# Patient Record
Sex: MALE | Race: ASIAN | NOT HISPANIC OR LATINO | URBAN - METROPOLITAN AREA
[De-identification: names, ages, dates, MRNs, and addresses within clinical notes are randomized per-mention and may not be internally consistent; named-entity substitution may affect disease eponyms.]

---

## 2021-04-01 ENCOUNTER — INPATIENT (INPATIENT)
Facility: HOSPITAL | Age: 61
LOS: 10 days | Discharge: ANOTHER HOSPITAL | End: 2021-04-12
Attending: INTERNAL MEDICINE | Admitting: INTERNAL MEDICINE
Payer: COMMERCIAL

## 2021-04-01 VITALS
SYSTOLIC BLOOD PRESSURE: 108 MMHG | RESPIRATION RATE: 28 BRPM | TEMPERATURE: 97 F | HEART RATE: 120 BPM | OXYGEN SATURATION: 60 % | DIASTOLIC BLOOD PRESSURE: 86 MMHG

## 2021-04-01 DIAGNOSIS — J96.00 ACUTE RESPIRATORY FAILURE, UNSPECIFIED WHETHER WITH HYPOXIA OR HYPERCAPNIA: ICD-10-CM

## 2021-04-01 DIAGNOSIS — R09.89 OTHER SPECIFIED SYMPTOMS AND SIGNS INVOLVING THE CIRCULATORY AND RESPIRATORY SYSTEMS: ICD-10-CM

## 2021-04-01 LAB
ALBUMIN SERPL ELPH-MCNC: 3.2 G/DL — LOW (ref 3.3–5)
ALBUMIN SERPL ELPH-MCNC: 3.2 G/DL — LOW (ref 3.3–5)
ALP SERPL-CCNC: 66 U/L — SIGNIFICANT CHANGE UP (ref 40–120)
ALP SERPL-CCNC: 66 U/L — SIGNIFICANT CHANGE UP (ref 40–120)
ALT FLD-CCNC: 74 U/L — HIGH (ref 4–41)
ALT FLD-CCNC: 74 U/L — HIGH (ref 4–41)
ANION GAP SERPL CALC-SCNC: 14 MMOL/L — SIGNIFICANT CHANGE UP (ref 7–14)
ANION GAP SERPL CALC-SCNC: 16 MMOL/L — HIGH (ref 7–14)
APTT BLD: 28.6 SEC — SIGNIFICANT CHANGE UP (ref 27–36.3)
APTT BLD: 56.8 SEC — HIGH (ref 27–36.3)
AST SERPL-CCNC: 157 U/L — HIGH (ref 4–40)
AST SERPL-CCNC: 173 U/L — HIGH (ref 4–40)
B-OH-BUTYR SERPL-SCNC: 1.3 MMOL/L — HIGH (ref 0–0.4)
BASOPHILS # BLD AUTO: 0 K/UL — SIGNIFICANT CHANGE UP (ref 0–0.2)
BASOPHILS NFR BLD AUTO: 0 % — SIGNIFICANT CHANGE UP (ref 0–2)
BILIRUB SERPL-MCNC: 0.8 MG/DL — SIGNIFICANT CHANGE UP (ref 0.2–1.2)
BILIRUB SERPL-MCNC: 0.9 MG/DL — SIGNIFICANT CHANGE UP (ref 0.2–1.2)
BLOOD GAS ARTERIAL COMPREHENSIVE RESULT: SIGNIFICANT CHANGE UP
BLOOD GAS ARTERIAL COMPREHENSIVE RESULT: SIGNIFICANT CHANGE UP
BLOOD GAS ARTERIAL COMPREHENSIVE WITH CREATININE RESULT: SIGNIFICANT CHANGE UP
BUN SERPL-MCNC: 45 MG/DL — HIGH (ref 7–23)
BUN SERPL-MCNC: 46 MG/DL — HIGH (ref 7–23)
CALCIUM SERPL-MCNC: 7.9 MG/DL — LOW (ref 8.4–10.5)
CALCIUM SERPL-MCNC: 8 MG/DL — LOW (ref 8.4–10.5)
CHLORIDE SERPL-SCNC: 103 MMOL/L — SIGNIFICANT CHANGE UP (ref 98–107)
CHLORIDE SERPL-SCNC: 103 MMOL/L — SIGNIFICANT CHANGE UP (ref 98–107)
CO2 SERPL-SCNC: 19 MMOL/L — LOW (ref 22–31)
CO2 SERPL-SCNC: 21 MMOL/L — LOW (ref 22–31)
CREAT SERPL-MCNC: 2.32 MG/DL — HIGH (ref 0.5–1.3)
CREAT SERPL-MCNC: 2.47 MG/DL — HIGH (ref 0.5–1.3)
EOSINOPHIL # BLD AUTO: 0 K/UL — SIGNIFICANT CHANGE UP (ref 0–0.5)
EOSINOPHIL NFR BLD AUTO: 0 % — SIGNIFICANT CHANGE UP (ref 0–6)
GLUCOSE BLDC GLUCOMTR-MCNC: 127 MG/DL — HIGH (ref 70–99)
GLUCOSE BLDC GLUCOMTR-MCNC: 127 MG/DL — HIGH (ref 70–99)
GLUCOSE BLDC GLUCOMTR-MCNC: 135 MG/DL — HIGH (ref 70–99)
GLUCOSE BLDC GLUCOMTR-MCNC: 140 MG/DL — HIGH (ref 70–99)
GLUCOSE BLDC GLUCOMTR-MCNC: 193 MG/DL — HIGH (ref 70–99)
GLUCOSE BLDC GLUCOMTR-MCNC: 235 MG/DL — HIGH (ref 70–99)
GLUCOSE BLDC GLUCOMTR-MCNC: 242 MG/DL — HIGH (ref 70–99)
GLUCOSE BLDC GLUCOMTR-MCNC: 243 MG/DL — HIGH (ref 70–99)
GLUCOSE BLDC GLUCOMTR-MCNC: 261 MG/DL — HIGH (ref 70–99)
GLUCOSE BLDC GLUCOMTR-MCNC: 289 MG/DL — HIGH (ref 70–99)
GLUCOSE BLDC GLUCOMTR-MCNC: 294 MG/DL — HIGH (ref 70–99)
GLUCOSE BLDC GLUCOMTR-MCNC: 360 MG/DL — HIGH (ref 70–99)
GLUCOSE SERPL-MCNC: 284 MG/DL — HIGH (ref 70–99)
GLUCOSE SERPL-MCNC: 300 MG/DL — HIGH (ref 70–99)
LYMPHOCYTES # BLD AUTO: 1.92 K/UL — SIGNIFICANT CHANGE UP (ref 1–3.3)
LYMPHOCYTES # BLD AUTO: 6.9 % — LOW (ref 13–44)
MONOCYTES # BLD AUTO: 1.2 K/UL — HIGH (ref 0–0.9)
MONOCYTES NFR BLD AUTO: 4.3 % — SIGNIFICANT CHANGE UP (ref 2–14)
NEUTROPHILS # BLD AUTO: 22.77 K/UL — HIGH (ref 1.8–7.4)
NEUTROPHILS NFR BLD AUTO: 81.9 % — HIGH (ref 43–77)
POTASSIUM SERPL-MCNC: 4.4 MMOL/L — SIGNIFICANT CHANGE UP (ref 3.5–5.3)
POTASSIUM SERPL-MCNC: 4.5 MMOL/L — SIGNIFICANT CHANGE UP (ref 3.5–5.3)
POTASSIUM SERPL-SCNC: 4.4 MMOL/L — SIGNIFICANT CHANGE UP (ref 3.5–5.3)
POTASSIUM SERPL-SCNC: 4.5 MMOL/L — SIGNIFICANT CHANGE UP (ref 3.5–5.3)
PROT SERPL-MCNC: 6 G/DL — SIGNIFICANT CHANGE UP (ref 6–8.3)
PROT SERPL-MCNC: 6 G/DL — SIGNIFICANT CHANGE UP (ref 6–8.3)
SARS-COV-2 RNA SPEC QL NAA+PROBE: DETECTED
SODIUM SERPL-SCNC: 138 MMOL/L — SIGNIFICANT CHANGE UP (ref 135–145)
SODIUM SERPL-SCNC: 138 MMOL/L — SIGNIFICANT CHANGE UP (ref 135–145)

## 2021-04-01 PROCEDURE — 71045 X-RAY EXAM CHEST 1 VIEW: CPT | Mod: 26,77,76

## 2021-04-01 PROCEDURE — 36620 INSERTION CATHETER ARTERY: CPT

## 2021-04-01 PROCEDURE — 31500 INSERT EMERGENCY AIRWAY: CPT

## 2021-04-01 PROCEDURE — 99291 CRITICAL CARE FIRST HOUR: CPT | Mod: 25

## 2021-04-01 PROCEDURE — 71275 CT ANGIOGRAPHY CHEST: CPT | Mod: 26

## 2021-04-01 PROCEDURE — 71045 X-RAY EXAM CHEST 1 VIEW: CPT | Mod: 26

## 2021-04-01 PROCEDURE — 36556 INSERT NON-TUNNEL CV CATH: CPT

## 2021-04-01 RX ORDER — NOREPINEPHRINE BITARTRATE/D5W 8 MG/250ML
0.05 PLASTIC BAG, INJECTION (ML) INTRAVENOUS
Qty: 8 | Refills: 0 | Status: DISCONTINUED | OUTPATIENT
Start: 2021-04-01 | End: 2021-04-01

## 2021-04-01 RX ORDER — HEPARIN SODIUM 5000 [USP'U]/ML
INJECTION INTRAVENOUS; SUBCUTANEOUS
Qty: 25000 | Refills: 0 | Status: DISCONTINUED | OUTPATIENT
Start: 2021-04-01 | End: 2021-04-09

## 2021-04-01 RX ORDER — INSULIN HUMAN 100 [IU]/ML
9 INJECTION, SOLUTION SUBCUTANEOUS
Qty: 100 | Refills: 0 | Status: DISCONTINUED | OUTPATIENT
Start: 2021-04-01 | End: 2021-04-02

## 2021-04-01 RX ORDER — PROPOFOL 10 MG/ML
10 INJECTION, EMULSION INTRAVENOUS
Qty: 1000 | Refills: 0 | Status: DISCONTINUED | OUTPATIENT
Start: 2021-04-01 | End: 2021-04-01

## 2021-04-01 RX ORDER — NOREPINEPHRINE BITARTRATE/D5W 8 MG/250ML
0.05 PLASTIC BAG, INJECTION (ML) INTRAVENOUS
Qty: 16 | Refills: 0 | Status: DISCONTINUED | OUTPATIENT
Start: 2021-04-01 | End: 2021-04-06

## 2021-04-01 RX ORDER — DEXAMETHASONE 0.5 MG/5ML
6 ELIXIR ORAL DAILY
Refills: 0 | Status: COMPLETED | OUTPATIENT
Start: 2021-04-01 | End: 2021-04-10

## 2021-04-01 RX ORDER — SODIUM CHLORIDE 9 MG/ML
1000 INJECTION INTRAMUSCULAR; INTRAVENOUS; SUBCUTANEOUS ONCE
Refills: 0 | Status: COMPLETED | OUTPATIENT
Start: 2021-04-01 | End: 2021-04-01

## 2021-04-01 RX ORDER — MIDAZOLAM HYDROCHLORIDE 1 MG/ML
0.1 INJECTION, SOLUTION INTRAMUSCULAR; INTRAVENOUS
Qty: 100 | Refills: 0 | Status: DISCONTINUED | OUTPATIENT
Start: 2021-04-01 | End: 2021-04-03

## 2021-04-01 RX ORDER — MIDAZOLAM HYDROCHLORIDE 1 MG/ML
0.02 INJECTION, SOLUTION INTRAMUSCULAR; INTRAVENOUS
Qty: 100 | Refills: 0 | Status: DISCONTINUED | OUTPATIENT
Start: 2021-04-01 | End: 2021-04-03

## 2021-04-01 RX ORDER — HEPARIN SODIUM 5000 [USP'U]/ML
6000 INJECTION INTRAVENOUS; SUBCUTANEOUS EVERY 6 HOURS
Refills: 0 | Status: DISCONTINUED | OUTPATIENT
Start: 2021-04-01 | End: 2021-04-01

## 2021-04-01 RX ORDER — VANCOMYCIN HCL 1 G
1000 VIAL (EA) INTRAVENOUS ONCE
Refills: 0 | Status: COMPLETED | OUTPATIENT
Start: 2021-04-01 | End: 2021-04-01

## 2021-04-01 RX ORDER — PROPOFOL 10 MG/ML
10 INJECTION, EMULSION INTRAVENOUS
Qty: 1000 | Refills: 0 | Status: DISCONTINUED | OUTPATIENT
Start: 2021-04-01 | End: 2021-04-06

## 2021-04-01 RX ORDER — CHLORHEXIDINE GLUCONATE 213 G/1000ML
1 SOLUTION TOPICAL
Refills: 0 | Status: DISCONTINUED | OUTPATIENT
Start: 2021-04-01 | End: 2021-04-12

## 2021-04-01 RX ORDER — DEXAMETHASONE 0.5 MG/5ML
6 ELIXIR ORAL ONCE
Refills: 0 | Status: COMPLETED | OUTPATIENT
Start: 2021-04-01 | End: 2021-04-01

## 2021-04-01 RX ORDER — INSULIN HUMAN 100 [IU]/ML
0.1 INJECTION, SOLUTION SUBCUTANEOUS
Qty: 100 | Refills: 0 | Status: DISCONTINUED | OUTPATIENT
Start: 2021-04-01 | End: 2021-04-01

## 2021-04-01 RX ORDER — DEXTROSE 50 % IN WATER 50 %
25 SYRINGE (ML) INTRAVENOUS ONCE
Refills: 0 | Status: DISCONTINUED | OUTPATIENT
Start: 2021-04-01 | End: 2021-04-12

## 2021-04-01 RX ORDER — CEFEPIME 1 G/1
1000 INJECTION, POWDER, FOR SOLUTION INTRAMUSCULAR; INTRAVENOUS ONCE
Refills: 0 | Status: COMPLETED | OUTPATIENT
Start: 2021-04-01 | End: 2021-04-02

## 2021-04-01 RX ORDER — CHLORHEXIDINE GLUCONATE 213 G/1000ML
15 SOLUTION TOPICAL EVERY 12 HOURS
Refills: 0 | Status: DISCONTINUED | OUTPATIENT
Start: 2021-04-01 | End: 2021-04-07

## 2021-04-01 RX ORDER — DEXTROSE 50 % IN WATER 50 %
15 SYRINGE (ML) INTRAVENOUS ONCE
Refills: 0 | Status: DISCONTINUED | OUTPATIENT
Start: 2021-04-01 | End: 2021-04-02

## 2021-04-01 RX ORDER — PROPOFOL 10 MG/ML
20 INJECTION, EMULSION INTRAVENOUS
Qty: 1000 | Refills: 0 | Status: DISCONTINUED | OUTPATIENT
Start: 2021-04-01 | End: 2021-04-02

## 2021-04-01 RX ORDER — DEXTROSE 10 % IN WATER 10 %
1000 INTRAVENOUS SOLUTION INTRAVENOUS
Refills: 0 | Status: DISCONTINUED | OUTPATIENT
Start: 2021-04-01 | End: 2021-04-01

## 2021-04-01 RX ORDER — FENTANYL CITRATE 50 UG/ML
0.5 INJECTION INTRAVENOUS
Qty: 2500 | Refills: 0 | Status: DISCONTINUED | OUTPATIENT
Start: 2021-04-01 | End: 2021-04-02

## 2021-04-01 RX ORDER — GLUCAGON INJECTION, SOLUTION 0.5 MG/.1ML
1 INJECTION, SOLUTION SUBCUTANEOUS ONCE
Refills: 0 | Status: DISCONTINUED | OUTPATIENT
Start: 2021-04-01 | End: 2021-04-12

## 2021-04-01 RX ORDER — HUMAN INSULIN 100 [IU]/ML
5 INJECTION, SUSPENSION SUBCUTANEOUS EVERY 6 HOURS
Refills: 0 | Status: DISCONTINUED | OUTPATIENT
Start: 2021-04-01 | End: 2021-04-02

## 2021-04-01 RX ORDER — INSULIN LISPRO 100/ML
VIAL (ML) SUBCUTANEOUS EVERY 6 HOURS
Refills: 0 | Status: DISCONTINUED | OUTPATIENT
Start: 2021-04-01 | End: 2021-04-02

## 2021-04-01 RX ORDER — SODIUM CHLORIDE 9 MG/ML
1000 INJECTION, SOLUTION INTRAVENOUS
Refills: 0 | Status: DISCONTINUED | OUTPATIENT
Start: 2021-04-01 | End: 2021-04-02

## 2021-04-01 RX ORDER — CISATRACURIUM BESYLATE 2 MG/ML
3 INJECTION INTRAVENOUS
Qty: 200 | Refills: 0 | Status: DISCONTINUED | OUTPATIENT
Start: 2021-04-01 | End: 2021-04-02

## 2021-04-01 RX ORDER — HEPARIN SODIUM 5000 [USP'U]/ML
3000 INJECTION INTRAVENOUS; SUBCUTANEOUS EVERY 6 HOURS
Refills: 0 | Status: DISCONTINUED | OUTPATIENT
Start: 2021-04-01 | End: 2021-04-01

## 2021-04-01 RX ORDER — DEXTROSE 50 % IN WATER 50 %
12.5 SYRINGE (ML) INTRAVENOUS ONCE
Refills: 0 | Status: DISCONTINUED | OUTPATIENT
Start: 2021-04-01 | End: 2021-04-12

## 2021-04-01 RX ADMIN — SODIUM CHLORIDE 1000 MILLILITER(S): 9 INJECTION INTRAMUSCULAR; INTRAVENOUS; SUBCUTANEOUS at 08:49

## 2021-04-01 RX ADMIN — Medication 6 MILLIGRAM(S): at 22:01

## 2021-04-01 RX ADMIN — CISATRACURIUM BESYLATE 13 MICROGRAM(S)/KG/MIN: 2 INJECTION INTRAVENOUS at 17:15

## 2021-04-01 RX ADMIN — MIDAZOLAM HYDROCHLORIDE 1.8 MG/KG/HR: 1 INJECTION, SOLUTION INTRAMUSCULAR; INTRAVENOUS at 11:29

## 2021-04-01 RX ADMIN — Medication 3.38 MICROGRAM(S)/KG/MIN: at 17:23

## 2021-04-01 RX ADMIN — SODIUM CHLORIDE 1000 MILLILITER(S): 9 INJECTION INTRAMUSCULAR; INTRAVENOUS; SUBCUTANEOUS at 09:18

## 2021-04-01 RX ADMIN — FENTANYL CITRATE 4.5 MICROGRAM(S)/KG/HR: 50 INJECTION INTRAVENOUS at 22:01

## 2021-04-01 RX ADMIN — Medication 6 MILLIGRAM(S): at 08:44

## 2021-04-01 RX ADMIN — PROPOFOL 5.4 MICROGRAM(S)/KG/MIN: 10 INJECTION, EMULSION INTRAVENOUS at 10:46

## 2021-04-01 RX ADMIN — HUMAN INSULIN 5 UNIT(S): 100 INJECTION, SUSPENSION SUBCUTANEOUS at 20:59

## 2021-04-01 RX ADMIN — INSULIN HUMAN 9 UNIT(S)/HR: 100 INJECTION, SOLUTION SUBCUTANEOUS at 09:40

## 2021-04-01 RX ADMIN — MIDAZOLAM HYDROCHLORIDE 1.8 MG/KG/HR: 1 INJECTION, SOLUTION INTRAMUSCULAR; INTRAVENOUS at 21:02

## 2021-04-01 RX ADMIN — Medication 250 MILLIGRAM(S): at 17:39

## 2021-04-01 RX ADMIN — INSULIN HUMAN 9 UNIT(S)/HR: 100 INJECTION, SOLUTION SUBCUTANEOUS at 20:59

## 2021-04-01 RX ADMIN — PROPOFOL 8.64 MICROGRAM(S)/KG/MIN: 10 INJECTION, EMULSION INTRAVENOUS at 18:45

## 2021-04-01 RX ADMIN — CHLORHEXIDINE GLUCONATE 15 MILLILITER(S): 213 SOLUTION TOPICAL at 17:40

## 2021-04-01 RX ADMIN — FENTANYL CITRATE 4.5 MICROGRAM(S)/KG/HR: 50 INJECTION INTRAVENOUS at 10:47

## 2021-04-01 RX ADMIN — HEPARIN SODIUM 1300 UNIT(S)/HR: 5000 INJECTION INTRAVENOUS; SUBCUTANEOUS at 17:00

## 2021-04-01 RX ADMIN — Medication 8.44 MICROGRAM(S)/KG/MIN: at 11:40

## 2021-04-01 NOTE — H&P ADULT - NSHPOUTPATIENTPROVIDERS_GEN_ALL_CORE
Alba Pizarro, Nephew: [phone] - primary contact  Demetrius, Brother in law (in [place]): [phone]   Lucero, Wife (in [place]): [phone] (speaks [place])

## 2021-04-01 NOTE — ED PROVIDER NOTE - OBJECTIVE STATEMENT
61yo M unknown medical history BIBEMS for respiratory distress, recently COVID+ as per EMS. Pt unable to comply with history 2/2 resp distress, nods yes to difficulty breathing, no to pain. AAOx3. En route, pt had rr-28, O2-60%.

## 2021-04-01 NOTE — ED ADULT NURSE NOTE - OBJECTIVE STATEMENT
Pt. brought in by EMS to Room 9 from home Covid + c/o SOB and tachypnea. As per Ems pt. satting 60% on nonrebreather and tachycardic. Pt. respirations even and labored, abdomen distended and nontender, extremities cool to touch, placed on CM, ST at this time. Respiratory at bedside, pt. placed on Bipap, satting 92%. 20G IV obtained in LAC, blood obtained, flushing without resistance, dressing dry and intact. 18G IV obtained in RAC, blood obtained, flushing without resistance, dressing dry and intact. Safety precautions obtained.

## 2021-04-01 NOTE — H&P ADULT - ASSESSMENT
60M unknown PMHx presented with hypoxic respiratory failure 2/2 COVID, complicated by severe HAGMA, intubated for hypoxic respiratory failure.     # NEURO  - AOx3 at baseline  - Sedated with versed and fentanyl  - May paralyze pending severity of ARDS    # CARDIAC  Troponin elevation in setting of increased demand and SUKI  - No ischemic changes no EKG  - R/o PE  - Trend trop q6 until peaked    # PULM  Hypoxic respiratory failure 2/2 COVID +/- PE  - CTA chest r/o PE  - Lung protective ventilation; 6mL/kg IBW = ______  - Volume AC, titrate FiO2 to maintain PaO2 > 55  - Dexamethasone 6 mg qdaily x 10 days (4/1 - )    # RENAL  ?SUKI vs CKD  - olson for strict I&Os  - trend Cr  - Avoid nephrotoxic agents, NSAIDs, RCA  - Renally dose meds    HAGMA lactic acidosis 2/2 sepsis +/- DKA  - f/u BHB  - fluid resuscitation    # GI   - Trickle feeds    # HEME/ONC  Elevated d-dimer/other inflammatory markers w/ hypoxia and tachycardia  - will start lovenox covid ppx dosing vs therapeutic AC pending CTA    # ID  Sepsis  - IVF 30cc/kg  - empiric vanc/cefepimep    Tx COVID19 as above    # ENDO  DKA?  - f/u bhb  - on insulin gtt  - f/u a1c

## 2021-04-01 NOTE — PROCEDURE NOTE - NSPOSTCAREGUIDE_GEN_A_CORE
Verbal/written post procedure instructions were given to patient/caregiver/Instructed patient/caregiver to follow-up with primary care physician/Instructed patient/caregiver regarding signs and symptoms of infection/Care for catheter as per unit/ICU protocols

## 2021-04-01 NOTE — ED ADULT TRIAGE NOTE - INTERNATIONAL TRAVEL
Outgoing call to patient, to advise of echo appointment scheduled for May 13, 2020 at 0845 AM at [facility].     Please transfer to [phone] when patient returns call.   
Patient advised.   
No

## 2021-04-01 NOTE — ED ADULT NURSE NOTE - CHIEF COMPLAINT QUOTE
Pt is Covid ++ BIBA due to respiratory distress, ), pt resp rate 28 and tachycardic in triage 122 bpm; pt on non-rebreather;  O2 sat reported 60% in ambulance, unable to obtain pulse ox reading in Amb [facility]; pt brought directly to  9; pt unable to speak due to SOB.

## 2021-04-01 NOTE — ED PROVIDER NOTE - CLINICAL SUMMARY MEDICAL DECISION MAKING FREE TEXT BOX
59yo M unknown medical history BIBEMS for respiratory distress and +COVID. O2 sat initially 45% on NRB, improved to 88%, will provide respiratory support with NIPPV, will obtain labs, ekg, cxr, possible icu evaluation, will reassess

## 2021-04-01 NOTE — CONSULT NOTE ADULT - ATTENDING COMMENTS
60 year old man with hypoxic respiratory failure requiring intubation HAGMA combination of DKA and elevated lactate with associated renal injury in the setting of viral pneumonia from COVID -19 infection admit to ICU for further managment

## 2021-04-01 NOTE — ED ADULT TRIAGE NOTE - CHIEF COMPLAINT QUOTE
Pt is Covid ++ BIBA due to respiratory distress, ), pt resp rate 28 and tachycardic in triage 122 bpm; pt on non-rebreather;  O2 sat reported 60% in ambulance, unable to obtain pulse ox reading in Amb [facility]; pt brought directly to  9; pt unable to speak due to SOB.

## 2021-04-01 NOTE — ED PROVIDER NOTE - PROGRESS NOTE DETAILS
Eliel Leo, PGY-1: Pt brought in by EMS, tachypneic to 40, cold, sat 60-90 with poor wave form. EKG performed, however limited 2/2 tremor, unable to interpret. BIPAP called immediately, just started, will monitor for impovement. Pt blood sugar 576, Glidescope at bedside. Eliel Leo, PGY-1: VBG demonstrates severe acidosis, Hyperglycemia, fluids started, and insulin given. MICU consulted. Sats 93%, will attempt to avoid intubation as patient may be attempting to compensate w high RR. CTA ordered. Khanh Cardenas PGY3  micu evaluated, still tachypneic, rec intubation. pt intubated without complication. will admit to micu Khanh Cardenas PGY3  micu evaluated, still tachypneic, rec intubation. pt intubated without complication. will admit to micu, placement confirmed on CXR Eliel Leo, PGY-1: several days of URI and fever, COVID+ last Wednesday, has since been in quarantine. Today was found to be severely dyspneic by neighbor. Not previously on medications as per brother, recently moved to [place], medical history is unknown.     Brother: [phone]

## 2021-04-01 NOTE — CONSULT NOTE ADULT - ASSESSMENT
60M unknown PMH brought to [facility] for acute hypoxic respiratory failure in setting of recent COVID infection, also with concern for DKA. Intubated in ED.     #Acute hypoxic respiratory failure in setting of COVID-19  -initially tachypneic to 40s on AVAPS. now intubated  -post-intubation ABG and CXR   -sedated on propofol   -current settings: 24/400/8/100  -trend ABGs    #HAGMA, r/o DKA   -AG of 29, bicarb of 123, elevated FSG to 500s, pH 7.05. No beta hydroxy butyrate ordered, unclear if true DKA.   -component of lactic acidosis as lactate is 15. high lactate, likely due to severe hypoxia   -agree with insulin gtt given elevated FSGs   -trend BMP and FSG       TO BE DISCUSSED WITH ATTENDING  60M unknown PMH brought to [facility] for acute hypoxic respiratory failure in setting of recent COVID infection, also with concern for DKA. Intubated in ED.     #Acute hypoxic respiratory failure in setting of COVID-19  -initially tachypneic to 40s on AVAPS. now intubated  -post-intubation ABG and CXR   -sedated on propofol   -current settings: 24/400/8/100  -trend ABGs    #HAGMA, r/o DKA   -AG of 29, bicarb of 123, elevated FSG to 500s, pH 7.05. No beta hydroxy butyrate ordered, unclear if true DKA.   -component of lactic acidosis as lactate is 15. high lactate, likely due to severe hypoxia   -agree with insulin gtt given elevated FSGs   -trend BMP and FSG       BEING ADMITTED TO [facility]

## 2021-04-01 NOTE — H&P ADULT - NSHPLABSRESULTS_GEN_ALL_CORE
14.6   27.80 )-----------( 230      ( 01 Apr 2021 08:52 )             47.1     04-01    135  |  93<L>  |  39<H>  ----------------------------<  625<HH>  4.5   |  13<L>  |  1.97<H>    Ca    9.0      01 Apr 2021 08:52    TPro  7.2  /  Alb  3.6  /  TBili  0.8  /  DBili  x   /  AST  37  /  ALT  27  /  AlkPhos  72  04-01    PT/INR - ( 01 Apr 2021 08:52 )   PT: 17.9 sec;   INR: 1.61 ratio         PTT - ( 01 Apr 2021 08:52 )  PTT:24.2 sec    pH, Venous: 7.05 (04-01-21 @ 08:52)  pCO2, Venous: 51 mmHg (04-01-21 @ 08:52)  HCO3, Venous: 10 mmol/L (04-01-21 @ 08:52)  pO2, Venous: <24 mmHg (04-01-21 @ 08:52)    Blood Gas Venous - Lactate: 14.9 mmol/L (04-01-21 @ 08:52)      CARDIAC MARKERS ( 01 Apr 2021 08:52 )  x     / x     / 504 U/L / x     / x          CAPILLARY BLOOD GLUCOSE      POCT Blood Glucose.: 360 mg/dL (01 Apr 2021 11:14)  POCT Blood Glucose.: 381 mg/dL (01 Apr 2021 10:15)  POCT Blood Glucose.: 326 mg/dL (01 Apr 2021 09:16)  POCT Blood Glucose.: 576 mg/dL (01 Apr 2021 08:45)      CXR < from: Xray Chest 1 View- PORTABLE-Urgent (Xray Chest 1 View- PORTABLE-Urgent .) (04.01.21 @ 10:51) >      < end of copied text >    IMPRESSION:  Appropriately positioned ET tube with tip approximately 3.3 cm above the asha.    Diffuse bilateral patchy lung opacities, concerning for multifocal pneumonia including an atypical/viral infection such as Covid 19, not significantly changed.

## 2021-04-01 NOTE — H&P ADULT - HISTORY OF PRESENT ILLNESS
60M unknown medical history BIBEMS for respiratory distress, recently COVID+ as per EMS. Pt unable to comply with history 2/2 resp distress, nods yes to difficulty breathing, no to pain. Baseline AAOx3. En route, pt had RR 28, SaO2 60%. In ED initially hypoxic to 60% placed on NRB and satting low 90s and tachypneic to 40s on NRB. Placed on AVAPS, still tachypneic to 40s. Labs showing HAGMA, elevated FSG to 500s. Also with lactate of 15. Trops of 200 and pro-BNP elevated to 21k. MICU consulted for respiratory failure in setting of COVID, also concern for DKA. On encounter, pt is noncooperative with questioning. Nods and tries to speak, unable to form full sentences and visibly tachypneic on AVAPS. In ED given 2L NS, decadron. Started on insulin gtt. Subsequently intubated.     Initial vitals 108/86, , RR 28, T97, SaO2 60% on 15L NRB. Received 2L NS bolus. Started on insulin gtt.     Pt admitted to surg b for further management.  60M unknown medical history BIBEMS for respiratory distress, recently COVID+ as per EMS. Pt unable to comply with history 2/2 resp distress, nods yes to difficulty breathing, no to pain. Baseline AAOx3. En route, pt had RR 28, SaO2 60%. In ED initially hypoxic to 60% placed on NRB and satting low 90s and tachypneic to 40s on NRB. Placed on AVAPS, still tachypneic to 40s. Labs showing HAGMA, elevated FSG to 500s. Also with lactate of 15. Trops of 200 and pro-BNP elevated to 21k. MICU consulted for respiratory failure in setting of COVID, also concern for DKA. On encounter, pt is noncooperative with questioning. Nods and tries to speak, unable to form full sentences and visibly tachypneic on AVAPS. In ED given 2L NS, decadron. Started on insulin gtt. Subsequently intubated.     Initial vitals 108/86, , RR 28, T97, SaO2 60% on 15L NRB. Received 2L NS bolus. Started on insulin gtt.     Pt admitted to surg b for further management.     Addendum 4/1/21 1700:  Limited history obtained from Alba Pizarro nephgurwinder, who lives in the [place] [phone]). Patient has a PMH of low BP and takes no medications. Patient tested positive for COVID on 3/19. He was initially doing fine with some fatigue, but suddenly decompensated after yesterday. Unable to obtain social history. Wife, Lucero, is currently in [place] - she went due to medical issues but was unable to return due to COVID travel restrictions.

## 2021-04-01 NOTE — H&P ADULT - ATTENDING COMMENTS
arrived intubated sedated on levophed    Covid PNA  acute resp failure hypoxic  PE with signs of right heart strain  lactic acidosis    # CVS- cont levophed for MAP <65, rayna placed  trend  lactate  official TTE - LVH on pocus, right heart dilation  check rigoberto again  # resp wean FiO2 as tolerated ,abg pending, prone for PF <150  # neuro cont versed fent , nimbex   # ID cont dexamethasone, empiric abx   # heme - IV heparin with bolus  # endo glucose resolved not in apparent DKA, follow glucose  # check pH if still <7.2 consider hco pushes  # adm to ICU

## 2021-04-01 NOTE — CONSULT NOTE ADULT - SUBJECTIVE AND OBJECTIVE BOX
HPI:  60M unknown medical history BIBEMS for respiratory distress, recently COVID+ as per EMS. Pt unable to comply with history 2/2 resp distress, nods yes to difficulty breathing, no to pain. AAOx3. En route, pt had rr-28, O2-60%. In ED initially hypoxic to 60% placed on NRB and satting low 90s and tachypneic to 40s  on NRB. Placed on AVAPS, still tachypneic to 40s. Labs showing HAGMA, elevated FSG to 500s. Also with lactate of 15. Trops of 200 and pro-BNP elevated to 21k. MICU consulted for respiratory failure in setting of COVID, also concern for DKA. On encounter pt is noncooperative with questioning. Nods and tries to speak, unable to form full sentences and visibly tachypneic on AVAPS. In ED given 2L NS, decadron. Started on insulin gtt. Subsequently intubated.       REVIEW OF SYSTEMS:  unable to assess     PMH/PSH:  PAST MEDICAL & SURGICAL HISTORY:      FAMILY HISTORY:  FAMILY HISTORY:      SOCIAL HISTORY:  Smoking: [  ] Never Smoked  [  ] Former Smoker (# packs x # years), quit   [  ] Current Smoker (# packs x # years)  Substance Use: [  ] None; [   ] Yes:  EtOH Use: [   ] None; [   ] Rare; [   ] Social; [   ] Frequent:   Marital Status: [  ] Single  [  ]   [  ]   [  ]   Dependents:  Occupation:  Barriers to treatment:   Advance Directives:     HOME MEDICATIONS:  Home Medications:      ALLERGIES:  Allergies    No Known Allergies    Intolerances            OBJECTIVE:  ICU Vital Signs Last 24 Hrs  T(C): 36.1 (01 Apr 2021 08:15), Max: 36.1 (01 Apr 2021 08:15)  T(F): 97 (01 Apr 2021 08:15), Max: 97 (01 Apr 2021 08:15)  HR: 123 (01 Apr 2021 10:19) (118 - 127)  BP: 148/86 (01 Apr 2021 09:41) (108/86 - 148/86)  BP(mean): 107 (01 Apr 2021 09:41) (104 - 107)  ABP: --  ABP(mean): --  RR: 45 (01 Apr 2021 09:41) (28 - 45)  SpO2: 88% (01 Apr 2021 10:19) (60% - 92%)    Mode: AC/ CMV (Assist Control/ Continuous Mandatory Ventilation), RR (machine): 24, TV (machine): 400, FiO2: 100, PEEP: 8, ITime: 1, MAP: 9, PIP: 47    CAPILLARY BLOOD GLUCOSE      POCT Blood Glucose.: 381 mg/dL (01 Apr 2021 10:15)      PHYSICAL EXAM  GENERAL: tachypneic male in acute distress   NEURO: follows commands; spontaneously moving all 4 extremities; strength and sensation grossly intact  HEENT: Pupil equal and reactive to light; extra-ocular movements intact; clear conjunctiva; normal mucus membrane and oropharynx; neck supple  RESP: rhonchi, tachypneic, on AVAPS   CVS: S1/S2 present, tachycardic   ABD: Soft, non-tender, non-distended, no masses appreciated; bowel sound normal at all 4 quadrants  EXT: Grossly normal ROM; 2+ pedal pulses bilaterally, no BLE edema  SKIN: Warm, dry, and appropirate color for skin tone; No new rashes      HOSPITAL MEDICATIONS:  MEDICATIONS  (STANDING):  chlorhexidine 0.12% Liquid 15 milliLiter(s) Oral Mucosa every 12 hours  insulin regular Infusion 9 Unit(s)/Hr (9 mL/Hr) IV Continuous <Continuous>  propofol Infusion 10 MICROgram(s)/kG/Min (5.4 mL/Hr) IV Continuous <Continuous>    MEDICATIONS  (PRN):        LABS:                        14.6   27.80 )-----------( 230      ( 01 Apr 2021 08:52 )             47.1     Hgb Trend: 14.6<--  04-01    135  |  93<L>  |  39<H>  ----------------------------<  625<HH>  4.5   |  13<L>  |  1.97<H>    Ca    9.0      01 Apr 2021 08:52    TPro  7.2  /  Alb  3.6  /  TBili  0.8  /  DBili  x   /  AST  37  /  ALT  27  /  AlkPhos  72  04-01    Creatinine Trend: 1.97<--  PT/INR - ( 01 Apr 2021 08:52 )   PT: 17.9 sec;   INR: 1.61 ratio         PTT - ( 01 Apr 2021 08:52 )  PTT:24.2 sec      Venous Blood Gas:  04-01 @ 08:52  7.05/51/<24/10/14.7  VBG Lactate: 14.9      MICROBIOLOGY:       RADIOLOGY & ADDITIONAL TESTS:   < from: Xray Chest 1 View- PORTABLE-Urgent (04.01.21 @ 09:12) >  INTERPRETATION:    Heart size and the mediastinum cannot be accurately evaluated on this projection.  There are diffuse bilateral patchy lung opacities.  No pleural effusion or pneumothorax seen.  There is osteoarthritic degenerative change of the spine.      IMPRESSION:  Diffuse bilateral patchy lung opacities, concerning for multifocal pneumonia including an atypical/viral infection such as Covid 19.      < end of copied text >

## 2021-04-02 LAB
A1C WITH ESTIMATED AVERAGE GLUCOSE RESULT: 10.3 % — HIGH (ref 4–5.6)
ALBUMIN SERPL ELPH-MCNC: 2.9 G/DL — LOW (ref 3.3–5)
ALP SERPL-CCNC: 62 U/L — SIGNIFICANT CHANGE UP (ref 40–120)
ALT FLD-CCNC: 74 U/L — HIGH (ref 4–41)
ANION GAP SERPL CALC-SCNC: 12 MMOL/L — SIGNIFICANT CHANGE UP (ref 7–14)
ANION GAP SERPL CALC-SCNC: 16 MMOL/L — HIGH (ref 7–14)
ANION GAP SERPL CALC-SCNC: 16 MMOL/L — HIGH (ref 7–14)
APTT BLD: 69.5 SEC — HIGH (ref 27–36.3)
APTT BLD: 69.9 SEC — HIGH (ref 27–36.3)
AST SERPL-CCNC: 153 U/L — HIGH (ref 4–40)
B-OH-BUTYR SERPL-SCNC: 1.9 MMOL/L — HIGH (ref 0–0.4)
B-OH-BUTYR SERPL-SCNC: <0 MMOL/L — SIGNIFICANT CHANGE UP (ref 0–0.4)
BASOPHILS # BLD AUTO: 0.04 K/UL — SIGNIFICANT CHANGE UP (ref 0–0.2)
BASOPHILS NFR BLD AUTO: 0.1 % — SIGNIFICANT CHANGE UP (ref 0–2)
BILIRUB SERPL-MCNC: 0.8 MG/DL — SIGNIFICANT CHANGE UP (ref 0.2–1.2)
BLOOD GAS ARTERIAL COMPREHENSIVE RESULT: SIGNIFICANT CHANGE UP
BUN SERPL-MCNC: 55 MG/DL — HIGH (ref 7–23)
BUN SERPL-MCNC: 56 MG/DL — HIGH (ref 7–23)
BUN SERPL-MCNC: 59 MG/DL — HIGH (ref 7–23)
CALCIUM SERPL-MCNC: 7.8 MG/DL — LOW (ref 8.4–10.5)
CALCIUM SERPL-MCNC: 8 MG/DL — LOW (ref 8.4–10.5)
CALCIUM SERPL-MCNC: 8.3 MG/DL — LOW (ref 8.4–10.5)
CHLORIDE SERPL-SCNC: 101 MMOL/L — SIGNIFICANT CHANGE UP (ref 98–107)
CHLORIDE SERPL-SCNC: 101 MMOL/L — SIGNIFICANT CHANGE UP (ref 98–107)
CHLORIDE SERPL-SCNC: 106 MMOL/L — SIGNIFICANT CHANGE UP (ref 98–107)
CO2 SERPL-SCNC: 18 MMOL/L — LOW (ref 22–31)
CO2 SERPL-SCNC: 18 MMOL/L — LOW (ref 22–31)
CO2 SERPL-SCNC: 22 MMOL/L — SIGNIFICANT CHANGE UP (ref 22–31)
CREAT SERPL-MCNC: 2.5 MG/DL — HIGH (ref 0.5–1.3)
CREAT SERPL-MCNC: 2.57 MG/DL — HIGH (ref 0.5–1.3)
CREAT SERPL-MCNC: 2.65 MG/DL — HIGH (ref 0.5–1.3)
D DIMER BLD IA.RAPID-MCNC: HIGH NG/ML DDU
EOSINOPHIL # BLD AUTO: 0 K/UL — SIGNIFICANT CHANGE UP (ref 0–0.5)
EOSINOPHIL NFR BLD AUTO: 0 % — SIGNIFICANT CHANGE UP (ref 0–6)
ESTIMATED AVERAGE GLUCOSE: 249 MG/DL — HIGH (ref 68–114)
GLUCOSE BLDC GLUCOMTR-MCNC: 116 MG/DL — HIGH (ref 70–99)
GLUCOSE BLDC GLUCOMTR-MCNC: 121 MG/DL — HIGH (ref 70–99)
GLUCOSE BLDC GLUCOMTR-MCNC: 146 MG/DL — HIGH (ref 70–99)
GLUCOSE BLDC GLUCOMTR-MCNC: 184 MG/DL — HIGH (ref 70–99)
GLUCOSE BLDC GLUCOMTR-MCNC: 190 MG/DL — HIGH (ref 70–99)
GLUCOSE BLDC GLUCOMTR-MCNC: 258 MG/DL — HIGH (ref 70–99)
GLUCOSE BLDC GLUCOMTR-MCNC: 278 MG/DL — HIGH (ref 70–99)
GLUCOSE BLDC GLUCOMTR-MCNC: 317 MG/DL — HIGH (ref 70–99)
GLUCOSE BLDC GLUCOMTR-MCNC: 339 MG/DL — HIGH (ref 70–99)
GLUCOSE BLDC GLUCOMTR-MCNC: 362 MG/DL — HIGH (ref 70–99)
GLUCOSE BLDC GLUCOMTR-MCNC: 372 MG/DL — HIGH (ref 70–99)
GLUCOSE BLDC GLUCOMTR-MCNC: 383 MG/DL — HIGH (ref 70–99)
GLUCOSE BLDC GLUCOMTR-MCNC: 397 MG/DL — HIGH (ref 70–99)
GLUCOSE BLDC GLUCOMTR-MCNC: 421 MG/DL — HIGH (ref 70–99)
GLUCOSE SERPL-MCNC: 141 MG/DL — HIGH (ref 70–99)
GLUCOSE SERPL-MCNC: 255 MG/DL — HIGH (ref 70–99)
GLUCOSE SERPL-MCNC: 454 MG/DL — CRITICAL HIGH (ref 70–99)
HCT VFR BLD CALC: 38.6 % — LOW (ref 39–50)
HGB BLD-MCNC: 13 G/DL — SIGNIFICANT CHANGE UP (ref 13–17)
IANC: 30.02 K/UL — HIGH (ref 1.5–8.5)
IMM GRANULOCYTES NFR BLD AUTO: 6.3 % — HIGH (ref 0–1.5)
LYMPHOCYTES # BLD AUTO: 1.42 K/UL — SIGNIFICANT CHANGE UP (ref 1–3.3)
LYMPHOCYTES # BLD AUTO: 4.1 % — LOW (ref 13–44)
MAGNESIUM SERPL-MCNC: 2.6 MG/DL — SIGNIFICANT CHANGE UP (ref 1.6–2.6)
MCHC RBC-ENTMCNC: 28.4 PG — SIGNIFICANT CHANGE UP (ref 27–34)
MCHC RBC-ENTMCNC: 33.7 GM/DL — SIGNIFICANT CHANGE UP (ref 32–36)
MCV RBC AUTO: 84.3 FL — SIGNIFICANT CHANGE UP (ref 80–100)
MONOCYTES # BLD AUTO: 1.22 K/UL — HIGH (ref 0–0.9)
MONOCYTES NFR BLD AUTO: 3.5 % — SIGNIFICANT CHANGE UP (ref 2–14)
NEUTROPHILS # BLD AUTO: 30.02 K/UL — HIGH (ref 1.8–7.4)
NEUTROPHILS NFR BLD AUTO: 86 % — HIGH (ref 43–77)
NRBC # BLD: 0 /100 WBCS — SIGNIFICANT CHANGE UP
NRBC # FLD: 0.26 K/UL — HIGH
PHOSPHATE SERPL-MCNC: 4 MG/DL — SIGNIFICANT CHANGE UP (ref 2.5–4.5)
PHOSPHATE SERPL-MCNC: 4.3 MG/DL — SIGNIFICANT CHANGE UP (ref 2.5–4.5)
PLATELET # BLD AUTO: 139 K/UL — LOW (ref 150–400)
POTASSIUM SERPL-MCNC: 4.5 MMOL/L — SIGNIFICANT CHANGE UP (ref 3.5–5.3)
POTASSIUM SERPL-MCNC: 5.3 MMOL/L — SIGNIFICANT CHANGE UP (ref 3.5–5.3)
POTASSIUM SERPL-MCNC: 5.5 MMOL/L — HIGH (ref 3.5–5.3)
POTASSIUM SERPL-SCNC: 4.5 MMOL/L — SIGNIFICANT CHANGE UP (ref 3.5–5.3)
POTASSIUM SERPL-SCNC: 5.3 MMOL/L — SIGNIFICANT CHANGE UP (ref 3.5–5.3)
POTASSIUM SERPL-SCNC: 5.5 MMOL/L — HIGH (ref 3.5–5.3)
PROT SERPL-MCNC: 5.9 G/DL — LOW (ref 6–8.3)
RBC # BLD: 4.58 M/UL — SIGNIFICANT CHANGE UP (ref 4.2–5.8)
RBC # FLD: 15.4 % — HIGH (ref 10.3–14.5)
SODIUM SERPL-SCNC: 135 MMOL/L — SIGNIFICANT CHANGE UP (ref 135–145)
SODIUM SERPL-SCNC: 135 MMOL/L — SIGNIFICANT CHANGE UP (ref 135–145)
SODIUM SERPL-SCNC: 140 MMOL/L — SIGNIFICANT CHANGE UP (ref 135–145)
TROPONIN T, HIGH SENSITIVITY RESULT: 391 NG/L — CRITICAL HIGH
TROPONIN T, HIGH SENSITIVITY RESULT: 555 NG/L — CRITICAL HIGH
VANCOMYCIN FLD-MCNC: 17.4 UG/ML — SIGNIFICANT CHANGE UP
WBC # BLD: 34.9 K/UL — HIGH (ref 3.8–10.5)
WBC # FLD AUTO: 34.9 K/UL — HIGH (ref 3.8–10.5)

## 2021-04-02 PROCEDURE — 99291 CRITICAL CARE FIRST HOUR: CPT

## 2021-04-02 RX ORDER — SODIUM ZIRCONIUM CYCLOSILICATE 10 G/10G
10 POWDER, FOR SUSPENSION ORAL ONCE
Refills: 0 | Status: COMPLETED | OUTPATIENT
Start: 2021-04-02 | End: 2021-04-02

## 2021-04-02 RX ORDER — INSULIN HUMAN 100 [IU]/ML
5 INJECTION, SOLUTION SUBCUTANEOUS
Qty: 100 | Refills: 0 | Status: DISCONTINUED | OUTPATIENT
Start: 2021-04-02 | End: 2021-04-03

## 2021-04-02 RX ORDER — HUMAN INSULIN 100 [IU]/ML
12 INJECTION, SUSPENSION SUBCUTANEOUS EVERY 6 HOURS
Refills: 0 | Status: DISCONTINUED | OUTPATIENT
Start: 2021-04-02 | End: 2021-04-02

## 2021-04-02 RX ORDER — CEFEPIME 1 G/1
1000 INJECTION, POWDER, FOR SOLUTION INTRAMUSCULAR; INTRAVENOUS EVERY 12 HOURS
Refills: 0 | Status: DISCONTINUED | OUTPATIENT
Start: 2021-04-02 | End: 2021-04-06

## 2021-04-02 RX ORDER — HUMAN INSULIN 100 [IU]/ML
7 INJECTION, SUSPENSION SUBCUTANEOUS EVERY 6 HOURS
Refills: 0 | Status: DISCONTINUED | OUTPATIENT
Start: 2021-04-02 | End: 2021-04-02

## 2021-04-02 RX ORDER — INSULIN LISPRO 100/ML
VIAL (ML) SUBCUTANEOUS EVERY 6 HOURS
Refills: 0 | Status: DISCONTINUED | OUTPATIENT
Start: 2021-04-02 | End: 2021-04-02

## 2021-04-02 RX ORDER — FENTANYL CITRATE 50 UG/ML
0.5 INJECTION INTRAVENOUS
Qty: 2500 | Refills: 0 | Status: DISCONTINUED | OUTPATIENT
Start: 2021-04-02 | End: 2021-04-06

## 2021-04-02 RX ADMIN — SODIUM ZIRCONIUM CYCLOSILICATE 10 GRAM(S): 10 POWDER, FOR SUSPENSION ORAL at 06:56

## 2021-04-02 RX ADMIN — HEPARIN SODIUM 1300 UNIT(S)/HR: 5000 INJECTION INTRAVENOUS; SUBCUTANEOUS at 10:14

## 2021-04-02 RX ADMIN — PROPOFOL 4.32 MICROGRAM(S)/KG/MIN: 10 INJECTION, EMULSION INTRAVENOUS at 04:00

## 2021-04-02 RX ADMIN — Medication 3.38 MICROGRAM(S)/KG/MIN: at 20:17

## 2021-04-02 RX ADMIN — MIDAZOLAM HYDROCHLORIDE 1.8 MG/KG/HR: 1 INJECTION, SOLUTION INTRAMUSCULAR; INTRAVENOUS at 17:18

## 2021-04-02 RX ADMIN — CEFEPIME 100 MILLIGRAM(S): 1 INJECTION, POWDER, FOR SOLUTION INTRAMUSCULAR; INTRAVENOUS at 22:19

## 2021-04-02 RX ADMIN — FENTANYL CITRATE 0.72 MICROGRAM(S)/KG/HR: 50 INJECTION INTRAVENOUS at 20:14

## 2021-04-02 RX ADMIN — HUMAN INSULIN 5 UNIT(S): 100 INJECTION, SUSPENSION SUBCUTANEOUS at 06:56

## 2021-04-02 RX ADMIN — Medication 3.38 MICROGRAM(S)/KG/MIN: at 09:30

## 2021-04-02 RX ADMIN — Medication 1: at 01:01

## 2021-04-02 RX ADMIN — CEFEPIME 100 MILLIGRAM(S): 1 INJECTION, POWDER, FOR SOLUTION INTRAMUSCULAR; INTRAVENOUS at 10:49

## 2021-04-02 RX ADMIN — Medication 3: at 06:54

## 2021-04-02 RX ADMIN — CHLORHEXIDINE GLUCONATE 15 MILLILITER(S): 213 SOLUTION TOPICAL at 06:53

## 2021-04-02 RX ADMIN — Medication 6 MILLIGRAM(S): at 06:54

## 2021-04-02 RX ADMIN — FENTANYL CITRATE 0.72 MICROGRAM(S)/KG/HR: 50 INJECTION INTRAVENOUS at 08:29

## 2021-04-02 RX ADMIN — HUMAN INSULIN 7 UNIT(S): 100 INJECTION, SUSPENSION SUBCUTANEOUS at 12:43

## 2021-04-02 RX ADMIN — PROPOFOL 4.32 MICROGRAM(S)/KG/MIN: 10 INJECTION, EMULSION INTRAVENOUS at 14:04

## 2021-04-02 RX ADMIN — HEPARIN SODIUM 1300 UNIT(S)/HR: 5000 INJECTION INTRAVENOUS; SUBCUTANEOUS at 20:14

## 2021-04-02 RX ADMIN — Medication 10: at 12:43

## 2021-04-02 RX ADMIN — CEFEPIME 100 MILLIGRAM(S): 1 INJECTION, POWDER, FOR SOLUTION INTRAMUSCULAR; INTRAVENOUS at 01:00

## 2021-04-02 RX ADMIN — INSULIN HUMAN 5 UNIT(S)/HR: 100 INJECTION, SOLUTION SUBCUTANEOUS at 20:16

## 2021-04-02 RX ADMIN — MIDAZOLAM HYDROCHLORIDE 1.8 MG/KG/HR: 1 INJECTION, SOLUTION INTRAMUSCULAR; INTRAVENOUS at 20:16

## 2021-04-02 RX ADMIN — CHLORHEXIDINE GLUCONATE 15 MILLILITER(S): 213 SOLUTION TOPICAL at 17:19

## 2021-04-02 RX ADMIN — PROPOFOL 4.32 MICROGRAM(S)/KG/MIN: 10 INJECTION, EMULSION INTRAVENOUS at 20:14

## 2021-04-02 RX ADMIN — CHLORHEXIDINE GLUCONATE 1 APPLICATION(S): 213 SOLUTION TOPICAL at 06:54

## 2021-04-02 NOTE — DIETITIAN INITIAL EVALUATION ADULT. - RD TO REMAIN AVAILABLE
2. Additional free water per MD discretion. 3. Consider multivitamin daily for micronutrient coverage. 4. Bowel regimen as needed.

## 2021-04-02 NOTE — PROGRESS NOTE ADULT - ASSESSMENT
60M unknown PMHx presented with hypoxic respiratory failure 2/2 COVID, complicated by severe HAGMA, intubated for hypoxic respiratory failure.     # NEURO  - AOx3 at baseline  - Sedated with versed and fentanyl  - May paralyze pending severity of ARDS    # CARDIAC  Troponin elevation in setting of increased demand and SUKI  - No ischemic changes no EKG  - R/o PE  - Trend trop q6 until peaked    # PULM  Hypoxic respiratory failure 2/2 COVID +/- PE  - CTA chest r/o PE  - Lung protective ventilation; 6mL/kg IBW estimated 5'7 = 400mL  - Volume AC, titrate FiO2 to maintain PaO2 > 55  - Dexamethasone 6 mg qdaily x 10 days (4/1 - )    # RENAL  ?SUKI vs CKD  - olson for strict I&Os  - trend Cr  - Avoid nephrotoxic agents, NSAIDs, RCA  - Renally dose meds    HAGMA lactic acidosis 2/2 sepsis +/- DKA  - f/u BHB  - fluid resuscitation    # GI   - Trickle feeds    # HEME/ONC  Elevated d-dimer/other inflammatory markers w/ hypoxia and tachycardia  - will start lovenox covid ppx dosing vs therapeutic AC pending CTA    # ID  Sepsis  - IVF 30cc/kg  - empiric vanc/cefepimep    Tx COVID19 as above    # ENDO  DKA?  - f/u bhb  - on insulin gtt  - f/u a1c 60M unknown PMHx presented with hypoxic respiratory failure 2/2 COVID, complicated by severe HAGMA, intubated for hypoxic respiratory failure.     # NEURO  - AOx3 at baseline  - Sedated with prop, versed, and fentanyl    # CARDIAC  Troponin elevation in setting of increased demand, SUKI, and PE  - No ischemic changes no EKG  - Trend trop q6 until peaked    # PULM  Hypoxic respiratory failure 2/2 COVID with L segmental upper lobe PE  - Lung protective ventilation; 6mL/kg IBW estimated 5'7 = 400mL  - Volume AC, titrate FiO2 to maintain PaO2 > 55  - Dexamethasone 6 mg qdaily x 10 days (4/1 - )    L segmental upper lobe PE  - Heparin gtt started 4/1; will need minimum of 3 months AC    # RENAL  ?SUKI vs CKD  - olson for strict I&Os  - trend Cr  - Avoid nephrotoxic agents, NSAIDs, RCA  - Renally dose meds    HAGMA lactic acidosis 2/2 sepsis +/- DKA  - likely due to severe hypoxemia on admission with lactate of 15, now improved s/p intubation and IVF    # GI   - c/w glucerna    # HEME/ONC  PE  - On heparin gtt 4/1    # ID  Sepsis  - IVF 30cc/kg  - empiric vanc/cefepimep    Tx COVID19 as above    # ENDO  Hyperglycemia w/ mildly elevated BHB  - s/p insulin gtt, now on NPH 7u q6  - maintain FS between 100-180  - ISS, FSq6 60M unknown PMHx presented with hypoxic respiratory failure 2/2 COVID, complicated by severe HAGMA, intubated for hypoxic respiratory failure.     # NEURO  - AOx3 at baseline  - Sedated with prop, versed, and fentanyl    # CARDIAC  Troponin elevation in setting of increased demand, SUKI, and PE  - No ischemic changes no EKG  - Trend trop q6 until peaked    Hypotension 2/2 vasoplegia and RH strain from PE; also with echogenic material in RV suspect clots  - c/w levo maintain MAP > 65  - f/u echo    # PULM  Hypoxic respiratory failure 2/2 COVID with L segmental upper lobe PE  - Lung protective ventilation; 6mL/kg IBW estimated 5'7 = 400mL  - Volume AC, titrate FiO2 to maintain PaO2 > 55  - Dexamethasone 6 mg qdaily x 10 days (4/1 - )    L segmental upper lobe PE  - Heparin gtt started 4/1; will need minimum of 3 months AC    # RENAL  ?SUKI vs CKD  - olson for strict I&Os  - trend Cr  - Avoid nephrotoxic agents, NSAIDs, RCA  - Renally dose meds    Hyperkalemia  - received [facility]  - Trend K TID to reassess need for further lokelma    HAGMA lactic acidosis 2/2 sepsis +/- DKA  - likely due to severe hypoxemia on admission with lactate of 15, now improved s/p intubation and IVF    # GI   - c/w nepro given renal dysfunction    # HEME/ONC  PE and L common femoral vein DVT   - On heparin gtt 4/1      # ID  Sepsis  - empiric vanc/cefepime started 4/1  - dose vanc by level, cefepime renally adjusted    Tx COVID19 as above    # ENDO  Hyperglycemia w/ mildly elevated BHB  - s/p insulin gtt, now on NPH 7u q6  - maintain FS between 100-180  - ISS, FSq6

## 2021-04-02 NOTE — DIETITIAN INITIAL EVALUATION ADULT. - PERTINENT LABORATORY DATA
04-02 Na 135 mmol/L Glu 255 mg/dL<H> K+ 5.5 mmol/L<H> Cr 2.65 mg/dL<H> BUN 55 mg/dL<H> Phos 4.0 mg/dL Alb 2.9 g/dL<L> Hgb 13.0 g/dL Hct 38.6 %<L> Glucose, Serum: 255 mg/dL *H*  Glucose, Serum: 284 mg/dL *H*  Glucose, Serum: 300 mg/dL *H*  24Hr FS:383 mg/dL  339 mg/dL  258 mg/dL  184 mg/dL  127 mg/dL  127 mg/dL

## 2021-04-02 NOTE — DIETITIAN INITIAL EVALUATION ADULT. - ENTERAL
1. Recommend Nepro @ 20mL/hr x24 hours + No Carb Prosource 3x daily [provides 480mL total volume, 864Kcal, 84 g protein, 349mL free water daily]. With propofol (399Kcal) patient will receive 1263Kcal (17.5Kcal/kg ABW) and 84g protein (~1.2g/kg ABW)

## 2021-04-02 NOTE — DIETITIAN INITIAL EVALUATION ADULT. - OTHER INFO
Pt with unknown medical history BIBEMS for respiratory distress, recently COVID positive. Intubated 4/1. Sedated on Propofol, Versed, Fentanyl. Pressor support with Levo. Propofol 15.1mL/hr  =  provides 399 additional Kcal if sedation continues at this rate x24 hours. Patient started on trickle feeds of Glucerna 1.2 after intubation. At time of visit, Glucerna 1.2 running at 40mL/hr. No tube feeding intolerances reported. Noted elevated Scr (^2.65) and hyperkalemia (K+ 5.5). Phosphorous normal. Would consider changing to Nepro formula 2/2 low K+ content (MD ordered). S/p insulin gtt. Ordered for SSI and NPH 7 units q6hrs. Noted hyperglycemia - possibly related to Dexamethasone. No BM in-house x 1 day. No vomiting or abdominal distention noted.

## 2021-04-02 NOTE — DIETITIAN INITIAL EVALUATION ADULT. - PERTINENT MEDS FT
cefepime   IVPB  dexAMETHasone  Injectable  fentaNYL   Infusion.....  heparin  Infusion.  insulin lispro (ADMELOG) corrective regimen sliding scale  insulin NPH human recombinant  midazolam Infusion  norepinephrine Infusion  propofol Infusion

## 2021-04-02 NOTE — PROGRESS NOTE ADULT - SUBJECTIVE AND OBJECTIVE BOX
Scar Khan MD, PGY-2  Available on Microsoft Teams | Pager: [phone] | Omnisio: [id number]  ---------------------------------------------------------------------------------------------  Patient is a 60y old  Male who presents with a chief complaint of COVID19 w/ severe metabolic acidosis s/p intubation (01 Apr 2021 11:30)      HPI:  60M unknown medical history BIBEMS for respiratory distress, recently COVID+ as per EMS. Pt unable to comply with history 2/2 resp distress, nods yes to difficulty breathing, no to pain. Baseline AAOx3. En route, pt had RR 28, SaO2 60%. In ED initially hypoxic to 60% placed on NRB and satting low 90s and tachypneic to 40s on NRB. Placed on AVAPS, still tachypneic to 40s. Labs showing HAGMA, elevated FSG to 500s. Also with lactate of 15. Trops of 200 and pro-BNP elevated to 21k. MICU consulted for respiratory failure in setting of COVID, also concern for DKA. On encounter, pt is noncooperative with questioning. Nods and tries to speak, unable to form full sentences and visibly tachypneic on AVAPS. In ED given 2L NS, decadron. Started on insulin gtt. Subsequently intubated.     Initial vitals 108/86, , RR 28, T97, SaO2 60% on 15L NRB. Received 2L NS bolus. Started on insulin gtt.     Pt admitted to surg b for further management.     Addendum 4/1/21 1700:  Limited history obtained from nitesh Anders, who lives in the [place] [phone]). Patient has a PMH of low BP and takes no medications. Patient tested positive for COVID on 3/19. He was initially doing fine with some fatigue, but suddenly decompensated after yesterday. Unable to obtain social history. Wife, Lucero, is currently in [place] - she went due to medical issues but was unable to return due to COVID travel restrictions.    (01 Apr 2021 11:30)      SUBJECTIVE / OVERNIGHT EVENTS:  ADDITIONAL REVIEW OF SYSTEMS:    MEDICATIONS  (STANDING):  cefepime   IVPB 1000 milliGRAM(s) IV Intermittent every 12 hours  chlorhexidine 0.12% Liquid 15 milliLiter(s) Oral Mucosa every 12 hours  chlorhexidine 4% Liquid 1 Application(s) Topical <User Schedule>  cisatracurium Infusion 3 MICROgram(s)/kG/Min (13 mL/Hr) IV Continuous <Continuous>  dexAMETHasone  Injectable 6 milliGRAM(s) IV Push daily  dextrose 50% Injectable 25 Gram(s) IV Push once  dextrose 50% Injectable 12.5 Gram(s) IV Push once  dextrose 50% Injectable 25 Gram(s) IV Push once  fentaNYL   Infusion..... 0.5 MICROgram(s)/kG/Hr (0.72 mL/Hr) IV Continuous <Continuous>  glucagon  Injectable 1 milliGRAM(s) IntraMuscular once  heparin  Infusion.  Unit(s)/Hr (13 mL/Hr) IV Continuous <Continuous>  insulin lispro (ADMELOG) corrective regimen sliding scale   SubCutaneous every 6 hours  insulin NPH human recombinant 7 Unit(s) SubCutaneous every 6 hours  midazolam Infusion 0.1 mG/kG/Hr (9 mL/Hr) IV Continuous <Continuous>  midazolam Infusion 0.02 mG/kG/Hr (1.8 mL/Hr) IV Continuous <Continuous>  norepinephrine Infusion 0.05 MICROgram(s)/kG/Min (3.38 mL/Hr) IV Continuous <Continuous>  propofol Infusion 10 MICROgram(s)/kG/Min (4.32 mL/Hr) IV Continuous <Continuous>    MEDICATIONS  (PRN):    Allergies    No Known Allergies    Intolerances        ICU Vital Signs Last 24 Hrs  T(F): 99.9 (02 Apr 2021 08:00), Max: 99.9 (02 Apr 2021 08:00)  HR: 95 (02 Apr 2021 08:00) (82 - 127)  BP: 108/77 (01 Apr 2021 16:00) (80/44 - 148/86)  BP(mean): 89 (01 Apr 2021 16:00) (58 - 107)  ABP: 127/74 (02 Apr 2021 08:00) (95/62 - 194/100)  ABP(mean): 93 (02 Apr 2021 08:00) (71 - 156)  RR: 33 (02 Apr 2021 08:00) (21 - 45)  SpO2: 100% (02 Apr 2021 08:00) (88% - 100%)    Mode: AC/ CMV (Assist Control/ Continuous Mandatory Ventilation)  RR (machine): 28  TV (machine): 400  FiO2: 60  PEEP: 5  ITime: 0.64  MAP: 12  PIP: 23    Physical Exam:     I&O's Summary    01 Apr 2021 07:01  -  02 Apr 2021 07:00  --------------------------------------------------------  IN: 1381.5 mL / OUT: 1050 mL / NET: 331.5 mL    02 Apr 2021 07:01  -  02 Apr 2021 08:36  --------------------------------------------------------  IN: 90.7 mL / OUT: 70 mL / NET: 20.7 mL        LABS:                        13.0   34.90 )-----------( 139      ( 02 Apr 2021 04:01 )             38.6     04-02    135  |  101  |  55<H>  ----------------------------<  255<H>  5.5<H>   |  18<L>  |  2.65<H>    Ca    7.8<L>      02 Apr 2021 04:01  Phos  4.0     04-02  Mg     2.6     04-02    TPro  5.9<L>  /  Alb  2.9<L>  /  TBili  0.8  /  DBili  x   /  AST  153<H>  /  ALT  74<H>  /  AlkPhos  62  04-02    PT/INR - ( 01 Apr 2021 08:52 )   PT: 17.9 sec;   INR: 1.61 ratio         PTT - ( 02 Apr 2021 04:01 )  PTT:69.9 sec  ABG - ( 02 Apr 2021 04:01 )  pH, Arterial: 7.32  pH, Blood: x     /  pCO2: 37    /  pO2: 98    / HCO3: 19    / Base Excess: -6.3  /  SaO2: 96.7              pH, Venous: 7.05 (04-01-21 @ 08:52)  pCO2, Venous: 51 mmHg (04-01-21 @ 08:52)  HCO3, Venous: 10 mmol/L (04-01-21 @ 08:52)  pO2, Venous: <24 mmHg (04-01-21 @ 08:52)    Blood Gas Venous - Lactate: 14.9 mmol/L (04-01-21 @ 08:52)      CARDIAC MARKERS ( 01 Apr 2021 08:52 )  x     / x     / 504 U/L / x     / x          CAPILLARY BLOOD GLUCOSE  POCT Blood Glucose.: 258 mg/dL (02 Apr 2021 06:04)  POCT Blood Glucose.: 184 mg/dL (02 Apr 2021 00:50)  POCT Blood Glucose.: 127 mg/dL (01 Apr 2021 23:12)  POCT Blood Glucose.: 127 mg/dL (01 Apr 2021 22:08)  POCT Blood Glucose.: 140 mg/dL (01 Apr 2021 21:11)  POCT Blood Glucose.: 135 mg/dL (01 Apr 2021 20:22)  POCT Blood Glucose.: 235 mg/dL (01 Apr 2021 19:04)  POCT Blood Glucose.: 261 mg/dL (01 Apr 2021 18:05)  POCT Blood Glucose.: 193 mg/dL (01 Apr 2021 17:01)  POCT Blood Glucose.: 243 mg/dL (01 Apr 2021 16:01)  POCT Blood Glucose.: 294 mg/dL (01 Apr 2021 14:43)  POCT Blood Glucose.: 242 mg/dL (01 Apr 2021 13:28)  POCT Blood Glucose.: 289 mg/dL (01 Apr 2021 12:12)  POCT Blood Glucose.: 360 mg/dL (01 Apr 2021 11:14)  POCT Blood Glucose.: 381 mg/dL (01 Apr 2021 10:15)  POCT Blood Glucose.: 326 mg/dL (01 Apr 2021 09:16)  POCT Blood Glucose.: 576 mg/dL (01 Apr 2021 08:45)      RADIOLOGY & ADDITIONAL TESTS:  Results Reviewed:   Imaging Personally Reviewed:  Electrocardiogram Personally Reviewed: Scar Khan MD, PGY-2  Available on Microsoft Teams | Pager: [phone] | Aer[employer]ot: [phone]  ---------------------------------------------------------------------------------------------  Patient is a 60y old  Male who presents with a chief complaint of COVID19 w/ severe metabolic acidosis s/p intubation (01 Apr 2021 11:30)      HPI:  60M unknown medical history BIBEMS for respiratory distress, recently COVID+ as per EMS. Pt unable to comply with history 2/2 resp distress, nods yes to difficulty breathing, no to pain. Baseline AAOx3. En route, pt had RR 28, SaO2 60%. In ED initially hypoxic to 60% placed on NRB and satting low 90s and tachypneic to 40s on NRB. Placed on AVAPS, still tachypneic to 40s. Labs showing HAGMA, elevated FSG to 500s. Also with lactate of 15. Trops of 200 and pro-BNP elevated to 21k. MICU consulted for respiratory failure in setting of COVID, also concern for DKA. On encounter, pt is noncooperative with questioning. Nods and tries to speak, unable to form full sentences and visibly tachypneic on AVAPS. In ED given 2L NS, decadron. Started on insulin gtt. Subsequently intubated.     Initial vitals 108/86, , RR 28, T97, SaO2 60% on 15L NRB. Received 2L NS bolus. Started on insulin gtt.     Pt admitted to surg b for further management.     Addendum 4/1/21 1700:  Limited history obtained from nitesh Anders, who lives in the [place] [phone]). Patient has a PMH of low BP and takes no medications. Patient tested positive for COVID on 3/19. He was initially doing fine with some fatigue, but suddenly decompensated after yesterday. Unable to obtain social history. Wife, Lucero, is currently in [place] - she went due to medical issues but was unable to return due to COVID travel restrictions.    (01 Apr 2021 11:30)    SUBJECTIVE / OVERNIGHT EVENTS:  Lokelma due to K 5.5, FiO2 reduced 65% overnight. Insulin transitioned to NPH.     MEDICATIONS  (STANDING):  cefepime   IVPB 1000 milliGRAM(s) IV Intermittent every 12 hours  chlorhexidine 0.12% Liquid 15 milliLiter(s) Oral Mucosa every 12 hours  chlorhexidine 4% Liquid 1 Application(s) Topical <User Schedule>  cisatracurium Infusion 3 MICROgram(s)/kG/Min (13 mL/Hr) IV Continuous <Continuous>  dexAMETHasone  Injectable 6 milliGRAM(s) IV Push daily  dextrose 50% Injectable 25 Gram(s) IV Push once  dextrose 50% Injectable 12.5 Gram(s) IV Push once  dextrose 50% Injectable 25 Gram(s) IV Push once  fentaNYL   Infusion..... 0.5 MICROgram(s)/kG/Hr (0.72 mL/Hr) IV Continuous <Continuous>  glucagon  Injectable 1 milliGRAM(s) IntraMuscular once  heparin  Infusion.  Unit(s)/Hr (13 mL/Hr) IV Continuous <Continuous>  insulin lispro (ADMELOG) corrective regimen sliding scale   SubCutaneous every 6 hours  insulin NPH human recombinant 7 Unit(s) SubCutaneous every 6 hours  midazolam Infusion 0.1 mG/kG/Hr (9 mL/Hr) IV Continuous <Continuous>  midazolam Infusion 0.02 mG/kG/Hr (1.8 mL/Hr) IV Continuous <Continuous>  norepinephrine Infusion 0.05 MICROgram(s)/kG/Min (3.38 mL/Hr) IV Continuous <Continuous>  propofol Infusion 10 MICROgram(s)/kG/Min (4.32 mL/Hr) IV Continuous <Continuous>    MEDICATIONS  (PRN):    Allergies    No Known Allergies    Intolerances    ICU Vital Signs Last 24 Hrs  T(F): 99.9 (02 Apr 2021 08:00), Max: 99.9 (02 Apr 2021 08:00)  HR: 95 (02 Apr 2021 08:00) (82 - 127)  BP: 108/77 (01 Apr 2021 16:00) (80/44 - 148/86)  BP(mean): 89 (01 Apr 2021 16:00) (58 - 107)  ABP: 127/74 (02 Apr 2021 08:00) (95/62 - 194/100)  ABP(mean): 93 (02 Apr 2021 08:00) (71 - 156)  RR: 33 (02 Apr 2021 08:00) (21 - 45)  SpO2: 100% (02 Apr 2021 08:00) (88% - 100%)    Mode: AC/ CMV (Assist Control/ Continuous Mandatory Ventilation)  RR (machine): 28  TV (machine): 400  FiO2: 60  PEEP: 5  ITime: 0.64  MAP: 12  PIP: 23    Physical Exam:   GENERAL: intubated and sedated  HEAD: NCAT  CHEST/LUNG: CTABL  HEART: RRR, s1, s2  ABDOMEN: Soft, Nontender, Nondistended; Bowel sounds present  EXTREMITIES:  2+ Peripheral Pulses, No clubbing, cyanosis, or edema  NEUROLOGY: non-focal  SKIN: No rashes or lesions    I&O's Summary    01 Apr 2021 07:01  -  02 Apr 2021 07:00  --------------------------------------------------------  IN: 1381.5 mL / OUT: 1050 mL / NET: 331.5 mL    02 Apr 2021 07:01  -  02 Apr 2021 08:36  --------------------------------------------------------  IN: 90.7 mL / OUT: 70 mL / NET: 20.7 mL    LABS:                        13.0   34.90 )-----------( 139      ( 02 Apr 2021 04:01 )             38.6     04-02    135  |  101  |  55<H>  ----------------------------<  255<H>  5.5<H>   |  18<L>  |  2.65<H>    Ca    7.8<L>      02 Apr 2021 04:01  Phos  4.0     04-02  Mg     2.6     04-02    TPro  5.9<L>  /  Alb  2.9<L>  /  TBili  0.8  /  DBili  x   /  AST  153<H>  /  ALT  74<H>  /  AlkPhos  62  04-02    PT/INR - ( 01 Apr 2021 08:52 )   PT: 17.9 sec;   INR: 1.61 ratio         PTT - ( 02 Apr 2021 04:01 )  PTT:69.9 sec  ABG - ( 02 Apr 2021 04:01 )  pH, Arterial: 7.32  pH, Blood: x     /  pCO2: 37    /  pO2: 98    / HCO3: 19    / Base Excess: -6.3  /  SaO2: 96.7      pH, Venous: 7.05 (04-01-21 @ 08:52)  pCO2, Venous: 51 mmHg (04-01-21 @ 08:52)  HCO3, Venous: 10 mmol/L (04-01-21 @ 08:52)  pO2, Venous: <24 mmHg (04-01-21 @ 08:52)    Blood Gas Venous - Lactate: 14.9 mmol/L (04-01-21 @ 08:52)    CARDIAC MARKERS ( 01 Apr 2021 08:52 )  x     / x     / 504 U/L / x     / x          CAPILLARY BLOOD GLUCOSE  POCT Blood Glucose.: 258 mg/dL (02 Apr 2021 06:04)  POCT Blood Glucose.: 184 mg/dL (02 Apr 2021 00:50)  POCT Blood Glucose.: 127 mg/dL (01 Apr 2021 23:12)  POCT Blood Glucose.: 127 mg/dL (01 Apr 2021 22:08)  POCT Blood Glucose.: 140 mg/dL (01 Apr 2021 21:11)  POCT Blood Glucose.: 135 mg/dL (01 Apr 2021 20:22)  POCT Blood Glucose.: 235 mg/dL (01 Apr 2021 19:04)  POCT Blood Glucose.: 261 mg/dL (01 Apr 2021 18:05)  POCT Blood Glucose.: 193 mg/dL (01 Apr 2021 17:01)  POCT Blood Glucose.: 243 mg/dL (01 Apr 2021 16:01)  POCT Blood Glucose.: 294 mg/dL (01 Apr 2021 14:43)  POCT Blood Glucose.: 242 mg/dL (01 Apr 2021 13:28)  POCT Blood Glucose.: 289 mg/dL (01 Apr 2021 12:12)  POCT Blood Glucose.: 360 mg/dL (01 Apr 2021 11:14)  POCT Blood Glucose.: 381 mg/dL (01 Apr 2021 10:15)  POCT Blood Glucose.: 326 mg/dL (01 Apr 2021 09:16)  POCT Blood Glucose.: 576 mg/dL (01 Apr 2021 08:45)      RADIOLOGY & ADDITIONAL TESTS:  Results Reviewed:   Imaging Personally Reviewed:  Electrocardiogram Personally Reviewed:

## 2021-04-03 LAB
ALBUMIN SERPL ELPH-MCNC: 2.8 G/DL — LOW (ref 3.3–5)
ALP SERPL-CCNC: 57 U/L — SIGNIFICANT CHANGE UP (ref 40–120)
ALT FLD-CCNC: 57 U/L — HIGH (ref 4–41)
ANION GAP SERPL CALC-SCNC: 11 MMOL/L — SIGNIFICANT CHANGE UP (ref 7–14)
APTT BLD: 76.5 SEC — HIGH (ref 27–36.3)
AST SERPL-CCNC: 62 U/L — HIGH (ref 4–40)
BASOPHILS # BLD AUTO: 0.16 K/UL — SIGNIFICANT CHANGE UP (ref 0–0.2)
BASOPHILS NFR BLD AUTO: 0.4 % — SIGNIFICANT CHANGE UP (ref 0–2)
BILIRUB SERPL-MCNC: 0.6 MG/DL — SIGNIFICANT CHANGE UP (ref 0.2–1.2)
BLOOD GAS ARTERIAL - LYTES,HGB,ICA,LACT RESULT: SIGNIFICANT CHANGE UP
BUN SERPL-MCNC: 60 MG/DL — HIGH (ref 7–23)
CALCIUM SERPL-MCNC: 8.2 MG/DL — LOW (ref 8.4–10.5)
CHLORIDE SERPL-SCNC: 105 MMOL/L — SIGNIFICANT CHANGE UP (ref 98–107)
CO2 SERPL-SCNC: 23 MMOL/L — SIGNIFICANT CHANGE UP (ref 22–31)
CREAT SERPL-MCNC: 2.36 MG/DL — HIGH (ref 0.5–1.3)
EOSINOPHIL # BLD AUTO: 0 K/UL — SIGNIFICANT CHANGE UP (ref 0–0.5)
EOSINOPHIL NFR BLD AUTO: 0 % — SIGNIFICANT CHANGE UP (ref 0–6)
GLUCOSE BLDC GLUCOMTR-MCNC: 114 MG/DL — HIGH (ref 70–99)
GLUCOSE BLDC GLUCOMTR-MCNC: 128 MG/DL — HIGH (ref 70–99)
GLUCOSE BLDC GLUCOMTR-MCNC: 135 MG/DL — HIGH (ref 70–99)
GLUCOSE BLDC GLUCOMTR-MCNC: 139 MG/DL — HIGH (ref 70–99)
GLUCOSE BLDC GLUCOMTR-MCNC: 140 MG/DL — HIGH (ref 70–99)
GLUCOSE BLDC GLUCOMTR-MCNC: 141 MG/DL — HIGH (ref 70–99)
GLUCOSE BLDC GLUCOMTR-MCNC: 143 MG/DL — HIGH (ref 70–99)
GLUCOSE BLDC GLUCOMTR-MCNC: 148 MG/DL — HIGH (ref 70–99)
GLUCOSE BLDC GLUCOMTR-MCNC: 169 MG/DL — HIGH (ref 70–99)
GLUCOSE BLDC GLUCOMTR-MCNC: 171 MG/DL — HIGH (ref 70–99)
GLUCOSE BLDC GLUCOMTR-MCNC: 173 MG/DL — HIGH (ref 70–99)
GLUCOSE BLDC GLUCOMTR-MCNC: 182 MG/DL — HIGH (ref 70–99)
GLUCOSE BLDC GLUCOMTR-MCNC: 186 MG/DL — HIGH (ref 70–99)
GLUCOSE BLDC GLUCOMTR-MCNC: 187 MG/DL — HIGH (ref 70–99)
GLUCOSE BLDC GLUCOMTR-MCNC: 193 MG/DL — HIGH (ref 70–99)
GLUCOSE BLDC GLUCOMTR-MCNC: 198 MG/DL — HIGH (ref 70–99)
GLUCOSE BLDC GLUCOMTR-MCNC: 209 MG/DL — HIGH (ref 70–99)
GLUCOSE BLDC GLUCOMTR-MCNC: 234 MG/DL — HIGH (ref 70–99)
GLUCOSE BLDC GLUCOMTR-MCNC: 272 MG/DL — HIGH (ref 70–99)
GLUCOSE BLDC GLUCOMTR-MCNC: 297 MG/DL — HIGH (ref 70–99)
GLUCOSE SERPL-MCNC: 203 MG/DL — HIGH (ref 70–99)
GRAM STN FLD: SIGNIFICANT CHANGE UP
HCT VFR BLD CALC: 37.7 % — LOW (ref 39–50)
HGB BLD-MCNC: 12.5 G/DL — LOW (ref 13–17)
IANC: 33.68 K/UL — HIGH (ref 1.5–8.5)
IMM GRANULOCYTES NFR BLD AUTO: 5.1 % — HIGH (ref 0–1.5)
LYMPHOCYTES # BLD AUTO: 1.09 K/UL — SIGNIFICANT CHANGE UP (ref 1–3.3)
LYMPHOCYTES # BLD AUTO: 2.9 % — LOW (ref 13–44)
MAGNESIUM SERPL-MCNC: 3 MG/DL — HIGH (ref 1.6–2.6)
MCHC RBC-ENTMCNC: 28 PG — SIGNIFICANT CHANGE UP (ref 27–34)
MCHC RBC-ENTMCNC: 33.2 GM/DL — SIGNIFICANT CHANGE UP (ref 32–36)
MCV RBC AUTO: 84.3 FL — SIGNIFICANT CHANGE UP (ref 80–100)
MONOCYTES # BLD AUTO: 1.33 K/UL — HIGH (ref 0–0.9)
MONOCYTES NFR BLD AUTO: 3.5 % — SIGNIFICANT CHANGE UP (ref 2–14)
NEUTROPHILS # BLD AUTO: 33.68 K/UL — HIGH (ref 1.8–7.4)
NEUTROPHILS NFR BLD AUTO: 88.1 % — HIGH (ref 43–77)
NRBC # BLD: 0 /100 WBCS — SIGNIFICANT CHANGE UP
NRBC # FLD: 0.16 K/UL — HIGH
PHOSPHATE SERPL-MCNC: 4.2 MG/DL — SIGNIFICANT CHANGE UP (ref 2.5–4.5)
PLATELET # BLD AUTO: 163 K/UL — SIGNIFICANT CHANGE UP (ref 150–400)
POTASSIUM SERPL-MCNC: 4.7 MMOL/L — SIGNIFICANT CHANGE UP (ref 3.5–5.3)
POTASSIUM SERPL-SCNC: 4.7 MMOL/L — SIGNIFICANT CHANGE UP (ref 3.5–5.3)
PROT SERPL-MCNC: 5.8 G/DL — LOW (ref 6–8.3)
RBC # BLD: 4.47 M/UL — SIGNIFICANT CHANGE UP (ref 4.2–5.8)
RBC # FLD: 15.5 % — HIGH (ref 10.3–14.5)
SODIUM SERPL-SCNC: 139 MMOL/L — SIGNIFICANT CHANGE UP (ref 135–145)
SPECIMEN SOURCE: SIGNIFICANT CHANGE UP
VANCOMYCIN FLD-MCNC: 7 UG/ML — SIGNIFICANT CHANGE UP
WBC # BLD: 38.21 K/UL — HIGH (ref 3.8–10.5)
WBC # FLD AUTO: 38.21 K/UL — HIGH (ref 3.8–10.5)

## 2021-04-03 PROCEDURE — 99291 CRITICAL CARE FIRST HOUR: CPT

## 2021-04-03 RX ORDER — POLYETHYLENE GLYCOL 3350 17 G/17G
17 POWDER, FOR SOLUTION ORAL EVERY 12 HOURS
Refills: 0 | Status: DISCONTINUED | OUTPATIENT
Start: 2021-04-03 | End: 2021-04-12

## 2021-04-03 RX ORDER — SENNA PLUS 8.6 MG/1
2 TABLET ORAL AT BEDTIME
Refills: 0 | Status: DISCONTINUED | OUTPATIENT
Start: 2021-04-03 | End: 2021-04-12

## 2021-04-03 RX ORDER — HUMAN INSULIN 100 [IU]/ML
32 INJECTION, SUSPENSION SUBCUTANEOUS EVERY 6 HOURS
Refills: 0 | Status: DISCONTINUED | OUTPATIENT
Start: 2021-04-03 | End: 2021-04-04

## 2021-04-03 RX ORDER — VANCOMYCIN HCL 1 G
1000 VIAL (EA) INTRAVENOUS ONCE
Refills: 0 | Status: COMPLETED | OUTPATIENT
Start: 2021-04-03 | End: 2021-04-03

## 2021-04-03 RX ORDER — INSULIN LISPRO 100/ML
VIAL (ML) SUBCUTANEOUS EVERY 6 HOURS
Refills: 0 | Status: DISCONTINUED | OUTPATIENT
Start: 2021-04-03 | End: 2021-04-06

## 2021-04-03 RX ORDER — HUMAN INSULIN 100 [IU]/ML
16 INJECTION, SUSPENSION SUBCUTANEOUS EVERY 6 HOURS
Refills: 0 | Status: DISCONTINUED | OUTPATIENT
Start: 2021-04-03 | End: 2021-04-03

## 2021-04-03 RX ADMIN — HUMAN INSULIN 32 UNIT(S): 100 INJECTION, SUSPENSION SUBCUTANEOUS at 17:59

## 2021-04-03 RX ADMIN — HUMAN INSULIN 16 UNIT(S): 100 INJECTION, SUSPENSION SUBCUTANEOUS at 13:02

## 2021-04-03 RX ADMIN — FENTANYL CITRATE 0.72 MICROGRAM(S)/KG/HR: 50 INJECTION INTRAVENOUS at 03:42

## 2021-04-03 RX ADMIN — Medication 4: at 17:46

## 2021-04-03 RX ADMIN — PROPOFOL 4.32 MICROGRAM(S)/KG/MIN: 10 INJECTION, EMULSION INTRAVENOUS at 13:39

## 2021-04-03 RX ADMIN — CHLORHEXIDINE GLUCONATE 15 MILLILITER(S): 213 SOLUTION TOPICAL at 05:47

## 2021-04-03 RX ADMIN — CHLORHEXIDINE GLUCONATE 15 MILLILITER(S): 213 SOLUTION TOPICAL at 17:46

## 2021-04-03 RX ADMIN — HUMAN INSULIN 16 UNIT(S): 100 INJECTION, SUSPENSION SUBCUTANEOUS at 07:52

## 2021-04-03 RX ADMIN — CEFEPIME 100 MILLIGRAM(S): 1 INJECTION, POWDER, FOR SOLUTION INTRAMUSCULAR; INTRAVENOUS at 22:33

## 2021-04-03 RX ADMIN — Medication 6 MILLIGRAM(S): at 05:46

## 2021-04-03 RX ADMIN — Medication 6: at 23:39

## 2021-04-03 RX ADMIN — SENNA PLUS 2 TABLET(S): 8.6 TABLET ORAL at 21:33

## 2021-04-03 RX ADMIN — CHLORHEXIDINE GLUCONATE 1 APPLICATION(S): 213 SOLUTION TOPICAL at 07:52

## 2021-04-03 RX ADMIN — HUMAN INSULIN 32 UNIT(S): 100 INJECTION, SUSPENSION SUBCUTANEOUS at 23:39

## 2021-04-03 RX ADMIN — Medication 250 MILLIGRAM(S): at 09:39

## 2021-04-03 RX ADMIN — CEFEPIME 100 MILLIGRAM(S): 1 INJECTION, POWDER, FOR SOLUTION INTRAMUSCULAR; INTRAVENOUS at 11:02

## 2021-04-03 RX ADMIN — POLYETHYLENE GLYCOL 3350 17 GRAM(S): 17 POWDER, FOR SOLUTION ORAL at 17:46

## 2021-04-03 RX ADMIN — FENTANYL CITRATE 0.72 MICROGRAM(S)/KG/HR: 50 INJECTION INTRAVENOUS at 13:39

## 2021-04-03 NOTE — PROGRESS NOTE ADULT - ATTENDING COMMENTS
60M with unknown medical history admitted for acute hypoxic respiratory failure from COVID-19, HAGMA, lactic acidosis, SUKI (vs CKD), hyperglycemia, troponemia, elevated BNP and PE/DVT. Nephew (Alba Pizarro, lives in the [place], [phone]) reports PMH of low BP and takes no medications. Wife, Lucero, is currently in [place]. Patient tested positive for COVID on 3/19. Found to have PE/DVT on admission.    Neuro: Analgesia with fentanyl, sedation with midazolam and propofol. Will d/c Versed this morning, then wean Propofol.  CV: Shock, likely cardiogenic (acute PE) and distributive (sepsis), continue leveophed to keep MAP >65. Can send ScvO2 for further detemination. TAPSE borderline low at 1.6 this morning.  Pulm: Acute hypoxic respiratory failure, continue mechanical ventilation with lung-protective settings.  GI: Continue TF with Nepro.  Renal: SUKI vs CKD, non oliguric, no HD imperative today. Monitor UOP. Limit neprhotoxins and hypotension. Mann necessary for strict IO.  ID: Culture negative sepsis, continue vancomycin and cefepime. WCC increasing but afebrile and otherwise doing better clinically. Monitor closely. If he worsens or stops improving, would send for stat CT A/P. POCUS revealed no significant hydronephrosis today, so doubt infected kidney stone.  Endo: Check TSH. Newly diagnosied DM, I transitioned from insulin drip to NPH 16u q6h this morning.  Heme: PE/DVT, continue heparin infusion.   Prophylaxis: Heparin drip, chlorhexidine, would start GI ppx with full-dose A/C.  Lines: LI[facility] TLC (4/1), R ax arterial (4/1), Mann (4/1).  Code: Full. 60M with unknown medical history admitted for acute hypoxic respiratory failure from COVID-19, HAGMA, lactic acidosis, SUKI (vs CKD), hyperglycemia, troponemia, elevated BNP and PE/DVT. Nephew (Alba Pizarro, lives in the [place], [phone]) reports PMH of low BP and takes no medications. Wife, Lucero, is currently in [place]. Patient tested positive for COVID on 3/19. Found to have PE/DVT on admission.    Neuro: Analgesia with fentanyl, sedation with midazolam and propofol. Will d/c Versed this morning, then wean Propofol.  CV: Shock, likely cardiogenic (acute PE) and distributive (sepsis), continue leveophed to keep MAP >65. Can send ScvO2 for further detemination. TAPSE borderline low at 1.6 this morning.  Pulm: Acute hypoxic respiratory failure, continue mechanical ventilation with lung-protective settings.  GI: Continue TF with Nepro.  Renal: SUKI vs CKD, non oliguric, no HD imperative today. Monitor UOP. Limit neprhotoxins and hypotension. Mann necessary for strict IO.  ID: Culture negative sepsis, continue vancomycin and cefepime. WCC increasing but afebrile and otherwise doing better clinically. Monitor closely. If he worsens or stops improving, would send for stat CT A/P. POCUS revealed no significant hydronephrosis today, so doubt infected kidney stone. COVID-19, continue dexamethasone 6 mg QD x 10 days.  Endo: Check TSH. Newly diagnosied DM, I transitioned from insulin drip to NPH 16u q6h this morning.  Heme: PE/DVT, continue heparin infusion.   Prophylaxis: Heparin drip, chlorhexidine, would start GI ppx with full-dose A/C.  Lines: [facility] TLC (4/1), R ax arterial (4/1), Mann (4/1).  Code: Full.

## 2021-04-03 NOTE — PROGRESS NOTE ADULT - SUBJECTIVE AND OBJECTIVE BOX
Scar Khan MD, PGY-2  Available on Microsoft Teams | Pager: [phone] | [facility]: [phone]  ---------------------------------------------------------------------------------------------  Patient is a 60y old  Male who presents with a chief complaint of COVID19 w/ severe metabolic acidosis s/p intubation (02 Apr 2021 13:18)      HPI:  60M unknown medical history BIBEMS for respiratory distress, recently COVID+ as per EMS. Pt unable to comply with history 2/2 resp distress, nods yes to difficulty breathing, no to pain. Baseline AAOx3. En route, pt had RR 28, SaO2 60%. In ED initially hypoxic to 60% placed on NRB and satting low 90s and tachypneic to 40s on NRB. Placed on AVAPS, still tachypneic to 40s. Labs showing HAGMA, elevated FSG to 500s. Also with lactate of 15. Trops of 200 and pro-BNP elevated to 21k. MICU consulted for respiratory failure in setting of COVID, also concern for DKA. On encounter, pt is noncooperative with questioning. Nods and tries to speak, unable to form full sentences and visibly tachypneic on AVAPS. In ED given 2L NS, decadron. Started on insulin gtt. Subsequently intubated.     Initial vitals 108/86, , RR 28, T97, SaO2 60% on 15L NRB. Received 2L NS bolus. Started on insulin gtt.     Pt admitted to surg b for further management.     Addendum 4/1/21 1700:  Limited history obtained from nitesh Anders, who lives in the [place] [phone]). Patient has a PMH of low BP and takes no medications. Patient tested positive for COVID on 3/19. He was initially doing fine with some fatigue, but suddenly decompensated after yesterday. Unable to obtain social history. Wife, Lucero, is currently in [place] - she went due to medical issues but was unable to return due to COVID travel restrictions.    (01 Apr 2021 11:30)      SUBJECTIVE / OVERNIGHT EVENTS: Insulin gtt overnight, now controlled. Labs pending. No other events. Intubated and sedated.      MEDICATIONS  (STANDING):  cefepime   IVPB 1000 milliGRAM(s) IV Intermittent every 12 hours  chlorhexidine 0.12% Liquid 15 milliLiter(s) Oral Mucosa every 12 hours  chlorhexidine 4% Liquid 1 Application(s) Topical <User Schedule>  dexAMETHasone  Injectable 6 milliGRAM(s) IV Push daily  dextrose 50% Injectable 25 Gram(s) IV Push once  dextrose 50% Injectable 12.5 Gram(s) IV Push once  dextrose 50% Injectable 25 Gram(s) IV Push once  fentaNYL   Infusion..... 0.5 MICROgram(s)/kG/Hr (0.72 mL/Hr) IV Continuous <Continuous>  glucagon  Injectable 1 milliGRAM(s) IntraMuscular once  heparin  Infusion.  Unit(s)/Hr (13 mL/Hr) IV Continuous <Continuous>  insulin NPH human recombinant 16 Unit(s) SubCutaneous every 6 hours  insulin regular Infusion 5 Unit(s)/Hr (5 mL/Hr) IV Continuous <Continuous>  midazolam Infusion 0.1 mG/kG/Hr (9 mL/Hr) IV Continuous <Continuous>  midazolam Infusion 0.02 mG/kG/Hr (1.8 mL/Hr) IV Continuous <Continuous>  norepinephrine Infusion 0.05 MICROgram(s)/kG/Min (3.38 mL/Hr) IV Continuous <Continuous>  propofol Infusion 10 MICROgram(s)/kG/Min (4.32 mL/Hr) IV Continuous <Continuous>    MEDICATIONS  (PRN):  Allergies  No Known Allergies  Intolerances      ICU Vital Signs Last 24 Hrs  T(F): 99.3 (03 Apr 2021 07:00), Max: 100.2 (02 Apr 2021 09:00)  HR: 82 (03 Apr 2021 07:00) (81 - 95)  ABP: 107/63 (03 Apr 2021 07:00) (92/58 - 127/74)  ABP(mean): 78 (03 Apr 2021 07:00) (69 - 93)  RR: 29 (03 Apr 2021 07:00) (28 - 33)  SpO2: 100% (03 Apr 2021 07:00) (100% - 100%)    Mode: AC/ CMV (Assist Control/ Continuous Mandatory Ventilation)  RR (machine): 28  TV (machine): 400  FiO2: 40  PEEP: 5  ITime: 0.6  MAP: 11  PIP: 25    Physical Exam:   GENERAL: intubated and sedated  HEAD: NCAT  CHEST/LUNG: CTABL  HEART: RRR, s1, s2  ABDOMEN: Soft, Nontender, Nondistended; Bowel sounds present  EXTREMITIES:  2+ Peripheral Pulses, No clubbing, cyanosis, or edema  NEUROLOGY: non-focal  SKIN: No rashes or lesions    I&O's Summary    02 Apr 2021 07:01  -  03 Apr 2021 07:00  --------------------------------------------------------  IN: 2068 mL / OUT: 1320 mL / NET: 748 mL    LABS:                        12.5   38.21 )-----------( 163      ( 03 Apr 2021 06:50 )             37.7     04-02    140  |  106  |  56<H>  ----------------------------<  141<H>  4.5   |  22  |  2.50<H>    Ca    8.3<L>      02 Apr 2021 22:39  Phos  4.3     04-02  Mg     2.6     04-02    TPro  5.9<L>  /  Alb  2.9<L>  /  TBili  0.8  /  DBili  x   /  AST  153<H>  /  ALT  74<H>  /  AlkPhos  62  04-02    PT/INR - ( 01 Apr 2021 08:52 )   PT: 17.9 sec;   INR: 1.61 ratio         PTT - ( 03 Apr 2021 06:50 )  PTT:76.5 sec  ABG - ( 03 Apr 2021 06:50 )  pH, Arterial: 7.36  pH, Blood: x     /  pCO2: 42    /  pO2: 64    / HCO3: 23    / Base Excess: -1.1  /  SaO2: 90.2      pH, Venous: 7.05 (04-01-21 @ 08:52)  pCO2, Venous: 51 mmHg (04-01-21 @ 08:52)  HCO3, Venous: 10 mmol/L (04-01-21 @ 08:52)  pO2, Venous: <24 mmHg (04-01-21 @ 08:52)    CARDIAC MARKERS ( 01 Apr 2021 08:52 )  x     / x     / 504 U/L / x     / x          CAPILLARY BLOOD GLUCOSE  POCT Blood Glucose.: 143 mg/dL (03 Apr 2021 06:55)  POCT Blood Glucose.: 171 mg/dL (03 Apr 2021 05:57)  POCT Blood Glucose.: 209 mg/dL (03 Apr 2021 05:03)  POCT Blood Glucose.: 193 mg/dL (03 Apr 2021 04:02)  POCT Blood Glucose.: 187 mg/dL (03 Apr 2021 03:09)  POCT Blood Glucose.: 173 mg/dL (03 Apr 2021 02:10)  POCT Blood Glucose.: 141 mg/dL (03 Apr 2021 00:47)  POCT Blood Glucose.: 116 mg/dL (02 Apr 2021 23:55)  POCT Blood Glucose.: 121 mg/dL (02 Apr 2021 22:59)  POCT Blood Glucose.: 146 mg/dL (02 Apr 2021 22:01)  POCT Blood Glucose.: 190 mg/dL (02 Apr 2021 21:02)  POCT Blood Glucose.: 278 mg/dL (02 Apr 2021 19:58)  POCT Blood Glucose.: 317 mg/dL (02 Apr 2021 18:58)  POCT Blood Glucose.: 362 mg/dL (02 Apr 2021 18:30)  POCT Blood Glucose.: 372 mg/dL (02 Apr 2021 18:07)  POCT Blood Glucose.: 397 mg/dL (02 Apr 2021 17:39)  POCT Blood Glucose.: 421 mg/dL (02 Apr 2021 17:03)  POCT Blood Glucose.: 383 mg/dL (02 Apr 2021 12:41)  POCT Blood Glucose.: 339 mg/dL (02 Apr 2021 11:35)    RADIOLOGY & ADDITIONAL TESTS:  Results Reviewed:   Imaging Personally Reviewed:  Electrocardiogram Personally Reviewed: Scar Khan MD, PGY-2  Available on [employer] Teams | Pager: [phone] | [facility]: [phone]  ---------------------------------------------------------------------------------------------  Patient is a 60y old  Male who presents with a chief complaint of COVID19 w/ severe metabolic acidosis s/p intubation (02 Apr 2021 13:18)      HPI:  60M unknown medical history BIBEMS for respiratory distress, recently COVID+ as per EMS. Pt unable to comply with history 2/2 resp distress, nods yes to difficulty breathing, no to pain. Baseline AAOx3. En route, pt had RR 28, SaO2 60%. In ED initially hypoxic to 60% placed on NRB and satting low 90s and tachypneic to 40s on NRB. Placed on AVAPS, still tachypneic to 40s. Labs showing HAGMA, elevated FSG to 500s. Also with lactate of 15. Trops of 200 and pro-BNP elevated to 21k. MICU consulted for respiratory failure in setting of COVID, also concern for DKA. On encounter, pt is noncooperative with questioning. Nods and tries to speak, unable to form full sentences and visibly tachypneic on AVAPS. In ED given 2L NS, decadron. Started on insulin gtt. Subsequently intubated.     Initial vitals 108/86, , RR 28, T97, SaO2 60% on 15L NRB. Received 2L NS bolus. Started on insulin gtt.     Pt admitted to surg b for further management.     Addendum 4/1/21 1700:  Limited history obtained from nitesh Anders, who lives in the [place] [phone]). Patient has a PMH of low BP and takes no medications. Patient tested positive for COVID on 3/19. He was initially doing fine with some fatigue, but suddenly decompensated after yesterday. Unable to obtain social history. Wife, Lucero, is currently in [place] - she went due to medical issues but was unable to return due to COVID travel restrictions.    (01 Apr 2021 11:30)    SUBJECTIVE / OVERNIGHT EVENTS: Insulin gtt overnight, now controlled. Labs pending. No other events. Intubated and sedated.      MEDICATIONS  (STANDING):  cefepime   IVPB 1000 milliGRAM(s) IV Intermittent every 12 hours  chlorhexidine 0.12% Liquid 15 milliLiter(s) Oral Mucosa every 12 hours  chlorhexidine 4% Liquid 1 Application(s) Topical <User Schedule>  dexAMETHasone  Injectable 6 milliGRAM(s) IV Push daily  dextrose 50% Injectable 25 Gram(s) IV Push once  dextrose 50% Injectable 12.5 Gram(s) IV Push once  dextrose 50% Injectable 25 Gram(s) IV Push once  fentaNYL   Infusion..... 0.5 MICROgram(s)/kG/Hr (0.72 mL/Hr) IV Continuous <Continuous>  glucagon  Injectable 1 milliGRAM(s) IntraMuscular once  heparin  Infusion.  Unit(s)/Hr (13 mL/Hr) IV Continuous <Continuous>  insulin NPH human recombinant 16 Unit(s) SubCutaneous every 6 hours  insulin regular Infusion 5 Unit(s)/Hr (5 mL/Hr) IV Continuous <Continuous>  midazolam Infusion 0.1 mG/kG/Hr (9 mL/Hr) IV Continuous <Continuous>  midazolam Infusion 0.02 mG/kG/Hr (1.8 mL/Hr) IV Continuous <Continuous>  norepinephrine Infusion 0.05 MICROgram(s)/kG/Min (3.38 mL/Hr) IV Continuous <Continuous>  propofol Infusion 10 MICROgram(s)/kG/Min (4.32 mL/Hr) IV Continuous <Continuous>    MEDICATIONS  (PRN):  Allergies  No Known Allergies  Intolerances      ICU Vital Signs Last 24 Hrs  T(F): 99.3 (03 Apr 2021 07:00), Max: 100.2 (02 Apr 2021 09:00)  HR: 82 (03 Apr 2021 07:00) (81 - 95)  ABP: 107/63 (03 Apr 2021 07:00) (92/58 - 127/74)  ABP(mean): 78 (03 Apr 2021 07:00) (69 - 93)  RR: 29 (03 Apr 2021 07:00) (28 - 33)  SpO2: 100% (03 Apr 2021 07:00) (100% - 100%)    Mode: AC/ CMV (Assist Control/ Continuous Mandatory Ventilation)  RR (machine): 28  TV (machine): 400  FiO2: 40  PEEP: 5  ITime: 0.6  MAP: 11  PIP: 25    Physical Exam:   GENERAL: intubated and sedated  HEAD: NCAT  CHEST/LUNG: CTABL  HEART: RRR, s1, s2  ABDOMEN: Soft, Nontender, Nondistended; Bowel sounds present  EXTREMITIES:  2+ Peripheral Pulses, No clubbing, cyanosis, or edema  NEUROLOGY: non-focal  SKIN: No rashes or lesions    I&O's Summary    02 Apr 2021 07:01  -  03 Apr 2021 07:00  --------------------------------------------------------  IN: 2068 mL / OUT: 1320 mL / NET: 748 mL    LABS:                        12.5   38.21 )-----------( 163      ( 03 Apr 2021 06:50 )             37.7     04-02    140  |  106  |  56<H>  ----------------------------<  141<H>  4.5   |  22  |  2.50<H>    Ca    8.3<L>      02 Apr 2021 22:39  Phos  4.3     04-02  Mg     2.6     04-02    TPro  5.9<L>  /  Alb  2.9<L>  /  TBili  0.8  /  DBili  x   /  AST  153<H>  /  ALT  74<H>  /  AlkPhos  62  04-02    PT/INR - ( 01 Apr 2021 08:52 )   PT: 17.9 sec;   INR: 1.61 ratio         PTT - ( 03 Apr 2021 06:50 )  PTT:76.5 sec  ABG - ( 03 Apr 2021 06:50 )  pH, Arterial: 7.36  pH, Blood: x     /  pCO2: 42    /  pO2: 64    / HCO3: 23    / Base Excess: -1.1  /  SaO2: 90.2      pH, Venous: 7.05 (04-01-21 @ 08:52)  pCO2, Venous: 51 mmHg (04-01-21 @ 08:52)  HCO3, Venous: 10 mmol/L (04-01-21 @ 08:52)  pO2, Venous: <24 mmHg (04-01-21 @ 08:52)    CARDIAC MARKERS ( 01 Apr 2021 08:52 )  x     / x     / 504 U/L / x     / x        CAPILLARY BLOOD GLUCOSE  POCT Blood Glucose.: 143 mg/dL (03 Apr 2021 06:55)  POCT Blood Glucose.: 171 mg/dL (03 Apr 2021 05:57)  POCT Blood Glucose.: 209 mg/dL (03 Apr 2021 05:03)  POCT Blood Glucose.: 193 mg/dL (03 Apr 2021 04:02)  POCT Blood Glucose.: 187 mg/dL (03 Apr 2021 03:09)  POCT Blood Glucose.: 173 mg/dL (03 Apr 2021 02:10)  POCT Blood Glucose.: 141 mg/dL (03 Apr 2021 00:47)  POCT Blood Glucose.: 116 mg/dL (02 Apr 2021 23:55)  POCT Blood Glucose.: 121 mg/dL (02 Apr 2021 22:59)  POCT Blood Glucose.: 146 mg/dL (02 Apr 2021 22:01)  POCT Blood Glucose.: 190 mg/dL (02 Apr 2021 21:02)  POCT Blood Glucose.: 278 mg/dL (02 Apr 2021 19:58)  POCT Blood Glucose.: 317 mg/dL (02 Apr 2021 18:58)  POCT Blood Glucose.: 362 mg/dL (02 Apr 2021 18:30)  POCT Blood Glucose.: 372 mg/dL (02 Apr 2021 18:07)  POCT Blood Glucose.: 397 mg/dL (02 Apr 2021 17:39)  POCT Blood Glucose.: 421 mg/dL (02 Apr 2021 17:03)  POCT Blood Glucose.: 383 mg/dL (02 Apr 2021 12:41)  POCT Blood Glucose.: 339 mg/dL (02 Apr 2021 11:35)    RADIOLOGY & ADDITIONAL TESTS:  Results Reviewed:   Imaging Personally Reviewed:  Electrocardiogram Personally Reviewed:

## 2021-04-03 NOTE — ED PROCEDURE NOTE - NS ED PROCEDURE ASSISTED BY
The procedure was performed independently
Supervision was available
The procedure was performed independently
Supervision was available

## 2021-04-03 NOTE — ED PROCEDURE NOTE - CARDIAC WINDOWS
Parasternal Long/Parasternal Short/Apical 4-Chamber/Sub-Xyphoid View
Parasternal Long/Parasternal Short/Apical 4-Chamber

## 2021-04-03 NOTE — ED PROCEDURE NOTE - PROCEDURE NAME, MLM
Point of Care Ultrasound Cardiac
Point of Care Ultrasound Lung
Tracheal Intubation
Point of Care Ultrasound Cardiac

## 2021-04-03 NOTE — CHART NOTE - NSCHARTNOTEFT_GEN_A_CORE
I spoke with pt's nephew Alba and his brother in law Demetrius. Pt does not take any chronic medications, only PRNs for fever/pain. He may have taken an oral diabetes medication in the past, but not recently. Does not know of any other chronic issues except issue diabetes. Updated both members of family on pt's progress. All questions answered.

## 2021-04-03 NOTE — PROGRESS NOTE ADULT - ASSESSMENT
60M unknown PMHx presented with hypoxic respiratory failure 2/2 COVID, complicated by severe HAGMA, intubated for hypoxic respiratory failure.     # NEURO  - AOx3 at baseline  - Sedated with prop, versed, and fentanyl    # CARDIAC  Troponin elevation in setting of increased demand, SUKI, and PE  - No ischemic changes no EKG  - Trend trop q6 until peaked    Hypotension 2/2 vasoplegia and RH strain from PE; also with echogenic material in RV suspect clots  - c/w levo maintain MAP > 65  - f/u echo    # PULM  Hypoxic respiratory failure 2/2 COVID with L segmental upper lobe PE  - Lung protective ventilation; 6mL/kg IBW estimated 5'7 = 400mL  - Volume AC, titrate FiO2 to maintain PaO2 > 55  - Dexamethasone 6 mg qdaily x 10 days (4/1 - )    L segmental upper lobe PE  - Heparin gtt started 4/1; will need minimum of 3 months AC    # RENAL  ?SUKI vs CKD  - olson for strict I&Os  - trend Cr  - Avoid nephrotoxic agents, NSAIDs, RCA  - Renally dose meds    Hyperkalemia  - received [facility]  - Trend K TID to reassess need for further lokelma    HAGMA lactic acidosis 2/2 sepsis +/- DKA  - likely due to severe hypoxemia on admission with lactate of 15, now improved s/p intubation and IVF    # GI   - c/w nepro given renal dysfunction    # HEME/ONC  PE and L common femoral vein DVT (DVT seen on POCUS 4/2)  - On heparin gtt 4/1    # ID  Sepsis  - empiric vanc/cefepime started 4/1  - dose vanc by level, cefepime renally adjusted    Tx COVID19 as above    # ENDO  Hyperglycemia w/ mildly elevated BHB  - s/p insulin gtt, NPH 16u q6  - maintain FS between 100-180  - ISS, FSq6 60M unknown PMHx presented with hypoxic respiratory failure 2/2 COVID, complicated by severe HAGMA, intubated for hypoxic respiratory failure.     # NEURO  - AOx3 at baseline  - Sedated with prop, versed, and fentanyl    # CARDIAC  Troponin elevation in setting of increased demand, SUKI, and PE  - No ischemic changes no EKG  - Downtrending trops now, no longer trending    Hypotension 2/2 vasoplegia and RH strain from PE; also with echogenic material in RV suspect clots  - c/w levo maintain MAP > 65  - f/u echo    # PULM  Hypoxic respiratory failure 2/2 COVID with L segmental upper lobe PE  - Lung protective ventilation; 6mL/kg IBW estimated 5'7 = 400mL  - Volume AC, titrate FiO2 to maintain PaO2 > 55  - Dexamethasone 6 mg qdaily x 10 days (4/1 - )    L segmental upper lobe PE  - Heparin gtt started 4/1; will need minimum of 3 months AC    # RENAL  ?SUKI vs CKD  - olson for strict I&Os  - trend Cr  - Avoid nephrotoxic agents, NSAIDs, RCA  - Renally dose meds    Hyperkalemia  - received lokelma x 1, now resolved    HAGMA lactic acidosis 2/2 sepsis +/- DKA  - likely due to severe hypoxemia on admission with lactate of 15, now improved s/p intubation and IVF  - now resolved    # GI   - c/w nepro given renal dysfunction    # HEME/ONC  PE and L common femoral vein DVT (DVT seen on POCUS 4/2)  - On heparin gtt 4/1    # ID  Sepsis  - empiric vanc/cefepime started 4/1  - dose vanc by level, cefepime renally adjusted  - give vanc 1g when random level < 15    Tx COVID19 as above    # ENDO  Hyperglycemia w/ mildly elevated BHB  - s/p insulin gtt, NPH 16u q6  - maintain FS between 100-180  - ISS, FSq6 60M unknown PMHx presented with hypoxic respiratory failure 2/2 COVID, complicated by severe HAGMA, intubated for hypoxic respiratory failure.     # NEURO  - AOx3 at baseline  - Sedated with prop, versed, and fentanyl  - D/c versed, then wean prop as tolerated    # CARDIAC  Troponin elevation in setting of increased demand, SUKI, and PE  - No ischemic changes no EKG  - Downtrending trops now, no longer trending    Hypotension 2/2 vasoplegia and RH strain from PE; also with echogenic material in RV suspect clots  - c/w levo maintain MAP > 65  - f/u echo    # PULM  Hypoxic respiratory failure 2/2 COVID with L segmental upper lobe PE  - Lung protective ventilation; 6mL/kg IBW estimated 5'7 = 400mL  - Volume AC, titrate FiO2 to maintain PaO2 > 55  - Dexamethasone 6 mg qdaily x 10 days (4/1 - )    L segmental upper lobe PE  - Heparin gtt started 4/1; will need minimum of 3 months AC    # RENAL  ?SUKI vs CKD  - olson for strict I&Os  - trend Cr  - Avoid nephrotoxic agents, NSAIDs, RCA  - Renally dose meds    Hyperkalemia  - received lokelma x 1, now resolved    HAGMA lactic acidosis 2/2 sepsis +/- DKA  - likely due to severe hypoxemia on admission with lactate of 15, now improved s/p intubation and IVF  - now resolved    # GI   - c/w nepro given renal dysfunction  - bowel regimen    # HEME/ONC  PE and L common femoral vein DVT (DVT seen on POCUS 4/2)  - On heparin gtt 4/1    # ID  Sepsis  - empiric vanc/cefepime started 4/1  - dose vanc by level, cefepime renally adjusted  - give vanc 1g when random level < 15    Tx COVID19 as above    # ENDO  Hyperglycemia w/ mildly elevated BHB  - s/p insulin gtt, NPH 16u q6  - maintain FS between 100-180  - ISS, FSq6

## 2021-04-04 LAB
ALBUMIN SERPL ELPH-MCNC: 2.5 G/DL — LOW (ref 3.3–5)
ALP SERPL-CCNC: 65 U/L — SIGNIFICANT CHANGE UP (ref 40–120)
ALT FLD-CCNC: 52 U/L — HIGH (ref 4–41)
ANION GAP SERPL CALC-SCNC: 12 MMOL/L — SIGNIFICANT CHANGE UP (ref 7–14)
APTT BLD: 84 SEC — HIGH (ref 27–36.3)
AST SERPL-CCNC: 65 U/L — HIGH (ref 4–40)
BASOPHILS # BLD AUTO: 0.12 K/UL — SIGNIFICANT CHANGE UP (ref 0–0.2)
BASOPHILS NFR BLD AUTO: 0.4 % — SIGNIFICANT CHANGE UP (ref 0–2)
BILIRUB SERPL-MCNC: 0.7 MG/DL — SIGNIFICANT CHANGE UP (ref 0.2–1.2)
BLOOD GAS ARTERIAL COMPREHENSIVE RESULT: SIGNIFICANT CHANGE UP
BUN SERPL-MCNC: 63 MG/DL — HIGH (ref 7–23)
CALCIUM SERPL-MCNC: 8.3 MG/DL — LOW (ref 8.4–10.5)
CHLORIDE SERPL-SCNC: 102 MMOL/L — SIGNIFICANT CHANGE UP (ref 98–107)
CO2 SERPL-SCNC: 23 MMOL/L — SIGNIFICANT CHANGE UP (ref 22–31)
CREAT SERPL-MCNC: 1.99 MG/DL — HIGH (ref 0.5–1.3)
EOSINOPHIL # BLD AUTO: 0 K/UL — SIGNIFICANT CHANGE UP (ref 0–0.5)
EOSINOPHIL NFR BLD AUTO: 0 % — SIGNIFICANT CHANGE UP (ref 0–6)
GLUCOSE BLDC GLUCOMTR-MCNC: 221 MG/DL — HIGH (ref 70–99)
GLUCOSE BLDC GLUCOMTR-MCNC: 229 MG/DL — HIGH (ref 70–99)
GLUCOSE BLDC GLUCOMTR-MCNC: 251 MG/DL — HIGH (ref 70–99)
GLUCOSE BLDC GLUCOMTR-MCNC: 264 MG/DL — HIGH (ref 70–99)
GLUCOSE BLDC GLUCOMTR-MCNC: 276 MG/DL — HIGH (ref 70–99)
GLUCOSE BLDC GLUCOMTR-MCNC: 319 MG/DL — HIGH (ref 70–99)
GLUCOSE SERPL-MCNC: 348 MG/DL — HIGH (ref 70–99)
HCT VFR BLD CALC: 36 % — LOW (ref 39–50)
HGB BLD-MCNC: 11.9 G/DL — LOW (ref 13–17)
IANC: 26.77 K/UL — HIGH (ref 1.5–8.5)
IMM GRANULOCYTES NFR BLD AUTO: 4.9 % — HIGH (ref 0–1.5)
LYMPHOCYTES # BLD AUTO: 0.68 K/UL — LOW (ref 1–3.3)
LYMPHOCYTES # BLD AUTO: 2.2 % — LOW (ref 13–44)
MCHC RBC-ENTMCNC: 27.8 PG — SIGNIFICANT CHANGE UP (ref 27–34)
MCHC RBC-ENTMCNC: 33.1 GM/DL — SIGNIFICANT CHANGE UP (ref 32–36)
MCV RBC AUTO: 84.1 FL — SIGNIFICANT CHANGE UP (ref 80–100)
MONOCYTES # BLD AUTO: 1.46 K/UL — HIGH (ref 0–0.9)
MONOCYTES NFR BLD AUTO: 4.8 % — SIGNIFICANT CHANGE UP (ref 2–14)
NEUTROPHILS # BLD AUTO: 26.77 K/UL — HIGH (ref 1.8–7.4)
NEUTROPHILS NFR BLD AUTO: 87.7 % — HIGH (ref 43–77)
NRBC # BLD: 0 /100 WBCS — SIGNIFICANT CHANGE UP
NRBC # FLD: 0.08 K/UL — HIGH
PLATELET # BLD AUTO: 132 K/UL — LOW (ref 150–400)
POTASSIUM SERPL-MCNC: 5 MMOL/L — SIGNIFICANT CHANGE UP (ref 3.5–5.3)
POTASSIUM SERPL-SCNC: 5 MMOL/L — SIGNIFICANT CHANGE UP (ref 3.5–5.3)
PROCALCITONIN SERPL-MCNC: 0.38 NG/ML — HIGH (ref 0.02–0.1)
PROT SERPL-MCNC: 5.7 G/DL — LOW (ref 6–8.3)
RBC # BLD: 4.28 M/UL — SIGNIFICANT CHANGE UP (ref 4.2–5.8)
RBC # FLD: 15.5 % — HIGH (ref 10.3–14.5)
SODIUM SERPL-SCNC: 137 MMOL/L — SIGNIFICANT CHANGE UP (ref 135–145)
VANCOMYCIN FLD-MCNC: 12.4 UG/ML — SIGNIFICANT CHANGE UP
WBC # BLD: 30.52 K/UL — HIGH (ref 3.8–10.5)
WBC # FLD AUTO: 30.52 K/UL — HIGH (ref 3.8–10.5)

## 2021-04-04 PROCEDURE — 99291 CRITICAL CARE FIRST HOUR: CPT

## 2021-04-04 RX ORDER — HUMAN INSULIN 100 [IU]/ML
40 INJECTION, SUSPENSION SUBCUTANEOUS EVERY 6 HOURS
Refills: 0 | Status: DISCONTINUED | OUTPATIENT
Start: 2021-04-04 | End: 2021-04-06

## 2021-04-04 RX ORDER — PANTOPRAZOLE SODIUM 20 MG/1
40 TABLET, DELAYED RELEASE ORAL DAILY
Refills: 0 | Status: DISCONTINUED | OUTPATIENT
Start: 2021-04-04 | End: 2021-04-05

## 2021-04-04 RX ORDER — VANCOMYCIN HCL 1 G
1000 VIAL (EA) INTRAVENOUS ONCE
Refills: 0 | Status: COMPLETED | OUTPATIENT
Start: 2021-04-04 | End: 2021-04-04

## 2021-04-04 RX ORDER — LACTULOSE 10 G/15ML
10 SOLUTION ORAL DAILY
Refills: 0 | Status: DISCONTINUED | OUTPATIENT
Start: 2021-04-04 | End: 2021-04-06

## 2021-04-04 RX ORDER — HUMAN INSULIN 100 [IU]/ML
36 INJECTION, SUSPENSION SUBCUTANEOUS EVERY 6 HOURS
Refills: 0 | Status: DISCONTINUED | OUTPATIENT
Start: 2021-04-04 | End: 2021-04-04

## 2021-04-04 RX ADMIN — Medication 6 MILLIGRAM(S): at 06:00

## 2021-04-04 RX ADMIN — CHLORHEXIDINE GLUCONATE 1 APPLICATION(S): 213 SOLUTION TOPICAL at 07:34

## 2021-04-04 RX ADMIN — HUMAN INSULIN 36 UNIT(S): 100 INJECTION, SUSPENSION SUBCUTANEOUS at 17:37

## 2021-04-04 RX ADMIN — POLYETHYLENE GLYCOL 3350 17 GRAM(S): 17 POWDER, FOR SOLUTION ORAL at 06:00

## 2021-04-04 RX ADMIN — PANTOPRAZOLE SODIUM 40 MILLIGRAM(S): 20 TABLET, DELAYED RELEASE ORAL at 13:44

## 2021-04-04 RX ADMIN — HUMAN INSULIN 32 UNIT(S): 100 INJECTION, SUSPENSION SUBCUTANEOUS at 06:01

## 2021-04-04 RX ADMIN — Medication 6: at 23:34

## 2021-04-04 RX ADMIN — LACTULOSE 10 GRAM(S): 10 SOLUTION ORAL at 17:39

## 2021-04-04 RX ADMIN — CEFEPIME 100 MILLIGRAM(S): 1 INJECTION, POWDER, FOR SOLUTION INTRAMUSCULAR; INTRAVENOUS at 11:14

## 2021-04-04 RX ADMIN — Medication 4: at 06:01

## 2021-04-04 RX ADMIN — HUMAN INSULIN 40 UNIT(S): 100 INJECTION, SUSPENSION SUBCUTANEOUS at 23:34

## 2021-04-04 RX ADMIN — HUMAN INSULIN 36 UNIT(S): 100 INJECTION, SUSPENSION SUBCUTANEOUS at 11:14

## 2021-04-04 RX ADMIN — CEFEPIME 100 MILLIGRAM(S): 1 INJECTION, POWDER, FOR SOLUTION INTRAMUSCULAR; INTRAVENOUS at 22:35

## 2021-04-04 RX ADMIN — Medication 6: at 17:38

## 2021-04-04 RX ADMIN — Medication 4: at 11:15

## 2021-04-04 RX ADMIN — Medication 250 MILLIGRAM(S): at 11:49

## 2021-04-04 RX ADMIN — POLYETHYLENE GLYCOL 3350 17 GRAM(S): 17 POWDER, FOR SOLUTION ORAL at 17:38

## 2021-04-04 RX ADMIN — SENNA PLUS 2 TABLET(S): 8.6 TABLET ORAL at 21:13

## 2021-04-04 RX ADMIN — CHLORHEXIDINE GLUCONATE 15 MILLILITER(S): 213 SOLUTION TOPICAL at 17:37

## 2021-04-04 RX ADMIN — CHLORHEXIDINE GLUCONATE 15 MILLILITER(S): 213 SOLUTION TOPICAL at 06:03

## 2021-04-04 NOTE — PROGRESS NOTE ADULT - ATTENDING COMMENTS
60M with DM, not on meds, admitted for acute hypoxic respiratory failure from COVID-19, HAGMA, lactic acidosis, SUKI (vs CKD), hyperglycemia, elevated troponin & pBNP. Nephew (Alba Pizarro nephgurwinder in [place], [phone]) reports PMH of DM, low BP and takes no medications. Wife, Lucero, is currently in [place]. Patient tested positive for COVID on 3/19. Found to have PE/DVT on admission.    Neuro: Propofol weaned off yesterday evening; will stop the fentanyl infusion this morning for awakening trial.  CV: Shock, cardiogenic (acute PE) & distributive (sepsis), continue leveophed to keep MAP >65. Able to wean down to .06 mcg/kg/min this AM. Suspect type 2 MI as cause of elevated troponin. Awaiting formal TTE. Would also start ASA 81 mg when able.  Pulm: Acute hypoxic respiratory failure, continue mechanical ventilation with lung-protective settings. Will place on CPAP trial once off sedation.  GI: Mild LFT elevation, likely from meds/sepsis/COVID. Continue TF with Nepro. No BM despite senna & PEG; give methylnaltrexone or lactulose.  Renal: SUKI vs CKD, non oliguric, Cr improving with conservative care. Monitor UOP, limit neprhotoxins and hypotension. Mann necessary.  ID: Sepsis, LRC with GPCs and blood cx NGTD. Continue vancomycin & cefepime. WCC trending down. COVID-19, continue decadron.  Endo: DM, not on home meds, elevated A1C. I increased NPH to 36u q6h today and will continue SSI.  Heme: PE/DVT, continue heparin infusion. If Cr continues to improve, would transition to treatment dose LMWH if GFR > 30.  Skin: Blue discoloration of toes. 2+ DP pulses, warm feet. Concerned about recent vasopressors but fingers normal. ? COVID toes. Monitor for now.  Prophylaxis: Heparin drip, chlorhexidine, will start GI ppx with full-dose A/C.  Lines: [facility] TLC (4/1), R ax arterial (4/1), Mann (4/1).  Code: Full.

## 2021-04-04 NOTE — PROGRESS NOTE ADULT - SUBJECTIVE AND OBJECTIVE BOX
Whitney Mario MD  Internal Medicine, PGY2  [phone]    MICU Progress Note    Interval Events:    Meds administered:  chlorhexidine 4% Liquid: 1 Application(s) Topical (04-04 @ 07:34)  chlorhexidine 0.12% Liquid: 15 milliLiter(s) Oral Mucosa (04-04 @ 06:03)  insulin lispro (ADMELOG) corrective regimen sliding scale: 4 Unit(s) SubCutaneous (04-04 @ 06:01)  insulin NPH human recombinant: 32 Unit(s) SubCutaneous (04-04 @ 06:01)  polyethylene glycol 3350: 17 Gram(s) Oral (04-04 @ 06:00)  dexAMETHasone  Injectable: 6 milliGRAM(s) IV Push (04-04 @ 06:00)  insulin lispro (ADMELOG) corrective regimen sliding scale: 6 Unit(s) SubCutaneous (04-03 @ 23:39)  insulin NPH human recombinant: 32 Unit(s) SubCutaneous (04-03 @ 23:39)  cefepime   IVPB: 100 mL/Hr IV Intermittent (04-03 @ 22:33)  senna: 2 Tablet(s) Oral (04-03 @ 21:33)        REVIEW OF SYSTEMS:  [ ] Unable to assess ROS because ______  CONSTITUTIONAL: No fever, chills, night sweats, or fatigue  EYES: No eye pain, visual disturbances, or discharge  ENMT:  No difficulty hearing, tinnitus, vertigo; No sinus or throat pain  NECK: No pain or stiffness  BREASTS: No pain, masses, or nipple discharge  RESPIRATORY: No cough, wheezing, or hemoptysis; No shortness of breath  CARDIOVASCULAR: No chest pain, palpitations, dizziness, or leg swelling  GASTROINTESTINAL: No abdominal or epigastric pain. No nausea, vomiting, or hematemesis; No diarrhea or constipation. No melena or hematochezia.  GENITOURINARY: No dysuria, frequency, hematuria, or incontinence  NEUROLOGICAL: No headaches, memory loss, loss of strength, numbness, or tremors  SKIN: No itching, burning, rashes, or lesions   LYMPH NODES: No enlarged glands  ENDOCRINE: No heat or cold intolerance; No hair loss  MUSCULOSKELETAL: No joint pain or swelling; No muscle, back, or extremity pain  PSYCHIATRIC: No depression, anxiety, mood swings, or difficulty sleeping  HEME/LYMPH: No easy bruising, or bleeding gums  ALLERGY AND IMMUNOLOGIC: No hives or eczema    OBJECTIVE:  Vents:  Mode: AC/ CMV (Assist Control/ Continuous Mandatory Ventilation), RR (machine): 28, TV (machine): 400, FiO2: 35, PEEP: 5, ITime: 0.64, MAP: 10, PIP: 25    04-03 @ 07:01  -  04-04 @ 07:00  --------------------------------------------------------  IN: 1825.5 mL / OUT: 1550 mL / NET: 275.5 mL      CAPILLARY BLOOD GLUCOSE      POCT Blood Glucose.: 221 mg/dL (04 Apr 2021 05:55)      Drips:    Lines:  See A/P    VITALS:  T(F): 99.7 (04-04-21 @ 07:00), Max: 99.9 (04-04-21 @ 00:26)  HR: 73 (04-04-21 @ 07:00) (73 - 84)  BP: 118/75 (04-04-21 @ 07:30) (111/72 - 118/75)  BP(mean): 90 (04-04-21 @ 07:30) (82 - 90)  ABP: 108/69 (04-04-21 @ 07:30) (99/58 - 113/69)  ABP(mean): 84 (04-04-21 @ 07:30) (5 - 86)  RR: 28 (04-04-21 @ 07:00) (27 - 29)  SpO2: 100% (04-04-21 @ 07:00) (100% - 100%)    PHYSICAL EXAM:  GENERAL: NAD, lying in bed comfortably  HEAD:  Atraumatic, Normocephalic  EYES: EOMI, PERRLA, conjunctiva and sclera clear  ENT: Moist mucous membranes  NECK: Supple, No JVD  CHEST/LUNG: Clear to auscultation bilaterally; No rales, rhonchi, wheezing, or rubs. Unlabored respirations  HEART: Regular rate and rhythm; No murmurs, rubs, or gallops  ABDOMEN: Bowel sounds present; Soft, Nontender, Nondistended. No hepatomegaly  EXTREMITIES:  2+ Peripheral Pulses, brisk capillary refill. No clubbing, cyanosis, or edema  NERVOUS SYSTEM:  Alert & Oriented X3, speech clear. No deficits   MSK: FROM all 4 extremities, full and equal strength  SKIN: No rashes or lesions    HOSPITAL MEDICATIONS:  Standing Meds:  cefepime   IVPB 1000 milliGRAM(s) IV Intermittent every 12 hours  chlorhexidine 0.12% Liquid 15 milliLiter(s) Oral Mucosa every 12 hours  chlorhexidine 4% Liquid 1 Application(s) Topical <User Schedule>  dexAMETHasone  Injectable 6 milliGRAM(s) IV Push daily  dextrose 50% Injectable 25 Gram(s) IV Push once  dextrose 50% Injectable 12.5 Gram(s) IV Push once  dextrose 50% Injectable 25 Gram(s) IV Push once  fentaNYL   Infusion..... 0.5 MICROgram(s)/kG/Hr IV Continuous <Continuous>  glucagon  Injectable 1 milliGRAM(s) IntraMuscular once  heparin  Infusion.  Unit(s)/Hr IV Continuous <Continuous>  insulin lispro (ADMELOG) corrective regimen sliding scale   SubCutaneous every 6 hours  insulin NPH human recombinant 32 Unit(s) SubCutaneous every 6 hours  norepinephrine Infusion 0.05 MICROgram(s)/kG/Min IV Continuous <Continuous>  polyethylene glycol 3350 17 Gram(s) Oral every 12 hours  propofol Infusion 10 MICROgram(s)/kG/Min IV Continuous <Continuous>  senna 2 Tablet(s) Oral at bedtime      PRN Meds:      LABS:  CBC 04-04-21 @ 02:51                        11.9   30.52 )-----------( 132                   36.0       Hgb trend: 11.9 <-- , 12.5 <-- , 13.0 <-- , 14.6 <--   WBC trend: 30.52 <-- , 38.21 <-- , 34.90 <-- , 27.80 <--     CMP 04-04-21 @ 02:51    137  |  102  |  63<H>  ----------------------------<  348<H>  5.0   |  23  |  1.99<H>    Ca    8.3<L>      04-04-21 @ 02:51  Phos  4.2     04-03  Mg     3.0     04-03    TPro  5.7<L>  /  Alb  2.5<L>  /  TBili  0.7  /  DBili  x   /  AST  65<H>  /  ALT  52<H>  /  AlkPhos  65  04-04      Serum Cr trend: 1.99 <-- , 2.36 <-- , 2.50 <-- , 2.57 <-- , 2.65 <-- , 2.47 <-- , 2.32 <-- , 1.97 <--   PTT - ( 04 Apr 2021 02:51 )  PTT:84.0 sec    Arterial Blood Gas:  04-04 @ 02:51  7.40/37/91/23/96.4/-1.5  ABG lactate: --  Arterial Blood Gas:  04-03 @ 06:50  7.36/42/64/23/90.2/-1.1  ABG lactate: --  Arterial Blood Gas:  04-02 @ 14:28  7.35/34/76/20/94.2/-5.8  ABG lactate: --        MICROBIOLOGY:     Culture - Sputum (collected 03 Apr 2021 18:14)  Source: .Sputum Sputum  Gram Stain (03 Apr 2021 19:26):    Few Squamous epithelial cells per low power field    Few polymorphonuclear leukocytes per low power field    Few Yeast per oil power field    Few Gram Positive Cocci in Pairs and Chains per oil power field    Culture - Blood (collected 01 Apr 2021 12:32)  Source: .Blood Blood-Peripheral  Preliminary Report (02 Apr 2021 13:01):    No growth to date.    Culture - Blood (collected 01 Apr 2021 12:32)  Source: .Blood Blood-Peripheral  Preliminary Report (02 Apr 2021 13:01):    No growth to date.        RADIOLOGY:  [ ] Reviewed and interpreted by me    EKG:   Whitney Mario MD  Internal Medicine, PGY2  [phone]    MICU Progress Note    Interval Events: No acute events.     Meds administered:  chlorhexidine 4% Liquid: 1 Application(s) Topical (04-04 @ 07:34)  chlorhexidine 0.12% Liquid: 15 milliLiter(s) Oral Mucosa (04-04 @ 06:03)  insulin lispro (ADMELOG) corrective regimen sliding scale: 4 Unit(s) SubCutaneous (04-04 @ 06:01)  insulin NPH human recombinant: 32 Unit(s) SubCutaneous (04-04 @ 06:01)  polyethylene glycol 3350: 17 Gram(s) Oral (04-04 @ 06:00)  dexAMETHasone  Injectable: 6 milliGRAM(s) IV Push (04-04 @ 06:00)  insulin lispro (ADMELOG) corrective regimen sliding scale: 6 Unit(s) SubCutaneous (04-03 @ 23:39)  insulin NPH human recombinant: 32 Unit(s) SubCutaneous (04-03 @ 23:39)  cefepime   IVPB: 100 mL/Hr IV Intermittent (04-03 @ 22:33)  senna: 2 Tablet(s) Oral (04-03 @ 21:33)        REVIEW OF SYSTEMS:  [ ] Unable to assess ROS because ______  CONSTITUTIONAL: No fever, chills, night sweats, or fatigue  EYES: No eye pain, visual disturbances, or discharge  ENMT:  No difficulty hearing, tinnitus, vertigo; No sinus or throat pain  NECK: No pain or stiffness  BREASTS: No pain, masses, or nipple discharge  RESPIRATORY: No cough, wheezing, or hemoptysis; No shortness of breath  CARDIOVASCULAR: No chest pain, palpitations, dizziness, or leg swelling  GASTROINTESTINAL: No abdominal or epigastric pain. No nausea, vomiting, or hematemesis; No diarrhea or constipation. No melena or hematochezia.  GENITOURINARY: No dysuria, frequency, hematuria, or incontinence  NEUROLOGICAL: No headaches, memory loss, loss of strength, numbness, or tremors  SKIN: No itching, burning, rashes, or lesions   LYMPH NODES: No enlarged glands  ENDOCRINE: No heat or cold intolerance; No hair loss  MUSCULOSKELETAL: No joint pain or swelling; No muscle, back, or extremity pain  PSYCHIATRIC: No depression, anxiety, mood swings, or difficulty sleeping  HEME/LYMPH: No easy bruising, or bleeding gums  ALLERGY AND IMMUNOLOGIC: No hives or eczema    OBJECTIVE:  Vents:  Mode: AC/ CMV (Assist Control/ Continuous Mandatory Ventilation), RR (machine): 28, TV (machine): 400, FiO2: 35, PEEP: 5, ITime: 0.64, MAP: 10, PIP: 25    04-03 @ 07:01  -  04-04 @ 07:00  --------------------------------------------------------  IN: 1825.5 mL / OUT: 1550 mL / NET: 275.5 mL      CAPILLARY BLOOD GLUCOSE      POCT Blood Glucose.: 221 mg/dL (04 Apr 2021 05:55)      Drips:    Lines:  See A/P    VITALS:  T(F): 99.7 (04-04-21 @ 07:00), Max: 99.9 (04-04-21 @ 00:26)  HR: 73 (04-04-21 @ 07:00) (73 - 84)  BP: 118/75 (04-04-21 @ 07:30) (111/72 - 118/75)  BP(mean): 90 (04-04-21 @ 07:30) (82 - 90)  ABP: 108/69 (04-04-21 @ 07:30) (99/58 - 113/69)  ABP(mean): 84 (04-04-21 @ 07:30) (5 - 86)  RR: 28 (04-04-21 @ 07:00) (27 - 29)  SpO2: 100% (04-04-21 @ 07:00) (100% - 100%)    PHYSICAL EXAM:  GENERAL: intubated and sedated  HEAD: NCAT  CHEST/LUNG: CTABL  HEART: RRR, s1, s2  ABDOMEN: Soft, Nontender, Nondistended; Bowel sounds present  EXTREMITIES:  2+ Peripheral Pulses, No clubbing, cyanosis, or edema  NEUROLOGY: non-focal  SKIN: No rashes or lesions    HOSPITAL MEDICATIONS:  Standing Meds:  cefepime   IVPB 1000 milliGRAM(s) IV Intermittent every 12 hours  chlorhexidine 0.12% Liquid 15 milliLiter(s) Oral Mucosa every 12 hours  chlorhexidine 4% Liquid 1 Application(s) Topical <User Schedule>  dexAMETHasone  Injectable 6 milliGRAM(s) IV Push daily  dextrose 50% Injectable 25 Gram(s) IV Push once  dextrose 50% Injectable 12.5 Gram(s) IV Push once  dextrose 50% Injectable 25 Gram(s) IV Push once  fentaNYL   Infusion..... 0.5 MICROgram(s)/kG/Hr IV Continuous <Continuous>  glucagon  Injectable 1 milliGRAM(s) IntraMuscular once  heparin  Infusion.  Unit(s)/Hr IV Continuous <Continuous>  insulin lispro (ADMELOG) corrective regimen sliding scale   SubCutaneous every 6 hours  insulin NPH human recombinant 32 Unit(s) SubCutaneous every 6 hours  norepinephrine Infusion 0.05 MICROgram(s)/kG/Min IV Continuous <Continuous>  polyethylene glycol 3350 17 Gram(s) Oral every 12 hours  propofol Infusion 10 MICROgram(s)/kG/Min IV Continuous <Continuous>  senna 2 Tablet(s) Oral at bedtime      PRN Meds:      LABS:  CBC 04-04-21 @ 02:51                        11.9   30.52 )-----------( 132                   36.0       Hgb trend: 11.9 <-- , 12.5 <-- , 13.0 <-- , 14.6 <--   WBC trend: 30.52 <-- , 38.21 <-- , 34.90 <-- , 27.80 <--     CMP 04-04-21 @ 02:51    137  |  102  |  63<H>  ----------------------------<  348<H>  5.0   |  23  |  1.99<H>    Ca    8.3<L>      04-04-21 @ 02:51  Phos  4.2     04-03  Mg     3.0     04-03    TPro  5.7<L>  /  Alb  2.5<L>  /  TBili  0.7  /  DBili  x   /  AST  65<H>  /  ALT  52<H>  /  AlkPhos  65  04-04      Serum Cr trend: 1.99 <-- , 2.36 <-- , 2.50 <-- , 2.57 <-- , 2.65 <-- , 2.47 <-- , 2.32 <-- , 1.97 <--   PTT - ( 04 Apr 2021 02:51 )  PTT:84.0 sec    Arterial Blood Gas:  04-04 @ 02:51  7.40/37/91/23/96.4/-1.5  ABG lactate: --  Arterial Blood Gas:  04-03 @ 06:50  7.36/42/64/23/90.2/-1.1  ABG lactate: --  Arterial Blood Gas:  04-02 @ 14:28  7.35/34/76/20/94.2/-5.8  ABG lactate: --        MICROBIOLOGY:     Culture - Sputum (collected 03 Apr 2021 18:14)  Source: .Sputum Sputum  Gram Stain (03 Apr 2021 19:26):    Few Squamous epithelial cells per low power field    Few polymorphonuclear leukocytes per low power field    Few Yeast per oil power field    Few Gram Positive Cocci in Pairs and Chains per oil power field    Culture - Blood (collected 01 Apr 2021 12:32)  Source: .Blood Blood-Peripheral  Preliminary Report (02 Apr 2021 13:01):    No growth to date.    Culture - Blood (collected 01 Apr 2021 12:32)  Source: .Blood Blood-Peripheral  Preliminary Report (02 Apr 2021 13:01):    No growth to date.        RADIOLOGY:  [ ] Reviewed and interpreted by me    EKG:

## 2021-04-04 NOTE — PROGRESS NOTE ADULT - ASSESSMENT
60M unknown PMHx presented with hypoxic respiratory failure 2/2 COVID, complicated by severe HAGMA, intubated for hypoxic respiratory failure.     # NEURO  - AOx3 at baseline  - Sedated with prop, versed, and fentanyl  - D/c versed, then wean prop as tolerated    # CARDIAC  Troponin elevation in setting of increased demand, SUKI, and PE  - No ischemic changes no EKG  - Downtrending trops now, no longer trending    Hypotension 2/2 vasoplegia and RH strain from PE; also with echogenic material in RV suspect clots  - c/w levo maintain MAP > 65  - f/u echo    # PULM  Hypoxic respiratory failure 2/2 COVID with L segmental upper lobe PE  - Lung protective ventilation; 6mL/kg IBW estimated 5'7 = 400mL  - Volume AC, titrate FiO2 to maintain PaO2 > 55  - Dexamethasone 6 mg qdaily x 10 days (4/1 - )    L segmental upper lobe PE  - Heparin gtt started 4/1; will need minimum of 3 months AC    # RENAL  ?SUKI vs CKD  - olson for strict I&Os  - trend Cr  - Avoid nephrotoxic agents, NSAIDs, RCA  - Renally dose meds    Hyperkalemia  - received lokelma x 1, now resolved    HAGMA lactic acidosis 2/2 sepsis +/- DKA  - likely due to severe hypoxemia on admission with lactate of 15, now improved s/p intubation and IVF  - now resolved    # GI   - c/w nepro given renal dysfunction  - bowel regimen    # HEME/ONC  PE and L common femoral vein DVT (DVT seen on POCUS 4/2)  - On heparin gtt 4/1    # ID  Sepsis  - empiric vanc/cefepime started 4/1  - dose vanc by level, cefepime renally adjusted    Tx COVID19 as above    # ENDO  Hyperglycemia w/ mildly elevated BHB  - s/p insulin gtt, NPH 32u q6 - will increase to 36  - maintain FS between 100-180  - ISS, FSq6 60M unknown PMHx presented with hypoxic respiratory failure 2/2 COVID, complicated by severe HAGMA, intubated for hypoxic respiratory failure.     # NEURO  - AOx3 at baseline  - Sedated with fentanyl, wean as tolerated    # CARDIAC  Troponin elevation in setting of increased demand, SUKI, and PE  - No ischemic changes no EKG  - Downtrending trops now, no longer trending    Hypotension 2/2 vasoplegia and RH strain from PE; also with echogenic material in RV suspect clots  - c/w levo maintain MAP > 65  - f/u echo    # PULM  Hypoxic respiratory failure 2/2 COVID with L segmental upper lobe PE  - Lung protective ventilation; 6mL/kg IBW estimated 5'7 = 400mL  - Volume AC, titrate FiO2 to maintain PaO2 > 55  - Dexamethasone 6 mg qdaily x 10 days (4/1 - )    L segmental upper lobe PE  - Heparin gtt started 4/1; will need minimum of 3 months AC    # RENAL  ?SUKI vs CKD  - olson for strict I&Os  - trend Cr  - Avoid nephrotoxic agents, NSAIDs, RCA  - Renally dose meds    Hyperkalemia  - received lokelma x 1, now resolved    HAGMA lactic acidosis 2/2 sepsis +/- DKA  - likely due to severe hypoxemia on admission with lactate of 15, now improved s/p intubation and IVF  - now resolved    # GI   - c/w nepro given renal dysfunction  - bowel regimen    # HEME/ONC  PE and L common femoral vein DVT (DVT seen on POCUS 4/2)  - On heparin gtt 4/1  - Once Cr downtrends, can switch to LMWH or DOAC    # ID  Sepsis  - empiric vanc/cefepime started 4/1  - dose vanc by level, cefepime renally adjusted    Tx COVID19 as above    # ENDO  Hyperglycemia w/ mildly elevated BHB  - s/p insulin gtt, NPH 32u q6 - will increase to 36  - maintain FS between 100-180  - ISS, FSq6 60M unknown PMHx presented with hypoxic respiratory failure 2/2 COVID, requiring intubation, complicated by PE and HAGMA.    # NEURO  - AOx3 at baseline  - Sedated with fentanyl, wean as tolerated    # CARDIAC  Troponin elevation in setting of increased demand, SUKI, and PE  - No ischemic changes no EKG  - Downtrending trops now, no longer trending    Hypotension 2/2 vasoplegia and RH strain from PE; also with echogenic material in RV suspect clots  - c/w levo maintain MAP > 65  - f/u echo    # PULM  Hypoxic respiratory failure 2/2 COVID with L segmental upper lobe PE  - Lung protective ventilation; 6mL/kg IBW estimated 5'7 = 400mL  - Volume AC, titrate FiO2 to maintain PaO2 > 55  - Dexamethasone 6 mg qdaily x 10 days (4/1 - )    L segmental upper lobe PE  - Heparin gtt started 4/1; will need minimum of 3 months AC    # RENAL  ?SUKI vs CKD  - olson for strict I&Os  - trend Cr  - Avoid nephrotoxic agents, NSAIDs, RCA  - Renally dose meds    Hyperkalemia  - received lokelma x 1, now resolved    HAGMA lactic acidosis 2/2 sepsis +/- DKA  - likely due to severe hypoxemia on admission with lactate of 15, now improved s/p intubation and IVF  - now resolved    # GI   - c/w nepro given renal dysfunction  - bowel regimen    # HEME/ONC  PE and L common femoral vein DVT (DVT seen on POCUS 4/2)  - On heparin gtt 4/1  - Once Cr downtrends, can switch to LMWH or DOAC    # ID  Sepsis  - empiric vanc/cefepime started 4/1  - dose vanc by level, cefepime renally adjusted    Tx COVID19 as above    # ENDO  Hyperglycemia w/ mildly elevated BHB  - s/p insulin gtt, NPH 32u q6 - will increase to 36  - maintain FS between 100-180  - ISS, FSq6 60M unknown PMHx presented with hypoxic respiratory failure 2/2 COVID, requiring intubation, complicated by PE and HAGMA.    # NEURO  - AOx3 at baseline  - Sedated with fentanyl, wean as tolerated    # CARDIAC  Troponin elevation in setting of increased demand, SUKI, and PE  - No ischemic changes no EKG  - Downtrending trops now, no longer trending    Hypotension 2/2 vasoplegia and RH strain from PE; also with echogenic material in RV suspect clots  - c/w levo maintain MAP > 65  - f/u echo    # PULM  Hypoxic respiratory failure 2/2 COVID with L segmental upper lobe PE  - Lung protective ventilation; 6mL/kg IBW estimated 5'7 = 400mL  - Volume AC, titrate FiO2 to maintain PaO2 > 55  - Dexamethasone 6 mg qdaily x 10 days (4/1 - )    L segmental upper lobe PE  - Heparin gtt started 4/1; will need minimum of 3 months AC    # RENAL  ?SUKI vs CKD  - olson for strict I&Os  - trend Cr  - Avoid nephrotoxic agents, NSAIDs, RCA  - Renally dose meds    Hyperkalemia  - received lokelma x 1, now resolved    HAGMA lactic acidosis 2/2 sepsis +/- DKA  - likely due to severe hypoxemia on admission with lactate of 15, now improved s/p intubation and IVF  - now resolved    # GI   - c/w nepro given renal dysfunction  - bowel regimen    # HEME/ONC  PE and L common femoral vein DVT (DVT seen on POCUS 4/2)  - On heparin gtt 4/1  - Once Cr downtrends, can switch to LMWH or DOAC    # ID  Sepsis  - empiric vanc/cefepime started 4/1  - dose vanc by level, cefepime renally adjusted    Tx COVID19 as above    # ENDO  Hyperglycemia w/ mildly elevated BHB  - s/p insulin gtt, NPH 32u q6 - will increase to 36  - maintain FS between 100-180  - ISS, FSq6  - A1c 10.3 - new DM. Will need endo consult eventually

## 2021-04-05 LAB
ALBUMIN SERPL ELPH-MCNC: 2.5 G/DL — LOW (ref 3.3–5)
ALP SERPL-CCNC: 80 U/L — SIGNIFICANT CHANGE UP (ref 40–120)
ALT FLD-CCNC: 76 U/L — HIGH (ref 4–41)
ANION GAP SERPL CALC-SCNC: 12 MMOL/L — SIGNIFICANT CHANGE UP (ref 7–14)
APTT BLD: 68.6 SEC — HIGH (ref 27–36.3)
AST SERPL-CCNC: 105 U/L — HIGH (ref 4–40)
BASOPHILS # BLD AUTO: 0 K/UL — SIGNIFICANT CHANGE UP (ref 0–0.2)
BASOPHILS NFR BLD AUTO: 0 % — SIGNIFICANT CHANGE UP (ref 0–2)
BILIRUB SERPL-MCNC: 0.7 MG/DL — SIGNIFICANT CHANGE UP (ref 0.2–1.2)
BLOOD GAS ARTERIAL COMPREHENSIVE RESULT: SIGNIFICANT CHANGE UP
BUN SERPL-MCNC: 63 MG/DL — HIGH (ref 7–23)
CALCIUM SERPL-MCNC: 8.8 MG/DL — SIGNIFICANT CHANGE UP (ref 8.4–10.5)
CHLORIDE SERPL-SCNC: 102 MMOL/L — SIGNIFICANT CHANGE UP (ref 98–107)
CO2 SERPL-SCNC: 23 MMOL/L — SIGNIFICANT CHANGE UP (ref 22–31)
CREAT SERPL-MCNC: 1.67 MG/DL — HIGH (ref 0.5–1.3)
CULTURE RESULTS: SIGNIFICANT CHANGE UP
EOSINOPHIL # BLD AUTO: 0 K/UL — SIGNIFICANT CHANGE UP (ref 0–0.5)
EOSINOPHIL NFR BLD AUTO: 0 % — SIGNIFICANT CHANGE UP (ref 0–6)
GLUCOSE BLDC GLUCOMTR-MCNC: 160 MG/DL — HIGH (ref 70–99)
GLUCOSE BLDC GLUCOMTR-MCNC: 171 MG/DL — HIGH (ref 70–99)
GLUCOSE BLDC GLUCOMTR-MCNC: 175 MG/DL — HIGH (ref 70–99)
GLUCOSE BLDC GLUCOMTR-MCNC: 201 MG/DL — HIGH (ref 70–99)
GLUCOSE BLDC GLUCOMTR-MCNC: 270 MG/DL — HIGH (ref 70–99)
GLUCOSE SERPL-MCNC: 271 MG/DL — HIGH (ref 70–99)
HCT VFR BLD CALC: 36.6 % — LOW (ref 39–50)
HGB BLD-MCNC: 12 G/DL — LOW (ref 13–17)
IANC: 20.67 K/UL — HIGH (ref 1.5–8.5)
LYMPHOCYTES # BLD AUTO: 0.22 K/UL — LOW (ref 1–3.3)
LYMPHOCYTES # BLD AUTO: 0.9 % — LOW (ref 13–44)
MAGNESIUM SERPL-MCNC: 2.7 MG/DL — HIGH (ref 1.6–2.6)
MCHC RBC-ENTMCNC: 27 PG — SIGNIFICANT CHANGE UP (ref 27–34)
MCHC RBC-ENTMCNC: 32.8 GM/DL — SIGNIFICANT CHANGE UP (ref 32–36)
MCV RBC AUTO: 82.4 FL — SIGNIFICANT CHANGE UP (ref 80–100)
MONOCYTES # BLD AUTO: 0.84 K/UL — SIGNIFICANT CHANGE UP (ref 0–0.9)
MONOCYTES NFR BLD AUTO: 3.5 % — SIGNIFICANT CHANGE UP (ref 2–14)
MRSA PCR RESULT.: SIGNIFICANT CHANGE UP
NEUTROPHILS # BLD AUTO: 21.59 K/UL — HIGH (ref 1.8–7.4)
NEUTROPHILS NFR BLD AUTO: 90.3 % — HIGH (ref 43–77)
PHOSPHATE SERPL-MCNC: 4.4 MG/DL — SIGNIFICANT CHANGE UP (ref 2.5–4.5)
PLATELET # BLD AUTO: 105 K/UL — LOW (ref 150–400)
POTASSIUM SERPL-MCNC: 4.5 MMOL/L — SIGNIFICANT CHANGE UP (ref 3.5–5.3)
POTASSIUM SERPL-SCNC: 4.5 MMOL/L — SIGNIFICANT CHANGE UP (ref 3.5–5.3)
PROT SERPL-MCNC: 5.8 G/DL — LOW (ref 6–8.3)
RBC # BLD: 4.44 M/UL — SIGNIFICANT CHANGE UP (ref 4.2–5.8)
RBC # FLD: 15.8 % — HIGH (ref 10.3–14.5)
S AUREUS DNA NOSE QL NAA+PROBE: SIGNIFICANT CHANGE UP
SODIUM SERPL-SCNC: 137 MMOL/L — SIGNIFICANT CHANGE UP (ref 135–145)
SPECIMEN SOURCE: SIGNIFICANT CHANGE UP
WBC # BLD: 23.91 K/UL — HIGH (ref 3.8–10.5)
WBC # FLD AUTO: 23.91 K/UL — HIGH (ref 3.8–10.5)

## 2021-04-05 PROCEDURE — 99291 CRITICAL CARE FIRST HOUR: CPT

## 2021-04-05 RX ORDER — DEXMEDETOMIDINE HYDROCHLORIDE IN 0.9% SODIUM CHLORIDE 4 UG/ML
0.2 INJECTION INTRAVENOUS
Qty: 400 | Refills: 0 | Status: DISCONTINUED | OUTPATIENT
Start: 2021-04-05 | End: 2021-04-12

## 2021-04-05 RX ORDER — MIDAZOLAM HYDROCHLORIDE 1 MG/ML
4 INJECTION, SOLUTION INTRAMUSCULAR; INTRAVENOUS ONCE
Refills: 0 | Status: DISCONTINUED | OUTPATIENT
Start: 2021-04-05 | End: 2021-04-05

## 2021-04-05 RX ORDER — FENTANYL CITRATE 50 UG/ML
100 INJECTION INTRAVENOUS ONCE
Refills: 0 | Status: DISCONTINUED | OUTPATIENT
Start: 2021-04-05 | End: 2021-04-05

## 2021-04-05 RX ORDER — PANTOPRAZOLE SODIUM 20 MG/1
40 TABLET, DELAYED RELEASE ORAL ONCE
Refills: 0 | Status: COMPLETED | OUTPATIENT
Start: 2021-04-05 | End: 2021-04-05

## 2021-04-05 RX ORDER — PANTOPRAZOLE SODIUM 20 MG/1
40 TABLET, DELAYED RELEASE ORAL DAILY
Refills: 0 | Status: DISCONTINUED | OUTPATIENT
Start: 2021-04-06 | End: 2021-04-12

## 2021-04-05 RX ORDER — FENTANYL CITRATE 50 UG/ML
25 INJECTION INTRAVENOUS ONCE
Refills: 0 | Status: DISCONTINUED | OUTPATIENT
Start: 2021-04-05 | End: 2021-04-05

## 2021-04-05 RX ORDER — FENTANYL CITRATE 50 UG/ML
50 INJECTION INTRAVENOUS ONCE
Refills: 0 | Status: DISCONTINUED | OUTPATIENT
Start: 2021-04-05 | End: 2021-04-05

## 2021-04-05 RX ADMIN — HUMAN INSULIN 40 UNIT(S): 100 INJECTION, SUSPENSION SUBCUTANEOUS at 23:34

## 2021-04-05 RX ADMIN — Medication 2: at 23:35

## 2021-04-05 RX ADMIN — POLYETHYLENE GLYCOL 3350 17 GRAM(S): 17 POWDER, FOR SOLUTION ORAL at 17:42

## 2021-04-05 RX ADMIN — FENTANYL CITRATE 25 MICROGRAM(S): 50 INJECTION INTRAVENOUS at 04:18

## 2021-04-05 RX ADMIN — FENTANYL CITRATE 100 MICROGRAM(S): 50 INJECTION INTRAVENOUS at 22:58

## 2021-04-05 RX ADMIN — HUMAN INSULIN 40 UNIT(S): 100 INJECTION, SUSPENSION SUBCUTANEOUS at 18:06

## 2021-04-05 RX ADMIN — LACTULOSE 10 GRAM(S): 10 SOLUTION ORAL at 11:13

## 2021-04-05 RX ADMIN — HEPARIN SODIUM 1300 UNIT(S)/HR: 5000 INJECTION INTRAVENOUS; SUBCUTANEOUS at 13:53

## 2021-04-05 RX ADMIN — MIDAZOLAM HYDROCHLORIDE 4 MILLIGRAM(S): 1 INJECTION, SOLUTION INTRAMUSCULAR; INTRAVENOUS at 22:53

## 2021-04-05 RX ADMIN — Medication 6: at 18:06

## 2021-04-05 RX ADMIN — SENNA PLUS 2 TABLET(S): 8.6 TABLET ORAL at 22:35

## 2021-04-05 RX ADMIN — DEXMEDETOMIDINE HYDROCHLORIDE IN 0.9% SODIUM CHLORIDE 3.6 MICROGRAM(S)/KG/HR: 4 INJECTION INTRAVENOUS at 17:38

## 2021-04-05 RX ADMIN — Medication 6 MILLIGRAM(S): at 05:34

## 2021-04-05 RX ADMIN — CEFEPIME 100 MILLIGRAM(S): 1 INJECTION, POWDER, FOR SOLUTION INTRAMUSCULAR; INTRAVENOUS at 10:22

## 2021-04-05 RX ADMIN — CEFEPIME 100 MILLIGRAM(S): 1 INJECTION, POWDER, FOR SOLUTION INTRAMUSCULAR; INTRAVENOUS at 22:35

## 2021-04-05 RX ADMIN — HUMAN INSULIN 40 UNIT(S): 100 INJECTION, SUSPENSION SUBCUTANEOUS at 11:12

## 2021-04-05 RX ADMIN — Medication 2: at 11:11

## 2021-04-05 RX ADMIN — HUMAN INSULIN 40 UNIT(S): 100 INJECTION, SUSPENSION SUBCUTANEOUS at 05:38

## 2021-04-05 RX ADMIN — FENTANYL CITRATE 0.72 MICROGRAM(S)/KG/HR: 50 INJECTION INTRAVENOUS at 22:52

## 2021-04-05 RX ADMIN — FENTANYL CITRATE 25 MICROGRAM(S): 50 INJECTION INTRAVENOUS at 05:36

## 2021-04-05 RX ADMIN — Medication 4: at 05:34

## 2021-04-05 RX ADMIN — CHLORHEXIDINE GLUCONATE 15 MILLILITER(S): 213 SOLUTION TOPICAL at 05:35

## 2021-04-05 RX ADMIN — POLYETHYLENE GLYCOL 3350 17 GRAM(S): 17 POWDER, FOR SOLUTION ORAL at 05:35

## 2021-04-05 RX ADMIN — PANTOPRAZOLE SODIUM 40 MILLIGRAM(S): 20 TABLET, DELAYED RELEASE ORAL at 10:22

## 2021-04-05 RX ADMIN — CHLORHEXIDINE GLUCONATE 1 APPLICATION(S): 213 SOLUTION TOPICAL at 08:20

## 2021-04-05 RX ADMIN — CHLORHEXIDINE GLUCONATE 15 MILLILITER(S): 213 SOLUTION TOPICAL at 17:42

## 2021-04-05 NOTE — PROGRESS NOTE ADULT - SUBJECTIVE AND OBJECTIVE BOX
CHIEF COMPLAINT:    Interval Events:    HPI:  60M unknown medical history BIBEMS for respiratory distress, recently COVID+ as per EMS. Pt unable to comply with history 2/2 resp distress, nods yes to difficulty breathing, no to pain. Baseline AAOx3. En route, pt had RR 28, SaO2 60%. In ED initially hypoxic to 60% placed on NRB and satting low 90s and tachypneic to 40s on NRB. Placed on AVAPS, still tachypneic to 40s. Labs showing HAGMA, elevated FSG to 500s. Also with lactate of 15. Trops of 200 and pro-BNP elevated to 21k. MICU consulted for respiratory failure in setting of COVID, also concern for DKA. On encounter, pt is noncooperative with questioning. Nods and tries to speak, unable to form full sentences and visibly tachypneic on AVAPS. In ED given 2L NS, decadron. Started on insulin gtt. Subsequently intubated.     Initial vitals 108/86, , RR 28, T97, SaO2 60% on 15L NRB. Received 2L NS bolus. Started on insulin gtt.     Pt admitted to surg b for further management.     Addendum 4/1/21 1700:  Limited history obtained from Alba Pizarro nephgurwinder, who lives in the [place] [phone]). Patient has a PMH of low BP and takes no medications. Patient tested positive for COVID on 3/19. He was initially doing fine with some fatigue, but suddenly decompensated after yesterday. Unable to obtain social history. Wife, Lucero, is currently in [place] - she went due to medical issues but was unable to return due to COVID travel restrictions.    (01 Apr 2021 11:30)      REVIEW OF SYSTEMS:      [ ] Unable to assess ROS because  ETT Sedation ________    OBJECTIVE:  ICU Vital Signs Last 24 Hrs  T(C): 35.9 (05 Apr 2021 11:00), Max: 37.5 (04 Apr 2021 20:00)  T(F): 96.6 (05 Apr 2021 11:00), Max: 99.5 (04 Apr 2021 20:00)  HR: 75 (05 Apr 2021 11:18) (61 - 98)  BP: 119/76 (05 Apr 2021 08:00) (119/76 - 119/76)  BP(mean): 92 (05 Apr 2021 08:00) (92 - 92)  ABP: 98/55 (05 Apr 2021 11:00) (91/54 - 132/69)  ABP(mean): 69 (05 Apr 2021 11:00) (66 - 90)  RR: 22 (05 Apr 2021 11:00) (16 - 28)  SpO2: 97% (05 Apr 2021 11:18) (97% - 100%)    Mode: CPAP with PS, FiO2: 35, PEEP: 5, PS: 5, MAP: 8, PIP: 15    04-04 @ 07:01  -  04-05 @ 07:00  --------------------------------------------------------  IN: 2004.4 mL / OUT: 2250 mL / NET: -245.6 mL    04-05 @ 07:01 - 04-05 @ 11:47  --------------------------------------------------------  IN: 319.8 mL / OUT: 375 mL / NET: -55.2 mL      CAPILLARY BLOOD GLUCOSE      POCT Blood Glucose.: 160 mg/dL (05 Apr 2021 11:28)      Physical Exam:   GENERAL: intubated, sedated  HEENT:  NC/AT  EYES: PERRLA, conjunctiva and sclera clear  NECK: Supple, No JVD  CHEST/LUNG: diminished bilaterally; No wheezes, rales, or rhonchi  HEART: Regular rate and rhythm; No murmurs, rubs, or gallops  ABDOMEN: Soft, Nontender, Nondistended; Bowel sounds present  EXTREMITIES:  2+ Peripheral Pulses, No clubbing, cyanosis, or edema  PSYCH: unable as pt is sedated  NEUROLOGY: sedated  SKIN: No rashes or lesions      LINES:    HOSPITAL MEDICATIONS:  Standing Meds:  cefepime   IVPB 1000 milliGRAM(s) IV Intermittent every 12 hours  chlorhexidine 0.12% Liquid 15 milliLiter(s) Oral Mucosa every 12 hours  chlorhexidine 4% Liquid 1 Application(s) Topical <User Schedule>  dexAMETHasone  Injectable 6 milliGRAM(s) IV Push daily  dexMEDEtomidine Infusion 0.2 MICROgram(s)/kG/Hr IV Continuous <Continuous>  dextrose 50% Injectable 25 Gram(s) IV Push once  dextrose 50% Injectable 12.5 Gram(s) IV Push once  dextrose 50% Injectable 25 Gram(s) IV Push once  fentaNYL   Infusion..... 0.5 MICROgram(s)/kG/Hr IV Continuous <Continuous>  glucagon  Injectable 1 milliGRAM(s) IntraMuscular once  heparin  Infusion.  Unit(s)/Hr IV Continuous <Continuous>  insulin lispro (ADMELOG) corrective regimen sliding scale   SubCutaneous every 6 hours  insulin NPH human recombinant 40 Unit(s) SubCutaneous every 6 hours  lactulose Syrup 10 Gram(s) Oral daily  norepinephrine Infusion 0.05 MICROgram(s)/kG/Min IV Continuous <Continuous>  polyethylene glycol 3350 17 Gram(s) Oral every 12 hours  propofol Infusion 10 MICROgram(s)/kG/Min IV Continuous <Continuous>  senna 2 Tablet(s) Oral at bedtime      PRN Meds:      LABS:                        12.0   23.91 )-----------( 105      ( 05 Apr 2021 02:08 )             36.6     Hgb Trend: 12.0<--, 11.9<--, 12.5<--, 13.0<--, 14.6<--  04-05    137  |  102  |  63<H>  ----------------------------<  271<H>  4.5   |  23  |  1.67<H>    Ca    8.8      05 Apr 2021 02:08  Phos  4.4     04-05  Mg     2.7     04-05    TPro  5.8<L>  /  Alb  2.5<L>  /  TBili  0.7  /  DBili  x   /  AST  105<H>  /  ALT  76<H>  /  AlkPhos  80  04-05    Creatinine Trend: 1.67<--, 1.99<--, 2.36<--, 2.50<--, 2.57<--, 2.65<--  PTT - ( 05 Apr 2021 02:08 )  PTT:68.6 sec    Arterial Blood Gas:  04-05 @ 02:08  7.45/35/108/25/97.9/0.1  ABG lactate: --  Arterial Blood Gas:  04-04 @ 02:51  7.40/37/91/23/96.4/-1.5  ABG lactate: --        MICROBIOLOGY:     Culture - Sputum (collected 03 Apr 2021 18:14)  Source: .Sputum Sputum  Gram Stain (03 Apr 2021 19:26):    Few Squamous epithelial cells per low power field    Few polymorphonuclear leukocytes per low power field    Few Yeast per oil power field    Few Gram Positive Cocci in Pairs and Chains per oil power field  Preliminary Report (04 Apr 2021 17:12):    Normal Respiratory Lizzette present    Culture - Blood (collected 03 Apr 2021 08:27)  Source: .Blood Blood-Peripheral  Preliminary Report (04 Apr 2021 09:01):    No growth to date.      RADIOLOGY:  [ ] Reviewed and interpreted by me

## 2021-04-05 NOTE — PROGRESS NOTE ADULT - ATTENDING COMMENTS
Patient seen and examined at bedside, chart,labs and radiography reviwed  1-hypoxic resp failure, continue vent support  2-ulises, avoid nephrotoxic meds, nephro consult  3-leukocytosis, continue abxs  4-pulm embolism, heparin drip    31 mins of critical crae time

## 2021-04-05 NOTE — PROGRESS NOTE ADULT - ASSESSMENT
60M unknown PMHx presented with hypoxic respiratory failure 2/2 COVID, requiring intubation, complicated by PE and HAGMA.    LOS day 4   EET day 4     # NEURO  - AOx3 at baseline  - Sedated with Precedex, wean as tolerated    # CARDIAC  Troponin elevation in setting of increased demand, SUKI, and PE  - No ischemic changes no EKG  - Downtrending trops now, no longer trending    Hypotension 2/2 vasoplegia and RH strain from PE; also with echogenic material in RV suspect clots  - c/w levo maintain MAP > 65  - f/u echo    # PULM  Hypoxic respiratory failure 2/2 COVID with L segmental upper lobe PE  - Lung protective ventilation; 6mL/kg IBW estimated 5'7 = 400mL  - Volume AC, titrate FiO2 to maintain PaO2 > 55  - Dexamethasone 6 mg qdaily x 10 days (4/1 - )    L segmental upper lobe PE  - Heparin gtt started 4/1; will need minimum of 3 months AC    # RENAL  ?SUKI vs CKD  - olson for strict I&Os  - trend Cr  - Avoid nephrotoxic agents, NSAIDs, RCA  - Renally dose meds    Hyperkalemia  - received lokelma x 1, now resolved    HAGMA lactic acidosis 2/2 sepsis +/- DKA  - likely due to severe hypoxemia on admission with lactate of 15, now improved s/p intubation and IVF  - now resolved    # GI   - c/w nepro given renal dysfunction  - bowel regimen    # HEME/ONC  PE and L common femoral vein DVT (DVT seen on POCUS 4/2)  - On heparin gtt 4/1  - Once Cr downtrends, can switch to LMWH or DOAC    # ID  Sepsis  - empiric vanc/cefepime started 4/1  - dose vanc by level, cefepime renally adjusted  -Cultures pending    Tx COVID19 as above    # ENDO  Hyperglycemia   - s/p insulin gtt,   - maintain FS between 100-180  - ISS, FSq6  - A1c 10.3 - new DM. Will need endo consult eventually

## 2021-04-06 LAB
ALBUMIN SERPL ELPH-MCNC: 2.6 G/DL — LOW (ref 3.3–5)
ALP SERPL-CCNC: 75 U/L — SIGNIFICANT CHANGE UP (ref 40–120)
ALT FLD-CCNC: 83 U/L — HIGH (ref 4–41)
ANION GAP SERPL CALC-SCNC: 10 MMOL/L — SIGNIFICANT CHANGE UP (ref 7–14)
APPEARANCE UR: ABNORMAL
APTT BLD: 65.8 SEC — HIGH (ref 27–36.3)
AST SERPL-CCNC: 66 U/L — HIGH (ref 4–40)
BACTERIA # UR AUTO: ABNORMAL
BASOPHILS # BLD AUTO: 0 K/UL — SIGNIFICANT CHANGE UP (ref 0–0.2)
BASOPHILS NFR BLD AUTO: 0 % — SIGNIFICANT CHANGE UP (ref 0–2)
BILIRUB SERPL-MCNC: 0.9 MG/DL — SIGNIFICANT CHANGE UP (ref 0.2–1.2)
BILIRUB UR-MCNC: NEGATIVE — SIGNIFICANT CHANGE UP
BLOOD GAS ARTERIAL COMPREHENSIVE RESULT: SIGNIFICANT CHANGE UP
BUN SERPL-MCNC: 70 MG/DL — HIGH (ref 7–23)
CALCIUM SERPL-MCNC: 9 MG/DL — SIGNIFICANT CHANGE UP (ref 8.4–10.5)
CHLORIDE SERPL-SCNC: 103 MMOL/L — SIGNIFICANT CHANGE UP (ref 98–107)
CO2 SERPL-SCNC: 25 MMOL/L — SIGNIFICANT CHANGE UP (ref 22–31)
COLOR SPEC: SIGNIFICANT CHANGE UP
CREAT SERPL-MCNC: 1.56 MG/DL — HIGH (ref 0.5–1.3)
CULTURE RESULTS: SIGNIFICANT CHANGE UP
CULTURE RESULTS: SIGNIFICANT CHANGE UP
DIFF PNL FLD: ABNORMAL
EOSINOPHIL # BLD AUTO: 0 K/UL — SIGNIFICANT CHANGE UP (ref 0–0.5)
EOSINOPHIL NFR BLD AUTO: 0 % — SIGNIFICANT CHANGE UP (ref 0–6)
EPI CELLS # UR: SIGNIFICANT CHANGE UP
GLUCOSE BLDC GLUCOMTR-MCNC: 138 MG/DL — HIGH (ref 70–99)
GLUCOSE BLDC GLUCOMTR-MCNC: 169 MG/DL — HIGH (ref 70–99)
GLUCOSE BLDC GLUCOMTR-MCNC: 288 MG/DL — HIGH (ref 70–99)
GLUCOSE BLDC GLUCOMTR-MCNC: 326 MG/DL — HIGH (ref 70–99)
GLUCOSE SERPL-MCNC: 165 MG/DL — HIGH (ref 70–99)
GLUCOSE UR QL: ABNORMAL
GRAM STN FLD: SIGNIFICANT CHANGE UP
GRAN CASTS # UR COMP ASSIST: SIGNIFICANT CHANGE UP /LPF
HCT VFR BLD CALC: 38.8 % — LOW (ref 39–50)
HGB BLD-MCNC: 12.8 G/DL — LOW (ref 13–17)
HYALINE CASTS # UR AUTO: SIGNIFICANT CHANGE UP /LPF (ref 0–7)
IANC: 31.04 K/UL — HIGH (ref 1.5–8.5)
KETONES UR-MCNC: NEGATIVE — SIGNIFICANT CHANGE UP
LEUKOCYTE ESTERASE UR-ACNC: NEGATIVE — SIGNIFICANT CHANGE UP
LYMPHOCYTES # BLD AUTO: 0.94 K/UL — LOW (ref 1–3.3)
LYMPHOCYTES # BLD AUTO: 2.6 % — LOW (ref 13–44)
MAGNESIUM SERPL-MCNC: 2.7 MG/DL — HIGH (ref 1.6–2.6)
MCHC RBC-ENTMCNC: 27.2 PG — SIGNIFICANT CHANGE UP (ref 27–34)
MCHC RBC-ENTMCNC: 33 GM/DL — SIGNIFICANT CHANGE UP (ref 32–36)
MCV RBC AUTO: 82.6 FL — SIGNIFICANT CHANGE UP (ref 80–100)
MONOCYTES # BLD AUTO: 0 K/UL — SIGNIFICANT CHANGE UP (ref 0–0.9)
MONOCYTES NFR BLD AUTO: 0 % — LOW (ref 2–14)
NEUTROPHILS # BLD AUTO: 34.88 K/UL — HIGH (ref 1.8–7.4)
NEUTROPHILS NFR BLD AUTO: 96.5 % — HIGH (ref 43–77)
NITRITE UR-MCNC: NEGATIVE — SIGNIFICANT CHANGE UP
PH UR: 6 — SIGNIFICANT CHANGE UP (ref 5–8)
PHOSPHATE SERPL-MCNC: 3.9 MG/DL — SIGNIFICANT CHANGE UP (ref 2.5–4.5)
PLATELET # BLD AUTO: 118 K/UL — LOW (ref 150–400)
POTASSIUM SERPL-MCNC: 4.8 MMOL/L — SIGNIFICANT CHANGE UP (ref 3.5–5.3)
POTASSIUM SERPL-SCNC: 4.8 MMOL/L — SIGNIFICANT CHANGE UP (ref 3.5–5.3)
PROCALCITONIN SERPL-MCNC: 0.24 NG/ML — HIGH (ref 0.02–0.1)
PROT SERPL-MCNC: 6.3 G/DL — SIGNIFICANT CHANGE UP (ref 6–8.3)
PROT UR-MCNC: ABNORMAL
RBC # BLD: 4.7 M/UL — SIGNIFICANT CHANGE UP (ref 4.2–5.8)
RBC # FLD: 15.8 % — HIGH (ref 10.3–14.5)
RBC CASTS # UR COMP ASSIST: SIGNIFICANT CHANGE UP /HPF (ref 0–4)
SODIUM SERPL-SCNC: 138 MMOL/L — SIGNIFICANT CHANGE UP (ref 135–145)
SP GR SPEC: 1.02 — SIGNIFICANT CHANGE UP (ref 1.01–1.02)
SPECIMEN SOURCE: SIGNIFICANT CHANGE UP
UROBILINOGEN FLD QL: SIGNIFICANT CHANGE UP
WBC # BLD: 36.14 K/UL — HIGH (ref 3.8–10.5)
WBC # FLD AUTO: 36.14 K/UL — HIGH (ref 3.8–10.5)
WBC UR QL: 3 /HPF — SIGNIFICANT CHANGE UP (ref 0–5)

## 2021-04-06 PROCEDURE — 99291 CRITICAL CARE FIRST HOUR: CPT

## 2021-04-06 PROCEDURE — 71045 X-RAY EXAM CHEST 1 VIEW: CPT | Mod: 26,77

## 2021-04-06 PROCEDURE — 71045 X-RAY EXAM CHEST 1 VIEW: CPT | Mod: 26

## 2021-04-06 RX ORDER — CEFTRIAXONE 500 MG/1
1000 INJECTION, POWDER, FOR SOLUTION INTRAMUSCULAR; INTRAVENOUS EVERY 24 HOURS
Refills: 0 | Status: DISCONTINUED | OUTPATIENT
Start: 2021-04-06 | End: 2021-04-10

## 2021-04-06 RX ORDER — HUMAN INSULIN 100 [IU]/ML
45 INJECTION, SUSPENSION SUBCUTANEOUS EVERY 6 HOURS
Refills: 0 | Status: DISCONTINUED | OUTPATIENT
Start: 2021-04-06 | End: 2021-04-09

## 2021-04-06 RX ORDER — INSULIN LISPRO 100/ML
VIAL (ML) SUBCUTANEOUS EVERY 6 HOURS
Refills: 0 | Status: DISCONTINUED | OUTPATIENT
Start: 2021-04-06 | End: 2021-04-12

## 2021-04-06 RX ORDER — LINEZOLID 600 MG/300ML
600 INJECTION, SOLUTION INTRAVENOUS EVERY 12 HOURS
Refills: 0 | Status: DISCONTINUED | OUTPATIENT
Start: 2021-04-06 | End: 2021-04-07

## 2021-04-06 RX ORDER — LINEZOLID 600 MG/300ML
600 INJECTION, SOLUTION INTRAVENOUS ONCE
Refills: 0 | Status: COMPLETED | OUTPATIENT
Start: 2021-04-06 | End: 2021-04-06

## 2021-04-06 RX ORDER — LINEZOLID 600 MG/300ML
INJECTION, SOLUTION INTRAVENOUS
Refills: 0 | Status: DISCONTINUED | OUTPATIENT
Start: 2021-04-06 | End: 2021-04-07

## 2021-04-06 RX ADMIN — Medication 8: at 11:24

## 2021-04-06 RX ADMIN — HEPARIN SODIUM 1300 UNIT(S)/HR: 5000 INJECTION INTRAVENOUS; SUBCUTANEOUS at 10:36

## 2021-04-06 RX ADMIN — POLYETHYLENE GLYCOL 3350 17 GRAM(S): 17 POWDER, FOR SOLUTION ORAL at 05:03

## 2021-04-06 RX ADMIN — Medication 6 MILLIGRAM(S): at 05:03

## 2021-04-06 RX ADMIN — PANTOPRAZOLE SODIUM 40 MILLIGRAM(S): 20 TABLET, DELAYED RELEASE ORAL at 11:17

## 2021-04-06 RX ADMIN — Medication 2: at 05:06

## 2021-04-06 RX ADMIN — HUMAN INSULIN 40 UNIT(S): 100 INJECTION, SUSPENSION SUBCUTANEOUS at 05:07

## 2021-04-06 RX ADMIN — POLYETHYLENE GLYCOL 3350 17 GRAM(S): 17 POWDER, FOR SOLUTION ORAL at 17:15

## 2021-04-06 RX ADMIN — HUMAN INSULIN 45 UNIT(S): 100 INJECTION, SUSPENSION SUBCUTANEOUS at 23:40

## 2021-04-06 RX ADMIN — HUMAN INSULIN 40 UNIT(S): 100 INJECTION, SUSPENSION SUBCUTANEOUS at 11:24

## 2021-04-06 RX ADMIN — CHLORHEXIDINE GLUCONATE 15 MILLILITER(S): 213 SOLUTION TOPICAL at 05:03

## 2021-04-06 RX ADMIN — LINEZOLID 300 MILLIGRAM(S): 600 INJECTION, SOLUTION INTRAVENOUS at 23:38

## 2021-04-06 RX ADMIN — DEXMEDETOMIDINE HYDROCHLORIDE IN 0.9% SODIUM CHLORIDE 3.6 MICROGRAM(S)/KG/HR: 4 INJECTION INTRAVENOUS at 15:40

## 2021-04-06 RX ADMIN — CHLORHEXIDINE GLUCONATE 15 MILLILITER(S): 213 SOLUTION TOPICAL at 17:15

## 2021-04-06 RX ADMIN — Medication 6: at 17:15

## 2021-04-06 RX ADMIN — HUMAN INSULIN 45 UNIT(S): 100 INJECTION, SUSPENSION SUBCUTANEOUS at 17:15

## 2021-04-06 RX ADMIN — SENNA PLUS 2 TABLET(S): 8.6 TABLET ORAL at 22:07

## 2021-04-06 RX ADMIN — LINEZOLID 300 MILLIGRAM(S): 600 INJECTION, SOLUTION INTRAVENOUS at 10:36

## 2021-04-06 RX ADMIN — CEFTRIAXONE 100 MILLIGRAM(S): 500 INJECTION, POWDER, FOR SOLUTION INTRAMUSCULAR; INTRAVENOUS at 09:39

## 2021-04-06 RX ADMIN — CHLORHEXIDINE GLUCONATE 1 APPLICATION(S): 213 SOLUTION TOPICAL at 07:15

## 2021-04-06 NOTE — PROGRESS NOTE ADULT - ATTENDING COMMENTS
Patient seen and examined at bedside. I have reviewed the above document and agree with the providers above documentation    1-resp failure  2-leukocytosis now 36k  3-pe/dvt    -continue abxs  -heparin drip  -blood,urine,sputum and procaclitonin

## 2021-04-06 NOTE — PROGRESS NOTE ADULT - SUBJECTIVE AND OBJECTIVE BOX
MICU Daily Progress Note  =====================================================  Interval/Overnight Events:  - Tachypnea on CPAP, returned to Volume AC overnight  - Started on fentanyl drip for asynchrony     HPI:  60M unknown medical history BIBEMS for respiratory distress, recently COVID+ as per EMS. Pt unable to comply with history 2/2 resp distress, nods yes to difficulty breathing, no to pain. Baseline AAOx3. In ED initially hypoxic to 60% placed on NRB and satting low 90s and tachypneic to 40s on NRB. Placed on AVAPS, still tachypneic to 40s. Labs showing HAGMA, elevated FSG to 500s. Also with lactate of 15. Trops of 200 and pro-BNP elevated to 21k. MICU consulted for respiratory failure in setting of COVID, also concern for DKA. On encounter, pt is noncooperative with questioning. In ED given 2L NS, decadron. Started on insulin gtt. Subsequently intubated and admitted to [facility].    Addendum 4/1/21 1700:  Limited history obtained from nitesh Anders, who lives in the [place] [phone]). Patient has a PMH of low BP and takes no medications. Patient tested positive for COVID on 3/19. He was initially doing fine with some fatigue, but suddenly decompensated after yesterday. Unable to obtain social history. Wife, Lucero, is currently in [place] - she went due to medical issues but was unable to return due to COVID travel restrictions.       Allergies: No Known Allergies      MEDICATIONS:   --------------------------------------------------------------------------------------  Neurologic Medications  dexMEDEtomidine Infusion 0.2 MICROgram(s)/kG/Hr IV Continuous <Continuous>  fentaNYL   Infusion..... 0.5 MICROgram(s)/kG/Hr IV Continuous <Continuous>    Respiratory Medications    Cardiovascular Medications  norepinephrine Infusion 0.05 MICROgram(s)/kG/Min IV Continuous <Continuous>    Gastrointestinal Medications  lactulose Syrup 10 Gram(s) Oral daily  pantoprazole  Injectable 40 milliGRAM(s) IV Push daily  polyethylene glycol 3350 17 Gram(s) Oral every 12 hours  senna 2 Tablet(s) Oral at bedtime    Genitourinary Medications    Hematologic/Oncologic Medications  heparin  Infusion.  Unit(s)/Hr IV Continuous <Continuous>    Antimicrobial/Immunologic Medications  cefTRIAXone   IVPB 1000 milliGRAM(s) IV Intermittent every 24 hours  linezolid  IVPB      linezolid  IVPB 600 milliGRAM(s) IV Intermittent once  linezolid  IVPB 600 milliGRAM(s) IV Intermittent every 12 hours    Endocrine/Metabolic Medications  dexAMETHasone  Injectable 6 milliGRAM(s) IV Push daily  dextrose 50% Injectable 25 Gram(s) IV Push once  dextrose 50% Injectable 12.5 Gram(s) IV Push once  dextrose 50% Injectable 25 Gram(s) IV Push once  glucagon  Injectable 1 milliGRAM(s) IntraMuscular once  insulin lispro (ADMELOG) corrective regimen sliding scale   SubCutaneous every 6 hours  insulin NPH human recombinant 40 Unit(s) SubCutaneous every 6 hours    Topical/Other Medications  chlorhexidine 0.12% Liquid 15 milliLiter(s) Oral Mucosa every 12 hours  chlorhexidine 4% Liquid 1 Application(s) Topical <User Schedule>    --------------------------------------------------------------------------------------    VITAL SIGNS, INS/OUTS (last 24 hours):  --------------------------------------------------------------------------------------  ICU Vital Signs Last 24 Hrs  T(C): 36.1 (06 Apr 2021 09:00), Max: 38 (06 Apr 2021 00:00)  T(F): 97 (06 Apr 2021 09:00), Max: 100.4 (06 Apr 2021 00:00)  HR: 64 (06 Apr 2021 09:00) (61 - 118)  BP: 113/68 (06 Apr 2021 08:00) (113/68 - 113/68)  BP(mean): 85 (06 Apr 2021 08:00) (85 - 85)  ABP: 112/62 (06 Apr 2021 09:00) (81/47 - 175/90)  ABP(mean): 80 (06 Apr 2021 09:00) (60 - 110)  RR: 19 (06 Apr 2021 09:00) (18 - 36)  SpO2: 100% (06 Apr 2021 09:00) (91% - 100%)  --------------------------------------------------------------------------------------    EXAM  NEUROLOGY  Exam: Intubated and sedated, not following commands    HEENT  Exam: Normocephalic, atraumatic    RESPIRATORY  Exam: Lungs clear to auscultation, Normal expansion/effort.  Mechanical Ventilation: Mode: AC/ CMV (Assist Control/ Continuous Mandatory Ventilation), RR (machine): 20, TV (machine): 350, FiO2: 50, PEEP: 5, ITime: 0.7, MAP: 8, PIP: 14    CARDIOVASCULAR  Exam: S1, S2.  Regular rate and rhythm.    GI/NUTRITION  Exam: Abdomen soft, Non-tender, Non-distended.     VASCULAR  Exam: Extremities warm, pink, well-perfused.     MUSCULOSKELETAL  Exam: All extremities moving spontaneously without limitations.     SKIN  Exam: Good skin turgor, no skin breakdown.     METABOLIC/FLUIDS/ELECTROLYTES    HEMATOLOGIC  [x] VTE Prophylaxis: heparin  Infusion.  Unit(s)/Hr IV Continuous <Continuous>    Transfusions:	[] PRBC	[] Platelets		[] FFP	[] Cryoprecipitate    INFECTIOUS DISEASE  Antimicrobials/Immunologic Medications:  cefTRIAXone   IVPB 1000 milliGRAM(s) IV Intermittent every 24 hours  linezolid  IVPB      linezolid  IVPB 600 milliGRAM(s) IV Intermittent once  linezolid  IVPB 600 milliGRAM(s) IV Intermittent every 12 hours    Day # 1    Tubes/Lines/Drains   [x] Peripheral IV  [x] Central Venous Line     	[] R	[x] L	[x] IJ	[] Fem	[] SC	Date Placed:   [x] Arterial Line		[] R	[] L	[] Fem	[] Rad	[] Ax	Date Placed:   [] PICC		[] Midline		[] Mediport  [x] Urinary Catheter		Date Placed:   [x] Necessity of urinary, arterial, and venous catheters discussed    LABS  --------------------------------------------------------------------------------------  CBC (04-06 @ 03:13)                          12.8<L>                   36.14<H>  )--------------(  118<L>     96.5<H>% Neuts, 2.6<L>% Lymphs, ANC: 34.88<H>                          38.8<L>  CBC (04-05 @ 02:08)                          12.0<L>                   23.91<H>  )--------------(  105<L>     90.3<H>% Neuts, 0.9<L>% Lymphs, ANC: 21.59<H>                          36.6<L>    BMP (04-06 @ 03:13)       138     |  103     |  70<H> 			Ca++ --      Ca 9.0          ---------------------------------( 165<H>		Mg 2.7<H>       4.8     |  25      |  1.56<H>			Ph 3.9     BMP (04-05 @ 02:08)       137     |  102     |  63<H> 			Ca++ --      Ca 8.8          ---------------------------------( 271<H>		Mg 2.7<H>       4.5     |  23      |  1.67<H>			Ph 4.4       LFTs (04-06 @ 03:13)      TPro 6.3 / Alb 2.6<L> / TBili 0.9 / DBili -- / AST 66<H> / ALT 83<H> / AlkPhos 75  LFTs (04-05 @ 02:08)      TPro 5.8<L> / Alb 2.5<L> / TBili 0.7 / DBili -- / <H> / ALT 76<H> / AlkPhos 80    Coags (04-06 @ 03:13)  aPTT 65.8<H> / INR -- / PT --  Coags (04-05 @ 02:08)  aPTT 68.6<H> / INR -- / PT --      ABG (04-06 @ 00:09)     7.44 / 39 / 82<L> / 27<H> / 2.4<H> / 95.3%     Lactate:    ABG (04-05 @ 21:09)     7.47<H> / 34<L> / 75<L> / 26 / 1.1 / 94.8<L>%     Lactate:      -> .Blood Blood-Peripheral Culture (04-03 @ 08:27)     NG    NG    No growth to date.    -> .Sputum Sputum Culture (04-03 @ 05:00)       Few Squamous epithelial cells per low power field  Few polymorphonuclear leukocytes per low power field  Few Yeast per oil power field  Few Gram Positive Cocci in Pairs and Chains per oil power field    NG    Normal Respiratory Lizzette present    -> .Blood Blood-Peripheral Culture (04-01 @ 12:32)     NG    NG    No growth to date.  --------------------------------------------------------------------------------------    OTHER LABORATORY:     IMAGING STUDIES:   CXR:

## 2021-04-06 NOTE — CHART NOTE - NSCHARTNOTEFT_GEN_A_CORE
Nutrition Follow-Up Note   Reason for follow-up: Critical care length of stay follow- up. Medical record reviewed. Spoke to RN.     Clinical Course history: Patient with unknown medical history BIBEMS for respiratory distress, recently COVID positive. Intubated . Off gtt sedation per RN.     Diet Order: Diet, NPO with Tube Feed:   Tube Feeding Modality: Orogastric  Nepro with Carb Steady (NEPRORTH)  Total Volume for 24 Hours (mL): 960  Continuous  Starting Tube Feed Rate {mL per Hour}: 20  Increase Tube Feed Rate by (mL): 20     Every 4 hours  Until Goal Tube Feed Rate (mL per Hour): 40  Tube Feed Duration (in Hours): 24  Tube Feed Start Time: 10:30  No Carb Prosource (1pkg = 15gms Protein)     Qty per Day:  3 (21 @ 10:11)    CURRENT EN ORDER PROVIDES:  Total Volume: 960mL/day  Energy: 1728Kcal/day  Protein: 123g protein/day  Free Water: 698mL/day    Nutrition Related Information: - Tube feeding intake: -: 190mL, -3: 565mL, 4/3-: 480mL, -: 890mL, -: 950mL.   *Of note, propofol weaned off on  so goal rate of feeds increased to 40mL/hr.   - Feeds running at 40mL/hr during visit.   - No tube feeding intolerances reported by RN.   - Glucose levels within target today (B, 175). Ordered for SSI and NPH 40 units q6hrs. Ordered for Dexamethasone.  - Potassium and phosphorus levels within normal limits at this time. Sodium normal. Would continue with Nepro formula as patient with elevated K+ and phosphorus previously.    GI: - RN report patient with large BM today.   - Bowel regimen: Senna, Lactulose, Miralax.   - No vomiting reported.    Anthropometric Measurements:   Height: 165.1cm   Weight (kg): 82.6 (, bed-scale weight *likely inaccurate), 72 ()  IBW: 61.6kg +/-10%  nutrition focused physical exam: mild temple wasting, mild quadriceps depletion.    Medications: MEDICATIONS  (STANDING):  cefTRIAXone   IVPB 1000 milliGRAM(s) IV Intermittent every 24 hours  dexAMETHasone  Injectable 6 milliGRAM(s) IV Push daily  dexMEDEtomidine Infusion 0.2 MICROgram(s)/kG/Hr (3.6 mL/Hr) IV Continuous <Continuous>  heparin  Infusion.  Unit(s)/Hr (13 mL/Hr) IV Continuous <Continuous>  insulin lispro (ADMELOG) corrective regimen sliding scale   SubCutaneous every 6 hours  insulin NPH human recombinant 40 Unit(s) SubCutaneous every 6 hours  linezolid  IVPB      linezolid  IVPB 600 milliGRAM(s) IV Intermittent every 12 hours  norepinephrine Infusion 0.05 MICROgram(s)/kG/Min (3.38 mL/Hr) IV Continuous <Continuous>  pantoprazole  Injectable 40 milliGRAM(s) IV Push daily  polyethylene glycol 3350 17 Gram(s) Oral every 12 hours  senna 2 Tablet(s) Oral at bedtime    Labs:  @ 03:13: Sodium 138, Potassium 4.8, Calcium 9.0, Magnesium 2.7<H>, Phosphorus 3.9, BUN 70<H>, Creatinine 1.56<H>, Glucose 165<H>, Alk Phos 75, ALT/SGPT 83<H>, AST/SGOT 66<H>, Albumin 2.6<L>, Prealbumin --, Total Bilirubin 0.9, Hemoglobin 12.8<L>, Hematocrit 38.8<L>, Ferritin --, C-Reactive Protein --, Creatine Kinase <<27>    POCT Blood Glucose.: 326 mg/dL (21 @ 11:21)  POCT Blood Glucose.: 169 mg/dL (21 @ 05:05)  POCT Blood Glucose.: 175 mg/dL (21 @ 23:33)  POCT Blood Glucose.: 270 mg/dL (21 @ 17:48)    Skin: No pressure ulcers/DTI noted in flowsheets.   Edema: 2+ generalized    Estimated Nutrition Needs: calculated using admit weight 72kg  Estimated Energy Needs (20-22Kcal/kg): 1440-1584Kcal/day  Estimated Protein Needs (1.5-2gkg): 86-108g protein/day  *Increased energy needs given critical illness*    Previous Nutrition Diagnosis: Altered Nutrition Related Lab Values.   Diagnosis is ongoing     Nutrition Interventions/Recommendations:   1. Recommend Nepro via OGT @ 35mL/hr x24 hours + No Carb Prosource 2x daily [provides 840mL total volume, 1512Kcal, 98gprotein and 611mL free water daily] ( 21Kcal/kg admit weight, ~1.4g/kg admit weight).  2. Additional free water per physician discretion.  3. Consider multivitamin daily for micronutrient coverage.  4. Monitor glucose levels and electrolytes.   5. Bowel regimen as needed.   6. Obtain daily weight.     Monitoring and Evaluation:   Monitor nutrition provision, tolerance to diet, weights, labs, skin integrity and GI function.   RD remains available, please consult as needed. Will follow up per protocol.

## 2021-04-06 NOTE — PROGRESS NOTE ADULT - ASSESSMENT
60M unknown PMHx presented with hypoxic respiratory failure 2/2 COVID, requiring intubation, complicated by PE and HAGMA    # NEURO  - AOx3 at baseline  - Sedated with Precedex, wean as tolerated  - Fentanyl PRN, attempt to avoid narcotic infusion    # CARDIAC  - Troponin elevation in setting of increased demand, SUKI, and PE, no ischemic changes on EKG  - Hypotension 2/2 vasoplegia and RH strain from PE; also with echogenic material in RV suspect clots  - Norepinephrine for MAP >64    # PULM  Hypoxic respiratory failure 2/2 COVID with L segmental upper lobe PE  - Volume AC 20/350/5/50%     - Ppeak 32     - Pplat 22     -   - CPAP 10/5 as tolerated, will attempt to wean    # GI   - c/w nepro given renal dysfunction, increase to goal now that propofol is off  - Continue protonix  - GI  ppx w/ lactulose, miralax and senna    # RENAL  ?SUKI vs CKD, Cr plateauing   - Monitor intake & output  - Net even/24hrs, UOP 1.9L  - Avoid nephrotoxic agents, NSAIDs, RCA  - Renally dose medications    # ID  COVID19   - Dexamethasone 4/1-    - Concern for superimposed bacterial infection vs. reactive given rising leukocytosis, procal 0.24 from 0.38      - Empiric vanc/cefepime started 4/1      - Change antibiotics to Ceftriaxone & Linezolid 4/6-  - Repeat blood cultures, sputum cultures  - Blood and sputum cultures from 4/5 pending  - Send fungitell     # HEME/ONC  PE and L common femoral vein DVT (DVT seen on POCUS 4/2)  - On heparin infusion, therapeutic  - Once Cr downtrends, can switch to LMWH or DOAC    # ENDO  Hyperglycemia   - A1c 10.3 - new DM, will eventually need follow up  - NPH 40units q 6hrs  - Continue moderate correctional scale  - Will adjust based on blood sugars    Disposition: MICU

## 2021-04-07 LAB
ALBUMIN SERPL ELPH-MCNC: 2.4 G/DL — LOW (ref 3.3–5)
ALP SERPL-CCNC: 83 U/L — SIGNIFICANT CHANGE UP (ref 40–120)
ALT FLD-CCNC: 73 U/L — HIGH (ref 4–41)
ANION GAP SERPL CALC-SCNC: 9 MMOL/L — SIGNIFICANT CHANGE UP (ref 7–14)
APTT BLD: 71.9 SEC — HIGH (ref 27–36.3)
AST SERPL-CCNC: 49 U/L — HIGH (ref 4–40)
BILIRUB SERPL-MCNC: 0.6 MG/DL — SIGNIFICANT CHANGE UP (ref 0.2–1.2)
BLOOD GAS ARTERIAL COMPREHENSIVE RESULT: SIGNIFICANT CHANGE UP
BUN SERPL-MCNC: 70 MG/DL — HIGH (ref 7–23)
CALCIUM SERPL-MCNC: 9.1 MG/DL — SIGNIFICANT CHANGE UP (ref 8.4–10.5)
CHLORIDE SERPL-SCNC: 101 MMOL/L — SIGNIFICANT CHANGE UP (ref 98–107)
CO2 SERPL-SCNC: 27 MMOL/L — SIGNIFICANT CHANGE UP (ref 22–31)
CREAT SERPL-MCNC: 1.46 MG/DL — HIGH (ref 0.5–1.3)
GLUCOSE BLDC GLUCOMTR-MCNC: 147 MG/DL — HIGH (ref 70–99)
GLUCOSE BLDC GLUCOMTR-MCNC: 148 MG/DL — HIGH (ref 70–99)
GLUCOSE BLDC GLUCOMTR-MCNC: 149 MG/DL — HIGH (ref 70–99)
GLUCOSE BLDC GLUCOMTR-MCNC: 184 MG/DL — HIGH (ref 70–99)
GLUCOSE SERPL-MCNC: 112 MG/DL — HIGH (ref 70–99)
HCT VFR BLD CALC: 36.8 % — LOW (ref 39–50)
HGB BLD-MCNC: 11.7 G/DL — LOW (ref 13–17)
MAGNESIUM SERPL-MCNC: 2.5 MG/DL — SIGNIFICANT CHANGE UP (ref 1.6–2.6)
MCHC RBC-ENTMCNC: 27 PG — SIGNIFICANT CHANGE UP (ref 27–34)
MCHC RBC-ENTMCNC: 31.8 GM/DL — LOW (ref 32–36)
MCV RBC AUTO: 85 FL — SIGNIFICANT CHANGE UP (ref 80–100)
NRBC # BLD: 0 /100 WBCS — SIGNIFICANT CHANGE UP
NRBC # FLD: 0.06 K/UL — HIGH
PHOSPHATE SERPL-MCNC: 4.2 MG/DL — SIGNIFICANT CHANGE UP (ref 2.5–4.5)
PLATELET # BLD AUTO: 130 K/UL — LOW (ref 150–400)
POTASSIUM SERPL-MCNC: 4.1 MMOL/L — SIGNIFICANT CHANGE UP (ref 3.5–5.3)
POTASSIUM SERPL-SCNC: 4.1 MMOL/L — SIGNIFICANT CHANGE UP (ref 3.5–5.3)
PROT SERPL-MCNC: 6.3 G/DL — SIGNIFICANT CHANGE UP (ref 6–8.3)
RBC # BLD: 4.33 M/UL — SIGNIFICANT CHANGE UP (ref 4.2–5.8)
RBC # FLD: 15.2 % — HIGH (ref 10.3–14.5)
SODIUM SERPL-SCNC: 137 MMOL/L — SIGNIFICANT CHANGE UP (ref 135–145)
WBC # BLD: 34.09 K/UL — HIGH (ref 3.8–10.5)
WBC # FLD AUTO: 34.09 K/UL — HIGH (ref 3.8–10.5)

## 2021-04-07 PROCEDURE — 99291 CRITICAL CARE FIRST HOUR: CPT

## 2021-04-07 PROCEDURE — 93306 TTE W/DOPPLER COMPLETE: CPT | Mod: 26

## 2021-04-07 RX ORDER — CASPOFUNGIN ACETATE 7 MG/ML
INJECTION, POWDER, LYOPHILIZED, FOR SOLUTION INTRAVENOUS
Refills: 0 | Status: DISCONTINUED | OUTPATIENT
Start: 2021-04-07 | End: 2021-04-07

## 2021-04-07 RX ORDER — CHLORHEXIDINE GLUCONATE 213 G/1000ML
15 SOLUTION TOPICAL EVERY 12 HOURS
Refills: 0 | Status: DISCONTINUED | OUTPATIENT
Start: 2021-04-07 | End: 2021-04-09

## 2021-04-07 RX ORDER — CASPOFUNGIN ACETATE 7 MG/ML
50 INJECTION, POWDER, LYOPHILIZED, FOR SOLUTION INTRAVENOUS EVERY 24 HOURS
Refills: 0 | Status: DISCONTINUED | OUTPATIENT
Start: 2021-04-07 | End: 2021-04-07

## 2021-04-07 RX ORDER — VANCOMYCIN HCL 1 G
1000 VIAL (EA) INTRAVENOUS EVERY 12 HOURS
Refills: 0 | Status: DISCONTINUED | OUTPATIENT
Start: 2021-04-07 | End: 2021-04-09

## 2021-04-07 RX ADMIN — Medication 250 MILLIGRAM(S): at 17:17

## 2021-04-07 RX ADMIN — LINEZOLID 300 MILLIGRAM(S): 600 INJECTION, SOLUTION INTRAVENOUS at 05:23

## 2021-04-07 RX ADMIN — HUMAN INSULIN 45 UNIT(S): 100 INJECTION, SUSPENSION SUBCUTANEOUS at 17:06

## 2021-04-07 RX ADMIN — HUMAN INSULIN 45 UNIT(S): 100 INJECTION, SUSPENSION SUBCUTANEOUS at 05:36

## 2021-04-07 RX ADMIN — Medication 6 MILLIGRAM(S): at 05:22

## 2021-04-07 RX ADMIN — DEXMEDETOMIDINE HYDROCHLORIDE IN 0.9% SODIUM CHLORIDE 3.6 MICROGRAM(S)/KG/HR: 4 INJECTION INTRAVENOUS at 21:39

## 2021-04-07 RX ADMIN — POLYETHYLENE GLYCOL 3350 17 GRAM(S): 17 POWDER, FOR SOLUTION ORAL at 16:16

## 2021-04-07 RX ADMIN — Medication 2: at 23:02

## 2021-04-07 RX ADMIN — POLYETHYLENE GLYCOL 3350 17 GRAM(S): 17 POWDER, FOR SOLUTION ORAL at 05:22

## 2021-04-07 RX ADMIN — HEPARIN SODIUM 1300 UNIT(S)/HR: 5000 INJECTION INTRAVENOUS; SUBCUTANEOUS at 04:42

## 2021-04-07 RX ADMIN — HUMAN INSULIN 45 UNIT(S): 100 INJECTION, SUSPENSION SUBCUTANEOUS at 11:29

## 2021-04-07 RX ADMIN — CHLORHEXIDINE GLUCONATE 15 MILLILITER(S): 213 SOLUTION TOPICAL at 05:22

## 2021-04-07 RX ADMIN — FENTANYL CITRATE 100 MICROGRAM(S): 50 INJECTION INTRAVENOUS at 19:39

## 2021-04-07 RX ADMIN — CHLORHEXIDINE GLUCONATE 1 APPLICATION(S): 213 SOLUTION TOPICAL at 06:32

## 2021-04-07 RX ADMIN — HUMAN INSULIN 45 UNIT(S): 100 INJECTION, SUSPENSION SUBCUTANEOUS at 23:00

## 2021-04-07 RX ADMIN — CEFTRIAXONE 100 MILLIGRAM(S): 500 INJECTION, POWDER, FOR SOLUTION INTRAMUSCULAR; INTRAVENOUS at 09:00

## 2021-04-07 RX ADMIN — SENNA PLUS 2 TABLET(S): 8.6 TABLET ORAL at 21:04

## 2021-04-07 RX ADMIN — CHLORHEXIDINE GLUCONATE 15 MILLILITER(S): 213 SOLUTION TOPICAL at 11:49

## 2021-04-07 RX ADMIN — PANTOPRAZOLE SODIUM 40 MILLIGRAM(S): 20 TABLET, DELAYED RELEASE ORAL at 11:48

## 2021-04-07 RX ADMIN — DEXMEDETOMIDINE HYDROCHLORIDE IN 0.9% SODIUM CHLORIDE 3.6 MICROGRAM(S)/KG/HR: 4 INJECTION INTRAVENOUS at 05:37

## 2021-04-07 NOTE — PROGRESS NOTE ADULT - ASSESSMENT
60M unknown PMHx presented with hypoxic respiratory failure 2/2 COVID, requiring intubation, complicated by PE and HAGMA    # NEURO  - AOx3 at baseline  - Sedated with Precedex, wean as tolerated  - Fentanyl PRN, attempt to avoid narcotic infusion  - Will order CT of head today    # CARDIAC  - Troponin elevation in setting of increased demand, SUKI, and PE, no ischemic changes on EKG  - Hypotension 2/2 vasoplegia and RH strain from PE; also with echogenic material in RV suspect clots  - Norepinephrine for MAP >64    # PULM  Hypoxic respiratory failure 2/2 COVID with L segmental upper lobe PE  - CPAP 10/5, 40%      # GI   - c/w nepro given renal dysfunction, increase to goal now that propofol is off  - Continue protonix  - GI  ppx w/ lactulose, miralax and senna    # RENAL  ?SUKI vs CKD, Cr plateauing   - Monitor intake & output  - Net even/24hrs, UOP 1.9L  - Avoid nephrotoxic agents, NSAIDs, RCA  - Renally dose medications    # ID  COVID19   - Dexamethasone 4/1-    - Concern for superimposed bacterial infection vs. reactive given rising leukocytosis, procal 0.24 from 0.38      - Empiric vanc/cefepime started 4/1      - Change antibiotics to Ceftriaxone & Linezolid 4/6-      - Will start Caspofungin today 4/7-  - Repeat blood cultures, sputum cultures  - Blood and sputum cultures from 4/5 pending  - Send fungitell     # HEME/ONC  PE and L common femoral vein DVT (DVT seen on POCUS 4/2)  - On heparin infusion, therapeutic  - Once Cr downtrends, can switch to LMWH or DOAC    # ENDO  Hyperglycemia   - A1c 10.3 - new DM, will eventually need follow up  - NPH 40units q 6hrs  - Continue moderate correctional scale  - Will adjust based on blood sugars    Disposition: MICU   60M unknown PMHx presented with hypoxic respiratory failure 2/2 COVID, requiring intubation, complicated by PE and HAGMA    # NEURO  - AOx3 at baseline  - Sedated with Precedex, wean as tolerated  - Fentanyl PRN, attempt to avoid narcotic infusion  - Will order CT of head today    # CARDIAC  - Troponin elevation in setting of increased demand, SUKI, and PE, no ischemic changes on EKG  - Hypotension 2/2 vasoplegia and RH strain from PE; also with echogenic material in RV suspect clots  - Norepinephrine for MAP >64    # PULM  Hypoxic respiratory failure 2/2 COVID with L segmental upper lobe PE  - CPAP 10/5, 40%      # GI   - c/w nepro given renal dysfunction, increase to goal now that propofol is off  - Continue protonix  - GI  ppx w/ lactulose, miralax and senna    # RENAL  ?SUKI vs CKD, Cr plateauing   - Monitor intake & output  - Net even/24hrs, UOP 1.9L  - Avoid nephrotoxic agents, NSAIDs, RCA  - Renally dose medications    # ID  COVID19   - Dexamethasone 4/1-    - Concern for superimposed bacterial infection vs. reactive. Sputum culture Moderate yeast and moderate gram neg rods.       - Empiric vanc/cefepime started 4/1      - Change antibiotics to Ceftriaxone & Linezolid 4/6-      - Will start Caspofungin today 4/7-  - Repeat blood cultures.  - Blood and sputum cultures from 4/5 pending  - Send fungitell     # HEME/ONC  PE and L common femoral vein DVT (DVT seen on POCUS 4/2)  - On heparin infusion, therapeutic  - Once Cr downtrends, can switch to LMWH or DOAC    # ENDO  Hyperglycemia   - A1c 10.3 - new DM, will eventually need follow up  - NPH increased from 40units to 45 units q 6hrs  - Continue moderate correctional scale  - Will adjust based on blood sugars    Disposition: MICU

## 2021-04-07 NOTE — PROGRESS NOTE ADULT - ATTENDING COMMENTS
Patient seen and examined at bedside. I have reviewed the above document and agree with the providers above documentation    1-resp failure  2-leukocytosis  -noted yeast insputum  -suggest beginning antifungal medications  3-encepathl;opathy  -likely covid incued encepathlopathy  4-pe/dvt on heparin drp

## 2021-04-07 NOTE — PHARMACOTHERAPY INTERVENTION NOTE - COMMENTS
60y Male with hypoxic respiratory failure 2/2 COVID-19 infection requiring intubation. Out of concern for superimposed infection, the patient is being empirically managed with ceftriaxone and vancomycin. The team is considering adding caspofungin empirically given moderate yeast seen on sputum Gram stain. Respiratory status appears to be improving as indicated by decreasing ventilatory requirements. Yeast present on Gram stain likely represents colonization. Additionally, MRSA nasal swab was negative.     Recommendations:  1. Suggest monitoring the patient off of caspofungin  2. Would consider discontinuation of vancomycin in the setting of negative MRSA nasal swab    Charlene Borden, PharmD  PGY-2 Infectious Diseases Pharmacy Resident  Spectra [id number]

## 2021-04-07 NOTE — PROGRESS NOTE ADULT - SUBJECTIVE AND OBJECTIVE BOX
INTERVAL HPI/OVERNIGHT EVENTS: Pt currently on CPAP. NPH increased to 45, last BS in 100s    SUBJECTIVE: Pt sedated/intubated    OBJECTIVE:    VITAL SIGNS:  ICU Vital Signs Last 24 Hrs  T(C): 36.6 (2021 08:00), Max: 37.7 (2021 17:00)  T(F): 97.9 (2021 08:00), Max: 99.9 (2021 17:00)  HR: 70 (2021 09:00) (70 - 105)  BP: 117/70 (2021 11:00) (117/70 - 117/70)  BP(mean): 84 (2021 11:00) (84 - 84)  ABP: 95/58 (2021 09:00) (95/53 - 136/66)  ABP(mean): 70 (2021 09:00) (67 - 91)  RR: 20 (2021 09:00) (15 - 29)  SpO2: 99% (2021 09:00) (96% - 100%)    Mode: CPAP with PS, FiO2: 40, PEEP: 5, PS: 10, MAP: 9, PC: , PIP: 17    04-06 @ 07:01  -  04-07 @ 07:00  --------------------------------------------------------  IN: 1915.4 mL / OUT: 2050 mL / NET: -134.6 mL      CAPILLARY BLOOD GLUCOSE      POCT Blood Glucose.: 148 mg/dL (2021 05:27)      PHYSICAL EXAM:    General: NAD  HEENT: NC/AT; PERRL, clear conjunctiva  Neck: supple  Respiratory: CTA b/l  Cardiovascular: +S1/S2; RRR  Abdomen: soft, NT/ND; +BS x4  Extremities: WWP, 2+ peripheral pulses b/l; no LE edema  Skin: normal color and turgor; no rash  Neurological:    MEDICATIONS:  MEDICATIONS  (STANDING):  caspofungin IVPB      cefTRIAXone   IVPB 1000 milliGRAM(s) IV Intermittent every 24 hours  chlorhexidine 0.12% Liquid 15 milliLiter(s) Oral Mucosa every 12 hours  chlorhexidine 4% Liquid 1 Application(s) Topical <User Schedule>  dexAMETHasone  Injectable 6 milliGRAM(s) IV Push daily  dexMEDEtomidine Infusion 0.2 MICROgram(s)/kG/Hr (3.6 mL/Hr) IV Continuous <Continuous>  dextrose 50% Injectable 25 Gram(s) IV Push once  dextrose 50% Injectable 12.5 Gram(s) IV Push once  dextrose 50% Injectable 25 Gram(s) IV Push once  glucagon  Injectable 1 milliGRAM(s) IntraMuscular once  heparin  Infusion.  Unit(s)/Hr (13 mL/Hr) IV Continuous <Continuous>  insulin lispro (ADMELOG) corrective regimen sliding scale   SubCutaneous every 6 hours  insulin NPH human recombinant 45 Unit(s) SubCutaneous every 6 hours  linezolid  IVPB      linezolid  IVPB 600 milliGRAM(s) IV Intermittent every 12 hours  pantoprazole  Injectable 40 milliGRAM(s) IV Push daily  polyethylene glycol 3350 17 Gram(s) Oral every 12 hours  senna 2 Tablet(s) Oral at bedtime    MEDICATIONS  (PRN):      ALLERGIES:  Allergies    No Known Allergies    Intolerances        LABS:                        11.7   34.09 )-----------( 130      ( 2021 04:56 )             36.8     04-07    137  |  101  |  70<H>  ----------------------------<  112<H>  4.1   |  27  |  1.46<H>    Ca    9.1      2021 04:56  Phos  4.2     04-07  Mg     2.5     04-07    TPro  6.3  /  Alb  2.4<L>  /  TBili  0.6  /  DBili  x   /  AST  49<H>  /  ALT  73<H>  /  AlkPhos  83  04-07    PTT - ( 2021 03:15 )  PTT:71.9 sec  Urinalysis Basic - ( 2021 11:21 )    Color: Light Yellow / Appearance: Slightly Turbid / S.018 / pH: x  Gluc: x / Ketone: Negative  / Bili: Negative / Urobili: <2 mg/dL   Blood: x / Protein: 30 mg/dL / Nitrite: Negative   Leuk Esterase: Negative / RBC: MANY /HPF / WBC 3 /HPF   Sq Epi: x / Non Sq Epi: Few / Bacteria: Few        RADIOLOGY & ADDITIONAL TESTS: Reviewed.

## 2021-04-08 LAB
ALBUMIN SERPL ELPH-MCNC: 2.3 G/DL — LOW (ref 3.3–5)
ALP SERPL-CCNC: 67 U/L — SIGNIFICANT CHANGE UP (ref 40–120)
ALT FLD-CCNC: 71 U/L — HIGH (ref 4–41)
ANION GAP SERPL CALC-SCNC: 8 MMOL/L — SIGNIFICANT CHANGE UP (ref 7–14)
APTT BLD: 73.6 SEC — HIGH (ref 27–36.3)
AST SERPL-CCNC: 52 U/L — HIGH (ref 4–40)
BASOPHILS # BLD AUTO: 0.07 K/UL — SIGNIFICANT CHANGE UP (ref 0–0.2)
BASOPHILS # BLD AUTO: 0.1 K/UL — SIGNIFICANT CHANGE UP (ref 0–0.2)
BASOPHILS NFR BLD AUTO: 0.3 % — SIGNIFICANT CHANGE UP (ref 0–2)
BASOPHILS NFR BLD AUTO: 0.4 % — SIGNIFICANT CHANGE UP (ref 0–2)
BILIRUB SERPL-MCNC: 0.5 MG/DL — SIGNIFICANT CHANGE UP (ref 0.2–1.2)
BLOOD GAS ARTERIAL COMPREHENSIVE RESULT: SIGNIFICANT CHANGE UP
BUN SERPL-MCNC: 63 MG/DL — HIGH (ref 7–23)
CALCIUM SERPL-MCNC: 8.8 MG/DL — SIGNIFICANT CHANGE UP (ref 8.4–10.5)
CHLORIDE SERPL-SCNC: 100 MMOL/L — SIGNIFICANT CHANGE UP (ref 98–107)
CO2 SERPL-SCNC: 28 MMOL/L — SIGNIFICANT CHANGE UP (ref 22–31)
CREAT SERPL-MCNC: 1.28 MG/DL — SIGNIFICANT CHANGE UP (ref 0.5–1.3)
CULTURE RESULTS: SIGNIFICANT CHANGE UP
CULTURE RESULTS: SIGNIFICANT CHANGE UP
EOSINOPHIL # BLD AUTO: 0 K/UL — SIGNIFICANT CHANGE UP (ref 0–0.5)
EOSINOPHIL # BLD AUTO: 0.02 K/UL — SIGNIFICANT CHANGE UP (ref 0–0.5)
EOSINOPHIL NFR BLD AUTO: 0 % — SIGNIFICANT CHANGE UP (ref 0–6)
EOSINOPHIL NFR BLD AUTO: 0.1 % — SIGNIFICANT CHANGE UP (ref 0–6)
FUNGITELL: <31 PG/ML — SIGNIFICANT CHANGE UP
GLUCOSE BLDC GLUCOMTR-MCNC: 126 MG/DL — HIGH (ref 70–99)
GLUCOSE BLDC GLUCOMTR-MCNC: 142 MG/DL — HIGH (ref 70–99)
GLUCOSE BLDC GLUCOMTR-MCNC: 172 MG/DL — HIGH (ref 70–99)
GLUCOSE BLDC GLUCOMTR-MCNC: 52 MG/DL — CRITICAL LOW (ref 70–99)
GLUCOSE SERPL-MCNC: 108 MG/DL — HIGH (ref 70–99)
HCT VFR BLD CALC: 33.2 % — LOW (ref 39–50)
HCT VFR BLD CALC: 33.9 % — LOW (ref 39–50)
HGB BLD-MCNC: 10.8 G/DL — LOW (ref 13–17)
HGB BLD-MCNC: 11.1 G/DL — LOW (ref 13–17)
IANC: 20.87 K/UL — HIGH (ref 1.5–8.5)
IANC: 21.66 K/UL — HIGH (ref 1.5–8.5)
IMM GRANULOCYTES NFR BLD AUTO: 4 % — HIGH (ref 0–1.5)
IMM GRANULOCYTES NFR BLD AUTO: 4.5 % — HIGH (ref 0–1.5)
INR BLD: 1.19 RATIO — HIGH (ref 0.88–1.16)
INR BLD: 1.21 RATIO — HIGH (ref 0.88–1.16)
LYMPHOCYTES # BLD AUTO: 0 % — LOW (ref 13–44)
LYMPHOCYTES # BLD AUTO: 0 K/UL — LOW (ref 1–3.3)
LYMPHOCYTES # BLD AUTO: 0.45 K/UL — LOW (ref 1–3.3)
LYMPHOCYTES # BLD AUTO: 1.8 % — LOW (ref 13–44)
MAGNESIUM SERPL-MCNC: 2.3 MG/DL — SIGNIFICANT CHANGE UP (ref 1.6–2.6)
MCHC RBC-ENTMCNC: 27 PG — SIGNIFICANT CHANGE UP (ref 27–34)
MCHC RBC-ENTMCNC: 27.3 PG — SIGNIFICANT CHANGE UP (ref 27–34)
MCHC RBC-ENTMCNC: 32.5 GM/DL — SIGNIFICANT CHANGE UP (ref 32–36)
MCHC RBC-ENTMCNC: 32.7 GM/DL — SIGNIFICANT CHANGE UP (ref 32–36)
MCV RBC AUTO: 83 FL — SIGNIFICANT CHANGE UP (ref 80–100)
MCV RBC AUTO: 83.5 FL — SIGNIFICANT CHANGE UP (ref 80–100)
MONOCYTES # BLD AUTO: 1.43 K/UL — HIGH (ref 0–0.9)
MONOCYTES # BLD AUTO: 1.71 K/UL — HIGH (ref 0–0.9)
MONOCYTES NFR BLD AUTO: 6.1 % — SIGNIFICANT CHANGE UP (ref 2–14)
MONOCYTES NFR BLD AUTO: 6.8 % — SIGNIFICANT CHANGE UP (ref 2–14)
NEUTROPHILS # BLD AUTO: 20.87 K/UL — HIGH (ref 1.8–7.4)
NEUTROPHILS # BLD AUTO: 21.66 K/UL — HIGH (ref 1.8–7.4)
NEUTROPHILS NFR BLD AUTO: 86.4 % — HIGH (ref 43–77)
NEUTROPHILS NFR BLD AUTO: 89.6 % — HIGH (ref 43–77)
NRBC # BLD: 0 /100 WBCS — SIGNIFICANT CHANGE UP
NRBC # BLD: 0 /100 WBCS — SIGNIFICANT CHANGE UP
NRBC # FLD: 0.02 K/UL — HIGH
NRBC # FLD: 0.04 K/UL — HIGH
PHOSPHATE SERPL-MCNC: 4.4 MG/DL — SIGNIFICANT CHANGE UP (ref 2.5–4.5)
PLATELET # BLD AUTO: 144 K/UL — LOW (ref 150–400)
PLATELET # BLD AUTO: 181 K/UL — SIGNIFICANT CHANGE UP (ref 150–400)
POTASSIUM SERPL-MCNC: 4.8 MMOL/L — SIGNIFICANT CHANGE UP (ref 3.5–5.3)
POTASSIUM SERPL-SCNC: 4.8 MMOL/L — SIGNIFICANT CHANGE UP (ref 3.5–5.3)
PROT SERPL-MCNC: 6 G/DL — SIGNIFICANT CHANGE UP (ref 6–8.3)
PROTHROM AB SERPL-ACNC: 13.5 SEC — SIGNIFICANT CHANGE UP (ref 10.6–13.6)
PROTHROM AB SERPL-ACNC: 13.7 SEC — HIGH (ref 10.6–13.6)
RBC # BLD: 4 M/UL — LOW (ref 4.2–5.8)
RBC # BLD: 4.06 M/UL — LOW (ref 4.2–5.8)
RBC # FLD: 14.8 % — HIGH (ref 10.3–14.5)
RBC # FLD: 15.1 % — HIGH (ref 10.3–14.5)
SODIUM SERPL-SCNC: 136 MMOL/L — SIGNIFICANT CHANGE UP (ref 135–145)
SPECIMEN SOURCE: SIGNIFICANT CHANGE UP
SPECIMEN SOURCE: SIGNIFICANT CHANGE UP
WBC # BLD: 23.29 K/UL — HIGH (ref 3.8–10.5)
WBC # BLD: 25.06 K/UL — HIGH (ref 3.8–10.5)
WBC # FLD AUTO: 23.29 K/UL — HIGH (ref 3.8–10.5)
WBC # FLD AUTO: 25.06 K/UL — HIGH (ref 3.8–10.5)

## 2021-04-08 PROCEDURE — 70450 CT HEAD/BRAIN W/O DYE: CPT | Mod: 26

## 2021-04-08 PROCEDURE — 71045 X-RAY EXAM CHEST 1 VIEW: CPT | Mod: 26

## 2021-04-08 PROCEDURE — 99221 1ST HOSP IP/OBS SF/LOW 40: CPT

## 2021-04-08 PROCEDURE — 71045 X-RAY EXAM CHEST 1 VIEW: CPT | Mod: 26,77

## 2021-04-08 PROCEDURE — 99291 CRITICAL CARE FIRST HOUR: CPT

## 2021-04-08 RX ORDER — FENTANYL CITRATE 50 UG/ML
100 INJECTION INTRAVENOUS ONCE
Refills: 0 | Status: DISCONTINUED | OUTPATIENT
Start: 2021-04-08 | End: 2021-04-08

## 2021-04-08 RX ORDER — DEXTROSE 50 % IN WATER 50 %
50 SYRINGE (ML) INTRAVENOUS ONCE
Refills: 0 | Status: COMPLETED | OUTPATIENT
Start: 2021-04-08 | End: 2021-04-08

## 2021-04-08 RX ADMIN — HUMAN INSULIN 45 UNIT(S): 100 INJECTION, SUSPENSION SUBCUTANEOUS at 06:10

## 2021-04-08 RX ADMIN — PANTOPRAZOLE SODIUM 40 MILLIGRAM(S): 20 TABLET, DELAYED RELEASE ORAL at 11:18

## 2021-04-08 RX ADMIN — Medication 4 MILLIGRAM(S): at 21:30

## 2021-04-08 RX ADMIN — CHLORHEXIDINE GLUCONATE 1 APPLICATION(S): 213 SOLUTION TOPICAL at 06:10

## 2021-04-08 RX ADMIN — FENTANYL CITRATE 100 MICROGRAM(S): 50 INJECTION INTRAVENOUS at 05:09

## 2021-04-08 RX ADMIN — Medication 50 MILLILITER(S): at 18:26

## 2021-04-08 RX ADMIN — FENTANYL CITRATE 100 MICROGRAM(S): 50 INJECTION INTRAVENOUS at 04:22

## 2021-04-08 RX ADMIN — CEFTRIAXONE 100 MILLIGRAM(S): 500 INJECTION, POWDER, FOR SOLUTION INTRAMUSCULAR; INTRAVENOUS at 09:27

## 2021-04-08 RX ADMIN — HUMAN INSULIN 45 UNIT(S): 100 INJECTION, SUSPENSION SUBCUTANEOUS at 11:08

## 2021-04-08 RX ADMIN — DEXMEDETOMIDINE HYDROCHLORIDE IN 0.9% SODIUM CHLORIDE 3.6 MICROGRAM(S)/KG/HR: 4 INJECTION INTRAVENOUS at 06:19

## 2021-04-08 RX ADMIN — Medication 250 MILLIGRAM(S): at 06:18

## 2021-04-08 RX ADMIN — CHLORHEXIDINE GLUCONATE 15 MILLILITER(S): 213 SOLUTION TOPICAL at 18:26

## 2021-04-08 RX ADMIN — CHLORHEXIDINE GLUCONATE 15 MILLILITER(S): 213 SOLUTION TOPICAL at 06:13

## 2021-04-08 RX ADMIN — Medication 6 MILLIGRAM(S): at 06:10

## 2021-04-08 RX ADMIN — DEXMEDETOMIDINE HYDROCHLORIDE IN 0.9% SODIUM CHLORIDE 3.6 MICROGRAM(S)/KG/HR: 4 INJECTION INTRAVENOUS at 19:54

## 2021-04-08 RX ADMIN — Medication 250 MILLIGRAM(S): at 18:57

## 2021-04-08 NOTE — CONSULT NOTE ADULT - ASSESSMENT
59 YO male with Covid, on heparin infusion for DVT/PE found to have a cerebellar infarct and scattered hemorrhagic infarcts vs hemorrhagic PRES

## 2021-04-08 NOTE — PROGRESS NOTE ADULT - ATTENDING COMMENTS
Patient seen and examined at bedside. I have reviewed the above document and agree with the providers above documentation    1-resp failure  2-leukocytosis, consult id  3-pe/dvt-anticoagulation  34-ams head ct consult neuro

## 2021-04-08 NOTE — CONSULT NOTE ADULT - PROBLEM SELECTOR RECOMMENDATION 2
Hold anticoagulation  Neurologic checks Q1H off sedation  maintain serum Na > 145, may require hypertonic saline infusion  Follow up CTA  Repeat noncontrast head CT in AM  No acute surgical intervention

## 2021-04-08 NOTE — PROGRESS NOTE ADULT - ASSESSMENT
60M unknown PMHx presented with hypoxic respiratory failure 2/2 COVID, requiring intubation, complicated by PE and HAGMA    # NEURO  - AOx3 at baseline, now does not withdraw to pain or follow commands  - Sedated with Precedex, wean as tolerated  - Urgent CT head to r/o acute process  - EEG  - Will consult neurology    # CARDIAC  - Troponin elevation in setting of increased demand, SUKI, and PE, no ischemic changes on EKG  - Hypotension 2/2 vasoplegia and RH strain from PE; also with echogenic material in RV suspect clots  - Norepinephrine for MAP >64    # PULM  Hypoxic respiratory failure 2/2 COVID with L segmental upper lobe PE  - CPAP 10/5, 40%  -     # GI   - c/w nepro at goal 35ml/hr  - Continue protonix  - GI  ppx w/ lactulose, miralax and senna    # RENAL  ?SUKI vs CKD, Cr improving  - Monitor intake & output  - Net negative 400ml/24hrs  - Avoid nephrotoxic agents, NSAIDs, RCA  - Renally dose medications    # ID  COVID19   - Dexamethasone 4/1-    - Concern for superimposed bacterial infection vs. reactive. Sputum culture Moderate yeast and moderate gram neg rods.       - Empiric vanc/cefepime started 4/1      - Change antibiotics to Ceftriaxone & Linezolid 4/6-      - Single dose Caspofungin 4/7  - Repeat blood cultures.  - Blood and sputum cultures from 4/5 NGTD  - Fungitell negative    # HEME/ONC  PE and L common femoral vein DVT (DVT seen on POCUS 4/2)  - On heparin infusion, therapeutic  - Once Cr downtrends, can switch to LMWH or DOAC    # ENDO  Hyperglycemia   - A1c 10.3 - new DM, will eventually need follow up  - NPH 45 units q 6hrs  - Continue moderate correctional scale  - Will adjust based on blood sugars    Disposition: MICU

## 2021-04-08 NOTE — CONSULT NOTE ADULT - REASON FOR ADMISSION
COVID19 w/ severe metabolic acidosis s/p intubation
COVID19 w/ severe metabolic acidosis s/p intubation
SOB, COVID+

## 2021-04-08 NOTE — CHART NOTE - NSCHARTNOTEFT_GEN_A_CORE
Called by primary team regarding emergent CTH findings.     Briefly, patient is a 60M w/ PMH of hypotension here for COVID related respiratory failure requiring intubation complicated by PE causing right heart strain, L. common femoral DVT, PNA, echogenic material found in the RV on TTE.     Neurology consulted for AMS on 4/8 after patient received large dose of Versed.     CTH (to my eye) demonstrates an inferior cerebellar hypodensity (?L. PICA territory); L. temporooccipital hypodensity with hemorrhage; R. occipital hypodensity with hemorrhage; L. jorge parietal hypodensity with hemorrhage; R. high parietal hypodensity with petechial hemorrhage; L. high frontal hypodensity with hemorrhage.     Impression: AMS likely secondary to multifocal localization -- etiology uncertain. DDx includes multifocal arterial ischemic infarcts w/ hemorrhagic conversion of unknown mechanism,  but possibly Embolic Stroke of Undetermined Source +/- COVID related hypercoagulability. Other possibilities include hemorrhagic posterior reversible encephelopathy syndrome (PRES) related to COVID, hemorrhagic venous infarcts, and infective endocarditis.     Plan:   [] CTA head/neck w/ IV contrast STAT  [] CTV w/ IV contrast STAT  [] Stop heparin STAT and consider reversing heparin to prevent worsening intracerebral hemorrhage if the benefits outweigh the risk of hemodynamic decompensation due to PE.   [] Avoid all pharmacological DVT ppx and antiplatelets   [] NSG consult STAT for suboccipital craniectomy watch  [] q2h neurochecks   [] SBP < 140   [] Hold off RAJESH for now since cardiac thrombus and endocarditis are lower on the differential Called by primary team regarding emergent CTH findings.     Briefly, patient is a 60M w/ PMH of hypotension here for COVID related respiratory failure requiring intubation complicated by PE causing right heart strain, L. common femoral DVT, PNA, echogenic material found in the RV on TTE.     Neurology consulted for AMS on 4/8 after patient received large dose of Versed.     CTH (to my eye) demonstrates an inferior cerebellar hypodensity (?L. PICA territory); L. temporooccipital hypodensity with hemorrhage; R. occipital hypodensity with hemorrhage; L. jorge parietal hypodensity with hemorrhage; R. high parietal hypodensity with petechial hemorrhage; L. high frontal hypodensity with hemorrhage.     Impression: AMS likely secondary to multifocal localization -- etiology uncertain. DDx includes multifocal arterial ischemic infarcts w/ hemorrhagic conversion of unknown mechanism,  but possibly Embolic Stroke of Undetermined Source +/- COVID related hypercoagulability. Other possibilities include COVID related hemorrhagic posterior reversible encephelopathy syndrome (PRES), hemorrhagic venous infarcts, and infective endocarditis.     Plan:   [] CTA head/neck w/ IV contrast STAT  [] CTV w/ IV contrast STAT  [] Stop heparin STAT and consider reversing heparin to prevent worsening intracerebral hemorrhage if the benefits outweigh the risk of hemodynamic decompensation due to PE.   [] Avoid all pharmacological DVT ppx and antiplatelets   [] NSG consult STAT for suboccipital craniectomy watch  [] q2h neurochecks   [] SBP < 140   [] Hold off RAJESH for now since cardiac thrombus and endocarditis are lower on the differential  [] MR brain w/w/o contrast (once able)   [] VEEG (soon after CTA's are done) Called by primary team regarding emergent CTH findings.     Briefly, patient is a 60M w/ PMH of hypotension here for COVID related respiratory failure requiring intubation complicated by PE causing right heart strain, L. common femoral DVT, PNA, echogenic material found in the RV on TTE.     Neurology consulted for AMS on 4/8 after patient received large dose of Versed.     CTH (to my eye) demonstrates an inferior cerebellar hypodensity (?L. PICA territory); L. temporooccipital hypodensity with hemorrhage; R. occipital hypodensity with hemorrhage; L. jorge parietal hypodensity with hemorrhage; R. high parietal hypodensity with petechial hemorrhage; L. high frontal hypodensity with hemorrhage.     Impression: AMS likely secondary to multifocal localization -- etiology uncertain. DDx includes multifocal arterial ischemic infarcts w/ hemorrhagic conversion of unknown mechanism,  but possibly Embolic Stroke of Undetermined Source +/- COVID related hypercoagulability. Other possibilities include COVID related hemorrhagic posterior reversible encephelopathy syndrome (PRES), hemorrhagic venous infarcts, and infective endocarditis.     Plan:   [] CTA head/neck w/ IV contrast STAT  [] CTV w/ IV contrast STAT  [] Stop heparin STAT and consider reversing heparin to prevent worsening intracerebral hemorrhage if the benefits outweigh the risk of hemodynamic decompensation due to PE.   [] Avoid all pharmacological DVT ppx and antiplatelets   [] NSG consult STAT for suboccipital craniectomy watch  [] q2h neurochecks   [] SBP < 140   [] Hold off RAJESH for now since cardiac thrombus and endocarditis are lower on the differential  [] MR brain w/w/o contrast (once able)   [] VEEG (soon after CTA's are done)    ADDENDUM 0528 4/9    CTA h/n demonstrates thrombus in the L. PICA, Called by primary team regarding emergent CTH findings.     Briefly, patient is a 60M w/ PMH of hypotension here for COVID related respiratory failure requiring intubation complicated by PE causing right heart strain, L. common femoral DVT, PNA, echogenic material found in the RV on TTE.     Neurology consulted for AMS on 4/8 after patient received large dose of Versed.     CTH (to my eye) demonstrates an inferior cerebellar hypodensity (?L. PICA territory); L. temporooccipital hypodensity with hemorrhage; R. occipital hypodensity with hemorrhage; L. jorge parietal hypodensity with hemorrhage; R. high parietal hypodensity with petechial hemorrhage; L. high frontal hypodensity with hemorrhage.     Impression: AMS likely secondary to multifocal localization -- etiology uncertain. DDx includes multifocal arterial ischemic infarcts w/ hemorrhagic conversion of unknown mechanism,  but possibly Embolic Stroke of Undetermined Source +/- COVID related hypercoagulability. Other possibilities include COVID related hemorrhagic posterior reversible encephelopathy syndrome (PRES), hemorrhagic venous infarcts, and infective endocarditis.     Plan:   [] CTA head/neck w/ IV contrast STAT  [] CTV w/ IV contrast STAT  [] Stop heparin STAT and consider reversing heparin to prevent worsening intracerebral hemorrhage if the benefits outweigh the risk of hemodynamic decompensation due to PE.   [] Avoid all pharmacological DVT ppx and antiplatelets   [] NSG consult STAT for suboccipital craniectomy watch  [] q2h neurochecks   [] SBP < 140   [] Hold off RAJESH for now since cardiac thrombus and endocarditis are lower on the differential  [] MR brain w/w/o contrast (once able)   [] VEEG (soon after CTA's are done)        ADDENDUM 0528 4/9  CTA h/n w/ venous phase demonstrates thrombus in the L. PICA artery. Otherwise, no stenoocclusive disease. Dural sinus are patent.  This finding supports the presumed ischemic stroke mechanism of ESUS +/- hyperocoagualability of COVID for at least the cerebellar lesion. The etiology of the remaining lesions is uncertain at this time.   MRI may clarify this. Please order MRI brain w/w/o contrast; MRV w/ IV contrast (to confirm no venous sinus thrombosis given suboptimal angle of the CTV). Called by primary team regarding emergent CTH findings.     Briefly, patient is a 60M w/ PMH of hypotension here for COVID related respiratory failure requiring intubation complicated by PE causing right heart strain, L. common femoral DVT, PNA, echogenic material found in the RV on TTE.     Neurology consulted for AMS on 4/8 after patient received large dose of Versed.     CTH (to my eye) demonstrates an inferior cerebellar hypodensity (?L. PICA territory); L. temporooccipital hypodensity with hemorrhage; R. occipital hypodensity with hemorrhage; L. high parietal hypodensity with hemorrhage; R. high parietal hypodensity with petechial hemorrhage; L. high frontal hypodensity with hemorrhage.     Impression: AMS likely secondary to multifocal localization -- etiology uncertain. DDx includes multifocal arterial ischemic infarcts w/ hemorrhagic conversion of unknown mechanism,  but possibly Embolic Stroke of Undetermined Source +/- COVID related hypercoagulability. Other possibilities include COVID related hemorrhagic posterior reversible encephelopathy syndrome (PRES), hemorrhagic venous infarcts, and infective endocarditis.     Plan:   [] CTA head/neck w/ IV contrast STAT  [] CTV w/ IV contrast STAT  [] Stop heparin STAT and consider reversing heparin to prevent worsening intracerebral hemorrhage if the benefits outweigh the risk of hemodynamic decompensation due to PE.   [] Avoid all pharmacological DVT ppx and antiplatelets   [] NSG consult STAT for suboccipital craniectomy watch  [] q2h neurochecks   [] SBP < 140   [] Hold off RAJESH for now since cardiac thrombus and endocarditis are lower on the differential  [] MR brain w/w/o contrast (once able)   [] VEEG (soon after CTA's are done)        ADDENDUM 0528 4/9  CTA h/n w/ venous phase demonstrates thrombus in the L. PICA. Otherwise, no stenoocclusive disease. Dural sinus are patent.  This finding supports the presumed ischemic stroke mechanism of ESUS +/- hyperocoagualability of COVID for at least the cerebellar lesion. The etiology of the remaining lesions is uncertain at this time.   MRI may clarify this. Please order MRI brain w/w/o contrast; MRV w/ IV contrast (to confirm no venous sinus thrombosis given suboptimal angle of the CTV).    Stroke attending. Agree with above

## 2021-04-08 NOTE — CHART NOTE - NSCHARTNOTEFT_GEN_A_CORE
CTH revealing possible CVA/hemorrage. Neurology called who advised to obtain CTA head&neck and consult neurosurgery. Unable to reach nephew, however updated and obtained consent for imaging from brother in law Demetrius. CTH revealing possible CVA w/ hemorrage. Neurology called who advised to obtain CTA head&neck and consult neurosurgery. Heparin gtt has been discontinued. Unable to reach nephew, however updated and obtained consent for imaging from brother in law Demetrius([phone]).

## 2021-04-08 NOTE — PROGRESS NOTE ADULT - SUBJECTIVE AND OBJECTIVE BOX
MICU Daily Progress Note  =====================================================  Interval/Overnight Events:    - No acute events overnight    HPI: 60M unknown medical history BIBEMS for respiratory distress, recently COVID+ as per EMS. Pt unable to comply with history 2/2 resp distress, nods yes to difficulty breathing, no to pain. Baseline AAOx3. In ED initially hypoxic to 60% placed on NRB and satting low 90s and tachypneic to 40s on NRB. Placed on AVAPS, still tachypneic to 40s. Labs showing HAGMA, elevated FSG to 500s. Also with lactate of 15. Trops of 200 and pro-BNP elevated to 21k. MICU consulted for respiratory failure in setting of COVID, also concern for DKA. On encounter, pt is noncooperative with questioning. In ED given 2L NS, decadron. Started on insulin gtt. Subsequently intubated and admitted to [facility].    Addendum 21 1700:  Limited history obtained from nitesh Anders, who lives in the [place] [phone]). Patient has a PMH of low BP and takes no medications. Patient tested positive for COVID on 3/19. He was initially doing fine with some fatigue, but suddenly decompensated after yesterday. Unable to obtain social history. Wife, Lucero, is currently in [place] - she went due to medical issues but was unable to return due to COVID travel restrictions.   Allergies: No Known Allergies      MEDICATIONS:   --------------------------------------------------------------------------------------  Neurologic Medications  dexMEDEtomidine Infusion 0.2 MICROgram(s)/kG/Hr IV Continuous <Continuous>    Respiratory Medications    Cardiovascular Medications    Gastrointestinal Medications  pantoprazole  Injectable 40 milliGRAM(s) IV Push daily  polyethylene glycol 3350 17 Gram(s) Oral every 12 hours  senna 2 Tablet(s) Oral at bedtime    Genitourinary Medications    Hematologic/Oncologic Medications  heparin  Infusion.  Unit(s)/Hr IV Continuous <Continuous>    Antimicrobial/Immunologic Medications  cefTRIAXone   IVPB 1000 milliGRAM(s) IV Intermittent every 24 hours  vancomycin  IVPB 1000 milliGRAM(s) IV Intermittent every 12 hours    Endocrine/Metabolic Medications  dexAMETHasone  Injectable 6 milliGRAM(s) IV Push daily  dextrose 50% Injectable 25 Gram(s) IV Push once  dextrose 50% Injectable 12.5 Gram(s) IV Push once  dextrose 50% Injectable 25 Gram(s) IV Push once  glucagon  Injectable 1 milliGRAM(s) IntraMuscular once  insulin lispro (ADMELOG) corrective regimen sliding scale   SubCutaneous every 6 hours  insulin NPH human recombinant 45 Unit(s) SubCutaneous every 6 hours    Topical/Other Medications  chlorhexidine 0.12% Liquid 15 milliLiter(s) Oral Mucosa every 12 hours  chlorhexidine 4% Liquid 1 Application(s) Topical <User Schedule>    --------------------------------------------------------------------------------------  VITAL SIGNS, INS/OUTS (last 24 hours):  --------------------------------------------------------------------------------------  Vital Signs Last 24 Hrs  T(C): 36.7 (2021 11:00), Max: 37.2 (2021 04:00)  T(F): 98.1 (2021 11:00), Max: 99 (2021 04:00)  HR: 68 (2021 11:00) (60 - 130)  BP: 92/52 (2021 21:00) (92/52 - 92/52)  BP(mean): --  RR: 20 (2021 11:00) (19 - 35)  SpO2: 100% (2021 11:00) (95% - 100%)  --------------------------------------------------------------------------------------    EXAM  NEUROLOGY  Exam: Normal, intubated, does not follow commands or withdraw to pain    HEENT  Exam: Normocephalic, atraumatic    RESPIRATORY  Exam: Lungs clear to auscultation, Normal expansion/effort.  Mechanical Ventilation: Mode: CPAP with PS, FiO2: 40, PEEP: 5, PS: 10, MAP: 9, PIP: 16    CARDIOVASCULAR  Exam: S1, S2.  Regular rate and rhythm.     GI/NUTRITION  Exam: Abdomen soft, Non-tender, Non-distended.     VASCULAR  Exam: Extremities warm, pink, well-perfused.     SKIN  Exam: Good skin turgor, no skin breakdown.     METABOLIC/FLUIDS/ELECTROLYTES    HEMATOLOGIC  [x] VTE Prophylaxis: heparin  Infusion.  Unit(s)/Hr IV Continuous <Continuous>    Transfusions:	[] PRBC	[] Platelets		[] FFP	[] Cryoprecipitate    INFECTIOUS DISEASE  Antimicrobials/Immunologic Medications:  cefTRIAXone   IVPB 1000 milliGRAM(s) IV Intermittent every 24 hours  vancomycin  IVPB 1000 milliGRAM(s) IV Intermittent every 12 hours    Day # 7    Tubes/Lines/Drains    [x] Peripheral IV  [x] Central Venous Line     	[] R	[] L	[] IJ	[] Fem	[] SC	Date Placed:   [x] Arterial Line		[] R	[] L	[] Fem	[] Rad	[] Ax	Date Placed:   [] PICC		[] Midline		[] Mediport  [] Urinary Catheter		Date Placed:   [x] Necessity of urinary, arterial, and venous catheters discussed    LABS  --------------------------------------------------------------------------------------  CBC (:52)                          10.8<L>                   25.06<H>  )--------------(  144<L>     86.4<H>% Neuts, 1.8<L>% Lymphs, ANC: 21.66<H>                          33.2<L>  CBC (:56)                          11.7<L>                   34.09<H>  )--------------(  130<L>     --    % Neuts, --    % Lymphs, ANC: --                              36.8<L>    BMP (:52)       136     |  100     |  63<H> 			Ca++ --      Ca 8.8          ---------------------------------( 108<H>		Mg 2.3          4.8     |  28      |  1.28  			Ph 4.4     BMP ( 04:56)       137     |  101     |  70<H> 			Ca++ --      Ca 9.1          ---------------------------------( 112<H>		Mg 2.5          4.1     |  27      |  1.46<H>			Ph 4.2       LFTs (:52)      TPro 6.0 / Alb 2.3<L> / TBili 0.5 / DBili -- / AST 52<H> / ALT 71<H> / AlkPhos 67  LFTs ( 04:56)      TPro 6.3 / Alb 2.4<L> / TBili 0.6 / DBili -- / AST 49<H> / ALT 73<H> / AlkPhos 83    Coags (:52)  aPTT 73.6<H> / INR 1.21<H> / PT 13.7<H>  Coags ( 03:15)  aPTT 71.9<H> / INR -- / PT --      ABG (:52)     7.47<H> / 40 / 96 / 29<H> / 4.6<H> / 97.2%     Lactate:    ABG ( @ 03:15)     7.44 / 40 / 98 / 28<H> / 3.5<H> / 97.5%     Lactate:        Urinalysis ( @ 11:21):     Color: Light Yellow / Appearance: Slightly Turbid<!> / S.018 / pH: 6.0 / Gluc: 500 mg/dL<!> / Ketones: Negative / Bili: Negative / Urobili: <2 mg/dL / Protein :30 mg/dL<!> / Nitrites: Negative / Leuk.Est: Negative / RBC: MANY / WBC: 3 / Sq Epi:  / Non Sq Epi: Few / Bacteria Few<!>       -> .Sputum Sputum Culture ( @ 14:49)       No polymorphonuclear leukocytes per low power field  Moderate Squamous epithelial cells per low power field  Moderate Yeast like cells per oil power field  Moderate Gram Variable Rods per oil power field  Rare Gram Positive Cocci in Clusters per oil power field    NG    Normal Respiratory Lizzette present    -> .Blood Blood Culture ( @ 12:23)     NG    NG    No growth to date.    -> .Blood Blood-Peripheral Culture ( @ 05:00)       Few Squamous epithelial cells per low power field  Few polymorphonuclear leukocytes per low power field  Few Yeast per oil power field  Few Gram Positive Cocci in Pairs and Chains per oil power field    NG    No Growth Final    -> .Blood Blood-Peripheral Culture ( @ 09:59)     NG    NG    No Growth Final    --------------------------------------------------------------------------------------    OTHER LABORATORY:     IMAGING STUDIES:   CXR:

## 2021-04-08 NOTE — CONSULT NOTE ADULT - ASSESSMENT
INCOMPLETE  Assessment:  60y unknown-handedness M with no known PMH admitted on 04/01/21 w/ severe hypoxic respiratory failure 2* COVID-19 c/b metabolic acidosis and overdose on Versed for whom neurology was consulted for persistent AMS  On exam, he is intubated, awakens spontaneously intermittently, does not follow commands or attend, brainstem reflexes intact, blinks to threat normal on the right and diminished on the left, withdraws to noxious stimulus in both legs but neither arm, has no spontaneous movement and downgoing toes.     IMPRESSION: Multi-factorial impaired level of arousal due to global cerebral dysfunction & impaired reticular activation system from combination of COVID-19, associated hypoxia, persistent pharmacologic effects of overdose of versed, precipitous drop in BP and multiple metabolic derangements including severe acidosis.     Plan  []STAT CTH w/o contrast & CTA H&N   []Minimize sedation as tolerated, sedation holidays at minimum as tolerated  []Continue to treat and correct metabolic derangements.   []Regulate autonomic stability as tolerated to keep MAP of  to help maintain CPP of  for cerebral autoregulation.   []Consider rEEG if significant fluctuations and discrete episodes of complete unresponsiveness to r/o intermittent seizures.     Bruce Rios, DO  PGY-3 Neurology Service 60y unknown-handedness M w h/o hypotension admitted on 04/01/21 w/ severe hypoxic respiratory failure 2* COVID-19 c/b metabolic acidosis and overdose on Versed for whom neurology was consulted for persistent AMS  On exam, he is intubated, on sedation with Precedex, awakens spontaneously intermittently, does not follow commands or attend, brainstem reflexes intact, blinks to threat normal on the right and diminished on the left, withdraws to noxious stimulus in both legs but neither arm, has no spontaneous movement and downgoing toes.     IMPRESSION: Multi-factorial impaired level of arousal due to global cerebral dysfunction & impaired reticular activation system from combination of COVID-19, associated hypoxia, persistent pharmacologic effects of overdose of multiple sedatives , precipitous drop in BP and multiple metabolic derangements including severe acidosis.     Plan  []STAT CTH w/o contrast & CTA H&N   []Minimize sedation as tolerated, sedation holidays at minimum as tolerated  []Continue to treat and correct metabolic derangements.   []Regulate autonomic stability as tolerated to keep MAP of  to help maintain CPP of  for cerebral autoregulation.   []Consider rEEG if significant fluctuations and discrete episodes of complete unresponsiveness to r/o intermittent seizures.     Bruce Rios, DO  PGY-3 Neurology Service

## 2021-04-08 NOTE — CONSULT NOTE ADULT - TIME BILLING
60-year-old ? handed gentleman first evaluated at [facility] on 4/9/2021 with decreased level of consciousness.  History and exam as above.  ROS otherwise negative.  CT head (4/8/2021) to my eye showed multiple areas of hypodensity consistent with subacute infarction, some with minor hemorrhagic transformation: Large left cerebellar with moderate effacement of the fourth ventricle; bilateral frontoparietal corona radiata/centrum semiovale; bilateral occipital or perhaps occipitoparietal; right thalamic.  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  Impression.  On 4/1/2021 he was admitted with Covid, respiratory failure, subsequent pulmonary embolism and pneumonia.  He was being treated with IV heparin.  On 4/8/2021 he was found to have depressed level of consciousness, some of which might have been attributable to large doses of Versed.  Unfortunately, aside from possible Covid-related encephalopathy, his neurological status may be explained by multiple recent infarcts in different vascular distributions, including the left cerebellum with mass-effect.  Mechanism of the infarcts is uncertain, but consistent with embolic stroke of undetermined source and/or perhaps hypercoagulability of COVID-19.  Prognosis is uncertain at this early time point, but probably guarded.  The left cerebellar infarct should be monitored for potential further mass-effect and brainstem compression or hydrocephalus.  I am assuming that he will still benefit from anticoagulation for recent pulmonary embolism.  Suggest.  If not already done, check serum D-dimer and antiphospholipid antibodies; repeat CT head in 24 hours; if patient is stable and can hold off on anticoagulation for now, repeat CT head in 2-3 days from now; if repeat CT is stable, with no significant increase in hemorrhage, then from neurovascular standpoint can restart anticoagulation in the low therapeutic range, for example IV heparin with PTT 50-60; if IVC filter would eliminate the need for full dose anticoagulation, then would proceed with filter instead; permissive hypertension with systolic -170; when stable, PT/OT.

## 2021-04-09 LAB
ALBUMIN SERPL ELPH-MCNC: 2 G/DL — LOW (ref 3.3–5)
ALBUMIN SERPL ELPH-MCNC: 2.2 G/DL — LOW (ref 3.3–5)
ALBUMIN SERPL ELPH-MCNC: 2.3 G/DL — LOW (ref 3.3–5)
ALP SERPL-CCNC: 103 U/L — SIGNIFICANT CHANGE UP (ref 40–120)
ALP SERPL-CCNC: 104 U/L — SIGNIFICANT CHANGE UP (ref 40–120)
ALP SERPL-CCNC: 89 U/L — SIGNIFICANT CHANGE UP (ref 40–120)
ALT FLD-CCNC: 108 U/L — HIGH (ref 4–41)
ALT FLD-CCNC: 123 U/L — HIGH (ref 4–41)
ALT FLD-CCNC: 136 U/L — HIGH (ref 4–41)
ANION GAP SERPL CALC-SCNC: 11 MMOL/L — SIGNIFICANT CHANGE UP (ref 7–14)
ANION GAP SERPL CALC-SCNC: 8 MMOL/L — SIGNIFICANT CHANGE UP (ref 7–14)
ANION GAP SERPL CALC-SCNC: 9 MMOL/L — SIGNIFICANT CHANGE UP (ref 7–14)
APTT BLD: 32.5 SEC — SIGNIFICANT CHANGE UP (ref 27–36.3)
AST SERPL-CCNC: 65 U/L — HIGH (ref 4–40)
AST SERPL-CCNC: 81 U/L — HIGH (ref 4–40)
AST SERPL-CCNC: 89 U/L — HIGH (ref 4–40)
BASOPHILS # BLD AUTO: 0 K/UL — SIGNIFICANT CHANGE UP (ref 0–0.2)
BASOPHILS NFR BLD AUTO: 0 % — SIGNIFICANT CHANGE UP (ref 0–2)
BILIRUB SERPL-MCNC: 0.4 MG/DL — SIGNIFICANT CHANGE UP (ref 0.2–1.2)
BILIRUB SERPL-MCNC: 0.5 MG/DL — SIGNIFICANT CHANGE UP (ref 0.2–1.2)
BILIRUB SERPL-MCNC: 0.6 MG/DL — SIGNIFICANT CHANGE UP (ref 0.2–1.2)
BLOOD GAS ARTERIAL COMPREHENSIVE RESULT: SIGNIFICANT CHANGE UP
BUN SERPL-MCNC: 49 MG/DL — HIGH (ref 7–23)
BUN SERPL-MCNC: 50 MG/DL — HIGH (ref 7–23)
BUN SERPL-MCNC: 57 MG/DL — HIGH (ref 7–23)
CALCIUM SERPL-MCNC: 8.2 MG/DL — LOW (ref 8.4–10.5)
CALCIUM SERPL-MCNC: 8.5 MG/DL — SIGNIFICANT CHANGE UP (ref 8.4–10.5)
CALCIUM SERPL-MCNC: 8.7 MG/DL — SIGNIFICANT CHANGE UP (ref 8.4–10.5)
CHLORIDE SERPL-SCNC: 104 MMOL/L — SIGNIFICANT CHANGE UP (ref 98–107)
CHLORIDE SERPL-SCNC: 98 MMOL/L — SIGNIFICANT CHANGE UP (ref 98–107)
CHLORIDE SERPL-SCNC: 98 MMOL/L — SIGNIFICANT CHANGE UP (ref 98–107)
CO2 SERPL-SCNC: 23 MMOL/L — SIGNIFICANT CHANGE UP (ref 22–31)
CO2 SERPL-SCNC: 24 MMOL/L — SIGNIFICANT CHANGE UP (ref 22–31)
CO2 SERPL-SCNC: 25 MMOL/L — SIGNIFICANT CHANGE UP (ref 22–31)
CREAT SERPL-MCNC: 1.13 MG/DL — SIGNIFICANT CHANGE UP (ref 0.5–1.3)
CREAT SERPL-MCNC: 1.17 MG/DL — SIGNIFICANT CHANGE UP (ref 0.5–1.3)
CREAT SERPL-MCNC: 1.2 MG/DL — SIGNIFICANT CHANGE UP (ref 0.5–1.3)
EOSINOPHIL # BLD AUTO: 0 K/UL — SIGNIFICANT CHANGE UP (ref 0–0.5)
EOSINOPHIL NFR BLD AUTO: 0 % — SIGNIFICANT CHANGE UP (ref 0–6)
GLUCOSE BLDC GLUCOMTR-MCNC: 124 MG/DL — HIGH (ref 70–99)
GLUCOSE BLDC GLUCOMTR-MCNC: 150 MG/DL — HIGH (ref 70–99)
GLUCOSE BLDC GLUCOMTR-MCNC: 158 MG/DL — HIGH (ref 70–99)
GLUCOSE BLDC GLUCOMTR-MCNC: 169 MG/DL — HIGH (ref 70–99)
GLUCOSE BLDC GLUCOMTR-MCNC: 199 MG/DL — HIGH (ref 70–99)
GLUCOSE BLDC GLUCOMTR-MCNC: 231 MG/DL — HIGH (ref 70–99)
GLUCOSE BLDC GLUCOMTR-MCNC: 52 MG/DL — CRITICAL LOW (ref 70–99)
GLUCOSE BLDC GLUCOMTR-MCNC: 53 MG/DL — CRITICAL LOW (ref 70–99)
GLUCOSE SERPL-MCNC: 165 MG/DL — HIGH (ref 70–99)
GLUCOSE SERPL-MCNC: 184 MG/DL — HIGH (ref 70–99)
GLUCOSE SERPL-MCNC: 49 MG/DL — CRITICAL LOW (ref 70–99)
HCT VFR BLD CALC: 34.8 % — LOW (ref 39–50)
HGB BLD-MCNC: 11.2 G/DL — LOW (ref 13–17)
IANC: 22.26 K/UL — HIGH (ref 1.5–8.5)
INR BLD: 1.1 RATIO — SIGNIFICANT CHANGE UP (ref 0.88–1.16)
LYMPHOCYTES # BLD AUTO: 0.44 K/UL — LOW (ref 1–3.3)
LYMPHOCYTES # BLD AUTO: 1.7 % — LOW (ref 13–44)
MAGNESIUM SERPL-MCNC: 2.2 MG/DL — SIGNIFICANT CHANGE UP (ref 1.6–2.6)
MCHC RBC-ENTMCNC: 26.7 PG — LOW (ref 27–34)
MCHC RBC-ENTMCNC: 32.2 GM/DL — SIGNIFICANT CHANGE UP (ref 32–36)
MCV RBC AUTO: 82.9 FL — SIGNIFICANT CHANGE UP (ref 80–100)
MONOCYTES # BLD AUTO: 1.12 K/UL — HIGH (ref 0–0.9)
MONOCYTES NFR BLD AUTO: 4.3 % — SIGNIFICANT CHANGE UP (ref 2–14)
NEUTROPHILS # BLD AUTO: 23.88 K/UL — HIGH (ref 1.8–7.4)
NEUTROPHILS NFR BLD AUTO: 91.4 % — HIGH (ref 43–77)
PHOSPHATE SERPL-MCNC: 4.3 MG/DL — SIGNIFICANT CHANGE UP (ref 2.5–4.5)
PLATELET # BLD AUTO: 219 K/UL — SIGNIFICANT CHANGE UP (ref 150–400)
POTASSIUM SERPL-MCNC: 4.4 MMOL/L — SIGNIFICANT CHANGE UP (ref 3.5–5.3)
POTASSIUM SERPL-MCNC: 4.8 MMOL/L — SIGNIFICANT CHANGE UP (ref 3.5–5.3)
POTASSIUM SERPL-MCNC: 5 MMOL/L — SIGNIFICANT CHANGE UP (ref 3.5–5.3)
POTASSIUM SERPL-SCNC: 4.4 MMOL/L — SIGNIFICANT CHANGE UP (ref 3.5–5.3)
POTASSIUM SERPL-SCNC: 4.8 MMOL/L — SIGNIFICANT CHANGE UP (ref 3.5–5.3)
POTASSIUM SERPL-SCNC: 5 MMOL/L — SIGNIFICANT CHANGE UP (ref 3.5–5.3)
PROT SERPL-MCNC: 5.8 G/DL — LOW (ref 6–8.3)
PROT SERPL-MCNC: 6 G/DL — SIGNIFICANT CHANGE UP (ref 6–8.3)
PROT SERPL-MCNC: 6.4 G/DL — SIGNIFICANT CHANGE UP (ref 6–8.3)
PROTHROM AB SERPL-ACNC: 12.6 SEC — SIGNIFICANT CHANGE UP (ref 10.6–13.6)
RBC # BLD: 4.2 M/UL — SIGNIFICANT CHANGE UP (ref 4.2–5.8)
RBC # FLD: 14.7 % — HIGH (ref 10.3–14.5)
SODIUM SERPL-SCNC: 131 MMOL/L — LOW (ref 135–145)
SODIUM SERPL-SCNC: 134 MMOL/L — LOW (ref 135–145)
SODIUM SERPL-SCNC: 135 MMOL/L — SIGNIFICANT CHANGE UP (ref 135–145)
VANCOMYCIN TROUGH SERPL-MCNC: 16.8 UG/ML — SIGNIFICANT CHANGE UP (ref 10–20)
WBC # BLD: 26.13 K/UL — HIGH (ref 3.8–10.5)
WBC # FLD AUTO: 26.13 K/UL — HIGH (ref 3.8–10.5)

## 2021-04-09 PROCEDURE — 99291 CRITICAL CARE FIRST HOUR: CPT

## 2021-04-09 PROCEDURE — 70498 CT ANGIOGRAPHY NECK: CPT | Mod: 26

## 2021-04-09 PROCEDURE — 70496 CT ANGIOGRAPHY HEAD: CPT | Mod: 26

## 2021-04-09 PROCEDURE — 99223 1ST HOSP IP/OBS HIGH 75: CPT

## 2021-04-09 PROCEDURE — 70450 CT HEAD/BRAIN W/O DYE: CPT | Mod: 26,59

## 2021-04-09 RX ORDER — LEVETIRACETAM 250 MG/1
500 TABLET, FILM COATED ORAL EVERY 12 HOURS
Refills: 0 | Status: DISCONTINUED | OUTPATIENT
Start: 2021-04-09 | End: 2021-04-12

## 2021-04-09 RX ORDER — SODIUM CHLORIDE 9 MG/ML
1000 INJECTION, SOLUTION INTRAVENOUS
Refills: 0 | Status: DISCONTINUED | OUTPATIENT
Start: 2021-04-09 | End: 2021-04-09

## 2021-04-09 RX ORDER — NOREPINEPHRINE BITARTRATE/D5W 8 MG/250ML
0.05 PLASTIC BAG, INJECTION (ML) INTRAVENOUS
Qty: 8 | Refills: 0 | Status: DISCONTINUED | OUTPATIENT
Start: 2021-04-09 | End: 2021-04-12

## 2021-04-09 RX ORDER — SODIUM CHLORIDE 9 MG/ML
1000 INJECTION INTRAMUSCULAR; INTRAVENOUS; SUBCUTANEOUS
Refills: 0 | Status: DISCONTINUED | OUTPATIENT
Start: 2021-04-09 | End: 2021-04-09

## 2021-04-09 RX ORDER — DEXTROSE 50 % IN WATER 50 %
50 SYRINGE (ML) INTRAVENOUS ONCE
Refills: 0 | Status: COMPLETED | OUTPATIENT
Start: 2021-04-09 | End: 2021-04-09

## 2021-04-09 RX ORDER — CHLORHEXIDINE GLUCONATE 213 G/1000ML
15 SOLUTION TOPICAL EVERY 12 HOURS
Refills: 0 | Status: DISCONTINUED | OUTPATIENT
Start: 2021-04-09 | End: 2021-04-12

## 2021-04-09 RX ORDER — SODIUM CHLORIDE 5 G/100ML
500 INJECTION, SOLUTION INTRAVENOUS
Refills: 0 | Status: DISCONTINUED | OUTPATIENT
Start: 2021-04-09 | End: 2021-04-09

## 2021-04-09 RX ORDER — SODIUM CHLORIDE 5 G/100ML
500 INJECTION, SOLUTION INTRAVENOUS
Refills: 0 | Status: DISCONTINUED | OUTPATIENT
Start: 2021-04-09 | End: 2021-04-10

## 2021-04-09 RX ADMIN — SODIUM CHLORIDE 60 MILLILITER(S): 5 INJECTION, SOLUTION INTRAVENOUS at 12:36

## 2021-04-09 RX ADMIN — LEVETIRACETAM 400 MILLIGRAM(S): 250 TABLET, FILM COATED ORAL at 17:43

## 2021-04-09 RX ADMIN — Medication 6 MILLIGRAM(S): at 05:59

## 2021-04-09 RX ADMIN — SODIUM CHLORIDE 30 MILLILITER(S): 5 INJECTION, SOLUTION INTRAVENOUS at 07:44

## 2021-04-09 RX ADMIN — CHLORHEXIDINE GLUCONATE 15 MILLILITER(S): 213 SOLUTION TOPICAL at 05:59

## 2021-04-09 RX ADMIN — SODIUM CHLORIDE 75 MILLILITER(S): 9 INJECTION, SOLUTION INTRAVENOUS at 06:22

## 2021-04-09 RX ADMIN — DEXMEDETOMIDINE HYDROCHLORIDE IN 0.9% SODIUM CHLORIDE 3.6 MICROGRAM(S)/KG/HR: 4 INJECTION INTRAVENOUS at 20:40

## 2021-04-09 RX ADMIN — PANTOPRAZOLE SODIUM 40 MILLIGRAM(S): 20 TABLET, DELAYED RELEASE ORAL at 11:46

## 2021-04-09 RX ADMIN — POLYETHYLENE GLYCOL 3350 17 GRAM(S): 17 POWDER, FOR SOLUTION ORAL at 06:05

## 2021-04-09 RX ADMIN — Medication 2 MILLIGRAM(S): at 06:49

## 2021-04-09 RX ADMIN — CHLORHEXIDINE GLUCONATE 1 APPLICATION(S): 213 SOLUTION TOPICAL at 06:05

## 2021-04-09 RX ADMIN — Medication 250 MILLIGRAM(S): at 06:05

## 2021-04-09 RX ADMIN — POLYETHYLENE GLYCOL 3350 17 GRAM(S): 17 POWDER, FOR SOLUTION ORAL at 17:43

## 2021-04-09 RX ADMIN — Medication 50 MILLILITER(S): at 05:45

## 2021-04-09 RX ADMIN — SODIUM CHLORIDE 30 MILLILITER(S): 5 INJECTION, SOLUTION INTRAVENOUS at 03:49

## 2021-04-09 RX ADMIN — CEFTRIAXONE 100 MILLIGRAM(S): 500 INJECTION, POWDER, FOR SOLUTION INTRAMUSCULAR; INTRAVENOUS at 09:08

## 2021-04-09 RX ADMIN — SENNA PLUS 2 TABLET(S): 8.6 TABLET ORAL at 21:06

## 2021-04-09 RX ADMIN — CHLORHEXIDINE GLUCONATE 15 MILLILITER(S): 213 SOLUTION TOPICAL at 17:43

## 2021-04-09 RX ADMIN — Medication 2: at 11:46

## 2021-04-09 RX ADMIN — DEXMEDETOMIDINE HYDROCHLORIDE IN 0.9% SODIUM CHLORIDE 3.6 MICROGRAM(S)/KG/HR: 4 INJECTION INTRAVENOUS at 01:22

## 2021-04-09 RX ADMIN — Medication 2: at 23:30

## 2021-04-09 RX ADMIN — Medication 6.87 MICROGRAM(S)/KG/MIN: at 01:22

## 2021-04-09 NOTE — PROGRESS NOTE ADULT - SUBJECTIVE AND OBJECTIVE BOX
ICU Daily Progress Note  =====================================================  Interval/Overnight Events:     ***      HPI: 60M unknown medical history BIBEMS for respiratory distress, recently COVID+ as per EMS. Pt unable to comply with history 2/2 resp distress, nods yes to difficulty breathing, no to pain. Baseline AAOx3. In ED initially hypoxic to 60% placed on NRB and satting low 90s and tachypneic to 40s on NRB. Placed on AVAPS, still tachypneic to 40s. Labs showing HAGMA, elevated FSG to 500s. Also with lactate of 15. Trops of 200 and pro-BNP elevated to 21k. MICU consulted for respiratory failure in setting of COVID, also concern for DKA. On encounter, pt is noncooperative with questioning. In ED given 2L NS, decadron. Started on insulin gtt. Subsequently intubated and admitted to [facility].    Addendum 4/1/21 1700:  Limited history obtained from nitesh Anders, who lives in the [place] (+[phone]). Patient has a PMH of low BP and takes no medications. Patient tested positive for COVID on 3/19. He was initially doing fine with some fatigue, but suddenly decompensated after yesterday. Unable to obtain social history. Wife, Lucero, is currently in [place] - she went due to medical issues but was unable to return due to COVID travel restrictions.   Allergies: No Known Allergies    Allergies: No Known Allergies      MEDICATIONS:   --------------------------------------------------------------------------------------  Neurologic Medications  dexMEDEtomidine Infusion 0.2 MICROgram(s)/kG/Hr IV Continuous <Continuous>    Respiratory Medications    Cardiovascular Medications  norepinephrine Infusion 0.05 MICROgram(s)/kG/Min IV Continuous <Continuous>    Gastrointestinal Medications  dextrose 5%. 1000 milliLiter(s) IV Continuous <Continuous>  pantoprazole  Injectable 40 milliGRAM(s) IV Push daily  polyethylene glycol 3350 17 Gram(s) Oral every 12 hours  senna 2 Tablet(s) Oral at bedtime  sodium chloride 1.5%. 500 milliLiter(s) IV Continuous <Continuous>    Genitourinary Medications    Hematologic/Oncologic Medications    Antimicrobial/Immunologic Medications  cefTRIAXone   IVPB 1000 milliGRAM(s) IV Intermittent every 24 hours  vancomycin  IVPB 1000 milliGRAM(s) IV Intermittent every 12 hours    Endocrine/Metabolic Medications  dexAMETHasone  Injectable 6 milliGRAM(s) IV Push daily  dextrose 50% Injectable 25 Gram(s) IV Push once  dextrose 50% Injectable 12.5 Gram(s) IV Push once  dextrose 50% Injectable 25 Gram(s) IV Push once  glucagon  Injectable 1 milliGRAM(s) IntraMuscular once  insulin lispro (ADMELOG) corrective regimen sliding scale   SubCutaneous every 6 hours    Topical/Other Medications  chlorhexidine 0.12% Liquid 15 milliLiter(s) Oral Mucosa every 12 hours  chlorhexidine 4% Liquid 1 Application(s) Topical <User Schedule>    --------------------------------------------------------------------------------------  VITAL SIGNS, INS/OUTS (last 24 hours):  T(C): 36.7 (04-09-21 @ 07:00), Max: 37.3 (04-08-21 @ 13:00)  HR: 69 (04-09-21 @ 07:00) (59 - 78)  BP: --  BP(mean): --  ABP: 92/54 (04-09-21 @ 07:00) (91/55 - 121/66)  ABP(mean): 67 (04-09-21 @ 07:00) (67 - 90)  RR: 29 (04-09-21 @ 07:00) (19 - 32)  SpO2: 98% (04-09-21 @ 07:00) (97% - 100%)  Wt(kg): --  CVP(mm Hg): --  CI: --  CAPILLARY BLOOD GLUCOSE      POCT Blood Glucose.: 231 mg/dL (09 Apr 2021 06:03)  POCT Blood Glucose.: 52 mg/dL (09 Apr 2021 05:44)  POCT Blood Glucose.: 124 mg/dL (09 Apr 2021 01:12)  POCT Blood Glucose.: 53 mg/dL (09 Apr 2021 00:41)  POCT Blood Glucose.: 172 mg/dL (08 Apr 2021 19:20)  POCT Blood Glucose.: 52 mg/dL (08 Apr 2021 18:20)  POCT Blood Glucose.: 142 mg/dL (08 Apr 2021 11:03)   N/A      04-08 @ 07:01  -  04-09 @ 07:00  --------------------------------------------------------  IN:    Dexmedetomidine: 253.4 mL    dextrose 5%: 75 mL    Heparin Infusion: 208 mL    IV PiggyBack: 300 mL    Nepro: 630 mL    Norepinephrine: 29.9 mL    sodium chloride 1.5%: 90 mL  Total IN: 1586.3 mL    OUT:    Indwelling Catheter - Urethral (mL): 2020 mL    Rectal Tube (mL): 100 mL  Total OUT: 2120 mL    Total NET: -533.7 mL  --------------------------------------------------------------------------------------    EXAM  NEUROLOGY  Exam: Normal, intubated, does not follow commands or withdraw to pain    HEENT  Exam: Normocephalic, atraumatic    RESPIRATORY  Exam: Lungs clear to auscultation, Normal expansion/effort.  Mechanical Ventilation: Mode: CPAP with PS, FiO2: 40, PEEP: 5, PS: 10, MAP: 9, PIP: 16    CARDIOVASCULAR  Exam: S1, S2.  Regular rate and rhythm.     GI/NUTRITION  Exam: Abdomen soft, Non-tender, Non-distended.     VASCULAR  Exam: Extremities warm, pink, well-perfused.     SKIN  Exam: Good skin turgor, no skin breakdown.     METABOLIC/FLUIDS/ELECTROLYTES  dextrose 5%. 1000 milliLiter(s) IV Continuous <Continuous>  sodium chloride 1.5%. 500 milliLiter(s) IV Continuous <Continuous>      HEMATOLOGIC  [x] VTE Prophylaxis:   Transfusions:	[] PRBC	[] Platelets		[] FFP	[] Cryoprecipitate    INFECTIOUS DISEASE  Antimicrobials/Immunologic Medications:  cefTRIAXone   IVPB 1000 milliGRAM(s) IV Intermittent every 24 hours  vancomycin  IVPB 1000 milliGRAM(s) IV Intermittent every 12 hours    Day #      of     ***    Tubes/Lines/Drains  ***  [x] Peripheral IV  [] Central Venous Line     	[] R	[] L	[] IJ	[] Fem	[] SC	Date Placed:   [] Arterial Line		[] R	[] L	[] Fem	[] Rad	[] Ax	Date Placed:   [] PICC		[] Midline		[] Mediport  [] Urinary Catheter		Date Placed:   [x] Necessity of urinary, arterial, and venous catheters discussed    LABS  --------------------------------------------------------------------------------------  ((Insert SICU Labs here))***  --------------------------------------------------------------------------------------    OTHER LABORATORY:     IMAGING STUDIES:   < from: CT Angio Head w/ IV Cont (04.09.21 @ 00:07) >    EXAM:  CT ANGIO BRAIN (W)AW IC      EXAM:  CT ANGIO NECK (W)AW IC        PROCEDURE DATE:  Apr 9 2021         INTERPRETATION:  Study: CTA head and Neck    CLINICAL HISTORY:  61 y/o male unknown handedness M with h/o hypotension not on any known meds BIBEMS 04/01/21 for COID-19 respiratory distress, intubated and sedated being evaluated by neurology for persistent impaired arousal. Per chart review, baseline reported AOx3 per primary team, but history has been limited as only known contacts are brother-in-law      TECHNIQUE:  CTA examination of the head and neck performed by obtaining helical axial images on a multi-detector CT unit during dynamic intravenous administration of iodinated contrast in the arterial phase. The image data was acquired utilizing submillimeter partitions and subsequently reconstructed into 3-dimensional maximum intensity projections and/or 3-D surface renditions in multiple planes using a separate work station. Both the axial source and reconstructed images werereviewed. 90 mls of Omnipaque 350 was administered intravenously without complication and 10 mls were discarded.    COMPARISON: None available.    FINDINGS:  Motion degraded exam.    CTA neck:  The left common carotid artery arises from the brachiocephalic artery and not the arch itself, normal variation in arch anatomy. The arch and origins of the great vessels are patent.    Both common carotid arteries take a normal course and caliber throughout the cervical region. There is higher bifurcationinto the internal and external carotid arteries on the left. The visualized portions of the external carotid arteries and the cervical internal carotid arteries are patent and normal in caliber. No evidence of focal occlusion, hemodynamically significant stenosis or dissection of the anterior carotid circulation within the neck.    Origins of both vertebral arteries are identified and patent. The vertebral arteries are codominant. The vertebral arteries take a normal course and caliber throughoutthe cervical region. No focal occlusion, hemodynamically significant stenosis or dissection is seen in the posterior vertebral circulation within the neck.    The patient is intubated at time of examination with ET tube in appropriate position above the asha. A orogastric tube is also present.    Nonspecific patchy groundglass opacities are seen in both lungs with more consolidative opacity in the superior right lower lobe likely representing patient's known atypical pneumonia with COVID 19. There are no acute osseous abnormalities.      CTA brain:  The cervical, petrous, lacerum, cavernous, clinoid and supraclinoid segments of both internal carotid arteries are widely patent and of normal course and caliber throughout the skull base. The middle and anterior cerebral arteries demonstrate symmetric arborization and patency without focal occlusion, hemodynamically significant stenosis or aneurysm. Anterior communicating artery is unremarkable.    The intradural vertebral arteries remain codominant. The right posterior inferior cerebellar artery is patent. The left posterior inferior cerebellar artery is patent proximally, however, demonstrates gradual decrease flow related enhancement in the midsection and absence of enhancement distally. The basilar artery and its tributaries, including the posterior cerebral arteries are widely patent and normal in caliber. Both posterior communicating arteries are identified and unremarkable. No focal occlusion, hemodynamically significant stenosis or aneurysm is seen in the remaining posterior vertebral basilar circulation.    The major dural venous sinuses and peripheral venous tributaries enhance appropriately for phase of contrast administration. There is no abnormal intracranial enhancement. There is no evidence of a vascular malformation.      IMPRESSION:  CTA neck:  No major vessel occlusion, hemodynamically significant stenosis by NASCET criteria or dissection.    Nonspecific patchy groundglass opacities are seen in both lungs with more consolidative opacity in the superior right lower lobe likely representing patient's known atypical pneumonia with COVID 19.    CTA HEAD:  Thrombosed left posterior inferior cerebellar artery. The remaining intracranial vasculature is patent without hemodynamically significant proximal stenosis, focal occlusion or aneurysm.    < end of copied text >       ICU Daily Progress Note  =====================================================  Interval/Overnight Events:      -       HPI: 60M unknown medical history BIBEMS for respiratory distress, recently COVID+ as per EMS. Pt unable to comply with history 2/2 resp distress, nods yes to difficulty breathing, no to pain. Baseline AAOx3. In ED initially hypoxic to 60% placed on NRB and satting low 90s and tachypneic to 40s on NRB. Placed on AVAPS, still tachypneic to 40s. Labs showing HAGMA, elevated FSG to 500s. Also with lactate of 15. Trops of 200 and pro-BNP elevated to 21k. MICU consulted for respiratory failure in setting of COVID, also concern for DKA. On encounter, pt is noncooperative with questioning. In ED given 2L NS, decadron. Started on insulin gtt. Subsequently intubated and admitted to [facility].    Addendum 4/1/21 1700:  Limited history obtained from nitesh Anders, who lives in the [place] [phone]). Patient has a PMH of low BP and takes no medications. Patient tested positive for COVID on 3/19. He was initially doing fine with some fatigue, but suddenly decompensated after yesterday. Unable to obtain social history. Wife, Lucero, is currently in [place] - she went due to medical issues but was unable to return due to COVID travel restrictions.   Allergies: No Known Allergies    Allergies: No Known Allergies      MEDICATIONS:   --------------------------------------------------------------------------------------  Neurologic Medications  dexMEDEtomidine Infusion 0.2 MICROgram(s)/kG/Hr IV Continuous <Continuous>    Respiratory Medications    Cardiovascular Medications  norepinephrine Infusion 0.05 MICROgram(s)/kG/Min IV Continuous <Continuous>    Gastrointestinal Medications  dextrose 5%. 1000 milliLiter(s) IV Continuous <Continuous>  pantoprazole  Injectable 40 milliGRAM(s) IV Push daily  polyethylene glycol 3350 17 Gram(s) Oral every 12 hours  senna 2 Tablet(s) Oral at bedtime  sodium chloride 1.5%. 500 milliLiter(s) IV Continuous <Continuous>    Genitourinary Medications    Hematologic/Oncologic Medications    Antimicrobial/Immunologic Medications  cefTRIAXone   IVPB 1000 milliGRAM(s) IV Intermittent every 24 hours  vancomycin  IVPB 1000 milliGRAM(s) IV Intermittent every 12 hours    Endocrine/Metabolic Medications  dexAMETHasone  Injectable 6 milliGRAM(s) IV Push daily  dextrose 50% Injectable 25 Gram(s) IV Push once  dextrose 50% Injectable 12.5 Gram(s) IV Push once  dextrose 50% Injectable 25 Gram(s) IV Push once  glucagon  Injectable 1 milliGRAM(s) IntraMuscular once  insulin lispro (ADMELOG) corrective regimen sliding scale   SubCutaneous every 6 hours    Topical/Other Medications  chlorhexidine 0.12% Liquid 15 milliLiter(s) Oral Mucosa every 12 hours  chlorhexidine 4% Liquid 1 Application(s) Topical <User Schedule>    --------------------------------------------------------------------------------------  VITAL SIGNS, INS/OUTS (last 24 hours):  T(C): 36.7 (04-09-21 @ 07:00), Max: 37.3 (04-08-21 @ 13:00)  HR: 69 (04-09-21 @ 07:00) (59 - 78)  BP: --  BP(mean): --  ABP: 92/54 (04-09-21 @ 07:00) (91/55 - 121/66)  ABP(mean): 67 (04-09-21 @ 07:00) (67 - 90)  RR: 29 (04-09-21 @ 07:00) (19 - 32)  SpO2: 98% (04-09-21 @ 07:00) (97% - 100%)  Wt(kg): --  CVP(mm Hg): --  CI: --  CAPILLARY BLOOD GLUCOSE      POCT Blood Glucose.: 231 mg/dL (09 Apr 2021 06:03)  POCT Blood Glucose.: 52 mg/dL (09 Apr 2021 05:44)  POCT Blood Glucose.: 124 mg/dL (09 Apr 2021 01:12)  POCT Blood Glucose.: 53 mg/dL (09 Apr 2021 00:41)  POCT Blood Glucose.: 172 mg/dL (08 Apr 2021 19:20)  POCT Blood Glucose.: 52 mg/dL (08 Apr 2021 18:20)  POCT Blood Glucose.: 142 mg/dL (08 Apr 2021 11:03)   N/A      04-08 @ 07:01  -  04-09 @ 07:00  --------------------------------------------------------  IN:    Dexmedetomidine: 253.4 mL    dextrose 5%: 75 mL    Heparin Infusion: 208 mL    IV PiggyBack: 300 mL    Nepro: 630 mL    Norepinephrine: 29.9 mL    sodium chloride 1.5%: 90 mL  Total IN: 1586.3 mL    OUT:    Indwelling Catheter - Urethral (mL): 2020 mL    Rectal Tube (mL): 100 mL  Total OUT: 2120 mL    Total NET: -533.7 mL  --------------------------------------------------------------------------------------    EXAM  NEUROLOGY  Exam: Normal, intubated, does not follow commands or withdraw to pain    HEENT  Exam: Normocephalic, atraumatic    RESPIRATORY  Exam: Lungs clear to auscultation, Normal expansion/effort.  Mechanical Ventilation: Mode: CPAP with PS, FiO2: 40, PEEP: 5, PS: 10, MAP: 9, PIP: 16    CARDIOVASCULAR  Exam: S1, S2.  Regular rate and rhythm.     GI/NUTRITION  Exam: Abdomen soft, Non-tender, Non-distended.     VASCULAR  Exam: Extremities warm, pink, well-perfused.     SKIN  Exam: Good skin turgor, no skin breakdown.     METABOLIC/FLUIDS/ELECTROLYTES  dextrose 5%. 1000 milliLiter(s) IV Continuous <Continuous>  sodium chloride 1.5%. 500 milliLiter(s) IV Continuous <Continuous>      HEMATOLOGIC  [x] VTE Prophylaxis:   Transfusions:	[] PRBC	[] Platelets		[] FFP	[] Cryoprecipitate    INFECTIOUS DISEASE  Antimicrobials/Immunologic Medications:  cefTRIAXone   IVPB 1000 milliGRAM(s) IV Intermittent every 24 hours  vancomycin  IVPB 1000 milliGRAM(s) IV Intermittent every 12 hours    Day #      of     ***    Tubes/Lines/Drains  ***  [x] Peripheral IV  [] Central Venous Line     	[] R	[] L	[] IJ	[] Fem	[] SC	Date Placed:   [] Arterial Line		[] R	[] L	[] Fem	[] Rad	[] Ax	Date Placed:   [] PICC		[] Midline		[] Mediport  [] Urinary Catheter		Date Placed:   [x] Necessity of urinary, arterial, and venous catheters discussed    LABS  --------------------------------------------------------------------------------------  ((Insert SICU Labs here))***  --------------------------------------------------------------------------------------    OTHER LABORATORY:     IMAGING STUDIES:   < from: CT Angio Head w/ IV Cont (04.09.21 @ 00:07) >    EXAM:  CT ANGIO BRAIN (W)AW IC      EXAM:  CT ANGIO NECK (W)AW IC        PROCEDURE DATE:  Apr 9 2021         INTERPRETATION:  Study: CTA head and Neck    CLINICAL HISTORY:  59 y/o male unknown handedness M with h/o hypotension not on any known meds BIBEMS 04/01/21 for COID-19 respiratory distress, intubated and sedated being evaluated by neurology for persistent impaired arousal. Per chart review, baseline reported AOx3 per primary team, but history has been limited as only known contacts are brother-in-law      TECHNIQUE:  CTA examination of the head and neck performed by obtaining helical axial images on a multi-detector CT unit during dynamic intravenous administration of iodinated contrast in the arterial phase. The image data was acquired utilizing submillimeter partitions and subsequently reconstructed into 3-dimensional maximum intensity projections and/or 3-D surface renditions in multiple planes using a separate work station. Both the axial source and reconstructed images werereviewed. 90 mls of Omnipaque 350 was administered intravenously without complication and 10 mls were discarded.    COMPARISON: None available.    FINDINGS:  Motion degraded exam.    CTA neck:  The left common carotid artery arises from the brachiocephalic artery and not the arch itself, normal variation in arch anatomy. The arch and origins of the great vessels are patent.    Both common carotid arteries take a normal course and caliber throughout the cervical region. There is higher bifurcationinto the internal and external carotid arteries on the left. The visualized portions of the external carotid arteries and the cervical internal carotid arteries are patent and normal in caliber. No evidence of focal occlusion, hemodynamically significant stenosis or dissection of the anterior carotid circulation within the neck.    Origins of both vertebral arteries are identified and patent. The vertebral arteries are codominant. The vertebral arteries take a normal course and caliber throughoutthe cervical region. No focal occlusion, hemodynamically significant stenosis or dissection is seen in the posterior vertebral circulation within the neck.    The patient is intubated at time of examination with ET tube in appropriate position above the asha. A orogastric tube is also present.    Nonspecific patchy groundglass opacities are seen in both lungs with more consolidative opacity in the superior right lower lobe likely representing patient's known atypical pneumonia with COVID 19. There are no acute osseous abnormalities.      CTA brain:  The cervical, petrous, lacerum, cavernous, clinoid and supraclinoid segments of both internal carotid arteries are widely patent and of normal course and caliber throughout the skull base. The middle and anterior cerebral arteries demonstrate symmetric arborization and patency without focal occlusion, hemodynamically significant stenosis or aneurysm. Anterior communicating artery is unremarkable.    The intradural vertebral arteries remain codominant. The right posterior inferior cerebellar artery is patent. The left posterior inferior cerebellar artery is patent proximally, however, demonstrates gradual decrease flow related enhancement in the midsection and absence of enhancement distally. The basilar artery and its tributaries, including the posterior cerebral arteries are widely patent and normal in caliber. Both posterior communicating arteries are identified and unremarkable. No focal occlusion, hemodynamically significant stenosis or aneurysm is seen in the remaining posterior vertebral basilar circulation.    The major dural venous sinuses and peripheral venous tributaries enhance appropriately for phase of contrast administration. There is no abnormal intracranial enhancement. There is no evidence of a vascular malformation.      IMPRESSION:  CTA neck:  No major vessel occlusion, hemodynamically significant stenosis by NASCET criteria or dissection.    Nonspecific patchy groundglass opacities are seen in both lungs with more consolidative opacity in the superior right lower lobe likely representing patient's known atypical pneumonia with COVID 19.    CTA HEAD:  Thrombosed left posterior inferior cerebellar artery. The remaining intracranial vasculature is patent without hemodynamically significant proximal stenosis, focal occlusion or aneurysm.    < end of copied text >       ICU Daily Progress Note  =====================================================  Interval/Overnight Events:      - CTH obtained for AMS; showed the following: Scattered bilateral cerebral and left cerebellar hemisphere acute infarcts with mild areas of hemorrhagic transformation, as discussed. Additional area of evolving acute ischemia in the right thalamus. Thromboembolic phenomenon is considered given different vascular distributions. No obstructive hydrocephalus, shift of midline structures, or herniation.  - Anticoagulation discontinued  - Neurology/ neurosurgery consulted   - Hypertonic saline started   - NPH discontinued for hypoglycemia overnight    HPI: 60M unknown medical history BIBEMS for respiratory distress, recently COVID+ as per EMS. Pt unable to comply with history 2/2 resp distress, nods yes to difficulty breathing, no to pain. Baseline AAOx3. In ED initially hypoxic to 60% placed on NRB and satting low 90s and tachypneic to 40s on NRB. Placed on AVAPS, still tachypneic to 40s. Labs showing HAGMA, elevated FSG to 500s. Also with lactate of 15. Trops of 200 and pro-BNP elevated to 21k. MICU consulted for respiratory failure in setting of COVID, also concern for DKA. On encounter, pt is noncooperative with questioning. In ED given 2L NS, decadron. Started on insulin gtt. Subsequently intubated and admitted to [facility].    Addendum 4/1/21 1700:  Limited history obtained from nitesh Anders, who lives in the [place] [phone]). Patient has a PMH of low BP and takes no medications. Patient tested positive for COVID on 3/19. He was initially doing fine with some fatigue, but suddenly decompensated after yesterday. Unable to obtain social history. Wife, Lucero, is currently in [place] - she went due to medical issues but was unable to return due to COVID travel restrictions.   Allergies: No Known Allergies    Allergies: No Known Allergies      MEDICATIONS:   --------------------------------------------------------------------------------------  Neurologic Medications  dexMEDEtomidine Infusion 0.2 MICROgram(s)/kG/Hr IV Continuous <Continuous>    Respiratory Medications    Cardiovascular Medications  norepinephrine Infusion 0.05 MICROgram(s)/kG/Min IV Continuous <Continuous>    Gastrointestinal Medications  dextrose 5%. 1000 milliLiter(s) IV Continuous <Continuous>  pantoprazole  Injectable 40 milliGRAM(s) IV Push daily  polyethylene glycol 3350 17 Gram(s) Oral every 12 hours  senna 2 Tablet(s) Oral at bedtime  sodium chloride 1.5%. 500 milliLiter(s) IV Continuous <Continuous>    Genitourinary Medications    Hematologic/Oncologic Medications    Antimicrobial/Immunologic Medications  cefTRIAXone   IVPB 1000 milliGRAM(s) IV Intermittent every 24 hours  vancomycin  IVPB 1000 milliGRAM(s) IV Intermittent every 12 hours    Endocrine/Metabolic Medications  dexAMETHasone  Injectable 6 milliGRAM(s) IV Push daily  dextrose 50% Injectable 25 Gram(s) IV Push once  dextrose 50% Injectable 12.5 Gram(s) IV Push once  dextrose 50% Injectable 25 Gram(s) IV Push once  glucagon  Injectable 1 milliGRAM(s) IntraMuscular once  insulin lispro (ADMELOG) corrective regimen sliding scale   SubCutaneous every 6 hours    Topical/Other Medications  chlorhexidine 0.12% Liquid 15 milliLiter(s) Oral Mucosa every 12 hours  chlorhexidine 4% Liquid 1 Application(s) Topical <User Schedule>    --------------------------------------------------------------------------------------  VITAL SIGNS, INS/OUTS (last 24 hours):  T(C): 36.7 (04-09-21 @ 07:00), Max: 37.3 (04-08-21 @ 13:00)  HR: 69 (04-09-21 @ 07:00) (59 - 78)  BP: --  BP(mean): --  ABP: 92/54 (04-09-21 @ 07:00) (91/55 - 121/66)  ABP(mean): 67 (04-09-21 @ 07:00) (67 - 90)  RR: 29 (04-09-21 @ 07:00) (19 - 32)  SpO2: 98% (04-09-21 @ 07:00) (97% - 100%)  Wt(kg): --  CVP(mm Hg): --  CI: --  CAPILLARY BLOOD GLUCOSE      POCT Blood Glucose.: 231 mg/dL (09 Apr 2021 06:03)  POCT Blood Glucose.: 52 mg/dL (09 Apr 2021 05:44)  POCT Blood Glucose.: 124 mg/dL (09 Apr 2021 01:12)  POCT Blood Glucose.: 53 mg/dL (09 Apr 2021 00:41)  POCT Blood Glucose.: 172 mg/dL (08 Apr 2021 19:20)  POCT Blood Glucose.: 52 mg/dL (08 Apr 2021 18:20)  POCT Blood Glucose.: 142 mg/dL (08 Apr 2021 11:03)   N/A      04-08 @ 07:01  -  04-09 @ 07:00  --------------------------------------------------------  IN:    Dexmedetomidine: 253.4 mL    dextrose 5%: 75 mL    Heparin Infusion: 208 mL    IV PiggyBack: 300 mL    Nepro: 630 mL    Norepinephrine: 29.9 mL    sodium chloride 1.5%: 90 mL  Total IN: 1586.3 mL    OUT:    Indwelling Catheter - Urethral (mL): 2020 mL    Rectal Tube (mL): 100 mL  Total OUT: 2120 mL    Total NET: -533.7 mL  --------------------------------------------------------------------------------------    EXAM  NEUROLOGY  Exam: Intubated, opens eyes spontaneously; does not follow commands nor withdraw to noxious stimuli    HEENT  Exam: Normocephalic, atraumatic    RESPIRATORY  Exam: Lungs clear to auscultation, Normal expansion/effort.  Mechanical Ventilation: Mode: CPAP with PS, FiO2: 40, PEEP: 5, PS: 10, MAP: 9, PIP: 16    CARDIOVASCULAR  Exam: S1, S2.  Regular rate and rhythm.     GI/NUTRITION  Exam: Abdomen soft, Non-tender, Non-distended.     VASCULAR  Exam: Extremities warm, pink, well-perfused.     SKIN  Exam: Good skin turgor, no skin breakdown.     METABOLIC/FLUIDS/ELECTROLYTES  dextrose 5%. 1000 milliLiter(s) IV Continuous <Continuous>  sodium chloride 1.5%. 500 milliLiter(s) IV Continuous <Continuous>      HEMATOLOGIC  [x] VTE Prophylaxis:   Transfusions:	[] PRBC	[] Platelets		[] FFP	[] Cryoprecipitate    INFECTIOUS DISEASE  Antimicrobials/Immunologic Medications:  cefTRIAXone   IVPB 1000 milliGRAM(s) IV Intermittent every 24 hours  vancomycin  IVPB 1000 milliGRAM(s) IV Intermittent every 12 hours    Day #  3    of    Vancomcyin  Day #  4    of   Ceftriaxone      Tubes/Lines/Drains    [x] Peripheral IV  [x] Central Venous Line     	[] R	[x] L	[x] IJ	[] Fem	[] SC	Date Placed: 4/1  [x] Arterial Line		[x] R	[] L	[] Fem	[x] Rad	[] Ax	Date Placed: 4/1  [] PICC		[] Midline		[] Mediport  [] Urinary Catheter		Date Placed:   [x] Necessity of urinary, arterial, and venous catheters discussed    LABS  --------------------------------------------------------------------------------------                        11.2   26.13 )-----------( 219      ( 09 Apr 2021 01:18 )             34.8   04-09    134<L>  |  98  |  57<H>  ----------------------------<  49<LL>  4.4   |  25  |  1.17    Ca    8.7      09 Apr 2021 01:18  Phos  4.3     04-09  Mg     2.2     04-09    TPro  6.4  /  Alb  2.2<L>  /  TBili  0.6  /  DBili  x   /  AST  81<H>  /  ALT  108<H>  /  AlkPhos  89  04-09  ABG - ( 09 Apr 2021 01:18 )  pH, Arterial: 7.45  pH, Blood: x     /  pCO2: 40    /  pO2: 92    / HCO3: 28    / Base Excess: 3.5   /  SaO2: 97.1    --------------------------------------------------------------------------------------    OTHER LABORATORY:     IMAGING STUDIES:   < from: CT Angio Head w/ IV Cont (04.09.21 @ 00:07) >    EXAM:  CT ANGIO BRAIN (W)AW IC      EXAM:  CT ANGIO NECK (W)AW IC        PROCEDURE DATE:  Apr 9 2021         INTERPRETATION:  Study: CTA head and Neck    CLINICAL HISTORY:  61 y/o male unknown handedness M with h/o hypotension not on any known meds BIBEMS 04/01/21 for COID-19 respiratory distress, intubated and sedated being evaluated by neurology for persistent impaired arousal. Per chart review, baseline reported AOx3 per primary team, but history has been limited as only known contacts are brother-in-law      TECHNIQUE:  CTA examination of the head and neck performed by obtaining helical axial images on a multi-detector CT unit during dynamic intravenous administration of iodinated contrast in the arterial phase. The image data was acquired utilizing submillimeter partitions and subsequently reconstructed into 3-dimensional maximum intensity projections and/or 3-D surface renditions in multiple planes using a separate work station. Both the axial source and reconstructed images werereviewed. 90 mls of Omnipaque 350 was administered intravenously without complication and 10 mls were discarded.    COMPARISON: None available.    FINDINGS:  Motion degraded exam.    CTA neck:  The left common carotid artery arises from the brachiocephalic artery and not the arch itself, normal variation in arch anatomy. The arch and origins of the great vessels are patent.    Both common carotid arteries take a normal course and caliber throughout the cervical region. There is higher bifurcationinto the internal and external carotid arteries on the left. The visualized portions of the external carotid arteries and the cervical internal carotid arteries are patent and normal in caliber. No evidence of focal occlusion, hemodynamically significant stenosis or dissection of the anterior carotid circulation within the neck.    Origins of both vertebral arteries are identified and patent. The vertebral arteries are codominant. The vertebral arteries take a normal course and caliber throughoutthe cervical region. No focal occlusion, hemodynamically significant stenosis or dissection is seen in the posterior vertebral circulation within the neck.    The patient is intubated at time of examination with ET tube in appropriate position above the asha. A orogastric tube is also present.    Nonspecific patchy groundglass opacities are seen in both lungs with more consolidative opacity in the superior right lower lobe likely representing patient's known atypical pneumonia with COVID 19. There are no acute osseous abnormalities.      CTA brain:  The cervical, petrous, lacerum, cavernous, clinoid and supraclinoid segments of both internal carotid arteries are widely patent and of normal course and caliber throughout the skull base. The middle and anterior cerebral arteries demonstrate symmetric arborization and patency without focal occlusion, hemodynamically significant stenosis or aneurysm. Anterior communicating artery is unremarkable.    The intradural vertebral arteries remain codominant. The right posterior inferior cerebellar artery is patent. The left posterior inferior cerebellar artery is patent proximally, however, demonstrates gradual decrease flow related enhancement in the midsection and absence of enhancement distally. The basilar artery and its tributaries, including the posterior cerebral arteries are widely patent and normal in caliber. Both posterior communicating arteries are identified and unremarkable. No focal occlusion, hemodynamically significant stenosis or aneurysm is seen in the remaining posterior vertebral basilar circulation.    The major dural venous sinuses and peripheral venous tributaries enhance appropriately for phase of contrast administration. There is no abnormal intracranial enhancement. There is no evidence of a vascular malformation.      IMPRESSION:  CTA neck:  No major vessel occlusion, hemodynamically significant stenosis by NASCET criteria or dissection.    Nonspecific patchy groundglass opacities are seen in both lungs with more consolidative opacity in the superior right lower lobe likely representing patient's known atypical pneumonia with COVID 19.    CTA HEAD:  Thrombosed left posterior inferior cerebellar artery. The remaining intracranial vasculature is patent without hemodynamically significant proximal stenosis, focal occlusion or aneurysm.    < end of copied text >  ==========================================================================================================  < from: CT Head No Cont (04.09.21 @ 11:07) >    EXAM:  CT BRAIN        PROCEDURE DATE:  Apr 9 2021         INTERPRETATION:  CLINICAL INDICATION:  CVA. Follow-up.    TECHNIQUE : Axial CT scanning of the brain was obtained from the skull base to the vertex without the administration of intravenous contrast. Coronal and sagittal reformatted images were subsequently obtained.    COMPARISON: CT head 4/8/2021    FINDINGS:  Redemonstration of multiple patchy hypoattenuating areas within the bilateral cerebral hemispheres, specifically in the bilateral frontoparietal and bilateral occipital lobes and left temporal lobe, and left cerebellar hemisphere, grossly unchanged from prior. Unchanged hypoattenuation within the right thalamus.    Unchanged hemorrhagic transformation within the left parietal, left temporal and bilateral occipital lobes.    Unchanged left greater than right mass effect with effacement of the left frontoparietal sulci and mild effacement of the fourth ventricle.    The basal cisterns are patent. The ventricles are stablein size. There is no midline shift.    The paranasal sinuses are clear. The tympanomastoid air cells are clear. The orbits are unremarkable.    Calvarium is intact.    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    No obstructive hydrocephalus or midline shift.      < end of copied text >

## 2021-04-09 NOTE — PROGRESS NOTE ADULT - ASSESSMENT
60M unknown PMHx presented with hypoxic respiratory failure 2/2 COVID, requiring intubation, complicated by PE and HAGMA    # NEURO  - AOx3 at baseline, now does not withdraw to pain or follow commands  - Sedated with Precedex, wean as tolerated  - Urgent CT head to r/o acute process  - EEG  - Will consult neurology    # CARDIAC  - Troponin elevation in setting of increased demand, SUKI, and PE, no ischemic changes on EKG  - Hypotension 2/2 vasoplegia and RH strain from PE; also with echogenic material in RV suspect clots  - Norepinephrine for MAP >64    # PULM  Hypoxic respiratory failure 2/2 COVID with L segmental upper lobe PE  - CPAP 10/5, 40%  -     # GI   - c/w nepro at goal 35ml/hr  - Continue protonix  - GI  ppx w/ lactulose, miralax and senna    # RENAL  ?SUKI vs CKD, Cr improving  - Monitor intake & output  - Net negative 400ml/24hrs  - Avoid nephrotoxic agents, NSAIDs, RCA  - Renally dose medications    # ID  COVID19   - Dexamethasone 4/1-    - Concern for superimposed bacterial infection vs. reactive. Sputum culture Moderate yeast and moderate gram neg rods.       - Empiric vanc/cefepime started 4/1      - Change antibiotics to Ceftriaxone & Linezolid 4/6-      - Single dose Caspofungin 4/7  - Repeat blood cultures.  - Blood and sputum cultures from 4/5 NGTD  - Fungitell negative    # HEME/ONC  PE and L common femoral vein DVT (DVT seen on POCUS 4/2)  - On heparin infusion, therapeutic  - Once Cr downtrends, can switch to LMWH or DOAC    # ENDO  Hyperglycemia   - A1c 10.3 - new DM, will eventually need follow up  - NPH 45 units q 6hrs  - Continue moderate correctional scale  - Will adjust based on blood sugars    Disposition: MICU 60M unknown PMHx presented with hypoxic respiratory failure 2/2 COVID, requiring intubation, complicated by PE and HAGMA    # NEURO  - AOx3 at baseline, now does not withdraw to pain or follow commands  - Sedated with Precedex, wean as tolerated  - CTH 4/8: Left cerebellar infarct,  L. temporooccipital hypodensity with hemorrhage; R. occipital hypodensity with hemorrhage; L. jorge parietal hypodensity with hemorrhage; R. high parietal hypodensity with petechial hemorrhage; L. high frontal hypodensity with hemorrhage.  - CTH 4/9: Scattered bilateral cerebral and left cerebellar hemispheric infarctswith mild areas of hemorrhagic transformation grossly unchanged from prior. No obstructive hydrocephalus or midline shift.  - f/u EEG  - Start Keppra for seizure prophylaxis   - Start hypertonic (3%) saline @ 30cc/h for hyponatremia 134 (goal >145)  - Appreciate neurology and neurosurgery recommendations    # CARDIAC  - Troponin elevation in setting of increased demand, SUKI, and PE, no ischemic changes on EKG  - Hypotension 2/2 vasoplegia and RH strain from PE; also with echogenic material in RV suspect clots  - Norepinephrine for MAP >64    # PULM  Hypoxic respiratory failure 2/2 COVID with L segmental upper lobe PE  - CPAP 10/5, 40% as tolerated  -     # GI   - c/w nepro at goal 35ml/hr  - Continue protonix  - GI  ppx w/ lactulose, miralax and senna    # RENAL  ?SUKI vs CKD, Cr improving  - Monitor intake & output  - Net negative 400ml/24hrs  - Avoid nephrotoxic agents, NSAIDs, RCA  - Renally dose medications    # ID  COVID19   - Dexamethasone 4/1- 4/10   - Concern for superimposed bacterial infection vs. reactive. Sputum culture Moderate yeast and moderate gram neg rods.       - Empiric vanc/cefepime started 4/1      - Change antibiotics to Ceftriaxone & Linezolid 4/6-      - Single dose Caspofungin 4/7  - Blood cultures 4/6 NGTD  - Sputum culture 4/6 GVR/ GPC in clusters  - MRSA swab negative  - Discontinue Vancomycin   - Fungitell negative    # HEME/ONC  PE and L common femoral vein DVT (DVT seen on POCUS 4/2)  - HOLDING anticoagulation given CVA with hemorrhagic conversion    # ENDO  Hyperglycemia   - A1c 10.3 - new DM, will eventually need follow up  - NPH discontinued for episodes of hypoglycemia overnight  - Continue moderate correctional scale    Disposition: MICU

## 2021-04-09 NOTE — CHART NOTE - NSCHARTNOTESELECT_GEN_ALL_CORE
Family update/Event Note
Neurology
Neurology/Event Note
CVA/Hemorrhage/Event Note
Follow-Up/Nutrition Services

## 2021-04-09 NOTE — CHART NOTE - NSCHARTNOTEFT_GEN_A_CORE
Neurology team spoke to critical care provider regarding patient's PE and need for AC from there perspective.    From a neurology perspective, would prefer to hold AC for 2-3 days at least.    Recommendations:  [] Please obtain repeat CTH on 04/10 to monitor cerebellum for edema/4th ventricle compression.  [] Please obtain repeat CTH on 04/11 to monitor hemorrhagic transformation. If this CTH is stable, then could consider restarting AC.    Case discussed with stroke attending Dr. Libman. Neurology team spoke to critical care provider regarding patient's PE and need for AC from there perspective.    From a neurology perspective, would prefer to hold AC for 2-3 days at least.    Recommendations:  [] Please obtain repeat CTH on 04/10 to monitor cerebellum for edema/4th ventricle compression.  [] Please obtain repeat CTH on 04/11 to monitor hemorrhagic transformation. If this CTH is stable, then could consider restarting AC.  [] Avoid hyponatremia. Neurosx recommending to maintain serum Na > 145. Current Na 131. c/w hypertonic saline.    Case discussed with stroke attending Dr. Libman. Neurology team spoke to critical care provider regarding patient's PE and need for AC from there perspective.    From a neurology perspective, would prefer to hold AC for 2-3 days if this can be done with a reasonable degree of safety from the standpoint of his PE.     Recommendations:  [] Please obtain repeat CTH on 04/10 to monitor cerebellum for edema/4th ventricle compression.  [] Please obtain repeat CTH on 04/11 to monitor hemorrhagic transformation. If this CTH is stable, then could consider restarting AC.  [] Avoid hyponatremia. Neurosx recommending to maintain serum Na > 145. Current Na 131. c/w hypertonic saline.    Case discussed with stroke attending Dr. Libman.    Stroke attending. Agree with above

## 2021-04-09 NOTE — PROGRESS NOTE ADULT - ATTENDING COMMENTS
Patient seen and examined at the bedside. Patient noted to have signiifcant posteror cerebral stroke with hemorrhgaic transformation, no shift or obvious cerebral edema.    -Neurosurgery consulted  -stopped heparin  -begin 35 at 30cc/hr serum opsom q 6 hr  -head at 30 degrees  -patinet is hypoensive on levophed, shooting for map of 65  -control fevers  -tight glycemic control  -begin keppra  -serial ct head, repeat today  -ptt is now <40

## 2021-04-10 LAB
ALBUMIN SERPL ELPH-MCNC: 1.8 G/DL — LOW (ref 3.3–5)
ALBUMIN SERPL ELPH-MCNC: 2.1 G/DL — LOW (ref 3.3–5)
ALBUMIN SERPL ELPH-MCNC: 2.2 G/DL — LOW (ref 3.3–5)
ALBUMIN SERPL ELPH-MCNC: 2.3 G/DL — LOW (ref 3.3–5)
ALP SERPL-CCNC: 104 U/L — SIGNIFICANT CHANGE UP (ref 40–120)
ALP SERPL-CCNC: 108 U/L — SIGNIFICANT CHANGE UP (ref 40–120)
ALP SERPL-CCNC: 112 U/L — SIGNIFICANT CHANGE UP (ref 40–120)
ALP SERPL-CCNC: 125 U/L — HIGH (ref 40–120)
ALT FLD-CCNC: 130 U/L — HIGH (ref 4–41)
ALT FLD-CCNC: 130 U/L — HIGH (ref 4–41)
ALT FLD-CCNC: 153 U/L — HIGH (ref 4–41)
ALT FLD-CCNC: 169 U/L — HIGH (ref 4–41)
ANION GAP SERPL CALC-SCNC: 11 MMOL/L — SIGNIFICANT CHANGE UP (ref 7–14)
ANION GAP SERPL CALC-SCNC: 9 MMOL/L — SIGNIFICANT CHANGE UP (ref 7–14)
APTT BLD: 30 SEC — SIGNIFICANT CHANGE UP (ref 27–36.3)
AST SERPL-CCNC: 100 U/L — HIGH (ref 4–40)
AST SERPL-CCNC: 61 U/L — HIGH (ref 4–40)
AST SERPL-CCNC: 63 U/L — HIGH (ref 4–40)
AST SERPL-CCNC: 69 U/L — HIGH (ref 4–40)
BASOPHILS # BLD AUTO: 0.06 K/UL — SIGNIFICANT CHANGE UP (ref 0–0.2)
BASOPHILS NFR BLD AUTO: 0.3 % — SIGNIFICANT CHANGE UP (ref 0–2)
BILIRUB SERPL-MCNC: 0.4 MG/DL — SIGNIFICANT CHANGE UP (ref 0.2–1.2)
BLOOD GAS ARTERIAL - LYTES,HGB,ICA,LACT RESULT: SIGNIFICANT CHANGE UP
BLOOD GAS ARTERIAL COMPREHENSIVE RESULT: SIGNIFICANT CHANGE UP
BUN SERPL-MCNC: 51 MG/DL — HIGH (ref 7–23)
BUN SERPL-MCNC: 51 MG/DL — HIGH (ref 7–23)
BUN SERPL-MCNC: 52 MG/DL — HIGH (ref 7–23)
BUN SERPL-MCNC: 53 MG/DL — HIGH (ref 7–23)
CALCIUM SERPL-MCNC: 8.2 MG/DL — LOW (ref 8.4–10.5)
CALCIUM SERPL-MCNC: 8.3 MG/DL — LOW (ref 8.4–10.5)
CALCIUM SERPL-MCNC: 8.5 MG/DL — SIGNIFICANT CHANGE UP (ref 8.4–10.5)
CALCIUM SERPL-MCNC: 8.6 MG/DL — SIGNIFICANT CHANGE UP (ref 8.4–10.5)
CHLORIDE SERPL-SCNC: 104 MMOL/L — SIGNIFICANT CHANGE UP (ref 98–107)
CHLORIDE SERPL-SCNC: 106 MMOL/L — SIGNIFICANT CHANGE UP (ref 98–107)
CHLORIDE SERPL-SCNC: 108 MMOL/L — HIGH (ref 98–107)
CHLORIDE SERPL-SCNC: 109 MMOL/L — HIGH (ref 98–107)
CO2 SERPL-SCNC: 22 MMOL/L — SIGNIFICANT CHANGE UP (ref 22–31)
CO2 SERPL-SCNC: 23 MMOL/L — SIGNIFICANT CHANGE UP (ref 22–31)
CO2 SERPL-SCNC: 23 MMOL/L — SIGNIFICANT CHANGE UP (ref 22–31)
CO2 SERPL-SCNC: 24 MMOL/L — SIGNIFICANT CHANGE UP (ref 22–31)
CREAT SERPL-MCNC: 1.14 MG/DL — SIGNIFICANT CHANGE UP (ref 0.5–1.3)
CREAT SERPL-MCNC: 1.14 MG/DL — SIGNIFICANT CHANGE UP (ref 0.5–1.3)
CREAT SERPL-MCNC: 1.15 MG/DL — SIGNIFICANT CHANGE UP (ref 0.5–1.3)
CREAT SERPL-MCNC: 1.22 MG/DL — SIGNIFICANT CHANGE UP (ref 0.5–1.3)
EOSINOPHIL # BLD AUTO: 0.04 K/UL — SIGNIFICANT CHANGE UP (ref 0–0.5)
EOSINOPHIL NFR BLD AUTO: 0.2 % — SIGNIFICANT CHANGE UP (ref 0–6)
GLUCOSE BLDC GLUCOMTR-MCNC: 172 MG/DL — HIGH (ref 70–99)
GLUCOSE BLDC GLUCOMTR-MCNC: 238 MG/DL — HIGH (ref 70–99)
GLUCOSE BLDC GLUCOMTR-MCNC: 252 MG/DL — HIGH (ref 70–99)
GLUCOSE SERPL-MCNC: 189 MG/DL — HIGH (ref 70–99)
GLUCOSE SERPL-MCNC: 203 MG/DL — HIGH (ref 70–99)
GLUCOSE SERPL-MCNC: 222 MG/DL — HIGH (ref 70–99)
GLUCOSE SERPL-MCNC: 276 MG/DL — HIGH (ref 70–99)
HCT VFR BLD CALC: 33.2 % — LOW (ref 39–50)
HGB BLD-MCNC: 10.6 G/DL — LOW (ref 13–17)
IANC: 14.9 K/UL — HIGH (ref 1.5–8.5)
IMM GRANULOCYTES NFR BLD AUTO: 4.3 % — HIGH (ref 0–1.5)
INR BLD: 1.11 RATIO — SIGNIFICANT CHANGE UP (ref 0.88–1.16)
LYMPHOCYTES # BLD AUTO: 0.49 K/UL — LOW (ref 1–3.3)
LYMPHOCYTES # BLD AUTO: 2.7 % — LOW (ref 13–44)
MAGNESIUM SERPL-MCNC: 2.1 MG/DL — SIGNIFICANT CHANGE UP (ref 1.6–2.6)
MCHC RBC-ENTMCNC: 26.4 PG — LOW (ref 27–34)
MCHC RBC-ENTMCNC: 31.9 GM/DL — LOW (ref 32–36)
MCV RBC AUTO: 82.8 FL — SIGNIFICANT CHANGE UP (ref 80–100)
MONOCYTES # BLD AUTO: 1.75 K/UL — HIGH (ref 0–0.9)
MONOCYTES NFR BLD AUTO: 9.7 % — SIGNIFICANT CHANGE UP (ref 2–14)
NEUTROPHILS # BLD AUTO: 14.9 K/UL — HIGH (ref 1.8–7.4)
NEUTROPHILS NFR BLD AUTO: 82.8 % — HIGH (ref 43–77)
NRBC # BLD: 0 /100 WBCS — SIGNIFICANT CHANGE UP
NRBC # FLD: 0 K/UL — SIGNIFICANT CHANGE UP
PHOSPHATE SERPL-MCNC: 4.1 MG/DL — SIGNIFICANT CHANGE UP (ref 2.5–4.5)
PLATELET # BLD AUTO: 267 K/UL — SIGNIFICANT CHANGE UP (ref 150–400)
POTASSIUM SERPL-MCNC: 4.7 MMOL/L — SIGNIFICANT CHANGE UP (ref 3.5–5.3)
POTASSIUM SERPL-MCNC: 4.9 MMOL/L — SIGNIFICANT CHANGE UP (ref 3.5–5.3)
POTASSIUM SERPL-MCNC: 4.9 MMOL/L — SIGNIFICANT CHANGE UP (ref 3.5–5.3)
POTASSIUM SERPL-MCNC: 5.3 MMOL/L — SIGNIFICANT CHANGE UP (ref 3.5–5.3)
POTASSIUM SERPL-SCNC: 4.7 MMOL/L — SIGNIFICANT CHANGE UP (ref 3.5–5.3)
POTASSIUM SERPL-SCNC: 4.9 MMOL/L — SIGNIFICANT CHANGE UP (ref 3.5–5.3)
POTASSIUM SERPL-SCNC: 4.9 MMOL/L — SIGNIFICANT CHANGE UP (ref 3.5–5.3)
POTASSIUM SERPL-SCNC: 5.3 MMOL/L — SIGNIFICANT CHANGE UP (ref 3.5–5.3)
PROT SERPL-MCNC: 5.8 G/DL — LOW (ref 6–8.3)
PROT SERPL-MCNC: 6 G/DL — SIGNIFICANT CHANGE UP (ref 6–8.3)
PROT SERPL-MCNC: 6.1 G/DL — SIGNIFICANT CHANGE UP (ref 6–8.3)
PROT SERPL-MCNC: 6.1 G/DL — SIGNIFICANT CHANGE UP (ref 6–8.3)
PROTHROM AB SERPL-ACNC: 12.6 SEC — SIGNIFICANT CHANGE UP (ref 10.6–13.6)
RBC # BLD: 4.01 M/UL — LOW (ref 4.2–5.8)
RBC # FLD: 14.9 % — HIGH (ref 10.3–14.5)
SODIUM SERPL-SCNC: 137 MMOL/L — SIGNIFICANT CHANGE UP (ref 135–145)
SODIUM SERPL-SCNC: 139 MMOL/L — SIGNIFICANT CHANGE UP (ref 135–145)
SODIUM SERPL-SCNC: 140 MMOL/L — SIGNIFICANT CHANGE UP (ref 135–145)
SODIUM SERPL-SCNC: 141 MMOL/L — SIGNIFICANT CHANGE UP (ref 135–145)
WBC # BLD: 18.01 K/UL — HIGH (ref 3.8–10.5)
WBC # FLD AUTO: 18.01 K/UL — HIGH (ref 3.8–10.5)

## 2021-04-10 PROCEDURE — 95720 EEG PHY/QHP EA INCR W/VEEG: CPT

## 2021-04-10 PROCEDURE — 70450 CT HEAD/BRAIN W/O DYE: CPT | Mod: 26

## 2021-04-10 PROCEDURE — 99291 CRITICAL CARE FIRST HOUR: CPT

## 2021-04-10 RX ORDER — SODIUM CHLORIDE 5 G/100ML
500 INJECTION, SOLUTION INTRAVENOUS
Refills: 0 | Status: DISCONTINUED | OUTPATIENT
Start: 2021-04-10 | End: 2021-04-11

## 2021-04-10 RX ADMIN — CHLORHEXIDINE GLUCONATE 1 APPLICATION(S): 213 SOLUTION TOPICAL at 05:05

## 2021-04-10 RX ADMIN — SENNA PLUS 2 TABLET(S): 8.6 TABLET ORAL at 22:31

## 2021-04-10 RX ADMIN — LEVETIRACETAM 400 MILLIGRAM(S): 250 TABLET, FILM COATED ORAL at 17:53

## 2021-04-10 RX ADMIN — LEVETIRACETAM 400 MILLIGRAM(S): 250 TABLET, FILM COATED ORAL at 05:06

## 2021-04-10 RX ADMIN — CHLORHEXIDINE GLUCONATE 15 MILLILITER(S): 213 SOLUTION TOPICAL at 17:53

## 2021-04-10 RX ADMIN — CHLORHEXIDINE GLUCONATE 15 MILLILITER(S): 213 SOLUTION TOPICAL at 05:06

## 2021-04-10 RX ADMIN — POLYETHYLENE GLYCOL 3350 17 GRAM(S): 17 POWDER, FOR SOLUTION ORAL at 17:53

## 2021-04-10 RX ADMIN — CEFTRIAXONE 100 MILLIGRAM(S): 500 INJECTION, POWDER, FOR SOLUTION INTRAMUSCULAR; INTRAVENOUS at 08:56

## 2021-04-10 RX ADMIN — Medication 2: at 05:44

## 2021-04-10 RX ADMIN — POLYETHYLENE GLYCOL 3350 17 GRAM(S): 17 POWDER, FOR SOLUTION ORAL at 05:06

## 2021-04-10 RX ADMIN — Medication 6: at 11:23

## 2021-04-10 RX ADMIN — Medication 4: at 17:53

## 2021-04-10 RX ADMIN — PANTOPRAZOLE SODIUM 40 MILLIGRAM(S): 20 TABLET, DELAYED RELEASE ORAL at 11:19

## 2021-04-10 RX ADMIN — DEXMEDETOMIDINE HYDROCHLORIDE IN 0.9% SODIUM CHLORIDE 3.6 MICROGRAM(S)/KG/HR: 4 INJECTION INTRAVENOUS at 04:37

## 2021-04-10 RX ADMIN — Medication 6 MILLIGRAM(S): at 05:05

## 2021-04-10 NOTE — EEG REPORT - NS EEG TEXT BOX
[facility]   COMPREHENSIVE EPILEPSY CENTER   REPORT OF LONG-TERM VIDEO EEG     [facility]: [address], [place] [address], Ph#: [phone]  [facility]: [address] [address], [place] [address], Ph#: [phone]  [facility]: [address] [place] [address], Ph#: [phone]    Patient Name: ADRIANA BOOKER  Age and : 60y ([birth year])  MRN #: [id number]  Location: [facility] (PACU) 12  Referring Physician: Scot Wilhelm    Start Time/Date: 12:48 on 21  End Time/Date: 11:02 on 04-10-21  Duration: 21hr 09m    _____________________________________________________________  STUDY INFORMATION    EEG Recording Technique:  The patient underwent continuous Video-EEG monitoring, using Telemetry System hardware on the XLTek Digital System. EEG and video data were stored on a computer hard drive with important events saved in digital archive files. The material was reviewed by a physician (electroencephalographer / epileptologist) on a daily basis. Mauri and seizure detection algorithms were utilized and reviewed. An EEG Technician attended to the patient, and was available throughout daytime work hours.  The epilepsy center neurologist was available in person or on call 24-hours per day.    EEG Placement and Labeling of Electrodes:  The EEG was performed utilizing 20 channel referential EEG connections (coronal over temporal over parasagittal montage) using all standard 10-20 electrode placements with EKG, with additional electrodes placed in the inferior temporal region using the modified 10-10 montage electrode placements for elective admissions, or if deemed necessary. Recording was at a sampling rate of 256 samples per second per channel. Time synchronized digital video recording was done simultaneously with EEG recording. A low light infrared camera was used for low light recording.     _____________________________________________________________  HISTORY    Patient is a 60y old  Male who presents with a chief complaint of COVID19 w/ severe metabolic acidosis s/p intubation (10 Apr 2021 10:41)      PERTINENT MEDICATION:  levETIRAcetam  IVPB 500 milliGRAM(s) IV Intermittent every 12 hours    _____________________________________________________________  STUDY INTERPRETATION    Findings: The background was continuous, spontaneously variable and reactive. During wakefulness, no posterior dominant rhythm seen.    Background Slowing:  Background predominantly consisted of theta, delta and faster activities.    Focal Slowing:   None were present.    Sleep Background:  Drowsiness was characterized by fragmentation, attenuation, and slowing of the background activity.    Sleep was characterized by the presence of symmetric, rudimentary sleep spindles and K-complexes.    Other Non-Epileptiform Findings:  None were present.    Interictal Epileptiform Activity:   None were present.    Events:  Clinical events: None recorded.  Seizures: None recorded.    Activation Procedures:   Hyperventilation was not performed.    Photic stimulation was not performed.     Artifacts:  Intermittent myogenic and movement artifacts were noted.    ECG:  The heart rate on single channel ECG was predominantly between 60-70 BPM.    _____________________________________________________________  EEG SUMMARY/CLASSIFICATION    Abnormal EEG in the awake, drowsy and asleep states.  - Moderate to severe  generalized slowing.    _____________________________________________________________  EEG IMPRESSION/CLINICAL CORRELATE    Abnormal EEG study.  1. Moderate to severe nonspecific diffuse or multifocal cerebral dysfunction.   2. No epileptiform pattern or seizure seen.    _____________________________________________________________    Jossy Swain MD  Attending Physician, [facility]

## 2021-04-10 NOTE — PROGRESS NOTE ADULT - ATTENDING COMMENTS
-Neurosurgery consulted  -stopped heparin  -reduce 3% to 20cc/hr, serial bmps do not serum sodium >12meq in 18hrs  -head at 30 degrees  -control fevers  -tight glycemic control  - keppra  -serial ct head, repeat today

## 2021-04-10 NOTE — PROGRESS NOTE ADULT - SUBJECTIVE AND OBJECTIVE BOX
HPI: 59 y/o unknown handedness M with h/o hypotension not on any known meds BIBEMS 04/01/21 for COVID-19 respiratory distress, intubated and sedated being evaluated by neurology for persistent impaired arousal.  Per chart review, baseline reported AOx3 per primary team, but history has been limited as only known contacts are brother-in-law Demetrius [phone], Alba Pizarro, nephew from [place] (+44 ) and his Wife, Lucero, in [place] +91  (speaks [place]).  PT's primary language unknown, but could be [place] and apparently was answering questions in English on admission (nodding yes to feeling SOB).   Apparently, PT had COVID-19 + test 03/19, initially only experienced fatigue and then had rapid decompensation 03/31/21.  On 04/01/21 was found dyspneic by neighbor and was brought into ED by EMS.  Per chart review, on arrival PT was able to nod yes when asked if he was SOB.  En route had RR 28, SaO2 60%.  In ED was hypoxic to 60% placed on NRB with improvement to low 90s but still tachypneic to 40s.  PT noted be have high-anion gap metabolic acidosis with finger stick in 500s with concern for DKA.  Additionally noted troponins in 200, lactate 15, elevated pro-BNP to 21,000.  .  PT was intubated, given IVF, started on insulin gtt and versed gtt, propofol gtt, fentanyl.  Of note, apparently PT's weight was incorrectly measured and registered recorded as 90 kg which was used to dose versed when in fact his weight is 71.6 Kg as of 04/08/21.  In addition, PT has had several precipitous drops in BP including on admission rom 194/100 to 95/62 within an hour but overall has had low BPs.  Further complications on current admission include demand-mismatch cadiac ischemia, SUKI, L. segmental upper lobe PE, hypotension 2* vasoplegia w/ RH strain, echogenic material found in RV on echocardiogram, possible superimposed bacterial vs. fungal PNA on CTX & Linezolid since 04/06, L. common femoral vein DVT, newly Dx uncontrolled DM2 w/ A1C 10.3.    Med review notable that on 04/02, PT was on midazolam Infusion 0.1 mG/kG/Hr (9 mL/Hr) vs midazolam Infusion 0.02 mG/kG/Hr (1.8 mL/Hr) IV continuous as well as fentnanyl infusion 0.5 mcg/kg/hr (0.72mL/hr) and propofol 10 mcg/kg/min (4.32 mL/hr).  On 04/06 he was started on fentanyl gtt at 0.5mcg/kg/hr as well as precedex at 0.2 mcg/kg/.hr.  PT currently still on precedex at same dose.      PMH  Hypotension    Home meds:   None reported    PSH: Unknown  Social Hx: Unknown  FH: Unknown   ALL: Unknown     MEDICATIONS  (STANDING):  cefTRIAXone   IVPB 1000 milliGRAM(s) IV Intermittent every 24 hours  chlorhexidine 0.12% Liquid 15 milliLiter(s) Oral Mucosa every 12 hours  chlorhexidine 4% Liquid 1 Application(s) Topical <User Schedule>  dexAMETHasone  Injectable 6 milliGRAM(s) IV Push daily  dexMEDEtomidine Infusion 0.2 MICROgram(s)/kG/Hr (3.6 mL/Hr) IV Continuous <Continuous>  dextrose 50% Injectable 25 Gram(s) IV Push once  dextrose 50% Injectable 12.5 Gram(s) IV Push once  dextrose 50% Injectable 25 Gram(s) IV Push once  glucagon  Injectable 1 milliGRAM(s) IntraMuscular once  heparin  Infusion.  Unit(s)/Hr (13 mL/Hr) IV Continuous <Continuous>  insulin lispro (ADMELOG) corrective regimen sliding scale   SubCutaneous every 6 hours  insulin NPH human recombinant 45 Unit(s) SubCutaneous every 6 hours  pantoprazole  Injectable 40 milliGRAM(s) IV Push daily  polyethylene glycol 3350 17 Gram(s) Oral every 12 hours  senna 2 Tablet(s) Oral at bedtime  vancomycin  IVPB 1000 milliGRAM(s) IV Intermittent every 12 hours      Weight (kg): 73.3 (04-07)    Neuro:  MS: Eyes open intermittently spontaneously, but mostly requires noxious stimuli to open eyes.  Not following commands in English (primary/best language not known at time of exam).  Withdraws to noxious stimuli in legs only.      Language: intubated - comatose  CNs: Pupils b/l equal 2mm with sluggish reaction, cephalo-ocular reflex intact, face symmetric, cough and gag present, rest of cranial nerves exam is limited by mental status.  Motor: no response to painful stimulation except in legs, 2/5 in B/L legs to noxious stimuli but otherwise no spontaneous movement or withdrawal to noxious stimuli.   Sensory: no response to pain apart from legs  Reflexes: 1+ in biceps and patellar B/L, downgoing toes B/L      Labs:                        11.1   23.29 )-----------( 181      ( 08 Apr 2021 17:45 )             33.9     04-08    136  |  100  |  63<H>  ----------------------------<  108<H>  4.8   |  28  |  1.28    Ca    8.8      08 Apr 2021 01:52  Phos  4.4     04-08  Mg     2.3     04-08    TPro  6.0  /  Alb  2.3<L>  /  TBili  0.5  /  DBili  x   /  AST  52<H>  /  ALT  71<H>  /  AlkPhos  67  04-08    PT/INR - ( 08 Apr 2021 17:45 )   PT: 13.5 sec;   INR: 1.19 ratio  PTT - ( 08 Apr 2021 01:52 )  PTT:73.6 sec  COVID-19 PCR: Detected (01 Apr 2021 09:06)      Culture - Sputum (collected 06 Apr 2021 14:49)  Source: .Sputum Sputum  Gram Stain (06 Apr 2021 23:23):    No polymorphonuclear leukocytes per low power field    Moderate Squamous epithelial cells per low power field    Moderate Yeast like cells per oil power field    Moderate Gram Variable Rods per oil power field    Rare Gram Positive Cocci in Clusters per oil power field  Preliminary Report (07 Apr 2021 21:20):    Normal Respiratory Lizzette present    Culture - Blood (collected 06 Apr 2021 12:23)  Source: .Blood Blood  Preliminary Report (07 Apr 2021 13:01):    No growth to date.    Culture - Blood (collected 06 Apr 2021 12:23)  Source: .Blood Blood  Preliminary Report (07 Apr 2021 13:01):    No growth to date.      ABG - ( 08 Apr 2021 01:52 )  pH, Arterial: 7.47  pH, Blood: x     /  pCO2: 40    /  pO2: 96    / HCO3: 29    / Base Excess: 4.6   /  SaO2: 97.2        < from: CT Head No Cont (04.09.21 @ 11:07) >  EXAM:  CT BRAIN        PROCEDURE DATE:  Apr 9 2021         INTERPRETATION:  CLINICAL INDICATION:  CVA. Follow-up.    TECHNIQUE : Axial CT scanning of the brain was obtained from the skull base to the vertex without the administration of intravenous contrast. Coronal and sagittal reformatted images were subsequently obtained.    COMPARISON: CT head 4/8/2021    FINDINGS:  Redemonstration of multiple patchy hypoattenuating areas within the bilateral cerebral hemispheres, specifically in the bilateral frontoparietal and bilateral occipital lobes and left temporal lobe, and left cerebellar hemisphere, grossly unchanged from prior. Unchanged hypoattenuation within the right thalamus.    Unchanged hemorrhagic transformation within the left parietal, left temporal and bilateral occipital lobes.    Unchanged left greater than right mass effect with effacement of the left frontoparietal sulci and mild effacement of the fourth ventricle.    The basal cisterns are patent. The ventricles are stablein size. There is no midline shift.    The paranasal sinuses are clear. The tympanomastoid air cells are clear. The orbits are unremarkable.    Calvarium is intact.    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    No obstructive hydrocephalus or midline shift.                MANJEET TABARES M.D., RADIOLOGY RESIDENT  This document has been electronically signed.  REJI MONTANO M.D., ATTENDING RADIOLOGIST  This document has been el    < end of copied text >      Assessment and Recommendation:   · Assessment	  60y unknown-handedness M w h/o hypotension admitted on 04/01/21 w/ severe hypoxic respiratory failure 2* COVID-19 c/b metabolic acidosis and overdose on Versed for whom neurology was consulted for persistent AMS  On exam, he is intubated, on sedation with Precedex, awakens spontaneously intermittently, does not follow commands or attend, brainstem reflexes intact, blinks to threat normal on the right and diminished on the left, withdraws to noxious stimulus in both legs but neither arm, has no spontaneous movement and downgoing toes.     IMPRESSION: Multi-factorial impaired level of arousal due to global cerebral dysfunction & impaired reticular activation system from combination of COVID-19, associated hypoxia, persistent pharmacologic effects of overdose of multiple sedatives , precipitous drop in BP and multiple metabolic derangements including severe acidosis.     Plan  can start anticoagulation  []Minimize sedation as tolerated, sedation holidays at minimum as tolerated  []Continue to treat and correct metabolic derangements.   []Regulate autonomic stability as tolerated to keep MAP of  to help maintain CPP of  for cerebral autoregulation.   []Consider rEEG if significant fluctuations and discrete episodes of complete unresponsiveness to r/o intermittent seizures.      HPI: 59 y/o unknown handedness M with h/o hypotension not on any known meds BIBEMS 21 for COVID-19 respiratory distress, intubated and sedated being evaluated by neurology for persistent impaired arousal.  Per chart review, baseline reported AOx3 per primary team, but history has been limited as only known contacts are brother-in-law Demetrius [phone], Alba Booker, nephew from [place] (+44 ) and his Wife, Lucero, in [place] +91  (speaks [place]).  PT's primary language unknown, but could be [place] and apparently was answering questions in English on admission (nodding yes to feeling SOB).   Apparently, PT had COVID-19 + test , initially only experienced fatigue and then had rapid decompensation 21.  On 21 was found dyspneic by neighbor and was brought into ED by EMS.  Per chart review, on arrival PT was able to nod yes when asked if he was SOB.  En route had RR 28, SaO2 60%.  In ED was hypoxic to 60% placed on NRB with improvement to low 90s but still tachypneic to 40s.  PT noted be have high-anion gap metabolic acidosis with finger stick in 500s with concern for DKA.  Additionally noted troponins in 200, lactate 15, elevated pro-BNP to 21,000.  .  PT was intubated, given IVF, started on insulin gtt and versed gtt, propofol gtt, fentanyl.  Of note, apparently PT's weight was incorrectly measured and registered recorded as 90 kg which was used to dose versed when in fact his weight is 71.6 Kg as of 21.  In addition, PT has had several precipitous drops in BP including on admission rom 194/100 to 95/62 within an hour but overall has had low BPs.  Further complications on current admission include demand-mismatch cadiac ischemia, SUKI, L. segmental upper lobe PE, hypotension 2* vasoplegia w/ RH strain, echogenic material found in RV on echocardiogram, possible superimposed bacterial vs. fungal PNA on CTX & Linezolid since , L. common femoral vein DVT, newly Dx uncontrolled DM2 w/ A1C 10.3.    Med review notable that on , PT was on midazolam Infusion 0.1 mG/kG/Hr (9 mL/Hr) vs midazolam Infusion 0.02 mG/kG/Hr (1.8 mL/Hr) IV continuous as well as fentnanyl infusion 0.5 mcg/kg/hr (0.72mL/hr) and propofol 10 mcg/kg/min (4.32 mL/hr).  On  he was started on fentanyl gtt at 0.5mcg/kg/hr as well as precedex at 0.2 mcg/kg/.hr.  PT currently still on precedex at same dose.      PMH  Hypotension    Home meds:   None reported    PSH: Unknown  Social Hx: Unknown  FH: Unknown   ALL: Unknown     MEDICATIONS  (STANDING):  cefTRIAXone   IVPB 1000 milliGRAM(s) IV Intermittent every 24 hours  chlorhexidine 0.12% Liquid 15 milliLiter(s) Oral Mucosa every 12 hours  chlorhexidine 4% Liquid 1 Application(s) Topical <User Schedule>  dexAMETHasone  Injectable 6 milliGRAM(s) IV Push daily  dexMEDEtomidine Infusion 0.2 MICROgram(s)/kG/Hr (3.6 mL/Hr) IV Continuous <Continuous>  dextrose 50% Injectable 25 Gram(s) IV Push once  dextrose 50% Injectable 12.5 Gram(s) IV Push once  dextrose 50% Injectable 25 Gram(s) IV Push once  glucagon  Injectable 1 milliGRAM(s) IntraMuscular once  heparin  Infusion.  Unit(s)/Hr (13 mL/Hr) IV Continuous <Continuous>  insulin lispro (ADMELOG) corrective regimen sliding scale   SubCutaneous every 6 hours  insulin NPH human recombinant 45 Unit(s) SubCutaneous every 6 hours  pantoprazole  Injectable 40 milliGRAM(s) IV Push daily  polyethylene glycol 3350 17 Gram(s) Oral every 12 hours  senna 2 Tablet(s) Oral at bedtime  vancomycin  IVPB 1000 milliGRAM(s) IV Intermittent every 12 hours      Weight (kg): 73.3 ()    Neuro:  MS: Eyes open intermittently spontaneously, but mostly requires noxious stimuli to open eyes.  Not following commands in English (primary/best language not known at time of exam).  Withdraws to noxious stimuli in legs only.      Language: intubated - comatose  CNs: Pupils b/l equal 2mm with sluggish reaction, cephalo-ocular reflex intact, face symmetric, cough and gag present, rest of cranial nerves exam is limited by mental status.  Motor: no response to painful stimulation except in legs, 2/5 in B/L legs to noxious stimuli but otherwise no spontaneous movement or withdrawal to noxious stimuli.   Sensory: no response to pain apart from legs  Reflexes: 1+ in biceps and patellar B/L, downgoing toes B/L      Labs:                        11.1   23.29 )-----------( 181      ( 2021 17:45 )             33.9     04-08    136  |  100  |  63<H>  ----------------------------<  108<H>  4.8   |  28  |  1.28    Ca    8.8      2021 01:52  Phos  4.4     04-08  Mg     2.3     04-08    TPro  6.0  /  Alb  2.3<L>  /  TBili  0.5  /  DBili  x   /  AST  52<H>  /  ALT  71<H>  /  AlkPhos  67  04-08    PT/INR - ( 2021 17:45 )   PT: 13.5 sec;   INR: 1.19 ratio  PTT - ( 2021 01:52 )  PTT:73.6 sec  COVID-19 PCR: Detected (2021 09:06)      Culture - Sputum (collected 2021 14:49)  Source: .Sputum Sputum  Gram Stain (2021 23:23):    No polymorphonuclear leukocytes per low power field    Moderate Squamous epithelial cells per low power field    Moderate Yeast like cells per oil power field    Moderate Gram Variable Rods per oil power field    Rare Gram Positive Cocci in Clusters per oil power field  Preliminary Report (2021 21:20):    Normal Respiratory Lizzette present    Culture - Blood (collected 2021 12:23)  Source: .Blood Blood  Preliminary Report (2021 13:01):    No growth to date.    Culture - Blood (collected 2021 12:23)  Source: .Blood Blood  Preliminary Report (2021 13:01):    No growth to date.      ABG - ( 2021 01:52 )  pH, Arterial: 7.47  pH, Blood: x     /  pCO2: 40    /  pO2: 96    / HCO3: 29    / Base Excess: 4.6   /  SaO2: 97.2        < from: CT Head No Cont (04.09.21 @ 11:07) >  EXAM:  CT BRAIN        PROCEDURE DATE:  2021         INTERPRETATION:  CLINICAL INDICATION:  CVA. Follow-up.    TECHNIQUE : Axial CT scanning of the brain was obtained from the skull base to the vertex without the administration of intravenous contrast. Coronal and sagittal reformatted images were subsequently obtained.    COMPARISON: CT head 2021    FINDINGS:  Redemonstration of multiple patchy hypoattenuating areas within the bilateral cerebral hemispheres, specifically in the bilateral frontoparietal and bilateral occipital lobes and left temporal lobe, and left cerebellar hemisphere, grossly unchanged from prior. Unchanged hypoattenuation within the right thalamus.    Unchanged hemorrhagic transformation within the left parietal, left temporal and bilateral occipital lobes.    Unchanged left greater than right mass effect with effacement of the left frontoparietal sulci and mild effacement of the fourth ventricle.    The basal cisterns are patent. The ventricles are stablein size. There is no midline shift.    The paranasal sinuses are clear. The tympanomastoid air cells are clear. The orbits are unremarkable.    Calvarium is intact.    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    No obstructive hydrocephalus or midline shift.                MANJEET TABARES M.D., RADIOLOGY RESIDENT  This document has been electronically signed.  REJI MONTANO M.D., ATTENDING RADIOLOGIST  This document has been el    < end of copied text >  EEG Report:  · EEG Report	  [facility]   REPORT OF LONG-TERM VIDEO EEG     [facility]: [address] [place] [address], [address] [address], Ph#: [phone]  [facility]: [address] [place] [address], Ph#: [phone]  [facility]: [address] [place] [address], Ph#: [phone]    Patient Name: ADRIANA BOOKER  Age and : 60y ([birth year])  MRN #: [id number]  Location: [facility] (PACU) 12  Referring Physician: Scot Wilhelm    Start Time/Date: 12:48 on 21  End Time/Date: 11:02 on 04-10-21  Duration: 21hr 09m    _____________________________________________________________  STUDY INFORMATION    EEG Recording Technique:  The patient underwent continuous Video-EEG monitoring, using Telemetry System hardware on the XLTek Digital System. EEG and video data were stored on a computer hard drive with important events saved in digital archive files. The material was reviewed by a physician (electroencephalographer / epileptologist) on a daily basis. Mauri and seizure detection algorithms were utilized and reviewed. An EEG Technician attended to the patient, and was available throughout daytime work hours.  The epilepsy center neurologist was available in person or on call 24-hours per day.    EEG Placement and Labeling of Electrodes:  The EEG was performed utilizing 20 channel referential EEG connections (coronal over temporal over parasagittal montage) using all standard 10-20 electrode placements with EKG, with additional electrodes placed in the inferior temporal region using the modified 10-10 montage electrode placements for elective admissions, or if deemed necessary. Recording was at a sampling rate of 256 samples per second per channel. Time synchronized digital video recording was done simultaneously with EEG recording. A low light infrared camera was used for low light recording.     _____________________________________________________________  HISTORY    Patient is a 60y old  Male who presents with a chief complaint of COVID19 w/ severe metabolic acidosis s/p intubation (10 Apr 2021 10:41)      PERTINENT MEDICATION:  levETIRAcetam  IVPB 500 milliGRAM(s) IV Intermittent every 12 hours    _____________________________________________________________  STUDY INTERPRETATION    Findings: The background was continuous, spontaneously variable and reactive. During wakefulness, no posterior dominant rhythm seen.    Background Slowing:  Background predominantly consisted of theta, delta and faster activities.    Focal Slowing:   None were present.    Sleep Background:  Drowsiness was characterized by fragmentation, attenuation, and slowing of the background activity.    Sleep was characterized by the presence of symmetric, rudimentary sleep spindles and K-complexes.    Other Non-Epileptiform Findings:  None were present.    Interictal Epileptiform Activity:   None were present.    Events:  Clinical events: None recorded.  Seizures: None recorded.    Activation Procedures:   Hyperventilation was not performed.    Photic stimulation was not performed.     Artifacts:  Intermittent myogenic and movement artifacts were noted.    ECG:  The heart rate on single channel ECG was predominantly between 60-70 BPM.    _____________________________________________________________  EEG SUMMARY/CLASSIFICATION    Abnormal EEG in the awake, drowsy and asleep states.  - Moderate to severe  generalized slowing.    _____________________________________________________________  EEG IMPRESSION/CLINICAL CORRELATE    Abnormal EEG study.  1. Moderate to severe nonspecific diffuse or multifocal cerebral dysfunction.   2. No epileptiform pattern or seizure seen.    _____________________________________________________________    Jossy Swain MD  Attending Physician, [facility]          Electronic Signatures:  Jossy Swain)  (Signed 10-Apr-2021 11:23)  	Authored: EEG REPORT      Last Updated: 10-Apr-2021 11:23 by Jossy Swain)    Assessment and Recommendation:   · Assessment	  60y unknown-handedness M w h/o hypotension admitted on 21 w/ severe hypoxic respiratory failure 2* COVID-19 c/b metabolic acidosis and overdose on Versed for whom neurology was consulted for persistent AMS  On exam, he is intubated, on sedation with Precedex, awakens spontaneously intermittently, does not follow commands or attend, brainstem reflexes intact, blinks to threat normal on the right and diminished on the left, withdraws to noxious stimulus in both legs but neither arm, has no spontaneous movement and downgoing toes.     IMPRESSION: Multi-factorial impaired level of arousal due to global cerebral dysfunction & impaired reticular activation system from combination of COVID-19, associated hypoxia, persistent pharmacologic effects of overdose of multiple sedatives , precipitous drop in BP and multiple metabolic derangements including severe acidosis.     Plan  EG shows no seizure activity  []Continue to treat and correct metabolic derangements.   []Regulate autonomic stability as tolerated to keep MAP of  to help maintain CPP of  for cerebral autoregulation.   [repeat CT

## 2021-04-10 NOTE — PROGRESS NOTE ADULT - SUBJECTIVE AND OBJECTIVE BOX
Subjective: Intubated and sedated w/ precedex drip    Vital Signs:  Vital Signs Last 24 Hrs  T(C): 37.5 (04-10-21 @ 09:00), Max: 37.9 (04-09-21 @ 16:00)  T(F): 99.5 (04-10-21 @ 09:00), Max: 100.2 (04-09-21 @ 16:00)  HR: 63 (04-10-21 @ 10:00) (59 - 73)  BP: 123/62  RR: 30 (04-10-21 @ 10:00) (23 - 30)  SpO2: 99% (04-10-21 @ 10:00) (96% - 100%) on (O2)      General: Opens eyes spontaneously, but not tracking appropriately  Eyes: Scleras clear, PERRLA  Neck: Neck supple, trachea midline, No JVD  Respiratory: coarse breath sounds B/L  CV: S1S2; no audible murmurs  Abdominal: +BS's x4, soft, ND/NT  Extremities: No edema, + peripheral pulses  Skin: No Rashes, Hematoma, Ecchymosis                          10.6   18.01 )-----------( 267      ( 10 Apr 2021 00:55 )             33.2       140  |  108<H>  |  51<H>  ----------------------------<  189<H>  4.7   |  23  |  1.14    Ca    8.3<L>     Phos  4.1     Mg     2.1       TPro  6.0  /  Alb  1.8<L>  /  TBili  0.4  /  DBili  x   /  AST  61<H>  /  ALT  130<H>  /  AlkPhos  108    PT: 12.6 sec;   INR: 1.11 ratio      PTT:30.0 sec      MEDICATIONS  (STANDING):  cefTRIAXone   IVPB 1000 milliGRAM(s) IV Intermittent every 24 hours  chlorhexidine 0.12% Liquid 15 milliLiter(s) Oral Mucosa every 12 hours  chlorhexidine 4% Liquid 1 Application(s) Topical <User Schedule>  dexMEDEtomidine Infusion 0.2 MICROgram(s)/kG/Hr (3.6 mL/Hr) IV Continuous <Continuous>  dextrose 50% Injectable 25 Gram(s) IV Push once  dextrose 50% Injectable 12.5 Gram(s) IV Push once  dextrose 50% Injectable 25 Gram(s) IV Push once  glucagon  Injectable 1 milliGRAM(s) IntraMuscular once  insulin lispro (ADMELOG) corrective regimen sliding scale   SubCutaneous every 6 hours  levETIRAcetam  IVPB 500 milliGRAM(s) IV Intermittent every 12 hours  norepinephrine Infusion 0.05 MICROgram(s)/kG/Min (6.87 mL/Hr) IV Continuous <Continuous>  pantoprazole  Injectable 40 milliGRAM(s) IV Push daily  polyethylene glycol 3350 17 Gram(s) Oral every 12 hours  senna 2 Tablet(s) Oral at bedtime  sodium chloride 3%. 500 milliLiter(s) (20 mL/Hr) IV Continuous <Continuous>      Assessment  61 y/o male w/ PAST MEDICAL & SURGICAL HISTORY:    Admitted 4/1/21 w/ COVID+ PNA requiring Intubation that same day    PLAN  +Left CVA w/ mild hemorrhagic transformation w/ evolving ischemia in right thalamus on CT Head 4/8, repeat CT 4/9 unchanged.  Neuro following; Spoke w/ Dr. Schoenberg  Repeat Head CT today to monitor cerebellum edema/4th ventricle compression, and plan is to repeat again tomorrow, 4/11  EEG in place since yesterday; Plan is to remove for Head CT today  Only sedative currently is precedex    3% saline continues at 20ml/hr  Na as low as 131 yesterday at noon; 140 this morning at 6am  Serial BMP's for Na every 6hrs - next at 12pm    Vent settings: FIO2 40%, PEEP 5, , RR 12  ABG at 1am: 7.45/36/100/26/98%  *CPAP'd 8hrs yesterday    Wean levophed drip as tolerated    Cr 1.14  Urine output last 24hrs: 2,265ml  Fluid Balance last 24hrs: +124ml    WBC 18 (down from 26 yesterday); Tmax 100.2oF yesterday at 4pm  Blood and sputum cultures negative, but game stain on sputum 4/6/21 +gram variable rods  Continues on ceftriaxone (Day 10)    +Pulmonary Embolism on CTA chest 4/1/21  Holding anticoagulation at this time for +CVA with hemorrhagic transformation    Finger-sticks:  172 at 6am, 199 at midnight, 150 at 6pm  Lispro sliding-scale    Nepro OGT feeds  Senna, miralax, protonix    Patient seen and discussed on rounds this morning with Dr. Nicole

## 2021-04-11 LAB
ALBUMIN SERPL ELPH-MCNC: 1.9 G/DL — LOW (ref 3.3–5)
ALBUMIN SERPL ELPH-MCNC: 2.1 G/DL — LOW (ref 3.3–5)
ALP SERPL-CCNC: 112 U/L — SIGNIFICANT CHANGE UP (ref 40–120)
ALP SERPL-CCNC: 120 U/L — SIGNIFICANT CHANGE UP (ref 40–120)
ALT FLD-CCNC: 164 U/L — HIGH (ref 4–41)
ALT FLD-CCNC: 185 U/L — HIGH (ref 4–41)
ANION GAP SERPL CALC-SCNC: 10 MMOL/L — SIGNIFICANT CHANGE UP (ref 7–14)
ANION GAP SERPL CALC-SCNC: 7 MMOL/L — SIGNIFICANT CHANGE UP (ref 7–14)
ANION GAP SERPL CALC-SCNC: 8 MMOL/L — SIGNIFICANT CHANGE UP (ref 7–14)
APTT BLD: 30.8 SEC — SIGNIFICANT CHANGE UP (ref 27–36.3)
AST SERPL-CCNC: 100 U/L — HIGH (ref 4–40)
AST SERPL-CCNC: 76 U/L — HIGH (ref 4–40)
BASOPHILS # BLD AUTO: 0.04 K/UL — SIGNIFICANT CHANGE UP (ref 0–0.2)
BASOPHILS NFR BLD AUTO: 0.3 % — SIGNIFICANT CHANGE UP (ref 0–2)
BILIRUB SERPL-MCNC: 0.4 MG/DL — SIGNIFICANT CHANGE UP (ref 0.2–1.2)
BILIRUB SERPL-MCNC: 0.4 MG/DL — SIGNIFICANT CHANGE UP (ref 0.2–1.2)
BLOOD GAS ARTERIAL - LYTES,HGB,ICA,LACT RESULT: SIGNIFICANT CHANGE UP
BUN SERPL-MCNC: 49 MG/DL — HIGH (ref 7–23)
BUN SERPL-MCNC: 49 MG/DL — HIGH (ref 7–23)
BUN SERPL-MCNC: 52 MG/DL — HIGH (ref 7–23)
CALCIUM SERPL-MCNC: 8.3 MG/DL — LOW (ref 8.4–10.5)
CALCIUM SERPL-MCNC: 8.3 MG/DL — LOW (ref 8.4–10.5)
CALCIUM SERPL-MCNC: 8.6 MG/DL — SIGNIFICANT CHANGE UP (ref 8.4–10.5)
CHLORIDE SERPL-SCNC: 110 MMOL/L — HIGH (ref 98–107)
CHLORIDE SERPL-SCNC: 113 MMOL/L — HIGH (ref 98–107)
CHLORIDE SERPL-SCNC: 114 MMOL/L — HIGH (ref 98–107)
CO2 SERPL-SCNC: 21 MMOL/L — LOW (ref 22–31)
CO2 SERPL-SCNC: 22 MMOL/L — SIGNIFICANT CHANGE UP (ref 22–31)
CO2 SERPL-SCNC: 24 MMOL/L — SIGNIFICANT CHANGE UP (ref 22–31)
CREAT SERPL-MCNC: 1.18 MG/DL — SIGNIFICANT CHANGE UP (ref 0.5–1.3)
CREAT SERPL-MCNC: 1.23 MG/DL — SIGNIFICANT CHANGE UP (ref 0.5–1.3)
CREAT SERPL-MCNC: 1.23 MG/DL — SIGNIFICANT CHANGE UP (ref 0.5–1.3)
CULTURE RESULTS: SIGNIFICANT CHANGE UP
CULTURE RESULTS: SIGNIFICANT CHANGE UP
EOSINOPHIL # BLD AUTO: 0.13 K/UL — SIGNIFICANT CHANGE UP (ref 0–0.5)
EOSINOPHIL NFR BLD AUTO: 0.8 % — SIGNIFICANT CHANGE UP (ref 0–6)
GLUCOSE BLDC GLUCOMTR-MCNC: 187 MG/DL — HIGH (ref 70–99)
GLUCOSE BLDC GLUCOMTR-MCNC: 190 MG/DL — HIGH (ref 70–99)
GLUCOSE BLDC GLUCOMTR-MCNC: 191 MG/DL — HIGH (ref 70–99)
GLUCOSE BLDC GLUCOMTR-MCNC: 211 MG/DL — HIGH (ref 70–99)
GLUCOSE SERPL-MCNC: 199 MG/DL — HIGH (ref 70–99)
GLUCOSE SERPL-MCNC: 215 MG/DL — HIGH (ref 70–99)
GLUCOSE SERPL-MCNC: 231 MG/DL — HIGH (ref 70–99)
HCT VFR BLD CALC: 33 % — LOW (ref 39–50)
HGB BLD-MCNC: 10.2 G/DL — LOW (ref 13–17)
IANC: 12.51 K/UL — HIGH (ref 1.5–8.5)
IMM GRANULOCYTES NFR BLD AUTO: 2.9 % — HIGH (ref 0–1.5)
INR BLD: 1.22 RATIO — HIGH (ref 0.88–1.16)
LYMPHOCYTES # BLD AUTO: 0.5 K/UL — LOW (ref 1–3.3)
LYMPHOCYTES # BLD AUTO: 3.2 % — LOW (ref 13–44)
MAGNESIUM SERPL-MCNC: 2.1 MG/DL — SIGNIFICANT CHANGE UP (ref 1.6–2.6)
MCHC RBC-ENTMCNC: 26.2 PG — LOW (ref 27–34)
MCHC RBC-ENTMCNC: 30.9 GM/DL — LOW (ref 32–36)
MCV RBC AUTO: 84.8 FL — SIGNIFICANT CHANGE UP (ref 80–100)
MONOCYTES # BLD AUTO: 1.78 K/UL — HIGH (ref 0–0.9)
MONOCYTES NFR BLD AUTO: 11.6 % — SIGNIFICANT CHANGE UP (ref 2–14)
NEUTROPHILS # BLD AUTO: 12.51 K/UL — HIGH (ref 1.8–7.4)
NEUTROPHILS NFR BLD AUTO: 81.2 % — HIGH (ref 43–77)
NRBC # BLD: 0 /100 WBCS — SIGNIFICANT CHANGE UP
NRBC # FLD: 0 K/UL — SIGNIFICANT CHANGE UP
PHOSPHATE SERPL-MCNC: 3.7 MG/DL — SIGNIFICANT CHANGE UP (ref 2.5–4.5)
PLATELET # BLD AUTO: 330 K/UL — SIGNIFICANT CHANGE UP (ref 150–400)
POTASSIUM SERPL-MCNC: 4.5 MMOL/L — SIGNIFICANT CHANGE UP (ref 3.5–5.3)
POTASSIUM SERPL-MCNC: 4.6 MMOL/L — SIGNIFICANT CHANGE UP (ref 3.5–5.3)
POTASSIUM SERPL-MCNC: 4.7 MMOL/L — SIGNIFICANT CHANGE UP (ref 3.5–5.3)
POTASSIUM SERPL-SCNC: 4.5 MMOL/L — SIGNIFICANT CHANGE UP (ref 3.5–5.3)
POTASSIUM SERPL-SCNC: 4.6 MMOL/L — SIGNIFICANT CHANGE UP (ref 3.5–5.3)
POTASSIUM SERPL-SCNC: 4.7 MMOL/L — SIGNIFICANT CHANGE UP (ref 3.5–5.3)
PROT SERPL-MCNC: 6.3 G/DL — SIGNIFICANT CHANGE UP (ref 6–8.3)
PROT SERPL-MCNC: 6.5 G/DL — SIGNIFICANT CHANGE UP (ref 6–8.3)
PROTHROM AB SERPL-ACNC: 13.8 SEC — HIGH (ref 10.6–13.6)
RBC # BLD: 3.89 M/UL — LOW (ref 4.2–5.8)
RBC # FLD: 15.2 % — HIGH (ref 10.3–14.5)
SODIUM SERPL-SCNC: 141 MMOL/L — SIGNIFICANT CHANGE UP (ref 135–145)
SODIUM SERPL-SCNC: 143 MMOL/L — SIGNIFICANT CHANGE UP (ref 135–145)
SODIUM SERPL-SCNC: 145 MMOL/L — SIGNIFICANT CHANGE UP (ref 135–145)
SPECIMEN SOURCE: SIGNIFICANT CHANGE UP
SPECIMEN SOURCE: SIGNIFICANT CHANGE UP
WBC # BLD: 15.41 K/UL — HIGH (ref 3.8–10.5)
WBC # FLD AUTO: 15.41 K/UL — HIGH (ref 3.8–10.5)

## 2021-04-11 PROCEDURE — 71045 X-RAY EXAM CHEST 1 VIEW: CPT | Mod: 26

## 2021-04-11 PROCEDURE — 99233 SBSQ HOSP IP/OBS HIGH 50: CPT

## 2021-04-11 PROCEDURE — 70450 CT HEAD/BRAIN W/O DYE: CPT | Mod: 26

## 2021-04-11 PROCEDURE — 99291 CRITICAL CARE FIRST HOUR: CPT

## 2021-04-11 RX ORDER — HEPARIN SODIUM 5000 [USP'U]/ML
500 INJECTION INTRAVENOUS; SUBCUTANEOUS
Qty: 25000 | Refills: 0 | Status: DISCONTINUED | OUTPATIENT
Start: 2021-04-11 | End: 2021-04-12

## 2021-04-11 RX ADMIN — LEVETIRACETAM 400 MILLIGRAM(S): 250 TABLET, FILM COATED ORAL at 17:20

## 2021-04-11 RX ADMIN — CHLORHEXIDINE GLUCONATE 15 MILLILITER(S): 213 SOLUTION TOPICAL at 17:20

## 2021-04-11 RX ADMIN — CHLORHEXIDINE GLUCONATE 15 MILLILITER(S): 213 SOLUTION TOPICAL at 05:33

## 2021-04-11 RX ADMIN — Medication 2: at 12:32

## 2021-04-11 RX ADMIN — SENNA PLUS 2 TABLET(S): 8.6 TABLET ORAL at 21:44

## 2021-04-11 RX ADMIN — LEVETIRACETAM 400 MILLIGRAM(S): 250 TABLET, FILM COATED ORAL at 05:33

## 2021-04-11 RX ADMIN — HEPARIN SODIUM 5 UNIT(S)/HR: 5000 INJECTION INTRAVENOUS; SUBCUTANEOUS at 21:44

## 2021-04-11 RX ADMIN — PANTOPRAZOLE SODIUM 40 MILLIGRAM(S): 20 TABLET, DELAYED RELEASE ORAL at 12:20

## 2021-04-11 RX ADMIN — CHLORHEXIDINE GLUCONATE 1 APPLICATION(S): 213 SOLUTION TOPICAL at 06:10

## 2021-04-11 RX ADMIN — Medication 4: at 06:19

## 2021-04-11 RX ADMIN — Medication 2: at 17:14

## 2021-04-11 RX ADMIN — Medication 2: at 00:59

## 2021-04-11 RX ADMIN — POLYETHYLENE GLYCOL 3350 17 GRAM(S): 17 POWDER, FOR SOLUTION ORAL at 05:33

## 2021-04-11 NOTE — PROGRESS NOTE ADULT - ATTENDING COMMENTS
ARF with hypoxia- does well on PSV. cont during day. a/c at night    severe sepsis- 2/2 covid    leukocytosis- better, afeb, off abx    DM- needs better control, add NPH    CVA- hemmorhagic cerebeller. stable on CT x3. # 4 today per neuro, prophylactic hypertonic saline. d/c after CT no change today

## 2021-04-11 NOTE — PROGRESS NOTE ADULT - SUBJECTIVE AND OBJECTIVE BOX
· Subjective and Objective:   HPI: 61 y/o unknown handedness M with h/o hypotension not on any known meds BIBEMS 21 for COVID-19 respiratory distress, intubated and sedated being evaluated by neurology for persistent impaired arousal.  Per chart review, baseline reported AOx3 per primary team, but history has been limited as only known contacts are brother-in-law Demetrius [phone], Alba Booker, nephew from [place] [phone] ) and his Wife, Lucero, in [place] +[phone]  (speaks [place]).  PT's primary language unknown, but could be [place] and apparently was answering questions in English on admission (nodding yes to feeling SOB).   Apparently, PT had COVID-19 + test , initially only experienced fatigue and then had rapid decompensation 21.  On 21 was found dyspneic by neighbor and was brought into ED by EMS.  Per chart review, on arrival PT was able to nod yes when asked if he was SOB.  En route had RR 28, SaO2 60%.  In ED was hypoxic to 60% placed on NRB with improvement to low 90s but still tachypneic to 40s.  PT noted be have high-anion gap metabolic acidosis with finger stick in 500s with concern for DKA.  Additionally noted troponins in 200, lactate 15, elevated pro-BNP to 21,000.  .  PT was intubated, given IVF, started on insulin gtt and versed gtt, propofol gtt, fentanyl.  Of note, apparently PT's weight was incorrectly measured and registered recorded as 90 kg which was used to dose versed when in fact his weight is 71.6 Kg as of 21.  In addition, PT has had several precipitous drops in BP including on admission rom 194/100 to 95/62 within an hour but overall has had low BPs.  Further complications on current admission include demand-mismatch cadiac ischemia, SUKI, L. segmental upper lobe PE, hypotension 2* vasoplegia w/ RH strain, echogenic material found in RV on echocardiogram, possible superimposed bacterial vs. fungal PNA on CTX & Linezolid since , L. common femoral vein DVT, newly Dx uncontrolled DM2 w/ A1C 10.3.    Med review notable that on , PT was on midazolam Infusion 0.1 mG/kG/Hr (9 mL/Hr) vs midazolam Infusion 0.02 mG/kG/Hr (1.8 mL/Hr) IV continuous as well as fentnanyl infusion 0.5 mcg/kg/hr (0.72mL/hr) and propofol 10 mcg/kg/min (4.32 mL/hr).  On  he was started on fentanyl gtt at 0.5mcg/kg/hr as well as precedex at 0.2 mcg/kg/.hr.  PT currently still on precedex at same dose.      PMH  Hypotension    Home meds:   None reported    PSH: Unknown  Social Hx: Unknown  FH: Unknown   ALL: Unknown     MEDICATIONS  (STANDING):  cefTRIAXone   IVPB 1000 milliGRAM(s) IV Intermittent every 24 hours  chlorhexidine 0.12% Liquid 15 milliLiter(s) Oral Mucosa every 12 hours  chlorhexidine 4% Liquid 1 Application(s) Topical <User Schedule>  dexAMETHasone  Injectable 6 milliGRAM(s) IV Push daily  dexMEDEtomidine Infusion 0.2 MICROgram(s)/kG/Hr (3.6 mL/Hr) IV Continuous <Continuous>  dextrose 50% Injectable 25 Gram(s) IV Push once  dextrose 50% Injectable 12.5 Gram(s) IV Push once  dextrose 50% Injectable 25 Gram(s) IV Push once  glucagon  Injectable 1 milliGRAM(s) IntraMuscular once  heparin  Infusion.  Unit(s)/Hr (13 mL/Hr) IV Continuous <Continuous>  insulin lispro (ADMELOG) corrective regimen sliding scale   SubCutaneous every 6 hours  insulin NPH human recombinant 45 Unit(s) SubCutaneous every 6 hours  pantoprazole  Injectable 40 milliGRAM(s) IV Push daily  polyethylene glycol 3350 17 Gram(s) Oral every 12 hours  senna 2 Tablet(s) Oral at bedtime  vancomycin  IVPB 1000 milliGRAM(s) IV Intermittent every 12 hours      Weight (kg): 73.3 ()    Neuro:  MS: Eyes open intermittently spontaneously, but mostly requires noxious stimuli to open eyes.  Not following commands in English (primary/best language not known at time of exam).  Withdraws to noxious stimuli in legs only.      Language: intubated - comatose  CNs: Pupils b/l equal 2mm with sluggish reaction, cephalo-ocular reflex intact, face symmetric, cough and gag present, rest of cranial nerves exam is limited by mental status.  Motor: no response to painful stimulation except in legs, 2/5 in B/L legs to noxious stimuli but otherwise no spontaneous movement or withdrawal to noxious stimuli.   Sensory: no response to pain apart from legs  Reflexes: 1+ in biceps and patellar B/L, downgoing toes B/L      Labs:                        11.1   23.29 )-----------( 181      ( 2021 17:45 )             33.9     04-08    136  |  100  |  63<H>  ----------------------------<  108<H>  4.8   |  28  |  1.28    Ca    8.8      2021 01:52  Phos  4.4     04-08  Mg     2.3     04-08    TPro  6.0  /  Alb  2.3<L>  /  TBili  0.5  /  DBili  x   /  AST  52<H>  /  ALT  71<H>  /  AlkPhos  67  04-08    PT/INR - ( 2021 17:45 )   PT: 13.5 sec;   INR: 1.19 ratio  PTT - ( 2021 01:52 )  PTT:73.6 sec  COVID-19 PCR: Detected (2021 09:06)      Culture - Sputum (collected 2021 14:49)  Source: .Sputum Sputum  Gram Stain (2021 23:23):    No polymorphonuclear leukocytes per low power field    Moderate Squamous epithelial cells per low power field    Moderate Yeast like cells per oil power field    Moderate Gram Variable Rods per oil power field    Rare Gram Positive Cocci in Clusters per oil power field  Preliminary Report (2021 21:20):    Normal Respiratory Lizzette present    Culture - Blood (collected 2021 12:23)  Source: .Blood Blood  Preliminary Report (2021 13:01):    No growth to date.    Culture - Blood (collected 2021 12:23)  Source: .Blood Blood  Preliminary Report (2021 13:01):    No growth to date.      ABG - ( 2021 01:52 )  pH, Arterial: 7.47  pH, Blood: x     /  pCO2: 40    /  pO2: 96    / HCO3: 29    / Base Excess: 4.6   /  SaO2: 97.2        < from: CT Head No Cont (21 @ 11:07) >  EXAM:  CT BRAIN        PROCEDURE DATE:  2021         INTERPRETATION:  CLINICAL INDICATION:  CVA. Follow-up.    TECHNIQUE : Axial CT scanning of the brain was obtained from the skull base to the vertex without the administration of intravenous contrast. Coronal and sagittal reformatted images were subsequently obtained.    COMPARISON: CT head 2021    FINDINGS:  Redemonstration of multiple patchy hypoattenuating areas within the bilateral cerebral hemispheres, specifically in the bilateral frontoparietal and bilateral occipital lobes and left temporal lobe, and left cerebellar hemisphere, grossly unchanged from prior. Unchanged hypoattenuation within the right thalamus.    Unchanged hemorrhagic transformation within the left parietal, left temporal and bilateral occipital lobes.    Unchanged left greater than right mass effect with effacement of the left frontoparietal sulci and mild effacement of the fourth ventricle.    The basal cisterns are patent. The ventricles are stablein size. There is no midline shift.    The paranasal sinuses are clear. The tympanomastoid air cells are clear. The orbits are unremarkable.    Calvarium is intact.    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    No obstructive hydrocephalus or midline shift.                MANJEET TABARES M.D., RADIOLOGY RESIDENT  This document has been electronically signed.  REJI MONTANO M.D., ATTENDING RADIOLOGIST  This document has been el    < end of copied text >   EEG Report:  · EEG Report	  [facility]   REPORT OF LONG-TERM VIDEO EEG     [facility]: [address] , [place] [address], Ph#: [phone]  [facility]: [address] [place] [address], Ph#: [phone]  [facility]: [address] [place] [address], Ph#: [phone]    Patient Name: ADRIANA BOOKER  Age and : 60y ([birth year])  MRN #: [id number]  Location: [facility] (PACU) 12  Referring Physician: Scot Wilhelm    Start Time/Date: 12:48 on 21  End Time/Date: 11:02 on 04-10-21  Duration: 21hr 09m    _____________________________________________________________  STUDY INFORMATION    EEG Recording Technique:  The patient underwent continuous Video-EEG monitoring, using Telemetry System hardware on the XLTek Digital System. EEG and video data were stored on a computer hard drive with important events saved in digital archive files. The material was reviewed by a physician (electroencephalographer / epileptologist) on a daily basis. Mauri and seizure detection algorithms were utilized and reviewed. An EEG Technician attended to the patient, and was available throughout daytime work hours.  The epilepsy center neurologist was available in person or on call 24-hours per day.    EEG Placement and Labeling of Electrodes:  The EEG was performed utilizing 20 channel referential EEG connections (coronal over temporal over parasagittal montage) using all standard 10-20 electrode placements with EKG, with additional electrodes placed in the inferior temporal region using the modified 10-10 montage electrode placements for elective admissions, or if deemed necessary. Recording was at a sampling rate of 256 samples per second per channel. Time synchronized digital video recording was done simultaneously with EEG recording. A low light infrared camera was used for low light recording.     _____________________________________________________________  HISTORY    Patient is a 60y old  Male who presents with a chief complaint of COVID19 w/ severe metabolic acidosis s/p intubation (10 Apr 2021 10:41)      PERTINENT MEDICATION:  levETIRAcetam  IVPB 500 milliGRAM(s) IV Intermittent every 12 hours    _____________________________________________________________  STUDY INTERPRETATION    Findings: The background was continuous, spontaneously variable and reactive. During wakefulness, no posterior dominant rhythm seen.    Background Slowing:  Background predominantly consisted of theta, delta and faster activities.    Focal Slowing:   None were present.    Sleep Background:  Drowsiness was characterized by fragmentation, attenuation, and slowing of the background activity.    Sleep was characterized by the presence of symmetric, rudimentary sleep spindles and K-complexes.    Other Non-Epileptiform Findings:  None were present.    Interictal Epileptiform Activity:   None were present.    Events:  Clinical events: None recorded.  Seizures: None recorded.    Activation Procedures:   Hyperventilation was not performed.    Photic stimulation was not performed.     Artifacts:  Intermittent myogenic and movement artifacts were noted.    ECG:  The heart rate on single channel ECG was predominantly between 60-70 BPM.    _____________________________________________________________  EEG SUMMARY/CLASSIFICATION    Abnormal EEG in the awake, drowsy and asleep states.  - Moderate to severe  generalized slowing.    _____________________________________________________________  EEG IMPRESSION/CLINICAL CORRELATE    Abnormal EEG study.  1. Moderate to severe nonspecific diffuse or multifocal cerebral dysfunction.   2. No epileptiform pattern or seizure seen.    _____________________________________________________________    Jossy Swain MD  Attending Physician, [facility]          Electronic Signatures:  Jossy Swain)  (Signed 10-Apr-2021 11:23)  	Authored: EEG REPORT      Last Updated: 10-Apr-2021 11:23 by Jossy Swain)  < from: CT Head No Cont (04.10.21 @ 12:27) >  EXAM:  CT BRAIN        PROCEDURE DATE:  Apr 10 2021         INTERPRETATION:  Clinical indication: Follow-up infarct.    Multiple axial sections were performed from base of skull to vertex without contrast enhancement. Coronal and sagittal reconstructions were performed as well.    Parenchymal volume loss and chronic microvascular ischemic changes are again seen and unchanged when compared the prior head CT performed on 2021    Abnormal areas of low-attenuation involving the high left frontal, high left frontal parietal, high right parietal, right occipital and left cerebellar region. These findings appear unchanged compared prior exam. There is evidence of some hemorrhagic transmission is seen involving the high left frontal parietal, right occipital region. These findings are compatible with patient's known infarct.    No significant shift or herniation seen    Evaluation of the osseous structures with the appropriate window appears normal    The visualized paranasal sinuses mastoid eminence appear clear.    IMPRESSION: No significant change when allowing for differences in technique.        < end of copied text >  < from: CT Head No Cont (04.10.21 @ 12:27) >  EXAM:  CT BRAIN        PROCEDURE DATE:  Apr 10 2021         INTERPRETATION:  Clinical indication: Follow-up infarct.    Multiple axial sections were performed from base of skull to vertex without contrast enhancement. Coronal and sagittal reconstructions were performed as well.    Parenchymal volume loss and chronic microvascular ischemic changes are again seen and unchanged when compared the prior head CT performed on 2021    Abnormal areas of low-attenuation involving the high left frontal, high left frontal parietal, high right parietal, right occipital and left cerebellar region. These findings appear unchanged compared prior exam. There is evidence of some hemorrhagic transmission is seen involving the high left frontal parietal, right occipital region. These findings are compatible with patient's known infarct.    No significant shift or herniation seen    Evaluation of the osseous structures with the appropriate window appears normal    The visualized paranasal sinuses mastoid eminence appear clear.    IMPRESSION: No significant change when allowing for differences in technique.        < end of copied text >    Assessment and Recommendation:   · Assessment	  60y unknown-handedness M w h/o hypotension admitted on 21 w/ severe hypoxic respiratory failure 2* COVID-19 c/b metabolic acidosis and overdose on Versed for whom neurology was consulted for persistent AMS  On exam, he is intubated, on sedation with Precedex, awakens spontaneously intermittently, does not follow commands or attend, brainstem reflexes intact, blinks to threat normal on the right and diminished on the left, withdraws to noxious stimulus in both legs but neither arm, has no spontaneous movement and downgoing toes.     IMPRESSION: Multi-factorial impaired level of arousal due to global cerebral dysfunction & impaired reticular activation system from combination of COVID-19, associated hypoxia, persistent pharmacologic effects of overdose of multiple sedatives , precipitous drop in BP and multiple metabolic derangements including severe acidosis.     Plan  EEG shows no seizure activity CT head no change   []Continue to treat and correct metabolic derangements.   []Regulate autonomic stability as tolerated to keep MAP of  to help maintain CPP of  for cerebral autoregulation.   can start anticoagulation no bolus      Electronic Signatures:  Schoenberg, Laura Gray (MD)  (Signed 2021 10:23)  	Authored: Progress Note, Reason for Admission, Subjective and Objective, Assessment and Plan

## 2021-04-11 NOTE — PROGRESS NOTE ADULT - SUBJECTIVE AND OBJECTIVE BOX
Subjective: Intubated and sedated w/ precedex drips.    Vital Signs:  Vital Signs Last 24 Hrs  T(C): 36 (04-11-21 @ 08:00), Max: 36.8 (04-11-21 @ 03:00)  T(F): 96.8 (04-11-21 @ 08:00), Max: 98.2 (04-11-21 @ 03:00)  HR: 63 (04-11-21 @ 08:21) (60 - 72)  BP: 103/49  RR: 27 (04-11-21 @ 08:00) (18 - 30)  SpO2: 95% (04-11-21 @ 08:21) (95% - 100%) on (O2)      General: Intubated and sedated w/ precedex; Opens eyes spontaneously, not tracking; moving extremities spontaneously - not appropriate to command  Eyes: Scleras clear, PERRLA  Neck: Neck supple, trachea midline, No JVD  Respiratory: coarse breath sounds B/L  CV: S1S2; no audible murmurs  Abdominal: +BS's x4, soft, ND/NT  Extremities: No edema, + peripheral pulses  Skin: No Rashes, Hematoma, Ecchymosis                          10.2   15.41 )-----------( 330      ( 11 Apr 2021 03:10 )             33.0       143  |  113<H>  |  49<H>  ----------------------------<  199<H>  4.6   |  22  |  1.18    Ca    8.3<L>      Phos  3.7     Mg     2.1        TPro  6.5  /  Alb  1.9<L>  /  TBili  0.4  /  DBili  x   /  AST  100<H>  /  ALT  185<H>  /  AlkPhos  120    PT: 13.8 sec;   INR: 1.22 ratio      PTT:30.8 sec      MEDICATIONS  (STANDING):  chlorhexidine 0.12% Liquid 15 milliLiter(s) Oral Mucosa every 12 hours  chlorhexidine 4% Liquid 1 Application(s) Topical <User Schedule>  dexMEDEtomidine Infusion 0.2 MICROgram(s)/kG/Hr (3.6 mL/Hr) IV Continuous <Continuous>  dextrose 50% Injectable 25 Gram(s) IV Push once  dextrose 50% Injectable 12.5 Gram(s) IV Push once  dextrose 50% Injectable 25 Gram(s) IV Push once  glucagon  Injectable 1 milliGRAM(s) IntraMuscular once  insulin lispro (ADMELOG) corrective regimen sliding scale   SubCutaneous every 6 hours  levETIRAcetam  IVPB 500 milliGRAM(s) IV Intermittent every 12 hours  norepinephrine Infusion 0.05 MICROgram(s)/kG/Min (6.87 mL/Hr) IV Continuous <Continuous>  pantoprazole  Injectable 40 milliGRAM(s) IV Push daily  polyethylene glycol 3350 17 Gram(s) Oral every 12 hours  senna 2 Tablet(s) Oral at bedtime  sodium chloride 3%. 500 milliLiter(s) (35 mL/Hr) IV Continuous <Continuous>      Assessment  61 y/o male w/ PAST MEDICAL & SURGICAL HISTORY:    Admitted 4/1/21 w/ COVID+ ARDS, and intubated 4/1/21    PLAN  Tolerated CPAP 40%, 10/5 from 1:30pm yesterday, through 10am this morning  ABG at 3am this morning on CPAP: 7.44/38/107/26/98%    Patient biting on ETT w/ precedex drip down to 0.7mcg/kg/hr; Turned back-up to 1.2mcg/kg/hr, and much more comfortable.  Put on Pressure-control 40%, PEEP 5  No longer biting on ETT at this point    CT Head 4/8 +left acute infarcts w/ hemorrhagic transformation  Repeat Head CT 4/9 unchanged  Repeat Head CT 4/10 unchanged  Plan is for repeat CT Head again today  Neuro following    Wean levophed as tolerated    +Secretions suctioned through ETT  Sputum culture sent  WBC 15 (down from 18)  Afebrile  Sputum culture 4/6 negative  Off antibiotics; Finished course of ceftriaxone yesterday    Cr 1.23  Urine output last 24hrs: 2,810ml  Fluid balance last 24hrs: -975ml     this morning (131 on 4/9/21)  3% Na drip at 35ml/hr    LFT's mildly elevated    Finger-sticks:   211 at 6am, 1887 at midnight, 238 at 6pm  Lispro sliding-scale    Nepro OGT feeds  Protonix, miralax, senna    SCD's for DVT prophylaxis    Patient seen and discussed on bedside rounds this morning with Dr. Larry

## 2021-04-12 ENCOUNTER — INPATIENT (INPATIENT)
Facility: HOSPITAL | Age: 61
LOS: 52 days | Discharge: NURSING, REHAB OR LONG-TERM CARE | DRG: 163 | End: 2021-06-04
Attending: STUDENT IN AN ORGANIZED HEALTH CARE EDUCATION/TRAINING PROGRAM | Admitting: INTERNAL MEDICINE
Payer: COMMERCIAL

## 2021-04-12 VITALS
RESPIRATION RATE: 23 BRPM | HEART RATE: 67 BPM | DIASTOLIC BLOOD PRESSURE: 67 MMHG | SYSTOLIC BLOOD PRESSURE: 122 MMHG | OXYGEN SATURATION: 98 %

## 2021-04-12 VITALS — HEART RATE: 70 BPM | OXYGEN SATURATION: 100 %

## 2021-04-12 DIAGNOSIS — R09.89 OTHER SPECIFIED SYMPTOMS AND SIGNS INVOLVING THE CIRCULATORY AND RESPIRATORY SYSTEMS: ICD-10-CM

## 2021-04-12 DIAGNOSIS — J18.9 PNEUMONIA, UNSPECIFIED ORGANISM: ICD-10-CM

## 2021-04-12 PROBLEM — Z78.9 OTHER SPECIFIED HEALTH STATUS: Chronic | Status: ACTIVE | Noted: 2021-04-01

## 2021-04-12 LAB
ALBUMIN SERPL ELPH-MCNC: 1.9 G/DL — LOW (ref 3.3–5)
ALBUMIN SERPL ELPH-MCNC: 2.1 G/DL — LOW (ref 3.3–5)
ALP SERPL-CCNC: 117 U/L — SIGNIFICANT CHANGE UP (ref 40–120)
ALP SERPL-CCNC: 133 U/L — HIGH (ref 40–120)
ALT FLD-CCNC: 179 U/L — HIGH (ref 10–45)
ALT FLD-CCNC: 261 U/L — HIGH (ref 4–41)
ANION GAP SERPL CALC-SCNC: 10 MMOL/L — SIGNIFICANT CHANGE UP (ref 5–17)
ANION GAP SERPL CALC-SCNC: 10 MMOL/L — SIGNIFICANT CHANGE UP (ref 7–14)
APTT BLD: 37 SEC — HIGH (ref 27–36.3)
APTT BLD: 43 SEC — HIGH (ref 27–36.3)
APTT BLD: 43.9 SEC — HIGH (ref 27.5–35.5)
AST SERPL-CCNC: 112 U/L — HIGH (ref 4–40)
AST SERPL-CCNC: 58 U/L — HIGH (ref 10–40)
BASOPHILS # BLD AUTO: 0.03 K/UL — SIGNIFICANT CHANGE UP (ref 0–0.2)
BASOPHILS # BLD AUTO: 0.05 K/UL — SIGNIFICANT CHANGE UP (ref 0–0.2)
BASOPHILS NFR BLD AUTO: 0.2 % — SIGNIFICANT CHANGE UP (ref 0–2)
BASOPHILS NFR BLD AUTO: 0.2 % — SIGNIFICANT CHANGE UP (ref 0–2)
BILIRUB SERPL-MCNC: 0.9 MG/DL — SIGNIFICANT CHANGE UP (ref 0.2–1.2)
BILIRUB SERPL-MCNC: 1.1 MG/DL — SIGNIFICANT CHANGE UP (ref 0.2–1.2)
BLOOD GAS ARTERIAL - LYTES,HGB,ICA,LACT RESULT: SIGNIFICANT CHANGE UP
BLOOD GAS ARTERIAL COMPREHENSIVE WITH CREATININE RESULT: SIGNIFICANT CHANGE UP
BUN SERPL-MCNC: 50 MG/DL — HIGH (ref 7–23)
BUN SERPL-MCNC: 55 MG/DL — HIGH (ref 7–23)
CALCIUM SERPL-MCNC: 8.3 MG/DL — LOW (ref 8.4–10.5)
CALCIUM SERPL-MCNC: 8.6 MG/DL — SIGNIFICANT CHANGE UP (ref 8.4–10.5)
CHLORIDE SERPL-SCNC: 112 MMOL/L — HIGH (ref 98–107)
CHLORIDE SERPL-SCNC: 113 MMOL/L — HIGH (ref 96–108)
CK SERPL-CCNC: 26 U/L — LOW (ref 30–200)
CO2 SERPL-SCNC: 20 MMOL/L — LOW (ref 22–31)
CO2 SERPL-SCNC: 20 MMOL/L — LOW (ref 22–31)
CREAT SERPL-MCNC: 1.24 MG/DL — SIGNIFICANT CHANGE UP (ref 0.5–1.3)
CREAT SERPL-MCNC: 1.38 MG/DL — HIGH (ref 0.5–1.3)
CRP SERPL-MCNC: 295 MG/L — HIGH (ref 0–4)
D DIMER BLD IA.RAPID-MCNC: 1272 NG/ML DDU — HIGH
EOSINOPHIL # BLD AUTO: 0.06 K/UL — SIGNIFICANT CHANGE UP (ref 0–0.5)
EOSINOPHIL # BLD AUTO: 0.28 K/UL — SIGNIFICANT CHANGE UP (ref 0–0.5)
EOSINOPHIL NFR BLD AUTO: 0.3 % — SIGNIFICANT CHANGE UP (ref 0–6)
EOSINOPHIL NFR BLD AUTO: 1.2 % — SIGNIFICANT CHANGE UP (ref 0–6)
GAS PNL BLDA: SIGNIFICANT CHANGE UP
GLUCOSE BLDC GLUCOMTR-MCNC: 162 MG/DL — HIGH (ref 70–99)
GLUCOSE BLDC GLUCOMTR-MCNC: 198 MG/DL — HIGH (ref 70–99)
GLUCOSE BLDC GLUCOMTR-MCNC: 225 MG/DL — HIGH (ref 70–99)
GLUCOSE BLDC GLUCOMTR-MCNC: 242 MG/DL — HIGH (ref 70–99)
GLUCOSE BLDC GLUCOMTR-MCNC: 247 MG/DL — HIGH (ref 70–99)
GLUCOSE BLDC GLUCOMTR-MCNC: 275 MG/DL — HIGH (ref 70–99)
GLUCOSE SERPL-MCNC: 241 MG/DL — HIGH (ref 70–99)
GLUCOSE SERPL-MCNC: 302 MG/DL — HIGH (ref 70–99)
GRAM STN FLD: SIGNIFICANT CHANGE UP
HCT VFR BLD CALC: 30.9 % — LOW (ref 39–50)
HCT VFR BLD CALC: 32.3 % — LOW (ref 39–50)
HGB BLD-MCNC: 10.3 G/DL — LOW (ref 13–17)
HGB BLD-MCNC: 9.7 G/DL — LOW (ref 13–17)
IANC: 15 K/UL — HIGH (ref 1.5–8.5)
IMM GRANULOCYTES NFR BLD AUTO: 1.3 % — SIGNIFICANT CHANGE UP (ref 0–1.5)
IMM GRANULOCYTES NFR BLD AUTO: 1.7 % — HIGH (ref 0–1.5)
INR BLD: 1.39 RATIO — HIGH (ref 0.88–1.16)
INR BLD: 1.43 RATIO — HIGH (ref 0.88–1.16)
LDH SERPL L TO P-CCNC: 438 U/L — HIGH (ref 50–242)
LYMPHOCYTES # BLD AUTO: 0.69 K/UL — LOW (ref 1–3.3)
LYMPHOCYTES # BLD AUTO: 0.71 K/UL — LOW (ref 1–3.3)
LYMPHOCYTES # BLD AUTO: 3.1 % — LOW (ref 13–44)
LYMPHOCYTES # BLD AUTO: 4.1 % — LOW (ref 13–44)
MAGNESIUM SERPL-MCNC: 2 MG/DL — SIGNIFICANT CHANGE UP (ref 1.6–2.6)
MAGNESIUM SERPL-MCNC: 2.2 MG/DL — SIGNIFICANT CHANGE UP (ref 1.6–2.6)
MCHC RBC-ENTMCNC: 26.5 PG — LOW (ref 27–34)
MCHC RBC-ENTMCNC: 26.5 PG — LOW (ref 27–34)
MCHC RBC-ENTMCNC: 31.4 GM/DL — LOW (ref 32–36)
MCHC RBC-ENTMCNC: 31.9 GM/DL — LOW (ref 32–36)
MCV RBC AUTO: 83 FL — SIGNIFICANT CHANGE UP (ref 80–100)
MCV RBC AUTO: 84.4 FL — SIGNIFICANT CHANGE UP (ref 80–100)
MONOCYTES # BLD AUTO: 1.3 K/UL — HIGH (ref 0–0.9)
MONOCYTES # BLD AUTO: 1.44 K/UL — HIGH (ref 0–0.9)
MONOCYTES NFR BLD AUTO: 6.4 % — SIGNIFICANT CHANGE UP (ref 2–14)
MONOCYTES NFR BLD AUTO: 7.5 % — SIGNIFICANT CHANGE UP (ref 2–14)
NEUTROPHILS # BLD AUTO: 15 K/UL — HIGH (ref 1.8–7.4)
NEUTROPHILS # BLD AUTO: 19.82 K/UL — HIGH (ref 1.8–7.4)
NEUTROPHILS NFR BLD AUTO: 86.2 % — HIGH (ref 43–77)
NEUTROPHILS NFR BLD AUTO: 87.8 % — HIGH (ref 43–77)
NRBC # BLD: 0 /100 WBCS — SIGNIFICANT CHANGE UP
NRBC # BLD: 0 /100 WBCS — SIGNIFICANT CHANGE UP (ref 0–0)
NRBC # FLD: 0 K/UL — SIGNIFICANT CHANGE UP
PHOSPHATE SERPL-MCNC: 3.7 MG/DL — SIGNIFICANT CHANGE UP (ref 2.5–4.5)
PHOSPHATE SERPL-MCNC: 5.2 MG/DL — HIGH (ref 2.5–4.5)
PLATELET # BLD AUTO: 367 K/UL — SIGNIFICANT CHANGE UP (ref 150–400)
PLATELET # BLD AUTO: 376 K/UL — SIGNIFICANT CHANGE UP (ref 150–400)
POTASSIUM SERPL-MCNC: 4.3 MMOL/L — SIGNIFICANT CHANGE UP (ref 3.5–5.3)
POTASSIUM SERPL-MCNC: 4.5 MMOL/L — SIGNIFICANT CHANGE UP (ref 3.5–5.3)
POTASSIUM SERPL-SCNC: 4.3 MMOL/L — SIGNIFICANT CHANGE UP (ref 3.5–5.3)
POTASSIUM SERPL-SCNC: 4.5 MMOL/L — SIGNIFICANT CHANGE UP (ref 3.5–5.3)
PROCALCITONIN SERPL-MCNC: 0.63 NG/ML — HIGH (ref 0.02–0.1)
PROT SERPL-MCNC: 6.4 G/DL — SIGNIFICANT CHANGE UP (ref 6–8.3)
PROT SERPL-MCNC: 6.6 G/DL — SIGNIFICANT CHANGE UP (ref 6–8.3)
PROTHROM AB SERPL-ACNC: 16.2 SEC — HIGH (ref 10.6–13.6)
PROTHROM AB SERPL-ACNC: 16.4 SEC — HIGH (ref 10.6–13.6)
RBC # BLD: 3.66 M/UL — LOW (ref 4.2–5.8)
RBC # BLD: 3.89 M/UL — LOW (ref 4.2–5.8)
RBC # FLD: 15 % — HIGH (ref 10.3–14.5)
RBC # FLD: 15.2 % — HIGH (ref 10.3–14.5)
SODIUM SERPL-SCNC: 142 MMOL/L — SIGNIFICANT CHANGE UP (ref 135–145)
SODIUM SERPL-SCNC: 143 MMOL/L — SIGNIFICANT CHANGE UP (ref 135–145)
SPECIMEN SOURCE: SIGNIFICANT CHANGE UP
WBC # BLD: 17.39 K/UL — HIGH (ref 3.8–10.5)
WBC # BLD: 22.58 K/UL — HIGH (ref 3.8–10.5)
WBC # FLD AUTO: 17.39 K/UL — HIGH (ref 3.8–10.5)
WBC # FLD AUTO: 22.58 K/UL — HIGH (ref 3.8–10.5)

## 2021-04-12 PROCEDURE — 71045 X-RAY EXAM CHEST 1 VIEW: CPT | Mod: 26

## 2021-04-12 PROCEDURE — 99291 CRITICAL CARE FIRST HOUR: CPT

## 2021-04-12 PROCEDURE — 70450 CT HEAD/BRAIN W/O DYE: CPT | Mod: 26

## 2021-04-12 PROCEDURE — 71045 X-RAY EXAM CHEST 1 VIEW: CPT | Mod: 26,77

## 2021-04-12 RX ORDER — INSULIN LISPRO 100/ML
VIAL (ML) SUBCUTANEOUS EVERY 6 HOURS
Refills: 0 | Status: DISCONTINUED | OUTPATIENT
Start: 2021-04-12 | End: 2021-04-13

## 2021-04-12 RX ORDER — CHLORHEXIDINE GLUCONATE 213 G/1000ML
15 SOLUTION TOPICAL EVERY 12 HOURS
Refills: 0 | Status: DISCONTINUED | OUTPATIENT
Start: 2021-04-12 | End: 2021-04-13

## 2021-04-12 RX ORDER — FENTANYL CITRATE 50 UG/ML
0.5 INJECTION INTRAVENOUS
Qty: 5000 | Refills: 0 | Status: DISCONTINUED | OUTPATIENT
Start: 2021-04-12 | End: 2021-04-12

## 2021-04-12 RX ORDER — NOREPINEPHRINE BITARTRATE/D5W 8 MG/250ML
0.05 PLASTIC BAG, INJECTION (ML) INTRAVENOUS
Qty: 8 | Refills: 0 | Status: DISCONTINUED | OUTPATIENT
Start: 2021-04-12 | End: 2021-04-14

## 2021-04-12 RX ORDER — FENTANYL CITRATE 50 UG/ML
0.5 INJECTION INTRAVENOUS
Qty: 2500 | Refills: 0 | Status: DISCONTINUED | OUTPATIENT
Start: 2021-04-12 | End: 2021-04-12

## 2021-04-12 RX ORDER — DEXMEDETOMIDINE HYDROCHLORIDE IN 0.9% SODIUM CHLORIDE 4 UG/ML
0.2 INJECTION INTRAVENOUS
Qty: 200 | Refills: 0 | Status: DISCONTINUED | OUTPATIENT
Start: 2021-04-12 | End: 2021-04-13

## 2021-04-12 RX ORDER — CHLORHEXIDINE GLUCONATE 213 G/1000ML
1 SOLUTION TOPICAL
Refills: 0 | Status: DISCONTINUED | OUTPATIENT
Start: 2021-04-12 | End: 2021-04-14

## 2021-04-12 RX ORDER — HEPARIN SODIUM 5000 [USP'U]/ML
700 INJECTION INTRAVENOUS; SUBCUTANEOUS
Qty: 25000 | Refills: 0 | Status: DISCONTINUED | OUTPATIENT
Start: 2021-04-12 | End: 2021-04-15

## 2021-04-12 RX ADMIN — LEVETIRACETAM 400 MILLIGRAM(S): 250 TABLET, FILM COATED ORAL at 05:36

## 2021-04-12 RX ADMIN — PANTOPRAZOLE SODIUM 40 MILLIGRAM(S): 20 TABLET, DELAYED RELEASE ORAL at 11:52

## 2021-04-12 RX ADMIN — DEXMEDETOMIDINE HYDROCHLORIDE IN 0.9% SODIUM CHLORIDE 3.6 MICROGRAM(S)/KG/HR: 4 INJECTION INTRAVENOUS at 15:11

## 2021-04-12 RX ADMIN — DEXMEDETOMIDINE HYDROCHLORIDE IN 0.9% SODIUM CHLORIDE 3.6 MICROGRAM(S)/KG/HR: 4 INJECTION INTRAVENOUS at 10:25

## 2021-04-12 RX ADMIN — Medication 4: at 17:19

## 2021-04-12 RX ADMIN — Medication 6.21 MICROGRAM(S)/KG/MIN: at 23:15

## 2021-04-12 RX ADMIN — POLYETHYLENE GLYCOL 3350 17 GRAM(S): 17 POWDER, FOR SOLUTION ORAL at 05:36

## 2021-04-12 RX ADMIN — LEVETIRACETAM 400 MILLIGRAM(S): 250 TABLET, FILM COATED ORAL at 17:20

## 2021-04-12 RX ADMIN — CHLORHEXIDINE GLUCONATE 15 MILLILITER(S): 213 SOLUTION TOPICAL at 08:04

## 2021-04-12 RX ADMIN — CHLORHEXIDINE GLUCONATE 15 MILLILITER(S): 213 SOLUTION TOPICAL at 17:05

## 2021-04-12 RX ADMIN — HEPARIN SODIUM 8 UNIT(S)/HR: 5000 INJECTION INTRAVENOUS; SUBCUTANEOUS at 23:15

## 2021-04-12 RX ADMIN — POLYETHYLENE GLYCOL 3350 17 GRAM(S): 17 POWDER, FOR SOLUTION ORAL at 17:20

## 2021-04-12 RX ADMIN — FENTANYL CITRATE 0.73 MICROGRAM(S)/KG/HR: 50 INJECTION INTRAVENOUS at 19:30

## 2021-04-12 RX ADMIN — CHLORHEXIDINE GLUCONATE 1 APPLICATION(S): 213 SOLUTION TOPICAL at 23:16

## 2021-04-12 RX ADMIN — Medication 4: at 01:15

## 2021-04-12 RX ADMIN — CHLORHEXIDINE GLUCONATE 1 APPLICATION(S): 213 SOLUTION TOPICAL at 08:04

## 2021-04-12 RX ADMIN — Medication 8: at 23:42

## 2021-04-12 RX ADMIN — Medication 2: at 07:00

## 2021-04-12 RX ADMIN — DEXMEDETOMIDINE HYDROCHLORIDE IN 0.9% SODIUM CHLORIDE 3.31 MICROGRAM(S)/KG/HR: 4 INJECTION INTRAVENOUS at 23:16

## 2021-04-12 NOTE — H&P ADULT - NSHPLABSRESULTS_GEN_ALL_CORE
9.7    22.58 )-----------( 376      ( 12 Apr 2021 21:18 )             30.9   04-12    143  |  113<H>  |  55<H>  ----------------------------<  302<H>  4.3   |  20<L>  |  1.38<H>    Ca    8.3<L>      12 Apr 2021 21:18  Phos  5.2     04-12  Mg     2.2     04-12    TPro  6.4  /  Alb  1.9<L>  /  TBili  0.9  /  DBili  x   /  AST  58<H>  /  ALT  179<H>  /  AlkPhos  117  04-12

## 2021-04-12 NOTE — PROGRESS NOTE ADULT - PROVIDER SPECIALTY LIST ADULT
Critical Care
Critical Care
MICU
Critical Care
MICU
Neurology
Critical Care
MICU
Critical Care
Neurology
CCU

## 2021-04-12 NOTE — H&P ADULT - ASSESSMENT
60M unknown PMHx presented with hypoxic respiratory failure 2/2 COVID, complicated by severe HAGMA, intubated for hypoxic respiratory failure.     # NEURO  - AOx3 at baseline  - Sedated with precedex and fentanyl initially   - weaned off fentanyl     # CARDIAC  hypotension secondary to sedation   - c/w levophed gtt to goal MAP >65     Troponin elevation in setting of increased demand and SUKI  - No ischemic changes no EKG  - R/o PE  - Trend trop q6 until peaked    # PULM  Hypoxic respiratory failure 2/2 COVID +/- PE  - tolerating CPAP 12/5 40%   - CXR to confirm ETT placement   - f/u ABG    # RENAL  ?SUKI vs CKD  - wesley for strict I&Os  - trend Cr  - Avoid nephrotoxic agents, NSAIDs, RCA  - Renally dose meds    HAGMA lactic acidosis 2/2 sepsis +/- DKA  - f/u BHB  - fluid resuscitation    # GI   - Trickle feeds    # HEME/ONC  Elevated d-dimer/other inflammatory markers w/ hypoxia and tachycardia  - will start lovenox covid ppx dosing vs therapeutic AC pending CTA    # ID  Sepsis  - IVF 30cc/kg  - empiric vanc/cefepimep    Tx COVID19 as above    # ENDO  DKA?  - f/u bhb  - on insulin gtt  - f/u a1c 60M unknown PMHx presented with hypoxic respiratory failure 2/2 COVID, complicated by severe HAGMA, intubated for hypoxic respiratory failure.     # NEURO  - AOx3 at baseline  - with AMS 4/8 found to have hemorrhagic infarcts  - CTH shows   - vEEG: moderate to severe non specific diffuse or multifocal cereberal dysfunction , neg for seizure  - c/w keppra for prophylaxis     Cerebellar/ hemorrhagic infarcts vs hemorrhagic PRES  - repeat CTH x3 no change (from [facility])  - f/u repeat CTH (4/12)   - on assessment, withdraws from pain   - c/w neuro check Q2hour  - sedated with precedex will wean as tolerated   - neurosx consulted no surgical intervention at this time     # PULMONARY   Hypoxic respiratory failure 2/2 COVID +/- PE  - tolerating CPAP 12/5 40% on arrival to 5ICU   - might need to put back on full vent support overnight   - CXR to confirm ETT placement   - f/u ABG    # CARDIAC  hypotension secondary to sedation   - c/w levophed gtt to goal MAP >65     # GI   - c/w nepro at 35 cc/hr tolerating well   - c/w bowel regimen with senna and miralax    #   SUKI now resolving   - creat 1.3<--2.5   - olson for strict I&Os  - Avoid nephrotoxic agents  - Renally dose meds    # HEME/ONC  PE and L common femoral vein DVT (DVT seen on POCUS 4/2)  - c/w hep gtt patient specific with goal PTT 50-70 sec     # ENDOCRINE  - AIC 10.3   - c/w moderate ISS     # ID  leukocytosis   - uptrending WBC 22.58<-- 17.39  - low grade fever 100   - continue to trend    - s/p Dex 10 day course (4/1-4/10)   - blood cx NGTD  - will resend blood culture if becomes febrile     COVID19  - Dexamethasone 4/1- 4/10  - Concern for superimposed bacterial infection vs. reactive. Sputum culture Moderate yeast and moderate gram neg rods.   - s/p Empiric vanc/cefepime  - s/p Caspofungin 4/7 x 1 dose   - Blood cultures 4/6 NGTD  - Sputum culture 4/6 GVR/ GPC in clusters  - MRSA swab negative  - Vancomycin d/c'd  - Fungitell negative     60M unknown PMHx presented with hypoxic respiratory failure 2/2 COVID, complicated by severe HAGMA, intubated for hypoxic respiratory failure.     # NEURO  - AOx3 at baseline  - with AMS 4/8 found to have hemorrhagic infarcts  - CTH shows CTH shows scattered b/l cerebellar hemisphere infarcts with mild areas of hemmorhagic transformation   - vEEG: moderate to severe non specific diffuse or multifocal cereberal dysfunction , neg for seizure  - c/w keppra for prophylaxis     Cerebellar/ hemorrhagic infarcts vs hemorrhagic PRES  - repeat CTH x3 no change (from [facility])  - f/u repeat CTH (4/12)   - on assessment, withdraws from pain   - c/w neuro check Q2hour  - sedated with precedex will wean as tolerated   - neurosx consulted no surgical intervention at this time     # PULMONARY   Hypoxic respiratory failure 2/2 COVID +/- PE  - tolerating CPAP 12/5 40% on arrival to 5ICU   - might need to put back on full vent support overnight   - CXR to confirm ETT placement   - f/u ABG    # CARDIAC  hypotension secondary to sedation   - c/w levophed gtt to goal MAP >65     # GI   - c/w nepro at 35 cc/hr tolerating well   - c/w bowel regimen with senna and miralax    #   SUKI now resolving   - creat 1.3<--2.5   - olson for strict I&Os  - Avoid nephrotoxic agents  - Renally dose meds    # HEME/ONC  PE and L common femoral vein DVT (DVT seen on POCUS 4/2)  - c/w hep gtt patient specific with goal PTT 50-70 sec     # ENDOCRINE  - AIC 10.3   - c/w moderate ISS     # ID  leukocytosis   - uptrending WBC 22.58<-- 17.39  - low grade fever 100   - continue to trend    - s/p Dex 10 day course (4/1-4/10)   - blood cx NGTD  - will resend blood culture if becomes febrile     COVID19  - Dexamethasone 4/1- 4/10  - Concern for superimposed bacterial infection vs. reactive. Sputum culture Moderate yeast and moderate gram neg rods.   - s/p Empiric vanc/cefepime  - s/p Caspofungin 4/7 x 1 dose   - Blood cultures 4/6 NGTD  - Sputum culture 4/6 GVR/ GPC in clusters  - MRSA swab negative  - Vancomycin d/c'd  - Fungitell negative    Lines:  Left radial rayna 4/13  Right IJ TLC 4/13

## 2021-04-12 NOTE — PROGRESS NOTE ADULT - REASON FOR ADMISSION
COVID19 w/ severe metabolic acidosis s/p intubation

## 2021-04-12 NOTE — H&P ADULT - NSHPPHYSICALEXAM_GEN_ALL_CORE
PHYSICAL EXAM:  GENERAL: No acute distress, well-developed  HEAD:  Atraumatic, Normocephalic  EYES: EOMI, PERRLA, conjunctiva and sclera clear  NECK: Supple, no lymphadenopathy, no JVD  CHEST/LUNG:   HEART: Regular rate and rhythm. Normal S1/S2. No murmurs, rubs, or gallops  ABDOMEN: Soft, non-tender, non-distended; normal bowel sounds, no organomegaly  EXTREMITIES:  2+ peripheral pulses b/l, No clubbing, cyanosis, or edema  NEUROLOGY: A&O x 3, no focal deficits  SKIN: No rashes or lesions PHYSICAL EXAM:  GENERAL: No acute distress, well-developed  HEAD:  Atraumatic, Normocephalic  EYES: EOMI, PERRLA, conjunctiva and sclera clear  NECK: Supple, no lymphadenopathy, no JVD  CHEST/LUNG:   HEART: Regular rate and rhythm. Normal S1/S2. No murmurs, rubs, or gallops  ABDOMEN: Soft, non-tender, non-distended; normal bowel sounds, no organomegaly  EXTREMITIES:  2+ peripheral pulses b/l, No clubbing, cyanosis, or edema  NEUROLOGY: sedated, illicit pain from painful stimuli    SKIN: No rashes or lesions PHYSICAL EXAM:  GENERAL: No acute distress, well-developed  HEAD:  Atraumatic, Normocephalic  EYES: EOMI, PERRLA, conjunctiva and sclera clear  NECK: Supple, no lymphadenopathy, no JVD  CHEST/LUNG: b/l rales   HEART: Regular rate and rhythm. Normal S1/S2. No murmurs, rubs, or gallops  ABDOMEN: Soft, non-tender, non-distended; normal bowel sounds, no organomegaly  EXTREMITIES:  2+ peripheral pulses b/l, No clubbing, cyanosis, or edema  NEUROLOGY: sedated, illicit pain from painful stimuli    SKIN: No rashes or lesions

## 2021-04-12 NOTE — PROGRESS NOTE ADULT - ATTENDING COMMENTS
ARF- problems with high pressures intermittently. Vt dec and improved. does not seem r/t biting down    hypotension- likely r/t sedation. low dose levo    cva- repeat CTx3 no change    leukocytosis- improving after 45 days ctx and 3 vanco. cx sput looks like NRF    SUKI- resolving    inc LFTs- started 4/10. no obvious toxins. follow

## 2021-04-12 NOTE — PROGRESS NOTE ADULT - SUBJECTIVE AND OBJECTIVE BOX
HPI:  60M unknown medical history BIBEMS for respiratory distress, recently COVID+ as per EMS. Pt unable to comply with history 2/2 resp distress, nods yes to difficulty breathing, no to pain. Baseline AAOx3. En route, pt had RR 28, SaO2 60%. In ED initially hypoxic to 60% placed on NRB and satting low 90s and tachypneic to 40s on NRB. Placed on AVAPS, still tachypneic to 40s. Labs showing HAGMA, elevated FSG to 500s. Also with lactate of 15. Trops of 200 and pro-BNP elevated to 21k. MICU consulted for respiratory failure in setting of COVID, also concern for DKA. On encounter, pt is noncooperative with questioning. Nods and tries to speak, unable to form full sentences and visibly tachypneic on AVAPS. In ED given 2L NS, decadron. Started on insulin gtt. Subsequently intubated.     Initial vitals 108/86, , RR 28, T97, SaO2 60% on 15L NRB. Received 2L NS bolus. Started on insulin gtt.     Pt admitted to surg b for further management.     Addendum 4/1/21 1700:  Limited history obtained from nitesh Anders, who lives in the [place] [phone]). Patient has a PMH of low BP and takes no medications. Patient tested positive for COVID on 3/19. He was initially doing fine with some fatigue, but suddenly decompensated after yesterday. Unable to obtain social history. Wife, Lucero, is currently in [place] - she went due to medical issues but was unable to return due to COVID travel restrictions.    (01 Apr 2021 11:30)    Subjective: Intubated and sedated w/ precedex drips.    ICU Vital Signs Last 24 Hrs  T(C): 37.7 (12 Apr 2021 16:00), Max: 38.1 (12 Apr 2021 00:00)  T(F): 99.9 (12 Apr 2021 16:00), Max: 100.6 (12 Apr 2021 00:00)  HR: 68 (12 Apr 2021 16:00) (60 - 75)  BP: --  BP(mean): --  ABP: 107/60 (12 Apr 2021 16:00) (96/57 - 155/73)  ABP(mean): 77 (12 Apr 2021 16:00) (69 - 99)  RR: 24 (12 Apr 2021 16:00) (22 - 30)  SpO2: 100% (12 Apr 2021 16:00) (93% - 100%)    General: Intubated and sedated w/ precedex; Opens eyes spontaneously, not tracking; moving extremities spontaneously - not appropriate to command  Eyes: Scleras clear, PERRLA  Neck: Neck supple, trachea midline, No JVD  Respiratory: coarse breath sounds B/L  CV: S1S2; no audible murmurs  Abdominal: +BS's x4, soft, ND/NT  Extremities: No edema, + peripheral pulses  Skin: No Rashes, Hematoma, Ecchymosis                          10.3   17.39 )-----------( 367      ( 12 Apr 2021 01:59 )             32.3     04-12    142  |  112<H>  |  50<H>  ----------------------------<  241<H>  4.5   |  20<L>  |  1.24    Ca    8.6      12 Apr 2021 01:59  Phos  3.7     04-12  Mg     2.0     04-12    TPro  6.6  /  Alb  2.1<L>  /  TBili  1.1  /  DBili  x   /  AST  112<H>  /  ALT  261<H>  /  AlkPhos  133<H>  04-12    PT/INR - ( 12 Apr 2021 01:59 )   PT: 16.2 sec;   INR: 1.43 ratio         PTT - ( 12 Apr 2021 11:45 )  PTT:43.0 sec    MEDICATIONS  (STANDING):  chlorhexidine 0.12% Liquid 15 milliLiter(s) Oral Mucosa every 12 hours  chlorhexidine 4% Liquid 1 Application(s) Topical <User Schedule>  dexMEDEtomidine Infusion 0.2 MICROgram(s)/kG/Hr (3.6 mL/Hr) IV Continuous <Continuous>  dextrose 50% Injectable 25 Gram(s) IV Push once  dextrose 50% Injectable 12.5 Gram(s) IV Push once  dextrose 50% Injectable 25 Gram(s) IV Push once  glucagon  Injectable 1 milliGRAM(s) IntraMuscular once  insulin lispro (ADMELOG) corrective regimen sliding scale   SubCutaneous every 6 hours  levETIRAcetam  IVPB 500 milliGRAM(s) IV Intermittent every 12 hours  norepinephrine Infusion 0.05 MICROgram(s)/kG/Min (6.87 mL/Hr) IV Continuous <Continuous>  pantoprazole  Injectable 40 milliGRAM(s) IV Push daily  polyethylene glycol 3350 17 Gram(s) Oral every 12 hours  senna 2 Tablet(s) Oral at bedtime  sodium chloride 3%. 500 milliLiter(s) (35 mL/Hr) IV Continuous <Continuous>      Assessment  59 y/o male Admitted 4/1/21 w/ COVID+ ARDS, and intubated 4/1/21    PLAN  #Pulm:  Mode: CPAP with PS  FiO2: 40  PEEP: 5  PS: 20  MAP: 11  ABG - ( 12 Apr 2021 15:16 )  pH, Arterial: 7.46  pH, Blood: x     /  pCO2: 31    /  pO2: 85    / HCO3: 23    / Base Excess: -2.0  /  SaO2: 95.9      Patient biting on ETT w/ precedex drip down to 0.7mcg/kg/hr; Turned back-up to 1.2mcg/kg/hr, and much more comfortable.    #Neuro:  CT Head 4/8 +left acute infarcts w/ hemorrhagic transformation  Repeat Head CT 4/9 unchanged  Repeat Head CT 4/10 unchanged  Plan is for repeat CT Head again today  Neuro following    #Cardio:  Elevated trop  Hypotension  Wean levophed as tolerated    #ID  +Secretions suctioned through ETT  Sputum culture sent  WBC 17.39  Afebrile  Sputum culture 4/6 negative  Off antibiotics; Finished course of ceftriaxone on 4/10    #Renal:  Cr 1.24  I&O's Summary    11 Apr 2021 07:01  -  12 Apr 2021 07:00  --------------------------------------------------------  IN: 1289.9 mL / OUT: 2400 mL / NET: -1110.1 mL    12 Apr 2021 07:01  -  12 Apr 2021 16:10  --------------------------------------------------------  IN: 424.2 mL / OUT: 695 mL / NET: -270.8 mL     today (131 on 4/9/21)    LFT's mildly elevated    #Endo:  Finger-sticks:   Lispro sliding-scale  POCT Blood Glucose.: 162 mg/dL (12 Apr 2021 11:57)  NPH inc to 45    GI:  Nepro OGT feeds  Protonix, miralax, senna    #Heme:  SCD's for DVT prophylaxis  Heparin

## 2021-04-12 NOTE — H&P ADULT - NSHPOUTPATIENTPROVIDERS_GEN_ALL_CORE
Alba Pizarro, Nephew: [phone] - primary contact  Demetrius, Brother in law (in [place]): [phone]   Lucero, Wife (in [place]): [phone] (speaks [place])

## 2021-04-12 NOTE — H&P ADULT - HISTORY OF PRESENT ILLNESS
60M unknown medical history BIBEMS for respiratory distress, recently COVID+ as per EMS. Pt unable to comply with history 2/2 resp distress, nods yes to difficulty breathing, no to pain. Baseline AAOx3. En route, pt had RR 28, SaO2 60%. In ED initially hypoxic to 60% placed on NRB and satting low 90s and tachypneic to 40s on NRB. Placed on AVAPS, still tachypneic to 40s. Labs showing HAGMA, elevated FSG to 500s. Also with lactate of 15. Trops of 200 and pro-BNP elevated to 21k. MICU consulted for respiratory failure in setting of COVID, also concern for DKA. On encounter, pt is noncooperative with questioning. Nods and tries to speak, unable to form full sentences and visibly tachypneic on AVAPS. In ED given 2L NS, decadron. Started on insulin gtt. Subsequently intubated.     Initial vitals 108/86, , RR 28, T97, SaO2 60% on 15L NRB. Received 2L NS bolus. Started on insulin gtt.     Pt admitted to surg b for further management.     Addendum 4/1/21 1700:  Limited history obtained from Alba Pizarro nephgurwinder, who lives in the [place] [phone]). Patient has a PMH of low BP and takes no medications. Patient tested positive for COVID on 3/19. He was initially doing fine with some fatigue, but suddenly decompensated after yesterday. Unable to obtain social history. Wife, Lucero, is currently in [place] - she went due to medical issues but was unable to return due to COVID travel restrictions.    60M unknown medical history BIBEMS for respiratory distress, recently COVID+ as per EMS. Pt unable to comply with history 2/2 resp distress, nods yes to difficulty breathing, no to pain. Baseline AAOx3. En route, pt had RR 28, SaO2 60%. In ED initially hypoxic to 60% placed on NRB and satting low 90s and tachypneic to 40s on NRB. Placed on AVAPS, still tachypneic to 40s. Labs showing HAGMA, elevated FSG to 500s. Also with lactate of 15. Trops of 200 and pro-BNP elevated to 21k. MICU consulted for respiratory failure in setting of COVID, also concern for DKA. On encounter, pt is noncooperative with questioning. Nods and tries to speak, unable to form full sentences and visibly tachypneic on AVAPS. In ED given 2L NS, decadron. Started on insulin gtt. Subsequently intubated.     Initial vitals 108/86, , RR 28, T97, SaO2 60% on 15L NRB. Received 2L NS bolus. Started on insulin gtt.     Pt admitted to surg b for further management.     Addendum 4/1/21 1700:  Limited history obtained from Alba Pizarro nephgurwinder, who lives in the [place] [phone]). Patient has a PMH of low BP and takes no medications. Patient tested positive for COVID on 3/19. He was initially doing fine with some fatigue, but suddenly decompensated after yesterday. Unable to obtain social history. Wife, Lucero, is currently in [place] - she went due to medical issues but was unable to return due to COVID travel restrictions.

## 2021-04-12 NOTE — PROGRESS NOTE ADULT - CRITICAL CARE SERVICES PROVIDED
Critical care services provided

## 2021-04-12 NOTE — PROGRESS NOTE ADULT - NSICDXPILOT_GEN_ALL_CORE
[place]

## 2021-04-12 NOTE — H&P ADULT - ATTENDING COMMENTS
Patient is a 59 y/o M with no known PMH, p/w acute hypoxic respiratory failure due to Covid pna, initially in severe ARDS, respiratory status improved, complicated by PE on AC, but now with acute cerebellar ischemic cva with some hemorrhagic transformation, initally heparin held, restarted yesterday, transferred today from [facility] to [facility] due to level loading, on keppra, no seizures on EEG there, s/p hypertonic saline.     - continue vent management, on PS, tolerating well, serial ABG, vent bundle, continue fentanyl/precedex for sedation  - PPI for DVT ppx  - restarted on 4/10 on heparin gtt without bolus due to PE, will repeat HCT to ensure stability of hemorrhage, continue keppra BID, f/u with neurology, q2h neurochecks for now, if remain stable, can trainsition to q4h  - SUKI, polyuric, monitor UOP  - insulin sliding scale, FS q6h  - continue feeds  -bowel regimen  - continue pressors as needed  - guarded prognosis

## 2021-04-13 LAB
ALBUMIN SERPL ELPH-MCNC: 2 G/DL — LOW (ref 3.3–5)
ALP SERPL-CCNC: 116 U/L — SIGNIFICANT CHANGE UP (ref 40–120)
ALT FLD-CCNC: 154 U/L — HIGH (ref 10–45)
ANION GAP SERPL CALC-SCNC: 11 MMOL/L — SIGNIFICANT CHANGE UP (ref 5–17)
APPEARANCE UR: ABNORMAL
APTT BLD: 50.6 SEC — HIGH (ref 27.5–35.5)
APTT BLD: 52.3 SEC — HIGH (ref 27.5–35.5)
AST SERPL-CCNC: 40 U/L — SIGNIFICANT CHANGE UP (ref 10–40)
BACTERIA # UR AUTO: NEGATIVE — SIGNIFICANT CHANGE UP
BASE EXCESS BLDV CALC-SCNC: -3 MMOL/L — LOW (ref -2–2)
BILIRUB SERPL-MCNC: 0.8 MG/DL — SIGNIFICANT CHANGE UP (ref 0.2–1.2)
BILIRUB UR-MCNC: NEGATIVE — SIGNIFICANT CHANGE UP
BLD GP AB SCN SERPL QL: NEGATIVE — SIGNIFICANT CHANGE UP
BUN SERPL-MCNC: 50 MG/DL — HIGH (ref 7–23)
CALCIUM SERPL-MCNC: 8.6 MG/DL — SIGNIFICANT CHANGE UP (ref 8.4–10.5)
CHLORIDE SERPL-SCNC: 112 MMOL/L — HIGH (ref 96–108)
CO2 BLDV-SCNC: 22 MMOL/L — SIGNIFICANT CHANGE UP (ref 22–30)
CO2 SERPL-SCNC: 22 MMOL/L — SIGNIFICANT CHANGE UP (ref 22–31)
COLOR SPEC: YELLOW — SIGNIFICANT CHANGE UP
CREAT SERPL-MCNC: 1.19 MG/DL — SIGNIFICANT CHANGE UP (ref 0.5–1.3)
CRP SERPL-MCNC: 311 MG/L — HIGH (ref 0–4)
D DIMER BLD IA.RAPID-MCNC: 1170 NG/ML DDU — HIGH
DIFF PNL FLD: ABNORMAL
EPI CELLS # UR: 2 /HPF — SIGNIFICANT CHANGE UP
FERRITIN SERPL-MCNC: 1130 NG/ML — HIGH (ref 30–400)
FERRITIN SERPL-MCNC: 1401 NG/ML — HIGH (ref 30–400)
FIBRINOGEN PPP-MCNC: 1200 MG/DL — HIGH (ref 290–520)
GAS PNL BLDA: SIGNIFICANT CHANGE UP
GAS PNL BLDA: SIGNIFICANT CHANGE UP
GLUCOSE BLDC GLUCOMTR-MCNC: 152 MG/DL — HIGH (ref 70–99)
GLUCOSE BLDC GLUCOMTR-MCNC: 174 MG/DL — HIGH (ref 70–99)
GLUCOSE BLDC GLUCOMTR-MCNC: 203 MG/DL — HIGH (ref 70–99)
GLUCOSE BLDC GLUCOMTR-MCNC: 211 MG/DL — HIGH (ref 70–99)
GLUCOSE BLDC GLUCOMTR-MCNC: 90 MG/DL — SIGNIFICANT CHANGE UP (ref 70–99)
GLUCOSE SERPL-MCNC: 178 MG/DL — HIGH (ref 70–99)
GLUCOSE UR QL: ABNORMAL
GRAM STN FLD: SIGNIFICANT CHANGE UP
HCO3 BLDV-SCNC: 21 MMOL/L — SIGNIFICANT CHANGE UP (ref 21–29)
HCT VFR BLD CALC: 32.4 % — LOW (ref 39–50)
HCV AB S/CO SERPL IA: 0.33 S/CO — SIGNIFICANT CHANGE UP (ref 0–0.99)
HCV AB SERPL-IMP: SIGNIFICANT CHANGE UP
HGB BLD-MCNC: 10.2 G/DL — LOW (ref 13–17)
HOROWITZ INDEX BLDV+IHG-RTO: 40 — SIGNIFICANT CHANGE UP
HYALINE CASTS # UR AUTO: 1 /LPF — SIGNIFICANT CHANGE UP (ref 0–7)
KETONES UR-MCNC: NEGATIVE — SIGNIFICANT CHANGE UP
LDH SERPL L TO P-CCNC: 386 U/L — HIGH (ref 50–242)
LEUKOCYTE ESTERASE UR-ACNC: NEGATIVE — SIGNIFICANT CHANGE UP
MAGNESIUM SERPL-MCNC: 2.1 MG/DL — SIGNIFICANT CHANGE UP (ref 1.6–2.6)
MCHC RBC-ENTMCNC: 26.5 PG — LOW (ref 27–34)
MCHC RBC-ENTMCNC: 31.5 GM/DL — LOW (ref 32–36)
MCV RBC AUTO: 84.2 FL — SIGNIFICANT CHANGE UP (ref 80–100)
NITRITE UR-MCNC: NEGATIVE — SIGNIFICANT CHANGE UP
NRBC # BLD: 0 /100 WBCS — SIGNIFICANT CHANGE UP (ref 0–0)
NT-PROBNP SERPL-SCNC: 648 PG/ML — HIGH (ref 0–300)
PCO2 BLDV: 37 MMHG — LOW (ref 42–55)
PH BLDV: 7.38 — SIGNIFICANT CHANGE UP (ref 7.35–7.45)
PH UR: 5.5 — SIGNIFICANT CHANGE UP (ref 5–8)
PHOSPHATE SERPL-MCNC: 4.1 MG/DL — SIGNIFICANT CHANGE UP (ref 2.5–4.5)
PLATELET # BLD AUTO: 416 K/UL — HIGH (ref 150–400)
PO2 BLDV: 40 MMHG — SIGNIFICANT CHANGE UP (ref 25–45)
POTASSIUM SERPL-MCNC: 3.6 MMOL/L — SIGNIFICANT CHANGE UP (ref 3.5–5.3)
POTASSIUM SERPL-SCNC: 3.6 MMOL/L — SIGNIFICANT CHANGE UP (ref 3.5–5.3)
PROT SERPL-MCNC: 6.5 G/DL — SIGNIFICANT CHANGE UP (ref 6–8.3)
PROT UR-MCNC: ABNORMAL
RBC # BLD: 3.85 M/UL — LOW (ref 4.2–5.8)
RBC # FLD: 15.2 % — HIGH (ref 10.3–14.5)
RBC CASTS # UR COMP ASSIST: 2 /HPF — SIGNIFICANT CHANGE UP (ref 0–4)
RH IG SCN BLD-IMP: POSITIVE — SIGNIFICANT CHANGE UP
SAO2 % BLDV: 65 % — LOW (ref 67–88)
SODIUM SERPL-SCNC: 145 MMOL/L — SIGNIFICANT CHANGE UP (ref 135–145)
SP GR SPEC: 1.02 — SIGNIFICANT CHANGE UP (ref 1.01–1.02)
SPECIMEN SOURCE: SIGNIFICANT CHANGE UP
TRIGL SERPL-MCNC: 126 MG/DL — SIGNIFICANT CHANGE UP
TROPONIN T, HIGH SENSITIVITY RESULT: 36 NG/L — SIGNIFICANT CHANGE UP (ref 0–51)
UROBILINOGEN FLD QL: NEGATIVE — SIGNIFICANT CHANGE UP
WBC # BLD: 25.62 K/UL — HIGH (ref 3.8–10.5)
WBC # FLD AUTO: 25.62 K/UL — HIGH (ref 3.8–10.5)
WBC UR QL: 1 /HPF — SIGNIFICANT CHANGE UP (ref 0–5)

## 2021-04-13 PROCEDURE — 99291 CRITICAL CARE FIRST HOUR: CPT | Mod: 25

## 2021-04-13 PROCEDURE — 71045 X-RAY EXAM CHEST 1 VIEW: CPT | Mod: 26

## 2021-04-13 PROCEDURE — 36556 INSERT NON-TUNNEL CV CATH: CPT

## 2021-04-13 PROCEDURE — 36620 INSERTION CATHETER ARTERY: CPT

## 2021-04-13 RX ORDER — LEVETIRACETAM 250 MG/1
500 TABLET, FILM COATED ORAL
Refills: 0 | Status: DISCONTINUED | OUTPATIENT
Start: 2021-04-13 | End: 2021-04-13

## 2021-04-13 RX ORDER — LEVETIRACETAM 250 MG/1
500 TABLET, FILM COATED ORAL EVERY 12 HOURS
Refills: 0 | Status: COMPLETED | OUTPATIENT
Start: 2021-04-13 | End: 2021-04-30

## 2021-04-13 RX ORDER — HUMAN INSULIN 100 [IU]/ML
9 INJECTION, SUSPENSION SUBCUTANEOUS EVERY 6 HOURS
Refills: 0 | Status: DISCONTINUED | OUTPATIENT
Start: 2021-04-13 | End: 2021-04-13

## 2021-04-13 RX ORDER — MIDAZOLAM HYDROCHLORIDE 1 MG/ML
2 INJECTION, SOLUTION INTRAMUSCULAR; INTRAVENOUS ONCE
Refills: 0 | Status: DISCONTINUED | OUTPATIENT
Start: 2021-04-13 | End: 2021-04-13

## 2021-04-13 RX ORDER — CHLORHEXIDINE GLUCONATE 213 G/1000ML
15 SOLUTION TOPICAL EVERY 12 HOURS
Refills: 0 | Status: DISCONTINUED | OUTPATIENT
Start: 2021-04-13 | End: 2021-04-13

## 2021-04-13 RX ORDER — FENTANYL CITRATE 50 UG/ML
100 INJECTION INTRAVENOUS ONCE
Refills: 0 | Status: DISCONTINUED | OUTPATIENT
Start: 2021-04-13 | End: 2021-04-13

## 2021-04-13 RX ORDER — CHLORHEXIDINE GLUCONATE 213 G/1000ML
1 SOLUTION TOPICAL
Refills: 0 | Status: DISCONTINUED | OUTPATIENT
Start: 2021-04-13 | End: 2021-04-14

## 2021-04-13 RX ORDER — SODIUM CHLORIDE 9 MG/ML
1000 INJECTION, SOLUTION INTRAVENOUS
Refills: 0 | Status: DISCONTINUED | OUTPATIENT
Start: 2021-04-13 | End: 2021-04-14

## 2021-04-13 RX ORDER — INSULIN LISPRO 100/ML
VIAL (ML) SUBCUTANEOUS EVERY 6 HOURS
Refills: 0 | Status: DISCONTINUED | OUTPATIENT
Start: 2021-04-13 | End: 2021-04-15

## 2021-04-13 RX ORDER — POTASSIUM CHLORIDE 20 MEQ
20 PACKET (EA) ORAL ONCE
Refills: 0 | Status: COMPLETED | OUTPATIENT
Start: 2021-04-13 | End: 2021-04-13

## 2021-04-13 RX ORDER — SODIUM CHLORIDE 9 MG/ML
1000 INJECTION, SOLUTION INTRAVENOUS
Refills: 0 | Status: DISCONTINUED | OUTPATIENT
Start: 2021-04-13 | End: 2021-04-13

## 2021-04-13 RX ORDER — FUROSEMIDE 40 MG
20 TABLET ORAL ONCE
Refills: 0 | Status: COMPLETED | OUTPATIENT
Start: 2021-04-13 | End: 2021-04-13

## 2021-04-13 RX ORDER — SODIUM CHLORIDE 9 MG/ML
10 INJECTION INTRAMUSCULAR; INTRAVENOUS; SUBCUTANEOUS
Refills: 0 | Status: DISCONTINUED | OUTPATIENT
Start: 2021-04-13 | End: 2021-04-15

## 2021-04-13 RX ORDER — HUMAN INSULIN 100 [IU]/ML
4 INJECTION, SUSPENSION SUBCUTANEOUS ONCE
Refills: 0 | Status: COMPLETED | OUTPATIENT
Start: 2021-04-13 | End: 2021-04-13

## 2021-04-13 RX ORDER — SENNA PLUS 8.6 MG/1
2 TABLET ORAL AT BEDTIME
Refills: 0 | Status: DISCONTINUED | OUTPATIENT
Start: 2021-04-13 | End: 2021-04-14

## 2021-04-13 RX ORDER — PANTOPRAZOLE SODIUM 20 MG/1
40 TABLET, DELAYED RELEASE ORAL DAILY
Refills: 0 | Status: DISCONTINUED | OUTPATIENT
Start: 2021-04-13 | End: 2021-04-17

## 2021-04-13 RX ORDER — POLYETHYLENE GLYCOL 3350 17 G/17G
17 POWDER, FOR SOLUTION ORAL AT BEDTIME
Refills: 0 | Status: DISCONTINUED | OUTPATIENT
Start: 2021-04-13 | End: 2021-04-14

## 2021-04-13 RX ORDER — DEXTROSE 50 % IN WATER 50 %
25 SYRINGE (ML) INTRAVENOUS ONCE
Refills: 0 | Status: COMPLETED | OUTPATIENT
Start: 2021-04-13 | End: 2021-04-13

## 2021-04-13 RX ADMIN — PANTOPRAZOLE SODIUM 40 MILLIGRAM(S): 20 TABLET, DELAYED RELEASE ORAL at 11:08

## 2021-04-13 RX ADMIN — Medication 1 DROP(S): at 11:08

## 2021-04-13 RX ADMIN — Medication 1: at 23:39

## 2021-04-13 RX ADMIN — HEPARIN SODIUM 8 UNIT(S)/HR: 5000 INJECTION INTRAVENOUS; SUBCUTANEOUS at 06:08

## 2021-04-13 RX ADMIN — Medication 6.21 MICROGRAM(S)/KG/MIN: at 18:24

## 2021-04-13 RX ADMIN — Medication 100 MILLIEQUIVALENT(S): at 15:58

## 2021-04-13 RX ADMIN — Medication 1 DROP(S): at 05:37

## 2021-04-13 RX ADMIN — CHLORHEXIDINE GLUCONATE 15 MILLILITER(S): 213 SOLUTION TOPICAL at 05:37

## 2021-04-13 RX ADMIN — Medication 25 MILLILITER(S): at 18:25

## 2021-04-13 RX ADMIN — LEVETIRACETAM 400 MILLIGRAM(S): 250 TABLET, FILM COATED ORAL at 06:50

## 2021-04-13 RX ADMIN — Medication 1 DROP(S): at 18:25

## 2021-04-13 RX ADMIN — LEVETIRACETAM 400 MILLIGRAM(S): 250 TABLET, FILM COATED ORAL at 18:25

## 2021-04-13 RX ADMIN — Medication 20 MILLIGRAM(S): at 11:08

## 2021-04-13 RX ADMIN — FENTANYL CITRATE 100 MICROGRAM(S): 50 INJECTION INTRAVENOUS at 01:50

## 2021-04-13 RX ADMIN — FENTANYL CITRATE 100 MICROGRAM(S): 50 INJECTION INTRAVENOUS at 01:15

## 2021-04-13 RX ADMIN — CHLORHEXIDINE GLUCONATE 1 APPLICATION(S): 213 SOLUTION TOPICAL at 23:29

## 2021-04-13 RX ADMIN — HUMAN INSULIN 4 UNIT(S): 100 INJECTION, SUSPENSION SUBCUTANEOUS at 11:35

## 2021-04-13 RX ADMIN — Medication 8: at 05:35

## 2021-04-13 RX ADMIN — Medication 8: at 11:23

## 2021-04-13 NOTE — PROGRESS NOTE ADULT - ATTENDING COMMENTS
Mr. Pizarro is a 59 y/o male who was admitted for COVID-19 pneumonia, acute hypoxic resp failure, PE with RV strain, acute cerebellar and cerebral infarcts with hemorrhagic conversion and encephalopathy. He was transferred from [facility] on 4/12. He tolerated PSV 5/5 today and is successfully extubated now. He is awake, alert, follows commands and moves all extremities. Will reconsult neurology for further w/u and treatment, continue iv heparin for now. Continue Keppra for seizure ppx. Bedside POCUS with scattered b-lines, bibasilar consolidations, normal LV sys fn, dilated RV, IVC 2 cm with resp variation, no R to L shunt was found on saline injection. He was on low dose levo in am - wean off if tolerates. Will order PT and swallow eval. Family updated by HERMANN Gonzalez, I updated family friend Dr. Pizarro (Radiologist at [facility]). Mr. Pizarro is a 61 y/o male who was admitted for COVID-19 pneumonia, acute hypoxic resp failure, PE with RV strain, acute cerebellar and cerebral infarcts with hemorrhagic conversion and encephalopathy. He was transferred from [facility] on 4/12. He tolerated PSV 5/5 today and is successfully extubated now. He is awake, alert, follows commands and moves all extremities. Will reconsult neurology for further w/u and treatment, continue iv heparin for now. Continue Keppra for seizure ppx. Bedside POCUS with scattered b-lines, bibasilar consolidations, normal LV sys fn, dilated RV, IVC 2 cm with resp variation, no R to L shunt was found on saline injection. He was on low dose levo in am - wean off if tolerates. Will order PT and swallow eval. He has nonspecific leucocytosis without an obvious evidence of infection, central line was changed overnight, will send cultures, monitor off abx for now. Family updated by HERMANN Gonzalez, I updated family friend Dr. Pizarro (Radiologist at [facility]).

## 2021-04-13 NOTE — PROGRESS NOTE ADULT - ASSESSMENT
60M unknown PMHx presented with hypoxic respiratory failure 2/2 COVID, complicated by severe HAGMA, hypoxemia, multiple areas of crebral infarcts with hemorhagic transformation, suki, spt with klebs completed empiric course of abx for Concern for superimposed bacterial infection vs. reactive,  intubated at [facility] for hypoxic respiratory failure and transferred to 5ICU (4/12)     # NEURO  - AOx3 at baseline currently lightly sedated but follows command no focal deficits seen  - with AMS 4/8 found to have hemorrhagic infarcts  Acute infarcts involving left cerebellar region  the left posterior frontal, bilateral occipital parietal regions with hemharagic transformation  - repeat CTH x3 no change (from [facility])  - f/u repeat CTH (4/12)   - vEEG: moderate to severe non specific diffuse or multifocal cereberal dysfunction , neg for seizure  - c/w keppra for prophylaxis   - c/w neuro check q4h  - Continue with low dose precedex will wean as tolerated   - neurosx ---> no surgical intervention at this time     # PULMONARY   Hypoxic respiratory failure 2/2 COVID +/- PE  - Now extubated on 4/13 to venturi mask at 40% sats 99%  -f/u post abg results    # CARDIAC  hypotension secondary to sedation   - c/w levophed gtt to goal MAP >65     # GI   - NPO for intubation  Dysphagia screening post extubation today  - c/w bowel regimen with senna and miralax    #   SUKI now resolving   - continue to trend creatinine  - olson for strict I&Os likely in am if renal function continues to improve  - Avoid nephrotoxic agents  - Renally dose meds    # HEME/ONC  PE and L common femoral vein DVT (DVT seen on POCUS 4/2)  - c/w hep gtt patient specific with goal PTT 50-70 sec   -trend PTT   -H+H currently stable    # ENDOCRINE  - AIC 10.3   - c/w moderate ISS     # ID  leukocytosis   - uptrending WBC 22.58<-- 17.39  - low grade fever 100   - continue to trend    - s/p Dex 10 day course (4/1-4/10)   - blood cx NGTD  - f/u blood culture sent 4/12    COVID19  - Dexamethasone 4/1- 4/10  - Not given remdes in the setting of SUKI  -Currenly off Abx  Concern for superimposed bacterial infection vs. reactive. Sputum culture Moderate yeast and moderate gram neg rods.   - s/p Empiric vanc/cefepime  - s/p Caspofungin 4/7 x 1 dose   - Blood cultures 4/6 NGTD  - Sputum culture 4/12 klebs  - MRSA swab negative  -  Fungitell negative

## 2021-04-13 NOTE — AIRWAY REMOVAL NOTE  ADULT & PEDS - ARTIFICAL AIRWAY REMOVAL COMMENTS
Written order for extubation verified. The patient was identified by full name and birth date compared to the identification band. Present during the procedure was NICK Torres.

## 2021-04-13 NOTE — PROCEDURE NOTE - NSINDICATIONS_GEN_A_CORE
arterial puncture to obtain ABG's/blood sampling/critical patient/monitoring purposes
critical illness/emergency venous access/hemodynamic monitoring/venous access

## 2021-04-13 NOTE — PROCEDURE NOTE - NSPROCDETAILS_GEN_ALL_CORE
location identified, draped/prepped, sterile technique used, needle inserted/introduced/positive blood return obtained via catheter/connected to a pressurized flush line/sutured in place/Seldinger technique/all materials/supplies accounted for at end of procedure
guidewire recovered/lumen(s) aspirated and flushed/sterile dressing applied/sterile technique, catheter placed/ultrasound guidance with use of sterile gel and probe cove

## 2021-04-13 NOTE — PROGRESS NOTE ADULT - SUBJECTIVE AND OBJECTIVE BOX
CHIEF COMPLAINT:  Patient is a 60y old  Male who presents with a chief complaint of COVID19 w/ severe metabolic acidosis s/p intubation (2021 20:54)    HPI:  60M unknown medical history BIBEMS for respiratory distress, recently COVID+ as per EMS. Pt unable to comply with history 2/2 resp distress, nods yes to difficulty breathing, no to pain. Baseline AAOx3. En route, pt had RR 28, SaO2 60%. In ED initially hypoxic to 60% placed on NRB and satting low 90s and tachypneic to 40s on NRB. Placed on AVAPS, still tachypneic to 40s. Labs showing HAGMA, elevated FSG to 500s. Also with lactate of 15. Trops of 200 and pro-BNP elevated to 21k. MICU consulted for respiratory failure in setting of COVID, also concern for DKA. On encounter, pt is noncooperative with questioning. Nods and tries to speak, unable to form full sentences and visibly tachypneic on AVAPS. In ED given 2L NS, decadron. Started on insulin gtt. Subsequently intubated.     Initial vitals 108/86, , RR 28, T97, SaO2 60% on 15L NRB. Received 2L NS bolus. Started on insulin gtt.     Pt admitted to surg b for further management.     Addendum 21 1700:  Limited history obtained from nitesh Anders, who lives in the  (+[phone]). Patient has a PMH of low BP and takes no medications. Patient tested positive for COVID on 3/19. He was initially doing fine with some fatigue, but suddenly decompensated after yesterday. Unable to obtain social history. Wife, Lucero, is currently in [place] - she went due to medical issues but was unable to return due to COVID travel restrictions.        (2021 20:54)      Interval Events: admitted overnight from [facility] see H+P for events      REVIEW OF SYSTEMS: unable 2/2 sedation          OBJECTIVE:  ICU Vital Signs Last 24 Hrs  T(C): 37 (2021 04:00), Max: 37.9 (2021 22:15)  T(F): 98.6 (2021 04:00), Max: 100.2 (2021 22:15)  HR: 56 (2021 06:45) (55 - 70)  BP: 88/52 (2021 20:45) (88/52 - 122/67)  BP(mean): 65 (2021 20:45) (65 - 89)  ABP: 102/48 (2021 06:45) (89/50 - 170/140)  ABP(mean): 66 (2021 06:45) (62 - 147)  RR: 15 (2021 06:45) (15 - 28)  SpO2: 100% (2021 06:45) (84% - 100%)    Mode: AC/ CMV (Assist Control/ Continuous Mandatory Ventilation), RR (machine): 22, TV (machine): 350, FiO2: 40, PEEP: 5, ITime: 1, MAP: 9, PIP: 19    04-12 @ 07:01  -  04-13 @ 07:00  --------------------------------------------------------  IN: 779.7 mL / OUT: 700 mL / NET: 79.7 mL      CAPILLARY BLOOD GLUCOSE      POCT Blood Glucose.: 211 mg/dL (2021 05:12)          PHYSICAL EXAM:  GENERAL: NAD,  HEENT:  Atraumatic, Normocephalic  EYES: PERRLA, conjunctiva and sclera clear  NECK: Supple, No JVD  CHEST/LUNG: diminished bilaterally; No wheezes, rales, or rhonchi  HEART: Regular rate and rhythm; No murmurs, rubs, or gallops  ABDOMEN: Soft, Nontender, Nondistended; Bowel sounds present  EXTREMITIES:  2+ Peripheral Pulses, No clubbing, cyanosis, or edema  PSYCH: unable as pt is sedated  NEUROLOGY: sedated, withdraws to pain stimuli to upper extremities  SKIN: No rashes or lesions        HOSPITAL MEDICATIONS:  MEDICATIONS  (STANDING):  artificial  tears Solution 1 Drop(s) Both EYES every 6 hours  chlorhexidine 0.12% Liquid 15 milliLiter(s) Oral Mucosa every 12 hours  chlorhexidine 2% Cloths 1 Application(s) Topical <User Schedule>  chlorhexidine 2% Cloths 1 Application(s) Topical <User Schedule>  dexMEDEtomidine Infusion 0.2 MICROgram(s)/kG/Hr (3.31 mL/Hr) IV Continuous <Continuous>  heparin  Infusion 700 Unit(s)/Hr (8 mL/Hr) IV Continuous <Continuous>  insulin lispro (ADMELOG) corrective regimen sliding scale   SubCutaneous every 6 hours  levETIRAcetam  IVPB 500 milliGRAM(s) IV Intermittent every 12 hours  norepinephrine Infusion 0.05 MICROgram(s)/kG/Min (6.21 mL/Hr) IV Continuous <Continuous>  pantoprazole  Injectable 40 milliGRAM(s) IV Push daily  polyethylene glycol 3350 17 Gram(s) Oral at bedtime  senna 2 Tablet(s) Oral at bedtime    MEDICATIONS  (PRN):  sodium chloride 0.9% lock flush 10 milliLiter(s) IV Push every 1 hour PRN Pre/post blood products, medications, blood draw, and to maintain line patency      LABS:                        9.7    22.58 )-----------( 376      ( 2021 21:18 )             30.9     Hgb Trend: 9.7<--, 10.3<--, 10.2<--, 10.6<--, 11.2<--  04-12    143  |  113<H>  |  55<H>  ----------------------------<  302<H>  4.3   |  20<L>  |  1.38<H>    Ca    8.3<L>      2021 21:18  Phos  5.2     04-12  Mg     2.2     -12    TPro  6.4  /  Alb  1.9<L>  /  TBili  0.9  /  DBili  x   /  AST  58<H>  /  ALT  179<H>  /  AlkPhos  117  04-12    LIVER FUNCTIONS - ( 2021 21:18 )  Alb: 1.9 g/dL / Pro: 6.4 g/dL / ALK PHOS: 117 U/L / ALT: 179 U/L / AST: 58 U/L / GGT: x           Creatinine Trend: 1.38<--, 1.24<--, 1.23<--, 1.18<--, 1.23<--, 1.15<--  PT/INR - ( 2021 21:18 )   PT: 16.4 sec;   INR: 1.39 ratio         PTT - ( 2021 05:39 )  PTT:52.3 sec  Urinalysis Basic - ( 2021 03:23 )    Color: Yellow / Appearance: Slightly Turbid / S.024 / pH: x  Gluc: x / Ketone: Negative  / Bili: Negative / Urobili: Negative   Blood: x / Protein: 30 mg/dL / Nitrite: Negative   Leuk Esterase: Negative / RBC: 2 /hpf / WBC 1 /HPF   Sq Epi: x / Non Sq Epi: 2 /hpf / Bacteria: Negative      Arterial Blood Gas:   @ 21:17  7.44/33/94/22/97/-1.2  ABG lactate: --  Arterial Blood Gas:   @ 15:16  7.46/31/85/23/95.9/-2.0  ABG lactate: --  Arterial Blood Gas:   @ 01:59  7.62/20/76/25/96.7/-0.2  ABG lactate: --    Venous Blood Gas:   @ 02:17  7.38/37/40/21/65  VBG Lactate: --      MICROBIOLOGY:     RADIOLOGY:  [ ] Reviewed and interpreted by me    EKG:

## 2021-04-14 LAB
-  AMIKACIN: SIGNIFICANT CHANGE UP
-  AMOXICILLIN/CLAVULANIC ACID: SIGNIFICANT CHANGE UP
-  AMPICILLIN/SULBACTAM: SIGNIFICANT CHANGE UP
-  AMPICILLIN: SIGNIFICANT CHANGE UP
-  AZTREONAM: SIGNIFICANT CHANGE UP
-  CEFAZOLIN: SIGNIFICANT CHANGE UP
-  CEFEPIME: SIGNIFICANT CHANGE UP
-  CEFOXITIN: SIGNIFICANT CHANGE UP
-  CEFTRIAXONE: SIGNIFICANT CHANGE UP
-  CIPROFLOXACIN: SIGNIFICANT CHANGE UP
-  ERTAPENEM: SIGNIFICANT CHANGE UP
-  GENTAMICIN: SIGNIFICANT CHANGE UP
-  IMIPENEM: SIGNIFICANT CHANGE UP
-  LEVOFLOXACIN: SIGNIFICANT CHANGE UP
-  MEROPENEM: SIGNIFICANT CHANGE UP
-  PIPERACILLIN/TAZOBACTAM: SIGNIFICANT CHANGE UP
-  TOBRAMYCIN: SIGNIFICANT CHANGE UP
-  TRIMETHOPRIM/SULFAMETHOXAZOLE: SIGNIFICANT CHANGE UP
ALBUMIN SERPL ELPH-MCNC: 2.1 G/DL — LOW (ref 3.3–5)
ALP SERPL-CCNC: 110 U/L — SIGNIFICANT CHANGE UP (ref 40–120)
ALT FLD-CCNC: 128 U/L — HIGH (ref 10–45)
ANION GAP SERPL CALC-SCNC: 12 MMOL/L — SIGNIFICANT CHANGE UP (ref 5–17)
APTT BLD: 44.4 SEC — HIGH (ref 27.5–35.5)
APTT BLD: 47.6 SEC — HIGH (ref 27.5–35.5)
APTT BLD: 49.3 SEC — HIGH (ref 27.5–35.5)
APTT BLD: 57.8 SEC — HIGH (ref 27.5–35.5)
AST SERPL-CCNC: 33 U/L — SIGNIFICANT CHANGE UP (ref 10–40)
BASOPHILS # BLD AUTO: 0.04 K/UL — SIGNIFICANT CHANGE UP (ref 0–0.2)
BASOPHILS NFR BLD AUTO: 0.2 % — SIGNIFICANT CHANGE UP (ref 0–2)
BILIRUB SERPL-MCNC: 0.8 MG/DL — SIGNIFICANT CHANGE UP (ref 0.2–1.2)
BUN SERPL-MCNC: 47 MG/DL — HIGH (ref 7–23)
CALCIUM SERPL-MCNC: 8.3 MG/DL — LOW (ref 8.4–10.5)
CHLORIDE SERPL-SCNC: 113 MMOL/L — HIGH (ref 96–108)
CK SERPL-CCNC: 44 U/L — SIGNIFICANT CHANGE UP (ref 30–200)
CO2 SERPL-SCNC: 20 MMOL/L — LOW (ref 22–31)
COVID-19 NUCLEOCAPSID GAM AB INTERP: POSITIVE
COVID-19 NUCLEOCAPSID TOTAL GAM ANTIBODY RESULT: 24.2 INDEX — HIGH
COVID-19 SPIKE DOMAIN AB INTERP: POSITIVE
COVID-19 SPIKE DOMAIN ANTIBODY RESULT: >250 U/ML — HIGH
CREAT SERPL-MCNC: 1.33 MG/DL — HIGH (ref 0.5–1.3)
CULTURE RESULTS: SIGNIFICANT CHANGE UP
EOSINOPHIL # BLD AUTO: 0.09 K/UL — SIGNIFICANT CHANGE UP (ref 0–0.5)
EOSINOPHIL NFR BLD AUTO: 0.4 % — SIGNIFICANT CHANGE UP (ref 0–6)
FERRITIN SERPL-MCNC: 1228 NG/ML — HIGH (ref 30–400)
GAS PNL BLDA: SIGNIFICANT CHANGE UP
GLUCOSE BLDC GLUCOMTR-MCNC: 148 MG/DL — HIGH (ref 70–99)
GLUCOSE BLDC GLUCOMTR-MCNC: 188 MG/DL — HIGH (ref 70–99)
GLUCOSE BLDC GLUCOMTR-MCNC: 217 MG/DL — HIGH (ref 70–99)
GLUCOSE SERPL-MCNC: 188 MG/DL — HIGH (ref 70–99)
HCT VFR BLD CALC: 32 % — LOW (ref 39–50)
HGB BLD-MCNC: 9.9 G/DL — LOW (ref 13–17)
IGA FLD-MCNC: 322 MG/DL — SIGNIFICANT CHANGE UP (ref 84–499)
IGG FLD-MCNC: 1817 MG/DL — HIGH (ref 610–1660)
IGM SERPL-MCNC: 109 MG/DL — SIGNIFICANT CHANGE UP (ref 35–242)
IMM GRANULOCYTES NFR BLD AUTO: 0.8 % — SIGNIFICANT CHANGE UP (ref 0–1.5)
INR BLD: 1.21 RATIO — HIGH (ref 0.88–1.16)
KAPPA LC SER QL IFE: 14.42 MG/DL — HIGH (ref 0.33–1.94)
KAPPA/LAMBDA FREE LIGHT CHAIN RATIO, SERUM: 1.1 RATIO — SIGNIFICANT CHANGE UP (ref 0.26–1.65)
LAMBDA LC SER QL IFE: 13.13 MG/DL — HIGH (ref 0.57–2.63)
LDH SERPL L TO P-CCNC: 382 U/L — HIGH (ref 50–242)
LYMPHOCYTES # BLD AUTO: 0.67 K/UL — LOW (ref 1–3.3)
LYMPHOCYTES # BLD AUTO: 3.3 % — LOW (ref 13–44)
MAGNESIUM SERPL-MCNC: 2.1 MG/DL — SIGNIFICANT CHANGE UP (ref 1.6–2.6)
MCHC RBC-ENTMCNC: 26.3 PG — LOW (ref 27–34)
MCHC RBC-ENTMCNC: 30.9 GM/DL — LOW (ref 32–36)
MCV RBC AUTO: 84.9 FL — SIGNIFICANT CHANGE UP (ref 80–100)
METHOD TYPE: SIGNIFICANT CHANGE UP
MONOCYTES # BLD AUTO: 0.89 K/UL — SIGNIFICANT CHANGE UP (ref 0–0.9)
MONOCYTES NFR BLD AUTO: 4.4 % — SIGNIFICANT CHANGE UP (ref 2–14)
NEUTROPHILS # BLD AUTO: 18.36 K/UL — HIGH (ref 1.8–7.4)
NEUTROPHILS NFR BLD AUTO: 90.9 % — HIGH (ref 43–77)
NRBC # BLD: 0 /100 WBCS — SIGNIFICANT CHANGE UP (ref 0–0)
ORGANISM # SPEC MICROSCOPIC CNT: SIGNIFICANT CHANGE UP
ORGANISM # SPEC MICROSCOPIC CNT: SIGNIFICANT CHANGE UP
PHOSPHATE SERPL-MCNC: 3.8 MG/DL — SIGNIFICANT CHANGE UP (ref 2.5–4.5)
PLATELET # BLD AUTO: 379 K/UL — SIGNIFICANT CHANGE UP (ref 150–400)
POTASSIUM SERPL-MCNC: 3.7 MMOL/L — SIGNIFICANT CHANGE UP (ref 3.5–5.3)
POTASSIUM SERPL-SCNC: 3.7 MMOL/L — SIGNIFICANT CHANGE UP (ref 3.5–5.3)
PROCALCITONIN SERPL-MCNC: 0.52 NG/ML — HIGH (ref 0.02–0.1)
PROT SERPL-MCNC: 6.5 G/DL — SIGNIFICANT CHANGE UP (ref 6–8.3)
PROTHROM AB SERPL-ACNC: 14.4 SEC — HIGH (ref 10.6–13.6)
RBC # BLD: 3.77 M/UL — LOW (ref 4.2–5.8)
RBC # FLD: 15 % — HIGH (ref 10.3–14.5)
SARS-COV-2 IGG+IGM SERPL QL IA: 24.2 INDEX — HIGH
SARS-COV-2 IGG+IGM SERPL QL IA: >250 U/ML — HIGH
SARS-COV-2 IGG+IGM SERPL QL IA: POSITIVE
SARS-COV-2 IGG+IGM SERPL QL IA: POSITIVE
SODIUM SERPL-SCNC: 145 MMOL/L — SIGNIFICANT CHANGE UP (ref 135–145)
SPECIMEN SOURCE: SIGNIFICANT CHANGE UP
WBC # BLD: 20.21 K/UL — HIGH (ref 3.8–10.5)
WBC # FLD AUTO: 20.21 K/UL — HIGH (ref 3.8–10.5)

## 2021-04-14 PROCEDURE — 71045 X-RAY EXAM CHEST 1 VIEW: CPT | Mod: 26

## 2021-04-14 PROCEDURE — 99233 SBSQ HOSP IP/OBS HIGH 50: CPT | Mod: GC

## 2021-04-14 RX ORDER — MEROPENEM 1 G/30ML
1000 INJECTION INTRAVENOUS EVERY 8 HOURS
Refills: 0 | Status: DISCONTINUED | OUTPATIENT
Start: 2021-04-14 | End: 2021-04-19

## 2021-04-14 RX ORDER — CHOLECALCIFEROL (VITAMIN D3) 125 MCG
400 CAPSULE ORAL DAILY
Refills: 0 | Status: DISCONTINUED | OUTPATIENT
Start: 2021-04-14 | End: 2021-05-02

## 2021-04-14 RX ORDER — MEROPENEM 1 G/30ML
INJECTION INTRAVENOUS
Refills: 0 | Status: DISCONTINUED | OUTPATIENT
Start: 2021-04-14 | End: 2021-04-19

## 2021-04-14 RX ORDER — SODIUM CHLORIDE 9 MG/ML
1000 INJECTION, SOLUTION INTRAVENOUS ONCE
Refills: 0 | Status: COMPLETED | OUTPATIENT
Start: 2021-04-14 | End: 2021-04-14

## 2021-04-14 RX ORDER — SENNA PLUS 8.6 MG/1
10 TABLET ORAL AT BEDTIME
Refills: 0 | Status: DISCONTINUED | OUTPATIENT
Start: 2021-04-14 | End: 2021-04-16

## 2021-04-14 RX ORDER — ACETAMINOPHEN 500 MG
1000 TABLET ORAL ONCE
Refills: 0 | Status: COMPLETED | OUTPATIENT
Start: 2021-04-14 | End: 2021-04-14

## 2021-04-14 RX ORDER — MULTIVIT-MIN/FERROUS GLUCONATE 9 MG/15 ML
30 LIQUID (ML) ORAL DAILY
Refills: 0 | Status: DISCONTINUED | OUTPATIENT
Start: 2021-04-14 | End: 2021-05-02

## 2021-04-14 RX ORDER — MEROPENEM 1 G/30ML
1000 INJECTION INTRAVENOUS ONCE
Refills: 0 | Status: COMPLETED | OUTPATIENT
Start: 2021-04-14 | End: 2021-04-14

## 2021-04-14 RX ORDER — MULTIVIT-MIN/FERROUS GLUCONATE 9 MG/15 ML
15 LIQUID (ML) ORAL DAILY
Refills: 0 | Status: DISCONTINUED | OUTPATIENT
Start: 2021-04-14 | End: 2021-04-14

## 2021-04-14 RX ORDER — POLYETHYLENE GLYCOL 3350 17 G/17G
17 POWDER, FOR SOLUTION ORAL AT BEDTIME
Refills: 0 | Status: DISCONTINUED | OUTPATIENT
Start: 2021-04-14 | End: 2021-04-16

## 2021-04-14 RX ADMIN — HEPARIN SODIUM 8.5 UNIT(S)/HR: 5000 INJECTION INTRAVENOUS; SUBCUTANEOUS at 01:33

## 2021-04-14 RX ADMIN — Medication 400 UNIT(S): at 16:49

## 2021-04-14 RX ADMIN — LEVETIRACETAM 400 MILLIGRAM(S): 250 TABLET, FILM COATED ORAL at 17:10

## 2021-04-14 RX ADMIN — Medication 1000 MILLIGRAM(S): at 02:00

## 2021-04-14 RX ADMIN — SODIUM CHLORIDE 1000 MILLILITER(S): 9 INJECTION, SOLUTION INTRAVENOUS at 16:49

## 2021-04-14 RX ADMIN — Medication 2: at 17:09

## 2021-04-14 RX ADMIN — PANTOPRAZOLE SODIUM 40 MILLIGRAM(S): 20 TABLET, DELAYED RELEASE ORAL at 11:54

## 2021-04-14 RX ADMIN — Medication 1000 MILLIGRAM(S): at 20:00

## 2021-04-14 RX ADMIN — Medication 400 MILLIGRAM(S): at 01:32

## 2021-04-14 RX ADMIN — SENNA PLUS 10 MILLILITER(S): 8.6 TABLET ORAL at 21:49

## 2021-04-14 RX ADMIN — HEPARIN SODIUM 9.5 UNIT(S)/HR: 5000 INJECTION INTRAVENOUS; SUBCUTANEOUS at 20:41

## 2021-04-14 RX ADMIN — MEROPENEM 100 MILLIGRAM(S): 1 INJECTION INTRAVENOUS at 21:46

## 2021-04-14 RX ADMIN — Medication 30 MILLILITER(S): at 16:48

## 2021-04-14 RX ADMIN — Medication 400 MILLIGRAM(S): at 18:59

## 2021-04-14 RX ADMIN — LEVETIRACETAM 400 MILLIGRAM(S): 250 TABLET, FILM COATED ORAL at 05:24

## 2021-04-14 RX ADMIN — Medication 1: at 11:51

## 2021-04-14 RX ADMIN — MEROPENEM 100 MILLIGRAM(S): 1 INJECTION INTRAVENOUS at 16:48

## 2021-04-14 NOTE — DIETITIAN INITIAL EVALUATION ADULT. - REASON INDICATOR FOR ASSESSMENT
Nutrition Assessment warranted for length of stay on 5ICU  Source: EMR, Team; Pt noted to not be speaking at this time (weaned off precedex yesterday)

## 2021-04-14 NOTE — DIETITIAN INITIAL EVALUATION ADULT. - ADD RECOMMEND
1. If Pt passes Swallow eval, recommend initiating Consistent CHO diet with Glucerna Shake x2 daily - with textures/consistencies deferred to team. If pt fails swallow eval, see EN recommendations above. 2. Consider addition of Multivitamin & Vitamin D supplementation pending no existing contraindications; Please draw Vitamin D levels. 3. RD to provide DM diet education as appropriate. 4. Monitor PO intake/tolerance, weights, labs, hydration status, bowels, and skin integrity.

## 2021-04-14 NOTE — DIETITIAN INITIAL EVALUATION ADULT. - ETIOLOGY
inability to provide adequate protein-calorie needs in setting of complicated hospital course 2/2 COVID-19

## 2021-04-14 NOTE — PHYSICAL THERAPY INITIAL EVALUATION ADULT - MANUAL MUSCLE TESTING RESULTS, REHAB EVAL
Unable to assess as pt's with poor command follow Unable to assess as pt's with poor command follow; Active L knee extension observed in sitting x2, however unable to repeat due to poor command follow

## 2021-04-14 NOTE — DIETITIAN NUTRITION RISK NOTIFICATION - ADDITIONAL COMMENTS/DIETITIAN RECOMMENDATIONS
1. If Pt passes Swallow eval, recommend initiating Consistent CHO diet with Glucerna Shake x2 daily - with textures/consistencies deferred to team.    - If pt fails swallow eval, recommend initiating Glucerna 1.2 @ 20ml/hr and increasing as tolerated to goal rate of 70ml/hr x 24hrs.  2. Consider addition of Multivitamin & Vitamin D supplementation pending no existing contraindications; Please draw Vitamin D levels

## 2021-04-14 NOTE — PROGRESS NOTE ADULT - SUBJECTIVE AND OBJECTIVE BOX
ADRIANA BOOKER  MRN-[id number]  Patient is a 60y old  Male who presents with a chief complaint of COVID19 w/ severe metabolic acidosis s/p intubation (2021 07:01)    HPI:  60M unknown medical history BIBEMS for respiratory distress, recently COVID+ as per EMS. Pt unable to comply with history 2/2 resp distress, nods yes to difficulty breathing, no to pain. Baseline AAOx3. En route, pt had RR 28, SaO2 60%. In ED initially hypoxic to 60% placed on NRB and satting low 90s and tachypneic to 40s on NRB. Placed on AVAPS, still tachypneic to 40s. Labs showing HAGMA, elevated FSG to 500s. Also with lactate of 15. Trops of 200 and pro-BNP elevated to 21k. MICU consulted for respiratory failure in setting of COVID, also concern for DKA. On encounter, pt is noncooperative with questioning. Nods and tries to speak, unable to form full sentences and visibly tachypneic on AVAPS. In ED given 2L NS, decadron. Started on insulin gtt. Subsequently intubated.     Initial vitals 108/86, , RR 28, T97, SaO2 60% on 15L NRB. Received 2L NS bolus. Started on insulin gtt.     Pt admitted to surg b for further management.     Addendum 21 1700:  Limited history obtained from nitesh Anders, who lives in the  (+[phone]). Patient has a PMH of low BP and takes no medications. Patient tested positive for COVID on 3/19. He was initially doing fine with some fatigue, but suddenly decompensated after yesterday. Unable to obtain social history. Wife, Lucero, is currently in [place] - she went due to medical issues but was unable to return due to COVID travel restrictions.        (2021 20:54)      24 HOUR EVENTS:    REVIEW OF SYSTEMS:    CONSTITUTIONAL: No weakness, fevers or chills  EYES/ENT: No visual changes;  No vertigo or throat pain   NECK: No pain or stiffness  RESPIRATORY: No cough, wheezing, hemoptysis; No shortness of breath  CARDIOVASCULAR: No chest pain or palpitations  GASTROINTESTINAL: No abdominal or epigastric pain. No nausea, vomiting, or hematemesis; No diarrhea or constipation. No melena or hematochezia.  GENITOURINARY: No dysuria, frequency or hematuria  NEUROLOGICAL: No numbness or weakness  SKIN: No itching, rashes      ICU Vital Signs Last 24 Hrs  T(C): 37.3 (2021 04:00), Max: 38 (2021 23:00)  T(F): 99.1 (:00), Max: 100.4 (2021 23:00)  HR: 116 (:00) (55 - 128)  BP: --  BP(mean): --  ABP: 117/43 (:) (97/46 - 153/78)  ABP(mean): 63 (:) (63 - 98)  RR: 25 (:) (15 - 34)  SpO2: 99% (:) (72% - 100%)    Mode: 40% V/M  CVP(mm Hg): --  CO: --  CI: --  PA: --  PA(mean): --  PA(direct): --  PCWP: --  LA: --  RA: --  SVR: --  SVRI: --  PVR: --  PVRI: --  I&O's Summary    2021 07:01  -  2021 07:00  --------------------------------------------------------  IN: 779.7 mL / OUT: 700 mL / NET: 79.7 mL    2021 07:  -  2021 06:19  --------------------------------------------------------  IN: 844.8 mL / OUT: 2045 mL / NET: -1200.2 mL        CAPILLARY BLOOD GLUCOSE    CAPILLARY BLOOD GLUCOSE      POCT Blood Glucose.: 148 mg/dL (2021 05:22)      PHYSICAL EXAM:  GENERAL: No acute distress, well-developed  HEAD:  Atraumatic, Normocephalic  EYES: EOMI, PERRLA, conjunctiva and sclera clear  NECK: Supple, no lymphadenopathy, no JVD  CHEST/LUNG: CTAB; No wheezes, rales, or rhonchi  HEART: Regular rate and rhythm. Normal S1/S2. No murmurs, rubs, or gallops  ABDOMEN: Soft, non-tender, non-distended; normal bowel sounds, no organomegaly  EXTREMITIES:  2+ peripheral pulses b/l, No clubbing, cyanosis, or edema  NEUROLOGY: A&O x 3, no focal deficits  SKIN: No rashes or lesions    ============================I/O===========================   I&O's Detail    2021 07:  -  2021 07:00  --------------------------------------------------------  IN:    Dexmedetomidine: 272.8 mL    Heparin: 86 mL    IV PiggyBack: 140 mL    Nepro with Carb Steady: 245 mL    Norepinephrine: 35.9 mL  Total IN: 779.7 mL    OUT:    Indwelling Catheter - Urethral (mL): 700 mL    Rectal Tube (mL): 0 mL  Total OUT: 700 mL    Total NET: 79.7 mL      2021 07:  -  2021 06:19  --------------------------------------------------------  IN:    Dexmedetomidine: 100.8 mL    Heparin: 178 mL    IV PiggyBack: 445 mL    Nepro with Carb Steady: 70 mL    Norepinephrine: 51 mL  Total IN: 844.8 mL    OUT:    dextrose 5% + lactated ringers: 0 mL    Indwelling Catheter - Urethral (mL): 2045 mL  Total OUT: 2045 mL    Total NET: -1200.2 mL        ============================ LABS =========================                        9.9    20.21 )-----------( 379      ( 2021 00:38 )             32.0         145  |  113<H>  |  47<H>  ----------------------------<  188<H>  3.7   |  20<L>  |  1.33<H>    Ca    8.3<L>      2021 00:39  Phos  3.8       Mg     2.1         TPro  6.5  /  Alb  2.1<L>  /  TBili  0.8  /  DBili  x   /  AST  33  /  ALT  128<H>  /  AlkPhos  110      Troponin T, High Sensitivity Result: 36 ng/L (21 @ 13:49)      Creatine Kinase, Serum: 44 U/L (21 @ 00:39)  Creatine Kinase, Serum: 26 U/L (21 @ 21:18)          LIVER FUNCTIONS - ( 2021 00:39 )  Alb: 2.1 g/dL / Pro: 6.5 g/dL / ALK PHOS: 110 U/L / ALT: 128 U/L / AST: 33 U/L / GGT: x           PT/INR - ( 2021 00:38 )   PT: 14.4 sec;   INR: 1.21 ratio         PTT - ( 2021 00:38 )  PTT:44.4 sec  ABG - ( 2021 00:37 )  pH, Arterial: 7.46  pH, Blood: x     /  pCO2: 32    /  pO2: 78    / HCO3: 22    / Base Excess: -.3   /  SaO2: 96                Blood Gas Arterial, Lactate: 1.4 mmol/L (21 @ 00:37)  Blood Gas Arterial, Lactate: 1.0 mmol/L (21 @ 13:46)  Blood Gas Arterial, Lactate: 0.9 mmol/L (21 @ 08:20)  Blood Gas Arterial, Lactate: 1.0 mmol/L (21 @ 21:17)  Blood Gas Arterial, Lactate: 1.5 mmol/L (21 @ 15:16)  Blood Gas Arterial, Lactate: 1.0 mmol/L (21 @ 01:59)    Urinalysis Basic - ( 2021 03:23 )    Color: Yellow / Appearance: Slightly Turbid / S.024 / pH: x  Gluc: x / Ketone: Negative  / Bili: Negative / Urobili: Negative   Blood: x / Protein: 30 mg/dL / Nitrite: Negative   Leuk Esterase: Negative / RBC: 2 /hpf / WBC 1 /HPF   Sq Epi: x / Non Sq Epi: 2 /hpf / Bacteria: Negative      ======================Micro/Rad/Cardio=================  Telemetry: Reviewed   EKG: Reviewed  CXR: Reviewed  Culture: Reviewed   Echo: Transthoracic Echocardiogram:   Patient name: ADRIANA BOOKER  YOB: 1960   Age: 60 (M)   MR#: [id number]  Study Date: 2021  [facility] CovidSonographer: Fawn Rios RDCS  Study quality: Technically Difficult  Referring Physician: Scot Wilhelm MD  Blood Pressure: 110/62 mmHg  Height: 183 cm  Weight: 73 kg  BSA: 2 m2  ------------------------------------------------------------------------  PROCEDURE: Transthoracic echocardiogram with 2-D, M-Mode  and complete spectral and color flow Doppler.  INDICATION: Chronic ischemic heart disease, unspecified  (I25.9)  ------------------------------------------------------------------------  DIMENSIONS:  Dimensions:     Normal Values:  LA:     2.4 cm    2.0 - 4.0 cm  Ao:     4.4 cm    2.0 - 3.8 cm  SEPTUM: 0.7 cm    0.6 - 1.2 cm  PWT:    0.6 cm    0.6 - 1.1 cm  LVIDd:  4.0 cm    3.0 - 5.6 cm  LVIDs:  2.6 cm    1.8 - 4.0 cm  Derived Variables:  LVMI: 37 g/m2  RWT: 0.30  ------------------------------------------------------------------------  OBSERVATIONS:  Mitral Valve: Normal mitral valve. Minimal mitral  regurgitation.  Aortic Root: Mild aortic root dilatation  (Ao: 4.4 cm at  the sinuses of Valsalva).  Aortic Valve: Normal trileaflet aortic valve.  Left Atrium: Normal left atrium.  Left Ventricle: Limited views from the parasternal window  suggest grossly preserved overall left ventricular systolic  function.  Unable to exclude segmental wall motion  abnormalities. Normal left ventricular internal dimensions  and wall thicknesses.  Right Heart: Normal right atrium. Right ventricular  enlargement with decreased right ventricular systolic  function. Normal tricuspid valve. Minimal tricuspid  regurgitation. Normal pulmonic valve. Minimal pulmonic  regurgitation.  Pericardium/PleuraNormal pericardium with trace pericardial  effusion.  ------------------------------------------------------------------------  CONCLUSIONS:  Technically difficult study.  1. Normal mitral valve. Minimal mitral regurgitation.  2. Mild aortic root dilatation  (Ao: 4.4 cm at the sinuses  of Valsalva).  3. Normal left ventricular internal dimensions and wall  thicknesses.  4. Limited views from the parasternal window suggest  grossly preserved overall left ventricular systolic  function.  Unable to exclude segmental wall motion  abnormalities.  5. Right ventricular enlargement with decreased right  ventricular systolic function.  ------------------------------------------------------------------------  Confirmed on  2021 - 12:07:23 by Bruce Robins M.D.,  [facility], [facility]  ------------------------------------------------------------------------ (21 @ 09:05)    Cath:   ======================================================  PAST MEDICAL & SURGICAL HISTORY:  No pertinent past medical history    No significant past surgical history      ====================ASSESSMENT AND PLAN==============      ====================CARDIOVASCULAR==================    Mechanical Circulatory Support:  [ ] IABP   [ ] Impella 2.5   [ ] Impella CP  Settings:  Augmented Diastolic Pressure:  Impella Flow:     ==Hemodynamics==     (Date) CVP:      PCWP:        PA S/D:            Cardiac Output:             Cardiac Index:            SVR:    Preload (fluids, diuretics):  Afterload (anti-hypertensives, pressors):  Inotropes:    ==Pump==    TTE Date:  LVEF:                              Regional Wall Motion Abnormailities?:  [ ]Yes   [ ] No   If Yes, Details  Diastolic function:  RV function:   Any change from prior?: [ ] Yes   [ ] No   If Yes, Details:   Volume status:   [ ] Hypovolemia    [ ] Euvolemic    [ ] Hypervolemic    ==Coronaries==    Last ischemic workup:   Antiplatelet regimen:   Anticoagulant:   Statin:   Beta blocker:    ==Rhythm==    Current rhythm:  AM EKG Interpretation:   Anti-arrhythmic therapies:   TVP with settings:         ====================== NEUROLOGY=====================  Sedation [ ]Yes   [ ] No   If Yes, Medication/Dose:   CAM ICU [ ] Positive  [ ] Negative    levETIRAcetam  IVPB 500 milliGRAM(s) IV Intermittent every 12 hours    ==================== RESPIRATORY======================  [ ] Invasive Mechanical Ventilation   [ ] BIPAP    [ ] HFNC    [ ] NC   Non-invasive Mechanical Ventilation/ HFNC Settings:   Mode: 40% V/M    ABG - ( 2021 00:37 )  pH, Arterial: 7.46  pH, Blood: x     /  pCO2: 32    /  pO2: 78    / HCO3: 22    / Base Excess: -.3   /  SaO2: 96                  Blood Gas Arterial - Lytes,H+H,iCa,Lact: Performed In Lab (21 @ 00:37)  Blood Gas Arterial - Lytes,H+H,iCa,Lact: Performed In Lab (21 @ 13:46)  Blood Gas Arterial - Lytes,H+H,iCa,Lact: Performed In Lab (21 @ 08:20)        ===================== RENAL =========================    21 @ 07:21 @ 07:00  --------------------------------------------------------  IN: 779.7 mL / OUT: 700 mL / NET: 79.7 mL    21 @ 07:01  21 @ 06:19  --------------------------------------------------------  IN: 844.8 mL / OUT: 2045 mL / NET: -1200.2 mL      Renal Replacement Therapy:  [ ] CRRT      [ ] IHD   Last Session:    Fluid removal:     [ ] Diuretic therapy, Regimen:       ==================== GASTROINTESTINAL===================    Diet:  Last BM:   Indication for Stress Ulcer Prophylaxis, [ ] Yes    [ ] No   If Yes, Medication:     pantoprazole  Injectable 40 milliGRAM(s) IV Push daily  polyethylene glycol 3350 17 Gram(s) Oral at bedtime  senna 2 Tablet(s) Oral at bedtime  sodium chloride 0.9% lock flush 10 milliLiter(s) IV Push every 1 hour PRN Pre/post blood products, medications, blood draw, and to maintain line patency    ========================INFECTIOUS DISEASE================  T(C): 37.3 (21 @ 04:00), Max: 38 (21 @ 23:00)  WBC Count: 20.21 K/uL (21 @ 00:38)  WBC Count: 25.62 K/uL (21 @ 13:49)  WBC Count: 22.58 K/uL (21 @ 21:18)  WBC Count: 17.39 K/uL (21 @ 01:59)      Culture - Sputum (collected 21 @ 05:25)  Source: .Sputum Sputum  Gram Stain (21 @ 10:16):    No polymorphonuclear leukocytes per low power field    No Squamous epithelial cells per low power field    Moderate Gram Negative Rods per oil power field    Culture - Sputum (collected 21 @ 08:30)  Source: .Sputum Sputum  Gram Stain (21 @ 13:49):    Few polymorphonuclear leukocytes per low power field    Rare Squamous epithelial cells per low power field    Moderate Gram Positive Cocci in Pairs and Chains per oil power field    Moderate Gram Negative Diplococci per oil power field  Preliminary Report (21 @ 11:53):    Numerous Klebsiella pneumoniae        Current Antibiotics/Antifungals/ Antivirals with start date:       ===================HEMATOLOGIC/ONC ===================  Hemoglobin: 9.9 g/dL (21 @ 00:38)  Hemoglobin: 10.2 g/dL (21 @ 13:49)  Hemoglobin: 9.7 g/dL (21 @ 21:18)  Hemoglobin: 10.3 g/dL (21 @ 01:59)    Platelet Count - Automated: 379 K/uL (21 @ 00:38)  Platelet Count - Automated: 416 K/uL (21 @ 13:49)  Platelet Count - Automated: 376 K/uL (21 @ 21:18)  Platelet Count - Automated: 367 K/uL (21 @ 01:59)    Chemical VTE Prophylaxis:  [ ] Lovenox    [ ] SQH   [ ]NA  Systemic Anticogaulation:  [ ] Yes    [ ] No,  If Yes, Medication and Indication:     heparin  Infusion 700 Unit(s)/Hr (8.5 mL/Hr) IV Continuous <Continuous>    =======================    ENDOCRINE  =====================  Insulin drip  [ ] Yes    [ ] No  Basal Insulin [ ] Yes    [ ] No, Details:   Bolus insulin [ ] Yes    [ ] No  Sliding Scale  [ ] Yes    [ ] No    POCT Blood Glucose.: 148 mg/dL (21 @ 05:22)  POCT Blood Glucose.: 174 mg/dL (21 @ 23:08)  POCT Blood Glucose.: 152 mg/dL (21 @ 19:42)  POCT Blood Glucose.: 90 mg/dL (21 @ 18:03)  POCT Blood Glucose.: 203 mg/dL (21 @ 11:07)        insulin lispro (ADMELOG) corrective regimen sliding scale   SubCutaneous every 6 hours    ======================= LINES/TUBES  =====================  [place]: (Date placed)  Central Line: (Date placed)  HD Catheter: (Date placed)  Arterial Line: (Date placed)  Endotracheal Tube: (Date placed)  [place]: (Date placed)  ======================= DISPOSITION  =====================  [ ] Critical   [ ] Guarded    [ ] Stable    [ ] Maintain in CICU  [ ] Downgrade to [place]  [ ] Discharge Home   ADRIANA BOOKER  MRN-[id number]  Patient is a 60y old  Male who presents with a chief complaint of COVID19 w/ severe metabolic acidosis s/p intubation (2021 07:01)    HPI: 60M unknown medical history BIBEMS for respiratory distress, recently COVID+ as per EMS. Pt unable to comply with history 2/2 resp distress, nods yes to difficulty breathing, no to pain. Baseline AAOx3. En route, pt had RR 28, SaO2 60%. In ED initially hypoxic to 60% placed on NRB and satting low 90s and tachypneic to 40s on NRB. Placed on AVAPS, still tachypneic to 40s. Labs showing HAGMA, elevated FSG to 500s. Also with lactate of 15. Trops of 200 and pro-BNP elevated to 21k. MICU consulted for respiratory failure in setting of COVID, also concern for DKA. On encounter, pt is noncooperative with questioning. Nods and tries to speak, unable to form full sentences and visibly tachypneic on AVAPS. In ED given 2L NS, decadron. Started on insulin gtt. Subsequently intubated.     REVIEW OF SYSTEMS:  - Unable to obtain, poor mental status     ICU Vital Signs Last 24 Hrs  T(C): 37.3 (2021 04:00), Max: 38 (2021 23:00)  T(F): 99.1 (2021 04:00), Max: 100.4 (2021 23:00)  HR: 116 (2021 04:00) (55 - 128)  ABP: 117/43 (2021 04:00) (97/46 - 153/78)  ABP(mean): 63 (2021 04:00) (63 - 98)  RR: 25 (2021 04:00) (15 - 34)  SpO2: 99% (2021 04:00) (72% - 100%)    Mode: 40% V/M      I&O's Summary    2021 07:01  -  2021 07:00  --------------------------------------------------------  IN: 779.7 mL / OUT: 700 mL / NET: 79.7 mL    2021 07:  -  2021 06:19  --------------------------------------------------------  IN: 844.8 mL / OUT: 2045 mL / NET: -1200.2 mL      CAPILLARY BLOOD GLUCOSE  POCT Blood Glucose.: 148 mg/dL (2021 05:22)    PHYSICAL EXAM:      ============================I/O===========================   I&O's Detail    2021 07:  -  2021 07:00  --------------------------------------------------------  IN:    Dexmedetomidine: 272.8 mL    Heparin: 86 mL    IV PiggyBack: 140 mL    Nepro with Carb Steady: 245 mL    Norepinephrine: 35.9 mL  Total IN: 779.7 mL    OUT:    Indwelling Catheter - Urethral (mL): 700 mL    Rectal Tube (mL): 0 mL  Total OUT: 700 mL    Total NET: 79.7 mL    2021 07:  -  2021 06:19  --------------------------------------------------------  IN:    Dexmedetomidine: 100.8 mL    Heparin: 178 mL    IV PiggyBack: 445 mL    Nepro with Carb Steady: 70 mL    Norepinephrine: 51 mL  Total IN: 844.8 mL    OUT:    dextrose 5% + lactated ringers: 0 mL    Indwelling Catheter - Urethral (mL): 2045 mL  Total OUT: 2045 mL    Total NET: -1200.2 mL  ============================ LABS =========================                    9.9    20.21 )-----------( 379      ( 2021 00:38 )             32.0         145  |  113<H>  |  47<H>  ----------------------------<  188<H>  3.7   |  20<L>  |  1.33<H>    Ca    8.3<L>      2021 00:39  Phos  3.8       Mg     2.1         TPro  6.5  /  Alb  2.1<L>  /  TBili  0.8  /  DBili  x   /  AST  33  /  ALT  128<H>  /  AlkPhos  110      Troponin T, High Sensitivity Result: 36 ng/L (21 @ 13:49)    Creatine Kinase, Serum: 44 U/L (21 @ 00:39)  Creatine Kinase, Serum: 26 U/L (21 @ 21:18)    LIVER FUNCTIONS - ( 2021 00:39 )  Alb: 2.1 g/dL / Pro: 6.5 g/dL / ALK PHOS: 110 U/L / ALT: 128 U/L / AST: 33 U/L / GGT: x           PT/INR - ( 2021 00:38 )   PT: 14.4 sec;   INR: 1.21 ratio    PTT - ( 2021 00:38 )  PTT:44.4 sec  ABG - ( 2021 00:37 )  pH, Arterial: 7.46  pH, Blood: x     /  pCO2: 32    /  pO2: 78    / HCO3: 22    / Base Excess: -.3   /  SaO2: 96        Blood Gas Arterial, Lactate: 1.4 mmol/L (21 @ 00:37)  Blood Gas Arterial, Lactate: 1.0 mmol/L (21 @ 13:46)  Blood Gas Arterial, Lactate: 0.9 mmol/L (04-13-21 @ 08:20)  Blood Gas Arterial, Lactate: 1.0 mmol/L (21 @ 21:17)  Blood Gas Arterial, Lactate: 1.5 mmol/L (21 @ 15:16)  Blood Gas Arterial, Lactate: 1.0 mmol/L (21 @ 01:59)    Urinalysis Basic - ( 2021 03:23 )    Color: Yellow / Appearance: Slightly Turbid / S.024 / pH: x  Gluc: x / Ketone: Negative  / Bili: Negative / Urobili: Negative   Blood: x / Protein: 30 mg/dL / Nitrite: Negative   Leuk Esterase: Negative / RBC: 2 /hpf / WBC 1 /HPF   Sq Epi: x / Non Sq Epi: 2 /hpf / Bacteria: Negative  ======================Micro/Rad/Cardio=================  Telemetry: Reviewed   EKG: Reviewed  CXR: Reviewed  Culture: Reviewed   Echo: Transthoracic Echocardiogram:   Patient name: ADRIANA BOOKER  YOB: 1960   Age: 60 (M)   MR#: [id number]  Study Date: 2021  [facility]Sonographer: Fawn Rios RDCS  Study quality: Technically Difficult  Referring Physician: Scot Wilhelm MD  Blood Pressure: 110/62 mmHg  Height: 183 cm  Weight: 73 kg  BSA: 2 m2  ------------------------------------------------------------------------  PROCEDURE: Transthoracic echocardiogram with 2-D, M-Mode  and complete spectral and color flow Doppler.  INDICATION: Chronic ischemic heart disease, unspecified  (I25.9)  ------------------------------------------------------------------------  DIMENSIONS:  Dimensions:     Normal Values:  LA:     2.4 cm    2.0 - 4.0 cm  Ao:     4.4 cm    2.0 - 3.8 cm  SEPTUM: 0.7 cm    0.6 - 1.2 cm  PWT:    0.6 cm    0.6 - 1.1 cm  LVIDd:  4.0 cm    3.0 - 5.6 cm  LVIDs:  2.6 cm    1.8 - 4.0 cm  Derived Variables:  LVMI: 37 g/m2  RWT: 0.30  ------------------------------------------------------------------------  OBSERVATIONS:  Mitral Valve: Normal mitral valve. Minimal mitral  regurgitation.  Aortic Root: Mild aortic root dilatation  (Ao: 4.4 cm at  the sinuses of Valsalva).  Aortic Valve: Normal trileaflet aortic valve.  Left Atrium: Normal left atrium.  Left Ventricle: Limited views from the parasternal window  suggest grossly preserved overall left ventricular systolic  function.  Unable to exclude segmental wall motion  abnormalities. Normal left ventricular internal dimensions  and wall thicknesses.  Right Heart: Normal right atrium. Right ventricular  enlargement with decreased right ventricular systolic  function. Normal tricuspid valve. Minimal tricuspid  regurgitation. Normal pulmonic valve. Minimal pulmonic  regurgitation.  Pericardium/PleuraNormal pericardium with trace pericardial  effusion.  ------------------------------------------------------------------------  CONCLUSIONS:  Technically difficult study.  1. Normal mitral valve. Minimal mitral regurgitation.  2. Mild aortic root dilatation  (Ao: 4.4 cm at the sinuses  of Valsalva).  3. Normal left ventricular internal dimensions and wall  thicknesses.  4. Limited views from the parasternal window suggest  grossly preserved overall left ventricular systolic  function.  Unable to exclude segmental wall motion  abnormalities.  5. Right ventricular enlargement with decreased right  ventricular systolic function.  ======================================================  PAST MEDICAL & SURGICAL HISTORY:  No pertinent past medical history  No significant past surgical history ADRIANA BOOEKR  MRN-[id number]  Patient is a 60y old  Male who presents with a chief complaint of COVID19 w/ severe metabolic acidosis s/p intubation (2021 07:01)    HPI: 60M unknown medical history BIBEMS for respiratory distress, recently COVID+ as per EMS. Pt unable to comply with history 2/2 resp distress, nods yes to difficulty breathing, no to pain. Baseline AAOx3. En route, pt had RR 28, SaO2 60%. In ED initially hypoxic to 60% placed on NRB and satting low 90s and tachypneic to 40s on NRB. Placed on AVAPS, still tachypneic to 40s. Labs showing HAGMA, elevated FSG to 500s. Also with lactate of 15. Trops of 200 and pro-BNP elevated to 21k. MICU consulted for respiratory failure in setting of COVID, also concern for DKA. On encounter, pt is noncooperative with questioning. Nods and tries to speak, unable to form full sentences and visibly tachypneic on AVAPS. In ED given 2L NS, decadron. Started on insulin gtt. Subsequently intubated.     REVIEW OF SYSTEMS:  - Unable to obtain, poor mental status     ICU Vital Signs Last 24 Hrs  T(C): 37.3 (2021 04:00), Max: 38 (2021 23:00)  T(F): 99.1 (2021 04:00), Max: 100.4 (2021 23:00)  HR: 116 (2021 04:00) (55 - 128)  ABP: 117/43 (2021 04:00) (97/46 - 153/78)  ABP(mean): 63 (2021 04:00) (63 - 98)  RR: 25 (2021 04:00) (15 - 34)  SpO2: 99% (2021 04:00) (72% - 100%)    Mode: 40% V/M      I&O's Summary    2021 07:01  -  2021 07:00  --------------------------------------------------------  IN: 779.7 mL / OUT: 700 mL / NET: 79.7 mL    2021 07:01  -  2021 06:19  --------------------------------------------------------  IN: 844.8 mL / OUT: 2045 mL / NET: -1200.2 mL      CAPILLARY BLOOD GLUCOSE  POCT Blood Glucose.: 148 mg/dL (2021 05:22)    PHYSICAL EXAM:  General: WN/WD NAD  Neurology: Tracking, not following commands   Respiratory: CTA B/L, no wheezing/rhonchi   CV: RRR, S1S2, no murmurs, rubs/gallops  Abdominal: Soft, NTND, normoactive BS  Extremities: No edema, +distal pulses   ============================I/O===========================   I&O's Detail    2021 07:01  -  2021 07:00  --------------------------------------------------------  IN:    Dexmedetomidine: 272.8 mL    Heparin: 86 mL    IV PiggyBack: 140 mL    Nepro with Carb Steady: 245 mL    Norepinephrine: 35.9 mL  Total IN: 779.7 mL    OUT:    Indwelling Catheter - Urethral (mL): 700 mL    Rectal Tube (mL): 0 mL  Total OUT: 700 mL    Total NET: 79.7 mL    2021 07:01  -  2021 06:19  --------------------------------------------------------  IN:    Dexmedetomidine: 100.8 mL    Heparin: 178 mL    IV PiggyBack: 445 mL    Nepro with Carb Steady: 70 mL    Norepinephrine: 51 mL  Total IN: 844.8 mL    OUT:    dextrose 5% + lactated ringers: 0 mL    Indwelling Catheter - Urethral (mL): 2045 mL  Total OUT: 2045 mL    Total NET: -1200.2 mL  ============================ LABS =========================                    9.9    20.21 )-----------( 379      ( 2021 00:38 )             32.0         145  |  113<H>  |  47<H>  ----------------------------<  188<H>  3.7   |  20<L>  |  1.33<H>    Ca    8.3<L>      2021 00:39  Phos  3.8       Mg     2.1         TPro  6.5  /  Alb  2.1<L>  /  TBili  0.8  /  DBili  x   /  AST  33  /  ALT  128<H>  /  AlkPhos  110      Troponin T, High Sensitivity Result: 36 ng/L (21 @ 13:49)    Creatine Kinase, Serum: 44 U/L (21 @ 00:39)  Creatine Kinase, Serum: 26 U/L (21 @ 21:18)    LIVER FUNCTIONS - ( 2021 00:39 )  Alb: 2.1 g/dL / Pro: 6.5 g/dL / ALK PHOS: 110 U/L / ALT: 128 U/L / AST: 33 U/L / GGT: x           PT/INR - ( 2021 00:38 )   PT: 14.4 sec;   INR: 1.21 ratio    PTT - ( 2021 00:38 )  PTT:44.4 sec  ABG - ( 2021 00:37 )  pH, Arterial: 7.46  pH, Blood: x     /  pCO2: 32    /  pO2: 78    / HCO3: 22    / Base Excess: -.3   /  SaO2: 96        Blood Gas Arterial, Lactate: 1.4 mmol/L (21 @ 00:37)  Blood Gas Arterial, Lactate: 1.0 mmol/L (21 @ 13:46)  Blood Gas Arterial, Lactate: 0.9 mmol/L (21 @ 08:20)  Blood Gas Arterial, Lactate: 1.0 mmol/L (21 @ 21:17)  Blood Gas Arterial, Lactate: 1.5 mmol/L (21 @ 15:16)  Blood Gas Arterial, Lactate: 1.0 mmol/L (21 @ 01:59)    Urinalysis Basic - ( 2021 03:23 )    Color: Yellow / Appearance: Slightly Turbid / S.024 / pH: x  Gluc: x / Ketone: Negative  / Bili: Negative / Urobili: Negative   Blood: x / Protein: 30 mg/dL / Nitrite: Negative   Leuk Esterase: Negative / RBC: 2 /hpf / WBC 1 /HPF   Sq Epi: x / Non Sq Epi: 2 /hpf / Bacteria: Negative  ======================Micro/Rad/Cardio=================  Telemetry: Reviewed   EKG: Reviewed  CXR: Reviewed  Culture: Reviewed   Echo: Transthoracic Echocardiogram:   Patient name: ADRIANA BOOKER  YOB: 1960   Age: 60 (M)   MR#: [id number]  Study Date: 2021  [facility]: Fawn Rios RDCS  Study quality: Technically Difficult  Referring Physician: Scot Wilhelm MD  Blood Pressure: 110/62 mmHg  Height: 183 cm  Weight: 73 kg  BSA: 2 m2  ------------------------------------------------------------------------  PROCEDURE: Transthoracic echocardiogram with 2-D, M-Mode  and complete spectral and color flow Doppler.  INDICATION: Chronic ischemic heart disease, unspecified  (I25.9)  ------------------------------------------------------------------------  DIMENSIONS:  Dimensions:     Normal Values:  LA:     2.4 cm    2.0 - 4.0 cm  Ao:     4.4 cm    2.0 - 3.8 cm  SEPTUM: 0.7 cm    0.6 - 1.2 cm  PWT:    0.6 cm    0.6 - 1.1 cm  LVIDd:  4.0 cm    3.0 - 5.6 cm  LVIDs:  2.6 cm    1.8 - 4.0 cm  Derived Variables:  LVMI: 37 g/m2  RWT: 0.30  ------------------------------------------------------------------------  OBSERVATIONS:  Mitral Valve: Normal mitral valve. Minimal mitral  regurgitation.  Aortic Root: Mild aortic root dilatation  (Ao: 4.4 cm at  the sinuses of Valsalva).  Aortic Valve: Normal trileaflet aortic valve.  Left Atrium: Normal left atrium.  Left Ventricle: Limited views from the parasternal window  suggest grossly preserved overall left ventricular systolic  function.  Unable to exclude segmental wall motion  abnormalities. Normal left ventricular internal dimensions  and wall thicknesses.  Right Heart: Normal right atrium. Right ventricular  enlargement with decreased right ventricular systolic  function. Normal tricuspid valve. Minimal tricuspid  regurgitation. Normal pulmonic valve. Minimal pulmonic  regurgitation.  Pericardium/PleuraNormal pericardium with trace pericardial  effusion.  ------------------------------------------------------------------------  CONCLUSIONS:  Technically difficult study.  1. Normal mitral valve. Minimal mitral regurgitation.  2. Mild aortic root dilatation  (Ao: 4.4 cm at the sinuses  of Valsalva).  3. Normal left ventricular internal dimensions and wall  thicknesses.  4. Limited views from the parasternal window suggest  grossly preserved overall left ventricular systolic  function.  Unable to exclude segmental wall motion  abnormalities.  5. Right ventricular enlargement with decreased right  ventricular systolic function.  ======================================================  PAST MEDICAL & SURGICAL HISTORY:  No pertinent past medical history  No significant past surgical history

## 2021-04-14 NOTE — PROGRESS NOTE ADULT - ASSESSMENT
A/P: 60M unknown PMHx presented with hypoxic respiratory failure 2/2 COVID, complicated by severe HAGMA, hypoxemia, multiple areas of crebral infarcts with hemorhagic transformation, suki, spt with klebs completed empiric course of abx for Concern for superimposed bacterial infection vs. reactive,  intubated at [facility] for hypoxic respiratory failure and transferred to 5ICU (4/12)     NEURO  #Acute CVA w/ hemorraghic conversion   - HCT: acute infarcts L cerebellar, L posterior frontal, b/l occipital parietal, hemorrhagic transformation  - Repeat HCT (4/12): no changes - as described above - no shift or herniation noted   - vEEG: mod-severe nonspecific diffuse/multifocal cerebral dysfunction, no seizures  - Neuro surg: no surgical intervention, restarted on AC no bolus  - c/w keppra for prophylaxis, neuro check q4h    PULM  #Acute hypoxic Respiratory Failure s/p intubation  - S/P Extubation (4/13), stable on RA w/ Sp02 >94%  - Pulm toileting with chest PT, supplemental 02 prn     #PE  - CTA Chest (4/1): +RITO PE with RV strain   - c/w systemic patient specific Heparin     CV  - No active issues, remains off pressors     GI  #Dysphagia   - Maintain NPO, insert NGT - start enteral feds   - Speech/swallow eval   - bowel regimen     RENAL  #SUKI  - SCr 1.19->1.33, strict I/Os, avoid nephrotoxins   - Target net even today     HEME  #Acute PE  - CTA Chest (4/1): +RITO PE with RV strain   - POCUS (4/2): L common femoral DVT  - c/w systemic patient specific Heparin     ENDO  #Hx T2DM, A1c 10.3  - BG stable while NPO, c/w ISS mod  - Monitor FS once started on enteral feds     ID  #Leukocytosis   - Remains with low grade fevers, no lactate, procal 0.59   - s/p Empiric vanc/cefepime, Caspofungin x 1 dose  - Monitor off Abx, f/u infectious work up     #COVID-19  - Did not complete Remdesvir 2/2 SUKI   - S/P completed Decadron course   - Inflammatory markers q72h    ETHICS  - FULL CODE  ======================= DISPOSITION  =====================  [X ] Critical   [ ] Guarded    [ ] Stable    [X ] Maintain in COVID ICU  [ ] Downgrade to floor   [ ] Discharge Home    Kaylin Ortiz [facility]  ICU [phone] A/P: 60M unknown PMHx presented with hypoxic respiratory failure 2/2 COVID, complicated by severe HAGMA, hypoxemia, multiple areas of crebral infarcts with hemorhagic transformation, suki, spt with klebs completed empiric course of abx for Concern for superimposed bacterial infection vs. reactive,  intubated at [facility] for hypoxic respiratory failure and transferred to 5ICU (4/12)     NEURO  #Acute CVA w/ hemorraghic conversion   - HCT: +infarcts L cerebellar, L posterior/frontal, b/l occipital/parietal, hemorrhagic transformation  - Repeat HCT (4/12): no changes - as described above - no shift or herniation noted   - vEEG: mod-severe nonspecific diffuse/multifocal cerebral dysfunction, no seizures  - Neuro surg: no surgical intervention, restarted on AC no bolus  - c/w keppra for prophylaxis, neuro check q4h    PULM  #Acute hypoxic Respiratory Failure s/p intubation  - S/P Extubation (4/13), stable on RA w/ Sp02 >94%  - Pulm toileting with chest PT, supplemental 02 prn     #PE  - CTA Chest (4/1): +RITO PE with RV strain   - c/w systemic patient specific Heparin     CV  - No active issues, remains off pressors     GI  #Dysphagia   - Maintain NPO, insert NGT - start enteral feds   - Speech/swallow eval   - bowel regimen     RENAL  #SUKI  - SCr 1.19->1.33, strict I/Os, avoid nephrotoxins   - Target net even today     HEME  #Acute PE  - CTA Chest (4/1): +RITO PE with RV strain   - POCUS (4/2): L common femoral DVT  - c/w systemic patient specific Heparin     ENDO  #Hx T2DM, A1c 10.3  - BG stable while NPO, c/w ISS mod  - Monitor FS once started on enteral feds     ID  #Leukocytosis   - Remains with low grade fevers, no lactate, procal 0.59   - s/p Empiric vanc/cefepime, Caspofungin x 1 dose  - Monitor off Abx, f/u infectious work up     #COVID-19  - Did not complete Remdesvir 2/2 SUKI   - S/P completed Decadron course   - Inflammatory markers q72h    ETHICS  - FULL CODE  ======================= DISPOSITION  =====================  [X ] Critical   [ ] Guarded    [ ] Stable    [X ] Maintain in COVID ICU  [ ] Downgrade to floor   [ ] Discharge Home    Kaylin Ortiz [facility]  ICU [phone] A/P: 60M unknown PMHx presented with hypoxic respiratory failure 2/2 COVID, complicated by severe HAGMA, hypoxemia, multiple areas of crebral infarcts with hemorhagic transformation, suki, spt with klebs completed empiric course of abx for Concern for superimposed bacterial infection vs. reactive,  intubated at [facility] for hypoxic respiratory failure and transferred to 5ICU (4/12)     NEURO  #Acute CVA w/ hemorraghic conversion   - HCT: +infarcts L cerebellar, L posterior/frontal, b/l occipital/parietal, hemorrhagic transformation  - Repeat HCT (4/12): no changes - as described above - no shift or herniation noted   - vEEG: mod-severe nonspecific diffuse/multifocal cerebral dysfunction, no seizures  - Neuro surg: no surgical intervention, restarted on AC no bolus  - c/w keppra for prophylaxis, neuro check q4h  - Neurology consult     PULM  #Acute hypoxic Respiratory Failure s/p intubation  - S/P Extubation (4/13), stable on RA w/ Sp02 >94%  - Pulm toileting with chest PT, supplemental 02 prn     #PE  - CTA Chest (4/1): +RITO PE with RV strain   - c/w systemic patient specific Heparin     CV  - No active issues, remains off pressors     GI  #Dysphagia   - Maintain NPO, insert NGT - start enteral feds   - Speech/swallow eval   - bowel regimen     RENAL  #SUKI  - SCr 1.19->1.33, strict I/Os, avoid nephrotoxins   - Target net even today     HEME  #Acute PE  - CTA Chest (4/1): +RITO PE with RV strain   - POCUS (4/2): L common femoral DVT  - c/w systemic patient specific Heparin     ENDO  #Hx T2DM, A1c 10.3  - BG stable while NPO, c/w ISS mod  - Monitor FS once started on enteral feds     ID  #Leukocytosis   - Remains with low grade fevers, no lactate, procal 0.59   - s/p Empiric vanc/cefepime, Caspofungin x 1 dose  - Monitor off Abx, f/u infectious work up     #COVID-19  - Did not complete Remdesvir 2/2 SUKI   - S/P completed Decadron course   - Inflammatory markers q72h    ETHICS  - FULL CODE  ======================= DISPOSITION  =====================  [ ] Critical   [ ] Guarded    [X ] Stable    [ ] Maintain in COVID ICU  [X ] Downgrade to floor   [ ] Discharge Home    Kaylin Ortiz [facility]  ICU [phone]

## 2021-04-14 NOTE — DIETITIAN INITIAL EVALUATION ADULT. - PERTINENT MEDS FT
MEDICATIONS  (STANDING):  chlorhexidine 2% Cloths 1 Application(s) Topical <User Schedule>  heparin  Infusion 700 Unit(s)/Hr (8.5 mL/Hr) IV Continuous <Continuous>  insulin lispro (ADMELOG) corrective regimen sliding scale   SubCutaneous every 6 hours  levETIRAcetam  IVPB 500 milliGRAM(s) IV Intermittent every 12 hours  pantoprazole  Injectable 40 milliGRAM(s) IV Push daily  polyethylene glycol 3350 17 Gram(s) Oral at bedtime  senna 2 Tablet(s) Oral at bedtime    MEDICATIONS  (PRN):  sodium chloride 0.9% lock flush 10 milliLiter(s) IV Push every 1 hour PRN Pre/post blood products, medications, blood draw, and to maintain line patency

## 2021-04-14 NOTE — PHYSICAL THERAPY INITIAL EVALUATION ADULT - PERTINENT HX OF CURRENT PROBLEM, REHAB EVAL
60 y M unknown PMH presented with hypoxic respiratory failure 2/2 COVID, complicated by severe HAGMA, hypoxemia, multiple areas of crebral infarcts with hemorhagic transformation, ulises, completed empiric course of abx for Concern for bacterial infection vs. reactive, intubated at [facility] for hypoxic respiratory failure and transferred to 5ICU (4/12). CT chest: found to have PE in RITO.

## 2021-04-14 NOTE — CHART NOTE - NSCHARTNOTEFT_GEN_A_CORE
5T COVID ICU Transfer Note    Transfer from: 5T COVID ICU    Transfer to: ( X ) Medicine    (  ) Telemetry     (   ) RCU      (    ) Palliative       (   ) Stroke Unit     (  ) MICU     Accepting Physician: Prashanth Frost   Signout given to: NANCY Frost, Medicine ACP    ADRIANA BOOKER  MRN-[id number]  Patient is a 60y old  Male who presents with a chief complaint of COVID19 w/ severe metabolic acidosis s/p intubation (2021 07:01)    HPI: 60M unknown medical history BIBEMS for respiratory distress, recently COVID+ as per EMS. Pt unable to comply with history 2/2 resp distress, nods yes to difficulty breathing, no to pain. Baseline AAOx3. En route, pt had RR 28, SaO2 60%. In ED initially hypoxic to 60% placed on NRB and satting low 90s and tachypneic to 40s on NRB. Placed on AVAPS, still tachypneic to 40s. Labs showing HAGMA, elevated FSG to 500s. Also with lactate of 15. Trops of 200 and pro-BNP elevated to 21k. MICU consulted for respiratory failure in setting of COVID, also concern for DKA. On encounter, pt is noncooperative with questioning. Nods and tries to speak, unable to form full sentences and visibly tachypneic on AVAPS. In ED given 2L NS, decadron. Started on insulin gtt. Subsequently intubated.     REVIEW OF SYSTEMS:  - Unable to obtain, poor mental status     ICU Vital Signs Last 24 Hrs  T(C): 37.3 (2021 04:00), Max: 38 (2021 23:00)  T(F): 99.1 (2021 04:00), Max: 100.4 (2021 23:00)  HR: 116 (2021 04:00) (55 - 128)  ABP: 117/43 (2021 04:00) (97/46 - 153/78)  ABP(mean): 63 (2021 04:00) (63 - 98)  RR: 25 (2021 04:00) (15 - 34)  SpO2: 99% (2021 04:00) (72% - 100%)    Mode: 40% V/M      I&O's Summary    2021 07:01  -  2021 07:00  --------------------------------------------------------  IN: 779.7 mL / OUT: 700 mL / NET: 79.7 mL    2021 07:  -  2021 06:19  --------------------------------------------------------  IN: 844.8 mL / OUT: 2045 mL / NET: -1200.2 mL      CAPILLARY BLOOD GLUCOSE  POCT Blood Glucose.: 148 mg/dL (2021 05:22)    PHYSICAL EXAM:  General: WN/WD NAD  Neurology: Tracking, not following commands   Respiratory: CTA B/L, no wheezing/rhonchi   CV: RRR, S1S2, no murmurs, rubs/gallops  Abdominal: Soft, NTND, normoactive BS  Extremities: No edema, +distal pulses   ============================I/O===========================   I&O's Detail    2021 07:  -  2021 07:00  --------------------------------------------------------  IN:    Dexmedetomidine: 272.8 mL    Heparin: 86 mL    IV PiggyBack: 140 mL    Nepro with Carb Steady: 245 mL    Norepinephrine: 35.9 mL  Total IN: 779.7 mL    OUT:    Indwelling Catheter - Urethral (mL): 700 mL    Rectal Tube (mL): 0 mL  Total OUT: 700 mL    Total NET: 79.7 mL    2021 07:  -  2021 06:19  --------------------------------------------------------  IN:    Dexmedetomidine: 100.8 mL    Heparin: 178 mL    IV PiggyBack: 445 mL    Nepro with Carb Steady: 70 mL    Norepinephrine: 51 mL  Total IN: 844.8 mL    OUT:    dextrose 5% + lactated ringers: 0 mL    Indwelling Catheter - Urethral (mL): 2045 mL  Total OUT: 2045 mL    Total NET: -1200.2 mL  ============================ LABS =========================                    9.9    20.21 )-----------( 379      ( 2021 00:38 )             32.0         145  |  113<H>  |  47<H>  ----------------------------<  188<H>  3.7   |  20<L>  |  1.33<H>    Ca    8.3<L>      2021 00:39  Phos  3.8       Mg     2.1         TPro  6.5  /  Alb  2.1<L>  /  TBili  0.8  /  DBili  x   /  AST  33  /  ALT  128<H>  /  AlkPhos  110      Troponin T, High Sensitivity Result: 36 ng/L (21 @ 13:49)    Creatine Kinase, Serum: 44 U/L (21 @ 00:39)  Creatine Kinase, Serum: 26 U/L (21 @ 21:18)    LIVER FUNCTIONS - ( 2021 00:39 )  Alb: 2.1 g/dL / Pro: 6.5 g/dL / ALK PHOS: 110 U/L / ALT: 128 U/L / AST: 33 U/L / GGT: x           PT/INR - ( 2021 00:38 )   PT: 14.4 sec;   INR: 1.21 ratio    PTT - ( 2021 00:38 )  PTT:44.4 sec  ABG - ( 2021 00:37 )  pH, Arterial: 7.46  pH, Blood: x     /  pCO2: 32    /  pO2: 78    / HCO3: 22    / Base Excess: -.3   /  SaO2: 96        Blood Gas Arterial, Lactate: 1.4 mmol/L (21 @ 00:37)  Blood Gas Arterial, Lactate: 1.0 mmol/L (21 @ 13:46)  Blood Gas Arterial, Lactate: 0.9 mmol/L (21 @ 08:20)  Blood Gas Arterial, Lactate: 1.0 mmol/L (21 @ 21:17)  Blood Gas Arterial, Lactate: 1.5 mmol/L (21 @ 15:16)  Blood Gas Arterial, Lactate: 1.0 mmol/L (21 @ 01:59)    Urinalysis Basic - ( 2021 03:23 )    Color: Yellow / Appearance: Slightly Turbid / S.024 / pH: x  Gluc: x / Ketone: Negative  / Bili: Negative / Urobili: Negative   Blood: x / Protein: 30 mg/dL / Nitrite: Negative   Leuk Esterase: Negative / RBC: 2 /hpf / WBC 1 /HPF   Sq Epi: x / Non Sq Epi: 2 /hpf / Bacteria: Negative  ======================Micro/Rad/Cardio=================  Telemetry: Reviewed   EKG: Reviewed  CXR: Reviewed  Culture: Reviewed   Echo: Transthoracic Echocardiogram:   Patient name: ADRIANA BOOKER  YOB: 1960   Age: 60 (M)   MR#: [id number]  Study Date: 2021  [facility]: Fawn Rios RDCS  Study quality: Technically Difficult  Referring Physician: Scot Wilhelm MD  Blood Pressure: 110/62 mmHg  Height: 183 cm  Weight: 73 kg  BSA: 2 m2  ------------------------------------------------------------------------  PROCEDURE: Transthoracic echocardiogram with 2-D, M-Mode  and complete spectral and color flow Doppler.  INDICATION: Chronic ischemic heart disease, unspecified  (I25.9)  ------------------------------------------------------------------------  DIMENSIONS:  Dimensions:     Normal Values:  LA:     2.4 cm    2.0 - 4.0 cm  Ao:     4.4 cm    2.0 - 3.8 cm  SEPTUM: 0.7 cm    0.6 - 1.2 cm  PWT:    0.6 cm    0.6 - 1.1 cm  LVIDd:  4.0 cm    3.0 - 5.6 cm  LVIDs:  2.6 cm    1.8 - 4.0 cm  Derived Variables:  LVMI: 37 g/m2  RWT: 0.30  ------------------------------------------------------------------------  OBSERVATIONS:  Mitral Valve: Normal mitral valve. Minimal mitral  regurgitation.  Aortic Root: Mild aortic root dilatation  (Ao: 4.4 cm at  the sinuses of Valsalva).  Aortic Valve: Normal trileaflet aortic valve.  Left Atrium: Normal left atrium.  Left Ventricle: Limited views from the parasternal window  suggest grossly preserved overall left ventricular systolic  function.  Unable to exclude segmental wall motion  abnormalities. Normal left ventricular internal dimensions  and wall thicknesses.  Right Heart: Normal right atrium. Right ventricular  enlargement with decreased right ventricular systolic  function. Normal tricuspid valve. Minimal tricuspid  regurgitation. Normal pulmonic valve. Minimal pulmonic  regurgitation.  Pericardium/PleuraNormal pericardium with trace pericardial  effusion.  ------------------------------------------------------------------------  CONCLUSIONS:  Technically difficult study.  1. Normal mitral valve. Minimal mitral regurgitation.  2. Mild aortic root dilatation  (Ao: 4.4 cm at the sinuses  of Valsalva).  3. Normal left ventricular internal dimensions and wall  thicknesses.  4. Limited views from the parasternal window suggest  grossly preserved overall left ventricular systolic  function.  Unable to exclude segmental wall motion  abnormalities.  5. Right ventricular enlargement with decreased right  ventricular systolic function.  ======================================================  PAST MEDICAL & SURGICAL HISTORY:  No pertinent past medical history  No significant past surgical history    Assessment and Plan:   · Assessment	  A/P: 60M unknown PMHx presented with hypoxic respiratory failure 2/2 COVID, complicated by severe HAGMA, hypoxemia, multiple areas of crebral infarcts with hemorhagic transformation, suki, spt with klebs completed empiric course of abx for Concern for superimposed bacterial infection vs. reactive,  intubated at [facility] for hypoxic respiratory failure and transferred to 5ICU ()     NEURO  #Acute CVA w/ hemorraghic conversion   - HCT: +infarcts L cerebellar, L posterior/frontal, b/l occipital/parietal, hemorrhagic transformation  - Repeat HCT (): no changes - as described above - no shift or herniation noted   - vEEG: mod-severe nonspecific diffuse/multifocal cerebral dysfunction, no seizures  - Neuro surg: no surgical intervention, restarted on AC no bolus  - c/w keppra for prophylaxis, neuro check q4h  - Neurology consult     PULM  #Acute hypoxic Respiratory Failure s/p intubation  - S/P Extubation (), stable on RA w/ Sp02 >94%  - Pulm toileting with chest PT, supplemental 02 prn     #PE  - CTA Chest (): +RITO PE with RV strain   - c/w systemic patient specific Heparin     CV  - No active issues, remains off pressors     GI  #Dysphagia   - Maintain NPO, insert NGT - start enteral feds   - Speech/swallow eval once able   - bowel regimen     RENAL  #SUKI  - SCr 1.19->1.33, strict I/Os, avoid nephrotoxins   - Target net even today     HEME  #Acute PE  - CTA Chest (): +RITO PE with RV strain   - POCUS (): L common femoral DVT  - c/w systemic patient specific Heparin     ENDO  #Hx T2DM, A1c 10.3  - BG stable while NPO, c/w ISS mod  - Monitor FS once started on enteral feds     ID  #Leukocytosis   - Remains with low grade fevers, no lactate, procal 0.59   - s/p Empiric vanc/cefepime, Caspofungin x 1 dose  - Monitor off Abx, f/u infectious work up     #COVID-19  - Did not complete Remdesvir  SUKI   - S/P completed Decadron course   - Inflammatory markers q72h    ETHICS  - FULL CODE, Palliative consult     Follow up:  - Speech/swallow eval once able   - Neuro consult   - Palliative consult   - TOV  ======================= DISPOSITION  =====================  [ ] Critical   [ ] Guarded    [X ] Stable    [ ] Maintain in COVID ICU  [X ] Downgrade to floor   [ ] Discharge Home    Kayiln Ortiz [facility]  ICU [phone]

## 2021-04-14 NOTE — DIETITIAN INITIAL EVALUATION ADULT. - OTHER INFO
Diet Hx in-house: NPO with EN via OGT 4/12-4/14, was ordered for Nepro @ 35ml/hr x 24hrs (840ml formula, 1512kcals, 68g protein, 611ml free H2O). Pt currently ordered NPO as of 5:30 this AM  - Per flowsheets, EN initiated 4/13, received 315ml (567kcals & ~26g protein) and then feeds were held  - Per chart (from current admit & prior admit at [facility]) Pt has met <50% of protein & energy needs in the last 7 days  - Per chart Pt pending dysphagia screening     GI: Pt previously noted with liquid stools and had rectal tube placed - since discontinued and noted with 2 soft BM's this AM - bowel regimen ordered (miralax, senna)    Weight Hx: Pt weighed 158Ibs/72kg (4/1)   Dosing weight upon current admit 4/12: 145.9Ibs - indicates a 7.6% (12Ib) wt loss in < 2 weeks  - Some wt loss likely 2/2 fluid shifts as pt noted to receive diuretic therapies, however, also likely 2/2 inability to feed at goal rate 2/2 complicated hospital course.    Pt with unknown PMH, A1c 10.3% (4/2) - indicates DM. ISS ordered at this time to optimize BG.

## 2021-04-14 NOTE — DIETITIAN INITIAL EVALUATION ADULT. - ORAL INTAKE PTA/DIET HISTORY
Unable to obtain subjective information from pt at this time; pt extubated and weaned from sedation yesterday - noted to not be speaking at this time. Per chart, NKFA, no micronutrient supplementation PTA. Per RD note from [facility] on 4/6 Pt was receiving Nepro @ 40ml/hr x 24hrs + 3 No Carb Prosource (1908kcals &123g protein) and then was changed to Nepro @ goal rate of 35ml/hr x 24hrs + 2 No Carb Prosource (1632kcals & 98g protein) on 4/6 up until discharge on 4/12

## 2021-04-14 NOTE — PHYSICAL THERAPY INITIAL EVALUATION ADULT - PLANNED THERAPY INTERVENTIONS, PT EVAL
Stair training... GOAL: In 4 weeks pt will negotiate 5 steps MinAx1 with least restrictive device./balance training/bed mobility training/gait training/strengthening/transfer training

## 2021-04-14 NOTE — PHYSICAL THERAPY INITIAL EVALUATION ADULT - IMPAIRMENTS FOUND, PT EVAL
aerobic capacity/endurance/arousal, attention, and cognition/cognitive impairment/decreased midline orientation/gait, locomotion, and balance/muscle strength/tone

## 2021-04-14 NOTE — DIETITIAN INITIAL EVALUATION ADULT. - PERTINENT LABORATORY DATA
04-14 Na145 mmol/L Glu 188 mg/dL<H> K+ 3.7 mmol/L Cr  1.33 mg/dL<H> BUN 47 mg/dL<H> Phos 3.8 mg/dL Alb 2.1 g/dL<L> PAB n/a       Triglycerides, Serum: 126 mg/dL (04-13-21 @ 13:49)    CAPILLARY BLOOD GLUCOSE  POCT Blood Glucose.: 148 mg/dL (14 Apr 2021 05:22)  POCT Blood Glucose.: 174 mg/dL (13 Apr 2021 23:08)  POCT Blood Glucose.: 152 mg/dL (13 Apr 2021 19:42)  POCT Blood Glucose.: 90 mg/dL (13 Apr 2021 18:03)  POCT Blood Glucose.: 203 mg/dL (13 Apr 2021 11:07)

## 2021-04-14 NOTE — DIETITIAN INITIAL EVALUATION ADULT. - CHIEF COMPLAINT
60M, unknown PMH. P/w acute hypoxic respiratory failure 2/2 COVID-19, c/b severe HAGMA; intubated 4/1. Course c/b PE, but now with acute cerebellar ischemic CVA with some hemorrhagic transformation. Transferred from [facility] to [facility] 2/2 level loading. Extubated 4/13 and weaned to RA as of this AM; weaned off pressors and sedatives

## 2021-04-14 NOTE — CHART NOTE - NSCHARTNOTEFT_GEN_A_CORE
MAR Accept Note    Transfer from: MICU    Transfer to: ( x ) Medicine    (  ) Telemetry     (   ) RCU        (    ) Palliative         (   ) Stroke Unit          (   ) __________________    Accepting Physician: Dr Pizarro  Signout given to:     MICU COURSE:  Refer to ICU transfer note for full details. Briefly, 60M w/ PMH of unspecified low BPs, recent COVID+ BIBEMS for respiratory distress. Admitted to MICU for AHRF 2/2 COVID requiring 1 day of intubation, and DKA requiring insulin gtt. Extubated 4/13 and on RA. Course c/b CVA with hemorrhagic conversion, ongoing encephalopathy, dysphagia s/p NGT, acute PE on hep gtt, SUKI.      ASSESSMENT & PLAN:   NEURO  #Acute CVA w/ hemorraghic conversion   - HCT: +infarcts L cerebellar, L posterior/frontal, b/l occipital/parietal, hemorrhagic transformation  - Repeat HCT (4/12): no changes - as described above - no shift or herniation noted   - vEEG: mod-severe nonspecific diffuse/multifocal cerebral dysfunction, no seizures  - Neuro surg: no surgical intervention, restarted on AC no bolus  - c/w keppra for prophylaxis, neuro check q4h  - Neurology consult     PULM  #Acute hypoxic Respiratory Failure s/p intubation  - S/P Extubation (4/13), stable on RA w/ Sp02 >94%  - Pulm toileting with chest PT, supplemental 02 prn     #PE  - CTA Chest (4/1): +RITO PE with RV strain   - c/w systemic patient specific Heparin     CV  - No active issues, remains off pressors     GI  #Dysphagia   - Maintain NPO, insert NGT - start enteral feds   - Speech/swallow eval   - bowel regimen     RENAL  #SUKI  - SCr 1.19->1.33, strict I/Os, avoid nephrotoxins   - Target net even today     HEME  #Acute PE  - CTA Chest (4/1): +RITO PE with RV strain   - POCUS (4/2): L common femoral DVT  - c/w systemic patient specific Heparin     ENDO  #Hx T2DM, A1c 10.3  - BG stable while NPO, c/w ISS mod  - Monitor FS once started on enteral feds     ID  #Leukocytosis   - Remains with low grade fevers, no lactate, procal 0.59   - s/p Empiric vanc/cefepime, Caspofungin x 1 dose  - Monitor off Abx, f/u infectious work up     #COVID-19  - Did not complete Remdesvir 2/2 SUKI   - S/P completed Decadron course   - Inflammatory markers q72h    ETHICS  - FULL CODE      FOR FOLLOW UP:  - trend CBC, BMP  - f/u neuro  - f/u PT  - f/u speech therapy  - GOC clarification

## 2021-04-14 NOTE — PHYSICAL THERAPY INITIAL EVALUATION ADULT - GENERAL OBSERVATIONS, REHAB EVAL
Pt tolerated PT initial eval fairly well. Rec'd in bed; +ICU monitoring, a-line, Mann catheter, R IJ, IVx2.

## 2021-04-14 NOTE — SWALLOW BEDSIDE ASSESSMENT ADULT - COMMENTS
Continued: extubated 4/13. Found to have acute CVA w/ hemorraghic conversion. +infarcts L cerebellar, L posterior/frontal, b/l occipital/parietal, hemorrhagic transformation. Repeat HCT (4/12): no changes. CTA Chest (4/1): +RITO PE with RV strain.  Pt on Heparin.

## 2021-04-14 NOTE — DIETITIAN INITIAL EVALUATION ADULT. - ENTERAL
Pending need for EN, recommend initiating Glucerna 1.2 @ 20ml/hr and increasing as tolerated to goal rate of 70ml/hr x 24hrs. At goal to provide 1680ml formula, 2016kcals, 101g protein, 1352ml free H2O (meets 30.5kcals/kg & 1.5g protein/kg based on dosing wt 66.2kg).

## 2021-04-14 NOTE — PHYSICAL THERAPY INITIAL EVALUATION ADULT - ADDITIONAL COMMENTS
Pt poor historian and non verbal.  As per CM Pt poor historian and non verbal.  As per CHRISTOPH Davis (spoke with family) Pt lives in apartment with wife, however wife is currently in [place]. PTA pt was independent with ambulation and ADL's and was employed.

## 2021-04-15 LAB
ALBUMIN SERPL ELPH-MCNC: 2 G/DL — LOW (ref 3.3–5)
ALBUMIN SERPL ELPH-MCNC: 2.1 G/DL — LOW (ref 3.3–5)
ALP SERPL-CCNC: 110 U/L — SIGNIFICANT CHANGE UP (ref 40–120)
ALP SERPL-CCNC: 115 U/L — SIGNIFICANT CHANGE UP (ref 40–120)
ALT FLD-CCNC: 73 U/L — HIGH (ref 10–45)
ALT FLD-CCNC: 92 U/L — HIGH (ref 10–45)
ANION GAP SERPL CALC-SCNC: 10 MMOL/L — SIGNIFICANT CHANGE UP (ref 5–17)
ANION GAP SERPL CALC-SCNC: 10 MMOL/L — SIGNIFICANT CHANGE UP (ref 5–17)
APTT BLD: 50.9 SEC — HIGH (ref 27.5–35.5)
AST SERPL-CCNC: 22 U/L — SIGNIFICANT CHANGE UP (ref 10–40)
AST SERPL-CCNC: 27 U/L — SIGNIFICANT CHANGE UP (ref 10–40)
BASOPHILS # BLD AUTO: 0.02 K/UL — SIGNIFICANT CHANGE UP (ref 0–0.2)
BASOPHILS NFR BLD AUTO: 0.1 % — SIGNIFICANT CHANGE UP (ref 0–2)
BILIRUB SERPL-MCNC: 0.6 MG/DL — SIGNIFICANT CHANGE UP (ref 0.2–1.2)
BILIRUB SERPL-MCNC: 0.9 MG/DL — SIGNIFICANT CHANGE UP (ref 0.2–1.2)
BUN SERPL-MCNC: 38 MG/DL — HIGH (ref 7–23)
BUN SERPL-MCNC: 43 MG/DL — HIGH (ref 7–23)
CALCIUM SERPL-MCNC: 8 MG/DL — LOW (ref 8.4–10.5)
CALCIUM SERPL-MCNC: 8.1 MG/DL — LOW (ref 8.4–10.5)
CHLORIDE SERPL-SCNC: 115 MMOL/L — HIGH (ref 96–108)
CHLORIDE SERPL-SCNC: 115 MMOL/L — HIGH (ref 96–108)
CO2 SERPL-SCNC: 24 MMOL/L — SIGNIFICANT CHANGE UP (ref 22–31)
CO2 SERPL-SCNC: 24 MMOL/L — SIGNIFICANT CHANGE UP (ref 22–31)
CREAT SERPL-MCNC: 1.3 MG/DL — SIGNIFICANT CHANGE UP (ref 0.5–1.3)
CREAT SERPL-MCNC: 1.42 MG/DL — HIGH (ref 0.5–1.3)
EOSINOPHIL # BLD AUTO: 0.19 K/UL — SIGNIFICANT CHANGE UP (ref 0–0.5)
EOSINOPHIL NFR BLD AUTO: 1.2 % — SIGNIFICANT CHANGE UP (ref 0–6)
GLUCOSE BLDC GLUCOMTR-MCNC: 240 MG/DL — HIGH (ref 70–99)
GLUCOSE BLDC GLUCOMTR-MCNC: 248 MG/DL — HIGH (ref 70–99)
GLUCOSE BLDC GLUCOMTR-MCNC: 256 MG/DL — HIGH (ref 70–99)
GLUCOSE BLDC GLUCOMTR-MCNC: 279 MG/DL — HIGH (ref 70–99)
GLUCOSE BLDC GLUCOMTR-MCNC: 334 MG/DL — HIGH (ref 70–99)
GLUCOSE SERPL-MCNC: 247 MG/DL — HIGH (ref 70–99)
GLUCOSE SERPL-MCNC: 250 MG/DL — HIGH (ref 70–99)
HCT VFR BLD CALC: 30.2 % — LOW (ref 39–50)
HGB BLD-MCNC: 9.2 G/DL — LOW (ref 13–17)
IMM GRANULOCYTES NFR BLD AUTO: 0.9 % — SIGNIFICANT CHANGE UP (ref 0–1.5)
INR BLD: 1.21 RATIO — HIGH (ref 0.88–1.16)
LYMPHOCYTES # BLD AUTO: 1.06 K/UL — SIGNIFICANT CHANGE UP (ref 1–3.3)
LYMPHOCYTES # BLD AUTO: 6.6 % — LOW (ref 13–44)
MAGNESIUM SERPL-MCNC: 2.3 MG/DL — SIGNIFICANT CHANGE UP (ref 1.6–2.6)
MAGNESIUM SERPL-MCNC: 2.3 MG/DL — SIGNIFICANT CHANGE UP (ref 1.6–2.6)
MCHC RBC-ENTMCNC: 26.1 PG — LOW (ref 27–34)
MCHC RBC-ENTMCNC: 30.5 GM/DL — LOW (ref 32–36)
MCV RBC AUTO: 85.8 FL — SIGNIFICANT CHANGE UP (ref 80–100)
MONOCYTES # BLD AUTO: 0.97 K/UL — HIGH (ref 0–0.9)
MONOCYTES NFR BLD AUTO: 6 % — SIGNIFICANT CHANGE UP (ref 2–14)
NEUTROPHILS # BLD AUTO: 13.71 K/UL — HIGH (ref 1.8–7.4)
NEUTROPHILS NFR BLD AUTO: 85.2 % — HIGH (ref 43–77)
NRBC # BLD: 0 /100 WBCS — SIGNIFICANT CHANGE UP (ref 0–0)
PHOSPHATE SERPL-MCNC: 2.9 MG/DL — SIGNIFICANT CHANGE UP (ref 2.5–4.5)
PHOSPHATE SERPL-MCNC: 2.9 MG/DL — SIGNIFICANT CHANGE UP (ref 2.5–4.5)
PLATELET # BLD AUTO: 366 K/UL — SIGNIFICANT CHANGE UP (ref 150–400)
POTASSIUM SERPL-MCNC: 3.6 MMOL/L — SIGNIFICANT CHANGE UP (ref 3.5–5.3)
POTASSIUM SERPL-MCNC: 3.7 MMOL/L — SIGNIFICANT CHANGE UP (ref 3.5–5.3)
POTASSIUM SERPL-SCNC: 3.6 MMOL/L — SIGNIFICANT CHANGE UP (ref 3.5–5.3)
POTASSIUM SERPL-SCNC: 3.7 MMOL/L — SIGNIFICANT CHANGE UP (ref 3.5–5.3)
PROT SERPL-MCNC: 6.3 G/DL — SIGNIFICANT CHANGE UP (ref 6–8.3)
PROT SERPL-MCNC: 6.6 G/DL — SIGNIFICANT CHANGE UP (ref 6–8.3)
PROTHROM AB SERPL-ACNC: 14.4 SEC — HIGH (ref 10.6–13.6)
RBC # BLD: 3.52 M/UL — LOW (ref 4.2–5.8)
RBC # FLD: 15.1 % — HIGH (ref 10.3–14.5)
SODIUM SERPL-SCNC: 149 MMOL/L — HIGH (ref 135–145)
SODIUM SERPL-SCNC: 149 MMOL/L — HIGH (ref 135–145)
WBC # BLD: 16.09 K/UL — HIGH (ref 3.8–10.5)
WBC # FLD AUTO: 16.09 K/UL — HIGH (ref 3.8–10.5)

## 2021-04-15 PROCEDURE — 99233 SBSQ HOSP IP/OBS HIGH 50: CPT | Mod: GC

## 2021-04-15 PROCEDURE — 93306 TTE W/DOPPLER COMPLETE: CPT | Mod: 26

## 2021-04-15 RX ORDER — HEPARIN SODIUM 5000 [USP'U]/ML
950 INJECTION INTRAVENOUS; SUBCUTANEOUS
Qty: 25000 | Refills: 0 | Status: DISCONTINUED | OUTPATIENT
Start: 2021-04-15 | End: 2021-04-16

## 2021-04-15 RX ORDER — POTASSIUM CHLORIDE 20 MEQ
40 PACKET (EA) ORAL ONCE
Refills: 0 | Status: DISCONTINUED | OUTPATIENT
Start: 2021-04-15 | End: 2021-04-15

## 2021-04-15 RX ORDER — HUMAN INSULIN 100 [IU]/ML
2 INJECTION, SUSPENSION SUBCUTANEOUS EVERY 6 HOURS
Refills: 0 | Status: DISCONTINUED | OUTPATIENT
Start: 2021-04-15 | End: 2021-04-16

## 2021-04-15 RX ORDER — ACETAMINOPHEN 500 MG
1000 TABLET ORAL ONCE
Refills: 0 | Status: COMPLETED | OUTPATIENT
Start: 2021-04-15 | End: 2021-04-15

## 2021-04-15 RX ORDER — POTASSIUM PHOSPHATE, MONOBASIC POTASSIUM PHOSPHATE, DIBASIC 236; 224 MG/ML; MG/ML
15 INJECTION, SOLUTION INTRAVENOUS ONCE
Refills: 0 | Status: COMPLETED | OUTPATIENT
Start: 2021-04-15 | End: 2021-04-15

## 2021-04-15 RX ORDER — INSULIN LISPRO 100/ML
VIAL (ML) SUBCUTANEOUS EVERY 6 HOURS
Refills: 0 | Status: DISCONTINUED | OUTPATIENT
Start: 2021-04-15 | End: 2021-05-06

## 2021-04-15 RX ADMIN — HUMAN INSULIN 2 UNIT(S): 100 INJECTION, SUSPENSION SUBCUTANEOUS at 17:05

## 2021-04-15 RX ADMIN — Medication 6: at 06:16

## 2021-04-15 RX ADMIN — MEROPENEM 100 MILLIGRAM(S): 1 INJECTION INTRAVENOUS at 21:49

## 2021-04-15 RX ADMIN — SENNA PLUS 10 MILLILITER(S): 8.6 TABLET ORAL at 21:50

## 2021-04-15 RX ADMIN — Medication 400 MILLIGRAM(S): at 20:42

## 2021-04-15 RX ADMIN — Medication 2: at 00:24

## 2021-04-15 RX ADMIN — Medication 30 MILLILITER(S): at 12:41

## 2021-04-15 RX ADMIN — LEVETIRACETAM 400 MILLIGRAM(S): 250 TABLET, FILM COATED ORAL at 17:05

## 2021-04-15 RX ADMIN — Medication 1000 MILLIGRAM(S): at 21:49

## 2021-04-15 RX ADMIN — Medication 4: at 17:06

## 2021-04-15 RX ADMIN — PANTOPRAZOLE SODIUM 40 MILLIGRAM(S): 20 TABLET, DELAYED RELEASE ORAL at 12:41

## 2021-04-15 RX ADMIN — HUMAN INSULIN 2 UNIT(S): 100 INJECTION, SUSPENSION SUBCUTANEOUS at 12:42

## 2021-04-15 RX ADMIN — Medication 6: at 12:42

## 2021-04-15 RX ADMIN — MEROPENEM 100 MILLIGRAM(S): 1 INJECTION INTRAVENOUS at 05:35

## 2021-04-15 RX ADMIN — Medication 400 UNIT(S): at 12:41

## 2021-04-15 RX ADMIN — LEVETIRACETAM 400 MILLIGRAM(S): 250 TABLET, FILM COATED ORAL at 05:32

## 2021-04-15 RX ADMIN — MEROPENEM 100 MILLIGRAM(S): 1 INJECTION INTRAVENOUS at 12:43

## 2021-04-15 RX ADMIN — POTASSIUM PHOSPHATE, MONOBASIC POTASSIUM PHOSPHATE, DIBASIC 62.5 MILLIMOLE(S): 236; 224 INJECTION, SOLUTION INTRAVENOUS at 06:17

## 2021-04-15 NOTE — PROGRESS NOTE ADULT - ASSESSMENT
A/P: 60M unknown PMHx presented with hypoxic respiratory failure 2/2 COVID, complicated by severe HAGMA, hypoxemia, multiple areas of crebral infarcts with hemorhagic transformation, suki, spt with klebs completed empiric course of abx for Concern for superimposed bacterial infection vs. reactive,  intubated at [facility] for hypoxic respiratory failure and transferred to 5ICU (4/12)     NEURO  #Acute CVA w/ hemorraghic conversion   - HCT: +infarcts L cerebellar, L posterior/frontal, b/l occipital/parietal, hemorrhagic transformation  - Repeat HCT (4/12): no changes - as described above - no shift or herniation noted   - vEEG: mod-severe nonspecific diffuse/multifocal cerebral dysfunction, no seizures  - Neuro surg: no surgical intervention, restarted on AC no bolus  - c/w keppra for prophylaxis, neuro check q4h  - Neurology consult     PULM  #Acute hypoxic Respiratory Failure s/p intubation  - S/P Extubation (4/13), stable on RA w/ Sp02 >94%  - Pulm toileting with chest PT, supplemental 02 prn     #PE  - CTA Chest (4/1): +RITO PE with RV strain   - c/w systemic patient specific Heparin     CV  - No active issues, remains off pressors     GI  #Dysphagia   - Maintain NPO, insert NGT - start enteral feds   - Speech/swallow eval   - bowel regimen     RENAL  #SUKI  - SCr 1.19->1.33, strict I/Os, avoid nephrotoxins   - Target net even today     HEME  #Acute PE  - CTA Chest (4/1): +RITO PE with RV strain   - POCUS (4/2): L common femoral DVT  - c/w systemic patient specific Heparin     ENDO  #Hx T2DM, A1c 10.3  - BG stable while NPO, c/w ISS mod  - Monitor FS once started on enteral feds     ID  #Leukocytosis   - Remains with low grade fevers, no lactate, procal 0.59   - s/p Empiric vanc/cefepime, Caspofungin x 1 dose  - Monitor off Abx, f/u infectious work up     #COVID-19  - Did not complete Remdesvir 2/2 SUKI   - S/P completed Decadron course   - Inflammatory markers q72h    ETHICS  - FULL CODE A/P: 60M unknown PMHx presented with hypoxic respiratory failure 2/2 COVID, complicated by severe HAGMA, hypoxemia, multiple areas of crebral infarcts with hemorhagic transformation, suki, spt with klebs completed empiric course of abx for Concern for superimposed bacterial infection vs. reactive,  intubated at [facility] for hypoxic respiratory failure and transferred to 5ICU (4/12)     NEURO  #Acute CVA w/ hemorraghic conversion   - HCT: +infarcts L cerebellar, L posterior/frontal, b/l occipital/parietal, hemorrhagic transformation  - Repeat HCT (4/12): no changes - as described above - no shift or herniation noted   - vEEG: mod-severe nonspecific diffuse/multifocal cerebral dysfunction, no seizures  - Neuro surg: no surgical intervention, restarted on AC no bolus  - c/w keppra for prophylaxis, neuro check q4h  - Neurology consult     PULM  #Acute hypoxic Respiratory Failure s/p intubation  - S/P Extubation (4/13), stable on RA w/ Sp02 >94%  - Pulm toileting with chest PT, supplemental 02 prn     #PE  - CTA Chest (4/1): +RITO PE with RV strain   - c/w Heparin gtt     CV  - No active issues, remains off pressors     GI  #Dysphagia   - NGT with enteral feeds per nutrition recs   - Speech/swallow eval when able to participate  - bowel regimen     RENAL  #SUKI  - SCr 1.33->1.42, strict I/Os, avoid nephrotoxins   - Target net even today     HEME  #Acute PE  - CTA Chest (4/1): +RITO PE with RV strain   - POCUS (4/2): L common femoral DVT  - c/w Heparin gtt    ENDO  #Hx T2DM, A1c 10.3  - C/w mod ISS q6h  - Add NPH 2units q6h    ID  #Leukocytosis   - Remains with low grade fevers, no lactate, procal 0.52  - s/p Empiric vanc/cefepime, Caspofungin x 1 dose  - Monitor off Abx    #COVID-19  - Did not complete Remdesvir 2/2 SUKI   - S/P completed Decadron course   - Inflammatory markers q72h    ETHICS  - FULL CODE

## 2021-04-15 NOTE — OCCUPATIONAL THERAPY INITIAL EVALUATION ADULT - IMPAIRMENTS CONTRIBUTING IMPAIRED BED MOBILITY, REHAB EVAL
impaired balance/cognition/impaired coordination/impaired motor control/abnormal muscle tone/impaired postural control/decreased ROM/decreased strength

## 2021-04-15 NOTE — PROGRESS NOTE ADULT - NUTRITIONAL ASSESSMENT
This patient has been assessed with a concern for Malnutrition and has been determined to have a diagnosis/diagnoses of Severe protein-calorie malnutrition.    This patient is being managed with:   Diet NPO with Tube Feed-  Tube Feeding Modality: Nasogastric  Glucerna 1.2 Kaden (GLUCERNARTH)  Total Volume for 24 Hours (mL): 1680  Continuous  Starting Tube Feed Rate {mL per Hour}: 20  Increase Tube Feed Rate by (mL): 10     Every 6 hours  Until Goal Tube Feed Rate (mL per Hour): 70  Tube Feed Duration (in Hours): 24  Tube Feed Start Time: 15:00  Entered: Apr 14 2021  3:00PM

## 2021-04-15 NOTE — OCCUPATIONAL THERAPY INITIAL EVALUATION ADULT - ASR WT BEARING STATUS EVAL
CONT: En route, pt had RR 28, SaO2 60%. In ED initially hypoxic to 60% placed on NRB and satting low 90s and tachypneic to 40s on NRB. Placed on AVAPS, still tachypneic to 40s. Trops of 200 and pro-BNP elevated to 21k. MICU consulted for respiratory failure in setting of COVID, also concern for DKA.  In ED given 2L NS, decadron. Started on insulin gtt. Subsequently intubated. CT Head (4/12): Abnormal low-attenuation involving left cerebellar region is again seen which is compatible with acute infarct. Acute infarcts involving the left posterior frontal, bilateral occipital parietal regions are again identified. These findings are compatible with acute subacute infarct as well. The infarct especially involving the high left parietal region does demonstrate some hemorrhagic transformation./no weight-bearing restrictions

## 2021-04-15 NOTE — PROGRESS NOTE ADULT - SUBJECTIVE AND OBJECTIVE BOX
CHIEF COMPLAINT:  Patient is a 60y old  Male who presents with a chief complaint of COVID19 w/ severe metabolic acidosis s/p intubation (14 Apr 2021 08:12)    HPI:  60M unknown medical history BIBEMS for respiratory distress, recently COVID+ as per EMS. Pt unable to comply with history 2/2 resp distress, nods yes to difficulty breathing, no to pain. Baseline AAOx3. En route, pt had RR 28, SaO2 60%. In ED initially hypoxic to 60% placed on NRB and satting low 90s and tachypneic to 40s on NRB. Placed on AVAPS, still tachypneic to 40s. Labs showing HAGMA, elevated FSG to 500s. Also with lactate of 15. Trops of 200 and pro-BNP elevated to 21k. MICU consulted for respiratory failure in setting of COVID, also concern for DKA. On encounter, pt is noncooperative with questioning. Nods and tries to speak, unable to form full sentences and visibly tachypneic on AVAPS. In ED given 2L NS, decadron. Started on insulin gtt. Subsequently intubated.     Initial vitals 108/86, , RR 28, T97, SaO2 60% on 15L NRB. Received 2L NS bolus. Started on insulin gtt.     Pt admitted to surg b for further management.     Addendum 4/1/21 1700:  Limited history obtained from nitesh Anders, who lives in the  (+[phone]). Patient has a PMH of low BP and takes no medications. Patient tested positive for COVID on 3/19. He was initially doing fine with some fatigue, but suddenly decompensated after yesterday. Unable to obtain social history. Wife, Lucero, is currently in [place] - she went due to medical issues but was unable to return due to COVID travel restrictions.        (12 Apr 2021 20:54)      Interval Events:      REVIEW OF SYSTEMS:  -    OBJECTIVE:  ICU Vital Signs Last 24 Hrs  T(C): 37.8 (15 Apr 2021 06:00), Max: 37.9 (14 Apr 2021 18:00)  T(F): 100.1 (15 Apr 2021 06:00), Max: 100.2 (14 Apr 2021 18:00)  HR: 119 (15 Apr 2021 07:00) (114 - 130)  BP: 140/69 (15 Apr 2021 07:00) (117/56 - 154/73)  BP(mean): 97 (15 Apr 2021 07:00) (79 - 105)  ABP: 149/61 (14 Apr 2021 13:00) (133/48 - 153/58)  ABP(mean): 87 (14 Apr 2021 13:00) (71 - 87)  RR: 27 (15 Apr 2021 07:00) (21 - 35)  SpO2: 97% (15 Apr 2021 07:00) (92% - 98%)        04-14-21 @ 07:01  -  04-15-21 @ 07:00  --------------------------------------------------------  IN: 1391.5 mL / OUT: 1215 mL / NET: 176.5 mL      CAPILLARY BLOOD GLUCOSE  POCT Blood Glucose.: 256 mg/dL (15 Apr 2021 05:34)    PHYSICAL EXAM:          HOSPITAL MEDICATIONS:  MEDICATIONS  (STANDING):  cholecalciferol 400 Unit(s) Oral daily  heparin  Infusion 700 Unit(s)/Hr (9.5 mL/Hr) IV Continuous <Continuous>  insulin lispro (ADMELOG) corrective regimen sliding scale   SubCutaneous every 6 hours  levETIRAcetam  IVPB 500 milliGRAM(s) IV Intermittent every 12 hours  meropenem  IVPB      meropenem  IVPB 1000 milliGRAM(s) IV Intermittent every 8 hours  multivitamin/minerals Oral Solution (WELLESSE) 30 milliLiter(s) Oral daily  pantoprazole  Injectable 40 milliGRAM(s) IV Push daily  polyethylene glycol 3350 17 Gram(s) Oral at bedtime  senna Syrup 10 milliLiter(s) Oral at bedtime    MEDICATIONS  (PRN):  sodium chloride 0.9% lock flush 10 milliLiter(s) IV Push every 1 hour PRN Pre/post blood products, medications, blood draw, and to maintain line patency      LABS:                        9.2    16.09 )-----------( 366      ( 15 Apr 2021 02:42 )             30.2     Hgb Trend: 9.2<--, 9.9<--, 10.2<--, 9.7<--, 10.3<--  04-15    149<H>  |  115<H>  |  43<H>  ----------------------------<  247<H>  3.6   |  24  |  1.42<H>    Ca    8.0<L>      15 Apr 2021 02:42  Phos  2.9     04-15  Mg     2.3     04-15    TPro  6.3  /  Alb  2.0<L>  /  TBili  0.9  /  DBili  x   /  AST  27  /  ALT  92<H>  /  AlkPhos  115  04-15    LIVER FUNCTIONS - ( 15 Apr 2021 02:42 )  Alb: 2.0 g/dL / Pro: 6.3 g/dL / ALK PHOS: 115 U/L / ALT: 92 U/L / AST: 27 U/L / GGT: x           Creatinine Trend: 1.42<--, 1.33<--, 1.19<--, 1.38<--, 1.24<--, 1.23<--  PT/INR - ( 15 Apr 2021 02:42 )   PT: 14.4 sec;   INR: 1.21 ratio    PTT - ( 15 Apr 2021 02:42 )  PTT:50.9 sec    Arterial Blood Gas:  04-14 @ 00:37  7.46/32/78/22/96/-.3  ABG lactate: --  Arterial Blood Gas:  04-13 @ 13:46  7.50/31/99/24/98/1.7  ABG lactate: --  Arterial Blood Gas:  04-13 @ 08:20  7.44/32/99/22/97/-1.5  ABG lactate: --        MICROBIOLOGY: Reviewed      RADIOLOGY: Reviewed and interpreted by me    EKG:   CHIEF COMPLAINT:  Patient is a 60y old  Male who presents with a chief complaint of COVID19 w/ severe metabolic acidosis s/p intubation (14 Apr 2021 08:12)    HPI:  60M unknown medical history BIBEMS for respiratory distress, recently COVID+ as per EMS. Pt unable to comply with history 2/2 resp distress, nods yes to difficulty breathing, no to pain. Baseline AAOx3. En route, pt had RR 28, SaO2 60%. In ED initially hypoxic to 60% placed on NRB and satting low 90s and tachypneic to 40s on NRB. Placed on AVAPS, still tachypneic to 40s. Labs showing HAGMA, elevated FSG to 500s. Also with lactate of 15. Trops of 200 and pro-BNP elevated to 21k. MICU consulted for respiratory failure in setting of COVID, also concern for DKA. On encounter, pt is noncooperative with questioning. Nods and tries to speak, unable to form full sentences and visibly tachypneic on AVAPS. In ED given 2L NS, decadron. Started on insulin gtt. Subsequently intubated.     Initial vitals 108/86, , RR 28, T97, SaO2 60% on 15L NRB. Received 2L NS bolus. Started on insulin gtt.     Pt admitted to surg b for further management.     Addendum 4/1/21 1700:  Limited history obtained from nitesh Anders, who lives in the  (+[phone]). Patient has a PMH of low BP and takes no medications. Patient tested positive for COVID on 3/19. He was initially doing fine with some fatigue, but suddenly decompensated after yesterday. Unable to obtain social history. Wife, Lucero, is currently in [place] - she went due to medical issues but was unable to return due to COVID travel restrictions.        (12 Apr 2021 20:54)      Interval Events:  -started free water 300cc q8h for sodium 148    REVIEW OF SYSTEMS:  -unable to obtain confusion    OBJECTIVE:  ICU Vital Signs Last 24 Hrs  T(C): 37.8 (15 Apr 2021 06:00), Max: 37.9 (14 Apr 2021 18:00)  T(F): 100.1 (15 Apr 2021 06:00), Max: 100.2 (14 Apr 2021 18:00)  HR: 119 (15 Apr 2021 07:00) (114 - 130)  BP: 140/69 (15 Apr 2021 07:00) (117/56 - 154/73)  BP(mean): 97 (15 Apr 2021 07:00) (79 - 105)  ABP: 149/61 (14 Apr 2021 13:00) (133/48 - 153/58)  ABP(mean): 87 (14 Apr 2021 13:00) (71 - 87)  RR: 27 (15 Apr 2021 07:00) (21 - 35)  SpO2: 97% (15 Apr 2021 07:00) (92% - 98%)      04-14-21 @ 07:01  -  04-15-21 @ 07:00  --------------------------------------------------------  IN: 1391.5 mL / OUT: 1215 mL / NET: 176.5 mL      CAPILLARY BLOOD GLUCOSE  POCT Blood Glucose.: 256 mg/dL (15 Apr 2021 05:34)    PHYSICAL EXAM:  GENERAL: NAD  HEENT:  Atraumatic, Normocephalic  EYES: PERRL, conjunctiva and sclera clear  NECK: Supple, No JVD  CHEST/LUNG: diminished bilaterally; No wheezes, rales, or rhonchi  HEART: Regular rate and rhythm; No murmurs, rubs, or gallops  ABDOMEN: Soft, Nontender, Nondistended; Bowel sounds present  EXTREMITIES:  2+ Peripheral Pulses, No clubbing, cyanosis, or edema  PSYCH: flat affect  NEUROLOGY: withdrawn, follows basic commands, weak  SKIN: No rashes or lesions        HOSPITAL MEDICATIONS:  MEDICATIONS  (STANDING):  cholecalciferol 400 Unit(s) Oral daily  heparin  Infusion 700 Unit(s)/Hr (9.5 mL/Hr) IV Continuous <Continuous>  insulin lispro (ADMELOG) corrective regimen sliding scale   SubCutaneous every 6 hours  levETIRAcetam  IVPB 500 milliGRAM(s) IV Intermittent every 12 hours  meropenem  IVPB      meropenem  IVPB 1000 milliGRAM(s) IV Intermittent every 8 hours  multivitamin/minerals Oral Solution (WELLESSE) 30 milliLiter(s) Oral daily  pantoprazole  Injectable 40 milliGRAM(s) IV Push daily  polyethylene glycol 3350 17 Gram(s) Oral at bedtime  senna Syrup 10 milliLiter(s) Oral at bedtime    MEDICATIONS  (PRN):  sodium chloride 0.9% lock flush 10 milliLiter(s) IV Push every 1 hour PRN Pre/post blood products, medications, blood draw, and to maintain line patency      LABS:                        9.2    16.09 )-----------( 366      ( 15 Apr 2021 02:42 )             30.2     Hgb Trend: 9.2<--, 9.9<--, 10.2<--, 9.7<--, 10.3<--  04-15    149<H>  |  115<H>  |  43<H>  ----------------------------<  247<H>  3.6   |  24  |  1.42<H>    Ca    8.0<L>      15 Apr 2021 02:42  Phos  2.9     04-15  Mg     2.3     04-15    TPro  6.3  /  Alb  2.0<L>  /  TBili  0.9  /  DBili  x   /  AST  27  /  ALT  92<H>  /  AlkPhos  115  04-15    LIVER FUNCTIONS - ( 15 Apr 2021 02:42 )  Alb: 2.0 g/dL / Pro: 6.3 g/dL / ALK PHOS: 115 U/L / ALT: 92 U/L / AST: 27 U/L / GGT: x           Creatinine Trend: 1.42<--, 1.33<--, 1.19<--, 1.38<--, 1.24<--, 1.23<--  PT/INR - ( 15 Apr 2021 02:42 )   PT: 14.4 sec;   INR: 1.21 ratio    PTT - ( 15 Apr 2021 02:42 )  PTT:50.9 sec    Arterial Blood Gas:  04-14 @ 00:37  7.46/32/78/22/96/-.3  ABG lactate: --  Arterial Blood Gas:  04-13 @ 13:46  7.50/31/99/24/98/1.7  ABG lactate: --  Arterial Blood Gas:  04-13 @ 08:20  7.44/32/99/22/97/-1.5  ABG lactate: --        MICROBIOLOGY: Reviewed      RADIOLOGY: Reviewed and interpreted by me    EKG: sinus

## 2021-04-15 NOTE — CONSULT NOTE ADULT - SUBJECTIVE AND OBJECTIVE BOX
HPI:  60M unknown medical history BIBEMS for respiratory distress, recently COVID+ as per EMS. Pt unable to comply with history 2/2 resp distress, nods yes to difficulty breathing, no to pain. Baseline AAOx3. En route, pt had RR 28, SaO2 60%. In ED initially hypoxic to 60% placed on NRB and satting low 90s and tachypneic to 40s on NRB. Placed on AVAPS, still tachypneic to 40s. Labs showing HAGMA, elevated FSG to 500s. Also with lactate of 15. Trops of 200 and pro-BNP elevated to 21k. MICU consulted for respiratory failure in setting of COVID, also concern for DKA. On encounter, pt is noncooperative with questioning. Nods and tries to speak, unable to form full sentences and visibly tachypneic on AVAPS. In ED given 2L NS, decadron. Started on insulin gtt. Subsequently intubated.     Initial vitals 108/86, , RR 28, T97, SaO2 60% on 15L NRB. Received 2L NS bolus. Started on insulin gtt.     Pt admitted to surg b for further management.     Addendum 4/1/21 1700:  Limited history obtained from Alba Pizarro, nephew, who lives in the [place] [phone]). Patient has a PMH of low BP and takes no medications. Patient tested positive for COVID on 3/19. He was initially doing fine with some fatigue, but suddenly decompensated after yesterday. Unable to obtain social history. Wife, Lucero, is currently in [place] - she went due to medical issues but was unable to return due to COVID travel restrictions.     REVIEW OF SYSTEMS  As per HPI    PAST MEDICAL & SURGICAL HISTORY:  No pertinent past medical history    No significant past surgical history      FAMILY HISTORY:  No pertinent family history in first degree relatives      SOCIAL HISTORY:   T/E/D:   Occupation:   Lives with:     MEDICATIONS (HOME):  Home Medications:    MEDICATIONS  (STANDING):  cholecalciferol 400 Unit(s) Oral daily  heparin  Infusion. 950 Unit(s)/Hr (9.5 mL/Hr) IV Continuous <Continuous>  insulin lispro (ADMELOG) corrective regimen sliding scale   SubCutaneous every 6 hours  insulin NPH human recombinant 2 Unit(s) SubCutaneous every 6 hours  levETIRAcetam  IVPB 500 milliGRAM(s) IV Intermittent every 12 hours  meropenem  IVPB      meropenem  IVPB 1000 milliGRAM(s) IV Intermittent every 8 hours  multivitamin/minerals Oral Solution (WELLESSE) 30 milliLiter(s) Oral daily  pantoprazole  Injectable 40 milliGRAM(s) IV Push daily  polyethylene glycol 3350 17 Gram(s) Oral at bedtime  senna Syrup 10 milliLiter(s) Oral at bedtime    MEDICATIONS  (PRN):    ALLERGIES/INTOLERANCES:  Allergies  No Known Allergies    Intolerances    VITALS & EXAMINATION:  Vital Signs Last 24 Hrs  T(C): 37.6 (15 Apr 2021 08:00), Max: 37.9 (14 Apr 2021 18:00)  T(F): 99.7 (15 Apr 2021 08:00), Max: 100.2 (14 Apr 2021 18:00)  HR: 120 (15 Apr 2021 15:48) (114 - 127)  BP: 151/77 (15 Apr 2021 15:48) (117/56 - 154/73)  BP(mean): 105 (15 Apr 2021 15:48) (79 - 105)  RR: 35 (15 Apr 2021 15:48) (22 - 35)  SpO2: 97% (15 Apr 2021 15:48) (93% - 97%)    General:  Constitutional: Male, appears stated age, in no apparent distress including pain  Head: Normocephalic & atraumatic.    Neurological (>12):  Limited exam  MS: Awake, alert to people walking in, does not follow commands.     Language: nonverbal    CNs: PERRLA (R = 4mm, L = 4mm). No BTT. Unable to test for EOM. No facial asymmetry b/l, full eye closure strength b/l. Gag reflex deferred.     Motor: No spontaneous movements of the extremities, minimally turns head     Sensation: Grimaces to noxious stimuli.     Cortical: Extinction on DSS (neglect): none    Reflexes: Corneal OCR reflexes intact.               Patellar(L4)    Plantar Resp  R	1		    Mute  L	1		    Mute     Coordination: Unable to assess    Gait: Deferred    LABORATORY:  CBC                       9.2    16.09 )-----------( 366      ( 15 Apr 2021 02:42 )             30.2     Chem 04-15    149<H>  |  115<H>  |  43<H>  ----------------------------<  247<H>  3.6   |  24  |  1.42<H>    Ca    8.0<L>      15 Apr 2021 02:42  Phos  2.9     04-15  Mg     2.3     04-15    TPro  6.3  /  Alb  2.0<L>  /  TBili  0.9  /  DBili  x   /  AST  27  /  ALT  92<H>  /  AlkPhos  115  04-15    LFTs LIVER FUNCTIONS - ( 15 Apr 2021 02:42 )  Alb: 2.0 g/dL / Pro: 6.3 g/dL / ALK PHOS: 115 U/L / ALT: 92 U/L / AST: 27 U/L / GGT: x           Coagulopathy PT/INR - ( 15 Apr 2021 02:42 )   PT: 14.4 sec;   INR: 1.21 ratio         PTT - ( 15 Apr 2021 02:42 )  PTT:50.9 sec  Lipid Panel 04-13 Chol -- LDL -- HDL -- Trig 126  A1c   Cardiac enzymes CARDIAC MARKERS ( 14 Apr 2021 00:39 )  x     / x     / 44 U/L / x     / x          U/A   CSF  Immunological  Other    STUDIES & IMAGING:  Studies (EKG, EEG, EMG, etc):     Radiology (XR, CT, MR, U/S, TTE/RAJESH):  CT head 4/12: No significant change when allowing for differences in technique.   HPI: 60M unknown med Hx BIBEMS to [facility] ED on 4/1/21 for respiratory distress, recently COVID+ on 3/19/21, transferred to [facility] from [facility] for possible neurosurg intervention; neurology consulted as pt was recently extubated and is AMS. Baseline AAOx3, was unable to participate in initial history due to resp distress, was nodding yes to indicate difficulty breathing and no to pain. Pt found to have high anion gap acidosis, elevated FSG to 500s, elevated beta-hydroxybutyrate, lactate of 15, trops of 200, pro-BNP of 21k. Was intubated and admitted to MICU for ARDS 2/2 COVID c/b probable DKA. Subsequent complications while admitted at [facility] include SUKI, segmental upper lobe PE, hypotension 2/2 vasoplegia w/ RH strain, echogenic material found in RV on echocardiogram, possible superimposed bacterial vs. fungal PNA on ceftriaxone and linezolid since 4/6/21, and L. common femoral vein DVT. Pt was also accidentally overdosed on Versed because of incorrectly charted weight on 4/8/21, pt was AMS and neuro was consulted. CTH demonstrated scattered cerebral and left cerebellar hemisphere acute infarcts with mild areas of hemorrhagic transformation, with additional area of evolving acute ischemia in the right thalamus, without evidence of obstructive hydrocephalus, midline shift, or herniation, presentation concerning for multiple thromboembolic infarcts w/ hemorrhagic transformation vs. hemorrhagic PRES. AC was halted for several days but ultimately restarted after 4 subsequent CTHs demonstrated no evolution of hemorrhagic changes.   60M unknown medical history BIBEMS for respiratory distress, recently COVID+ as per EMS. Pt unable to comply with history 2/2 resp distress, nods yes to difficulty breathing, no to pain. Baseline AAOx3. En route, pt had RR 28, SaO2 60%. In ED initially hypoxic to 60% placed on NRB and satting low 90s and tachypneic to 40s on NRB. Placed on AVAPS, still tachypneic to 40s. Labs showing HAGMA, elevated FSG to 500s. Also with lactate of 15. Trops of 200 and pro-BNP elevated to 21k. MICU consulted for respiratory failure in setting of COVID, also concern for DKA. On encounter, pt is noncooperative with questioning. Nods and tries to speak, unable to form full sentences and visibly tachypneic on AVAPS. In ED given 2L NS, decadron. Started on insulin gtt. Subsequently intubated.     Initial vitals 108/86, , RR 28, T97, SaO2 60% on 15L NRB. Received 2L NS bolus. Started on insulin gtt.     Pt admitted to surg b for further management.     Addendum 4/1/21 1700:  Limited history obtained from Alba Pizarro, nephew, who lives in the [place] [phone]). Patient has a PMH of low BP and takes no medications. Patient tested positive for COVID on 3/19. He was initially doing fine with some fatigue, but suddenly decompensated after yesterday. Unable to obtain social history. Wife, Lucero, is currently in [place] - she went due to medical issues but was unable to return due to COVID travel restrictions.     REVIEW OF SYSTEMS  As per HPI    PAST MEDICAL & SURGICAL HISTORY:  No pertinent past medical history    No significant past surgical history      FAMILY HISTORY:  No pertinent family history in first degree relatives      SOCIAL HISTORY:   T/E/D:   Occupation:   Lives with:     MEDICATIONS (HOME):  Home Medications:    MEDICATIONS  (STANDING):  cholecalciferol 400 Unit(s) Oral daily  heparin  Infusion. 950 Unit(s)/Hr (9.5 mL/Hr) IV Continuous <Continuous>  insulin lispro (ADMELOG) corrective regimen sliding scale   SubCutaneous every 6 hours  insulin NPH human recombinant 2 Unit(s) SubCutaneous every 6 hours  levETIRAcetam  IVPB 500 milliGRAM(s) IV Intermittent every 12 hours  meropenem  IVPB      meropenem  IVPB 1000 milliGRAM(s) IV Intermittent every 8 hours  multivitamin/minerals Oral Solution (WELLESSE) 30 milliLiter(s) Oral daily  pantoprazole  Injectable 40 milliGRAM(s) IV Push daily  polyethylene glycol 3350 17 Gram(s) Oral at bedtime  senna Syrup 10 milliLiter(s) Oral at bedtime    MEDICATIONS  (PRN):    ALLERGIES/INTOLERANCES:  Allergies  No Known Allergies    Intolerances    VITALS & EXAMINATION:  Vital Signs Last 24 Hrs  T(C): 37.6 (15 Apr 2021 08:00), Max: 37.9 (14 Apr 2021 18:00)  T(F): 99.7 (15 Apr 2021 08:00), Max: 100.2 (14 Apr 2021 18:00)  HR: 120 (15 Apr 2021 15:48) (114 - 127)  BP: 151/77 (15 Apr 2021 15:48) (117/56 - 154/73)  BP(mean): 105 (15 Apr 2021 15:48) (79 - 105)  RR: 35 (15 Apr 2021 15:48) (22 - 35)  SpO2: 97% (15 Apr 2021 15:48) (93% - 97%)    General:  Constitutional: Male, appears stated age, in no apparent distress including pain  Head: Normocephalic & atraumatic.    Neurological (>12):  Limited exam  MS: Awake, alert to people walking in, does not follow commands.     Language: nonverbal    CNs: PERRLA (R = 4mm, L = 4mm). No BTT. Unable to test for EOM. No facial asymmetry b/l, full eye closure strength b/l. Gag reflex deferred.     Motor: No spontaneous movements of the extremities, minimally turns head     Sensation: Grimaces to noxious stimuli.     Cortical: Extinction on DSS (neglect): none    Reflexes: Corneal OCR reflexes intact.               Patellar(L4)    Plantar Resp  R	1		    Mute  L	1		    Mute     Coordination: Unable to assess    Gait: Deferred    LABORATORY:  CBC                       9.2    16.09 )-----------( 366      ( 15 Apr 2021 02:42 )             30.2     Chem 04-15    149<H>  |  115<H>  |  43<H>  ----------------------------<  247<H>  3.6   |  24  |  1.42<H>    Ca    8.0<L>      15 Apr 2021 02:42  Phos  2.9     04-15  Mg     2.3     04-15    TPro  6.3  /  Alb  2.0<L>  /  TBili  0.9  /  DBili  x   /  AST  27  /  ALT  92<H>  /  AlkPhos  115  04-15    LFTs LIVER FUNCTIONS - ( 15 Apr 2021 02:42 )  Alb: 2.0 g/dL / Pro: 6.3 g/dL / ALK PHOS: 115 U/L / ALT: 92 U/L / AST: 27 U/L / GGT: x           Coagulopathy PT/INR - ( 15 Apr 2021 02:42 )   PT: 14.4 sec;   INR: 1.21 ratio         PTT - ( 15 Apr 2021 02:42 )  PTT:50.9 sec  Lipid Panel 04-13 Chol -- LDL -- HDL -- Trig 126  A1c   Cardiac enzymes CARDIAC MARKERS ( 14 Apr 2021 00:39 )  x     / x     / 44 U/L / x     / x          U/A   CSF  Immunological  Other    STUDIES & IMAGING:  Studies (EKG, EEG, EMG, etc):     Radiology (XR, CT, MR, U/S, TTE/RAJESH):  CT head 4/12: No significant change when allowing for differences in technique.   HPI: 60M unknown med Hx BIBEMS to [facility] ED on 4/1/21 for respiratory distress, recently COVID+ on 3/19/21, transferred to [facility] from [facility] for possible neurosurg intervention; neurology consulted as pt was recently extubated and is still AMS. Baseline AAOx3 and fully functional. Pt at [facility] found to have high anion gap acidosis, elevated FSG to 500s, elevated beta-hydroxybutyrate, lactate of 15, trops of 200, pro-BNP of 21k. Was intubated and admitted to MICU for ARDS 2/2 COVID c/b probable DKA. Subsequent complications while admitted at [facility] include SUKI, segmental upper lobe PE, hypotension 2/2 vasoplegia w/ RH strain, echogenic material found in RV on echocardiogram, possible superimposed bacterial vs. fungal PNA on ceftriaxone and linezolid since 4/6/21, and L. common femoral vein DVT. Pt was also accidentally overdosed on Versed because of incorrectly charted weight on 4/8/21, pt was AMS and neuro was consulted all while at [facility]. CTH demonstrated scattered cerebral and left cerebellar hemisphere acute infarcts with mild areas of hemorrhagic transformation, with additional area of evolving acute ischemia in the right thalamus, without evidence of obstructive hydrocephalus, midline shift, or herniation, presentation concerning for multiple thromboembolic infarcts w/ hemorrhagic transformation vs. hemorrhagic PRES. AC was halted for several days but ultimately restarted after 3 subsequent CTHs demonstrated no evolution of hemorrhagic changes.       REVIEW OF SYSTEMS  As per HPI    PAST MEDICAL & SURGICAL HISTORY:  No pertinent past medical history    No significant past surgical history      FAMILY HISTORY:  No pertinent family history in first degree relatives      SOCIAL HISTORY:   T/E/D:   Occupation:   Lives with:     MEDICATIONS (HOME):  Home Medications:    MEDICATIONS  (STANDING):  cholecalciferol 400 Unit(s) Oral daily  heparin  Infusion. 950 Unit(s)/Hr (9.5 mL/Hr) IV Continuous <Continuous>  insulin lispro (ADMELOG) corrective regimen sliding scale   SubCutaneous every 6 hours  insulin NPH human recombinant 2 Unit(s) SubCutaneous every 6 hours  levETIRAcetam  IVPB 500 milliGRAM(s) IV Intermittent every 12 hours  meropenem  IVPB      meropenem  IVPB 1000 milliGRAM(s) IV Intermittent every 8 hours  multivitamin/minerals Oral Solution (WELLESSE) 30 milliLiter(s) Oral daily  pantoprazole  Injectable 40 milliGRAM(s) IV Push daily  polyethylene glycol 3350 17 Gram(s) Oral at bedtime  senna Syrup 10 milliLiter(s) Oral at bedtime    MEDICATIONS  (PRN):    ALLERGIES/INTOLERANCES:  Allergies  No Known Allergies    Intolerances    VITALS & EXAMINATION:  Vital Signs Last 24 Hrs  T(C): 37.6 (15 Apr 2021 08:00), Max: 37.9 (14 Apr 2021 18:00)  T(F): 99.7 (15 Apr 2021 08:00), Max: 100.2 (14 Apr 2021 18:00)  HR: 120 (15 Apr 2021 15:48) (114 - 127)  BP: 151/77 (15 Apr 2021 15:48) (117/56 - 154/73)  BP(mean): 105 (15 Apr 2021 15:48) (79 - 105)  RR: 35 (15 Apr 2021 15:48) (22 - 35)  SpO2: 97% (15 Apr 2021 15:48) (93% - 97%)    General:  Constitutional: Male, appears stated age, in no apparent distress including pain  Head: Normocephalic & atraumatic.    Neurological (>12):  Limited exam  MS: Awake, alert to people walking in, does not follow commands.     Language: nonverbal    CNs: PERRLA (R = 4mm, L = 4mm). No BTT. Unable to test for EOM. No facial asymmetry b/l, full eye closure strength b/l. Gag reflex deferred.     Motor: No spontaneous movements of the extremities, minimally turns head     Sensation: Grimaces to noxious stimuli.     Cortical: Extinction on DSS (neglect): none    Reflexes: Corneal OCR reflexes intact.               Patellar(L4)    Plantar Resp  R	1		    Mute  L	1		    Mute     Coordination: Unable to assess    Gait: Deferred    LABORATORY:  CBC                       9.2    16.09 )-----------( 366      ( 15 Apr 2021 02:42 )             30.2     Chem 04-15    149<H>  |  115<H>  |  43<H>  ----------------------------<  247<H>  3.6   |  24  |  1.42<H>    Ca    8.0<L>      15 Apr 2021 02:42  Phos  2.9     04-15  Mg     2.3     04-15    TPro  6.3  /  Alb  2.0<L>  /  TBili  0.9  /  DBili  x   /  AST  27  /  ALT  92<H>  /  AlkPhos  115  04-15    LFTs LIVER FUNCTIONS - ( 15 Apr 2021 02:42 )  Alb: 2.0 g/dL / Pro: 6.3 g/dL / ALK PHOS: 115 U/L / ALT: 92 U/L / AST: 27 U/L / GGT: x           Coagulopathy PT/INR - ( 15 Apr 2021 02:42 )   PT: 14.4 sec;   INR: 1.21 ratio         PTT - ( 15 Apr 2021 02:42 )  PTT:50.9 sec  Lipid Panel 04-13 Chol -- LDL -- HDL -- Trig 126  A1c   Cardiac enzymes CARDIAC MARKERS ( 14 Apr 2021 00:39 )  x     / x     / 44 U/L / x     / x          U/A   CSF  Immunological  Other    STUDIES & IMAGING:  Studies (EKG, EEG, EMG, etc):     Radiology (XR, CT, MR, U/S, TTE/RAJESH):  CT head 4/12: No significant change when allowing for differences in technique.

## 2021-04-15 NOTE — OCCUPATIONAL THERAPY INITIAL EVALUATION ADULT - PERTINENT HX OF CURRENT PROBLEM, REHAB EVAL
60M BIBEMS for respiratory distress, recently COVID+ as per EMS. Pt unable to comply with history 2/2 resp distress, nods yes to difficulty breathing, no to pain. Baseline AAOx3.

## 2021-04-15 NOTE — CONSULT NOTE ADULT - ASSESSMENT
Assessment:     Neurology consulted here at [facility] for AMS s/p extubation. On exam, patient is nonverbal and does not follow commands. He is minimally alert. Tested brainstem reflexes are intact. Last CTH done performed 4/12 showed stable b/l infarcts in different vascular distributions, with suggested mechanism uncertain, but consistent with embolic stroke of undetermined source and/or perhaps hypercoagulability of COVID-19.  Multi-factorial impaired level of arousal due to global cerebral dysfunction likely from combination of COVID-19, associated hypoxia, persistent pharmacologic effects of overdose of multiple sedatives at [facility], precipitous drop in BP (while at [facility]) and multiple metabolic derangements.    Plan:  []Continue heparin gtt w/ transition to DOAC when able  []Care per MICU team  []q4hr neur0 checks  []Further recommendations to be discussed with neurology service    -Management & disposition to be discussed with neuro attending, Dr. Melvin Assessment:  60M unknown med Hx BIBEMS to [facility] ED on 4/1/21 for respiratory distress, recently COVID+ on 3/19/21, transferred to [facility] from [facility] for possible neurosurg intervention; neurology consulted as pt was recently extubated and is still AMS. Baseline AAOx3 and fully functional. Pt at [facility] found to have high anion gap acidosis, elevated FSG to 500s, elevated beta-hydroxybutyrate, lactate of 15, trops of 200, pro-BNP of 21k. Was intubated and admitted to MICU for ARDS 2/2 COVID c/b probable DKA. Subsequent complications while admitted at [facility] include SUKI, segmental upper lobe PE, hypotension 2/2 vasoplegia w/ RH strain, echogenic material found in RV on echocardiogram, possible superimposed bacterial vs. fungal PNA on ceftriaxone and linezolid since 4/6/21, and L. common femoral vein DVT. Pt was also accidentally overdosed on Versed because of incorrectly charted weight on 4/8/21, pt was AMS and neuro was consulted all while at [facility]. CTH demonstrated scattered cerebral and left cerebellar hemisphere acute infarcts with mild areas of hemorrhagic transformation, with additional area of evolving acute ischemia in the right thalamus, without evidence of obstructive hydrocephalus, midline shift, or herniation, presentation concerning for multiple thromboembolic infarcts w/ hemorrhagic transformation vs. hemorrhagic PRES. AC was halted for several days but ultimately restarted after 3 subsequent CTHs demonstrated no evolution of hemorrhagic changes.     Neurology consulted here at [facility] for still AMS s/p extubation. On exam, patient is nonverbal and does not follow commands. He is minimally alert. Tested brainstem reflexes are intact. Last CTH done performed 4/12 showed stable b/l infarcts in different vascular distributions, with suggested mechanism uncertain, but consistent with embolic stroke of undetermined source and/or perhaps hypercoagulability of COVID-19.  Multi-factorial impaired level of arousal due to global cerebral dysfunction likely from combination of COVID-19, associated hypoxia, persistent pharmacologic effects of overdose of multiple sedatives at [facility], precipitous drop in BP (while at [facility]) and multiple metabolic derangements.    Plan:  []Continue heparin gtt w/ transition to DOAC when able  []Care per MICU team  []q4hr neur0 checks  []Further recommendations to be discussed with neurology service    -Management & disposition to be discussed with neuro attending, Dr. Melvin

## 2021-04-16 LAB
-  AMIKACIN: SIGNIFICANT CHANGE UP
-  AMOXICILLIN/CLAVULANIC ACID: SIGNIFICANT CHANGE UP
-  AMPICILLIN/SULBACTAM: SIGNIFICANT CHANGE UP
-  AMPICILLIN: SIGNIFICANT CHANGE UP
-  AZTREONAM: SIGNIFICANT CHANGE UP
-  CEFAZOLIN: SIGNIFICANT CHANGE UP
-  CEFEPIME: SIGNIFICANT CHANGE UP
-  CEFOXITIN: SIGNIFICANT CHANGE UP
-  CEFTRIAXONE: SIGNIFICANT CHANGE UP
-  CIPROFLOXACIN: SIGNIFICANT CHANGE UP
-  ERTAPENEM: SIGNIFICANT CHANGE UP
-  GENTAMICIN: SIGNIFICANT CHANGE UP
-  IMIPENEM: SIGNIFICANT CHANGE UP
-  LEVOFLOXACIN: SIGNIFICANT CHANGE UP
-  MEROPENEM: SIGNIFICANT CHANGE UP
-  PIPERACILLIN/TAZOBACTAM: SIGNIFICANT CHANGE UP
-  TOBRAMYCIN: SIGNIFICANT CHANGE UP
-  TRIMETHOPRIM/SULFAMETHOXAZOLE: SIGNIFICANT CHANGE UP
A-TUMOR NECROSIS FACT SERPL-MCNC: 174.3 PG/ML — HIGH
ALBUMIN SERPL ELPH-MCNC: 2 G/DL — LOW (ref 3.3–5)
ALBUMIN SERPL ELPH-MCNC: 2.1 G/DL — LOW (ref 3.3–5)
ALP SERPL-CCNC: 115 U/L — SIGNIFICANT CHANGE UP (ref 40–120)
ALP SERPL-CCNC: 118 U/L — SIGNIFICANT CHANGE UP (ref 40–120)
ALT FLD-CCNC: 62 U/L — HIGH (ref 10–45)
ALT FLD-CCNC: 66 U/L — HIGH (ref 10–45)
ANION GAP SERPL CALC-SCNC: 7 MMOL/L — SIGNIFICANT CHANGE UP (ref 5–17)
ANION GAP SERPL CALC-SCNC: 8 MMOL/L — SIGNIFICANT CHANGE UP (ref 5–17)
APTT BLD: 48 SEC — HIGH (ref 27.5–35.5)
APTT BLD: 52.8 SEC — HIGH (ref 27.5–35.5)
APTT BLD: 54.3 SEC — HIGH (ref 27.5–35.5)
AST SERPL-CCNC: 21 U/L — SIGNIFICANT CHANGE UP (ref 10–40)
AST SERPL-CCNC: 28 U/L — SIGNIFICANT CHANGE UP (ref 10–40)
BASOPHILS # BLD AUTO: 0.02 K/UL — SIGNIFICANT CHANGE UP (ref 0–0.2)
BASOPHILS NFR BLD AUTO: 0.1 % — SIGNIFICANT CHANGE UP (ref 0–2)
BILIRUB SERPL-MCNC: 0.4 MG/DL — SIGNIFICANT CHANGE UP (ref 0.2–1.2)
BILIRUB SERPL-MCNC: 0.5 MG/DL — SIGNIFICANT CHANGE UP (ref 0.2–1.2)
BUN SERPL-MCNC: 38 MG/DL — HIGH (ref 7–23)
BUN SERPL-MCNC: 39 MG/DL — HIGH (ref 7–23)
CALCIUM SERPL-MCNC: 7.8 MG/DL — LOW (ref 8.4–10.5)
CALCIUM SERPL-MCNC: 7.9 MG/DL — LOW (ref 8.4–10.5)
CHLORIDE SERPL-SCNC: 115 MMOL/L — HIGH (ref 96–108)
CHLORIDE SERPL-SCNC: 116 MMOL/L — HIGH (ref 96–108)
CO2 SERPL-SCNC: 24 MMOL/L — SIGNIFICANT CHANGE UP (ref 22–31)
CO2 SERPL-SCNC: 25 MMOL/L — SIGNIFICANT CHANGE UP (ref 22–31)
CREAT SERPL-MCNC: 1.24 MG/DL — SIGNIFICANT CHANGE UP (ref 0.5–1.3)
CREAT SERPL-MCNC: 1.26 MG/DL — SIGNIFICANT CHANGE UP (ref 0.5–1.3)
CULTURE RESULTS: SIGNIFICANT CHANGE UP
EOSINOPHIL # BLD AUTO: 0.22 K/UL — SIGNIFICANT CHANGE UP (ref 0–0.5)
EOSINOPHIL NFR BLD AUTO: 1.6 % — SIGNIFICANT CHANGE UP (ref 0–6)
GLUCOSE BLDC GLUCOMTR-MCNC: 167 MG/DL — HIGH (ref 70–99)
GLUCOSE BLDC GLUCOMTR-MCNC: 204 MG/DL — HIGH (ref 70–99)
GLUCOSE BLDC GLUCOMTR-MCNC: 234 MG/DL — HIGH (ref 70–99)
GLUCOSE BLDC GLUCOMTR-MCNC: 307 MG/DL — HIGH (ref 70–99)
GLUCOSE SERPL-MCNC: 334 MG/DL — HIGH (ref 70–99)
GLUCOSE SERPL-MCNC: 334 MG/DL — HIGH (ref 70–99)
HCT VFR BLD CALC: 29.3 % — LOW (ref 39–50)
HCT VFR BLD CALC: 31.6 % — LOW (ref 39–50)
HGB BLD-MCNC: 8.8 G/DL — LOW (ref 13–17)
HGB BLD-MCNC: 9.5 G/DL — LOW (ref 13–17)
IL10 SERPL-MCNC: 13.5 PG/ML — HIGH
IL12 SERPL-MCNC: 4.8 PG/ML — HIGH
IL13 SERPL-MCNC: 51.8 PG/ML — HIGH
IL17A SERPL-MCNC: 2.1 PG/ML — HIGH
IL2 SERPL-MCNC: 1924 PG/ML — HIGH (ref 175.3–858.2)
IL2 SERPL-MCNC: <2.1 PG/ML — SIGNIFICANT CHANGE UP
IL4 SERPL-MCNC: 2.9 PG/ML — HIGH
IL6 SERPL-MCNC: 36.9 PG/ML — HIGH
IL8 SERPL-MCNC: 10.3 PG/ML — HIGH
IMM GRANULOCYTES NFR BLD AUTO: 1 % — SIGNIFICANT CHANGE UP (ref 0–1.5)
INR BLD: 1.16 RATIO — SIGNIFICANT CHANGE UP (ref 0.88–1.16)
INTERFERON GAMMA, BLOOD: 7.6 PG/ML — HIGH
INTERLEUKIN 1 BETA: 20.5 PG/ML — HIGH
INTERLEUKIN 5: <2.1 PG/ML — SIGNIFICANT CHANGE UP
LYMPHOCYTES # BLD AUTO: 1.13 K/UL — SIGNIFICANT CHANGE UP (ref 1–3.3)
LYMPHOCYTES # BLD AUTO: 8 % — LOW (ref 13–44)
MAGNESIUM SERPL-MCNC: 2.1 MG/DL — SIGNIFICANT CHANGE UP (ref 1.6–2.6)
MAGNESIUM SERPL-MCNC: 2.2 MG/DL — SIGNIFICANT CHANGE UP (ref 1.6–2.6)
MCHC RBC-ENTMCNC: 25.9 PG — LOW (ref 27–34)
MCHC RBC-ENTMCNC: 26.2 PG — LOW (ref 27–34)
MCHC RBC-ENTMCNC: 30 GM/DL — LOW (ref 32–36)
MCHC RBC-ENTMCNC: 30.1 GM/DL — LOW (ref 32–36)
MCV RBC AUTO: 86.2 FL — SIGNIFICANT CHANGE UP (ref 80–100)
MCV RBC AUTO: 87.3 FL — SIGNIFICANT CHANGE UP (ref 80–100)
METHOD TYPE: SIGNIFICANT CHANGE UP
MONOCYTES # BLD AUTO: 0.99 K/UL — HIGH (ref 0–0.9)
MONOCYTES NFR BLD AUTO: 7 % — SIGNIFICANT CHANGE UP (ref 2–14)
NEUTROPHILS # BLD AUTO: 11.57 K/UL — HIGH (ref 1.8–7.4)
NEUTROPHILS NFR BLD AUTO: 82.3 % — HIGH (ref 43–77)
NRBC # BLD: 0 /100 WBCS — SIGNIFICANT CHANGE UP (ref 0–0)
NRBC # BLD: 0 /100 WBCS — SIGNIFICANT CHANGE UP (ref 0–0)
ORGANISM # SPEC MICROSCOPIC CNT: SIGNIFICANT CHANGE UP
ORGANISM # SPEC MICROSCOPIC CNT: SIGNIFICANT CHANGE UP
PHOSPHATE SERPL-MCNC: 2.3 MG/DL — LOW (ref 2.5–4.5)
PHOSPHATE SERPL-MCNC: 2.7 MG/DL — SIGNIFICANT CHANGE UP (ref 2.5–4.5)
PLATELET # BLD AUTO: 350 K/UL — SIGNIFICANT CHANGE UP (ref 150–400)
PLATELET # BLD AUTO: 357 K/UL — SIGNIFICANT CHANGE UP (ref 150–400)
POTASSIUM SERPL-MCNC: 3.8 MMOL/L — SIGNIFICANT CHANGE UP (ref 3.5–5.3)
POTASSIUM SERPL-MCNC: 4 MMOL/L — SIGNIFICANT CHANGE UP (ref 3.5–5.3)
POTASSIUM SERPL-SCNC: 3.8 MMOL/L — SIGNIFICANT CHANGE UP (ref 3.5–5.3)
POTASSIUM SERPL-SCNC: 4 MMOL/L — SIGNIFICANT CHANGE UP (ref 3.5–5.3)
PROT SERPL-MCNC: 6.2 G/DL — SIGNIFICANT CHANGE UP (ref 6–8.3)
PROT SERPL-MCNC: 6.4 G/DL — SIGNIFICANT CHANGE UP (ref 6–8.3)
PROTHROM AB SERPL-ACNC: 13.8 SEC — HIGH (ref 10.6–13.6)
RBC # BLD: 3.4 M/UL — LOW (ref 4.2–5.8)
RBC # BLD: 3.62 M/UL — LOW (ref 4.2–5.8)
RBC # FLD: 14.9 % — HIGH (ref 10.3–14.5)
RBC # FLD: 15 % — HIGH (ref 10.3–14.5)
SODIUM SERPL-SCNC: 147 MMOL/L — HIGH (ref 135–145)
SODIUM SERPL-SCNC: 148 MMOL/L — HIGH (ref 135–145)
SPECIMEN SOURCE: SIGNIFICANT CHANGE UP
WBC # BLD: 14.07 K/UL — HIGH (ref 3.8–10.5)
WBC # BLD: 17.14 K/UL — HIGH (ref 3.8–10.5)
WBC # FLD AUTO: 14.07 K/UL — HIGH (ref 3.8–10.5)
WBC # FLD AUTO: 17.14 K/UL — HIGH (ref 3.8–10.5)

## 2021-04-16 PROCEDURE — 99233 SBSQ HOSP IP/OBS HIGH 50: CPT | Mod: GC

## 2021-04-16 PROCEDURE — 99223 1ST HOSP IP/OBS HIGH 75: CPT

## 2021-04-16 RX ORDER — METOPROLOL TARTRATE 50 MG
25 TABLET ORAL
Refills: 0 | Status: DISCONTINUED | OUTPATIENT
Start: 2021-04-16 | End: 2021-04-17

## 2021-04-16 RX ORDER — HUMAN INSULIN 100 [IU]/ML
10 INJECTION, SUSPENSION SUBCUTANEOUS EVERY 6 HOURS
Refills: 0 | Status: DISCONTINUED | OUTPATIENT
Start: 2021-04-16 | End: 2021-04-17

## 2021-04-16 RX ORDER — HEPARIN SODIUM 5000 [USP'U]/ML
1000 INJECTION INTRAVENOUS; SUBCUTANEOUS
Qty: 25000 | Refills: 0 | Status: DISCONTINUED | OUTPATIENT
Start: 2021-04-16 | End: 2021-04-17

## 2021-04-16 RX ORDER — POTASSIUM PHOSPHATE, MONOBASIC POTASSIUM PHOSPHATE, DIBASIC 236; 224 MG/ML; MG/ML
15 INJECTION, SOLUTION INTRAVENOUS ONCE
Refills: 0 | Status: COMPLETED | OUTPATIENT
Start: 2021-04-16 | End: 2021-04-16

## 2021-04-16 RX ADMIN — HUMAN INSULIN 10 UNIT(S): 100 INJECTION, SUSPENSION SUBCUTANEOUS at 23:14

## 2021-04-16 RX ADMIN — Medication 30 MILLILITER(S): at 12:07

## 2021-04-16 RX ADMIN — HUMAN INSULIN 2 UNIT(S): 100 INJECTION, SUSPENSION SUBCUTANEOUS at 05:37

## 2021-04-16 RX ADMIN — HUMAN INSULIN 10 UNIT(S): 100 INJECTION, SUSPENSION SUBCUTANEOUS at 17:07

## 2021-04-16 RX ADMIN — HUMAN INSULIN 2 UNIT(S): 100 INJECTION, SUSPENSION SUBCUTANEOUS at 00:39

## 2021-04-16 RX ADMIN — Medication 4: at 05:37

## 2021-04-16 RX ADMIN — Medication 4: at 17:06

## 2021-04-16 RX ADMIN — POTASSIUM PHOSPHATE, MONOBASIC POTASSIUM PHOSPHATE, DIBASIC 62.5 MILLIMOLE(S): 236; 224 INJECTION, SOLUTION INTRAVENOUS at 16:19

## 2021-04-16 RX ADMIN — Medication 400 UNIT(S): at 12:08

## 2021-04-16 RX ADMIN — LEVETIRACETAM 400 MILLIGRAM(S): 250 TABLET, FILM COATED ORAL at 05:35

## 2021-04-16 RX ADMIN — HUMAN INSULIN 10 UNIT(S): 100 INJECTION, SUSPENSION SUBCUTANEOUS at 12:08

## 2021-04-16 RX ADMIN — Medication 2: at 23:16

## 2021-04-16 RX ADMIN — HEPARIN SODIUM 1000 UNIT(S)/HR: 5000 INJECTION INTRAVENOUS; SUBCUTANEOUS at 08:19

## 2021-04-16 RX ADMIN — Medication 25 MILLIGRAM(S): at 17:04

## 2021-04-16 RX ADMIN — LEVETIRACETAM 400 MILLIGRAM(S): 250 TABLET, FILM COATED ORAL at 17:03

## 2021-04-16 RX ADMIN — MEROPENEM 100 MILLIGRAM(S): 1 INJECTION INTRAVENOUS at 05:33

## 2021-04-16 RX ADMIN — Medication 8: at 00:38

## 2021-04-16 RX ADMIN — HEPARIN SODIUM 1200 UNIT(S)/HR: 5000 INJECTION INTRAVENOUS; SUBCUTANEOUS at 20:00

## 2021-04-16 RX ADMIN — MEROPENEM 100 MILLIGRAM(S): 1 INJECTION INTRAVENOUS at 21:24

## 2021-04-16 RX ADMIN — MEROPENEM 100 MILLIGRAM(S): 1 INJECTION INTRAVENOUS at 14:09

## 2021-04-16 RX ADMIN — PANTOPRAZOLE SODIUM 40 MILLIGRAM(S): 20 TABLET, DELAYED RELEASE ORAL at 12:08

## 2021-04-16 RX ADMIN — Medication 8: at 12:08

## 2021-04-16 RX ADMIN — HEPARIN SODIUM 1100 UNIT(S)/HR: 5000 INJECTION INTRAVENOUS; SUBCUTANEOUS at 12:45

## 2021-04-16 NOTE — PROGRESS NOTE ADULT - ASSESSMENT
A/P: 60M unknown PMHx presented with hypoxic respiratory failure 2/2 COVID, complicated by severe HAGMA, hypoxemia, multiple areas of crebral infarcts with hemorhagic transformation, suki, spt with klebs completed empiric course of abx for Concern for superimposed bacterial infection vs. reactive,  intubated at [facility] for hypoxic respiratory failure and transferred to 5ICU (4/12)     NEURO  #Acute CVA w/ hemorraghic conversion   - HCT: +infarcts L cerebellar, L posterior/frontal, b/l occipital/parietal, hemorrhagic transformation  - vEEG: mod-severe nonspecific diffuse/multifocal cerebral dysfunction, no seizures  - Neuro surg: no surgical intervention  - c/w keppra for prophylaxis, neuro check q4h  - Neurology recs appreciated    PULM  #Acute hypoxic Respiratory Failure s/p intubation  - S/P Extubation (4/13), stable on RA w/ Sp02 >94%  - Pulm toileting with chest PT, supplemental 02 prn     #PE  - CTA Chest (4/1): +RITO PE with RV strain   - c/w Heparin gtt     CV  - Remains off pressors   - Start Metoprolol 25mg BID with hold parameters    GI  #Dysphagia   - NGT with enteral feeds per nutrition recs   - Speech/swallow eval when able to participate; consider PEG  - bowel regimen     RENAL  #SUKI  - SCr 1.3->1.26, strict I/Os, avoid nephrotoxins   - Target net even today     HEME  #Acute PE  - CTA Chest (4/1): +RITO PE with RV strain   - POCUS (4/2): L common femoral DVT  - c/w Heparin gtt    ENDO  #Hx T2DM, A1c 10.3  - C/w mod ISS and NPH q6h    ID  #Leukocytosis, Klebsiella ESBL   - Remains with low grade fevers, dec WBC 14  - Meropenem x 8days (4/14-22)    #COVID-19  - Did not complete Remdesvir 2/2 SUKI   - S/P completed Decadron course   - Inflammatory markers q72h    ETHICS  - FULL CODE

## 2021-04-16 NOTE — CHART NOTE - NSCHARTNOTEFT_GEN_A_CORE
60M unknown medical history BIBEMS for respiratory distress, recently COVID+ as per EMS. Pt unable to comply with history 2/2 resp distress, nods yes to difficulty breathing, no to pain. Baseline AAOx3. En route, pt had RR 28, SaO2 60%. In ED initially hypoxic to 60% placed on NRB and satting low 90s and tachypneic to 40s on NRB. Placed on AVAPS, still tachypneic to 40s. Labs showing HAGMA, elevated FSG to 500s. Also with lactate of 15. Trops of 200 and pro-BNP elevated to 21k. MICU consulted for respiratory failure in setting of COVID, also concern for DKA. On encounter, pt is noncooperative with questioning. Nods and tries to speak, unable to form full sentences and visibly tachypneic on AVAPS. In ED given 2L NS, decadron. Started on insulin gtt. Subsequently intubated  Extubated 4/13. Found to have acute CVA w/ hemorraghic conversion. +infarcts L cerebellar, L posterior/frontal, b/l occipital/parietal, hemorrhagic transformation. Repeat HCT (4/12): no changes. CTA Chest (4/1): +RITO PE with RV strain.  Pt on Heparin.  Neurology following-> Multi-factorial impaired level of arousal due to global cerebral dysfunction likely from combination of COVID-19, associated hypoxia, persistent pharmacologic effects of overdose of multiple sedatives at [facility], precipitous drop in BP (while at [facility]) and multiple metabolic derangements.    Pt seen for initial bedside swallow evaluation on 4/14. Mentation did not support PO feeding at that time and NPO, with non-oral nutrition/hydration/medications was recommended.    Pt seen today for re-assessment of swallow and speech-language evaluation. Christopher  #[phone], Rodri, utilized for exams.     Pt found in bed, sleeping upon encounter. Pt opens eyes to verbal stimulation. Spontaneous movement of the upper Right extremity. Pt does not establish eye contact, nor does he turn his head to localize sound. +Flat affect. Difficulty keeping eyes open for extent of evaluation. Vocal quality noted to be dysphonic with coughing. +Non-nutritive swallows evident. Oral care provided. +Xerostomia and dried crusted secretions along lips and anterior portion of tongue. Pt attempts to close lips around swab. RR ranging from 31-35 at baseline. No PO trials administered given elevated RR and current mentation.   Formal speech-language evaluation completed. Please see report for details.     Impressions: Pt remains inappropriate for PO feeding. Improved responses noted today to verbal/tactile stimulation.     Recommend: Continue NPO, with non-oral nutrition/hydration/medications. This service will continue to follow for advancement to PO feeding as clinically indicated and language/cognitive therapy.     Kaylin Bardales #[phone]   Speech pathology

## 2021-04-16 NOTE — PROGRESS NOTE ADULT - SUBJECTIVE AND OBJECTIVE BOX
CHIEF COMPLAINT:  Patient is a 60y old  Male who presents with a chief complaint of COVID19 w/ severe metabolic acidosis s/p intubation (15 Apr 2021 16:44)    HPI:  60M unknown medical history BIBEMS for respiratory distress, recently COVID+ as per EMS. Pt unable to comply with history 2/2 resp distress, nods yes to difficulty breathing, no to pain. Baseline AAOx3. En route, pt had RR 28, SaO2 60%. In ED initially hypoxic to 60% placed on NRB and satting low 90s and tachypneic to 40s on NRB. Placed on AVAPS, still tachypneic to 40s. Labs showing HAGMA, elevated FSG to 500s. Also with lactate of 15. Trops of 200 and pro-BNP elevated to 21k. MICU consulted for respiratory failure in setting of COVID, also concern for DKA. On encounter, pt is noncooperative with questioning. Nods and tries to speak, unable to form full sentences and visibly tachypneic on AVAPS. In ED given 2L NS, decadron. Started on insulin gtt. Subsequently intubated and admitted to [facility] for further management.     Addendum 4/1/21 1700:  Limited history obtained from nitesh Anders, who lives in the [place] [phone]). Patient has a PMH of low BP and takes no medications. Patient tested positive for COVID on 3/19. He was initially doing fine with some fatigue, but suddenly decompensated after yesterday. Unable to obtain social history. Wife, Lucero, is currently in [place] - she went due to medical issues but was unable to return due to COVID travel restrictions.    (12 Apr 2021 20:54)      Interval Events:  -Tmax 100->Tylenol 1g IV    REVIEW OF SYSTEMS:  -unable to obtain confusion    OBJECTIVE:  ICU Vital Signs Last 24 Hrs  T(C): 37.7 (16 Apr 2021 05:00), Max: 38.2 (15 Apr 2021 20:00)  T(F): 99.8 (16 Apr 2021 05:00), Max: 100.8 (15 Apr 2021 20:00)  HR: 109 (16 Apr 2021 06:00) (105 - 123)  BP: 153/76 (16 Apr 2021 06:00) (126/70 - 153/77)  BP(mean): 105 (16 Apr 2021 06:00) (92 - 108)  ABP: --  ABP(mean): --  RR: 32 (16 Apr 2021 06:00) (24 - 35)  SpO2: 98% (16 Apr 2021 06:00) (80% - 100%)      04-15-21 @ 07:01  -  04-16-21 @ 07:00  --------------------------------------------------------  IN: 3088 mL / OUT: 2225 mL / NET: 863 mL    04-16-21 @ 07:01  -  04-16-21 @ 10:46  --------------------------------------------------------  IN: 80 mL / OUT: 0 mL / NET: 80 mL      CAPILLARY BLOOD GLUCOSE  POCT Blood Glucose.: 234 mg/dL (16 Apr 2021 05:29)    PHYSICAL EXAM:  GENERAL: NAD  HEENT:  Atraumatic, Normocephalic  EYES: PERRL, conjunctiva and sclera clear  NECK: Supple, No JVD  CHEST/LUNG: diminished bilaterally; No wheezes, rales, or rhonchi  HEART: Regular rate and rhythm; No murmurs, rubs, or gallops  ABDOMEN: Soft, Nontender, Nondistended; Bowel sounds present  EXTREMITIES:  2+ Peripheral Pulses, No clubbing, cyanosis, or edema  PSYCH: flat affect  NEUROLOGY: withdrawn, follows basic commands, weak  SKIN: No rashes or lesions    HOSPITAL MEDICATIONS:  MEDICATIONS  (STANDING):  cholecalciferol 400 Unit(s) Oral daily  heparin  Infusion. 1000 Unit(s)/Hr (10 mL/Hr) IV Continuous <Continuous>  insulin lispro (ADMELOG) corrective regimen sliding scale   SubCutaneous every 6 hours  insulin NPH human recombinant 10 Unit(s) SubCutaneous every 6 hours  levETIRAcetam  IVPB 500 milliGRAM(s) IV Intermittent every 12 hours  meropenem  IVPB      meropenem  IVPB 1000 milliGRAM(s) IV Intermittent every 8 hours  metoprolol tartrate 25 milliGRAM(s) Enteral Tube two times a day  multivitamin/minerals Oral Solution (WELLESSE) 30 milliLiter(s) Oral daily  pantoprazole  Injectable 40 milliGRAM(s) IV Push daily  polyethylene glycol 3350 17 Gram(s) Oral at bedtime  senna Syrup 10 milliLiter(s) Oral at bedtime    MEDICATIONS  (PRN):      LABS:                        8.8    14.07 )-----------( 350      ( 16 Apr 2021 00:14 )             29.3     Hgb Trend: 8.8<--, 9.2<--, 9.9<--, 10.2<--, 9.7<--  04-16    147<H>  |  115<H>  |  39<H>  ----------------------------<  334<H>  3.8   |  24  |  1.26    Ca    7.8<L>      16 Apr 2021 00:14  Phos  2.7     04-16  Mg     2.2     04-16    TPro  6.2  /  Alb  2.1<L>  /  TBili  0.5  /  DBili  x   /  AST  21  /  ALT  66<H>  /  AlkPhos  115  04-16    LIVER FUNCTIONS - ( 16 Apr 2021 00:14 )  Alb: 2.1 g/dL / Pro: 6.2 g/dL / ALK PHOS: 115 U/L / ALT: 66 U/L / AST: 21 U/L / GGT: x           Creatinine Trend: 1.26<--, 1.30<--, 1.42<--, 1.33<--, 1.19<--, 1.38<--  PT/INR - ( 16 Apr 2021 00:14 )   PT: 13.8 sec;   INR: 1.16 ratio    PTT - ( 16 Apr 2021 00:14 )  PTT:52.8 sec    MICROBIOLOGY: Reviewed    RADIOLOGY: Reviewed and interpreted by me    EKG: sinus

## 2021-04-16 NOTE — SPEECH LANGUAGE PATHOLOGY EVALUATION - SLP PERTINENT HISTORY OF CURRENT PROBLEM
60M unknown medical history BIBEMS for respiratory distress, recently COVID+ as per EMS. Pt unable to comply with history 2/2 resp distress, nods yes to difficulty breathing, no to pain. Baseline AAOx3. En route, pt had RR 28, SaO2 60%. In ED initially hypoxic to 60% placed on NRB and satting low 90s and tachypneic to 40s on NRB. Placed on AVAPS, still tachypneic to 40s. Labs showing HAGMA, elevated FSG to 500s. Also with lactate of 15. Trops of 200 and pro-BNP elevated to 21k. MICU consulted for respiratory failure in setting of COVID, also concern for DKA. On encounter, pt is noncooperative with questioning. Nods and tries to speak, unable to form full sentences and visibly tachypneic on AVAPS. In ED given 2L NS, decadron. Started on insulin gtt. Subsequently intubated

## 2021-04-16 NOTE — SPEECH LANGUAGE PATHOLOGY EVALUATION - 1-STEP
given command "Blink if you understand me" and "squeeze my hand" followed at the beginning of the session.

## 2021-04-16 NOTE — CHART NOTE - NSCHARTNOTEFT_GEN_A_CORE
Nutrition Chart Note.  Pt seen for: Initial Malnutrition Follow up on 5ICU    Source: EMR, team; pt nonveral    Chart reviewed, events noted. Per chart: 60M, unknown PMH. P/w acute hypoxic respiratory failure 2/2 COVID-19, c/b severe HAGMA; intubated 4/1. Course c/b PE, but now with acute cerebellar ischemic CVA with some hemorrhagic transformation. Transferred from [facility] to [facility] 2/2 level loading. Extubated 4/13.  - Failed Bedside swallow - receiving TF's via NGT    Diet : Diet, NPO with Tube Feed:   Tube Feeding Modality: Nasogastric  Glucerna 1.2 Kaden (GLUCERNARTH)  Total Volume for 24 Hours (mL): 1680  Continuous  Starting Tube Feed Rate {mL per Hour}: 20  Increase Tube Feed Rate by (mL): 10     Every 6 hours  Until Goal Tube Feed Rate (mL per Hour): 70  Tube Feed Duration (in Hours): 24  Tube Feed Start Time: 15:00 (04-14-21 @ 15:01)      CURRENT EN ORDER PROVIDES: 2016kcals, 101g protein, 1352ml free H2O  - EN provisions per Flowsheets:    (4/16) 550ml, 33% of EN goal (so far) - currently infusing at goal rate    (4/15) 1080ml, 64% of EN goal - titrating up to goal    (4/14) 120ml, 7% of EN goal - Pt was NPO and then EN initiated @ 20ml/hr and slowly titrated up to goal      Nutrition-Related Events:  - 10u NPH q6hrs + Insulin Sliding Scale   - Hypernatremic 4/15 - Free Water added: 300ml q8hrs  - Multivitamin/mineral & Vitamin D3 ordered  - Bedside Swallow 4/14 - recommendations for "NPO, with non-oral nutrition/hydration/medications."  - Pt with unknown PMH, A1c 10.3% (4/2) - indicates DM.    GI: Last BM 4/16, stool consistency noted to be liquid - bowel regimen ordered (miralax, senna)      Current Weight: No additional weights indicated at this time  Weight (kg): 66.2 (04-12)      Drug Dosing Weight  Weight (kg): 66.2 (12 Apr 2021 21:00)  BMI (kg/m2): 22.9 (12 Apr 2021 21:00)    Pertinent Medications: MEDICATIONS  (STANDING):  cholecalciferol 400 Unit(s) Oral daily  heparin  Infusion. 1000 Unit(s)/Hr (10 mL/Hr) IV Continuous <Continuous>  insulin lispro (ADMELOG) corrective regimen sliding scale   SubCutaneous every 6 hours  insulin NPH human recombinant 10 Unit(s) SubCutaneous every 6 hours  levETIRAcetam  IVPB 500 milliGRAM(s) IV Intermittent every 12 hours  meropenem  IVPB      meropenem  IVPB 1000 milliGRAM(s) IV Intermittent every 8 hours  multivitamin/minerals Oral Solution (WELLESSE) 30 milliLiter(s) Oral daily  pantoprazole  Injectable 40 milliGRAM(s) IV Push daily  polyethylene glycol 3350 17 Gram(s) Oral at bedtime  senna Syrup 10 milliLiter(s) Oral at bedtime    MEDICATIONS  (PRN):    Pertinent Labs:  04-16 @ 00:14: Sodium 147<H>, Potassium 3.8, Calcium 7.8<L>, Magnesium 2.2, Phosphorus 2.7, BUN 39<H>, Creatinine 1.26, Glucose 334<H>, Alk Phos 115, ALT/SGPT 66<H>, AST/SGOT 21, Albumin 2.1<L>, Prealbumin --, Total Bilirubin 0.5, Hemoglobin 8.8<L>, Hematocrit 29.3<L>, Ferritin --, C-Reactive Protein --, Creatine Kinase <<27>  04-15 @ 17:54: Sodium 149<H>, Potassium 3.7, Calcium 8.1<L>, Magnesium 2.3, Phosphorus 2.9, BUN 38<H>, Creatinine 1.30, Glucose 250<H>, Alk Phos 110, ALT/SGPT 73<H>, AST/SGOT 22, Albumin 2.1<L>, Prealbumin --, Total Bilirubin 0.6, Hemoglobin --, Hematocrit --, Ferritin --, C-Reactive Protein --, Creatine Kinase <<27>    Triglycerides, Serum: 126 mg/dL (04-13-21 @ 13:49)    CAPILLARY BLOOD GLUCOSE  POCT Blood Glucose.: 234 mg/dL (16 Apr 2021 05:29)  POCT Blood Glucose.: 334 mg/dL (15 Apr 2021 23:45)  POCT Blood Glucose.: 248 mg/dL (15 Apr 2021 17:03)  POCT Blood Glucose.: 279 mg/dL (15 Apr 2021 12:27)        Skin per nursing documentation: no pressure injuries noted  Edema: 1+ generalized; 2+ B/L ankles    Estimated Needs:   [x ] no change since previous assessment  Based on Dosing wt 145.9 lb/66.1 kg  Energy (28-32 kcals/kg): 8595-3286  Protein (1.2-1.6 g pro/kg): 79..76        Previous Nutrition Diagnosis: Severe Malnutrition in setting of Acute Illness.  Nutrition Diagnosis is [x ] ongoing - addressed with EN       New Nutrition Diagnosis: N/a      Interventions:   1. Continue Glucerna 1.2 @ goal rate of 70ml/hr x 24hrs. Provides 1680ml formula, 2016kcals, 101g protein, 1352ml free H2O (meets 30.5kcals/kg & 1.5g protein/kg based on dosing wt 66.2kg).  2. Continue Multivitamin and Vitamin D3 supplementation as ordered  3. Consider modifying bowel regimen in setting of loose stools; if persists, consider addition of Banatrol BID  4. Monitor BG and adjust insulin regimen as appropriate - defer to team  5. RD to remain available to adjust EN formulary, volume/rate PRN.      Monitoring and Evaluation:   Continue to monitor Nutritional intake, Tolerance to diet prescription, weights, labs, skin integrity  RD remains available upon request and will follow up per protocol    Kay Napoles, MS, RD, CDN, [facility]  pager #[phone]

## 2021-04-16 NOTE — SPEECH LANGUAGE PATHOLOGY EVALUATION - SLP DIAGNOSIS
Pt presents with improved response/awareness to verbal/tactile stimulation. Eyes open and facial grimace when hearing native language. Attempting to focus but unable to maintain attention to task/speaker. Moving RUE spontaneously. Followed 1-step simple commands on x2 occasions. No verbal responses. Unable to establish eye contact.

## 2021-04-17 DIAGNOSIS — R13.10 DYSPHAGIA, UNSPECIFIED: ICD-10-CM

## 2021-04-17 DIAGNOSIS — Z71.89 OTHER SPECIFIED COUNSELING: ICD-10-CM

## 2021-04-17 DIAGNOSIS — R41.82 ALTERED MENTAL STATUS, UNSPECIFIED: ICD-10-CM

## 2021-04-17 DIAGNOSIS — E11.9 TYPE 2 DIABETES MELLITUS WITHOUT COMPLICATIONS: ICD-10-CM

## 2021-04-17 DIAGNOSIS — I73.9 PERIPHERAL VASCULAR DISEASE, UNSPECIFIED: ICD-10-CM

## 2021-04-17 DIAGNOSIS — I10 ESSENTIAL (PRIMARY) HYPERTENSION: ICD-10-CM

## 2021-04-17 DIAGNOSIS — Z29.9 ENCOUNTER FOR PROPHYLACTIC MEASURES, UNSPECIFIED: ICD-10-CM

## 2021-04-17 DIAGNOSIS — J95.851 VENTILATOR ASSOCIATED PNEUMONIA: ICD-10-CM

## 2021-04-17 DIAGNOSIS — I63.9 CEREBRAL INFARCTION, UNSPECIFIED: ICD-10-CM

## 2021-04-17 DIAGNOSIS — I26.99 OTHER PULMONARY EMBOLISM WITHOUT ACUTE COR PULMONALE: ICD-10-CM

## 2021-04-17 LAB
ALBUMIN SERPL ELPH-MCNC: 1.9 G/DL — LOW (ref 3.3–5)
ALP SERPL-CCNC: 120 U/L — SIGNIFICANT CHANGE UP (ref 40–120)
ALT FLD-CCNC: 64 U/L — HIGH (ref 10–45)
ANION GAP SERPL CALC-SCNC: 8 MMOL/L — SIGNIFICANT CHANGE UP (ref 5–17)
APTT BLD: 63.1 SEC — HIGH (ref 27.5–35.5)
APTT BLD: 74 SEC — HIGH (ref 27.5–35.5)
AST SERPL-CCNC: 45 U/L — HIGH (ref 10–40)
BASOPHILS # BLD AUTO: 0.04 K/UL — SIGNIFICANT CHANGE UP (ref 0–0.2)
BASOPHILS NFR BLD AUTO: 0.2 % — SIGNIFICANT CHANGE UP (ref 0–2)
BILIRUB SERPL-MCNC: 0.4 MG/DL — SIGNIFICANT CHANGE UP (ref 0.2–1.2)
BUN SERPL-MCNC: 34 MG/DL — HIGH (ref 7–23)
CALCIUM SERPL-MCNC: 7.8 MG/DL — LOW (ref 8.4–10.5)
CHLORIDE SERPL-SCNC: 114 MMOL/L — HIGH (ref 96–108)
CO2 SERPL-SCNC: 22 MMOL/L — SIGNIFICANT CHANGE UP (ref 22–31)
CREAT SERPL-MCNC: 1.08 MG/DL — SIGNIFICANT CHANGE UP (ref 0.5–1.3)
EOSINOPHIL # BLD AUTO: 0.56 K/UL — HIGH (ref 0–0.5)
EOSINOPHIL NFR BLD AUTO: 3.2 % — SIGNIFICANT CHANGE UP (ref 0–6)
GLUCOSE BLDC GLUCOMTR-MCNC: 155 MG/DL — HIGH (ref 70–99)
GLUCOSE BLDC GLUCOMTR-MCNC: 182 MG/DL — HIGH (ref 70–99)
GLUCOSE BLDC GLUCOMTR-MCNC: 209 MG/DL — HIGH (ref 70–99)
GLUCOSE BLDC GLUCOMTR-MCNC: 56 MG/DL — LOW (ref 70–99)
GLUCOSE BLDC GLUCOMTR-MCNC: 60 MG/DL — LOW (ref 70–99)
GLUCOSE BLDC GLUCOMTR-MCNC: 76 MG/DL — SIGNIFICANT CHANGE UP (ref 70–99)
GLUCOSE SERPL-MCNC: 200 MG/DL — HIGH (ref 70–99)
HCT VFR BLD CALC: 30.6 % — LOW (ref 39–50)
HGB BLD-MCNC: 9.1 G/DL — LOW (ref 13–17)
IMM GRANULOCYTES NFR BLD AUTO: 1.2 % — SIGNIFICANT CHANGE UP (ref 0–1.5)
INR BLD: 1.14 RATIO — SIGNIFICANT CHANGE UP (ref 0.88–1.16)
LYMPHOCYTES # BLD AUTO: 1.3 K/UL — SIGNIFICANT CHANGE UP (ref 1–3.3)
LYMPHOCYTES # BLD AUTO: 7.5 % — LOW (ref 13–44)
MAGNESIUM SERPL-MCNC: 2.1 MG/DL — SIGNIFICANT CHANGE UP (ref 1.6–2.6)
MCHC RBC-ENTMCNC: 26.1 PG — LOW (ref 27–34)
MCHC RBC-ENTMCNC: 29.7 GM/DL — LOW (ref 32–36)
MCV RBC AUTO: 87.9 FL — SIGNIFICANT CHANGE UP (ref 80–100)
MONOCYTES # BLD AUTO: 1.03 K/UL — HIGH (ref 0–0.9)
MONOCYTES NFR BLD AUTO: 6 % — SIGNIFICANT CHANGE UP (ref 2–14)
NEUTROPHILS # BLD AUTO: 14.12 K/UL — HIGH (ref 1.8–7.4)
NEUTROPHILS NFR BLD AUTO: 81.9 % — HIGH (ref 43–77)
NRBC # BLD: 0 /100 WBCS — SIGNIFICANT CHANGE UP (ref 0–0)
PHOSPHATE SERPL-MCNC: 2.8 MG/DL — SIGNIFICANT CHANGE UP (ref 2.5–4.5)
PLATELET # BLD AUTO: 336 K/UL — SIGNIFICANT CHANGE UP (ref 150–400)
POTASSIUM SERPL-MCNC: 4.1 MMOL/L — SIGNIFICANT CHANGE UP (ref 3.5–5.3)
POTASSIUM SERPL-SCNC: 4.1 MMOL/L — SIGNIFICANT CHANGE UP (ref 3.5–5.3)
PROT SERPL-MCNC: 6.4 G/DL — SIGNIFICANT CHANGE UP (ref 6–8.3)
PROTHROM AB SERPL-ACNC: 13.6 SEC — SIGNIFICANT CHANGE UP (ref 10.6–13.6)
RBC # BLD: 3.48 M/UL — LOW (ref 4.2–5.8)
RBC # FLD: 15 % — HIGH (ref 10.3–14.5)
SODIUM SERPL-SCNC: 144 MMOL/L — SIGNIFICANT CHANGE UP (ref 135–145)
WBC # BLD: 17.26 K/UL — HIGH (ref 3.8–10.5)
WBC # FLD AUTO: 17.26 K/UL — HIGH (ref 3.8–10.5)

## 2021-04-17 PROCEDURE — 99223 1ST HOSP IP/OBS HIGH 75: CPT | Mod: GC

## 2021-04-17 PROCEDURE — 99233 SBSQ HOSP IP/OBS HIGH 50: CPT | Mod: GC

## 2021-04-17 RX ORDER — DEXTROSE 50 % IN WATER 50 %
25 SYRINGE (ML) INTRAVENOUS ONCE
Refills: 0 | Status: DISCONTINUED | OUTPATIENT
Start: 2021-04-17 | End: 2021-05-12

## 2021-04-17 RX ORDER — APIXABAN 2.5 MG/1
10 TABLET, FILM COATED ORAL EVERY 12 HOURS
Refills: 0 | Status: DISCONTINUED | OUTPATIENT
Start: 2021-04-17 | End: 2021-04-17

## 2021-04-17 RX ORDER — HUMAN INSULIN 100 [IU]/ML
12 INJECTION, SUSPENSION SUBCUTANEOUS EVERY 6 HOURS
Refills: 0 | Status: DISCONTINUED | OUTPATIENT
Start: 2021-04-17 | End: 2021-04-17

## 2021-04-17 RX ORDER — APIXABAN 2.5 MG/1
5 TABLET, FILM COATED ORAL EVERY 12 HOURS
Refills: 0 | Status: DISCONTINUED | OUTPATIENT
Start: 2021-04-17 | End: 2021-04-17

## 2021-04-17 RX ORDER — GLUCAGON INJECTION, SOLUTION 0.5 MG/.1ML
1 INJECTION, SOLUTION SUBCUTANEOUS ONCE
Refills: 0 | Status: DISCONTINUED | OUTPATIENT
Start: 2021-04-17 | End: 2021-05-12

## 2021-04-17 RX ORDER — HUMAN INSULIN 100 [IU]/ML
4 INJECTION, SUSPENSION SUBCUTANEOUS EVERY 6 HOURS
Refills: 0 | Status: DISCONTINUED | OUTPATIENT
Start: 2021-04-17 | End: 2021-04-17

## 2021-04-17 RX ORDER — HEPARIN SODIUM 5000 [USP'U]/ML
2500 INJECTION INTRAVENOUS; SUBCUTANEOUS EVERY 6 HOURS
Refills: 0 | Status: DISCONTINUED | OUTPATIENT
Start: 2021-04-17 | End: 2021-04-29

## 2021-04-17 RX ORDER — HEPARIN SODIUM 5000 [USP'U]/ML
5500 INJECTION INTRAVENOUS; SUBCUTANEOUS EVERY 6 HOURS
Refills: 0 | Status: DISCONTINUED | OUTPATIENT
Start: 2021-04-17 | End: 2021-04-29

## 2021-04-17 RX ORDER — DEXTROSE 50 % IN WATER 50 %
15 SYRINGE (ML) INTRAVENOUS ONCE
Refills: 0 | Status: DISCONTINUED | OUTPATIENT
Start: 2021-04-17 | End: 2021-05-12

## 2021-04-17 RX ORDER — SODIUM CHLORIDE 9 MG/ML
4 INJECTION INTRAMUSCULAR; INTRAVENOUS; SUBCUTANEOUS EVERY 6 HOURS
Refills: 0 | Status: COMPLETED | OUTPATIENT
Start: 2021-04-17 | End: 2021-04-18

## 2021-04-17 RX ORDER — IPRATROPIUM/ALBUTEROL SULFATE 18-103MCG
3 AEROSOL WITH ADAPTER (GRAM) INHALATION EVERY 6 HOURS
Refills: 0 | Status: COMPLETED | OUTPATIENT
Start: 2021-04-17 | End: 2021-04-18

## 2021-04-17 RX ORDER — HEPARIN SODIUM 5000 [USP'U]/ML
INJECTION INTRAVENOUS; SUBCUTANEOUS
Qty: 25000 | Refills: 0 | Status: DISCONTINUED | OUTPATIENT
Start: 2021-04-17 | End: 2021-04-29

## 2021-04-17 RX ORDER — SODIUM CHLORIDE 9 MG/ML
1000 INJECTION, SOLUTION INTRAVENOUS
Refills: 0 | Status: DISCONTINUED | OUTPATIENT
Start: 2021-04-17 | End: 2021-04-18

## 2021-04-17 RX ORDER — DEXTROSE 50 % IN WATER 50 %
12.5 SYRINGE (ML) INTRAVENOUS ONCE
Refills: 0 | Status: DISCONTINUED | OUTPATIENT
Start: 2021-04-17 | End: 2021-05-12

## 2021-04-17 RX ORDER — SODIUM,POTASSIUM PHOSPHATES 278-250MG
1 POWDER IN PACKET (EA) ORAL ONCE
Refills: 0 | Status: COMPLETED | OUTPATIENT
Start: 2021-04-17 | End: 2021-04-17

## 2021-04-17 RX ADMIN — Medication 4: at 13:33

## 2021-04-17 RX ADMIN — MEROPENEM 100 MILLIGRAM(S): 1 INJECTION INTRAVENOUS at 13:45

## 2021-04-17 RX ADMIN — Medication 2: at 04:50

## 2021-04-17 RX ADMIN — HUMAN INSULIN 10 UNIT(S): 100 INJECTION, SUSPENSION SUBCUTANEOUS at 04:51

## 2021-04-17 RX ADMIN — Medication 400 UNIT(S): at 13:17

## 2021-04-17 RX ADMIN — HEPARIN SODIUM 1200 UNIT(S)/HR: 5000 INJECTION INTRAVENOUS; SUBCUTANEOUS at 02:01

## 2021-04-17 RX ADMIN — LEVETIRACETAM 400 MILLIGRAM(S): 250 TABLET, FILM COATED ORAL at 18:10

## 2021-04-17 RX ADMIN — Medication 1 PACKET(S): at 08:29

## 2021-04-17 RX ADMIN — Medication 25 MILLIGRAM(S): at 04:49

## 2021-04-17 RX ADMIN — HUMAN INSULIN 12 UNIT(S): 100 INJECTION, SUSPENSION SUBCUTANEOUS at 13:34

## 2021-04-17 RX ADMIN — HEPARIN SODIUM 1200 UNIT(S)/HR: 5000 INJECTION INTRAVENOUS; SUBCUTANEOUS at 09:39

## 2021-04-17 RX ADMIN — Medication 2: at 18:10

## 2021-04-17 RX ADMIN — MEROPENEM 100 MILLIGRAM(S): 1 INJECTION INTRAVENOUS at 04:50

## 2021-04-17 RX ADMIN — MEROPENEM 100 MILLIGRAM(S): 1 INJECTION INTRAVENOUS at 22:23

## 2021-04-17 RX ADMIN — PANTOPRAZOLE SODIUM 40 MILLIGRAM(S): 20 TABLET, DELAYED RELEASE ORAL at 13:17

## 2021-04-17 RX ADMIN — HEPARIN SODIUM 1200 UNIT(S)/HR: 5000 INJECTION INTRAVENOUS; SUBCUTANEOUS at 19:17

## 2021-04-17 RX ADMIN — LEVETIRACETAM 400 MILLIGRAM(S): 250 TABLET, FILM COATED ORAL at 04:50

## 2021-04-17 NOTE — PROGRESS NOTE ADULT - PROBLEM SELECTOR PLAN 8
-Initiated goals of care discussion today with patient's brother-in-law (listed in chart as primary point of contact); patient is full code at this time; goal is as much recovery as possible -Dry gangrene noted over distal toes on bilateral lower extremities--most concerning for microvascular disease subsequent to vasopressor administration   -Dorsalis pedis pulses intact bilaterally; will order susan to confirm no evidence of macrovascular disease and no need for procedural intervention

## 2021-04-17 NOTE — PROGRESS NOTE ADULT - PROBLEM SELECTOR PLAN 9
-Full-dose anticoagulation for dvt prophylaxis as above   -NPO with tube feeds  -Full code at this time -Initiated goals of care discussion today with patient's brother-in-law (listed in chart as primary point of contact); patient is full code at this time; goal is as much recovery as possible

## 2021-04-17 NOTE — PROGRESS NOTE ADULT - ATTENDING COMMENTS
This is a 60 yr male with PMH of DMII (may have been on oral hypoglycemic in the past) who was initially admitted to [facility] ICU for hypoxic respiratory failure 2/2 COVID, PE with R heart strain. Also required levo for cardiogenic and septic shock. Labs on admission also significant for hyperglycemia, severe lactic acidosis, SUKI.  CTH 4/8 with scattered b/l cerebral/L cerebellar hemisphere acute infarcts w/ mild areas of hemorrhagic transformation and additional areas of evolving acute ischemia in the R thalamus. CTA head w/ thrombosed left posterior inferior cerebellar artery, CTA neck without significant stenosis. Was evaluated by neurology and neurosurgery, no surgical intervention. Given ppx hypertonic saline. EEG negative for seizures but on ppx keppra. Transferred to [facility] 4/12 and extubated 4/13. Patient has completed 10 day course of dexamethasone for COVID, renal function improved. Found to have + ESBL kleb in the sputum and on antibiotics for ventilator associated PNA.   Currently, still AOx0 (baseline AOx3), opens eyes and withdraws to tactile stimuli. Moving all extremities but not able to follow commands.   Encephalopathy – likely from ischemic infarcts w/ hemorrhagic component, COVID 19. Will need GOC discussion with the family. Unable to participate w/ S&S, will need PEG if within GOC. On ppx keppra  Acute PE - CTHs have been stable while on heparin gtt (restarted 4/10), transition to DOAC if NGT replaced  ESBL kleb PNA – continue meropenem   Necrotic toes – noted to have some necrosis on b/l LE, extremities warm with R foot slightly cooler. Was on levo while in MICU so likely microvascular disease. PVRs, may need vascular/podiatry eval if within GOC  Rest as above. This is a 60 yr male with PMH of DMII (may have been on oral hypoglycemic in the past) who was initially admitted to [facility] ICU for hypoxic respiratory failure 2/2 COVID, PE with R heart strain. Also required levo for cardiogenic and septic shock. Labs on admission also significant for hyperglycemia, severe lactic acidosis, SUKI.  CTH 4/8 with scattered b/l cerebral/L cerebellar hemisphere acute infarcts w/ mild areas of hemorrhagic transformation and additional areas of evolving acute ischemia in the R thalamus. CTA head w/ thrombosed left posterior inferior cerebellar artery, CTA neck without significant stenosis. Was evaluated by neurology and neurosurgery, no surgical intervention. Given ppx hypertonic saline. EEG negative for seizures but on ppx keppra. Transferred to [facility] 4/12 and extubated 4/13. Patient has completed 10 day course of dexamethasone for COVID, renal function improved. Found to have + ESBL kleb in the sputum and on antibiotics for ventilator associated PNA.   Currently, still AOx0 (baseline AOx3), opens eyes and withdraws to tactile stimuli. Moving all extremities but not able to follow commands.   Encephalopathy – likely from ischemic infarcts w/ hemorrhagic component, COVID 19. Will need GOC discussion with the family. Unable to participate w/ S&S, will need PEG if within GOC. On ppx keppra  Acute PE - CTHs have been stable while on heparin gtt (restarted 4/10), transition to DOAC if NGT replaced  ESBL kleb PNA – continue meropenem   Necrotic toes – noted to have some necrosis on b/l LE, extremities warm with R foot slightly cooler. Was on levo while in MICU so likely microvascular disease. ABIs, may need vascular/podiatry eval if within GOC  Rest as above.

## 2021-04-17 NOTE — PROGRESS NOTE ADULT - PROBLEM SELECTOR PLAN 10
-Full-dose anticoagulation for dvt prophylaxis as above   -NPO with tube feeds  -Full code at this time

## 2021-04-17 NOTE — PROGRESS NOTE ADULT - PROBLEM SELECTOR PLAN 3
-Low-grade temperature elevations noted over past few days in micu  -For that reason, blood and sputum cultures drawn on 4/13; sputum cultures growing esbl klebsiella   -For that reason, managing patient at this time with meropenem 1 g q8h for treatment of ventilator-associated pneumonia (dqay 3/7 of antibiotic therapy)   -Maintaining oxygen saturations greater than 95% on room air at this time

## 2021-04-17 NOTE — PROGRESS NOTE ADULT - ATTENDING COMMENTS
Patient seen and examined. Patient is 60M who presented with COVID-19 pneumonia. His course was complicated by ARDS, PE/DVT, and multiple ischemic infarcts. He is now extubated and slowly improving but remains with dysphagia.    1. Acute Hypoxemic Respiratory Failure - in the setting of COVID-19 and RITO PE. Now extubated and doing well on RA. Maintain o2 sat > 90%. continue therapeutic AC. Incentive spirometer if able.   2. Oropharyngeal dysphagia - continue NGT feeds. Patient remains too encephalopathic to eat but does apparently respond moderately to commands/speech in [place]. May need PEG tube therefore will continue IV AC until that decision is made. Aspiration precautions.  3. Venous Thromboembolism - initially with RITO PE with RV strain as well as L femoral DVT. Continue AC. Consider repeat echo.   - Consider checking anticardiolipin antibodies given extent of clotting  4. Neurologic - patient with bilateral MCA, bilateral PCA, and left ICA infarcts. EEG negative 4/10. Continue AC. Neurology followup. Continue Keppra for seizure prophylaxis  5. Infectious Disease - COVID-19 pneumonia completed treatment. New ESBL Klebsiella pneumonia started on Karin 4/14 with plan for a 7 day course. Monitor for signs and symptoms of infection. culture if spikes.    Patient is medically stable for transfer to the medical floors for further management and care.

## 2021-04-17 NOTE — PROGRESS NOTE ADULT - SUBJECTIVE AND OBJECTIVE BOX
Mateo Spears y3   [phone]    INTERVAL HPI/OVERNIGHT EVENTS: The patient's care was transitioned to an internal medicine team. In brief, he is a 60-year-old man with minimal past medical history other than a concern for diabetes mellitus who presented to [facility] on 4/1 with hypoxic respiratory failure in the setting of a recent diagnosis of covid-19. He was intubated in the emergency department and admitted immediately to the medical intensive care unit. At the time of presentation, the patient was noted to have a high anion gap metabolic acidosis, presumed to be secondary to lactic acidosis in the setting of profound hypoxia. On admission, the patient also was noted to have a pulmonary embolus and was managed with an intravenous heparin infusion. In the setting of persistent fevers and leukocytosis, the patient was managed for about five days on vancomycin and cefepime. On 4/8, the patient was noted to have minimal recovery of his mental status despite weaning of sedative medications, so he underwent a non-contrast ct scan of the head which demonstrated multiple areas of hypodensity, in the bilateral corona radiata/centrum semiovale, in the bilateral occipital lobes, in the right thalamus, and in the left cerebellum, concerning for multi-focal acute-to-subacute infarctions. Notably, associated with the infarction of the patient's left cerebellum was concern for mass effect and for compression of the fourth ventricle with concern for the development of obstructive hydrocephalus. A cta of the head and neck was ordered and demonstrated an obstruction in the left posterior inferior cerebellar artery. Out of concern for worsening hemorrhagic conversion, the patient's therapeutic anticoagulation was discontinued. He was evaluated by the neurosurgical team, which said that there was no indication for acute intervention. The patient underwent an EEG, which did not demonstrate any seizures or epilleptiform activity. On 4/11, the patient's therapeutic anticoagulation was restarted, and on 4/12, he was transferred to [facility] out of need for load management and for further care. On 4/13, the patient was successfully extubated. In the setting of persistent, low-grade temperature elevations and tachycardia, the patient had blood and sputum cultures drawn, and on 4/14, his sputum cultures grew extended spectrum beta-lactamase resistant klebsiella, so the patient was administered intravenous meropenem. He was evaluated by physical therapy, occupational therapy, and speech/swallow teams, which were able to perform very limited evaluations in the setting of the patient's persistent alteration in mental status and limited ability to communicate. At this time, he has been maintained npo with tube feeds.     SUBJECTIVE: Patient seen and examined at bedside.     Could not obtain accurate review of systems because the patient is not verbal.     OBJECTIVE:    VITAL SIGNS:  T(C): 37.3 (17 Apr 2021 11:00), Max: 37.6 (16 Apr 2021 16:00)  T(F): 99.1 (17 Apr 2021 11:00), Max: 99.7 (16 Apr 2021 16:00)  HR: 96 (17 Apr 2021 14:00) (78 - 108)  BP: 140/72 (17 Apr 2021 14:00) (135/65 - 158/76)  BP(mean): 100 (17 Apr 2021 14:00) (89 - 111)  ABP: --  ABP(mean): --  RR: 31 (17 Apr 2021 14:00) (20 - 33)  SpO2: 99% (17 Apr 2021 14:00) (95% - 100%)        04-16 @ 07:01  -  04-17 @ 07:00  --------------------------------------------------------  IN: 2737 mL / OUT: 700 mL / NET: 2037 mL    04-17 @ 07:01 - 04-17 @ 14:34  --------------------------------------------------------  IN: 694 mL / OUT: 0 mL / NET: 694 mL      CAPILLARY BLOOD GLUCOSE      POCT Blood Glucose.: 209 mg/dL (17 Apr 2021 13:32)      PHYSICAL EXAM:    General: NAD; awake and alert   HEENT: PERRL grossly, clear conjunctiva  Neck: No jvd, no lymphadenopathy  Respiratory: CTA b/l, no rales, no wheezes; equal respiratory excursion   Cardiovascular: +S1/S2; RRR  Abdomen: soft, NT/ND; +BS x4  Extremities: WWP, 2+ peripheral pulses b/l; no LE edema  Skin: normal color and turgor; no rash  Neurological: No gross motor or sensory deficits; coordination intact     MEDICATIONS:  MEDICATIONS  (STANDING):  cholecalciferol 400 Unit(s) Oral daily  heparin  Infusion. 1000 Unit(s)/Hr (10 mL/Hr) IV Continuous <Continuous>  insulin lispro (ADMELOG) corrective regimen sliding scale   SubCutaneous every 6 hours  insulin NPH human recombinant 12 Unit(s) SubCutaneous every 6 hours  levETIRAcetam  IVPB 500 milliGRAM(s) IV Intermittent every 12 hours  meropenem  IVPB 1000 milliGRAM(s) IV Intermittent every 8 hours  meropenem  IVPB      metoprolol tartrate 25 milliGRAM(s) Enteral Tube two times a day  multivitamin/minerals Oral Solution (WELLESSE) 30 milliLiter(s) Oral daily  pantoprazole  Injectable 40 milliGRAM(s) IV Push daily    MEDICATIONS  (PRN):      ALLERGIES:  Allergies    No Known Allergies    Intolerances        LABS:                        9.1    17.26 )-----------( 336      ( 17 Apr 2021 01:33 )             30.6     04-17    144  |  114<H>  |  34<H>  ----------------------------<  200<H>  4.1   |  22  |  1.08    Ca    7.8<L>      17 Apr 2021 01:33  Phos  2.8     04-17  Mg     2.1     04-17    TPro  6.4  /  Alb  1.9<L>  /  TBili  0.4  /  DBili  x   /  AST  45<H>  /  ALT  64<H>  /  AlkPhos  120  04-17    PT/INR - ( 17 Apr 2021 01:33 )   PT: 13.6 sec;   INR: 1.14 ratio         PTT - ( 17 Apr 2021 08:18 )  PTT:74.0 sec      RADIOLOGY & ADDITIONAL TESTS: Reviewed. Mateo Spears y3   [phone]    INTERVAL HPI/OVERNIGHT EVENTS: The patient's care was transitioned to an internal medicine team. In brief, he is a 60-year-old man with minimal past medical history other than a concern for diabetes mellitus who presented to [facility] on 4/1 with hypoxic respiratory failure in the setting of a recent diagnosis of covid-19. He was intubated in the emergency department and admitted immediately to the medical intensive care unit. At the time of presentation, the patient was noted to have a high anion gap metabolic acidosis, presumed to be secondary to lactic acidosis in the setting of profound hypoxia. On admission, the patient also was noted to have a pulmonary embolus and was managed with an intravenous heparin infusion. In the setting of persistent fevers and leukocytosis, the patient was managed for about five days on vancomycin and cefepime. On 4/8, the patient was noted to have minimal recovery of his mental status despite weaning of sedative medications, so he underwent a non-contrast ct scan of the head which demonstrated multiple areas of hypodensity, in the bilateral corona radiata/centrum semiovale, in the bilateral occipital lobes, in the right thalamus, and in the left cerebellum, concerning for multi-focal acute-to-subacute infarctions. Notably, associated with the infarction of the patient's left cerebellum was concern for mass effect and for compression of the fourth ventricle with concern for the development of obstructive hydrocephalus. A cta of the head and neck was ordered and demonstrated an obstruction in the left posterior inferior cerebellar artery. Out of concern for worsening hemorrhagic conversion, the patient's therapeutic anticoagulation was discontinued. He was evaluated by the neurosurgical team, which said that there was no indication for acute intervention. The patient underwent an EEG, which did not demonstrate any seizures or epilleptiform activity. On 4/11, the patient's therapeutic anticoagulation was restarted, and on 4/12, he was transferred to [facility] out of need for load management and for further care. On 4/13, the patient was successfully extubated. In the setting of persistent, low-grade temperature elevations and tachycardia, the patient had blood and sputum cultures drawn, and on 4/14, his sputum cultures grew extended spectrum beta-lactamase resistant klebsiella, so the patient was administered intravenous meropenem. He was evaluated by physical therapy, occupational therapy, and speech/swallow teams, which were able to perform very limited evaluations in the setting of the patient's persistent alteration in mental status and limited ability to communicate. At this time, he has been maintained npo with tube feeds.     SUBJECTIVE: Patient seen and examined at bedside.     Could not obtain accurate review of systems because the patient is not verbal.     OBJECTIVE:    VITAL SIGNS:  T(C): 37.3 (17 Apr 2021 11:00), Max: 37.6 (16 Apr 2021 16:00)  T(F): 99.1 (17 Apr 2021 11:00), Max: 99.7 (16 Apr 2021 16:00)  HR: 96 (17 Apr 2021 14:00) (78 - 108)  BP: 140/72 (17 Apr 2021 14:00) (135/65 - 158/76)  BP(mean): 100 (17 Apr 2021 14:00) (89 - 111)  ABP: --  ABP(mean): --  RR: 31 (17 Apr 2021 14:00) (20 - 33)  SpO2: 99% (17 Apr 2021 14:00) (95% - 100%)        04-16 @ 07:01  -  04-17 @ 07:00  --------------------------------------------------------  IN: 2737 mL / OUT: 700 mL / NET: 2037 mL    04-17 @ 07:01  -  04-17 @ 14:34  --------------------------------------------------------  IN: 694 mL / OUT: 0 mL / NET: 694 mL      CAPILLARY BLOOD GLUCOSE      POCT Blood Glucose.: 209 mg/dL (17 Apr 2021 13:32)      PHYSICAL EXAM:    General: NAD; awake but does not speak to me or follow commands   HEENT: PERRL grossly, clear conjunctiva  Neck: No jvd, no lymphadenopathy  Respiratory: Tachypneic; using accessory muscles of respiration; coarse breath sounds noted at right lung base; no wheezes; equal respiratory excursion bilaterally   Cardiovascular: +S1/S2; RRR  Abdomen: soft, NT/ND; +BS x4  Extremities: WWP, 2+ dorsalis pedis pulses b/l; no LE edema; eschar noted over distal toes on bilateral feet   Skin: normal color and turgor; no rash  Neurological: Not verbal; does not respond to commands; moving right arm spontaneously     MEDICATIONS:  MEDICATIONS  (STANDING):  cholecalciferol 400 Unit(s) Oral daily  heparin  Infusion. 1000 Unit(s)/Hr (10 mL/Hr) IV Continuous <Continuous>  insulin lispro (ADMELOG) corrective regimen sliding scale   SubCutaneous every 6 hours  insulin NPH human recombinant 12 Unit(s) SubCutaneous every 6 hours  levETIRAcetam  IVPB 500 milliGRAM(s) IV Intermittent every 12 hours  meropenem  IVPB 1000 milliGRAM(s) IV Intermittent every 8 hours  meropenem  IVPB      metoprolol tartrate 25 milliGRAM(s) Enteral Tube two times a day  multivitamin/minerals Oral Solution (WELLESSE) 30 milliLiter(s) Oral daily  pantoprazole  Injectable 40 milliGRAM(s) IV Push daily    MEDICATIONS  (PRN):      ALLERGIES:  Allergies    No Known Allergies    Intolerances        LABS:                        9.1    17.26 )-----------( 336      ( 17 Apr 2021 01:33 )             30.6     04-17    144  |  114<H>  |  34<H>  ----------------------------<  200<H>  4.1   |  22  |  1.08    Ca    7.8<L>      17 Apr 2021 01:33  Phos  2.8     04-17  Mg     2.1     04-17    TPro  6.4  /  Alb  1.9<L>  /  TBili  0.4  /  DBili  x   /  AST  45<H>  /  ALT  64<H>  /  AlkPhos  120  04-17    PT/INR - ( 17 Apr 2021 01:33 )   PT: 13.6 sec;   INR: 1.14 ratio         PTT - ( 17 Apr 2021 08:18 )  PTT:74.0 sec      RADIOLOGY & ADDITIONAL TESTS: Reviewed.

## 2021-04-17 NOTE — PROGRESS NOTE ADULT - NUTRITIONAL ASSESSMENT
This patient has been assessed with a concern for Malnutrition and has been determined to have a diagnosis/diagnoses of Severe protein-calorie malnutrition.    This patient is being managed with:   Diet NPO with Tube Feed-  Tube Feeding Modality: Nasogastric  Glucerna 1.2 Kaden (GLUCERNARTH)  Total Volume for 24 Hours (mL): 1680  Continuous  Starting Tube Feed Rate {mL per Hour}: 20  Increase Tube Feed Rate by (mL): 10     Every 6 hours  Until Goal Tube Feed Rate (mL per Hour): 70  Tube Feed Duration (in Hours): 24  Tube Feed Start Time: 15:00  Jaquan TF     Qty per Day:  2  Entered: Apr 16 2021  7:50PM

## 2021-04-17 NOTE — PROGRESS NOTE ADULT - PROBLEM SELECTOR PLAN 6
-Elevated blood pressures, to 140s and 150s systolic, noted over past few days in micu subsequent to vasopressor weaning   -Administered metoprolol tartrate 25 mg twice daily since 4/16   -Will transition anti-hypertensive management to oral amlodipine 5 mg daily -Elevated blood pressures, to 140s and 150s systolic, noted over past few days in micu subsequent to vasopressor weaning   -Administered metoprolol tartrate 25 mg twice daily since 4/16; will discontinue today  -Will transition anti-hypertensive management to oral amlodipine 5 mg daily after monitoring for 24 hours if blood pressures continue to rise

## 2021-04-17 NOTE — PROGRESS NOTE ADULT - PROBLEM SELECTOR PLAN 7
-Dysphagia noted in setting of patient's acute alteration in mental status  -Nasogastric tube placed for administration of tube feeds  -Patient evaluated by speech and swallow team, and npo status recommended  -Will maintain npo status with tube feeds at this time; in setting of acute cerebral infarcts, it is possible that patient's neurologic function and swallowing mechanism improve; will re-evaluate daily -Dysphagia noted in setting of patient's acute alteration in mental status  -Nasogastric tube placed for administration of tube feeds; but patient self removed today   -Patient evaluated by speech and swallow team, and npo status recommended  -Will maintain npo status with tube feeds at this time once tube feeds replaced (unsuccessful x3 today); in setting of acute cerebral infarcts, it is possible that patient's neurologic function and swallowing mechanism improve; will re-evaluate daily

## 2021-04-17 NOTE — PROGRESS NOTE ADULT - PROBLEM SELECTOR PLAN 5
-History of type II diabetes mellitus noted; a1c noted to be greater than 10   -In setting of tube feed administration, administering 12 units of nph q6h with q6h insulin sliding scale

## 2021-04-17 NOTE — PROGRESS NOTE ADULT - PROBLEM SELECTOR PLAN 1
-Alteration in mental status noted subsequent to weaning of sedative medications  -Etiology likely multi-factorial in setting of acute ischemic infarctions of the brain in conjunction with sedative medication administration, acute kidney injury with uremia (now resolving), resolving hypoxic respiratory failure, covid-19 infection, superimposed bacterial pneumonia, urinary retention, concern for nutritional deficiencies  -Managing acute pneumonia with antibiotics; managing pulmonary embolus with full-dose anticoagulation; limiting sedative medication administration   -At this time, will favor frequent reorientation; will re-evaluate swallowing function as tolerated

## 2021-04-17 NOTE — PROGRESS NOTE ADULT - SUBJECTIVE AND OBJECTIVE BOX
CHIEF COMPLAINT:  Patient is a 60y old  Male who presents with a chief complaint of COVID19 w/ severe metabolic acidosis s/p intubation (16 Apr 2021 10:45)    HPI:  60M unknown medical history BIBEMS for respiratory distress, recently COVID+ as per EMS. Pt unable to comply with history 2/2 resp distress, nods yes to difficulty breathing, no to pain. Baseline AAOx3. En route, pt had RR 28, SaO2 60%. In ED initially hypoxic to 60% placed on NRB and satting low 90s and tachypneic to 40s on NRB. Placed on AVAPS, still tachypneic to 40s. Labs showing HAGMA, elevated FSG to 500s. Also with lactate of 15. Trops of 200 and pro-BNP elevated to 21k. MICU consulted for respiratory failure in setting of COVID, also concern for DKA. On encounter, pt is noncooperative with questioning. Nods and tries to speak, unable to form full sentences and visibly tachypneic on AVAPS. In ED given 2L NS, decadron. Started on insulin gtt. Subsequently intubated.     Initial vitals 108/86, , RR 28, T97, SaO2 60% on 15L NRB. Received 2L NS bolus. Started on insulin gtt.     Pt admitted to [facility] surg b for further management.     Addendum 4/1/21 1700:  Limited history obtained from nitesh Anders, who lives in the  [phone]). Patient has a PMH of low BP and takes no medications. Patient tested positive for COVID on 3/19. He was initially doing fine with some fatigue, but suddenly decompensated after yesterday. Unable to obtain social history. Wife, Lucero, is currently in [place] - she went due to medical issues but was unable to return due to COVID travel restrictions.    (12 Apr 2021 20:54)      Interval Events:  -no overnight events    REVIEW OF SYSTEMS:  -unable to obtain confused    OBJECTIVE:  ICU Vital Signs Last 24 Hrs  T(C): 37.3 (17 Apr 2021 05:00), Max: 37.6 (16 Apr 2021 16:00)  T(F): 99.2 (17 Apr 2021 05:00), Max: 99.7 (16 Apr 2021 16:00)  HR: 78 (17 Apr 2021 06:00) (78 - 117)  BP: 147/75 (17 Apr 2021 06:00) (135/65 - 160/74)  BP(mean): 103 (17 Apr 2021 06:00) (89 - 110)  ABP: --  ABP(mean): --  RR: 25 (17 Apr 2021 06:00) (25 - 35)  SpO2: 100% (17 Apr 2021 06:00) (96% - 100%)        04-15-21 @ 07:01  -  04-16-21 @ 07:00  --------------------------------------------------------  IN: 3088 mL / OUT: 2225 mL / NET: 863 mL    04-16-21 @ 07:01  -  04-17-21 @ 06:48  --------------------------------------------------------  IN: 2737 mL / OUT: 700 mL / NET: 2037 mL      CAPILLARY BLOOD GLUCOSE  POCT Blood Glucose.: 182 mg/dL (17 Apr 2021 04:48)    PHYSICAL EXAM:  GENERAL: NAD  HEENT:  Atraumatic, Normocephalic  EYES: PERRL, conjunctiva and sclera clear  NECK: Supple, No JVD  CHEST/LUNG: diminished bilaterally; No wheezes, rales, or rhonchi  HEART: Regular rate and rhythm; No murmurs, rubs, or gallops  ABDOMEN: Soft, Nontender, Nondistended; Bowel sounds present  EXTREMITIES:  2+ Peripheral Pulses, No clubbing, cyanosis, or edema  PSYCH: flat affect  NEUROLOGY: withdrawn, follows basic commands, weak  SKIN: No rashes or lesions    HOSPITAL MEDICATIONS:  MEDICATIONS  (STANDING):  cholecalciferol 400 Unit(s) Oral daily  heparin  Infusion. 1000 Unit(s)/Hr (10 mL/Hr) IV Continuous <Continuous>  insulin lispro (ADMELOG) corrective regimen sliding scale   SubCutaneous every 6 hours  insulin NPH human recombinant 10 Unit(s) SubCutaneous every 6 hours  levETIRAcetam  IVPB 500 milliGRAM(s) IV Intermittent every 12 hours  meropenem  IVPB      meropenem  IVPB 1000 milliGRAM(s) IV Intermittent every 8 hours  metoprolol tartrate 25 milliGRAM(s) Enteral Tube two times a day  multivitamin/minerals Oral Solution (WELLESSE) 30 milliLiter(s) Oral daily  pantoprazole  Injectable 40 milliGRAM(s) IV Push daily    MEDICATIONS  (PRN):      LABS:                        9.1    17.26 )-----------( 336      ( 17 Apr 2021 01:33 )             30.6     Hgb Trend: 9.1<--, 9.5<--, 8.8<--, 9.2<--, 9.9<--  04-17    144  |  114<H>  |  34<H>  ----------------------------<  200<H>  4.1   |  22  |  1.08    Ca    7.8<L>      17 Apr 2021 01:33  Phos  2.8     04-17  Mg     2.1     04-17    TPro  6.4  /  Alb  1.9<L>  /  TBili  0.4  /  DBili  x   /  AST  45<H>  /  ALT  64<H>  /  AlkPhos  120  04-17    LIVER FUNCTIONS - ( 17 Apr 2021 01:33 )  Alb: 1.9 g/dL / Pro: 6.4 g/dL / ALK PHOS: 120 U/L / ALT: 64 U/L / AST: 45 U/L / GGT: x           Creatinine Trend: 1.08<--, 1.24<--, 1.26<--, 1.30<--, 1.42<--, 1.33<--  PT/INR - ( 17 Apr 2021 01:33 )   PT: 13.6 sec;   INR: 1.14 ratio    PTT - ( 17 Apr 2021 01:33 )  PTT:63.1 sec    MICROBIOLOGY: Reviewed    RADIOLOGY: Reviewed and interpreted by me    EKG: sinus

## 2021-04-17 NOTE — PROGRESS NOTE ADULT - PROBLEM SELECTOR PLAN 4
-Pulmonary embolus noted on ct scan performed at time of admission  -Etiology of venous thromboembolism uncertain at this time; no known history of thrombophilia; provoked in setting of acute covid illness   -Managing with heparin for full-dose anticoagulation   -Will transition therapy to apixaban administered via enteral tube -Pulmonary embolus noted on ct scan performed at time of admission  -Etiology of venous thromboembolism uncertain at this time; no known history of thrombophilia; provoked in setting of acute covid illness   -Managing with heparin for full-dose anticoagulation   -Will transition therapy to apixaban once enteral tube access regained

## 2021-04-17 NOTE — PROGRESS NOTE ADULT - ASSESSMENT
A/P: 60M unknown PMHx presented with hypoxic respiratory failure 2/2 COVID, complicated by severe HAGMA, hypoxemia, multiple areas of crebral infarcts with hemorhagic transformation, suki, spt with klebs completed empiric course of abx for Concern for superimposed bacterial infection vs. reactive,  intubated at [facility] for hypoxic respiratory failure and transferred to 5ICU (4/12)     NEURO  #Acute CVA w/ hemorraghic conversion   - HCT: +infarcts L cerebellar, L posterior/frontal, b/l occipital/parietal, hemorrhagic transformation  - vEEG: mod-severe nonspecific diffuse/multifocal cerebral dysfunction, no seizures  - Neuro surg: no surgical intervention  - c/w keppra for prophylaxis, neuro check q4h  - Neurology recs appreciated    PULM  #Acute hypoxic Respiratory Failure s/p intubation  - S/P Extubation (4/13), stable on RA w/ Sp02 >94%  - Pulm toileting with chest PT, supplemental 02 prn     #PE  - CTA Chest (4/1): +RIOT PE with RV strain   - c/w Heparin gtt     CV  - Remains off pressors   - Start Metoprolol 25mg BID with hold parameters    GI  #Dysphagia   - NGT with enteral feeds per nutrition recs   - Speech/swallow eval when able to participate; consider PEG  - bowel regimen     RENAL  #SUKI  - SCr 1.3->1.26, strict I/Os, avoid nephrotoxins   - Target net even today     HEME  #Acute PE  - CTA Chest (4/1): +RITO PE with RV strain   - POCUS (4/2): L common femoral DVT  - c/w Heparin gtt    ENDO  #Hx T2DM, A1c 10.3  - C/w mod ISS and NPH q6h    ID  #Leukocytosis, Klebsiella ESBL   - Remains with low grade fevers, dec WBC 14  - Meropenem x 8days (4/14-22)    #COVID-19  - Did not complete Remdesvir 2/2 SUKI   - S/P completed Decadron course   - Inflammatory markers q72h    ETHICS  - FULL CODE

## 2021-04-17 NOTE — PROGRESS NOTE ADULT - ASSESSMENT
The patient is a 60-year-old man managed previously only for diabetes mellitus who presented on 4/1 with acute respiratory failure with hypoxia secondary to covid-19-associated pneumonia and whose course has been complicated by 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

## 2021-04-17 NOTE — PROGRESS NOTE ADULT - PROBLEM SELECTOR PLAN 2
-Acute ischemic strokes, with concern for hemorrhagic conversion, noted on CT scan on 4/8  -Etiology of acute ischemic stroke uncertain at this time; tte with bubble performed at bedside in the micu without any evidence of right-to-left shunt; no arrhythmia noted on telemetry throughout stay in the micu   -CTA head and neck without any evidence of carotid artery stenosis or dissection   -Full-dose anticoagulation for pulmonary embolus held briefly out of concern for hemorrhagic conversion of acute strokes but since restarted; status post management with hypertonic saline; minimal concern at this time for elevated intracranial pressure   -Will administer oral aspirin for secondary prophylaxis   -Currently receiving leviteracetam for seizure prophylaxis although no documented seizures or seizure-like activity   -Will discuss case with neurology team -Acute ischemic strokes, with concern for hemorrhagic conversion, noted on CT scan on 4/8  -Etiology of acute ischemic stroke uncertain at this time; tte with bubble performed at bedside in the micu without any evidence of right-to-left shunt; no arrhythmia noted on telemetry throughout stay in the micu   -CTA head and neck without any evidence of carotid artery stenosis or dissection   -Full-dose anticoagulation for pulmonary embolus held briefly out of concern for hemorrhagic conversion of acute strokes but since restarted; status post management with hypertonic saline; minimal concern at this time for elevated intracranial pressure   -Will administer oral aspirin for secondary prophylaxis following discussion with neurology  -Currently receiving leviteracetam for seizure prophylaxis although no documented seizures or seizure-like activity   -Will discuss case with neurology team

## 2021-04-18 ENCOUNTER — TRANSCRIPTION ENCOUNTER (OUTPATIENT)
Age: 61
End: 2021-04-18

## 2021-04-18 LAB
ALBUMIN SERPL ELPH-MCNC: 1.9 G/DL — LOW (ref 3.3–5)
ALP SERPL-CCNC: 103 U/L — SIGNIFICANT CHANGE UP (ref 40–120)
ALT FLD-CCNC: 61 U/L — HIGH (ref 10–45)
ANION GAP SERPL CALC-SCNC: 8 MMOL/L — SIGNIFICANT CHANGE UP (ref 5–17)
APTT BLD: 79.6 SEC — HIGH (ref 27.5–35.5)
APTT BLD: 92.3 SEC — HIGH (ref 27.5–35.5)
AST SERPL-CCNC: 36 U/L — SIGNIFICANT CHANGE UP (ref 10–40)
BILIRUB SERPL-MCNC: 0.6 MG/DL — SIGNIFICANT CHANGE UP (ref 0.2–1.2)
BUN SERPL-MCNC: 29 MG/DL — HIGH (ref 7–23)
CALCIUM SERPL-MCNC: 8 MG/DL — LOW (ref 8.4–10.5)
CHLORIDE SERPL-SCNC: 111 MMOL/L — HIGH (ref 96–108)
CO2 SERPL-SCNC: 22 MMOL/L — SIGNIFICANT CHANGE UP (ref 22–31)
CREAT SERPL-MCNC: 1.1 MG/DL — SIGNIFICANT CHANGE UP (ref 0.5–1.3)
CULTURE RESULTS: SIGNIFICANT CHANGE UP
CULTURE RESULTS: SIGNIFICANT CHANGE UP
GLUCOSE BLDC GLUCOMTR-MCNC: 111 MG/DL — HIGH (ref 70–99)
GLUCOSE BLDC GLUCOMTR-MCNC: 114 MG/DL — HIGH (ref 70–99)
GLUCOSE BLDC GLUCOMTR-MCNC: 118 MG/DL — HIGH (ref 70–99)
GLUCOSE BLDC GLUCOMTR-MCNC: 128 MG/DL — HIGH (ref 70–99)
GLUCOSE SERPL-MCNC: 112 MG/DL — HIGH (ref 70–99)
HCT VFR BLD CALC: 32.4 % — LOW (ref 39–50)
HGB BLD-MCNC: 10 G/DL — LOW (ref 13–17)
MAGNESIUM SERPL-MCNC: 2.1 MG/DL — SIGNIFICANT CHANGE UP (ref 1.6–2.6)
MCHC RBC-ENTMCNC: 27.1 PG — SIGNIFICANT CHANGE UP (ref 27–34)
MCHC RBC-ENTMCNC: 30.9 GM/DL — LOW (ref 32–36)
MCV RBC AUTO: 87.8 FL — SIGNIFICANT CHANGE UP (ref 80–100)
NRBC # BLD: 0 /100 WBCS — SIGNIFICANT CHANGE UP (ref 0–0)
PHOSPHATE SERPL-MCNC: 3.2 MG/DL — SIGNIFICANT CHANGE UP (ref 2.5–4.5)
PLATELET # BLD AUTO: 318 K/UL — SIGNIFICANT CHANGE UP (ref 150–400)
POTASSIUM SERPL-MCNC: 4.3 MMOL/L — SIGNIFICANT CHANGE UP (ref 3.5–5.3)
POTASSIUM SERPL-SCNC: 4.3 MMOL/L — SIGNIFICANT CHANGE UP (ref 3.5–5.3)
PROT SERPL-MCNC: 6.4 G/DL — SIGNIFICANT CHANGE UP (ref 6–8.3)
RBC # BLD: 3.69 M/UL — LOW (ref 4.2–5.8)
RBC # FLD: 15 % — HIGH (ref 10.3–14.5)
SODIUM SERPL-SCNC: 141 MMOL/L — SIGNIFICANT CHANGE UP (ref 135–145)
SPECIMEN SOURCE: SIGNIFICANT CHANGE UP
SPECIMEN SOURCE: SIGNIFICANT CHANGE UP
WBC # BLD: 18.68 K/UL — HIGH (ref 3.8–10.5)
WBC # FLD AUTO: 18.68 K/UL — HIGH (ref 3.8–10.5)

## 2021-04-18 PROCEDURE — 99233 SBSQ HOSP IP/OBS HIGH 50: CPT | Mod: GC

## 2021-04-18 RX ORDER — SODIUM CHLORIDE 9 MG/ML
1000 INJECTION, SOLUTION INTRAVENOUS
Refills: 0 | Status: DISCONTINUED | OUTPATIENT
Start: 2021-04-18 | End: 2021-05-12

## 2021-04-18 RX ORDER — DEXTROSE 50 % IN WATER 50 %
25 SYRINGE (ML) INTRAVENOUS ONCE
Refills: 0 | Status: DISCONTINUED | OUTPATIENT
Start: 2021-04-18 | End: 2021-04-18

## 2021-04-18 RX ORDER — HUMAN INSULIN 100 [IU]/ML
4 INJECTION, SUSPENSION SUBCUTANEOUS EVERY 6 HOURS
Refills: 0 | Status: DISCONTINUED | OUTPATIENT
Start: 2021-04-18 | End: 2021-04-18

## 2021-04-18 RX ORDER — DEXTROSE 50 % IN WATER 50 %
12.5 SYRINGE (ML) INTRAVENOUS ONCE
Refills: 0 | Status: COMPLETED | OUTPATIENT
Start: 2021-04-18 | End: 2021-04-18

## 2021-04-18 RX ADMIN — Medication 12.5 GRAM(S): at 00:05

## 2021-04-18 RX ADMIN — MEROPENEM 100 MILLIGRAM(S): 1 INJECTION INTRAVENOUS at 13:25

## 2021-04-18 RX ADMIN — SODIUM CHLORIDE 4 MILLILITER(S): 9 INJECTION INTRAMUSCULAR; INTRAVENOUS; SUBCUTANEOUS at 12:07

## 2021-04-18 RX ADMIN — LEVETIRACETAM 400 MILLIGRAM(S): 250 TABLET, FILM COATED ORAL at 05:08

## 2021-04-18 RX ADMIN — LEVETIRACETAM 400 MILLIGRAM(S): 250 TABLET, FILM COATED ORAL at 17:33

## 2021-04-18 RX ADMIN — SODIUM CHLORIDE 40 MILLILITER(S): 9 INJECTION, SOLUTION INTRAVENOUS at 00:04

## 2021-04-18 RX ADMIN — HEPARIN SODIUM 1200 UNIT(S)/HR: 5000 INJECTION INTRAVENOUS; SUBCUTANEOUS at 08:58

## 2021-04-18 RX ADMIN — MEROPENEM 100 MILLIGRAM(S): 1 INJECTION INTRAVENOUS at 21:26

## 2021-04-18 RX ADMIN — Medication 3 MILLILITER(S): at 12:08

## 2021-04-18 RX ADMIN — Medication 3 MILLILITER(S): at 07:08

## 2021-04-18 RX ADMIN — HEPARIN SODIUM 1200 UNIT(S)/HR: 5000 INJECTION INTRAVENOUS; SUBCUTANEOUS at 02:50

## 2021-04-18 RX ADMIN — MEROPENEM 100 MILLIGRAM(S): 1 INJECTION INTRAVENOUS at 05:08

## 2021-04-18 NOTE — PROGRESS NOTE ADULT - PROBLEM SELECTOR PLAN 9
-Initiated goals of care discussion today with patient's brother-in-law (listed in chart as primary point of contact); patient is full code at this time; goal is as much recovery as possible -Initiated goals of care discussion with patient's brother-in-law (listed in chart as primary point of contact); patient is full code at this time; goal is as much recovery as possible  -Discussed further today the possibility of placing a peg tube; the patient's brother-in-law states that the patient's preference would be to receive a peg tube   -Will consult gastroenterology in am for peg tube placement -Initiated goals of care discussion with patient's brother-in-law (listed in chart as primary point of contact); patient is full code at this time; goal is as much recovery as possible  -The patient's wife is in [place], but the patient's brother-in-law has been visiting and placing the patient's wife on facetime; she is aware of all that is happening and has happened   -Discussed further today the possibility of placing a peg tube; the patient's brother-in-law states that the patient's preference would be to receive a peg tube   -Will consult gastroenterology in am for peg tube placement

## 2021-04-18 NOTE — DISCHARGE NOTE PROVIDER - DETAILS OF MALNUTRITION DIAGNOSIS/DIAGNOSES
This patient has been assessed with a concern for Malnutrition and was treated during this hospitalization for the following Nutrition diagnosis/diagnoses:     -  04/14/2021: Severe protein-calorie malnutrition

## 2021-04-18 NOTE — DISCHARGE NOTE PROVIDER - NSDCFUADDINST_GEN_ALL_CORE_FT
Podiatry Discharge Instructions:  Follow up: Please follow up with Dr. Aguilar within 1 week of discharge from the hospital, please call [phone] for appointment and discuss that you recently were seen in the hospital.  Wound Care: Please apply betadine to bilateral digits  Antibiotics: Please continue as instructed. Podiatry Discharge Instructions:  Follow up: Please follow up with Dr. Aguilar within 1 week of discharge from the hospital, please call [phone] for appointment and discuss that you recently were seen in the hospital.  Wound Care: Please apply betadine to bilateral digits  Antibiotics: Please continue as instructed

## 2021-04-18 NOTE — DISCHARGE NOTE PROVIDER - NSDCFUADDAPPT_GEN_ALL_CORE_FT
Bactrim DS 2 tabs TID + Augmentin 875mg po q 12, for tentative 2 more weeks (then will further determine if role for further course based on imaging)--> will need additional imaging of lungs in 2 weeks.      Bactrim DS 2 tabs TID + Augmentin 875mg po q 12, for tentative 2 more weeks (then will further determine if role for further course based on imaging)--> will need additional imaging of lungs in 2 weeks. Follow with Dr. Sammy Chambers.     Needs outpt endocrine established. Can call [facility] to schedule an appointment. [phone]. or can follow up w/previous Endocrinologist.     Will need VERY CLOSE follow up care with ID, Endocrine, PCP and Pulmonary      Bactrim DS 2 tabs TID + Augmentin 875mg po q12 until 6/8/21. Follow with Dr. Sammy Chambers (Infectious Disease) upon discharge.     Needs outpatient Endocrinologist established. Can call [facility] to schedule an appointment. [phone]. or can follow up w/ previous Endocrinologist.     Will need close follow up care with ID, Endocrine, PCP, Podiatry, and Pulmonary      Bactrim DS 2 tabs TID + Augmentin 875mg po q12 until 6/8/21. Follow with Dr. Sammy Chambers (Infectious Disease) upon discharge.     Needs outpatient Endocrinologist established. Can call [facility] to schedule an appointment. [phone]. or can follow up w/ previous Endocrinologist.     Will need close follow up care with ID, Endocrine, PCP, Podiatry, Neurology and Pulmonary

## 2021-04-18 NOTE — DISCHARGE NOTE PROVIDER - NSDCMRMEDTOKEN_GEN_ALL_CORE_FT
acetaminophen 325 mg oral tablet: 2 tab(s) orally every 6 hours  amoxicillin-clavulanate 875 mg-125 mg oral tablet: 1 tab(s) orally every 12 hours   cadexomer iodine 0.9% topical gel: 1 application topically once a day  cholecalciferol oral tablet: 400 unit(s) orally once a day  doxazosin 2 mg oral tablet: 1 tab(s) orally once a day (at bedtime)  heparin: 5000 unit(s) subcutaneous every 12 hours  insulin isophane (NPH) 100 units/mL human recombinant subcutaneous suspension: 15 unit(s) subcutaneous   insulin lispro 100 units/mL injectable solution: Moderate sliding scale insulin: injectable   2 Unit(s) if Glucose 151 - 200  4 Unit(s) if Glucose 201 - 250  6 Unit(s) if Glucose 251 - 300  8 Unit(s) if Glucose 301 - 350  10 Unit(s) if Glucose 351 - 400  12 Unit(s) if Glucose Greater Than 400.     Please notify provider for BG &lt;70 or &gt;400  ipratropium-albuterol 0.5 mg-2.5 mg/3 mL inhalation solution: 3 milliliter(s) inhaled every 6 hours, As needed, Shortness of Breath and/or Wheezing  levETIRAcetam 100 mg/mL oral solution: 5 milliliter(s) orally 2 times a day  polyethylene glycol 3350 oral powder for reconstitution: 17 gram(s) orally once a day  senna oral tablet: 2 tab(s) orally once a day (at bedtime)  sulfamethoxazole-trimethoprim 800 mg-160 mg oral tablet: 2 tab(s) orally 2 times a day    acetaminophen 325 mg oral tablet: 2 tab(s) orally every 6 hours  amoxicillin-clavulanate 875 mg-125 mg oral tablet: 1 tab(s) orally every 12 hours   cadexomer iodine 0.9% topical gel: 1 application topically once a day  cholecalciferol oral tablet: 400 unit(s) orally once a day  doxazosin 2 mg oral tablet: 1 tab(s) orally once a day (at bedtime)  heparin: 5000 unit(s) subcutaneous every 12 hours  insulin isophane (NPH) 100 units/mL human recombinant subcutaneous suspension: 12 unit(s) subcutaneous  insulin lispro 100 units/mL injectable solution: Moderate sliding scale insulin: injectable   2 Unit(s) if Glucose 151 - 200  4 Unit(s) if Glucose 201 - 250  6 Unit(s) if Glucose 251 - 300  8 Unit(s) if Glucose 301 - 350  10 Unit(s) if Glucose 351 - 400  12 Unit(s) if Glucose Greater Than 400.     Please notify provider for BG &lt;70 or &gt;400  ipratropium-albuterol 0.5 mg-2.5 mg/3 mL inhalation solution: 3 milliliter(s) inhaled every 6 hours, As needed, Shortness of Breath and/or Wheezing  levETIRAcetam 100 mg/mL oral solution: 5 milliliter(s) orally 2 times a day  polyethylene glycol 3350 oral powder for reconstitution: 17 gram(s) orally once a day  senna oral tablet: 2 tab(s) orally once a day (at bedtime)  sulfamethoxazole-trimethoprim 800 mg-160 mg oral tablet: 2 tab(s) orally 2 times a day

## 2021-04-18 NOTE — DISCHARGE NOTE PROVIDER - NSDCCPCAREPLAN_GEN_ALL_CORE_FT
PRINCIPAL DISCHARGE DIAGNOSIS  Diagnosis: Altered mental status  Assessment and Plan of Treatment: When you first came to the hospital, you had a lot of trouble breathing because of your covid infection. For that reason, you were connected to a breathing tube. After your breathing tube was disconnected and you were breathing on your own, your doctors became very concerned that you were not interacting with them as well as they would have hoped. They wondered if this may have been caused by a stroke or a bleed, so they did a ct scan of your head, which showed evidence of some new strokes. We wonder if this may be the reason why it has been more difficult for you to wake up. We also know that all of the time that you spent with the breathing tube, and all of the medications you received, may have made you a bit confused. Because you were confused and had trouble swallowing on your own, your doctors placed a feeding tube...      SECONDARY DISCHARGE DIAGNOSES  Diagnosis: Pulmonary embolism  Assessment and Plan of Treatment: When you first came to the hospital, in addition to your covid, your doctors noticed a clot in your lungs. For that reason, they have been giving you blood thinners...     PRINCIPAL DISCHARGE DIAGNOSIS  Diagnosis: Altered mental status  Assessment and Plan of Treatment: When you first came to the hospital, you had a lot of trouble breathing because of your covid infection. For that reason, you were connected to a breathing tube. After your breathing tube was disconnected and you were breathing on your own, your doctors became very concerned that you were not interacting with them as well as they would have hoped. They wondered if this may have been caused by a stroke or a bleed, so they did a ct scan of your head, which showed evidence of some new strokes. We wonder if this may be the reason why it has been more difficult for you to wake up. We also know that all of the time that you spent with the breathing tube, and all of the medications you received, may have made you a bit confused. Because you were confused and had trouble swallowing on your own, your doctors placed a feeding tube.      SECONDARY DISCHARGE DIAGNOSES  Diagnosis: Pulmonary embolism  Assessment and Plan of Treatment: When you first came to the hospital, in addition to your covid, your doctors noticed a clot in your lungs. For that reason, you are to continue on Eliquis at home.     PRINCIPAL DISCHARGE DIAGNOSIS  Diagnosis: Altered mental status  Assessment and Plan of Treatment: When you first came to the hospital, you had a lot of trouble breathing because of your covid infection. For that reason, you were connected to a breathing tube. After your breathing tube was disconnected and you were breathing on your own, your doctors became very concerned that you were not interacting with them as well as they would have hoped. They wondered if this may have been caused by a stroke or a bleed, so they did a ct scan of your head, which showed evidence of some new strokes. We wonder if this may be the reason why it has been more difficult for you to wake up. We also know that all of the time that you spent with the breathing tube, and all of the medications you received, may have made you a bit confused. Because you were confused and had trouble swallowing on your own, your doctors placed a feeding tube.      SECONDARY DISCHARGE DIAGNOSES  Diagnosis: Empyema lung  Assessment and Plan of Treatment: You were found to have a large collection of infected fluid in your lung. A VATs procedure was done by thoracic surgery. You recovered well and you were treated with a long course of meropenem. Please continue to take your antibiotics.    Diagnosis: Pulmonary embolism  Assessment and Plan of Treatment: When you first came to the hospital, in addition to your covid, your doctors noticed a clot in your lungs. You were put on a blood thinner BUT because you had two episodes of minor bleeds in the thigh and in the right chest wall, we decided that the risk of bleed outweighed the benefit. You will go home off of anticoagulation but if you start feeling shortness of breath or rapid heart rate please go to your nearest hospital     PRINCIPAL DISCHARGE DIAGNOSIS  Diagnosis: Altered mental status  Assessment and Plan of Treatment: You presented to the hospital with difficulty breathing in the setting of a COVID pneumonia. You required intubation and a stay in the ICU. After your breathing tube was removed, you had a change in your mental status. Imaging of your brain showed evidence of some new strokes.      SECONDARY DISCHARGE DIAGNOSES  Diagnosis: Pulmonary embolism  Assessment and Plan of Treatment: When you first came to the hospital, in addition to your covid, your doctors noticed a clot in your lungs. You were put on a blood thinner BUT because you had two episodes of minor bleeds in the thigh and in the right chest wall, we decided that the risk of bleed outweighed the benefit. You will go home off of anticoagulation but if you start feeling shortness of breath or rapid heart rate please go to your nearest hospital    Diagnosis: Empyema lung  Assessment and Plan of Treatment: You were found to have a large collection of infected fluid in your lung. A VATs procedure was done by thoracic surgery. You recovered well and you were treated with a long course of meropenem. Please continue to take your antibiotics.     PRINCIPAL DISCHARGE DIAGNOSIS  Diagnosis: Altered mental status  Assessment and Plan of Treatment: You presented to the hospital with difficulty breathing in the setting of a COVID pneumonia. You required intubation and a stay in the ICU. After your breathing tube was removed, you had a change in your mental status. Imaging of your brain showed evidence of some new strokes.      SECONDARY DISCHARGE DIAGNOSES  Diagnosis: Pulmonary embolism  Assessment and Plan of Treatment: When you first came to the hospital, in addition to your covid, your doctors noticed a clot in your lungs. You were put on a blood thinner BUT because you had two episodes of minor bleeds in the thigh and in the right chest wall, we decided that the risk of bleed outweighed the benefit. You will go home off of anticoagulation but if you start feeling shortness of breath or rapid heart rate please go to your nearest hospital    Diagnosis: Empyema lung  Assessment and Plan of Treatment: You were found to have a large collection of infected fluid in your lung. A VATs procedure was done by thoracic surgery. You recovered well and you were treated with a long course of meropenem. Please continue to take your oral antibiotics until 6/8 and follow up with ID as an outpatient.

## 2021-04-18 NOTE — DISCHARGE NOTE PROVIDER - INSTRUCTIONS
PEG tube feeds only, continuous feeds at 70ml/hour   PATIENT DID NOT PASS MBS SWALLOW STUDY ON 5/26. PEG tube feeds only, continuous feeds at 70ml/hour:PATIENT DID NOT PASS MBS SWALLOW STUDY ON 5/26.   ASPIRATION RISK: KEEP HEAD OF BED ELEVATED TO 30 DEGREES OR GREATER at all times    This patient is being managed with:   Diet NPO with Tube Feed-  Tube Feeding Modality: Gastrostomy  Glucerna 1.2 Kaden (GLUCERNARTH)  Total Volume for 24 Hours (mL): 1680  Until Goal Tube Feed Rate (mL per Hour): 70  Tube Feed Duration (in Hours): 24    INSULIN REQUIRED: NPH 12-12-12-3 and  continue  moderate correction scale sq q6h; continue this regimen on discharge to rehab, finger sticks every 6 hours    PEG tube feeds only, continuous feeds at 70ml/hour:PATIENT DID NOT PASS MBS SWALLOW STUDY ON 5/26.   ASPIRATION RISK: KEEP HEAD OF BED ELEVATED TO 30 DEGREES OR GREATER at all times    This patient is being managed with:   Diet NPO with Tube Feed-  Tube Feeding Modality: Gastrostomy  Glucerna 1.2 Kaden (GLUCERNARTH)  Total Volume for 24 Hours (mL): 1680  Until Goal Tube Feed Rate (mL per Hour): 70  Tube Feed Duration (in Hours): 24    INSULIN REQUIRED: NPH 15U BID and continue  moderate correction scale sq q6h; continue this regimen on discharge to rehab, finger sticks every 6 hours

## 2021-04-18 NOTE — PROGRESS NOTE ADULT - PROBLEM SELECTOR PLAN 1
-Alteration in mental status noted subsequent to weaning of sedative medications  -Etiology likely multi-factorial in setting of acute ischemic infarctions of the brain in conjunction with sedative medication administration, acute kidney injury with uremia (now resolving), resolving hypoxic respiratory failure, covid-19 infection, superimposed bacterial pneumonia, urinary retention, concern for nutritional deficiencies  -Managing acute pneumonia with antibiotics; managing pulmonary embolus with full-dose anticoagulation; limiting sedative medication administration   -At this time, will favor frequent reorientation; will re-evaluate swallowing function as tolerated -Alteration in mental status noted subsequent to weaning of sedative medications  -Etiology likely multi-factorial in setting of acute ischemic infarctions of the brain in conjunction with sedative medication administration, acute kidney injury with uremia (now resolving), resolving hypoxic respiratory failure, covid-19 infection, superimposed bacterial pneumonia, urinary retention, concern for nutritional deficiencies  -Managing acute pneumonia with antibiotics; managing pulmonary embolus with full-dose anticoagulation; limiting sedative medication administration   -At this time, will favor frequent reorientation; will re-evaluate swallowing function as possible

## 2021-04-18 NOTE — PROGRESS NOTE ADULT - NUTRITIONAL ASSESSMENT
This patient has been assessed with a concern for Malnutrition and has been determined to have a diagnosis/diagnoses of Severe protein-calorie malnutrition.    This patient is being managed with:   Diet NPO-  Entered: Apr 17 2021  6:50PM

## 2021-04-18 NOTE — PROGRESS NOTE ADULT - PROBLEM SELECTOR PLAN 6
-Elevated blood pressures, to 140s and 150s systolic, noted over past few days in micu subsequent to vasopressor weaning   -Administered metoprolol tartrate 25 mg twice daily since 4/16; will discontinue today  -Will transition anti-hypertensive management to oral amlodipine 5 mg daily after monitoring for 24 hours if blood pressures continue to rise -Elevated blood pressures, to 140s and 150s systolic, noted over past few days in [facility]u subsequent to vasopressor weaning   -Administered metoprolol tartrate 25 mg twice daily since 4/16; discontinued  -Will transition anti-hypertensive management to oral amlodipine 5 mg daily after monitoring for 24 hours if blood pressures continue to rise

## 2021-04-18 NOTE — PROGRESS NOTE ADULT - SUBJECTIVE AND OBJECTIVE BOX
Mateo Spears y3   [phone]    INTERVAL HPI/OVERNIGHT EVENTS:    SUBJECTIVE: Patient seen and examined at bedside.     CONSTITUTIONAL: No weakness, fevers, or chills  EYES/ENT: No visual changes;  No vertigo or throat pain   NECK: No pain, no stiffness  RESPIRATORY: No cough, no shortness of breath  CARDIOVASCULAR: No chest pain, no palpitations  GASTROINTESTINAL: No abdominal or epigastric pain. No nausea, vomiting, or hematemesis; No diarrhea or constipation. No melena or hematochezia.  GENITOURINARY: No dysuria, frequency, or hematuria  NEUROLOGICAL: No numbness, no weakness  SKIN: No itching, no rashes    OBJECTIVE:    VITAL SIGNS:  ICU Vital Signs Last 24 Hrs  T(C): 37 (18 Apr 2021 04:09), Max: 37.3 (17 Apr 2021 07:00)  T(F): 98.6 (18 Apr 2021 04:09), Max: 99.2 (18 Apr 2021 00:40)  HR: 91 (18 Apr 2021 04:09) (90 - 105)  BP: 120/78 (18 Apr 2021 04:09) (120/78 - 152/86)  BP(mean): 100 (17 Apr 2021 14:00) (98 - 111)  ABP: --  ABP(mean): --  RR: 18 (18 Apr 2021 04:09) (18 - 31)  SpO2: 95% (18 Apr 2021 04:09) (94% - 100%)        04-16 @ 07:01  -  04-17 @ 07:00  --------------------------------------------------------  IN: 2737 mL / OUT: 700 mL / NET: 2037 mL    04-17 @ 07:01  -  04-18 @ 06:56  --------------------------------------------------------  IN: 1120 mL / OUT: 375 mL / NET: 745 mL      CAPILLARY BLOOD GLUCOSE      POCT Blood Glucose.: 111 mg/dL (18 Apr 2021 05:59)      PHYSICAL EXAM:    General: NAD; awake and alert   HEENT: PERRL grossly, clear conjunctiva  Neck: No jvd, no lymphadenopathy  Respiratory: CTA b/l, no rales, no wheezes; equal respiratory excursion   Cardiovascular: +S1/S2; RRR  Abdomen: soft, NT/ND; +BS x4  Extremities: WWP, 2+ peripheral pulses b/l; no LE edema  Skin: normal color and turgor; no rash  Neurological: No gross motor or sensory deficits; coordination intact     MEDICATIONS:  MEDICATIONS  (STANDING):  albuterol/ipratropium for Nebulization 3 milliLiter(s) Nebulizer every 6 hours  cholecalciferol 400 Unit(s) Oral daily  dextrose 40% Gel 15 Gram(s) Oral once  dextrose 5%. 1000 milliLiter(s) (50 mL/Hr) IV Continuous <Continuous>  dextrose 5%. 1000 milliLiter(s) (100 mL/Hr) IV Continuous <Continuous>  dextrose 50% Injectable 25 Gram(s) IV Push once  dextrose 50% Injectable 12.5 Gram(s) IV Push once  dextrose 50% Injectable 25 Gram(s) IV Push once  glucagon  Injectable 1 milliGRAM(s) IntraMuscular once  heparin  Infusion.  Unit(s)/Hr (12 mL/Hr) IV Continuous <Continuous>  insulin lispro (ADMELOG) corrective regimen sliding scale   SubCutaneous every 6 hours  insulin NPH human recombinant 4 Unit(s) SubCutaneous every 6 hours  levETIRAcetam  IVPB 500 milliGRAM(s) IV Intermittent every 12 hours  meropenem  IVPB      meropenem  IVPB 1000 milliGRAM(s) IV Intermittent every 8 hours  multivitamin/minerals Oral Solution (WELLESSE) 30 milliLiter(s) Oral daily  sodium chloride 3%  Inhalation 4 milliLiter(s) Inhalation every 6 hours    MEDICATIONS  (PRN):  heparin   Injectable 5500 Unit(s) IV Push every 6 hours PRN For aPTT less than 40  heparin   Injectable 2500 Unit(s) IV Push every 6 hours PRN For aPTT between 40 - 57      ALLERGIES:  Allergies    No Known Allergies    Intolerances        LABS:                        10.0   18.68 )-----------( 318      ( 18 Apr 2021 01:41 )             32.4     04-17    144  |  114<H>  |  34<H>  ----------------------------<  200<H>  4.1   |  22  |  1.08    Ca    7.8<L>      17 Apr 2021 01:33  Phos  2.8     04-17  Mg     2.1     04-17    TPro  6.4  /  Alb  1.9<L>  /  TBili  0.4  /  DBili  x   /  AST  45<H>  /  ALT  64<H>  /  AlkPhos  120  04-17    PT/INR - ( 17 Apr 2021 01:33 )   PT: 13.6 sec;   INR: 1.14 ratio         PTT - ( 18 Apr 2021 01:41 )  PTT:92.3 sec      RADIOLOGY & ADDITIONAL TESTS: Reviewed. Mateo Spears y3   [phone]    INTERVAL HPI/OVERNIGHT EVENTS: The patient developed hypoglycemia after his nasogastric tube was removed (and therefore feeds were discontinued). However, his fingerstick glucose measurement charis following administration of intravenous dextrose.     SUBJECTIVE: Patient seen and examined at bedside.     Cannot assess review of systems as patient is not verbal.     OBJECTIVE:    VITAL SIGNS:  T(C): 37 (18 Apr 2021 04:09), Max: 37.3 (17 Apr 2021 07:00)  T(F): 98.6 (18 Apr 2021 04:09), Max: 99.2 (18 Apr 2021 00:40)  HR: 91 (18 Apr 2021 04:09) (90 - 105)  BP: 120/78 (18 Apr 2021 04:09) (120/78 - 152/86)  BP(mean): 100 (17 Apr 2021 14:00) (98 - 111)  ABP: --  ABP(mean): --  RR: 18 (18 Apr 2021 04:09) (18 - 31)  SpO2: 95% (18 Apr 2021 04:09) (94% - 100%)        04-16 @ 07:01  -  04-17 @ 07:00  --------------------------------------------------------  IN: 2737 mL / OUT: 700 mL / NET: 2037 mL    04-17 @ 07:01  -  04-18 @ 06:56  --------------------------------------------------------  IN: 1120 mL / OUT: 375 mL / NET: 745 mL      CAPILLARY BLOOD GLUCOSE      POCT Blood Glucose.: 111 mg/dL (18 Apr 2021 05:59)      PHYSICAL EXAM:    General: Lethargic, breathing comfortably   HEENT: PERRL grossly, clear conjunctiva; nasogastric tube removed   Neck: No jvd, no lymphadenopathy  Respiratory: CTA b/l, no rales, no wheezes; equal respiratory excursion   Cardiovascular: +S1/S2; RRR  Abdomen: soft, NT/ND; +BS x4  Extremities: WWP, 2+ peripheral pulses b/l; no LE edema  Skin: normal color and turgor; no rash  Neurological: Not alert; not verbal; not tracking gaze; moving all extremities     MEDICATIONS:  MEDICATIONS  (STANDING):  albuterol/ipratropium for Nebulization 3 milliLiter(s) Nebulizer every 6 hours  cholecalciferol 400 Unit(s) Oral daily  dextrose 40% Gel 15 Gram(s) Oral once  dextrose 5%. 1000 milliLiter(s) (50 mL/Hr) IV Continuous <Continuous>  dextrose 5%. 1000 milliLiter(s) (100 mL/Hr) IV Continuous <Continuous>  dextrose 50% Injectable 25 Gram(s) IV Push once  dextrose 50% Injectable 12.5 Gram(s) IV Push once  dextrose 50% Injectable 25 Gram(s) IV Push once  glucagon  Injectable 1 milliGRAM(s) IntraMuscular once  heparin  Infusion.  Unit(s)/Hr (12 mL/Hr) IV Continuous <Continuous>  insulin lispro (ADMELOG) corrective regimen sliding scale   SubCutaneous every 6 hours  insulin NPH human recombinant 4 Unit(s) SubCutaneous every 6 hours  levETIRAcetam  IVPB 500 milliGRAM(s) IV Intermittent every 12 hours  meropenem  IVPB      meropenem  IVPB 1000 milliGRAM(s) IV Intermittent every 8 hours  multivitamin/minerals Oral Solution (WELLESSE) 30 milliLiter(s) Oral daily  sodium chloride 3%  Inhalation 4 milliLiter(s) Inhalation every 6 hours    MEDICATIONS  (PRN):  heparin   Injectable 5500 Unit(s) IV Push every 6 hours PRN For aPTT less than 40  heparin   Injectable 2500 Unit(s) IV Push every 6 hours PRN For aPTT between 40 - 57      ALLERGIES:  Allergies    No Known Allergies    Intolerances        LABS:                        10.0   18.68 )-----------( 318      ( 18 Apr 2021 01:41 )             32.4     04-17    144  |  114<H>  |  34<H>  ----------------------------<  200<H>  4.1   |  22  |  1.08    Ca    7.8<L>      17 Apr 2021 01:33  Phos  2.8     04-17  Mg     2.1     04-17    TPro  6.4  /  Alb  1.9<L>  /  TBili  0.4  /  DBili  x   /  AST  45<H>  /  ALT  64<H>  /  AlkPhos  120  04-17    PT/INR - ( 17 Apr 2021 01:33 )   PT: 13.6 sec;   INR: 1.14 ratio         PTT - ( 18 Apr 2021 01:41 )  PTT:92.3 sec      RADIOLOGY & ADDITIONAL TESTS: Reviewed.

## 2021-04-18 NOTE — DISCHARGE NOTE PROVIDER - CARE PROVIDER_API CALL
Reyna Stover)  EndocrinologyMetabDiabetes; Internal Medicine  [address], [address]  [place] [address]  Phone: [phone]  Fax: [phone]  Established Patient  Follow Up Time: 1 week    Sammy Chambers)  Infectious Disease; Internal Medicine  [address] [place] [address]  Phone: [phone]  Fax: [phone]  Established Patient  Follow Up Time: 2 weeks

## 2021-04-18 NOTE — DISCHARGE NOTE PROVIDER - HOSPITAL COURSE
The patient  is a 60-year-old man with minimal past medical history other than a concern for diabetes mellitus who presented to [facility] on 4/1 with hypoxic respiratory failure in the setting of a recent diagnosis of covid-19. He was intubated in the emergency department and admitted immediately to the medical intensive care unit. At the time of presentation, the patient was noted to have a high anion gap metabolic acidosis, presumed to be secondary to lactic acidosis in the setting of profound hypoxia. On admission, the patient also was noted to have a pulmonary embolus and was managed with an intravenous heparin infusion. In the setting of persistent fevers and leukocytosis, the patient was managed for about five days on vancomycin and cefepime. On 4/8, the patient was noted to have minimal recovery of his mental status despite weaning of sedative medications, so he underwent a non-contrast ct scan of the head which demonstrated multiple areas of hypodensity, in the bilateral corona radiata/centrum semiovale, in the bilateral occipital lobes, in the right thalamus, and in the left cerebellum, concerning for multi-focal acute-to-subacute infarctions. Notably, associated with the infarction of the patient's left cerebellum was concern for mass effect and for compression of the fourth ventricle with concern for the development of obstructive hydrocephalus. A cta of the head and neck was ordered and demonstrated an obstruction in the left posterior inferior cerebellar artery. Out of concern for worsening hemorrhagic conversion, the patient's therapeutic anticoagulation was discontinued. He was evaluated by the neurosurgical team, which said that there was no indication for acute intervention. The patient underwent an EEG, which did not demonstrate any seizures or epilleptiform activity. On 4/11, the patient's therapeutic anticoagulation was restarted, and on 4/12, he was transferred to [facility] out of need for load management and for further care. On 4/13, the patient was successfully extubated. In the setting of persistent, low-grade temperature elevations and tachycardia, the patient had blood and sputum cultures drawn, and on 4/14, his sputum cultures grew extended spectrum beta-lactamase resistant klebsiella, so the patient was administered intravenous meropenem. He was evaluated by physical therapy, occupational therapy, and speech/swallow teams, which were able to perform very limited evaluations in the setting of the patient's persistent alteration in mental status and limited ability to communicate. The patient was transferred to the internal medicine floor on 4/17. He has been maintained npo with tube feeds; however, he has pulled out his ng tube. Multiple attempts at replacing the patient’s ng tube were unsuccessful. Per goals of care discussion with the patient’s brother-in-law, who is in constant communication with the patient’s next-of-kin, his wife, the patient’s goals of care are a full code, and he would desire to have a peg tube placed. The patient  is a 60-year-old man with minimal past medical history other than a concern for diabetes mellitus who presented to [facility] on 4/1 with hypoxic respiratory failure in the setting of a recent diagnosis of covid-19. He was intubated in the emergency department and admitted immediately to the medical intensive care unit. At the time of presentation, the patient was noted to have a high anion gap metabolic acidosis, presumed to be secondary to lactic acidosis in the setting of profound hypoxia. On admission, the patient also was noted to have a pulmonary embolus and was managed with an intravenous heparin infusion. In the setting of persistent fevers and leukocytosis, the patient was managed for about five days on vancomycin and cefepime. On 4/8, the patient was noted to have minimal recovery of his mental status despite weaning of sedative medications, so he underwent a non-contrast ct scan of the head which demonstrated multiple areas of hypodensity, in the bilateral corona radiata/centrum semiovale, in the bilateral occipital lobes, in the right thalamus, and in the left cerebellum, concerning for multi-focal acute-to-subacute infarctions. Notably, associated with the infarction of the patient's left cerebellum was concern for mass effect and for compression of the fourth ventricle with concern for the development of obstructive hydrocephalus. A cta of the head and neck was ordered and demonstrated an obstruction in the left posterior inferior cerebellar artery. Out of concern for worsening hemorrhagic conversion, the patient's therapeutic anticoagulation was discontinued. He was evaluated by the neurosurgical team, which said that there was no indication for acute intervention. The patient underwent an EEG, which did not demonstrate any seizures or epilleptiform activity. On 4/11, the patient's therapeutic anticoagulation was restarted, and on 4/12, he was transferred to [facility] out of need for load management and for further care. On 4/13, the patient was successfully extubated. In the setting of persistent, low-grade temperature elevations and tachycardia, the patient had blood and sputum cultures drawn, and on 4/14, his sputum cultures grew extended spectrum beta-lactamase resistant klebsiella, so the patient was administered intravenous meropenem. He was evaluated by physical therapy, occupational therapy, and speech/swallow teams, which were able to perform very limited evaluations in the setting of the patient's persistent alteration in mental status and limited ability to communicate. The patient was transferred to the internal medicine floor on 4/17. He has been maintained npo with tube feeds; however, he has pulled out his ng tube. Multiple attempts at replacing the patient’s ng tube were unsuccessful. Per goals of care discussion with the patient’s brother-in-law, who is in constant communication with the patient’s next-of-kin, his wife, the patient’s goals of care are a full code, and he would desire to have a peg tube placed. Peg tube was placed and tube feedings were started. He was transitioned from heparin gtt to lovenox and will be discharged on Eliquis. The patient  is a 60-year-old man with minimal past medical history other than a concern for diabetes mellitus who presented to [facility] on 4/1 with hypoxic respiratory failure in the setting of a recent diagnosis of covid-19. He was intubated in the emergency department and admitted immediately to the medical intensive care unit. At the time of presentation, the patient was noted to have a high anion gap metabolic acidosis, presumed to be secondary to lactic acidosis in the setting of profound hypoxia. On admission, the patient also was noted to have a pulmonary embolus and was managed with an intravenous heparin infusion. In the setting of persistent fevers and leukocytosis, the patient was managed for about five days on vancomycin and cefepime. On 4/8, the patient was noted to have minimal recovery of his mental status despite weaning of sedative medications, so he underwent a non-contrast ct scan of the head which demonstrated multiple areas of hypodensity, in the bilateral corona radiata/centrum semiovale, in the bilateral occipital lobes, in the right thalamus, and in the left cerebellum, concerning for multi-focal acute-to-subacute infarctions. Notably, associated with the infarction of the patient's left cerebellum was concern for mass effect and for compression of the fourth ventricle with concern for the development of obstructive hydrocephalus. A cta of the head and neck was ordered and demonstrated an obstruction in the left posterior inferior cerebellar artery. Out of concern for worsening hemorrhagic conversion, the patient's therapeutic anticoagulation was discontinued. He was evaluated by the neurosurgical team, which said that there was no indication for acute intervention. The patient underwent an EEG, which did not demonstrate any seizures or epilleptiform activity. On 4/11, the patient's therapeutic anticoagulation was restarted, and on 4/12, he was transferred to [facility] out of need for load management and for further care. On 4/13, the patient was successfully extubated. In the setting of persistent, low-grade temperature elevations and tachycardia, the patient had blood and sputum cultures drawn, and on 4/14, his sputum cultures grew extended spectrum beta-lactamase resistant klebsiella, so the patient was administered intravenous meropenem. He was evaluated by physical therapy, occupational therapy, and speech/swallow teams, which were able to perform very limited evaluations in the setting of the patient's persistent alteration in mental status and limited ability to communicate. The patient was transferred to the internal medicine floor on 4/17. He has been maintained npo with tube feeds; however, he has pulled out his ng tube. Multiple attempts at replacing the patient’s ng tube were unsuccessful. Per goals of care discussion with the patient’s brother-in-law, who is in constant communication with the patient’s next-of-kin, his wife, the patient’s goals of care are a full code, and he would desire to have a peg tube placed. Peg tube was placed and tube feedings were started with goal rate of 70. He was transitioned from heparin gtt to lovenox.      When brought to the floors, patient had uptrending WBC. Patient was panscanned (CT) and was found to have an empyema with an active hematoma in left obturator. UA positive. Patient started on meropenem on 5/7 until day of discharge. Lovenox was stopped due to bleed. Patient taken for VATs procedure for the empyema, now s/p removal of staples and all three drains. Patient then put on heparin drip but found to have right chest wall hematoma and rebleeding left obturator hematoma. Risks of restarting AC during this time outweighed benefit and full AC was stopped for the rest of his admission. Pt underwent another MBS on 5/26 and did not pass, still having signs of aspiration.     PT and PMR saw patient and he is cleared for discharge to acxute rehab with multiple follow ups. The patient  is a 60-year-old man with minimal past medical history other than a concern for diabetes mellitus who presented to [facility] on 4/1 with hypoxic respiratory failure in the setting of a recent diagnosis of covid-19. He was intubated in the emergency department and admitted immediately to the medical intensive care unit. At the time of presentation, the patient was noted to have a high anion gap metabolic acidosis, presumed to be secondary to lactic acidosis in the setting of profound hypoxia. On admission, the patient also was noted to have a pulmonary embolus and was managed with an intravenous heparin infusion. In the setting of persistent fevers and leukocytosis, the patient was managed for about five days on vancomycin and cefepime. On 4/8, the patient was noted to have minimal recovery of his mental status despite weaning of sedative medications, so he underwent a non-contrast ct scan of the head which demonstrated multiple areas of hypodensity, in the bilateral corona radiata/centrum semiovale, in the bilateral occipital lobes, in the right thalamus, and in the left cerebellum, concerning for multi-focal acute-to-subacute infarctions. Notably, associated with the infarction of the patient's left cerebellum was concern for mass effect and for compression of the fourth ventricle with concern for the development of obstructive hydrocephalus. A cta of the head and neck was ordered and demonstrated an obstruction in the left posterior inferior cerebellar artery. Out of concern for worsening hemorrhagic conversion, the patient's therapeutic anticoagulation was discontinued. He was evaluated by the neurosurgical team, which said that there was no indication for acute intervention. The patient underwent an EEG, which did not demonstrate any seizures or epilleptiform activity. On 4/11, the patient's therapeutic anticoagulation was restarted, and on 4/12, he was transferred to [facility] out of need for load management and for further care. On 4/13, the patient was successfully extubated. In the setting of persistent, low-grade temperature elevations and tachycardia, the patient had blood and sputum cultures drawn, and on 4/14, his sputum cultures grew extended spectrum beta-lactamase resistant klebsiella, so the patient was administered intravenous meropenem. He was evaluated by physical therapy, occupational therapy, and speech/swallow teams, which were able to perform very limited evaluations in the setting of the patient's persistent alteration in mental status and limited ability to communicate. The patient was transferred to the internal medicine floor on 4/17. Per goals of care discussion with the patient’s brother-in-law, who is in constant communication with the patient’s next-of-kin, his wife, the patient’s goals of care are a full code, and he would desire to have a peg tube placed. Peg tube was placed and tube feedings were started with goal rate of 70. He was transitioned from heparin gtt to lovenox.      When brought to the floors, patient had uptrending WBC. Patient was panscanned (CT) and was found to have an empyema with an active hematoma in left obturator. UA positive. Patient started on meropenem on 5/7 until day of discharge, at which  time he was transitioned to augmentin and bactrim to be continued until 6/8. Lovenox was stopped due to bleed. Patient taken for VATs procedure for the empyema, s/p removal of staples and all three drains. Patient then put on heparin drip but found to have right chest wall hematoma and rebleeding left obturator hematoma. Risks of restarting AC during this time outweighed benefit and full AC was stopped, pt was continued on DVT ppx throughout hospitalization.     PT and PMR saw patient and he is cleared for discharge to acute rehab with multiple follow ups. The patient  is a 60-year-old man with minimal past medical history other than a concern for diabetes mellitus who presented to [facility] on 4/1 with hypoxic respiratory failure in the setting of a recent diagnosis of covid-19. He was intubated in the emergency department and admitted immediately to the medical intensive care unit. At the time of presentation, the patient was noted to have a high anion gap metabolic acidosis, presumed to be secondary to lactic acidosis in the setting of profound hypoxia. On admission, the patient also was noted to have a pulmonary embolus and was managed with an intravenous heparin infusion. In the setting of persistent fevers and leukocytosis, the patient was managed for about five days on vancomycin and cefepime. On 4/8, the patient was noted to have minimal recovery of his mental status despite weaning of sedative medications, so he underwent a non-contrast ct scan of the head which demonstrated multiple areas of hypodensity, in the bilateral corona radiata/centrum semiovale, in the bilateral occipital lobes, in the right thalamus, and in the left cerebellum, concerning for multi-focal acute-to-subacute infarctions. Notably, associated with the infarction of the patient's left cerebellum was concern for mass effect and for compression of the fourth ventricle with concern for the development of obstructive hydrocephalus. A cta of the head and neck was ordered and demonstrated an obstruction in the left posterior inferior cerebellar artery. Out of concern for worsening hemorrhagic conversion, the patient's therapeutic anticoagulation was discontinued. He was evaluated by the neurosurgical team, which said that there was no indication for acute intervention. The patient underwent an EEG, which did not demonstrate any seizures or epilleptiform activity. On 4/11, the patient's therapeutic anticoagulation was restarted, and on 4/12, he was transferred to [facility] out of need for load management and for further care. On 4/13, the patient was successfully extubated. In the setting of persistent, low-grade temperature elevations and tachycardia, the patient had blood and sputum cultures drawn, and on 4/14, his sputum cultures grew extended spectrum beta-lactamase resistant klebsiella, so the patient was administered intravenous meropenem. He was evaluated by physical therapy, occupational therapy, and speech/swallow teams, which were able to perform very limited evaluations in the setting of the patient's persistent alteration in mental status and limited ability to communicate. The patient was transferred to the internal medicine floor on 4/17. Per goals of care discussion with the patient’s brother-in-law, who is in constant communication with the patient’s next-of-kin, his wife, the patient’s goals of care are a full code, and he would desire to have a peg tube placed. Peg tube was placed and tube feedings were started with goal rate of 70. He was transitioned from heparin gtt to lovenox.      When brought to the floors, patient had uptrending WBC. Patient was panscanned (CT) and was found to have an empyema with an active hematoma in left obturator. UA positive. Patient started on meropenem on 5/7 until day of discharge, at which  time he was transitioned to augmentin and bactrim to be continued until 6/8. Lovenox was stopped due to bleed. Patient taken for VATs procedure for the empyema, s/p removal of staples and all three drains. Patient then put on heparin drip but found to have right chest wall hematoma and rebleeding left obturator hematoma. Risks of restarting AC during this time outweighed benefit and full AC was stopped, pt was continued on DVT ppx throughout hospitalization.     PT and PMR saw patient and he is cleared for discharge to acute rehab with multiple follow ups.       61 yo M with h/o DM admitted on 4/1 for acute hypoxic respiratory failure secondary to SARS-CoV-2 infection, requiring endotracheal intubation, septic shock, ischemic stroke with hemorrhagic transformation and ESBL Klebsiella pneumonia. Initially admitted to [facility] ICU and transferred to [facility] for neurosurgery evaluation and further management. Pulmonary service consulted eapascual this month for empyema, patient underwent VATS decortication on 5/12 with Dr. Albarado. grew ESBL Klebsiella pneumonia on fluid cultures, being treated with Meropenem. Three chest tubes removed over the course of the past week.   CT Chest 5/16 Interval new small cavitary lesions in the right lower lobe and right chest wall hematoma.    Also of note, RITO segmental PEs with e/o RH strain was noted on CTPA performed on 4/1. LE Dopplers negative on 4/15 and 5/8  Treated with heparin drip, however being held in light of acute bleed in right obturator and right chest wall hematoma found on CT 5/16 and is being maintained on Hep SQ bid, Hct stable around 30.    COVID-19 infection  Provoked VTE (setting of sepsis/high inflammatory state with COVID infection)  Acute hypoxic respiratory failure  Empyema s/p VATs decortication  Acute ischemic CVA with areas of hemorrhagic transformation  Right obturator hematoma with presence of active bleeding, Large right chest wall hematoma- no evidence of active bleeding    T60-year-old male with h/o untreated T2DM and recently diagnosed COVID (3/19) BIBEMS to [facility] on 4/1 for hypoxic respiratory failure. Admitted to MICU and intubated. Course complicated by the following:   -Septic shock  -ESBL Klebsiella PNA   -Ischemic stroke with hemorrhagic transformation. Found to have multi-focal acute-to-subacute infarctions. 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 Subsequent scans showed hemorrhagic conversion. Evaluated by Neurosurgery, who recommended outpatient follow up.     -Elevated  ischemic stroke with hemorrhagic transformation, and ESBL Klebsiella PNA. admission, the patient also was noted to have a pulmonary embolus and was managed with an intravenous heparin infusion. In the setting of persistent fevers and leukocytosis, the patient was managed for about five days on vancomycin and cefepime.    Out of concern for worsening hemorrhagic conversion, the patient's therapeutic anticoagulation was discontinued. He was evaluated by the neurosurgical team, which said that there was no indication for acute intervention. The patient underwent an EEG, which did not demonstrate any seizures or epilleptiform activity. On 4/11, the patient's therapeutic anticoagulation was restarted, and on 4/12, he was transferred to [facility] out of need for load management and for further care. On 4/13, the patient was successfully extubated. In the setting of persistent, low-grade temperature elevations and tachycardia, the patient had blood and sputum cultures drawn, and on 4/14, his sputum cultures grew extended spectrum beta-lactamase resistant klebsiella, so the patient was administered intravenous meropenem. He was evaluated by physical therapy, occupational therapy, and speech/swallow teams, which were able to perform very limited evaluations in the setting of the patient's persistent alteration in mental status and limited ability to communicate. The patient was transferred to the internal medicine floor on 4/17. Per goals of care discussion with the patient’s brother-in-law, who is in constant communication with the patient’s next-of-kin, his wife, the patient’s goals of care are a full code, and he would desire to have a peg tube placed. Peg tube was placed and tube feedings were started with goal rate of 70. He was transitioned from heparin gtt to lovenox.      When brought to the floors, patient had uptrending WBC. Patient was panscanned (CT) and was found to have an empyema with an active hematoma in left obturator. UA positive. Patient started on meropenem on 5/7 until day of discharge, at which  time he was transitioned to augmentin and bactrim to be continued until 6/8. Lovenox was stopped due to bleed. Patient taken for VATs procedure for the empyema, s/p removal of staples and all three drains. Patient then put on heparin drip but found to have right chest wall hematoma and rebleeding left obturator hematoma. Risks of restarting AC during this time outweighed benefit and full AC was stopped, pt was continued on DVT ppx throughout hospitalization.     PT and PMR saw patient and he is cleared for discharge to acute rehab with multiple follow ups.       59 yo M with h/o DM admitted on 4/1 for acute hypoxic respiratory failure secondary to SARS-CoV-2 infection, requiring endotracheal intubation, septic shock, ischemic stroke with hemorrhagic transformation and ESBL Klebsiella pneumonia. Initially admitted to [facility] ICU and transferred to [facility] for neurosurgery evaluation and further management. Pulmonary service consulted ealier this month for empyema, patient underwent VATS decortication on 5/12 with Dr. Albarado. grew ESBL Klebsiella pneumonia on fluid cultures, being treated with Meropenem. Three chest tubes removed over the course of the past week.   CT Chest 5/16 Interval new small cavitary lesions in the right lower lobe and right chest wall hematoma.    Also of note, RITO segmental PEs with e/o RH strain was noted on CTPA performed on 4/1. LE Dopplers negative on 4/15 and 5/8  Treated with heparin drip, however being held in light of acute bleed in right obturator and right chest wall hematoma found on CT 5/16 and is being maintained on Hep SQ bid, Hct stable around 30.    COVID-19 infection  Provoked VTE (setting of sepsis/high inflammatory state with COVID infection)  Acute hypoxic respiratory failure  Empyema s/p VATs decortication  Acute ischemic CVA with areas of hemorrhagic transformation  Right obturator hematoma with presence of active bleeding, Large right chest wall hematoma- no evidence of active bleeding    T60-year-old male with h/o untreated T2DM and recently diagnosed COVID (3/19) BIBEMS to [facility] on 4/1 for hypoxic respiratory failure, found to have COVID PNA. Admitted to MICU and intubated. Course complicated by the following:   -Septic shock on admission, treated with vancomycin and cefepime.   -ESBL Klebsiella PNA c/b   -Ischemic stroke with hemorrhagic transformation. Found to have multi-focal acute-to-subacute infarctions. 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 Subsequent scans showed hemorrhagic conversion. Evaluated by Neurosurgery, who recommended outpatient follow up.   -PE: initially anticoagulated with heparin gtt. However, discontinued and treated with prophylactic dose SQH course further complicated by a right chest wall and right obturator hematomas    Out of concern for worsening hemorrhagic conversion, the patient's therapeutic anticoagulation was discontinued. He was evaluated by the neurosurgical team, which said that there was no indication for acute intervention. The patient underwent an EEG, which did not demonstrate any seizures or epilleptiform activity. On 4/11, the patient's therapeutic anticoagulation was restarted, and on 4/12, he was transferred to [facility] out of need for load management and for further care. On 4/13, the patient was successfully extubated. In the setting of persistent, low-grade temperature elevations and tachycardia, the patient had blood and sputum cultures drawn, and on 4/14, his sputum cultures grew extended spectrum beta-lactamase resistant klebsiella, so the patient was administered intravenous meropenem. He was evaluated by physical therapy, occupational therapy, and speech/swallow teams, which were able to perform very limited evaluations in the setting of the patient's persistent alteration in mental status and limited ability to communicate. The patient was transferred to the internal medicine floor on 4/17. Per goals of care discussion with the patient’s brother-in-law, who is in constant communication with the patient’s next-of-kin, his wife, the patient’s goals of care are a full code, and he would desire to have a peg tube placed. Peg tube was placed and tube feedings were started with goal rate of 70. He was transitioned from heparin gtt to lovenox.      When brought to the floors, patient had uptrending WBC. Patient was panscanned (CT) and was found to have an empyema with an active hematoma in left obturator. UA positive. Patient started on meropenem on 5/7 until day of discharge, at which  time he was transitioned to augmentin and bactrim to be continued until 6/8. Lovenox was stopped due to bleed. Patient taken for VATs procedure for the empyema, s/p removal of staples and all three drains. Patient then put on heparin drip but found to have right chest wall hematoma and rebleeding left obturator hematoma. Risks of restarting AC during this time outweighed benefit and full AC was stopped, pt was continued on DVT ppx throughout hospitalization.     PT and PMR saw patient and he is cleared for discharge to acute rehab with multiple follow ups.       61 yo M with h/o DM admitted on 4/1 for acute hypoxic respiratory failure secondary to SARS-CoV-2 infection, requiring endotracheal intubation, septic shock, ischemic stroke with hemorrhagic transformation and ESBL Klebsiella pneumonia. Initially admitted to [facility] ICU and transferred to [facility] for neurosurgery evaluation and further management. Pulmonary service consulted ealier this month for empyema, patient underwent VATS decortication on 5/12 with Dr. Albarado. grew ESBL Klebsiella pneumonia on fluid cultures, being treated with Meropenem. Three chest tubes removed over the course of the past week.   CT Chest 5/16 Interval new small cavitary lesions in the right lower lobe and right chest wall hematoma.    Also of note, RITO segmental PEs with e/o RH strain was noted on CTPA performed on 4/1. LE Dopplers negative on 4/15 and 5/8  Treated with heparin drip, however being held in light of acute bleed in right obturator and right chest wall hematoma found on CT 5/16 and is being maintained on Hep SQ bid, Hct stable around 30.    COVID-19 infection  Provoked VTE (setting of sepsis/high inflammatory state with COVID infection)  Acute hypoxic respiratory failure  Empyema s/p VATs decortication  Acute ischemic CVA with areas of hemorrhagic transformation  Right obturator hematoma with presence of active bleeding, Large right chest wall hematoma- no evidence of active bleeding    T60yo M with T2DM and recently diagnosed COVID (3/19) BIBEMS to [facility] on 4/1 for hypoxic respiratory failure secondary to COVID PNA, requiring endotracheal intubation (4/1-4/13). Course complicated by the following:   -Septic shock on admission, treated with vancomycin and cefepime.   -ESBL Klebsiella PNA c/b empyema s/p VATs decortication. Treated with prolonged course of Meropenem while hospitalized and transitioned to Augmentin/Bactrim upon discharge (until 6/8). Will follow up with ID as outpatient.   -Acute ischemic CVA with areas of hemorrhagic transformation. Found to have multi-focal acute-to-subacute infarctions. 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 Subsequent scans showed hemorrhagic conversion. Transferred to [facility] for Neurosurgery evaluation, no surgical intervention at this time. Will require outpatient follow up.   -RITO segmental PEs with e/o RH strain: Provoked in the setting of sepsis/high inflammatory state with COVID infection. LE Dopplers negative x 2. Initially anticoagulated with heparin gtt. Course further complicated by a right chest wall and right obturator hematomas. Due to concern for worsening hemorrhagic conversion of stroke and hematomas, the patient's therapeutic anticoagulation was discontinued. He was evaluated by the neurosurgical team, which said that there was no indication for acute intervention. Patient will not be anticoagulated upon discharge.   -AMS: Likely in the setting of acute strokes, improved throughout hospitalization. Due to prolonged intubation with subsequent AMS and dysphagia, pt had PEG tube placed. Pt failed MBS following improvement of mental status.   -Dry gangrene of BLE digits: required bedside debridement and wound care. Will follow up with Podiatry as an outpatient.      PT and PMR saw patient and he is cleared for discharge to acute rehab with multiple follow ups, including ID, Endocrine, PCP, Podiatry, Neurology and Pulmonary.

## 2021-04-18 NOTE — PROGRESS NOTE ADULT - PROBLEM SELECTOR PLAN 8
-Dry gangrene noted over distal toes on bilateral lower extremities--most concerning for microvascular disease subsequent to vasopressor administration   -Dorsalis pedis pulses intact bilaterally; will order susan to confirm no evidence of macrovascular disease and no need for procedural intervention

## 2021-04-18 NOTE — PROGRESS NOTE ADULT - PROBLEM SELECTOR PLAN 4
-Pulmonary embolus noted on ct scan performed at time of admission  -Etiology of venous thromboembolism uncertain at this time; no known history of thrombophilia; provoked in setting of acute covid illness   -Managing with heparin for full-dose anticoagulation   -Will transition therapy to apixaban once enteral tube access regained

## 2021-04-18 NOTE — DISCHARGE NOTE PROVIDER - PROVIDER TOKENS
PROVIDER:[TOKEN:[[id number]],FOLLOWUP:[1 week],ESTABLISHEDPATIENT:[T]],PROVIDER:[TOKEN:[[id number]],FOLLOWUP:[2 weeks],ESTABLISHEDPATIENT:[T]]

## 2021-04-18 NOTE — PROGRESS NOTE ADULT - ATTENDING COMMENTS
60 yr male with PMH of DMII (may have been on oral hypoglycemic in the past) who was initially admitted to [facility] ICU for hypoxic respiratory failure 2/2 COVID, PE with R heart strain. Also required levo for cardiogenic and septic shock. Labs on admission also significant for hyperglycemia, severe lactic acidosis, SUKI.  CTH 4/8 with scattered b/l cerebral/L cerebellar hemisphere acute infarcts w/ mild areas of hemorrhagic transformation and additional areas of evolving acute ischemia in the R thalamus. CTA head w/ thrombosed left posterior inferior cerebellar artery, CTA neck without significant stenosis. Was evaluated by neurology and neurosurgery, no surgical intervention. Given ppx hypertonic saline. EEG negative for seizures but on ppx keppra. Transferred to [facility] 4/12 and extubated 4/13. Patient has completed 10 day course of dexamethasone for COVID, renal function improved. Found to have + ESBL kleb in the sputum and on antibiotics for ventilator associated PNA.     acute hypoxic resp failure: on RA now sating into mid 90s. s/p COVID PNA now improved  acute CVA: likely thrombotic complication of recent COVID infection. significant cognitive, speech, swallow, motor deficits. will likely benefit from acute rehab. cont with AC on hep gtt for now  PE/DVT: again related to recent COVID infection. cont with hep gtt for now. eventual transition to oral meds  Dysphagia: Will likely need PEG. pulling out NGT. if noncompliant will hold off further attempt. Will need to d/w surrogate decision maker.  COVID PNA: improved resp status now on RA> completed treatment in ICU. supportive.  Bacterial PNA: focal infiltrate now on broad abx. can cont for 7 days and stop.     Overall prognosis guarded.   PT and PMR eval

## 2021-04-18 NOTE — DISCHARGE NOTE PROVIDER - CARE PROVIDERS DIRECT ADDRESSES
[email or web address]

## 2021-04-18 NOTE — DISCHARGE NOTE PROVIDER - NSDCHHNEEDSERVICE_GEN_ALL_CORE
Catheter insertion/Medication teaching and assessment/Observation and assessment/Rehabilitation services/Teaching and training/Wound care and assessment

## 2021-04-18 NOTE — PROGRESS NOTE ADULT - PROBLEM SELECTOR PLAN 7
-Dysphagia noted in setting of patient's acute alteration in mental status  -Nasogastric tube placed for administration of tube feeds; but patient self removed today   -Patient evaluated by speech and swallow team, and npo status recommended  -Will maintain npo status with tube feeds at this time once tube feeds replaced (unsuccessful x3 today); in setting of acute cerebral infarcts, it is possible that patient's neurologic function and swallowing mechanism improve; will re-evaluate daily -Dysphagia noted in setting of patient's acute alteration in mental status  -Nasogastric tube placed for administration of tube feeds; but patient self removed 4/17; multiple attempts to replace unsuccessful   -Patient evaluated by speech and swallow team, and npo status recommended  -Will maintain npo status with tube feeds at this time once tube feeds replaced (unsuccessful x3 today); in setting of acute cerebral infarcts, it is possible that patient's neurologic function and swallowing mechanism improve; will re-evaluate daily

## 2021-04-18 NOTE — PROGRESS NOTE ADULT - PROBLEM SELECTOR PLAN 2
-Acute ischemic strokes, with concern for hemorrhagic conversion, noted on CT scan on 4/8  -Etiology of acute ischemic stroke uncertain at this time; tte with bubble performed at bedside in the micu without any evidence of right-to-left shunt; no arrhythmia noted on telemetry throughout stay in the micu   -CTA head and neck without any evidence of carotid artery stenosis or dissection   -Full-dose anticoagulation for pulmonary embolus held briefly out of concern for hemorrhagic conversion of acute strokes but since restarted; status post management with hypertonic saline; minimal concern at this time for elevated intracranial pressure   -Will administer oral aspirin for secondary prophylaxis following discussion with neurology  -Currently receiving leviteracetam for seizure prophylaxis although no documented seizures or seizure-like activity   -Will discuss case with neurology team

## 2021-04-18 NOTE — CHART NOTE - NSCHARTNOTEFT_GEN_A_CORE
Consult for tube feeding    Noted pt had pulled out NGT 4/17, pending replacement. When EN resumed, recommend  Glucerna 1.2 @ goal rate of 70ml/hr x 24hrs. Provides 1680ml formula, 2016kcals, 101g protein, 1352ml free H2O (meets 30.5kcals/kg & 1.5g protein/kg based on dosing wt 66.2kg). Banatrol 2 times/day if indicated for loose BM. Will f/u on EN tolerance.

## 2021-04-18 NOTE — DISCHARGE NOTE PROVIDER - NSDCCPTREATMENT_GEN_ALL_CORE_FT
PRINCIPAL PROCEDURE  Procedure: CTA chest w/w/o contrast  Findings and Treatment: 4/1/21  Branching left upper lobe segmental pulmonary emboli with evidence of heart strain.  Extensive bilateral opacities consistent with reported history of Covid.      SECONDARY PROCEDURE  Procedure: CTA head w/w/o contrast  Findings and Treatment: 4/9/21  CTA neck:  No major vessel occlusion, hemodynamically significant stenosis by NASCET criteria or dissection.  Nonspecific patchy groundglass opacities are seen in both lungs with more consolidative opacity in the superior right lower lobe likely representing patient's known atypical pneumonia with COVID 19.  CTA HEAD:  Thrombosed left posterior inferior cerebellar artery. The remaining intracranial vasculature is patent without hemodynamically significant proximal stenosis, focal occlusion or aneurysm.    Procedure: MRI brain  Findings and Treatment: 4/22/21  MRI BRAIN: Multiple evolving subacute infarcts with associated hemorrhagesincluding in the bilateral centrum semiovale, bilateral parieto-occipital region, left posterior temporal lobe, right thalamus, and anterior temporal poles bilaterally. Evolving subacute infarct with hemorrhagic transformation in the left cerebellar hemisphere. Note, many of these infarcts have associated abnormal contrast enhancement, likely reflecting expected evolution of ischemia, however, follow-up imaging to document resolution of this finding is recommended.  MRA HEAD: No evidence of proximal vessel stenosis, occlusion, aneurysm or vascular malformation. CT angiography may be obtained for further assessment.    Procedure: CTA chest w/w/o contrast  Findings and Treatment: 5/8/21  Right empyema. Bilateral multifocal pneumonia.  Splenic infarcts.  Mucosal hyperenhancement of the urinary bladder, possibly cystitis.  Asymmetric enlargement of the right obturator internus muscle with hyperdense foci, suspicious for intramuscular hematoma with active bleed; alternatively, intramuscular metastases can have this appearance.

## 2021-04-18 NOTE — DISCHARGE NOTE PROVIDER - NSFOLLOWUPCLINICS_GEN_ALL_ED_FT
[facility] Pulmonolgy and Sleep Medicine  Pulmonology  [address] [place] [address], [place] [address]  Phone: [phone]  Fax:   Follow Up Time: 1 month     [facility] Pulmonolgy and Sleep Medicine  Pulmonology  [address], [address]  [place] [address]  Phone: [phone]  Fax:   Follow Up Time: 1 month    [facility]  Neurology  [address] [place] [id number]  Phone: [phone]  Fax:   Follow Up Time: 1 month

## 2021-04-19 LAB
ANION GAP SERPL CALC-SCNC: 15 MMOL/L — SIGNIFICANT CHANGE UP (ref 5–17)
APPEARANCE UR: CLEAR — SIGNIFICANT CHANGE UP
APTT BLD: 77.9 SEC — HIGH (ref 27.5–35.5)
BILIRUB UR-MCNC: NEGATIVE — SIGNIFICANT CHANGE UP
BUN SERPL-MCNC: 30 MG/DL — HIGH (ref 7–23)
CALCIUM SERPL-MCNC: 8.3 MG/DL — LOW (ref 8.4–10.5)
CHLORIDE SERPL-SCNC: 106 MMOL/L — SIGNIFICANT CHANGE UP (ref 96–108)
CO2 SERPL-SCNC: 18 MMOL/L — LOW (ref 22–31)
COLOR SPEC: COLORLESS — SIGNIFICANT CHANGE UP
CREAT SERPL-MCNC: 1.16 MG/DL — SIGNIFICANT CHANGE UP (ref 0.5–1.3)
DIFF PNL FLD: NEGATIVE — SIGNIFICANT CHANGE UP
GLUCOSE BLDC GLUCOMTR-MCNC: 125 MG/DL — HIGH (ref 70–99)
GLUCOSE BLDC GLUCOMTR-MCNC: 134 MG/DL — HIGH (ref 70–99)
GLUCOSE BLDC GLUCOMTR-MCNC: 148 MG/DL — HIGH (ref 70–99)
GLUCOSE BLDC GLUCOMTR-MCNC: 153 MG/DL — HIGH (ref 70–99)
GLUCOSE BLDC GLUCOMTR-MCNC: 165 MG/DL — HIGH (ref 70–99)
GLUCOSE SERPL-MCNC: 144 MG/DL — HIGH (ref 70–99)
GLUCOSE UR QL: NEGATIVE — SIGNIFICANT CHANGE UP
HCT VFR BLD CALC: 31.3 % — LOW (ref 39–50)
HGB BLD-MCNC: 9.5 G/DL — LOW (ref 13–17)
KETONES UR-MCNC: NEGATIVE — SIGNIFICANT CHANGE UP
LEUKOCYTE ESTERASE UR-ACNC: NEGATIVE — SIGNIFICANT CHANGE UP
MAGNESIUM SERPL-MCNC: 2.2 MG/DL — SIGNIFICANT CHANGE UP (ref 1.6–2.6)
MCHC RBC-ENTMCNC: 26.2 PG — LOW (ref 27–34)
MCHC RBC-ENTMCNC: 30.4 GM/DL — LOW (ref 32–36)
MCV RBC AUTO: 86.2 FL — SIGNIFICANT CHANGE UP (ref 80–100)
NITRITE UR-MCNC: NEGATIVE — SIGNIFICANT CHANGE UP
NRBC # BLD: 0 /100 WBCS — SIGNIFICANT CHANGE UP (ref 0–0)
PH UR: 6.5 — SIGNIFICANT CHANGE UP (ref 5–8)
PHOSPHATE SERPL-MCNC: 3.6 MG/DL — SIGNIFICANT CHANGE UP (ref 2.5–4.5)
PLATELET # BLD AUTO: 339 K/UL — SIGNIFICANT CHANGE UP (ref 150–400)
POTASSIUM SERPL-MCNC: 4.4 MMOL/L — SIGNIFICANT CHANGE UP (ref 3.5–5.3)
POTASSIUM SERPL-SCNC: 4.4 MMOL/L — SIGNIFICANT CHANGE UP (ref 3.5–5.3)
PROT UR-MCNC: NEGATIVE — SIGNIFICANT CHANGE UP
RBC # BLD: 3.63 M/UL — LOW (ref 4.2–5.8)
RBC # FLD: 15.6 % — HIGH (ref 10.3–14.5)
SODIUM SERPL-SCNC: 139 MMOL/L — SIGNIFICANT CHANGE UP (ref 135–145)
SP GR SPEC: 1.01 — SIGNIFICANT CHANGE UP (ref 1.01–1.02)
UROBILINOGEN FLD QL: NEGATIVE — SIGNIFICANT CHANGE UP
WBC # BLD: 16.03 K/UL — HIGH (ref 3.8–10.5)
WBC # FLD AUTO: 16.03 K/UL — HIGH (ref 3.8–10.5)

## 2021-04-19 PROCEDURE — 71045 X-RAY EXAM CHEST 1 VIEW: CPT | Mod: 26

## 2021-04-19 PROCEDURE — 99233 SBSQ HOSP IP/OBS HIGH 50: CPT | Mod: GC

## 2021-04-19 PROCEDURE — 99222 1ST HOSP IP/OBS MODERATE 55: CPT | Mod: GC

## 2021-04-19 PROCEDURE — 99223 1ST HOSP IP/OBS HIGH 75: CPT | Mod: GC

## 2021-04-19 RX ORDER — FUROSEMIDE 40 MG
20 TABLET ORAL ONCE
Refills: 0 | Status: COMPLETED | OUTPATIENT
Start: 2021-04-19 | End: 2021-04-19

## 2021-04-19 RX ORDER — ACETAMINOPHEN 500 MG
1000 TABLET ORAL ONCE
Refills: 0 | Status: COMPLETED | OUTPATIENT
Start: 2021-04-19 | End: 2021-04-19

## 2021-04-19 RX ORDER — VANCOMYCIN HCL 1 G
1250 VIAL (EA) INTRAVENOUS EVERY 12 HOURS
Refills: 0 | Status: DISCONTINUED | OUTPATIENT
Start: 2021-04-19 | End: 2021-04-19

## 2021-04-19 RX ORDER — ERTAPENEM SODIUM 1 G/1
1000 INJECTION, POWDER, LYOPHILIZED, FOR SOLUTION INTRAMUSCULAR; INTRAVENOUS EVERY 24 HOURS
Refills: 0 | Status: DISCONTINUED | OUTPATIENT
Start: 2021-04-19 | End: 2021-04-23

## 2021-04-19 RX ORDER — IPRATROPIUM/ALBUTEROL SULFATE 18-103MCG
3 AEROSOL WITH ADAPTER (GRAM) INHALATION ONCE
Refills: 0 | Status: COMPLETED | OUTPATIENT
Start: 2021-04-19 | End: 2021-04-19

## 2021-04-19 RX ORDER — VANCOMYCIN HCL 1 G
VIAL (EA) INTRAVENOUS
Refills: 0 | Status: DISCONTINUED | OUTPATIENT
Start: 2021-04-19 | End: 2021-04-19

## 2021-04-19 RX ORDER — SODIUM CHLORIDE 9 MG/ML
1000 INJECTION, SOLUTION INTRAVENOUS ONCE
Refills: 0 | Status: COMPLETED | OUTPATIENT
Start: 2021-04-19 | End: 2021-04-19

## 2021-04-19 RX ORDER — IPRATROPIUM/ALBUTEROL SULFATE 18-103MCG
3 AEROSOL WITH ADAPTER (GRAM) INHALATION EVERY 8 HOURS
Refills: 0 | Status: DISCONTINUED | OUTPATIENT
Start: 2021-04-19 | End: 2021-04-30

## 2021-04-19 RX ORDER — VANCOMYCIN HCL 1 G
1250 VIAL (EA) INTRAVENOUS ONCE
Refills: 0 | Status: COMPLETED | OUTPATIENT
Start: 2021-04-19 | End: 2021-04-19

## 2021-04-19 RX ADMIN — MEROPENEM 100 MILLIGRAM(S): 1 INJECTION INTRAVENOUS at 05:08

## 2021-04-19 RX ADMIN — Medication 2: at 11:49

## 2021-04-19 RX ADMIN — LEVETIRACETAM 400 MILLIGRAM(S): 250 TABLET, FILM COATED ORAL at 17:41

## 2021-04-19 RX ADMIN — Medication 3 MILLILITER(S): at 14:17

## 2021-04-19 RX ADMIN — SODIUM CHLORIDE 1000 MILLILITER(S): 9 INJECTION, SOLUTION INTRAVENOUS at 05:43

## 2021-04-19 RX ADMIN — ERTAPENEM SODIUM 120 MILLIGRAM(S): 1 INJECTION, POWDER, LYOPHILIZED, FOR SOLUTION INTRAMUSCULAR; INTRAVENOUS at 17:41

## 2021-04-19 RX ADMIN — Medication 3 MILLILITER(S): at 02:06

## 2021-04-19 RX ADMIN — Medication 166.67 MILLIGRAM(S): at 06:53

## 2021-04-19 RX ADMIN — Medication 2: at 06:53

## 2021-04-19 RX ADMIN — MEROPENEM 100 MILLIGRAM(S): 1 INJECTION INTRAVENOUS at 14:05

## 2021-04-19 RX ADMIN — LEVETIRACETAM 400 MILLIGRAM(S): 250 TABLET, FILM COATED ORAL at 05:08

## 2021-04-19 RX ADMIN — Medication 20 MILLIGRAM(S): at 13:43

## 2021-04-19 RX ADMIN — Medication 400 MILLIGRAM(S): at 05:08

## 2021-04-19 RX ADMIN — HEPARIN SODIUM 1200 UNIT(S)/HR: 5000 INJECTION INTRAVENOUS; SUBCUTANEOUS at 08:53

## 2021-04-19 NOTE — PROGRESS NOTE ADULT - ATTENDING COMMENTS
60 yr male with PMH of DMI who was initially admitted to [facility] ICU for hypoxic respiratory failure 2/2 COVID c/b PE with R heart strain, cardiogenic and septic shock, ischemic and hemorrhagic CVA and ESBL klebsiella ventilator associate PNA, transferred to [facility] for neurosurgery eval.  Overnight was febrile, pan cultured. On exam, barely arousable except to deep sternal rub.  acute hypoxic resp failure: cont supplemental O2 as needed and supportive management.  acute CVA: likely thrombotic complication of recent COVID infection. significant cognitive, speech, swallow, motor deficits. will likely benefit from acute rehab. cont with AC on hep gtt for now  PE/DVT: again related to recent COVID infection. cont with hep gtt for now. eventual transition to oral meds  Dysphagia: pulled out NGT overnight. SLP yanet appreciated. GI consult for PEG placement.  COVID PNA: s/p treatment  Ventilator associated ESBL klebsiella pneumonia - cont vanc and meropenem; f/u sputum and blood cx, urinary antigens. ID consult  Dry gangrenous appearance of several toes. Podiatry consult.

## 2021-04-19 NOTE — PROGRESS NOTE ADULT - PROBLEM SELECTOR PLAN 9
-Initiated goals of care discussion with patient's brother-in-law (listed in chart as primary point of contact); patient is full code at this time; goal is as much recovery as possible  -The patient's wife is in [place], but the patient's brother-in-law has been visiting and placing the patient's wife on facetime; she is aware of all that is happening and has happened   -Discussed further today the possibility of placing a peg tube; the patient's brother-in-law states that the patient's preference would be to receive a peg tube   -Will consult gastroenterology in am for peg tube placement -Initiated goals of care discussion with patient's brother-in-law (listed in chart as primary point of contact); patient is full code at this time; goal is as much recovery as possible  -The patient's wife is in [place], but the patient's brother-in-law has been visiting and placing the patient's wife on facetime; she is aware of all that is happening and has happened   -Discussed further today the possibility of placing a peg tube; the patient's brother-in-law states that the patient's preference would be to receive a peg tube   - GI consult for peg tube placement

## 2021-04-19 NOTE — PROGRESS NOTE ADULT - SUBJECTIVE AND OBJECTIVE BOX
Irina Rodriguez MD  PGY 1 Department of Internal Medicine  Pager: [phone], [phone] ANH      Patient is a 60y old  Male who presents with a chief complaint of COVID19 w/ severe metabolic acidosis s/p intubation (18 Apr 2021 16:32)      SUBJECTIVE / OVERNIGHT EVENTS: Pt seen and examined. No acute overnight events.   Denies fevers, chills, CP/palpitations, SOB/cough, abdominal pain, N/V, constipation, or diarrhea.        MEDICATIONS  (STANDING):  cholecalciferol 400 Unit(s) Oral daily  dextrose 40% Gel 15 Gram(s) Oral once  dextrose 5%. 1000 milliLiter(s) (50 mL/Hr) IV Continuous <Continuous>  dextrose 5%. 1000 milliLiter(s) (100 mL/Hr) IV Continuous <Continuous>  dextrose 50% Injectable 25 Gram(s) IV Push once  dextrose 50% Injectable 12.5 Gram(s) IV Push once  dextrose 50% Injectable 25 Gram(s) IV Push once  glucagon  Injectable 1 milliGRAM(s) IntraMuscular once  heparin  Infusion.  Unit(s)/Hr (12 mL/Hr) IV Continuous <Continuous>  insulin lispro (ADMELOG) corrective regimen sliding scale   SubCutaneous every 6 hours  levETIRAcetam  IVPB 500 milliGRAM(s) IV Intermittent every 12 hours  meropenem  IVPB 1000 milliGRAM(s) IV Intermittent every 8 hours  meropenem  IVPB      multivitamin/minerals Oral Solution (WELLESSE) 30 milliLiter(s) Oral daily  vancomycin  IVPB 1250 milliGRAM(s) IV Intermittent every 12 hours  vancomycin  IVPB        MEDICATIONS  (PRN):  heparin   Injectable 5500 Unit(s) IV Push every 6 hours PRN For aPTT less than 40  heparin   Injectable 2500 Unit(s) IV Push every 6 hours PRN For aPTT between 40 - 57      I&O's Summary    17 Apr 2021 07:01  -  18 Apr 2021 07:00  --------------------------------------------------------  IN: 1168 mL / OUT: 375 mL / NET: 793 mL    18 Apr 2021 07:01  -  19 Apr 2021 06:59  --------------------------------------------------------  IN: 0 mL / OUT: 0 mL / NET: 0 mL        Vital Signs Last 24 Hrs  T(C): 38.3 (19 Apr 2021 03:37), Max: 38.3 (19 Apr 2021 03:37)  T(F): 101 (19 Apr 2021 03:37), Max: 101 (19 Apr 2021 03:37)  HR: 118 (19 Apr 2021 03:37) (95 - 118)  BP: 117/76 (19 Apr 2021 03:37) (117/76 - 126/82)  BP(mean): --  RR: 22 (19 Apr 2021 04:19) (18 - 22)  SpO2: 95% (19 Apr 2021 04:19) (90% - 98%)    CAPILLARY BLOOD GLUCOSE      POCT Blood Glucose.: 153 mg/dL (19 Apr 2021 05:59)  POCT Blood Glucose.: 134 mg/dL (19 Apr 2021 00:17)  POCT Blood Glucose.: 128 mg/dL (18 Apr 2021 17:28)  POCT Blood Glucose.: 118 mg/dL (18 Apr 2021 11:46)      PHYSICAL EXAM:  GENERAL: NAD,   HEAD:  Atraumatic, Normocephalic  EYES: EOMI, PERRL, conjunctiva and sclera clear  NECK: No JVD  CHEST/LUNG: Clear to auscultation bilaterally; No wheeze  HEART: Regular rate and rhythm; No murmurs, rubs, or gallops  ABDOMEN: Soft, Nontender, Nondistended; Bowel sounds present  EXTREMITIES:  2+ Peripheral Pulses, No clubbing, cyanosis, or edema  PSYCH: AAOx3  NEUROLOGY: non-focal  SKIN: No rashes or lesions       LABS:                        10.0   18.68 )-----------( 318      ( 18 Apr 2021 01:41 )             32.4     Auto Eosinophil # x     / Auto Eosinophil % x     / Auto Neutrophil # x     / Auto Neutrophil % x     / BANDS % x        04-18    141  |  111<H>  |  29<H>  ----------------------------<  112<H>  4.3   |  22  |  1.10    Ca    8.0<L>      18 Apr 2021 06:49  Mg     2.1     04-18  Phos  3.2     04-18  TPro  6.4  /  Alb  1.9<L>  /  TBili  0.6  /  DBili  x   /  AST  36  /  ALT  61<H>  /  AlkPhos  103  04-18    PTT - ( 18 Apr 2021 08:35 )  PTT:79.6 sec              RADIOLOGY & ADDITIONAL TESTS:    Imaging Personally Reviewed:    Consultant(s) Notes Reviewed:      Care Discussed with Consultants/Other Providers:   Irina Rodriguez MD  PGY 1 Department of Internal Medicine  Pager: [phone], [phone] ANH      Patient is a 60y old  Male who presents with a chief complaint of COVID19 w/ severe metabolic acidosis s/p intubation (18 Apr 2021 16:32)      SUBJECTIVE / OVERNIGHT EVENTS: Pt seen and examined. Overnight, the pt was febrile, in which panculture was ordered and SOB, started on 4L NC. The pt is unable to ascertain ROS, however appears alert and awake.      MEDICATIONS  (STANDING):  cholecalciferol 400 Unit(s) Oral daily  dextrose 40% Gel 15 Gram(s) Oral once  dextrose 5%. 1000 milliLiter(s) (50 mL/Hr) IV Continuous <Continuous>  dextrose 5%. 1000 milliLiter(s) (100 mL/Hr) IV Continuous <Continuous>  dextrose 50% Injectable 25 Gram(s) IV Push once  dextrose 50% Injectable 12.5 Gram(s) IV Push once  dextrose 50% Injectable 25 Gram(s) IV Push once  glucagon  Injectable 1 milliGRAM(s) IntraMuscular once  heparin  Infusion.  Unit(s)/Hr (12 mL/Hr) IV Continuous <Continuous>  insulin lispro (ADMELOG) corrective regimen sliding scale   SubCutaneous every 6 hours  levETIRAcetam  IVPB 500 milliGRAM(s) IV Intermittent every 12 hours  meropenem  IVPB 1000 milliGRAM(s) IV Intermittent every 8 hours  meropenem  IVPB      multivitamin/minerals Oral Solution (WELLESSE) 30 milliLiter(s) Oral daily  vancomycin  IVPB 1250 milliGRAM(s) IV Intermittent every 12 hours  vancomycin  IVPB        MEDICATIONS  (PRN):  heparin   Injectable 5500 Unit(s) IV Push every 6 hours PRN For aPTT less than 40  heparin   Injectable 2500 Unit(s) IV Push every 6 hours PRN For aPTT between 40 - 57      I&O's Summary    17 Apr 2021 07:01  -  18 Apr 2021 07:00  --------------------------------------------------------  IN: 1168 mL / OUT: 375 mL / NET: 793 mL    18 Apr 2021 07:01  -  19 Apr 2021 06:59  --------------------------------------------------------  IN: 0 mL / OUT: 0 mL / NET: 0 mL        Vital Signs Last 24 Hrs  T(C): 38.3 (19 Apr 2021 03:37), Max: 38.3 (19 Apr 2021 03:37)  T(F): 101 (19 Apr 2021 03:37), Max: 101 (19 Apr 2021 03:37)  HR: 118 (19 Apr 2021 03:37) (95 - 118)  BP: 117/76 (19 Apr 2021 03:37) (117/76 - 126/82)  BP(mean): --  RR: 22 (19 Apr 2021 04:19) (18 - 22)  SpO2: 95% (19 Apr 2021 04:19) (90% - 98%)    CAPILLARY BLOOD GLUCOSE      POCT Blood Glucose.: 153 mg/dL (19 Apr 2021 05:59)  POCT Blood Glucose.: 134 mg/dL (19 Apr 2021 00:17)  POCT Blood Glucose.: 128 mg/dL (18 Apr 2021 17:28)  POCT Blood Glucose.: 118 mg/dL (18 Apr 2021 11:46)      PHYSICAL EXAM:  GENERAL: NAD,   HEAD:  Atraumatic, Normocephalic  EYES: EOMI, PERRL, conjunctiva and sclera clear  NECK: No JVD  CHEST/LUNG: Clear to auscultation bilaterally; Rhonchi in b/l upper lobes  HEART: Regular rate and rhythm; No murmurs, rubs, or gallops  ABDOMEN: Soft, Nontender, Nondistended; Bowel sounds present  EXTREMITIES:  2+ Peripheral Pulses, No clubbing, cyanosis, or edema  NEUROLOGY: AAOx4, non-focal deficit  SKIN: No rashes or lesions       LABS:                        10.0   18.68 )-----------( 318      ( 18 Apr 2021 01:41 )             32.4     Auto Eosinophil # x     / Auto Eosinophil % x     / Auto Neutrophil # x     / Auto Neutrophil % x     / BANDS % x        04-18    141  |  111<H>  |  29<H>  ----------------------------<  112<H>  4.3   |  22  |  1.10    Ca    8.0<L>      18 Apr 2021 06:49  Mg     2.1     04-18  Phos  3.2     04-18  TPro  6.4  /  Alb  1.9<L>  /  TBili  0.6  /  DBili  x   /  AST  36  /  ALT  61<H>  /  AlkPhos  103  04-18    PTT - ( 18 Apr 2021 08:35 )  PTT:79.6 sec

## 2021-04-19 NOTE — PROGRESS NOTE ADULT - PROBLEM SELECTOR PLAN 1
-Alteration in mental status noted subsequent to weaning of sedative medications  -Etiology likely multi-factorial in setting of acute ischemic infarctions of the brain in conjunction with sedative medication administration, acute kidney injury with uremia (now resolving), resolving hypoxic respiratory failure, covid-19 infection, superimposed bacterial pneumonia, urinary retention, concern for nutritional deficiencies  -Managing acute pneumonia with antibiotics; managing pulmonary embolus with full-dose anticoagulation; limiting sedative medication administration   -At this time, will favor frequent reorientation; will re-evaluate swallowing function as possible - Etiology likely multi-factorial in setting of acute ischemic infarctions of the brain in conjunction vs acute kidney injury with uremia (now resolving) vs resolving hypoxic respiratory failure, covid-19 infection, superimposed bacterial pneumonia, urinary retention, concern for nutritional deficiencies  - Managing acute pneumonia with antibiotics; managing pulmonary embolus with full-dose anticoagulation; limiting sedative medication administration   - At this time, will favor frequent reorientation; will re-evaluate swallowing function as possible

## 2021-04-19 NOTE — PROGRESS NOTE ADULT - PROBLEM SELECTOR PLAN 7
-Dysphagia noted in setting of patient's acute alteration in mental status  -Nasogastric tube placed for administration of tube feeds; but patient self removed 4/17; multiple attempts to replace unsuccessful   -Patient evaluated by speech and swallow team, and npo status recommended  -Will maintain npo status with tube feeds at this time once tube feeds replaced (unsuccessful x3 today); in setting of acute cerebral infarcts, it is possible that patient's neurologic function and swallowing mechanism improve; will re-evaluate daily -Dysphagia noted in setting of patient's acute alteration in mental status  -Nasogastric tube placed for administration of tube feeds; but patient self removed 4/17; multiple attempts to replace unsuccessful   -Patient evaluated by speech and swallow team, and npo status recommended  -Will maintain npo status with tube feeds at this time once tube feeds replaced (unsuccessful x3 today); in setting of acute cerebral infarcts, it is possible that patient's neurologic function and swallowing mechanism improve  Will re-evaluate daily

## 2021-04-19 NOTE — CONSULT NOTE ADULT - SUBJECTIVE AND OBJECTIVE BOX
INFECTIOUS DISEASE SERVICE INITIAL CONSULTATION NOTE    HPI:  60M unknown medical history BIBEMS for respiratory distress, recently COVID+ as per EMS. Pt unable to comply with history 2/2 resp distress, nods yes to difficulty breathing, no to pain. Baseline AAOx3. En route, pt had RR 28, SaO2 60%. In ED initially hypoxic to 60% placed on NRB and satting low 90s and tachypneic to 40s on NRB. Placed on AVAPS, still tachypneic to 40s. Labs showing HAGMA, elevated FSG to 500s. Also with lactate of 15. Trops of 200 and pro-BNP elevated to 21k. MICU consulted for respiratory failure in setting of COVID, also concern for DKA. On encounter, pt is noncooperative with questioning. Nods and tries to speak, unable to form full sentences and visibly tachypneic on AVAPS. In ED given 2L NS, decadron. Started on insulin gtt. Subsequently intubated.     Initial vitals 108/86, , RR 28, T97, SaO2 60% on 15L NRB. Received 2L NS bolus. Started on insulin gtt.     Pt admitted to surg b for further management.     Addendum 4/1/21 1700:  Limited history obtained from Alba Pizarro nephew, who lives in the [place] [phone]). Patient has a PMH of low BP and takes no medications. Patient tested positive for COVID on 3/19. He was initially doing fine with some fatigue, but suddenly decompensated after yesterday. Unable to obtain social history. Wife, Lucero, is currently in [place] - she went due to medical issues but was unable to return due to COVID travel restrictions.    (12 Apr 2021 20:54)  Patient was admitted to [facility] at [facility]. Developed CVAs with hemorrhagic transformation. Transferred to [facility] 5ICU. Eventually extubated and transferred to [facility]. ID consulted for fever.   The patient is unable to provide any additional history, but denies pain.     PAST MEDICAL & SURGICAL HISTORY:  No pertinent past medical history    No significant past surgical history    REVIEW OF SYSTEMS: The patient is unable to provide due to their mental status.    ACTIVE ANTIMICROBIAL/ANTIBIOTIC MEDICATIONS:  meropenem  IVPB 1000 milliGRAM(s) IV Intermittent every 8 hours  meropenem  IVPB      vancomycin  IVPB 1250 milliGRAM(s) IV Intermittent every 12 hours  vancomycin  IVPB          OTHER MEDICATIONS:  albuterol/ipratropium for Nebulization 3 milliLiter(s) Nebulizer every 8 hours  cholecalciferol 400 Unit(s) Oral daily  dextrose 40% Gel 15 Gram(s) Oral once  dextrose 5%. 1000 milliLiter(s) IV Continuous <Continuous>  dextrose 5%. 1000 milliLiter(s) IV Continuous <Continuous>  dextrose 50% Injectable 25 Gram(s) IV Push once  dextrose 50% Injectable 12.5 Gram(s) IV Push once  dextrose 50% Injectable 25 Gram(s) IV Push once  glucagon  Injectable 1 milliGRAM(s) IntraMuscular once  heparin   Injectable 5500 Unit(s) IV Push every 6 hours PRN  heparin   Injectable 2500 Unit(s) IV Push every 6 hours PRN  heparin  Infusion.  Unit(s)/Hr IV Continuous <Continuous>  insulin lispro (ADMELOG) corrective regimen sliding scale   SubCutaneous every 6 hours  levETIRAcetam  IVPB 500 milliGRAM(s) IV Intermittent every 12 hours  multivitamin/minerals Oral Solution (WELLESSE) 30 milliLiter(s) Oral daily      ALLERGIES:  Allergies    No Known Allergies    Intolerances    SOCIAL HISTORY: From the [place]?    FAMILY HISTORY:  No pertinent family history in first degree relatives        VITAL SIGNS:  ICU Vital Signs Last 24 Hrs  T(C): 37.4 (19 Apr 2021 12:14), Max: 38.3 (19 Apr 2021 03:37)  T(F): 99.4 (19 Apr 2021 12:14), Max: 101 (19 Apr 2021 03:37)  HR: 89 (19 Apr 2021 14:21) (89 - 118)  BP: 120/76 (19 Apr 2021 12:14) (117/76 - 137/83)  BP(mean): --  ABP: --  ABP(mean): --  RR: 20 (19 Apr 2021 12:14) (18 - 22)  SpO2: 98% (19 Apr 2021 14:21) (90% - 99%)      PHYSICAL EXAM:  Constitutional: Sleeping comfortably, coughing  Head: NC/AT  Eyes: anicteric sclera  ENMT: no rhinorrhea; no oropharyngeal lesions, erythema, or exudates	  Neck: supple; no JVD or LAD  Respiratory: Coughing   Cardiovascular: +S1/S2, RRR; no appreciable murmurs  Gastrointestinal: soft, NT/ND; +BS  Extremities: WWP; no clubbing, cyanosis, or edema, few black toes bilaterally   Vascular: no phlebitis   Dermatologic: skin warm and dry; no visible rashes or lesions  Neurologic: awakens to tactile stimuli, does not follow commands     LABS:                        9.5    16.03 )-----------( 339      ( 19 Apr 2021 07:08 )             31.3     04-19    139  |  106  |  30<H>  ----------------------------<  144<H>  4.4   |  18<L>  |  1.16    Ca    8.3<L>      19 Apr 2021 07:07  Phos  3.6     04-19  Mg     2.2     04-19    TPro  6.4  /  Alb  1.9<L>  /  TBili  0.6  /  DBili  x   /  AST  36  /  ALT  61<H>  /  AlkPhos  103  04-18    PTT - ( 19 Apr 2021 07:09 )  PTT:77.9 sec      MICROBIOLOGY:    Culture - Blood (collected 04-13-21 @ 07:02)  Source: .Blood Blood  Final Report (04-18-21 @ 08:00):    No Growth Final    Culture - Blood (collected 04-13-21 @ 07:02)  Source: .Blood Blood  Final Report (04-18-21 @ 08:00):    No Growth Final    Culture - Sputum (collected 04-13-21 @ 05:25)  Source: .Sputum Sputum  Gram Stain (04-13-21 @ 10:16):    No polymorphonuclear leukocytes per low power field    No Squamous epithelial cells per low power field    Moderate Gram Negative Rods per oil power field  Final Report (04-16-21 @ 08:53):    Numerous Klebsiella pneumoniae ESBL    Normal Respiratory Lizzette absent  Organism: Klebsiella pneumoniae ESBL (04-16-21 @ 08:53)  Organism: Klebsiella pneumoniae ESBL (04-16-21 @ 08:53)      -  Amikacin: S <=16      -  Amoxicillin/Clavulanic Acid: I 16/8      -  Ampicillin: R >16 These ampicillin results predict results for amoxicillin      -  Ampicillin/Sulbactam: R >16/8 Enterobacter, Citrobacter, and Serratia may develop resistance during prolonged therapy (3-4 days)      -  Aztreonam: R >16      -  Cefazolin: R >16 Enterobacter, Citrobacter, and Serratia may develop resistance during prolonged therapy (3-4 days)      -  Cefepime: R 16      -  Cefoxitin: I 16      -  Ceftriaxone: R >32 Enterobacter, Citrobacter, and Serratia may develop resistance during prolonged therapy      -  Ciprofloxacin: S <=0.25      -  Ertapenem: S <=0.5      -  Gentamicin: S <=2      -  Imipenem: S <=1      -  Levofloxacin: S <=0.5      -  Meropenem: S <=1      -  Piperacillin/Tazobactam: R >64      -  Tobramycin: S <=2      -  Trimethoprim/Sulfamethoxazole: S <=0.5/9.5      Method Type: JUAN        RADIOLOGY & ADDITIONAL STUDIES: INFECTIOUS DISEASE SERVICE INITIAL CONSULTATION NOTE    HPI:  60M unknown medical history BIBEMS for respiratory distress, recently COVID+ as per EMS. Pt unable to comply with history 2/2 resp distress, nods yes to difficulty breathing, no to pain. Baseline AAOx3. En route, pt had RR 28, SaO2 60%. In ED initially hypoxic to 60% placed on NRB and satting low 90s and tachypneic to 40s on NRB. Placed on AVAPS, still tachypneic to 40s. Labs showing HAGMA, elevated FSG to 500s. Also with lactate of 15. Trops of 200 and pro-BNP elevated to 21k. MICU consulted for respiratory failure in setting of COVID, also concern for DKA. On encounter, pt is noncooperative with questioning. Nods and tries to speak, unable to form full sentences and visibly tachypneic on AVAPS. In ED given 2L NS, decadron. Started on insulin gtt. Subsequently intubated.     Initial vitals 108/86, , RR 28, T97, SaO2 60% on 15L NRB. Received 2L NS bolus. Started on insulin gtt.     Pt admitted to surg b for further management.     Addendum 4/1/21 1700:  Limited history obtained from Alba Pizarro nephew, who lives in the [place] [phone]). Patient has a PMH of low BP and takes no medications. Patient tested positive for COVID on 3/19. He was initially doing fine with some fatigue, but suddenly decompensated after yesterday. Unable to obtain social history. Wife, Lucero, is currently in [place] - she went due to medical issues but was unable to return due to COVID travel restrictions.    (12 Apr 2021 20:54)  Patient was admitted to [facility]. Developed CVAs with hemorrhagic transformation. Transferred to [facility] 5ICU. Eventually extubated and transferred to [facility]. ID consulted for fever.   The patient is unable to provide any additional history, but denies pain.     PAST MEDICAL & SURGICAL HISTORY:  No pertinent past medical history    No significant past surgical history    REVIEW OF SYSTEMS: The patient is unable to provide due to their mental status.    ACTIVE ANTIMICROBIAL/ANTIBIOTIC MEDICATIONS:  meropenem  IVPB 1000 milliGRAM(s) IV Intermittent every 8 hours  meropenem  IVPB      vancomycin  IVPB 1250 milliGRAM(s) IV Intermittent every 12 hours  vancomycin  IVPB          OTHER MEDICATIONS:  albuterol/ipratropium for Nebulization 3 milliLiter(s) Nebulizer every 8 hours  cholecalciferol 400 Unit(s) Oral daily  dextrose 40% Gel 15 Gram(s) Oral once  dextrose 5%. 1000 milliLiter(s) IV Continuous <Continuous>  dextrose 5%. 1000 milliLiter(s) IV Continuous <Continuous>  dextrose 50% Injectable 25 Gram(s) IV Push once  dextrose 50% Injectable 12.5 Gram(s) IV Push once  dextrose 50% Injectable 25 Gram(s) IV Push once  glucagon  Injectable 1 milliGRAM(s) IntraMuscular once  heparin   Injectable 5500 Unit(s) IV Push every 6 hours PRN  heparin   Injectable 2500 Unit(s) IV Push every 6 hours PRN  heparin  Infusion.  Unit(s)/Hr IV Continuous <Continuous>  insulin lispro (ADMELOG) corrective regimen sliding scale   SubCutaneous every 6 hours  levETIRAcetam  IVPB 500 milliGRAM(s) IV Intermittent every 12 hours  multivitamin/minerals Oral Solution (WELLESSE) 30 milliLiter(s) Oral daily      ALLERGIES:  Allergies    No Known Allergies    Intolerances    SOCIAL HISTORY: From the [place]?    FAMILY HISTORY:  No pertinent family history in first degree relatives    VITAL SIGNS:  ICU Vital Signs Last 24 Hrs  T(C): 37.4 (19 Apr 2021 12:14), Max: 38.3 (19 Apr 2021 03:37)  T(F): 99.4 (19 Apr 2021 12:14), Max: 101 (19 Apr 2021 03:37)  HR: 89 (19 Apr 2021 14:21) (89 - 118)  BP: 120/76 (19 Apr 2021 12:14) (117/76 - 137/83)  BP(mean): --  ABP: --  ABP(mean): --  RR: 20 (19 Apr 2021 12:14) (18 - 22)  SpO2: 98% (19 Apr 2021 14:21) (90% - 99%)      PHYSICAL EXAM:  Constitutional: Sleeping comfortably, coughing  Head: NC/AT  Eyes: anicteric sclera  ENMT: no rhinorrhea; no oropharyngeal lesions, erythema, or exudates	  Neck: supple; no JVD or LAD  Respiratory: Coughing   Cardiovascular: +S1/S2, RRR; no appreciable murmurs  Gastrointestinal: soft, NT/ND; +BS  Extremities: WWP; no clubbing, cyanosis, or edema, few black toes bilaterally   Vascular: no phlebitis   Dermatologic: skin warm and dry; no visible rashes or lesions  Neurologic: awakens to tactile stimuli, does not follow commands     LABS:                        9.5    16.03 )-----------( 339      ( 19 Apr 2021 07:08 )             31.3     04-19    139  |  106  |  30<H>  ----------------------------<  144<H>  4.4   |  18<L>  |  1.16    Ca    8.3<L>      19 Apr 2021 07:07  Phos  3.6     04-19  Mg     2.2     04-19    TPro  6.4  /  Alb  1.9<L>  /  TBili  0.6  /  DBili  x   /  AST  36  /  ALT  61<H>  /  AlkPhos  103  04-18    PTT - ( 19 Apr 2021 07:09 )  PTT:77.9 sec      MICROBIOLOGY:    Culture - Blood (collected 04-13-21 @ 07:02)  Source: .Blood Blood  Final Report (04-18-21 @ 08:00):    No Growth Final    Culture - Blood (collected 04-13-21 @ 07:02)  Source: .Blood Blood  Final Report (04-18-21 @ 08:00):    No Growth Final    Culture - Sputum (collected 04-13-21 @ 05:25)  Source: .Sputum Sputum  Gram Stain (04-13-21 @ 10:16):    No polymorphonuclear leukocytes per low power field    No Squamous epithelial cells per low power field    Moderate Gram Negative Rods per oil power field  Final Report (04-16-21 @ 08:53):    Numerous Klebsiella pneumoniae ESBL    Normal Respiratory Lizzette absent  Organism: Klebsiella pneumoniae ESBL (04-16-21 @ 08:53)  Organism: Klebsiella pneumoniae ESBL (04-16-21 @ 08:53)      -  Amikacin: S <=16      -  Amoxicillin/Clavulanic Acid: I 16/8      -  Ampicillin: R >16 These ampicillin results predict results for amoxicillin      -  Ampicillin/Sulbactam: R >16/8 Enterobacter, Citrobacter, and Serratia may develop resistance during prolonged therapy (3-4 days)      -  Aztreonam: R >16      -  Cefazolin: R >16 Enterobacter, Citrobacter, and Serratia may develop resistance during prolonged therapy (3-4 days)      -  Cefepime: R 16      -  Cefoxitin: I 16      -  Ceftriaxone: R >32 Enterobacter, Citrobacter, and Serratia may develop resistance during prolonged therapy      -  Ciprofloxacin: S <=0.25      -  Ertapenem: S <=0.5      -  Gentamicin: S <=2      -  Imipenem: S <=1      -  Levofloxacin: S <=0.5      -  Meropenem: S <=1      -  Piperacillin/Tazobactam: R >64      -  Tobramycin: S <=2      -  Trimethoprim/Sulfamethoxazole: S <=0.5/9.5      Method Type: JUAN    RADIOLOGY & ADDITIONAL STUDIES:    < from: Xray Chest 1 View- PORTABLE-Urgent (Xray Chest 1 View- PORTABLE-Urgent .) (04.19.21 @ 03:53) >  Impression:    The heart is normal in size. Right pleural effusion. Right lower lobe pneumonia. The left lung appears to be clear. NG tube was removed. The right central line was removed as well. No pneumothorax. No change in appearance the chest when compared to previous study done April 14, 2021 at 2:23 PM.    < end of copied text >  < from: CT Head No Cont (04.12.21 @ 21:56) >  This exam is compared with prior noncontrast head CT performed on April 11, 2021.    Abnormal low-attenuation involving left cerebellar region is again seen which is compatible with acute infarct. Acute infarcts involving the left posterior frontal, bilateral occipital parietal regions are again identified. These findings are compatible with acute subacute infarct as well. The infarct especially involving the high left parietal region does demonstrate some hemorrhagic transformation. No significant shift or herniation seen.    Dilation of the osseous structures with the appropriate window appears normal    The visualized paranasal sinuses mastoid and mastoids appear clear.    IMPRESSION: No significant change when allowing for differences in technique.    < end of copied text >

## 2021-04-19 NOTE — PROGRESS NOTE ADULT - PROBLEM SELECTOR PLAN 3
-Low-grade temperature elevations noted over past few days in micu  -For that reason, blood and sputum cultures drawn on 4/13; sputum cultures growing esbl klebsiella   -For that reason, managing patient at this time with meropenem 1 g q8h for treatment of ventilator-associated pneumonia (day 4/7 of antibiotic therapy)   -Maintaining oxygen saturations greater than 95% on room air at this time -Low-grade temperature elevations noted over past few days in micu  -For that reason, blood and sputum cultures drawn on 4/13; sputum cultures growing esbl klebsiella   -For that reason, managing patient at this time with meropenem 1 g q8h for treatment of ventilator-associated pneumonia, will continue Abx for 14 day course  -Maintaining oxygen saturations greater than 95% on room air at this time  - Spiked fever O/N on 4/18 to 4/19, cultures sent (previous 4/13 blood cxs x2 negative), CXR similar to previously with noted, right pleural effusion and right lower lobe pneumonia, and left side clear lungs, fever can also be 2/2 to noninfectious source given hx of P.E. and stroke  - Started on vancomycin -Low-grade temperature elevations noted over past few days in micu  -For that reason, blood and sputum cultures drawn on 4/13; sputum cultures growing esbl klebsiella   - Previously on meropenem 1 g q8h (4/15-4/19) for treatment of ventilator-associated pneumonia, switched to ertapenem for 14 day course, given low concern for pseudomonas, given sputum not growing this  -Maintaining oxygen saturations greater than 95% on room air at this time  - Spiked fever O/N on 4/18 to 4/19, cultures sent (previous 4/13 blood cxs x2 negative), CXR similar to previously with noted, right pleural effusion and right lower lobe pneumonia, and left side clear lungs, likely 2/2 to aspiration from chemical pneumonitis

## 2021-04-19 NOTE — CONSULT NOTE ADULT - ASSESSMENT
# dysphagia - likely mutlifactorial, including CVA, covid/prolonged intubation; will benefit from non-oral nutrition. Currently patient is poor candidate for PEG placement, given 1) c/f ongoing infection (fever, leukocytosis, hypoxia); 2) recurrent episodes of pulling NGT (and therefore risk of pulling PEG tube w/ subsequent risk of peritonitits); 3) possibility for rapid improvement pending management of CVA, infection. As such, will defer PEG placement at this time    # fever  # COVID  # CVA    Recommendations:  - management of fever/hypoxia per primary team  - management of CVA per neurology  - c/w SLP evaluations as mental status improves  - if patient improves from infectious standpoint, and still not tolerating PO, will discuss w/ neurology and family role of PEG. Please call GI back at that point.   - rest of care per primary team    GI will sign off. Please call back when patient optimized for PEG if still within [facility]    Ivan Harris, PGY4  Gastroenterology Fellow  Available on [employer] Teams  [phone] ([employer] Short Range Pager)  [phone] (Long Range Pager)    After 5pm, please contact the on-call GI fellow. [phone] # dysphagia - likely mutlifactorial, including CVA, covid/prolonged intubation; will benefit from non-oral nutrition. Currently patient is poor candidate for PEG placement, given 1) c/f ongoing infection (fever, leukocytosis, hypoxia); 2) recurrent episodes of pulling NGT (and therefore risk of pulling PEG tube w/ subsequent risk of peritonitits); 3) possibility for rapid improvement pending management of CVA, infection. As such, will defer PEG placement at this time    # fever  # COVID  # CVA    Recommendations:  - management of fever/hypoxia per primary team  - management of CVA per neurology  - c/w SLP evaluations as mental status improves  - Will need to discuss with wife regarding risks of patient pulling out his PEG as he did with the NGT, which can result in potentially fatal peritonitis/leak  - if patient improves from infectious standpoint, and still not tolerating PO, will discuss w/ neurology and family role of PEG. Please call GI back at that point.   - rest of care per primary team    GI will sign off. Please call back when patient optimized for PEG if still within [place] [facility]    Ivan Harris, PGY4  Gastroenterology Fellow  Available on [employer] Teams  [phone] ([employer] Short Range Pager)  [phone] (Long Range Pager)    After 5pm, please contact the on-call GI fellow. [phone]

## 2021-04-19 NOTE — CONSULT NOTE ADULT - SUBJECTIVE AND OBJECTIVE BOX
Chief Complaint:  Patient is a 60y old  Male who presents with a chief complaint of COVID19 w/ severe metabolic acidosis s/p intubation (19 Apr 2021 15:14)      HPI:  History obtained from chart review:  60M unknown medical history BIBEMS 4/1/21 for respiratory distress, recently COVID+ as per EMS. Pt unable to comply with history 2/2 resp distress, nods yes to difficulty breathing, no to pain. Baseline AAOx3. En route, pt had RR 28, SaO2 60%. In ED initially hypoxic to 60% placed on NRB and satting low 90s and tachypneic to 40s on NRB. Placed on AVAPS, still tachypneic to 40s. Labs showing HAGMA, elevated FSG to 500s. Also with lactate of 15. Trops of 200 and pro-BNP elevated to 21k. MICU consulted for respiratory failure in setting of COVID, also concern for DKA. In ED given 2L NS, decadron. Started on insulin gtt. Subsequently intubated.     Pt admitted to surg b for further management.     Patient was admitted to [facility] at [facility]. Developed CVAs with hemorrhagic transformation. Transferred to [facility] 5ICU. Eventually extubated and transferred to [facility].     Remains not tolerating PO. NGT's placed, though patient continues to pull them out. Seen by SLP, however not tolerating exam.     Continues to be intermittently febrile (most recent 4/18/21 101) w/ new hypoxia.     GI consulted for PEG placement.      Allergies:  No Known Allergies      Home Medications:    Hospital Medications:  albuterol/ipratropium for Nebulization 3 milliLiter(s) Nebulizer every 8 hours  cholecalciferol 400 Unit(s) Oral daily  dextrose 40% Gel 15 Gram(s) Oral once  dextrose 5%. 1000 milliLiter(s) IV Continuous <Continuous>  dextrose 5%. 1000 milliLiter(s) IV Continuous <Continuous>  dextrose 50% Injectable 25 Gram(s) IV Push once  dextrose 50% Injectable 12.5 Gram(s) IV Push once  dextrose 50% Injectable 25 Gram(s) IV Push once  ertapenem  IVPB 1000 milliGRAM(s) IV Intermittent every 24 hours  glucagon  Injectable 1 milliGRAM(s) IntraMuscular once  heparin   Injectable 5500 Unit(s) IV Push every 6 hours PRN  heparin   Injectable 2500 Unit(s) IV Push every 6 hours PRN  heparin  Infusion.  Unit(s)/Hr IV Continuous <Continuous>  insulin lispro (ADMELOG) corrective regimen sliding scale   SubCutaneous every 6 hours  levETIRAcetam  IVPB 500 milliGRAM(s) IV Intermittent every 12 hours  multivitamin/minerals Oral Solution (WELLESSE) 30 milliLiter(s) Oral daily      PMHX/PSHX:  No pertinent past medical history    No significant past surgical history        Family history:  No pertinent family history in first degree relatives        Denies family history of colon cancer/polyps, stomach cancer/polyps, pancreatic cancer/masses, liver cancer/disease, ovarian cancer and endometrial cancer.    Social History:   Tob: Denies  EtOH: Denies  Illicit Drugs: Denies    ROS:     General:  No wt loss, fevers, chills, night sweats, fatigue  Eyes:  Good vision, no reported pain  ENT:  No sore throat, pain, runny nose, dysphagia  CV:  No pain, palpitations, hypo/hypertension  Pulm:  No dyspnea, cough, tachypnea, wheezing  GI:  see HPI  :  No pain, bleeding, incontinence, nocturia  Muscle:  No pain, weakness  Neuro:  No weakness, tingling, memory problems  Psych:  No fatigue, insomnia, mood problems, depression  Endocrine:  No polyuria, polydipsia, cold/heat intolerance  Heme:  No petechiae, ecchymosis, easy bruisability  Skin:  No rash, tattoos, scars, edema    PHYSICAL EXAM:     GENERAL:  lying in bed, NAD, nonverbal  HEENT:  NCAT, no scleral icterus   CHEST:  no respiratory distress  HEART:  Regular rate and rhythm  ABDOMEN:  Soft, non-tender, non-distended, normoactive bowel sounds,  no masses, no hepato-splenomegaly, no signs of chronic liver disease  EXTREMITIES: No edema  SKIN:  No rash/erythema/ecchymoses/petechiae/wounds/abscess/warm/dry  NEURO:  nonverbal, making eye contact, not moving left side    Vital Signs:  Vital Signs Last 24 Hrs  T(C): 37.4 (19 Apr 2021 12:14), Max: 38.3 (19 Apr 2021 03:37)  T(F): 99.4 (19 Apr 2021 12:14), Max: 101 (19 Apr 2021 03:37)  HR: 89 (19 Apr 2021 14:21) (89 - 118)  BP: 120/76 (19 Apr 2021 12:14) (117/76 - 137/83)  BP(mean): --  RR: 20 (19 Apr 2021 12:14) (18 - 22)  SpO2: 98% (19 Apr 2021 14:21) (90% - 99%)  Daily     Daily     LABS:                        9.5    16.03 )-----------( 339      ( 19 Apr 2021 07:08 )             31.3     Mean Cell Volume: 86.2 fl (04-19-21 @ 07:08)    04-19    139  |  106  |  30<H>  ----------------------------<  144<H>  4.4   |  18<L>  |  1.16    Ca    8.3<L>      19 Apr 2021 07:07  Phos  3.6     04-19  Mg     2.2     04-19    TPro  6.4  /  Alb  1.9<L>  /  TBili  0.6  /  DBili  x   /  AST  36  /  ALT  61<H>  /  AlkPhos  103  04-18    LIVER FUNCTIONS - ( 18 Apr 2021 06:49 )  Alb: 1.9 g/dL / Pro: 6.4 g/dL / ALK PHOS: 103 U/L / ALT: 61 U/L / AST: 36 U/L / GGT: x           PTT - ( 19 Apr 2021 07:09 )  PTT:77.9 sec                            9.5    16.03 )-----------( 339      ( 19 Apr 2021 07:08 )             31.3                         10.0   18.68 )-----------( 318      ( 18 Apr 2021 01:41 )             32.4                         9.1    17.26 )-----------( 336      ( 17 Apr 2021 01:33 )             30.6                         9.5    17.14 )-----------( 357      ( 16 Apr 2021 18:47 )             31.6       Imaging:           Chief Complaint:  Patient is a 60y old  Male who presents with a chief complaint of COVID19 w/ severe metabolic acidosis s/p intubation (19 Apr 2021 15:14)      HPI:  History obtained from chart review:  60M unknown medical history BIBEMS 4/1/21 for respiratory distress, recently COVID+ as per EMS. Pt unable to comply with history 2/2 resp distress, nods yes to difficulty breathing, no to pain. Baseline AAOx3. En route, pt had RR 28, SaO2 60%. In ED initially hypoxic to 60% placed on NRB and satting low 90s and tachypneic to 40s on NRB. Placed on AVAPS, still tachypneic to 40s. Labs showing HAGMA, elevated FSG to 500s. Also with lactate of 15. Trops of 200 and pro-BNP elevated to 21k. MICU consulted for respiratory failure in setting of COVID, also concern for DKA. In ED given 2L NS, decadron. Started on insulin gtt. Subsequently intubated.     Pt admitted to surg b for further management.     Patient was admitted to [facility] at [facility]. Developed CVAs with hemorrhagic transformation. Transferred to [facility] 5ICU. Eventually extubated and transferred to [facility].     Remains not tolerating PO. NGT's placed, though patient continues to pull them out. Seen by SLP, however not tolerating exam.     Continues to be intermittently febrile (most recent 4/18/21 101) w/ new hypoxia.     GI consulted for PEG placement.      Allergies:  No Known Allergies      Home Medications:    Hospital Medications:  albuterol/ipratropium for Nebulization 3 milliLiter(s) Nebulizer every 8 hours  cholecalciferol 400 Unit(s) Oral daily  dextrose 40% Gel 15 Gram(s) Oral once  dextrose 5%. 1000 milliLiter(s) IV Continuous <Continuous>  dextrose 5%. 1000 milliLiter(s) IV Continuous <Continuous>  dextrose 50% Injectable 25 Gram(s) IV Push once  dextrose 50% Injectable 12.5 Gram(s) IV Push once  dextrose 50% Injectable 25 Gram(s) IV Push once  ertapenem  IVPB 1000 milliGRAM(s) IV Intermittent every 24 hours  glucagon  Injectable 1 milliGRAM(s) IntraMuscular once  heparin   Injectable 5500 Unit(s) IV Push every 6 hours PRN  heparin   Injectable 2500 Unit(s) IV Push every 6 hours PRN  heparin  Infusion.  Unit(s)/Hr IV Continuous <Continuous>  insulin lispro (ADMELOG) corrective regimen sliding scale   SubCutaneous every 6 hours  levETIRAcetam  IVPB 500 milliGRAM(s) IV Intermittent every 12 hours  multivitamin/minerals Oral Solution (WELLESSE) 30 milliLiter(s) Oral daily      PMHX/PSHX:  No pertinent past medical history    No significant past surgical history        Family history:  No pertinent family history in first degree relatives        Denies family history of colon cancer/polyps, stomach cancer/polyps, pancreatic cancer/masses, liver cancer/disease, ovarian cancer and endometrial cancer.    Social History:   Unable to obtain, patinet is non-verbal    ROS:   Unable to obtain, paitent is non-verbal    PHYSICAL EXAM:     GENERAL:  lying in bed, NAD, nonverbal  HEENT:  NCAT, no scleral icterus   CHEST:  no respiratory distress  HEART:  Regular rate and rhythm  ABDOMEN:  Soft, non-tender, non-distended, normoactive bowel sounds,  no masses, no hepato-splenomegaly, no signs of chronic liver disease  EXTREMITIES: No edema  SKIN:  No rash/erythema/ecchymoses/petechiae/wounds/abscess/warm/dry  NEURO:  nonverbal, making eye contact, not moving left side    Vital Signs:  Vital Signs Last 24 Hrs  T(C): 37.4 (19 Apr 2021 12:14), Max: 38.3 (19 Apr 2021 03:37)  T(F): 99.4 (19 Apr 2021 12:14), Max: 101 (19 Apr 2021 03:37)  HR: 89 (19 Apr 2021 14:21) (89 - 118)  BP: 120/76 (19 Apr 2021 12:14) (117/76 - 137/83)  BP(mean): --  RR: 20 (19 Apr 2021 12:14) (18 - 22)  SpO2: 98% (19 Apr 2021 14:21) (90% - 99%)  Daily     Daily     LABS:                        9.5    16.03 )-----------( 339      ( 19 Apr 2021 07:08 )             31.3     Mean Cell Volume: 86.2 fl (04-19-21 @ 07:08)    04-19    139  |  106  |  30<H>  ----------------------------<  144<H>  4.4   |  18<L>  |  1.16    Ca    8.3<L>      19 Apr 2021 07:07  Phos  3.6     04-19  Mg     2.2     04-19    TPro  6.4  /  Alb  1.9<L>  /  TBili  0.6  /  DBili  x   /  AST  36  /  ALT  61<H>  /  AlkPhos  103  04-18    LIVER FUNCTIONS - ( 18 Apr 2021 06:49 )  Alb: 1.9 g/dL / Pro: 6.4 g/dL / ALK PHOS: 103 U/L / ALT: 61 U/L / AST: 36 U/L / GGT: x           PTT - ( 19 Apr 2021 07:09 )  PTT:77.9 sec                            9.5    16.03 )-----------( 339      ( 19 Apr 2021 07:08 )             31.3                         10.0   18.68 )-----------( 318      ( 18 Apr 2021 01:41 )             32.4                         9.1    17.26 )-----------( 336      ( 17 Apr 2021 01:33 )             30.6                         9.5    17.14 )-----------( 357      ( 16 Apr 2021 18:47 )             31.6       Imaging:    < from: CT Angio Chest w/ IV Cont (04.01.21 @ 14:04) >  IMPRESSION:  Branching left upper lobe segmental pulmonary emboli with evidence of heart strain.    Extensive bilateral opacities consistent with reported history of Covid.    These findings were discussed with JOEL Chambers on  4/1/2021 at 3:27 PM by Dr. Castillo with read back confirmation.      < end of copied text >         Chief Complaint:  Patient is a 60y old  Male who presents with a chief complaint of COVID19 w/ severe metabolic acidosis s/p intubation (19 Apr 2021 15:14)      HPI:  History obtained from chart review:  60M unknown medical history BIBEMS 4/1/21 for respiratory distress, recently COVID+ as per EMS. Pt unable to comply with history 2/2 resp distress, nods yes to difficulty breathing, no to pain. Baseline AAOx3. En route, pt had RR 28, SaO2 60%. In ED initially hypoxic to 60% placed on NRB and satting low 90s and tachypneic to 40s on NRB. Placed on AVAPS, still tachypneic to 40s. Labs showing HAGMA, elevated FSG to 500s. Also with lactate of 15. Trops of 200 and pro-BNP elevated to 21k. MICU consulted for respiratory failure in setting of COVID, also concern for DKA. In ED given 2L NS, decadron. Started on insulin gtt. Subsequently intubated.     Pt admitted to surg b for further management.     Patient was admitted to [facility] at [facility]. Developed CVAs with hemorrhagic transformation. Transferred to [facility] 5ICU. Eventually extubated and transferred to [facility].     Remains not tolerating PO. NGT's placed, though patient continues to pull them out. Seen by SLP, however not tolerating exam.     Continues to be intermittently febrile (most recent 4/18/21 101) w/ new hypoxia.     GI consulted for PEG placement.      Allergies:  No Known Allergies      Home Medications:    Hospital Medications:  albuterol/ipratropium for Nebulization 3 milliLiter(s) Nebulizer every 8 hours  cholecalciferol 400 Unit(s) Oral daily  dextrose 40% Gel 15 Gram(s) Oral once  dextrose 5%. 1000 milliLiter(s) IV Continuous <Continuous>  dextrose 5%. 1000 milliLiter(s) IV Continuous <Continuous>  dextrose 50% Injectable 25 Gram(s) IV Push once  dextrose 50% Injectable 12.5 Gram(s) IV Push once  dextrose 50% Injectable 25 Gram(s) IV Push once  ertapenem  IVPB 1000 milliGRAM(s) IV Intermittent every 24 hours  glucagon  Injectable 1 milliGRAM(s) IntraMuscular once  heparin   Injectable 5500 Unit(s) IV Push every 6 hours PRN  heparin   Injectable 2500 Unit(s) IV Push every 6 hours PRN  heparin  Infusion.  Unit(s)/Hr IV Continuous <Continuous>  insulin lispro (ADMELOG) corrective regimen sliding scale   SubCutaneous every 6 hours  levETIRAcetam  IVPB 500 milliGRAM(s) IV Intermittent every 12 hours  multivitamin/minerals Oral Solution (WELLESSE) 30 milliLiter(s) Oral daily      PMHX/PSHX:  No pertinent past medical history    No significant past surgical history        Family history:  No pertinent family history in first degree relatives        Denies family history of colon cancer/polyps, stomach cancer/polyps, pancreatic cancer/masses, liver cancer/disease, ovarian cancer and endometrial cancer.    Social History:   Unable to obtain, patinet is non-verbal    ROS:   Unable to obtain, paitent is non-verbal    PHYSICAL EXAM:     GENERAL:  lying in bed, NAD, nonverbal  HEENT:  NCAT, no scleral icterus   CHEST:  no respiratory distress  HEART:  Regular rate and rhythm  ABDOMEN:  Soft, non-tender, non-distended, normoactive bowel sounds,  no masses, no hepato-splenomegaly, no signs of chronic liver disease; no abdominal scars  EXTREMITIES: No edema  SKIN:  No rash/erythema/ecchymoses/petechiae/wounds/abscess/warm/dry  NEURO:  nonverbal, making eye contact, not moving left side    Vital Signs:  Vital Signs Last 24 Hrs  T(C): 37.4 (19 Apr 2021 12:14), Max: 38.3 (19 Apr 2021 03:37)  T(F): 99.4 (19 Apr 2021 12:14), Max: 101 (19 Apr 2021 03:37)  HR: 89 (19 Apr 2021 14:21) (89 - 118)  BP: 120/76 (19 Apr 2021 12:14) (117/76 - 137/83)  BP(mean): --  RR: 20 (19 Apr 2021 12:14) (18 - 22)  SpO2: 98% (19 Apr 2021 14:21) (90% - 99%)  Daily     Daily     LABS:                        9.5    16.03 )-----------( 339      ( 19 Apr 2021 07:08 )             31.3     Mean Cell Volume: 86.2 fl (04-19-21 @ 07:08)    04-19    139  |  106  |  30<H>  ----------------------------<  144<H>  4.4   |  18<L>  |  1.16    Ca    8.3<L>      19 Apr 2021 07:07  Phos  3.6     04-19  Mg     2.2     04-19    TPro  6.4  /  Alb  1.9<L>  /  TBili  0.6  /  DBili  x   /  AST  36  /  ALT  61<H>  /  AlkPhos  103  04-18    LIVER FUNCTIONS - ( 18 Apr 2021 06:49 )  Alb: 1.9 g/dL / Pro: 6.4 g/dL / ALK PHOS: 103 U/L / ALT: 61 U/L / AST: 36 U/L / GGT: x           PTT - ( 19 Apr 2021 07:09 )  PTT:77.9 sec                            9.5    16.03 )-----------( 339      ( 19 Apr 2021 07:08 )             31.3                         10.0   18.68 )-----------( 318      ( 18 Apr 2021 01:41 )             32.4                         9.1    17.26 )-----------( 336      ( 17 Apr 2021 01:33 )             30.6                         9.5    17.14 )-----------( 357      ( 16 Apr 2021 18:47 )             31.6       Imaging:    < from: CT Angio Chest w/ IV Cont (04.01.21 @ 14:04) >  IMPRESSION:  Branching left upper lobe segmental pulmonary emboli with evidence of heart strain.    Extensive bilateral opacities consistent with reported history of Covid.    These findings were discussed with JOEL Chambers on  4/1/2021 at 3:27 PM by Dr. Castillo with read back confirmation.      < end of copied text >

## 2021-04-19 NOTE — PROGRESS NOTE ADULT - PROBLEM SELECTOR PLAN 6
-Elevated blood pressures, to 140s and 150s systolic, noted over past few days in micu subsequent to vasopressor weaning   -Administered metoprolol tartrate 25 mg twice daily since 4/16; discontinued  -Will transition anti-hypertensive management to oral amlodipine 5 mg daily after monitoring for 24 hours if blood pressures continue to rise -Elevated blood pressures, to 140s and 150s systolic, noted over past few days in micu subsequent to vasopressor weaning   - Was on metoprolol tartrate 25 mg twice daily since 4/16; discontinued  - If is hypertensive persistently, can consider antihypertensives

## 2021-04-19 NOTE — CONSULT NOTE ADULT - ASSESSMENT
Mr Pizarro is a 60 year old man with prolonged hospitalization for COVID-19, DKA, and CVA with hemorrhagic conversion who has developed new fevers.   Suspect that the patient is aspirating, though no clear aspiration pneumonia on imaging at this time. Downtrending leukocytosis.   Sputum culture on 4/13 with ESBL klebsiella.     Fevers - suspect 2/2 aspiration  - Stop meropenem   - Start ertapenem 1g daily   - Stop vancomycin  - Send MSSA/MRSA nasal swab  - Follow up cultures  - Monitor for additional fevers  - Trend WBCs    Valerie Carmona, PGY-5  Infectious Disease Fellow   Pager: [phone]  After 5pm/weekends: [phone]

## 2021-04-19 NOTE — PROGRESS NOTE ADULT - PROBLEM SELECTOR PLAN 8
-Dry gangrene noted over distal toes on bilateral lower extremities--most concerning for microvascular disease subsequent to vasopressor administration   -Dorsalis pedis pulses intact bilaterally; will order susan to confirm no evidence of macrovascular disease and no need for procedural intervention -Dry gangrene noted over distal toes on bilateral lower extremities--most concerning for microvascular disease subsequent to vasopressor administration   -Dorsalis pedis pulses intact bilaterally  - ERASMO ordered to confirm no evidence of macrovascular disease and no need for procedural intervention

## 2021-04-19 NOTE — PROGRESS NOTE ADULT - PROBLEM SELECTOR PLAN 2
-Acute ischemic strokes, with concern for hemorrhagic conversion, noted on CT scan on 4/8  -Etiology of acute ischemic stroke uncertain at this time; tte with bubble performed at bedside in the micu without any evidence of right-to-left shunt; no arrhythmia noted on telemetry throughout stay in the micu   -CTA head and neck without any evidence of carotid artery stenosis or dissection   -Full-dose anticoagulation for pulmonary embolus held briefly out of concern for hemorrhagic conversion of acute strokes but since restarted; status post management with hypertonic saline; minimal concern at this time for elevated intracranial pressure   -Will administer oral aspirin for secondary prophylaxis following discussion with neurology  -Currently receiving leviteracetam for seizure prophylaxis although no documented seizures or seizure-like activity   -Will discuss case with neurology team -Acute ischemic strokes, with concern for hemorrhagic conversion, noted on CT scan on 4/8  -Etiology of acute ischemic stroke uncertain at this time; TTE with bubble performed at bedside in the micu without any evidence of right-to-left shunt; no arrhythmia noted on telemetry throughout stay in the micu   -CTA head and neck without any evidence of carotid artery stenosis or dissection   -Full-dose anticoagulation for pulmonary embolus held briefly out of concern for hemorrhagic conversion of acute strokes but since restarted; status post management with hypertonic saline; minimal concern at this time for elevated intracranial pressure   -Will administer oral aspirin for secondary prophylaxis following discussion with neurology  -Currently receiving leviteracetam for seizure prophylaxis although no documented seizures or seizure-like activity   -Will discuss case with neurology team

## 2021-04-20 LAB
ANION GAP SERPL CALC-SCNC: 13 MMOL/L — SIGNIFICANT CHANGE UP (ref 5–17)
APTT BLD: 68.6 SEC — HIGH (ref 27.5–35.5)
BUN SERPL-MCNC: 27 MG/DL — HIGH (ref 7–23)
CALCIUM SERPL-MCNC: 8.4 MG/DL — SIGNIFICANT CHANGE UP (ref 8.4–10.5)
CHLORIDE SERPL-SCNC: 103 MMOL/L — SIGNIFICANT CHANGE UP (ref 96–108)
CO2 SERPL-SCNC: 21 MMOL/L — LOW (ref 22–31)
CREAT SERPL-MCNC: 1 MG/DL — SIGNIFICANT CHANGE UP (ref 0.5–1.3)
CULTURE RESULTS: NO GROWTH — SIGNIFICANT CHANGE UP
GLUCOSE BLDC GLUCOMTR-MCNC: 117 MG/DL — HIGH (ref 70–99)
GLUCOSE BLDC GLUCOMTR-MCNC: 119 MG/DL — HIGH (ref 70–99)
GLUCOSE BLDC GLUCOMTR-MCNC: 142 MG/DL — HIGH (ref 70–99)
GLUCOSE BLDC GLUCOMTR-MCNC: 169 MG/DL — HIGH (ref 70–99)
GLUCOSE SERPL-MCNC: 112 MG/DL — HIGH (ref 70–99)
HCT VFR BLD CALC: 31.8 % — LOW (ref 39–50)
HGB BLD-MCNC: 9.9 G/DL — LOW (ref 13–17)
MAGNESIUM SERPL-MCNC: 2.2 MG/DL — SIGNIFICANT CHANGE UP (ref 1.6–2.6)
MCHC RBC-ENTMCNC: 26.8 PG — LOW (ref 27–34)
MCHC RBC-ENTMCNC: 31.1 GM/DL — LOW (ref 32–36)
MCV RBC AUTO: 86.2 FL — SIGNIFICANT CHANGE UP (ref 80–100)
NRBC # BLD: 0 /100 WBCS — SIGNIFICANT CHANGE UP (ref 0–0)
PHOSPHATE SERPL-MCNC: 3.8 MG/DL — SIGNIFICANT CHANGE UP (ref 2.5–4.5)
PLATELET # BLD AUTO: 269 K/UL — SIGNIFICANT CHANGE UP (ref 150–400)
POTASSIUM SERPL-MCNC: 4.6 MMOL/L — SIGNIFICANT CHANGE UP (ref 3.5–5.3)
POTASSIUM SERPL-SCNC: 4.6 MMOL/L — SIGNIFICANT CHANGE UP (ref 3.5–5.3)
RBC # BLD: 3.69 M/UL — LOW (ref 4.2–5.8)
RBC # FLD: 15.8 % — HIGH (ref 10.3–14.5)
SODIUM SERPL-SCNC: 137 MMOL/L — SIGNIFICANT CHANGE UP (ref 135–145)
SPECIMEN SOURCE: SIGNIFICANT CHANGE UP
WBC # BLD: 12.13 K/UL — HIGH (ref 3.8–10.5)
WBC # FLD AUTO: 12.13 K/UL — HIGH (ref 3.8–10.5)

## 2021-04-20 PROCEDURE — 99233 SBSQ HOSP IP/OBS HIGH 50: CPT | Mod: GC

## 2021-04-20 PROCEDURE — 93923 UPR/LXTR ART STDY 3+ LVLS: CPT | Mod: 26

## 2021-04-20 PROCEDURE — 99232 SBSQ HOSP IP/OBS MODERATE 35: CPT

## 2021-04-20 RX ORDER — SODIUM CHLORIDE 9 MG/ML
1000 INJECTION, SOLUTION INTRAVENOUS
Refills: 0 | Status: DISCONTINUED | OUTPATIENT
Start: 2021-04-20 | End: 2021-04-21

## 2021-04-20 RX ADMIN — LEVETIRACETAM 400 MILLIGRAM(S): 250 TABLET, FILM COATED ORAL at 18:01

## 2021-04-20 RX ADMIN — LEVETIRACETAM 400 MILLIGRAM(S): 250 TABLET, FILM COATED ORAL at 05:36

## 2021-04-20 RX ADMIN — Medication 3 MILLILITER(S): at 05:58

## 2021-04-20 RX ADMIN — SODIUM CHLORIDE 75 MILLILITER(S): 9 INJECTION, SOLUTION INTRAVENOUS at 14:12

## 2021-04-20 RX ADMIN — Medication 3 MILLILITER(S): at 15:49

## 2021-04-20 RX ADMIN — ERTAPENEM SODIUM 120 MILLIGRAM(S): 1 INJECTION, POWDER, LYOPHILIZED, FOR SOLUTION INTRAMUSCULAR; INTRAVENOUS at 18:01

## 2021-04-20 RX ADMIN — HEPARIN SODIUM 1200 UNIT(S)/HR: 5000 INJECTION INTRAVENOUS; SUBCUTANEOUS at 20:16

## 2021-04-20 NOTE — PROGRESS NOTE ADULT - PROBLEM SELECTOR PLAN 1
- Etiology likely multi-factorial in setting of acute ischemic infarctions of the brain in conjunction vs acute kidney injury with uremia (now resolving) vs resolving hypoxic respiratory failure, covid-19 infection, superimposed bacterial pneumonia, urinary retention, concern for nutritional deficiencies  - Managing acute pneumonia with antibiotics; managing pulmonary embolus with full-dose anticoagulation; limiting sedative medication administration   - At this time, will favor frequent reorientation; will re-evaluate swallowing function as possible - Etiology likely multi-factorial in setting of acute ischemic infarctions of the brain in conjunction vs acute kidney injury with uremia (now resolving) vs resolving hypoxic respiratory failure, covid-19 infection, superimposed bacterial pneumonia, urinary retention, concern for nutritional deficiencies  - Managing acute pneumonia with antibiotics; managing pulmonary embolus with full-dose anticoagulation; limiting sedative medication administration   - At this time, will favor frequent reorientation; will re-evaluate swallowing function as possible  - Pending EEG, MRI Brain, and MRA Head

## 2021-04-20 NOTE — CHART NOTE - NSCHARTNOTEFT_GEN_A_CORE
60M unknown medical history BIBEMS for respiratory distress, recently COVID+ as per EMS. Pt unable to comply with history 2/2 resp distress, nods yes to difficulty breathing, no to pain. Baseline AAOx3. En route, pt had RR 28, SaO2 60%. In ED initially hypoxic to 60% placed on NRB and satting low 90s and tachypneic to 40s on NRB. Placed on AVAPS, still tachypneic to 40s. Labs showing HAGMA, elevated FSG to 500s. Also with lactate of 15. Trops of 200 and pro-BNP elevated to 21k. MICU consulted for respiratory failure in setting of COVID, also concern for DKA. On encounter, pt is noncooperative with questioning. Nods and tries to speak, unable to form full sentences and visibly tachypneic on AVAPS. In ED given 2L NS, decadron. Started on insulin gtt. Subsequently intubated  Extubated 4/13. Found to have acute CVA w/ hemorraghic conversion. +infarcts L cerebellar, L posterior/frontal, b/l occipital/parietal, hemorrhagic transformation. Repeat HCT (4/12): no changes. CTA Chest (4/1): +RITO PE with RV strain.  Pt on Heparin.  Neurology following-> Multi-factorial impaired level of arousal due to global cerebral dysfunction likely from combination of COVID-19, associated hypoxia, persistent pharmacologic effects of overdose of multiple sedatives at [facility], precipitous drop in BP (while at [facility]) and multiple metabolic derangements. ID following-> +fevers, suspect aspiration.     Pt is being followed by this service for restorative swallow and language therapy.     Pt seen today for reassessment of swallow. Pt found in bed, sleeping upon encounter. Pt opens eyes briefly to verbal stimulation. +Baseline, non-productive cough. gurgling noted.  Pt placed in upright position and produced spontaneous cough. Christopher  #[id number]. Pt does not establish eye contact or localize to clinician's voice. Pt lethargic and is unable to maintain attention to SLP despite multi-modal cues. No PO trials offered at this time. Pt left  in no acute distress. D/W MD Rodriguez     Impressions: Mentation does support PO feeding at this time.   Recommendations: Continue NPO, with non-oral nutrition/hydration/medications. Maintain good oral hygiene and aspiration precautions. This service will continue to follow. Recommend acute rehab upon discharge.     Kaylin Bardales #[phone]   Speech Pathology 60M unknown medical history BIBEMS for respiratory distress, recently COVID+ as per EMS. Pt unable to comply with history 2/2 resp distress, nods yes to difficulty breathing, no to pain. Baseline AAOx3. En route, pt had RR 28, SaO2 60%. In ED initially hypoxic to 60% placed on NRB and satting low 90s and tachypneic to 40s on NRB. Placed on AVAPS, still tachypneic to 40s. Labs showing HAGMA, elevated FSG to 500s. Also with lactate of 15. Trops of 200 and pro-BNP elevated to 21k. MICU consulted for respiratory failure in setting of COVID, also concern for DKA. On encounter, pt is noncooperative with questioning. Nods and tries to speak, unable to form full sentences and visibly tachypneic on AVAPS. In ED given 2L NS, decadron. Started on insulin gtt. Subsequently intubated  Extubated 4/13. Found to have acute CVA w/ hemorraghic conversion. +infarcts L cerebellar, L posterior/frontal, b/l occipital/parietal, hemorrhagic transformation. Repeat HCT (4/12): no changes. CTA Chest (4/1): +RITO PE with RV strain.  Pt on Heparin.  Neurology following-> Multi-factorial impaired level of arousal due to global cerebral dysfunction likely from combination of COVID-19, associated hypoxia, persistent pharmacologic effects of overdose of multiple sedatives at [facility], precipitous drop in BP (while at [facility]) and multiple metabolic derangements. ID following-> +fevers, suspect aspiration.     Pt is being followed by this service for restorative swallow and language therapy.     Pt seen today for reassessment of swallow. Pt found in bed, sleeping upon encounter. Pt opens eyes briefly to verbal stimulation. +Baseline, non-productive cough. gurgling noted.  Pt on room air. Pt placed in upright position and produced spontaneous cough. Christopher  #[id number]. Pt does not establish eye contact or localize to clinician's voice. Pt lethargic and is unable to maintain attention to SLP despite multi-modal cues. +Flat affect with no demonstration of awareness for surroundings. No PO trials offered at this time. Pt left  in no acute distress. D/W MD Rodriguez     Impressions: Mentation does support PO feeding at this time.   Recommendations: Continue NPO, with non-oral nutrition/hydration/medications. Maintain good oral hygiene and aspiration precautions. This service will continue to follow. Recommend acute rehab upon discharge.     Kaylin Bardales #[phone]   Speech Pathology

## 2021-04-20 NOTE — PROGRESS NOTE ADULT - SUBJECTIVE AND OBJECTIVE BOX
ADRIANA BOOKER 60y [id number]    Patient is a 60y old  Male who presents with a chief complaint of COVID19 w/ severe metabolic acidosis s/p intubation (2021 07:03)      Follow Up/CC:  ID following for fever    Interval History/ROS: no fever, pt lethargic     Allergies    No Known Allergies    Intolerances        ANTIMICROBIALS:  ertapenem  IVPB 1000 every 24 hours      MEDICATIONS  (STANDING):  albuterol/ipratropium for Nebulization 3 milliLiter(s) Nebulizer every 8 hours  cholecalciferol 400 Unit(s) Oral daily  dextrose 40% Gel 15 Gram(s) Oral once  dextrose 5% + lactated ringers. 1000 milliLiter(s) (75 mL/Hr) IV Continuous <Continuous>  dextrose 5%. 1000 milliLiter(s) (50 mL/Hr) IV Continuous <Continuous>  dextrose 5%. 1000 milliLiter(s) (100 mL/Hr) IV Continuous <Continuous>  dextrose 50% Injectable 12.5 Gram(s) IV Push once  dextrose 50% Injectable 25 Gram(s) IV Push once  dextrose 50% Injectable 25 Gram(s) IV Push once  ertapenem  IVPB 1000 milliGRAM(s) IV Intermittent every 24 hours  glucagon  Injectable 1 milliGRAM(s) IntraMuscular once  heparin  Infusion.  Unit(s)/Hr (12 mL/Hr) IV Continuous <Continuous>  insulin lispro (ADMELOG) corrective regimen sliding scale   SubCutaneous every 6 hours  levETIRAcetam  IVPB 500 milliGRAM(s) IV Intermittent every 12 hours  multivitamin/minerals Oral Solution (WELLESSE) 30 milliLiter(s) Oral daily    MEDICATIONS  (PRN):  heparin   Injectable 5500 Unit(s) IV Push every 6 hours PRN For aPTT less than 40  heparin   Injectable 2500 Unit(s) IV Push every 6 hours PRN For aPTT between 40 - 57        Vital Signs Last 24 Hrs  T(C): 37.1 (2021 04:49), Max: 37.5 (2021 20:24)  T(F): 98.8 (2021 04:49), Max: 99.5 (2021 20:24)  HR: 0 (2021 06:43) (0 - 108)  BP: 113/74 (2021 04:49) (113/74 - 120/82)  BP(mean): --  RR: 18 (2021 04:49) (18 - 20)  SpO2: 94% (2021 06:43) (94% - 100%)    CBC Full  -  ( 2021 07:01 )  WBC Count : 12.13 K/uL  RBC Count : 3.69 M/uL  Hemoglobin : 9.9 g/dL  Hematocrit : 31.8 %  Platelet Count - Automated : 269 K/uL  Mean Cell Volume : 86.2 fl  Mean Cell Hemoglobin : 26.8 pg  Mean Cell Hemoglobin Concentration : 31.1 gm/dL  Auto Neutrophil # : x  Auto Lymphocyte # : x  Auto Monocyte # : x  Auto Eosinophil # : x  Auto Basophil # : x  Auto Neutrophil % : x  Auto Lymphocyte % : x  Auto Monocyte % : x  Auto Eosinophil % : x  Auto Basophil % : x        137  |  103  |  27<H>  ----------------------------<  112<H>  4.6   |  21<L>  |  1.00    Ca    8.4      2021 07:03  Phos  3.8       Mg     2.2             Urinalysis Basic - ( 2021 16:38 )    Color: Colorless / Appearance: Clear / S.011 / pH: x  Gluc: x / Ketone: Negative  / Bili: Negative / Urobili: Negative   Blood: x / Protein: Negative / Nitrite: Negative   Leuk Esterase: Negative / RBC: x / WBC x   Sq Epi: x / Non Sq Epi: x / Bacteria: x        MICROBIOLOGY:  .Blood Blood-Peripheral  21   No growth to date.  --  --      .Blood Blood  21   No Growth Final  --  --      .Sputum Sputum  21   Numerous Klebsiella pneumoniae ESBL  Normal Respiratory Lizzette absent  --  Klebsiella pneumoniae ESBL      .Sputum Sputum  21   Numerous Klebsiella pneumoniae ESBL  Normal Respiratory Lizzette absent  --  Klebsiella pneumoniae ESBL      .Blood Blood  21   No Growth Final  --  --      .Sputum Sputum  21   Normal Respiratory Lizzette present  --    No polymorphonuclear leukocytes per low power field  Moderate Squamous epithelial cells per low power field  Moderate Yeast like cells per oil power field  Moderate Gram Variable Rods per oil power field  Rare Gram Positive Cocci in Clusters per oil power field      .Blood Blood-Peripheral  21   No Growth Final  --    Few Squamous epithelial cells per low power field  Few polymorphonuclear leukocytes per low power field  Few Yeast per oil power field  Few Gram Positive Cocci in Pairs and Chains per oil power field      .Blood Blood-Peripheral  21   No Growth Final  --  --        RADIOLOGY    < from: Xray Chest 1 View- PORTABLE-Urgent (Xray Chest 1 View- PORTABLE-Urgent .) (21 @ 03:53) >  The heart is normal in size. Right pleural effusion. Right lower lobe pneumonia. The left lung appears to be clear. NG tube was removed. The right central line was removed as well. No pneumothorax. No change in appearance the chest when compared to previous study done 2021 at 2:23 PM.    < end of copied text >

## 2021-04-20 NOTE — PROGRESS NOTE ADULT - PROBLEM SELECTOR PLAN 8
-Dry gangrene noted over distal toes on bilateral lower extremities--most concerning for microvascular disease subsequent to vasopressor administration   -Dorsalis pedis pulses intact bilaterally  - ERASMO ordered to confirm no evidence of macrovascular disease and no need for procedural intervention -Dry gangrene noted over distal toes on bilateral lower extremities--most concerning for microvascular disease subsequent to vasopressor administration   -Dorsalis pedis pulses intact bilaterally  - ERASMO demonstrating right ERASMO: 1.09, left ERASMO: 1.24, right TBI: 0.78, and left TBI: 0.73.   Significant lower extremity arterial disease not identified. The only abnormality on this examination is the decreased amplitude to the pulse volume recordings at the levels of the toes bilaterally.  - Podiatry consulted

## 2021-04-20 NOTE — CONSULT NOTE ADULT - SUBJECTIVE AND OBJECTIVE BOX
Patient is a 60y old  Male who presents with a chief complaint of COVID19 w/ severe metabolic acidosis s/p intubation (20 Apr 2021 12:08)      HPI:  60M unknown medical history BIBEMS for respiratory distress, recently COVID+ as per EMS. Pt unable to comply with history 2/2 resp distress, nods yes to difficulty breathing, no to pain. Baseline AAOx3. En route, pt had RR 28, SaO2 60%. In ED initially hypoxic to 60% placed on NRB and satting low 90s and tachypneic to 40s on NRB. Placed on AVAPS, still tachypneic to 40s. Labs showing HAGMA, elevated FSG to 500s. Also with lactate of 15. Trops of 200 and pro-BNP elevated to 21k. MICU consulted for respiratory failure in setting of COVID, also concern for DKA. On encounter, pt is noncooperative with questioning. Nods and tries to speak, unable to form full sentences and visibly tachypneic on AVAPS. In ED given 2L NS, decadron. Started on insulin gtt. Subsequently intubated.     Initial vitals 108/86, , RR 28, T97, SaO2 60% on 15L NRB. Received 2L NS bolus. Started on insulin gtt.     Pt admitted to surg b for further management.     Addendum 4/1/21 1700:  Limited history obtained from nitesh Anders, who lives in the  (+[address] ). Patient has a PMH of low BP and takes no medications. Patient tested positive for COVID on 3/19. He was initially doing fine with some fatigue, but suddenly decompensated after yesterday. Unable to obtain social history. Wife, Lucero, is currently in [place] - she went due to medical issues but was unable to return due to COVID travel restrictions.        (12 Apr 2021 20:54)      PAST MEDICAL & SURGICAL HISTORY:  No pertinent past medical history    No significant past surgical history        MEDICATIONS  (STANDING):  albuterol/ipratropium for Nebulization 3 milliLiter(s) Nebulizer every 8 hours  cholecalciferol 400 Unit(s) Oral daily  dextrose 40% Gel 15 Gram(s) Oral once  dextrose 5% + lactated ringers. 1000 milliLiter(s) (75 mL/Hr) IV Continuous <Continuous>  dextrose 5%. 1000 milliLiter(s) (50 mL/Hr) IV Continuous <Continuous>  dextrose 5%. 1000 milliLiter(s) (100 mL/Hr) IV Continuous <Continuous>  dextrose 50% Injectable 25 Gram(s) IV Push once  dextrose 50% Injectable 12.5 Gram(s) IV Push once  dextrose 50% Injectable 25 Gram(s) IV Push once  ertapenem  IVPB 1000 milliGRAM(s) IV Intermittent every 24 hours  glucagon  Injectable 1 milliGRAM(s) IntraMuscular once  heparin  Infusion.  Unit(s)/Hr (12 mL/Hr) IV Continuous <Continuous>  insulin lispro (ADMELOG) corrective regimen sliding scale   SubCutaneous every 6 hours  levETIRAcetam  IVPB 500 milliGRAM(s) IV Intermittent every 12 hours  multivitamin/minerals Oral Solution (WELLESSE) 30 milliLiter(s) Oral daily    MEDICATIONS  (PRN):  heparin   Injectable 5500 Unit(s) IV Push every 6 hours PRN For aPTT less than 40  heparin   Injectable 2500 Unit(s) IV Push every 6 hours PRN For aPTT between 40 - 57      Allergies    No Known Allergies    Intolerances        VITALS:    Vital Signs Last 24 Hrs  T(C): 36.4 (20 Apr 2021 13:25), Max: 37.5 (19 Apr 2021 20:24)  T(F): 97.6 (20 Apr 2021 13:25), Max: 99.5 (19 Apr 2021 20:24)  HR: 100 (20 Apr 2021 15:51) (0 - 108)  BP: 109/72 (20 Apr 2021 13:25) (109/72 - 120/82)  BP(mean): --  RR: 18 (20 Apr 2021 13:25) (18 - 19)  SpO2: 98% (20 Apr 2021 15:51) (94% - 100%)    LABS:                          9.9    12.13 )-----------( 269      ( 20 Apr 2021 07:01 )             31.8       04-20    137  |  103  |  27<H>  ----------------------------<  112<H>  4.6   |  21<L>  |  1.00    Ca    8.4      20 Apr 2021 07:03  Phos  3.8     04-20  Mg     2.2     04-20        CAPILLARY BLOOD GLUCOSE      POCT Blood Glucose.: 142 mg/dL (20 Apr 2021 17:34)  POCT Blood Glucose.: 117 mg/dL (20 Apr 2021 12:56)  POCT Blood Glucose.: 119 mg/dL (20 Apr 2021 06:28)  POCT Blood Glucose.: 125 mg/dL (19 Apr 2021 23:53)      PTT - ( 20 Apr 2021 07:00 )  PTT:68.6 sec    LOWER EXTREMITY PHYSICAL EXAM:    Vascular: DP/PT 2/4, B/L, CFT <3 seconds B/L, Temperature gradient warm to cool, B/L however diminished warmth noted on the R foot past TMT  Neuro: Epicritic sensation intact to the level of digits, B/L.  Skin: bilateral digital gangrenous changes noted to distal tips of digits, notably on L foot 2nd digit full thickness, well adhered and warmth noted; R foot 2nd digit sloughed skin with no probe to bone nor signs of infection      RADIOLOGY & ADDITIONAL STUDIES:  < from: [facility] Physiol Extremity Lower 3+ Level, BI (04.20.21 @ 12:44) >    EXAM:  PHYSIOL EXTREM LOW 3+ LEV BI                            PROCEDURE DATE:  04/20/2021            INTERPRETATION:  History: Gangrenous right and left toes.    Risk factors: Smoking, diabetes, hypertension, coronary artery disease.    The right ankle-brachial index equals 1.09; the left ankle-brachial index equals 1.24.    The blood pressure measurements at the right ankle are 126 mmHg in the posterior tibial artery and 129 mmHg in the dorsal pedis artery.    The blood pressure measurement at the right great toe is 92 mmHg and the right toe-brachial index equals 0.78.    The blood pressure measurements at the left ankle are 146 mmHg in the posterior tibial artery and 126 mmHg in the dorsal pedis artery.    The blood pressure measurement at the left great toe is 86 mmHg and the left toe-brachial index equals 0.73.    Plethysmography shows normal arterial waveforms at every level bilaterally from the upper thighs through the metatarsals.    The only abnormality on this examination is the decreased amplitude to the pulse volume recordings at the levels of the toes bilaterally.    Impression: Significant lower extremity arterial disease is NOT identified.    The only abnormality on this examination is the decreased amplitude to the pulse volume recordings at the levels of the toes bilaterally.                    DRE AG M.D., ATTENDING RADIOLOGIST  This document has been electronically signed. Apr 20 2021 12:59PM    < end of copied text >

## 2021-04-20 NOTE — PROGRESS NOTE ADULT - PROBLEM SELECTOR PLAN 7
-Dysphagia noted in setting of patient's acute alteration in mental status  -Nasogastric tube placed for administration of tube feeds; but patient self removed 4/17; multiple attempts to replace unsuccessful   -Patient evaluated by speech and swallow team, and npo status recommended  -Will maintain npo status with tube feeds at this time once tube feeds replaced (unsuccessful x3 today); in setting of acute cerebral infarcts, it is possible that patient's neurologic function and swallowing mechanism improve  Will re-evaluate daily

## 2021-04-20 NOTE — PROGRESS NOTE ADULT - SUBJECTIVE AND OBJECTIVE BOX
Irina Rodriguez MD  PGY 1 Department of Internal Medicine  Pager: [phone], [phone] [facility]      Patient is a 60y old  Male who presents with a chief complaint of COVID19 w/ severe metabolic acidosis s/p intubation (2021 15:56)      SUBJECTIVE / OVERNIGHT EVENTS: Pt seen and examined. No acute overnight events.   Denies fevers, chills, CP/palpitations, SOB/cough, abdominal pain, N/V, constipation, or diarrhea.        MEDICATIONS  (STANDING):  albuterol/ipratropium for Nebulization 3 milliLiter(s) Nebulizer every 8 hours  cholecalciferol 400 Unit(s) Oral daily  dextrose 40% Gel 15 Gram(s) Oral once  dextrose 5%. 1000 milliLiter(s) (50 mL/Hr) IV Continuous <Continuous>  dextrose 5%. 1000 milliLiter(s) (100 mL/Hr) IV Continuous <Continuous>  dextrose 50% Injectable 12.5 Gram(s) IV Push once  dextrose 50% Injectable 25 Gram(s) IV Push once  dextrose 50% Injectable 25 Gram(s) IV Push once  ertapenem  IVPB 1000 milliGRAM(s) IV Intermittent every 24 hours  glucagon  Injectable 1 milliGRAM(s) IntraMuscular once  heparin  Infusion.  Unit(s)/Hr (12 mL/Hr) IV Continuous <Continuous>  insulin lispro (ADMELOG) corrective regimen sliding scale   SubCutaneous every 6 hours  levETIRAcetam  IVPB 500 milliGRAM(s) IV Intermittent every 12 hours  multivitamin/minerals Oral Solution (WELLESSE) 30 milliLiter(s) Oral daily    MEDICATIONS  (PRN):  heparin   Injectable 5500 Unit(s) IV Push every 6 hours PRN For aPTT less than 40  heparin   Injectable 2500 Unit(s) IV Push every 6 hours PRN For aPTT between 40 - 57      I&O's Summary    2021 07:01  -  2021 07:00  --------------------------------------------------------  IN: 250 mL / OUT: 1702 mL / NET: -1452 mL        Vital Signs Last 24 Hrs  T(C): 37.1 (2021 04:49), Max: 37.5 (2021 20:24)  T(F): 98.8 (2021 04:49), Max: 99.5 (2021 20:24)  HR: 0 (2021 06:43) (0 - 108)  BP: 113/74 (2021 04:49) (113/74 - 137/83)  BP(mean): --  RR: 18 (2021 04:49) (18 - 22)  SpO2: 94% (2021 06:43) (94% - 100%)    CAPILLARY BLOOD GLUCOSE      POCT Blood Glucose.: 119 mg/dL (2021 06:28)  POCT Blood Glucose.: 125 mg/dL (2021 23:53)  POCT Blood Glucose.: 148 mg/dL (2021 17:14)  POCT Blood Glucose.: 165 mg/dL (2021 11:42)      PHYSICAL EXAM:  GENERAL: NAD,   HEAD:  Atraumatic, Normocephalic  EYES: EOMI, PERRL, conjunctiva and sclera clear  NECK: No JVD  CHEST/LUNG: Clear to auscultation bilaterally; No wheeze  HEART: Regular rate and rhythm; No murmurs, rubs, or gallops  ABDOMEN: Soft, Nontender, Nondistended; Bowel sounds present  EXTREMITIES:  2+ Peripheral Pulses, No clubbing, cyanosis, or edema  PSYCH: AAOx3  NEUROLOGY: non-focal  SKIN: No rashes or lesions       LABS:                        9.5    16.03 )-----------( 339      ( 2021 07:08 )             31.3     Auto Eosinophil # x     / Auto Eosinophil % x     / Auto Neutrophil # x     / Auto Neutrophil % x     / BANDS % x        04-19    139  |  106  |  30<H>  ----------------------------<  144<H>  4.4   |  18<L>  |  1.16    Ca    8.3<L>      2021 07:07  Mg     2.2     04-19  Phos  3.6     04-19    PTT - ( 2021 07:09 )  PTT:77.9 sec      Urinalysis Basic - ( 2021 16:38 )    Color: Colorless / Appearance: Clear / S.011 / pH: x  Gluc: x / Ketone: Negative  / Bili: Negative / Urobili: Negative   Blood: x / Protein: Negative / Nitrite: Negative   Leuk Esterase: Negative / RBC: x / WBC x   Sq Epi: x / Non Sq Epi: x / Bacteria: x         Irina Rodriguez MD  PGY 1 Department of Internal Medicine  Pager: [phone], [phone] [facility]      Patient is a 60y old  Male who presents with a chief complaint of COVID19 w/ severe metabolic acidosis s/p intubation (2021 15:56)      SUBJECTIVE / OVERNIGHT EVENTS: Pt seen and examined. No acute overnight events. This morning the pt remains not in acute distress, ROS limited given altered mental status.        MEDICATIONS  (STANDING):  albuterol/ipratropium for Nebulization 3 milliLiter(s) Nebulizer every 8 hours  cholecalciferol 400 Unit(s) Oral daily  dextrose 40% Gel 15 Gram(s) Oral once  dextrose 5%. 1000 milliLiter(s) (50 mL/Hr) IV Continuous <Continuous>  dextrose 5%. 1000 milliLiter(s) (100 mL/Hr) IV Continuous <Continuous>  dextrose 50% Injectable 12.5 Gram(s) IV Push once  dextrose 50% Injectable 25 Gram(s) IV Push once  dextrose 50% Injectable 25 Gram(s) IV Push once  ertapenem  IVPB 1000 milliGRAM(s) IV Intermittent every 24 hours  glucagon  Injectable 1 milliGRAM(s) IntraMuscular once  heparin  Infusion.  Unit(s)/Hr (12 mL/Hr) IV Continuous <Continuous>  insulin lispro (ADMELOG) corrective regimen sliding scale   SubCutaneous every 6 hours  levETIRAcetam  IVPB 500 milliGRAM(s) IV Intermittent every 12 hours  multivitamin/minerals Oral Solution (WELLESSE) 30 milliLiter(s) Oral daily    MEDICATIONS  (PRN):  heparin   Injectable 5500 Unit(s) IV Push every 6 hours PRN For aPTT less than 40  heparin   Injectable 2500 Unit(s) IV Push every 6 hours PRN For aPTT between 40 - 57      I&O's Summary    2021 07:01  -  2021 07:00  --------------------------------------------------------  IN: 250 mL / OUT: 1702 mL / NET: -1452 mL        Vital Signs Last 24 Hrs  T(C): 37.1 (2021 04:49), Max: 37.5 (2021 20:24)  T(F): 98.8 (2021 04:49), Max: 99.5 (2021 20:24)  HR: 0 (2021 06:43) (0 - 108)  BP: 113/74 (2021 04:49) (113/74 - 137/83)  BP(mean): --  RR: 18 (2021 04:49) (18 - 22)  SpO2: 94% (2021 06:43) (94% - 100%)    CAPILLARY BLOOD GLUCOSE      POCT Blood Glucose.: 119 mg/dL (2021 06:28)  POCT Blood Glucose.: 125 mg/dL (2021 23:53)  POCT Blood Glucose.: 148 mg/dL (2021 17:14)  POCT Blood Glucose.: 165 mg/dL (2021 11:42)      PHYSICAL EXAM:  GENERAL: NAD,   HEAD:  Atraumatic, Normocephalic  EYES: EOMI, PERRL, conjunctiva and sclera clear  NECK: No JVD  CHEST/LUNG: Clear to auscultation bilaterally; No wheeze, crackles, or rhonchi present  HEART: Regular rate and rhythm; No murmurs, rubs, or gallops  ABDOMEN: Soft, Nontender, Nondistended; Bowel sounds present  EXTREMITIES:  2+ Peripheral Pulses, No clubbing, cyanosis, or edema  NEUROLOGY: Altered mental status, unable to assess neuro deficitis.  SKIN: No rashes or lesions       LABS:                        9.5    16.03 )-----------( 339      ( 2021 07:08 )             31.3     Auto Eosinophil # x     / Auto Eosinophil % x     / Auto Neutrophil # x     / Auto Neutrophil % x     / BANDS % x        04-19    139  |  106  |  30<H>  ----------------------------<  144<H>  4.4   |  18<L>  |  1.16    Ca    8.3<L>      2021 07:07  Mg     2.2     04-19  Phos  3.6     04-19    PTT - ( 2021 07:09 )  PTT:77.9 sec      Urinalysis Basic - ( 2021 16:38 )    Color: Colorless / Appearance: Clear / S.011 / pH: x  Gluc: x / Ketone: Negative  / Bili: Negative / Urobili: Negative   Blood: x / Protein: Negative / Nitrite: Negative   Leuk Esterase: Negative / RBC: x / WBC x   Sq Epi: x / Non Sq Epi: x / Bacteria: x

## 2021-04-20 NOTE — PROGRESS NOTE ADULT - ATTENDING COMMENTS
60 yr male with PMH of DM who was initially admitted to [facility] ICU for hypoxic respiratory failure 2/2 COVID c/b PE with R heart strain, cardiogenic and septic shock, ischemic and hemorrhagic CVA and ESBL klebsiella ventilator associate PNA, transferred to [facility] for neurosurgery eval.  No acute overnight events. Pt remains AAO X 0, not responding to commands.  acute hypoxic resp failure: requiring 4L NC. Appreciate ongoing pulm recs.  acute CVA: likely thrombotic complication of recent COVID infection. significant cognitive, speech, swallow, motor deficits. will likely benefit from acute rehab. F/u neurology recs.  PE/DVT: related to recent COVID infection. cont with hep gtt for now. eventual transition to oral meds  Dysphagia: pulled out NGT overnight. SLP yanet appreciated. GI consulted for PEG placement - however concern that pt might pull this out as well. Start maintenance fluids, monitor electrolytes.  COVID PNA: s/p treatment; supportive care  Ventilator associated ESBL klebsiella pneumonia -appreciate ID recs, cont ertapenem.   Dry gangrenous appearance of several toes. Podiatry consult.  Poor prognosis. Palliative care consult placed.

## 2021-04-20 NOTE — PROGRESS NOTE ADULT - ASSESSMENT
Mr Pizarro is a 60 year old man with prolonged hospitalization for COVID-19, DKA, and CVA with hemorrhagic conversion who has developed new fevers.   Suspect that the patient is aspirating, though no clear aspiration pneumonia on imaging at this time. Downtrending leukocytosis.   Sputum culture on 4/13 with ESBL klebsiella.     Fevers - suspect 2/2 aspiration PNA  - cont ertapenem 1g daily - total 5 days   - Send MSSA/MRSA nasal swab  - Follow up cultures  - Monitor for additional fevers  - Trend WBCs - better  - asp precautions   - define GOC     Chan Alaniz  Attending Physician   Division of Infectious Disease  Pager #[phone]  Available on Microsoft Teams also  After 5pm/weekend or no response, call #[phone]    D/w Dr. Ferraro

## 2021-04-20 NOTE — PROGRESS NOTE ADULT - PROBLEM SELECTOR PLAN 9
-Initiated goals of care discussion with patient's brother-in-law (listed in chart as primary point of contact); patient is full code at this time; goal is as much recovery as possible  -The patient's wife is in [place], but the patient's brother-in-law has been visiting and placing the patient's wife on facetime; she is aware of all that is happening and has happened   -Discussed further today the possibility of placing a peg tube; the patient's brother-in-law states that the patient's preference would be to receive a peg tube   - GI consult for peg tube placement

## 2021-04-20 NOTE — PROGRESS NOTE ADULT - PROBLEM SELECTOR PLAN 6
-Elevated blood pressures, to 140s and 150s systolic, noted over past few days in micu subsequent to vasopressor weaning   - Was on metoprolol tartrate 25 mg twice daily since 4/16; discontinued  - If is hypertensive persistently, can consider antihypertensives

## 2021-04-20 NOTE — PROGRESS NOTE ADULT - PROBLEM SELECTOR PLAN 3
-Low-grade temperature elevations noted over past few days in micu  -For that reason, blood and sputum cultures drawn on 4/13; sputum cultures growing esbl klebsiella   - Previously on meropenem 1 g q8h (4/15-4/19) for treatment of ventilator-associated pneumonia, switched to ertapenem for 14 day course, given low concern for pseudomonas, given sputum not growing this  -Maintaining oxygen saturations greater than 95% on room air at this time  - Spiked fever O/N on 4/18 to 4/19, cultures sent (previous 4/13 blood cxs x2 negative), CXR similar to previously with noted, right pleural effusion and right lower lobe pneumonia, and left side clear lungs, likely 2/2 to aspiration from chemical pneumonitis -Low-grade temperature elevations noted over past few days in micu  -For that reason, blood and sputum cultures drawn on 4/13; sputum cultures growing ESBL Klebsiella   - Previously on meropenem 1 g q8h (4/15-4/19) for treatment of ventilator-associated pneumonia, switched to ertapenem for 14 day course, given low concern for pseudomonas, given sputum not growing this  -Maintaining oxygen saturations greater than 95% on room air at this time  - Spiked fever O/N on 4/18 to 4/19, cultures sent (previous 4/13 blood cxs x2 negative), CXR similar to previously with noted, right pleural effusion and right lower lobe pneumonia, and left side clear lungs, likely 2/2 to aspiration from chemical pneumonitis -Low-grade temperature elevations noted over past few days in micu  -For that reason, blood and sputum cultures drawn on 4/13; sputum cultures growing ESBL Klebsiella   - Previously on meropenem 1 g q8h (4/15-4/19) for treatment of ventilator-associated pneumonia, switched to ertapenem for 5 more days, given low concern for pseudomonas, given sputum not growing this  -Maintaining oxygen saturations greater than 95% on room air at this time  - Spiked fever O/N on 4/18 to 4/19, cultures sent (previous 4/13 blood cxs x2 negative), CXR similar to previously with noted, right pleural effusion and right lower lobe pneumonia, and left side clear lungs, likely 2/2 to aspiration from chemical pneumonitis

## 2021-04-21 LAB
APTT BLD: 82.4 SEC — HIGH (ref 27.5–35.5)
GLUCOSE BLDC GLUCOMTR-MCNC: 133 MG/DL — HIGH (ref 70–99)
GLUCOSE BLDC GLUCOMTR-MCNC: 171 MG/DL — HIGH (ref 70–99)
GLUCOSE BLDC GLUCOMTR-MCNC: 173 MG/DL — HIGH (ref 70–99)
GLUCOSE BLDC GLUCOMTR-MCNC: 176 MG/DL — HIGH (ref 70–99)
HCT VFR BLD CALC: 33.6 % — LOW (ref 39–50)
HGB BLD-MCNC: 10.1 G/DL — LOW (ref 13–17)
MCHC RBC-ENTMCNC: 26.1 PG — LOW (ref 27–34)
MCHC RBC-ENTMCNC: 30.1 GM/DL — LOW (ref 32–36)
MCV RBC AUTO: 86.8 FL — SIGNIFICANT CHANGE UP (ref 80–100)
MRSA PCR RESULT.: SIGNIFICANT CHANGE UP
NRBC # BLD: 0 /100 WBCS — SIGNIFICANT CHANGE UP (ref 0–0)
PLATELET # BLD AUTO: 330 K/UL — SIGNIFICANT CHANGE UP (ref 150–400)
RBC # BLD: 3.87 M/UL — LOW (ref 4.2–5.8)
RBC # FLD: 15.9 % — HIGH (ref 10.3–14.5)
S AUREUS DNA NOSE QL NAA+PROBE: DETECTED
SARS-COV-2 RNA SPEC QL NAA+PROBE: DETECTED
WBC # BLD: 10.07 K/UL — SIGNIFICANT CHANGE UP (ref 3.8–10.5)
WBC # FLD AUTO: 10.07 K/UL — SIGNIFICANT CHANGE UP (ref 3.8–10.5)

## 2021-04-21 PROCEDURE — 99232 SBSQ HOSP IP/OBS MODERATE 35: CPT

## 2021-04-21 PROCEDURE — 99233 SBSQ HOSP IP/OBS HIGH 50: CPT | Mod: GC

## 2021-04-21 RX ORDER — SODIUM CHLORIDE 9 MG/ML
1000 INJECTION, SOLUTION INTRAVENOUS
Refills: 0 | Status: DISCONTINUED | OUTPATIENT
Start: 2021-04-21 | End: 2021-04-22

## 2021-04-21 RX ORDER — SODIUM CHLORIDE 9 MG/ML
1000 INJECTION, SOLUTION INTRAVENOUS
Refills: 0 | Status: DISCONTINUED | OUTPATIENT
Start: 2021-04-21 | End: 2021-04-21

## 2021-04-21 RX ADMIN — LEVETIRACETAM 400 MILLIGRAM(S): 250 TABLET, FILM COATED ORAL at 17:38

## 2021-04-21 RX ADMIN — Medication 2: at 00:11

## 2021-04-21 RX ADMIN — Medication 3 MILLILITER(S): at 22:46

## 2021-04-21 RX ADMIN — Medication 2: at 17:42

## 2021-04-21 RX ADMIN — Medication 3 MILLILITER(S): at 00:08

## 2021-04-21 RX ADMIN — ERTAPENEM SODIUM 120 MILLIGRAM(S): 1 INJECTION, POWDER, LYOPHILIZED, FOR SOLUTION INTRAMUSCULAR; INTRAVENOUS at 17:38

## 2021-04-21 RX ADMIN — Medication 3 MILLILITER(S): at 05:46

## 2021-04-21 RX ADMIN — Medication 3 MILLILITER(S): at 15:15

## 2021-04-21 RX ADMIN — LEVETIRACETAM 400 MILLIGRAM(S): 250 TABLET, FILM COATED ORAL at 05:48

## 2021-04-21 RX ADMIN — HEPARIN SODIUM 1200 UNIT(S)/HR: 5000 INJECTION INTRAVENOUS; SUBCUTANEOUS at 07:45

## 2021-04-21 RX ADMIN — Medication 2: at 05:46

## 2021-04-21 RX ADMIN — SODIUM CHLORIDE 75 MILLILITER(S): 9 INJECTION, SOLUTION INTRAVENOUS at 14:00

## 2021-04-21 RX ADMIN — SODIUM CHLORIDE 75 MILLILITER(S): 9 INJECTION, SOLUTION INTRAVENOUS at 00:17

## 2021-04-21 RX ADMIN — Medication 2: at 12:35

## 2021-04-21 RX ADMIN — SODIUM CHLORIDE 75 MILLILITER(S): 9 INJECTION, SOLUTION INTRAVENOUS at 19:00

## 2021-04-21 NOTE — PROGRESS NOTE ADULT - PROBLEM SELECTOR PLAN 10
-Full-dose anticoagulation for dvt prophylaxis as above   -NPO with tube feeds  -Full code at this time -Full-dose anticoagulation for dvt prophylaxis as above   - Will be reevaluated from speech and swallow  -Full code at this time

## 2021-04-21 NOTE — PROGRESS NOTE ADULT - ATTENDING COMMENTS
60 yr male with PMH of DM who was initially admitted to [facility] ICU for hypoxic respiratory failure 2/2 COVID c/b PE with R heart strain, cardiogenic and septic shock, ischemic and hemorrhagic CVA and ESBL klebsiella ventilator associate PNA, transferred to [facility] for neurosurgery eval.  No acute overnight events. Seen at bedside with family present. Awake, alert, responds name, followed some commands and was able to move all extremities, denies pain and open to NGT.  acute hypoxic resp failure: secondary to Ventilator associated ESBL klebsiella pneumonia, likely aspiration PNA. Cont ertapenem per ID recs. Still requiring 4L NC. Appreciate ongoing pulm recs.  acute CVA: likely thrombotic complication of recent COVID infection. significant cognitive, speech, swallow, motor deficits. Appreciate neurology recs - pending MRI and EEG. PT/Ot. Acute rehab on discharge.  PE/DVT: related to recent COVID infection. cont with hep gtt for now. eventual transition to oral meds  Dysphagia: will replace NGT today if tolerates. SLP eval. GI consult for PEG tube.  COVID: s/p treatment; supportive care  Dry gangrenous appearance of several toes. Appreciate Podiatry consult. No acute intervention  Poor prognosis. Spoke to family at bedside and via phone call who maintain continue aggressive management. Encouraged family to be present at bedside to facilitate healing.

## 2021-04-21 NOTE — PROGRESS NOTE ADULT - ASSESSMENT
Mr Pizarro is a 60 year old man with prolonged hospitalization for COVID-19, DKA, and CVA with hemorrhagic conversion who has developed new fevers.   Suspect that the patient is aspirating, though no clear aspiration pneumonia on imaging at this time. Downtrending leukocytosis.   Sputum culture on 4/13 with ESBL klebsiella.     Fevers - suspect 2/2 aspiration PNA  - cont ertapenem 1g daily - complete today - was on meropenem prior to ertapenem   - cultures negative  - Monitor for additional fevers  - Trend WBCs - better  - asp precautions   - define GOC     Chan Alaniz  Attending Physician   Division of Infectious Disease  Pager #[phone]  Available on Microsoft Teams also  After 5pm/weekend or no response, call #[phone]

## 2021-04-21 NOTE — PROGRESS NOTE ADULT - PROBLEM SELECTOR PLAN 9
-Initiated goals of care discussion with patient's brother-in-law (listed in chart as primary point of contact); patient is full code at this time; goal is as much recovery as possible  -The patient's wife is in [place], but the patient's brother-in-law has been visiting and placing the patient's wife on facetime; she is aware of all that is happening and has happened   -Discussed further today the possibility of placing a peg tube; the patient's brother-in-law states that the patient's preference would be to receive a peg tube   - GI consult for peg tube placement -Initiated goals of care discussion with patient's brother-in-law (listed in chart as primary point of contact); patient is full code at this time; goal is as much recovery as possible  -The patient's wife is in [place], but the patient's brother-in-law has been visiting and placing the patient's wife on facetime; she is aware of all that is happening and has happened   -Discussed further today the possibility of placing a peg tube; the patient's brother-in-law states that the patient's preference would be to receive a peg tube   - GI not offering peg tube at this time

## 2021-04-21 NOTE — PROGRESS NOTE ADULT - SUBJECTIVE AND OBJECTIVE BOX
Irina Rodriguez MD  PGY 1 Department of Internal Medicine  Pager: [phone], [phone] [facility]      Patient is a 60y old  Male who presents with a chief complaint of COVID19 w/ severe metabolic acidosis s/p intubation (2021 18:47)      SUBJECTIVE / OVERNIGHT EVENTS: Pt seen and examined. No acute overnight events.   Denies fevers, chills, CP/palpitations, SOB/cough, abdominal pain, N/V, constipation, or diarrhea.        MEDICATIONS  (STANDING):  albuterol/ipratropium for Nebulization 3 milliLiter(s) Nebulizer every 8 hours  cholecalciferol 400 Unit(s) Oral daily  dextrose 40% Gel 15 Gram(s) Oral once  dextrose 5% + lactated ringers. 1000 milliLiter(s) (75 mL/Hr) IV Continuous <Continuous>  dextrose 5%. 1000 milliLiter(s) (50 mL/Hr) IV Continuous <Continuous>  dextrose 5%. 1000 milliLiter(s) (100 mL/Hr) IV Continuous <Continuous>  dextrose 50% Injectable 25 Gram(s) IV Push once  dextrose 50% Injectable 12.5 Gram(s) IV Push once  dextrose 50% Injectable 25 Gram(s) IV Push once  ertapenem  IVPB 1000 milliGRAM(s) IV Intermittent every 24 hours  glucagon  Injectable 1 milliGRAM(s) IntraMuscular once  heparin  Infusion.  Unit(s)/Hr (12 mL/Hr) IV Continuous <Continuous>  insulin lispro (ADMELOG) corrective regimen sliding scale   SubCutaneous every 6 hours  levETIRAcetam  IVPB 500 milliGRAM(s) IV Intermittent every 12 hours  multivitamin/minerals Oral Solution (WELLESSE) 30 milliLiter(s) Oral daily    MEDICATIONS  (PRN):  heparin   Injectable 5500 Unit(s) IV Push every 6 hours PRN For aPTT less than 40  heparin   Injectable 2500 Unit(s) IV Push every 6 hours PRN For aPTT between 40 - 57      I&O's Summary    2021 07:01  -  2021 07:00  --------------------------------------------------------  IN: 1144 mL / OUT: 0 mL / NET: 1144 mL        Vital Signs Last 24 Hrs  T(C): 36.9 (2021 04:44), Max: 37.2 (2021 01:07)  T(F): 98.5 (2021 04:44), Max: 99 (2021 01:07)  HR: 96 (2021 05:31) (92 - 101)  BP: 110/71 (2021 04:44) (109/72 - 123/80)  BP(mean): --  RR: 18 (2021 04:44) (18 - 18)  SpO2: 97% (2021 05:31) (96% - 100%)    CAPILLARY BLOOD GLUCOSE      POCT Blood Glucose.: 173 mg/dL (2021 05:39)  POCT Blood Glucose.: 169 mg/dL (2021 23:50)  POCT Blood Glucose.: 142 mg/dL (2021 17:34)  POCT Blood Glucose.: 117 mg/dL (2021 12:56)      PHYSICAL EXAM:  GENERAL: NAD,   HEAD:  Atraumatic, Normocephalic  EYES: EOMI, PERRL, conjunctiva and sclera clear  NECK: No JVD  CHEST/LUNG: Clear to auscultation bilaterally; No wheeze  HEART: Regular rate and rhythm; No murmurs, rubs, or gallops  ABDOMEN: Soft, Nontender, Nondistended; Bowel sounds present  EXTREMITIES:  2+ Peripheral Pulses, No clubbing, cyanosis, or edema  PSYCH: AAOx3  NEUROLOGY: non-focal  SKIN: No rashes or lesions       LABS:                        9.9    12.13 )-----------( 269      ( 2021 07:01 )             31.8     Auto Eosinophil # x     / Auto Eosinophil % x     / Auto Neutrophil # x     / Auto Neutrophil % x     / BANDS % x                            9.5    16.03 )-----------( 339      ( 2021 07:08 )             31.3     Auto Eosinophil # x     / Auto Eosinophil % x     / Auto Neutrophil # x     / Auto Neutrophil % x     / BANDS % x        04-20    137  |  103  |  27<H>  ----------------------------<  112<H>  4.6   |  21<L>  |  1.00  -    139  |  106  |  30<H>  ----------------------------<  144<H>  4.4   |  18<L>  |  1.16    Ca    8.4      2021 07:03  Mg     2.2     0420  Phos  3.8     04-20    PTT - ( 2021 07:00 )  PTT:68.6 sec      Urinalysis Basic - ( 2021 16:38 )    Color: Colorless / Appearance: Clear / S.011 / pH: x  Gluc: x / Ketone: Negative  / Bili: Negative / Urobili: Negative   Blood: x / Protein: Negative / Nitrite: Negative   Leuk Esterase: Negative / RBC: x / WBC x   Sq Epi: x / Non Sq Epi: x / Bacteria: x           Irina Rodriguez MD  PGY 1 Department of Internal Medicine  Pager: [phone], [phone] J      Patient is a 60y old  Male who presents with a chief complaint of COVID19 w/ severe metabolic acidosis s/p intubation (2021 18:47)      SUBJECTIVE / OVERNIGHT EVENTS: Pt seen and examined. No acute overnight events. This morning the pt is resting comfortably at bedside, not in acute distress. ROS limited in setting of altered mental status.    MEDICATIONS  (STANDING):  albuterol/ipratropium for Nebulization 3 milliLiter(s) Nebulizer every 8 hours  cholecalciferol 400 Unit(s) Oral daily  dextrose 40% Gel 15 Gram(s) Oral once  dextrose 5% + lactated ringers. 1000 milliLiter(s) (75 mL/Hr) IV Continuous <Continuous>  dextrose 5%. 1000 milliLiter(s) (50 mL/Hr) IV Continuous <Continuous>  dextrose 5%. 1000 milliLiter(s) (100 mL/Hr) IV Continuous <Continuous>  dextrose 50% Injectable 25 Gram(s) IV Push once  dextrose 50% Injectable 12.5 Gram(s) IV Push once  dextrose 50% Injectable 25 Gram(s) IV Push once  ertapenem  IVPB 1000 milliGRAM(s) IV Intermittent every 24 hours  glucagon  Injectable 1 milliGRAM(s) IntraMuscular once  heparin  Infusion.  Unit(s)/Hr (12 mL/Hr) IV Continuous <Continuous>  insulin lispro (ADMELOG) corrective regimen sliding scale   SubCutaneous every 6 hours  levETIRAcetam  IVPB 500 milliGRAM(s) IV Intermittent every 12 hours  multivitamin/minerals Oral Solution (WELLESSE) 30 milliLiter(s) Oral daily    MEDICATIONS  (PRN):  heparin   Injectable 5500 Unit(s) IV Push every 6 hours PRN For aPTT less than 40  heparin   Injectable 2500 Unit(s) IV Push every 6 hours PRN For aPTT between 40 - 57      I&O's Summary    2021 07:01  -  2021 07:00  --------------------------------------------------------  IN: 1144 mL / OUT: 0 mL / NET: 1144 mL        Vital Signs Last 24 Hrs  T(C): 36.9 (2021 04:44), Max: 37.2 (2021 01:07)  T(F): 98.5 (2021 04:44), Max: 99 (2021 01:07)  HR: 96 (2021 05:31) (92 - 101)  BP: 110/71 (2021 04:44) (109/72 - 123/80)  BP(mean): --  RR: 18 (2021 04:44) (18 - 18)  SpO2: 97% (2021 05:31) (96% - 100%)    CAPILLARY BLOOD GLUCOSE      POCT Blood Glucose.: 173 mg/dL (2021 05:39)  POCT Blood Glucose.: 169 mg/dL (2021 23:50)  POCT Blood Glucose.: 142 mg/dL (2021 17:34)  POCT Blood Glucose.: 117 mg/dL (2021 12:56)      PHYSICAL EXAM:  GENERAL: NAD,   HEAD:  Atraumatic, Normocephalic  EYES: EOMI, PERRL, conjunctiva and sclera clear  NECK: No JVD  CHEST/LUNG: Clear to auscultation bilaterally; No wheeze, crackles, or rhonchi present  HEART: Regular rate and rhythm; No murmurs, rubs, or gallops  ABDOMEN: Soft, Nontender, Nondistended; Bowel sounds present  EXTREMITIES:  2+ Peripheral Pulses, No clubbing, cyanosis, or edema  NEUROLOGY: Altered mental status, unable to assess neuro deficitis.  SKIN: No rashes or lesions       LABS:                        9.9    12.13 )-----------( 269      ( 2021 07:01 )             31.8     Auto Eosinophil # x     / Auto Eosinophil % x     / Auto Neutrophil # x     / Auto Neutrophil % x     / BANDS % x                            9.5    16.03 )-----------( 339      ( 2021 07:08 )             31.3     Auto Eosinophil # x     / Auto Eosinophil % x     / Auto Neutrophil # x     / Auto Neutrophil % x     / BANDS % x        04-20    137  |  103  |  27<H>  ----------------------------<  112<H>  4.6   |  21<L>  |  1.00      139  |  106  |  30<H>  ----------------------------<  144<H>  4.4   |  18<L>  |  1.16    Ca    8.4      2021 07:03  Mg     2.2       Phos  3.8     04-20    PTT - ( 2021 07:00 )  PTT:68.6 sec      Urinalysis Basic - ( 2021 16:38 )    Color: Colorless / Appearance: Clear / S.011 / pH: x  Gluc: x / Ketone: Negative  / Bili: Negative / Urobili: Negative   Blood: x / Protein: Negative / Nitrite: Negative   Leuk Esterase: Negative / RBC: x / WBC x   Sq Epi: x / Non Sq Epi: x / Bacteria: x

## 2021-04-21 NOTE — PROGRESS NOTE ADULT - PROBLEM SELECTOR PLAN 8
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   Significant lower extremity arterial disease not identified. The only abnormality on this examination is the decreased amplitude to the pulse volume recordings at the levels of the toes bilaterally.  - Podiatry consulted -Dry gangrene noted over distal toes on bilateral lower extremities--most concerning for microvascular disease subsequent to vasopressor administration   -Dorsalis pedis pulses intact bilaterally  - ERASMO demonstrating right ERASMO: 1.09, left ERASMO: 1.24, right TBI: 0.78, and left TBI: 0.73.   Significant lower extremity arterial disease not identified. The only abnormality on this examination is the decreased amplitude to the pulse volume recordings at the levels of the toes bilaterally.  - Podiatry recs appreciated, no acute surgical intervention at this time, can apply betadine to wound

## 2021-04-21 NOTE — PROGRESS NOTE ADULT - SUBJECTIVE AND OBJECTIVE BOX
ADRIANA BOOKER 60y [id number]    Patient is a 60y old  Male who presents with a chief complaint of COVID19 w/ severe metabolic acidosis s/p intubation (2021 07:04)      Follow Up/CC:  ID following for    Interval History/ROS:    Allergies    No Known Allergies    Intolerances        ANTIMICROBIALS:  ertapenem  IVPB 1000 every 24 hours      MEDICATIONS  (STANDING):  albuterol/ipratropium for Nebulization 3 milliLiter(s) Nebulizer every 8 hours  cholecalciferol 400 Unit(s) Oral daily  dextrose 40% Gel 15 Gram(s) Oral once  dextrose 5% + lactated ringers. 1000 milliLiter(s) (75 mL/Hr) IV Continuous <Continuous>  dextrose 5%. 1000 milliLiter(s) (50 mL/Hr) IV Continuous <Continuous>  dextrose 5%. 1000 milliLiter(s) (100 mL/Hr) IV Continuous <Continuous>  dextrose 50% Injectable 25 Gram(s) IV Push once  dextrose 50% Injectable 12.5 Gram(s) IV Push once  dextrose 50% Injectable 25 Gram(s) IV Push once  ertapenem  IVPB 1000 milliGRAM(s) IV Intermittent every 24 hours  glucagon  Injectable 1 milliGRAM(s) IntraMuscular once  heparin  Infusion.  Unit(s)/Hr (12 mL/Hr) IV Continuous <Continuous>  insulin lispro (ADMELOG) corrective regimen sliding scale   SubCutaneous every 6 hours  levETIRAcetam  IVPB 500 milliGRAM(s) IV Intermittent every 12 hours  multivitamin/minerals Oral Solution (WELLESSE) 30 milliLiter(s) Oral daily    MEDICATIONS  (PRN):  heparin   Injectable 5500 Unit(s) IV Push every 6 hours PRN For aPTT less than 40  heparin   Injectable 2500 Unit(s) IV Push every 6 hours PRN For aPTT between 40 - 57        Vital Signs Last 24 Hrs  T(C): 36.6 (2021 13:48), Max: 37.2 (2021 01:07)  T(F): 97.8 (2021 13:48), Max: 99 (2021 01:07)  HR: 97 (2021 13:48) (92 - 99)  BP: 120/80 (2021 13:48) (104/69 - 123/80)  BP(mean): --  RR: 18 (2021 13:48) (18 - 18)  SpO2: 98% (2021 13:48) (96% - 100%)    CBC Full  -  ( 2021 07:11 )  WBC Count : 10.07 K/uL  RBC Count : 3.87 M/uL  Hemoglobin : 10.1 g/dL  Hematocrit : 33.6 %  Platelet Count - Automated : 330 K/uL  Mean Cell Volume : 86.8 fl  Mean Cell Hemoglobin : 26.1 pg  Mean Cell Hemoglobin Concentration : 30.1 gm/dL  Auto Neutrophil # : x  Auto Lymphocyte # : x  Auto Monocyte # : x  Auto Eosinophil # : x  Auto Basophil # : x  Auto Neutrophil % : x  Auto Lymphocyte % : x  Auto Monocyte % : x  Auto Eosinophil % : x  Auto Basophil % : x        137  |  103  |  27<H>  ----------------------------<  112<H>  4.6   |  21<L>  |  1.00    Ca    8.4      2021 07:03  Phos  3.8       Mg     2.2             Urinalysis Basic - ( 2021 16:38 )    Color: Colorless / Appearance: Clear / S.011 / pH: x  Gluc: x / Ketone: Negative  / Bili: Negative / Urobili: Negative   Blood: x / Protein: Negative / Nitrite: Negative   Leuk Esterase: Negative / RBC: x / WBC x   Sq Epi: x / Non Sq Epi: x / Bacteria: x        MICROBIOLOGY:  .Urine Clean Catch (Midstream)  21   No growth  --  --      .Blood Blood-Peripheral  21   No growth to date.  --  --      .Blood Blood  21   No Growth Final  --  --      .Sputum Sputum  21   Numerous Klebsiella pneumoniae ESBL  Normal Respiratory Lizzette absent  --  Klebsiella pneumoniae ESBL      .Sputum Sputum  21   Numerous Klebsiella pneumoniae ESBL  Normal Respiratory Lizzette absent  --  Klebsiella pneumoniae ESBL      .Blood Blood  21   No Growth Final  --  --      .Sputum Sputum  21   Normal Respiratory Lizzette present  --    No polymorphonuclear leukocytes per low power field  Moderate Squamous epithelial cells per low power field  Moderate Yeast like cells per oil power field  Moderate Gram Variable Rods per oil power field  Rare Gram Positive Cocci in Clusters per oil power field      .Blood Blood-Peripheral  21   No Growth Final  --    Few Squamous epithelial cells per low power field  Few polymorphonuclear leukocytes per low power field  Few Yeast per oil power field  Few Gram Positive Cocci in Pairs and Chains per oil power field      .Blood Blood-Peripheral  21   No Growth Final  --  --        RADIOLOGY    < from: Xray Chest 1 View- PORTABLE-Urgent (Xray Chest 1 View- PORTABLE-Urgent .) (21 @ 03:53) >  The heart is normal in size. Right pleural effusion. Right lower lobe pneumonia. The left lung appears to be clear. NG tube was removed. The right central line was removed as well. No pneumothorax. No change in appearance the chest when compared to previous study done 2021 at 2:23 PM.    < end of copied text >

## 2021-04-21 NOTE — PROGRESS NOTE ADULT - PROBLEM SELECTOR PLAN 3
-Low-grade temperature elevations noted over past few days in micu  -For that reason, blood and sputum cultures drawn on 4/13; sputum cultures growing ESBL Klebsiella   - Previously on meropenem 1 g q8h (4/15-4/19) for treatment of ventilator-associated pneumonia, switched to ertapenem for 5 more days, given low concern for pseudomonas, given sputum not growing this  -Maintaining oxygen saturations greater than 95% on room air at this time  - Spiked fever O/N on 4/18 to 4/19, cultures sent (previous 4/13 blood cxs x2 negative), CXR similar to previously with noted, right pleural effusion and right lower lobe pneumonia, and left side clear lungs, likely 2/2 to aspiration from chemical pneumonitis

## 2021-04-21 NOTE — PROGRESS NOTE ADULT - PROBLEM SELECTOR PLAN 1
- Etiology likely multi-factorial in setting of acute ischemic infarctions of the brain in conjunction vs acute kidney injury with uremia (now resolving) vs resolving hypoxic respiratory failure, covid-19 infection, superimposed bacterial pneumonia, urinary retention, concern for nutritional deficiencies  - Managing acute pneumonia with antibiotics; managing pulmonary embolus with full-dose anticoagulation; limiting sedative medication administration   - At this time, will favor frequent reorientation; will re-evaluate swallowing function as possible  - Pending EEG, MRI Brain, and MRA Head - Etiology likely multi-factorial in setting of acute ischemic infarctions of the brain in conjunction vs acute kidney injury with uremia (now resolving) vs resolving hypoxic respiratory failure, covid-19 infection, superimposed bacterial pneumonia, urinary retention, concern for nutritional deficiencies  - Managing acute pneumonia with antibiotics; managing pulmonary embolus with full-dose anticoagulation; limiting sedative medication administration   - At this time, will favor frequent reorientation; will re-evaluate swallowing function as possible  - Some improvement in mental status on 4/21  - Pending EEG, MRI Brain, and MRA Head

## 2021-04-22 LAB
ALBUMIN SERPL ELPH-MCNC: 2 G/DL — LOW (ref 3.3–5)
ALP SERPL-CCNC: 81 U/L — SIGNIFICANT CHANGE UP (ref 40–120)
ALT FLD-CCNC: 29 U/L — SIGNIFICANT CHANGE UP (ref 10–45)
ANION GAP SERPL CALC-SCNC: 11 MMOL/L — SIGNIFICANT CHANGE UP (ref 5–17)
APTT BLD: 75.8 SEC — HIGH (ref 27.5–35.5)
AST SERPL-CCNC: 23 U/L — SIGNIFICANT CHANGE UP (ref 10–40)
BILIRUB SERPL-MCNC: 0.6 MG/DL — SIGNIFICANT CHANGE UP (ref 0.2–1.2)
BUN SERPL-MCNC: 16 MG/DL — SIGNIFICANT CHANGE UP (ref 7–23)
CALCIUM SERPL-MCNC: 8.4 MG/DL — SIGNIFICANT CHANGE UP (ref 8.4–10.5)
CHLORIDE SERPL-SCNC: 101 MMOL/L — SIGNIFICANT CHANGE UP (ref 96–108)
CO2 SERPL-SCNC: 23 MMOL/L — SIGNIFICANT CHANGE UP (ref 22–31)
CREAT SERPL-MCNC: 0.76 MG/DL — SIGNIFICANT CHANGE UP (ref 0.5–1.3)
GLUCOSE BLDC GLUCOMTR-MCNC: 143 MG/DL — HIGH (ref 70–99)
GLUCOSE BLDC GLUCOMTR-MCNC: 149 MG/DL — HIGH (ref 70–99)
GLUCOSE BLDC GLUCOMTR-MCNC: 154 MG/DL — HIGH (ref 70–99)
GLUCOSE SERPL-MCNC: 158 MG/DL — HIGH (ref 70–99)
HCT VFR BLD CALC: 33.9 % — LOW (ref 39–50)
HGB BLD-MCNC: 10.4 G/DL — LOW (ref 13–17)
MAGNESIUM SERPL-MCNC: 1.9 MG/DL — SIGNIFICANT CHANGE UP (ref 1.6–2.6)
MCHC RBC-ENTMCNC: 26.1 PG — LOW (ref 27–34)
MCHC RBC-ENTMCNC: 30.7 GM/DL — LOW (ref 32–36)
MCV RBC AUTO: 85.2 FL — SIGNIFICANT CHANGE UP (ref 80–100)
NRBC # BLD: 0 /100 WBCS — SIGNIFICANT CHANGE UP (ref 0–0)
PHOSPHATE SERPL-MCNC: 2.7 MG/DL — SIGNIFICANT CHANGE UP (ref 2.5–4.5)
PLATELET # BLD AUTO: 318 K/UL — SIGNIFICANT CHANGE UP (ref 150–400)
POTASSIUM SERPL-MCNC: 4.3 MMOL/L — SIGNIFICANT CHANGE UP (ref 3.5–5.3)
POTASSIUM SERPL-SCNC: 4.3 MMOL/L — SIGNIFICANT CHANGE UP (ref 3.5–5.3)
PROT SERPL-MCNC: 6.8 G/DL — SIGNIFICANT CHANGE UP (ref 6–8.3)
RBC # BLD: 3.98 M/UL — LOW (ref 4.2–5.8)
RBC # FLD: 15.7 % — HIGH (ref 10.3–14.5)
SODIUM SERPL-SCNC: 135 MMOL/L — SIGNIFICANT CHANGE UP (ref 135–145)
WBC # BLD: 7.88 K/UL — SIGNIFICANT CHANGE UP (ref 3.8–10.5)
WBC # FLD AUTO: 7.88 K/UL — SIGNIFICANT CHANGE UP (ref 3.8–10.5)

## 2021-04-22 PROCEDURE — 70544 MR ANGIOGRAPHY HEAD W/O DYE: CPT | Mod: 26,59

## 2021-04-22 PROCEDURE — 99232 SBSQ HOSP IP/OBS MODERATE 35: CPT

## 2021-04-22 PROCEDURE — 99233 SBSQ HOSP IP/OBS HIGH 50: CPT | Mod: GC

## 2021-04-22 PROCEDURE — 99222 1ST HOSP IP/OBS MODERATE 55: CPT | Mod: GC

## 2021-04-22 PROCEDURE — 70553 MRI BRAIN STEM W/O & W/DYE: CPT | Mod: 26

## 2021-04-22 RX ORDER — SODIUM CHLORIDE 9 MG/ML
1000 INJECTION, SOLUTION INTRAVENOUS
Refills: 0 | Status: DISCONTINUED | OUTPATIENT
Start: 2021-04-22 | End: 2021-04-23

## 2021-04-22 RX ADMIN — ERTAPENEM SODIUM 120 MILLIGRAM(S): 1 INJECTION, POWDER, LYOPHILIZED, FOR SOLUTION INTRAMUSCULAR; INTRAVENOUS at 17:26

## 2021-04-22 RX ADMIN — HEPARIN SODIUM 1200 UNIT(S)/HR: 5000 INJECTION INTRAVENOUS; SUBCUTANEOUS at 12:24

## 2021-04-22 RX ADMIN — LEVETIRACETAM 400 MILLIGRAM(S): 250 TABLET, FILM COATED ORAL at 05:34

## 2021-04-22 RX ADMIN — SODIUM CHLORIDE 75 MILLILITER(S): 9 INJECTION, SOLUTION INTRAVENOUS at 18:42

## 2021-04-22 RX ADMIN — Medication 3 MILLILITER(S): at 21:38

## 2021-04-22 RX ADMIN — LEVETIRACETAM 400 MILLIGRAM(S): 250 TABLET, FILM COATED ORAL at 18:19

## 2021-04-22 RX ADMIN — Medication 3 MILLILITER(S): at 06:13

## 2021-04-22 RX ADMIN — Medication 2: at 12:25

## 2021-04-22 RX ADMIN — Medication 3 MILLILITER(S): at 15:08

## 2021-04-22 NOTE — PROGRESS NOTE ADULT - PROBLEM SELECTOR PLAN 9
-Initiated goals of care discussion with patient's brother-in-law (listed in chart as primary point of contact); patient is full code at this time; goal is as much recovery as possible  -The patient's wife is in [place], but the patient's brother-in-law has been visiting and placing the patient's wife on facetime; she is aware of all that is happening and has happened   -Discussed further today the possibility of placing a peg tube; the patient's brother-in-law states that the patient's preference would be to receive a peg tube   - GI not offering peg tube at this time

## 2021-04-22 NOTE — CONSULT NOTE ADULT - SUBJECTIVE AND OBJECTIVE BOX
60y Male was admitted on 04-12    Patient is a 60y old  Male who presents with a chief complaint of COVID19 w/ severe metabolic acidosis s/p intubation (21 Apr 2021 12:02)    HPI:  60-year-old man with minimal past medical history other than a concern for diabetes mellitus who presented to [facility] on 4/1 with hypoxic respiratory failure in the setting of a recent diagnosis of covid-19. He was intubated in the emergency department and admitted immediately to the medical intensive care unit. At the time of presentation, the patient was noted to have a high anion gap metabolic acidosis, presumed to be secondary to lactic acidosis in the setting of profound hypoxia. On admission, the patient also was noted to have a pulmonary embolus and was managed with an intravenous heparin infusion. In the setting of persistent fevers and leukocytosis, the patient was managed for about five days on vancomycin and cefepime. On 4/8, the patient was noted to have minimal recovery of his mental status despite weaning of sedative medications, so he underwent a non-contrast ct scan of the head which demonstrated multiple areas of hypodensity, in the bilateral corona radiata/centrum semiovale, in the bilateral occipital lobes, in the right thalamus, and in the left cerebellum, concerning for multi-focal acute-to-subacute infarctions. Notably, associated with the infarction of the patient's left cerebellum was concern for mass effect and for compression of the fourth ventricle with concern for the development of obstructive hydrocephalus. A cta of the head and neck was ordered and demonstrated an obstruction in the left posterior inferior cerebellar artery. Out of concern for worsening hemorrhagic conversion, the patient's therapeutic anticoagulation was discontinued. He was evaluated by the neurosurgical team, which said that there was no indication for acute intervention. The patient underwent an EEG, which did not demonstrate any seizures or epilleptiform activity. On 4/11, the patient's therapeutic anticoagulation was restarted, and on 4/12, he was transferred to [facility] out of need for load management and for further care. On 4/13, the patient was successfully extubated. In the setting of persistent, low-grade temperature elevations and tachycardia, the patient had blood and sputum cultures drawn, and on 4/14, his sputum cultures grew extended spectrum beta-lactamase resistant klebsiella, so the patient was administered intravenous meropenem. He was evaluated by physical therapy, occupational therapy, and speech/swallow teams, which were able to perform very limited evaluations in the setting of the patient's persistent alteration in mental status and limited ability to communicate. The patient was transferred to the internal medicine floor on 4/17. He has been maintained npo with tube feeds; however, he has pulled out his ng tube. Multiple attempts at replacing the patient’s ng tube were unsuccessful. PLan for PEG tube. 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, no intervention recommended     Hospital Course:      TODAY'S SUBJECTIVE HX:    REVIEW OF SYSTEMS  Constitutional - No fever, No weight loss, No fatigue  HEENT - No eye pain, No visual disturbances, No difficulty hearing, No tinnitus, No vertigo, No neck pain  Respiratory - No cough, No wheezing, No shortness of breath  Cardiovascular - No chest pain, No palpitations  Gastrointestinal - No abdominal pain, No nausea, No vomiting, No diarrhea, No constipation  Genitourinary - No dysuria, No frequency, No hematuria, No incontinence  Neurological - No headaches, No memory loss, No loss of strength, No numbness, No tremors  Skin - No itching, No rashes, No lesions   Endocrine - No temperature intolerance  Musculoskeletal - No joint pain, No joint swelling, No muscle pain  Psychiatric - No depression, No anxiety    VITALS  T(C): 36.6 (04-21-21 @ 21:43), Max: 37.2 (04-21-21 @ 01:07)  HR: 98 (04-21-21 @ 21:43) (96 - 99)  BP: 109/67 (04-21-21 @ 21:43) (104/69 - 120/80)  RR: 18 (04-21-21 @ 21:43) (18 - 18)  SpO2: 97% (04-21-21 @ 21:43) (96% - 100%)  Wt(kg): --    PAST MEDICAL & SURGICAL HISTORY  No pertinent past medical history    No significant past surgical history        SOCIAL HISTORY  Smoking - Denied  EtOH - Denied   Drugs - Denied    FUNCTIONAL HISTORY  Pt lives in apartment with wife, however wife is currently in [place]. PTA pt was independent with ambulation and ADL's and was employed.    CURRENT FUNCTIONAL STATUS 4/21  Bed mobility: max a    FAMILY HISTORY   No pertinent family history in first degree relatives        RECENT LABS/IMAGING  CBC Full  -  ( 21 Apr 2021 07:11 )  WBC Count : 10.07 K/uL  RBC Count : 3.87 M/uL  Hemoglobin : 10.1 g/dL  Hematocrit : 33.6 %  Platelet Count - Automated : 330 K/uL  Mean Cell Volume : 86.8 fl  Mean Cell Hemoglobin : 26.1 pg  Mean Cell Hemoglobin Concentration : 30.1 gm/dL  Auto Neutrophil # : x  Auto Lymphocyte # : x  Auto Monocyte # : x  Auto Eosinophil # : x  Auto Basophil # : x  Auto Neutrophil % : x  Auto Lymphocyte % : x  Auto Monocyte % : x  Auto Eosinophil % : x  Auto Basophil % : x    04-20    137  |  103  |  27<H>  ----------------------------<  112<H>  4.6   |  21<L>  |  1.00    Ca    8.4      20 Apr 2021 07:03  Phos  3.8     04-20  Mg     2.2     04-20          ALLERGIES  No Known Allergies      MEDICATIONS   albuterol/ipratropium for Nebulization 3 milliLiter(s) Nebulizer every 8 hours  cholecalciferol 400 Unit(s) Oral daily  dextrose 40% Gel 15 Gram(s) Oral once  dextrose 5% + lactated ringers. 1000 milliLiter(s) IV Continuous <Continuous>  dextrose 5%. 1000 milliLiter(s) IV Continuous <Continuous>  dextrose 5%. 1000 milliLiter(s) IV Continuous <Continuous>  dextrose 50% Injectable 25 Gram(s) IV Push once  dextrose 50% Injectable 12.5 Gram(s) IV Push once  dextrose 50% Injectable 25 Gram(s) IV Push once  ertapenem  IVPB 1000 milliGRAM(s) IV Intermittent every 24 hours  glucagon  Injectable 1 milliGRAM(s) IntraMuscular once  heparin   Injectable 5500 Unit(s) IV Push every 6 hours PRN  heparin   Injectable 2500 Unit(s) IV Push every 6 hours PRN  heparin  Infusion.  Unit(s)/Hr IV Continuous <Continuous>  insulin lispro (ADMELOG) corrective regimen sliding scale   SubCutaneous every 6 hours  levETIRAcetam  IVPB 500 milliGRAM(s) IV Intermittent every 12 hours  multivitamin/minerals Oral Solution (WELLESSE) 30 milliLiter(s) Oral daily    60 year old man with prolonged hospitalization for COVID-19, DKA, PE, and CVA with hemorrhagic conversion and fevers suspected 2/2 to aspiration PNA (klebsiella), dysphagia  #Resp (COVID, PE, ASp PNA) - abx, on AC (cleared by NSGY)  # CVA - ct head stable, no need for nsgy interveiontion, eeg negative  # DKA  # Nutrition       60y Male was admitted on 04-12    Patient is a 60y old  Male who presents with a chief complaint of COVID19 w/ severe metabolic acidosis s/p intubation (21 Apr 2021 12:02)    HPI:  60-year-old man with minimal past medical history other than a concern for diabetes mellitus who presented to [facility] on 4/1 with hypoxic respiratory failure in the setting of a recent diagnosis of covid-19. He was intubated in the emergency department and admitted immediately to the medical intensive care unit. At the time of presentation, the patient was noted to have a high anion gap metabolic acidosis, presumed to be secondary to lactic acidosis in the setting of profound hypoxia. On admission, the patient also was noted to have a pulmonary embolus and was managed with an intravenous heparin infusion. In the setting of persistent fevers and leukocytosis, the patient was managed for about five days on vancomycin and cefepime. On 4/8, the patient was noted to have minimal recovery of his mental status despite weaning of sedative medications, so he underwent a non-contrast ct scan of the head which demonstrated multiple areas of hypodensity, in the bilateral corona radiata/centrum semiovale, in the bilateral occipital lobes, in the right thalamus, and in the left cerebellum, concerning for multi-focal acute-to-subacute infarctions. Notably, associated with the infarction of the patient's left cerebellum was concern for mass effect and for compression of the fourth ventricle with concern for the development of obstructive hydrocephalus. A cta of the head and neck was ordered and demonstrated an obstruction in the left posterior inferior cerebellar artery. Out of concern for worsening hemorrhagic conversion, the patient's therapeutic anticoagulation was discontinued. He was evaluated by the neurosurgical team, which said that there was no indication for acute intervention. The patient underwent an EEG, which did not demonstrate any seizures or epilleptiform activity. On 4/11, the patient's therapeutic anticoagulation was restarted, and on 4/12, he was transferred to [facility] out of need for load management and for further care. On 4/13, the patient was successfully extubated. In the setting of persistent, low-grade temperature elevations and tachycardia, the patient had blood and sputum cultures drawn, and on 4/14, his sputum cultures grew extended spectrum beta-lactamase resistant klebsiella, so the patient was administered intravenous meropenem. He was evaluated by physical therapy, occupational therapy, and speech/swallow teams, which were able to perform very limited evaluations in the setting of the patient's persistent alteration in mental status and limited ability to communicate. The patient was transferred to the internal medicine floor on 4/17. He has been maintained npo with tube feeds; however, he has pulled out his ng tube. Multiple attempts at replacing the patient’s ng tube were unsuccessful. PLan for PEG tube. 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, no intervention recommended           TODAY'S SUBJECTIVE HX:  Patient does not provide much subjective Hx     VITALS  T(C): 36.6 (04-21-21 @ 21:43), Max: 37.2 (04-21-21 @ 01:07)  HR: 98 (04-21-21 @ 21:43) (96 - 99)  BP: 109/67 (04-21-21 @ 21:43) (104/69 - 120/80)  RR: 18 (04-21-21 @ 21:43) (18 - 18)  SpO2: 97% (04-21-21 @ 21:43) (96% - 100%)  Wt(kg): --    PAST MEDICAL & SURGICAL HISTORY  No pertinent past medical history    No significant past surgical history      FUNCTIONAL HISTORY  Pt lives in apartment with wife, however wife is currently in [place]. PTA pt was independent with ambulation and ADL's and was employed.    CURRENT FUNCTIONAL STATUS 4/21  Bed mobility: max a    FAMILY HISTORY   No pertinent family history in first degree relatives        RECENT LABS/IMAGING  CBC Full  -  ( 21 Apr 2021 07:11 )  WBC Count : 10.07 K/uL  RBC Count : 3.87 M/uL  Hemoglobin : 10.1 g/dL  Hematocrit : 33.6 %  Platelet Count - Automated : 330 K/uL  Mean Cell Volume : 86.8 fl  Mean Cell Hemoglobin : 26.1 pg  Mean Cell Hemoglobin Concentration : 30.1 gm/dL  Auto Neutrophil # : x  Auto Lymphocyte # : x  Auto Monocyte # : x  Auto Eosinophil # : x  Auto Basophil # : x  Auto Neutrophil % : x  Auto Lymphocyte % : x  Auto Monocyte % : x  Auto Eosinophil % : x  Auto Basophil % : x    04-20    137  |  103  |  27<H>  ----------------------------<  112<H>  4.6   |  21<L>  |  1.00    Ca    8.4      20 Apr 2021 07:03  Phos  3.8     04-20  Mg     2.2     04-20          ALLERGIES  No Known Allergies      MEDICATIONS   albuterol/ipratropium for Nebulization 3 milliLiter(s) Nebulizer every 8 hours  cholecalciferol 400 Unit(s) Oral daily  dextrose 40% Gel 15 Gram(s) Oral once  dextrose 5% + lactated ringers. 1000 milliLiter(s) IV Continuous <Continuous>  dextrose 5%. 1000 milliLiter(s) IV Continuous <Continuous>  dextrose 5%. 1000 milliLiter(s) IV Continuous <Continuous>  dextrose 50% Injectable 25 Gram(s) IV Push once  dextrose 50% Injectable 12.5 Gram(s) IV Push once  dextrose 50% Injectable 25 Gram(s) IV Push once  ertapenem  IVPB 1000 milliGRAM(s) IV Intermittent every 24 hours  glucagon  Injectable 1 milliGRAM(s) IntraMuscular once  heparin   Injectable 5500 Unit(s) IV Push every 6 hours PRN  heparin   Injectable 2500 Unit(s) IV Push every 6 hours PRN  heparin  Infusion.  Unit(s)/Hr IV Continuous <Continuous>  insulin lispro (ADMELOG) corrective regimen sliding scale   SubCutaneous every 6 hours  levETIRAcetam  IVPB 500 milliGRAM(s) IV Intermittent every 12 hours  multivitamin/minerals Oral Solution (WELLESSE) 30 milliLiter(s) Oral daily    ----------------------------------------------------------------------------------------  PHYSICAL EXAM  Constitutional - NAD, Comfortable  HEENT - NCAT, EOMI  Neck - Supple, No limited ROM  Chest - Breathing comfortably, No wheezing  Cardiovascular - S1S2   Abdomen - Soft   Extremities - No C/C/E, No calf tenderness   Neurologic Exam -                    Cognitive - Awake, Alert, AAO to self, place, date, year, situation     Communication - Fluent, No dysarthria     Cranial Nerves - CN 2-12 intact     Motor - No focal deficits                    LEFT    UE - ShAB 5/5, EF 5/5, EE 5/5, WE 5/5,  5/5                    RIGHT UE - ShAB 5/5, EF 5/5, EE 5/5, WE 5/5,  5/5                    LEFT    LE - HF 5/5, KE 5/5, DF 5/5, PF 5/5                    RIGHT LE - HF 5/5, KE 5/5, DF 5/5, PF 5/5        Sensory - Intact to LT     Reflexes - DTR Intact, No primitive reflexive     Coordination - FTN intact     OculoVestibular - No saccades, No nystagmus, VOR         Balance - WNL Static  Psychiatric - Mood stable, Affect WNL    ------------------------------------------------------------------------------------------------  ASSESSMENT/PLAN    60 year old man with prolonged hospitalization for COVID-19, DKA, PE, and CVA with hemorrhagic conversion and fevers suspected 2/2 to aspiration PNA (klebsiella), dysphagia  #Resp (COVID, PE, ASp PNA) - abx, on AC (cleared by NSGY)  # CVA - ct head stable, no need for nsgy interveiontion, eeg negative  # DKA  # Nutrition    Pain - Tylenol  DVT PPX - SCDs    Disposition -  PT - ROM, Bed Mob, Transfers, Amb with AD   OT - ADLs, ROM  SLP - Dysphagia eval and treat  Precautions - Falls, Cardiac  Weight bearing status -  Skin - Turn Q2hrs  Diet -              60y Male was admitted on 04-12    Patient is a 60y old  Male who presents with a chief complaint of COVID19 w/ severe metabolic acidosis s/p intubation (21 Apr 2021 12:02)    HPI:  60-year-old man with minimal past medical history other than a concern for diabetes mellitus who presented to [facility] on 4/1 with hypoxic respiratory failure in the setting of a recent diagnosis of covid-19. He was intubated in the emergency department and admitted immediately to the medical intensive care unit. At the time of presentation, the patient was noted to have a high anion gap metabolic acidosis, presumed to be secondary to lactic acidosis in the setting of profound hypoxia. On admission, the patient also was noted to have a pulmonary embolus and was managed with an intravenous heparin infusion. In the setting of persistent fevers and leukocytosis, the patient was managed for about five days on vancomycin and cefepime. On 4/8, the patient was noted to have minimal recovery of his mental status despite weaning of sedative medications, so he underwent a non-contrast ct scan of the head which demonstrated multiple areas of hypodensity, in the bilateral corona radiata/centrum semiovale, in the bilateral occipital lobes, in the right thalamus, and in the left cerebellum, concerning for multi-focal acute-to-subacute infarctions. Notably, associated with the infarction of the patient's left cerebellum was concern for mass effect and for compression of the fourth ventricle with concern for the development of obstructive hydrocephalus. A cta of the head and neck was ordered and demonstrated an obstruction in the left posterior inferior cerebellar artery. Out of concern for worsening hemorrhagic conversion, the patient's therapeutic anticoagulation was discontinued. He was evaluated by the neurosurgical team, which said that there was no indication for acute intervention. The patient underwent an EEG, which did not demonstrate any seizures or epilleptiform activity. On 4/11, the patient's therapeutic anticoagulation was restarted, and on 4/12, he was transferred to [facility] out of need for load management and for further care. On 4/13, the patient was successfully extubated. In the setting of persistent, low-grade temperature elevations and tachycardia, the patient had blood and sputum cultures drawn, and on 4/14, his sputum cultures grew extended spectrum beta-lactamase resistant klebsiella, so the patient was administered intravenous meropenem. He was evaluated by physical therapy, occupational therapy, and speech/swallow teams, which were able to perform very limited evaluations in the setting of the patient's persistent alteration in mental status and limited ability to communicate. The patient was transferred to the internal medicine floor on 4/17. He has been maintained npo with tube feeds; however, he has pulled out his ng tube. Multiple attempts at replacing the patient’s ng tube were unsuccessful. PLan for PEG tube. 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, no intervention recommended           TODAY'S SUBJECTIVE HX:  Patient does not provide much subjective Hx     VITALS  T(C): 36.6 (04-21-21 @ 21:43), Max: 37.2 (04-21-21 @ 01:07)  HR: 98 (04-21-21 @ 21:43) (96 - 99)  BP: 109/67 (04-21-21 @ 21:43) (104/69 - 120/80)  RR: 18 (04-21-21 @ 21:43) (18 - 18)  SpO2: 97% (04-21-21 @ 21:43) (96% - 100%)  Wt(kg): --    PAST MEDICAL & SURGICAL HISTORY  No pertinent past medical history    No significant past surgical history      FUNCTIONAL HISTORY  Pt lives in apartment with wife, however wife is currently in [place]. PTA pt was independent with ambulation and ADL's and was employed.    CURRENT FUNCTIONAL STATUS 4/21  Bed mobility: max a    FAMILY HISTORY   No pertinent family history in first degree relatives        RECENT LABS/IMAGING  CBC Full  -  ( 21 Apr 2021 07:11 )  WBC Count : 10.07 K/uL  RBC Count : 3.87 M/uL  Hemoglobin : 10.1 g/dL  Hematocrit : 33.6 %  Platelet Count - Automated : 330 K/uL  Mean Cell Volume : 86.8 fl  Mean Cell Hemoglobin : 26.1 pg  Mean Cell Hemoglobin Concentration : 30.1 gm/dL  Auto Neutrophil # : x  Auto Lymphocyte # : x  Auto Monocyte # : x  Auto Eosinophil # : x  Auto Basophil # : x  Auto Neutrophil % : x  Auto Lymphocyte % : x  Auto Monocyte % : x  Auto Eosinophil % : x  Auto Basophil % : x    04-20    137  |  103  |  27<H>  ----------------------------<  112<H>  4.6   |  21<L>  |  1.00    Ca    8.4      20 Apr 2021 07:03  Phos  3.8     04-20  Mg     2.2     04-20          ALLERGIES  No Known Allergies      MEDICATIONS   albuterol/ipratropium for Nebulization 3 milliLiter(s) Nebulizer every 8 hours  cholecalciferol 400 Unit(s) Oral daily  dextrose 40% Gel 15 Gram(s) Oral once  dextrose 5% + lactated ringers. 1000 milliLiter(s) IV Continuous <Continuous>  dextrose 5%. 1000 milliLiter(s) IV Continuous <Continuous>  dextrose 5%. 1000 milliLiter(s) IV Continuous <Continuous>  dextrose 50% Injectable 25 Gram(s) IV Push once  dextrose 50% Injectable 12.5 Gram(s) IV Push once  dextrose 50% Injectable 25 Gram(s) IV Push once  ertapenem  IVPB 1000 milliGRAM(s) IV Intermittent every 24 hours  glucagon  Injectable 1 milliGRAM(s) IntraMuscular once  heparin   Injectable 5500 Unit(s) IV Push every 6 hours PRN  heparin   Injectable 2500 Unit(s) IV Push every 6 hours PRN  heparin  Infusion.  Unit(s)/Hr IV Continuous <Continuous>  insulin lispro (ADMELOG) corrective regimen sliding scale   SubCutaneous every 6 hours  levETIRAcetam  IVPB 500 milliGRAM(s) IV Intermittent every 12 hours  multivitamin/minerals Oral Solution (WELLESSE) 30 milliLiter(s) Oral daily    ----------------------------------------------------------------------------------------  PHYSICAL EXAM  Constitutional - NAD,   HEENT - NCAT,   Chest - Breathing comfortably, No wheezing  Abdomen - Soft   Extremities - bilateral digital toe ischemia  Neurologic Exam -                    Cognitive - Awake, Alert, AAO to self, not to location, time, situation     Communication - follows some simple command, + dysarthria     Cranial Nerves - no facial asymmetry     Motor - moves right upper extrem more spontanously than left                    LEFT    UE - grossly 4/5                    RIGHT UE - grossly 3/5  does not move bilaterl LE  Tone increased in bilateral quadriceps, improves with ROM     Sensory - withdraws to pain on the left UE  Psychiatric - flat    ------------------------------------------------------------------------------------------------  ASSESSMENT/PLAN    60 year old man with prolonged hospitalization for COVID-19, DKA, PE, and CVA with hemorrhagic conversion and fevers suspected 2/2 to aspiration PNA (klebsiella), dysphagia  #Resp (COVID, PE, ASp PNA) - abx, on AC (cleared by NSGY)  # CVA - ct head stable, no need for nsgy intervention eeg negative, keppra for seizure ppx  # Nutrition - npo awaiting PEG    Pain - Tylenol  DVT PPX - heparin drip    Disposition -  PT - ROM, Bed Mob, Transfers, Amb with AD   OT - ADLs, ROM  SLP - Dysphagia eval and treat  Precautions - Falls  Skin - Turn Q2hrs    Rehab -   When patient is medically stable, tolerating oral/peg diet/medication, off of IV heparin Recommend ACUTE inpatient rehabilitation for the functional deficits consisting of 3 hours of therapy/day & 24 hour RN/daily PMR physician for comorbid medical management. Will continue to follow for ongoing rehab needs and recommendations. Patient will be able to tolerate 3 hours a day.

## 2021-04-22 NOTE — PROGRESS NOTE ADULT - SUBJECTIVE AND OBJECTIVE BOX
Irina Rodriguez MD  PGY 1 Department of Internal Medicine  Pager: [phone], [phone] MEGANJ      Patient is a 60y old  Male who presents with a chief complaint of COVID19 w/ severe metabolic acidosis s/p intubation (22 Apr 2021 00:01)      SUBJECTIVE / OVERNIGHT EVENTS: Pt seen and examined. No acute overnight events.   Denies fevers, chills, CP/palpitations, SOB/cough, abdominal pain, N/V, constipation, or diarrhea.        MEDICATIONS  (STANDING):  albuterol/ipratropium for Nebulization 3 milliLiter(s) Nebulizer every 8 hours  cholecalciferol 400 Unit(s) Oral daily  dextrose 40% Gel 15 Gram(s) Oral once  dextrose 5% + lactated ringers. 1000 milliLiter(s) (75 mL/Hr) IV Continuous <Continuous>  dextrose 5%. 1000 milliLiter(s) (50 mL/Hr) IV Continuous <Continuous>  dextrose 5%. 1000 milliLiter(s) (100 mL/Hr) IV Continuous <Continuous>  dextrose 50% Injectable 25 Gram(s) IV Push once  dextrose 50% Injectable 12.5 Gram(s) IV Push once  dextrose 50% Injectable 25 Gram(s) IV Push once  ertapenem  IVPB 1000 milliGRAM(s) IV Intermittent every 24 hours  glucagon  Injectable 1 milliGRAM(s) IntraMuscular once  heparin  Infusion.  Unit(s)/Hr (12 mL/Hr) IV Continuous <Continuous>  insulin lispro (ADMELOG) corrective regimen sliding scale   SubCutaneous every 6 hours  levETIRAcetam  IVPB 500 milliGRAM(s) IV Intermittent every 12 hours  multivitamin/minerals Oral Solution (WELLESSE) 30 milliLiter(s) Oral daily    MEDICATIONS  (PRN):  heparin   Injectable 5500 Unit(s) IV Push every 6 hours PRN For aPTT less than 40  heparin   Injectable 2500 Unit(s) IV Push every 6 hours PRN For aPTT between 40 - 57      I&O's Summary    21 Apr 2021 07:01  -  22 Apr 2021 07:00  --------------------------------------------------------  IN: 0 mL / OUT: 200 mL / NET: -200 mL        Vital Signs Last 24 Hrs  T(C): 37 (22 Apr 2021 04:22), Max: 37 (22 Apr 2021 04:22)  T(F): 98.6 (22 Apr 2021 04:22), Max: 98.6 (22 Apr 2021 04:22)  HR: 100 (22 Apr 2021 06:21) (94 - 100)  BP: 132/79 (22 Apr 2021 04:22) (104/69 - 132/79)  BP(mean): --  RR: 18 (22 Apr 2021 04:22) (18 - 18)  SpO2: 97% (22 Apr 2021 06:21) (95% - 100%)    CAPILLARY BLOOD GLUCOSE      POCT Blood Glucose.: 149 mg/dL (22 Apr 2021 05:44)  POCT Blood Glucose.: 133 mg/dL (21 Apr 2021 23:51)  POCT Blood Glucose.: 176 mg/dL (21 Apr 2021 17:39)  POCT Blood Glucose.: 171 mg/dL (21 Apr 2021 12:16)      PHYSICAL EXAM:  GENERAL: NAD,   HEAD:  Atraumatic, Normocephalic  EYES: EOMI, PERRL, conjunctiva and sclera clear  NECK: No JVD  CHEST/LUNG: Clear to auscultation bilaterally; No wheeze  HEART: Regular rate and rhythm; No murmurs, rubs, or gallops  ABDOMEN: Soft, Nontender, Nondistended; Bowel sounds present  EXTREMITIES:  2+ Peripheral Pulses, No clubbing, cyanosis, or edema  PSYCH: AAOx3  NEUROLOGY: non-focal  SKIN: No rashes or lesions       LABS:                        10.1   10.07 )-----------( 330      ( 21 Apr 2021 07:11 )             33.6     Auto Eosinophil # x     / Auto Eosinophil % x     / Auto Neutrophil # x     / Auto Neutrophil % x     / BANDS % x            PTT - ( 21 Apr 2021 07:11 )  PTT:82.4 sec

## 2021-04-22 NOTE — PROGRESS NOTE ADULT - ATTENDING COMMENTS
60 yr male with PMH of DM who was initially admitted to [facility] ICU for hypoxic respiratory failure 2/2 COVID c/b PE with R heart strain, cardiogenic and septic shock, ischemic and hemorrhagic CVA and ESBL klebsiella ventilator associate PNA, transferred to [facility] for neurosurgery eval.  No acute overnight events.   1) Acute hypoxic resp failure: secondary to Ventilator associated ESBL klebsiella pneumonia, possible aspiration PNA. Cont ertapenem per ID recs. Still requiring 4L NC. Appreciate ongoing pulm recs.  2) Acute CVA: likely thrombotic complication of recent COVID infection. significant cognitive, speech, swallow, motor deficits. Appreciate neurology recs - pending MRI and EEG. PT/OT. Acute rehab on discharge.  3) PE/DVT: related to recent COVID infection. cont with hep gtt for now. eventual transition to oral meds  4) Dysphagia: SLP to reeval pt tomorrow. If tolerates, can start diet. Otherwise will need GI consult for PEG tube. Pt does not tolerate NGT placement  5) COVID: s/p treatment; supportive care  6) Dry gangrenous appearance of several toes. Appreciate Podiatry consult. No acute intervention

## 2021-04-22 NOTE — PROGRESS NOTE ADULT - ASSESSMENT
Mr Pizarro is a 60 year old man with prolonged hospitalization for COVID-19, DKA, and CVA with hemorrhagic conversion who has developed new fevers.   Suspect that the patient is aspirating, though no clear aspiration pneumonia on imaging at this time. Downtrending leukocytosis.   Sputum culture on 4/13 with ESBL klebsiella.     Fevers - suspect 2/2 aspiration PNA  - cont ertapenem 1g daily - complete today - was on meropenem prior to ertapenem   - cultures negative  - Monitor for additional fevers  - Trend WBCs - better  - asp precautions   - define GOC     Chan Alaniz  Attending Physician   Division of Infectious Disease  Pager #[phone]  Available on Microsoft Teams also  After 5pm/weekend or no response, call #[phone]

## 2021-04-22 NOTE — CHART NOTE - NSCHARTNOTEFT_GEN_A_CORE
60M unknown medical history BIBEMS for respiratory distress, recently COVID+ as per EMS. Pt unable to comply with history 2/2 resp distress, nods yes to difficulty breathing, no to pain. Baseline AAOx3. En route, pt had RR 28, SaO2 60%. In ED initially hypoxic to 60% placed on NRB and satting low 90s and tachypneic to 40s on NRB. Placed on AVAPS, still tachypneic to 40s. Labs showing HAGMA, elevated FSG to 500s. Also with lactate of 15. Trops of 200 and pro-BNP elevated to 21k. MICU consulted for respiratory failure in setting of COVID, also concern for DKA. On encounter, pt is noncooperative with questioning. Nods and tries to speak, unable to form full sentences and visibly tachypneic on AVAPS. In ED given 2L NS, decadron. Started on insulin gtt. Subsequently intubated  Extubated 4/13. Found to have acute CVA w/ hemorraghic conversion. +infarcts L cerebellar, L posterior/frontal, b/l occipital/parietal, hemorrhagic transformation. Repeat HCT (4/12): no changes. CTA Chest (4/1): +RITO PE with RV strain.  Pt on Heparin.  Neurology following-> Multi-factorial impaired level of arousal due to global cerebral dysfunction likely from combination of COVID-19, associated hypoxia, persistent pharmacologic effects of overdose of multiple sedatives at [facility], precipitous drop in BP (while at [facility]) and multiple metabolic derangements. ID following-> +fevers, suspect aspiration.    Pt seen today for reassessment of swallow to determine safety of initiating an oral diet and speech-language therapy to improve functional communication.     Pt found in bed, sleeping upon encounter. Arouses easily to voice. Family (cousin) at bedside. Family requesting to translate. Language line with [place]  #[id number], [place] in background if needed. Pt with poor eye contact. +Hypophonia with improvement given increased breath support, suspect deconditioning component. Fair intelligibility due to hypophonia. Pt nods y/n intermittently to simple questions and intermittently follows simple 1-step commands ~25%. Pt fatigues easily and requires ret breaks. Instructions given to family to speak in short, simple sentences, allow increased time to respond. Family playing music in background to provide auditory stimulation. Pt nods "Yes" when asked if he enjoys.     Pt has baseline cough (wet, non-productive). Given PO trials of crushed ice chips x2 and 1/2-3/4 teaspoons of nectar thick liquid x2 and thin liquid x2 (family brought in tea and requesting to trial)-> significantly delayed pharyngeal swallows, +hyolaryngeal excursion upon palpation and increased cough post swallow noted. Immediate cough following 1/2 trials of thin liquid. No further PO trials administered. Pt requires rest breaks between intakes due to fatigue factor. Education to family provided regarding s/s of aspiration, rationale for recommendation to remain npo (including tea brought by family) and continued st services to progress to oral diet.  Although patient not ready to start oral diet, he is demonstrating improvement in cognitive status and ability to participate in therapy. Family verbalized understanding. Purposeful proactive rounding completed.     D/W NICK Galvan, family, patient, and MD Rodriguez.     Impressions: Pt presenting with improvement in cognitive status and ability to participate in therapy. Pt presents with evidence of an oropharyngeal dysphagia marked by delayed pharyngeal swallows and s/s indicative of laryngeal penetration/aspiration given thick and thin fluids. Although patient not demonstrating readiness to advance to an oral diet at this time, ability to accept PO trials is an improvement. Pt will need objective swallow study when clinicially ready to determine safety of oral diet.   Recommendations: Continue NPO, with non-oral nutrition/hydration/medications. Maintain good oral hygiene and aspiration precautions. Will continue to follow. Recommend rehab upon discharge.      Kaylin Bardales #[phone]   Speech pathology 60M unknown medical history BIBEMS for respiratory distress, recently COVID+ as per EMS. Pt unable to comply with history 2/2 resp distress, nods yes to difficulty breathing, no to pain. Baseline AAOx3. En route, pt had RR 28, SaO2 60%. In ED initially hypoxic to 60% placed on NRB and satting low 90s and tachypneic to 40s on NRB. Placed on AVAPS, still tachypneic to 40s. Labs showing HAGMA, elevated FSG to 500s. Also with lactate of 15. Trops of 200 and pro-BNP elevated to 21k. MICU consulted for respiratory failure in setting of COVID, also concern for DKA. On encounter, pt is noncooperative with questioning. Nods and tries to speak, unable to form full sentences and visibly tachypneic on AVAPS. In ED given 2L NS, decadron. Started on insulin gtt. Subsequently intubated  Extubated 4/13. Found to have acute CVA w/ hemorraghic conversion. +infarcts L cerebellar, L posterior/frontal, b/l occipital/parietal, hemorrhagic transformation. Repeat HCT (4/12): no changes. CTA Chest (4/1): +RITO PE with RV strain.  Pt on Heparin.  Neurology following-> Multi-factorial impaired level of arousal due to global cerebral dysfunction likely from combination of COVID-19, associated hypoxia, persistent pharmacologic effects of overdose of multiple sedatives at [facility], precipitous drop in BP (while at [facility]) and multiple metabolic derangements. ID following-> +fevers, suspect aspiration.    Pt seen today for reassessment of swallow to determine safety of initiating an oral diet and speech-language therapy to improve functional communication.     Pt found in bed, sleeping upon encounter. Arouses easily to voice. Family (cousin) at bedside. Family requesting to translate. Language line with [place]  #[id number], [place] in background if needed. Pt with poor eye contact. +Hypophonia with improvement given increased breath support, suspect deconditioning component. Fair intelligibility due to hypophonia. Pt nods y/n intermittently to simple questions and intermittently follows simple 1-step commands ~25%. Pt fatigues easily and requires ret breaks. Instructions given to family to speak in short, simple sentences, allow increased time to respond. Family playing music in background to provide auditory stimulation. Pt nods "Yes" when asked if he enjoys.     Pt has baseline cough (wet, non-productive). Given PO trials of crushed ice chips x2 and 1/2-3/4 teaspoons of nectar thick liquid x2 and thin liquid x2 (family brought in tea and requesting to trial)-> significantly delayed pharyngeal swallows, +hyolaryngeal excursion upon palpation and increased cough post swallow noted. Immediate cough following 1/2 trials of thin liquid. No further PO trials administered. Pt requires rest breaks between intakes due to fatigue factor. Education to family provided regarding s/s of aspiration, rationale for recommendation to remain npo (including tea brought by family) and continued st services to progress to oral diet.  Although patient not ready to start oral diet, he is demonstrating improvement in cognitive status and ability to participate in therapy. Family verbalized understanding. Purposeful proactive rounding completed.     D/W NICK Galvan, family, patient, and MD Rodriguez.     Impressions: Pt presenting with improvement in cognitive status and ability to participate in therapy. Pt presents with evidence of an oropharyngeal dysphagia marked by delayed pharyngeal swallows and s/s indicative of laryngeal penetration/aspiration given thick and thin fluids. Although patient not demonstrating readiness to advance to an oral diet at this time, ability to accept PO trials is an improvement. Pt will need objective swallow study when clinicially ready to determine safety of oral diet.   Recommendations: Continue NPO, with non-oral nutrition/hydration/medications. Maintain good oral hygiene and aspiration precautions. Will continue to follow. Recommend rehab upon discharge.  Monitor voice and if no improvement, consider ENT consult.     Kaylin Bardales #[phone]   Speech pathology

## 2021-04-22 NOTE — PROGRESS NOTE ADULT - PROBLEM SELECTOR PLAN 8
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   Significant lower extremity arterial disease not identified. The only abnormality on this examination is the decreased amplitude to the pulse volume recordings at the levels of the toes bilaterally.  - Podiatry recs appreciated, no acute surgical intervention at this time, can apply betadine to wound

## 2021-04-22 NOTE — PROGRESS NOTE ADULT - SUBJECTIVE AND OBJECTIVE BOX
ADRIANA BOOKER 60y [id number]    Patient is a 60y old  Male who presents with a chief complaint of COVID19 w/ severe metabolic acidosis s/p intubation (22 Apr 2021 07:06)      Follow Up/CC:  ID following for fever    Interval History/ROS: no fever    Allergies    No Known Allergies    Intolerances        ANTIMICROBIALS:  ertapenem  IVPB 1000 every 24 hours      MEDICATIONS  (STANDING):  albuterol/ipratropium for Nebulization 3 milliLiter(s) Nebulizer every 8 hours  cholecalciferol 400 Unit(s) Oral daily  dextrose 40% Gel 15 Gram(s) Oral once  dextrose 5% + lactated ringers. 1000 milliLiter(s) (75 mL/Hr) IV Continuous <Continuous>  dextrose 5%. 1000 milliLiter(s) (50 mL/Hr) IV Continuous <Continuous>  dextrose 5%. 1000 milliLiter(s) (100 mL/Hr) IV Continuous <Continuous>  dextrose 50% Injectable 25 Gram(s) IV Push once  dextrose 50% Injectable 12.5 Gram(s) IV Push once  dextrose 50% Injectable 25 Gram(s) IV Push once  ertapenem  IVPB 1000 milliGRAM(s) IV Intermittent every 24 hours  glucagon  Injectable 1 milliGRAM(s) IntraMuscular once  heparin  Infusion.  Unit(s)/Hr (12 mL/Hr) IV Continuous <Continuous>  insulin lispro (ADMELOG) corrective regimen sliding scale   SubCutaneous every 6 hours  levETIRAcetam  IVPB 500 milliGRAM(s) IV Intermittent every 12 hours  multivitamin/minerals Oral Solution (WELLESSE) 30 milliLiter(s) Oral daily    MEDICATIONS  (PRN):  heparin   Injectable 5500 Unit(s) IV Push every 6 hours PRN For aPTT less than 40  heparin   Injectable 2500 Unit(s) IV Push every 6 hours PRN For aPTT between 40 - 57        Vital Signs Last 24 Hrs  T(C): 36.8 (22 Apr 2021 16:28), Max: 37.1 (22 Apr 2021 08:06)  T(F): 98.2 (22 Apr 2021 16:28), Max: 98.7 (22 Apr 2021 08:06)  HR: 106 (22 Apr 2021 16:28) (89 - 108)  BP: 122/77 (22 Apr 2021 16:28) (109/67 - 134/84)  BP(mean): --  RR: 20 (22 Apr 2021 16:28) (18 - 20)  SpO2: 96% (22 Apr 2021 16:28) (95% - 98%)    CBC Full  -  ( 22 Apr 2021 07:18 )  WBC Count : 7.88 K/uL  RBC Count : 3.98 M/uL  Hemoglobin : 10.4 g/dL  Hematocrit : 33.9 %  Platelet Count - Automated : 318 K/uL  Mean Cell Volume : 85.2 fl  Mean Cell Hemoglobin : 26.1 pg  Mean Cell Hemoglobin Concentration : 30.7 gm/dL  Auto Neutrophil # : x  Auto Lymphocyte # : x  Auto Monocyte # : x  Auto Eosinophil # : x  Auto Basophil # : x  Auto Neutrophil % : x  Auto Lymphocyte % : x  Auto Monocyte % : x  Auto Eosinophil % : x  Auto Basophil % : x    04-22    135  |  101  |  16  ----------------------------<  158<H>  4.3   |  23  |  0.76    Ca    8.4      22 Apr 2021 07:16  Phos  2.7     04-22  Mg     1.9     04-22    TPro  6.8  /  Alb  2.0<L>  /  TBili  0.6  /  DBili  x   /  AST  23  /  ALT  29  /  AlkPhos  81  04-22    LIVER FUNCTIONS - ( 22 Apr 2021 07:16 )  Alb: 2.0 g/dL / Pro: 6.8 g/dL / ALK PHOS: 81 U/L / ALT: 29 U/L / AST: 23 U/L / GGT: x               MICROBIOLOGY:  .Urine Clean Catch (Midstream)  04-19-21   No growth  --  --      .Blood Blood-Peripheral  04-19-21   No growth to date.  --  --      .Blood Blood  04-13-21   No Growth Final  --  --      .Sputum Sputum  04-13-21   Numerous Klebsiella pneumoniae ESBL  Normal Respiratory Lizzette absent  --  Klebsiella pneumoniae ESBL      .Sputum Sputum  04-12-21   Numerous Klebsiella pneumoniae ESBL  Normal Respiratory Lizzette absent  --  Klebsiella pneumoniae ESBL      .Blood Blood  04-06-21   No Growth Final  --  --      .Sputum Sputum  04-06-21   Normal Respiratory Lizzette present  --    No polymorphonuclear leukocytes per low power field  Moderate Squamous epithelial cells per low power field  Moderate Yeast like cells per oil power field  Moderate Gram Variable Rods per oil power field  Rare Gram Positive Cocci in Clusters per oil power field      .Blood Blood-Peripheral  04-03-21   No Growth Final  --    Few Squamous epithelial cells per low power field  Few polymorphonuclear leukocytes per low power field  Few Yeast per oil power field  Few Gram Positive Cocci in Pairs and Chains per oil power field      .Blood Blood-Peripheral  04-01-21   No Growth Final  --  --      RADIOLOGY    < from: [facility] Physiol Extremity Lower 3+ Level, BI (04.20.21 @ 12:44) >  Impression: Significant lower extremity arterial disease is NOT identified.    The only abnormality on this examination is the decreased amplitude to the pulse volume recordings at the levels of the toes bilaterally.    < end of copied text >

## 2021-04-22 NOTE — PROGRESS NOTE ADULT - PROBLEM SELECTOR PLAN 1
- Etiology likely multi-factorial in setting of acute ischemic infarctions of the brain in conjunction vs acute kidney injury with uremia (now resolving) vs resolving hypoxic respiratory failure, covid-19 infection, superimposed bacterial pneumonia, urinary retention, concern for nutritional deficiencies  - Managing acute pneumonia with antibiotics; managing pulmonary embolus with full-dose anticoagulation; limiting sedative medication administration   - At this time, will favor frequent reorientation; will re-evaluate swallowing function as possible  - Some improvement in mental status on 4/21  - Pending EEG, MRI Brain, and MRA Head

## 2021-04-23 LAB
ALBUMIN SERPL ELPH-MCNC: 2.6 G/DL — LOW (ref 3.3–5)
ALP SERPL-CCNC: 82 U/L — SIGNIFICANT CHANGE UP (ref 40–120)
ALT FLD-CCNC: 26 U/L — SIGNIFICANT CHANGE UP (ref 10–45)
ANION GAP SERPL CALC-SCNC: 10 MMOL/L — SIGNIFICANT CHANGE UP (ref 5–17)
APTT BLD: 83.3 SEC — HIGH (ref 27.5–35.5)
AST SERPL-CCNC: 21 U/L — SIGNIFICANT CHANGE UP (ref 10–40)
BILIRUB SERPL-MCNC: 0.5 MG/DL — SIGNIFICANT CHANGE UP (ref 0.2–1.2)
BUN SERPL-MCNC: 16 MG/DL — SIGNIFICANT CHANGE UP (ref 7–23)
CALCIUM SERPL-MCNC: 9.1 MG/DL — SIGNIFICANT CHANGE UP (ref 8.4–10.5)
CHLORIDE SERPL-SCNC: 101 MMOL/L — SIGNIFICANT CHANGE UP (ref 96–108)
CO2 SERPL-SCNC: 26 MMOL/L — SIGNIFICANT CHANGE UP (ref 22–31)
CREAT SERPL-MCNC: 0.8 MG/DL — SIGNIFICANT CHANGE UP (ref 0.5–1.3)
GLUCOSE BLDC GLUCOMTR-MCNC: 130 MG/DL — HIGH (ref 70–99)
GLUCOSE BLDC GLUCOMTR-MCNC: 140 MG/DL — HIGH (ref 70–99)
GLUCOSE BLDC GLUCOMTR-MCNC: 174 MG/DL — HIGH (ref 70–99)
GLUCOSE BLDC GLUCOMTR-MCNC: 210 MG/DL — HIGH (ref 70–99)
GLUCOSE SERPL-MCNC: 136 MG/DL — HIGH (ref 70–99)
HCT VFR BLD CALC: 31.8 % — LOW (ref 39–50)
HGB BLD-MCNC: 9.8 G/DL — LOW (ref 13–17)
MAGNESIUM SERPL-MCNC: 2 MG/DL — SIGNIFICANT CHANGE UP (ref 1.6–2.6)
MCHC RBC-ENTMCNC: 26.1 PG — LOW (ref 27–34)
MCHC RBC-ENTMCNC: 30.8 GM/DL — LOW (ref 32–36)
MCV RBC AUTO: 84.6 FL — SIGNIFICANT CHANGE UP (ref 80–100)
NRBC # BLD: 0 /100 WBCS — SIGNIFICANT CHANGE UP (ref 0–0)
PHOSPHATE SERPL-MCNC: 3.4 MG/DL — SIGNIFICANT CHANGE UP (ref 2.5–4.5)
PLATELET # BLD AUTO: 364 K/UL — SIGNIFICANT CHANGE UP (ref 150–400)
POTASSIUM SERPL-MCNC: 4.3 MMOL/L — SIGNIFICANT CHANGE UP (ref 3.5–5.3)
POTASSIUM SERPL-SCNC: 4.3 MMOL/L — SIGNIFICANT CHANGE UP (ref 3.5–5.3)
PROT SERPL-MCNC: 7.7 G/DL — SIGNIFICANT CHANGE UP (ref 6–8.3)
RBC # BLD: 3.76 M/UL — LOW (ref 4.2–5.8)
RBC # FLD: 15.9 % — HIGH (ref 10.3–14.5)
SODIUM SERPL-SCNC: 137 MMOL/L — SIGNIFICANT CHANGE UP (ref 135–145)
WBC # BLD: 8.79 K/UL — SIGNIFICANT CHANGE UP (ref 3.8–10.5)
WBC # FLD AUTO: 8.79 K/UL — SIGNIFICANT CHANGE UP (ref 3.8–10.5)

## 2021-04-23 PROCEDURE — 99223 1ST HOSP IP/OBS HIGH 75: CPT

## 2021-04-23 PROCEDURE — 99233 SBSQ HOSP IP/OBS HIGH 50: CPT | Mod: GC

## 2021-04-23 PROCEDURE — 99232 SBSQ HOSP IP/OBS MODERATE 35: CPT

## 2021-04-23 RX ORDER — SODIUM CHLORIDE 9 MG/ML
1000 INJECTION, SOLUTION INTRAVENOUS
Refills: 0 | Status: COMPLETED | OUTPATIENT
Start: 2021-04-23 | End: 2021-04-23

## 2021-04-23 RX ADMIN — Medication 3 MILLILITER(S): at 06:11

## 2021-04-23 RX ADMIN — LEVETIRACETAM 400 MILLIGRAM(S): 250 TABLET, FILM COATED ORAL at 06:27

## 2021-04-23 RX ADMIN — LEVETIRACETAM 400 MILLIGRAM(S): 250 TABLET, FILM COATED ORAL at 18:59

## 2021-04-23 RX ADMIN — Medication 2: at 18:59

## 2021-04-23 RX ADMIN — Medication 4: at 12:29

## 2021-04-23 RX ADMIN — HEPARIN SODIUM 1200 UNIT(S)/HR: 5000 INJECTION INTRAVENOUS; SUBCUTANEOUS at 09:30

## 2021-04-23 NOTE — CONSULT NOTE ADULT - ASSESSMENT
60-year-old man managed previously only for diabetes mellitus who presented on 4/1 with acute respiratory failure with hypoxia secondary to covid-19-associated pneumonia and whose course has been complicated by 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 Palliative care was called for GOC.

## 2021-04-23 NOTE — CONSULT NOTE ADULT - PROBLEM SELECTOR RECOMMENDATION 3
Podiatry inputs noticed. For now waiting on tissue demarcation to define a need for a Sx intervention. Wound care.

## 2021-04-23 NOTE — PROGRESS NOTE ADULT - RS GEN PE MLT RESP DETAILS PC
clear to auscultation bilaterally/no wheezes
no wheezes/diminished breath sounds, R
respirations non-labored/diminished breath sounds, R
clear to auscultation bilaterally/no wheezes

## 2021-04-23 NOTE — PROGRESS NOTE ADULT - PROBLEM SELECTOR PLAN 10
-Full-dose anticoagulation for dvt prophylaxis as above   - Will be reevaluated from speech and swallow  -Full code at this time -Full-dose anticoagulation for DVT prophylaxis as above   -Full code at this time

## 2021-04-23 NOTE — PROGRESS NOTE ADULT - PROBLEM SELECTOR PLAN 3
-Low-grade temperature elevations noted over past few days in micu  -For that reason, blood and sputum cultures drawn on 4/13; sputum cultures growing ESBL Klebsiella   - Previously on meropenem 1 g q8h (4/15-4/19) for treatment of ventilator-associated pneumonia, switched to ertapenem for 5 more days, given low concern for pseudomonas, given sputum not growing this  -Maintaining oxygen saturations greater than 95% on room air at this time  - Spiked fever O/N on 4/18 to 4/19, cultures sent (previous 4/13 blood cxs x2 negative), CXR similar to previously with noted, right pleural effusion and right lower lobe pneumonia, and left side clear lungs, likely 2/2 to aspiration from chemical pneumonitis -Low-grade temperature elevations noted over past few days in micu  -For that reason, blood and sputum cultures drawn on 4/13; sputum cultures growing ESBL Klebsiella   - Previously on meropenem 1 g q8h (4/15-4/19) for treatment of ventilator-associated pneumonia, switched to ertapenem on 4/19 for 5 more days, completed course of Abx  -Maintaining oxygen saturations greater than 95% on room air at this time  - Spiked fever O/N on 4/18 to 4/19, cultures sent (previous 4/13 blood cxs x2 negative), CXR similar to previously with noted, right pleural effusion and right lower lobe pneumonia, and left side clear lungs, likely 2/2 to aspiration from chemical pneumonitis

## 2021-04-23 NOTE — PROGRESS NOTE ADULT - GASTROINTESTINAL
Requesting to verify her daughter's upcoming appt. Provided with this information as requested. Triager assisted by  Trev #[id number].  
detailed exam

## 2021-04-23 NOTE — PROGRESS NOTE ADULT - ASSESSMENT
Mr Pizarro is a 60 year old man with prolonged hospitalization for COVID-19, DKA, and CVA with hemorrhagic conversion who has developed new fevers.   Suspect that the patient is aspirating, though no clear aspiration pneumonia on imaging at this time. Downtrending leukocytosis.   Sputum culture on 4/13 with ESBL klebsiella.     Fevers - suspect 2/2 aspiration PNA  - s/p abx  - cultures negative  - Monitor for additional fevers  - Trend WBCs - better  - asp precautions   - define GOC     Chan Alaniz  Attending Physician   Division of Infectious Disease  Pager #[phone]  Available on [employer] Teams also  After 5pm/weekend or no response, call #[phone]    Will sign off, recall ID if needed #[phone].

## 2021-04-23 NOTE — PROGRESS NOTE ADULT - ATTENDING COMMENTS
60 yr male with PMH of DM who was initially admitted to [facility] ICU for hypoxic respiratory failure 2/2 COVID c/b PE with R heart strain, cardiogenic and septic shock, ischemic and hemorrhagic CVA and ESBL klebsiella ventilator associate PNA, transferred to [facility] for neurosurgery eval.  No acute overnight events.   1) Acute hypoxic resp failure: secondary to Ventilator associated ESBL klebsiella pneumonia, possible aspiration PNA. Completed course of ertapenem per ID recs. Cont supplemental O2.  2) Acute CVA: likely thrombotic complication of recent COVID infection. Significant cognitive, speech, swallow, motor deficits, however slowly improving. Appreciate neurology recs - s/p MRI and pending EEG. PT/OT. Acute rehab on discharge.  3) PE/DVT: related to recent COVID infection. Cont with hep gtt for now. Eventual transition to oral meds  4) Dysphagia: SLP to reeval pt today. If tolerates, can start diet. Otherwise will need GI consult for PEG tube. Pt does not tolerate NGT placement despite several repeated attempts. Cont maintenance hydration  5) COVID: s/p treatment; supportive care  6) Dry gangrenous appearance of several toes. Appreciate Podiatry consult. No acute intervention. Obtain wound care consult

## 2021-04-23 NOTE — PROGRESS NOTE ADULT - COMMENTS
pt lethargic, cannot get ROS

## 2021-04-23 NOTE — CONSULT NOTE ADULT - SUBJECTIVE AND OBJECTIVE BOX
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 Palliative care was called for GOC. gpatient awake,  non verbal, blinking spontaneously and to threat. does not follow commands consistently  no spontaneous movement, but withdraws all 4 equally to pain, facial grimacing is symmetric  CT head multiple infracts- unchanged    As per Neuor: "Impression- altered mental status- Multi-factorial - impaired level of arousal due to global cerebral dysfunction likely from combination of infarcts, COVID-19 encephalopathy, associated hypoxia, persistent pharmacologic effects of sedatives , precipitous drop in BP  and multiple metabolic derangements." And recommending EEG and MRI brain. As per ID with possible aspiration events. As per S&S: "Recommendations: Continue NPO, with non-oral nutrition/hydration/medications. Maintain good oral hygiene and aspiration precautions. Will continue to follow. Recommend rehab upon discharge.  Monitor voice and if no improvement, consider ENT consult." Rehab therapy is recommending acute rehab.     Palliative care was called for GOC and ACP.         PERTINENT PM/SXH:   No pertinent past medical history      No significant past surgical history      FAMILY HISTORY:  No pertinent family history in first degree relatives      ITEMS NOT CHECKED ARE NOT PRESENT    SOCIAL HISTORY:   Significant other/partner[ ]  Children[ ]  Baptist/Spirituality:  Substance hx:  [ ]   Tobacco hx:  [ ]   Alcohol hx: [ ]   Home Opioid hx:  [ ] I-Stop Reference No:  Living Situation: [ ]Home  [ ]Long term care  [ ]Rehab [ ]Other   LCSW placed call to  brother in law- Margarito Moira [phone] & also nephew-Rylanddwayne [facility]--[phone] introduced self, explain role of social work and provided  contact info.  Brother in law confirmed patient's identity and demographics.   Patient is a 60 year old, , Restorationism,  male.  Patient  resided in [facility] apartment located in a private home.  However, wife went to  [place] for health reasons and has not been able to return due to covid pandemic.    Patient was independent with ambulation and ADL's and was employed.  Both of  patient's two adult children, a son and a daughter are currently in [place].   Patient has been in the [place] the past two years and previously lived in  [place] he relocated to [place] last August 2020Patient noted with no advance directives & full code.  Surrogate decision maker   is patient's wife-Lucero, (in [place]): [phone] (speaks Gujurati).     ADVANCE DIRECTIVES:    DNR  MOLST  [ ]  Living Will  [ ]   DECISION MAKER(s):  [ ] Health Care Proxy(s)  [ ] Surrogate(s)  [ ] Guardian           Name(s): Phone Number(s):    BASELINE (I)ADL(s) (prior to admission):  [place]: [ ]Total  [ ] Moderate [ ]Dependent    Allergies    No Known Allergies    Intolerances    MEDICATIONS  (STANDING):  albuterol/ipratropium for Nebulization 3 milliLiter(s) Nebulizer every 8 hours  cholecalciferol 400 Unit(s) Oral daily  dextrose 40% Gel 15 Gram(s) Oral once  dextrose 5% + lactated ringers. 1000 milliLiter(s) (100 mL/Hr) IV Continuous <Continuous>  dextrose 5%. 1000 milliLiter(s) (50 mL/Hr) IV Continuous <Continuous>  dextrose 5%. 1000 milliLiter(s) (100 mL/Hr) IV Continuous <Continuous>  dextrose 50% Injectable 25 Gram(s) IV Push once  dextrose 50% Injectable 12.5 Gram(s) IV Push once  dextrose 50% Injectable 25 Gram(s) IV Push once  glucagon  Injectable 1 milliGRAM(s) IntraMuscular once  heparin  Infusion.  Unit(s)/Hr (12 mL/Hr) IV Continuous <Continuous>  insulin lispro (ADMELOG) corrective regimen sliding scale   SubCutaneous every 6 hours  levETIRAcetam  IVPB 500 milliGRAM(s) IV Intermittent every 12 hours  multivitamin/minerals Oral Solution (WELLESSE) 30 milliLiter(s) Oral daily    MEDICATIONS  (PRN):  heparin   Injectable 5500 Unit(s) IV Push every 6 hours PRN For aPTT less than 40  heparin   Injectable 2500 Unit(s) IV Push every 6 hours PRN For aPTT between 40 - 57    PRESENT SYMPTOMS: [ ]Unable to obtain due to poor mentation   Source if other than patient:  [ ]Family   [ ]Team     Pain: [ ]yes [ ]no  QOL impact -   Location -                    Aggravating factors -  Quality -  Radiation -  Timing-  Severity (0-10 scale):  Minimal acceptable level (0-10 scale):     CPOT:    https://www.[facility].org/getattachment/[email or web address]/Critical-Care-Pain-Observation-Tool-(CPOT)      PAIN AD Score:     http://geriatrictoolkit.[facility]/cog/painad.pdf (press ctrl +  left click to view)    Dyspnea:                           [ ]Mild [ ]Moderate [ ]Severe  Anxiety:                             [ ]Mild [ ]Moderate [ ]Severe  Fatigue:                             [ ]Mild [ ]Moderate [ ]Severe  Nausea:                             [ ]Mild [ ]Moderate [ ]Severe  Loss of appetite:              [ ]Mild [ ]Moderate [ ]Severe  Constipation:                    [ ]Mild [ ]Moderate [ ]Severe    Other Symptoms:  [ ]All other review of systems negative     Palliative Performance Status Version 2:         %    http://npcrc.org/files/news/palliative_performance_scale_ppsv2.pdf  PHYSICAL EXAM:  Vital Signs Last 24 Hrs  T(C): 36.7 (23 Apr 2021 05:09), Max: 36.8 (22 Apr 2021 11:49)  T(F): 98.1 (23 Apr 2021 05:09), Max: 98.3 (22 Apr 2021 19:54)  HR: 102 (23 Apr 2021 06:11) (85 - 113)  BP: 116/79 (23 Apr 2021 05:09) (116/79 - 133/84)  BP(mean): --  RR: 18 (23 Apr 2021 05:09) (18 - 20)  SpO2: 98% (23 Apr 2021 06:11) (96% - 100%) I&O's Summary    22 Apr 2021 07:01  -  23 Apr 2021 07:00  --------------------------------------------------------  IN: 275 mL / OUT: 600 mL / NET: -325 mL      GENERAL:  [ ]Alert  [ ]Oriented x   [ ]Lethargic  [ ]Cachexia  [ ]Unarousable  [ ]Verbal  [ ]Non-Verbal  Behavioral:   [ ] Anxiety  [ ] Delirium [ ] Agitation [ ] Other  HEENT:  [ ]Normal   [ ]Dry mouth   [ ]ET Tube/Trach  [ ]Oral lesions  PULMONARY:   [ ]Clear [ ]Tachypnea  [ ]Audible excessive secretions   [ ]Rhonchi        [ ]Right [ ]Left [ ]Bilateral  [ ]Crackles        [ ]Right [ ]Left [ ]Bilateral  [ ]Wheezing     [ ]Right [ ]Left [ ]Bilateral  [ ]Diminished breath sounds [ ]right [ ]left [ ]bilateral  CARDIOVASCULAR:    [ ]Regular [ ]Irregular [ ]Tachy  [ ]Adelso [ ]Murmur [ ]Other  GASTROINTESTINAL:  [ ]Soft  [ ]Distended   [ ]+BS  [ ]Non tender [ ]Tender  [ ]PEG [ ]OGT/ NGT  Last BM:     GENITOURINARY:  [ ]Normal [ ] Incontinent   [ ]Oliguria/Anuria   [ ]Mann  MUSCULOSKELETAL:   [ ]Normal   [ ]Weakness  [ ]Bed/Wheelchair bound [ ]Edema  NEUROLOGIC:   [ ]No focal deficits  [ ]Cognitive impairment  [ ]Dysphagia [ ]Dysarthria [ ]Paresis [ ]Other   SKIN:   [ ]Normal    [ ]Rash  [ ]Pressure ulcer(s)       Present on admission [ ]y [ ]n    CRITICAL CARE:  [ ] Shock Present  [ ]Septic [ ]Cardiogenic [ ]Neurologic [ ]Hypovolemic  [ ]  Vasopressors [ ]  Inotropes   [ ]Respiratory failure present [ ]Mechanical ventilation [ ]Non-invasive ventilatory support [ ]High flow    [ ]Acute  [ ]Chronic [ ]Hypoxic  [ ]Hypercarbic [ ]Other  [ ]Other organ failure     LABS:                        9.8    8.79  )-----------( 364      ( 23 Apr 2021 06:42 )             31.8   04-23    137  |  101  |  16  ----------------------------<  136<H>  4.3   |  26  |  0.80    Ca    9.1      23 Apr 2021 06:42  Phos  3.4     04-23  Mg     2.0     04-23    TPro  7.7  /  Alb  2.6<L>  /  TBili  0.5  /  DBili  x   /  AST  21  /  ALT  26  /  AlkPhos  82  04-23  PTT - ( 23 Apr 2021 06:42 )  PTT:83.3 sec      RADIOLOGY & ADDITIONAL STUDIES:    PROTEIN CALORIE MALNUTRITION PRESENT: [ ]mild [ ]moderate [ ]severe [ ]underweight [ ]morbid obesity  https://www.andeal.org/vault/2440/web/files/ONC/Table_Clinical%20Characteristics%20to%20Document%20Malnutrition-White%20JV%20et%20al%202012.pdf    Height (cm): 170 (04-12-21 @ 20:51)  Weight (kg): 66.2 (04-12-21 @ 21:00), 73.3 (04-07-21 @ 06:00)  BMI (kg/m2): 22.9 (04-12-21 @ 21:00), 25.4 (04-12-21 @ 20:51)    [ ]PPSV2 < or = to 30% [ ]significant weight loss  [ ]poor nutritional intake  [ ]anasarca     Albumin, Serum: 2.6 g/dL (04-23-21 @ 06:42)   [ ]Artificial Nutrition      REFERRALS:   [ ]Chaplaincy  [ ]Hospice  [ ]Child Life  [ ]Social Work  [ ]Case management [ ]Holistic Therapy     Goals of Care Document:  Romain  ID # [id number]  HPI:  60-year-old man managed previously only for diabetes mellitus who presented on 4/1 with acute respiratory failure with hypoxia secondary to covid-19-associated pneumonia and whose course has been complicated by 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 Palliative care was called for GOC.     As per Neuor: "Impression- altered mental status- Multi-factorial - impaired level of arousal due to global cerebral dysfunction likely from combination of infarcts, COVID-19 encephalopathy, associated hypoxia, persistent pharmacologic effects of sedatives , precipitous drop in BP  and multiple metabolic derangements." And recommending EEG and MRI brain. As per ID with possible aspiration events. As per S&S: "Recommendations: Continue NPO, with non-oral nutrition/hydration/medications. Maintain good oral hygiene and aspiration precautions. Will continue to follow. Recommend rehab upon discharge.  Monitor voice and if no improvement, consider ENT consult." Rehab therapy is recommending acute rehab.     Palliative care was called for GOC and ACP.     4/24 The patient was lethargic and only able to episodically nod to very simple questions. He was almost non verbal. He denied pain.     PERTINENT PM/SXH:   No pertinent past medical history      No significant past surgical history      FAMILY HISTORY:  No pertinent family history in first degree relatives      ITEMS NOT CHECKED ARE NOT PRESENT    SOCIAL HISTORY:   Significant other/partner[x ] Wife  Children[ ]  Methodist/Spirituality:  Substance hx:  [ ]   Tobacco hx:  [ ]   Alcohol hx: [ ]   Home Opioid hx:  [ ] I-Stop Reference No:  Living Situation: [x ]Home  [ ]Long term care  [ ]Rehab [ ]Other   LCSW placed call to  brother in law- Margarito Moira [phone] & also nephew-Alba [facility]Henry [facility] [phone] introduced self, explain role of social work and provided  contact info.  Brother in law confirmed patient's identity and demographics.   Patient is a 60 year old, , Sabianist, [place] male.  Patient  resided in [facility] apartment located in a private home.  However, wife went to  [place] for health reasons and has not been able to return due to covid pandemic.    Patient was independent with ambulation and ADL's and was employed.  Both of  patient's two adult children, a son and a daughter are currently in [place].   [place] has been [place] the past two years and previously lived in  [place] he relocated to [place] last August 2020Patient noted with no advance directives & full code.  Surrogate decision maker   is patient's wife-Lucero, (in [place]): [phone] (speaks Guaugustinarati).     ADVANCE DIRECTIVES:    DNR  MOLST  [ ]  Living Will  [ ]   DECISION MAKER(s):  [ ] Health Care Proxy(s)  [x ] Surrogate(s)  [ ] Guardian           Name(s): Phone Number(s): Wife as above.     BASELINE (I)ADL(s) (prior to admission):  [place]: [ ]Total  [ ] Moderate [ ]Dependent    Allergies    No Known Allergies    Intolerances    MEDICATIONS  (STANDING):  albuterol/ipratropium for Nebulization 3 milliLiter(s) Nebulizer every 8 hours  cholecalciferol 400 Unit(s) Oral daily  dextrose 40% Gel 15 Gram(s) Oral once  dextrose 5% + lactated ringers. 1000 milliLiter(s) (100 mL/Hr) IV Continuous <Continuous>  dextrose 5%. 1000 milliLiter(s) (50 mL/Hr) IV Continuous <Continuous>  dextrose 5%. 1000 milliLiter(s) (100 mL/Hr) IV Continuous <Continuous>  dextrose 50% Injectable 25 Gram(s) IV Push once  dextrose 50% Injectable 12.5 Gram(s) IV Push once  dextrose 50% Injectable 25 Gram(s) IV Push once  glucagon  Injectable 1 milliGRAM(s) IntraMuscular once  heparin  Infusion.  Unit(s)/Hr (12 mL/Hr) IV Continuous <Continuous>  insulin lispro (ADMELOG) corrective regimen sliding scale   SubCutaneous every 6 hours  levETIRAcetam  IVPB 500 milliGRAM(s) IV Intermittent every 12 hours  multivitamin/minerals Oral Solution (WELLESSE) 30 milliLiter(s) Oral daily    MEDICATIONS  (PRN):  heparin   Injectable 5500 Unit(s) IV Push every 6 hours PRN For aPTT less than 40  heparin   Injectable 2500 Unit(s) IV Push every 6 hours PRN For aPTT between 40 - 57    PRESENT SYMPTOMS: [ x]Unable to obtain due to poor mentation   Source if other than patient:  [ ]Family   [ ]Team     Pain: [ ]yes [ x]no  QOL impact -   Location -                    Aggravating factors -  Quality -  Radiation -  Timing-  Severity (0-10 scale):  Minimal acceptable level (0-10 scale):     CPOT:    https://www.[facility].org/getattachment/[email or web address]/Critical-Care-Pain-Observation-Tool-(CPOT)      PAIN AD Score:     http://geriatrictoolkit.[facility]/cog/painad.pdf (press ctrl +  left click to view)    Dyspnea:                           [ ]Mild [ ]Moderate [ ]Severe  Anxiety:                             [ ]Mild [ ]Moderate [ ]Severe  Fatigue:                             [ ]Mild [ ]Moderate [ ]Severe  Nausea:                             [ ]Mild [ ]Moderate [ ]Severe  Loss of appetite:              [ ]Mild [ ]Moderate [ ]Severe  Constipation:                    [ ]Mild [ ]Moderate [ ]Severe    Other Symptoms:  [ ]All other review of systems negative     Palliative Performance Status Version 2:    20     %    http://npcrc.org/files/news/palliative_performance_scale_ppsv2.pdf  PHYSICAL EXAM:  Vital Signs Last 24 Hrs  T(C): 36.7 (23 Apr 2021 05:09), Max: 36.8 (22 Apr 2021 11:49)  T(F): 98.1 (23 Apr 2021 05:09), Max: 98.3 (22 Apr 2021 19:54)  HR: 102 (23 Apr 2021 06:11) (85 - 113)  BP: 116/79 (23 Apr 2021 05:09) (116/79 - 133/84)  BP(mean): --  RR: 18 (23 Apr 2021 05:09) (18 - 20)  SpO2: 98% (23 Apr 2021 06:11) (96% - 100%) I&O's Summary    22 Apr 2021 07:01  -  23 Apr 2021 07:00  --------------------------------------------------------  IN: 275 mL / OUT: 600 mL / NET: -325 mL      GENERAL:  [ ]Alert  [ ]Oriented x   [x ]Lethargic  [ ]Cachexia  [ ]Unarousable  [ ]Verbal  [ ]Non-Verbal  Behavioral:   [ ] Anxiety  [ ] Delirium [ ] Agitation [ ] Other  HEENT:  [x ]Normal   [ ]Dry mouth   [ ]ET Tube/Trach  [ ]Oral lesions  PULMONARY:   [ ]Clear [ ]Tachypnea  [ ]Audible excessive secretions   [ ]Rhonchi        [ ]Right [ ]Left [ ]Bilateral  [ ]Crackles        [ ]Right [ ]Left [ ]Bilateral  [ ]Wheezing     [ ]Right [ ]Left [ ]Bilateral  [x ]Diminished breath sounds [ ]right [ ]left [x ]bilateral  CARDIOVASCULAR:    [x ]Regular [ ]Irregular [ ]Tachy  [ ]Adelso [ ]Murmur [ ]Other  GASTROINTESTINAL:  [x ]Soft  [ ]Distended   [x ]+BS  [ ]Non tender [ ]Tender  [ ]PEG [ ]OGT/ NGT  Last BM: 4/17    GENITOURINARY:  [ ]Normal [x ] Incontinent   [ ]Oliguria/Anuria   [ ]Mann  MUSCULOSKELETAL:   [ ]Normal   [x ]Weakness  [ ]Bed/Wheelchair bound [ ]Edema  NEUROLOGIC:   [ ]No focal deficits  [x ]Cognitive impairment  [ ]Dysphagia [ ]Dysarthria [ ]Paresis [x ]Other: Appears to be aphasic. Strength 3-4/5 over UE and 2/5 over RLE (unclear if this is 2/2 to overall weakness or 2/2 to CVAs themselves or a combination of both)   SKIN:   [ ]Normal    [ ]Rash  [ ]Pressure ulcer(s)       Present on admission [ ]y [ ]n [x] necrotic areas over toes with healing wounds.     CRITICAL CARE:  [ ] Shock Present  [ ]Septic [ ]Cardiogenic [ ]Neurologic [ ]Hypovolemic  [ ]  Vasopressors [ ]  Inotropes   [ ]Respiratory failure present [ ]Mechanical ventilation [ ]Non-invasive ventilatory support [ ]High flow    [ ]Acute  [ ]Chronic [ ]Hypoxic  [ ]Hypercarbic [ ]Other  [ ]Other organ failure     LABS:                        9.8    8.79  )-----------( 364      ( 23 Apr 2021 06:42 )             31.8   04-23    137  |  101  |  16  ----------------------------<  136<H>  4.3   |  26  |  0.80    Ca    9.1      23 Apr 2021 06:42  Phos  3.4     04-23  Mg     2.0     04-23    TPro  7.7  /  Alb  2.6<L>  /  TBili  0.5  /  DBili  x   /  AST  21  /  ALT  26  /  AlkPhos  82  04-23  PTT - ( 23 Apr 2021 06:42 )  PTT:83.3 sec      RADIOLOGY & ADDITIONAL STUDIES:  < from: MR Angio Head No Cont (04.22.21 @ 21:31) >    EXAM:  MR BRAIN WAW IC                          EXAM:  MR ANGIO BRAIN                            PROCEDURE DATE:  04/22/2021  IMPRESSION:    MRI BRAIN: Multiple evolving subacute infarcts with associated hemorrhagesincluding in the bilateral centrum semiovale, bilateral parieto-occipital region, left posterior temporal lobe, right thalamus, and anterior temporal poles bilaterally. Evolving subacute infarct with hemorrhagic transformation in the left cerebellar hemisphere. Note, many of these infarcts have associated abnormal contrast enhancement, likely reflecting expected evolution of ischemia, however, follow-up imaging to document resolution of this finding is recommended.    MRA HEAD: No evidence of proximal vessel stenosis, occlusion, aneurysm or vascular malformation. CT angiography may be obtained for further assessment.                KAYCE BURROWS MD; Attending Radiologist  This document has been electronically signed. Apr 23 2021 10:39AM    < end of copied text >      PROTEIN CALORIE MALNUTRITION PRESENT: [ ]mild [ ]moderate [ ]severe [ ]underweight [ ]morbid obesity  https://www.andeal.org/vault/2440/web/files/ONC/Table_Clinical%20Characteristics%20to%20Document%20Malnutrition-White%20JV%20et%20al%202012.pdf    Height (cm): 170 (04-12-21 @ 20:51)  Weight (kg): 66.2 (04-12-21 @ 21:00), 73.3 (04-07-21 @ 06:00)  BMI (kg/m2): 22.9 (04-12-21 @ 21:00), 25.4 (04-12-21 @ 20:51)    [ ]PPSV2 < or = to 30% [ ]significant weight loss  [ ]poor nutritional intake  [ ]anasarca     Albumin, Serum: 2.6 g/dL (04-23-21 @ 06:42)   [ ]Artificial Nutrition      REFERRALS:   [ ]Chaplaincy  [ ]Hospice  [ ]Child Life  [ ]Social Work  [ ]Case management [ ]Holistic Therapy     Goals of Care Document:

## 2021-04-23 NOTE — CONSULT NOTE ADULT - PROBLEM SELECTOR RECOMMENDATION 4
The patient's wife is his legal surrogate.   When I was able to get neuro inputs it was already after midnight in [place] so calling his wife was at that time was not ideal. I will also need to f/u with S&S and getting the results of a recommended FEES study in order to better define Rx options for nutrition GOC. As a result I will be probably reaching out to his wife on Monday or Tuesday so GOC/ACP can be better determined.

## 2021-04-23 NOTE — PROGRESS NOTE ADULT - GASTROINTESTINAL DETAILS
soft/nontender/no distention/no rebound tenderness/no guarding
soft/nontender/no distention/no guarding/no rigidity

## 2021-04-23 NOTE — PROGRESS NOTE ADULT - PROBLEM SELECTOR PLAN 1
- Etiology likely multi-factorial in setting of acute ischemic infarctions of the brain in conjunction vs acute kidney injury with uremia (now resolving) vs resolving hypoxic respiratory failure, covid-19 infection, superimposed bacterial pneumonia, urinary retention, concern for nutritional deficiencies  - Managing acute pneumonia with antibiotics; managing pulmonary embolus with full-dose anticoagulation; limiting sedative medication administration   - At this time, will favor frequent reorientation; will re-evaluate swallowing function as possible  - Some improvement in mental status on 4/21  - Pending EEG, MRI Brain, and MRA Head - Etiology likely multi-factorial in setting of acute ischemic infarctions of the brain in conjunction vs acute kidney injury with uremia (now resolving) vs resolving hypoxic respiratory failure, covid-19 infection, superimposed bacterial pneumonia, urinary retention, concern for nutritional deficiencies  - Managing acute pneumonia with antibiotics; managing pulmonary embolus with full-dose anticoagulation; limiting sedative medication administration   - At this time, will favor frequent reorientation; will re-evaluate swallowing function as possible  - Some improvement in mental status on 4/21  - MRI Brain, and MRA Head demonstrated multiple evolving subacute infarcts with associated hemorrhages including in the bilateral centrum semiovale, bilateral parieto-occipital region, left posterior temporal lobe, right thalamus, and anterior temporal poles bilaterally. Evolving subacute infarct with hemorrhagic transformation in the left cerebellar hemisphere. Note, many of these infarcts have associated abnormal contrast enhancement, likely reflecting expected evolution of ischemia, however, follow-up imaging to document resolution of this finding is recommended. No evidence of proximal vessel stenosis, occlusion, aneurysm or vascular malformation.   - Awaiting neuro recs  - Pending EEG

## 2021-04-23 NOTE — CHART NOTE - NSCHARTNOTEFT_GEN_A_CORE
Nutrition Follow Up Note  Patient seen for: inadequate diet order x >4 days    Hospital course as per chart: 60y Male with PMH of DM who presented on  with acute respiratory failure with hypoxia secondary to COVID-19 associated PNA. Hospital course complicated by "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"     Pt with dysphagia in setting of AMS, NG tube placed for tube feeds. Pt self-removed on , multiple attempts to replace unsuccessful. SLP recommended to continue NPO yesterday (). Plan for FEES Monday () per team. "Patient's brother-in-law states that the patient's preference would be to receive a peg tube... GI not offering peg tube at this time."     Chart reviewed, events noted.    Source: [] Patient       [x] EMR        [] RN        [] Family at bedside       [] Other:    -If unable to interview patient: [] Trach/Vent/BiPAP  [x] Disoriented/confused/inappropriate to interview    Diet Order: NPO (21)    - Is current order appropriate/adequate? [] Yes  [x]  No:       GI:  Last BM ___.   Bowel Regimen? [] Yes   [] No    Weights: Daily Weight in k.4 () - ~wt stability noted. Will continue to monitor and trend weights.    Nutritionally Pertinent MEDICATIONS  (STANDING):  cholecalciferol  dextrose 40% Gel  dextrose 5% + lactated ringers.  dextrose 5%.  dextrose 5%.  dextrose 50% Injectable  dextrose 50% Injectable  dextrose 50% Injectable  glucagon  Injectable  insulin lispro (ADMELOG) corrective regimen sliding scale  multivitamin/minerals Oral Solution (WELLESSE)    Pertinent Labs:  @ 06:42: Na 137, BUN 16, Cr 0.80, <H>, K+ 4.3, Phos 3.4, Mg 2.0, Alk Phos 82, ALT/SGPT 26, AST/SGOT 21, HbA1c --    A1C with Estimated Average Glucose Result: 10.3 % (21 @ 14:28)    Finger Sticks:  POCT Blood Glucose.: 210 mg/dL ( @ 12:16)  POCT Blood Glucose.: 140 mg/dL (04-23 @ 05:35)  POCT Blood Glucose.: 130 mg/dL ( @ 00:17)  POCT Blood Glucose.: 143 mg/dL ( @ 17:32)    Skin per nursing documentation: +wound, no pressure injuries per flowsheets   Edema per flowsheets: no edema per flowsheets     Estimated Needs:   [x] no change since previous assessment  Based on Dosing wt 145.9 lb/66.1 kg  Energy (28-32 kcals/kg): 8071-5892  Protein (1.2-1.6 g pro/kg): 79..76    Previous Nutrition Diagnosis: Severe Malnutrition   Nutrition Diagnosis is: [x] ongoing  [] resolved [] not applicable     New Nutrition Diagnosis: not applicable  Inadequate Oral Intake  Inadequate Protein-Energy Intake  Excessive Energy Intake  Underweight  Increased Nutrient Needs  Overweight/Obesity  Unintended Weight Loss   Food & Nutrition Related Knowledge Deficit  Altered Nutrition-Related Lab Values     Nutrition Care Plan:  [x] In Progress - pt NPO, to be addressed with nutrition provision as per team  [] Achieved  [] Not applicable    Recommendations:     Monitoring and Evaluation: Monitor nutrition provision and tolerance, weight, labs, skin, GI status    Nutrition care plan to remain consistent with GOC.     RD remains available upon request and will follow up per protocol  Concepcion Anne MS, RD CDN Pager #[phone] Nutrition Follow Up Note  Patient seen for: inadequate diet order x >4 days    Hospital course as per chart: 60y Male with PMH of DM who presented on  with acute respiratory failure with hypoxia secondary to COVID-19 associated PNA. Hospital course complicated by "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"     Pt with dysphagia in setting of AMS, NG tube placed for tube feeds. Pt self-removed on , multiple attempts to replace unsuccessful. SLP recommended to continue NPO yesterday and today. Plan for FEES Monday () per team. "Patient's brother-in-law states that the patient's preference would be to receive a peg tube... GI not offering peg tube at this time."     Chart reviewed, events noted.    Source: [] Patient       [x] EMR        [] RN        [] Family at bedside       [] Other:    -If unable to interview patient: [] Trach/Vent/BiPAP  [x] Disoriented/confused/inappropriate to interview - pt lethargic, no family at bedside    Diet Order: NPO (21)    - Is current order appropriate/adequate? [] Yes  [x]  No:     GI:  Last documented BM .   Bowel Regimen? [] Yes   [x] No    Weights: Daily Weight in k.4 () - ~wt stability noted. Will continue to monitor and trend weights.    Nutritionally Pertinent MEDICATIONS  (STANDING):  cholecalciferol  dextrose 40% Gel  dextrose 5% + lactated ringers.  dextrose 5%.  dextrose 5%.  dextrose 50% Injectable  dextrose 50% Injectable  dextrose 50% Injectable  glucagon  Injectable  insulin lispro (ADMELOG) corrective regimen sliding scale  multivitamin/minerals Oral Solution (WELLESSE)    Pertinent Labs:  @ 06:42: Na 137, BUN 16, Cr 0.80, <H>, K+ 4.3, Phos 3.4, Mg 2.0, Alk Phos 82, ALT/SGPT 26, AST/SGOT 21, HbA1c --    A1C with Estimated Average Glucose Result: 10.3 % (21 @ 14:28)    Finger Sticks:  POCT Blood Glucose.: 210 mg/dL ( @ 12:16)  POCT Blood Glucose.: 140 mg/dL ( @ 05:35)  POCT Blood Glucose.: 130 mg/dL ( @ 00:17)  POCT Blood Glucose.: 143 mg/dL ( @ 17:32)    Skin per nursing documentation: +wound, no pressure injuries per flowsheets   Edema per flowsheets: no edema per flowsheets     Estimated Needs:   [x] no change since previous assessment  Based on Dosing wt 145.9 lb/66.1 kg  Energy (28-32 kcals/kg): 0061-2521  Protein (1.2-1.6 g pro/kg): 79..76    Previous Nutrition Diagnosis: Severe Malnutrition   Nutrition Diagnosis is: [x] ongoing  [] resolved [] not applicable     New Nutrition Diagnosis: not applicable    Nutrition Care Plan:  [x] In Progress - pt NPO, to be addressed with nutrition provision as per team  [] Achieved  [] Not applicable    Recommendations:   1) Defer nutrition provision to team  - If able to be advanced to PO diet, recommend Consistent Carbohydrate diet with trial of Glucerna 1 daily (220 kcal, 10 g protein in each) to optimize intake. Defer texture/consistency to team/Speech Pathologist recommendations. Monitor/adjust PRN.  - If recommended to remain NPO, consider alternate means of nutrition if medically feasible and within GOC. RD remains available for further recommendations.   2) Pt at risk for refeeding syndrome:   - Continue multivitamin/minerals, suggest thiamine supplementation if no medical contraindications  - Monitor and replete electrolytes PRN  3) Bowel regimen as per team    Monitoring and Evaluation: Monitor nutrition provision and tolerance, weight, labs, skin, GI status    Nutrition care plan to remain consistent with GOC.     RD remains available upon request and will follow up per protocol  Concepcion Anne MS, RD CDN Pager #[phone]

## 2021-04-23 NOTE — PROGRESS NOTE ADULT - SUBJECTIVE AND OBJECTIVE BOX
Irina Rodriguez MD  PGY 1 Department of Internal Medicine  Pager: [phone], [phone]      Patient is a 60y old  Male who presents with a chief complaint of COVID19 w/ severe metabolic acidosis s/p intubation (22 Apr 2021 16:19)      SUBJECTIVE / OVERNIGHT EVENTS: Pt seen and examined. No acute overnight events.   Denies fevers, chills, CP/palpitations, SOB/cough, abdominal pain, N/V, constipation, or diarrhea.        MEDICATIONS  (STANDING):  albuterol/ipratropium for Nebulization 3 milliLiter(s) Nebulizer every 8 hours  cholecalciferol 400 Unit(s) Oral daily  dextrose 40% Gel 15 Gram(s) Oral once  dextrose 5% + lactated ringers. 1000 milliLiter(s) (75 mL/Hr) IV Continuous <Continuous>  dextrose 5%. 1000 milliLiter(s) (50 mL/Hr) IV Continuous <Continuous>  dextrose 5%. 1000 milliLiter(s) (100 mL/Hr) IV Continuous <Continuous>  dextrose 50% Injectable 25 Gram(s) IV Push once  dextrose 50% Injectable 12.5 Gram(s) IV Push once  dextrose 50% Injectable 25 Gram(s) IV Push once  ertapenem  IVPB 1000 milliGRAM(s) IV Intermittent every 24 hours  glucagon  Injectable 1 milliGRAM(s) IntraMuscular once  heparin  Infusion.  Unit(s)/Hr (12 mL/Hr) IV Continuous <Continuous>  insulin lispro (ADMELOG) corrective regimen sliding scale   SubCutaneous every 6 hours  levETIRAcetam  IVPB 500 milliGRAM(s) IV Intermittent every 12 hours  multivitamin/minerals Oral Solution (WELLESSE) 30 milliLiter(s) Oral daily    MEDICATIONS  (PRN):  heparin   Injectable 5500 Unit(s) IV Push every 6 hours PRN For aPTT less than 40  heparin   Injectable 2500 Unit(s) IV Push every 6 hours PRN For aPTT between 40 - 57      I&O's Summary    22 Apr 2021 07:01  -  23 Apr 2021 07:00  --------------------------------------------------------  IN: 275 mL / OUT: 600 mL / NET: -325 mL        Vital Signs Last 24 Hrs  T(C): 36.7 (23 Apr 2021 05:09), Max: 37.1 (22 Apr 2021 08:06)  T(F): 98.1 (23 Apr 2021 05:09), Max: 98.7 (22 Apr 2021 08:06)  HR: 102 (23 Apr 2021 06:11) (85 - 113)  BP: 116/79 (23 Apr 2021 05:09) (116/79 - 134/84)  BP(mean): --  RR: 18 (23 Apr 2021 05:09) (18 - 20)  SpO2: 98% (23 Apr 2021 06:11) (96% - 100%)    CAPILLARY BLOOD GLUCOSE      POCT Blood Glucose.: 140 mg/dL (23 Apr 2021 05:35)  POCT Blood Glucose.: 130 mg/dL (23 Apr 2021 00:17)  POCT Blood Glucose.: 143 mg/dL (22 Apr 2021 17:32)  POCT Blood Glucose.: 154 mg/dL (22 Apr 2021 11:59)      PHYSICAL EXAM:  GENERAL: NAD,   HEAD:  Atraumatic, Normocephalic  EYES: EOMI, PERRL, conjunctiva and sclera clear  NECK: No JVD  CHEST/LUNG: Clear to auscultation bilaterally; No wheeze  HEART: Regular rate and rhythm; No murmurs, rubs, or gallops  ABDOMEN: Soft, Nontender, Nondistended; Bowel sounds present  EXTREMITIES:  2+ Peripheral Pulses, No clubbing, cyanosis, or edema  PSYCH: AAOx3  NEUROLOGY: non-focal  SKIN: No rashes or lesions       LABS:                        10.4   7.88  )-----------( 318      ( 22 Apr 2021 07:18 )             33.9     Auto Eosinophil # x     / Auto Eosinophil % x     / Auto Neutrophil # x     / Auto Neutrophil % x     / BANDS % x                            10.1   10.07 )-----------( 330      ( 21 Apr 2021 07:11 )             33.6     Auto Eosinophil # x     / Auto Eosinophil % x     / Auto Neutrophil # x     / Auto Neutrophil % x     / BANDS % x        04-22    135  |  101  |  16  ----------------------------<  158<H>  4.3   |  23  |  0.76    Ca    8.4      22 Apr 2021 07:16  Mg     1.9     04-22  Phos  2.7     04-22  TPro  6.8  /  Alb  2.0<L>  /  TBili  0.6  /  DBili  x   /  AST  23  /  ALT  29  /  AlkPhos  81  04-22    PTT - ( 22 Apr 2021 07:19 )  PTT:75.8 sec             Irina Rodriguez MD  PGY 1 Department of Internal Medicine  Pager: [phone], [phone] [facility]      Patient is a 60y old  Male who presents with a chief complaint of COVID19 w/ severe metabolic acidosis s/p intubation (22 Apr 2021 16:19)      SUBJECTIVE / OVERNIGHT EVENTS: Pt seen and examined. No acute overnight events. This morning the pt is resting comfortably at bedside and not in acute distress. ROS limited in setting of mental status, despite using  phone.       MEDICATIONS  (STANDING):  albuterol/ipratropium for Nebulization 3 milliLiter(s) Nebulizer every 8 hours  cholecalciferol 400 Unit(s) Oral daily  dextrose 40% Gel 15 Gram(s) Oral once  dextrose 5% + lactated ringers. 1000 milliLiter(s) (75 mL/Hr) IV Continuous <Continuous>  dextrose 5%. 1000 milliLiter(s) (50 mL/Hr) IV Continuous <Continuous>  dextrose 5%. 1000 milliLiter(s) (100 mL/Hr) IV Continuous <Continuous>  dextrose 50% Injectable 25 Gram(s) IV Push once  dextrose 50% Injectable 12.5 Gram(s) IV Push once  dextrose 50% Injectable 25 Gram(s) IV Push once  ertapenem  IVPB 1000 milliGRAM(s) IV Intermittent every 24 hours  glucagon  Injectable 1 milliGRAM(s) IntraMuscular once  heparin  Infusion.  Unit(s)/Hr (12 mL/Hr) IV Continuous <Continuous>  insulin lispro (ADMELOG) corrective regimen sliding scale   SubCutaneous every 6 hours  levETIRAcetam  IVPB 500 milliGRAM(s) IV Intermittent every 12 hours  multivitamin/minerals Oral Solution (WELLESSE) 30 milliLiter(s) Oral daily    MEDICATIONS  (PRN):  heparin   Injectable 5500 Unit(s) IV Push every 6 hours PRN For aPTT less than 40  heparin   Injectable 2500 Unit(s) IV Push every 6 hours PRN For aPTT between 40 - 57      I&O's Summary    22 Apr 2021 07:01  -  23 Apr 2021 07:00  --------------------------------------------------------  IN: 275 mL / OUT: 600 mL / NET: -325 mL        Vital Signs Last 24 Hrs  T(C): 36.7 (23 Apr 2021 05:09), Max: 37.1 (22 Apr 2021 08:06)  T(F): 98.1 (23 Apr 2021 05:09), Max: 98.7 (22 Apr 2021 08:06)  HR: 102 (23 Apr 2021 06:11) (85 - 113)  BP: 116/79 (23 Apr 2021 05:09) (116/79 - 134/84)  BP(mean): --  RR: 18 (23 Apr 2021 05:09) (18 - 20)  SpO2: 98% (23 Apr 2021 06:11) (96% - 100%)    CAPILLARY BLOOD GLUCOSE      POCT Blood Glucose.: 140 mg/dL (23 Apr 2021 05:35)  POCT Blood Glucose.: 130 mg/dL (23 Apr 2021 00:17)  POCT Blood Glucose.: 143 mg/dL (22 Apr 2021 17:32)  POCT Blood Glucose.: 154 mg/dL (22 Apr 2021 11:59)      PHYSICAL EXAM:  GENERAL: NAD, waxing and waning mental status  HEAD:  Atraumatic, Normocephalic  EYES: EOMI, PERRL, conjunctiva and sclera clear  NECK: No JVD  CHEST/LUNG: Clear to auscultation bilaterally; No wheezes or crackles  HEART: Regular rate and rhythm; No murmurs, rubs, or gallops  ABDOMEN: Soft, Nontender, Nondistended; Bowel sounds present  EXTREMITIES:  2+ Peripheral Pulses, No clubbing, cyanosis, or edema  NEUROLOGY: non-focal  SKIN: No rashes or lesions       LABS:                        10.4   7.88  )-----------( 318      ( 22 Apr 2021 07:18 )             33.9     Auto Eosinophil # x     / Auto Eosinophil % x     / Auto Neutrophil # x     / Auto Neutrophil % x     / BANDS % x                            10.1   10.07 )-----------( 330      ( 21 Apr 2021 07:11 )             33.6     Auto Eosinophil # x     / Auto Eosinophil % x     / Auto Neutrophil # x     / Auto Neutrophil % x     / BANDS % x        04-22    135  |  101  |  16  ----------------------------<  158<H>  4.3   |  23  |  0.76    Ca    8.4      22 Apr 2021 07:16  Mg     1.9     04-22  Phos  2.7     04-22  TPro  6.8  /  Alb  2.0<L>  /  TBili  0.6  /  DBili  x   /  AST  23  /  ALT  29  /  AlkPhos  81  04-22    PTT - ( 22 Apr 2021 07:19 )  PTT:75.8 sec

## 2021-04-23 NOTE — CONSULT NOTE ADULT - NSPROBSELRECBLANK_5_GEN
Received request via: Pharmacy    Was the patient seen in the last year in this department? No     Does the patient have an active prescription (recently filled or refills available) for medication(s) requested? No   DISPLAY PLAN FREE TEXT

## 2021-04-23 NOTE — CHART NOTE - NSCHARTNOTEFT_GEN_A_CORE
60M unknown medical history BIBEMS for respiratory distress, recently COVID+ as per EMS. Pt unable to comply with history 2/2 resp distress, nods yes to difficulty breathing, no to pain. Baseline AAOx3. En route, pt had RR 28, SaO2 60%. In ED initially hypoxic to 60% placed on NRB and satting low 90s and tachypneic to 40s on NRB. Placed on AVAPS, still tachypneic to 40s. Labs showing HAGMA, elevated FSG to 500s. Also with lactate of 15. Trops of 200 and pro-BNP elevated to 21k. MICU consulted for respiratory failure in setting of COVID, also concern for DKA. On encounter, pt is noncooperative with questioning. Nods and tries to speak, unable to form full sentences and visibly tachypneic on AVAPS. In ED given 2L NS, decadron. Started on insulin gtt. Subsequently intubated  Extubated 4/13. Found to have acute CVA w/ hemorraghic conversion. +infarcts L cerebellar, L posterior/frontal, b/l occipital/parietal, hemorrhagic transformation. Repeat HCT (4/12): no changes. CTA Chest (4/1): +RITO PE with RV strain.  Pt on Heparin.  Neurology following-> Multi-factorial impaired level of arousal due to global cerebral dysfunction likely from combination of COVID-19, associated hypoxia, persistent pharmacologic effects of overdose of multiple sedatives at [facility], precipitous drop in BP (while at [facility]) and multiple metabolic derangements. ID following-> +fevers, suspect aspiration.    Pt seen today for reassessment of swallow on 4/22, recommendations were for NPO, with non-oral nutrition/hydration/medications.   Today, pt seen for re-evaluation of swallow, pt awake family at bedside requesting to translate. Pt able to follow some simple directions and communicate simple needs. Hypophonic, reduced vocal volume suspect related to deconditioning. At baseline pt with weak cough.   Impression: Pt presents with 1. An oral and suspected pharyngeal dysphagia marked by delayed oral transit time, suspected uncontrolled loss, and delayed coughing post PO intake of thin puree and honey thick liquids suggestive of laryngeal penetration/aspiration.     Recommendations:   Continue NPO, with non-oral nutrition/hydration/medications.   Given consistent improvement in overall status, recommend FEES exam to formally assess oropharyngeal swallow and determine candidacy for initiation of PO diet.   Maintain good oral hygiene and aspiration precautions. Will continue to follow. Recommend rehab upon discharge.    Above d/w RN: Reina

## 2021-04-23 NOTE — PROGRESS NOTE ADULT - PROBLEM SELECTOR PLAN 7
-Dysphagia noted in setting of patient's acute alteration in mental status  -Nasogastric tube placed for administration of tube feeds; but patient self removed 4/17; multiple attempts to replace unsuccessful   -Patient evaluated by speech and swallow team, and npo status recommended  -Will maintain npo status with tube feeds at this time once tube feeds replaced (unsuccessful x3 today); in setting of acute cerebral infarcts, it is possible that patient's neurologic function and swallowing mechanism improve  Will re-evaluate daily -Dysphagia noted in setting of patient's acute alteration in mental status  -Nasogastric tube placed for administration of tube feeds; but patient self removed 4/17; multiple attempts to replace unsuccessful   -Patient evaluated by speech and swallow team, and npo status recommended  -Will maintain npo status with tube feeds at this time once tube feeds replaced (unsuccessful x3 today); in setting of acute cerebral infarcts, it is possible that patient's neurologic function and swallowing mechanism improve  Pt tolerated small feedings with speech & swallow and family at bedside  - On Monday, will assess aspiration risk with fiberoptic endoscopic evaluation of swallowing study

## 2021-04-23 NOTE — PROGRESS NOTE ADULT - PROBLEM SELECTOR PLAN 2
-Acute ischemic strokes, with concern for hemorrhagic conversion, noted on CT scan on 4/8  -Etiology of acute ischemic stroke uncertain at this time; TTE with bubble performed at bedside in the micu without any evidence of right-to-left shunt; no arrhythmia noted on telemetry throughout stay in the micu   -CTA head and neck without any evidence of carotid artery stenosis or dissection   -Full-dose anticoagulation for pulmonary embolus held briefly out of concern for hemorrhagic conversion of acute strokes but since restarted; status post management with hypertonic saline; minimal concern at this time for elevated intracranial pressure   -Will administer oral aspirin for secondary prophylaxis following discussion with neurology  -Currently receiving leviteracetam for seizure prophylaxis although no documented seizures or seizure-like activity   -Will discuss case with neurology team -Acute ischemic strokes, with concern for hemorrhagic conversion, noted on CT scan on 4/8  -Etiology of acute ischemic stroke uncertain at this time; TTE with bubble performed at bedside in the micu without any evidence of right-to-left shunt; no arrhythmia noted on telemetry throughout stay in the micu   -CTA head and neck without any evidence of carotid artery stenosis or dissection   -Full-dose anticoagulation for pulmonary embolus held briefly out of concern for hemorrhagic conversion of acute strokes but since restarted; status post management with hypertonic saline; minimal concern at this time for elevated intracranial pressure   -Will administer oral aspirin for secondary prophylaxis following discussion with neurology  -Currently receiving levetiracetam for seizure prophylaxis although no documented seizures or seizure-like activity   - EEG pending  -Will discuss case with neurology team

## 2021-04-23 NOTE — PROGRESS NOTE ADULT - SUBJECTIVE AND OBJECTIVE BOX
Patient is a 60y old  Male who presents with a chief complaint of COVID19 w/ severe metabolic acidosis s/p intubation (23 Apr 2021 16:04)       INTERVAL HPI/OVERNIGHT EVENTS:  Patient seen and evaluated at bedside.  Pt is resting comfortable in NAD    Allergies    No Known Allergies    Intolerances        Vital Signs Last 24 Hrs  T(C): 36.7 (23 Apr 2021 14:23), Max: 36.7 (23 Apr 2021 05:09)  T(F): 98 (23 Apr 2021 14:23), Max: 98.1 (23 Apr 2021 05:09)  HR: 102 (23 Apr 2021 14:23) (85 - 105)  BP: 125/86 (23 Apr 2021 14:23) (116/79 - 125/86)  BP(mean): --  RR: 18 (23 Apr 2021 14:23) (18 - 18)  SpO2: 100% (23 Apr 2021 14:23) (97% - 100%)    LABS:                        9.8    8.79  )-----------( 364      ( 23 Apr 2021 06:42 )             31.8     04-23    137  |  101  |  16  ----------------------------<  136<H>  4.3   |  26  |  0.80    Ca    9.1      23 Apr 2021 06:42  Phos  3.4     04-23  Mg     2.0     04-23    TPro  7.7  /  Alb  2.6<L>  /  TBili  0.5  /  DBili  x   /  AST  21  /  ALT  26  /  AlkPhos  82  04-23    PTT - ( 23 Apr 2021 06:42 )  PTT:83.3 sec    CAPILLARY BLOOD GLUCOSE      POCT Blood Glucose.: 174 mg/dL (23 Apr 2021 18:41)  POCT Blood Glucose.: 210 mg/dL (23 Apr 2021 12:16)  POCT Blood Glucose.: 140 mg/dL (23 Apr 2021 05:35)  POCT Blood Glucose.: 130 mg/dL (23 Apr 2021 00:17)      Lower Extremity Physical Exam:  Vascular: DP/PT 2/4, B/L, CFT <3 seconds B/L, Temperature gradient warm to cool, B/L however diminished warmth noted on the R foot past TMT  Neuro: Epicritic sensation intact to the level of digits, B/L.  Skin: bilateral digital gangrenous changes noted to distal tips of digits, notably on L foot 2nd digit full thickness, well adhered and warmth noted; R foot 2nd digit sloughed skin with no probe to bone nor signs of infection

## 2021-04-23 NOTE — PROGRESS NOTE ADULT - SUBJECTIVE AND OBJECTIVE BOX
ADRIANA BOOKER 60y [id number]    Patient is a 60y old  Male who presents with a chief complaint of COVID19 w/ severe metabolic acidosis s/p intubation (23 Apr 2021 10:46)      Follow Up/CC:  ID following for fever    Interval History/ROS: no fever    Allergies    No Known Allergies    Intolerances        ANTIMICROBIALS:      MEDICATIONS  (STANDING):  albuterol/ipratropium for Nebulization 3 milliLiter(s) Nebulizer every 8 hours  cholecalciferol 400 Unit(s) Oral daily  dextrose 40% Gel 15 Gram(s) Oral once  dextrose 5% + lactated ringers. 1000 milliLiter(s) (100 mL/Hr) IV Continuous <Continuous>  dextrose 5%. 1000 milliLiter(s) (50 mL/Hr) IV Continuous <Continuous>  dextrose 5%. 1000 milliLiter(s) (100 mL/Hr) IV Continuous <Continuous>  dextrose 50% Injectable 25 Gram(s) IV Push once  dextrose 50% Injectable 12.5 Gram(s) IV Push once  dextrose 50% Injectable 25 Gram(s) IV Push once  glucagon  Injectable 1 milliGRAM(s) IntraMuscular once  heparin  Infusion.  Unit(s)/Hr (12 mL/Hr) IV Continuous <Continuous>  insulin lispro (ADMELOG) corrective regimen sliding scale   SubCutaneous every 6 hours  levETIRAcetam  IVPB 500 milliGRAM(s) IV Intermittent every 12 hours  multivitamin/minerals Oral Solution (WELLESSE) 30 milliLiter(s) Oral daily    MEDICATIONS  (PRN):  heparin   Injectable 5500 Unit(s) IV Push every 6 hours PRN For aPTT less than 40  heparin   Injectable 2500 Unit(s) IV Push every 6 hours PRN For aPTT between 40 - 57        Vital Signs Last 24 Hrs  T(C): 36.7 (23 Apr 2021 14:23), Max: 36.8 (22 Apr 2021 16:28)  T(F): 98 (23 Apr 2021 14:23), Max: 98.3 (22 Apr 2021 19:54)  HR: 102 (23 Apr 2021 14:23) (85 - 113)  BP: 125/86 (23 Apr 2021 14:23) (116/79 - 133/84)  BP(mean): --  RR: 18 (23 Apr 2021 14:23) (18 - 20)  SpO2: 100% (23 Apr 2021 14:23) (96% - 100%)    CBC Full  -  ( 23 Apr 2021 06:42 )  WBC Count : 8.79 K/uL  RBC Count : 3.76 M/uL  Hemoglobin : 9.8 g/dL  Hematocrit : 31.8 %  Platelet Count - Automated : 364 K/uL  Mean Cell Volume : 84.6 fl  Mean Cell Hemoglobin : 26.1 pg  Mean Cell Hemoglobin Concentration : 30.8 gm/dL  Auto Neutrophil # : x  Auto Lymphocyte # : x  Auto Monocyte # : x  Auto Eosinophil # : x  Auto Basophil # : x  Auto Neutrophil % : x  Auto Lymphocyte % : x  Auto Monocyte % : x  Auto Eosinophil % : x  Auto Basophil % : x    04-23    137  |  101  |  16  ----------------------------<  136<H>  4.3   |  26  |  0.80    Ca    9.1      23 Apr 2021 06:42  Phos  3.4     04-23  Mg     2.0     04-23    TPro  7.7  /  Alb  2.6<L>  /  TBili  0.5  /  DBili  x   /  AST  21  /  ALT  26  /  AlkPhos  82  04-23    LIVER FUNCTIONS - ( 23 Apr 2021 06:42 )  Alb: 2.6 g/dL / Pro: 7.7 g/dL / ALK PHOS: 82 U/L / ALT: 26 U/L / AST: 21 U/L / GGT: x               MICROBIOLOGY:  .Urine Clean Catch (Midstream)  04-19-21   No growth  --  --      .Blood Blood-Peripheral  04-19-21   No growth to date.  --  --      .Blood Blood  04-13-21   No Growth Final  --  --      .Sputum Sputum  04-13-21   Numerous Klebsiella pneumoniae ESBL  Normal Respiratory Lizzette absent  --  Klebsiella pneumoniae ESBL      .Sputum Sputum  04-12-21   Numerous Klebsiella pneumoniae ESBL  Normal Respiratory Lizzette absent  --  Klebsiella pneumoniae ESBL      .Blood Blood  04-06-21   No Growth Final  --  --      .Sputum Sputum  04-06-21   Normal Respiratory Lizzette present  --    No polymorphonuclear leukocytes per low power field  Moderate Squamous epithelial cells per low power field  Moderate Yeast like cells per oil power field  Moderate Gram Variable Rods per oil power field  Rare Gram Positive Cocci in Clusters per oil power field      .Blood Blood-Peripheral  04-03-21   No Growth Final  --    Few Squamous epithelial cells per low power field  Few polymorphonuclear leukocytes per low power field  Few Yeast per oil power field  Few Gram Positive Cocci in Pairs and Chains per oil power field      .Blood Blood-Peripheral  04-01-21   No Growth Final  --  --              v            RADIOLOGY

## 2021-04-23 NOTE — PROGRESS NOTE ADULT - ASSESSMENT
60M w/ bilateral digital gangrene  - Pt seen and evaluated  - bilateral digital gangrenous changes noted to distal tips of digits, notably on L foot 2nd digit full thickness, well adhered and warmth noted; R foot 2nd digit sloughed skin with no probe to bone nor signs of infection  - Foot currently without signs of infection, with demarcating gangrenous changes  - No acute podiatric surgical intervention at this time  - Plan to continue local wound care with betadine & with adequate blood flow & allow for further demarcation prior to any surgical intervention  - will monitor

## 2021-04-23 NOTE — PROGRESS NOTE ADULT - PROBLEM SELECTOR PLAN 8
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   Significant lower extremity arterial disease not identified. The only abnormality on this examination is the decreased amplitude to the pulse volume recordings at the levels of the toes bilaterally.  - Podiatry recs appreciated, no acute surgical intervention at this time, can apply betadine to wound -Dry gangrene noted over distal toes on bilateral lower extremities--most concerning for microvascular disease subsequent to vasopressor administration   -Dorsalis pedis pulses intact bilaterally  - ERASMO demonstrating right ERASMO: 1.09, left ERASMO: 1.24, right TBI: 0.78, and left TBI: 0.73.   Significant lower extremity arterial disease not identified. The only abnormality on this examination is the decreased amplitude to the pulse volume recordings at the levels of the toes bilaterally.  - Podiatry recs appreciated, no acute surgical intervention at this time, can apply betadine to wound  - Wound care consult placed

## 2021-04-24 DIAGNOSIS — I96 GANGRENE, NOT ELSEWHERE CLASSIFIED: ICD-10-CM

## 2021-04-24 DIAGNOSIS — R13.10 DYSPHAGIA, UNSPECIFIED: ICD-10-CM

## 2021-04-24 DIAGNOSIS — G93.49 OTHER ENCEPHALOPATHY: ICD-10-CM

## 2021-04-24 DIAGNOSIS — Z71.89 OTHER SPECIFIED COUNSELING: ICD-10-CM

## 2021-04-24 DIAGNOSIS — Z51.5 ENCOUNTER FOR PALLIATIVE CARE: ICD-10-CM

## 2021-04-24 LAB
ALBUMIN SERPL ELPH-MCNC: 2.2 G/DL — LOW (ref 3.3–5)
ALP SERPL-CCNC: 71 U/L — SIGNIFICANT CHANGE UP (ref 40–120)
ALT FLD-CCNC: 22 U/L — SIGNIFICANT CHANGE UP (ref 10–45)
ANION GAP SERPL CALC-SCNC: 6 MMOL/L — SIGNIFICANT CHANGE UP (ref 5–17)
APTT BLD: 92.1 SEC — HIGH (ref 27.5–35.5)
AST SERPL-CCNC: 19 U/L — SIGNIFICANT CHANGE UP (ref 10–40)
BILIRUB SERPL-MCNC: 0.5 MG/DL — SIGNIFICANT CHANGE UP (ref 0.2–1.2)
BUN SERPL-MCNC: 12 MG/DL — SIGNIFICANT CHANGE UP (ref 7–23)
CALCIUM SERPL-MCNC: 8.8 MG/DL — SIGNIFICANT CHANGE UP (ref 8.4–10.5)
CHLORIDE SERPL-SCNC: 103 MMOL/L — SIGNIFICANT CHANGE UP (ref 96–108)
CO2 SERPL-SCNC: 28 MMOL/L — SIGNIFICANT CHANGE UP (ref 22–31)
CREAT SERPL-MCNC: 0.75 MG/DL — SIGNIFICANT CHANGE UP (ref 0.5–1.3)
CULTURE RESULTS: SIGNIFICANT CHANGE UP
CULTURE RESULTS: SIGNIFICANT CHANGE UP
FOLATE SERPL-MCNC: 10.8 NG/ML — SIGNIFICANT CHANGE UP
GLUCOSE BLDC GLUCOMTR-MCNC: 148 MG/DL — HIGH (ref 70–99)
GLUCOSE BLDC GLUCOMTR-MCNC: 157 MG/DL — HIGH (ref 70–99)
GLUCOSE BLDC GLUCOMTR-MCNC: 172 MG/DL — HIGH (ref 70–99)
GLUCOSE BLDC GLUCOMTR-MCNC: 184 MG/DL — HIGH (ref 70–99)
GLUCOSE BLDC GLUCOMTR-MCNC: 189 MG/DL — HIGH (ref 70–99)
GLUCOSE BLDC GLUCOMTR-MCNC: 189 MG/DL — HIGH (ref 70–99)
GLUCOSE SERPL-MCNC: 184 MG/DL — HIGH (ref 70–99)
HCT VFR BLD CALC: 28.3 % — LOW (ref 39–50)
HGB BLD-MCNC: 8.6 G/DL — LOW (ref 13–17)
MAGNESIUM SERPL-MCNC: 1.8 MG/DL — SIGNIFICANT CHANGE UP (ref 1.6–2.6)
MCHC RBC-ENTMCNC: 26 PG — LOW (ref 27–34)
MCHC RBC-ENTMCNC: 30.4 GM/DL — LOW (ref 32–36)
MCV RBC AUTO: 85.5 FL — SIGNIFICANT CHANGE UP (ref 80–100)
NRBC # BLD: 0 /100 WBCS — SIGNIFICANT CHANGE UP (ref 0–0)
PHOSPHATE SERPL-MCNC: 3.3 MG/DL — SIGNIFICANT CHANGE UP (ref 2.5–4.5)
PLATELET # BLD AUTO: 354 K/UL — SIGNIFICANT CHANGE UP (ref 150–400)
POTASSIUM SERPL-MCNC: 4.1 MMOL/L — SIGNIFICANT CHANGE UP (ref 3.5–5.3)
POTASSIUM SERPL-SCNC: 4.1 MMOL/L — SIGNIFICANT CHANGE UP (ref 3.5–5.3)
PROT SERPL-MCNC: 6.8 G/DL — SIGNIFICANT CHANGE UP (ref 6–8.3)
RBC # BLD: 3.31 M/UL — LOW (ref 4.2–5.8)
RBC # FLD: 15.7 % — HIGH (ref 10.3–14.5)
SODIUM SERPL-SCNC: 137 MMOL/L — SIGNIFICANT CHANGE UP (ref 135–145)
SPECIMEN SOURCE: SIGNIFICANT CHANGE UP
SPECIMEN SOURCE: SIGNIFICANT CHANGE UP
TSH SERPL-MCNC: 2 UIU/ML — SIGNIFICANT CHANGE UP (ref 0.27–4.2)
VIT B12 SERPL-MCNC: 412 PG/ML — SIGNIFICANT CHANGE UP (ref 232–1245)
WBC # BLD: 7 K/UL — SIGNIFICANT CHANGE UP (ref 3.8–10.5)
WBC # FLD AUTO: 7 K/UL — SIGNIFICANT CHANGE UP (ref 3.8–10.5)

## 2021-04-24 PROCEDURE — 99233 SBSQ HOSP IP/OBS HIGH 50: CPT | Mod: GC

## 2021-04-24 RX ORDER — THIAMINE MONONITRATE (VIT B1) 100 MG
100 TABLET ORAL DAILY
Refills: 0 | Status: DISCONTINUED | OUTPATIENT
Start: 2021-04-24 | End: 2021-04-30

## 2021-04-24 RX ORDER — SODIUM CHLORIDE 9 MG/ML
1000 INJECTION, SOLUTION INTRAVENOUS
Refills: 0 | Status: DISCONTINUED | OUTPATIENT
Start: 2021-04-24 | End: 2021-04-25

## 2021-04-24 RX ADMIN — Medication 2: at 08:27

## 2021-04-24 RX ADMIN — Medication 3 MILLILITER(S): at 23:15

## 2021-04-24 RX ADMIN — LEVETIRACETAM 400 MILLIGRAM(S): 250 TABLET, FILM COATED ORAL at 05:44

## 2021-04-24 RX ADMIN — Medication 400 UNIT(S): at 12:05

## 2021-04-24 RX ADMIN — Medication 2: at 17:04

## 2021-04-24 RX ADMIN — Medication 3 MILLILITER(S): at 18:10

## 2021-04-24 RX ADMIN — Medication 100 MILLIGRAM(S): at 16:51

## 2021-04-24 RX ADMIN — LEVETIRACETAM 400 MILLIGRAM(S): 250 TABLET, FILM COATED ORAL at 17:01

## 2021-04-24 RX ADMIN — Medication 30 MILLILITER(S): at 16:52

## 2021-04-24 RX ADMIN — SODIUM CHLORIDE 100 MILLILITER(S): 9 INJECTION, SOLUTION INTRAVENOUS at 04:39

## 2021-04-24 RX ADMIN — Medication 2: at 23:36

## 2021-04-24 RX ADMIN — SODIUM CHLORIDE 75 MILLILITER(S): 9 INJECTION, SOLUTION INTRAVENOUS at 11:10

## 2021-04-24 RX ADMIN — HEPARIN SODIUM 1200 UNIT(S)/HR: 5000 INJECTION INTRAVENOUS; SUBCUTANEOUS at 08:31

## 2021-04-24 NOTE — PROGRESS NOTE ADULT - ATTENDING COMMENTS
Patient seen and examined     Overall nonverbal but stable. VSS. FSG ok.     Ultimately, patient pending FEES evaluation and decision regarding PEG. In term of mentation, pending EEG. Multiple CTH in past few days stable while on heparin gtt for stroke- BPs overall stable. Currently full code. Palliative following

## 2021-04-24 NOTE — PROGRESS NOTE ADULT - PROBLEM SELECTOR PLAN 1
- Etiology likely multi-factorial in setting of acute ischemic infarctions of the brain in conjunction vs acute kidney injury with uremia (now resolving) vs resolving hypoxic respiratory failure, covid-19 infection, superimposed bacterial pneumonia, urinary retention, concern for nutritional deficiencies  - Managing acute pneumonia with antibiotics; managing pulmonary embolus with full-dose anticoagulation; limiting sedative medication administration   - At this time, will favor frequent reorientation; will re-evaluate swallowing function as possible  - Some improvement in mental status on 4/21  - MRI Brain, and MRA Head demonstrated multiple evolving subacute infarcts with associated hemorrhages including in the bilateral centrum semiovale, bilateral parieto-occipital region, left posterior temporal lobe, right thalamus, and anterior temporal poles bilaterally. Evolving subacute infarct with hemorrhagic transformation in the left cerebellar hemisphere. Note, many of these infarcts have associated abnormal contrast enhancement, likely reflecting expected evolution of ischemia, however, follow-up imaging to document resolution of this finding is recommended. No evidence of proximal vessel stenosis, occlusion, aneurysm or vascular malformation.   - Awaiting neuro recs  - Pending EEG

## 2021-04-24 NOTE — PROGRESS NOTE ADULT - PROBLEM SELECTOR PLAN 3
-Low-grade temperature elevations noted over past few days in micu  -For that reason, blood and sputum cultures drawn on 4/13; sputum cultures growing ESBL Klebsiella   - Previously on meropenem 1 g q8h (4/15-4/19) for treatment of ventilator-associated pneumonia, switched to ertapenem on 4/19 for 5 more days, completed course of Abx  -Maintaining oxygen saturations greater than 95% on room air at this time  - Spiked fever O/N on 4/18 to 4/19, cultures sent (previous 4/13 blood cxs x2 negative), CXR similar to previously with noted, right pleural effusion and right lower lobe pneumonia, and left side clear lungs, likely 2/2 to aspiration from chemical pneumonitis

## 2021-04-24 NOTE — PROGRESS NOTE ADULT - PROBLEM SELECTOR PLAN 7
-Dysphagia noted in setting of patient's acute alteration in mental status  -Nasogastric tube placed for administration of tube feeds; but patient self removed 4/17; multiple attempts to replace unsuccessful   -Patient evaluated by speech and swallow team, and npo status recommended  -Will maintain npo status with tube feeds at this time once tube feeds replaced (unsuccessful x3 today); in setting of acute cerebral infarcts, it is possible that patient's neurologic function and swallowing mechanism improve  Pt tolerated small feedings with speech & swallow and family at bedside  - On Monday, will assess aspiration risk with fiberoptic endoscopic evaluation of swallowing study

## 2021-04-24 NOTE — PROGRESS NOTE ADULT - PROBLEM SELECTOR PLAN 9
-Initiated goals of care discussion with patient's brother-in-law (listed in chart as primary point of contact); patient is full code at this time; goal is as much recovery as possible  -The patient's wife is in [place], but the patient's brother-in-law has been visiting and placing the patient's wife on facetime; she is aware of all that is happening and has happened   -Discussed further today the possibility of placing a peg tube; the patient's brother-in-law states that the patient's preference would be to receive a peg tube   - GI not offering peg tube at this time

## 2021-04-24 NOTE — PROGRESS NOTE ADULT - PROBLEM SELECTOR PLAN 2
-Acute ischemic strokes, with concern for hemorrhagic conversion, noted on CT scan on 4/8  -Etiology of acute ischemic stroke uncertain at this time; TTE with bubble performed at bedside in the micu without any evidence of right-to-left shunt; no arrhythmia noted on telemetry throughout stay in the micu   -CTA head and neck without any evidence of carotid artery stenosis or dissection   -Full-dose anticoagulation for pulmonary embolus held briefly out of concern for hemorrhagic conversion of acute strokes but since restarted; status post management with hypertonic saline; minimal concern at this time for elevated intracranial pressure   -Will administer oral aspirin for secondary prophylaxis following discussion with neurology  -Currently receiving levetiracetam for seizure prophylaxis although no documented seizures or seizure-like activity   - EEG pending  -Will discuss case with neurology team

## 2021-04-24 NOTE — PROGRESS NOTE ADULT - SUBJECTIVE AND OBJECTIVE BOX
Patient is a 60y old  Male who presents with a chief complaint of COVID19 w/ severe metabolic acidosis s/p intubation (23 Apr 2021 16:04)      Raghavendra Martell PGY3  Internal Medicine  Pager: [facility]: [phone] NS: [phone]     SUBJECTIVE/OVERNIGHT EVENTS     No acute events overnight. Pt seen and examined at bedside.    MEDICATIONS  (STANDING):  albuterol/ipratropium for Nebulization 3 milliLiter(s) Nebulizer every 8 hours  cholecalciferol 400 Unit(s) Oral daily  dextrose 40% Gel 15 Gram(s) Oral once  dextrose 5%. 1000 milliLiter(s) (50 mL/Hr) IV Continuous <Continuous>  dextrose 5%. 1000 milliLiter(s) (100 mL/Hr) IV Continuous <Continuous>  dextrose 50% Injectable 25 Gram(s) IV Push once  dextrose 50% Injectable 12.5 Gram(s) IV Push once  dextrose 50% Injectable 25 Gram(s) IV Push once  glucagon  Injectable 1 milliGRAM(s) IntraMuscular once  heparin  Infusion.  Unit(s)/Hr (12 mL/Hr) IV Continuous <Continuous>  insulin lispro (ADMELOG) corrective regimen sliding scale   SubCutaneous every 6 hours  levETIRAcetam  IVPB 500 milliGRAM(s) IV Intermittent every 12 hours  multivitamin/minerals Oral Solution (WELLESSE) 30 milliLiter(s) Oral daily    MEDICATIONS  (PRN):  heparin   Injectable 5500 Unit(s) IV Push every 6 hours PRN For aPTT less than 40  heparin   Injectable 2500 Unit(s) IV Push every 6 hours PRN For aPTT between 40 - 57    CAPILLARY BLOOD GLUCOSE      POCT Blood Glucose.: 184 mg/dL (24 Apr 2021 06:04)  POCT Blood Glucose.: 148 mg/dL (24 Apr 2021 00:14)  POCT Blood Glucose.: 174 mg/dL (23 Apr 2021 18:41)  POCT Blood Glucose.: 210 mg/dL (23 Apr 2021 12:16)    I&O's Summary    23 Apr 2021 07:01  -  24 Apr 2021 07:00  --------------------------------------------------------  IN: 0 mL / OUT: 825 mL / NET: -825 mL          Vital Signs Last 24 Hrs  T(C): 36.5 (24 Apr 2021 04:42), Max: 36.7 (23 Apr 2021 14:23)  T(F): 97.7 (24 Apr 2021 04:42), Max: 98 (23 Apr 2021 14:23)  HR: 99 (24 Apr 2021 04:42) (99 - 102)  BP: 147/91 (24 Apr 2021 04:42) (118/72 - 147/91)  BP(mean): --  RR: 18 (24 Apr 2021 04:42) (18 - 19)  SpO2: 99% (24 Apr 2021 04:42) (99% - 100%)    PHYSICAL EXAM:  GENERAL: NAD, well-developed  HEAD:  Atraumatic, Normocephalic  EYES: EOMI, PERRLA, conjunctiva and sclera clear  NECK: Supple, No JVD  CHEST/LUNG: Clear to auscultation bilaterally; No wheeze  HEART: Regular rate and rhythm; No murmurs, rubs, or gallops  ABDOMEN: Soft, Nontender, Nondistended; Bowel sounds present  EXTREMITIES:  2+ Peripheral Pulses, No clubbing, cyanosis, or edema  PSYCH: AAOx3  NEUROLOGY: non-focal  SKIN: No rashes or lesions    LABS                        9.8    8.79  )-----------( 364      ( 23 Apr 2021 06:42 )             31.8     04-23    137  |  101  |  16  ----------------------------<  136<H>  4.3   |  26  |  0.80    Ca    9.1      23 Apr 2021 06:42  Phos  3.4     04-23  Mg     2.0     04-23    TPro  7.7  /  Alb  2.6<L>  /  TBili  0.5  /  DBili  x   /  AST  21  /  ALT  26  /  AlkPhos  82  04-23    PTT - ( 23 Apr 2021 06:42 )  PTT:83.3 sec   14 Apr 2021 00:39 Troponin x     / CK 44 U/L / CKMB x     / CKMB Units x       12 Apr 2021 21:18 Troponin x     / CK 26 U/L / CKMB x     / CKMB Units x       01 Apr 2021 08:52 Troponin x     /  U/L / CKMB x     / CKMB Units x                    RADIOLOGY & ADDITIONAL TESTS:         Patient is a 60y old  Male who presents with a chief complaint of COVID19 w/ severe metabolic acidosis s/p intubation (23 Apr 2021 16:04)      Raghavendra Martell PGY3  Internal Medicine  Pager: [facility]: [phone] NS: [phone]     SUBJECTIVE/OVERNIGHT EVENTS     No acute events overnight. Pt seen and examined at bedside. Mostly non-verbal with intermittent head nodding to questions.    MEDICATIONS  (STANDING):  albuterol/ipratropium for Nebulization 3 milliLiter(s) Nebulizer every 8 hours  cholecalciferol 400 Unit(s) Oral daily  dextrose 40% Gel 15 Gram(s) Oral once  dextrose 5%. 1000 milliLiter(s) (50 mL/Hr) IV Continuous <Continuous>  dextrose 5%. 1000 milliLiter(s) (100 mL/Hr) IV Continuous <Continuous>  dextrose 50% Injectable 25 Gram(s) IV Push once  dextrose 50% Injectable 12.5 Gram(s) IV Push once  dextrose 50% Injectable 25 Gram(s) IV Push once  glucagon  Injectable 1 milliGRAM(s) IntraMuscular once  heparin  Infusion.  Unit(s)/Hr (12 mL/Hr) IV Continuous <Continuous>  insulin lispro (ADMELOG) corrective regimen sliding scale   SubCutaneous every 6 hours  levETIRAcetam  IVPB 500 milliGRAM(s) IV Intermittent every 12 hours  multivitamin/minerals Oral Solution (WELLESSE) 30 milliLiter(s) Oral daily    MEDICATIONS  (PRN):  heparin   Injectable 5500 Unit(s) IV Push every 6 hours PRN For aPTT less than 40  heparin   Injectable 2500 Unit(s) IV Push every 6 hours PRN For aPTT between 40 - 57    CAPILLARY BLOOD GLUCOSE      POCT Blood Glucose.: 184 mg/dL (24 Apr 2021 06:04)  POCT Blood Glucose.: 148 mg/dL (24 Apr 2021 00:14)  POCT Blood Glucose.: 174 mg/dL (23 Apr 2021 18:41)  POCT Blood Glucose.: 210 mg/dL (23 Apr 2021 12:16)    I&O's Summary    23 Apr 2021 07:01  -  24 Apr 2021 07:00  --------------------------------------------------------  IN: 0 mL / OUT: 825 mL / NET: -825 mL          Vital Signs Last 24 Hrs  T(C): 36.5 (24 Apr 2021 04:42), Max: 36.7 (23 Apr 2021 14:23)  T(F): 97.7 (24 Apr 2021 04:42), Max: 98 (23 Apr 2021 14:23)  HR: 99 (24 Apr 2021 04:42) (99 - 102)  BP: 147/91 (24 Apr 2021 04:42) (118/72 - 147/91)  BP(mean): --  RR: 18 (24 Apr 2021 04:42) (18 - 19)  SpO2: 99% (24 Apr 2021 04:42) (99% - 100%)    PHYSICAL EXAM:  GENERAL: NAD, well-developed  HEAD:  Atraumatic, Normocephalic  EYES: EOMI, PERRLA, conjunctiva and sclera clear  NECK: Supple, No JVD  CHEST/LUNG: Clear to auscultation bilaterally; No wheeze  HEART: Regular rate and rhythm; No murmurs, rubs, or gallops  ABDOMEN: Soft, Nontender, Nondistended; Bowel sounds present  EXTREMITIES:  2+ Peripheral Pulses, No clubbing, cyanosis, or edema  PSYCH: alert, not oriented  NEUROLOGY: non-focal  SKIN: No rashes or lesions    LABS                        9.8    8.79  )-----------( 364      ( 23 Apr 2021 06:42 )             31.8     04-23    137  |  101  |  16  ----------------------------<  136<H>  4.3   |  26  |  0.80    Ca    9.1      23 Apr 2021 06:42  Phos  3.4     04-23  Mg     2.0     04-23    TPro  7.7  /  Alb  2.6<L>  /  TBili  0.5  /  DBili  x   /  AST  21  /  ALT  26  /  AlkPhos  82  04-23    PTT - ( 23 Apr 2021 06:42 )  PTT:83.3 sec   14 Apr 2021 00:39 Troponin x     / CK 44 U/L / CKMB x     / CKMB Units x       12 Apr 2021 21:18 Troponin x     / CK 26 U/L / CKMB x     / CKMB Units x       01 Apr 2021 08:52 Troponin x     /  U/L / CKMB x     / CKMB Units x                    RADIOLOGY & ADDITIONAL TESTS:

## 2021-04-24 NOTE — PROGRESS NOTE ADULT - PROBLEM SELECTOR PLAN 8
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   Significant lower extremity arterial disease not identified. The only abnormality on this examination is the decreased amplitude to the pulse volume recordings at the levels of the toes bilaterally.  - Podiatry recs appreciated, no acute surgical intervention at this time, can apply betadine to wound  - Wound care consult placed

## 2021-04-25 LAB
ALBUMIN SERPL ELPH-MCNC: 2.3 G/DL — LOW (ref 3.3–5)
ALP SERPL-CCNC: 72 U/L — SIGNIFICANT CHANGE UP (ref 40–120)
ALT FLD-CCNC: 22 U/L — SIGNIFICANT CHANGE UP (ref 10–45)
ANION GAP SERPL CALC-SCNC: 11 MMOL/L — SIGNIFICANT CHANGE UP (ref 5–17)
APTT BLD: 103 SEC — HIGH (ref 27.5–35.5)
APTT BLD: 106.7 SEC — HIGH (ref 27.5–35.5)
APTT BLD: 94.3 SEC — HIGH (ref 27.5–35.5)
AST SERPL-CCNC: 19 U/L — SIGNIFICANT CHANGE UP (ref 10–40)
BILIRUB SERPL-MCNC: 0.5 MG/DL — SIGNIFICANT CHANGE UP (ref 0.2–1.2)
BUN SERPL-MCNC: 11 MG/DL — SIGNIFICANT CHANGE UP (ref 7–23)
CALCIUM SERPL-MCNC: 8.9 MG/DL — SIGNIFICANT CHANGE UP (ref 8.4–10.5)
CHLORIDE SERPL-SCNC: 103 MMOL/L — SIGNIFICANT CHANGE UP (ref 96–108)
CO2 SERPL-SCNC: 24 MMOL/L — SIGNIFICANT CHANGE UP (ref 22–31)
CREAT SERPL-MCNC: 0.77 MG/DL — SIGNIFICANT CHANGE UP (ref 0.5–1.3)
GLUCOSE BLDC GLUCOMTR-MCNC: 139 MG/DL — HIGH (ref 70–99)
GLUCOSE BLDC GLUCOMTR-MCNC: 161 MG/DL — HIGH (ref 70–99)
GLUCOSE BLDC GLUCOMTR-MCNC: 183 MG/DL — HIGH (ref 70–99)
GLUCOSE BLDC GLUCOMTR-MCNC: 211 MG/DL — HIGH (ref 70–99)
GLUCOSE SERPL-MCNC: 181 MG/DL — HIGH (ref 70–99)
HCT VFR BLD CALC: 28.4 % — LOW (ref 39–50)
HGB BLD-MCNC: 8.7 G/DL — LOW (ref 13–17)
MAGNESIUM SERPL-MCNC: 1.6 MG/DL — SIGNIFICANT CHANGE UP (ref 1.6–2.6)
MCHC RBC-ENTMCNC: 25.9 PG — LOW (ref 27–34)
MCHC RBC-ENTMCNC: 30.6 GM/DL — LOW (ref 32–36)
MCV RBC AUTO: 84.5 FL — SIGNIFICANT CHANGE UP (ref 80–100)
NRBC # BLD: 0 /100 WBCS — SIGNIFICANT CHANGE UP (ref 0–0)
PHOSPHATE SERPL-MCNC: 3.6 MG/DL — SIGNIFICANT CHANGE UP (ref 2.5–4.5)
PLATELET # BLD AUTO: 382 K/UL — SIGNIFICANT CHANGE UP (ref 150–400)
POTASSIUM SERPL-MCNC: 3.9 MMOL/L — SIGNIFICANT CHANGE UP (ref 3.5–5.3)
POTASSIUM SERPL-SCNC: 3.9 MMOL/L — SIGNIFICANT CHANGE UP (ref 3.5–5.3)
PROT SERPL-MCNC: 6.8 G/DL — SIGNIFICANT CHANGE UP (ref 6–8.3)
RBC # BLD: 3.36 M/UL — LOW (ref 4.2–5.8)
RBC # FLD: 15.9 % — HIGH (ref 10.3–14.5)
SODIUM SERPL-SCNC: 138 MMOL/L — SIGNIFICANT CHANGE UP (ref 135–145)
WBC # BLD: 6.79 K/UL — SIGNIFICANT CHANGE UP (ref 3.8–10.5)
WBC # FLD AUTO: 6.79 K/UL — SIGNIFICANT CHANGE UP (ref 3.8–10.5)

## 2021-04-25 PROCEDURE — 99233 SBSQ HOSP IP/OBS HIGH 50: CPT | Mod: GC

## 2021-04-25 RX ORDER — SODIUM CHLORIDE 9 MG/ML
1000 INJECTION, SOLUTION INTRAVENOUS
Refills: 0 | Status: DISCONTINUED | OUTPATIENT
Start: 2021-04-25 | End: 2021-04-27

## 2021-04-25 RX ORDER — ATORVASTATIN CALCIUM 80 MG/1
40 TABLET, FILM COATED ORAL AT BEDTIME
Refills: 0 | Status: DISCONTINUED | OUTPATIENT
Start: 2021-04-25 | End: 2021-04-25

## 2021-04-25 RX ORDER — MAGNESIUM SULFATE 500 MG/ML
2 VIAL (ML) INJECTION ONCE
Refills: 0 | Status: COMPLETED | OUTPATIENT
Start: 2021-04-25 | End: 2021-04-25

## 2021-04-25 RX ADMIN — HEPARIN SODIUM 1100 UNIT(S)/HR: 5000 INJECTION INTRAVENOUS; SUBCUTANEOUS at 16:11

## 2021-04-25 RX ADMIN — SODIUM CHLORIDE 75 MILLILITER(S): 9 INJECTION, SOLUTION INTRAVENOUS at 13:25

## 2021-04-25 RX ADMIN — LEVETIRACETAM 400 MILLIGRAM(S): 250 TABLET, FILM COATED ORAL at 05:05

## 2021-04-25 RX ADMIN — HEPARIN SODIUM 1000 UNIT(S)/HR: 5000 INJECTION INTRAVENOUS; SUBCUTANEOUS at 23:22

## 2021-04-25 RX ADMIN — Medication 3 MILLILITER(S): at 17:07

## 2021-04-25 RX ADMIN — SODIUM CHLORIDE 75 MILLILITER(S): 9 INJECTION, SOLUTION INTRAVENOUS at 18:25

## 2021-04-25 RX ADMIN — HEPARIN SODIUM 1100 UNIT(S)/HR: 5000 INJECTION INTRAVENOUS; SUBCUTANEOUS at 08:40

## 2021-04-25 RX ADMIN — Medication 3 MILLILITER(S): at 22:47

## 2021-04-25 RX ADMIN — Medication 100 MILLIGRAM(S): at 12:01

## 2021-04-25 RX ADMIN — Medication 2: at 18:26

## 2021-04-25 RX ADMIN — Medication 2: at 23:47

## 2021-04-25 RX ADMIN — Medication 4: at 05:11

## 2021-04-25 RX ADMIN — Medication 50 GRAM(S): at 12:01

## 2021-04-25 RX ADMIN — LEVETIRACETAM 400 MILLIGRAM(S): 250 TABLET, FILM COATED ORAL at 17:06

## 2021-04-25 NOTE — PROGRESS NOTE ADULT - PROBLEM SELECTOR PLAN 10
-Full-dose anticoagulation for DVT prophylaxis as above   -Full code at this time -Full-dose anticoagulation for DVT prophylaxis as above   -Full code

## 2021-04-25 NOTE — PROGRESS NOTE ADULT - PROBLEM SELECTOR PLAN 7
-Dysphagia noted in setting of patient's acute alteration in mental status  -Nasogastric tube placed for administration of tube feeds; but patient self removed 4/17; multiple attempts to replace unsuccessful   -Patient evaluated by speech and swallow team, and npo status recommended  -Will maintain npo status with tube feeds at this time once tube feeds replaced (unsuccessful x3 today); in setting of acute cerebral infarcts, it is possible that patient's neurologic function and swallowing mechanism improve  Pt tolerated small feedings with speech & swallow and family at bedside  - On Monday, will assess aspiration risk with fiberoptic endoscopic evaluation of swallowing study -Dysphagia noted in setting of patient's acute alteration in mental status  -Nasogastric tube placed for administration of tube feeds; but patient self removed 4/17; multiple attempts to replace unsuccessful   -Patient evaluated by speech and swallow team, and npo status recommended  -Will maintain npo status with tube feeds at this time once tube feeds replaced (unsuccessful x3 today); in setting of acute cerebral infarcts, it is possible that patient's neurologic function and swallowing mechanism improve  Pt tolerated small feedings with speech & swallow and family at bedside  - On Monday, will assess aspiration risk with fiberoptic endoscopic evaluation of swallowing study  - Will give maintenance fluids (D5LR) at 1000 ml at 75 cc/hr x16 hrs

## 2021-04-25 NOTE — PROGRESS NOTE ADULT - ATTENDING COMMENTS
60 yr male with PMH of DM who was initially admitted to [facility] ICU for hypoxic respiratory failure 2/2 COVID c/b PE with R heart strain, cardiogenic and septic shock, ischemic and hemorrhagic CVA and ESBL klebsiella ventilator associate PNA, transferred to [facility] for neurosurgery eval.   Today able to open his eyes to voice, not following commands. Pending EEG  Pending FEES eval and possible PEG. IV hydration, failed multiple NGT placement attempts.   On heparin gtt for PE/DVT and acute CVA.  Rest as above.

## 2021-04-25 NOTE — PROGRESS NOTE ADULT - NUTRITIONAL ASSESSMENT
This patient has been assessed with a concern for Malnutrition and has been determined to have a diagnosis/diagnoses of Severe protein-calorie malnutrition.    This patient is being managed with:   Diet NPO-  Entered: Apr 17 2021  6:50PM     This patient has been assessed with a concern for Malnutrition and has been determined to have a diagnosis/diagnoses of Severe protein-calorie malnutrition.    This patient is being managed with:   Diet NPO-  Entered: Apr 17 2021  6:50PM

## 2021-04-25 NOTE — PROGRESS NOTE ADULT - PROBLEM SELECTOR PLAN 2
-Acute ischemic strokes, with concern for hemorrhagic conversion, noted on CT scan on 4/8  -Etiology of acute ischemic stroke uncertain at this time; TTE with bubble performed at bedside in the micu without any evidence of right-to-left shunt; no arrhythmia noted on telemetry throughout stay in the micu   -CTA head and neck without any evidence of carotid artery stenosis or dissection   -Full-dose anticoagulation for pulmonary embolus held briefly out of concern for hemorrhagic conversion of acute strokes but since restarted; status post management with hypertonic saline; minimal concern at this time for elevated intracranial pressure   -Will administer oral aspirin for secondary prophylaxis following discussion with neurology  -Currently receiving levetiracetam for seizure prophylaxis although no documented seizures or seizure-like activity   - EEG pending  -Will discuss case with neurology team -Acute ischemic strokes, with concern for hemorrhagic conversion, noted on CT scan on 4/8  -Etiology of acute ischemic stroke uncertain at this time; TTE with bubble performed at bedside in the micu without any evidence of right-to-left shunt; no arrhythmia noted on telemetry throughout stay in the micu   -CTA head and neck without any evidence of carotid artery stenosis or dissection   -Full-dose anticoagulation for pulmonary embolus held briefly out of concern for hemorrhagic conversion of acute strokes but since restarted; status post management with hypertonic saline; minimal concern at this time for elevated intracranial pressure   -Will administer oral aspirin for secondary prophylaxis following discussion with neurology  -Currently receiving levetiracetam for seizure prophylaxis although no documented seizures or seizure-like activity   - EEG pending  - Will discuss case with neurology team

## 2021-04-25 NOTE — PROGRESS NOTE ADULT - PROBLEM SELECTOR PLAN 8
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   Significant lower extremity arterial disease not identified. The only abnormality on this examination is the decreased amplitude to the pulse volume recordings at the levels of the toes bilaterally.  - Podiatry recs appreciated, no acute surgical intervention at this time, can apply betadine to wound  - Wound care consult placed -Dry gangrene noted over distal toes on bilateral lower extremities--most concerning for microvascular disease subsequent to vasopressor administration   -Dorsalis pedis pulses intact bilaterally  - ERASMO demonstrating right ERASMO: 1.09, left ERASMO: 1.24, right TBI: 0.78, and left TBI: 0.73.   Significant lower extremity arterial disease not identified. The only abnormality on this examination is the decreased amplitude to the pulse volume recordings at the levels of the toes bilaterally.  - Podiatry recs appreciated, no acute surgical intervention at this time, can apply betadine to wound  - Wound care recs appreciated

## 2021-04-25 NOTE — PROGRESS NOTE ADULT - SUBJECTIVE AND OBJECTIVE BOX
Irina Rodriguez MD  PGY 1 Department of Internal Medicine  Pager: [phone] NS, [id number] ANH      Patient is a 60y old  Male who presents with a chief complaint of COVID19 w/ severe metabolic acidosis s/p intubation (24 Apr 2021 07:48)      SUBJECTIVE / OVERNIGHT EVENTS: Pt seen and examined. No acute overnight events.   Denies fevers, chills, CP/palpitations, SOB/cough, abdominal pain, N/V, constipation, or diarrhea.        MEDICATIONS  (STANDING):  albuterol/ipratropium for Nebulization 3 milliLiter(s) Nebulizer every 8 hours  cholecalciferol 400 Unit(s) Oral daily  dextrose 40% Gel 15 Gram(s) Oral once  dextrose 5% + lactated ringers. 1000 milliLiter(s) (75 mL/Hr) IV Continuous <Continuous>  dextrose 5%. 1000 milliLiter(s) (50 mL/Hr) IV Continuous <Continuous>  dextrose 5%. 1000 milliLiter(s) (100 mL/Hr) IV Continuous <Continuous>  dextrose 50% Injectable 25 Gram(s) IV Push once  dextrose 50% Injectable 12.5 Gram(s) IV Push once  dextrose 50% Injectable 25 Gram(s) IV Push once  glucagon  Injectable 1 milliGRAM(s) IntraMuscular once  heparin  Infusion.  Unit(s)/Hr (12 mL/Hr) IV Continuous <Continuous>  insulin lispro (ADMELOG) corrective regimen sliding scale   SubCutaneous every 6 hours  levETIRAcetam  IVPB 500 milliGRAM(s) IV Intermittent every 12 hours  multivitamin/minerals Oral Solution (WELLESSE) 30 milliLiter(s) Oral daily  thiamine Injectable 100 milliGRAM(s) IV Push daily    MEDICATIONS  (PRN):  heparin   Injectable 5500 Unit(s) IV Push every 6 hours PRN For aPTT less than 40  heparin   Injectable 2500 Unit(s) IV Push every 6 hours PRN For aPTT between 40 - 57      I&O's Summary    24 Apr 2021 07:01  -  25 Apr 2021 07:00  --------------------------------------------------------  IN: 1368 mL / OUT: 0 mL / NET: 1368 mL        Vital Signs Last 24 Hrs  T(C): 36.9 (25 Apr 2021 06:00), Max: 37.2 (24 Apr 2021 21:09)  T(F): 98.5 (25 Apr 2021 06:00), Max: 98.9 (24 Apr 2021 21:09)  HR: 100 (25 Apr 2021 06:00) (92 - 115)  BP: 111/66 (25 Apr 2021 06:00) (107/73 - 132/83)  BP(mean): --  RR: 18 (25 Apr 2021 06:00) (17 - 19)  SpO2: 97% (25 Apr 2021 06:00) (97% - 100%)    CAPILLARY BLOOD GLUCOSE      POCT Blood Glucose.: 211 mg/dL (25 Apr 2021 05:09)  POCT Blood Glucose.: 189 mg/dL (24 Apr 2021 23:31)  POCT Blood Glucose.: 189 mg/dL (24 Apr 2021 17:01)  POCT Blood Glucose.: 157 mg/dL (24 Apr 2021 12:13)  POCT Blood Glucose.: 172 mg/dL (24 Apr 2021 08:26)      PHYSICAL EXAM:  GENERAL: NAD,   HEAD:  Atraumatic, Normocephalic  EYES: EOMI, PERRL, conjunctiva and sclera clear  NECK: No JVD  CHEST/LUNG: Clear to auscultation bilaterally; No wheeze  HEART: Regular rate and rhythm; No murmurs, rubs, or gallops  ABDOMEN: Soft, Nontender, Nondistended; Bowel sounds present  EXTREMITIES:  2+ Peripheral Pulses, No clubbing, cyanosis, or edema  PSYCH: AAOx3  NEUROLOGY: non-focal  SKIN: No rashes or lesions       LABS:                        8.6    7.00  )-----------( 354      ( 24 Apr 2021 07:11 )             28.3     Auto Eosinophil # x     / Auto Eosinophil % x     / Auto Neutrophil # x     / Auto Neutrophil % x     / BANDS % x        04-24    137  |  103  |  12  ----------------------------<  184<H>  4.1   |  28  |  0.75    Ca    8.8      24 Apr 2021 07:06  Mg     1.8     04-24  Phos  3.3     04-24  TPro  6.8  /  Alb  2.2<L>  /  TBili  0.5  /  DBili  x   /  AST  19  /  ALT  22  /  AlkPhos  71  04-24    PTT - ( 24 Apr 2021 07:11 )  PTT:92.1 sec           Irina Rodriguez MD  PGY 1 Department of Internal Medicine  Pager: [phone] NS, [id number] GRUPO      Patient is a 60y old  Male who presents with a chief complaint of COVID19 w/ severe metabolic acidosis s/p intubation (24 Apr 2021 07:48)      SUBJECTIVE / OVERNIGHT EVENTS: Pt seen and examined. No acute overnight events. This morning the pt was resting comfortably. Unable to effectively elicit ROS, however does not appear to be in any pain.      MEDICATIONS  (STANDING):  albuterol/ipratropium for Nebulization 3 milliLiter(s) Nebulizer every 8 hours  cholecalciferol 400 Unit(s) Oral daily  dextrose 40% Gel 15 Gram(s) Oral once  dextrose 5% + lactated ringers. 1000 milliLiter(s) (75 mL/Hr) IV Continuous <Continuous>  dextrose 5%. 1000 milliLiter(s) (50 mL/Hr) IV Continuous <Continuous>  dextrose 5%. 1000 milliLiter(s) (100 mL/Hr) IV Continuous <Continuous>  dextrose 50% Injectable 25 Gram(s) IV Push once  dextrose 50% Injectable 12.5 Gram(s) IV Push once  dextrose 50% Injectable 25 Gram(s) IV Push once  glucagon  Injectable 1 milliGRAM(s) IntraMuscular once  heparin  Infusion.  Unit(s)/Hr (12 mL/Hr) IV Continuous <Continuous>  insulin lispro (ADMELOG) corrective regimen sliding scale   SubCutaneous every 6 hours  levETIRAcetam  IVPB 500 milliGRAM(s) IV Intermittent every 12 hours  multivitamin/minerals Oral Solution (WELLESSE) 30 milliLiter(s) Oral daily  thiamine Injectable 100 milliGRAM(s) IV Push daily    MEDICATIONS  (PRN):  heparin   Injectable 5500 Unit(s) IV Push every 6 hours PRN For aPTT less than 40  heparin   Injectable 2500 Unit(s) IV Push every 6 hours PRN For aPTT between 40 - 57      I&O's Summary    24 Apr 2021 07:01  -  25 Apr 2021 07:00  --------------------------------------------------------  IN: 1368 mL / OUT: 0 mL / NET: 1368 mL        Vital Signs Last 24 Hrs  T(C): 36.9 (25 Apr 2021 06:00), Max: 37.2 (24 Apr 2021 21:09)  T(F): 98.5 (25 Apr 2021 06:00), Max: 98.9 (24 Apr 2021 21:09)  HR: 100 (25 Apr 2021 06:00) (92 - 115)  BP: 111/66 (25 Apr 2021 06:00) (107/73 - 132/83)  BP(mean): --  RR: 18 (25 Apr 2021 06:00) (17 - 19)  SpO2: 97% (25 Apr 2021 06:00) (97% - 100%)    CAPILLARY BLOOD GLUCOSE      POCT Blood Glucose.: 211 mg/dL (25 Apr 2021 05:09)  POCT Blood Glucose.: 189 mg/dL (24 Apr 2021 23:31)  POCT Blood Glucose.: 189 mg/dL (24 Apr 2021 17:01)  POCT Blood Glucose.: 157 mg/dL (24 Apr 2021 12:13)  POCT Blood Glucose.: 172 mg/dL (24 Apr 2021 08:26)      PHYSICAL EXAM:  GENERAL: NAD, AAOx1, improves with family at bedside  HEAD:  Atraumatic, Normocephalic  EYES: EOMI, PERRL, conjunctiva and sclera clear  NECK: No JVD  CHEST/LUNG: Clear to auscultation bilaterally; No wheeze  HEART: Regular rate and rhythm; No murmurs, rubs, or gallops  ABDOMEN: Soft, Nontender, Nondistended; Bowel sounds present  EXTREMITIES:  2+ Peripheral Pulses, No clubbing, cyanosis, or edema  NEUROLOGY: no focal deficits  SKIN: No rashes or lesions       LABS:                        8.6    7.00  )-----------( 354      ( 24 Apr 2021 07:11 )             28.3     Auto Eosinophil # x     / Auto Eosinophil % x     / Auto Neutrophil # x     / Auto Neutrophil % x     / BANDS % x        04-24    137  |  103  |  12  ----------------------------<  184<H>  4.1   |  28  |  0.75    Ca    8.8      24 Apr 2021 07:06  Mg     1.8     04-24  Phos  3.3     04-24  TPro  6.8  /  Alb  2.2<L>  /  TBili  0.5  /  DBili  x   /  AST  19  /  ALT  22  /  AlkPhos  71  04-24    PTT - ( 24 Apr 2021 07:11 )  PTT:92.1 sec

## 2021-04-25 NOTE — PROGRESS NOTE ADULT - PROBLEM SELECTOR PLAN 6
-Elevated blood pressures, to 140s and 150s systolic, noted over past few days in micu subsequent to vasopressor weaning   - Was on metoprolol tartrate 25 mg twice daily since 4/16; discontinued  - If is hypertensive persistently, can consider antihypertensives -Elevated blood pressures, to 140s and 150s systolic, noted over past few days in micu subsequent to vasopressor weaning   - Was on metoprolol tartrate 25 mg twice daily since 4/16; discontinued  - BP is stable  - If is hypertensive persistently, can consider antihypertensives

## 2021-04-25 NOTE — PROGRESS NOTE ADULT - PROBLEM SELECTOR PLAN 3
-Low-grade temperature elevations noted over past few days in micu  -For that reason, blood and sputum cultures drawn on 4/13; sputum cultures growing ESBL Klebsiella   - Previously on meropenem 1 g q8h (4/15-4/19) for treatment of ventilator-associated pneumonia, switched to ertapenem on 4/19 for 5 more days, completed course of Abx  -Maintaining oxygen saturations greater than 95% on room air at this time  - Spiked fever O/N on 4/18 to 4/19, cultures sent (previous 4/13 blood cxs x2 negative), CXR similar to previously with noted, right pleural effusion and right lower lobe pneumonia, and left side clear lungs, likely 2/2 to aspiration from chemical pneumonitis -Low-grade temperature elevations noted over past few days in micu  -For that reason, blood and sputum cultures drawn on 4/13; sputum cultures growing ESBL Klebsiella   - Previously on meropenem 1 g q8h (4/15-4/19) for treatment of ventilator-associated pneumonia, switched to ertapenem on 4/19 for 5 more days, completed course of Abx  -Maintaining oxygen saturations greater than 95% on room air at this time  - Spiked fever O/N on 4/18 to 4/19, cultures sent (previous 4/13 blood cxs x2 negative), CXR similar to previously with noted, right pleural effusion and right lower lobe pneumonia, and left side clear lungs, likely 2/2 to aspiration from chemical pneumonitis  - No current fevers has been stable off Abx

## 2021-04-25 NOTE — PROGRESS NOTE ADULT - PROBLEM SELECTOR PLAN 5
-History of type II diabetes mellitus noted; a1c noted to be greater than 10   -In setting of tube feed administration, administering 12 units of nph q6h with q6h insulin sliding scale -History of type II diabetes mellitus noted; a1c noted to be greater than 10   - On insulin sliding scale  - Will consider adding on basal/bolus if sugars increase  - Monitor fingersticks

## 2021-04-25 NOTE — PROGRESS NOTE ADULT - PROBLEM SELECTOR PLAN 1
- Etiology likely multi-factorial in setting of acute ischemic infarctions of the brain in conjunction vs acute kidney injury with uremia (now resolving) vs resolving hypoxic respiratory failure, covid-19 infection, superimposed bacterial pneumonia, urinary retention, concern for nutritional deficiencies  - Managing acute pneumonia with antibiotics; managing pulmonary embolus with full-dose anticoagulation; limiting sedative medication administration   - At this time, will favor frequent reorientation; will re-evaluate swallowing function as possible  - Some improvement in mental status on 4/21  - MRI Brain, and MRA Head demonstrated multiple evolving subacute infarcts with associated hemorrhages including in the bilateral centrum semiovale, bilateral parieto-occipital region, left posterior temporal lobe, right thalamus, and anterior temporal poles bilaterally. Evolving subacute infarct with hemorrhagic transformation in the left cerebellar hemisphere. Note, many of these infarcts have associated abnormal contrast enhancement, likely reflecting expected evolution of ischemia, however, follow-up imaging to document resolution of this finding is recommended. No evidence of proximal vessel stenosis, occlusion, aneurysm or vascular malformation.   - Awaiting neuro recs  - Pending EEG - Etiology likely multi-factorial in setting of acute ischemic infarctions of the brain in conjunction vs acute kidney injury with uremia (now resolving) vs resolving hypoxic respiratory failure, covid-19 infection, superimposed bacterial pneumonia, urinary retention, concern for nutritional deficiencies  - Managing acute pneumonia with antibiotics; managing pulmonary embolus with full-dose anticoagulation; limiting sedative medication administration   - At this time, will favor frequent reorientation; will re-evaluate swallowing function as possible  - Some improvement in mental status on 4/21  - MRI Brain, and MRA Head demonstrated multiple evolving subacute infarcts with associated hemorrhages including in the bilateral centrum semiovale, bilateral parieto-occipital region, left posterior temporal lobe, right thalamus, and anterior temporal poles bilaterally. Evolving subacute infarct with hemorrhagic transformation in the left cerebellar hemisphere. Note, many of these infarcts have associated abnormal contrast enhancement, likely reflecting expected evolution of ischemia, however, follow-up imaging to document resolution of this finding is recommended. No evidence of proximal vessel stenosis, occlusion, aneurysm or vascular malformation.   - Will follow up on neuro recs  - EEG pending - Etiology likely multi-factorial in setting of acute ischemic infarctions of the brain in conjunction vs acute kidney injury with uremia (now resolving) vs resolving hypoxic respiratory failure, covid-19 infection, superimposed bacterial pneumonia, urinary retention, concern for nutritional deficiencies  - Managing acute pneumonia with antibiotics; managing pulmonary embolus with full-dose anticoagulation; limiting sedative medication administration   - At this time, will favor frequent reorientation; will re-evaluate swallowing function as possible  - Some improvement in mental status on 4/21  - MRI Brain, and MRA Head demonstrated multiple evolving subacute infarcts with associated hemorrhages, likely expected evolution of ischemia  - Will follow up on neuro recs  - EEG pending

## 2021-04-26 LAB
ALBUMIN SERPL ELPH-MCNC: 2.3 G/DL — LOW (ref 3.3–5)
ALP SERPL-CCNC: 72 U/L — SIGNIFICANT CHANGE UP (ref 40–120)
ALT FLD-CCNC: 24 U/L — SIGNIFICANT CHANGE UP (ref 10–45)
ANION GAP SERPL CALC-SCNC: 11 MMOL/L — SIGNIFICANT CHANGE UP (ref 5–17)
APTT BLD: 63.2 SEC — HIGH (ref 27.5–35.5)
APTT BLD: 70 SEC — HIGH (ref 27.5–35.5)
AST SERPL-CCNC: 22 U/L — SIGNIFICANT CHANGE UP (ref 10–40)
BILIRUB SERPL-MCNC: 0.6 MG/DL — SIGNIFICANT CHANGE UP (ref 0.2–1.2)
BUN SERPL-MCNC: 10 MG/DL — SIGNIFICANT CHANGE UP (ref 7–23)
CALCIUM SERPL-MCNC: 8.7 MG/DL — SIGNIFICANT CHANGE UP (ref 8.4–10.5)
CHLORIDE SERPL-SCNC: 102 MMOL/L — SIGNIFICANT CHANGE UP (ref 96–108)
CHOLEST SERPL-MCNC: 114 MG/DL — SIGNIFICANT CHANGE UP
CO2 SERPL-SCNC: 26 MMOL/L — SIGNIFICANT CHANGE UP (ref 22–31)
CREAT SERPL-MCNC: 0.78 MG/DL — SIGNIFICANT CHANGE UP (ref 0.5–1.3)
GLUCOSE BLDC GLUCOMTR-MCNC: 131 MG/DL — HIGH (ref 70–99)
GLUCOSE BLDC GLUCOMTR-MCNC: 152 MG/DL — HIGH (ref 70–99)
GLUCOSE BLDC GLUCOMTR-MCNC: 158 MG/DL — HIGH (ref 70–99)
GLUCOSE BLDC GLUCOMTR-MCNC: 177 MG/DL — HIGH (ref 70–99)
GLUCOSE SERPL-MCNC: 171 MG/DL — HIGH (ref 70–99)
HCT VFR BLD CALC: 28.7 % — LOW (ref 39–50)
HDLC SERPL-MCNC: 35 MG/DL — LOW
HGB BLD-MCNC: 8.9 G/DL — LOW (ref 13–17)
LIPID PNL WITH DIRECT LDL SERPL: 53 MG/DL — SIGNIFICANT CHANGE UP
MAGNESIUM SERPL-MCNC: 1.9 MG/DL — SIGNIFICANT CHANGE UP (ref 1.6–2.6)
MCHC RBC-ENTMCNC: 26.3 PG — LOW (ref 27–34)
MCHC RBC-ENTMCNC: 31 GM/DL — LOW (ref 32–36)
MCV RBC AUTO: 84.7 FL — SIGNIFICANT CHANGE UP (ref 80–100)
NON HDL CHOLESTEROL: 79 MG/DL — SIGNIFICANT CHANGE UP
NRBC # BLD: 0 /100 WBCS — SIGNIFICANT CHANGE UP (ref 0–0)
PHOSPHATE SERPL-MCNC: 3.8 MG/DL — SIGNIFICANT CHANGE UP (ref 2.5–4.5)
PLATELET # BLD AUTO: 394 K/UL — SIGNIFICANT CHANGE UP (ref 150–400)
POTASSIUM SERPL-MCNC: 4.2 MMOL/L — SIGNIFICANT CHANGE UP (ref 3.5–5.3)
POTASSIUM SERPL-SCNC: 4.2 MMOL/L — SIGNIFICANT CHANGE UP (ref 3.5–5.3)
PROT SERPL-MCNC: 6.8 G/DL — SIGNIFICANT CHANGE UP (ref 6–8.3)
RBC # BLD: 3.39 M/UL — LOW (ref 4.2–5.8)
RBC # FLD: 15.9 % — HIGH (ref 10.3–14.5)
SODIUM SERPL-SCNC: 139 MMOL/L — SIGNIFICANT CHANGE UP (ref 135–145)
TRIGL SERPL-MCNC: 126 MG/DL — SIGNIFICANT CHANGE UP
WBC # BLD: 5.79 K/UL — SIGNIFICANT CHANGE UP (ref 3.8–10.5)
WBC # FLD AUTO: 5.79 K/UL — SIGNIFICANT CHANGE UP (ref 3.8–10.5)

## 2021-04-26 PROCEDURE — 99232 SBSQ HOSP IP/OBS MODERATE 35: CPT | Mod: GC

## 2021-04-26 PROCEDURE — 99221 1ST HOSP IP/OBS SF/LOW 40: CPT

## 2021-04-26 PROCEDURE — 99497 ADVNCD CARE PLAN 30 MIN: CPT

## 2021-04-26 RX ADMIN — Medication 3 MILLILITER(S): at 22:00

## 2021-04-26 RX ADMIN — Medication 2: at 13:01

## 2021-04-26 RX ADMIN — HEPARIN SODIUM 1000 UNIT(S)/HR: 5000 INJECTION INTRAVENOUS; SUBCUTANEOUS at 14:21

## 2021-04-26 RX ADMIN — HEPARIN SODIUM 1000 UNIT(S)/HR: 5000 INJECTION INTRAVENOUS; SUBCUTANEOUS at 06:23

## 2021-04-26 RX ADMIN — Medication 3 MILLILITER(S): at 05:12

## 2021-04-26 RX ADMIN — Medication 100 MILLIGRAM(S): at 14:18

## 2021-04-26 RX ADMIN — LEVETIRACETAM 400 MILLIGRAM(S): 250 TABLET, FILM COATED ORAL at 17:48

## 2021-04-26 RX ADMIN — Medication 2: at 05:18

## 2021-04-26 RX ADMIN — LEVETIRACETAM 400 MILLIGRAM(S): 250 TABLET, FILM COATED ORAL at 05:07

## 2021-04-26 RX ADMIN — Medication 2: at 17:49

## 2021-04-26 RX ADMIN — Medication 3 MILLILITER(S): at 13:10

## 2021-04-26 NOTE — PROGRESS NOTE ADULT - PROBLEM SELECTOR PLAN 6
-Elevated blood pressures, to 140s and 150s systolic, noted over past few days in micu subsequent to vasopressor weaning   - Was on metoprolol tartrate 25 mg twice daily since 4/16; discontinued  - BP is stable  - If is hypertensive persistently, can consider antihypertensives

## 2021-04-26 NOTE — PROGRESS NOTE ADULT - PROBLEM SELECTOR PLAN 8
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   Significant lower extremity arterial disease not identified. The only abnormality on this examination is the decreased amplitude to the pulse volume recordings at the levels of the toes bilaterally.  - Podiatry recs appreciated, no acute surgical intervention at this time, can apply betadine to wound  - Wound care recs appreciated

## 2021-04-26 NOTE — PROGRESS NOTE ADULT - PROBLEM SELECTOR PLAN 9
-Initiated goals of care discussion with patient's brother-in-law (listed in chart as primary point of contact); patient is full code at this time; goal is as much recovery as possible  -The patient's wife is in [place], but the patient's brother-in-law has been visiting and placing the patient's wife on facetime; she is aware of all that is happening and has happened   -Discussed further today the possibility of placing a peg tube; the patient's brother-in-law states that the patient's preference would be to receive a peg tube   - GI not offering peg tube at this time -Initiated goals of care discussion with patient's brother-in-law (listed in chart as primary point of contact); patient is full code at this time; goal is as much recovery as possible  -The patient's wife is in [place], but the patient's brother-in-law has been visiting and placing the patient's wife on facetime; she is aware of all that is happening and has happened   -Discussed further today the possibility of placing a peg tube; the patient's brother-in-law states that the patient's preference would be to receive a peg tube   - Depending on fiberoptic endoscopic evaluation swallowing study, may require peg tube

## 2021-04-26 NOTE — CONSULT NOTE ADULT - ASSESSMENT
59 yo male post covid c/b cva seen for FEES. Pt did not cooperate with laryngoscopy therefore exam was aborted

## 2021-04-26 NOTE — SWALLOW FEES ASSESSMENT ADULT - COMMENTS
Continued: extubated 4/13. Found to have acute CVA w/ hemorraghic conversion. +infarcts L cerebellar, L posterior/frontal, b/l occipital/parietal, hemorrhagic transformation. Repeat HCT (4/12): no changes. CTA Chest (4/1): +RITO PE with RV strain.  Pt on Heparin.  Neurology following-> Multi-factorial impaired level of arousal due to global cerebral dysfunction likely from combination of COVID-19, associated hypoxia, persistent pharmacologic effects of overdose of multiple sedatives at [facility], precipitous drop in BP (while at [facility]) and multiple metabolic derangements. ID following-> +fevers, suspect aspiration.

## 2021-04-26 NOTE — PROGRESS NOTE ADULT - PROBLEM SELECTOR PLAN 5
Vital signs:  Temp: 98.3  F (36.8  C) Temp src: Oral BP: 132/59 Pulse: 91 Resp: 16 SpO2: 93 % O2 Device: None (Room air)     Neuro: A&Ox4, calls appropriately. Neuros intact.   Cardiac: HR 91, denies chest pain.   Respiratory: LS clear, denies SOB.   Pain: Pt c/o headache, prn tylenol & cool cloth applied to forehead with adequate relief.   Nausea: Intermittent nausea, no emesis, prn antiemetic with relief. Aromatherapy utilized.   Diet: NPO, ice chips. TPN & lipids infusing per order.   GI/: Urostomy with adequate UOP. Colostomy pouch intact, +stool.   Incision: Midline incision with staples CDI, KYLE.   LDA: R PICC double lumen infusing. TPN infusing at 55 ml/hr. NS infusing at 45 ml/hr.   LAB:  at 0214.   Activity: Activity encouraged, up ambulating in halls w/SBA.   New changes this shift: None, no acute changes overnight.              Plan: Continue POC.    -History of type II diabetes mellitus noted; a1c noted to be greater than 10   - On insulin sliding scale  - Will consider adding on basal/bolus if sugars increase  - Monitor fingersticks

## 2021-04-26 NOTE — PROGRESS NOTE ADULT - PROBLEM SELECTOR PLAN 1
- Etiology likely multi-factorial in setting of acute ischemic infarctions of the brain in conjunction vs acute kidney injury with uremia (now resolving) vs resolving hypoxic respiratory failure, covid-19 infection, superimposed bacterial pneumonia, urinary retention, concern for nutritional deficiencies  - Managing acute pneumonia with antibiotics; managing pulmonary embolus with full-dose anticoagulation; limiting sedative medication administration   - At this time, will favor frequent reorientation; will re-evaluate swallowing function as possible  - Some improvement in mental status on 4/21  - MRI Brain, and MRA Head demonstrated multiple evolving subacute infarcts with associated hemorrhages, likely expected evolution of ischemia  - Will follow up on neuro recs  - EEG pending

## 2021-04-26 NOTE — GOALS OF CARE CONVERSATION - ADVANCED CARE PLANNING - CONVERSATION DETAILS
There was not a way for a language line  (ID [id number])  to be able to contact the patient's family in [place] [facility] so the patient's brother called them and put them on a three way phone call. The patient's brother in law also served as a .     The patient family agreed on the patient's brother being the patient's main decision maker. I d/w the patient's brother in law about his acute advanced illness and poor prognosis for functional independency. I indicated that moving forward there was a great chance for the patient needing 24/7 assistance. We also discussed about the patient's dysphagia and pending work up (FEES study). The patient's brother indicated that, as he has d/w the patient's wife, GOC are towards trying to further improve the patient's condition. We discussed about code status and he stated the patient was to be full code. He also stated that if failing the FEES study that the plan will be for PEG placement.    Once GOC are defined. I will sing off.         Amauri Martinez MD   Geriatrics and Palliative Care ([facility]) Consult Service    of Geriatric and Palliative Medicine  [facility]      Please page the following number for clinical matters between the hours of 9 am and 5 pm from Monday through Friday : [phone]    After 5pm and on weekends, please see the contact information below:    In the event of newly developing, evolving, or worsening symptoms, please contact the Palliative Medicine team via pager (if the patient is at [facility] #[phone] or if the patient is at [facility] #[phone]) The Geriatric and Palliative Medicine service has coverage 24 hours a day/ 7 days a week to provide medical recommendations regarding symptom management needs via telephone

## 2021-04-26 NOTE — PROGRESS NOTE ADULT - PROBLEM SELECTOR PLAN 3
-Low-grade temperature elevations noted over past few days in micu  -For that reason, blood and sputum cultures drawn on 4/13; sputum cultures growing ESBL Klebsiella   - Previously on meropenem 1 g q8h (4/15-4/19) for treatment of ventilator-associated pneumonia, switched to ertapenem on 4/19 for 5 more days, completed course of Abx  -Maintaining oxygen saturations greater than 95% on room air at this time  - Spiked fever O/N on 4/18 to 4/19, cultures sent (previous 4/13 blood cxs x2 negative), CXR similar to previously with noted, right pleural effusion and right lower lobe pneumonia, and left side clear lungs, likely 2/2 to aspiration from chemical pneumonitis  - No current fevers has been stable off Abx

## 2021-04-26 NOTE — PROGRESS NOTE ADULT - PROBLEM SELECTOR PLAN 7
-Dysphagia noted in setting of patient's acute alteration in mental status  -Nasogastric tube placed for administration of tube feeds; but patient self removed 4/17; multiple attempts to replace unsuccessful   -Patient evaluated by speech and swallow team, and npo status recommended  -Will maintain npo status with tube feeds at this time once tube feeds replaced (unsuccessful x3 today); in setting of acute cerebral infarcts, it is possible that patient's neurologic function and swallowing mechanism improve  Pt tolerated small feedings with speech & swallow and family at bedside  - On Monday, will assess aspiration risk with fiberoptic endoscopic evaluation of swallowing study  - Will give maintenance fluids (D5LR) at 1000 ml at 75 cc/hr x16 hrs -Dysphagia noted in setting of patient's acute alteration in mental status  -Nasogastric tube placed for administration of tube feeds; but patient self removed 4/17; multiple attempts to replace unsuccessful   -Patient evaluated by speech and swallow team, and npo status recommended  -Will maintain npo status with tube feeds at this time once tube feeds replaced (unsuccessful x3 today); in setting of acute cerebral infarcts, it is possible that patient's neurologic function and swallowing mechanism improve  Pt tolerated small feedings with speech & swallow and family at bedside  - On Monday, will assess aspiration risk with fiberoptic endoscopic evaluation of swallowing study, pending possible PEG tube

## 2021-04-26 NOTE — PROGRESS NOTE ADULT - ASSESSMENT
The patient is a 60-year-old man managed previously only for diabetes mellitus who presented on 4/1 with acute respiratory failure with hypoxia secondary to covid-19-associated pneumonia and whose course has been complicated by 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  The patient is a 60-year-old man managed previously only for diabetes mellitus who presented on 4/1 with acute respiratory failure with hypoxia secondary to covid-19-associated pneumonia and whose course has been complicated by 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 Pt completed antibiotic course, however is pending speech and swallow FEES evaluation, possible necessity of a PEG tube.

## 2021-04-26 NOTE — SWALLOW FEES ASSESSMENT ADULT - SLP GENERAL OBSERVATIONS
Pt awake in bed, O2 via NC, family present at bedside.  services offered, pt refused and requested to use family member as . Pt appropriately greeted clinician upon entry to room and appeared more awake, alert, and conversant compared to prior exams. + Dysphonia.

## 2021-04-26 NOTE — SWALLOW FEES ASSESSMENT ADULT - DIAGNOSTIC IMPRESSIONS
Pt admitted with COVID19 w/ severe metabolic acidosis s/p intubation. FEES was attempted however pt did not cooperate with laryngoscopy (despite multiple attempts and family assistance) therefore exam was aborted. Pt now scheduled for MBS, plan d/w pt and family at bedside, and MD/team. This service will continue to follow.

## 2021-04-26 NOTE — PROGRESS NOTE ADULT - SUBJECTIVE AND OBJECTIVE BOX
Irina Rodriguez MD  PGY 1 Department of Internal Medicine  Pager: [phone] NS, [phone] ANH      Patient is a 60y old  Male who presents with a chief complaint of COVID19 w/ severe metabolic acidosis s/p intubation (25 Apr 2021 07:03)      SUBJECTIVE / OVERNIGHT EVENTS: Pt seen and examined. No acute overnight events.   Denies fevers, chills, CP/palpitations, SOB/cough, abdominal pain, N/V, constipation, or diarrhea.        MEDICATIONS  (STANDING):  albuterol/ipratropium for Nebulization 3 milliLiter(s) Nebulizer every 8 hours  cholecalciferol 400 Unit(s) Oral daily  dextrose 40% Gel 15 Gram(s) Oral once  dextrose 5% + lactated ringers. 1000 milliLiter(s) (75 mL/Hr) IV Continuous <Continuous>  dextrose 5%. 1000 milliLiter(s) (50 mL/Hr) IV Continuous <Continuous>  dextrose 5%. 1000 milliLiter(s) (100 mL/Hr) IV Continuous <Continuous>  dextrose 50% Injectable 25 Gram(s) IV Push once  dextrose 50% Injectable 12.5 Gram(s) IV Push once  dextrose 50% Injectable 25 Gram(s) IV Push once  glucagon  Injectable 1 milliGRAM(s) IntraMuscular once  heparin  Infusion.  Unit(s)/Hr (12 mL/Hr) IV Continuous <Continuous>  insulin lispro (ADMELOG) corrective regimen sliding scale   SubCutaneous every 6 hours  levETIRAcetam  IVPB 500 milliGRAM(s) IV Intermittent every 12 hours  multivitamin/minerals Oral Solution (WELLESSE) 30 milliLiter(s) Oral daily  thiamine Injectable 100 milliGRAM(s) IV Push daily    MEDICATIONS  (PRN):  heparin   Injectable 5500 Unit(s) IV Push every 6 hours PRN For aPTT less than 40  heparin   Injectable 2500 Unit(s) IV Push every 6 hours PRN For aPTT between 40 - 57      I&O's Summary    25 Apr 2021 07:01  -  26 Apr 2021 07:00  --------------------------------------------------------  IN: 454 mL / OUT: 1000 mL / NET: -546 mL        Vital Signs Last 24 Hrs  T(C): 36.6 (26 Apr 2021 04:00), Max: 36.8 (25 Apr 2021 13:58)  T(F): 97.8 (26 Apr 2021 04:00), Max: 98.2 (25 Apr 2021 13:58)  HR: 82 (26 Apr 2021 06:10) (82 - 108)  BP: 118/78 (26 Apr 2021 04:00) (118/78 - 132/85)  BP(mean): --  RR: 18 (26 Apr 2021 04:00) (18 - 18)  SpO2: 98% (26 Apr 2021 06:10) (96% - 100%)    CAPILLARY BLOOD GLUCOSE      POCT Blood Glucose.: 177 mg/dL (26 Apr 2021 05:17)  POCT Blood Glucose.: 161 mg/dL (25 Apr 2021 23:28)  POCT Blood Glucose.: 183 mg/dL (25 Apr 2021 18:20)  POCT Blood Glucose.: 139 mg/dL (25 Apr 2021 13:08)      PHYSICAL EXAM:  GENERAL: NAD,   HEAD:  Atraumatic, Normocephalic  EYES: EOMI, PERRL, conjunctiva and sclera clear  NECK: No JVD  CHEST/LUNG: Clear to auscultation bilaterally; No wheeze  HEART: Regular rate and rhythm; No murmurs, rubs, or gallops  ABDOMEN: Soft, Nontender, Nondistended; Bowel sounds present  EXTREMITIES:  2+ Peripheral Pulses, No clubbing, cyanosis, or edema  PSYCH: AAOx3  NEUROLOGY: non-focal  SKIN: No rashes or lesions       LABS:                        8.9    5.79  )-----------( 394      ( 26 Apr 2021 05:47 )             28.7     Auto Eosinophil # x     / Auto Eosinophil % x     / Auto Neutrophil # x     / Auto Neutrophil % x     / BANDS % x                            8.7    6.79  )-----------( 382      ( 25 Apr 2021 07:13 )             28.4     Auto Eosinophil # x     / Auto Eosinophil % x     / Auto Neutrophil # x     / Auto Neutrophil % x     / BANDS % x                            8.6    7.00  )-----------( 354      ( 24 Apr 2021 07:11 )             28.3     Auto Eosinophil # x     / Auto Eosinophil % x     / Auto Neutrophil # x     / Auto Neutrophil % x     / BANDS % x        04-26    139  |  102  |  10  ----------------------------<  171<H>  4.2   |  26  |  0.78  04-25    138  |  103  |  11  ----------------------------<  181<H>  3.9   |  24  |  0.77  04-24    137  |  103  |  12  ----------------------------<  184<H>  4.1   |  28  |  0.75    Ca    8.7      26 Apr 2021 05:47  Mg     1.9     04-26  Phos  3.8     04-26  TPro  6.8  /  Alb  2.3<L>  /  TBili  0.6  /  DBili  x   /  AST  22  /  ALT  24  /  AlkPhos  72  04-26  TPro  6.8  /  Alb  2.3<L>  /  TBili  0.5  /  DBili  x   /  AST  19  /  ALT  22  /  AlkPhos  72  04-25  TPro  6.8  /  Alb  2.2<L>  /  TBili  0.5  /  DBili  x   /  AST  19  /  ALT  22  /  AlkPhos  71  04-24    PTT - ( 26 Apr 2021 05:47 )  PTT:70.0 sec             Irina Rodriguez MD  PGY 1 Department of Internal Medicine  Pager: [phone] NS, [phone] GRUPO      Patient is a 60y old  Male who presents with a chief complaint of COVID19 w/ severe metabolic acidosis s/p intubation (25 Apr 2021 07:03)      SUBJECTIVE / OVERNIGHT EVENTS: Pt seen and examined. No acute overnight events. This morning the pt is more alert and awake. He still has a limited ROS, however with use of  was able to state he is feeling better.      MEDICATIONS  (STANDING):  albuterol/ipratropium for Nebulization 3 milliLiter(s) Nebulizer every 8 hours  cholecalciferol 400 Unit(s) Oral daily  dextrose 40% Gel 15 Gram(s) Oral once  dextrose 5% + lactated ringers. 1000 milliLiter(s) (75 mL/Hr) IV Continuous <Continuous>  dextrose 5%. 1000 milliLiter(s) (50 mL/Hr) IV Continuous <Continuous>  dextrose 5%. 1000 milliLiter(s) (100 mL/Hr) IV Continuous <Continuous>  dextrose 50% Injectable 25 Gram(s) IV Push once  dextrose 50% Injectable 12.5 Gram(s) IV Push once  dextrose 50% Injectable 25 Gram(s) IV Push once  glucagon  Injectable 1 milliGRAM(s) IntraMuscular once  heparin  Infusion.  Unit(s)/Hr (12 mL/Hr) IV Continuous <Continuous>  insulin lispro (ADMELOG) corrective regimen sliding scale   SubCutaneous every 6 hours  levETIRAcetam  IVPB 500 milliGRAM(s) IV Intermittent every 12 hours  multivitamin/minerals Oral Solution (WELLESSE) 30 milliLiter(s) Oral daily  thiamine Injectable 100 milliGRAM(s) IV Push daily    MEDICATIONS  (PRN):  heparin   Injectable 5500 Unit(s) IV Push every 6 hours PRN For aPTT less than 40  heparin   Injectable 2500 Unit(s) IV Push every 6 hours PRN For aPTT between 40 - 57      I&O's Summary    25 Apr 2021 07:01  -  26 Apr 2021 07:00  --------------------------------------------------------  IN: 454 mL / OUT: 1000 mL / NET: -546 mL        Vital Signs Last 24 Hrs  T(C): 36.6 (26 Apr 2021 04:00), Max: 36.8 (25 Apr 2021 13:58)  T(F): 97.8 (26 Apr 2021 04:00), Max: 98.2 (25 Apr 2021 13:58)  HR: 82 (26 Apr 2021 06:10) (82 - 108)  BP: 118/78 (26 Apr 2021 04:00) (118/78 - 132/85)  BP(mean): --  RR: 18 (26 Apr 2021 04:00) (18 - 18)  SpO2: 98% (26 Apr 2021 06:10) (96% - 100%)    CAPILLARY BLOOD GLUCOSE      POCT Blood Glucose.: 177 mg/dL (26 Apr 2021 05:17)  POCT Blood Glucose.: 161 mg/dL (25 Apr 2021 23:28)  POCT Blood Glucose.: 183 mg/dL (25 Apr 2021 18:20)  POCT Blood Glucose.: 139 mg/dL (25 Apr 2021 13:08)      PHYSICAL EXAM:  GENERAL: NAD, AAOx0  HEAD:  Atraumatic, Normocephalic  EYES: EOMI, PERRL, conjunctiva and sclera clear  NECK: No JVD  CHEST/LUNG: Clear to auscultation bilaterally; No wheezes or crackles  HEART: Regular rate and rhythm; No murmurs, rubs, or gallops  ABDOMEN: Soft, Nontender, Nondistended; Bowel sounds present  EXTREMITIES:  2+ Peripheral Pulses, No clubbing, cyanosis, or edema  NEUROLOGY: non-focal  SKIN: No rashes or lesions       LABS:                        8.9    5.79  )-----------( 394      ( 26 Apr 2021 05:47 )             28.7     Auto Eosinophil # x     / Auto Eosinophil % x     / Auto Neutrophil # x     / Auto Neutrophil % x     / BANDS % x                            8.7    6.79  )-----------( 382      ( 25 Apr 2021 07:13 )             28.4     Auto Eosinophil # x     / Auto Eosinophil % x     / Auto Neutrophil # x     / Auto Neutrophil % x     / BANDS % x                            8.6    7.00  )-----------( 354      ( 24 Apr 2021 07:11 )             28.3     Auto Eosinophil # x     / Auto Eosinophil % x     / Auto Neutrophil # x     / Auto Neutrophil % x     / BANDS % x        04-26    139  |  102  |  10  ----------------------------<  171<H>  4.2   |  26  |  0.78  04-25    138  |  103  |  11  ----------------------------<  181<H>  3.9   |  24  |  0.77  04-24    137  |  103  |  12  ----------------------------<  184<H>  4.1   |  28  |  0.75    Ca    8.7      26 Apr 2021 05:47  Mg     1.9     04-26  Phos  3.8     04-26  TPro  6.8  /  Alb  2.3<L>  /  TBili  0.6  /  DBili  x   /  AST  22  /  ALT  24  /  AlkPhos  72  04-26  TPro  6.8  /  Alb  2.3<L>  /  TBili  0.5  /  DBili  x   /  AST  19  /  ALT  22  /  AlkPhos  72  04-25  TPro  6.8  /  Alb  2.2<L>  /  TBili  0.5  /  DBili  x   /  AST  19  /  ALT  22  /  AlkPhos  71  04-24    PTT - ( 26 Apr 2021 05:47 )  PTT:70.0 sec

## 2021-04-26 NOTE — CONSULT NOTE ADULT - SUBJECTIVE AND OBJECTIVE BOX
CC: dysphagia    HPI: 60M admitted with hypoxic respiratory failure 2/2 COVID, complicated by severe HAGMA, hypoxemia with Left upper Lobe Segmental PE,  multiple areas of cerebral infarcts with hemorhagic transformation, SUKI, sputum cx with klebsiella completed with an empiric course of Vanco/Cefepime and Capsofungin x1 for Concern for superimposed bacterial infection vs. reactive,  intubated at [facility] for hypoxic respiratory failure and transferred to 5ICU (4/12). Pt now stable on 5 juan david currently NPO pending objective swallow eval. ENT called to assist in FEES with Speech and Swallow.         PAST MEDICAL & SURGICAL HISTORY:  No pertinent past medical history    No significant past surgical history      Allergies    No Known Allergies    Intolerances      MEDICATIONS  (STANDING):  albuterol/ipratropium for Nebulization 3 milliLiter(s) Nebulizer every 8 hours  cholecalciferol 400 Unit(s) Oral daily  dextrose 40% Gel 15 Gram(s) Oral once  dextrose 5% + lactated ringers. 1000 milliLiter(s) (75 mL/Hr) IV Continuous <Continuous>  dextrose 5%. 1000 milliLiter(s) (50 mL/Hr) IV Continuous <Continuous>  dextrose 5%. 1000 milliLiter(s) (100 mL/Hr) IV Continuous <Continuous>  dextrose 50% Injectable 25 Gram(s) IV Push once  dextrose 50% Injectable 12.5 Gram(s) IV Push once  dextrose 50% Injectable 25 Gram(s) IV Push once  glucagon  Injectable 1 milliGRAM(s) IntraMuscular once  heparin  Infusion.  Unit(s)/Hr (12 mL/Hr) IV Continuous <Continuous>  insulin lispro (ADMELOG) corrective regimen sliding scale   SubCutaneous every 6 hours  levETIRAcetam  IVPB 500 milliGRAM(s) IV Intermittent every 12 hours  multivitamin/minerals Oral Solution (WELLESSE) 30 milliLiter(s) Oral daily  thiamine Injectable 100 milliGRAM(s) IV Push daily    MEDICATIONS  (PRN):  heparin   Injectable 5500 Unit(s) IV Push every 6 hours PRN For aPTT less than 40  heparin   Injectable 2500 Unit(s) IV Push every 6 hours PRN For aPTT between 40 - 57      Social History: No reported toxic haibts    Family history:  No pertinent family history in first degree relatives        ROS:   ENT: all negative except as noted in HPI   Skin: No itching, dryness, rash, changes to hair, or skin masses  CV: denies palpitations  Pulm: denies SOB, cough, hemoptysis  GI: denies change in appetite, indigestion, n/v  : denies pertinent urinary symptoms, urgency  Neuro: denies numbness/tingling, loss of sensation  Psych: denies anxiety  MS: denies muscle weakness, instability  Heme: denies easy bruising or bleeding  Endo: denies heat/cold intolerance, excessive sweating  Vascular: denies LE edema    Vital Signs Last 24 Hrs  T(C): 37 (26 Apr 2021 13:57), Max: 37 (26 Apr 2021 13:57)  T(F): 98.6 (26 Apr 2021 13:57), Max: 98.6 (26 Apr 2021 13:57)  HR: 114 (26 Apr 2021 13:57) (82 - 114)  BP: 124/78 (26 Apr 2021 13:57) (118/78 - 125/76)  BP(mean): --  RR: 18 (26 Apr 2021 13:57) (18 - 20)  SpO2: 99% (26 Apr 2021 13:57) (96% - 100%)                          8.9    5.79  )-----------( 394      ( 26 Apr 2021 05:47 )             28.7    04-26    139  |  102  |  10  ----------------------------<  171<H>  4.2   |  26  |  0.78    Ca    8.7      26 Apr 2021 05:47  Phos  3.8     04-26  Mg     1.9     04-26    TPro  6.8  /  Alb  2.3<L>  /  TBili  0.6  /  DBili  x   /  AST  22  /  ALT  24  /  AlkPhos  72  04-26   PTT - ( 26 Apr 2021 12:32 )  PTT:63.2 sec    PHYSICAL EXAM:  Gen: NAD  Skin: No rashes, bruises, or lesions  Head: Normocephalic, Atraumatic  Face: no edema, erythema, or fluctuance. Parotid glands soft without mass  Eyes: no scleral injection  Nose: Nares bilaterally patent, no discharge, nasal cannula in place  Mouth: No Stridor / Drooling / Trismus.  Mucosa moist, tongue/uvula midline, oropharynx clear  Neck: Flat, supple, no lymphadenopathy, trachea midline, no masses  Lymphatic: No lymphadenopathy  Resp: breathing easily, no stridor  CV: no peripheral edema/cyanosis  GI: nondistended   Peripheral vascular: no JVD or edema  Neuro: facial nerve intact, no facial droop        Fiberoptic Indirect laryngoscopy:   Unable to complete exam as pt not cooperating

## 2021-04-26 NOTE — PROGRESS NOTE ADULT - PROBLEM SELECTOR PLAN 2
-Acute ischemic strokes, with concern for hemorrhagic conversion, noted on CT scan on 4/8  -Etiology of acute ischemic stroke uncertain at this time; TTE with bubble performed at bedside in the micu without any evidence of right-to-left shunt; no arrhythmia noted on telemetry throughout stay in the micu   -CTA head and neck without any evidence of carotid artery stenosis or dissection   -Full-dose anticoagulation for pulmonary embolus held briefly out of concern for hemorrhagic conversion of acute strokes but since restarted; status post management with hypertonic saline; minimal concern at this time for elevated intracranial pressure   -Will administer oral aspirin for secondary prophylaxis following discussion with neurology  -Currently receiving levetiracetam for seizure prophylaxis although no documented seizures or seizure-like activity   - EEG pending  - Will discuss case with neurology team -Acute ischemic strokes, with concern for hemorrhagic conversion, noted on CT scan on 4/8  -Etiology of acute ischemic stroke uncertain at this time; TTE with bubble performed at bedside in the micu without any evidence of right-to-left shunt; no arrhythmia noted on telemetry throughout stay in the micu   -CTA head and neck without any evidence of carotid artery stenosis or dissection   -Full-dose anticoagulation for pulmonary embolus held briefly out of concern for hemorrhagic conversion of acute strokes but since restarted; status post management with hypertonic saline; minimal concern at this time for elevated intracranial pressure   -Will administer oral aspirin for secondary prophylaxis following discussion with neurology  -Currently receiving levetiracetam for seizure prophylaxis although no documented seizures or seizure-like activity   - EEG pending

## 2021-04-26 NOTE — SWALLOW FEES ASSESSMENT ADULT - SLP PERTINENT HISTORY OF CURRENT PROBLEM
H&P: 60M unknown medical history BIBEMS for respiratory distress, recently COVID+ as per EMS. Pt unable to comply with history 2/2 resp distress, nods yes to difficulty breathing, no to pain. Baseline AAOx3. En route, pt had RR 28, SaO2 60%. In ED initially hypoxic to 60% placed on NRB and satting low 90s and tachypneic to 40s on NRB. Placed on AVAPS, still tachypneic to 40s. Labs showing HAGMA, elevated FSG to 500s. Also with lactate of 15. Trops of 200 and pro-BNP elevated to 21k. MICU consulted for respiratory failure in setting of COVID, also concern for DKA. On encounter, pt is noncooperative with questioning. Nods and tries to speak, unable to form full sentences and visibly tachypneic on AVAPS. In ED given 2L NS, decadron. Started on insulin gtt. Subsequently intubated

## 2021-04-27 LAB
ALBUMIN SERPL ELPH-MCNC: 2.5 G/DL — LOW (ref 3.3–5)
ALP SERPL-CCNC: 79 U/L — SIGNIFICANT CHANGE UP (ref 40–120)
ALT FLD-CCNC: 25 U/L — SIGNIFICANT CHANGE UP (ref 10–45)
ANION GAP SERPL CALC-SCNC: 12 MMOL/L — SIGNIFICANT CHANGE UP (ref 5–17)
APTT BLD: 73.1 SEC — HIGH (ref 27.5–35.5)
AST SERPL-CCNC: 19 U/L — SIGNIFICANT CHANGE UP (ref 10–40)
BILIRUB SERPL-MCNC: 0.7 MG/DL — SIGNIFICANT CHANGE UP (ref 0.2–1.2)
BUN SERPL-MCNC: 12 MG/DL — SIGNIFICANT CHANGE UP (ref 7–23)
CALCIUM SERPL-MCNC: 8.7 MG/DL — SIGNIFICANT CHANGE UP (ref 8.4–10.5)
CHLORIDE SERPL-SCNC: 102 MMOL/L — SIGNIFICANT CHANGE UP (ref 96–108)
CO2 SERPL-SCNC: 27 MMOL/L — SIGNIFICANT CHANGE UP (ref 22–31)
CREAT SERPL-MCNC: 0.82 MG/DL — SIGNIFICANT CHANGE UP (ref 0.5–1.3)
GLUCOSE BLDC GLUCOMTR-MCNC: 122 MG/DL — HIGH (ref 70–99)
GLUCOSE BLDC GLUCOMTR-MCNC: 134 MG/DL — HIGH (ref 70–99)
GLUCOSE BLDC GLUCOMTR-MCNC: 155 MG/DL — HIGH (ref 70–99)
GLUCOSE BLDC GLUCOMTR-MCNC: 212 MG/DL — HIGH (ref 70–99)
GLUCOSE SERPL-MCNC: 120 MG/DL — HIGH (ref 70–99)
HCT VFR BLD CALC: 29.6 % — LOW (ref 39–50)
HGB BLD-MCNC: 9 G/DL — LOW (ref 13–17)
MAGNESIUM SERPL-MCNC: 1.9 MG/DL — SIGNIFICANT CHANGE UP (ref 1.6–2.6)
MCHC RBC-ENTMCNC: 25.9 PG — LOW (ref 27–34)
MCHC RBC-ENTMCNC: 30.4 GM/DL — LOW (ref 32–36)
MCV RBC AUTO: 85.1 FL — SIGNIFICANT CHANGE UP (ref 80–100)
NRBC # BLD: 0 /100 WBCS — SIGNIFICANT CHANGE UP (ref 0–0)
PHOSPHATE SERPL-MCNC: 4.1 MG/DL — SIGNIFICANT CHANGE UP (ref 2.5–4.5)
PLATELET # BLD AUTO: 385 K/UL — SIGNIFICANT CHANGE UP (ref 150–400)
POTASSIUM SERPL-MCNC: 4.5 MMOL/L — SIGNIFICANT CHANGE UP (ref 3.5–5.3)
POTASSIUM SERPL-SCNC: 4.5 MMOL/L — SIGNIFICANT CHANGE UP (ref 3.5–5.3)
PROT SERPL-MCNC: 6.9 G/DL — SIGNIFICANT CHANGE UP (ref 6–8.3)
RBC # BLD: 3.48 M/UL — LOW (ref 4.2–5.8)
RBC # FLD: 16.3 % — HIGH (ref 10.3–14.5)
SODIUM SERPL-SCNC: 141 MMOL/L — SIGNIFICANT CHANGE UP (ref 135–145)
WBC # BLD: 6.81 K/UL — SIGNIFICANT CHANGE UP (ref 3.8–10.5)
WBC # FLD AUTO: 6.81 K/UL — SIGNIFICANT CHANGE UP (ref 3.8–10.5)

## 2021-04-27 PROCEDURE — 99232 SBSQ HOSP IP/OBS MODERATE 35: CPT | Mod: GC

## 2021-04-27 PROCEDURE — 95816 EEG AWAKE AND DROWSY: CPT | Mod: 26

## 2021-04-27 PROCEDURE — 74230 X-RAY XM SWLNG FUNCJ C+: CPT | Mod: 26

## 2021-04-27 RX ORDER — SODIUM CHLORIDE 9 MG/ML
1000 INJECTION, SOLUTION INTRAVENOUS
Refills: 0 | Status: DISCONTINUED | OUTPATIENT
Start: 2021-04-27 | End: 2021-04-28

## 2021-04-27 RX ADMIN — HEPARIN SODIUM 1000 UNIT(S)/HR: 5000 INJECTION INTRAVENOUS; SUBCUTANEOUS at 06:35

## 2021-04-27 RX ADMIN — LEVETIRACETAM 400 MILLIGRAM(S): 250 TABLET, FILM COATED ORAL at 17:58

## 2021-04-27 RX ADMIN — LEVETIRACETAM 400 MILLIGRAM(S): 250 TABLET, FILM COATED ORAL at 05:14

## 2021-04-27 RX ADMIN — Medication 100 MILLIGRAM(S): at 12:19

## 2021-04-27 RX ADMIN — SODIUM CHLORIDE 75 MILLILITER(S): 9 INJECTION, SOLUTION INTRAVENOUS at 12:18

## 2021-04-27 RX ADMIN — Medication 3 MILLILITER(S): at 22:15

## 2021-04-27 RX ADMIN — Medication 4: at 17:58

## 2021-04-27 RX ADMIN — Medication 3 MILLILITER(S): at 05:37

## 2021-04-27 RX ADMIN — Medication 2: at 23:50

## 2021-04-27 NOTE — SWALLOW VFSS/MBS ASSESSMENT ADULT - COMMENTS
Continued: extubated 4/13. Found to have acute CVA w/ hemorraghic conversion. +infarcts L cerebellar, L posterior/frontal, b/l occipital/parietal, hemorrhagic transformation. Repeat HCT (4/12): no changes. CTA Chest (4/1): +RITO PE with RV strain.  Pt on Heparin.  Neurology following-> Multi-factorial impaired level of arousal due to global cerebral dysfunction likely from combination of COVID-19, associated hypoxia, persistent pharmacologic effects of overdose of multiple sedatives at [facility], precipitous drop in BP (while at [facility]) and multiple metabolic derangements. ID following-> +fevers, suspect aspiration.

## 2021-04-27 NOTE — PROGRESS NOTE ADULT - PROBLEM SELECTOR PLAN 5
-History of type II diabetes mellitus noted; a1c noted to be greater than 10   - On insulin sliding scale  - Will consider adding on basal/bolus if sugars increase  - Monitor fingersticks

## 2021-04-27 NOTE — PROGRESS NOTE ADULT - ASSESSMENT
The patient is a 60-year-old man managed previously only for diabetes mellitus who presented on 4/1 with acute respiratory failure with hypoxia secondary to covid-19-associated pneumonia and whose course has been complicated by 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 Pt completed antibiotic course, however is pending speech and swallow FEES evaluation, possible necessity of a PEG tube.

## 2021-04-27 NOTE — PROGRESS NOTE ADULT - PROBLEM SELECTOR PLAN 7
-Dysphagia noted in setting of patient's acute alteration in mental status  -Nasogastric tube placed for administration of tube feeds; but patient self removed 4/17; multiple attempts to replace unsuccessful   -Patient evaluated by speech and swallow team, and npo status recommended  -Will maintain npo status with tube feeds at this time once tube feeds replaced (unsuccessful x3 today); in setting of acute cerebral infarcts, it is possible that patient's neurologic function and swallowing mechanism improve  Pt tolerated small feedings with speech & swallow and family at bedside  - On Monday, will assess aspiration risk with fiberoptic endoscopic evaluation of swallowing study, pending possible PEG tube -Dysphagia noted in setting of patient's acute alteration in mental status  -Nasogastric tube placed for administration of tube feeds; but patient self removed 4/17; multiple attempts to replace unsuccessful   -Patient evaluated by speech and swallow team, and npo status recommended  -Will maintain npo status with tube feeds at this time once tube feeds replaced (unsuccessful x3 today); in setting of acute cerebral infarcts, it is possible that patient's neurologic function and swallowing mechanism improve  Pt tolerated small feedings with speech & swallow and family at bedside  - FEES study- pt not able to cooperate with it  - Cineesophagogram- demonstrating risk of aspiration, NPO recommended  - Will require PEG tube placement

## 2021-04-27 NOTE — SWALLOW VFSS/MBS ASSESSMENT ADULT - THE ABOVE FINDINGS WERE DISCUSSED WITH
T3 Dr. Rodriguez; RN Catrachita; Pt's family member (Chandana Pizarro)/Physician/Nursing/Patient/Family

## 2021-04-27 NOTE — CHART NOTE - NSCHARTNOTEFT_GEN_A_CORE
Nutrition Follow Up Note  Patient seen for: inadequate diet order x >4 days    Hospital course as per chart: 60y Male with PMH of DM who presented on  with acute respiratory failure with hypoxia secondary to COVID-19 associated PNA. Hospital course complicated by "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" Pt with dysphagia in setting of AMS, NG tube placed for tube feeds. Pt self-removed on , multiple attempts to replace unsuccessful. Did not tolerate FEES; pending MBS study. Per team, if fails, will likely need PEG. "The patient's brother-in-law states that the patient's preference would be to receive a peg tube." Chart reviewed, events noted.    Source: [] Patient       [x] EMR        [] RN        [] Family at bedside       [] Other:    -If unable to interview patient: [] Trach/Vent/BiPAP  [x] Disoriented/confused/inappropriate to interview - pt lethargic, no family at bedside    Diet, NPO (21 @ 18:50) [Active]  - Is current order appropriate/adequate? [] Yes  [x]  No:     GI:  Last documented BM .   Bowel Regimen? [] Yes   [x] No    Weights: Daily Weight in k.4 () - ~wt stability noted. Will continue to monitor and trend weights.    MEDICATIONS  (STANDING):  albuterol/ipratropium for Nebulization 3 milliLiter(s) Nebulizer every 8 hours  cholecalciferol 400 Unit(s) Oral daily  dextrose 40% Gel 15 Gram(s) Oral once  dextrose 5% + lactated ringers. 1000 milliLiter(s) (75 mL/Hr) IV Continuous <Continuous>  dextrose 5%. 1000 milliLiter(s) (50 mL/Hr) IV Continuous <Continuous>  dextrose 5%. 1000 milliLiter(s) (100 mL/Hr) IV Continuous <Continuous>  dextrose 50% Injectable 25 Gram(s) IV Push once  dextrose 50% Injectable 12.5 Gram(s) IV Push once  dextrose 50% Injectable 25 Gram(s) IV Push once  glucagon  Injectable 1 milliGRAM(s) IntraMuscular once  heparin  Infusion.  Unit(s)/Hr (12 mL/Hr) IV Continuous <Continuous>  insulin lispro (ADMELOG) corrective regimen sliding scale   SubCutaneous every 6 hours  levETIRAcetam  IVPB 500 milliGRAM(s) IV Intermittent every 12 hours  multivitamin/minerals Oral Solution (WELLESSE) 30 milliLiter(s) Oral daily  thiamine Injectable 100 milliGRAM(s) IV Push daily    MEDICATIONS  (PRN):  heparin   Injectable 5500 Unit(s) IV Push every 6 hours PRN For aPTT less than 40  heparin   Injectable 2500 Unit(s) IV Push every 6 hours PRN For aPTT between 40 - 57    Pertinent Labs (): glucose 120    A1C with Estimated Average Glucose Result: 10.3 % (21 @ 14:28)    CAPILLARY BLOOD GLUCOSE  POCT Blood Glucose.: 134 mg/dL (2021 12:01)  POCT Blood Glucose.: 122 mg/dL (2021 05:49)  POCT Blood Glucose.: 131 mg/dL (2021 23:55)  POCT Blood Glucose.: 152 mg/dL (2021 17:45)    Skin per nursing documentation: +wound, no pressure injuries per flowsheets   Edema per flowsheets: no edema per flowsheets     Estimated Needs:   [x] no change since previous assessment  Based on Dosing wt 145.9 lb/66.1 kg  Energy (28-32 kcals/kg): 6170-6746  Protein (1.2-1.6 g pro/kg): 79..76    Previous Nutrition Diagnosis: Severe Malnutrition   Nutrition Diagnosis is: [x] ongoing  [] resolved [] not applicable     New Nutrition Diagnosis: not applicable    Nutrition Care Plan:  [x] In Progress - pt NPO, to be addressed with nutrition provision as per team  [] Achieved  [] Not applicable    Recommendations:   1) Defer nutrition provision to team  - If able to be advanced to PO diet, recommend Consistent Carbohydrate diet with trial of Glucerna 1 daily (220 kcal, 10 g protein in each) to optimize intake. Defer texture/consistency to team/Speech Pathologist recommendations. Monitor/adjust PRN.  - If recommended to remain NPO, consider alternate means of nutrition if medically feasible and within GOC. RD remains available for further recommendations.   2) Pt at HIGH risk for refeeding syndrome:   - Continue multivitamin/minerals and thiamine supplementation if no medical contraindications  - Monitor and replete electrolytes PRN  3) Bowel regimen as per team    Monitoring and Evaluation: Monitor nutrition provision and tolerance, weight, labs, skin, GI status    Nutrition care plan to remain consistent with GOC.     RD remains available upon request and will follow up per protocol  Concepcion Anne MS, RD CDN Pager #[phone]

## 2021-04-27 NOTE — PROGRESS NOTE ADULT - PROBLEM SELECTOR PLAN 9
-Initiated goals of care discussion with patient's brother-in-law (listed in chart as primary point of contact); patient is full code at this time; goal is as much recovery as possible  -The patient's wife is in [place], but the patient's brother-in-law has been visiting and placing the patient's wife on facetime; she is aware of all that is happening and has happened   -Discussed further today the possibility of placing a peg tube; the patient's brother-in-law states that the patient's preference would be to receive a peg tube   - Depending on fiberoptic endoscopic evaluation swallowing study, may require peg tube

## 2021-04-27 NOTE — PROGRESS NOTE ADULT - PROBLEM SELECTOR PLAN 2
-Acute ischemic strokes, with concern for hemorrhagic conversion, noted on CT scan on 4/8  -Etiology of acute ischemic stroke uncertain at this time; TTE with bubble performed at bedside in the micu without any evidence of right-to-left shunt; no arrhythmia noted on telemetry throughout stay in the micu   -CTA head and neck without any evidence of carotid artery stenosis or dissection   -Full-dose anticoagulation for pulmonary embolus held briefly out of concern for hemorrhagic conversion of acute strokes but since restarted; status post management with hypertonic saline; minimal concern at this time for elevated intracranial pressure   -Will administer oral aspirin for secondary prophylaxis following discussion with neurology  -Currently receiving levetiracetam for seizure prophylaxis although no documented seizures or seizure-like activity   - EEG pending

## 2021-04-27 NOTE — PROGRESS NOTE ADULT - ATTENDING COMMENTS
60 yr male with PMH of DM who was initially admitted to [facility] ICU for hypoxic respiratory failure 2/2 COVID c/b PE with R heart strain, cardiogenic and septic shock, ischemic and hemorrhagic CVA and ESBL klebsiella ventilator associate PNA, transferred to [facility] for neurosurgery eval.   No acute events. Improving neurologically daily.  Did not tolerate FEES; pending MBS study; if fails, will likely need PEG  Cont  heparin gtt for PE/DVT and acute CVA.  Rest as above.

## 2021-04-27 NOTE — CHART NOTE - NSCHARTNOTEFT_GEN_A_CORE
MRI images reviewed by neurology service. At this time, agree with plan to continue Heparin gtt, goal PTT 40-60, with transition to DOAC. If acute change in current mental status, repeat STAT CTH is warranted.

## 2021-04-27 NOTE — PROGRESS NOTE ADULT - SUBJECTIVE AND OBJECTIVE BOX
Irina Rodriguez MD  PGY 1 Department of Internal Medicine  Pager: [phone] NS, [phone] ANH      Patient is a 60y old  Male who presents with a chief complaint of COVID19 w/ severe metabolic acidosis s/p intubation (26 Apr 2021 14:42)      SUBJECTIVE / OVERNIGHT EVENTS: Pt seen and examined. No acute overnight events.   Denies fevers, chills, CP/palpitations, SOB/cough, abdominal pain, N/V, constipation, or diarrhea.        MEDICATIONS  (STANDING):  albuterol/ipratropium for Nebulization 3 milliLiter(s) Nebulizer every 8 hours  cholecalciferol 400 Unit(s) Oral daily  dextrose 40% Gel 15 Gram(s) Oral once  dextrose 5% + lactated ringers. 1000 milliLiter(s) (75 mL/Hr) IV Continuous <Continuous>  dextrose 5%. 1000 milliLiter(s) (50 mL/Hr) IV Continuous <Continuous>  dextrose 5%. 1000 milliLiter(s) (100 mL/Hr) IV Continuous <Continuous>  dextrose 50% Injectable 25 Gram(s) IV Push once  dextrose 50% Injectable 12.5 Gram(s) IV Push once  dextrose 50% Injectable 25 Gram(s) IV Push once  glucagon  Injectable 1 milliGRAM(s) IntraMuscular once  heparin  Infusion.  Unit(s)/Hr (12 mL/Hr) IV Continuous <Continuous>  insulin lispro (ADMELOG) corrective regimen sliding scale   SubCutaneous every 6 hours  levETIRAcetam  IVPB 500 milliGRAM(s) IV Intermittent every 12 hours  multivitamin/minerals Oral Solution (WELLESSE) 30 milliLiter(s) Oral daily  thiamine Injectable 100 milliGRAM(s) IV Push daily    MEDICATIONS  (PRN):  heparin   Injectable 5500 Unit(s) IV Push every 6 hours PRN For aPTT less than 40  heparin   Injectable 2500 Unit(s) IV Push every 6 hours PRN For aPTT between 40 - 57      I&O's Summary    26 Apr 2021 07:01  -  27 Apr 2021 07:00  --------------------------------------------------------  IN: 300 mL / OUT: 1400 mL / NET: -1100 mL        Vital Signs Last 24 Hrs  T(C): 36.9 (27 Apr 2021 04:38), Max: 37 (26 Apr 2021 13:57)  T(F): 98.4 (27 Apr 2021 04:38), Max: 98.6 (26 Apr 2021 13:57)  HR: 67 (27 Apr 2021 05:37) (67 - 114)  BP: 126/76 (27 Apr 2021 04:38) (114/70 - 130/75)  BP(mean): --  RR: 18 (27 Apr 2021 04:38) (18 - 20)  SpO2: 98% (27 Apr 2021 05:37) (98% - 100%)    CAPILLARY BLOOD GLUCOSE      POCT Blood Glucose.: 122 mg/dL (27 Apr 2021 05:49)  POCT Blood Glucose.: 131 mg/dL (26 Apr 2021 23:55)  POCT Blood Glucose.: 152 mg/dL (26 Apr 2021 17:45)  POCT Blood Glucose.: 158 mg/dL (26 Apr 2021 12:03)      PHYSICAL EXAM:  GENERAL: NAD,   HEAD:  Atraumatic, Normocephalic  EYES: EOMI, PERRL, conjunctiva and sclera clear  NECK: No JVD  CHEST/LUNG: Clear to auscultation bilaterally; No wheeze  HEART: Regular rate and rhythm; No murmurs, rubs, or gallops  ABDOMEN: Soft, Nontender, Nondistended; Bowel sounds present  EXTREMITIES:  2+ Peripheral Pulses, No clubbing, cyanosis, or edema  PSYCH: AAOx3  NEUROLOGY: non-focal  SKIN: No rashes or lesions       LABS:                        9.0    6.81  )-----------( 385      ( 27 Apr 2021 06:05 )             29.6     Auto Eosinophil # x     / Auto Eosinophil % x     / Auto Neutrophil # x     / Auto Neutrophil % x     / BANDS % x                            8.9    5.79  )-----------( 394      ( 26 Apr 2021 05:47 )             28.7     Auto Eosinophil # x     / Auto Eosinophil % x     / Auto Neutrophil # x     / Auto Neutrophil % x     / BANDS % x        04-27    141  |  102  |  12  ----------------------------<  120<H>  4.5   |  27  |  0.82  04-26    139  |  102  |  10  ----------------------------<  171<H>  4.2   |  26  |  0.78    Ca    8.7      27 Apr 2021 06:05  Mg     1.9     04-27  Phos  4.1     04-27  TPro  6.9  /  Alb  2.5<L>  /  TBili  0.7  /  DBili  x   /  AST  19  /  ALT  25  /  AlkPhos  79  04-27  TPro  6.8  /  Alb  2.3<L>  /  TBili  0.6  /  DBili  x   /  AST  22  /  ALT  24  /  AlkPhos  72  04-26    PTT - ( 27 Apr 2021 06:05 )  PTT:73.1 sec           Irina Rodriguez MD  PGY 1 Department of Internal Medicine  Pager: [phone] NS, [phone] ANH      Patient is a 60y old  Male who presents with a chief complaint of COVID19 w/ severe metabolic acidosis s/p intubation (26 Apr 2021 14:42)      SUBJECTIVE / OVERNIGHT EVENTS: Pt seen and examined. No acute overnight events. Pt alert and awake, communicated with  however limited.     MEDICATIONS  (STANDING):  albuterol/ipratropium for Nebulization 3 milliLiter(s) Nebulizer every 8 hours  cholecalciferol 400 Unit(s) Oral daily  dextrose 40% Gel 15 Gram(s) Oral once  dextrose 5% + lactated ringers. 1000 milliLiter(s) (75 mL/Hr) IV Continuous <Continuous>  dextrose 5%. 1000 milliLiter(s) (50 mL/Hr) IV Continuous <Continuous>  dextrose 5%. 1000 milliLiter(s) (100 mL/Hr) IV Continuous <Continuous>  dextrose 50% Injectable 25 Gram(s) IV Push once  dextrose 50% Injectable 12.5 Gram(s) IV Push once  dextrose 50% Injectable 25 Gram(s) IV Push once  glucagon  Injectable 1 milliGRAM(s) IntraMuscular once  heparin  Infusion.  Unit(s)/Hr (12 mL/Hr) IV Continuous <Continuous>  insulin lispro (ADMELOG) corrective regimen sliding scale   SubCutaneous every 6 hours  levETIRAcetam  IVPB 500 milliGRAM(s) IV Intermittent every 12 hours  multivitamin/minerals Oral Solution (WELLESSE) 30 milliLiter(s) Oral daily  thiamine Injectable 100 milliGRAM(s) IV Push daily    MEDICATIONS  (PRN):  heparin   Injectable 5500 Unit(s) IV Push every 6 hours PRN For aPTT less than 40  heparin   Injectable 2500 Unit(s) IV Push every 6 hours PRN For aPTT between 40 - 57      I&O's Summary    26 Apr 2021 07:01  -  27 Apr 2021 07:00  --------------------------------------------------------  IN: 300 mL / OUT: 1400 mL / NET: -1100 mL        Vital Signs Last 24 Hrs  T(C): 36.9 (27 Apr 2021 04:38), Max: 37 (26 Apr 2021 13:57)  T(F): 98.4 (27 Apr 2021 04:38), Max: 98.6 (26 Apr 2021 13:57)  HR: 67 (27 Apr 2021 05:37) (67 - 114)  BP: 126/76 (27 Apr 2021 04:38) (114/70 - 130/75)  BP(mean): --  RR: 18 (27 Apr 2021 04:38) (18 - 20)  SpO2: 98% (27 Apr 2021 05:37) (98% - 100%)    CAPILLARY BLOOD GLUCOSE      POCT Blood Glucose.: 122 mg/dL (27 Apr 2021 05:49)  POCT Blood Glucose.: 131 mg/dL (26 Apr 2021 23:55)  POCT Blood Glucose.: 152 mg/dL (26 Apr 2021 17:45)  POCT Blood Glucose.: 158 mg/dL (26 Apr 2021 12:03)      PHYSICAL EXAM:  GENERAL: NAD, Alert and awake  HEAD:  Atraumatic, Normocephalic  EYES: EOMI, PERRL, conjunctiva and sclera clear  NECK: No JVD  CHEST/LUNG: Clear to auscultation bilaterally; No wheeze  HEART: Regular rate and rhythm; No murmurs, rubs, or gallops  ABDOMEN: Soft, Nontender, Nondistended; Bowel sounds present  EXTREMITIES:  2+ Peripheral Pulses, No clubbing, cyanosis, or edema  NEUROLOGY: non-focal  SKIN: No rashes or lesions       LABS:                        9.0    6.81  )-----------( 385      ( 27 Apr 2021 06:05 )             29.6     Auto Eosinophil # x     / Auto Eosinophil % x     / Auto Neutrophil # x     / Auto Neutrophil % x     / BANDS % x                            8.9    5.79  )-----------( 394      ( 26 Apr 2021 05:47 )             28.7     Auto Eosinophil # x     / Auto Eosinophil % x     / Auto Neutrophil # x     / Auto Neutrophil % x     / BANDS % x        04-27    141  |  102  |  12  ----------------------------<  120<H>  4.5   |  27  |  0.82  04-26    139  |  102  |  10  ----------------------------<  171<H>  4.2   |  26  |  0.78    Ca    8.7      27 Apr 2021 06:05  Mg     1.9     04-27  Phos  4.1     04-27  TPro  6.9  /  Alb  2.5<L>  /  TBili  0.7  /  DBili  x   /  AST  19  /  ALT  25  /  AlkPhos  79  04-27  TPro  6.8  /  Alb  2.3<L>  /  TBili  0.6  /  DBili  x   /  AST  22  /  ALT  24  /  AlkPhos  72  04-26    PTT - ( 27 Apr 2021 06:05 )  PTT:73.1 sec

## 2021-04-27 NOTE — EEG REPORT - NS EEG TEXT BOX
[facility]   COMPREHENSIVE EPILEPSY CENTER   REPORT OF ROUTINE VIDEO EEG     [facility]: [address] [place] [address] , [address], [place] [address], Ph#: [phone]  [facility]:  [address], [place] [address], Ph#: [phone]      Patient Name: ADRIANA BOOKER  Age and : 60y ([birth year])  MRN #: [id number]  Location: [place] [address]  Referring Physician: Pretty Ferraro    Study Date: 21    _____________________________________________________________  TECHNICAL INFORMATION    Placement and Labeling of Electrodes:  The EEG was performed utilizing 20 channels referential EEG connections (coronal over temporal over parasagittal montage) using all standard 10-20 electrode placements with EKG.  Recording was at a sampling rate of 256 samples per second per channel.  Time synchronized digital video recording was done simultaneously with EEG recording.  A low light infrared camera was used for low light recording.  Mauri and seizure detection algorithms were utilized.    _____________________________________________________________  HISTORY    Patient is a 60y old  Male who presents with a chief complaint of COVID19 w/ severe metabolic acidosis s/p intubation and multiple stroke.       PERTINENT MEDICATION:  MEDICATIONS  (STANDING):  albuterol/ipratropium for Nebulization 3 milliLiter(s) Nebulizer every 8 hours  cholecalciferol 400 Unit(s) Oral daily  dextrose 40% Gel 15 Gram(s) Oral once  dextrose 5% + lactated ringers. 1000 milliLiter(s) (75 mL/Hr) IV Continuous <Continuous>  dextrose 5%. 1000 milliLiter(s) (50 mL/Hr) IV Continuous <Continuous>  dextrose 5%. 1000 milliLiter(s) (100 mL/Hr) IV Continuous <Continuous>  dextrose 50% Injectable 25 Gram(s) IV Push once  dextrose 50% Injectable 12.5 Gram(s) IV Push once  dextrose 50% Injectable 25 Gram(s) IV Push once  glucagon  Injectable 1 milliGRAM(s) IntraMuscular once  heparin  Infusion.  Unit(s)/Hr (12 mL/Hr) IV Continuous <Continuous>  insulin lispro (ADMELOG) corrective regimen sliding scale   SubCutaneous every 6 hours  levETIRAcetam  IVPB 500 milliGRAM(s) IV Intermittent every 12 hours  multivitamin/minerals Oral Solution (WELLESSE) 30 milliLiter(s) Oral daily  thiamine Injectable 100 milliGRAM(s) IV Push daily    _____________________________________________________________  STUDY INTERPRETATION    Findings: The background was continuous, spontaneously variable and reactive. During wakefulness, the posterior dominant rhythm consisted of symmetric, poorly modulated 6-7 Hz activity, with amplitude to 30 uV, that attenuated to eye opening.      Background Slowing:  Diffuse theta and polymorphic delta slowing.    Focal Slowing:   None were present.    Sleep Background:  Drowsiness and stage II sleep transients were not recorded.    Other Non-Epileptiform Findings:  None were present.    Interictal Epileptiform Activity:   None were present.    Events:  Clinical events: None recorded.  Seizures: None recorded.    Activation Procedures:   Hyperventilation was not performed.    Photic stimulation was not performed.     Artifacts:  Intermittent myogenic and movement artifacts were noted.    ECG:  The heart rate on single channel ECG was predominantly between 60-70 BPM.    _____________________________________________________________  EEG SUMMARY/CLASSIFICATION    Abnormal EEG in the awake state.  - Moderate generalized slowing.    _____________________________________________________________  EEG IMPRESSION/CLINICAL CORRELATE    Abnormal EEG study.  1. Moderate nonspecific diffuse or multifocal cerebral dysfunction.   2. No epileptiform pattern or seizure seen.   _____________________________________________________________        Prelim read     Gerald Alvarez MD  Epilepsy Fellow, [facility]      Epilepsy Reading Room Ph# [phone]  Epilepsy Answering Service after 5 pm and before 8:30 am: Ph# [phone] [facility]   COMPREHENSIVE EPILEPSY CENTER   REPORT OF ROUTINE VIDEO EEG     [facility]: [address] [place] [address] , [address], [place] [address], Ph#: [phone]  [facility]:  [address], [place] [address], Ph#: [phone]      Patient Name: ADRIANA BOOKER  Age and : 60y ([birth year])  MRN #: [id number]  Location: [place] [address]  Referring Physician: Pretty Ferraro    Study Date: 21    _____________________________________________________________  TECHNICAL INFORMATION    Placement and Labeling of Electrodes:  The EEG was performed utilizing 20 channels referential EEG connections (coronal over temporal over parasagittal montage) using all standard 10-20 electrode placements with EKG.  Recording was at a sampling rate of 256 samples per second per channel.  Time synchronized digital video recording was done simultaneously with EEG recording.  A low light infrared camera was used for low light recording.  Mauri and seizure detection algorithms were utilized.    _____________________________________________________________  HISTORY    Patient is a 60y old  Male who presents with a chief complaint of COVID19 w/ severe metabolic acidosis s/p intubation and multiple stroke.       PERTINENT MEDICATION:  MEDICATIONS  (STANDING):  albuterol/ipratropium for Nebulization 3 milliLiter(s) Nebulizer every 8 hours  cholecalciferol 400 Unit(s) Oral daily  dextrose 40% Gel 15 Gram(s) Oral once  dextrose 5% + lactated ringers. 1000 milliLiter(s) (75 mL/Hr) IV Continuous <Continuous>  dextrose 5%. 1000 milliLiter(s) (50 mL/Hr) IV Continuous <Continuous>  dextrose 5%. 1000 milliLiter(s) (100 mL/Hr) IV Continuous <Continuous>  dextrose 50% Injectable 25 Gram(s) IV Push once  dextrose 50% Injectable 12.5 Gram(s) IV Push once  dextrose 50% Injectable 25 Gram(s) IV Push once  glucagon  Injectable 1 milliGRAM(s) IntraMuscular once  heparin  Infusion.  Unit(s)/Hr (12 mL/Hr) IV Continuous <Continuous>  insulin lispro (ADMELOG) corrective regimen sliding scale   SubCutaneous every 6 hours  levETIRAcetam  IVPB 500 milliGRAM(s) IV Intermittent every 12 hours  multivitamin/minerals Oral Solution (WELLESSE) 30 milliLiter(s) Oral daily  thiamine Injectable 100 milliGRAM(s) IV Push daily    _____________________________________________________________  STUDY INTERPRETATION    Findings: The background was continuous, spontaneously variable and reactive. During wakefulness, the posterior dominant rhythm consisted of symmetric, poorly modulated 6-7 Hz activity, with amplitude to 30 uV, that attenuated to eye opening.      Background Slowing:  Diffuse theta and polymorphic delta slowing.    Focal Slowing:   None were present.    Sleep Background:  Drowsiness and stage II sleep transients were not recorded.    Other Non-Epileptiform Findings:  None were present.    Interictal Epileptiform Activity:   None were present.    Events:  Clinical events: None recorded.  Seizures: None recorded.    Activation Procedures:   Hyperventilation was not performed.    Photic stimulation was not performed.     Artifacts:  Intermittent myogenic and movement artifacts were noted.    ECG:  The heart rate on single channel ECG was predominantly between 60-70 BPM.    _____________________________________________________________  EEG SUMMARY/CLASSIFICATION    Abnormal EEG in the awake state.  - Moderate generalized slowing.    _____________________________________________________________  EEG IMPRESSION/CLINICAL CORRELATE    Abnormal EEG study.  1. Moderate nonspecific diffuse or multifocal cerebral dysfunction.   2. No epileptiform pattern or seizure seen.   _____________________________________________________________      Gerald Alvarez MD  Epilepsy Fellow, [facility]      Epilepsy Reading Room Ph# [phone]  Epilepsy Answering Service after 5 pm and before 8:30 am: Ph# [phone]    Armen Bonilla MD  Neurology Attending Physician

## 2021-04-28 LAB
ALBUMIN SERPL ELPH-MCNC: 2.4 G/DL — LOW (ref 3.3–5)
ALP SERPL-CCNC: 74 U/L — SIGNIFICANT CHANGE UP (ref 40–120)
ALT FLD-CCNC: 23 U/L — SIGNIFICANT CHANGE UP (ref 10–45)
ANION GAP SERPL CALC-SCNC: 10 MMOL/L — SIGNIFICANT CHANGE UP (ref 5–17)
APTT BLD: 59.7 SEC — HIGH (ref 27.5–35.5)
AST SERPL-CCNC: 14 U/L — SIGNIFICANT CHANGE UP (ref 10–40)
BILIRUB SERPL-MCNC: 0.7 MG/DL — SIGNIFICANT CHANGE UP (ref 0.2–1.2)
BUN SERPL-MCNC: 12 MG/DL — SIGNIFICANT CHANGE UP (ref 7–23)
CALCIUM SERPL-MCNC: 8.6 MG/DL — SIGNIFICANT CHANGE UP (ref 8.4–10.5)
CHLORIDE SERPL-SCNC: 101 MMOL/L — SIGNIFICANT CHANGE UP (ref 96–108)
CO2 SERPL-SCNC: 26 MMOL/L — SIGNIFICANT CHANGE UP (ref 22–31)
CREAT SERPL-MCNC: 0.79 MG/DL — SIGNIFICANT CHANGE UP (ref 0.5–1.3)
GLUCOSE BLDC GLUCOMTR-MCNC: 133 MG/DL — HIGH (ref 70–99)
GLUCOSE BLDC GLUCOMTR-MCNC: 167 MG/DL — HIGH (ref 70–99)
GLUCOSE BLDC GLUCOMTR-MCNC: 221 MG/DL — HIGH (ref 70–99)
GLUCOSE SERPL-MCNC: 215 MG/DL — HIGH (ref 70–99)
HCT VFR BLD CALC: 28.2 % — LOW (ref 39–50)
HGB BLD-MCNC: 8.9 G/DL — LOW (ref 13–17)
MAGNESIUM SERPL-MCNC: 1.8 MG/DL — SIGNIFICANT CHANGE UP (ref 1.6–2.6)
MCHC RBC-ENTMCNC: 26.6 PG — LOW (ref 27–34)
MCHC RBC-ENTMCNC: 31.6 GM/DL — LOW (ref 32–36)
MCV RBC AUTO: 84.4 FL — SIGNIFICANT CHANGE UP (ref 80–100)
NRBC # BLD: 0 /100 WBCS — SIGNIFICANT CHANGE UP (ref 0–0)
PHOSPHATE SERPL-MCNC: 3.1 MG/DL — SIGNIFICANT CHANGE UP (ref 2.5–4.5)
PLATELET # BLD AUTO: 382 K/UL — SIGNIFICANT CHANGE UP (ref 150–400)
POTASSIUM SERPL-MCNC: 3.7 MMOL/L — SIGNIFICANT CHANGE UP (ref 3.5–5.3)
POTASSIUM SERPL-SCNC: 3.7 MMOL/L — SIGNIFICANT CHANGE UP (ref 3.5–5.3)
PROT SERPL-MCNC: 6.6 G/DL — SIGNIFICANT CHANGE UP (ref 6–8.3)
RBC # BLD: 3.34 M/UL — LOW (ref 4.2–5.8)
RBC # FLD: 16 % — HIGH (ref 10.3–14.5)
SODIUM SERPL-SCNC: 137 MMOL/L — SIGNIFICANT CHANGE UP (ref 135–145)
WBC # BLD: 6.23 K/UL — SIGNIFICANT CHANGE UP (ref 3.8–10.5)
WBC # FLD AUTO: 6.23 K/UL — SIGNIFICANT CHANGE UP (ref 3.8–10.5)

## 2021-04-28 PROCEDURE — 99232 SBSQ HOSP IP/OBS MODERATE 35: CPT

## 2021-04-28 PROCEDURE — 99358 PROLONG SERVICE W/O CONTACT: CPT

## 2021-04-28 PROCEDURE — 99233 SBSQ HOSP IP/OBS HIGH 50: CPT | Mod: GC

## 2021-04-28 RX ORDER — SODIUM CHLORIDE 9 MG/ML
1000 INJECTION, SOLUTION INTRAVENOUS
Refills: 0 | Status: DISCONTINUED | OUTPATIENT
Start: 2021-04-28 | End: 2021-04-30

## 2021-04-28 RX ADMIN — Medication 3 MILLILITER(S): at 13:45

## 2021-04-28 RX ADMIN — Medication 4: at 06:37

## 2021-04-28 RX ADMIN — LEVETIRACETAM 400 MILLIGRAM(S): 250 TABLET, FILM COATED ORAL at 05:25

## 2021-04-28 RX ADMIN — Medication 3 MILLILITER(S): at 22:13

## 2021-04-28 RX ADMIN — Medication 2: at 18:29

## 2021-04-28 RX ADMIN — Medication 100 MILLIGRAM(S): at 13:39

## 2021-04-28 RX ADMIN — HEPARIN SODIUM 1000 UNIT(S)/HR: 5000 INJECTION INTRAVENOUS; SUBCUTANEOUS at 13:38

## 2021-04-28 RX ADMIN — LEVETIRACETAM 400 MILLIGRAM(S): 250 TABLET, FILM COATED ORAL at 18:34

## 2021-04-28 RX ADMIN — SODIUM CHLORIDE 75 MILLILITER(S): 9 INJECTION, SOLUTION INTRAVENOUS at 15:20

## 2021-04-28 NOTE — PROGRESS NOTE ADULT - ASSESSMENT
60-year-old man managed previously only for diabetes mellitus who presented on 4/1 with acute respiratory failure with hypoxia secondary to covid-19-associated pneumonia and whose course has been complicated by 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 S/p failed s/s eval with MBS and FEES.       Impression:  #Dysphagia - in setting of multiple CVAs and intubation 2/2 covid  #COVID-19 resulting in hypoxic respiratory failure s/p intubation, now on RA  #CVA - on hep gtt      Recommendation:  - NPO at this time with no enteral access due to patient removal multiple times  - discussed with patient and patient's brother-in-law regarding risks and benefits of peg placement, they would like to move forward with procedure  - can plan for PEG tomorrow  - will need to hold heparin gtt x6 hours prior to procedure; please hold at 2AM  - rest of care per primary team      Arlene Grier PGY-4  Gastroenterology Fellow  Pager #[phone] (ANH) or [phone] (NS)  Available on [employer] Teams.  Please contact on-call GI fellow via answering service ([phone]) after 5pm and before 8am, and on weekends.

## 2021-04-28 NOTE — PROGRESS NOTE ADULT - PROBLEM SELECTOR PLAN 5
-History of type II diabetes mellitus noted; a1c noted to be greater than 10   - On insulin sliding scale  - Will consider adding on basal/bolus if sugars increase  - Monitor fingersticks -Elevated blood pressures, to 140s and 150s systolic, noted over past few days in micu subsequent to vasopressor weaning   - Was on metoprolol tartrate 25 mg twice daily since 4/16; discontinued  - BP is stable  - If is hypertensive persistently, can consider antihypertensives

## 2021-04-28 NOTE — PROGRESS NOTE ADULT - PROBLEM SELECTOR PLAN 1
- Etiology likely multi-factorial in setting of acute ischemic infarctions of the brain in conjunction vs acute kidney injury with uremia (now resolving) vs resolving hypoxic respiratory failure, covid-19 infection, superimposed bacterial pneumonia, urinary retention, concern for nutritional deficiencies  - Managing acute pneumonia with antibiotics; managing pulmonary embolus with full-dose anticoagulation; limiting sedative medication administration   - At this time, will favor frequent reorientation; will re-evaluate swallowing function as possible  - Some improvement in mental status on 4/21  - MRI Brain, and MRA Head demonstrated multiple evolving subacute infarcts with associated hemorrhages, likely expected evolution of ischemia  - Will follow up on neuro recs  - EEG pending - Etiology likely multi-factorial in setting of acute ischemic infarctions of the brain in conjunction vs acute kidney injury with uremia (now resolving) vs resolving hypoxic respiratory failure, covid-19 infection, superimposed bacterial pneumonia, urinary retention, concern for nutritional deficiencies  - Managing acute pneumonia with antibiotics; managing pulmonary embolus with full-dose anticoagulation; limiting sedative medication administration   - At this time, will favor frequent reorientation; will re-evaluate swallowing function as possible  - Some improvement in mental status on 4/21  - MRI Brain, and MRA Head demonstrated multiple evolving subacute infarcts with associated hemorrhages, likely expected evolution of ischemia  - Will follow up on neuro recs  - EEG pending official read, so far no seizure-like activity noted

## 2021-04-28 NOTE — PROGRESS NOTE ADULT - SUBJECTIVE AND OBJECTIVE BOX
Irina Rodriguez MD  PGY 1 Department of Internal Medicine  Pager: [phone] [place], [phone] ANH      Patient is a 60y old  Male who presents with a chief complaint of COVID19 w/ severe metabolic acidosis s/p intubation (27 Apr 2021 07:19)      SUBJECTIVE / OVERNIGHT EVENTS: Pt seen and examined. No acute overnight events.   Denies fevers, chills, CP/palpitations, SOB/cough, abdominal pain, N/V, constipation, or diarrhea.        MEDICATIONS  (STANDING):  albuterol/ipratropium for Nebulization 3 milliLiter(s) Nebulizer every 8 hours  cholecalciferol 400 Unit(s) Oral daily  dextrose 40% Gel 15 Gram(s) Oral once  dextrose 5% + lactated ringers. 1000 milliLiter(s) (75 mL/Hr) IV Continuous <Continuous>  dextrose 5%. 1000 milliLiter(s) (50 mL/Hr) IV Continuous <Continuous>  dextrose 5%. 1000 milliLiter(s) (100 mL/Hr) IV Continuous <Continuous>  dextrose 50% Injectable 25 Gram(s) IV Push once  dextrose 50% Injectable 12.5 Gram(s) IV Push once  dextrose 50% Injectable 25 Gram(s) IV Push once  glucagon  Injectable 1 milliGRAM(s) IntraMuscular once  heparin  Infusion.  Unit(s)/Hr (12 mL/Hr) IV Continuous <Continuous>  insulin lispro (ADMELOG) corrective regimen sliding scale   SubCutaneous every 6 hours  levETIRAcetam  IVPB 500 milliGRAM(s) IV Intermittent every 12 hours  multivitamin/minerals Oral Solution (WELLESSE) 30 milliLiter(s) Oral daily  thiamine Injectable 100 milliGRAM(s) IV Push daily    MEDICATIONS  (PRN):  heparin   Injectable 5500 Unit(s) IV Push every 6 hours PRN For aPTT less than 40  heparin   Injectable 2500 Unit(s) IV Push every 6 hours PRN For aPTT between 40 - 57      I&O's Summary    27 Apr 2021 07:01  -  28 Apr 2021 07:00  --------------------------------------------------------  IN: 550 mL / OUT: 400 mL / NET: 150 mL        Vital Signs Last 24 Hrs  T(C): 36.7 (28 Apr 2021 04:23), Max: 36.9 (27 Apr 2021 09:44)  T(F): 98 (28 Apr 2021 04:23), Max: 98.5 (27 Apr 2021 09:44)  HR: 111 (28 Apr 2021 04:23) (94 - 111)  BP: 115/74 (28 Apr 2021 04:23) (108/62 - 115/74)  BP(mean): --  RR: 18 (28 Apr 2021 04:23) (18 - 18)  SpO2: 95% (28 Apr 2021 04:23) (95% - 100%)    CAPILLARY BLOOD GLUCOSE      POCT Blood Glucose.: 221 mg/dL (28 Apr 2021 06:16)  POCT Blood Glucose.: 155 mg/dL (27 Apr 2021 23:33)  POCT Blood Glucose.: 212 mg/dL (27 Apr 2021 17:32)  POCT Blood Glucose.: 134 mg/dL (27 Apr 2021 12:01)      PHYSICAL EXAM:  GENERAL: NAD,   HEAD:  Atraumatic, Normocephalic  EYES: EOMI, PERRL, conjunctiva and sclera clear  NECK: No JVD  CHEST/LUNG: Clear to auscultation bilaterally; No wheeze  HEART: Regular rate and rhythm; No murmurs, rubs, or gallops  ABDOMEN: Soft, Nontender, Nondistended; Bowel sounds present  EXTREMITIES:  2+ Peripheral Pulses, No clubbing, cyanosis, or edema  PSYCH: AAOx3  NEUROLOGY: non-focal  SKIN: No rashes or lesions       LABS:                        9.0    6.81  )-----------( 385      ( 27 Apr 2021 06:05 )             29.6     Auto Eosinophil # x     / Auto Eosinophil % x     / Auto Neutrophil # x     / Auto Neutrophil % x     / BANDS % x        04-27    141  |  102  |  12  ----------------------------<  120<H>  4.5   |  27  |  0.82    Ca    8.7      27 Apr 2021 06:05  Mg     1.9     04-27  Phos  4.1     04-27  TPro  6.9  /  Alb  2.5<L>  /  TBili  0.7  /  DBili  x   /  AST  19  /  ALT  25  /  AlkPhos  79  04-27    PTT - ( 27 Apr 2021 06:05 )  PTT:73.1 sec             Irina Rodriguez MD  PGY 1 Department of Internal Medicine  Pager: [phone] NS, [phone] ANH      Patient is a 60y old  Male who presents with a chief complaint of COVID19 w/ severe metabolic acidosis s/p intubation (27 Apr 2021 07:19)      SUBJECTIVE / OVERNIGHT EVENTS: Pt seen and examined. No acute overnight events. This morning the pt remained alert and awake.      MEDICATIONS  (STANDING):  albuterol/ipratropium for Nebulization 3 milliLiter(s) Nebulizer every 8 hours  cholecalciferol 400 Unit(s) Oral daily  dextrose 40% Gel 15 Gram(s) Oral once  dextrose 5% + lactated ringers. 1000 milliLiter(s) (75 mL/Hr) IV Continuous <Continuous>  dextrose 5%. 1000 milliLiter(s) (50 mL/Hr) IV Continuous <Continuous>  dextrose 5%. 1000 milliLiter(s) (100 mL/Hr) IV Continuous <Continuous>  dextrose 50% Injectable 25 Gram(s) IV Push once  dextrose 50% Injectable 12.5 Gram(s) IV Push once  dextrose 50% Injectable 25 Gram(s) IV Push once  glucagon  Injectable 1 milliGRAM(s) IntraMuscular once  heparin  Infusion.  Unit(s)/Hr (12 mL/Hr) IV Continuous <Continuous>  insulin lispro (ADMELOG) corrective regimen sliding scale   SubCutaneous every 6 hours  levETIRAcetam  IVPB 500 milliGRAM(s) IV Intermittent every 12 hours  multivitamin/minerals Oral Solution (WELLESSE) 30 milliLiter(s) Oral daily  thiamine Injectable 100 milliGRAM(s) IV Push daily    MEDICATIONS  (PRN):  heparin   Injectable 5500 Unit(s) IV Push every 6 hours PRN For aPTT less than 40  heparin   Injectable 2500 Unit(s) IV Push every 6 hours PRN For aPTT between 40 - 57      I&O's Summary    27 Apr 2021 07:01  -  28 Apr 2021 07:00  --------------------------------------------------------  IN: 550 mL / OUT: 400 mL / NET: 150 mL        Vital Signs Last 24 Hrs  T(C): 36.7 (28 Apr 2021 04:23), Max: 36.9 (27 Apr 2021 09:44)  T(F): 98 (28 Apr 2021 04:23), Max: 98.5 (27 Apr 2021 09:44)  HR: 111 (28 Apr 2021 04:23) (94 - 111)  BP: 115/74 (28 Apr 2021 04:23) (108/62 - 115/74)  BP(mean): --  RR: 18 (28 Apr 2021 04:23) (18 - 18)  SpO2: 95% (28 Apr 2021 04:23) (95% - 100%)    CAPILLARY BLOOD GLUCOSE      POCT Blood Glucose.: 221 mg/dL (28 Apr 2021 06:16)  POCT Blood Glucose.: 155 mg/dL (27 Apr 2021 23:33)  POCT Blood Glucose.: 212 mg/dL (27 Apr 2021 17:32)  POCT Blood Glucose.: 134 mg/dL (27 Apr 2021 12:01)      PHYSICAL EXAM:  GENERAL: NAD, Alert and awake  HEAD:  Atraumatic, Normocephalic  EYES: EOMI, PERRL, conjunctiva and sclera clear  NECK: No JVD  CHEST/LUNG: Clear to auscultation bilaterally; No wheeze  HEART: Regular rate and rhythm; No murmurs, rubs, or gallops  ABDOMEN: Soft, Nontender, Nondistended; Bowel sounds present  EXTREMITIES:  2+ Peripheral Pulses, No clubbing, cyanosis, or edema  NEUROLOGY: non-focal  SKIN: No rashes or lesions          LABS:                        9.0    6.81  )-----------( 385      ( 27 Apr 2021 06:05 )             29.6     Auto Eosinophil # x     / Auto Eosinophil % x     / Auto Neutrophil # x     / Auto Neutrophil % x     / BANDS % x        04-27    141  |  102  |  12  ----------------------------<  120<H>  4.5   |  27  |  0.82    Ca    8.7      27 Apr 2021 06:05  Mg     1.9     04-27  Phos  4.1     04-27  TPro  6.9  /  Alb  2.5<L>  /  TBili  0.7  /  DBili  x   /  AST  19  /  ALT  25  /  AlkPhos  79  04-27    PTT - ( 27 Apr 2021 06:05 )  PTT:73.1 sec

## 2021-04-28 NOTE — GOALS OF CARE CONVERSATION - ADVANCED CARE PLANNING - CONVERSATION DETAILS
I received a called from the patient's surrogate (brother in law, Margarito Pizarro) who was on the phone with one of the patient's family member who is an MD, Dr. Mclain. The MD asked me several question about the patient's clinical status and results of multiple tests including MRI results, blood work, last ECHO, CTs, FESS and MBS studies and current treatments. After providing him with detailed information about the patient's clinical status, we discussed about neuro inputs in regards to prognosis. I stated that as d/w Neuro, chances for the patient recovering functional independency were limited; however, that there was a better possibility for regaining swallowing capacity. I indicated the patient was for possible PEG placement tomorrow. Dr. Mclain asked me about dispo planning and I indicated that current dispo is for acute rehab. After our discussion, Dr. Mclain stated he was going to reach out to the patient's family to discuss about his current state and level of support the patient may need in the future (likely 24/7 care) as well as the possible complications he may present moving forwards (infections, aspiration, decubitus ulcers). He also indicated he was going to try to re-address code status with them. He will call me back with an update about his conversations with the patient's family.     30' were spent in non face to face time during this encounter. Time in 16:30 time out 17:00

## 2021-04-28 NOTE — PROGRESS NOTE ADULT - ASSESSMENT
The patient is a 60-year-old man managed previously only for diabetes mellitus who presented on 4/1 with acute respiratory failure with hypoxia secondary to covid-19-associated pneumonia and whose course has been complicated by 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 Pt completed antibiotic course, however is pending speech and swallow FEES evaluation, possible necessity of a PEG tube. The patient is a 60-year-old man managed previously only for diabetes mellitus who presented on 4/1 with acute respiratory failure with hypoxia secondary to covid-19-associated pneumonia and whose course has been complicated by 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 Pt completed antibiotic course, however unsuccessful cineesophagogram with notable aspiration, pt pending PEG tube placement.

## 2021-04-28 NOTE — PROGRESS NOTE ADULT - PROBLEM SELECTOR PLAN 7
-Dysphagia noted in setting of patient's acute alteration in mental status  -Nasogastric tube placed for administration of tube feeds; but patient self removed 4/17; multiple attempts to replace unsuccessful   -Patient evaluated by speech and swallow team, and npo status recommended  -Will maintain npo status with tube feeds at this time once tube feeds replaced (unsuccessful x3 today); in setting of acute cerebral infarcts, it is possible that patient's neurologic function and swallowing mechanism improve  Pt tolerated small feedings with speech & swallow and family at bedside  - FEES study- pt not able to cooperate with it  - Cineesophagogram- demonstrating risk of aspiration, NPO recommended  - Will require PEG tube placement -Dry gangrene noted over distal toes on bilateral lower extremities--most concerning for microvascular disease subsequent to vasopressor administration   -Dorsalis pedis pulses intact bilaterally  - ERASMO demonstrating right ERASMO: 1.09, left ERASMO: 1.24, right TBI: 0.78, and left TBI: 0.73.   Significant lower extremity arterial disease not identified. The only abnormality on this examination is the decreased amplitude to the pulse volume recordings at the levels of the toes bilaterally.  - Podiatry recs appreciated, no acute surgical intervention at this time, can apply betadine to wound  - Wound care recs appreciated

## 2021-04-28 NOTE — PROGRESS NOTE ADULT - ATTENDING COMMENTS
60 yr male with PMH of DM who was initially admitted to [facility] ICU for hypoxic respiratory failure 2/2 COVID c/b PE with R heart strain, cardiogenic and septic shock, ischemic and hemorrhagic CVA and ESBL klebsiella ventilator associate PNA, transferred to [facility] for neurosurgery eval.   No acute events. Improving neurologically daily. S/p EEG with no epileptiform activity seen.  Did not tolerate FEES; failed MBS study; GI consult for PEG  Cont  heparin gtt for PE/DVT and acute CVA.  Rest as above.

## 2021-04-28 NOTE — PROGRESS NOTE ADULT - PROBLEM SELECTOR PLAN 2
-Acute ischemic strokes, with concern for hemorrhagic conversion, noted on CT scan on 4/8  -Etiology of acute ischemic stroke uncertain at this time; TTE with bubble performed at bedside in the micu without any evidence of right-to-left shunt; no arrhythmia noted on telemetry throughout stay in the micu   -CTA head and neck without any evidence of carotid artery stenosis or dissection   -Full-dose anticoagulation for pulmonary embolus held briefly out of concern for hemorrhagic conversion of acute strokes but since restarted; status post management with hypertonic saline; minimal concern at this time for elevated intracranial pressure   -Will administer oral aspirin for secondary prophylaxis following discussion with neurology  -Currently receiving levetiracetam for seizure prophylaxis although no documented seizures or seizure-like activity   - EEG pending -Acute ischemic strokes, with concern for hemorrhagic conversion, noted on CT scan on 4/8  -Etiology of acute ischemic stroke uncertain at this time; TTE with bubble performed at bedside in the micu without any evidence of right-to-left shunt; no arrhythmia noted on telemetry throughout stay in the micu   -CTA head and neck without any evidence of carotid artery stenosis or dissection   -Full-dose anticoagulation for pulmonary embolus held briefly out of concern for hemorrhagic conversion of acute strokes but since restarted; status post management with hypertonic saline; minimal concern at this time for elevated intracranial pressure   -Will administer oral aspirin for secondary prophylaxis following discussion with neurology  - Currently receiving levetiracetam for seizure prophylaxis although no documented seizures or seizure-like activity   - EEG pending official read, so far no seizure-like activity noted

## 2021-04-28 NOTE — PROGRESS NOTE ADULT - PROBLEM SELECTOR PLAN 8
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   Significant lower extremity arterial disease not identified. The only abnormality on this examination is the decreased amplitude to the pulse volume recordings at the levels of the toes bilaterally.  - Podiatry recs appreciated, no acute surgical intervention at this time, can apply betadine to wound  - Wound care recs appreciated -Initiated goals of care discussion with patient's brother-in-law (listed in chart as primary point of contact); patient is full code at this time; goal is as much recovery as possible  -The patient's wife is in [place], but the patient's brother-in-law has been visiting and placing the patient's wife on facetime; she is aware of all that is happening and has happened   -Discussed further today the possibility of placing a peg tube; the patient's brother-in-law states that the patient's preference would be to receive a peg tube   - Depending on fiberoptic endoscopic evaluation swallowing study, may require peg tube

## 2021-04-28 NOTE — CHART NOTE - NSCHARTNOTEFT_GEN_A_CORE
Routine EEG reviewed.  Moderate slowing but no evidence of epileptic activity.    Continue supportive and medical care.    Please call [phone] with any questions or concerns.

## 2021-04-28 NOTE — PROGRESS NOTE ADULT - ATTENDING COMMENTS
Patient known to service  Doing better since last week with improved fevers/leukocytosis  Failed repeat FEES and MBS  PEG is reasonable.  Benign abdominal exam  Tentative plan for PEG tomm

## 2021-04-28 NOTE — PROGRESS NOTE ADULT - PROBLEM SELECTOR PLAN 9
-Initiated goals of care discussion with patient's brother-in-law (listed in chart as primary point of contact); patient is full code at this time; goal is as much recovery as possible  -The patient's wife is in [place], but the patient's brother-in-law has been visiting and placing the patient's wife on facetime; she is aware of all that is happening and has happened   -Discussed further today the possibility of placing a peg tube; the patient's brother-in-law states that the patient's preference would be to receive a peg tube   - Depending on fiberoptic endoscopic evaluation swallowing study, may require peg tube - Holding full dose AC  - Full code - Heparin to be held at 2 AM on 4/29/21  - Full code

## 2021-04-28 NOTE — PROGRESS NOTE ADULT - PROBLEM SELECTOR PLAN 6
-Elevated blood pressures, to 140s and 150s systolic, noted over past few days in micu subsequent to vasopressor weaning   - Was on metoprolol tartrate 25 mg twice daily since 4/16; discontinued  - BP is stable  - If is hypertensive persistently, can consider antihypertensives -Dysphagia noted in setting of patient's acute alteration in mental status  -Nasogastric tube placed for administration of tube feeds; but patient self removed 4/17; multiple attempts to replace unsuccessful   -Patient evaluated by speech and swallow team, and npo status recommended  -Will maintain npo status with tube feeds at this time once tube feeds replaced (unsuccessful x3 today); in setting of acute cerebral infarcts, it is possible that patient's neurologic function and swallowing mechanism improve  Pt tolerated small feedings with speech & swallow and family at bedside  - FEES study- pt not able to cooperate with it  - Cineesophagogram- demonstrating risk of aspiration, NPO recommended  - PEG tube for 4/29, NPO, and hold heparin at 2 AM on 4/29/21

## 2021-04-28 NOTE — PROGRESS NOTE ADULT - SUBJECTIVE AND OBJECTIVE BOX
Chief Complaint:  Patient is a 60y old  Male who presents with a chief complaint of COVID19 w/ severe metabolic acidosis s/p intubation (28 Apr 2021 07:02)      Interval Events: patient underwent s/s eval with FEES and strict NPO is recommended  - leukocytosis, fevers and hypoxia resolved at this time  - after GOC discussion, decision made for PEG eval  - brother designated primary decision-maker      Hospital Medications:  albuterol/ipratropium for Nebulization 3 milliLiter(s) Nebulizer every 8 hours  cholecalciferol 400 Unit(s) Oral daily  dextrose 40% Gel 15 Gram(s) Oral once  dextrose 5% + lactated ringers. 1000 milliLiter(s) IV Continuous <Continuous>  dextrose 5%. 1000 milliLiter(s) IV Continuous <Continuous>  dextrose 5%. 1000 milliLiter(s) IV Continuous <Continuous>  dextrose 50% Injectable 25 Gram(s) IV Push once  dextrose 50% Injectable 12.5 Gram(s) IV Push once  dextrose 50% Injectable 25 Gram(s) IV Push once  glucagon  Injectable 1 milliGRAM(s) IntraMuscular once  heparin   Injectable 5500 Unit(s) IV Push every 6 hours PRN  heparin   Injectable 2500 Unit(s) IV Push every 6 hours PRN  heparin  Infusion.  Unit(s)/Hr IV Continuous <Continuous>  insulin lispro (ADMELOG) corrective regimen sliding scale   SubCutaneous every 6 hours  levETIRAcetam  IVPB 500 milliGRAM(s) IV Intermittent every 12 hours  multivitamin/minerals Oral Solution (WELLESSE) 30 milliLiter(s) Oral daily  thiamine Injectable 100 milliGRAM(s) IV Push daily      PMHX/PSHX:  No pertinent past medical history    No significant past surgical history            ROS:     General:  No weight loss, fevers, chills, night sweats, fatigue   Eyes:  No vision changes  ENT:  No sore throat, pain, runny nose  CV:  No chest pain, palpitations, dizziness   Resp:  No SOB, cough, wheezing  GI:  See HPI  :  No burning with urination, hematuria  Muscle:  No pain, weakness  Neuro:  No weakness/tingling, memory problems  Psych:  No fatigue, insomnia, mood problems, depression  Heme:  No easy bruisability  Skin:  No rash, edema      PHYSICAL EXAM:     GENERAL:  Well developed, no distress  HEENT:  NC/AT,  conjunctivae clear, sclera anicteric  CHEST:  Full & symmetric excursion, no increased effort w/ respirations  HEART:  Regular rhythm & rate  ABDOMEN:  Soft, non-tender, non-distended  EXTREMITIES:  no LE  edema  SKIN:  No rash/erythema/ecchymoses/petechiae/wounds/jaundice  NEURO:  Alert, oriented    Vital Signs:  Vital Signs Last 24 Hrs  T(C): 36.7 (28 Apr 2021 04:23), Max: 36.7 (27 Apr 2021 21:10)  T(F): 98 (28 Apr 2021 04:23), Max: 98.1 (27 Apr 2021 21:10)  HR: 111 (28 Apr 2021 04:23) (94 - 111)  BP: 115/74 (28 Apr 2021 04:23) (108/74 - 115/74)  BP(mean): --  RR: 18 (28 Apr 2021 04:23) (18 - 18)  SpO2: 95% (28 Apr 2021 04:23) (95% - 100%)  Daily     Daily     LABS:                        8.9    6.23  )-----------( 382      ( 28 Apr 2021 06:54 )             28.2     04-28    137  |  101  |  12  ----------------------------<  215<H>  3.7   |  26  |  0.79    Ca    8.6      28 Apr 2021 06:54  Phos  3.1     04-28  Mg     1.8     04-28    TPro  6.6  /  Alb  2.4<L>  /  TBili  0.7  /  DBili  x   /  AST  14  /  ALT  23  /  AlkPhos  74  04-28    LIVER FUNCTIONS - ( 28 Apr 2021 06:54 )  Alb: 2.4 g/dL / Pro: 6.6 g/dL / ALK PHOS: 74 U/L / ALT: 23 U/L / AST: 14 U/L / GGT: x           PTT - ( 28 Apr 2021 06:54 )  PTT:59.7 sec        Imaging:             Chief Complaint:  Patient is a 60y old  Male who presents with a chief complaint of COVID19 w/ severe metabolic acidosis s/p intubation (28 Apr 2021 07:02)      Interval Events: patient underwent s/s eval with FEES and strict NPO is recommended  - leukocytosis, fevers and hypoxia resolved at this time  - after GOC discussion, decision made for PEG eval  - brother-in-law designated primary decision-maker noted in last GOC discussion with Palliative  - patient lethargic and noncommunicative on exam      Hospital Medications:  albuterol/ipratropium for Nebulization 3 milliLiter(s) Nebulizer every 8 hours  cholecalciferol 400 Unit(s) Oral daily  dextrose 40% Gel 15 Gram(s) Oral once  dextrose 5% + lactated ringers. 1000 milliLiter(s) IV Continuous <Continuous>  dextrose 5%. 1000 milliLiter(s) IV Continuous <Continuous>  dextrose 5%. 1000 milliLiter(s) IV Continuous <Continuous>  dextrose 50% Injectable 25 Gram(s) IV Push once  dextrose 50% Injectable 12.5 Gram(s) IV Push once  dextrose 50% Injectable 25 Gram(s) IV Push once  glucagon  Injectable 1 milliGRAM(s) IntraMuscular once  heparin   Injectable 5500 Unit(s) IV Push every 6 hours PRN  heparin   Injectable 2500 Unit(s) IV Push every 6 hours PRN  heparin  Infusion.  Unit(s)/Hr IV Continuous <Continuous>  insulin lispro (ADMELOG) corrective regimen sliding scale   SubCutaneous every 6 hours  levETIRAcetam  IVPB 500 milliGRAM(s) IV Intermittent every 12 hours  multivitamin/minerals Oral Solution (WELLESSE) 30 milliLiter(s) Oral daily  thiamine Injectable 100 milliGRAM(s) IV Push daily      PMHX/PSHX:  No pertinent past medical history    No significant past surgical history            ROS: unable to obtain      PHYSICAL EXAM:     GENERAL:  Well developed, no distress  HEENT:  NC/AT,  conjunctivae clear, sclera anicteric  CHEST:  Full & symmetric excursion, no increased effort w/ respirations  HEART:  Regular rhythm & rate  ABDOMEN:  Soft, non-tender, non-distended  EXTREMITIES:  no LE  edema  SKIN:  No rash/erythema/ecchymoses/petechiae/wounds/jaundice  NEURO:  Alert, oriented    Vital Signs:  Vital Signs Last 24 Hrs  T(C): 36.7 (28 Apr 2021 04:23), Max: 36.7 (27 Apr 2021 21:10)  T(F): 98 (28 Apr 2021 04:23), Max: 98.1 (27 Apr 2021 21:10)  HR: 111 (28 Apr 2021 04:23) (94 - 111)  BP: 115/74 (28 Apr 2021 04:23) (108/74 - 115/74)  BP(mean): --  RR: 18 (28 Apr 2021 04:23) (18 - 18)  SpO2: 95% (28 Apr 2021 04:23) (95% - 100%)  Daily     Daily     LABS:                        8.9    6.23  )-----------( 382      ( 28 Apr 2021 06:54 )             28.2     04-28    137  |  101  |  12  ----------------------------<  215<H>  3.7   |  26  |  0.79    Ca    8.6      28 Apr 2021 06:54  Phos  3.1     04-28  Mg     1.8     04-28    TPro  6.6  /  Alb  2.4<L>  /  TBili  0.7  /  DBili  x   /  AST  14  /  ALT  23  /  AlkPhos  74  04-28    LIVER FUNCTIONS - ( 28 Apr 2021 06:54 )  Alb: 2.4 g/dL / Pro: 6.6 g/dL / ALK PHOS: 74 U/L / ALT: 23 U/L / AST: 14 U/L / GGT: x           PTT - ( 28 Apr 2021 06:54 )  PTT:59.7 sec        Imaging:             Chief Complaint:  Patient is a 60y old  Male who presents with a chief complaint of COVID19 w/ severe metabolic acidosis s/p intubation (28 Apr 2021 07:02)      Interval Events: patient underwent s/s eval with FEES and strict NPO is recommended  - leukocytosis, fevers and hypoxia resolved at this time  - after GOC discussion, decision made for PEG eval  - brother-in-law designated primary decision-maker noted in last GOC discussion with Palliative        Hospital Medications:  albuterol/ipratropium for Nebulization 3 milliLiter(s) Nebulizer every 8 hours  cholecalciferol 400 Unit(s) Oral daily  dextrose 40% Gel 15 Gram(s) Oral once  dextrose 5% + lactated ringers. 1000 milliLiter(s) IV Continuous <Continuous>  dextrose 5%. 1000 milliLiter(s) IV Continuous <Continuous>  dextrose 5%. 1000 milliLiter(s) IV Continuous <Continuous>  dextrose 50% Injectable 25 Gram(s) IV Push once  dextrose 50% Injectable 12.5 Gram(s) IV Push once  dextrose 50% Injectable 25 Gram(s) IV Push once  glucagon  Injectable 1 milliGRAM(s) IntraMuscular once  heparin   Injectable 5500 Unit(s) IV Push every 6 hours PRN  heparin   Injectable 2500 Unit(s) IV Push every 6 hours PRN  heparin  Infusion.  Unit(s)/Hr IV Continuous <Continuous>  insulin lispro (ADMELOG) corrective regimen sliding scale   SubCutaneous every 6 hours  levETIRAcetam  IVPB 500 milliGRAM(s) IV Intermittent every 12 hours  multivitamin/minerals Oral Solution (WELLESSE) 30 milliLiter(s) Oral daily  thiamine Injectable 100 milliGRAM(s) IV Push daily      PMHX/PSHX:  No pertinent past medical history    No significant past surgical history            ROS: limited - denies abd pain      PHYSICAL EXAM:     GENERAL:  chronically ill appearing, no distress  HEENT:  NC/AT,  conjunctivae clear, sclera anicteric  CHEST:  Full & symmetric excursion, no increased effort w/ respirations  HEART:  Regular rhythm & rate  ABDOMEN:  Soft, non-tender, non-distended  EXTREMITIES:  no LE  edema  SKIN:  No rash/erythema/ecchymoses/petechiae/wounds/jaundice  NEURO:  Alert, responsive, unable to verbalize answers however able to nod    Vital Signs:  Vital Signs Last 24 Hrs  T(C): 36.7 (28 Apr 2021 04:23), Max: 36.7 (27 Apr 2021 21:10)  T(F): 98 (28 Apr 2021 04:23), Max: 98.1 (27 Apr 2021 21:10)  HR: 111 (28 Apr 2021 04:23) (94 - 111)  BP: 115/74 (28 Apr 2021 04:23) (108/74 - 115/74)  BP(mean): --  RR: 18 (28 Apr 2021 04:23) (18 - 18)  SpO2: 95% (28 Apr 2021 04:23) (95% - 100%)  Daily     Daily     LABS:                        8.9    6.23  )-----------( 382      ( 28 Apr 2021 06:54 )             28.2     04-28    137  |  101  |  12  ----------------------------<  215<H>  3.7   |  26  |  0.79    Ca    8.6      28 Apr 2021 06:54  Phos  3.1     04-28  Mg     1.8     04-28    TPro  6.6  /  Alb  2.4<L>  /  TBili  0.7  /  DBili  x   /  AST  14  /  ALT  23  /  AlkPhos  74  04-28    LIVER FUNCTIONS - ( 28 Apr 2021 06:54 )  Alb: 2.4 g/dL / Pro: 6.6 g/dL / ALK PHOS: 74 U/L / ALT: 23 U/L / AST: 14 U/L / GGT: x           PTT - ( 28 Apr 2021 06:54 )  PTT:59.7 sec        Imaging:

## 2021-04-28 NOTE — PROGRESS NOTE ADULT - PROBLEM SELECTOR PLAN 3
-Low-grade temperature elevations noted over past few days in micu  -For that reason, blood and sputum cultures drawn on 4/13; sputum cultures growing ESBL Klebsiella   - Previously on meropenem 1 g q8h (4/15-4/19) for treatment of ventilator-associated pneumonia, switched to ertapenem on 4/19 for 5 more days, completed course of Abx  -Maintaining oxygen saturations greater than 95% on room air at this time  - Spiked fever O/N on 4/18 to 4/19, cultures sent (previous 4/13 blood cxs x2 negative), CXR similar to previously with noted, right pleural effusion and right lower lobe pneumonia, and left side clear lungs, likely 2/2 to aspiration from chemical pneumonitis  - No current fevers has been stable off Abx -Pulmonary embolus noted on ct scan performed at time of admission  -Etiology of venous thromboembolism uncertain at this time; no known history of thrombophilia; provoked in setting of acute covid illness   -Managing with heparin for full-dose anticoagulation   -Will transition therapy to apixaban once enteral tube access regained

## 2021-04-28 NOTE — PROGRESS NOTE ADULT - PROBLEM SELECTOR PLAN 4
-Pulmonary embolus noted on ct scan performed at time of admission  -Etiology of venous thromboembolism uncertain at this time; no known history of thrombophilia; provoked in setting of acute covid illness   -Managing with heparin for full-dose anticoagulation   -Will transition therapy to apixaban once enteral tube access regained -History of type II diabetes mellitus noted; a1c noted to be greater than 10   - On insulin sliding scale  - Will consider adding on basal/bolus if sugars increase  - Monitor fingersticks

## 2021-04-29 LAB
ALBUMIN SERPL ELPH-MCNC: 2.5 G/DL — LOW (ref 3.3–5)
ALP SERPL-CCNC: 74 U/L — SIGNIFICANT CHANGE UP (ref 40–120)
ALT FLD-CCNC: 22 U/L — SIGNIFICANT CHANGE UP (ref 10–45)
ANION GAP SERPL CALC-SCNC: 11 MMOL/L — SIGNIFICANT CHANGE UP (ref 5–17)
APTT BLD: 30.2 SEC — SIGNIFICANT CHANGE UP (ref 27.5–35.5)
AST SERPL-CCNC: 20 U/L — SIGNIFICANT CHANGE UP (ref 10–40)
BILIRUB SERPL-MCNC: 0.8 MG/DL — SIGNIFICANT CHANGE UP (ref 0.2–1.2)
BLD GP AB SCN SERPL QL: NEGATIVE — SIGNIFICANT CHANGE UP
BUN SERPL-MCNC: 11 MG/DL — SIGNIFICANT CHANGE UP (ref 7–23)
CALCIUM SERPL-MCNC: 8.8 MG/DL — SIGNIFICANT CHANGE UP (ref 8.4–10.5)
CHLORIDE SERPL-SCNC: 103 MMOL/L — SIGNIFICANT CHANGE UP (ref 96–108)
CO2 SERPL-SCNC: 25 MMOL/L — SIGNIFICANT CHANGE UP (ref 22–31)
CREAT SERPL-MCNC: 0.81 MG/DL — SIGNIFICANT CHANGE UP (ref 0.5–1.3)
GLUCOSE BLDC GLUCOMTR-MCNC: 114 MG/DL — HIGH (ref 70–99)
GLUCOSE BLDC GLUCOMTR-MCNC: 119 MG/DL — HIGH (ref 70–99)
GLUCOSE BLDC GLUCOMTR-MCNC: 138 MG/DL — HIGH (ref 70–99)
GLUCOSE BLDC GLUCOMTR-MCNC: 147 MG/DL — HIGH (ref 70–99)
GLUCOSE BLDC GLUCOMTR-MCNC: 156 MG/DL — HIGH (ref 70–99)
GLUCOSE BLDC GLUCOMTR-MCNC: 171 MG/DL — HIGH (ref 70–99)
GLUCOSE SERPL-MCNC: 140 MG/DL — HIGH (ref 70–99)
HCT VFR BLD CALC: 29.8 % — LOW (ref 39–50)
HGB BLD-MCNC: 9.2 G/DL — LOW (ref 13–17)
INR BLD: 1.2 RATIO — HIGH (ref 0.88–1.16)
MAGNESIUM SERPL-MCNC: 1.7 MG/DL — SIGNIFICANT CHANGE UP (ref 1.6–2.6)
MCHC RBC-ENTMCNC: 26.1 PG — LOW (ref 27–34)
MCHC RBC-ENTMCNC: 30.9 GM/DL — LOW (ref 32–36)
MCV RBC AUTO: 84.4 FL — SIGNIFICANT CHANGE UP (ref 80–100)
NRBC # BLD: 0 /100 WBCS — SIGNIFICANT CHANGE UP (ref 0–0)
PHOSPHATE SERPL-MCNC: 3.1 MG/DL — SIGNIFICANT CHANGE UP (ref 2.5–4.5)
PLATELET # BLD AUTO: 375 K/UL — SIGNIFICANT CHANGE UP (ref 150–400)
POTASSIUM SERPL-MCNC: 3.7 MMOL/L — SIGNIFICANT CHANGE UP (ref 3.5–5.3)
POTASSIUM SERPL-SCNC: 3.7 MMOL/L — SIGNIFICANT CHANGE UP (ref 3.5–5.3)
PROT SERPL-MCNC: 6.8 G/DL — SIGNIFICANT CHANGE UP (ref 6–8.3)
PROTHROM AB SERPL-ACNC: 14.3 SEC — HIGH (ref 10.6–13.6)
RBC # BLD: 3.53 M/UL — LOW (ref 4.2–5.8)
RBC # FLD: 16.3 % — HIGH (ref 10.3–14.5)
RH IG SCN BLD-IMP: POSITIVE — SIGNIFICANT CHANGE UP
SODIUM SERPL-SCNC: 139 MMOL/L — SIGNIFICANT CHANGE UP (ref 135–145)
WBC # BLD: 6.06 K/UL — SIGNIFICANT CHANGE UP (ref 3.8–10.5)
WBC # FLD AUTO: 6.06 K/UL — SIGNIFICANT CHANGE UP (ref 3.8–10.5)

## 2021-04-29 PROCEDURE — 43246 EGD PLACE GASTROSTOMY TUBE: CPT

## 2021-04-29 PROCEDURE — 99233 SBSQ HOSP IP/OBS HIGH 50: CPT | Mod: GC

## 2021-04-29 RX ORDER — MAGNESIUM SULFATE 500 MG/ML
2 VIAL (ML) INJECTION ONCE
Refills: 0 | Status: COMPLETED | OUTPATIENT
Start: 2021-04-29 | End: 2021-04-29

## 2021-04-29 RX ORDER — SODIUM CHLORIDE 9 MG/ML
500 INJECTION INTRAMUSCULAR; INTRAVENOUS; SUBCUTANEOUS
Refills: 0 | Status: COMPLETED | OUTPATIENT
Start: 2021-04-29 | End: 2021-04-29

## 2021-04-29 RX ADMIN — Medication 3 MILLILITER(S): at 13:45

## 2021-04-29 RX ADMIN — Medication 100 MILLIGRAM(S): at 12:50

## 2021-04-29 RX ADMIN — Medication 50 GRAM(S): at 18:23

## 2021-04-29 RX ADMIN — Medication 3 MILLILITER(S): at 22:07

## 2021-04-29 RX ADMIN — LEVETIRACETAM 400 MILLIGRAM(S): 250 TABLET, FILM COATED ORAL at 06:43

## 2021-04-29 RX ADMIN — Medication 2: at 13:38

## 2021-04-29 RX ADMIN — SODIUM CHLORIDE 75 MILLILITER(S): 9 INJECTION, SOLUTION INTRAVENOUS at 06:24

## 2021-04-29 RX ADMIN — LEVETIRACETAM 400 MILLIGRAM(S): 250 TABLET, FILM COATED ORAL at 18:27

## 2021-04-29 RX ADMIN — SODIUM CHLORIDE 30 MILLILITER(S): 9 INJECTION INTRAMUSCULAR; INTRAVENOUS; SUBCUTANEOUS at 18:39

## 2021-04-29 NOTE — PROGRESS NOTE ADULT - SUBJECTIVE AND OBJECTIVE BOX
ADRIANA BOOKER  Male  MRN-[id number]    Subjective: No acute events overnight. To go for PEG placement today, for which Heparin gtt is held since 2am 4/29.       VITAL SIGNS:  T(F): 97.4  HR: 104  BP: 126/81  RR: 18  SpO2: 96%    Exam:   MS: awake, alert. Nonverbal. Holds up 2 fingers when mimicked. Does not follow any other commands.    CN: BTT present b/l. EOMI. Face symmetric. T/u midline.   Motor: No drift RUE. LUE drops 10 seconds.   Sensory: Grimaces to noxious stimuli throughout   Reflexes: Mute Babinski bilaterally.   Coordination: Unable to assess  Gait: Deferred    LABS:                          9.2    6.06  )-----------( 375      ( 29 Apr 2021 07:07 )             29.8     04-29    139  |  103  |  11  ----------------------------<  140<H>  3.7   |  25  |  0.81    Ca    8.8      29 Apr 2021 07:07  Phos  3.1     04-29  Mg     1.7     04-29    TPro  6.8  /  Alb  2.5<L>  /  TBili  0.8  /  DBili  x   /  AST  20  /  ALT  22  /  AlkPhos  74  04-29    PT/INR - ( 29 Apr 2021 07:07 )   PT: 14.3 sec;   INR: 1.20 ratio         PTT - ( 29 Apr 2021 07:07 )  PTT:30.2 sec    RADIOLOGY & ADDITIONAL STUDIES:    EEG 4/27: 1. Moderate nonspecific diffuse or multifocal cerebral dysfunction.   2. No epileptiform pattern or seizure seen.     MRI BRAIN: Multiple evolving subacute infarcts with associated hemorrhages including in the bilateral centrum semiovale, bilateral parieto-occipital region, left posterior temporal lobe, right thalamus, and anterior temporal poles bilaterally. Evolving subacute infarct with hemorrhagic transformation in the left cerebellar hemisphere. Note, many of these infarcts have associated abnormal contrast enhancement, likely reflecting expected evolution of ischemia, however, follow-up imaging to document resolution of this finding is recommended.    MRA HEAD: No evidence of proximal vessel stenosis, occlusion, aneurysm or vascular malformation. CT angiography may be obtained for further assessment.

## 2021-04-29 NOTE — PROGRESS NOTE ADULT - PROBLEM SELECTOR PLAN 2
-Acute ischemic strokes, with concern for hemorrhagic conversion, noted on CT scan on 4/8  -Etiology of acute ischemic stroke uncertain at this time; TTE with bubble performed at bedside in the micu without any evidence of right-to-left shunt; no arrhythmia noted on telemetry throughout stay in the micu   -CTA head and neck without any evidence of carotid artery stenosis or dissection   -Full-dose anticoagulation for pulmonary embolus held briefly out of concern for hemorrhagic conversion of acute strokes but since restarted; status post management with hypertonic saline; minimal concern at this time for elevated intracranial pressure   -Will administer oral aspirin for secondary prophylaxis following discussion with neurology  - Currently receiving levetiracetam for seizure prophylaxis although no documented seizures or seizure-like activity   - EEG pending official read, so far no seizure-like activity noted -Acute ischemic strokes, with concern for hemorrhagic conversion, noted on CT scan on 4/8  -Etiology of acute ischemic stroke uncertain at this time; TTE with bubble performed at bedside in the micu without any evidence of right-to-left shunt; no arrhythmia noted on telemetry throughout stay in the micu   -CTA head and neck without any evidence of carotid artery stenosis or dissection   -Full-dose anticoagulation for pulmonary embolus held briefly out of concern for hemorrhagic conversion of acute strokes but since restarted; status post management with hypertonic saline; minimal concern at this time for elevated intracranial pressure   - Currently receiving levetiracetam for seizure prophylaxis although no documented seizures or seizure-like activity   - As per neuro, EEG not suggestive of seizure-like activity, pt is on keppra as per previous neuro recs  - Will administer aspirin when enteral tube access is obtained

## 2021-04-29 NOTE — PROGRESS NOTE ADULT - ASSESSMENT
The patient is a 60-year-old man managed previously only for diabetes mellitus who presented on 4/1 with acute respiratory failure with hypoxia secondary to covid-19-associated pneumonia and whose course has been complicated by 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 Pt completed antibiotic course, however unsuccessful cineesophagogram with notable aspiration, pt pending PEG tube placement. The patient is a 60-year-old man managed previously only for diabetes mellitus who presented on 4/1 with acute respiratory failure with hypoxia secondary to covid-19-associated pneumonia and whose course has been complicated by 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 Pt completed antibiotic course, however unsuccessful cineesophagogram with notable aspiration. PEG tube placement today.

## 2021-04-29 NOTE — PRE PROCEDURE NOTE - PRE PROCEDURE EVALUATION
Attending Physician:      Dr. Brennan                      Procedure:   EGD/PEG placement    Indication for Procedure:   Dysphagia  ________________________________________________________  PAST MEDICAL & SURGICAL HISTORY:    COVID with hospitalization/intubation  Metabolic Acidosis following intubation  Cerebral infarct    No significant past surgical history      ALLERGIES:  No Known Allergies    HOME MEDICATIONS:    AICD/PPM: [ ] yes   [X ] no    PERTINENT LAB DATA:                        9.2    6.06  )-----------( 375      ( 29 Apr 2021 07:07 )             29.8     04-29    139  |  103  |  11  ----------------------------<  140<H>  3.7   |  25  |  0.81    Ca    8.8      29 Apr 2021 07:07  Phos  3.1     04-29  Mg     1.7     04-29  TPro  6.8  /  Alb  2.5<L>  /  TBili  0.8  /  DBili  x   /  AST  20  /  ALT  22  /  AlkPhos  74  04-29  PT/INR - ( 29 Apr 2021 07:07 )   PT: 14.3 sec;   INR: 1.20 ratio    PTT - ( 29 Apr 2021 07:07 )  PTT:30.2 sec    PHYSICAL EXAMINATION:    T(C): 36.3  HR: 104  BP: 126/81  RR: 18  SpO2: 96%    Constitutional: NAD    Neck:  No JVD  Respiratory: CTAB/L  Cardiovascular: S1 and S2  Gastrointestinal: BS+, soft, NT/ND  Extremities: No peripheral edema  Neurological: A/O x 0-unable to assess as patient does not actively participate in conversation and does not follow commands.        COMMENTS:    The patient is a suitable candidate for the planned procedure unless box checked [ ]  No, explain:

## 2021-04-29 NOTE — PROGRESS NOTE ADULT - ATTENDING COMMENTS
60 yr male with PMH of DM who was initially admitted to [facility] ICU for hypoxic respiratory failure 2/2 COVID c/b PE with R heart strain, cardiogenic and septic shock, ischemic and hemorrhagic CVA and ESBL klebsiella ventilator associate PNA, transferred to [facility] for neurosurgery eval.   No acute events. Improving neurologically daily. S/p EEG with no epileptiform activity seen.  Did not tolerate FEES; failed MBS study; Planned for PEG today.  Resume heparin gtt for PE/DVT and acute CVA 24 hours after procedure.

## 2021-04-29 NOTE — PROGRESS NOTE ADULT - PROBLEM SELECTOR PLAN 1
- Etiology likely multi-factorial in setting of acute ischemic infarctions of the brain in conjunction vs acute kidney injury with uremia (now resolving) vs resolving hypoxic respiratory failure, covid-19 infection, superimposed bacterial pneumonia, urinary retention, concern for nutritional deficiencies  - Managing acute pneumonia with antibiotics; managing pulmonary embolus with full-dose anticoagulation; limiting sedative medication administration   - At this time, will favor frequent reorientation; will re-evaluate swallowing function as possible  - Some improvement in mental status on 4/21  - MRI Brain, and MRA Head demonstrated multiple evolving subacute infarcts with associated hemorrhages, likely expected evolution of ischemia  - Will follow up on neuro recs  - EEG pending official read, so far no seizure-like activity noted - Etiology likely multi-factorial in setting of acute ischemic infarctions of the brain in conjunction vs acute kidney injury with uremia (now resolving) vs resolving hypoxic respiratory failure, covid-19 infection, superimposed bacterial pneumonia, urinary retention, concern for nutritional deficiencies  - Managing acute pneumonia with antibiotics; managing pulmonary embolus with full-dose anticoagulation; limiting sedative medication administration   - At this time, will favor frequent reorientation; will re-evaluate swallowing function as possible  - Some improvement in mental status on 4/21  - MRI Brain, and MRA Head demonstrated multiple evolving subacute infarcts with associated hemorrhages, likely expected evolution of ischemia  - Neuro recs appreciated  - As per neuro, EEG not suggestive of seizure-like activity

## 2021-04-29 NOTE — PROGRESS NOTE ADULT - PROBLEM SELECTOR PLAN 9
- Heparin to be held at 2 AM on 4/29/21  - Full code - Heparin to be held at 2 AM on 4/29/21 until 48 hours after procedure, has SCDs  - Full code

## 2021-04-29 NOTE — PROGRESS NOTE ADULT - SUBJECTIVE AND OBJECTIVE BOX
Irina Rodriguez MD  PGY 1 Department of Internal Medicine  Pager: [phone] NS, [phone] ANH      Patient is a 60y old  Male who presents with a chief complaint of COVID19 w/ severe metabolic acidosis s/p intubation (28 Apr 2021 10:11)      SUBJECTIVE / OVERNIGHT EVENTS: Pt seen and examined. No acute overnight events.   Denies fevers, chills, CP/palpitations, SOB/cough, abdominal pain, N/V, constipation, or diarrhea.        MEDICATIONS  (STANDING):  albuterol/ipratropium for Nebulization 3 milliLiter(s) Nebulizer every 8 hours  cholecalciferol 400 Unit(s) Oral daily  dextrose 40% Gel 15 Gram(s) Oral once  dextrose 5% + lactated ringers. 1000 milliLiter(s) (75 mL/Hr) IV Continuous <Continuous>  dextrose 5%. 1000 milliLiter(s) (50 mL/Hr) IV Continuous <Continuous>  dextrose 5%. 1000 milliLiter(s) (100 mL/Hr) IV Continuous <Continuous>  dextrose 50% Injectable 25 Gram(s) IV Push once  dextrose 50% Injectable 12.5 Gram(s) IV Push once  dextrose 50% Injectable 25 Gram(s) IV Push once  glucagon  Injectable 1 milliGRAM(s) IntraMuscular once  insulin lispro (ADMELOG) corrective regimen sliding scale   SubCutaneous every 6 hours  levETIRAcetam  IVPB 500 milliGRAM(s) IV Intermittent every 12 hours  multivitamin/minerals Oral Solution (WELLESSE) 30 milliLiter(s) Oral daily  thiamine Injectable 100 milliGRAM(s) IV Push daily    MEDICATIONS  (PRN):      I&O's Summary    28 Apr 2021 07:01  -  29 Apr 2021 07:00  --------------------------------------------------------  IN: 430 mL / OUT: 1000 mL / NET: -570 mL        Vital Signs Last 24 Hrs  T(C): 36.6 (29 Apr 2021 05:38), Max: 37.3 (28 Apr 2021 21:10)  T(F): 97.8 (29 Apr 2021 05:38), Max: 99.1 (28 Apr 2021 21:10)  HR: 99 (29 Apr 2021 05:38) (92 - 103)  BP: 114/72 (29 Apr 2021 05:38) (114/72 - 122/82)  BP(mean): --  RR: 18 (29 Apr 2021 05:38) (18 - 18)  SpO2: 96% (29 Apr 2021 05:38) (96% - 99%)    CAPILLARY BLOOD GLUCOSE      POCT Blood Glucose.: 147 mg/dL (29 Apr 2021 05:59)  POCT Blood Glucose.: 138 mg/dL (29 Apr 2021 02:02)  POCT Blood Glucose.: 167 mg/dL (28 Apr 2021 17:38)  POCT Blood Glucose.: 133 mg/dL (28 Apr 2021 12:07)      PHYSICAL EXAM:  GENERAL: NAD,   HEAD:  Atraumatic, Normocephalic  EYES: EOMI, PERRL, conjunctiva and sclera clear  NECK: No JVD  CHEST/LUNG: Clear to auscultation bilaterally; No wheeze  HEART: Regular rate and rhythm; No murmurs, rubs, or gallops  ABDOMEN: Soft, Nontender, Nondistended; Bowel sounds present  EXTREMITIES:  2+ Peripheral Pulses, No clubbing, cyanosis, or edema  PSYCH: AAOx3  NEUROLOGY: non-focal  SKIN: No rashes or lesions       LABS:                        8.9    6.23  )-----------( 382      ( 28 Apr 2021 06:54 )             28.2     Auto Eosinophil # x     / Auto Eosinophil % x     / Auto Neutrophil # x     / Auto Neutrophil % x     / BANDS % x        04-28    137  |  101  |  12  ----------------------------<  215<H>  3.7   |  26  |  0.79    Ca    8.6      28 Apr 2021 06:54  Mg     1.8     04-28  Phos  3.1     04-28  TPro  6.6  /  Alb  2.4<L>  /  TBili  0.7  /  DBili  x   /  AST  14  /  ALT  23  /  AlkPhos  74  04-28    PTT - ( 28 Apr 2021 06:54 )  PTT:59.7 sec           Irina Rodriguez MD  PGY 1 Department of Internal Medicine  Pager: [phone] NS, [phone] ANH      Patient is a 60y old  Male who presents with a chief complaint of COVID19 w/ severe metabolic acidosis s/p intubation (28 Apr 2021 10:11)      SUBJECTIVE / OVERNIGHT EVENTS: Pt seen and examined. No acute overnight events. This morning the patient is awake and alert. ROS limited due to mental status.     MEDICATIONS  (STANDING):  albuterol/ipratropium for Nebulization 3 milliLiter(s) Nebulizer every 8 hours  cholecalciferol 400 Unit(s) Oral daily  dextrose 40% Gel 15 Gram(s) Oral once  dextrose 5% + lactated ringers. 1000 milliLiter(s) (75 mL/Hr) IV Continuous <Continuous>  dextrose 5%. 1000 milliLiter(s) (50 mL/Hr) IV Continuous <Continuous>  dextrose 5%. 1000 milliLiter(s) (100 mL/Hr) IV Continuous <Continuous>  dextrose 50% Injectable 25 Gram(s) IV Push once  dextrose 50% Injectable 12.5 Gram(s) IV Push once  dextrose 50% Injectable 25 Gram(s) IV Push once  glucagon  Injectable 1 milliGRAM(s) IntraMuscular once  insulin lispro (ADMELOG) corrective regimen sliding scale   SubCutaneous every 6 hours  levETIRAcetam  IVPB 500 milliGRAM(s) IV Intermittent every 12 hours  multivitamin/minerals Oral Solution (WELLESSE) 30 milliLiter(s) Oral daily  thiamine Injectable 100 milliGRAM(s) IV Push daily    MEDICATIONS  (PRN):      I&O's Summary    28 Apr 2021 07:01  -  29 Apr 2021 07:00  --------------------------------------------------------  IN: 430 mL / OUT: 1000 mL / NET: -570 mL        Vital Signs Last 24 Hrs  T(C): 36.6 (29 Apr 2021 05:38), Max: 37.3 (28 Apr 2021 21:10)  T(F): 97.8 (29 Apr 2021 05:38), Max: 99.1 (28 Apr 2021 21:10)  HR: 99 (29 Apr 2021 05:38) (92 - 103)  BP: 114/72 (29 Apr 2021 05:38) (114/72 - 122/82)  BP(mean): --  RR: 18 (29 Apr 2021 05:38) (18 - 18)  SpO2: 96% (29 Apr 2021 05:38) (96% - 99%)    CAPILLARY BLOOD GLUCOSE      POCT Blood Glucose.: 147 mg/dL (29 Apr 2021 05:59)  POCT Blood Glucose.: 138 mg/dL (29 Apr 2021 02:02)  POCT Blood Glucose.: 167 mg/dL (28 Apr 2021 17:38)  POCT Blood Glucose.: 133 mg/dL (28 Apr 2021 12:07)      PHYSICAL EXAM:  GENERAL: NAD, Alert and awake  HEAD:  Atraumatic, Normocephalic  EYES: EOMI, PERRL, conjunctiva and sclera clear  NECK: No JVD  CHEST/LUNG: Clear to auscultation bilaterally; No wheezes or crackles  HEART: Regular rate and rhythm; No murmurs, rubs, or gallops  ABDOMEN: Soft, Nontender, Nondistended; Bowel sounds present  EXTREMITIES:  2+ Peripheral Pulses, No clubbing, cyanosis, or edema  NEUROLOGY: non-focal  SKIN: No rashes or lesions       LABS:                        8.9    6.23  )-----------( 382      ( 28 Apr 2021 06:54 )             28.2     Auto Eosinophil # x     / Auto Eosinophil % x     / Auto Neutrophil # x     / Auto Neutrophil % x     / BANDS % x        04-28    137  |  101  |  12  ----------------------------<  215<H>  3.7   |  26  |  0.79    Ca    8.6      28 Apr 2021 06:54  Mg     1.8     04-28  Phos  3.1     04-28  TPro  6.6  /  Alb  2.4<L>  /  TBili  0.7  /  DBili  x   /  AST  14  /  ALT  23  /  AlkPhos  74  04-28    PTT - ( 28 Apr 2021 06:54 )  PTT:59.7 sec

## 2021-04-29 NOTE — PROGRESS NOTE ADULT - PROBLEM SELECTOR PLAN 3
-Pulmonary embolus noted on ct scan performed at time of admission  -Etiology of venous thromboembolism uncertain at this time; no known history of thrombophilia; provoked in setting of acute covid illness   -Managing with heparin for full-dose anticoagulation   -Will transition therapy to apixaban once enteral tube access regained -Pulmonary embolus noted on ct scan performed at time of admission  -Etiology of venous thromboembolism uncertain at this time; no known history of thrombophilia; provoked in setting of acute covid illness   -Managing with heparin for full-dose anticoagulation   -Will transition therapy to lovenox once enteral tube access regained, then on discharge will transition to Eliquis

## 2021-04-29 NOTE — PROGRESS NOTE ADULT - PROBLEM SELECTOR PLAN 4
-History of type II diabetes mellitus noted; a1c noted to be greater than 10   - On insulin sliding scale  - Will consider adding on basal/bolus if sugars increase  - Monitor fingersticks -History of type II diabetes mellitus noted; a1c noted to be greater than 10   - On insulin sliding scale  - If sugars are elevated, will consider NPH while on tube feeds  - Monitor fingersticks

## 2021-04-29 NOTE — PROGRESS NOTE ADULT - PROBLEM SELECTOR PLAN 6
-Dysphagia noted in setting of patient's acute alteration in mental status  -Nasogastric tube placed for administration of tube feeds; but patient self removed 4/17; multiple attempts to replace unsuccessful   -Patient evaluated by speech and swallow team, and npo status recommended  -Will maintain npo status with tube feeds at this time once tube feeds replaced (unsuccessful x3 today); in setting of acute cerebral infarcts, it is possible that patient's neurologic function and swallowing mechanism improve  Pt tolerated small feedings with speech & swallow and family at bedside  - FEES study- pt not able to cooperate with it  - Cineesophagogram- demonstrating risk of aspiration, NPO recommended  - PEG tube for 4/29, NPO, and hold heparin at 2 AM on 4/29/21 -Dysphagia noted in setting of patient's acute alteration in mental status  -Nasogastric tube placed for administration of tube feeds; but patient self removed 4/17; multiple attempts to replace unsuccessful   -Patient evaluated by speech and swallow team, and npo status recommended  -Will maintain npo status with tube feeds at this time once tube feeds replaced (unsuccessful x3 today); in setting of acute cerebral infarcts, it is possible that patient's neurologic function and swallowing mechanism improve  Pt tolerated small feedings with speech & swallow and family at bedside  - FEES study- pt not able to cooperate with it  - Cineesophagogram- demonstrating risk of aspiration, NPO recommended  - PEG tube for 4/29, NPO, and hold heparin at 2 AM on 4/29/21  - Monitor for refeeding syndrome, after 24 hours when tube feedings are started

## 2021-04-29 NOTE — PROGRESS NOTE ADULT - ASSESSMENT
Assessment: 60M unknown med Hx BIBEMS to [facility] ED on 4/1/21 for respiratory distress, recently COVID+ on 3/19/21, transferred to [facility] from [facility] for possible neurosurg intervention; neurology consulted as pt was recently extubated and is still AMS. Baseline AAOx3 and fully functional. Pt at [facility] found to have high anion gap acidosis, elevated FSG to 500s, elevated beta-hydroxybutyrate, lactate of 15, trops of 200, pro-BNP of 21k. Was intubated and admitted to MICU for ARDS 2/2 COVID c/b probable DKA. Subsequent complications while admitted at [facility] include SUKI, segmental upper lobe PE, hypotension 2/2 vasoplegia w/ RH strain, echogenic material found in RV on echocardiogram, possible superimposed bacterial vs. fungal PNA on ceftriaxone and linezolid since 4/6/21, and L. common femoral vein DVT. Pt was also accidentally overdosed on Versed because of incorrectly charted weight on 4/8/21, pt was AMS and neuro was consulted all while at [facility]. CTH demonstrated scattered cerebral and left cerebellar hemisphere acute infarcts with mild areas of hemorrhagic transformation, with additional area of evolving acute ischemia in the right thalamus, without evidence of obstructive hydrocephalus, midline shift, or herniation, presentation concerning for multiple thromboembolic infarcts w/ hemorrhagic transformation vs. hemorrhagic PRES. AC was halted for several days but ultimately restarted after 3 subsequent CTHs demonstrated no evolution of hemorrhagic changes.     Neurology consulted here at [facility] for still AMS s/p extubation. On exam, patient is significantly more alert and awake compared to initial evaluation.  Last CTH and most recent MRI showed b/l infarcts in different vascular distributions, with suggested mechanism uncertain, but consistent with embolic stroke of undetermined source and/or perhaps hypercoagulability of COVID-19. EEG showed no evidence of seizure activity. From a neurology standpoint, recommend to transition to DOAC when able. Patient to go for PEG today.        Plan:  []Continue heparin gtt w/ transition to DOAC when able  []Care per medicine team  []PT/OT  []STAT CTH if acute change in neuro status  []From a neurology standpoint, no further workup necessary  []Re-consult neurology PRN if acute change in neuro status      -Management & disposition to be discussed with neuro attending, Dr. Hale

## 2021-04-30 LAB
ALBUMIN SERPL ELPH-MCNC: 2.4 G/DL — LOW (ref 3.3–5)
ALBUMIN SERPL ELPH-MCNC: 2.8 G/DL — LOW (ref 3.3–5)
ALP SERPL-CCNC: 78 U/L — SIGNIFICANT CHANGE UP (ref 40–120)
ALP SERPL-CCNC: 83 U/L — SIGNIFICANT CHANGE UP (ref 40–120)
ALT FLD-CCNC: 19 U/L — SIGNIFICANT CHANGE UP (ref 10–45)
ALT FLD-CCNC: 19 U/L — SIGNIFICANT CHANGE UP (ref 10–45)
ANION GAP SERPL CALC-SCNC: 13 MMOL/L — SIGNIFICANT CHANGE UP (ref 5–17)
ANION GAP SERPL CALC-SCNC: 19 MMOL/L — HIGH (ref 5–17)
AST SERPL-CCNC: 17 U/L — SIGNIFICANT CHANGE UP (ref 10–40)
AST SERPL-CCNC: 19 U/L — SIGNIFICANT CHANGE UP (ref 10–40)
BILIRUB SERPL-MCNC: 0.8 MG/DL — SIGNIFICANT CHANGE UP (ref 0.2–1.2)
BILIRUB SERPL-MCNC: 1 MG/DL — SIGNIFICANT CHANGE UP (ref 0.2–1.2)
BUN SERPL-MCNC: 14 MG/DL — SIGNIFICANT CHANGE UP (ref 7–23)
BUN SERPL-MCNC: 19 MG/DL — SIGNIFICANT CHANGE UP (ref 7–23)
CALCIUM SERPL-MCNC: 8.7 MG/DL — SIGNIFICANT CHANGE UP (ref 8.4–10.5)
CALCIUM SERPL-MCNC: 9.2 MG/DL — SIGNIFICANT CHANGE UP (ref 8.4–10.5)
CHLORIDE SERPL-SCNC: 100 MMOL/L — SIGNIFICANT CHANGE UP (ref 96–108)
CHLORIDE SERPL-SCNC: 101 MMOL/L — SIGNIFICANT CHANGE UP (ref 96–108)
CO2 SERPL-SCNC: 20 MMOL/L — LOW (ref 22–31)
CO2 SERPL-SCNC: 23 MMOL/L — SIGNIFICANT CHANGE UP (ref 22–31)
CREAT SERPL-MCNC: 0.82 MG/DL — SIGNIFICANT CHANGE UP (ref 0.5–1.3)
CREAT SERPL-MCNC: 0.83 MG/DL — SIGNIFICANT CHANGE UP (ref 0.5–1.3)
GLUCOSE BLDC GLUCOMTR-MCNC: 103 MG/DL — HIGH (ref 70–99)
GLUCOSE BLDC GLUCOMTR-MCNC: 106 MG/DL — HIGH (ref 70–99)
GLUCOSE BLDC GLUCOMTR-MCNC: 136 MG/DL — HIGH (ref 70–99)
GLUCOSE SERPL-MCNC: 152 MG/DL — HIGH (ref 70–99)
GLUCOSE SERPL-MCNC: 99 MG/DL — SIGNIFICANT CHANGE UP (ref 70–99)
HCT VFR BLD CALC: 31 % — LOW (ref 39–50)
HGB BLD-MCNC: 9.5 G/DL — LOW (ref 13–17)
MAGNESIUM SERPL-MCNC: 2 MG/DL — SIGNIFICANT CHANGE UP (ref 1.6–2.6)
MAGNESIUM SERPL-MCNC: 2.2 MG/DL — SIGNIFICANT CHANGE UP (ref 1.6–2.6)
MCHC RBC-ENTMCNC: 26.4 PG — LOW (ref 27–34)
MCHC RBC-ENTMCNC: 30.6 GM/DL — LOW (ref 32–36)
MCV RBC AUTO: 86.1 FL — SIGNIFICANT CHANGE UP (ref 80–100)
NRBC # BLD: 0 /100 WBCS — SIGNIFICANT CHANGE UP (ref 0–0)
PHOSPHATE SERPL-MCNC: 3.4 MG/DL — SIGNIFICANT CHANGE UP (ref 2.5–4.5)
PHOSPHATE SERPL-MCNC: 4.3 MG/DL — SIGNIFICANT CHANGE UP (ref 2.5–4.5)
PLATELET # BLD AUTO: 348 K/UL — SIGNIFICANT CHANGE UP (ref 150–400)
POTASSIUM SERPL-MCNC: 4 MMOL/L — SIGNIFICANT CHANGE UP (ref 3.5–5.3)
POTASSIUM SERPL-MCNC: 4.4 MMOL/L — SIGNIFICANT CHANGE UP (ref 3.5–5.3)
POTASSIUM SERPL-SCNC: 4 MMOL/L — SIGNIFICANT CHANGE UP (ref 3.5–5.3)
POTASSIUM SERPL-SCNC: 4.4 MMOL/L — SIGNIFICANT CHANGE UP (ref 3.5–5.3)
PROT SERPL-MCNC: 7 G/DL — SIGNIFICANT CHANGE UP (ref 6–8.3)
PROT SERPL-MCNC: 7.9 G/DL — SIGNIFICANT CHANGE UP (ref 6–8.3)
RBC # BLD: 3.6 M/UL — LOW (ref 4.2–5.8)
RBC # FLD: 16.3 % — HIGH (ref 10.3–14.5)
SODIUM SERPL-SCNC: 137 MMOL/L — SIGNIFICANT CHANGE UP (ref 135–145)
SODIUM SERPL-SCNC: 139 MMOL/L — SIGNIFICANT CHANGE UP (ref 135–145)
WBC # BLD: 8.52 K/UL — SIGNIFICANT CHANGE UP (ref 3.8–10.5)
WBC # FLD AUTO: 8.52 K/UL — SIGNIFICANT CHANGE UP (ref 3.8–10.5)

## 2021-04-30 PROCEDURE — 99232 SBSQ HOSP IP/OBS MODERATE 35: CPT

## 2021-04-30 PROCEDURE — 99233 SBSQ HOSP IP/OBS HIGH 50: CPT | Mod: GC

## 2021-04-30 RX ORDER — IPRATROPIUM/ALBUTEROL SULFATE 18-103MCG
3 AEROSOL WITH ADAPTER (GRAM) INHALATION EVERY 12 HOURS
Refills: 0 | Status: DISCONTINUED | OUTPATIENT
Start: 2021-04-30 | End: 2021-05-12

## 2021-04-30 RX ORDER — THIAMINE MONONITRATE (VIT B1) 100 MG
100 TABLET ORAL DAILY
Refills: 0 | Status: DISCONTINUED | OUTPATIENT
Start: 2021-04-30 | End: 2021-05-02

## 2021-04-30 RX ORDER — ENOXAPARIN SODIUM 100 MG/ML
70 INJECTION SUBCUTANEOUS
Refills: 0 | Status: DISCONTINUED | OUTPATIENT
Start: 2021-04-30 | End: 2021-05-07

## 2021-04-30 RX ORDER — ENOXAPARIN SODIUM 100 MG/ML
60 INJECTION SUBCUTANEOUS
Refills: 0 | Status: DISCONTINUED | OUTPATIENT
Start: 2021-04-30 | End: 2021-04-30

## 2021-04-30 RX ORDER — SODIUM CHLORIDE 9 MG/ML
1000 INJECTION, SOLUTION INTRAVENOUS
Refills: 0 | Status: DISCONTINUED | OUTPATIENT
Start: 2021-04-30 | End: 2021-04-30

## 2021-04-30 RX ORDER — LEVETIRACETAM 250 MG/1
500 TABLET, FILM COATED ORAL EVERY 12 HOURS
Refills: 0 | Status: DISCONTINUED | OUTPATIENT
Start: 2021-05-01 | End: 2021-05-01

## 2021-04-30 RX ORDER — TAMSULOSIN HYDROCHLORIDE 0.4 MG/1
0.4 CAPSULE ORAL DAILY
Refills: 0 | Status: DISCONTINUED | OUTPATIENT
Start: 2021-04-30 | End: 2021-05-02

## 2021-04-30 RX ADMIN — LEVETIRACETAM 400 MILLIGRAM(S): 250 TABLET, FILM COATED ORAL at 17:33

## 2021-04-30 RX ADMIN — Medication 3 MILLILITER(S): at 05:15

## 2021-04-30 RX ADMIN — LEVETIRACETAM 400 MILLIGRAM(S): 250 TABLET, FILM COATED ORAL at 05:18

## 2021-04-30 RX ADMIN — Medication 3 MILLILITER(S): at 14:31

## 2021-04-30 RX ADMIN — TAMSULOSIN HYDROCHLORIDE 0.4 MILLIGRAM(S): 0.4 CAPSULE ORAL at 22:33

## 2021-04-30 RX ADMIN — ENOXAPARIN SODIUM 70 MILLIGRAM(S): 100 INJECTION SUBCUTANEOUS at 17:35

## 2021-04-30 RX ADMIN — Medication 100 MILLIGRAM(S): at 11:47

## 2021-04-30 NOTE — PROGRESS NOTE ADULT - ASSESSMENT
The patient is a 60-year-old man managed previously only for diabetes mellitus who presented on 4/1 with acute respiratory failure with hypoxia secondary to covid-19-associated pneumonia and whose course has been complicated by 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 Pt completed antibiotic course, however unsuccessful cineesophagogram with notable aspiration. PEG tube placement today. The patient is a 60-year-old man managed previously only for diabetes mellitus who presented on 4/1 with acute respiratory failure with hypoxia secondary to covid-19-associated pneumonia and whose course has been complicated by 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 Pt completed antibiotic course, however unsuccessful cineesophagogram with notable aspiration. PEG tube placement on 4/30 and tube feedings resume don 5/1.

## 2021-04-30 NOTE — PROGRESS NOTE ADULT - SUBJECTIVE AND OBJECTIVE BOX
Irina Rodriguez MD  PGY 1 Department of Internal Medicine  Pager: [phone] NS, [phone] ANH      Patient is a 60y old  Male who presents with a chief complaint of COVID19 w/ severe metabolic acidosis s/p intubation (29 Apr 2021 09:03)      SUBJECTIVE / OVERNIGHT EVENTS: Pt seen and examined. No acute overnight events.   Denies fevers, chills, CP/palpitations, SOB/cough, abdominal pain, N/V, constipation, or diarrhea.        MEDICATIONS  (STANDING):  albuterol/ipratropium for Nebulization 3 milliLiter(s) Nebulizer every 8 hours  cholecalciferol 400 Unit(s) Oral daily  dextrose 40% Gel 15 Gram(s) Oral once  dextrose 5% + lactated ringers. 1000 milliLiter(s) (75 mL/Hr) IV Continuous <Continuous>  dextrose 5%. 1000 milliLiter(s) (50 mL/Hr) IV Continuous <Continuous>  dextrose 5%. 1000 milliLiter(s) (100 mL/Hr) IV Continuous <Continuous>  dextrose 50% Injectable 25 Gram(s) IV Push once  dextrose 50% Injectable 12.5 Gram(s) IV Push once  dextrose 50% Injectable 25 Gram(s) IV Push once  glucagon  Injectable 1 milliGRAM(s) IntraMuscular once  insulin lispro (ADMELOG) corrective regimen sliding scale   SubCutaneous every 6 hours  levETIRAcetam  IVPB 500 milliGRAM(s) IV Intermittent every 12 hours  multivitamin/minerals Oral Solution (WELLESSE) 30 milliLiter(s) Oral daily  thiamine Injectable 100 milliGRAM(s) IV Push daily    MEDICATIONS  (PRN):      I&O's Summary    28 Apr 2021 07:01  -  29 Apr 2021 07:00  --------------------------------------------------------  IN: 430 mL / OUT: 1000 mL / NET: -570 mL    29 Apr 2021 07:01  -  30 Apr 2021 06:58  --------------------------------------------------------  IN: 100 mL / OUT: 450 mL / NET: -350 mL        Vital Signs Last 24 Hrs  T(C): 36.9 (30 Apr 2021 04:46), Max: 37.1 (29 Apr 2021 17:22)  T(F): 98.4 (30 Apr 2021 04:46), Max: 98.8 (29 Apr 2021 17:22)  HR: 108 (30 Apr 2021 04:46) (96 - 112)  BP: 125/74 (30 Apr 2021 04:46) (106/81 - 126/81)  BP(mean): --  RR: 18 (30 Apr 2021 04:46) (18 - 27)  SpO2: 100% (30 Apr 2021 04:46) (96% - 100%)    CAPILLARY BLOOD GLUCOSE      POCT Blood Glucose.: 106 mg/dL (30 Apr 2021 06:14)  POCT Blood Glucose.: 119 mg/dL (29 Apr 2021 23:45)  POCT Blood Glucose.: 114 mg/dL (29 Apr 2021 17:46)  POCT Blood Glucose.: 171 mg/dL (29 Apr 2021 13:33)  POCT Blood Glucose.: 156 mg/dL (29 Apr 2021 08:33)      PHYSICAL EXAM:  GENERAL: NAD,   HEAD:  Atraumatic, Normocephalic  EYES: EOMI, PERRL, conjunctiva and sclera clear  NECK: No JVD  CHEST/LUNG: Clear to auscultation bilaterally; No wheeze  HEART: Regular rate and rhythm; No murmurs, rubs, or gallops  ABDOMEN: Soft, Nontender, Nondistended; Bowel sounds present  EXTREMITIES:  2+ Peripheral Pulses, No clubbing, cyanosis, or edema  PSYCH: AAOx3  NEUROLOGY: non-focal  SKIN: No rashes or lesions       LABS:                        9.2    6.06  )-----------( 375      ( 29 Apr 2021 07:07 )             29.8     Auto Eosinophil # x     / Auto Eosinophil % x     / Auto Neutrophil # x     / Auto Neutrophil % x     / BANDS % x        04-29    139  |  103  |  11  ----------------------------<  140<H>  3.7   |  25  |  0.81    Ca    8.8      29 Apr 2021 07:07  Mg     1.7     04-29  Phos  3.1     04-29  TPro  6.8  /  Alb  2.5<L>  /  TBili  0.8  /  DBili  x   /  AST  20  /  ALT  22  /  AlkPhos  74  04-29    PT/INR - ( 29 Apr 2021 07:07 )   PT: 14.3 sec;   INR: 1.20 ratio         PTT - ( 29 Apr 2021 07:07 )  PTT:30.2 sec           Irina Rodriguez MD  PGY 1 Department of Internal Medicine  Pager: [phone] NS, [id number] GRUPO      Patient is a 60y old  Male who presents with a chief complaint of COVID19 w/ severe metabolic acidosis s/p intubation (29 Apr 2021 09:03)      SUBJECTIVE / OVERNIGHT EVENTS: Pt seen and examined. No acute overnight events. This morning the pt is alert and awake.       MEDICATIONS  (STANDING):  albuterol/ipratropium for Nebulization 3 milliLiter(s) Nebulizer every 8 hours  cholecalciferol 400 Unit(s) Oral daily  dextrose 40% Gel 15 Gram(s) Oral once  dextrose 5% + lactated ringers. 1000 milliLiter(s) (75 mL/Hr) IV Continuous <Continuous>  dextrose 5%. 1000 milliLiter(s) (50 mL/Hr) IV Continuous <Continuous>  dextrose 5%. 1000 milliLiter(s) (100 mL/Hr) IV Continuous <Continuous>  dextrose 50% Injectable 25 Gram(s) IV Push once  dextrose 50% Injectable 12.5 Gram(s) IV Push once  dextrose 50% Injectable 25 Gram(s) IV Push once  glucagon  Injectable 1 milliGRAM(s) IntraMuscular once  insulin lispro (ADMELOG) corrective regimen sliding scale   SubCutaneous every 6 hours  levETIRAcetam  IVPB 500 milliGRAM(s) IV Intermittent every 12 hours  multivitamin/minerals Oral Solution (WELLESSE) 30 milliLiter(s) Oral daily  thiamine Injectable 100 milliGRAM(s) IV Push daily    MEDICATIONS  (PRN):      I&O's Summary    28 Apr 2021 07:01  -  29 Apr 2021 07:00  --------------------------------------------------------  IN: 430 mL / OUT: 1000 mL / NET: -570 mL    29 Apr 2021 07:01  -  30 Apr 2021 06:58  --------------------------------------------------------  IN: 100 mL / OUT: 450 mL / NET: -350 mL        Vital Signs Last 24 Hrs  T(C): 36.9 (30 Apr 2021 04:46), Max: 37.1 (29 Apr 2021 17:22)  T(F): 98.4 (30 Apr 2021 04:46), Max: 98.8 (29 Apr 2021 17:22)  HR: 108 (30 Apr 2021 04:46) (96 - 112)  BP: 125/74 (30 Apr 2021 04:46) (106/81 - 126/81)  BP(mean): --  RR: 18 (30 Apr 2021 04:46) (18 - 27)  SpO2: 100% (30 Apr 2021 04:46) (96% - 100%)    CAPILLARY BLOOD GLUCOSE      POCT Blood Glucose.: 106 mg/dL (30 Apr 2021 06:14)  POCT Blood Glucose.: 119 mg/dL (29 Apr 2021 23:45)  POCT Blood Glucose.: 114 mg/dL (29 Apr 2021 17:46)  POCT Blood Glucose.: 171 mg/dL (29 Apr 2021 13:33)  POCT Blood Glucose.: 156 mg/dL (29 Apr 2021 08:33)      PHYSICAL EXAM:  GENERAL: NAD, AAOx0  HEAD:  Atraumatic, Normocephalic  EYES: EOMI, PERRL, conjunctiva and sclera clear  NECK: No JVD  CHEST/LUNG: Clear to auscultation bilaterally; No wheezes or crackles  HEART: Regular rate and rhythm; No murmurs, rubs, or gallops  ABDOMEN: Soft, Nontender, Nondistended; Bowel sounds present  EXTREMITIES:  2+ Peripheral Pulses, No clubbing, cyanosis, or edema  NEUROLOGY: non-focal  SKIN: No rashes or lesions       LABS:                        9.2    6.06  )-----------( 375      ( 29 Apr 2021 07:07 )             29.8     Auto Eosinophil # x     / Auto Eosinophil % x     / Auto Neutrophil # x     / Auto Neutrophil % x     / BANDS % x        04-29    139  |  103  |  11  ----------------------------<  140<H>  3.7   |  25  |  0.81    Ca    8.8      29 Apr 2021 07:07  Mg     1.7     04-29  Phos  3.1     04-29  TPro  6.8  /  Alb  2.5<L>  /  TBili  0.8  /  DBili  x   /  AST  20  /  ALT  22  /  AlkPhos  74  04-29    PT/INR - ( 29 Apr 2021 07:07 )   PT: 14.3 sec;   INR: 1.20 ratio         PTT - ( 29 Apr 2021 07:07 )  PTT:30.2 sec

## 2021-04-30 NOTE — PROGRESS NOTE ADULT - ASSESSMENT
· Assessment	  60M w/ bilateral digital gangrene  - Pt seen and evaluated  - bilateral digital gangrenous changes noted to distal tips of digits, notably on L foot 2nd digit full thickness, well adhered and warmth noted; R foot 2nd digit sloughed skin with no probe to bone nor signs of infection  - Foot currently without signs of infection, with demarcating gangrenous changes  - No acute podiatric surgical intervention at this time  - Plan to continue local wound care with betadine & with adequate blood flow & allow for further demarcation prior to any surgical intervention  - will monitor

## 2021-04-30 NOTE — CHART NOTE - NSCHARTNOTEFT_GEN_A_CORE
Nutrition Follow Up Note  Patient seen for: malnutrition follow-up, inadequate diet order x 4 days    Hospital course as per chart: 60y Male with PMH of DM who presented on  with acute respiratory failure with hypoxia secondary to COVID-19 associated PNA. Hospital course complicated by "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" Pt with dysphagia in setting of AMS, NG tube placed for tube feeds. Pt self-removed on , multiple attempts to replace unsuccessful. Did not tolerate FEES; cineesophagogram demonstrating risk of aspiration, NPO recommended. PEG tube placed yesterday (), may start enteral feeds via PEG today. Chart reviewed, events noted.     Source: [] Patient       [x] EMR        [] RN        [] Family at bedside       [] Other:    -If unable to interview patient: [] Trach/Vent/BiPAP  [x] Disoriented/confused/inappropriate to interview - pt lethargic, no family at bedside    Diet, NPO (21 @ 18:50) [Active]  - Is current order appropriate/adequate? [] Yes  [x]  No:     GI:  Last documented BM .   Bowel Regimen? [] Yes   [x] No    Weights: Daily Weight in k.1 (-) - ~wt stability noted. Will continue to monitor and trend weights.    MEDICATIONS  (STANDING):  albuterol/ipratropium for Nebulization 3 milliLiter(s) Nebulizer every 8 hours  cholecalciferol 400 Unit(s) Oral daily  dextrose 40% Gel 15 Gram(s) Oral once  dextrose 5% + lactated ringers. 1000 milliLiter(s) (75 mL/Hr) IV Continuous <Continuous>  dextrose 5%. 1000 milliLiter(s) (50 mL/Hr) IV Continuous <Continuous>  dextrose 5%. 1000 milliLiter(s) (100 mL/Hr) IV Continuous <Continuous>  dextrose 50% Injectable 25 Gram(s) IV Push once  dextrose 50% Injectable 12.5 Gram(s) IV Push once  dextrose 50% Injectable 25 Gram(s) IV Push once  glucagon  Injectable 1 milliGRAM(s) IntraMuscular once  insulin lispro (ADMELOG) corrective regimen sliding scale   SubCutaneous every 6 hours  levETIRAcetam  IVPB 500 milliGRAM(s) IV Intermittent every 12 hours  multivitamin/minerals Oral Solution (WELLESSE) 30 milliLiter(s) Oral daily  thiamine Injectable 100 milliGRAM(s) IV Push daily    A1C with Estimated Average Glucose Result: 10.3 % (21 @ 14:28)    CAPILLARY BLOOD GLUCOSE  POCT Blood Glucose.: 106 mg/dL (2021 06:14)  POCT Blood Glucose.: 119 mg/dL (2021 23:45)  POCT Blood Glucose.: 114 mg/dL (2021 17:46)  POCT Blood Glucose.: 171 mg/dL (2021 13:33)    Skin per nursing documentation: +wound, no pressure injuries per flowsheets   Edema per flowsheets: no edema per flowsheets     Estimated Needs:   [x] no change since previous assessment  Based on Dosing wt 145.9 lb/66.1 kg  Energy (28-32 kcals/kg): 9287-1646  Protein (1.2-1.6 g pro/kg): 79..76    Previous Nutrition Diagnosis: Severe Malnutrition   Nutrition Diagnosis is: [x] ongoing  [] resolved [] not applicable     New Nutrition Diagnosis: not applicable    Nutrition Care Plan:  [x] In Progress - pt NPO, to be addressed with nutrition provision as per team  [] Achieved  [] Not applicable    Recommendations:     Monitoring and Evaluation: Monitor enteral nutrition provision and tolerance, weight, labs, skin, GI status    Nutrition care plan to remain consistent with [facility].     RD remains available upon request and will follow up per protocol  Concepcion Anne MS, RD CDN Pager #[phone] Nutrition Follow Up Note  Patient seen for: malnutrition follow-up, inadequate diet order x 4 days    Hospital course as per chart: 60y Male with PMH of DM who presented on  with acute respiratory failure with hypoxia secondary to COVID-19 associated PNA. Hospital course complicated by "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" Pt with dysphagia in setting of AMS, NG tube placed for tube feeds. Pt self-removed on , multiple attempts to replace unsuccessful. Did not tolerate FEES; cineesophagogram demonstrating risk of aspiration, NPO recommended. PEG tube placed yesterday (), plan to start enteral feeds via PEG today per team. Chart reviewed, events noted.     Source: [] Patient       [x] EMR        [] RN        [] Family at bedside       [x] team 3    -If unable to interview patient: [] Trach/Vent/BiPAP  [x] Disoriented/confused/inappropriate to interview - pt lethargic, no family at bedside    Diet, NPO (21 @ 18:50) [Active]  - Is current order appropriate/adequate? [] Yes  [x]  No:     GI:  Last documented BM .   Bowel Regimen? [] Yes   [x] No    Weights: Daily Weight in k.1 (-) - ~wt stability noted. Will continue to monitor and trend weights.    MEDICATIONS  (STANDING):  albuterol/ipratropium for Nebulization 3 milliLiter(s) Nebulizer every 8 hours  cholecalciferol 400 Unit(s) Oral daily  dextrose 40% Gel 15 Gram(s) Oral once  dextrose 5% + lactated ringers. 1000 milliLiter(s) (75 mL/Hr) IV Continuous <Continuous>  dextrose 5%. 1000 milliLiter(s) (50 mL/Hr) IV Continuous <Continuous>  dextrose 5%. 1000 milliLiter(s) (100 mL/Hr) IV Continuous <Continuous>  dextrose 50% Injectable 25 Gram(s) IV Push once  dextrose 50% Injectable 12.5 Gram(s) IV Push once  dextrose 50% Injectable 25 Gram(s) IV Push once  glucagon  Injectable 1 milliGRAM(s) IntraMuscular once  insulin lispro (ADMELOG) corrective regimen sliding scale   SubCutaneous every 6 hours  levETIRAcetam  IVPB 500 milliGRAM(s) IV Intermittent every 12 hours  multivitamin/minerals Oral Solution (WELLESSE) 30 milliLiter(s) Oral daily  thiamine Injectable 100 milliGRAM(s) IV Push daily    A1C with Estimated Average Glucose Result: 10.3 % (21 @ 14:28)    CAPILLARY BLOOD GLUCOSE  POCT Blood Glucose.: 106 mg/dL (2021 06:14)  POCT Blood Glucose.: 119 mg/dL (2021 23:45)  POCT Blood Glucose.: 114 mg/dL (2021 17:46)  POCT Blood Glucose.: 171 mg/dL (2021 13:33)    Skin per nursing documentation: +wound, no pressure injuries per flowsheets   Edema per flowsheets: no edema per flowsheets     Estimated Needs:   [x] no change since previous assessment  Based on Dosing wt 145.9 lb/66.1 kg  Energy (28-32 kcals/kg): 3697-3582  Protein (1.2-1.6 g pro/kg): 79..76    Previous Nutrition Diagnosis: Severe Malnutrition   Nutrition Diagnosis is: [x] ongoing  [] resolved [] not applicable     New Nutrition Diagnosis: not applicable    Nutrition Care Plan:  [x] In Progress - pt NPO, to be addressed with nutrition provision as per team  [] Achieved  [] Not applicable    Recommendations:   1) As feasible, recommend starting tube feeds of Glucerna 1.2 at 10 ml/hr.   - Patient at HIGH RISK FOR REFEEDING SYNDROME. Recommend monitoring electrolytes, with repletion PRN before increasing rate.   - Pending tolerance and electrolytes WNL, recommend increasing Glucerna 1.2 formula by 10 ml/hr q24 hours to goal rate 70 ml/hr x 24 hours (at goal, regimen provides 1680 ml total volume, 2016 kcal, 100.8 g protein, 1352 ml water; provides 30 kcal/kg, 1.5 g/kg protein based on dosing wt 66.2 kg)  - Defer additional free water flushes to team  2) Continue multivitamin and thiamine in setting of refeeding risk, if no medical contraindications    Monitoring and Evaluation: Monitor enteral nutrition provision and tolerance, weight, labs, skin, GI status    Nutrition care plan to remain consistent with [facility].     RD remains available upon request and will follow up per protocol  Concepcion Llamas MS RD CDN Pager #[phone] Nutrition Follow Up Note  Patient seen for: malnutrition follow-up, inadequate diet order x 4 days    Hospital course as per chart: 60y Male with PMH of DM who presented on  with acute respiratory failure with hypoxia secondary to COVID-19 associated PNA. Hospital course complicated by "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" Pt with dysphagia in setting of AMS, NG tube placed for tube feeds. Pt self-removed on , multiple attempts to replace unsuccessful. Did not tolerate FEES; cineesophagogram demonstrating risk of aspiration, NPO recommended. PEG tube placed yesterday (), plan to start enteral feeds via PEG today per team. Chart reviewed, events noted.     Source: [] Patient       [x] EMR        [] RN        [] Family at bedside       [x] team 3    -If unable to interview patient: [] Trach/Vent/BiPAP  [x] Disoriented/confused/inappropriate to interview - pt lethargic, no family at bedside    Diet, NPO (21 @ 18:50) [Active]  - Is current order appropriate/adequate? [] Yes  [x]  No:     GI:  Last documented BM .   Bowel Regimen? [] Yes   [x] No    Weights: Daily Weight in k.1 (-) - ~wt stability noted. Will continue to monitor and trend weights.    MEDICATIONS  (STANDING):  albuterol/ipratropium for Nebulization 3 milliLiter(s) Nebulizer every 8 hours  cholecalciferol 400 Unit(s) Oral daily  dextrose 40% Gel 15 Gram(s) Oral once  dextrose 5% + lactated ringers. 1000 milliLiter(s) (75 mL/Hr) IV Continuous <Continuous>  dextrose 5%. 1000 milliLiter(s) (50 mL/Hr) IV Continuous <Continuous>  dextrose 5%. 1000 milliLiter(s) (100 mL/Hr) IV Continuous <Continuous>  dextrose 50% Injectable 25 Gram(s) IV Push once  dextrose 50% Injectable 12.5 Gram(s) IV Push once  dextrose 50% Injectable 25 Gram(s) IV Push once  glucagon  Injectable 1 milliGRAM(s) IntraMuscular once  insulin lispro (ADMELOG) corrective regimen sliding scale   SubCutaneous every 6 hours  levETIRAcetam  IVPB 500 milliGRAM(s) IV Intermittent every 12 hours  multivitamin/minerals Oral Solution (WELLESSE) 30 milliLiter(s) Oral daily  thiamine Injectable 100 milliGRAM(s) IV Push daily    A1C with Estimated Average Glucose Result: 10.3 % (21 @ 14:28)    CAPILLARY BLOOD GLUCOSE  POCT Blood Glucose.: 106 mg/dL (2021 06:14)  POCT Blood Glucose.: 119 mg/dL (2021 23:45)  POCT Blood Glucose.: 114 mg/dL (2021 17:46)  POCT Blood Glucose.: 171 mg/dL (2021 13:33)    Skin per nursing documentation: +wound, no pressure injuries per flowsheets   Edema per flowsheets: no edema per flowsheets     Estimated Needs:   [x] no change since previous assessment  Based on Dosing wt 145.9 lb/66.1 kg  Energy (28-32 kcals/kg): 7799-3561  Protein (1.2-1.6 g pro/kg): 79..76    Previous Nutrition Diagnosis: Severe Malnutrition   Nutrition Diagnosis is: [x] ongoing  [] resolved [] not applicable     New Nutrition Diagnosis: not applicable    Nutrition Care Plan:  [x] In Progress - pt NPO, to be addressed with nutrition provision as per team  [] Achieved  [] Not applicable    Recommendations:   1) As feasible, recommend starting tube feeds of Glucerna 1.2 at 10 ml/hr.   - Patient at HIGH RISK FOR REFEEDING SYNDROME. Recommend monitoring electrolytes, with repletion PRN before increasing rate.   - Pending tolerance and electrolytes WNL, recommend increasing Glucerna 1.2 formula by 10 ml/hr q24 hours to goal rate 70 ml/hr x 24 hours (at goal, regimen provides 1680 ml total volume, 2016 kcal, 100.8 g protein, 1352 ml water; provides 30 kcal/kg, 1.5 g/kg protein based on dosing wt 66.2 kg)  - Defer additional free water flushes to team  2) Continue multivitamin and thiamine in setting of refeeding risk, if no medical contraindications    Monitoring and Evaluation: Monitor enteral nutrition provision and tolerance, weight, labs, skin, GI status    Nutrition care plan to remain consistent with [facility].     Discussed with Team 3  RD remains available upon request and will follow up per protocol  Concepcion Llamas MS RD CDN Pager #[phone]

## 2021-04-30 NOTE — PROGRESS NOTE ADULT - PROBLEM SELECTOR PLAN 9
- Heparin to be held at 2 AM on 4/29/21 until 48 hours after procedure, has SCDs  - Full code - On lovenox  - Full code

## 2021-04-30 NOTE — PROGRESS NOTE ADULT - PROBLEM SELECTOR PLAN 8
-Initiated goals of care discussion with patient's brother-in-law (listed in chart as primary point of contact); patient is full code at this time; goal is as much recovery as possible  -The patient's wife is in [place], but the patient's brother-in-law has been visiting and placing the patient's wife on facetime; she is aware of all that is happening and has happened   -Discussed further today the possibility of placing a peg tube; the patient's brother-in-law states that the patient's preference would be to receive a peg tube   - Depending on fiberoptic endoscopic evaluation swallowing study, may require peg tube -Initiated goals of care discussion with patient's brother-in-law (listed in chart as primary point of contact); patient is full code at this time; goal is as much recovery as possible  -The patient's wife is in [place], but the patient's brother-in-law has been visiting and placing the patient's wife on facetime; she is aware of all that is happening and has happened   -Discussed further today the possibility of placing a peg tube; the patient's brother-in-law states that the patient's preference would be to receive a peg tube   - PEG tube on 4/30

## 2021-04-30 NOTE — PROGRESS NOTE ADULT - PROBLEM SELECTOR PLAN 3
-Pulmonary embolus noted on ct scan performed at time of admission  -Etiology of venous thromboembolism uncertain at this time; no known history of thrombophilia; provoked in setting of acute covid illness   -Managing with heparin for full-dose anticoagulation   -Will transition therapy to lovenox once enteral tube access regained, then on discharge will transition to Eliquis

## 2021-04-30 NOTE — PROGRESS NOTE ADULT - ASSESSMENT
60-year-old man managed previously only for diabetes mellitus who presented on 4/1 with acute respiratory failure with hypoxia secondary to covid-19-associated pneumonia and whose course has been complicated by 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 S/p failed s/s eval with MBS and FEES.       Impression:  #Dysphagia - in setting of multiple CVAs and intubation 2/2 covid  #COVID-19 resulting in hypoxic respiratory failure s/p intubation, now on RA  #CVA - on hep gtt      Recommendation:  - may start enteral feeds via PEG today   - Nutrition consult for recommendations on enteral feeds  - avoid gauze between the PEG bumper and skin  - maintain aspiration precautions, elevate HOB during feeds  - keep on abdominal binder to prevent PEG dislodgement given previously pulled out NGT  - may restart heparin today  - rest of care per primary team    GI to sign off. Please reconsult as needed.     Arlene Grier PGY-4  Gastroenterology Fellow  Pager #[phone] ([facility]) or [phone] ([facility])  Available on [employer] Teams.  Please contact on-call GI fellow via answering service ([phone]) after 5pm and before 8am, and on weekends.

## 2021-04-30 NOTE — PROGRESS NOTE ADULT - SUBJECTIVE AND OBJECTIVE BOX
Podiatry pager #: [phone]    Patient is a 60y old  Male who presents with a chief complaint of COVID19 w/ severe metabolic acidosis s/p intubation (30 Apr 2021 06:58) Podiatry seen today for evaluation of gangrenous changes to the foot       INTERVAL HPI/OVERNIGHT EVENTS:  Patient seen and evaluated at bedside.  Pt is resting comfortable in NAD. Denies N/V/F/C.  Pain rated at X/10    Allergies    No Known Allergies    Intolerances        Vital Signs Last 24 Hrs  T(C): 36.9 (30 Apr 2021 04:46), Max: 37.1 (29 Apr 2021 17:22)  T(F): 98.4 (30 Apr 2021 04:46), Max: 98.8 (29 Apr 2021 17:22)  HR: 108 (30 Apr 2021 04:46) (96 - 112)  BP: 125/74 (30 Apr 2021 04:46) (106/81 - 126/81)  BP(mean): --  RR: 18 (30 Apr 2021 04:46) (18 - 27)  SpO2: 100% (30 Apr 2021 04:46) (96% - 100%)    albuterol/ipratropium for Nebulization 3 milliLiter(s) Nebulizer every 8 hours  cholecalciferol 400 Unit(s) Oral daily  dextrose 40% Gel 15 Gram(s) Oral once  dextrose 5% + lactated ringers. 1000 milliLiter(s) IV Continuous <Continuous>  dextrose 5%. 1000 milliLiter(s) IV Continuous <Continuous>  dextrose 5%. 1000 milliLiter(s) IV Continuous <Continuous>  dextrose 50% Injectable 25 Gram(s) IV Push once  dextrose 50% Injectable 12.5 Gram(s) IV Push once  dextrose 50% Injectable 25 Gram(s) IV Push once  glucagon  Injectable 1 milliGRAM(s) IntraMuscular once  insulin lispro (ADMELOG) corrective regimen sliding scale   SubCutaneous every 6 hours  levETIRAcetam  IVPB 500 milliGRAM(s) IV Intermittent every 12 hours  multivitamin/minerals Oral Solution (WELLESSE) 30 milliLiter(s) Oral daily  thiamine Injectable 100 milliGRAM(s) IV Push daily      LABS:                        9.5    8.52  )-----------( 348      ( 30 Apr 2021 07:13 )             31.0     04-30    137  |  101  |  14  ----------------------------<  99  4.0   |  23  |  0.83    Ca    8.7      30 Apr 2021 07:13  Phos  4.3     04-30  Mg     2.2     04-30    TPro  7.0  /  Alb  2.4<L>  /  TBili  0.8  /  DBili  x   /  AST  17  /  ALT  19  /  AlkPhos  78  04-30    PT/INR - ( 29 Apr 2021 07:07 )   PT: 14.3 sec;   INR: 1.20 ratio         PTT - ( 29 Apr 2021 07:07 )  PTT:30.2 sec    CAPILLARY BLOOD GLUCOSE      POCT Blood Glucose.: 106 mg/dL (30 Apr 2021 06:14)  POCT Blood Glucose.: 119 mg/dL (29 Apr 2021 23:45)  POCT Blood Glucose.: 114 mg/dL (29 Apr 2021 17:46)  POCT Blood Glucose.: 171 mg/dL (29 Apr 2021 13:33)  POCT Blood Glucose.: 156 mg/dL (29 Apr 2021 08:33)      Lower Extremity Physical Exam:  Vascular: DP/PT 2/4, B/L, CFT <3 seconds B/L, Temperature gradient warm to cool, B/L however diminished warmth noted on the R foot past TMT  Neuro: Epicritic sensation intact to the level of digits, B/L.  Skin: bilateral digital gangrenous changes noted to distal tips of digits, notably on L foot 2nd digit full thickness, well adhered and warmth noted; R foot 2nd digit sloughed skin with no probe to bone nor signs of infection

## 2021-04-30 NOTE — PROGRESS NOTE ADULT - PROBLEM SELECTOR PLAN 2
-Acute ischemic strokes, with concern for hemorrhagic conversion, noted on CT scan on 4/8  -Etiology of acute ischemic stroke uncertain at this time; TTE with bubble performed at bedside in the [facility] without any evidence of right-to-left shunt; no arrhythmia noted on telemetry throughout stay in the [facility]   -CTA head and neck without any evidence of carotid artery stenosis or dissection   -Full-dose anticoagulation for pulmonary embolus held briefly out of concern for hemorrhagic conversion of acute strokes but since restarted; status post management with hypertonic saline; minimal concern at this time for elevated intracranial pressure   - Currently receiving levetiracetam for seizure prophylaxis although no documented seizures or seizure-like activity   - As per neuro, EEG not suggestive of seizure-like activity, pt is on keppra as per previous neuro recs  - Will administer aspirin when enteral tube access is obtained

## 2021-04-30 NOTE — PROGRESS NOTE ADULT - ATTENDING COMMENTS
s/p PEG yesterday  Looks healthy and in place  Okay to start feeds  Okay to start heparin drip today

## 2021-04-30 NOTE — PROGRESS NOTE ADULT - PROBLEM SELECTOR PLAN 1
- Etiology likely multi-factorial in setting of acute ischemic infarctions of the brain in conjunction vs acute kidney injury with uremia (now resolving) vs resolving hypoxic respiratory failure, covid-19 infection, superimposed bacterial pneumonia, urinary retention, concern for nutritional deficiencies  - Managing acute pneumonia with antibiotics; managing pulmonary embolus with full-dose anticoagulation; limiting sedative medication administration   - At this time, will favor frequent reorientation; will re-evaluate swallowing function as possible  - Some improvement in mental status on 4/21  - MRI Brain, and MRA Head demonstrated multiple evolving subacute infarcts with associated hemorrhages, likely expected evolution of ischemia  - Neuro recs appreciated  - As per neuro, EEG not suggestive of seizure-like activity

## 2021-04-30 NOTE — PROGRESS NOTE ADULT - SUBJECTIVE AND OBJECTIVE BOX
Chief Complaint:  Patient is a 60y old  Male who presents with a chief complaint of COVID19 w/ severe metabolic acidosis s/p intubation (30 Apr 2021 08:28)      Interval Events: no acute events overnight  - PEG tube placed yesterday - checked this AM. No blood noted on gauze under bumper. Bumper slightly loosened to rest  on abdominal wall - bumper at 4cm.      Hospital Medications:  albuterol/ipratropium for Nebulization 3 milliLiter(s) Nebulizer every 8 hours  cholecalciferol 400 Unit(s) Oral daily  dextrose 40% Gel 15 Gram(s) Oral once  dextrose 5% + lactated ringers. 1000 milliLiter(s) IV Continuous <Continuous>  dextrose 5%. 1000 milliLiter(s) IV Continuous <Continuous>  dextrose 5%. 1000 milliLiter(s) IV Continuous <Continuous>  dextrose 50% Injectable 25 Gram(s) IV Push once  dextrose 50% Injectable 12.5 Gram(s) IV Push once  dextrose 50% Injectable 25 Gram(s) IV Push once  glucagon  Injectable 1 milliGRAM(s) IntraMuscular once  insulin lispro (ADMELOG) corrective regimen sliding scale   SubCutaneous every 6 hours  levETIRAcetam  IVPB 500 milliGRAM(s) IV Intermittent every 12 hours  multivitamin/minerals Oral Solution (WELLESSE) 30 milliLiter(s) Oral daily  thiamine Injectable 100 milliGRAM(s) IV Push daily      PMHX/PSHX:  No pertinent past medical history    No significant past surgical history            ROS: unable to obtain      PHYSICAL EXAM:   GENERAL:  chronically ill appearing, no distress  HEENT:  NC/AT,  conjunctivae clear, sclera anicteric  CHEST:  Full & symmetric excursion, no increased effort w/ respirations  HEART:  Regular rhythm & rate  ABDOMEN:  Soft, non-tender, non-distended, PEG in place, no bleeding, easily rotates  EXTREMITIES:  no LE  edema  SKIN:  No rash/erythema/ecchymoses/petechiae/wounds/jaundice  NEURO:  lethargic this AM      Vital Signs:  Vital Signs Last 24 Hrs  T(C): 36.9 (30 Apr 2021 04:46), Max: 37.1 (29 Apr 2021 17:22)  T(F): 98.4 (30 Apr 2021 04:46), Max: 98.8 (29 Apr 2021 17:22)  HR: 108 (30 Apr 2021 04:46) (96 - 112)  BP: 125/74 (30 Apr 2021 04:46) (106/81 - 125/74)  BP(mean): --  RR: 18 (30 Apr 2021 04:46) (18 - 27)  SpO2: 100% (30 Apr 2021 04:46) (96% - 100%)  Daily     Daily     LABS:                        9.5    8.52  )-----------( 348      ( 30 Apr 2021 07:13 )             31.0     04-30    137  |  101  |  14  ----------------------------<  99  4.0   |  23  |  0.83    Ca    8.7      30 Apr 2021 07:13  Phos  4.3     04-30  Mg     2.2     04-30    TPro  7.0  /  Alb  2.4<L>  /  TBili  0.8  /  DBili  x   /  AST  17  /  ALT  19  /  AlkPhos  78  04-30    LIVER FUNCTIONS - ( 30 Apr 2021 07:13 )  Alb: 2.4 g/dL / Pro: 7.0 g/dL / ALK PHOS: 78 U/L / ALT: 19 U/L / AST: 17 U/L / GGT: x           PT/INR - ( 29 Apr 2021 07:07 )   PT: 14.3 sec;   INR: 1.20 ratio         PTT - ( 29 Apr 2021 07:07 )  PTT:30.2 sec        Imaging:

## 2021-04-30 NOTE — PROGRESS NOTE ADULT - ATTENDING COMMENTS
60 yr male with PMH of DM who was initially admitted to [facility] ICU for hypoxic respiratory failure 2/2 COVID c/b PE with R heart strain, cardiogenic and septic shock, ischemic and hemorrhagic CVA and ESBL klebsiella ventilator associate PNA, transferred to [facility] for neurosurgery eval and further management.   No acute events. S/p successful PEG placement yesterday by GI team. Site intact with abdominal binder to prevent dislodgement given pt's hx of pulling out NGT.  -Nutrition consult. GI cleared pt to start feeds  -Monitor for refeeding syndrome - trend BMP, electrolytes BID  -resume heparin gtt for PE  Dispo: when tube feeds at goal, to acute rehab. Likely next week.

## 2021-04-30 NOTE — PROGRESS NOTE ADULT - PROBLEM SELECTOR PLAN 6
-Dysphagia noted in setting of patient's acute alteration in mental status  -Nasogastric tube placed for administration of tube feeds; but patient self removed 4/17; multiple attempts to replace unsuccessful   -Patient evaluated by speech and swallow team, and npo status recommended  -Will maintain npo status with tube feeds at this time once tube feeds replaced (unsuccessful x3 today); in setting of acute cerebral infarcts, it is possible that patient's neurologic function and swallowing mechanism improve  Pt tolerated small feedings with speech & swallow and family at bedside  - FEES study- pt not able to cooperate with it  - Cineesophagogram- demonstrating risk of aspiration, NPO recommended  - PEG tube for 4/29, NPO, and hold heparin at 2 AM on 4/29/21  - Monitor for refeeding syndrome, after 24 hours when tube feedings are started -Dysphagia noted in setting of patient's acute alteration in mental status  -Nasogastric tube placed for administration of tube feeds; but patient self removed 4/17; multiple attempts to replace unsuccessful   -Patient evaluated by speech and swallow team, and npo status recommended  -Will maintain npo status with tube feeds at this time once tube feeds replaced (unsuccessful x3 today); in setting of acute cerebral infarcts, it is possible that patient's neurologic function and swallowing mechanism improve  Pt tolerated small feedings with speech & swallow and family at bedside  - FEES study- pt not able to cooperate with it  - Cineesophagogram- demonstrating risk of aspiration  - PEG tube placed on 4/30, tube feedings started  - Monitor for refeeding syndrome with BMP BID

## 2021-04-30 NOTE — PROGRESS NOTE ADULT - PROBLEM SELECTOR PLAN 4
-History of type II diabetes mellitus noted; a1c noted to be greater than 10   - On insulin sliding scale  - If sugars are elevated, will consider NPH while on tube feeds  - Monitor fingersticks

## 2021-05-01 LAB
ALBUMIN SERPL ELPH-MCNC: 2.7 G/DL — LOW (ref 3.3–5)
ALP SERPL-CCNC: 79 U/L — SIGNIFICANT CHANGE UP (ref 40–120)
ALT FLD-CCNC: 15 U/L — SIGNIFICANT CHANGE UP (ref 10–45)
ANION GAP SERPL CALC-SCNC: 16 MMOL/L — SIGNIFICANT CHANGE UP (ref 5–17)
ANION GAP SERPL CALC-SCNC: 18 MMOL/L — HIGH (ref 5–17)
AST SERPL-CCNC: 16 U/L — SIGNIFICANT CHANGE UP (ref 10–40)
BILIRUB SERPL-MCNC: 0.8 MG/DL — SIGNIFICANT CHANGE UP (ref 0.2–1.2)
BUN SERPL-MCNC: 19 MG/DL — SIGNIFICANT CHANGE UP (ref 7–23)
BUN SERPL-MCNC: 20 MG/DL — SIGNIFICANT CHANGE UP (ref 7–23)
CALCIUM SERPL-MCNC: 9.1 MG/DL — SIGNIFICANT CHANGE UP (ref 8.4–10.5)
CALCIUM SERPL-MCNC: 9.6 MG/DL — SIGNIFICANT CHANGE UP (ref 8.4–10.5)
CHLORIDE SERPL-SCNC: 100 MMOL/L — SIGNIFICANT CHANGE UP (ref 96–108)
CHLORIDE SERPL-SCNC: 99 MMOL/L — SIGNIFICANT CHANGE UP (ref 96–108)
CO2 SERPL-SCNC: 21 MMOL/L — LOW (ref 22–31)
CO2 SERPL-SCNC: 24 MMOL/L — SIGNIFICANT CHANGE UP (ref 22–31)
CREAT SERPL-MCNC: 0.77 MG/DL — SIGNIFICANT CHANGE UP (ref 0.5–1.3)
CREAT SERPL-MCNC: 0.82 MG/DL — SIGNIFICANT CHANGE UP (ref 0.5–1.3)
GLUCOSE BLDC GLUCOMTR-MCNC: 140 MG/DL — HIGH (ref 70–99)
GLUCOSE BLDC GLUCOMTR-MCNC: 143 MG/DL — HIGH (ref 70–99)
GLUCOSE BLDC GLUCOMTR-MCNC: 152 MG/DL — HIGH (ref 70–99)
GLUCOSE BLDC GLUCOMTR-MCNC: 180 MG/DL — HIGH (ref 70–99)
GLUCOSE BLDC GLUCOMTR-MCNC: 185 MG/DL — HIGH (ref 70–99)
GLUCOSE SERPL-MCNC: 170 MG/DL — HIGH (ref 70–99)
GLUCOSE SERPL-MCNC: 181 MG/DL — HIGH (ref 70–99)
HCT VFR BLD CALC: 31.7 % — LOW (ref 39–50)
HGB BLD-MCNC: 9.8 G/DL — LOW (ref 13–17)
MAGNESIUM SERPL-MCNC: 1.8 MG/DL — SIGNIFICANT CHANGE UP (ref 1.6–2.6)
MAGNESIUM SERPL-MCNC: 1.9 MG/DL — SIGNIFICANT CHANGE UP (ref 1.6–2.6)
MCHC RBC-ENTMCNC: 26.5 PG — LOW (ref 27–34)
MCHC RBC-ENTMCNC: 30.9 GM/DL — LOW (ref 32–36)
MCV RBC AUTO: 85.7 FL — SIGNIFICANT CHANGE UP (ref 80–100)
NRBC # BLD: 0 /100 WBCS — SIGNIFICANT CHANGE UP (ref 0–0)
PHOSPHATE SERPL-MCNC: 3.3 MG/DL — SIGNIFICANT CHANGE UP (ref 2.5–4.5)
PHOSPHATE SERPL-MCNC: 3.4 MG/DL — SIGNIFICANT CHANGE UP (ref 2.5–4.5)
PLATELET # BLD AUTO: 343 K/UL — SIGNIFICANT CHANGE UP (ref 150–400)
POTASSIUM SERPL-MCNC: 4.2 MMOL/L — SIGNIFICANT CHANGE UP (ref 3.5–5.3)
POTASSIUM SERPL-MCNC: 4.4 MMOL/L — SIGNIFICANT CHANGE UP (ref 3.5–5.3)
POTASSIUM SERPL-SCNC: 4.2 MMOL/L — SIGNIFICANT CHANGE UP (ref 3.5–5.3)
POTASSIUM SERPL-SCNC: 4.4 MMOL/L — SIGNIFICANT CHANGE UP (ref 3.5–5.3)
PROT SERPL-MCNC: 7.7 G/DL — SIGNIFICANT CHANGE UP (ref 6–8.3)
RBC # BLD: 3.7 M/UL — LOW (ref 4.2–5.8)
RBC # FLD: 16.9 % — HIGH (ref 10.3–14.5)
SODIUM SERPL-SCNC: 138 MMOL/L — SIGNIFICANT CHANGE UP (ref 135–145)
SODIUM SERPL-SCNC: 140 MMOL/L — SIGNIFICANT CHANGE UP (ref 135–145)
WBC # BLD: 9.87 K/UL — SIGNIFICANT CHANGE UP (ref 3.8–10.5)
WBC # FLD AUTO: 9.87 K/UL — SIGNIFICANT CHANGE UP (ref 3.8–10.5)

## 2021-05-01 PROCEDURE — 99233 SBSQ HOSP IP/OBS HIGH 50: CPT | Mod: GC

## 2021-05-01 RX ORDER — LEVETIRACETAM 250 MG/1
500 TABLET, FILM COATED ORAL
Refills: 0 | Status: DISCONTINUED | OUTPATIENT
Start: 2021-05-01 | End: 2021-05-12

## 2021-05-01 RX ORDER — LEVETIRACETAM 250 MG/1
500 TABLET, FILM COATED ORAL
Refills: 0 | Status: DISCONTINUED | OUTPATIENT
Start: 2021-05-01 | End: 2021-05-01

## 2021-05-01 RX ADMIN — LEVETIRACETAM 500 MILLIGRAM(S): 250 TABLET, FILM COATED ORAL at 05:02

## 2021-05-01 RX ADMIN — Medication 1 TABLET(S): at 13:35

## 2021-05-01 RX ADMIN — Medication 2: at 06:39

## 2021-05-01 RX ADMIN — Medication 2: at 13:36

## 2021-05-01 RX ADMIN — ENOXAPARIN SODIUM 70 MILLIGRAM(S): 100 INJECTION SUBCUTANEOUS at 18:51

## 2021-05-01 RX ADMIN — Medication 3 MILLILITER(S): at 06:14

## 2021-05-01 RX ADMIN — ENOXAPARIN SODIUM 70 MILLIGRAM(S): 100 INJECTION SUBCUTANEOUS at 05:03

## 2021-05-01 RX ADMIN — TAMSULOSIN HYDROCHLORIDE 0.4 MILLIGRAM(S): 0.4 CAPSULE ORAL at 13:36

## 2021-05-01 RX ADMIN — Medication 100 MILLIGRAM(S): at 13:35

## 2021-05-01 RX ADMIN — Medication 30 MILLILITER(S): at 13:36

## 2021-05-01 RX ADMIN — LEVETIRACETAM 500 MILLIGRAM(S): 250 TABLET, FILM COATED ORAL at 18:50

## 2021-05-01 RX ADMIN — Medication 400 UNIT(S): at 13:35

## 2021-05-01 NOTE — PROGRESS NOTE ADULT - PROBLEM SELECTOR PLAN 3
-Pulmonary embolus noted on ct scan performed at time of admission  -Etiology of venous thromboembolism uncertain at this time; no known history of thrombophilia; provoked in setting of acute covid illness   -Managing with heparin for full-dose anticoagulation   -Will transition therapy to lovenox once enteral tube access regained, then on discharge will transition to Eliquis -Pulmonary embolus noted on ct scan performed at time of admission  -Etiology of venous thromboembolism uncertain at this time; no known history of thrombophilia; provoked in setting of acute covid illness   - Transitioned from heparin to lovenox, will discharge on Eliquis

## 2021-05-01 NOTE — PROGRESS NOTE ADULT - NUTRITIONAL ASSESSMENT
This patient has been assessed with a concern for Malnutrition and has been determined to have a diagnosis/diagnoses of Severe protein-calorie malnutrition.    This patient is being managed with:   Diet NPO with Tube Feed-  Tube Feeding Modality: Gastrostomy  Glucerna 1.2 Kaden (GLUCERNARTH)  Total Volume for 24 Hours (mL): 1680  Continuous  Starting Tube Feed Rate {mL per Hour}: 10  Increase Tube Feed Rate by (mL): 10     Every 24 hours  Until Goal Tube Feed Rate (mL per Hour): 70  Tube Feed Duration (in Hours): 24  Tube Feed Start Time: 14:25  Entered: Apr 30 2021  2:46PM

## 2021-05-01 NOTE — CHART NOTE - NSCHARTNOTEFT_GEN_A_CORE
Nutrition Follow Up Note  Patient seen for: malnutrition follow-up, refeeding risk    59 yo male with PMH of DM who initially presented with acute respiratory failure with hypoxia secondary to COVID-19 associated PNA. Hospital course complicated by "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" Pt with dysphagia in setting of AMS, NG tube placed for tube feeds. Pt self-removed on , multiple attempts to replace unsuccessful. Did not tolerate FEES; cineesophagogram demonstrating risk of aspiration, NPO recommended. PEG tube placed on  with subsequent initiation of enteral feeding. Pt at risk for refeeding syndrome given course as described above. Chart reviewed, events noted.   Chart reviewed, events noted.    Source: [] Patient       [x] EMR        [x] PCA, MD        [] Family at bedside       [] Other:    -If unable to interview patient: [] Trach/Vent/BiPAP  [x] Disoriented/confused/inappropriate to interview- lethargic, no family at bedside  Spoke with PCA, pt appears to be tolerating EN thus far. Pt seen at bedside with Glucerna 1.2 infusing @ 10cc/hr. RD spoke with MD regarding EN regimen. EN initiated @ ~1500 on  therefore plan to increase to rate of 20cc/hr @ 1500 today. MD agreeable to plan; will continue to increase by 10cc/hr q24hrs. Will continue to check electrolytes (focusing on K+, Mg+, Phos) q12hrs & replete prn. Thus far electrolytes WDL with no need to replete. Will continue to monitor/trend    Diet Order:   Diet, NPO with Tube Feed:   Tube Feeding Modality: Gastrostomy  Glucerna 1.2 Kaden (GLUCERNAR)  Total Volume for 24 Hours (mL): 1680  Continuous  Starting Tube Feed Rate {mL per Hour}: 10  Increase Tube Feed Rate by (mL): 10     Every 24 hours  Until Goal Tube Feed Rate (mL per Hour): 70  Tube Feed Duration (in Hours): 24  Tube Feed Start Time: 14:25 (21)    EN currently provides: 240ml fluid, 288 kaden/d (4cal/kg), 14gm prot/d (0.2gm prot/kg)  Goal to provide: 1680ml fluid, 2016 kaden/d (30cal/kg), 101gm prot/d (1.5gm prot/kg)    - Is current order appropriate/adequate? [x] Yes  []  No: slow advancement to goal rate to promote tolerance & minimize possible side effects due to potential for refeeding syndrome    GI:  Last BM .   Bowel Regimen? [] Yes   [x] No    Daily Weight in k.1 (-) - weight stable    Nutritionally Pertinent MEDICATIONS  (STANDING):  cholecalciferol  dextrose 40% Gel  dextrose 5%.  dextrose 5%.  dextrose 50% Injectable  dextrose 50% Injectable  dextrose 50% Injectable  glucagon  Injectable  insulin lispro (ADMELOG) corrective regimen sliding scale  multivitamin  multivitamin/minerals Oral Solution (WELLESSE)  tamsulosin  thiamine    Pertinent Labs:  @ 07:38: Na 138, BUN 20, Cr 0.82, <H>, K+ 4.2, Phos 3.3, Mg 1.9, Alk Phos 79, ALT/SGPT 15, AST/SGOT 16, HbA1c --   @ 17:40: Na 139, BUN 19, Cr 0.82, <H>, K+ 4.4, Phos 3.4, Mg 2.0, Alk Phos 83, ALT/SGPT 19, AST/SGOT 19, HbA1c --    A1C with Estimated Average Glucose Result: 10.3 % (21 @ 14:28)    Finger Sticks:  POCT Blood Glucose.: 180 mg/dL ( @ 05:58)  POCT Blood Glucose.: 143 mg/dL ( @ 00:01)  POCT Blood Glucose.: 136 mg/dL ( @ 17:32)  POCT Blood Glucose.: 103 mg/dL ( @ 12:22)      Skin per nursing documentation: +wound, no pressure injuries per flowsheets   Edema per flowsheets: no edema per flowsheets     Estimated Needs:   [x] no change since previous assessment  Based on Dosing wt 145.9 lb/66.1 kg  Energy (28-32 kcals/kg): 2146-5692  Protein (1.2-1.6 g pro/kg): 79..76    Previous Nutrition Diagnosis: Severe Malnutrition   Nutrition Diagnosis is: [x] ongoing  [] resolved [] not applicable     New Nutrition Diagnosis: not applicable    Nutrition Care Plan:  [x] In Progress - achieving via slow advancement towards goal EN rate  [] Achieved  [] Not applicable    Recommendations:   1) Continue to advance feeds of Glucerna 1.2 by 10cc/hr q24hrs until goal rate of 70cc/hr x 24 hrs is achieved/tolerated. Goal to provide 1680cc fluid, 2016cal/d (30cal/kg), and 101gm prot/d (1.5gm prot/kg) based upon dosing wt 66.1Kg  2) Patient remains at risk for refeeding syndrome. Recommend monitoring electrolytes q12hrs (K, Mg, Phos), with repletion PRN prior to increasing EN rate.   3) Defer additional free water flushes to team  4) Continue multivitamin and thiamine in setting of refeeding risk, if no medical contraindications    Monitoring and Evaluation: Monitor enteral nutrition provision and tolerance, weight, labs, skin, GI status      RD remains available upon request and will follow up per protocol  Tavia Espinoza RD CDN Pager #[phone]

## 2021-05-01 NOTE — PROGRESS NOTE ADULT - PROBLEM SELECTOR PLAN 4
-History of type II diabetes mellitus noted; a1c noted to be greater than 10   - On insulin sliding scale  - If sugars are elevated, will consider NPH while on tube feeds  - Monitor fingersticks - Type II diabetes mellitus noted; a1c noted to be greater than 10   - On insulin sliding scale  - Monitor fingersticks while on tube feeds

## 2021-05-01 NOTE — PROGRESS NOTE ADULT - PROBLEM SELECTOR PLAN 1
- Etiology likely multi-factorial in setting of acute ischemic infarctions of the brain in conjunction vs acute kidney injury with uremia (now resolving) vs resolving hypoxic respiratory failure, covid-19 infection, superimposed bacterial pneumonia, urinary retention, concern for nutritional deficiencies  - Managing acute pneumonia with antibiotics; managing pulmonary embolus with full-dose anticoagulation; limiting sedative medication administration   - At this time, will favor frequent reorientation; will re-evaluate swallowing function as possible  - Some improvement in mental status on 4/21  - MRI Brain, and MRA Head demonstrated multiple evolving subacute infarcts with associated hemorrhages, likely expected evolution of ischemia  - Neuro recs appreciated  - As per neuro, EEG not suggestive of seizure-like activity - Etiology likely multi-factorial in setting of acute ischemic infarctions of the brain in conjunction vs acute kidney injury with uremia (now resolving) vs resolving hypoxic respiratory failure, covid-19 infection, superimposed bacterial pneumonia, urinary retention, concern for nutritional deficiencies)  - S/p management of: acute pneumonia with antibiotics; pulmonary embolus with full-dose anticoagulation  - Reorientation with family at bedside (pt speaks [place], does best with family present)  - MRI Brain, and MRA Head demonstrated multiple evolving subacute infarcts with associated hemorrhages, likely expected evolution of ischemia  - Neuro recs appreciated  - As per neuro, EEG not suggestive of seizure-like activity  - C/w Keppra 500 mg BID

## 2021-05-01 NOTE — PROGRESS NOTE ADULT - PROBLEM SELECTOR PLAN 5
-Elevated blood pressures, to 140s and 150s systolic, noted over past few days in micu subsequent to vasopressor weaning   - Was on metoprolol tartrate 25 mg twice daily since 4/16; discontinued  - BP is stable  - If is hypertensive persistently, can consider antihypertensives Elevated while in MICU  - Was on metoprolol tartrate 25 mg twice daily since 4/16; discontinued  - BP is stable  - If is hypertensive persistently, can consider antihypertensives

## 2021-05-01 NOTE — PROGRESS NOTE ADULT - ASSESSMENT
The patient is a 60-year-old man managed previously only for diabetes mellitus who presented on 4/1 with acute respiratory failure with hypoxia secondary to covid-19-associated pneumonia and whose course has been complicated by 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 Pt completed antibiotic course, however unsuccessful cineesophagogram with notable aspiration. PEG tube placement on 4/30 and tube feedings resume don 5/1. The patient is a 60-year-old man managed previously only for diabetes mellitus who presented on 4/1 with acute respiratory failure with hypoxia secondary to covid-19-associated pneumonia and whose course has been complicated by 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 Pt completed antibiotic course, however unsuccessful cineesophagogram with notable aspiration. PEG tube placement on 4/30 and tube feedings started on 5/1/21, slowly increasing by 10 ml by day, monitoring for refeeding syndrome.

## 2021-05-01 NOTE — PROGRESS NOTE ADULT - PROBLEM SELECTOR PLAN 2
-Acute ischemic strokes, with concern for hemorrhagic conversion, noted on CT scan on 4/8  -Etiology of acute ischemic stroke uncertain at this time; TTE with bubble performed at bedside in the micu without any evidence of right-to-left shunt; no arrhythmia noted on telemetry throughout stay in the micu   -CTA head and neck without any evidence of carotid artery stenosis or dissection   -Full-dose anticoagulation for pulmonary embolus held briefly out of concern for hemorrhagic conversion of acute strokes but since restarted; status post management with hypertonic saline; minimal concern at this time for elevated intracranial pressure   - Currently receiving levetiracetam for seizure prophylaxis although no documented seizures or seizure-like activity   - As per neuro, EEG not suggestive of seizure-like activity, pt is on keppra as per previous neuro recs  - Will administer aspirin when enteral tube access is obtained -Acute ischemic strokes, with concern for hemorrhagic conversion, noted on CT scan on 4/8  -Etiology of acute ischemic stroke uncertain at this time; TTE with bubble performed at bedside in the MICU without any evidence of right-to-left shunt; no arrhythmia noted on telemetry throughout stay in the MICU  - CTA head and neck without any evidence of carotid artery stenosis or dissection   - Transitioned from heparin to lovenox, will discharge on Eliquis

## 2021-05-01 NOTE — PROGRESS NOTE ADULT - PROBLEM SELECTOR PLAN 7
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   Significant lower extremity arterial disease not identified. The only abnormality on this examination is the decreased amplitude to the pulse volume recordings at the levels of the toes bilaterally.  - Podiatry recs appreciated, no acute surgical intervention at this time, can apply betadine to wound  - Wound care recs appreciated -Dry gangrene noted over distal toes on bilateral lower extremities--most concerning for microvascular disease subsequent to vasopressor administration   -Dorsalis pedis pulses intact bilaterally  - ERASMO demonstrating right ERASMO: 1.09, left ERASMO: 1.24, right TBI: 0.78, and left TBI: 0.73.   Significant lower extremity arterial disease not identified. Decreased amplitude to the pulse volume recordings at the levels of the toes bilaterally.  - Podiatry recs appreciated, no acute surgical intervention at this time, applying betadine to wound  - Wound care recs appreciated

## 2021-05-01 NOTE — PROGRESS NOTE ADULT - PROBLEM SELECTOR PLAN 8
-Initiated goals of care discussion with patient's brother-in-law (listed in chart as primary point of contact); patient is full code at this time; goal is as much recovery as possible  -The patient's wife is in [place], but the patient's brother-in-law has been visiting and placing the patient's wife on facetime; she is aware of all that is happening and has happened   -Discussed further today the possibility of placing a peg tube; the patient's brother-in-law states that the patient's preference would be to receive a peg tube   - PEG tube on 4/30 - Initiated goals of care discussion with patient's brother-in-law (listed in chart as primary point of contact); patient is full code at this time; goal is as much recovery as possible  -The patient's wife is in [place], but the patient's brother-in-law has been visiting and placing the patient's wife on facetime; she is aware of all that is happening and has happened

## 2021-05-01 NOTE — PROGRESS NOTE ADULT - SUBJECTIVE AND OBJECTIVE BOX
Irina Rodriguez MD  PGY 1 Department of Internal Medicine  Pager: [phone] [place], [phone] ANH      Patient is a 60y old  Male who presents with a chief complaint of COVID19 w/ severe metabolic acidosis s/p intubation (30 Apr 2021 10:00)      SUBJECTIVE / OVERNIGHT EVENTS: Pt seen and examined. No acute overnight events.   Denies fevers, chills, CP/palpitations, SOB/cough, abdominal pain, N/V, constipation, or diarrhea.        MEDICATIONS  (STANDING):  albuterol/ipratropium for Nebulization 3 milliLiter(s) Nebulizer every 12 hours  cholecalciferol 400 Unit(s) Oral daily  dextrose 40% Gel 15 Gram(s) Oral once  dextrose 5%. 1000 milliLiter(s) (50 mL/Hr) IV Continuous <Continuous>  dextrose 5%. 1000 milliLiter(s) (100 mL/Hr) IV Continuous <Continuous>  dextrose 50% Injectable 25 Gram(s) IV Push once  dextrose 50% Injectable 12.5 Gram(s) IV Push once  dextrose 50% Injectable 25 Gram(s) IV Push once  enoxaparin Injectable 70 milliGRAM(s) SubCutaneous two times a day  glucagon  Injectable 1 milliGRAM(s) IntraMuscular once  insulin lispro (ADMELOG) corrective regimen sliding scale   SubCutaneous every 6 hours  levETIRAcetam  Solution 500 milliGRAM(s) Oral two times a day  multivitamin 1 Tablet(s) Oral daily  multivitamin/minerals Oral Solution (WELLESSE) 30 milliLiter(s) Oral daily  tamsulosin 0.4 milliGRAM(s) Oral daily  thiamine 100 milliGRAM(s) Oral daily    MEDICATIONS  (PRN):      I&O's Summary    30 Apr 2021 07:01  -  01 May 2021 07:00  --------------------------------------------------------  IN: 210 mL / OUT: 700 mL / NET: -490 mL        Vital Signs Last 24 Hrs  T(C): 36.7 (01 May 2021 04:51), Max: 37.2 (30 Apr 2021 14:37)  T(F): 98.1 (01 May 2021 04:51), Max: 98.9 (30 Apr 2021 14:37)  HR: 112 (01 May 2021 06:20) (108 - 117)  BP: 120/76 (01 May 2021 04:51) (107/62 - 123/81)  BP(mean): --  RR: 18 (01 May 2021 04:51) (18 - 18)  SpO2: 96% (01 May 2021 06:20) (95% - 98%)    CAPILLARY BLOOD GLUCOSE      POCT Blood Glucose.: 180 mg/dL (01 May 2021 05:58)  POCT Blood Glucose.: 143 mg/dL (01 May 2021 00:01)  POCT Blood Glucose.: 136 mg/dL (30 Apr 2021 17:32)  POCT Blood Glucose.: 103 mg/dL (30 Apr 2021 12:22)      PHYSICAL EXAM:  GENERAL: NAD,   HEAD:  Atraumatic, Normocephalic  EYES: EOMI, PERRL, conjunctiva and sclera clear  NECK: No JVD  CHEST/LUNG: Clear to auscultation bilaterally; No wheeze  HEART: Regular rate and rhythm; No murmurs, rubs, or gallops  ABDOMEN: Soft, Nontender, Nondistended; Bowel sounds present  EXTREMITIES:  2+ Peripheral Pulses, No clubbing, cyanosis, or edema  PSYCH: AAOx3  NEUROLOGY: non-focal  SKIN: No rashes or lesions       LABS:                        9.5    8.52  )-----------( 348      ( 30 Apr 2021 07:13 )             31.0     Auto Eosinophil # x     / Auto Eosinophil % x     / Auto Neutrophil # x     / Auto Neutrophil % x     / BANDS % x                            9.2    6.06  )-----------( 375      ( 29 Apr 2021 07:07 )             29.8     Auto Eosinophil # x     / Auto Eosinophil % x     / Auto Neutrophil # x     / Auto Neutrophil % x     / BANDS % x        04-30    139  |  100  |  19  ----------------------------<  152<H>  4.4   |  20<L>  |  0.82  04-30    137  |  101  |  14  ----------------------------<  99  4.0   |  23  |  0.83  04-29    139  |  103  |  11  ----------------------------<  140<H>  3.7   |  25  |  0.81    Ca    9.2      30 Apr 2021 17:40  Mg     2.0     04-30  Phos  3.4     04-30  TPro  7.9  /  Alb  2.8<L>  /  TBili  1.0  /  DBili  x   /  AST  19  /  ALT  19  /  AlkPhos  83  04-30  TPro  7.0  /  Alb  2.4<L>  /  TBili  0.8  /  DBili  x   /  AST  17  /  ALT  19  /  AlkPhos  78  04-30  TPro  6.8  /  Alb  2.5<L>  /  TBili  0.8  /  DBili  x   /  AST  20  /  ALT  22  /  AlkPhos  74  04-29    PT/INR - ( 29 Apr 2021 07:07 )   PT: 14.3 sec;   INR: 1.20 ratio         PTT - ( 29 Apr 2021 07:07 )  PTT:30.2 sec           Irina Rodriguez MD  PGY 1 Department of Internal Medicine  Pager: [phone] [place], [phone] ANH      Patient is a 60y old  Male who presents with a chief complaint of COVID19 w/ severe metabolic acidosis s/p intubation (30 Apr 2021 10:00)      SUBJECTIVE / OVERNIGHT EVENTS: Pt seen and examined. No acute overnight events. This morning the pt is awake and alert, ROS is limited in setting of mental status.       MEDICATIONS  (STANDING):  albuterol/ipratropium for Nebulization 3 milliLiter(s) Nebulizer every 12 hours  cholecalciferol 400 Unit(s) Oral daily  dextrose 40% Gel 15 Gram(s) Oral once  dextrose 5%. 1000 milliLiter(s) (50 mL/Hr) IV Continuous <Continuous>  dextrose 5%. 1000 milliLiter(s) (100 mL/Hr) IV Continuous <Continuous>  dextrose 50% Injectable 25 Gram(s) IV Push once  dextrose 50% Injectable 12.5 Gram(s) IV Push once  dextrose 50% Injectable 25 Gram(s) IV Push once  enoxaparin Injectable 70 milliGRAM(s) SubCutaneous two times a day  glucagon  Injectable 1 milliGRAM(s) IntraMuscular once  insulin lispro (ADMELOG) corrective regimen sliding scale   SubCutaneous every 6 hours  levETIRAcetam  Solution 500 milliGRAM(s) Oral two times a day  multivitamin 1 Tablet(s) Oral daily  multivitamin/minerals Oral Solution (WELLESSE) 30 milliLiter(s) Oral daily  tamsulosin 0.4 milliGRAM(s) Oral daily  thiamine 100 milliGRAM(s) Oral daily    MEDICATIONS  (PRN):      I&O's Summary    30 Apr 2021 07:01  -  01 May 2021 07:00  --------------------------------------------------------  IN: 210 mL / OUT: 700 mL / NET: -490 mL        Vital Signs Last 24 Hrs  T(C): 36.7 (01 May 2021 04:51), Max: 37.2 (30 Apr 2021 14:37)  T(F): 98.1 (01 May 2021 04:51), Max: 98.9 (30 Apr 2021 14:37)  HR: 112 (01 May 2021 06:20) (108 - 117)  BP: 120/76 (01 May 2021 04:51) (107/62 - 123/81)  BP(mean): --  RR: 18 (01 May 2021 04:51) (18 - 18)  SpO2: 96% (01 May 2021 06:20) (95% - 98%)    CAPILLARY BLOOD GLUCOSE      POCT Blood Glucose.: 180 mg/dL (01 May 2021 05:58)  POCT Blood Glucose.: 143 mg/dL (01 May 2021 00:01)  POCT Blood Glucose.: 136 mg/dL (30 Apr 2021 17:32)  POCT Blood Glucose.: 103 mg/dL (30 Apr 2021 12:22)      PHYSICAL EXAM:  GENERAL: NAD, Alert and awake  HEAD:  Atraumatic, Normocephalic  EYES: EOMI, PERRL, conjunctiva and sclera clear  NECK: No JVD  CHEST/LUNG: Clear to auscultation bilaterally; No wheezes or crackles  HEART: Regular rate and rhythm; No murmurs, rubs, or gallops  ABDOMEN: Soft, Nontender, Nondistended; Bowel sounds present  EXTREMITIES:  2+ Peripheral Pulses, No clubbing, cyanosis, or edema  NEUROLOGY: non-focal  SKIN: No rashes or lesions       LABS:                        9.5    8.52  )-----------( 348      ( 30 Apr 2021 07:13 )             31.0     Auto Eosinophil # x     / Auto Eosinophil % x     / Auto Neutrophil # x     / Auto Neutrophil % x     / BANDS % x                            9.2    6.06  )-----------( 375      ( 29 Apr 2021 07:07 )             29.8     Auto Eosinophil # x     / Auto Eosinophil % x     / Auto Neutrophil # x     / Auto Neutrophil % x     / BANDS % x        04-30    139  |  100  |  19  ----------------------------<  152<H>  4.4   |  20<L>  |  0.82  04-30    137  |  101  |  14  ----------------------------<  99  4.0   |  23  |  0.83  04-29    139  |  103  |  11  ----------------------------<  140<H>  3.7   |  25  |  0.81    Ca    9.2      30 Apr 2021 17:40  Mg     2.0     04-30  Phos  3.4     04-30  TPro  7.9  /  Alb  2.8<L>  /  TBili  1.0  /  DBili  x   /  AST  19  /  ALT  19  /  AlkPhos  83  04-30  TPro  7.0  /  Alb  2.4<L>  /  TBili  0.8  /  DBili  x   /  AST  17  /  ALT  19  /  AlkPhos  78  04-30  TPro  6.8  /  Alb  2.5<L>  /  TBili  0.8  /  DBili  x   /  AST  20  /  ALT  22  /  AlkPhos  74  04-29    PT/INR - ( 29 Apr 2021 07:07 )   PT: 14.3 sec;   INR: 1.20 ratio         PTT - ( 29 Apr 2021 07:07 )  PTT:30.2 sec

## 2021-05-01 NOTE — PROGRESS NOTE ADULT - PROBLEM SELECTOR PLAN 6
-Dysphagia noted in setting of patient's acute alteration in mental status  -Nasogastric tube placed for administration of tube feeds; but patient self removed 4/17; multiple attempts to replace unsuccessful   -Patient evaluated by speech and swallow team, and npo status recommended  -Will maintain npo status with tube feeds at this time once tube feeds replaced (unsuccessful x3 today); in setting of acute cerebral infarcts, it is possible that patient's neurologic function and swallowing mechanism improve  Pt tolerated small feedings with speech & swallow and family at bedside  - FEES study- pt not able to cooperate with it  - Cineesophagogram- demonstrating risk of aspiration  - PEG tube placed on 4/30, tube feedings started  - Monitor for refeeding syndrome with BMP BID Dysphagia noted in setting of patient's acute alteration in mental status and hx of intubation  - Nasogastric tube placed for administration of tube feeds originally, but patient self removed 4/17; multiple attempts to replace unsuccessful   Evaluated by speech & swallow: unable to cooperate with FEES study, cineesophagram performed demonstrating aspiration  - PEG tube placed on 4/30, tube feedings started on 4/30, will slowly increase rate by 10 ml q24hrs  - Monitor for refeeding syndrome with BMP BID

## 2021-05-01 NOTE — PROGRESS NOTE ADULT - ATTENDING COMMENTS
60 yr male with PMH of DM who was initially admitted to [facility] ICU for hypoxic respiratory failure 2/2 COVID c/b PE with R heart strain, cardiogenic and septic shock, ischemic and hemorrhagic CVA and ESBL klebsiella ventilator associate PNA, transferred to [facility] for neurosurgery eval and further management.   No acute events. S/p successful PEG placement by GI team. Site intact with abdominal binder to prevent dislodgement given pt's hx of pulling out NGT.  -Nutrition consult. GI cleared pt to start feeds  -Monitor for refeeding syndrome - trend BMP, electrolytes BID  -resume heparin gtt for PE  Dispo: when tube feeds at goal, to acute rehab. Likely next week.

## 2021-05-02 LAB
ANION GAP SERPL CALC-SCNC: 15 MMOL/L — SIGNIFICANT CHANGE UP (ref 5–17)
BUN SERPL-MCNC: 21 MG/DL — SIGNIFICANT CHANGE UP (ref 7–23)
CALCIUM SERPL-MCNC: 9.4 MG/DL — SIGNIFICANT CHANGE UP (ref 8.4–10.5)
CHLORIDE SERPL-SCNC: 101 MMOL/L — SIGNIFICANT CHANGE UP (ref 96–108)
CO2 SERPL-SCNC: 25 MMOL/L — SIGNIFICANT CHANGE UP (ref 22–31)
CREAT SERPL-MCNC: 0.74 MG/DL — SIGNIFICANT CHANGE UP (ref 0.5–1.3)
GLUCOSE BLDC GLUCOMTR-MCNC: 184 MG/DL — HIGH (ref 70–99)
GLUCOSE BLDC GLUCOMTR-MCNC: 202 MG/DL — HIGH (ref 70–99)
GLUCOSE BLDC GLUCOMTR-MCNC: 221 MG/DL — HIGH (ref 70–99)
GLUCOSE BLDC GLUCOMTR-MCNC: 224 MG/DL — HIGH (ref 70–99)
GLUCOSE SERPL-MCNC: 211 MG/DL — HIGH (ref 70–99)
HCT VFR BLD CALC: 34.6 % — LOW (ref 39–50)
HGB BLD-MCNC: 10.5 G/DL — LOW (ref 13–17)
MAGNESIUM SERPL-MCNC: 1.8 MG/DL — SIGNIFICANT CHANGE UP (ref 1.6–2.6)
MCHC RBC-ENTMCNC: 26.1 PG — LOW (ref 27–34)
MCHC RBC-ENTMCNC: 30.3 GM/DL — LOW (ref 32–36)
MCV RBC AUTO: 86.1 FL — SIGNIFICANT CHANGE UP (ref 80–100)
NRBC # BLD: 0 /100 WBCS — SIGNIFICANT CHANGE UP (ref 0–0)
PHOSPHATE SERPL-MCNC: 3.7 MG/DL — SIGNIFICANT CHANGE UP (ref 2.5–4.5)
PLATELET # BLD AUTO: 368 K/UL — SIGNIFICANT CHANGE UP (ref 150–400)
POTASSIUM SERPL-MCNC: 3.8 MMOL/L — SIGNIFICANT CHANGE UP (ref 3.5–5.3)
POTASSIUM SERPL-SCNC: 3.8 MMOL/L — SIGNIFICANT CHANGE UP (ref 3.5–5.3)
RBC # BLD: 4.02 M/UL — LOW (ref 4.2–5.8)
RBC # FLD: 17.1 % — HIGH (ref 10.3–14.5)
SODIUM SERPL-SCNC: 141 MMOL/L — SIGNIFICANT CHANGE UP (ref 135–145)
WBC # BLD: 11 K/UL — HIGH (ref 3.8–10.5)
WBC # FLD AUTO: 11 K/UL — HIGH (ref 3.8–10.5)

## 2021-05-02 PROCEDURE — 93010 ELECTROCARDIOGRAM REPORT: CPT

## 2021-05-02 PROCEDURE — 99233 SBSQ HOSP IP/OBS HIGH 50: CPT | Mod: GC

## 2021-05-02 RX ORDER — THIAMINE MONONITRATE (VIT B1) 100 MG
100 TABLET ORAL DAILY
Refills: 0 | Status: DISCONTINUED | OUTPATIENT
Start: 2021-05-02 | End: 2021-05-12

## 2021-05-02 RX ORDER — SODIUM CHLORIDE 9 MG/ML
1000 INJECTION, SOLUTION INTRAVENOUS
Refills: 0 | Status: DISCONTINUED | OUTPATIENT
Start: 2021-05-02 | End: 2021-05-03

## 2021-05-02 RX ORDER — POTASSIUM CHLORIDE 20 MEQ
20 PACKET (EA) ORAL ONCE
Refills: 0 | Status: COMPLETED | OUTPATIENT
Start: 2021-05-02 | End: 2021-05-02

## 2021-05-02 RX ORDER — MAGNESIUM SULFATE 500 MG/ML
1 VIAL (ML) INJECTION ONCE
Refills: 0 | Status: COMPLETED | OUTPATIENT
Start: 2021-05-02 | End: 2021-05-02

## 2021-05-02 RX ORDER — MULTIVIT-MIN/FERROUS GLUCONATE 9 MG/15 ML
30 LIQUID (ML) ORAL DAILY
Refills: 0 | Status: DISCONTINUED | OUTPATIENT
Start: 2021-05-02 | End: 2021-05-12

## 2021-05-02 RX ORDER — TAMSULOSIN HYDROCHLORIDE 0.4 MG/1
0.4 CAPSULE ORAL DAILY
Refills: 0 | Status: DISCONTINUED | OUTPATIENT
Start: 2021-05-02 | End: 2021-05-02

## 2021-05-02 RX ORDER — CHOLECALCIFEROL (VITAMIN D3) 125 MCG
400 CAPSULE ORAL DAILY
Refills: 0 | Status: DISCONTINUED | OUTPATIENT
Start: 2021-05-02 | End: 2021-05-12

## 2021-05-02 RX ADMIN — ENOXAPARIN SODIUM 70 MILLIGRAM(S): 100 INJECTION SUBCUTANEOUS at 17:57

## 2021-05-02 RX ADMIN — Medication 4: at 17:57

## 2021-05-02 RX ADMIN — Medication 2: at 13:42

## 2021-05-02 RX ADMIN — LEVETIRACETAM 500 MILLIGRAM(S): 250 TABLET, FILM COATED ORAL at 17:58

## 2021-05-02 RX ADMIN — Medication 100 GRAM(S): at 17:56

## 2021-05-02 RX ADMIN — Medication 100 MILLIGRAM(S): at 13:43

## 2021-05-02 RX ADMIN — Medication 50 MILLIEQUIVALENT(S): at 17:55

## 2021-05-02 RX ADMIN — Medication 30 MILLILITER(S): at 13:43

## 2021-05-02 RX ADMIN — Medication 3 MILLILITER(S): at 05:27

## 2021-05-02 RX ADMIN — SODIUM CHLORIDE 100 MILLILITER(S): 9 INJECTION, SOLUTION INTRAVENOUS at 13:44

## 2021-05-02 RX ADMIN — Medication 400 UNIT(S): at 13:43

## 2021-05-02 RX ADMIN — Medication 2: at 00:48

## 2021-05-02 RX ADMIN — ENOXAPARIN SODIUM 70 MILLIGRAM(S): 100 INJECTION SUBCUTANEOUS at 06:27

## 2021-05-02 RX ADMIN — Medication 4: at 07:28

## 2021-05-02 RX ADMIN — LEVETIRACETAM 500 MILLIGRAM(S): 250 TABLET, FILM COATED ORAL at 06:27

## 2021-05-02 RX ADMIN — Medication 3 MILLILITER(S): at 17:27

## 2021-05-02 NOTE — PROGRESS NOTE ADULT - PROBLEM SELECTOR PLAN 4
- Type II diabetes mellitus noted; a1c noted to be greater than 10   - On insulin sliding scale  - Monitor fingersticks while on tube feeds

## 2021-05-02 NOTE — PROGRESS NOTE ADULT - NUTRITIONAL ASSESSMENT
Left 2nd message for patient to call RN hotline [phone].     Kristine Sutton RN   This patient has been assessed with a concern for Malnutrition and has been determined to have a diagnosis/diagnoses of Severe protein-calorie malnutrition.    This patient is being managed with:   Diet NPO with Tube Feed-  Tube Feeding Modality: Gastrostomy  Glucerna 1.2 Kaden (GLUCERNARTH)  Total Volume for 24 Hours (mL): 1680  Continuous  Starting Tube Feed Rate {mL per Hour}: 10  Increase Tube Feed Rate by (mL): 10     Every 24 hours  Until Goal Tube Feed Rate (mL per Hour): 70  Tube Feed Duration (in Hours): 24  Tube Feed Start Time: 14:25  Entered: Apr 30 2021  2:46PM

## 2021-05-02 NOTE — PROGRESS NOTE ADULT - ASSESSMENT
The patient is a 60-year-old man managed previously only for diabetes mellitus who presented on 4/1 with acute respiratory failure with hypoxia secondary to covid-19-associated pneumonia and whose course has been complicated by 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 Pt completed antibiotic course, however unsuccessful cineesophagogram with notable aspiration. PEG tube placement on 4/30 and tube feedings started on 5/1/21, slowly increasing by 10 ml by day, monitoring for refeeding syndrome.  60 yr male with PMH of DM who was initially admitted to [facility] ICU for hypoxic respiratory failure 2/2 COVID c/b PE with R heart strain, cardiogenic and septic shock, ischemic and hemorrhagic CVA and ESBL klebsiella ventilator associate PNA, transferred to [facility] for neurosurgery eval and further management. s/p PEG, now monitoring for refeeding syndrome

## 2021-05-02 NOTE — PROGRESS NOTE ADULT - PROBLEM SELECTOR PLAN 8
- Initiated goals of care discussion with patient's brother-in-law (listed in chart as primary point of contact); patient is full code at this time; goal is as much recovery as possible  -The patient's wife is in [place], but the patient's brother-in-law has been visiting and placing the patient's wife on facetime; she is aware of all that is happening and has happened

## 2021-05-02 NOTE — PROGRESS NOTE ADULT - PROBLEM SELECTOR PLAN 1
- Etiology likely multi-factorial in setting of acute ischemic infarctions of the brain in conjunction vs acute kidney injury with uremia (now resolving) vs resolving hypoxic respiratory failure, covid-19 infection, superimposed bacterial pneumonia, urinary retention, concern for nutritional deficiencies)  - S/p management of: acute pneumonia with antibiotics; pulmonary embolus with full-dose anticoagulation  - Reorientation with family at bedside (pt speaks [place], does best with family present)  - MRI Brain, and MRA Head demonstrated multiple evolving subacute infarcts with associated hemorrhages, likely expected evolution of ischemia  - Neuro recs appreciated  - As per neuro, EEG not suggestive of seizure-like activity  - C/w Keppra 500 mg BID

## 2021-05-02 NOTE — PROGRESS NOTE ADULT - PROBLEM SELECTOR PLAN 3
-Pulmonary embolus noted on ct scan performed at time of admission  -Etiology of venous thromboembolism uncertain at this time; no known history of thrombophilia; provoked in setting of acute covid illness   - Transitioned from heparin to lovenox, will discharge on Eliquis

## 2021-05-02 NOTE — CHART NOTE - NSCHARTNOTEFT_GEN_A_CORE
Nutrition Note  Patient seen for: Nutrition follow-up for refeeding risk     Chart reviewed, events noted. Pt s/p PEG placement with feeds started on 4/29.     Source: [] Patient       [x] EMR        [] RN        [] Family at bedside       [] Other:    -If unable to interview patient: [] Trach/Vent/BiPAP  [x] Disoriented/confused/inappropriate to interview, no family at bedside    Diet Order:   Diet, NPO with Tube Feed:   Tube Feeding Modality: Gastrostomy  Glucerna 1.2 Kaden (GLUCERNARTH)   Total Volume for 24 Hours (mL): 1680  Continuous  Starting Tube Feed Rate {mL per Hour}: 10  Increase Tube Feed Rate by (mL): 10     Every 24 hours  Until Goal Tube Feed Rate (mL per Hour): 70  Tube Feed Duration (in Hours): 24  Tube Feed Start Time: 14:25 (04-30-21)    - Is current order appropriate/adequate? [x] Yes  []  No: slow advancement towards goal rate due to refeeding risk.     - Nutrition-related concerns: Pt observed with tube feeds of Glucerna infusing @ 20ml/hr.  Electrolytes remain WNL, will continue to monitor and recommend repletion as needed. No acute GI distress noted. Plan to increase to 30ml/hr later today.    Previous Nutrition Diagnosis: Severe Malnutrition   Nutrition Diagnosis is: [x] ongoing , addressed with enteral nutrition provision     Nutrition Care Plan:  [x] In Progress    Recommendations:      1)Continue to advance feeds of Glucerna 1.2 by 10cc/hr [id number] until goal rate of 70cc/hr x 24 hrs is achieved/tolerated. Goal to provide 1680cc fluid, 2016cal/d (30cal/kg), and 101gm prot/d (1.5gm prot/kg) based upon dosing wt 66.1Kg  2) Patient remains at risk for refeeding syndrome. Recommend monitoring electrolytes q12hrs (K, Mg, Phos), with repletion PRN prior to increasing EN rate.   3) Defer additional free water flushes to team  4) Continue multivitamin and thiamine supplementation.     Monitoring and Evaluation:   Continue to monitor nutritional intake, tolerance to diet prescription, weights, labs, skin integrity  RD remains available upon request and will follow up per protocol  Beverley Salas RD, CDN, Pager # [phone]

## 2021-05-02 NOTE — PROGRESS NOTE ADULT - PROBLEM SELECTOR PLAN 5
Elevated while in MICU  - Was on metoprolol tartrate 25 mg twice daily since 4/16; discontinued  - BP is stable  - If is hypertensive persistently, can consider antihypertensives

## 2021-05-02 NOTE — PROGRESS NOTE ADULT - SUBJECTIVE AND OBJECTIVE BOX
Greyson Andrea (Tevin) PGY-2  Pager: NS) [phone]/ (PNF) [phone]    Patient is a 60y old  Male who presents with a chief complaint of COVID19 w/ severe metabolic acidosis s/p intubation (01 May 2021 07:00)      SUBJECTIVE / OVERNIGHT EVENTS:  No acute events over night. Denies any fevers/chills, headache, CP, SOB, abd pain, N/V/D, constipation, or leg swelling.       MEDICATIONS  (STANDING):  albuterol/ipratropium for Nebulization 3 milliLiter(s) Nebulizer every 12 hours  cholecalciferol 400 Unit(s) Oral daily  dextrose 40% Gel 15 Gram(s) Oral once  dextrose 5%. 1000 milliLiter(s) (50 mL/Hr) IV Continuous <Continuous>  dextrose 5%. 1000 milliLiter(s) (100 mL/Hr) IV Continuous <Continuous>  dextrose 50% Injectable 25 Gram(s) IV Push once  dextrose 50% Injectable 12.5 Gram(s) IV Push once  dextrose 50% Injectable 25 Gram(s) IV Push once  enoxaparin Injectable 70 milliGRAM(s) SubCutaneous two times a day  glucagon  Injectable 1 milliGRAM(s) IntraMuscular once  insulin lispro (ADMELOG) corrective regimen sliding scale   SubCutaneous every 6 hours  levETIRAcetam  Solution 500 milliGRAM(s) Oral two times a day  multivitamin 1 Tablet(s) Oral daily  multivitamin/minerals Oral Solution (WELLESSE) 30 milliLiter(s) Oral daily  tamsulosin 0.4 milliGRAM(s) Oral daily  thiamine 100 milliGRAM(s) Oral daily    MEDICATIONS  (PRN):          OBJECTIVE:  Vital Signs Last 24 Hrs  T(C): 36.9 (02 May 2021 04:48), Max: 36.9 (01 May 2021 20:05)  T(F): 98.4 (02 May 2021 04:48), Max: 98.5 (01 May 2021 20:05)  HR: 108 (02 May 2021 05:42) (108 - 124)  BP: 112/77 (02 May 2021 04:48) (112/77 - 119/76)  BP(mean): --  RR: 18 (02 May 2021 04:48) (18 - 18)  SpO2: 96% (02 May 2021 05:42) (95% - 97%)  PHYSICAL EXAM:  GENERAL: NAD, well-developed  HEAD:  Atraumatic, Normocephalic  EYES: conjunctiva and sclera clear  NECK: Supple, No JVD  CHEST/LUNG: Clear to auscultation bilaterally; No wheeze  HEART: Regular rate and rhythm; No murmurs, rubs, or gallops  ABDOMEN: Soft, Nontender, Nondistended; Bowel sounds present  EXTREMITIES:  No clubbing, cyanosis, or edema  PSYCH: AAOx3  NEUROLOGY: non-focal  SKIN: No rashes or lesions    CAPILLARY BLOOD GLUCOSE      POCT Blood Glucose.: 185 mg/dL (01 May 2021 23:51)  POCT Blood Glucose.: 140 mg/dL (01 May 2021 18:48)  POCT Blood Glucose.: 152 mg/dL (01 May 2021 12:47)    I&O's Summary    30 Apr 2021 07:01  -  01 May 2021 07:00  --------------------------------------------------------  IN: 220 mL / OUT: 700 mL / NET: -480 mL    01 May 2021 07:01  -  02 May 2021 06:54  --------------------------------------------------------  IN: 170 mL / OUT: 800 mL / NET: -630 mL              LABS:                        9.8    9.87  )-----------( 343      ( 01 May 2021 07:38 )             31.7     05-01    140  |  100  |  19  ----------------------------<  181<H>  4.4   |  24  |  0.77    Ca    9.6      01 May 2021 23:03  Phos  3.4     05-01  Mg     1.8     05-01    TPro  7.7  /  Alb  2.7<L>  /  TBili  0.8  /  DBili  x   /  AST  16  /  ALT  15  /  AlkPhos  79  05-01                  RADIOLOGY & ADDITIONAL TESTS:       Greyson Andrea ([place]evin) PGY-2  Pager: NS) [phone]/ (SNI) [phone]    Patient is a 60y old  Male who presents with a chief complaint of COVID19 w/ severe metabolic acidosis s/p intubation (01 May 2021 07:00)      SUBJECTIVE / OVERNIGHT EVENTS:  No acute events over night.   shakes no to "pain"      MEDICATIONS  (STANDING):  albuterol/ipratropium for Nebulization 3 milliLiter(s) Nebulizer every 12 hours  cholecalciferol 400 Unit(s) Oral daily  dextrose 40% Gel 15 Gram(s) Oral once  dextrose 5%. 1000 milliLiter(s) (50 mL/Hr) IV Continuous <Continuous>  dextrose 5%. 1000 milliLiter(s) (100 mL/Hr) IV Continuous <Continuous>  dextrose 50% Injectable 25 Gram(s) IV Push once  dextrose 50% Injectable 12.5 Gram(s) IV Push once  dextrose 50% Injectable 25 Gram(s) IV Push once  enoxaparin Injectable 70 milliGRAM(s) SubCutaneous two times a day  glucagon  Injectable 1 milliGRAM(s) IntraMuscular once  insulin lispro (ADMELOG) corrective regimen sliding scale   SubCutaneous every 6 hours  levETIRAcetam  Solution 500 milliGRAM(s) Oral two times a day  multivitamin 1 Tablet(s) Oral daily  multivitamin/minerals Oral Solution (WELLESSE) 30 milliLiter(s) Oral daily  tamsulosin 0.4 milliGRAM(s) Oral daily  thiamine 100 milliGRAM(s) Oral daily    MEDICATIONS  (PRN):          OBJECTIVE:  Vital Signs Last 24 Hrs  T(C): 36.9 (02 May 2021 04:48), Max: 36.9 (01 May 2021 20:05)  T(F): 98.4 (02 May 2021 04:48), Max: 98.5 (01 May 2021 20:05)  HR: 108 (02 May 2021 05:42) (108 - 124)  BP: 112/77 (02 May 2021 04:48) (112/77 - 119/76)  BP(mean): --  RR: 18 (02 May 2021 04:48) (18 - 18)  SpO2: 96% (02 May 2021 05:42) (95% - 97%)  PHYSICAL EXAM:  GENERAL: NAD  HEAD:  Atraumatic, Normocephalic  EYES: conjunctiva and sclera clear  NECK: Supple, No JVD  CHEST/LUNG: Clear to auscultation bilaterally; No wheeze  HEART: No murmurs, rubs, or gallops, tachycardic   ABDOMEN: Soft, Nontender, Nondistended; Bowel sounds present. PEG tube in place  EXTREMITIES:  No clubbing, cyanosis, or edema  PSYCH: AAOx0    CAPILLARY BLOOD GLUCOSE      POCT Blood Glucose.: 185 mg/dL (01 May 2021 23:51)  POCT Blood Glucose.: 140 mg/dL (01 May 2021 18:48)  POCT Blood Glucose.: 152 mg/dL (01 May 2021 12:47)    I&O's Summary    30 Apr 2021 07:01  -  01 May 2021 07:00  --------------------------------------------------------  IN: 220 mL / OUT: 700 mL / NET: -480 mL    01 May 2021 07:01  -  02 May 2021 06:54  --------------------------------------------------------  IN: 170 mL / OUT: 800 mL / NET: -630 mL              LABS:                        9.8    9.87  )-----------( 343      ( 01 May 2021 07:38 )             31.7     05-01    140  |  100  |  19  ----------------------------<  181<H>  4.4   |  24  |  0.77    Ca    9.6      01 May 2021 23:03  Phos  3.4     05-01  Mg     1.8     05-01    TPro  7.7  /  Alb  2.7<L>  /  TBili  0.8  /  DBili  x   /  AST  16  /  ALT  15  /  AlkPhos  79  05-01                  RADIOLOGY & ADDITIONAL TESTS:

## 2021-05-02 NOTE — PROGRESS NOTE ADULT - ATTENDING COMMENTS
60 yr male with PMH of DM who was initially admitted to [facility] ICU for hypoxic respiratory failure 2/2 COVID c/b PE with R heart strain, cardiogenic and septic shock, ischemic and hemorrhagic CVA and ESBL klebsiella ventilator associate PNA, transferred to [facility] for neurosurgery eval and further management.   No acute events. S/p successful PEG placement by GI team. Site intact with abdominal binder to prevent dislodgement given pt's hx of pulling out NGT.  -Nutrition consult. GI cleared pt to start feeds  -Monitor for refeeding syndrome - trend BMP, electrolytes   -resume lovenox for PE  -patient tachycardic today - hasn't been getting free water in tube feeds. Will trial IVF and check for improvement  Dispo: when tube feeds at goal, to acute rehab. Likely next week.

## 2021-05-02 NOTE — PROGRESS NOTE ADULT - PROBLEM SELECTOR PLAN 7
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   Significant lower extremity arterial disease not identified. Decreased amplitude to the pulse volume recordings at the levels of the toes bilaterally.  - Podiatry recs appreciated, no acute surgical intervention at this time, applying betadine to wound  - Wound care recs appreciated

## 2021-05-02 NOTE — PROGRESS NOTE ADULT - PROBLEM SELECTOR PLAN 6
Dysphagia noted in setting of patient's acute alteration in mental status and hx of intubation  - Nasogastric tube placed for administration of tube feeds originally, but patient self removed 4/17; multiple attempts to replace unsuccessful   Evaluated by speech & swallow: unable to cooperate with FEES study, cineesophagram performed demonstrating aspiration  - PEG tube placed on 4/30, tube feedings started on 4/30, will slowly increase rate by 10 ml q24hrs  - Monitor for refeeding syndrome with BMP BID Dysphagia noted in setting of patient's acute alteration in mental status and hx of intubation  - Nasogastric tube placed for administration of tube feeds originally, but patient self removed 4/17; multiple attempts to replace unsuccessful   Evaluated by speech & swallow: unable to cooperate with FEES study, cineesophagram performed demonstrating aspiration  - PEG tube placed on 4/30, tube feedings started on 4/30, will slowly increase rate by 10 ml q24hrs, currently at 20cc/hr  - Monitor for refeeding syndrome with BMP BID

## 2021-05-02 NOTE — PROGRESS NOTE ADULT - PROBLEM SELECTOR PLAN 9
- DVT: Lovenox  - Diet: NPO with tube feedings  - Full code - DVT: Lovenox full dose  - Diet: NPO with tube feedings  - Full code

## 2021-05-02 NOTE — PROGRESS NOTE ADULT - PROBLEM SELECTOR PLAN 2
-Acute ischemic strokes, with concern for hemorrhagic conversion, noted on CT scan on 4/8  -Etiology of acute ischemic stroke uncertain at this time; TTE with bubble performed at bedside in the MICU without any evidence of right-to-left shunt; no arrhythmia noted on telemetry throughout stay in the MICU  - CTA head and neck without any evidence of carotid artery stenosis or dissection   - Transitioned from heparin to lovenox, will discharge on Eliquis -Acute ischemic strokes, with concern for hemorrhagic conversion, noted on CT scan on 4/8  -Etiology of acute ischemic stroke uncertain at this time; TTE with bubble performed at bedside in the MICU without any evidence of right-to-left shunt; no arrhythmia noted on telemetry throughout stay in the MICU  - CTA head and neck without any evidence of carotid artery stenosis or dissection

## 2021-05-03 LAB
ALBUMIN SERPL ELPH-MCNC: 2.4 G/DL — LOW (ref 3.3–5)
ALP SERPL-CCNC: 69 U/L — SIGNIFICANT CHANGE UP (ref 40–120)
ALT FLD-CCNC: 13 U/L — SIGNIFICANT CHANGE UP (ref 10–45)
ANION GAP SERPL CALC-SCNC: 13 MMOL/L — SIGNIFICANT CHANGE UP (ref 5–17)
ANION GAP SERPL CALC-SCNC: 14 MMOL/L — SIGNIFICANT CHANGE UP (ref 5–17)
AST SERPL-CCNC: 20 U/L — SIGNIFICANT CHANGE UP (ref 10–40)
BILIRUB SERPL-MCNC: 0.6 MG/DL — SIGNIFICANT CHANGE UP (ref 0.2–1.2)
BUN SERPL-MCNC: 19 MG/DL — SIGNIFICANT CHANGE UP (ref 7–23)
BUN SERPL-MCNC: 20 MG/DL — SIGNIFICANT CHANGE UP (ref 7–23)
CALCIUM SERPL-MCNC: 9 MG/DL — SIGNIFICANT CHANGE UP (ref 8.4–10.5)
CALCIUM SERPL-MCNC: 9.1 MG/DL — SIGNIFICANT CHANGE UP (ref 8.4–10.5)
CHLORIDE SERPL-SCNC: 100 MMOL/L — SIGNIFICANT CHANGE UP (ref 96–108)
CHLORIDE SERPL-SCNC: 101 MMOL/L — SIGNIFICANT CHANGE UP (ref 96–108)
CO2 SERPL-SCNC: 23 MMOL/L — SIGNIFICANT CHANGE UP (ref 22–31)
CO2 SERPL-SCNC: 25 MMOL/L — SIGNIFICANT CHANGE UP (ref 22–31)
CREAT SERPL-MCNC: 0.73 MG/DL — SIGNIFICANT CHANGE UP (ref 0.5–1.3)
CREAT SERPL-MCNC: 0.8 MG/DL — SIGNIFICANT CHANGE UP (ref 0.5–1.3)
GLUCOSE BLDC GLUCOMTR-MCNC: 145 MG/DL — HIGH (ref 70–99)
GLUCOSE BLDC GLUCOMTR-MCNC: 246 MG/DL — HIGH (ref 70–99)
GLUCOSE BLDC GLUCOMTR-MCNC: 257 MG/DL — HIGH (ref 70–99)
GLUCOSE BLDC GLUCOMTR-MCNC: 271 MG/DL — HIGH (ref 70–99)
GLUCOSE SERPL-MCNC: 155 MG/DL — HIGH (ref 70–99)
GLUCOSE SERPL-MCNC: 229 MG/DL — HIGH (ref 70–99)
HCT VFR BLD CALC: 29 % — LOW (ref 39–50)
HGB BLD-MCNC: 8.8 G/DL — LOW (ref 13–17)
MAGNESIUM SERPL-MCNC: 1.9 MG/DL — SIGNIFICANT CHANGE UP (ref 1.6–2.6)
MAGNESIUM SERPL-MCNC: 2 MG/DL — SIGNIFICANT CHANGE UP (ref 1.6–2.6)
MCHC RBC-ENTMCNC: 26 PG — LOW (ref 27–34)
MCHC RBC-ENTMCNC: 30.3 GM/DL — LOW (ref 32–36)
MCV RBC AUTO: 85.8 FL — SIGNIFICANT CHANGE UP (ref 80–100)
NRBC # BLD: 0 /100 WBCS — SIGNIFICANT CHANGE UP (ref 0–0)
PHOSPHATE SERPL-MCNC: 3.4 MG/DL — SIGNIFICANT CHANGE UP (ref 2.5–4.5)
PHOSPHATE SERPL-MCNC: 4 MG/DL — SIGNIFICANT CHANGE UP (ref 2.5–4.5)
PLATELET # BLD AUTO: 330 K/UL — SIGNIFICANT CHANGE UP (ref 150–400)
POTASSIUM SERPL-MCNC: 4.5 MMOL/L — SIGNIFICANT CHANGE UP (ref 3.5–5.3)
POTASSIUM SERPL-MCNC: 4.7 MMOL/L — SIGNIFICANT CHANGE UP (ref 3.5–5.3)
POTASSIUM SERPL-SCNC: 4.5 MMOL/L — SIGNIFICANT CHANGE UP (ref 3.5–5.3)
POTASSIUM SERPL-SCNC: 4.7 MMOL/L — SIGNIFICANT CHANGE UP (ref 3.5–5.3)
PROT SERPL-MCNC: 7.4 G/DL — SIGNIFICANT CHANGE UP (ref 6–8.3)
RBC # BLD: 3.38 M/UL — LOW (ref 4.2–5.8)
RBC # FLD: 17.2 % — HIGH (ref 10.3–14.5)
SARS-COV-2 RNA SPEC QL NAA+PROBE: SIGNIFICANT CHANGE UP
SODIUM SERPL-SCNC: 137 MMOL/L — SIGNIFICANT CHANGE UP (ref 135–145)
SODIUM SERPL-SCNC: 139 MMOL/L — SIGNIFICANT CHANGE UP (ref 135–145)
WBC # BLD: 9.33 K/UL — SIGNIFICANT CHANGE UP (ref 3.8–10.5)
WBC # FLD AUTO: 9.33 K/UL — SIGNIFICANT CHANGE UP (ref 3.8–10.5)

## 2021-05-03 PROCEDURE — 99232 SBSQ HOSP IP/OBS MODERATE 35: CPT

## 2021-05-03 PROCEDURE — 99233 SBSQ HOSP IP/OBS HIGH 50: CPT | Mod: GC

## 2021-05-03 RX ORDER — POLYETHYLENE GLYCOL 3350 17 G/17G
17 POWDER, FOR SOLUTION ORAL DAILY
Refills: 0 | Status: DISCONTINUED | OUTPATIENT
Start: 2021-05-03 | End: 2021-05-12

## 2021-05-03 RX ORDER — SENNA PLUS 8.6 MG/1
2 TABLET ORAL AT BEDTIME
Refills: 0 | Status: DISCONTINUED | OUTPATIENT
Start: 2021-05-03 | End: 2021-05-12

## 2021-05-03 RX ORDER — SODIUM CHLORIDE 9 MG/ML
1000 INJECTION, SOLUTION INTRAVENOUS
Refills: 0 | Status: DISCONTINUED | OUTPATIENT
Start: 2021-05-03 | End: 2021-05-04

## 2021-05-03 RX ADMIN — Medication 100 MILLIGRAM(S): at 14:41

## 2021-05-03 RX ADMIN — Medication 3 MILLILITER(S): at 11:41

## 2021-05-03 RX ADMIN — Medication 30 MILLILITER(S): at 14:42

## 2021-05-03 RX ADMIN — POLYETHYLENE GLYCOL 3350 17 GRAM(S): 17 POWDER, FOR SOLUTION ORAL at 14:42

## 2021-05-03 RX ADMIN — Medication 4: at 06:14

## 2021-05-03 RX ADMIN — Medication 3 MILLILITER(S): at 05:19

## 2021-05-03 RX ADMIN — ENOXAPARIN SODIUM 70 MILLIGRAM(S): 100 INJECTION SUBCUTANEOUS at 19:09

## 2021-05-03 RX ADMIN — Medication 400 UNIT(S): at 14:41

## 2021-05-03 RX ADMIN — SENNA PLUS 2 TABLET(S): 8.6 TABLET ORAL at 22:16

## 2021-05-03 RX ADMIN — LEVETIRACETAM 500 MILLIGRAM(S): 250 TABLET, FILM COATED ORAL at 19:09

## 2021-05-03 RX ADMIN — Medication 4: at 00:44

## 2021-05-03 RX ADMIN — Medication 6: at 14:41

## 2021-05-03 RX ADMIN — LEVETIRACETAM 500 MILLIGRAM(S): 250 TABLET, FILM COATED ORAL at 06:14

## 2021-05-03 RX ADMIN — ENOXAPARIN SODIUM 70 MILLIGRAM(S): 100 INJECTION SUBCUTANEOUS at 06:14

## 2021-05-03 RX ADMIN — SODIUM CHLORIDE 100 MILLILITER(S): 9 INJECTION, SOLUTION INTRAVENOUS at 14:41

## 2021-05-03 NOTE — PROGRESS NOTE ADULT - SUBJECTIVE AND OBJECTIVE BOX
PROGRESS NOTE:   Authored by Dr. Emily Mckeon, ANH pager [phone]    Patient is a 60y old  Male who presents with a chief complaint of COVID19 w/ severe metabolic acidosis s/p intubation (02 May 2021 06:54)      SUBJECTIVE / OVERNIGHT EVENTS:    MEDICATIONS  (STANDING):  albuterol/ipratropium for Nebulization 3 milliLiter(s) Nebulizer every 12 hours  cholecalciferol 400 Unit(s) Oral daily  dextrose 40% Gel 15 Gram(s) Oral once  dextrose 5%. 1000 milliLiter(s) (50 mL/Hr) IV Continuous <Continuous>  dextrose 5%. 1000 milliLiter(s) (100 mL/Hr) IV Continuous <Continuous>  dextrose 50% Injectable 25 Gram(s) IV Push once  dextrose 50% Injectable 12.5 Gram(s) IV Push once  dextrose 50% Injectable 25 Gram(s) IV Push once  enoxaparin Injectable 70 milliGRAM(s) SubCutaneous two times a day  glucagon  Injectable 1 milliGRAM(s) IntraMuscular once  insulin lispro (ADMELOG) corrective regimen sliding scale   SubCutaneous every 6 hours  lactated ringers. 1000 milliLiter(s) (100 mL/Hr) IV Continuous <Continuous>  levETIRAcetam  Solution 500 milliGRAM(s) Oral two times a day  multivitamin/minerals Oral Solution (WELLESSE) 30 milliLiter(s) Oral daily  thiamine 100 milliGRAM(s) Oral daily    MEDICATIONS  (PRN):      CAPILLARY BLOOD GLUCOSE      POCT Blood Glucose.: 246 mg/dL (03 May 2021 06:12)  POCT Blood Glucose.: 224 mg/dL (02 May 2021 23:58)  POCT Blood Glucose.: 202 mg/dL (02 May 2021 17:46)  POCT Blood Glucose.: 184 mg/dL (02 May 2021 13:04)    I&O's Summary    02 May 2021 07:01  -  03 May 2021 07:00  --------------------------------------------------------  IN: 1340 mL / OUT: 1050 mL / NET: 290 mL        PHYSICAL EXAM:  Vital Signs Last 24 Hrs  T(C): 36.9 (03 May 2021 05:35), Max: 36.9 (03 May 2021 05:35)  T(F): 98.5 (03 May 2021 05:35), Max: 98.5 (03 May 2021 05:35)  HR: 108 (03 May 2021 05:35) (95 - 121)  BP: 127/80 (03 May 2021 05:35) (106/64 - 127/80)  BP(mean): --  RR: 18 (03 May 2021 05:35) (18 - 18)  SpO2: 100% (03 May 2021 05:35) (93% - 100%)    GENERAL: No acute distress, well-developed  HEAD:  Atraumatic, Normocephalic  EYES: EOMI, PERRLA, conjunctiva and sclera clear  NECK: Supple, no lymphadenopathy, no JVD  CHEST/LUNG: CTAB; No wheezes, rales, or rhonchi  HEART: Regular rate and rhythm; No murmurs, rubs, or gallops  ABDOMEN: Soft, non-tender, non-distended; normal bowel sounds, no organomegaly  EXTREMITIES:  2+ peripheral pulses b/l, No clubbing, cyanosis, or edema  NEUROLOGY: A&O x 3, no focal deficits  SKIN: No rashes or lesions    LABS:                        10.5   11.00 )-----------( 368      ( 02 May 2021 07:15 )             34.6     05-02    141  |  101  |  21  ----------------------------<  211<H>  3.8   |  25  |  0.74    Ca    9.4      02 May 2021 15:14  Phos  3.7     05-02  Mg     1.8     05-02                  RADIOLOGY & ADDITIONAL TESTS:  Results Reviewed:   Imaging Personally Reviewed:  Electrocardiogram Personally Reviewed:    COORDINATION OF CARE:  Care Discussed with Consultants/Other Providers [Y/N]:  Prior or Outpatient Records Reviewed [Y/N]:   PROGRESS NOTE:   Authored by Dr. Emily Mckeon, ANH pager [phone]    Patient is a 60y old  Male who presents with a chief complaint of COVID19 w/ severe metabolic acidosis s/p intubation (02 May 2021 06:54)      SUBJECTIVE / OVERNIGHT EVENTS: No events overnight. Patient examined at bedside. he appeared confused but able to follow commands. He was unable to answer questions as he was also very lethargic. Patient was able to move all extremities     MEDICATIONS  (STANDING):  albuterol/ipratropium for Nebulization 3 milliLiter(s) Nebulizer every 12 hours  cholecalciferol 400 Unit(s) Oral daily  dextrose 40% Gel 15 Gram(s) Oral once  dextrose 5%. 1000 milliLiter(s) (50 mL/Hr) IV Continuous <Continuous>  dextrose 5%. 1000 milliLiter(s) (100 mL/Hr) IV Continuous <Continuous>  dextrose 50% Injectable 25 Gram(s) IV Push once  dextrose 50% Injectable 12.5 Gram(s) IV Push once  dextrose 50% Injectable 25 Gram(s) IV Push once  enoxaparin Injectable 70 milliGRAM(s) SubCutaneous two times a day  glucagon  Injectable 1 milliGRAM(s) IntraMuscular once  insulin lispro (ADMELOG) corrective regimen sliding scale   SubCutaneous every 6 hours  lactated ringers. 1000 milliLiter(s) (100 mL/Hr) IV Continuous <Continuous>  levETIRAcetam  Solution 500 milliGRAM(s) Oral two times a day  multivitamin/minerals Oral Solution (WELLESSE) 30 milliLiter(s) Oral daily  thiamine 100 milliGRAM(s) Oral daily    MEDICATIONS  (PRN):      CAPILLARY BLOOD GLUCOSE      POCT Blood Glucose.: 246 mg/dL (03 May 2021 06:12)  POCT Blood Glucose.: 224 mg/dL (02 May 2021 23:58)  POCT Blood Glucose.: 202 mg/dL (02 May 2021 17:46)  POCT Blood Glucose.: 184 mg/dL (02 May 2021 13:04)    I&O's Summary    02 May 2021 07:01  -  03 May 2021 07:00  --------------------------------------------------------  IN: 1340 mL / OUT: 1050 mL / NET: 290 mL        PHYSICAL EXAM:  Vital Signs Last 24 Hrs  T(C): 36.9 (03 May 2021 05:35), Max: 36.9 (03 May 2021 05:35)  T(F): 98.5 (03 May 2021 05:35), Max: 98.5 (03 May 2021 05:35)  HR: 108 (03 May 2021 05:35) (95 - 121)  BP: 127/80 (03 May 2021 05:35) (106/64 - 127/80)  BP(mean): --  RR: 18 (03 May 2021 05:35) (18 - 18)  SpO2: 100% (03 May 2021 05:35) (93% - 100%)    GENERAL: lethargic but able to follow commands   HEAD:  Atraumatic, Normocephalic  EYES: EOMI, PERRLA, conjunctiva and sclera clear  NECK: Supple, no lymphadenopathy, no JVD  CHEST/LUNG: CTAB; No wheezes, rales, or rhonchi  HEART: Regular rate and rhythm; No murmurs, rubs, or gallops  ABDOMEN: Soft, non-tender, non-distended; normal bowel sounds, no organomegaly, PEG inplace, c/d/i  EXTREMITIES:  2+ peripheral pulses b/l, No clubbing, cyanosis, or edema  NEUROLOGY: A&O difficult to obtain, no focal deficits  SKIN: No rashes or lesions    LABS:                        10.5   11.00 )-----------( 368      ( 02 May 2021 07:15 )             34.6     05-02    141  |  101  |  21  ----------------------------<  211<H>  3.8   |  25  |  0.74    Ca    9.4      02 May 2021 15:14  Phos  3.7     05-02  Mg     1.8     05-02                  RADIOLOGY & ADDITIONAL TESTS:  Results Reviewed:   Imaging Personally Reviewed:  Electrocardiogram Personally Reviewed:    COORDINATION OF CARE:  Care Discussed with Consultants/Other Providers [Y/N]:  Prior or Outpatient Records Reviewed [Y/N]:

## 2021-05-03 NOTE — PROGRESS NOTE ADULT - ASSESSMENT
60 yr male with PMH of DM who was initially admitted to [facility] ICU for hypoxic respiratory failure 2/2 COVID c/b PE with R heart strain, cardiogenic and septic shock, ischemic and hemorrhagic CVA and ESBL klebsiella ventilator associate PNA, transferred to [facility] for neurosurgery eval and further management. s/p PEG, now monitoring for refeeding syndrome

## 2021-05-03 NOTE — CHART NOTE - NSCHARTNOTEFT_GEN_A_CORE
Nutrition Follow Up Note  Patient seen for: malnutrition, refeeding risk follow-up    Hospital course as per chart: 60y Male with PMH of DM who presented on  with acute respiratory failure with hypoxia secondary to COVID-19 associated PNA. Hospital course complicated by "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" Pt with dysphagia in setting of AMS, NG tube placed for tube feeds. Pt self-removed on , multiple attempts to replace unsuccessful. Did not tolerate FEES; cineesophagogram demonstrating risk of aspiration, NPO recommended. PEG tube placed , feeds started. Chart reviewed, events noted.     Source: [] Patient       [x] EMR        [x] RN        [] Family at bedside          -If unable to interview patient: [] Trach/Vent/BiPAP  [x] Disoriented/confused/inappropriate to interview - pt lethargic, no family at bedside    Diet, NPO with Tube Feed:   Tube Feeding Modality: Gastrostomy  Glucerna 1.2 Kaden (GLUCERNARTH)  Total Volume for 24 Hours (mL): 1680  Continuous  Starting Tube Feed Rate {mL per Hour}: 10  Increase Tube Feed Rate by (mL): 10     Every 24 hours  Until Goal Tube Feed Rate (mL per Hour): 70  Tube Feed Duration (in Hours): 24  Tube Feed Start Time: 14:25 (21 @ 14:46) [Active]    - Is current order appropriate/adequate? [x] Yes  []  No:   - At goal, current EN regimen provides 2016 kcal, 100.8 g protein, 1352 ml water; ~30 kcal/kg, 1.5 g/kg protein based on dosing wt 66.2 kg  - Visited pt, observed tube feeds (Glucerna 1.2) running at bedside at 30 ml/hr. RN reports pt tolerating with no acute issues, plans to increase to 40 ml/hr this afternoon.     GI:  Last documented BM .   Bowel Regimen? [x] Yes   [] No    Weights: Daily Weight in k.1 () - ~wt stability noted. Will continue to monitor and trend weights.    MEDICATIONS  (STANDING):  albuterol/ipratropium for Nebulization 3 milliLiter(s) Nebulizer every 12 hours  cholecalciferol 400 Unit(s) Oral daily  dextrose 40% Gel 15 Gram(s) Oral once  dextrose 5%. 1000 milliLiter(s) (50 mL/Hr) IV Continuous <Continuous>  dextrose 5%. 1000 milliLiter(s) (100 mL/Hr) IV Continuous <Continuous>  dextrose 50% Injectable 25 Gram(s) IV Push once  dextrose 50% Injectable 12.5 Gram(s) IV Push once  dextrose 50% Injectable 25 Gram(s) IV Push once  enoxaparin Injectable 70 milliGRAM(s) SubCutaneous two times a day  glucagon  Injectable 1 milliGRAM(s) IntraMuscular once  insulin lispro (ADMELOG) corrective regimen sliding scale   SubCutaneous every 6 hours  lactated ringers. 1000 milliLiter(s) (100 mL/Hr) IV Continuous <Continuous>  levETIRAcetam  Solution 500 milliGRAM(s) Oral two times a day  multivitamin/minerals Oral Solution (WELLESSE) 30 milliLiter(s) Oral daily  polyethylene glycol 3350 17 Gram(s) Oral daily  senna 2 Tablet(s) Oral at bedtime  thiamine 100 milliGRAM(s) Oral daily    Pertinent labs (5/3): glucose 229    A1C with Estimated Average Glucose Result: 10.3 % (21 @ 14:28)    CAPILLARY BLOOD GLUCOSE  POCT Blood Glucose.: 257 mg/dL (03 May 2021 14:11)  POCT Blood Glucose.: 271 mg/dL (03 May 2021 12:23)  POCT Blood Glucose.: 246 mg/dL (03 May 2021 06:12)  POCT Blood Glucose.: 224 mg/dL (02 May 2021 23:58)  POCT Blood Glucose.: 202 mg/dL (02 May 2021 17:46)    Skin per nursing documentation: +wound, no pressure injuries per flowsheets   Edema per flowsheets: no edema per flowsheets     Estimated Needs:   [x] no change since previous assessment  Based on Dosing wt 145.9 lb/66.1 kg  Energy (28-32 kcals/kg): 9657-2952  Protein (1.2-1.6 g pro/kg): 79..76    Previous Nutrition Diagnosis: Severe Malnutrition   Nutrition Diagnosis is: [x] ongoing  [] resolved [] not applicable     New Nutrition Diagnosis: not applicable    Nutrition Care Plan:  [x] In Progress - being addressed with enteral nutrition  [] Achieved  [] Not applicable    Recommendations:   1) Continue current enteral nutrition regimen: Glucerna 1.2 at 30 ml/hr.   - Patient at HIGH RISK FOR REFEEDING SYNDROME. Recommend monitoring electrolytes, with repletion PRN before increasing rate.   - Pending tolerance and electrolytes WNL, recommend increasing Glucerna 1.2 formula by 10 ml/hr q24 hours to goal rate 70 ml/hr x 24 hours.   - Defer additional free water flushes to team  2) Continue multivitamin and thiamine in setting of refeeding risk, if no medical contraindications  3) Consider Endocrinology consult in setting of hyperglycemia; glycemic control as per team    Monitoring and Evaluation: Monitor enteral nutrition provision and tolerance, weight, labs, skin, GI status    Nutrition care plan to remain consistent with GOC.     KARTIK remains available upon request and will follow up per protocol  Concepcion Llamas MS, RD CDN Pager #[phone]

## 2021-05-03 NOTE — PROGRESS NOTE ADULT - ATTENDING COMMENTS
aphasia  moves arms spontaneously  b/l leg weakness  follows commands intermittently  left foot dressing/toe gangrene per chart  +PEG  Is/Os  Monitor clinically.    patient with multiple co-morbid conditions; higher risk for future complications despite optimal medical therapy

## 2021-05-03 NOTE — PROGRESS NOTE ADULT - SUBJECTIVE AND OBJECTIVE BOX
Patient is a 60y old  Male who presents with a chief complaint of COVID19 w/ severe metabolic acidosis s/p intubation (03 May 2021 13:33)       INTERVAL HPI/OVERNIGHT EVENTS:  Patient seen and evaluated at bedside.  Pt is resting comfortable in NAD.     Allergies    No Known Allergies    Intolerances        Vital Signs Last 24 Hrs  T(C): 36.8 (03 May 2021 20:35), Max: 36.9 (03 May 2021 05:35)  T(F): 98.2 (03 May 2021 20:35), Max: 98.5 (03 May 2021 05:35)  HR: 109 (03 May 2021 20:35) (63 - 109)  BP: 112/63 (03 May 2021 20:35) (108/72 - 127/80)  BP(mean): --  RR: 18 (03 May 2021 20:35) (18 - 18)  SpO2: 98% (03 May 2021 20:35) (97% - 100%)    LABS:                        8.8    9.33  )-----------( 330      ( 03 May 2021 07:55 )             29.0     05-03    137  |  101  |  19  ----------------------------<  155<H>  4.7   |  23  |  0.73    Ca    9.1      03 May 2021 21:34  Phos  3.4     05-03  Mg     1.9     05-03    TPro  7.4  /  Alb  2.4<L>  /  TBili  0.6  /  DBili  x   /  AST  20  /  ALT  13  /  AlkPhos  69  05-03        CAPILLARY BLOOD GLUCOSE      POCT Blood Glucose.: 145 mg/dL (03 May 2021 19:08)  POCT Blood Glucose.: 257 mg/dL (03 May 2021 14:11)  POCT Blood Glucose.: 271 mg/dL (03 May 2021 12:23)  POCT Blood Glucose.: 246 mg/dL (03 May 2021 06:12)  POCT Blood Glucose.: 224 mg/dL (02 May 2021 23:58)      Lower Extremity Physical Exam:  Vascular: DP/PT 2/4, B/L, CFT <3 seconds B/L, Temperature gradient warm to cool, B/L however diminished warmth noted on the R foot past TMT  Ortho: toes painful to palp  Neuro: Epicritic sensation intact to the level of digits, B/L.  Skin: bilateral digital gangrenous changes noted to distal tips of digits, notably on L foot 2nd digit full thickness, well adhered and warmth noted; R foot 2nd digit sloughed skin with no probe to bone nor signs of infection

## 2021-05-03 NOTE — PROGRESS NOTE ADULT - PROBLEM SELECTOR PLAN 2
-Acute ischemic strokes, with concern for hemorrhagic conversion, noted on CT scan on 4/8  -Etiology of acute ischemic stroke uncertain at this time; TTE with bubble performed at bedside in the MICU without any evidence of right-to-left shunt; no arrhythmia noted on telemetry throughout stay in the MICU  - CTA head and neck without any evidence of carotid artery stenosis or dissection

## 2021-05-03 NOTE — PROGRESS NOTE ADULT - PROBLEM SELECTOR PLAN 6
Dysphagia noted in setting of patient's acute alteration in mental status and hx of intubation  - Nasogastric tube placed for administration of tube feeds originally, but patient self removed 4/17; multiple attempts to replace unsuccessful   Evaluated by speech & swallow: unable to cooperate with FEES study, cineesophagram performed demonstrating aspiration  - PEG tube placed on 4/30, tube feedings started on 4/30, will slowly increase rate by 10 ml q24hrs, currently at 20cc/hr  - Monitor for refeeding syndrome with BMP BID

## 2021-05-03 NOTE — PROGRESS NOTE ADULT - SUBJECTIVE AND OBJECTIVE BOX
60y Male was admitted on 04-12    Patient is a 60y old  Male who presents with a chief complaint of COVID19 w/ severe metabolic acidosis s/p intubation (21 Apr 2021 12:02)    HPI:  Patient seen and examined.  Family member at bedside providing interpretation.  Reports no pain, no CP, some dyspnea w exertion, + fatigue.  Tolerating therapies- max A transfers.    MEDICATIONS  (STANDING):  albuterol/ipratropium for Nebulization 3 milliLiter(s) Nebulizer every 12 hours  cholecalciferol 400 Unit(s) Oral daily  dextrose 40% Gel 15 Gram(s) Oral once  dextrose 5%. 1000 milliLiter(s) (50 mL/Hr) IV Continuous <Continuous>  dextrose 5%. 1000 milliLiter(s) (100 mL/Hr) IV Continuous <Continuous>  dextrose 50% Injectable 25 Gram(s) IV Push once  dextrose 50% Injectable 12.5 Gram(s) IV Push once  dextrose 50% Injectable 25 Gram(s) IV Push once  enoxaparin Injectable 70 milliGRAM(s) SubCutaneous two times a day  glucagon  Injectable 1 milliGRAM(s) IntraMuscular once  insulin lispro (ADMELOG) corrective regimen sliding scale   SubCutaneous every 6 hours  lactated ringers. 1000 milliLiter(s) (100 mL/Hr) IV Continuous <Continuous>  levETIRAcetam  Solution 500 milliGRAM(s) Oral two times a day  multivitamin/minerals Oral Solution (WELLESSE) 30 milliLiter(s) Oral daily  polyethylene glycol 3350 17 Gram(s) Oral daily  senna 2 Tablet(s) Oral at bedtime  thiamine 100 milliGRAM(s) Oral daily    MEDICATIONS  (PRN):    Vital Signs Last 24 Hrs  T(C): 36.4 (03 May 2021 12:28), Max: 36.9 (03 May 2021 05:35)  T(F): 97.5 (03 May 2021 12:28), Max: 98.5 (03 May 2021 05:35)  HR: 63 (03 May 2021 12:28) (63 - 121)  BP: 108/72 (03 May 2021 12:28) (106/64 - 127/80)  BP(mean): --  RR: 18 (03 May 2021 12:28) (18 - 18)  SpO2: 97% (03 May 2021 12:28) (93% - 100%)    PHYSICAL EXAM  Constitutional - NAD,   HEENT - NCAT,   Chest - Breathing comfortably, No wheezing  Abdomen - Soft   Extremities - bilateral digital toe ischemia  Neurologic Exam -                 Alert  Slow processing  Motor 3+/5 bl UEs, 3/5 prox bl LEs, 2/5 bl LEs  Follows verbal instruction w slow processing     Psychiatric - flat    ------------------------------------------------------------------------------------------------  ASSESSMENT/PLAN    60 year old man with prolonged hospitalization for COVID-19, DKA, PE, and CVA with hemorrhagic conversion and fevers suspected 2/2 to aspiration PNA (klebsiella), dysphagia    Disposition -  PT - ROM, Bed Mob, Transfers, Amb with AD   OT - ADLs, ROM  SLP - Dysphagia eval and treat  Prec- Falls, Pulm, cardiac  DVT px- Lovenox  Skin - Turn Q2hrs  Dispo- Acute Rehab- can tolerate 3h/d of therapies and requires daily physician visits

## 2021-05-04 LAB
ALBUMIN SERPL ELPH-MCNC: 2.5 G/DL — LOW (ref 3.3–5)
ALBUMIN SERPL ELPH-MCNC: 3.3 G/DL — SIGNIFICANT CHANGE UP (ref 3.3–5)
ALP SERPL-CCNC: 68 U/L — SIGNIFICANT CHANGE UP (ref 40–120)
ALP SERPL-CCNC: 91 U/L — SIGNIFICANT CHANGE UP (ref 40–120)
ALT FLD-CCNC: 12 U/L — SIGNIFICANT CHANGE UP (ref 10–45)
ALT FLD-CCNC: 15 U/L — SIGNIFICANT CHANGE UP (ref 10–45)
ANION GAP SERPL CALC-SCNC: 13 MMOL/L — SIGNIFICANT CHANGE UP (ref 5–17)
ANION GAP SERPL CALC-SCNC: 15 MMOL/L — SIGNIFICANT CHANGE UP (ref 5–17)
APTT BLD: 31.8 SEC — SIGNIFICANT CHANGE UP (ref 27.5–35.5)
AST SERPL-CCNC: 17 U/L — SIGNIFICANT CHANGE UP (ref 10–40)
AST SERPL-CCNC: 20 U/L — SIGNIFICANT CHANGE UP (ref 10–40)
BILIRUB SERPL-MCNC: 0.6 MG/DL — SIGNIFICANT CHANGE UP (ref 0.2–1.2)
BILIRUB SERPL-MCNC: 0.8 MG/DL — SIGNIFICANT CHANGE UP (ref 0.2–1.2)
BUN SERPL-MCNC: 18 MG/DL — SIGNIFICANT CHANGE UP (ref 7–23)
BUN SERPL-MCNC: 18 MG/DL — SIGNIFICANT CHANGE UP (ref 7–23)
CALCIUM SERPL-MCNC: 8.9 MG/DL — SIGNIFICANT CHANGE UP (ref 8.4–10.5)
CALCIUM SERPL-MCNC: 9.8 MG/DL — SIGNIFICANT CHANGE UP (ref 8.4–10.5)
CHLORIDE SERPL-SCNC: 98 MMOL/L — SIGNIFICANT CHANGE UP (ref 96–108)
CHLORIDE SERPL-SCNC: 99 MMOL/L — SIGNIFICANT CHANGE UP (ref 96–108)
CO2 SERPL-SCNC: 24 MMOL/L — SIGNIFICANT CHANGE UP (ref 22–31)
CO2 SERPL-SCNC: 25 MMOL/L — SIGNIFICANT CHANGE UP (ref 22–31)
CREAT SERPL-MCNC: 0.69 MG/DL — SIGNIFICANT CHANGE UP (ref 0.5–1.3)
CREAT SERPL-MCNC: 0.73 MG/DL — SIGNIFICANT CHANGE UP (ref 0.5–1.3)
GLUCOSE BLDC GLUCOMTR-MCNC: 160 MG/DL — HIGH (ref 70–99)
GLUCOSE BLDC GLUCOMTR-MCNC: 173 MG/DL — HIGH (ref 70–99)
GLUCOSE BLDC GLUCOMTR-MCNC: 198 MG/DL — HIGH (ref 70–99)
GLUCOSE BLDC GLUCOMTR-MCNC: 259 MG/DL — HIGH (ref 70–99)
GLUCOSE BLDC GLUCOMTR-MCNC: 281 MG/DL — HIGH (ref 70–99)
GLUCOSE SERPL-MCNC: 167 MG/DL — HIGH (ref 70–99)
GLUCOSE SERPL-MCNC: 170 MG/DL — HIGH (ref 70–99)
HCT VFR BLD CALC: 28.5 % — LOW (ref 39–50)
HGB BLD-MCNC: 8.9 G/DL — LOW (ref 13–17)
MAGNESIUM SERPL-MCNC: 1.8 MG/DL — SIGNIFICANT CHANGE UP (ref 1.6–2.6)
MAGNESIUM SERPL-MCNC: 1.9 MG/DL — SIGNIFICANT CHANGE UP (ref 1.6–2.6)
MCHC RBC-ENTMCNC: 26.8 PG — LOW (ref 27–34)
MCHC RBC-ENTMCNC: 31.2 GM/DL — LOW (ref 32–36)
MCV RBC AUTO: 85.8 FL — SIGNIFICANT CHANGE UP (ref 80–100)
NRBC # BLD: 0 /100 WBCS — SIGNIFICANT CHANGE UP (ref 0–0)
PHOSPHATE SERPL-MCNC: 3.5 MG/DL — SIGNIFICANT CHANGE UP (ref 2.5–4.5)
PHOSPHATE SERPL-MCNC: 3.9 MG/DL — SIGNIFICANT CHANGE UP (ref 2.5–4.5)
PLATELET # BLD AUTO: 310 K/UL — SIGNIFICANT CHANGE UP (ref 150–400)
POTASSIUM SERPL-MCNC: 4.3 MMOL/L — SIGNIFICANT CHANGE UP (ref 3.5–5.3)
POTASSIUM SERPL-MCNC: 4.7 MMOL/L — SIGNIFICANT CHANGE UP (ref 3.5–5.3)
POTASSIUM SERPL-SCNC: 4.3 MMOL/L — SIGNIFICANT CHANGE UP (ref 3.5–5.3)
POTASSIUM SERPL-SCNC: 4.7 MMOL/L — SIGNIFICANT CHANGE UP (ref 3.5–5.3)
PROT SERPL-MCNC: 7.1 G/DL — SIGNIFICANT CHANGE UP (ref 6–8.3)
PROT SERPL-MCNC: 9 G/DL — HIGH (ref 6–8.3)
RBC # BLD: 3.32 M/UL — LOW (ref 4.2–5.8)
RBC # FLD: 17.3 % — HIGH (ref 10.3–14.5)
SODIUM SERPL-SCNC: 137 MMOL/L — SIGNIFICANT CHANGE UP (ref 135–145)
SODIUM SERPL-SCNC: 137 MMOL/L — SIGNIFICANT CHANGE UP (ref 135–145)
WBC # BLD: 11.46 K/UL — HIGH (ref 3.8–10.5)
WBC # FLD AUTO: 11.46 K/UL — HIGH (ref 3.8–10.5)

## 2021-05-04 PROCEDURE — 99233 SBSQ HOSP IP/OBS HIGH 50: CPT | Mod: GC

## 2021-05-04 PROCEDURE — 74018 RADEX ABDOMEN 1 VIEW: CPT | Mod: 26

## 2021-05-04 RX ORDER — TAMSULOSIN HYDROCHLORIDE 0.4 MG/1
0.4 CAPSULE ORAL AT BEDTIME
Refills: 0 | Status: DISCONTINUED | OUTPATIENT
Start: 2021-05-04 | End: 2021-05-12

## 2021-05-04 RX ADMIN — Medication 3 MILLILITER(S): at 17:20

## 2021-05-04 RX ADMIN — TAMSULOSIN HYDROCHLORIDE 0.4 MILLIGRAM(S): 0.4 CAPSULE ORAL at 21:29

## 2021-05-04 RX ADMIN — Medication 6: at 12:34

## 2021-05-04 RX ADMIN — Medication 2: at 06:27

## 2021-05-04 RX ADMIN — ENOXAPARIN SODIUM 70 MILLIGRAM(S): 100 INJECTION SUBCUTANEOUS at 18:26

## 2021-05-04 RX ADMIN — Medication 400 UNIT(S): at 12:22

## 2021-05-04 RX ADMIN — Medication 6: at 23:44

## 2021-05-04 RX ADMIN — SENNA PLUS 2 TABLET(S): 8.6 TABLET ORAL at 21:29

## 2021-05-04 RX ADMIN — LEVETIRACETAM 500 MILLIGRAM(S): 250 TABLET, FILM COATED ORAL at 06:13

## 2021-05-04 RX ADMIN — Medication 100 MILLIGRAM(S): at 12:23

## 2021-05-04 RX ADMIN — Medication 30 MILLILITER(S): at 18:25

## 2021-05-04 RX ADMIN — Medication 2: at 17:17

## 2021-05-04 RX ADMIN — LEVETIRACETAM 500 MILLIGRAM(S): 250 TABLET, FILM COATED ORAL at 18:26

## 2021-05-04 RX ADMIN — ENOXAPARIN SODIUM 70 MILLIGRAM(S): 100 INJECTION SUBCUTANEOUS at 06:13

## 2021-05-04 RX ADMIN — Medication 2: at 00:09

## 2021-05-04 RX ADMIN — POLYETHYLENE GLYCOL 3350 17 GRAM(S): 17 POWDER, FOR SOLUTION ORAL at 12:22

## 2021-05-04 NOTE — PROGRESS NOTE ADULT - PROBLEM SELECTOR PLAN 8
- Initiated goals of care discussion with patient's brother-in-law (listed in chart as primary point of contact); patient is full code at this time; goal is as much recovery as possible  -The patient's wife is in [place], but the patient's brother-in-law has been visiting and placing the patient's wife on facetime; she is aware of all that is happening and has happened feet with necrosis  - podiatry recs appreciated   - c/w wound care  - no active signs of infxn

## 2021-05-04 NOTE — PROGRESS NOTE ADULT - PROBLEM SELECTOR PLAN 6
Dysphagia noted in setting of patient's acute alteration in mental status and hx of intubation  - Nasogastric tube placed for administration of tube feeds originally, but patient self removed 4/17; multiple attempts to replace unsuccessful   Evaluated by speech & swallow: unable to cooperate with FEES study, cineesophagram performed demonstrating aspiration  - PEG tube placed on 4/30, tube feedings started on 4/30, will slowly increase rate by 10 ml q24hrs, currently at 20cc/hr  - Monitor for refeeding syndrome with BMP BID Dysphagia noted in setting of patient's acute alteration in mental status and hx of intubation  - Nasogastric tube placed for administration of tube feeds originally, but patient self removed 4/17; multiple attempts to replace unsuccessful   Evaluated by speech & swallow: unable to cooperate with FEES study, cineesophagram performed demonstrating aspiration  - PEG tube placed on 4/30, tube feedings started on 4/30, will slowly increase rate by 10 ml q24hrs, currently at 20cc/hr, + free water q8  - Monitor for refeeding syndrome with BMP BID

## 2021-05-04 NOTE — PROGRESS NOTE ADULT - PROBLEM SELECTOR PLAN 2
-Acute ischemic strokes, with concern for hemorrhagic conversion, noted on CT scan on 4/8  -Etiology of acute ischemic stroke uncertain at this time; TTE with bubble performed at bedside in the MICU without any evidence of right-to-left shunt; no arrhythmia noted on telemetry throughout stay in the MICU  - CTA head and neck without any evidence of carotid artery stenosis or dissection -Acute ischemic strokes, with concern for hemorrhagic conversion, noted on CT scan on 4/8  -Etiology of acute ischemic stroke uncertain at this time; TTE with bubble performed at bedside in the MICU without any evidence of right-to-left shunt; no arrhythmia noted on telemetry throughout stay in the MICU  - CTA head and neck without any evidence of carotid artery stenosis or dissection    Retention   - olson placed  - will need TOV  - consider starting flomax

## 2021-05-04 NOTE — PROGRESS NOTE ADULT - SUBJECTIVE AND OBJECTIVE BOX
PROGRESS NOTE:   Authored by Dr. Emily Mckeon, ANH pager [phone]    Patient is a 60y old  Male who presents with a chief complaint of COVID19 w/ severe metabolic acidosis s/p intubation (03 May 2021 18:01)      SUBJECTIVE / OVERNIGHT EVENTS:    MEDICATIONS  (STANDING):  albuterol/ipratropium for Nebulization 3 milliLiter(s) Nebulizer every 12 hours  cholecalciferol 400 Unit(s) Oral daily  dextrose 40% Gel 15 Gram(s) Oral once  dextrose 5%. 1000 milliLiter(s) (50 mL/Hr) IV Continuous <Continuous>  dextrose 5%. 1000 milliLiter(s) (100 mL/Hr) IV Continuous <Continuous>  dextrose 50% Injectable 25 Gram(s) IV Push once  dextrose 50% Injectable 12.5 Gram(s) IV Push once  dextrose 50% Injectable 25 Gram(s) IV Push once  enoxaparin Injectable 70 milliGRAM(s) SubCutaneous two times a day  glucagon  Injectable 1 milliGRAM(s) IntraMuscular once  insulin lispro (ADMELOG) corrective regimen sliding scale   SubCutaneous every 6 hours  lactated ringers. 1000 milliLiter(s) (100 mL/Hr) IV Continuous <Continuous>  levETIRAcetam  Solution 500 milliGRAM(s) Oral two times a day  multivitamin/minerals Oral Solution (WELLESSE) 30 milliLiter(s) Oral daily  polyethylene glycol 3350 17 Gram(s) Oral daily  senna 2 Tablet(s) Oral at bedtime  thiamine 100 milliGRAM(s) Oral daily    MEDICATIONS  (PRN):      CAPILLARY BLOOD GLUCOSE      POCT Blood Glucose.: 173 mg/dL (04 May 2021 06:17)  POCT Blood Glucose.: 198 mg/dL (03 May 2021 23:58)  POCT Blood Glucose.: 145 mg/dL (03 May 2021 19:08)  POCT Blood Glucose.: 257 mg/dL (03 May 2021 14:11)  POCT Blood Glucose.: 271 mg/dL (03 May 2021 12:23)    I&O's Summary    03 May 2021 07:01  -  04 May 2021 07:00  --------------------------------------------------------  IN: 1450 mL / OUT: 550 mL / NET: 900 mL        PHYSICAL EXAM:  Vital Signs Last 24 Hrs  T(C): 36.7 (04 May 2021 04:48), Max: 36.8 (03 May 2021 20:35)  T(F): 98.1 (04 May 2021 04:48), Max: 98.2 (03 May 2021 20:35)  HR: 107 (04 May 2021 04:48) (63 - 109)  BP: 118/78 (04 May 2021 04:48) (108/72 - 118/78)  BP(mean): --  RR: 18 (04 May 2021 04:48) (18 - 18)  SpO2: 98% (04 May 2021 04:48) (97% - 98%)    GENERAL: No acute distress, well-developed  HEAD:  Atraumatic, Normocephalic  EYES: EOMI, PERRLA, conjunctiva and sclera clear  NECK: Supple, no lymphadenopathy, no JVD  CHEST/LUNG: CTAB; No wheezes, rales, or rhonchi  HEART: Regular rate and rhythm; No murmurs, rubs, or gallops  ABDOMEN: Soft, non-tender, non-distended; normal bowel sounds, no organomegaly  EXTREMITIES:  2+ peripheral pulses b/l, No clubbing, cyanosis, or edema  NEUROLOGY: A&O x 3, no focal deficits  SKIN: No rashes or lesions    LABS:                        8.9    11.46 )-----------( 310      ( 04 May 2021 07:15 )             28.5     05-04    137  |  99  |  18  ----------------------------<  167<H>  4.3   |  25  |  0.69    Ca    8.9      04 May 2021 07:12  Phos  3.9     05-04  Mg     1.8     05-04    TPro  7.1  /  Alb  2.5<L>  /  TBili  0.6  /  DBili  x   /  AST  17  /  ALT  12  /  AlkPhos  68  05-04    PTT - ( 04 May 2021 07:13 )  PTT:31.8 sec            RADIOLOGY & ADDITIONAL TESTS:  Results Reviewed:   Imaging Personally Reviewed:  Electrocardiogram Personally Reviewed:    COORDINATION OF CARE:  Care Discussed with Consultants/Other Providers [Y/N]:  Prior or Outpatient Records Reviewed [Y/N]:   PROGRESS NOTE:   Authored by Dr. Emily Mckeon, ANH pager [phone]    Patient is a 60y old  Male who presents with a chief complaint of COVID19 w/ severe metabolic acidosis s/p intubation (03 May 2021 18:01)      SUBJECTIVE / OVERNIGHT EVENTS: no events overnight. Patient examined at bedside. He appeared more alert today. He was able to move his extremities and follow commands     MEDICATIONS  (STANDING):  albuterol/ipratropium for Nebulization 3 milliLiter(s) Nebulizer every 12 hours  cholecalciferol 400 Unit(s) Oral daily  dextrose 40% Gel 15 Gram(s) Oral once  dextrose 5%. 1000 milliLiter(s) (50 mL/Hr) IV Continuous <Continuous>  dextrose 5%. 1000 milliLiter(s) (100 mL/Hr) IV Continuous <Continuous>  dextrose 50% Injectable 25 Gram(s) IV Push once  dextrose 50% Injectable 12.5 Gram(s) IV Push once  dextrose 50% Injectable 25 Gram(s) IV Push once  enoxaparin Injectable 70 milliGRAM(s) SubCutaneous two times a day  glucagon  Injectable 1 milliGRAM(s) IntraMuscular once  insulin lispro (ADMELOG) corrective regimen sliding scale   SubCutaneous every 6 hours  lactated ringers. 1000 milliLiter(s) (100 mL/Hr) IV Continuous <Continuous>  levETIRAcetam  Solution 500 milliGRAM(s) Oral two times a day  multivitamin/minerals Oral Solution (WELLESSE) 30 milliLiter(s) Oral daily  polyethylene glycol 3350 17 Gram(s) Oral daily  senna 2 Tablet(s) Oral at bedtime  thiamine 100 milliGRAM(s) Oral daily    MEDICATIONS  (PRN):      CAPILLARY BLOOD GLUCOSE      POCT Blood Glucose.: 173 mg/dL (04 May 2021 06:17)  POCT Blood Glucose.: 198 mg/dL (03 May 2021 23:58)  POCT Blood Glucose.: 145 mg/dL (03 May 2021 19:08)  POCT Blood Glucose.: 257 mg/dL (03 May 2021 14:11)  POCT Blood Glucose.: 271 mg/dL (03 May 2021 12:23)    I&O's Summary    03 May 2021 07:01  -  04 May 2021 07:00  --------------------------------------------------------  IN: 1450 mL / OUT: 550 mL / NET: 900 mL        PHYSICAL EXAM:  Vital Signs Last 24 Hrs  T(C): 36.7 (04 May 2021 04:48), Max: 36.8 (03 May 2021 20:35)  T(F): 98.1 (04 May 2021 04:48), Max: 98.2 (03 May 2021 20:35)  HR: 107 (04 May 2021 04:48) (63 - 109)  BP: 118/78 (04 May 2021 04:48) (108/72 - 118/78)  BP(mean): --  RR: 18 (04 May 2021 04:48) (18 - 18)  SpO2: 98% (04 May 2021 04:48) (97% - 98%)    GENERAL: patient was tired but able tofollow commands   HEAD:  Atraumatic, Normocephalic  EYES: EOMI, PERRLA, conjunctiva and sclera clear  NECK: Supple, no lymphadenopathy, no JVD  CHEST/LUNG: CTAB; No wheezes, rales, or rhonchi  HEART: Regular rate and rhythm; No murmurs, rubs, or gallops  ABDOMEN: Soft, non-tender, non-distended; normal bowel sounds, no organomegaly, PEG in place   EXTREMITIES:  2+ peripheral pulses b/l, No clubbing, cyanosis, or edema  NEUROLOGY: A&O x 3, 1/5 strength in LE, 4/5 strength in UE   SKIN: feet with necrosis in toes, no pus or open lesion   LABS:                        8.9    11.46 )-----------( 310      ( 04 May 2021 07:15 )             28.5     05-04    137  |  99  |  18  ----------------------------<  167<H>  4.3   |  25  |  0.69    Ca    8.9      04 May 2021 07:12  Phos  3.9     05-04  Mg     1.8     05-04    TPro  7.1  /  Alb  2.5<L>  /  TBili  0.6  /  DBili  x   /  AST  17  /  ALT  12  /  AlkPhos  68  05-04    PTT - ( 04 May 2021 07:13 )  PTT:31.8 sec            RADIOLOGY & ADDITIONAL TESTS:  Results Reviewed:   Imaging Personally Reviewed:  Electrocardiogram Personally Reviewed:    COORDINATION OF CARE:  Care Discussed with Consultants/Other Providers [Y/N]:  Prior or Outpatient Records Reviewed [Y/N]:

## 2021-05-04 NOTE — PROGRESS NOTE ADULT - PROBLEM SELECTOR PLAN 9
- DVT: Lovenox full dose  - Diet: NPO with tube feedings  - Full code - Initiated goals of care discussion with patient's brother-in-law (listed in chart as primary point of contact); patient is full code at this time; goal is as much recovery as possible  -The patient's wife is in [place], but the patient's brother-in-law has been visiting and placing the patient's wife on facetime; she is aware of all that is happening and has happened

## 2021-05-04 NOTE — PROGRESS NOTE ADULT - ASSESSMENT
60 yr male with PMH of DM who was initially admitted to [facility] ICU for hypoxic respiratory failure 2/2 COVID c/b PE with R heart strain, cardiogenic and septic shock, ischemic and hemorrhagic CVA and ESBL klebsiella ventilator associate PNA, transferred to [facility] for neurosurgery eval and further management. s/p PEG, now monitoring for refeeding syndrome

## 2021-05-05 LAB
ALBUMIN SERPL ELPH-MCNC: 2.7 G/DL — LOW (ref 3.3–5)
ALP SERPL-CCNC: 77 U/L — SIGNIFICANT CHANGE UP (ref 40–120)
ALT FLD-CCNC: 14 U/L — SIGNIFICANT CHANGE UP (ref 10–45)
ANION GAP SERPL CALC-SCNC: 15 MMOL/L — SIGNIFICANT CHANGE UP (ref 5–17)
APPEARANCE UR: ABNORMAL
APTT BLD: 33.3 SEC — SIGNIFICANT CHANGE UP (ref 27.5–35.5)
AST SERPL-CCNC: 16 U/L — SIGNIFICANT CHANGE UP (ref 10–40)
BACTERIA # UR AUTO: ABNORMAL
BASOPHILS # BLD AUTO: 0.09 K/UL — SIGNIFICANT CHANGE UP (ref 0–0.2)
BASOPHILS NFR BLD AUTO: 0.5 % — SIGNIFICANT CHANGE UP (ref 0–2)
BILIRUB SERPL-MCNC: 0.7 MG/DL — SIGNIFICANT CHANGE UP (ref 0.2–1.2)
BILIRUB UR-MCNC: NEGATIVE — SIGNIFICANT CHANGE UP
BUN SERPL-MCNC: 20 MG/DL — SIGNIFICANT CHANGE UP (ref 7–23)
CALCIUM SERPL-MCNC: 8.8 MG/DL — SIGNIFICANT CHANGE UP (ref 8.4–10.5)
CHLORIDE SERPL-SCNC: 97 MMOL/L — SIGNIFICANT CHANGE UP (ref 96–108)
CO2 SERPL-SCNC: 23 MMOL/L — SIGNIFICANT CHANGE UP (ref 22–31)
COLOR SPEC: ABNORMAL
CREAT SERPL-MCNC: 0.79 MG/DL — SIGNIFICANT CHANGE UP (ref 0.5–1.3)
DIFF PNL FLD: ABNORMAL
EOSINOPHIL # BLD AUTO: 0.25 K/UL — SIGNIFICANT CHANGE UP (ref 0–0.5)
EOSINOPHIL NFR BLD AUTO: 1.4 % — SIGNIFICANT CHANGE UP (ref 0–6)
EPI CELLS # UR: 3 — SIGNIFICANT CHANGE UP
GLUCOSE BLDC GLUCOMTR-MCNC: 225 MG/DL — HIGH (ref 70–99)
GLUCOSE BLDC GLUCOMTR-MCNC: 241 MG/DL — HIGH (ref 70–99)
GLUCOSE BLDC GLUCOMTR-MCNC: 258 MG/DL — HIGH (ref 70–99)
GLUCOSE BLDC GLUCOMTR-MCNC: 287 MG/DL — HIGH (ref 70–99)
GLUCOSE SERPL-MCNC: 279 MG/DL — HIGH (ref 70–99)
GLUCOSE UR QL: ABNORMAL
HCT VFR BLD CALC: 28.8 % — LOW (ref 39–50)
HGB BLD-MCNC: 8.7 G/DL — LOW (ref 13–17)
HYALINE CASTS # UR AUTO: 2 /LPF — SIGNIFICANT CHANGE UP (ref 0–7)
IMM GRANULOCYTES NFR BLD AUTO: 2.4 % — HIGH (ref 0–1.5)
KETONES UR-MCNC: SIGNIFICANT CHANGE UP
LEUKOCYTE ESTERASE UR-ACNC: ABNORMAL
LYMPHOCYTES # BLD AUTO: 1.82 K/UL — SIGNIFICANT CHANGE UP (ref 1–3.3)
LYMPHOCYTES # BLD AUTO: 10.4 % — LOW (ref 13–44)
MAGNESIUM SERPL-MCNC: 1.9 MG/DL — SIGNIFICANT CHANGE UP (ref 1.6–2.6)
MCHC RBC-ENTMCNC: 26.1 PG — LOW (ref 27–34)
MCHC RBC-ENTMCNC: 30.2 GM/DL — LOW (ref 32–36)
MCV RBC AUTO: 86.5 FL — SIGNIFICANT CHANGE UP (ref 80–100)
MONOCYTES # BLD AUTO: 1.48 K/UL — HIGH (ref 0–0.9)
MONOCYTES NFR BLD AUTO: 8.5 % — SIGNIFICANT CHANGE UP (ref 2–14)
NEUTROPHILS # BLD AUTO: 13.44 K/UL — HIGH (ref 1.8–7.4)
NEUTROPHILS NFR BLD AUTO: 76.8 % — SIGNIFICANT CHANGE UP (ref 43–77)
NITRITE UR-MCNC: POSITIVE
NRBC # BLD: 0 /100 WBCS — SIGNIFICANT CHANGE UP (ref 0–0)
PH UR: 6.5 — SIGNIFICANT CHANGE UP (ref 5–8)
PHOSPHATE SERPL-MCNC: 3.6 MG/DL — SIGNIFICANT CHANGE UP (ref 2.5–4.5)
PLATELET # BLD AUTO: 323 K/UL — SIGNIFICANT CHANGE UP (ref 150–400)
POTASSIUM SERPL-MCNC: 4.4 MMOL/L — SIGNIFICANT CHANGE UP (ref 3.5–5.3)
POTASSIUM SERPL-SCNC: 4.4 MMOL/L — SIGNIFICANT CHANGE UP (ref 3.5–5.3)
PROCALCITONIN SERPL-MCNC: 0.29 NG/ML — HIGH (ref 0.02–0.1)
PROT SERPL-MCNC: 7.1 G/DL — SIGNIFICANT CHANGE UP (ref 6–8.3)
PROT UR-MCNC: ABNORMAL
RBC # BLD: 3.33 M/UL — LOW (ref 4.2–5.8)
RBC # FLD: 17.6 % — HIGH (ref 10.3–14.5)
RBC CASTS # UR COMP ASSIST: >50 /HPF — SIGNIFICANT CHANGE UP (ref 0–4)
SODIUM SERPL-SCNC: 135 MMOL/L — SIGNIFICANT CHANGE UP (ref 135–145)
SP GR SPEC: 1.03 — HIGH (ref 1.01–1.02)
UROBILINOGEN FLD QL: ABNORMAL
WBC # BLD: 17.5 K/UL — HIGH (ref 3.8–10.5)
WBC # FLD AUTO: 17.5 K/UL — HIGH (ref 3.8–10.5)
WBC UR QL: >50

## 2021-05-05 PROCEDURE — 70450 CT HEAD/BRAIN W/O DYE: CPT | Mod: 26

## 2021-05-05 PROCEDURE — 71045 X-RAY EXAM CHEST 1 VIEW: CPT | Mod: 26

## 2021-05-05 PROCEDURE — 99233 SBSQ HOSP IP/OBS HIGH 50: CPT | Mod: GC

## 2021-05-05 RX ADMIN — Medication 6: at 05:49

## 2021-05-05 RX ADMIN — ENOXAPARIN SODIUM 70 MILLIGRAM(S): 100 INJECTION SUBCUTANEOUS at 05:49

## 2021-05-05 RX ADMIN — Medication 4: at 23:42

## 2021-05-05 RX ADMIN — LEVETIRACETAM 500 MILLIGRAM(S): 250 TABLET, FILM COATED ORAL at 17:20

## 2021-05-05 RX ADMIN — SENNA PLUS 2 TABLET(S): 8.6 TABLET ORAL at 21:35

## 2021-05-05 RX ADMIN — ENOXAPARIN SODIUM 70 MILLIGRAM(S): 100 INJECTION SUBCUTANEOUS at 17:20

## 2021-05-05 RX ADMIN — Medication 4: at 17:19

## 2021-05-05 RX ADMIN — Medication 3 MILLILITER(S): at 17:50

## 2021-05-05 RX ADMIN — Medication 30 MILLILITER(S): at 12:24

## 2021-05-05 RX ADMIN — TAMSULOSIN HYDROCHLORIDE 0.4 MILLIGRAM(S): 0.4 CAPSULE ORAL at 21:35

## 2021-05-05 RX ADMIN — POLYETHYLENE GLYCOL 3350 17 GRAM(S): 17 POWDER, FOR SOLUTION ORAL at 12:24

## 2021-05-05 RX ADMIN — Medication 6: at 12:31

## 2021-05-05 RX ADMIN — Medication 400 UNIT(S): at 12:24

## 2021-05-05 RX ADMIN — LEVETIRACETAM 500 MILLIGRAM(S): 250 TABLET, FILM COATED ORAL at 05:49

## 2021-05-05 RX ADMIN — Medication 100 MILLIGRAM(S): at 12:23

## 2021-05-05 NOTE — PROGRESS NOTE ADULT - ASSESSMENT
60 yr male with PMH of DM who was initially admitted to [facility] ICU for hypoxic respiratory failure 2/2 COVID c/b PE with R heart strain, cardiogenic and septic shock, ischemic and hemorrhagic CVA and ESBL klebsiella ventilator associate PNA, transferred to [facility] for neurosurgery eval and further management. s/p PEG, now monitoring for refeeding syndrome

## 2021-05-05 NOTE — PROGRESS NOTE ADULT - ATTENDING COMMENTS
59 yo M with PMH of DM who was initially admitted to [facility] ICU 4/1 for hypoxic respiratory failure 2/2 COVID c/b PE with R heart strain, cardiogenic and septic shock, ischemic and hemorrhagic CVA and ESBL klebsiella ventilator associated PNA, transferred to [facility] for neurosurgery eval on 4/12  No fever, has leukocytosis  Has rising WBC, conversely, patient appears well at bedside--no fevers, no chills, no new complaints  Bilateral LE dry gangrene  CXR RLL atelectasis vs pna? No clinical signs pneumonia  No sob/no cough, no abd pain, N/V/D  Has PEG  PCT minimally elevated  Based on stable clinical status, would monitor off abx for now  1) Leukocytosis  - Acute rise in leukocytosis, unclear cause; aspiration pneumonitis vs alternate cause?  - Would monitor off abx for now  - Trend WBC  - Monitor for fever, diarrhea, alternate sources infection (if clinical worsening, would start empiric abx acutely  - Check BCXs x 2, UA, UCX  2) COVID  - Prior infection  - Monitor for any signs active COVID/reinfection  3) Abnormal finding on imaging  - Note CXR with RLL opacity, presently no clinical signs pna  - O2 supplementation per primary team    Sammy Chambers MD  Pager [phone]  After 5pm and on weekends call [phone]    I was physically present for the key portions of the evaluation and management service provided. I saw and examined the patient. I agree with the above history, physical, and plan except for any discrepancies which I have documented in “Attending Attestation.” Please refer to “Attending Attestation” for final plan. 59 yo M with PMH of DM who was initially admitted to [facility] ICU 4/1 for hypoxic respiratory failure 2/2 COVID c/b PE with R heart strain, cardiogenic and septic shock, ischemic and hemorrhagic CVA and ESBL klebsiella ventilator associated PNA, transferred to [facility] for neurosurgery eval on 4/12  No fever, has leukocytosis  Has rising WBC, conversely, patient appears well at bedside--no fevers, no chills, no new complaints  Bilateral LE dry gangrene  CXR RLL atelectasis vs pna? No clinical signs pneumonia  No sob/no cough, no abd pain, N/V/D  Has PEG  PCT minimally elevated  Based on stable clinical status, would monitor off abx for now  1) Leukocytosis  - Acute rise in leukocytosis, unclear cause; aspiration pneumonitis vs alternate cause?  - Would monitor off abx for now  - Trend WBC  - Monitor for fever, diarrhea, alternate sources infection (if clinical worsening, would start empiric abx acutely  - Check BCXs x 2, UA, UCX  2) COVID  - Prior infection  - Monitor for any signs active COVID/reinfection  3) Abnormal finding on imaging  - Note CXR with RLL opacity, presently no clinical signs pna  - O2 supplementation per primary team  4) Gangrene  - Appears noninfectious/dry gangrene  - Monitor wounds    Sammy Chambers MD  Pager [phone]  After 5pm and on weekends call [phone]    I was physically present for the key portions of the evaluation and management service provided. I saw and examined the patient. I agree with the above history, physical, and plan except for any discrepancies which I have documented in “Attending Attestation.” Please refer to “Attending Attestation” for final plan.

## 2021-05-05 NOTE — PROGRESS NOTE ADULT - PROBLEM SELECTOR PLAN 1
- Etiology likely multi-factorial in setting of acute ischemic infarctions of the brain in conjunction vs acute kidney injury with uremia (now resolving) vs resolving hypoxic respiratory failure, covid-19 infection, superimposed bacterial pneumonia, urinary retention, concern for nutritional deficiencies)  - S/p management of: acute pneumonia with antibiotics; pulmonary embolus with full-dose anticoagulation  - Reorientation with family at bedside (pt speaks [place], does best with family present)  - MRI Brain, and MRA Head demonstrated multiple evolving subacute infarcts with associated hemorrhages, likely expected evolution of ischemia  - Neuro recs appreciated  - As per neuro, EEG not suggestive of seizure-like activity  - C/w Keppra 500 mg BID - Etiology likely multi-factorial in setting of acute ischemic infarctions of the brain in conjunction vs acute kidney injury with uremia (now resolving) vs resolving hypoxic respiratory failure, covid-19 infection, superimposed bacterial pneumonia, urinary retention, concern for nutritional deficiencies)  - S/p management of: acute pneumonia with antibiotics; pulmonary embolus with full-dose anticoagulation  - Reorientation with family at bedside (pt speaks [place], does best with family present)  - MRI Brain, and MRA Head demonstrated multiple evolving subacute infarcts with associated hemorrhages, likely expected evolution of ischemia  - Neuro recs appreciated  - As per neuro, EEG not suggestive of seizure-like activity  - C/w Keppra 500 mg BID  - repeating CT head 5/5

## 2021-05-05 NOTE — PROGRESS NOTE ADULT - ATTENDING COMMENTS
neuro exam unchanged  stable gangrenous changes in toes - no worsening  PEG feeds  work up leukocytosis prior to discharge to acute rehab at [place]

## 2021-05-05 NOTE — PROGRESS NOTE ADULT - ASSESSMENT
60 yr male with PMH of DM who was initially admitted to [facility] ICU 4/1 for hypoxic respiratory failure 2/2 COVID c/b PE with R heart strain, cardiogenic and septic shock, ischemic and hemorrhagic CVA and ESBL klebsiella ventilator associated PNA, transferred to [facility] for neurosurgery eval on 4/12.   Extubated 4/13 and transferred to floors 4/14. S/p EEG with no epileptiform activity seen.  Failed swallow study, s/p PEG 4/29.     ID called back for leukocytosis.   Concern for developing aspiration PNA vs bacteremia vs reactive    Rec:  - f/u Bclx  - check UA, and if positive, UCx  - monitor off abx for now, if decompensates can start zosyn for possible aspiration PNA and check CT chest  - trend leukocytosis    Gael Koehler MD  Fellow, Infectious Diseases, PGY-4  Pager: [phone]  Before 9am or after 5pm/Weekends: Call [phone]

## 2021-05-05 NOTE — PROGRESS NOTE ADULT - SUBJECTIVE AND OBJECTIVE BOX
PROGRESS NOTE:   Authored by Dr. Emily Mckeon, ANH pager [phone]    Patient is a 60y old  Male who presents with a chief complaint of COVID19 w/ severe metabolic acidosis s/p intubation (04 May 2021 08:52)      SUBJECTIVE / OVERNIGHT EVENTS:    MEDICATIONS  (STANDING):  albuterol/ipratropium for Nebulization 3 milliLiter(s) Nebulizer every 12 hours  cholecalciferol 400 Unit(s) Oral daily  dextrose 40% Gel 15 Gram(s) Oral once  dextrose 5%. 1000 milliLiter(s) (50 mL/Hr) IV Continuous <Continuous>  dextrose 5%. 1000 milliLiter(s) (100 mL/Hr) IV Continuous <Continuous>  dextrose 50% Injectable 25 Gram(s) IV Push once  dextrose 50% Injectable 12.5 Gram(s) IV Push once  dextrose 50% Injectable 25 Gram(s) IV Push once  enoxaparin Injectable 70 milliGRAM(s) SubCutaneous two times a day  glucagon  Injectable 1 milliGRAM(s) IntraMuscular once  insulin lispro (ADMELOG) corrective regimen sliding scale   SubCutaneous every 6 hours  levETIRAcetam  Solution 500 milliGRAM(s) Oral two times a day  multivitamin/minerals Oral Solution (WELLESSE) 30 milliLiter(s) Oral daily  polyethylene glycol 3350 17 Gram(s) Oral daily  senna 2 Tablet(s) Oral at bedtime  tamsulosin 0.4 milliGRAM(s) Oral at bedtime  thiamine 100 milliGRAM(s) Oral daily    MEDICATIONS  (PRN):      CAPILLARY BLOOD GLUCOSE      POCT Blood Glucose.: 287 mg/dL (05 May 2021 05:16)  POCT Blood Glucose.: 259 mg/dL (04 May 2021 23:31)  POCT Blood Glucose.: 160 mg/dL (04 May 2021 17:16)  POCT Blood Glucose.: 281 mg/dL (04 May 2021 12:31)    I&O's Summary    04 May 2021 07:01  -  05 May 2021 07:00  --------------------------------------------------------  IN: 600 mL / OUT: 1400 mL / NET: -800 mL        PHYSICAL EXAM:  Vital Signs Last 24 Hrs  T(C): 37.3 (05 May 2021 04:38), Max: 37.3 (05 May 2021 04:38)  T(F): 99.2 (05 May 2021 04:38), Max: 99.2 (05 May 2021 04:38)  HR: 106 (05 May 2021 04:38) (85 - 106)  BP: 112/69 (05 May 2021 04:38) (100/68 - 118/71)  BP(mean): --  RR: 18 (05 May 2021 04:38) (18 - 19)  SpO2: 94% (05 May 2021 04:38) (92% - 98%)    GENERAL: No acute distress, well-developed  HEAD:  Atraumatic, Normocephalic  EYES: EOMI, PERRLA, conjunctiva and sclera clear  NECK: Supple, no lymphadenopathy, no JVD  CHEST/LUNG: CTAB; No wheezes, rales, or rhonchi  HEART: Regular rate and rhythm; No murmurs, rubs, or gallops  ABDOMEN: Soft, non-tender, non-distended; normal bowel sounds, no organomegaly  EXTREMITIES:  2+ peripheral pulses b/l, No clubbing, cyanosis, or edema  NEUROLOGY: A&O x 3, no focal deficits  SKIN: No rashes or lesions    LABS:                        8.7    17.50 )-----------( 323      ( 05 May 2021 07:27 )             28.8     05-05    135  |  97  |  20  ----------------------------<  279<H>  4.4   |  23  |  0.79    Ca    8.8      05 May 2021 07:22  Phos  3.6     05-05  Mg     1.9     05-05    TPro  7.1  /  Alb  2.7<L>  /  TBili  0.7  /  DBili  x   /  AST  16  /  ALT  14  /  AlkPhos  77  05-05    PTT - ( 05 May 2021 07:27 )  PTT:33.3 sec            RADIOLOGY & ADDITIONAL TESTS:  Results Reviewed:   Imaging Personally Reviewed:  Electrocardiogram Personally Reviewed:    COORDINATION OF CARE:  Care Discussed with Consultants/Other Providers [Y/N]:  Prior or Outpatient Records Reviewed [Y/N]:   PROGRESS NOTE:   Authored by Dr. Emily Mckeon, ANH pager [phone]    Patient is a 60y old  Male who presents with a chief complaint of COVID19 w/ severe metabolic acidosis s/p intubation (04 May 2021 08:52)      SUBJECTIVE / OVERNIGHT EVENTS: Patient examined at bedside. He appeared well and was able to converse better than he did earlier today. He was also able to follow commands and told us that he is not in any pain    MEDICATIONS  (STANDING):  albuterol/ipratropium for Nebulization 3 milliLiter(s) Nebulizer every 12 hours  cholecalciferol 400 Unit(s) Oral daily  dextrose 40% Gel 15 Gram(s) Oral once  dextrose 5%. 1000 milliLiter(s) (50 mL/Hr) IV Continuous <Continuous>  dextrose 5%. 1000 milliLiter(s) (100 mL/Hr) IV Continuous <Continuous>  dextrose 50% Injectable 25 Gram(s) IV Push once  dextrose 50% Injectable 12.5 Gram(s) IV Push once  dextrose 50% Injectable 25 Gram(s) IV Push once  enoxaparin Injectable 70 milliGRAM(s) SubCutaneous two times a day  glucagon  Injectable 1 milliGRAM(s) IntraMuscular once  insulin lispro (ADMELOG) corrective regimen sliding scale   SubCutaneous every 6 hours  levETIRAcetam  Solution 500 milliGRAM(s) Oral two times a day  multivitamin/minerals Oral Solution (WELLESSE) 30 milliLiter(s) Oral daily  polyethylene glycol 3350 17 Gram(s) Oral daily  senna 2 Tablet(s) Oral at bedtime  tamsulosin 0.4 milliGRAM(s) Oral at bedtime  thiamine 100 milliGRAM(s) Oral daily    MEDICATIONS  (PRN):      CAPILLARY BLOOD GLUCOSE      POCT Blood Glucose.: 287 mg/dL (05 May 2021 05:16)  POCT Blood Glucose.: 259 mg/dL (04 May 2021 23:31)  POCT Blood Glucose.: 160 mg/dL (04 May 2021 17:16)  POCT Blood Glucose.: 281 mg/dL (04 May 2021 12:31)    I&O's Summary    04 May 2021 07:01  -  05 May 2021 07:00  --------------------------------------------------------  IN: 600 mL / OUT: 1400 mL / NET: -800 mL        PHYSICAL EXAM:  Vital Signs Last 24 Hrs  T(C): 37.3 (05 May 2021 04:38), Max: 37.3 (05 May 2021 04:38)  T(F): 99.2 (05 May 2021 04:38), Max: 99.2 (05 May 2021 04:38)  HR: 106 (05 May 2021 04:38) (85 - 106)  BP: 112/69 (05 May 2021 04:38) (100/68 - 118/71)  BP(mean): --  RR: 18 (05 May 2021 04:38) (18 - 19)  SpO2: 94% (05 May 2021 04:38) (92% - 98%)    GENERAL: No acute distress, lethargic but arousable and able to follow commands  HEAD:  Atraumatic, Normocephalic  EYES: EOMI, PERRLA, conjunctiva and sclera clear  NECK: Supple, no lymphadenopathy, no JVD  CHEST/LUNG: CTAB; No wheezes, rales, or rhonchi  HEART: Regular rate and rhythm; No murmurs, rubs, or gallops  ABDOMEN: Soft, non-tender, non-distended; normal bowel sounds, no organomegaly  EXTREMITIES:  2+ peripheral pulses b/l, No clubbing, cyanosis, or edema  NEUROLOGY: A&O x 3, 1/5 strength in LE  SKIN: gangrenous changes in toes right middle toe with some pus and open. Left an right foot with black, hard toes, tender to deep palpation, strong pulses in feet b/l     LABS:                        8.7    17.50 )-----------( 323      ( 05 May 2021 07:27 )             28.8     05-05    135  |  97  |  20  ----------------------------<  279<H>  4.4   |  23  |  0.79    Ca    8.8      05 May 2021 07:22  Phos  3.6     05-05  Mg     1.9     05-05    TPro  7.1  /  Alb  2.7<L>  /  TBili  0.7  /  DBili  x   /  AST  16  /  ALT  14  /  AlkPhos  77  05-05    PTT - ( 05 May 2021 07:27 )  PTT:33.3 sec            RADIOLOGY & ADDITIONAL TESTS:  Results Reviewed:   Imaging Personally Reviewed:  Electrocardiogram Personally Reviewed:    COORDINATION OF CARE:  Care Discussed with Consultants/Other Providers [Y/N]:  Prior or Outpatient Records Reviewed [Y/N]:

## 2021-05-05 NOTE — PROGRESS NOTE ADULT - PROBLEM SELECTOR PLAN 2
-Acute ischemic strokes, with concern for hemorrhagic conversion, noted on CT scan on 4/8  -Etiology of acute ischemic stroke uncertain at this time; TTE with bubble performed at bedside in the MICU without any evidence of right-to-left shunt; no arrhythmia noted on telemetry throughout stay in the MICU  - CTA head and neck without any evidence of carotid artery stenosis or dissection    Retention   - olson placed  - will need TOV  - consider starting flomax -Acute ischemic strokes, with concern for hemorrhagic conversion, noted on CT scan on 4/8  -Etiology of acute ischemic stroke uncertain at this time; TTE with bubble performed at bedside in the MICU without any evidence of right-to-left shunt; no arrhythmia noted on telemetry throughout stay in the MICU  - CTA head and neck without any evidence of carotid artery stenosis or dissection    Retention   - olson placed  - will need TOV  - c/w flomax

## 2021-05-05 NOTE — PROGRESS NOTE ADULT - PROBLEM SELECTOR PLAN 8
feet with necrosis  - podiatry recs appreciated   - c/w wound care  - no active signs of infxn feet with necrosis  - podiatry recs appreciated   - c/w wound care  - no active signs of infxn    Leukocytosis  - ID recs appreciated    - Will monitor off abx   - CXR with RLL opacity, presently no clinical signs pna  - O2 supplementation as necessary   - BCx in lab   - UA and UCx

## 2021-05-05 NOTE — PROGRESS NOTE ADULT - PROBLEM SELECTOR PLAN 6
Dysphagia noted in setting of patient's acute alteration in mental status and hx of intubation  - Nasogastric tube placed for administration of tube feeds originally, but patient self removed 4/17; multiple attempts to replace unsuccessful   Evaluated by speech & swallow: unable to cooperate with FEES study, cineesophagram performed demonstrating aspiration  - PEG tube placed on 4/30, tube feedings started on 4/30, will slowly increase rate by 10 ml q24hrs, currently at 20cc/hr, + free water q8  - Monitor for refeeding syndrome with BMP BID

## 2021-05-05 NOTE — PROGRESS NOTE ADULT - SUBJECTIVE AND OBJECTIVE BOX
ID Follow Up For     Patient is a 60y old  Male who presents with a chief complaint of COVID19 w/ severe metabolic acidosis s/p intubation (05 May 2021 09:44)    Interval History/ROS:   60 yr male with PMH of DM who was initially admitted to [facility] ICU 4/1 for hypoxic respiratory failure 2/2 COVID c/b PE with R heart strain, cardiogenic and septic shock, ischemic and hemorrhagic CVA and ESBL klebsiella ventilator associate PNA, transferred to [facility] for neurosurgery eval on 4/12.   Extubated 4/13 and transferred to floors 4/14. S/p EEG with no epileptiform activity seen.  Failed swallow study, s/p PEG 4/29.     ID called back for leukocytosis.         No Known Allergies    Intolerances        ANTIMICROBIALS:      OTHER MEDS:  albuterol/ipratropium for Nebulization 3 milliLiter(s) Nebulizer every 12 hours  cholecalciferol 400 Unit(s) Oral daily  dextrose 40% Gel 15 Gram(s) Oral once  dextrose 5%. 1000 milliLiter(s) IV Continuous <Continuous>  dextrose 5%. 1000 milliLiter(s) IV Continuous <Continuous>  dextrose 50% Injectable 25 Gram(s) IV Push once  dextrose 50% Injectable 12.5 Gram(s) IV Push once  dextrose 50% Injectable 25 Gram(s) IV Push once  enoxaparin Injectable 70 milliGRAM(s) SubCutaneous two times a day  glucagon  Injectable 1 milliGRAM(s) IntraMuscular once  insulin lispro (ADMELOG) corrective regimen sliding scale   SubCutaneous every 6 hours  levETIRAcetam  Solution 500 milliGRAM(s) Oral two times a day  multivitamin/minerals Oral Solution (WELLESSE) 30 milliLiter(s) Oral daily  polyethylene glycol 3350 17 Gram(s) Oral daily  senna 2 Tablet(s) Oral at bedtime  tamsulosin 0.4 milliGRAM(s) Oral at bedtime  thiamine 100 milliGRAM(s) Oral daily      Vital Signs Last 24 Hrs  T(C): 37.3 (05 May 2021 04:38), Max: 37.3 (05 May 2021 04:38)  T(F): 99.2 (05 May 2021 04:38), Max: 99.2 (05 May 2021 04:38)  HR: 106 (05 May 2021 04:38) (85 - 106)  BP: 112/69 (05 May 2021 04:38) (100/68 - 118/71)  BP(mean): --  RR: 18 (05 May 2021 04:38) (18 - 19)  SpO2: 94% (05 May 2021 04:38) (92% - 98%)    EXAM:  Constitutional: Not in acute distress  Eyes: No icterus. Pupils b/l reactive to light.  Oral cavity: Clear, no lesions  Neck: No neck vein distension noted  RS: Chest clear to auscultation bilaterally. No wheeze/rhonchi/crepitations.  CVS: S1, S2 heard. Regular rate and rhythm. No murmurs/rubs/gallops.  Abdomen: Soft. No guarding/rigidity/tenderness.  : No acute abnormalities  Extremities: Warm. No pedal edema  Skin: No lesions noted  Vascular: No evidence of phlebitis  Neuro: Alert, oriented to time/place/person    Labs:                        8.7    17.50 )-----------( 323      ( 05 May 2021 07:27 )             28.8       05-05    135  |  97  |  20  ----------------------------<  279<H>  4.4   |  23  |  0.79    Ca    8.8      05 May 2021 07:22  Phos  3.6     05-05  Mg     1.9     05-05    TPro  7.1  /  Alb  2.7<L>  /  TBili  0.7  /  DBili  x   /  AST  16  /  ALT  14  /  AlkPhos  77  05-05          MICROBIOLOGY:    RADIOLOGY:    OTHER INVESTIGATIONS: ID Follow Up For leukocytosis    Patient is a 60y old  Male who presents with a chief complaint of COVID19 w/ severe metabolic acidosis s/p intubation (05 May 2021 09:44)    Interval History/ROS:   60 yr male with PMH of DM who was initially admitted to [facility] ICU 4/1 for hypoxic respiratory failure 2/2 COVID c/b PE with R heart strain, cardiogenic and septic shock, ischemic and hemorrhagic CVA and ESBL klebsiella ventilator associated PNA, transferred to [facility] for neurosurgery eval on 4/12.   Extubated 4/13 and transferred to floors 4/14. S/p EEG with no epileptiform activity seen.  Failed swallow study, s/p PEG 4/29.     ID called back for leukocytosis.   Pt developed       No Known Allergies    Intolerances        ANTIMICROBIALS:      OTHER MEDS:  albuterol/ipratropium for Nebulization 3 milliLiter(s) Nebulizer every 12 hours  cholecalciferol 400 Unit(s) Oral daily  dextrose 40% Gel 15 Gram(s) Oral once  dextrose 5%. 1000 milliLiter(s) IV Continuous <Continuous>  dextrose 5%. 1000 milliLiter(s) IV Continuous <Continuous>  dextrose 50% Injectable 25 Gram(s) IV Push once  dextrose 50% Injectable 12.5 Gram(s) IV Push once  dextrose 50% Injectable 25 Gram(s) IV Push once  enoxaparin Injectable 70 milliGRAM(s) SubCutaneous two times a day  glucagon  Injectable 1 milliGRAM(s) IntraMuscular once  insulin lispro (ADMELOG) corrective regimen sliding scale   SubCutaneous every 6 hours  levETIRAcetam  Solution 500 milliGRAM(s) Oral two times a day  multivitamin/minerals Oral Solution (WELLESSE) 30 milliLiter(s) Oral daily  polyethylene glycol 3350 17 Gram(s) Oral daily  senna 2 Tablet(s) Oral at bedtime  tamsulosin 0.4 milliGRAM(s) Oral at bedtime  thiamine 100 milliGRAM(s) Oral daily      Vital Signs Last 24 Hrs  T(C): 37.3 (05 May 2021 04:38), Max: 37.3 (05 May 2021 04:38)  T(F): 99.2 (05 May 2021 04:38), Max: 99.2 (05 May 2021 04:38)  HR: 106 (05 May 2021 04:38) (85 - 106)  BP: 112/69 (05 May 2021 04:38) (100/68 - 118/71)  BP(mean): --  RR: 18 (05 May 2021 04:38) (18 - 19)  SpO2: 94% (05 May 2021 04:38) (92% - 98%)    EXAM:  Constitutional: Not in acute distress  Eyes: No icterus. Pupils b/l reactive to light.  Oral cavity: Clear, no lesions  Neck: No neck vein distension noted  RS: Chest clear to auscultation bilaterally. No wheeze/rhonchi/crepitations.  CVS: S1, S2 heard. Regular rate and rhythm. No murmurs/rubs/gallops.  Abdomen: Soft. No guarding/rigidity/tenderness.  : No acute abnormalities  Extremities: Warm. No pedal edema  Skin: No lesions noted  Vascular: No evidence of phlebitis  Neuro: Alert, oriented to time/place/person    Labs:                        8.7    17.50 )-----------( 323      ( 05 May 2021 07:27 )             28.8       05-05    135  |  97  |  20  ----------------------------<  279<H>  4.4   |  23  |  0.79    Ca    8.8      05 May 2021 07:22  Phos  3.6     05-05  Mg     1.9     05-05    TPro  7.1  /  Alb  2.7<L>  /  TBili  0.7  /  DBili  x   /  AST  16  /  ALT  14  /  AlkPhos  77  05-05          MICROBIOLOGY:    RADIOLOGY:    OTHER INVESTIGATIONS: ID Follow Up For leukocytosis    Patient is a 60y old  Male who presents with a chief complaint of COVID19 w/ severe metabolic acidosis s/p intubation (05 May 2021 09:44)    Interval History/ROS:   60 yr male with PMH of DM who was initially admitted to [facility] ICU 4/1 for hypoxic respiratory failure 2/2 COVID c/b PE with R heart strain, cardiogenic and septic shock, ischemic and hemorrhagic CVA and ESBL klebsiella ventilator associated PNA, transferred to [facility] for neurosurgery eval on 4/12.   Extubated 4/13 and transferred to floors 4/14. S/p EEG with no epileptiform activity seen.  Failed swallow study, s/p PEG 4/29.     ID called back for leukocytosis.   Pt developed     No Known Allergies    Intolerances    ANTIMICROBIALS:      OTHER MEDS:  albuterol/ipratropium for Nebulization 3 milliLiter(s) Nebulizer every 12 hours  cholecalciferol 400 Unit(s) Oral daily  dextrose 40% Gel 15 Gram(s) Oral once  dextrose 5%. 1000 milliLiter(s) IV Continuous <Continuous>  dextrose 5%. 1000 milliLiter(s) IV Continuous <Continuous>  dextrose 50% Injectable 25 Gram(s) IV Push once  dextrose 50% Injectable 12.5 Gram(s) IV Push once  dextrose 50% Injectable 25 Gram(s) IV Push once  enoxaparin Injectable 70 milliGRAM(s) SubCutaneous two times a day  glucagon  Injectable 1 milliGRAM(s) IntraMuscular once  insulin lispro (ADMELOG) corrective regimen sliding scale   SubCutaneous every 6 hours  levETIRAcetam  Solution 500 milliGRAM(s) Oral two times a day  multivitamin/minerals Oral Solution (WELLESSE) 30 milliLiter(s) Oral daily  polyethylene glycol 3350 17 Gram(s) Oral daily  senna 2 Tablet(s) Oral at bedtime  tamsulosin 0.4 milliGRAM(s) Oral at bedtime  thiamine 100 milliGRAM(s) Oral daily      Vital Signs Last 24 Hrs  T(C): 37.3 (05 May 2021 04:38), Max: 37.3 (05 May 2021 04:38)  T(F): 99.2 (05 May 2021 04:38), Max: 99.2 (05 May 2021 04:38)  HR: 106 (05 May 2021 04:38) (85 - 106)  BP: 112/69 (05 May 2021 04:38) (100/68 - 118/71)  BP(mean): --  RR: 18 (05 May 2021 04:38) (18 - 19)  SpO2: 94% (05 May 2021 04:38) (92% - 98%)    EXAM:  Constitutional: Not in acute distress  Eyes: No icterus. Pupils b/l reactive to light.  Oral cavity: Clear, no lesions  Neck: No neck vein distension noted  RS: Chest clear to auscultation bilaterally. No wheeze/rhonchi/crepitations.  CVS: S1, S2 heard. Regular rate and rhythm. No murmurs/rubs/gallops.  Abdomen: Soft. No guarding/rigidity/tenderness.  : No acute abnormalities  Extremities: Warm. No pedal edema  Skin: No lesions noted  Vascular: No evidence of phlebitis  Neuro: Alert, oriented to time/place/person    Labs:                        8.7    17.50 )-----------( 323      ( 05 May 2021 07:27 )             28.8       05-05    135  |  97  |  20  ----------------------------<  279<H>  4.4   |  23  |  0.79    Ca    8.8      05 May 2021 07:22  Phos  3.6     05-05  Mg     1.9     05-05    TPro  7.1  /  Alb  2.7<L>  /  TBili  0.7  /  DBili  x   /  AST  16  /  ALT  14  /  AlkPhos  77  05-05          MICROBIOLOGY:    RADIOLOGY:    OTHER INVESTIGATIONS: ID Follow Up For leukocytosis    Patient is a 60y old  Male who presents with a chief complaint of COVID19 w/ severe metabolic acidosis s/p intubation (05 May 2021 09:44)    Interval History/ROS:   60 yr male with PMH of DM who was initially admitted to [facility] ICU 4/1 for hypoxic respiratory failure 2/2 COVID c/b PE with R heart strain, cardiogenic and septic shock, ischemic and hemorrhagic CVA and ESBL klebsiella ventilator associated PNA, transferred to [facility] for neurosurgery eval on 4/12.   Extubated 4/13 and transferred to floors 4/14. S/p EEG with no epileptiform activity seen.  Failed swallow study, s/p PEG 4/29.     ID called back for leukocytosis.   Pt completed txt for PNA on 4/23 with last fever on 4/19.   Cousin at bedside, providing interpretation. Pt on RA. Cousin reports he is moving his extremities more and trying to talk, and coughing when talking.  Otherwise no complaints.     No Known Allergies    Intolerances    ANTIMICROBIALS:      OTHER MEDS:  albuterol/ipratropium for Nebulization 3 milliLiter(s) Nebulizer every 12 hours  cholecalciferol 400 Unit(s) Oral daily  dextrose 40% Gel 15 Gram(s) Oral once  dextrose 5%. 1000 milliLiter(s) IV Continuous <Continuous>  dextrose 5%. 1000 milliLiter(s) IV Continuous <Continuous>  dextrose 50% Injectable 25 Gram(s) IV Push once  dextrose 50% Injectable 12.5 Gram(s) IV Push once  dextrose 50% Injectable 25 Gram(s) IV Push once  enoxaparin Injectable 70 milliGRAM(s) SubCutaneous two times a day  glucagon  Injectable 1 milliGRAM(s) IntraMuscular once  insulin lispro (ADMELOG) corrective regimen sliding scale   SubCutaneous every 6 hours  levETIRAcetam  Solution 500 milliGRAM(s) Oral two times a day  multivitamin/minerals Oral Solution (WELLESSE) 30 milliLiter(s) Oral daily  polyethylene glycol 3350 17 Gram(s) Oral daily  senna 2 Tablet(s) Oral at bedtime  tamsulosin 0.4 milliGRAM(s) Oral at bedtime  thiamine 100 milliGRAM(s) Oral daily      Vital Signs Last 24 Hrs  T(C): 37.3 (05 May 2021 04:38), Max: 37.3 (05 May 2021 04:38)  T(F): 99.2 (05 May 2021 04:38), Max: 99.2 (05 May 2021 04:38)  HR: 106 (05 May 2021 04:38) (85 - 106)  BP: 112/69 (05 May 2021 04:38) (100/68 - 118/71)  BP(mean): --  RR: 18 (05 May 2021 04:38) (18 - 19)  SpO2: 94% (05 May 2021 04:38) (92% - 98%)    EXAM:  Constitutional: Not in acute distress  Eyes: No icterus. Pupils b/l reactive to light.  Oral cavity: Clear, no lesions  Neck: No neck vein distension noted  RS: Chest clear to auscultation bilaterally. No wheeze/rhonchi/crepitations.  CVS: S1, S2 heard. Regular rate and rhythm. No murmurs/rubs/gallops.  Abdomen: Soft. No guarding/rigidity/tenderness. PEG c/d/i  : No acute abnormalities  Extremities: Warm. No pedal edema; bilateral dry gangrene of toes   Skin: No lesions noted; Arrow IV on R arm, PIV on L arm  Vascular: No evidence of phlebitis  Neuro: Alert, attempting to talk, following commands, unable to walk, moving extremities.     Labs:                        8.7    17.50 )-----------( 323      ( 05 May 2021 07:27 )             28.8       05-05    135  |  97  |  20  ----------------------------<  279<H>  4.4   |  23  |  0.79    Ca    8.8      05 May 2021 07:22  Phos  3.6     05-05  Mg     1.9     05-05    TPro  7.1  /  Alb  2.7<L>  /  TBili  0.7  /  DBili  x   /  AST  16  /  ALT  14  /  AlkPhos  77  05-05    MICROBIOLOGY:      Culture - Blood (collected 04-19-21 @ 10:25)  Source: .Blood Blood-Peripheral  Final Report (04-24-21 @ 11:01):    No Growth Final    Culture - Blood (collected 04-19-21 @ 10:25)  Source: .Blood Blood-Peripheral  Final Report (04-24-21 @ 11:01):    No Growth Final    Culture - Blood (collected 04-13-21 @ 07:02)  Source: .Blood Blood  Final Report (04-18-21 @ 08:00):    No Growth Final    Culture - Blood (collected 04-13-21 @ 07:02)  Source: .Blood Blood  Final Report (04-18-21 @ 08:00):    No Growth Final      RADIOLOGY:  < from: Xray Chest 1 View AP/PA (05.05.21 @ 11:47) >  IMPRESSION:  Poor inspiratory effort. The heart is normal in size. Right pleural effusion. Right lower lobe pneumonia and/or atelectasis. The left lung is clear. No acute bony pathology could be identified.      < from: CT Head No Cont (04.12.21 @ 21:56) >  IMPRESSION: No significant change when allowing for differences in technique.    < from: CT Angio Chest w/ IV Cont (04.01.21 @ 14:04) >  IMPRESSION:  Branching left upper lobe segmental pulmonary emboli with evidence of heart strain.  Extensive bilateral opacities consistent with reported history of Covid.    < from: MR Angio Head No Cont (04.22.21 @ 21:31) >  IMPRESSION:    MRI BRAIN: Multiple evolving subacute infarcts with associated hemorrhagesincluding in the bilateral centrum semiovale, bilateral parieto-occipital region, left posterior temporal lobe, right thalamus, and anterior temporal poles bilaterally. Evolving subacute infarct with hemorrhagic transformation in the left cerebellar hemisphere. Note, many of these infarcts have associated abnormal contrast enhancement, likely reflecting expected evolution of ischemia, however, follow-up imaging to document resolution of this finding is recommended.    MRA HEAD: No evidence of proximal vessel stenosis, occlusion, aneurysm or vascular malformation. CT angiography may be obtained for further assessment.          OTHER INVESTIGATIONS:

## 2021-05-06 DIAGNOSIS — E11.65 TYPE 2 DIABETES MELLITUS WITH HYPERGLYCEMIA: ICD-10-CM

## 2021-05-06 LAB
ANION GAP SERPL CALC-SCNC: 11 MMOL/L — SIGNIFICANT CHANGE UP (ref 5–17)
ANION GAP SERPL CALC-SCNC: 12 MMOL/L — SIGNIFICANT CHANGE UP (ref 5–17)
BASOPHILS # BLD AUTO: 0.08 K/UL — SIGNIFICANT CHANGE UP (ref 0–0.2)
BASOPHILS NFR BLD AUTO: 0.5 % — SIGNIFICANT CHANGE UP (ref 0–2)
BUN SERPL-MCNC: 20 MG/DL — SIGNIFICANT CHANGE UP (ref 7–23)
BUN SERPL-MCNC: 20 MG/DL — SIGNIFICANT CHANGE UP (ref 7–23)
CALCIUM SERPL-MCNC: 8.5 MG/DL — SIGNIFICANT CHANGE UP (ref 8.4–10.5)
CALCIUM SERPL-MCNC: 8.8 MG/DL — SIGNIFICANT CHANGE UP (ref 8.4–10.5)
CHLORIDE SERPL-SCNC: 97 MMOL/L — SIGNIFICANT CHANGE UP (ref 96–108)
CHLORIDE SERPL-SCNC: 99 MMOL/L — SIGNIFICANT CHANGE UP (ref 96–108)
CO2 SERPL-SCNC: 24 MMOL/L — SIGNIFICANT CHANGE UP (ref 22–31)
CO2 SERPL-SCNC: 24 MMOL/L — SIGNIFICANT CHANGE UP (ref 22–31)
CREAT SERPL-MCNC: 0.79 MG/DL — SIGNIFICANT CHANGE UP (ref 0.5–1.3)
CREAT SERPL-MCNC: 0.81 MG/DL — SIGNIFICANT CHANGE UP (ref 0.5–1.3)
EOSINOPHIL # BLD AUTO: 0.1 K/UL — SIGNIFICANT CHANGE UP (ref 0–0.5)
EOSINOPHIL NFR BLD AUTO: 0.6 % — SIGNIFICANT CHANGE UP (ref 0–6)
GLUCOSE BLDC GLUCOMTR-MCNC: 186 MG/DL — HIGH (ref 70–99)
GLUCOSE BLDC GLUCOMTR-MCNC: 225 MG/DL — HIGH (ref 70–99)
GLUCOSE BLDC GLUCOMTR-MCNC: 268 MG/DL — HIGH (ref 70–99)
GLUCOSE BLDC GLUCOMTR-MCNC: 303 MG/DL — HIGH (ref 70–99)
GLUCOSE SERPL-MCNC: 130 MG/DL — HIGH (ref 70–99)
GLUCOSE SERPL-MCNC: 278 MG/DL — HIGH (ref 70–99)
HCT VFR BLD CALC: 27.2 % — LOW (ref 39–50)
HGB BLD-MCNC: 8.3 G/DL — LOW (ref 13–17)
IMM GRANULOCYTES NFR BLD AUTO: 2.5 % — HIGH (ref 0–1.5)
LYMPHOCYTES # BLD AUTO: 15.5 % — SIGNIFICANT CHANGE UP (ref 13–44)
LYMPHOCYTES # BLD AUTO: 2.68 K/UL — SIGNIFICANT CHANGE UP (ref 1–3.3)
MAGNESIUM SERPL-MCNC: 1.9 MG/DL — SIGNIFICANT CHANGE UP (ref 1.6–2.6)
MAGNESIUM SERPL-MCNC: 2 MG/DL — SIGNIFICANT CHANGE UP (ref 1.6–2.6)
MCHC RBC-ENTMCNC: 26.1 PG — LOW (ref 27–34)
MCHC RBC-ENTMCNC: 30.5 GM/DL — LOW (ref 32–36)
MCV RBC AUTO: 85.5 FL — SIGNIFICANT CHANGE UP (ref 80–100)
MONOCYTES # BLD AUTO: 1.52 K/UL — HIGH (ref 0–0.9)
MONOCYTES NFR BLD AUTO: 8.8 % — SIGNIFICANT CHANGE UP (ref 2–14)
NEUTROPHILS # BLD AUTO: 12.53 K/UL — HIGH (ref 1.8–7.4)
NEUTROPHILS NFR BLD AUTO: 72.1 % — SIGNIFICANT CHANGE UP (ref 43–77)
NRBC # BLD: 0 /100 WBCS — SIGNIFICANT CHANGE UP (ref 0–0)
PHOSPHATE SERPL-MCNC: 3.3 MG/DL — SIGNIFICANT CHANGE UP (ref 2.5–4.5)
PHOSPHATE SERPL-MCNC: 3.7 MG/DL — SIGNIFICANT CHANGE UP (ref 2.5–4.5)
PLATELET # BLD AUTO: 324 K/UL — SIGNIFICANT CHANGE UP (ref 150–400)
POTASSIUM SERPL-MCNC: 4.8 MMOL/L — SIGNIFICANT CHANGE UP (ref 3.5–5.3)
POTASSIUM SERPL-MCNC: 4.9 MMOL/L — SIGNIFICANT CHANGE UP (ref 3.5–5.3)
POTASSIUM SERPL-SCNC: 4.8 MMOL/L — SIGNIFICANT CHANGE UP (ref 3.5–5.3)
POTASSIUM SERPL-SCNC: 4.9 MMOL/L — SIGNIFICANT CHANGE UP (ref 3.5–5.3)
RBC # BLD: 3.18 M/UL — LOW (ref 4.2–5.8)
RBC # FLD: 17.4 % — HIGH (ref 10.3–14.5)
SARS-COV-2 RNA SPEC QL NAA+PROBE: SIGNIFICANT CHANGE UP
SODIUM SERPL-SCNC: 133 MMOL/L — LOW (ref 135–145)
SODIUM SERPL-SCNC: 134 MMOL/L — LOW (ref 135–145)
WBC # BLD: 17.34 K/UL — HIGH (ref 3.8–10.5)
WBC # FLD AUTO: 17.34 K/UL — HIGH (ref 3.8–10.5)

## 2021-05-06 PROCEDURE — 93010 ELECTROCARDIOGRAM REPORT: CPT

## 2021-05-06 PROCEDURE — 71260 CT THORAX DX C+: CPT | Mod: 26

## 2021-05-06 PROCEDURE — 99233 SBSQ HOSP IP/OBS HIGH 50: CPT | Mod: GC

## 2021-05-06 PROCEDURE — 74177 CT ABD & PELVIS W/CONTRAST: CPT | Mod: 26

## 2021-05-06 PROCEDURE — 99222 1ST HOSP IP/OBS MODERATE 55: CPT

## 2021-05-06 PROCEDURE — 99232 SBSQ HOSP IP/OBS MODERATE 35: CPT

## 2021-05-06 RX ORDER — HUMAN INSULIN 100 [IU]/ML
12 INJECTION, SUSPENSION SUBCUTANEOUS ONCE
Refills: 0 | Status: COMPLETED | OUTPATIENT
Start: 2021-05-06 | End: 2021-05-06

## 2021-05-06 RX ORDER — INSULIN LISPRO 100/ML
VIAL (ML) SUBCUTANEOUS EVERY 6 HOURS
Refills: 0 | Status: DISCONTINUED | OUTPATIENT
Start: 2021-05-06 | End: 2021-05-12

## 2021-05-06 RX ORDER — HUMAN INSULIN 100 [IU]/ML
5 INJECTION, SUSPENSION SUBCUTANEOUS EVERY 6 HOURS
Refills: 0 | Status: DISCONTINUED | OUTPATIENT
Start: 2021-05-06 | End: 2021-05-07

## 2021-05-06 RX ORDER — SODIUM CHLORIDE 9 MG/ML
1000 INJECTION INTRAMUSCULAR; INTRAVENOUS; SUBCUTANEOUS
Refills: 0 | Status: DISCONTINUED | OUTPATIENT
Start: 2021-05-06 | End: 2021-05-12

## 2021-05-06 RX ORDER — DEXTROSE 50 % IN WATER 50 %
25 SYRINGE (ML) INTRAVENOUS ONCE
Refills: 0 | Status: DISCONTINUED | OUTPATIENT
Start: 2021-05-06 | End: 2021-05-12

## 2021-05-06 RX ORDER — GLUCAGON INJECTION, SOLUTION 0.5 MG/.1ML
1 INJECTION, SOLUTION SUBCUTANEOUS ONCE
Refills: 0 | Status: DISCONTINUED | OUTPATIENT
Start: 2021-05-06 | End: 2021-05-12

## 2021-05-06 RX ORDER — DEXTROSE 50 % IN WATER 50 %
12.5 SYRINGE (ML) INTRAVENOUS ONCE
Refills: 0 | Status: DISCONTINUED | OUTPATIENT
Start: 2021-05-06 | End: 2021-05-12

## 2021-05-06 RX ORDER — PIPERACILLIN AND TAZOBACTAM 4; .5 G/20ML; G/20ML
3.38 INJECTION, POWDER, LYOPHILIZED, FOR SOLUTION INTRAVENOUS EVERY 8 HOURS
Refills: 0 | Status: DISCONTINUED | OUTPATIENT
Start: 2021-05-06 | End: 2021-05-06

## 2021-05-06 RX ORDER — PIPERACILLIN AND TAZOBACTAM 4; .5 G/20ML; G/20ML
3.38 INJECTION, POWDER, LYOPHILIZED, FOR SOLUTION INTRAVENOUS EVERY 8 HOURS
Refills: 0 | Status: DISCONTINUED | OUTPATIENT
Start: 2021-05-06 | End: 2021-05-07

## 2021-05-06 RX ORDER — PIPERACILLIN AND TAZOBACTAM 4; .5 G/20ML; G/20ML
3.38 INJECTION, POWDER, LYOPHILIZED, FOR SOLUTION INTRAVENOUS ONCE
Refills: 0 | Status: COMPLETED | OUTPATIENT
Start: 2021-05-06 | End: 2021-05-06

## 2021-05-06 RX ORDER — PIPERACILLIN AND TAZOBACTAM 4; .5 G/20ML; G/20ML
3.38 INJECTION, POWDER, LYOPHILIZED, FOR SOLUTION INTRAVENOUS ONCE
Refills: 0 | Status: DISCONTINUED | OUTPATIENT
Start: 2021-05-06 | End: 2021-05-06

## 2021-05-06 RX ORDER — INSULIN GLARGINE 100 [IU]/ML
12 INJECTION, SOLUTION SUBCUTANEOUS AT BEDTIME
Refills: 0 | Status: DISCONTINUED | OUTPATIENT
Start: 2021-05-06 | End: 2021-05-06

## 2021-05-06 RX ORDER — DEXTROSE 50 % IN WATER 50 %
15 SYRINGE (ML) INTRAVENOUS ONCE
Refills: 0 | Status: DISCONTINUED | OUTPATIENT
Start: 2021-05-06 | End: 2021-05-12

## 2021-05-06 RX ORDER — SODIUM CHLORIDE 9 MG/ML
1000 INJECTION, SOLUTION INTRAVENOUS
Refills: 0 | Status: DISCONTINUED | OUTPATIENT
Start: 2021-05-06 | End: 2021-05-12

## 2021-05-06 RX ADMIN — Medication 2: at 17:33

## 2021-05-06 RX ADMIN — Medication 8: at 06:15

## 2021-05-06 RX ADMIN — HUMAN INSULIN 12 UNIT(S): 100 INJECTION, SUSPENSION SUBCUTANEOUS at 08:56

## 2021-05-06 RX ADMIN — Medication 400 UNIT(S): at 12:49

## 2021-05-06 RX ADMIN — ENOXAPARIN SODIUM 70 MILLIGRAM(S): 100 INJECTION SUBCUTANEOUS at 17:14

## 2021-05-06 RX ADMIN — SODIUM CHLORIDE 75 MILLILITER(S): 9 INJECTION INTRAMUSCULAR; INTRAVENOUS; SUBCUTANEOUS at 14:39

## 2021-05-06 RX ADMIN — Medication 30 MILLILITER(S): at 12:50

## 2021-05-06 RX ADMIN — LEVETIRACETAM 500 MILLIGRAM(S): 250 TABLET, FILM COATED ORAL at 17:16

## 2021-05-06 RX ADMIN — HUMAN INSULIN 5 UNIT(S): 100 INJECTION, SUSPENSION SUBCUTANEOUS at 17:32

## 2021-05-06 RX ADMIN — SENNA PLUS 2 TABLET(S): 8.6 TABLET ORAL at 22:18

## 2021-05-06 RX ADMIN — ENOXAPARIN SODIUM 70 MILLIGRAM(S): 100 INJECTION SUBCUTANEOUS at 06:08

## 2021-05-06 RX ADMIN — TAMSULOSIN HYDROCHLORIDE 0.4 MILLIGRAM(S): 0.4 CAPSULE ORAL at 22:18

## 2021-05-06 RX ADMIN — Medication 3 MILLILITER(S): at 05:41

## 2021-05-06 RX ADMIN — Medication 100 MILLIGRAM(S): at 12:50

## 2021-05-06 RX ADMIN — PIPERACILLIN AND TAZOBACTAM 25 GRAM(S): 4; .5 INJECTION, POWDER, LYOPHILIZED, FOR SOLUTION INTRAVENOUS at 23:39

## 2021-05-06 RX ADMIN — LEVETIRACETAM 500 MILLIGRAM(S): 250 TABLET, FILM COATED ORAL at 06:08

## 2021-05-06 RX ADMIN — PIPERACILLIN AND TAZOBACTAM 200 GRAM(S): 4; .5 INJECTION, POWDER, LYOPHILIZED, FOR SOLUTION INTRAVENOUS at 14:40

## 2021-05-06 RX ADMIN — Medication 4: at 12:51

## 2021-05-06 NOTE — PROGRESS NOTE ADULT - ASSESSMENT
· Assessment	  60M w/ bilateral digital gangrene  - Pt seen and evaluated  - bilateral digital gangrenous changes noted to distal tips of digits, notably on L foot 2nd digit full thickness, well adhered ; R foot 2nd digit sloughed skin with no probe to bone nor signs of infection  - Foot currently without signs of infection, with demarcating gangrenous changes  - No acute podiatric surgical intervention at this time  - Plan to continue local wound care with betadine & with adequate blood flow & allow for further demarcation prior to any surgical intervention  - will monitor

## 2021-05-06 NOTE — CONSULT NOTE ADULT - PROBLEM SELECTOR PROBLEM 1
Type 2 diabetes mellitus with hyperglycemia, without long-term current use of insulin
Dysphagia
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

## 2021-05-06 NOTE — PROGRESS NOTE ADULT - PROBLEM SELECTOR PLAN 10
- DVT: Lovenox full dose  - Diet: NPO with tube feedings  - Full code  - Dispo- acute rehab - DVT: Lovenox full dose  - Diet: NPO with tube feedings  - Full code  - Dispo- acute rehab pending medical optimization

## 2021-05-06 NOTE — PROGRESS NOTE ADULT - PROBLEM SELECTOR PLAN 2
-Acute ischemic strokes, with concern for hemorrhagic conversion, noted on CT scan on 4/8  -Etiology of acute ischemic stroke uncertain at this time; TTE with bubble performed at bedside in the MICU without any evidence of right-to-left shunt; no arrhythmia noted on telemetry throughout stay in the MICU  - CTA head and neck without any evidence of carotid artery stenosis or dissection    Retention   - olson placed  - will need TOV  - c/w flomax

## 2021-05-06 NOTE — PROGRESS NOTE ADULT - PROBLEM SELECTOR PLAN 3
-Pulmonary embolus noted on ct scan performed at time of admission  -Etiology of venous thromboembolism uncertain at this time; no known history of thrombophilia; provoked in setting of acute covid illness   - Transitioned from heparin to lovenox, will discharge on Eliquis -Pulmonary embolus noted on ct scan performed at time of admission  -Etiology of venous thromboembolism uncertain at this time; no known history of thrombophilia; provoked in setting of acute covid illness   - Transitioned from heparin to lovenox  - will discharge on Eliquis

## 2021-05-06 NOTE — PROGRESS NOTE ADULT - ASSESSMENT
60 yr male with PMH of DM who was initially admitted to [facility] ICU for hypoxic respiratory failure 2/2 COVID c/b PE with R heart strain, cardiogenic and septic shock, ischemic and hemorrhagic CVA and ESBL klebsiella ventilator associate PNA, transferred to [facility] for neurosurgery eval and further management. s/p PEG, now monitoring for refeeding syndrome

## 2021-05-06 NOTE — PROGRESS NOTE ADULT - PROBLEM SELECTOR PLAN 4
- Type II diabetes mellitus noted; a1c noted to be greater than 10   - On insulin sliding scale  - Monitor fingersticks while on tube feeds - Type II diabetes mellitus noted; a1c noted to be greater than 10   - On insulin sliding scale  - Monitor fingersticks while on tube feeds  - endocrine consulted for uncontrolled DM

## 2021-05-06 NOTE — PROGRESS NOTE ADULT - ATTENDING COMMENTS
unchanged clinical status  ?aspiration pneumonitis.  ID recommends monitor off abx  adjust insulin dose daily as appropriate based on glucose levels.   monitor tube feed intake and output

## 2021-05-06 NOTE — CONSULT NOTE ADULT - ASSESSMENT
59 y/o M w/ new onset diabetes with A1c of 10.3% on admission for COVID now on tube feeds x 24 hours with hyperglycemia.

## 2021-05-06 NOTE — CONSULT NOTE ADULT - PROBLEM SELECTOR RECOMMENDATION 9
Diabetes Education and Nutrition Eval  Pt. on 24 hour tube feeds. Based on insulin requirements the past 24 hours would recommend NPH 5 units q6 and low correction q6.   Goal glucose 100-180  Outpt. endo follow-up  Outpt. optho, podiatry, nephrology  Plan to d/c on basal bolus vs. basal + orals depending on insulin requirements. Needs education and pen teaching.      Mg Dudley D.O  [phone]
Diet per speech and swallow, plan likely for MBS
As per neuro multifactorial ("COVID-19 encephalopathy, associated hypoxia, persistent pharmacologic effects of sedatives , precipitous drop in BP  and multiple metabolic derangements").   Work up and Rx as per primary team.   D/W Dr. Hale (Neuro) that indicated the patient has poor chances for recovering independency; however, that with speech therapy he may be able to regain swallowing capacity.

## 2021-05-06 NOTE — CONSULT NOTE ADULT - SUBJECTIVE AND OBJECTIVE BOX
HPI:  60M unknown medical history BIBEMS for respiratory distress, recently COVID+ as per EMS. Pt unable to comply with history 2/2 resp distress, nods yes to difficulty breathing, no to pain. Baseline AAOx3. En route, pt had RR 28, SaO2 60%. In ED initially hypoxic to 60% placed on NRB and satting low 90s and tachypneic to 40s on NRB. Placed on AVAPS, still tachypneic to 40s. Labs showing HAGMA, elevated FSG to 500s. Also with lactate of 15. Trops of 200 and pro-BNP elevated to 21k. MICU consulted for respiratory failure in setting of COVID, also concern for DKA. On encounter, pt is noncooperative with questioning. Nods and tries to speak, unable to form full sentences and visibly tachypneic on AVAPS. In ED given 2L NS, decadron. Started on insulin gtt. Subsequently intubated.   Patient tested positive for COVID on 3/19. He was initially doing fine with some fatigue, but suddenly decompensated. Wife, Lucero, is currently in [place] - she went due to medical issues but was unable to return due to COVID travel restrictions.   Endocrine consulted for uncontrolled diabetes with A1c of 10.3% and hyperglycemia now on tube feeds x 24 hours. Pt. has been admitted since 4/12/2021. No reported prior hx of diabetes and was not on medications for diabetes prior to admission.       PAST MEDICAL & SURGICAL HISTORY:  No pertinent past medical history    No significant past surgical history        FAMILY HISTORY:  No pertinent family history in first degree relatives        Social History: No drug abuse or alcohol abuse    Outpatient Medications: None    MEDICATIONS  (STANDING):  albuterol/ipratropium for Nebulization 3 milliLiter(s) Nebulizer every 12 hours  cholecalciferol 400 Unit(s) Oral daily  dextrose 40% Gel 15 Gram(s) Oral once  dextrose 40% Gel 15 Gram(s) Oral once  dextrose 5%. 1000 milliLiter(s) (50 mL/Hr) IV Continuous <Continuous>  dextrose 5%. 1000 milliLiter(s) (100 mL/Hr) IV Continuous <Continuous>  dextrose 5%. 1000 milliLiter(s) (50 mL/Hr) IV Continuous <Continuous>  dextrose 5%. 1000 milliLiter(s) (100 mL/Hr) IV Continuous <Continuous>  dextrose 50% Injectable 25 Gram(s) IV Push once  dextrose 50% Injectable 12.5 Gram(s) IV Push once  dextrose 50% Injectable 25 Gram(s) IV Push once  dextrose 50% Injectable 25 Gram(s) IV Push once  dextrose 50% Injectable 12.5 Gram(s) IV Push once  dextrose 50% Injectable 25 Gram(s) IV Push once  enoxaparin Injectable 70 milliGRAM(s) SubCutaneous two times a day  glucagon  Injectable 1 milliGRAM(s) IntraMuscular once  glucagon  Injectable 1 milliGRAM(s) IntraMuscular once  insulin lispro (ADMELOG) corrective regimen sliding scale   SubCutaneous every 6 hours  insulin NPH human recombinant 5 Unit(s) SubCutaneous every 6 hours  levETIRAcetam  Solution 500 milliGRAM(s) Oral two times a day  multivitamin/minerals Oral Solution (WELLESSE) 30 milliLiter(s) Oral daily  piperacillin/tazobactam IVPB.. 3.375 Gram(s) IV Intermittent every 8 hours  polyethylene glycol 3350 17 Gram(s) Oral daily  senna 2 Tablet(s) Oral at bedtime  sodium chloride 0.9%. 1000 milliLiter(s) (75 mL/Hr) IV Continuous <Continuous>  tamsulosin 0.4 milliGRAM(s) Oral at bedtime  thiamine 100 milliGRAM(s) Oral daily    MEDICATIONS  (PRN):      Allergies    No Known Allergies    Intolerances      Review of Systems: Pt. unable to provide full ROS at this time. Denies any pain      PHYSICAL EXAM:  VITALS: T(C): 36.8 (05-06-21 @ 14:58)  T(F): 98.3 (05-06-21 @ 14:58), Max: 99.5 (05-06-21 @ 05:02)  HR: 110 (05-06-21 @ 14:58) (89 - 888)  BP: 119/72 (05-06-21 @ 14:58) (110/70 - 122/82)  RR:  (17 - 18)  SpO2:  (96% - 98%)  Wt(kg): --  GENERAL: NAD at this time  EYES: No proptosis, EOMI  HEENT:  Atraumatic, Normocephalic,   THYROID: Normal size, no palpable nodules  RESPIRATORY: Clear to auscultation bilaterally, full excursion, non-labored  CARDIOVASCULAR: Regular rhythm; No murmurs; no peripheral edema  GI: Soft, nontender, non distended, normal bowel sounds  SKIN: Dry, intact, No rashes or lesions  MUSCULOSKELETAL: normal strength  NEURO: follows commands  PSYCH: normal affect, normal mood  CUSHING'S SIGNS: no striae      POCT Blood Glucose.: 225 mg/dL (05-06-21 @ 12:04)  POCT Blood Glucose.: 268 mg/dL (05-06-21 @ 08:44)  POCT Blood Glucose.: 303 mg/dL (05-06-21 @ 06:14)  POCT Blood Glucose.: 241 mg/dL (05-05-21 @ 23:32)  POCT Blood Glucose.: 225 mg/dL (05-05-21 @ 17:17)  POCT Blood Glucose.: 258 mg/dL (05-05-21 @ 12:29)  POCT Blood Glucose.: 287 mg/dL (05-05-21 @ 05:16)  POCT Blood Glucose.: 259 mg/dL (05-04-21 @ 23:31)  POCT Blood Glucose.: 160 mg/dL (05-04-21 @ 17:16)  POCT Blood Glucose.: 281 mg/dL (05-04-21 @ 12:31)  POCT Blood Glucose.: 173 mg/dL (05-04-21 @ 06:17)  POCT Blood Glucose.: 198 mg/dL (05-03-21 @ 23:58)  POCT Blood Glucose.: 145 mg/dL (05-03-21 @ 19:08)                              8.3    17.34 )-----------( 324      ( 06 May 2021 07:03 )             27.2       05-06    133<L>  |  97  |  20  ----------------------------<  278<H>  4.9   |  24  |  0.81    EGFR if : 114  EGFR if non : 98    Ca    8.8      05-06  Mg     2.0     05-06  Phos  3.7     05-06    TPro  7.1  /  Alb  2.7<L>  /  TBili  0.7  /  DBili  x   /  AST  16  /  ALT  14  /  AlkPhos  77  05-05    Thyroid Function Tests:  04-24 @ 11:11 TSH 2.00 FreeT4 -- T3 -- Anti TPO -- Anti Thyroglobulin Ab -- TSI --          04-26 Chol 114 Direct LDL -- LDL calculated 53 HDL 35<L> Trig 126, 04-13 Chol -- Direct LDL -- LDL calculated -- HDL -- Trig 126  Radiology:

## 2021-05-06 NOTE — PROGRESS NOTE ADULT - ASSESSMENT
61 yo M with PMH of DM who was initially admitted to [facility] ICU 4/1 for hypoxic respiratory failure 2/2 COVID c/b PE with R heart strain, cardiogenic and septic shock, ischemic and hemorrhagic CVA and ESBL klebsiella ventilator associated PNA, transferred to [facility] for neurosurgery eval on 4/12  No fever, has leukocytosis  Has rising WBC, conversely, patient appears well at bedside--no fevers, no chills, no new complaints  Bilateral LE dry gangrene  CXR RLL atelectasis vs pna? No clinical signs pneumonia  No sob/no cough, no abd pain, N/V/D  Has PEG  PCT minimally elevated  Remain stable, WBC still elevated but not rising further, afebrile  1) Leukocytosis  - Acute rise in leukocytosis, unclear cause; aspiration pneumonitis vs alternate cause? Positive UA but ? no symptoms UTI  - Would continue monitor off abx for now  - Trend WBC  - Monitor for fever, diarrhea, alternate sources infection (if clinical worsening, would start empiric abx acutely)  - F/U BCX, UCX  2) COVID  - Prior infection  - Monitor for any signs active COVID/reinfection  3) Abnormal finding on imaging  - Note CXR with RLL opacity, presently no clinical signs pna  - O2 supplementation per primary team  4) Gangrene  - Appears noninfectious/dry gangrene  - Monitor wounds    Sammy Chambers MD  Pager [phone]  After 5pm and on weekends call [phone]

## 2021-05-06 NOTE — PROGRESS NOTE ADULT - PROBLEM SELECTOR PLAN 8
feet with necrosis  - podiatry recs appreciated   - c/w wound care  - no active signs of infxn    Leukocytosis  - ID recs appreciated    - Will monitor off abx   - CXR with RLL opacity, presently no clinical signs pna  - O2 supplementation as necessary   - BCx in lab   - UA and UCx feet with necrosis  - podiatry recs appreciated   - c/w wound care  - no active signs of infxn    Leukocytosis  - ID recs appreciated    - Will monitor off abx   - CXR with RLL opacity, presently no clinical signs pna  - O2 supplementation as necessary   - BCx in lab, f/u results   - UA positive, pending UCx  - CT chest, a/p for infection workup

## 2021-05-06 NOTE — PROGRESS NOTE ADULT - SUBJECTIVE AND OBJECTIVE BOX
Podiatry pager #: [phone]/ [phone]    Patient is a 60y old  Male who presents with a chief complaint of COVID19 w/ severe metabolic acidosis s/p intubation (06 May 2021 16:41)       INTERVAL HPI/OVERNIGHT EVENTS:  Patient seen and evaluated at bedside.  Pt is resting comfortable in NAD. Denies N/V/F/C.  Pain rated at X/10    Allergies    No Known Allergies    Intolerances        Vital Signs Last 24 Hrs  T(C): 36.9 (07 May 2021 06:02), Max: 36.9 (06 May 2021 20:50)  T(F): 98.4 (07 May 2021 06:02), Max: 98.5 (06 May 2021 20:50)  HR: 101 (07 May 2021 06:34) (82 - 110)  BP: 115/65 (07 May 2021 06:02) (115/65 - 119/72)  BP(mean): --  RR: 18 (07 May 2021 06:02) (18 - 18)  SpO2: 99% (07 May 2021 06:34) (95% - 99%)    albuterol/ipratropium for Nebulization 3 milliLiter(s) Nebulizer every 12 hours  cholecalciferol 400 Unit(s) Oral daily  dextrose 40% Gel 15 Gram(s) Oral once  dextrose 40% Gel 15 Gram(s) Oral once  dextrose 5%. 1000 milliLiter(s) IV Continuous <Continuous>  dextrose 5%. 1000 milliLiter(s) IV Continuous <Continuous>  dextrose 5%. 1000 milliLiter(s) IV Continuous <Continuous>  dextrose 5%. 1000 milliLiter(s) IV Continuous <Continuous>  dextrose 50% Injectable 25 Gram(s) IV Push once  dextrose 50% Injectable 12.5 Gram(s) IV Push once  dextrose 50% Injectable 25 Gram(s) IV Push once  dextrose 50% Injectable 25 Gram(s) IV Push once  dextrose 50% Injectable 12.5 Gram(s) IV Push once  dextrose 50% Injectable 25 Gram(s) IV Push once  enoxaparin Injectable 70 milliGRAM(s) SubCutaneous two times a day  glucagon  Injectable 1 milliGRAM(s) IntraMuscular once  glucagon  Injectable 1 milliGRAM(s) IntraMuscular once  insulin lispro (ADMELOG) corrective regimen sliding scale   SubCutaneous every 6 hours  insulin NPH human recombinant 5 Unit(s) SubCutaneous every 6 hours  levETIRAcetam  Solution 500 milliGRAM(s) Oral two times a day  multivitamin/minerals Oral Solution (WELLESSE) 30 milliLiter(s) Oral daily  piperacillin/tazobactam IVPB.. 3.375 Gram(s) IV Intermittent every 8 hours  polyethylene glycol 3350 17 Gram(s) Oral daily  senna 2 Tablet(s) Oral at bedtime  sodium chloride 0.9%. 1000 milliLiter(s) IV Continuous <Continuous>  tamsulosin 0.4 milliGRAM(s) Oral at bedtime  thiamine 100 milliGRAM(s) Oral daily      LABS:                        8.4    14.09 )-----------( 340      ( 07 May 2021 07:07 )             27.0     05-07    134<L>  |  98  |  18  ----------------------------<  227<H>  5.0   |  24  |  0.86    Ca    8.8      07 May 2021 07:07  Phos  4.0     -  Mg     2.1     -    TPro  6.9  /  Alb  2.6<L>  /  TBili  0.5  /  DBili  x   /  AST  16  /  ALT  15  /  AlkPhos  87  05-07      Urinalysis Basic - ( 05 May 2021 18:38 )    Color: Dark Yellow / Appearance: Turbid / S.027 / pH: x  Gluc: x / Ketone: Trace  / Bili: Negative / Urobili: 3 mg/dL   Blood: x / Protein: 100 mg/dL / Nitrite: Positive   Leuk Esterase: Large / RBC: >50 /hpf / WBC >50   Sq Epi: x / Non Sq Epi: 3 / Bacteria: Many      CAPILLARY BLOOD GLUCOSE      POCT Blood Glucose.: 237 mg/dL (07 May 2021 06:08)  POCT Blood Glucose.: 239 mg/dL (07 May 2021 00:18)  POCT Blood Glucose.: 186 mg/dL (06 May 2021 17:30)  POCT Blood Glucose.: 225 mg/dL (06 May 2021 12:04)  POCT Blood Glucose.: 268 mg/dL (06 May 2021 08:44)      Lower Extremity Physical Exam:  Vascular: DP/PT 2/4, B/L, CFT <3 seconds B/L, Temperature gradient warm to cool, B/L however diminished warmth noted on the R foot past TMT  Ortho: toes painful to palp  Neuro: Epicritic sensation intact to the level of digits, B/L.  Skin: bilateral digital gangrenous changes noted to distal tips of digits, notably on L foot 2nd digit full thickness, well adhered; R foot 2nd digit sloughed skin with no probe to bone nor signs of infection    RADIOLOGY & ADDITIONAL TESTS:

## 2021-05-06 NOTE — PROGRESS NOTE ADULT - SUBJECTIVE AND OBJECTIVE BOX
CC: F/U for Leukocytosis    Saw/spoke to patient. Unchanged. No new events, team started abx in the interim.    Allergies  No Known Allergies    ANTIMICROBIALS:  piperacillin/tazobactam IVPB. 3.375 once  piperacillin/tazobactam IVPB.. 3.375 every 8 hours    PE:    Vital Signs Last 24 Hrs  T(C): 37.5 (06 May 2021 05:02), Max: 37.5 (06 May 2021 05:02)  T(F): 99.5 (06 May 2021 05:02), Max: 99.5 (06 May 2021 05:02)  HR: 110 (06 May 2021 05:42) (89 - 888)  BP: 110/70 (06 May 2021 05:02) (105/69 - 122/82)  RR: 17 (06 May 2021 05:02) (17 - 18)  SpO2: 96% (06 May 2021 05:42) (95% - 98%)    Gen: AOx3, NAD, non-toxic  CV: S1+S2 normal, tachycardic  Resp: Clear bilat, no resp distress, no crackles/wheezes  Abd: Soft, nontender, +BS  Ext: No LE edema, no wounds    LABS:                        8.3    17.34 )-----------( 324      ( 06 May 2021 07:03 )             27.2     05-06    133<L>  |  97  |  20  ----------------------------<  278<H>  4.9   |  24  |  0.81    Ca    8.8      06 May 2021 07:02  Phos  3.7     05-06  Mg     2.0     05-06    TPro  7.1  /  Alb  2.7<L>  /  TBili  0.7  /  DBili  x   /  AST  16  /  ALT  14  /  AlkPhos  77  05-05    Urinalysis Basic - ( 05 May 2021 18:38 )    Color: Dark Yellow / Appearance: Turbid / S.027 / pH: x  Gluc: x / Ketone: Trace  / Bili: Negative / Urobili: 3 mg/dL   Blood: x / Protein: 100 mg/dL / Nitrite: Positive   Leuk Esterase: Large / RBC: >50 /hpf / WBC >50   Sq Epi: x / Non Sq Epi: 3 / Bacteria: Many    MICROBIOLOGY:    .Urine Clean Catch (Midstream)  21   No growth    .Blood Blood-Peripheral  21   No Growth Final    .Blood Blood  21   No Growth Final    .Sputum Sputum  21   Numerous Klebsiella pneumoniae ESBL  Normal Respiratory Lizzette absent  --  Klebsiella pneumoniae ESBL    .Sputum Sputum  21   Numerous Klebsiella pneumoniae ESBL  Normal Respiratory Lizzette absent  --  Klebsiella pneumoniae ESBL    RADIOLOGY:     XR:    IMPRESSION:    Poor inspiratory effort. The heart is normal in size. Right pleural effusion. Right lower lobe pneumonia and/or atelectasis. The left lung is clear. No acute bony pathology could be identified.

## 2021-05-06 NOTE — PROGRESS NOTE ADULT - PROBLEM SELECTOR PLAN 1
- Etiology likely multi-factorial in setting of acute ischemic infarctions of the brain in conjunction vs acute kidney injury with uremia (now resolving) vs resolving hypoxic respiratory failure, covid-19 infection, superimposed bacterial pneumonia, urinary retention, concern for nutritional deficiencies)  - S/p management of: acute pneumonia with antibiotics; pulmonary embolus with full-dose anticoagulation  - Reorientation with family at bedside (pt speaks [place], does best with family present)  - MRI Brain, and MRA Head demonstrated multiple evolving subacute infarcts with associated hemorrhages, likely expected evolution of ischemia  - Neuro recs appreciated  - As per neuro, EEG not suggestive of seizure-like activity  - C/w Keppra 500 mg BID  - repeating CT head 5/5 - Etiology likely multi-factorial in setting of acute ischemic infarctions of the brain in conjunction vs acute kidney injury with uremia (now resolving) vs resolving hypoxic respiratory failure, covid-19 infection, superimposed bacterial pneumonia, urinary retention, concern for nutritional deficiencies)  - S/p management of: acute pneumonia with antibiotics; pulmonary embolus with full-dose anticoagulation  - Reorientation with family at bedside (pt speaks [place], does best with family present)  - MRI Brain, and MRA Head demonstrated multiple evolving subacute infarcts with associated hemorrhages, likely expected evolution of ischemia  - Neuro recs appreciated  - As per neuro, EEG not suggestive of seizure-like activity  - C/w Keppra 500 mg BID  - repeat CT head 5/5 normal

## 2021-05-06 NOTE — PROGRESS NOTE ADULT - SUBJECTIVE AND OBJECTIVE BOX
PROGRESS NOTE:   Authored by Dr. Emily Mckeon, ANH pager [phone]    Patient is a 60y old  Male who presents with a chief complaint of COVID19 w/ severe metabolic acidosis s/p intubation (05 May 2021 13:07)      SUBJECTIVE / OVERNIGHT EVENTS:    MEDICATIONS  (STANDING):  albuterol/ipratropium for Nebulization 3 milliLiter(s) Nebulizer every 12 hours  cholecalciferol 400 Unit(s) Oral daily  dextrose 40% Gel 15 Gram(s) Oral once  dextrose 40% Gel 15 Gram(s) Oral once  dextrose 5%. 1000 milliLiter(s) (50 mL/Hr) IV Continuous <Continuous>  dextrose 5%. 1000 milliLiter(s) (100 mL/Hr) IV Continuous <Continuous>  dextrose 5%. 1000 milliLiter(s) (100 mL/Hr) IV Continuous <Continuous>  dextrose 5%. 1000 milliLiter(s) (50 mL/Hr) IV Continuous <Continuous>  dextrose 50% Injectable 25 Gram(s) IV Push once  dextrose 50% Injectable 12.5 Gram(s) IV Push once  dextrose 50% Injectable 25 Gram(s) IV Push once  dextrose 50% Injectable 25 Gram(s) IV Push once  dextrose 50% Injectable 12.5 Gram(s) IV Push once  dextrose 50% Injectable 25 Gram(s) IV Push once  enoxaparin Injectable 70 milliGRAM(s) SubCutaneous two times a day  glucagon  Injectable 1 milliGRAM(s) IntraMuscular once  glucagon  Injectable 1 milliGRAM(s) IntraMuscular once  insulin glargine Injectable (LANTUS) 12 Unit(s) SubCutaneous at bedtime  insulin lispro (ADMELOG) corrective regimen sliding scale   SubCutaneous every 6 hours  insulin NPH human recombinant 12 Unit(s) SubCutaneous once  levETIRAcetam  Solution 500 milliGRAM(s) Oral two times a day  multivitamin/minerals Oral Solution (WELLESSE) 30 milliLiter(s) Oral daily  polyethylene glycol 3350 17 Gram(s) Oral daily  senna 2 Tablet(s) Oral at bedtime  tamsulosin 0.4 milliGRAM(s) Oral at bedtime  thiamine 100 milliGRAM(s) Oral daily    MEDICATIONS  (PRN):      CAPILLARY BLOOD GLUCOSE      POCT Blood Glucose.: 303 mg/dL (06 May 2021 06:14)  POCT Blood Glucose.: 241 mg/dL (05 May 2021 23:32)  POCT Blood Glucose.: 225 mg/dL (05 May 2021 17:17)  POCT Blood Glucose.: 258 mg/dL (05 May 2021 12:29)    I&O's Summary    05 May 2021 07:01  -  06 May 2021 07:00  --------------------------------------------------------  IN: 650 mL / OUT: 850 mL / NET: -200 mL        PHYSICAL EXAM:  Vital Signs Last 24 Hrs  T(C): 37.5 (06 May 2021 05:02), Max: 37.5 (06 May 2021 05:02)  T(F): 99.5 (06 May 2021 05:02), Max: 99.5 (06 May 2021 05:02)  HR: 110 (06 May 2021 05:42) (89 - 888)  BP: 110/70 (06 May 2021 05:02) (105/69 - 122/82)  BP(mean): --  RR: 17 (06 May 2021 05:02) (17 - 18)  SpO2: 96% (06 May 2021 05:42) (95% - 98%)    GENERAL: No acute distress, well-developed  HEAD:  Atraumatic, Normocephalic  EYES: EOMI, PERRLA, conjunctiva and sclera clear  NECK: Supple, no lymphadenopathy, no JVD  CHEST/LUNG: CTAB; No wheezes, rales, or rhonchi  HEART: Regular rate and rhythm; No murmurs, rubs, or gallops  ABDOMEN: Soft, non-tender, non-distended; normal bowel sounds, no organomegaly  EXTREMITIES:  2+ peripheral pulses b/l, No clubbing, cyanosis, or edema  NEUROLOGY: A&O x 3, no focal deficits  SKIN: No rashes or lesions    LABS:                        8.3    17.34 )-----------( 324      ( 06 May 2021 07:03 )             27.2     05-06    133<L>  |  97  |  20  ----------------------------<  278<H>  4.9   |  24  |  0.81    Ca    8.8      06 May 2021 07:02  Phos  3.7     05-  Mg     2.0     05-06    TPro  7.1  /  Alb  2.7<L>  /  TBili  0.7  /  DBili  x   /  AST  16  /  ALT  14  /  AlkPhos  77  -    PTT - ( 05 May 2021 07:27 )  PTT:33.3 sec      Urinalysis Basic - ( 05 May 2021 18:38 )    Color: Dark Yellow / Appearance: Turbid / S.027 / pH: x  Gluc: x / Ketone: Trace  / Bili: Negative / Urobili: 3 mg/dL   Blood: x / Protein: 100 mg/dL / Nitrite: Positive   Leuk Esterase: Large / RBC: >50 /hpf / WBC >50   Sq Epi: x / Non Sq Epi: 3 / Bacteria: Many          RADIOLOGY & ADDITIONAL TESTS:  Results Reviewed:   Imaging Personally Reviewed:  Electrocardiogram Personally Reviewed:    COORDINATION OF CARE:  Care Discussed with Consultants/Other Providers [Y/N]:  Prior or Outpatient Records Reviewed [Y/N]:   PROGRESS NOTE:   Authored by ANH Fritz pager [phone]    Patient is a 60y old  Male who presents with a chief complaint of COVID19 w/ severe metabolic acidosis s/p intubation (05 May 2021 13:07)      SUBJECTIVE / OVERNIGHT EVENTS:No events overnight. Patient examined at bedside. He appeared well. He denies any pain and    MEDICATIONS  (STANDING):  albuterol/ipratropium for Nebulization 3 milliLiter(s) Nebulizer every 12 hours  cholecalciferol 400 Unit(s) Oral daily  dextrose 40% Gel 15 Gram(s) Oral once  dextrose 40% Gel 15 Gram(s) Oral once  dextrose 5%. 1000 milliLiter(s) (50 mL/Hr) IV Continuous <Continuous>  dextrose 5%. 1000 milliLiter(s) (100 mL/Hr) IV Continuous <Continuous>  dextrose 5%. 1000 milliLiter(s) (100 mL/Hr) IV Continuous <Continuous>  dextrose 5%. 1000 milliLiter(s) (50 mL/Hr) IV Continuous <Continuous>  dextrose 50% Injectable 25 Gram(s) IV Push once  dextrose 50% Injectable 12.5 Gram(s) IV Push once  dextrose 50% Injectable 25 Gram(s) IV Push once  dextrose 50% Injectable 25 Gram(s) IV Push once  dextrose 50% Injectable 12.5 Gram(s) IV Push once  dextrose 50% Injectable 25 Gram(s) IV Push once  enoxaparin Injectable 70 milliGRAM(s) SubCutaneous two times a day  glucagon  Injectable 1 milliGRAM(s) IntraMuscular once  glucagon  Injectable 1 milliGRAM(s) IntraMuscular once  insulin glargine Injectable (LANTUS) 12 Unit(s) SubCutaneous at bedtime  insulin lispro (ADMELOG) corrective regimen sliding scale   SubCutaneous every 6 hours  insulin NPH human recombinant 12 Unit(s) SubCutaneous once  levETIRAcetam  Solution 500 milliGRAM(s) Oral two times a day  multivitamin/minerals Oral Solution (WELLESSE) 30 milliLiter(s) Oral daily  polyethylene glycol 3350 17 Gram(s) Oral daily  senna 2 Tablet(s) Oral at bedtime  tamsulosin 0.4 milliGRAM(s) Oral at bedtime  thiamine 100 milliGRAM(s) Oral daily    MEDICATIONS  (PRN):      CAPILLARY BLOOD GLUCOSE      POCT Blood Glucose.: 303 mg/dL (06 May 2021 06:14)  POCT Blood Glucose.: 241 mg/dL (05 May 2021 23:32)  POCT Blood Glucose.: 225 mg/dL (05 May 2021 17:17)  POCT Blood Glucose.: 258 mg/dL (05 May 2021 12:29)    I&O's Summary    05 May 2021 07:01  -  06 May 2021 07:00  --------------------------------------------------------  IN: 650 mL / OUT: 850 mL / NET: -200 mL        PHYSICAL EXAM:  Vital Signs Last 24 Hrs  T(C): 37.5 (06 May 2021 05:02), Max: 37.5 (06 May 2021 05:02)  T(F): 99.5 (06 May 2021 05:02), Max: 99.5 (06 May 2021 05:02)  HR: 110 (06 May 2021 05:42) (89 - 888)  BP: 110/70 (06 May 2021 05:02) (105/69 - 122/82)  BP(mean): --  RR: 17 (06 May 2021 05:02) (17 - 18)  SpO2: 96% (06 May 2021 05:42) (95% - 98%)    GENERAL: No acute distress, well-developed  HEAD:  Atraumatic, Normocephalic  EYES: EOMI, PERRLA, conjunctiva and sclera clear  NECK: Supple, no lymphadenopathy, no JVD  CHEST/LUNG: CTAB; No wheezes, rales, or rhonchi  HEART: Regular rate and rhythm; No murmurs, rubs, or gallops  ABDOMEN: Soft, non-tender, non-distended; normal bowel sounds, no organomegaly  EXTREMITIES:  2+ peripheral pulses b/l, No clubbing, cyanosis, or edema  NEUROLOGY: A&O x 3, no focal deficits  SKIN: No rashes or lesions    LABS:                        8.3    17.34 )-----------( 324      ( 06 May 2021 07:03 )             27.2     05-06    133<L>  |  97  |  20  ----------------------------<  278<H>  4.9   |  24  |  0.81    Ca    8.8      06 May 2021 07:02  Phos  3.7     05-  Mg     2.0     -    TPro  7.1  /  Alb  2.7<L>  /  TBili  0.7  /  DBili  x   /  AST  16  /  ALT  14  /  AlkPhos  77  -    PTT - ( 05 May 2021 07:27 )  PTT:33.3 sec      Urinalysis Basic - ( 05 May 2021 18:38 )    Color: Dark Yellow / Appearance: Turbid / S.027 / pH: x  Gluc: x / Ketone: Trace  / Bili: Negative / Urobili: 3 mg/dL   Blood: x / Protein: 100 mg/dL / Nitrite: Positive   Leuk Esterase: Large / RBC: >50 /hpf / WBC >50   Sq Epi: x / Non Sq Epi: 3 / Bacteria: Many          RADIOLOGY & ADDITIONAL TESTS:  Results Reviewed:   Imaging Personally Reviewed:  Electrocardiogram Personally Reviewed:    COORDINATION OF CARE:  Care Discussed with Consultants/Other Providers [Y/N]:  Prior or Outpatient Records Reviewed [Y/N]:   PROGRESS NOTE:   Authored by ANH Fritz pager [phone]    Patient is a 60y old  Male who presents with a chief complaint of COVID19 w/ severe metabolic acidosis s/p intubation (05 May 2021 13:07)      SUBJECTIVE / OVERNIGHT EVENTS:No events overnight. Patient examined at bedside. He appeared well. He denies any pain.     MEDICATIONS  (STANDING):  albuterol/ipratropium for Nebulization 3 milliLiter(s) Nebulizer every 12 hours  cholecalciferol 400 Unit(s) Oral daily  dextrose 40% Gel 15 Gram(s) Oral once  dextrose 40% Gel 15 Gram(s) Oral once  dextrose 5%. 1000 milliLiter(s) (50 mL/Hr) IV Continuous <Continuous>  dextrose 5%. 1000 milliLiter(s) (100 mL/Hr) IV Continuous <Continuous>  dextrose 5%. 1000 milliLiter(s) (100 mL/Hr) IV Continuous <Continuous>  dextrose 5%. 1000 milliLiter(s) (50 mL/Hr) IV Continuous <Continuous>  dextrose 50% Injectable 25 Gram(s) IV Push once  dextrose 50% Injectable 12.5 Gram(s) IV Push once  dextrose 50% Injectable 25 Gram(s) IV Push once  dextrose 50% Injectable 25 Gram(s) IV Push once  dextrose 50% Injectable 12.5 Gram(s) IV Push once  dextrose 50% Injectable 25 Gram(s) IV Push once  enoxaparin Injectable 70 milliGRAM(s) SubCutaneous two times a day  glucagon  Injectable 1 milliGRAM(s) IntraMuscular once  glucagon  Injectable 1 milliGRAM(s) IntraMuscular once  insulin glargine Injectable (LANTUS) 12 Unit(s) SubCutaneous at bedtime  insulin lispro (ADMELOG) corrective regimen sliding scale   SubCutaneous every 6 hours  insulin NPH human recombinant 12 Unit(s) SubCutaneous once  levETIRAcetam  Solution 500 milliGRAM(s) Oral two times a day  multivitamin/minerals Oral Solution (WELLESSE) 30 milliLiter(s) Oral daily  polyethylene glycol 3350 17 Gram(s) Oral daily  senna 2 Tablet(s) Oral at bedtime  tamsulosin 0.4 milliGRAM(s) Oral at bedtime  thiamine 100 milliGRAM(s) Oral daily    MEDICATIONS  (PRN):      CAPILLARY BLOOD GLUCOSE      POCT Blood Glucose.: 303 mg/dL (06 May 2021 06:14)  POCT Blood Glucose.: 241 mg/dL (05 May 2021 23:32)  POCT Blood Glucose.: 225 mg/dL (05 May 2021 17:17)  POCT Blood Glucose.: 258 mg/dL (05 May 2021 12:29)    I&O's Summary    05 May 2021 07:01  -  06 May 2021 07:00  --------------------------------------------------------  IN: 650 mL / OUT: 850 mL / NET: -200 mL        PHYSICAL EXAM:  Vital Signs Last 24 Hrs  T(C): 37.5 (06 May 2021 05:02), Max: 37.5 (06 May 2021 05:02)  T(F): 99.5 (06 May 2021 05:02), Max: 99.5 (06 May 2021 05:02)  HR: 110 (06 May 2021 05:42) (89 - 888)  BP: 110/70 (06 May 2021 05:02) (105/69 - 122/82)  BP(mean): --  RR: 17 (06 May 2021 05:02) (17 - 18)  SpO2: 96% (06 May 2021 05:42) (95% - 98%)    GENERAL: No acute distress, lethargic but able to converse   HEAD:  Atraumatic, Normocephalic  EYES: EOMI, PERRLA, conjunctiva and sclera clear  NECK: Supple, no lymphadenopathy, no JVD  CHEST/LUNG: CTAB; No wheezes, rales, or rhonchi  HEART: Regular rate and rhythm; No murmurs, rubs, or gallops  ABDOMEN: Soft, non-tender, non-distended; normal bowel sounds, no organomegaly, PEG in place   EXTREMITIES:  2+ peripheral pulses b/l, No clubbing, cyanosis, or edema  NEUROLOGY: A&O x 3, significant weakness in LE   SKIN: gangrenous changes on feet b/l, no acute worsening     LABS:                        8.3    17.34 )-----------( 324      ( 06 May 2021 07:03 )             27.2     05-06    133<L>  |  97  |  20  ----------------------------<  278<H>  4.9   |  24  |  0.81    Ca    8.8      06 May 2021 07:02  Phos  3.7     05-  Mg     2.0     -    TPro  7.1  /  Alb  2.7<L>  /  TBili  0.7  /  DBili  x   /  AST  16  /  ALT  14  /  AlkPhos  77      PTT - ( 05 May 2021 07:27 )  PTT:33.3 sec      Urinalysis Basic - ( 05 May 2021 18:38 )    Color: Dark Yellow / Appearance: Turbid / S.027 / pH: x  Gluc: x / Ketone: Trace  / Bili: Negative / Urobili: 3 mg/dL   Blood: x / Protein: 100 mg/dL / Nitrite: Positive   Leuk Esterase: Large / RBC: >50 /hpf / WBC >50   Sq Epi: x / Non Sq Epi: 3 / Bacteria: Many          RADIOLOGY & ADDITIONAL TESTS:  Results Reviewed:   Imaging Personally Reviewed:  Electrocardiogram Personally Reviewed:    COORDINATION OF CARE:  Care Discussed with Consultants/Other Providers [Y/N]:  Prior or Outpatient Records Reviewed [Y/N]:

## 2021-05-07 DIAGNOSIS — R65.10 SYSTEMIC INFLAMMATORY RESPONSE SYNDROME (SIRS) OF NON-INFECTIOUS ORIGIN WITHOUT ACUTE ORGAN DYSFUNCTION: ICD-10-CM

## 2021-05-07 LAB
ALBUMIN SERPL ELPH-MCNC: 2.3 G/DL — LOW (ref 3.3–5)
ALBUMIN SERPL ELPH-MCNC: 2.6 G/DL — LOW (ref 3.3–5)
ALP SERPL-CCNC: 85 U/L — SIGNIFICANT CHANGE UP (ref 40–120)
ALP SERPL-CCNC: 87 U/L — SIGNIFICANT CHANGE UP (ref 40–120)
ALT FLD-CCNC: 15 U/L — SIGNIFICANT CHANGE UP (ref 10–45)
ALT FLD-CCNC: 17 U/L — SIGNIFICANT CHANGE UP (ref 10–45)
ANION GAP SERPL CALC-SCNC: 12 MMOL/L — SIGNIFICANT CHANGE UP (ref 5–17)
ANION GAP SERPL CALC-SCNC: 14 MMOL/L — SIGNIFICANT CHANGE UP (ref 5–17)
ANION GAP SERPL CALC-SCNC: 14 MMOL/L — SIGNIFICANT CHANGE UP (ref 5–17)
AST SERPL-CCNC: 16 U/L — SIGNIFICANT CHANGE UP (ref 10–40)
AST SERPL-CCNC: 18 U/L — SIGNIFICANT CHANGE UP (ref 10–40)
BILIRUB SERPL-MCNC: 0.5 MG/DL — SIGNIFICANT CHANGE UP (ref 0.2–1.2)
BILIRUB SERPL-MCNC: 0.5 MG/DL — SIGNIFICANT CHANGE UP (ref 0.2–1.2)
BUN SERPL-MCNC: 16 MG/DL — SIGNIFICANT CHANGE UP (ref 7–23)
BUN SERPL-MCNC: 17 MG/DL — SIGNIFICANT CHANGE UP (ref 7–23)
BUN SERPL-MCNC: 18 MG/DL — SIGNIFICANT CHANGE UP (ref 7–23)
CALCIUM SERPL-MCNC: 8.6 MG/DL — SIGNIFICANT CHANGE UP (ref 8.4–10.5)
CALCIUM SERPL-MCNC: 8.8 MG/DL — SIGNIFICANT CHANGE UP (ref 8.4–10.5)
CALCIUM SERPL-MCNC: 8.8 MG/DL — SIGNIFICANT CHANGE UP (ref 8.4–10.5)
CHLORIDE SERPL-SCNC: 97 MMOL/L — SIGNIFICANT CHANGE UP (ref 96–108)
CHLORIDE SERPL-SCNC: 98 MMOL/L — SIGNIFICANT CHANGE UP (ref 96–108)
CHLORIDE SERPL-SCNC: 98 MMOL/L — SIGNIFICANT CHANGE UP (ref 96–108)
CO2 SERPL-SCNC: 22 MMOL/L — SIGNIFICANT CHANGE UP (ref 22–31)
CO2 SERPL-SCNC: 24 MMOL/L — SIGNIFICANT CHANGE UP (ref 22–31)
CO2 SERPL-SCNC: 24 MMOL/L — SIGNIFICANT CHANGE UP (ref 22–31)
CREAT SERPL-MCNC: 0.82 MG/DL — SIGNIFICANT CHANGE UP (ref 0.5–1.3)
CREAT SERPL-MCNC: 0.85 MG/DL — SIGNIFICANT CHANGE UP (ref 0.5–1.3)
CREAT SERPL-MCNC: 0.86 MG/DL — SIGNIFICANT CHANGE UP (ref 0.5–1.3)
GLUCOSE BLDC GLUCOMTR-MCNC: 209 MG/DL — HIGH (ref 70–99)
GLUCOSE BLDC GLUCOMTR-MCNC: 237 MG/DL — HIGH (ref 70–99)
GLUCOSE BLDC GLUCOMTR-MCNC: 239 MG/DL — HIGH (ref 70–99)
GLUCOSE BLDC GLUCOMTR-MCNC: 309 MG/DL — HIGH (ref 70–99)
GLUCOSE SERPL-MCNC: 176 MG/DL — HIGH (ref 70–99)
GLUCOSE SERPL-MCNC: 222 MG/DL — HIGH (ref 70–99)
GLUCOSE SERPL-MCNC: 227 MG/DL — HIGH (ref 70–99)
HCT VFR BLD CALC: 26.4 % — LOW (ref 39–50)
HCT VFR BLD CALC: 27 % — LOW (ref 39–50)
HGB BLD-MCNC: 8 G/DL — LOW (ref 13–17)
HGB BLD-MCNC: 8.4 G/DL — LOW (ref 13–17)
MAGNESIUM SERPL-MCNC: 2 MG/DL — SIGNIFICANT CHANGE UP (ref 1.6–2.6)
MAGNESIUM SERPL-MCNC: 2.1 MG/DL — SIGNIFICANT CHANGE UP (ref 1.6–2.6)
MCHC RBC-ENTMCNC: 25.7 PG — LOW (ref 27–34)
MCHC RBC-ENTMCNC: 26.8 PG — LOW (ref 27–34)
MCHC RBC-ENTMCNC: 30.3 GM/DL — LOW (ref 32–36)
MCHC RBC-ENTMCNC: 31.1 GM/DL — LOW (ref 32–36)
MCV RBC AUTO: 84.9 FL — SIGNIFICANT CHANGE UP (ref 80–100)
MCV RBC AUTO: 86 FL — SIGNIFICANT CHANGE UP (ref 80–100)
NRBC # BLD: 0 /100 WBCS — SIGNIFICANT CHANGE UP (ref 0–0)
NRBC # BLD: 0 /100 WBCS — SIGNIFICANT CHANGE UP (ref 0–0)
PHOSPHATE SERPL-MCNC: 3.4 MG/DL — SIGNIFICANT CHANGE UP (ref 2.5–4.5)
PHOSPHATE SERPL-MCNC: 4 MG/DL — SIGNIFICANT CHANGE UP (ref 2.5–4.5)
PLATELET # BLD AUTO: 340 K/UL — SIGNIFICANT CHANGE UP (ref 150–400)
PLATELET # BLD AUTO: 344 K/UL — SIGNIFICANT CHANGE UP (ref 150–400)
POTASSIUM SERPL-MCNC: 4.8 MMOL/L — SIGNIFICANT CHANGE UP (ref 3.5–5.3)
POTASSIUM SERPL-MCNC: 5 MMOL/L — SIGNIFICANT CHANGE UP (ref 3.5–5.3)
POTASSIUM SERPL-MCNC: 5 MMOL/L — SIGNIFICANT CHANGE UP (ref 3.5–5.3)
POTASSIUM SERPL-SCNC: 4.8 MMOL/L — SIGNIFICANT CHANGE UP (ref 3.5–5.3)
POTASSIUM SERPL-SCNC: 5 MMOL/L — SIGNIFICANT CHANGE UP (ref 3.5–5.3)
POTASSIUM SERPL-SCNC: 5 MMOL/L — SIGNIFICANT CHANGE UP (ref 3.5–5.3)
PROT SERPL-MCNC: 6.9 G/DL — SIGNIFICANT CHANGE UP (ref 6–8.3)
PROT SERPL-MCNC: 7 G/DL — SIGNIFICANT CHANGE UP (ref 6–8.3)
RBC # BLD: 3.11 M/UL — LOW (ref 4.2–5.8)
RBC # BLD: 3.14 M/UL — LOW (ref 4.2–5.8)
RBC # FLD: 17.4 % — HIGH (ref 10.3–14.5)
RBC # FLD: 17.4 % — HIGH (ref 10.3–14.5)
SODIUM SERPL-SCNC: 133 MMOL/L — LOW (ref 135–145)
SODIUM SERPL-SCNC: 134 MMOL/L — LOW (ref 135–145)
SODIUM SERPL-SCNC: 136 MMOL/L — SIGNIFICANT CHANGE UP (ref 135–145)
WBC # BLD: 14.09 K/UL — HIGH (ref 3.8–10.5)
WBC # BLD: 14.5 K/UL — HIGH (ref 3.8–10.5)
WBC # FLD AUTO: 14.09 K/UL — HIGH (ref 3.8–10.5)
WBC # FLD AUTO: 14.5 K/UL — HIGH (ref 3.8–10.5)

## 2021-05-07 PROCEDURE — 99233 SBSQ HOSP IP/OBS HIGH 50: CPT | Mod: GC

## 2021-05-07 PROCEDURE — 76604 US EXAM CHEST: CPT | Mod: 26

## 2021-05-07 PROCEDURE — 99223 1ST HOSP IP/OBS HIGH 75: CPT | Mod: GC,25

## 2021-05-07 PROCEDURE — 99232 SBSQ HOSP IP/OBS MODERATE 35: CPT

## 2021-05-07 PROCEDURE — 99233 SBSQ HOSP IP/OBS HIGH 50: CPT

## 2021-05-07 PROCEDURE — 73702 CT LWR EXTREMITY W/O&W/DYE: CPT | Mod: 26,RT

## 2021-05-07 RX ORDER — MEROPENEM 1 G/30ML
1000 INJECTION INTRAVENOUS EVERY 8 HOURS
Refills: 0 | Status: DISCONTINUED | OUTPATIENT
Start: 2021-05-07 | End: 2021-05-12

## 2021-05-07 RX ORDER — HUMAN INSULIN 100 [IU]/ML
8 INJECTION, SUSPENSION SUBCUTANEOUS EVERY 6 HOURS
Refills: 0 | Status: DISCONTINUED | OUTPATIENT
Start: 2021-05-07 | End: 2021-05-08

## 2021-05-07 RX ADMIN — Medication 4: at 06:12

## 2021-05-07 RX ADMIN — HUMAN INSULIN 8 UNIT(S): 100 INJECTION, SUSPENSION SUBCUTANEOUS at 18:17

## 2021-05-07 RX ADMIN — TAMSULOSIN HYDROCHLORIDE 0.4 MILLIGRAM(S): 0.4 CAPSULE ORAL at 21:42

## 2021-05-07 RX ADMIN — Medication 3 MILLILITER(S): at 18:03

## 2021-05-07 RX ADMIN — PIPERACILLIN AND TAZOBACTAM 25 GRAM(S): 4; .5 INJECTION, POWDER, LYOPHILIZED, FOR SOLUTION INTRAVENOUS at 05:43

## 2021-05-07 RX ADMIN — HUMAN INSULIN 5 UNIT(S): 100 INJECTION, SUSPENSION SUBCUTANEOUS at 00:37

## 2021-05-07 RX ADMIN — LEVETIRACETAM 500 MILLIGRAM(S): 250 TABLET, FILM COATED ORAL at 05:39

## 2021-05-07 RX ADMIN — Medication 100 MILLIGRAM(S): at 12:56

## 2021-05-07 RX ADMIN — Medication 400 UNIT(S): at 12:56

## 2021-05-07 RX ADMIN — Medication 3 MILLILITER(S): at 06:08

## 2021-05-07 RX ADMIN — HUMAN INSULIN 5 UNIT(S): 100 INJECTION, SUSPENSION SUBCUTANEOUS at 06:13

## 2021-05-07 RX ADMIN — SENNA PLUS 2 TABLET(S): 8.6 TABLET ORAL at 21:42

## 2021-05-07 RX ADMIN — MEROPENEM 100 MILLIGRAM(S): 1 INJECTION INTRAVENOUS at 21:49

## 2021-05-07 RX ADMIN — LEVETIRACETAM 500 MILLIGRAM(S): 250 TABLET, FILM COATED ORAL at 18:17

## 2021-05-07 RX ADMIN — MEROPENEM 100 MILLIGRAM(S): 1 INJECTION INTRAVENOUS at 12:49

## 2021-05-07 RX ADMIN — Medication 4: at 18:15

## 2021-05-07 RX ADMIN — Medication 4: at 00:37

## 2021-05-07 RX ADMIN — ENOXAPARIN SODIUM 70 MILLIGRAM(S): 100 INJECTION SUBCUTANEOUS at 05:40

## 2021-05-07 RX ADMIN — Medication 8: at 12:55

## 2021-05-07 RX ADMIN — HUMAN INSULIN 5 UNIT(S): 100 INJECTION, SUSPENSION SUBCUTANEOUS at 12:57

## 2021-05-07 NOTE — CONSULT NOTE ADULT - ASSESSMENT
60M hospitalized for COVID PNA, c/b cardiogenic/septic shock and ischemic/hemorrhagic stroke, now extubated on the floor w/ negative COVID PCR, Thoracic surgery consulted to evaluate the recently found R sided empyema    - Will evaluate for possible thoracic surgery intervention, and will update the primary team w/ plans tomorrow.  - Discussed w/ Dr. Tena, Thoracic Surgeon on call    RAQUEL Tena PGY-3  Thoracic Surgery

## 2021-05-07 NOTE — CONSULT NOTE ADULT - SUBJECTIVE AND OBJECTIVE BOX
PAST MEDICAL & SURGICAL HISTORY:  No pertinent past medical history    No significant past surgical history     59 yo man with past medical history of diabetes mellitus who was initially admitted to [facility] ICU for hypoxic respiratory failure secondary to SARS-CoV-2 infection complicated by right heart strain, cardiogenic and septic shock, ischemic stroke with hemorrhagic transformation and ESBL Klebsiella pneumonia. Transferred to [facility] for neurosurgery evaluation and further management. Now s/p PEG. Pulmonary service consulted for concern of empyema on CT chest.     PAST MEDICAL & SURGICAL HISTORY:  No pertinent past medical history  No significant past surgical history    FAMILY HISTORY:  No pertinent family history in first degree relatives    SOCIAL HISTORY  Unable to obtain given clinical condition    MEDICATIONS  (STANDING):  albuterol/ipratropium for Nebulization 3 milliLiter(s) Nebulizer every 12 hours  cholecalciferol 400 Unit(s) Oral daily  dextrose 40% Gel 15 Gram(s) Oral once  dextrose 40% Gel 15 Gram(s) Oral once  dextrose 5%. 1000 milliLiter(s) (50 mL/Hr) IV Continuous <Continuous>  dextrose 5%. 1000 milliLiter(s) (100 mL/Hr) IV Continuous <Continuous>  dextrose 5%. 1000 milliLiter(s) (50 mL/Hr) IV Continuous <Continuous>  dextrose 5%. 1000 milliLiter(s) (100 mL/Hr) IV Continuous <Continuous>  dextrose 50% Injectable 25 Gram(s) IV Push once  dextrose 50% Injectable 12.5 Gram(s) IV Push once  dextrose 50% Injectable 25 Gram(s) IV Push once  dextrose 50% Injectable 25 Gram(s) IV Push once  dextrose 50% Injectable 12.5 Gram(s) IV Push once  dextrose 50% Injectable 25 Gram(s) IV Push once  glucagon  Injectable 1 milliGRAM(s) IntraMuscular once  glucagon  Injectable 1 milliGRAM(s) IntraMuscular once  insulin lispro (ADMELOG) corrective regimen sliding scale   SubCutaneous every 6 hours  insulin NPH human recombinant 8 Unit(s) SubCutaneous every 6 hours  levETIRAcetam  Solution 500 milliGRAM(s) Oral two times a day  meropenem  IVPB 1000 milliGRAM(s) IV Intermittent every 8 hours  multivitamin/minerals Oral Solution (WELLESSE) 30 milliLiter(s) Oral daily  polyethylene glycol 3350 17 Gram(s) Oral daily  senna 2 Tablet(s) Oral at bedtime  sodium chloride 0.9%. 1000 milliLiter(s) (75 mL/Hr) IV Continuous <Continuous>  tamsulosin 0.4 milliGRAM(s) Oral at bedtime  thiamine 100 milliGRAM(s) Oral daily    MEDICATIONS  (PRN):      ALLERGIES  No known medication allergies      REVIEW OF SYSTEMS  Unable to obtain given clinical condition      OBJECTIVE    Vital Signs Last 24 Hrs  T(C): 36.3 (07 May 2021 13:26), Max: 36.9 (06 May 2021 20:50)  T(F): 97.4 (07 May 2021 13:26), Max: 98.5 (06 May 2021 20:50)  HR: 108 (07 May 2021 13:26) (82 - 108)  BP: 100/65 (07 May 2021 13:26) (100/65 - 115/67)  RR: 18 (07 May 2021 13:26) (18 - 18)  SpO2: 96% (07 May 2021 13:26) (95% - 99%)    PHYSICAL EXAM  General: no acute distress  HEENT: normocephalic  Eyes: pupils equal and reactive to light  Neck: supple  Respiratory: non labored breathing, decreased breath sounds in bases  Cardiovascular: normal rate, regular rhythm  Gastrointestinal: abdomen soft, non tender, non distended  Extremities: no lower extremity edema  Neurological: not oriented      LABORATORY                          8.4    14.09 )-----------( 340      ( 07 May 2021 07:07 )             27.0     05-07    134<L>  |  98  |  18  ----------------------------<  227<H>  5.0   |  24  |  0.86    Ca    8.8      07 May 2021 07:07  Phos  4.0     05-07  Mg     2.1     05-07    TPro  6.9  /  Alb  2.6<L>  /  TBili  0.5  /  DBili  x   /  AST  16  /  ALT  15  /  AlkPhos  87  05-07      RADIOLOGY    CT Chest w/ IV Cont (05.06.21 @ 18:44) >  Right empyema. Bilateral multifocal pneumonia.  Splenic infarcts.  Mucosal hyperenhancement of the urinary bladder, possibly cystitis.  Asymmetric enlargement of the right obturator internus muscle with hyperdense foci, suspicious for intramuscular hematoma with active bleed; alternatively, intramuscular metastases can have this appearance.    Dr. Julian discussed the preliminary findings with Dr. Spears on May 6, 2021 at 7:52 PM. The last 2 additional findings were discussed by Dr. St with Dr. Mckeon on May 7, 2021 at 10:56 AM. Readback was obtained.    < end of copied text >      TTE with Doppler (w/Cont) (04.15.21 @ 19:22) >  1. Mild aortic root dilatation  (Ao: 4.2 cm at the sinuses of Valsalva).  2. Increased relative wall thickness with normal left ventricular mass index, consistent with concentric left ventricular remodeling.  3. Hyperdynamic left ventricular systolic function. Endocardial visualization enhanced with intravenous injection of Ultrasonic Enhancing Agent (Definity).  4. The right ventricle is not well visualized; grossly Normal right ventricular size and systolic function. TV s' = 20 cm/sec.  5. Estimated right ventricular systolic pressure equals 31 mm Hg, assuming right atrial pressure equals 8 mm Hg, consistent with normal pulmonary pressures.  6. Unable to evalaute for PFO/ASD in the setting of poor image quality. Consider repeat limited study with agitated saliine if clinically indicated.  7. Normal pericardium with trace pericardial effusion.  *** No previous Echo exam.    < end of copied text >

## 2021-05-07 NOTE — PROGRESS NOTE ADULT - PROBLEM SELECTOR PLAN 2
-Acute ischemic strokes, with concern for hemorrhagic conversion, noted on CT scan on 4/8  -Etiology of acute ischemic stroke uncertain at this time; TTE with bubble performed at bedside in the MICU without any evidence of right-to-left shunt; no arrhythmia noted on telemetry throughout stay in the MICU  - CTA head and neck without any evidence of carotid artery stenosis or dissection    Retention   - olson placed  - will need TOV  - c/w flomax - Etiology likely multi-factorial in setting of acute ischemic infarctions of the brain in conjunction vs acute kidney injury with uremia (now resolving) vs resolving hypoxic respiratory failure, covid-19 infection, superimposed bacterial pneumonia, urinary retention, concern for nutritional deficiencies)  - S/p management of: acute pneumonia with antibiotics; pulmonary embolus with full-dose anticoagulation  - Reorientation with family at bedside (pt speaks [place], does best with family present)  - MRI Brain, and MRA Head demonstrated multiple evolving subacute infarcts with associated hemorrhages, likely expected evolution of ischemia  - Neuro recs appreciated  - As per neuro, EEG not suggestive of seizure-like activity  - C/w Keppra 500 mg BID  - repeat CT head 5/5 normal

## 2021-05-07 NOTE — CONSULT NOTE ADULT - SUBJECTIVE AND OBJECTIVE BOX
Vascular Surgery Consult Note  Attending: Dr. Mckeon  Service: Vascular Surgery  [id number]    HPI: 59 yo man with past medical history of diabetes mellitus who was initially admitted to [facility] ICU for hypoxic respiratory failure secondary to SARS-CoV-2 infection complicated by right heart strain, cardiogenic and septic shock, ischemic stroke with hemorrhagic transformation and ESBL Klebsiella pneumonia. Transferred to [facility] for neurosurgery evaluation and further management. Now s/p PEG. Vascular surgery consulted for evaluation of bilateral toe gangrene. Patient ambulates at home.    PAST MEDICAL & SURGICAL HISTORY:  No pertinent past medical history  No significant past surgical history    ALLERGIES:  Allergies  No Known Allergies    FAMILY HISTORY:  No pertinent family history in first degree relatives    PHYSICAL EXAM:  General: NAD, resting comfortably  HEENT: NC/AT, EOMI, normal hearing, no oral lesions, no LAD, neck supple  Pulmonary: normal resp effort, CTA-B  Cardiovascular: NSR, no murmurs  Abdominal: soft, ND/NT, no organomegaly  Extremities: WWP, normal strength, no clubbing/cyanosis/edema  Neuro: A/O x 3, CNs II-XII grossly intact, normal sensation, no focal deficits  Pulses: palpable distal pulses, necrotic 3rd digit on right foot and 1st, 2nd, and 4th digits on left foot.    VITAL SIGNS:  Vital Signs Last 24 Hrs  T(C): 36.3 (07 May 2021 13:26), Max: 36.9 (06 May 2021 20:50)  T(F): 97.4 (07 May 2021 13:26), Max: 98.5 (06 May 2021 20:50)  HR: 98 (07 May 2021 18:03) (82 - 108)  BP: 100/65 (07 May 2021 13:26) (100/65 - 115/67)  BP(mean): --  RR: 18 (07 May 2021 13:26) (18 - 18)  SpO2: 95% (07 May 2021 18:03) (95% - 99%)    I&O's Summary    06 May 2021 07:01  -  07 May 2021 07:00  --------------------------------------------------------  IN: 1780 mL / OUT: 1500 mL / NET: 280 mL    07 May 2021 07:01  -  07 May 2021 19:15  --------------------------------------------------------  IN: 1090 mL / OUT: 0 mL / NET: 1090 mL        LABS:                        8.0    14.50 )-----------( 344      ( 07 May 2021 17:11 )             26.4     05-07    133<L>  |  97  |  16  ----------------------------<  176<H>  5.0   |  22  |  0.82    Ca    8.6      07 May 2021 17:11  Phos  3.4     05-07  Mg     2.0     05-07    TPro  7.0  /  Alb  2.3<L>  /  TBili  0.5  /  DBili  x   /  AST  18  /  ALT  17  /  AlkPhos  85  05-07        CAPILLARY BLOOD GLUCOSE      POCT Blood Glucose.: 209 mg/dL (07 May 2021 18:06)  POCT Blood Glucose.: 309 mg/dL (07 May 2021 12:35)  POCT Blood Glucose.: 237 mg/dL (07 May 2021 06:08)  POCT Blood Glucose.: 239 mg/dL (07 May 2021 00:18)    LIVER FUNCTIONS - ( 07 May 2021 17:11 )  Alb: 2.3 g/dL / Pro: 7.0 g/dL / ALK PHOS: 85 U/L / ALT: 17 U/L / AST: 18 U/L / GGT: x             CULTURES:      RADIOLOGY & ADDITIONAL STUDIES:

## 2021-05-07 NOTE — PROGRESS NOTE ADULT - PROBLEM SELECTOR PLAN 5
Elevated while in MICU  - Was on metoprolol tartrate 25 mg twice daily since 4/16; discontinued  - BP is stable  - If is hypertensive persistently, can consider antihypertensives - Type II diabetes mellitus noted; a1c noted to be greater than 10   - On insulin sliding scale with q6 NPH   - Monitor fingersticks while on tube feeds  - endocrine consulted for uncontrolled DM

## 2021-05-07 NOTE — PROGRESS NOTE ADULT - ASSESSMENT
61 y/o M w/ new onset diabetes with A1c of 10.3% on admission for COVID now on tube feeds x 24 hours with hyperglycemia.   BG above goal will increase NPH regimen to BG Goal 100-180mg/dl

## 2021-05-07 NOTE — CONSULT NOTE ADULT - ASSESSMENT
59 yo man with past medical history of diabetes mellitus who was initially admitted to [facility] ICU for hypoxic respiratory failure secondary to SARS-CoV-2 infection complicated by right heart strain, cardiogenic and septic shock, ischemic stroke with hemorrhagic transformation and ESBL Klebsiella pneumonia. Transferred to [facility] for neurosurgery evaluation and further management. Now s/p PEG. Pulmonary service consulted for concern of empyema on CT chest.     Imaging reviewed. Air and fluid collection likely represents lung abscess. Bedside ultrasound with only small amount of pleural effusion.     Recommendations:  - No indication for thoracentesis. Air and fluid collection seen in CT likely represents lung abscess  - Continue meropenem for coverage given history of ESBL Klebsiella - will need prolonged therapy  - CT surgery consult      --------------------------------------------------------  Troy Hong, PGY-5  Pulmonary/Critical Care Fellow  Pager: [phone] ([facility]), [phone] ([place])  Pulmonary spectra: [id number]

## 2021-05-07 NOTE — PROGRESS NOTE ADULT - NUTRITIONAL ASSESSMENT
tube feeding at goal RD recs appreciated     Diet, NPO with Tube Feed:   Tube Feeding Modality: Gastrostomy  Glucerna 1.2 Kaden (GLUCERNARTH)  Total Volume for 24 Hours (mL): 1680  Continuous  Starting Tube Feed Rate {mL per Hour}: 10  Increase Tube Feed Rate by (mL): 10     Every 24 hours  Until Goal Tube Feed Rate (mL per Hour): 70  Tube Feed Duration (in Hours): 24  Tube Feed Start Time: 14:25 (04-30-21 @ 14:46) [Active]

## 2021-05-07 NOTE — PROGRESS NOTE ADULT - ATTENDING COMMENTS
CT findings noted  d/w pulm in detail  continue abx  d/w ID re: Meropenem  trend CBC given incidental finding of hematoma right hip  holding AC for now  consider vascular consult for IVC filter if continued bleeding.  transfuse if hb<7 or symptomatic.

## 2021-05-07 NOTE — CONSULT NOTE ADULT - SUBJECTIVE AND OBJECTIVE BOX
Thoracic Surgery Consult  Consulting surgical team: Thoracic Surger  Consulting attending: Dr. Mina Tena    HPI:  61 yo man with past medical history of diabetes mellitus who was initially admitted to [facility] ICU for hypoxic respiratory failure secondary to SARS-CoV-2 infection complicated by right heart strain, cardiogenic and septic shock, ischemic stroke with hemorrhagic transformation and ESBL Klebsiella pneumonia. Transferred to [facility] for neurosurgery evaluation and further management. Now s/p PEG. Today CT chest/abd/pelv done, w/ CT chest showing R sided loculated collection, concerning for empyema. Thoracic Surgery consulted to evaluate. Pt is currently saturating >95% on room air.        (12 Apr 2021 20:54)      PAST MEDICAL HISTORY:  No pertinent past medical history        PAST SURGICAL HISTORY:  No significant past surgical history        MEDICATIONS:  albuterol/ipratropium for Nebulization 3 milliLiter(s) Nebulizer every 12 hours  cholecalciferol 400 Unit(s) Oral daily  dextrose 40% Gel 15 Gram(s) Oral once  dextrose 40% Gel 15 Gram(s) Oral once  dextrose 5%. 1000 milliLiter(s) IV Continuous <Continuous>  dextrose 5%. 1000 milliLiter(s) IV Continuous <Continuous>  dextrose 5%. 1000 milliLiter(s) IV Continuous <Continuous>  dextrose 5%. 1000 milliLiter(s) IV Continuous <Continuous>  dextrose 50% Injectable 25 Gram(s) IV Push once  dextrose 50% Injectable 12.5 Gram(s) IV Push once  dextrose 50% Injectable 25 Gram(s) IV Push once  dextrose 50% Injectable 25 Gram(s) IV Push once  dextrose 50% Injectable 12.5 Gram(s) IV Push once  dextrose 50% Injectable 25 Gram(s) IV Push once  glucagon  Injectable 1 milliGRAM(s) IntraMuscular once  glucagon  Injectable 1 milliGRAM(s) IntraMuscular once  insulin lispro (ADMELOG) corrective regimen sliding scale   SubCutaneous every 6 hours  insulin NPH human recombinant 8 Unit(s) SubCutaneous every 6 hours  levETIRAcetam  Solution 500 milliGRAM(s) Oral two times a day  meropenem  IVPB 1000 milliGRAM(s) IV Intermittent every 8 hours  multivitamin/minerals Oral Solution (WELLESSE) 30 milliLiter(s) Oral daily  polyethylene glycol 3350 17 Gram(s) Oral daily  senna 2 Tablet(s) Oral at bedtime  sodium chloride 0.9%. 1000 milliLiter(s) IV Continuous <Continuous>  tamsulosin 0.4 milliGRAM(s) Oral at bedtime  thiamine 100 milliGRAM(s) Oral daily      ALLERGIES:  No Known Allergies      VITALS & I/Os:  Vital Signs Last 24 Hrs  T(C): 36.3 (07 May 2021 13:26), Max: 36.9 (07 May 2021 06:02)  T(F): 97.4 (07 May 2021 13:26), Max: 98.4 (07 May 2021 06:02)  HR: 98 (07 May 2021 18:03) (98 - 108)  BP: 100/65 (07 May 2021 13:26) (100/65 - 115/65)  BP(mean): --  RR: 18 (07 May 2021 13:26) (18 - 18)  SpO2: 95% (07 May 2021 18:03) (95% - 99%)    I&O's Summary    06 May 2021 07:01  -  07 May 2021 07:00  --------------------------------------------------------  IN: 1780 mL / OUT: 1500 mL / NET: 280 mL    07 May 2021 07:01  -  07 May 2021 21:04  --------------------------------------------------------  IN: 1090 mL / OUT: 0 mL / NET: 1090 mL        PHYSICAL EXAM:  General: No acute distress  Respiratory: Nonlabored  Cardiovascular: RRR  Abdominal: Soft, nondistended, nontender. No rebound or guarding. No organomegaly, no palpable mass.  Extremities: Warm    LABS:                        8.0    14.50 )-----------( 344      ( 07 May 2021 17:11 )             26.4     05-07    133<L>  |  97  |  16  ----------------------------<  176<H>  5.0   |  22  |  0.82    Ca    8.6      07 May 2021 17:11  Phos  3.4     05-07  Mg     2.0     05-07    TPro  7.0  /  Alb  2.3<L>  /  TBili  0.5  /  DBili  x   /  AST  18  /  ALT  17  /  AlkPhos  85  05-07    Lactate:      IMAGING:    < from: CT Chest w/ IV Cont (05.06.21 @ 18:44) >    EXAM:  CT ABDOMEN AND PELVIS IC                          EXAM:  CT CHEST IC                            *** ADDENDUM 05/07/2021  ***    Clarification correction to initial report: Air and fluid collection in the right lower lung may represent emphysema or parenchymal abscess    The case was discussed with pulmonology.    *** END OF ADDENDUM 05/07/2021  ***      PROCEDURE DATE:  05/06/2021            INTERPRETATION:  CLINICAL INFORMATION: Pneumonia.    COMPARISON: None.    CONTRAST/COMPLICATIONS:  IV Contrast: Omnipaque 350.  90 mL administered.   10 mL discarded.  Oral Contrast: None.  Complications: None.    PROCEDURE:  CT of the Chest, Abdomen and Pelvis was performed.  Sagittal and coronal reformats were performed.    FINDINGS:  CHEST:  LUNGS AND LARGE AIRWAYS: Patchy opacities in both lungs.  PLEURA: Enhancing air and fluid collection in the right pleural space, consistent with empyema.  VESSELS: Within normal limits.  HEART: Heart size is normal. No pericardial effusion.  MEDIASTINUM AND BESSIE: No lymphadenopathy.  CHEST WALL AND LOWER NECK: Within normal limits.    ABDOMEN AND PELVIS:  LIVER: Within normal limits.  BILE DUCTS: Normal caliber.  GALLBLADDER: Within normal limits.  SPLEEN: Multiple hypodense splenic lesions, probably infarcts.  PANCREAS: Within normal limits.  ADRENALS: Within normal limits.  KIDNEYS/URETERS: Within normal limits.    BLADDER: Contains a Mann catheter balloon. Mucosal hyperenhancement of the urinary bladder with mild surrounding inflammation.  REPRODUCTIVE ORGANS: Prostate within normal limits.    BOWEL: Percutaneous gastrostomy tube. No bowel obstruction. Appendix contains an appendicolith.  PERITONEUM: No ascites.  VESSELS: Atherosclerotic changes.  RETROPERITONEUM/LYMPH NODES: No lymphadenopathy.  ABDOMINAL WALL: Asymmetric enlargement of the right obturator internus muscle with hyperdense foci, suspicious for intramuscular hematoma with active bleed or intramuscular metastases can have this appearance.  BONES: Degenerative changes.    IMPRESSION:  Right empyema. Bilateral multifocal pneumonia.    Splenic infarcts.    Mucosal hyperenhancement of the urinary bladder, possibly cystitis.    Asymmetric enlargement of the right obturator internus muscle with hyperdense foci, suspicious for intramuscular hematoma with active bleed; alternatively, intramuscular metastases can have this appearance.    Dr. Julian discussed the preliminary findings with Dr. Spears on May 6, 2021 at 7:52 PM. The last 2 additional findings were discussed by Dr. St with Dr. Mckeon on May 7, 2021 at 10:56 AM. Readback was obtained.        ***Please see the addendum at the top of this report. It may contain additional important information or changes.****        ORALIA ST M.D., ATTENDING RADIOLOGIST  This document has been electronically signed. May  7 2021 10:56AM  Addend:ORALIA ST M.D., ATTENDING RADIOLOGIST  This addendum was electronically signed on: May  7 2021 11:59AM.    < end of copied text >

## 2021-05-07 NOTE — PROGRESS NOTE ADULT - PROBLEM SELECTOR PLAN 4
- Type II diabetes mellitus noted; a1c noted to be greater than 10   - On insulin sliding scale  - Monitor fingersticks while on tube feeds  - endocrine consulted for uncontrolled DM -Pulmonary embolus noted on ct scan performed at time of admission  -Etiology of venous thromboembolism uncertain at this time; no known history of thrombophilia; provoked in setting of acute covid illness   - Transitioned from heparin to lovenox but now holding due to acute bleed   - will discharge on Eliquis

## 2021-05-07 NOTE — PROGRESS NOTE ADULT - ASSESSMENT
61 yo M with PMH of DM who was initially admitted to [facility] ICU 4/1 for hypoxic respiratory failure 2/2 COVID c/b PE with R heart strain, cardiogenic and septic shock, ischemic and hemorrhagic CVA and ESBL klebsiella ventilator associated PNA, transferred to [facility] for neurosurgery eval on 4/12  No fever, has leukocytosis  Has rising WBC, conversely, patient appears well at bedside--no fevers, no chills, no new complaints  Bilateral LE dry gangrene  CXR RLL atelectasis vs pna? No clinical signs pneumonia  No sob/no cough, no abd pain, N/V/D  Has PEG  PCT minimally elevated  CT now with Empyema, concern for bilateral PNA, abscess  1) Leukocytosis  - Now suspected to be infectious pulmonary sequelae  - Trend WBC  - F/U pending cultures  2) COVID  - Prior infection  - Monitor for any signs active COVID/reinfection  3) Suspected Empyema/Abscess  - Meropenem 1g q 8  - Pulmonary vs CTS eval: role for drainage of empyema?  - Repeat sputum culture if producing sputum  4) Gangrene  - Appears noninfectious/dry gangrene  - Monitor wounds    Discussed case with medicine team    Sammy Chambers MD  Pager [phone]  After 5pm and on weekends call [phone]

## 2021-05-07 NOTE — PROGRESS NOTE ADULT - ASSESSMENT
60 yr male with PMH of DM who was initially admitted to [facility] ICU for hypoxic respiratory failure 2/2 COVID c/b PE with R heart strain, cardiogenic and septic shock, ischemic and hemorrhagic CVA and ESBL klebsiella ventilator associate PNA, transferred to [facility] for neurosurgery eval and further management. s/p PEG, now monitoring for refeeding syndrome

## 2021-05-07 NOTE — PROGRESS NOTE ADULT - PROBLEM SELECTOR PLAN 3
-Pulmonary embolus noted on ct scan performed at time of admission  -Etiology of venous thromboembolism uncertain at this time; no known history of thrombophilia; provoked in setting of acute covid illness   - Transitioned from heparin to lovenox  - will discharge on Eliquis -Acute ischemic strokes, with concern for hemorrhagic conversion, noted on CT scan on 4/8  -Etiology of acute ischemic stroke uncertain at this time; TTE with bubble performed at bedside in the MICU without any evidence of right-to-left shunt; no arrhythmia noted on telemetry throughout stay in the MICU  - CTA head and neck without any evidence of carotid artery stenosis or dissection    Retention   - olson placed  - c/w flomax

## 2021-05-07 NOTE — PROGRESS NOTE ADULT - PROBLEM SELECTOR PLAN 1
- Etiology likely multi-factorial in setting of acute ischemic infarctions of the brain in conjunction vs acute kidney injury with uremia (now resolving) vs resolving hypoxic respiratory failure, covid-19 infection, superimposed bacterial pneumonia, urinary retention, concern for nutritional deficiencies)  - S/p management of: acute pneumonia with antibiotics; pulmonary embolus with full-dose anticoagulation  - Reorientation with family at bedside (pt speaks [place], does best with family present)  - MRI Brain, and MRA Head demonstrated multiple evolving subacute infarcts with associated hemorrhages, likely expected evolution of ischemia  - Neuro recs appreciated  - As per neuro, EEG not suggestive of seizure-like activity  - C/w Keppra 500 mg BID  - repeat CT head 5/5 normal Leukocytosis and tachycardia  - ID recs appreciated    - started on zosyn 5/7 and switched to meropenem 5/7  - CXR with RLL opacity  - O2 supplementation as necessary   - BCx in lab, f/u results   - UA positive, pending UCx  - CT chest, a/p + for empyema--. Pulm consulted, waiting for US to be performed for further planning   - CT abd showing likely cystitis     Obturator Hematoma   - actively bleeding hematoma in right thigh  - lovenox on hold   - CBC to monitor hbg q6

## 2021-05-07 NOTE — PROGRESS NOTE ADULT - SUBJECTIVE AND OBJECTIVE BOX
DIABETES FOLLOW UP : Seen earlier today    INTERVAL HX: On continous tube feeds at goal, tolerating well. [place]  Deo ID [id number]; pt tries to speak but able to only whisper faintly, nodded head yes or no to some questions, other questions did not nod head at all. Responded with head nodding for "no" to pain/headache/blurred vision/ dizziness sob/ cp /nausea . BG above goal on current regimen.       Review of Systems: as above     Allergies    No Known Allergies      MEDICATIONS  (STANDING):  albuterol/ipratropium for Nebulization 3 milliLiter(s) Nebulizer every 12 hours  cholecalciferol 400 Unit(s) Oral daily  dextrose 40% Gel 15 Gram(s) Oral once  dextrose 40% Gel 15 Gram(s) Oral once  dextrose 5%. 1000 milliLiter(s) (50 mL/Hr) IV Continuous <Continuous>  dextrose 5%. 1000 milliLiter(s) (100 mL/Hr) IV Continuous <Continuous>  dextrose 5%. 1000 milliLiter(s) (50 mL/Hr) IV Continuous <Continuous>  dextrose 5%. 1000 milliLiter(s) (100 mL/Hr) IV Continuous <Continuous>  dextrose 50% Injectable 25 Gram(s) IV Push once  dextrose 50% Injectable 12.5 Gram(s) IV Push once  dextrose 50% Injectable 25 Gram(s) IV Push once  dextrose 50% Injectable 25 Gram(s) IV Push once  dextrose 50% Injectable 12.5 Gram(s) IV Push once  dextrose 50% Injectable 25 Gram(s) IV Push once  glucagon  Injectable 1 milliGRAM(s) IntraMuscular once  glucagon  Injectable 1 milliGRAM(s) IntraMuscular once  insulin lispro (ADMELOG) corrective regimen sliding scale   SubCutaneous every 6 hours  insulin NPH human recombinant 8 Unit(s) SubCutaneous every 6 hours  levETIRAcetam  Solution 500 milliGRAM(s) Oral two times a day  meropenem  IVPB 1000 milliGRAM(s) IV Intermittent every 8 hours  multivitamin/minerals Oral Solution (WELLESSE) 30 milliLiter(s) Oral daily  polyethylene glycol 3350 17 Gram(s) Oral daily  senna 2 Tablet(s) Oral at bedtime  sodium chloride 0.9%. 1000 milliLiter(s) (75 mL/Hr) IV Continuous <Continuous>  tamsulosin 0.4 milliGRAM(s) Oral at bedtime  thiamine 100 milliGRAM(s) Oral daily      PHYSICAL EXAM:  VITALS: T(C): 36.3 (05-07-21 @ 13:26)  T(F): 97.4 (05-07-21 @ 13:26), Max: 98.5 (05-06-21 @ 20:50)  HR: 108 (05-07-21 @ 13:26) (82 - 110)  BP: 100/65 (05-07-21 @ 13:26) (100/65 - 119/72)  RR:  (18 - 18)  SpO2:  (95% - 99%)  Wt(kg): --  GENERAL: male laying in bed in NAD  Abdomen: Soft, nontender, non distended, PEG tube in place TF Glucerna at goal  Extremities: Warm, no edema in all 4 exts  NEURO: alert , difficulty speaking    LABS:  POCT Blood Glucose.: 309 mg/dL (05-07-21 @ 12:35)  POCT Blood Glucose.: 237 mg/dL (05-07-21 @ 06:08)  POCT Blood Glucose.: 239 mg/dL (05-07-21 @ 00:18)  POCT Blood Glucose.: 186 mg/dL (05-06-21 @ 17:30)  POCT Blood Glucose.: 225 mg/dL (05-06-21 @ 12:04)  POCT Blood Glucose.: 268 mg/dL (05-06-21 @ 08:44)  POCT Blood Glucose.: 303 mg/dL (05-06-21 @ 06:14)  POCT Blood Glucose.: 241 mg/dL (05-05-21 @ 23:32)  POCT Blood Glucose.: 225 mg/dL (05-05-21 @ 17:17)  POCT Blood Glucose.: 258 mg/dL (05-05-21 @ 12:29)  POCT Blood Glucose.: 287 mg/dL (05-05-21 @ 05:16)  POCT Blood Glucose.: 259 mg/dL (05-04-21 @ 23:31)  POCT Blood Glucose.: 160 mg/dL (05-04-21 @ 17:16)                            8.4    14.09 )-----------( 340      ( 07 May 2021 07:07 )             27.0       05-07    134<L>  |  98  |  18  ----------------------------<  227<H>  5.0   |  24  |  0.86    EGFR if : 111  EGFR if non : 96    Ca    8.8      05-07  Mg     2.1     05-07  Phos  4.0     05-07    TPro  6.9  /  Alb  2.6<L>  /  TBili  0.5  /  DBili  x   /  AST  16  /  ALT  15  /  AlkPhos  87  05-07 04-26 Chol 114 Direct LDL -- LDL calculated 53 HDL 35<L> Trig 126, 04-13 Chol -- Direct LDL -- LDL calculated -- HDL -- Trig 126    Thyroid Function Tests:  04-24 @ 11:11 TSH 2.00 FreeT4 -- T3 -- Anti TPO -- Anti Thyroglobulin Ab -- TSI --      A1C with Estimated Average Glucose Result: 10.3 % (04-02-21 @ 14:28)      Estimated Average Glucose: 249 mg/dL (04-02-21 @ 14:28)                         DIABETES FOLLOW UP : Seen earlier today    INTERVAL HX: On continous tube feeds at goal, tolerating well. [place]  Deo ID [id number]; pt tries to speak but able to only whisper faintly, nodded head yes or no to some questions, other questions did not nod head at all. Responded with head nodding for "no" to pain/headache/blurred vision/ dizziness sob/ cp /nausea . BG above goal on current regimen. Did not receive Scheduled NPH at 12 noon yesterday.      Review of Systems: as above     Allergies    No Known Allergies      MEDICATIONS  (STANDING):  albuterol/ipratropium for Nebulization 3 milliLiter(s) Nebulizer every 12 hours  cholecalciferol 400 Unit(s) Oral daily  dextrose 40% Gel 15 Gram(s) Oral once  dextrose 40% Gel 15 Gram(s) Oral once  dextrose 5%. 1000 milliLiter(s) (50 mL/Hr) IV Continuous <Continuous>  dextrose 5%. 1000 milliLiter(s) (100 mL/Hr) IV Continuous <Continuous>  dextrose 5%. 1000 milliLiter(s) (50 mL/Hr) IV Continuous <Continuous>  dextrose 5%. 1000 milliLiter(s) (100 mL/Hr) IV Continuous <Continuous>  dextrose 50% Injectable 25 Gram(s) IV Push once  dextrose 50% Injectable 12.5 Gram(s) IV Push once  dextrose 50% Injectable 25 Gram(s) IV Push once  dextrose 50% Injectable 25 Gram(s) IV Push once  dextrose 50% Injectable 12.5 Gram(s) IV Push once  dextrose 50% Injectable 25 Gram(s) IV Push once  glucagon  Injectable 1 milliGRAM(s) IntraMuscular once  glucagon  Injectable 1 milliGRAM(s) IntraMuscular once  insulin lispro (ADMELOG) corrective regimen sliding scale   SubCutaneous every 6 hours  insulin NPH human recombinant 8 Unit(s) SubCutaneous every 6 hours  levETIRAcetam  Solution 500 milliGRAM(s) Oral two times a day  meropenem  IVPB 1000 milliGRAM(s) IV Intermittent every 8 hours  multivitamin/minerals Oral Solution (WELLESSE) 30 milliLiter(s) Oral daily  polyethylene glycol 3350 17 Gram(s) Oral daily  senna 2 Tablet(s) Oral at bedtime  sodium chloride 0.9%. 1000 milliLiter(s) (75 mL/Hr) IV Continuous <Continuous>  tamsulosin 0.4 milliGRAM(s) Oral at bedtime  thiamine 100 milliGRAM(s) Oral daily      PHYSICAL EXAM:  VITALS: T(C): 36.3 (05-07-21 @ 13:26)  T(F): 97.4 (05-07-21 @ 13:26), Max: 98.5 (05-06-21 @ 20:50)  HR: 108 (05-07-21 @ 13:26) (82 - 110)  BP: 100/65 (05-07-21 @ 13:26) (100/65 - 119/72)  RR:  (18 - 18)  SpO2:  (95% - 99%)  Wt(kg): --  GENERAL: male laying in bed in NAD  Abdomen: Soft, nontender, non distended, PEG tube in place TF Glucerna at goal  Extremities: Warm, no edema in all 4 exts  NEURO: alert , difficulty speaking    LABS:  POCT Blood Glucose.: 309 mg/dL (05-07-21 @ 12:35)  POCT Blood Glucose.: 237 mg/dL (05-07-21 @ 06:08)  POCT Blood Glucose.: 239 mg/dL (05-07-21 @ 00:18)  POCT Blood Glucose.: 186 mg/dL (05-06-21 @ 17:30)  POCT Blood Glucose.: 225 mg/dL (05-06-21 @ 12:04)  POCT Blood Glucose.: 268 mg/dL (05-06-21 @ 08:44)  POCT Blood Glucose.: 303 mg/dL (05-06-21 @ 06:14)  POCT Blood Glucose.: 241 mg/dL (05-05-21 @ 23:32)  POCT Blood Glucose.: 225 mg/dL (05-05-21 @ 17:17)  POCT Blood Glucose.: 258 mg/dL (05-05-21 @ 12:29)  POCT Blood Glucose.: 287 mg/dL (05-05-21 @ 05:16)  POCT Blood Glucose.: 259 mg/dL (05-04-21 @ 23:31)  POCT Blood Glucose.: 160 mg/dL (05-04-21 @ 17:16)                            8.4    14.09 )-----------( 340      ( 07 May 2021 07:07 )             27.0       05-07    134<L>  |  98  |  18  ----------------------------<  227<H>  5.0   |  24  |  0.86    EGFR if : 111  EGFR if non : 96    Ca    8.8      05-07  Mg     2.1     05-07  Phos  4.0     05-07    TPro  6.9  /  Alb  2.6<L>  /  TBili  0.5  /  DBili  x   /  AST  16  /  ALT  15  /  AlkPhos  87  05-07 04-26 Chol 114 Direct LDL -- LDL calculated 53 HDL 35<L> Trig 126, 04-13 Chol -- Direct LDL -- LDL calculated -- HDL -- Trig 126    Thyroid Function Tests:  04-24 @ 11:11 TSH 2.00 FreeT4 -- T3 -- Anti TPO -- Anti Thyroglobulin Ab -- TSI --      A1C with Estimated Average Glucose Result: 10.3 % (04-02-21 @ 14:28)      Estimated Average Glucose: 249 mg/dL (04-02-21 @ 14:28)

## 2021-05-07 NOTE — CHART NOTE - NSCHARTNOTEFT_GEN_A_CORE
Nutrition Follow Up Note  Patient seen for: malnutrition, refeeding risk follow-up    Hospital course as per chart: 60y Male with PMH of DM who presented on 4/1 with acute respiratory failure with hypoxia secondary to COVID-19 associated PNA. Hospital course complicated by "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" Pt with dysphagia in setting of AMS, NG tube placed for tube feeds. Pt self-removed on 4/17, multiple attempts to replace unsuccessful. Did not tolerate FEES; cineesophagogram demonstrating risk of aspiration, NPO recommended. PEG tube placed 4/29, feeds started. Chart reviewed, events noted. Endocrinology consulted for uncontrolled T2DM with hyperglycemia.     Source: [] Patient       [x] EMR        [] RN        [] Family at bedside       [] Other:    -If unable to interview patient: [] Trach/Vent/BiPAP  [x] Disoriented/confused/inappropriate to interview; lethargic/sleeping    Diet Order:   Diet, NPO with Tube Feed:   Tube Feeding Modality: Gastrostomy  Glucerna 1.2 Kaden (GLUCERNARTH)  Total Volume for 24 Hours (mL): 1680  Continuous  Starting Tube Feed Rate {mL per Hour}: 10  Increase Tube Feed Rate by (mL): 10     Every 24 hours  Until Goal Tube Feed Rate (mL per Hour): 70  Tube Feed Duration (in Hours): 24  Tube Feed Start Time: 14:25 (04-30-21)    - Is current order appropriate/adequate? [x] Yes  []  No:   - Current enteral nutrition regimen provides 2016 kcal, 100.8 g protein, 1352 ml water. Provides ~30 kcal/kg, 1.5 g/kg protein based on dosing wt 66.2 kg.   - Observed tube feeds running at bedside (Glucerna 1.2 @ 70 ml/hr). No reports of intolerance to feeds.     GI:  Last BM today 5/7.   Bowel Regimen? [x] Yes   [] No  Noted with fecal incontinence.    Weights: no new wts to assess   Will continue to monitor and trend weights.    Nutritionally Pertinent MEDICATIONS  (STANDING):  cholecalciferol  dextrose 40% Gel  dextrose 40% Gel  dextrose 5%.  dextrose 5%.  dextrose 5%.  dextrose 5%.  dextrose 50% Injectable  dextrose 50% Injectable  dextrose 50% Injectable  dextrose 50% Injectable  dextrose 50% Injectable  dextrose 50% Injectable  glucagon  Injectable  glucagon  Injectable  insulin lispro (ADMELOG) corrective regimen sliding scale  insulin NPH human recombinant  meropenem  IVPB  multivitamin/minerals Oral Solution (WELLESSE)  polyethylene glycol 3350  senna  sodium chloride 0.9%.  tamsulosin  thiamine    Pertinent Labs: 05-07 @ 07:07: Na 134<L>, BUN 18, Cr 0.86, <H>, K+ 5.0, Phos 4.0, Mg 2.1, Alk Phos 87, ALT/SGPT 15, AST/SGOT 16, HbA1c --  05-06 @ 16:46: Na 134<L>, BUN 20, Cr 0.79, <H>, K+ 4.8, Phos 3.3, Mg 1.9, Alk Phos --, ALT/SGPT --, AST/SGOT --, HbA1c --    A1C with Estimated Average Glucose Result: 10.3 % (04-02-21 @ 14:28)    Finger Sticks:  POCT Blood Glucose.: 309 mg/dL (05-07 @ 12:35)  POCT Blood Glucose.: 237 mg/dL (05-07 @ 06:08)  POCT Blood Glucose.: 239 mg/dL (05-07 @ 00:18)  POCT Blood Glucose.: 186 mg/dL (05-06 @ 17:30)    Skin per nursing documentation: +wound, +skin tear, no pressure injuries per flowsheets   Edema per flowsheets: none    Estimated Needs:   [x] no change since previous assessment  Based on Dosing wt 145.9 lb/66.1 kg  Energy (28-32 kcals/kg): 7358-7892  Protein (1.2-1.6 g pro/kg): 79..76    Previous Nutrition Diagnosis: Severe Malnutrition  Nutrition Diagnosis is: [x] ongoing  [] resolved [] not applicable     New Nutrition Diagnosis: not applicable    Nutrition Care Plan:  [] In Progress  [x] Achieved - being addressed with enteral nutrition, now at goal rate  [] Not applicable    Recommendations:  1) Continue current enteral nutrition regimen: Glucerna 1.2 via PEG at goal rate 70 ml/hr x 24 hours. Defer free water flushes to team. RD remains available to adjust tube feed regimen PRN.   2) Continue multivitamin/minerals and thiamine in setting of refeeding syndrome risk  3) Continue to monitor and replete electrolytes PRN     Monitoring and Evaluation: Monitor enteral nutrition provision and tolerance, weight, labs, skin, GI status    RD remains available upon request and will follow up per protocol  Concepcion Llamas MS KARTIK CDN Pager #[phone]

## 2021-05-07 NOTE — CONSULT NOTE ADULT - ASSESSMENT
60M with bilateral toe gangrene.    PLAN:  - Patient with bilateral palpable DP and PT pulses.   - ERASMO/PVR on 4/20/21 revealing no vascular pathology  - Thus, no acute vascular surgery intervention at this time.  - Continue local wound care and podiatry oversight  - Please recontact vascular surgery with further questions    Discussed with vascular surgery fellow Dr. Rodriguez.    PRINCE Iniguez, PGY-2   [facility]   Vascular Surgery   [phone]

## 2021-05-07 NOTE — PROGRESS NOTE ADULT - SUBJECTIVE AND OBJECTIVE BOX
PROGRESS NOTE:   Authored by Dr. Emily Mckeon, ANH pager [phone]    Patient is a 60y old  Male who presents with a chief complaint of COVID19 w/ severe metabolic acidosis s/p intubation (06 May 2021 16:41)      SUBJECTIVE / OVERNIGHT EVENTS:    MEDICATIONS  (STANDING):  albuterol/ipratropium for Nebulization 3 milliLiter(s) Nebulizer every 12 hours  cholecalciferol 400 Unit(s) Oral daily  dextrose 40% Gel 15 Gram(s) Oral once  dextrose 40% Gel 15 Gram(s) Oral once  dextrose 5%. 1000 milliLiter(s) (100 mL/Hr) IV Continuous <Continuous>  dextrose 5%. 1000 milliLiter(s) (50 mL/Hr) IV Continuous <Continuous>  dextrose 5%. 1000 milliLiter(s) (50 mL/Hr) IV Continuous <Continuous>  dextrose 5%. 1000 milliLiter(s) (100 mL/Hr) IV Continuous <Continuous>  dextrose 50% Injectable 25 Gram(s) IV Push once  dextrose 50% Injectable 12.5 Gram(s) IV Push once  dextrose 50% Injectable 25 Gram(s) IV Push once  dextrose 50% Injectable 25 Gram(s) IV Push once  dextrose 50% Injectable 12.5 Gram(s) IV Push once  dextrose 50% Injectable 25 Gram(s) IV Push once  enoxaparin Injectable 70 milliGRAM(s) SubCutaneous two times a day  glucagon  Injectable 1 milliGRAM(s) IntraMuscular once  glucagon  Injectable 1 milliGRAM(s) IntraMuscular once  insulin lispro (ADMELOG) corrective regimen sliding scale   SubCutaneous every 6 hours  insulin NPH human recombinant 5 Unit(s) SubCutaneous every 6 hours  levETIRAcetam  Solution 500 milliGRAM(s) Oral two times a day  multivitamin/minerals Oral Solution (WELLESSE) 30 milliLiter(s) Oral daily  piperacillin/tazobactam IVPB.. 3.375 Gram(s) IV Intermittent every 8 hours  polyethylene glycol 3350 17 Gram(s) Oral daily  senna 2 Tablet(s) Oral at bedtime  sodium chloride 0.9%. 1000 milliLiter(s) (75 mL/Hr) IV Continuous <Continuous>  tamsulosin 0.4 milliGRAM(s) Oral at bedtime  thiamine 100 milliGRAM(s) Oral daily    MEDICATIONS  (PRN):      CAPILLARY BLOOD GLUCOSE      POCT Blood Glucose.: 237 mg/dL (07 May 2021 06:08)  POCT Blood Glucose.: 239 mg/dL (07 May 2021 00:18)  POCT Blood Glucose.: 186 mg/dL (06 May 2021 17:30)  POCT Blood Glucose.: 225 mg/dL (06 May 2021 12:04)  POCT Blood Glucose.: 268 mg/dL (06 May 2021 08:44)    I&O's Summary    06 May 2021 07:01  -  07 May 2021 07:00  --------------------------------------------------------  IN: 1710 mL / OUT: 1500 mL / NET: 210 mL        PHYSICAL EXAM:  Vital Signs Last 24 Hrs  T(C): 36.9 (07 May 2021 06:02), Max: 36.9 (06 May 2021 20:50)  T(F): 98.4 (07 May 2021 06:02), Max: 98.5 (06 May 2021 20:50)  HR: 101 (07 May 2021 06:34) (82 - 110)  BP: 115/65 (07 May 2021 06:02) (115/65 - 119/72)  BP(mean): --  RR: 18 (07 May 2021 06:02) (18 - 18)  SpO2: 99% (07 May 2021 06:34) (95% - 99%)    GENERAL: No acute distress, well-developed  HEAD:  Atraumatic, Normocephalic  EYES: EOMI, PERRLA, conjunctiva and sclera clear  NECK: Supple, no lymphadenopathy, no JVD  CHEST/LUNG: CTAB; No wheezes, rales, or rhonchi  HEART: Regular rate and rhythm; No murmurs, rubs, or gallops  ABDOMEN: Soft, non-tender, non-distended; normal bowel sounds, no organomegaly  EXTREMITIES:  2+ peripheral pulses b/l, No clubbing, cyanosis, or edema  NEUROLOGY: A&O x 3, no focal deficits  SKIN: No rashes or lesions    LABS:                        8.4    14.09 )-----------( 340      ( 07 May 2021 07:07 )             27.0     05-07    134<L>  |  98  |  18  ----------------------------<  227<H>  5.0   |  24  |  0.86    Ca    8.8      07 May 2021 07:07  Phos  4.0       Mg     2.1         TPro  6.9  /  Alb  2.6<L>  /  TBili  0.5  /  DBili  x   /  AST  16  /  ALT  15  /  AlkPhos  87            Urinalysis Basic - ( 05 May 2021 18:38 )    Color: Dark Yellow / Appearance: Turbid / S.027 / pH: x  Gluc: x / Ketone: Trace  / Bili: Negative / Urobili: 3 mg/dL   Blood: x / Protein: 100 mg/dL / Nitrite: Positive   Leuk Esterase: Large / RBC: >50 /hpf / WBC >50   Sq Epi: x / Non Sq Epi: 3 / Bacteria: Many        Culture - Blood (collected 05 May 2021 18:15)  Source: .Blood Blood  Preliminary Report (06 May 2021 19:07):    No growth to date.    Culture - Blood (collected 05 May 2021 18:10)  Source: .Blood Blood  Preliminary Report (06 May 2021 19:07):    No growth to date.        RADIOLOGY & ADDITIONAL TESTS:  Results Reviewed:   Imaging Personally Reviewed:  Electrocardiogram Personally Reviewed:    COORDINATION OF CARE:  Care Discussed with Consultants/Other Providers [Y/N]:  Prior or Outpatient Records Reviewed [Y/N]:   PROGRESS NOTE:   Authored by ANH Fritz pager [phone]    Patient is a 60y old  Male who presents with a chief complaint of COVID19 w/ severe metabolic acidosis s/p intubation (06 May 2021 16:41)      SUBJECTIVE / OVERNIGHT EVENTS: No events overnight. Patient denies any h/a, n/v/d/c. He points to his leg when asked if he has pain. His leg appears swollen and tender to touch. The knee is also very still and difficult to bend. Patient is mentating well but  appears lethargic at times     MEDICATIONS  (STANDING):  albuterol/ipratropium for Nebulization 3 milliLiter(s) Nebulizer every 12 hours  cholecalciferol 400 Unit(s) Oral daily  dextrose 40% Gel 15 Gram(s) Oral once  dextrose 40% Gel 15 Gram(s) Oral once  dextrose 5%. 1000 milliLiter(s) (100 mL/Hr) IV Continuous <Continuous>  dextrose 5%. 1000 milliLiter(s) (50 mL/Hr) IV Continuous <Continuous>  dextrose 5%. 1000 milliLiter(s) (50 mL/Hr) IV Continuous <Continuous>  dextrose 5%. 1000 milliLiter(s) (100 mL/Hr) IV Continuous <Continuous>  dextrose 50% Injectable 25 Gram(s) IV Push once  dextrose 50% Injectable 12.5 Gram(s) IV Push once  dextrose 50% Injectable 25 Gram(s) IV Push once  dextrose 50% Injectable 25 Gram(s) IV Push once  dextrose 50% Injectable 12.5 Gram(s) IV Push once  dextrose 50% Injectable 25 Gram(s) IV Push once  enoxaparin Injectable 70 milliGRAM(s) SubCutaneous two times a day  glucagon  Injectable 1 milliGRAM(s) IntraMuscular once  glucagon  Injectable 1 milliGRAM(s) IntraMuscular once  insulin lispro (ADMELOG) corrective regimen sliding scale   SubCutaneous every 6 hours  insulin NPH human recombinant 5 Unit(s) SubCutaneous every 6 hours  levETIRAcetam  Solution 500 milliGRAM(s) Oral two times a day  multivitamin/minerals Oral Solution (WELLESSE) 30 milliLiter(s) Oral daily  piperacillin/tazobactam IVPB.. 3.375 Gram(s) IV Intermittent every 8 hours  polyethylene glycol 3350 17 Gram(s) Oral daily  senna 2 Tablet(s) Oral at bedtime  sodium chloride 0.9%. 1000 milliLiter(s) (75 mL/Hr) IV Continuous <Continuous>  tamsulosin 0.4 milliGRAM(s) Oral at bedtime  thiamine 100 milliGRAM(s) Oral daily    MEDICATIONS  (PRN):      CAPILLARY BLOOD GLUCOSE      POCT Blood Glucose.: 237 mg/dL (07 May 2021 06:08)  POCT Blood Glucose.: 239 mg/dL (07 May 2021 00:18)  POCT Blood Glucose.: 186 mg/dL (06 May 2021 17:30)  POCT Blood Glucose.: 225 mg/dL (06 May 2021 12:04)  POCT Blood Glucose.: 268 mg/dL (06 May 2021 08:44)    I&O's Summary    06 May 2021 07:01  -  07 May 2021 07:00  --------------------------------------------------------  IN: 1710 mL / OUT: 1500 mL / NET: 210 mL        PHYSICAL EXAM:  Vital Signs Last 24 Hrs  T(C): 36.9 (07 May 2021 06:02), Max: 36.9 (06 May 2021 20:50)  T(F): 98.4 (07 May 2021 06:02), Max: 98.5 (06 May 2021 20:50)  HR: 101 (07 May 2021 06:34) (82 - 110)  BP: 115/65 (07 May 2021 06:02) (115/65 - 119/72)  BP(mean): --  RR: 18 (07 May 2021 06:02) (18 - 18)  SpO2: 99% (07 May 2021 06:34) (95% - 99%)    GENERAL: No acute distress, lethargic   HEAD:  Atraumatic, Normocephalic  EYES: EOMI, PERRLA, conjunctiva and sclera clear  NECK: Supple, no lymphadenopathy, no JVD  CHEST/LUNG: decreased breath sounds in both lung b/l 2/2 poor insp effort   HEART: Regular rate and rhythm; No murmurs, rubs, or gallops  ABDOMEN: Soft, non-tender, non-distended; normal bowel sounds, no organomegaly, guards upper left side of abd. PEG in place   EXTREMITIES:  2+ peripheral pulses b/l, No clubbing, cyanosis, or edema, right knee still, no erythema or tenderness to palpation. Thigh area tender to palpation   NEUROLOGY: A&O x 2, generalized weakness without focality   SKIN: gangrenous toes w/o change     LABS:                        8.4    14.09 )-----------( 340      ( 07 May 2021 07:07 )             27.0     05-07    134<L>  |  98  |  18  ----------------------------<  227<H>  5.0   |  24  |  0.86    Ca    8.8      07 May 2021 07:07  Phos  4.0     05-  Mg     2.1     -    TPro  6.9  /  Alb  2.6<L>  /  TBili  0.5  /  DBili  x   /  AST  16  /  ALT  15  /  AlkPhos  87  -          Urinalysis Basic - ( 05 May 2021 18:38 )    Color: Dark Yellow / Appearance: Turbid / S.027 / pH: x  Gluc: x / Ketone: Trace  / Bili: Negative / Urobili: 3 mg/dL   Blood: x / Protein: 100 mg/dL / Nitrite: Positive   Leuk Esterase: Large / RBC: >50 /hpf / WBC >50   Sq Epi: x / Non Sq Epi: 3 / Bacteria: Many        Culture - Blood (collected 05 May 2021 18:15)  Source: .Blood Blood  Preliminary Report (06 May 2021 19:07):    No growth to date.    Culture - Blood (collected 05 May 2021 18:10)  Source: .Blood Blood  Preliminary Report (06 May 2021 19:07):    No growth to date.        RADIOLOGY & ADDITIONAL TESTS:  Results Reviewed:   Imaging Personally Reviewed:  Electrocardiogram Personally Reviewed:    COORDINATION OF CARE:  Care Discussed with Consultants/Other Providers [Y/N]:  Prior or Outpatient Records Reviewed [Y/N]:

## 2021-05-07 NOTE — PROGRESS NOTE ADULT - SUBJECTIVE AND OBJECTIVE BOX
CC: F/U for Empyema    Saw/spoke to patient. No fevers, no chills. No new complaints, although appears fatigued today.    Allergies  No Known Allergies    ANTIMICROBIALS:  meropenem  IVPB 1000 every 8 hours    PE:    Vital Signs Last 24 Hrs  T(C): 36.9 (07 May 2021 06:02), Max: 36.9 (06 May 2021 20:50)  T(F): 98.4 (07 May 2021 06:02), Max: 98.5 (06 May 2021 20:50)  HR: 101 (07 May 2021 06:34) (82 - 110)  BP: 115/65 (07 May 2021 06:02) (115/65 - 119/72)  RR: 18 (07 May 2021 06:02) (18 - 18)  SpO2: 99% (07 May 2021 06:34) (95% - 99%)    Gen: AOx3, NAD, non-toxic  CV: S1+S2 normal, nontachycardic  Resp: Clear bilat, no resp distress, no crackles/wheezes  Abd: Soft, nontender, +BS  Ext: No LE edema, no wounds    LABS:                        8.4    14.09 )-----------( 340      ( 07 May 2021 07:07 )             27.0     05-07    134<L>  |  98  |  18  ----------------------------<  227<H>  5.0   |  24  |  0.86    Ca    8.8      07 May 2021 07:07  Phos  4.0     05-07  Mg     2.1     05-07    TPro  6.9  /  Alb  2.6<L>  /  TBili  0.5  /  DBili  x   /  AST  16  /  ALT  15  /  AlkPhos  87  05-07    Urinalysis Basic - ( 05 May 2021 18:38 )    Color: Dark Yellow / Appearance: Turbid / S.027 / pH: x  Gluc: x / Ketone: Trace  / Bili: Negative / Urobili: 3 mg/dL   Blood: x / Protein: 100 mg/dL / Nitrite: Positive   Leuk Esterase: Large / RBC: >50 /hpf / WBC >50   Sq Epi: x / Non Sq Epi: 3 / Bacteria: Many    MICROBIOLOGY:    .Blood Blood  21   No growth to date.     .Blood Blood  21   No growth to date.      .Urine Clean Catch (Midstream)  21   No growth      .Blood Blood-Peripheral  21   No Growth Final     .Blood Blood  21   No Growth Final     .Sputum Sputum  21   Numerous Klebsiella pneumoniae ESBL  Normal Respiratory Lizzette absent  --  Klebsiella pneumoniae ESBL    .Sputum Sputum  21   Numerous Klebsiella pneumoniae ESBL  Normal Respiratory Lizzette absent  --  Klebsiella pneumoniae ESBL    (otherwise reviewed)    RADIOLOGY:     CT:    Clarification correction to initial report: Air and fluid collection in the right lower lung may represent emphysema or parenchymal abscess    The case was discussed with pulmonology.    *** END OF ADDENDUM 2021  ***      PROCEDURE DATE:  2021            INTERPRETATION:  CLINICAL INFORMATION: Pneumonia.    COMPARISON: None.    CONTRAST/COMPLICATIONS:  IV Contrast: Omnipaque 350.  90 mL administered.   10 mL discarded.  Oral Contrast: None.  Complications: None.    PROCEDURE:  CT of the Chest, Abdomen and Pelvis was performed.  Sagittal and coronal reformats were performed.    FINDINGS:  CHEST:  LUNGS AND LARGE AIRWAYS: Patchy opacities in both lungs.  PLEURA: Enhancing air and fluid collection in the right pleural space, consistent with empyema.  VESSELS: Within normal limits.  HEART: Heart size is normal. No pericardial effusion.  MEDIASTINUM AND BESSIE: No lymphadenopathy.  CHEST WALL AND LOWER NECK: Within normal limits.    ABDOMEN AND PELVIS:  LIVER: Within normal limits.  BILE DUCTS: Normal caliber.  GALLBLADDER: Within normal limits.  SPLEEN: Multiple hypodense splenic lesions, probably infarcts.  PANCREAS: Within normal limits.  ADRENALS: Within normal limits.  KIDNEYS/URETERS: Within normal limits.    BLADDER: Contains a Mann catheter balloon. Mucosal hyperenhancement of the urinary bladder with mild surrounding inflammation.  REPRODUCTIVE ORGANS: Prostate within normal limits.    BOWEL: Percutaneous gastrostomy tube. No bowel obstruction. Appendix contains an appendicolith.  PERITONEUM: No ascites.  VESSELS: Atherosclerotic changes.  RETROPERITONEUM/LYMPH NODES: No lymphadenopathy.  ABDOMINAL WALL: Asymmetric enlargement of the right obturator internus muscle with hyperdense foci, suspicious for intramuscular hematoma with active bleed or intramuscular metastases can have this appearance.  BONES: Degenerative changes.    IMPRESSION:  Right empyema. Bilateral multifocal pneumonia.    Splenic infarcts.    Mucosal hyperenhancement of the urinary bladder, possibly cystitis.    Asymmetric enlargement of the right obturator internus muscle with hyperdense foci, suspicious for intramuscular hematoma with active bleed; alternatively, intramuscular metastases can have this appearance.

## 2021-05-07 NOTE — PROGRESS NOTE ADULT - PROBLEM SELECTOR PLAN 6
Dysphagia noted in setting of patient's acute alteration in mental status and hx of intubation  - Nasogastric tube placed for administration of tube feeds originally, but patient self removed 4/17; multiple attempts to replace unsuccessful   Evaluated by speech & swallow: unable to cooperate with FEES study, cineesophagram performed demonstrating aspiration  - PEG tube placed on 4/30, tube feedings started on 4/30, will slowly increase rate by 10 ml q24hrs, currently at 20cc/hr, + free water q8  - Monitor for refeeding syndrome with BMP BID Elevated while in MICU  - Was on metoprolol tartrate 25 mg twice daily since 4/16; discontinued  - BP is stable  - If is hypertensive persistently, can consider antihypertensives

## 2021-05-07 NOTE — PROGRESS NOTE ADULT - PROBLEM SELECTOR PLAN 7
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   Significant lower extremity arterial disease not identified. Decreased amplitude to the pulse volume recordings at the levels of the toes bilaterally.  - Podiatry recs appreciated, no acute surgical intervention at this time, applying betadine to wound  - Wound care recs appreciated Dysphagia noted in setting of patient's acute alteration in mental status and hx of intubation  - Nasogastric tube placed for administration of tube feeds originally, but patient self removed 4/17; multiple attempts to replace unsuccessful   Evaluated by speech & swallow: unable to cooperate with FEES study, cineesophagram performed demonstrating aspiration  - PEG tube placed on 4/30, tube feedings started on 4/30, will slowly increase rate by 10 ml q24hrs, currently at 20cc/hr, + free water q8  - Monitor for refeeding syndrome with BMP BID

## 2021-05-07 NOTE — PROGRESS NOTE ADULT - PROBLEM SELECTOR PLAN 1
Goal glucose 100-180  -FS q6h  - Adjust NPH 8 units sq q6h ; HOLD IF TUBE FEEDING TURNED OFF  -c/w  moderate correction scale sq q6h.     Per team possibility of drainage over weekend vs. monday- if patient will be NPO p MN ensure that last dose of NPH is given at 1800 evening before, Hold subsequent doses ;  Patient should be prioritized for AM procedure if possible to maintain glycemic control.     Notify endocrine if plan is to be NPO     Outpt. endo follow-up  Outpt. optho, podiatry, nephrology  Dispo: basal bolus vs. basal + orals depending on insulin requirements. Needs education and pen teaching.     Discussed with patient and Dr Linda Calvert NP   In House Pager: [phone]   office:  [phone] (M-F 9a-5pm)               [phone] (nights/weekends)

## 2021-05-07 NOTE — PROGRESS NOTE ADULT - PROBLEM SELECTOR PLAN 10
- DVT: Lovenox full dose  - Diet: NPO with tube feedings  - Full code  - Dispo- acute rehab pending medical optimization

## 2021-05-08 DIAGNOSIS — J86.9 PYOTHORAX WITHOUT FISTULA: ICD-10-CM

## 2021-05-08 LAB
ALBUMIN SERPL ELPH-MCNC: 2.7 G/DL — LOW (ref 3.3–5)
ALP SERPL-CCNC: 82 U/L — SIGNIFICANT CHANGE UP (ref 40–120)
ALT FLD-CCNC: 19 U/L — SIGNIFICANT CHANGE UP (ref 10–45)
ANION GAP SERPL CALC-SCNC: 13 MMOL/L — SIGNIFICANT CHANGE UP (ref 5–17)
APTT BLD: 20.8 SEC — LOW (ref 27.5–35.5)
AST SERPL-CCNC: 20 U/L — SIGNIFICANT CHANGE UP (ref 10–40)
BILIRUB SERPL-MCNC: 0.4 MG/DL — SIGNIFICANT CHANGE UP (ref 0.2–1.2)
BUN SERPL-MCNC: 15 MG/DL — SIGNIFICANT CHANGE UP (ref 7–23)
CALCIUM SERPL-MCNC: 9 MG/DL — SIGNIFICANT CHANGE UP (ref 8.4–10.5)
CHLORIDE SERPL-SCNC: 97 MMOL/L — SIGNIFICANT CHANGE UP (ref 96–108)
CO2 SERPL-SCNC: 25 MMOL/L — SIGNIFICANT CHANGE UP (ref 22–31)
CREAT SERPL-MCNC: 0.83 MG/DL — SIGNIFICANT CHANGE UP (ref 0.5–1.3)
GLUCOSE BLDC GLUCOMTR-MCNC: 166 MG/DL — HIGH (ref 70–99)
GLUCOSE BLDC GLUCOMTR-MCNC: 184 MG/DL — HIGH (ref 70–99)
GLUCOSE BLDC GLUCOMTR-MCNC: 209 MG/DL — HIGH (ref 70–99)
GLUCOSE BLDC GLUCOMTR-MCNC: 217 MG/DL — HIGH (ref 70–99)
GLUCOSE BLDC GLUCOMTR-MCNC: 217 MG/DL — HIGH (ref 70–99)
GLUCOSE BLDC GLUCOMTR-MCNC: 246 MG/DL — HIGH (ref 70–99)
GLUCOSE BLDC GLUCOMTR-MCNC: 270 MG/DL — HIGH (ref 70–99)
GLUCOSE SERPL-MCNC: 182 MG/DL — HIGH (ref 70–99)
HCT VFR BLD CALC: 26.9 % — LOW (ref 39–50)
HGB BLD-MCNC: 8.3 G/DL — LOW (ref 13–17)
INR BLD: 0.97 RATIO — SIGNIFICANT CHANGE UP (ref 0.88–1.16)
MAGNESIUM SERPL-MCNC: 2.2 MG/DL — SIGNIFICANT CHANGE UP (ref 1.6–2.6)
MCHC RBC-ENTMCNC: 26.3 PG — LOW (ref 27–34)
MCHC RBC-ENTMCNC: 30.9 GM/DL — LOW (ref 32–36)
MCV RBC AUTO: 85.1 FL — SIGNIFICANT CHANGE UP (ref 80–100)
NRBC # BLD: 0 /100 WBCS — SIGNIFICANT CHANGE UP (ref 0–0)
PHOSPHATE SERPL-MCNC: 3.9 MG/DL — SIGNIFICANT CHANGE UP (ref 2.5–4.5)
PLATELET # BLD AUTO: 363 K/UL — SIGNIFICANT CHANGE UP (ref 150–400)
POTASSIUM SERPL-MCNC: 4.7 MMOL/L — SIGNIFICANT CHANGE UP (ref 3.5–5.3)
POTASSIUM SERPL-SCNC: 4.7 MMOL/L — SIGNIFICANT CHANGE UP (ref 3.5–5.3)
PROCALCITONIN SERPL-MCNC: 0.19 NG/ML — HIGH (ref 0.02–0.1)
PROT SERPL-MCNC: 7.5 G/DL — SIGNIFICANT CHANGE UP (ref 6–8.3)
PROTHROM AB SERPL-ACNC: 11.6 SEC — SIGNIFICANT CHANGE UP (ref 10.6–13.6)
RBC # BLD: 3.16 M/UL — LOW (ref 4.2–5.8)
RBC # FLD: 17.5 % — HIGH (ref 10.3–14.5)
SODIUM SERPL-SCNC: 135 MMOL/L — SIGNIFICANT CHANGE UP (ref 135–145)
WBC # BLD: 13.63 K/UL — HIGH (ref 3.8–10.5)
WBC # FLD AUTO: 13.63 K/UL — HIGH (ref 3.8–10.5)

## 2021-05-08 PROCEDURE — 99232 SBSQ HOSP IP/OBS MODERATE 35: CPT

## 2021-05-08 PROCEDURE — 93970 EXTREMITY STUDY: CPT | Mod: 26

## 2021-05-08 PROCEDURE — 99233 SBSQ HOSP IP/OBS HIGH 50: CPT | Mod: GC

## 2021-05-08 PROCEDURE — 99231 SBSQ HOSP IP/OBS SF/LOW 25: CPT

## 2021-05-08 RX ORDER — HUMAN INSULIN 100 [IU]/ML
10 INJECTION, SUSPENSION SUBCUTANEOUS EVERY 6 HOURS
Refills: 0 | Status: DISCONTINUED | OUTPATIENT
Start: 2021-05-08 | End: 2021-05-09

## 2021-05-08 RX ADMIN — HUMAN INSULIN 10 UNIT(S): 100 INJECTION, SUSPENSION SUBCUTANEOUS at 12:47

## 2021-05-08 RX ADMIN — HUMAN INSULIN 8 UNIT(S): 100 INJECTION, SUSPENSION SUBCUTANEOUS at 06:25

## 2021-05-08 RX ADMIN — Medication 3 MILLILITER(S): at 17:10

## 2021-05-08 RX ADMIN — Medication 30 MILLILITER(S): at 23:46

## 2021-05-08 RX ADMIN — LEVETIRACETAM 500 MILLIGRAM(S): 250 TABLET, FILM COATED ORAL at 18:06

## 2021-05-08 RX ADMIN — TAMSULOSIN HYDROCHLORIDE 0.4 MILLIGRAM(S): 0.4 CAPSULE ORAL at 21:22

## 2021-05-08 RX ADMIN — POLYETHYLENE GLYCOL 3350 17 GRAM(S): 17 POWDER, FOR SOLUTION ORAL at 12:47

## 2021-05-08 RX ADMIN — MEROPENEM 100 MILLIGRAM(S): 1 INJECTION INTRAVENOUS at 05:28

## 2021-05-08 RX ADMIN — SENNA PLUS 2 TABLET(S): 8.6 TABLET ORAL at 21:22

## 2021-05-08 RX ADMIN — Medication 400 UNIT(S): at 18:05

## 2021-05-08 RX ADMIN — HUMAN INSULIN 8 UNIT(S): 100 INJECTION, SUSPENSION SUBCUTANEOUS at 00:00

## 2021-05-08 RX ADMIN — Medication 4: at 23:57

## 2021-05-08 RX ADMIN — Medication 2: at 01:19

## 2021-05-08 RX ADMIN — Medication 4: at 12:46

## 2021-05-08 RX ADMIN — HUMAN INSULIN 10 UNIT(S): 100 INJECTION, SUSPENSION SUBCUTANEOUS at 23:58

## 2021-05-08 RX ADMIN — LEVETIRACETAM 500 MILLIGRAM(S): 250 TABLET, FILM COATED ORAL at 05:29

## 2021-05-08 RX ADMIN — HUMAN INSULIN 10 UNIT(S): 100 INJECTION, SUSPENSION SUBCUTANEOUS at 18:03

## 2021-05-08 RX ADMIN — Medication 100 MILLIGRAM(S): at 12:47

## 2021-05-08 RX ADMIN — Medication 3 MILLILITER(S): at 05:14

## 2021-05-08 RX ADMIN — Medication 4: at 06:25

## 2021-05-08 RX ADMIN — Medication 4: at 18:05

## 2021-05-08 NOTE — PROGRESS NOTE ADULT - PROBLEM SELECTOR PLAN 2
- Etiology likely multi-factorial in setting of acute ischemic infarctions of the brain in conjunction vs acute kidney injury with uremia (now resolving) vs resolving hypoxic respiratory failure, covid-19 infection, superimposed bacterial pneumonia, urinary retention, concern for nutritional deficiencies)  - S/p management of: acute pneumonia with antibiotics; pulmonary embolus with full-dose anticoagulation  - Reorientation with family at bedside (pt speaks [place], does best with family present)  - MRI Brain, and MRA Head demonstrated multiple evolving subacute infarcts with associated hemorrhages, likely expected evolution of ischemia  - Neuro recs appreciated  - As per neuro, EEG not suggestive of seizure-like activity  - C/w Keppra 500 mg BID  - repeat CT head 5/5 normal

## 2021-05-08 NOTE — PROGRESS NOTE ADULT - SUBJECTIVE AND OBJECTIVE BOX
PROGRESS NOTE:   Authored by Dr. Emily Mckeon, ANH pager [phone]    Patient is a 60y old  Male who presents with a chief complaint of COVID19 w/ severe metabolic acidosis s/p intubation (07 May 2021 21:04)      SUBJECTIVE / OVERNIGHT EVENTS:    MEDICATIONS  (STANDING):  albuterol/ipratropium for Nebulization 3 milliLiter(s) Nebulizer every 12 hours  cholecalciferol 400 Unit(s) Oral daily  dextrose 40% Gel 15 Gram(s) Oral once  dextrose 40% Gel 15 Gram(s) Oral once  dextrose 5%. 1000 milliLiter(s) (50 mL/Hr) IV Continuous <Continuous>  dextrose 5%. 1000 milliLiter(s) (100 mL/Hr) IV Continuous <Continuous>  dextrose 5%. 1000 milliLiter(s) (50 mL/Hr) IV Continuous <Continuous>  dextrose 5%. 1000 milliLiter(s) (100 mL/Hr) IV Continuous <Continuous>  dextrose 50% Injectable 25 Gram(s) IV Push once  dextrose 50% Injectable 12.5 Gram(s) IV Push once  dextrose 50% Injectable 25 Gram(s) IV Push once  dextrose 50% Injectable 25 Gram(s) IV Push once  dextrose 50% Injectable 12.5 Gram(s) IV Push once  dextrose 50% Injectable 25 Gram(s) IV Push once  glucagon  Injectable 1 milliGRAM(s) IntraMuscular once  glucagon  Injectable 1 milliGRAM(s) IntraMuscular once  insulin lispro (ADMELOG) corrective regimen sliding scale   SubCutaneous every 6 hours  insulin NPH human recombinant 8 Unit(s) SubCutaneous every 6 hours  levETIRAcetam  Solution 500 milliGRAM(s) Oral two times a day  meropenem  IVPB 1000 milliGRAM(s) IV Intermittent every 8 hours  multivitamin/minerals Oral Solution (WELLESSE) 30 milliLiter(s) Oral daily  polyethylene glycol 3350 17 Gram(s) Oral daily  senna 2 Tablet(s) Oral at bedtime  sodium chloride 0.9%. 1000 milliLiter(s) (75 mL/Hr) IV Continuous <Continuous>  tamsulosin 0.4 milliGRAM(s) Oral at bedtime  thiamine 100 milliGRAM(s) Oral daily    MEDICATIONS  (PRN):      CAPILLARY BLOOD GLUCOSE      POCT Blood Glucose.: 270 mg/dL (08 May 2021 06:45)  POCT Blood Glucose.: 246 mg/dL (08 May 2021 06:23)  POCT Blood Glucose.: 184 mg/dL (08 May 2021 01:17)  POCT Blood Glucose.: 166 mg/dL (08 May 2021 00:12)  POCT Blood Glucose.: 209 mg/dL (07 May 2021 18:06)  POCT Blood Glucose.: 309 mg/dL (07 May 2021 12:35)    I&O's Summary    07 May 2021 07:01  -  08 May 2021 07:00  --------------------------------------------------------  IN: 2780 mL / OUT: 950 mL / NET: 1830 mL        PHYSICAL EXAM:  Vital Signs Last 24 Hrs  T(C): 36.7 (08 May 2021 04:38), Max: 36.7 (08 May 2021 04:38)  T(F): 98 (08 May 2021 04:38), Max: 98 (08 May 2021 04:38)  HR: 96 (08 May 2021 05:48) (96 - 111)  BP: 111/72 (08 May 2021 04:38) (100/65 - 120/75)  BP(mean): --  RR: 18 (08 May 2021 04:38) (18 - 18)  SpO2: 96% (08 May 2021 05:48) (95% - 98%)    GENERAL: No acute distress, well-developed  HEAD:  Atraumatic, Normocephalic  EYES: EOMI, PERRLA, conjunctiva and sclera clear  NECK: Supple, no lymphadenopathy, no JVD  CHEST/LUNG: CTAB; No wheezes, rales, or rhonchi  HEART: Regular rate and rhythm; No murmurs, rubs, or gallops  ABDOMEN: Soft, non-tender, non-distended; normal bowel sounds, no organomegaly  EXTREMITIES:  2+ peripheral pulses b/l, No clubbing, cyanosis, or edema  NEUROLOGY: A&O x 3, no focal deficits  SKIN: No rashes or lesions    LABS:                        8.0    14.50 )-----------( 344      ( 07 May 2021 17:11 )             26.4     05-07    133<L>  |  97  |  16  ----------------------------<  176<H>  5.0   |  22  |  0.82    Ca    8.6      07 May 2021 17:11  Phos  3.4     05-07  Mg     2.0     05-07    TPro  7.0  /  Alb  2.3<L>  /  TBili  0.5  /  DBili  x   /  AST  18  /  ALT  17  /  AlkPhos  85  05-07              Culture - Blood (collected 05 May 2021 18:15)  Source: .Blood Blood  Preliminary Report (06 May 2021 19:07):    No growth to date.    Culture - Blood (collected 05 May 2021 18:10)  Source: .Blood Blood  Preliminary Report (06 May 2021 19:07):    No growth to date.        RADIOLOGY & ADDITIONAL TESTS:  Results Reviewed:   Imaging Personally Reviewed:  Electrocardiogram Personally Reviewed:    COORDINATION OF CARE:  Care Discussed with Consultants/Other Providers [Y/N]:  Prior or Outpatient Records Reviewed [Y/N]:   PROGRESS NOTE:   Authored by ANH Fritz pager [phone]    Patient is a 60y old  Male who presents with a chief complaint of COVID19 w/ severe metabolic acidosis s/p intubation (07 May 2021 21:04)      SUBJECTIVE / OVERNIGHT EVENTS: no events overnight. Patient appears well this morning. He grimaces with light touch to the right leg. He denies any pain when asked.     MEDICATIONS  (STANDING):  albuterol/ipratropium for Nebulization 3 milliLiter(s) Nebulizer every 12 hours  cholecalciferol 400 Unit(s) Oral daily  dextrose 40% Gel 15 Gram(s) Oral once  dextrose 40% Gel 15 Gram(s) Oral once  dextrose 5%. 1000 milliLiter(s) (50 mL/Hr) IV Continuous <Continuous>  dextrose 5%. 1000 milliLiter(s) (100 mL/Hr) IV Continuous <Continuous>  dextrose 5%. 1000 milliLiter(s) (50 mL/Hr) IV Continuous <Continuous>  dextrose 5%. 1000 milliLiter(s) (100 mL/Hr) IV Continuous <Continuous>  dextrose 50% Injectable 25 Gram(s) IV Push once  dextrose 50% Injectable 12.5 Gram(s) IV Push once  dextrose 50% Injectable 25 Gram(s) IV Push once  dextrose 50% Injectable 25 Gram(s) IV Push once  dextrose 50% Injectable 12.5 Gram(s) IV Push once  dextrose 50% Injectable 25 Gram(s) IV Push once  glucagon  Injectable 1 milliGRAM(s) IntraMuscular once  glucagon  Injectable 1 milliGRAM(s) IntraMuscular once  insulin lispro (ADMELOG) corrective regimen sliding scale   SubCutaneous every 6 hours  insulin NPH human recombinant 8 Unit(s) SubCutaneous every 6 hours  levETIRAcetam  Solution 500 milliGRAM(s) Oral two times a day  meropenem  IVPB 1000 milliGRAM(s) IV Intermittent every 8 hours  multivitamin/minerals Oral Solution (WELLESSE) 30 milliLiter(s) Oral daily  polyethylene glycol 3350 17 Gram(s) Oral daily  senna 2 Tablet(s) Oral at bedtime  sodium chloride 0.9%. 1000 milliLiter(s) (75 mL/Hr) IV Continuous <Continuous>  tamsulosin 0.4 milliGRAM(s) Oral at bedtime  thiamine 100 milliGRAM(s) Oral daily    MEDICATIONS  (PRN):      CAPILLARY BLOOD GLUCOSE      POCT Blood Glucose.: 270 mg/dL (08 May 2021 06:45)  POCT Blood Glucose.: 246 mg/dL (08 May 2021 06:23)  POCT Blood Glucose.: 184 mg/dL (08 May 2021 01:17)  POCT Blood Glucose.: 166 mg/dL (08 May 2021 00:12)  POCT Blood Glucose.: 209 mg/dL (07 May 2021 18:06)  POCT Blood Glucose.: 309 mg/dL (07 May 2021 12:35)    I&O's Summary    07 May 2021 07:01  -  08 May 2021 07:00  --------------------------------------------------------  IN: 2780 mL / OUT: 950 mL / NET: 1830 mL        PHYSICAL EXAM:  Vital Signs Last 24 Hrs  T(C): 36.7 (08 May 2021 04:38), Max: 36.7 (08 May 2021 04:38)  T(F): 98 (08 May 2021 04:38), Max: 98 (08 May 2021 04:38)  HR: 96 (08 May 2021 05:48) (96 - 111)  BP: 111/72 (08 May 2021 04:38) (100/65 - 120/75)  BP(mean): --  RR: 18 (08 May 2021 04:38) (18 - 18)  SpO2: 96% (08 May 2021 05:48) (95% - 98%)    GENERAL: No acute distress, malnourished   HEAD:  Atraumatic, Normocephalic  EYES: EOMI, PERRLA, conjunctiva and sclera clear  NECK: Supple, no lymphadenopathy, no JVD  CHEST/LUNG: CTAB; No wheezes, rales, or rhonchi, decreased breath sounds b/l   HEART: Regular rate and rhythm; No murmurs, rubs, or gallops  ABDOMEN: Soft, non-tender, non-distended; normal bowel sounds, no organomegaly  EXTREMITIES:  2+ peripheral pulses b/l, right thigh with some swelling in tender to touch, no color changes or open lesions. Knee (r) very stiff and unable to bend   NEUROLOGY: A&O 1-2, no focal deficits  SKIN: gangrenous toes     LABS:                        8.0    14.50 )-----------( 344      ( 07 May 2021 17:11 )             26.4     05-07    133<L>  |  97  |  16  ----------------------------<  176<H>  5.0   |  22  |  0.82    Ca    8.6      07 May 2021 17:11  Phos  3.4     05-07  Mg     2.0     05-07    TPro  7.0  /  Alb  2.3<L>  /  TBili  0.5  /  DBili  x   /  AST  18  /  ALT  17  /  AlkPhos  85  05-07              Culture - Blood (collected 05 May 2021 18:15)  Source: .Blood Blood  Preliminary Report (06 May 2021 19:07):    No growth to date.    Culture - Blood (collected 05 May 2021 18:10)  Source: .Blood Blood  Preliminary Report (06 May 2021 19:07):    No growth to date.        RADIOLOGY & ADDITIONAL TESTS:  Results Reviewed:   Imaging Personally Reviewed:  Electrocardiogram Personally Reviewed:    COORDINATION OF CARE:  Care Discussed with Consultants/Other Providers [Y/N]:  Prior or Outpatient Records Reviewed [Y/N]:

## 2021-05-08 NOTE — PROGRESS NOTE ADULT - PROBLEM SELECTOR PLAN 1
Leukocytosis and tachycardia  - ID recs appreciated    - started on zosyn 5/7 and switched to meropenem 5/7  - CXR with RLL opacity  - O2 supplementation as necessary   - BCx in lab, f/u results   - UA positive, pending UCx  - CT chest, a/p + for empyema--. Pulm consulted, waiting for US to be performed for further planning   - CT abd showing likely cystitis     Obturator Hematoma   - actively bleeding hematoma in right thigh  - lovenox on hold   - CBC to monitor hbg q6 Leukocytosis and tachycardia  - ID recs appreciated    - started on zosyn 5/7 and switched to meropenem 5/7  - CXR with RLL opacity  - O2 supplementation as necessary   - BCx in lab, f/u results   - UA positive  - UCx growing Kleb  - CT chest, a/p + for empyema--. Pulm and CT consulted, will f/u with plan to manage empyema   - CT abd showing likely cystitis     Obturator Hematoma   - bleeding hematoma in right thigh on CT on 5/7  - CT hip showing non active bleed, hematoma on right thigh on 5/8  - lovenox on hold   - CBC to monitor hbg q6  - doppler to r/o DVT in LE and then if + will consult IR to consider IVC filter

## 2021-05-08 NOTE — PROGRESS NOTE ADULT - ASSESSMENT
60M hospitalized for COVID PNA, c/b cardiogenic/septic shock and ischemic/hemorrhagic stroke, now extubated on the floor w/ negative COVID PCR, Thoracic surgery consulted to evaluate the recently found R sided empyema.     5/8 VSS; NAD. Thoracic surgery recommend IR Consult/Tap for R empyema.

## 2021-05-08 NOTE — PROGRESS NOTE ADULT - SUBJECTIVE AND OBJECTIVE BOX
Patient is a 60y old  Male who presents with a chief complaint of COVID19 w/ severe metabolic acidosis s/p intubation (08 May 2021 07:25)      SUBJECTIVE: "Hi "    Vital Signs Last 24 Hrs  T(C): 36.7 (05-08-21 @ 04:38), Max: 36.7 (05-08-21 @ 04:38)  T(F): 98 (05-08-21 @ 04:38), Max: 98 (05-08-21 @ 04:38)  HR: 96 (05-08-21 @ 05:48) (96 - 111)  BP: 111/72 (05-08-21 @ 04:38) (100/65 - 120/75)  RR: 18 (05-08-21 @ 04:38) (18 - 18)  SpO2: 96% (05-08-21 @ 05:48) (95% - 98%)              05-07-21 @ 07:01  -  05-08-21 @ 07:00  --------------------------------------------------------  IN: 2780 mL / OUT: 950 mL / NET: 1830 mL        Daily     Daily                           8.3    13.63 )-----------( 363      ( 08 May 2021 07:43 )             26.9     05-08    135  |  97  |  15  ----------------------------<  182<H>  4.7   |  25  |  0.83    Ca    9.0      08 May 2021 07:43  Phos  3.9     05-08  Mg     2.2     05-08    TPro  7.5  /  Alb  2.7<L>  /  TBili  0.4  /  DBili  x   /  AST  20  /  ALT  19  /  AlkPhos  82  05-08          PHYSICAL EXAM  Neurology: A&Ox3, NAD, no gross deficits  CV : RRR+S1S2  Lungs: Respirations non-labored, B/L BS  Abdomen: Soft, NT/ND, +BSx4Q  Extremities: B/L LE edema, negative calf tenderness, +PP           CHEST TUBES:  Left CT     [  ]LWS  [  ]H2O seal  Right CT   [  ]LWS  [  ]H2O seal          MEDICATIONS  albuterol/ipratropium for Nebulization 3 milliLiter(s) Nebulizer every 12 hours  cholecalciferol 400 Unit(s) Oral daily  dextrose 40% Gel 15 Gram(s) Oral once  dextrose 40% Gel 15 Gram(s) Oral once  dextrose 5%. 1000 milliLiter(s) IV Continuous <Continuous>  dextrose 5%. 1000 milliLiter(s) IV Continuous <Continuous>  dextrose 5%. 1000 milliLiter(s) IV Continuous <Continuous>  dextrose 5%. 1000 milliLiter(s) IV Continuous <Continuous>  dextrose 50% Injectable 25 Gram(s) IV Push once  dextrose 50% Injectable 12.5 Gram(s) IV Push once  dextrose 50% Injectable 25 Gram(s) IV Push once  dextrose 50% Injectable 25 Gram(s) IV Push once  dextrose 50% Injectable 12.5 Gram(s) IV Push once  dextrose 50% Injectable 25 Gram(s) IV Push once  glucagon  Injectable 1 milliGRAM(s) IntraMuscular once  glucagon  Injectable 1 milliGRAM(s) IntraMuscular once  insulin lispro (ADMELOG) corrective regimen sliding scale   SubCutaneous every 6 hours  insulin NPH human recombinant 10 Unit(s) SubCutaneous every 6 hours  levETIRAcetam  Solution 500 milliGRAM(s) Oral two times a day  meropenem  IVPB 1000 milliGRAM(s) IV Intermittent every 8 hours  multivitamin/minerals Oral Solution (WELLESSE) 30 milliLiter(s) Oral daily  polyethylene glycol 3350 17 Gram(s) Oral daily  senna 2 Tablet(s) Oral at bedtime  sodium chloride 0.9%. 1000 milliLiter(s) IV Continuous <Continuous>  tamsulosin 0.4 milliGRAM(s) Oral at bedtime  thiamine 100 milliGRAM(s) Oral daily

## 2021-05-08 NOTE — PROGRESS NOTE ADULT - PROBLEM SELECTOR PLAN 4
-Pulmonary embolus noted on ct scan performed at time of admission  -Etiology of venous thromboembolism uncertain at this time; no known history of thrombophilia; provoked in setting of acute covid illness   - Transitioned from heparin to lovenox but now holding due to acute bleed   - will discharge on Eliquis

## 2021-05-08 NOTE — PROGRESS NOTE ADULT - SUBJECTIVE AND OBJECTIVE BOX
DIABETES FOLLOW UP : Seen earlier today    INTERVAL HX: No hypoglycemia o/n . Pt opened eyes and went back to sleep, BG remains elevated this am . 12MN BG at goal.       Review of Systems:  unable to obtain pt 2/2 AMS    Allergies    No Known Allergies    MEDICATIONS  (STANDING):  albuterol/ipratropium for Nebulization 3 milliLiter(s) Nebulizer every 12 hours  cholecalciferol 400 Unit(s) Oral daily  dextrose 40% Gel 15 Gram(s) Oral once  dextrose 40% Gel 15 Gram(s) Oral once  dextrose 5%. 1000 milliLiter(s) (50 mL/Hr) IV Continuous <Continuous>  dextrose 5%. 1000 milliLiter(s) (100 mL/Hr) IV Continuous <Continuous>  dextrose 5%. 1000 milliLiter(s) (50 mL/Hr) IV Continuous <Continuous>  dextrose 5%. 1000 milliLiter(s) (100 mL/Hr) IV Continuous <Continuous>  dextrose 50% Injectable 25 Gram(s) IV Push once  dextrose 50% Injectable 12.5 Gram(s) IV Push once  dextrose 50% Injectable 25 Gram(s) IV Push once  dextrose 50% Injectable 25 Gram(s) IV Push once  dextrose 50% Injectable 12.5 Gram(s) IV Push once  dextrose 50% Injectable 25 Gram(s) IV Push once  glucagon  Injectable 1 milliGRAM(s) IntraMuscular once  glucagon  Injectable 1 milliGRAM(s) IntraMuscular once  insulin lispro (ADMELOG) corrective regimen sliding scale   SubCutaneous every 6 hours  insulin NPH human recombinant 10 Unit(s) SubCutaneous every 6 hours  levETIRAcetam  Solution 500 milliGRAM(s) Oral two times a day  meropenem  IVPB 1000 milliGRAM(s) IV Intermittent every 8 hours  multivitamin/minerals Oral Solution (WELLESSE) 30 milliLiter(s) Oral daily  polyethylene glycol 3350 17 Gram(s) Oral daily  senna 2 Tablet(s) Oral at bedtime  sodium chloride 0.9%. 1000 milliLiter(s) (75 mL/Hr) IV Continuous <Continuous>  tamsulosin 0.4 milliGRAM(s) Oral at bedtime  thiamine 100 milliGRAM(s) Oral daily      PHYSICAL EXAM:  VITALS: T(C): 36.7 (05-08-21 @ 04:38)  T(F): 98 (05-08-21 @ 04:38), Max: 98 (05-08-21 @ 04:38)  HR: 96 (05-08-21 @ 05:48) (96 - 111)  BP: 111/72 (05-08-21 @ 04:38) (100/65 - 120/75)  RR:  (18 - 18)  SpO2:  (95% - 98%)  Wt(kg): --  GENERAL: male laying in bed in NAD  Abdomen: Soft, nontender, non distended, PEG tube in place TF Glucerna at goal  Extremities: Warm, no edema in all 4 exts, gangrenous toes   Neuro: Not oriented      LABS:  POCT Blood Glucose.: 217 mg/dL (05-08-21 @ 12:27)  POCT Blood Glucose.: 270 mg/dL (05-08-21 @ 06:45)  POCT Blood Glucose.: 246 mg/dL (05-08-21 @ 06:23)  POCT Blood Glucose.: 184 mg/dL (05-08-21 @ 01:17)  POCT Blood Glucose.: 166 mg/dL (05-08-21 @ 00:12)  POCT Blood Glucose.: 209 mg/dL (05-07-21 @ 18:06)  POCT Blood Glucose.: 309 mg/dL (05-07-21 @ 12:35)  POCT Blood Glucose.: 237 mg/dL (05-07-21 @ 06:08)  POCT Blood Glucose.: 239 mg/dL (05-07-21 @ 00:18)  POCT Blood Glucose.: 186 mg/dL (05-06-21 @ 17:30)  POCT Blood Glucose.: 225 mg/dL (05-06-21 @ 12:04)  POCT Blood Glucose.: 268 mg/dL (05-06-21 @ 08:44)  POCT Blood Glucose.: 303 mg/dL (05-06-21 @ 06:14)  POCT Blood Glucose.: 241 mg/dL (05-05-21 @ 23:32)  POCT Blood Glucose.: 225 mg/dL (05-05-21 @ 17:17)                            8.3    13.63 )-----------( 363      ( 08 May 2021 07:43 )             26.9       05-08    135  |  97  |  15  ----------------------------<  182<H>  4.7   |  25  |  0.83    EGFR if : 112  EGFR if non : 97    Ca    9.0      05-08  Mg     2.2     05-08  Phos  3.9     05-08    TPro  7.5  /  Alb  2.7<L>  /  TBili  0.4  /  DBili  x   /  AST  20  /  ALT  19  /  AlkPhos  82  05-08 04-26 Chol 114 Direct LDL -- LDL calculated 53 HDL 35<L> Trig 126, 04-13 Chol -- Direct LDL -- LDL calculated -- HDL -- Trig 126    Thyroid Function Tests:  04-24 @ 11:11 TSH 2.00 FreeT4 -- T3 -- Anti TPO -- Anti Thyroglobulin Ab -- TSI --          A1C with Estimated Average Glucose Result: 10.3 % (04-02-21 @ 14:28)      Estimated Average Glucose: 249 mg/dL (04-02-21 @ 14:28)

## 2021-05-08 NOTE — PROGRESS NOTE ADULT - PROBLEM SELECTOR PLAN 3
-Acute ischemic strokes, with concern for hemorrhagic conversion, noted on CT scan on 4/8  -Etiology of acute ischemic stroke uncertain at this time; TTE with bubble performed at bedside in the MICU without any evidence of right-to-left shunt; no arrhythmia noted on telemetry throughout stay in the MICU  - CTA head and neck without any evidence of carotid artery stenosis or dissection    Retention   - olson placed  - c/w flomax

## 2021-05-08 NOTE — PROGRESS NOTE ADULT - PROBLEM SELECTOR PLAN 5
- Type II diabetes mellitus noted; a1c noted to be greater than 10   - On insulin sliding scale with q6 NPH   - Monitor fingersticks while on tube feeds  - endocrine consulted for uncontrolled DM - Type II diabetes mellitus noted; a1c noted to be greater than 10   - On insulin sliding scale with q6 NPH   - Monitor fingersticks while on tube feeds  - endocrine recs appreciated for uncontrolled DM

## 2021-05-08 NOTE — PROGRESS NOTE ADULT - ATTENDING COMMENTS
CT hip noted - bleeding seems to have stopped  pulm, CT surg, IR have all given input - no drainage of RLE pleural fluid for now due to patient's tenuous status  continue abx  d/w ID re: Meropenem  holding AC for now though he has PE - if repeat CBC stable then will trial heparin drip since presumable hematoma has stopped bleeding  transfuse if hb<7 or symptomatic.

## 2021-05-08 NOTE — PROGRESS NOTE ADULT - PROBLEM SELECTOR PLAN 7
Dysphagia noted in setting of patient's acute alteration in mental status and hx of intubation  - Nasogastric tube placed for administration of tube feeds originally, but patient self removed 4/17; multiple attempts to replace unsuccessful   Evaluated by speech & swallow: unable to cooperate with FEES study, cineesophagram performed demonstrating aspiration  - PEG tube placed on 4/30, tube feedings started on 4/30, will slowly increase rate by 10 ml q24hrs, currently at 20cc/hr, + free water q8  - Monitor for refeeding syndrome with BMP BID Dysphagia noted in setting of patient's acute alteration in mental status and hx of intubation  - Nasogastric tube placed for administration of tube feeds originally, but patient self removed 4/17; multiple attempts to replace unsuccessful   Evaluated by speech & swallow: unable to cooperate with FEES study, cineesophagram performed demonstrating aspiration  - PEG tube placed on 4/30, tube feedings started on 4/30, will slowly increase rate by 10 ml q24hrs, currently at 20cc/hr, + free water q8

## 2021-05-08 NOTE — PROGRESS NOTE ADULT - ASSESSMENT
61 yo M with PMH of DM who was initially admitted to [facility] ICU 4/1 for hypoxic respiratory failure 2/2 COVID c/b PE with R heart strain, cardiogenic and septic shock, ischemic and hemorrhagic CVA and ESBL klebsiella ventilator associated PNA, transferred to [facility] for neurosurgery eval on 4/12  No fever, has leukocytosis  Has rising WBC, conversely, patient appears well at bedside--no fevers, no chills, no new complaints  Bilateral LE dry gangrene  CXR RLL atelectasis vs pna? No clinical signs pneumonia  No sob/no cough, no abd pain, N/V/D  Has PEG  PCT minimally elevated    CT now with Empyema, concern for bilateral PNA, abscess    1) Leukocytosis  - Now suspected to be infectious pulmonary sequelae  - Trend WBC  - F/U pending blood cultures, NTD     2) positive urine cx, abnormal U/A   pt with urinary retention   on jennifer already     3) Suspected Empyema/Abscess  - Meropenem 1g q 8  - Pulmonary following   - s/p IR eval, concern for development of bronchopleural fistula, no intervention   - CTS on board, surgical plan pending     - Repeat sputum culture if producing sputum  4) Gangrene  - Appears noninfectious/dry gangrene  - Monitor wounds      Zaira Estevez  Pager: [phone]. If no response or past 5 pm call [phone].

## 2021-05-08 NOTE — PROGRESS NOTE ADULT - SUBJECTIVE AND OBJECTIVE BOX
60y old  Male who presents with a chief complaint of COVID19 w/ severe metabolic acidosis s/p intubation (08 May 2021 12:44)      Interval history:  afebrile, cough improved, no chest pain, occasional abdominal pain.       Allergies:   No Known Allergies    Antimicrobials:  meropenem  IVPB 1000 milliGRAM(s) IV Intermittent every 8 hours      REVIEW OF SYSTEMS:  No chest pain  No N/V  No rash.     Vital Signs Last 24 Hrs  T(C): 36.7 (05-08-21 @ 04:38), Max: 36.7 (05-08-21 @ 04:38)  T(F): 98 (05-08-21 @ 04:38), Max: 98 (05-08-21 @ 04:38)  HR: 96 (05-08-21 @ 05:48) (96 - 111)  BP: 111/72 (05-08-21 @ 04:38) (111/72 - 120/75)  BP(mean): --  RR: 18 (05-08-21 @ 04:38) (18 - 18)  SpO2: 96% (05-08-21 @ 05:48) (95% - 98%)      PHYSICAL EXAM:  Patient in no acute distress. Alert, awake,  Cardiovascular: S1S2 normal.  Lungs: coarse breath sounds   Gastrointestinal: soft, nondistended. + peg   Extremities: no edema.  ischemic changes toes   IV sites not inflamed.                           8.3    13.63 )-----------( 363      ( 08 May 2021 07:43 )             26.9   05-08    135  |  97  |  15  ----------------------------<  182<H>  4.7   |  25  |  0.83    Ca    9.0      08 May 2021 07:43  Phos  3.9     05-08  Mg     2.2     05-08    TPro  7.5  /  Alb  2.7<L>  /  TBili  0.4  /  DBili  x   /  AST  20  /  ALT  19  /  AlkPhos  82  05-08      LIVER FUNCTIONS - ( 08 May 2021 07:43 )  Alb: 2.7 g/dL / Pro: 7.5 g/dL / ALK PHOS: 82 U/L / ALT: 19 U/L / AST: 20 U/L / GGT: x               Culture - Urine (collected 06 May 2021 06:36)  Source: .Urine Clean Catch (Midstream)  Preliminary Report (08 May 2021 09:25):    >100,000 CFU/ml Klebsiella pneumoniae    Culture - Blood (collected 05 May 2021 18:15)  Source: .Blood Blood  Preliminary Report (06 May 2021 19:07):    No growth to date.    Culture - Blood (collected 05 May 2021 18:10)  Source: .Blood Blood  Preliminary Report (06 May 2021 19:07):    No growth to date.        Radiology:  < from: [facility] Duplex Lower Ext Vein Scan, Bilat (05.08.21 @ 09:46) >  IMPRESSION:  No evidence of deep venous thrombosis in either lower extremity.      < from: CT Lower Extremity w/wo IV Cont, Right (05.07.21 @ 22:41) >  IMPRESSION:  1. No active hemorrhage or contrast extravasation is seen.  2. Enlarged right obturator internus is again seen with calcifications and heterogeneous density. Changes could reflect subacute on chronic intramuscular hematoma, however, mass cannot be absolutely excluded.  3. Correlation with older imaging is suggested if available. MRI of the pelvis without and with intravenous contrast may be helpful for further evaluation if no other priors are available.

## 2021-05-08 NOTE — PROGRESS NOTE ADULT - PROBLEM SELECTOR PLAN 1
R empyema-   thoracic rec IR consult for tap  will follow   daily CXR  No surgical intervention at this time  Will consider Vats if no drainage with chest tube

## 2021-05-08 NOTE — PROGRESS NOTE ADULT - PROBLEM SELECTOR PLAN 8
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   Significant lower extremity arterial disease not identified. Decreased amplitude to the pulse volume recordings at the levels of the toes bilaterally.  - Podiatry recs appreciated, no acute surgical intervention at this time, applying betadine to wound  - Wound care recs appreciated    feet with necrosis  - podiatry recs appreciated   - c/w wound care  - no active signs of infxn    Leukocytosis  - ID recs appreciated    - Will monitor off abx   - CXR with RLL opacity, presently no clinical signs pna  - O2 supplementation as necessary   - BCx in lab, f/u results   - UA positive, pending UCx  - CT chest, a/p for infection workup -Dry gangrene noted over distal toes on bilateral lower extremities--most concerning for microvascular disease subsequent to vasopressor administration   -Dorsalis pedis pulses intact bilaterally  - ERASMO demonstrating right ERASMO: 1.09, left ERASMO: 1.24, right TBI: 0.78, and left TBI: 0.73.   Significant lower extremity arterial disease not identified. Decreased amplitude to the pulse volume recordings at the levels of the toes bilaterally.  - Podiatry recs appreciated, no acute surgical intervention at this time, applying betadine to wound  - Wound care recs appreciated  - vascular recs appreciated

## 2021-05-08 NOTE — CONSULT NOTE ADULT - ASSESSMENT
Interventional Radiology  Evaluate for Procedure: Right chest tube    HPI: 60y Male with covid pneumonia, large right lower lobe lung abscess     Allergies:   Medications (Abx/Cardiac/Anticoagulation/Blood Products)  enoxaparin Injectable: 70 milliGRAM(s) SubCutaneous (05-07 @ 05:40)  meropenem  IVPB: 100 mL/Hr IV Intermittent (05-08 @ 05:28)  piperacillin/tazobactam IVPB.: 200 mL/Hr IV Intermittent (05-06 @ 14:40)  piperacillin/tazobactam IVPB..: 25 mL/Hr IV Intermittent (05-07 @ 05:43)  tamsulosin: 0.4 milliGRAM(s) Oral (05-07 @ 21:42)    Data:    T(C): 36.7  HR: 96  BP: 111/72  RR: 18  SpO2: 96%    -WBC 13.63 / HgB 8.3 / Hct 26.9 / Plt 363  -Na 135 / Cl 97 / BUN 15 / Glucose 182  -K 4.7 / CO2 25 / Cr 0.83  -ALT 19 / Alk Phos 82 / T.Bili 0.4  -INR 0.97 / PTT 20.8    Radiology: moderate right lower lobe parenchymal abscess in the area of prior pneumonia, possible erosion into pleural space, abscess contains large air.    Assessment/Plan:   -60y Male with moderate right lower lobe parenchymal lung abscess in the area of prior pneumonia, possible erosion into pleural space. Advise against IR chest tube given the risk of iatrogenic bronchopleural fistula which could acutely worsen the patients already tenuous respiratory status. If he is a safe candidate, would consider VATS, otherwise conservative management +/- palliative care.

## 2021-05-08 NOTE — PROGRESS NOTE ADULT - PROBLEM SELECTOR PLAN 1
Goal glucose 100-180  -FS q6h  - Adjust NPH 10 units sq q6h ; HOLD IF TUBE FEEDING TURNED OFF  -c/w  moderate correction scale sq q6h.     Per team possibility of drainage over weekend vs. monday- if patient will be NPO p MN ensure that last dose of NPH is given at 1800 evening before, Hold subsequent doses ;  Patient should be prioritized for AM procedure if possible to maintain glycemic control.     Notify endocrine if plan is to be NPO     Outpt. endo follow-up  Outpt. optho, podiatry, nephrology  Dispo: basal bolus vs. basal + orals depending on insulin requirements. Needs education and pen teaching.     Discussed with patient and Dr Linda Calvert NP   In House Pager: [phone]   office:  [phone] (M-F 9a-5pm)               [phone] (nights/weekends)

## 2021-05-08 NOTE — PROGRESS NOTE ADULT - ASSESSMENT
60 yr male with PMH of DM who was initially admitted to [facility] ICU for hypoxic respiratory failure 2/2 COVID c/b PE with R heart strain, cardiogenic and septic shock, ischemic and hemorrhagic CVA and ESBL klebsiella ventilator associate PNA, transferred to [facility] for neurosurgery eval and further management. s/p PEG, now monitoring for refeeding syndrome

## 2021-05-09 LAB
ALBUMIN SERPL ELPH-MCNC: 2.6 G/DL — LOW (ref 3.3–5)
ALP SERPL-CCNC: 83 U/L — SIGNIFICANT CHANGE UP (ref 40–120)
ALT FLD-CCNC: 20 U/L — SIGNIFICANT CHANGE UP (ref 10–45)
ANION GAP SERPL CALC-SCNC: 12 MMOL/L — SIGNIFICANT CHANGE UP (ref 5–17)
AST SERPL-CCNC: 18 U/L — SIGNIFICANT CHANGE UP (ref 10–40)
BILIRUB SERPL-MCNC: 0.4 MG/DL — SIGNIFICANT CHANGE UP (ref 0.2–1.2)
BLD GP AB SCN SERPL QL: NEGATIVE — SIGNIFICANT CHANGE UP
BLD GP AB SCN SERPL QL: NEGATIVE — SIGNIFICANT CHANGE UP
BUN SERPL-MCNC: 20 MG/DL — SIGNIFICANT CHANGE UP (ref 7–23)
CALCIUM SERPL-MCNC: 8.7 MG/DL — SIGNIFICANT CHANGE UP (ref 8.4–10.5)
CHLORIDE SERPL-SCNC: 96 MMOL/L — SIGNIFICANT CHANGE UP (ref 96–108)
CO2 SERPL-SCNC: 23 MMOL/L — SIGNIFICANT CHANGE UP (ref 22–31)
CREAT SERPL-MCNC: 0.88 MG/DL — SIGNIFICANT CHANGE UP (ref 0.5–1.3)
GLUCOSE BLDC GLUCOMTR-MCNC: 127 MG/DL — HIGH (ref 70–99)
GLUCOSE BLDC GLUCOMTR-MCNC: 231 MG/DL — HIGH (ref 70–99)
GLUCOSE BLDC GLUCOMTR-MCNC: 256 MG/DL — HIGH (ref 70–99)
GLUCOSE SERPL-MCNC: 220 MG/DL — HIGH (ref 70–99)
HCT VFR BLD CALC: 25.9 % — LOW (ref 39–50)
HCT VFR BLD CALC: 27.8 % — LOW (ref 39–50)
HGB BLD-MCNC: 8.1 G/DL — LOW (ref 13–17)
HGB BLD-MCNC: 8.7 G/DL — LOW (ref 13–17)
MAGNESIUM SERPL-MCNC: 2.2 MG/DL — SIGNIFICANT CHANGE UP (ref 1.6–2.6)
MCHC RBC-ENTMCNC: 26.2 PG — LOW (ref 27–34)
MCHC RBC-ENTMCNC: 26.4 PG — LOW (ref 27–34)
MCHC RBC-ENTMCNC: 31.3 GM/DL — LOW (ref 32–36)
MCHC RBC-ENTMCNC: 31.3 GM/DL — LOW (ref 32–36)
MCV RBC AUTO: 83.7 FL — SIGNIFICANT CHANGE UP (ref 80–100)
MCV RBC AUTO: 84.4 FL — SIGNIFICANT CHANGE UP (ref 80–100)
NRBC # BLD: 0 /100 WBCS — SIGNIFICANT CHANGE UP (ref 0–0)
NRBC # BLD: 0 /100 WBCS — SIGNIFICANT CHANGE UP (ref 0–0)
PHOSPHATE SERPL-MCNC: 4 MG/DL — SIGNIFICANT CHANGE UP (ref 2.5–4.5)
PLATELET # BLD AUTO: 430 K/UL — HIGH (ref 150–400)
PLATELET # BLD AUTO: 441 K/UL — HIGH (ref 150–400)
POTASSIUM SERPL-MCNC: 5 MMOL/L — SIGNIFICANT CHANGE UP (ref 3.5–5.3)
POTASSIUM SERPL-SCNC: 5 MMOL/L — SIGNIFICANT CHANGE UP (ref 3.5–5.3)
PROT SERPL-MCNC: 7.2 G/DL — SIGNIFICANT CHANGE UP (ref 6–8.3)
RBC # BLD: 3.07 M/UL — LOW (ref 4.2–5.8)
RBC # BLD: 3.32 M/UL — LOW (ref 4.2–5.8)
RBC # FLD: 17.6 % — HIGH (ref 10.3–14.5)
RBC # FLD: 17.8 % — HIGH (ref 10.3–14.5)
RH IG SCN BLD-IMP: POSITIVE — SIGNIFICANT CHANGE UP
RH IG SCN BLD-IMP: POSITIVE — SIGNIFICANT CHANGE UP
SARS-COV-2 RNA SPEC QL NAA+PROBE: SIGNIFICANT CHANGE UP
SODIUM SERPL-SCNC: 131 MMOL/L — LOW (ref 135–145)
WBC # BLD: 18.34 K/UL — HIGH (ref 3.8–10.5)
WBC # BLD: 18.63 K/UL — HIGH (ref 3.8–10.5)
WBC # FLD AUTO: 18.34 K/UL — HIGH (ref 3.8–10.5)
WBC # FLD AUTO: 18.63 K/UL — HIGH (ref 3.8–10.5)

## 2021-05-09 PROCEDURE — 99233 SBSQ HOSP IP/OBS HIGH 50: CPT | Mod: GC

## 2021-05-09 PROCEDURE — 99232 SBSQ HOSP IP/OBS MODERATE 35: CPT

## 2021-05-09 PROCEDURE — 71045 X-RAY EXAM CHEST 1 VIEW: CPT | Mod: 26

## 2021-05-09 RX ORDER — HEPARIN SODIUM 5000 [USP'U]/ML
INJECTION INTRAVENOUS; SUBCUTANEOUS
Qty: 25000 | Refills: 0 | Status: DISCONTINUED | OUTPATIENT
Start: 2021-05-09 | End: 2021-05-09

## 2021-05-09 RX ORDER — HUMAN INSULIN 100 [IU]/ML
12 INJECTION, SUSPENSION SUBCUTANEOUS EVERY 6 HOURS
Refills: 0 | Status: COMPLETED | OUTPATIENT
Start: 2021-05-09 | End: 2021-05-09

## 2021-05-09 RX ORDER — HEPARIN SODIUM 5000 [USP'U]/ML
2500 INJECTION INTRAVENOUS; SUBCUTANEOUS EVERY 6 HOURS
Refills: 0 | Status: DISCONTINUED | OUTPATIENT
Start: 2021-05-09 | End: 2021-05-09

## 2021-05-09 RX ORDER — CEFUROXIME AXETIL 250 MG
1500 TABLET ORAL ONCE
Refills: 0 | Status: DISCONTINUED | OUTPATIENT
Start: 2021-05-09 | End: 2021-05-10

## 2021-05-09 RX ORDER — HEPARIN SODIUM 5000 [USP'U]/ML
5500 INJECTION INTRAVENOUS; SUBCUTANEOUS EVERY 6 HOURS
Refills: 0 | Status: DISCONTINUED | OUTPATIENT
Start: 2021-05-09 | End: 2021-05-09

## 2021-05-09 RX ADMIN — HUMAN INSULIN 12 UNIT(S): 100 INJECTION, SUSPENSION SUBCUTANEOUS at 17:50

## 2021-05-09 RX ADMIN — LEVETIRACETAM 500 MILLIGRAM(S): 250 TABLET, FILM COATED ORAL at 06:27

## 2021-05-09 RX ADMIN — Medication 6: at 06:27

## 2021-05-09 RX ADMIN — Medication 100 MILLIGRAM(S): at 12:48

## 2021-05-09 RX ADMIN — MEROPENEM 100 MILLIGRAM(S): 1 INJECTION INTRAVENOUS at 21:11

## 2021-05-09 RX ADMIN — MEROPENEM 100 MILLIGRAM(S): 1 INJECTION INTRAVENOUS at 12:48

## 2021-05-09 RX ADMIN — MEROPENEM 100 MILLIGRAM(S): 1 INJECTION INTRAVENOUS at 02:00

## 2021-05-09 RX ADMIN — Medication 400 UNIT(S): at 12:48

## 2021-05-09 RX ADMIN — TAMSULOSIN HYDROCHLORIDE 0.4 MILLIGRAM(S): 0.4 CAPSULE ORAL at 21:12

## 2021-05-09 RX ADMIN — MEROPENEM 100 MILLIGRAM(S): 1 INJECTION INTRAVENOUS at 06:27

## 2021-05-09 RX ADMIN — Medication 30 MILLILITER(S): at 12:48

## 2021-05-09 RX ADMIN — SENNA PLUS 2 TABLET(S): 8.6 TABLET ORAL at 21:12

## 2021-05-09 RX ADMIN — LEVETIRACETAM 500 MILLIGRAM(S): 250 TABLET, FILM COATED ORAL at 17:50

## 2021-05-09 RX ADMIN — HUMAN INSULIN 12 UNIT(S): 100 INJECTION, SUSPENSION SUBCUTANEOUS at 12:51

## 2021-05-09 RX ADMIN — Medication 3 MILLILITER(S): at 17:15

## 2021-05-09 RX ADMIN — HEPARIN SODIUM 1200 UNIT(S)/HR: 5000 INJECTION INTRAVENOUS; SUBCUTANEOUS at 11:45

## 2021-05-09 RX ADMIN — HUMAN INSULIN 10 UNIT(S): 100 INJECTION, SUSPENSION SUBCUTANEOUS at 06:27

## 2021-05-09 RX ADMIN — Medication 4: at 12:50

## 2021-05-09 RX ADMIN — Medication 3 MILLILITER(S): at 05:45

## 2021-05-09 NOTE — PROGRESS NOTE ADULT - SUBJECTIVE AND OBJECTIVE BOX
DIABETES FOLLOW UP : Seen earlier today    INTERVAL HX: tolerating continuous TF , to be NPO for surgery tomorrow, BG remains above goal       Review of Systems:  unable to obtain pt w/ AMS    Allergies    No Known Allergies    Intolerances      MEDICATIONS  (STANDING):  albuterol/ipratropium for Nebulization 3 milliLiter(s) Nebulizer every 12 hours  cefuroxime  IVPB 1500 milliGRAM(s) IV Intermittent once  cholecalciferol 400 Unit(s) Oral daily  dextrose 40% Gel 15 Gram(s) Oral once  dextrose 40% Gel 15 Gram(s) Oral once  dextrose 5%. 1000 milliLiter(s) (50 mL/Hr) IV Continuous <Continuous>  dextrose 5%. 1000 milliLiter(s) (100 mL/Hr) IV Continuous <Continuous>  dextrose 5%. 1000 milliLiter(s) (50 mL/Hr) IV Continuous <Continuous>  dextrose 5%. 1000 milliLiter(s) (100 mL/Hr) IV Continuous <Continuous>  dextrose 50% Injectable 25 Gram(s) IV Push once  dextrose 50% Injectable 12.5 Gram(s) IV Push once  dextrose 50% Injectable 25 Gram(s) IV Push once  dextrose 50% Injectable 25 Gram(s) IV Push once  dextrose 50% Injectable 12.5 Gram(s) IV Push once  dextrose 50% Injectable 25 Gram(s) IV Push once  glucagon  Injectable 1 milliGRAM(s) IntraMuscular once  glucagon  Injectable 1 milliGRAM(s) IntraMuscular once  insulin lispro (ADMELOG) corrective regimen sliding scale   SubCutaneous every 6 hours  insulin NPH human recombinant 12 Unit(s) SubCutaneous every 6 hours  levETIRAcetam  Solution 500 milliGRAM(s) Oral two times a day  meropenem  IVPB 1000 milliGRAM(s) IV Intermittent every 8 hours  multivitamin/minerals Oral Solution (WELLESSE) 30 milliLiter(s) Oral daily  polyethylene glycol 3350 17 Gram(s) Oral daily  senna 2 Tablet(s) Oral at bedtime  sodium chloride 0.9%. 1000 milliLiter(s) (75 mL/Hr) IV Continuous <Continuous>  tamsulosin 0.4 milliGRAM(s) Oral at bedtime  thiamine 100 milliGRAM(s) Oral daily      PHYSICAL EXAM:  VITALS: T(C): 36.9 (05-09-21 @ 14:13)  T(F): 98.4 (05-09-21 @ 14:13), Max: 98.4 (05-09-21 @ 14:13)  HR: 113 (05-09-21 @ 14:13) (105 - 113)  BP: 112/68 (05-09-21 @ 14:13) (112/68 - 122/80)  RR:  (16 - 18)  SpO2:  (95% - 98%)  Wt(kg): --  GENERAL: male laying in bed  in NAD  Abdomen: Soft, nontender, non distended  Extremities: Warm, no edema in all 4 exts, necrotic toes  NEURO: Not oriented    LABS:  POCT Blood Glucose.: 231 mg/dL (05-09-21 @ 12:19)  POCT Blood Glucose.: 256 mg/dL (05-09-21 @ 06:15)  POCT Blood Glucose.: 209 mg/dL (05-08-21 @ 23:55)  POCT Blood Glucose.: 217 mg/dL (05-08-21 @ 17:44)  POCT Blood Glucose.: 217 mg/dL (05-08-21 @ 12:27)  POCT Blood Glucose.: 270 mg/dL (05-08-21 @ 06:45)  POCT Blood Glucose.: 246 mg/dL (05-08-21 @ 06:23)  POCT Blood Glucose.: 184 mg/dL (05-08-21 @ 01:17)  POCT Blood Glucose.: 166 mg/dL (05-08-21 @ 00:12)  POCT Blood Glucose.: 209 mg/dL (05-07-21 @ 18:06)  POCT Blood Glucose.: 309 mg/dL (05-07-21 @ 12:35)  POCT Blood Glucose.: 237 mg/dL (05-07-21 @ 06:08)  POCT Blood Glucose.: 239 mg/dL (05-07-21 @ 00:18)  POCT Blood Glucose.: 186 mg/dL (05-06-21 @ 17:30)                            8.1    18.63 )-----------( 441      ( 09 May 2021 06:47 )             25.9       05-09    131<L>  |  96  |  20  ----------------------------<  220<H>  5.0   |  23  |  0.88    EGFR if : 110  EGFR if non : 95    Ca    8.7      05-09  Mg     2.2     05-09  Phos  4.0     05-09    TPro  7.2  /  Alb  2.6<L>  /  TBili  0.4  /  DBili  x   /  AST  18  /  ALT  20  /  AlkPhos  83  05-09 04-26 Chol 114 Direct LDL -- LDL calculated 53 HDL 35<L> Trig 126, 04-13 Chol -- Direct LDL -- LDL calculated -- HDL -- Trig 126    Thyroid Function Tests:  04-24 @ 11:11 TSH 2.00 FreeT4 -- T3 -- Anti TPO -- Anti Thyroglobulin Ab -- TSI --      A1C with Estimated Average Glucose Result: 10.3 % (04-02-21 @ 14:28)      Estimated Average Glucose: 249 mg/dL (04-02-21 @ 14:28)

## 2021-05-09 NOTE — PROGRESS NOTE ADULT - PROBLEM SELECTOR PLAN 7
Dysphagia noted in setting of patient's acute alteration in mental status and hx of intubation  - Nasogastric tube placed for administration of tube feeds originally, but patient self removed 4/17; multiple attempts to replace unsuccessful   Evaluated by speech & swallow: unable to cooperate with FEES study, cineesophagram performed demonstrating aspiration  - PEG tube placed on 4/30, tube feedings started on 4/30, will slowly increase rate by 10 ml q24hrs, currently at 20cc/hr, + free water q8

## 2021-05-09 NOTE — PROGRESS NOTE ADULT - PROBLEM SELECTOR PLAN 1
-Goal glucose 100-180  -FS q6h  - Adjust NPH 12 units sq q6h ; HOLD IF TUBE FEEDING TURNED OFF  -c/w  moderate correction scale sq q6h.     NPO p MN -Last NPH dose today 1800; hold NPH while TF off,  HOLD NPH starting at MN dose tonight ; c/w Admelog correction scale only   Patient should be prioritized for AM procedure if possible to maintain glycemic control.     Outpt. endo follow-up  Outpt. optho, podiatry, nephrology  Dispo: basal bolus vs. basal + orals depending on insulin requirements. Needs education and pen teaching.       D/w floor RN and Team Dr Caryn Calvert, NP   In House Pager: [phone]   office:  [phone] (M-F 9a-5pm)               [phone] (nights/weekends)

## 2021-05-09 NOTE — PROGRESS NOTE ADULT - NUTRITIONAL ASSESSMENT
This patient has been assessed with a concern for Malnutrition and has been determined to have a diagnosis/diagnoses of Severe protein-calorie malnutrition.    This patient is being managed with:   Diet NPO after Midnight-     NPO Start Date: 09-May-2021   NPO Start Time: 23:59  Except Medications  Entered: May  9 2021  1:06PM    Diet NPO after Midnight-     NPO Start Date: 09-May-2021   NPO Start Time: 23:59  Entered: May  9 2021 12:17PM    Diet NPO with Tube Feed-  Tube Feeding Modality: Gastrostomy  Glucerna 1.2 Kaden (GLUCERNAR)  Total Volume for 24 Hours (mL): 1680  Continuous  Starting Tube Feed Rate {mL per Hour}: 10  Increase Tube Feed Rate by (mL): 10     Every 24 hours  Until Goal Tube Feed Rate (mL per Hour): 70  Tube Feed Duration (in Hours): 24  Tube Feed Start Time: 14:25  Entered: Apr 30 2021  2:46PM

## 2021-05-09 NOTE — PROGRESS NOTE ADULT - ASSESSMENT
60 yr male with PMH of DM who was initially admitted to [facility] ICU for hypoxic respiratory failure 2/2 COVID c/b PE with R heart strain, cardiogenic and septic shock, ischemic and hemorrhagic CVA and ESBL klebsiella ventilator associate PNA, transferred to [facility] for neurosurgery eval and further management. s/p PEG, now monitoring for refeeding syndrome

## 2021-05-09 NOTE — PROGRESS NOTE ADULT - SUBJECTIVE AND OBJECTIVE BOX
Dr. Sammy Rubin   Internal Medicine PGY-2  Pager [phone] ([facility])/[phone] ([facility])    Patient is a 60y old  Male who presents with a chief complaint of COVID19 w/ severe metabolic acidosis s/p intubation (08 May 2021 14:12)      SUBJECTIVE / OVERNIGHT EVENTS:    MEDICATIONS  (STANDING):  albuterol/ipratropium for Nebulization 3 milliLiter(s) Nebulizer every 12 hours  cholecalciferol 400 Unit(s) Oral daily  dextrose 40% Gel 15 Gram(s) Oral once  dextrose 40% Gel 15 Gram(s) Oral once  dextrose 5%. 1000 milliLiter(s) (50 mL/Hr) IV Continuous <Continuous>  dextrose 5%. 1000 milliLiter(s) (100 mL/Hr) IV Continuous <Continuous>  dextrose 5%. 1000 milliLiter(s) (50 mL/Hr) IV Continuous <Continuous>  dextrose 5%. 1000 milliLiter(s) (100 mL/Hr) IV Continuous <Continuous>  dextrose 50% Injectable 25 Gram(s) IV Push once  dextrose 50% Injectable 12.5 Gram(s) IV Push once  dextrose 50% Injectable 25 Gram(s) IV Push once  dextrose 50% Injectable 25 Gram(s) IV Push once  dextrose 50% Injectable 12.5 Gram(s) IV Push once  dextrose 50% Injectable 25 Gram(s) IV Push once  glucagon  Injectable 1 milliGRAM(s) IntraMuscular once  glucagon  Injectable 1 milliGRAM(s) IntraMuscular once  insulin lispro (ADMELOG) corrective regimen sliding scale   SubCutaneous every 6 hours  insulin NPH human recombinant 10 Unit(s) SubCutaneous every 6 hours  levETIRAcetam  Solution 500 milliGRAM(s) Oral two times a day  meropenem  IVPB 1000 milliGRAM(s) IV Intermittent every 8 hours  multivitamin/minerals Oral Solution (WELLESSE) 30 milliLiter(s) Oral daily  polyethylene glycol 3350 17 Gram(s) Oral daily  senna 2 Tablet(s) Oral at bedtime  sodium chloride 0.9%. 1000 milliLiter(s) (75 mL/Hr) IV Continuous <Continuous>  tamsulosin 0.4 milliGRAM(s) Oral at bedtime  thiamine 100 milliGRAM(s) Oral daily    MEDICATIONS  (PRN):      Vital Signs Last 24 Hrs  T(C): 36.7 (09 May 2021 04:42), Max: 36.7 (08 May 2021 14:17)  T(F): 98.1 (09 May 2021 04:42), Max: 98.1 (08 May 2021 14:17)  HR: 105 (09 May 2021 05:45) (105 - 115)  BP: 122/80 (09 May 2021 04:42) (100/65 - 122/80)  BP(mean): --  RR: 18 (09 May 2021 04:42) (16 - 18)  SpO2: 96% (09 May 2021 05:45) (96% - 98%)  CAPILLARY BLOOD GLUCOSE      POCT Blood Glucose.: 256 mg/dL (09 May 2021 06:15)  POCT Blood Glucose.: 209 mg/dL (08 May 2021 23:55)  POCT Blood Glucose.: 217 mg/dL (08 May 2021 17:44)  POCT Blood Glucose.: 217 mg/dL (08 May 2021 12:27)    I&O's Summary    08 May 2021 07:01  -  09 May 2021 07:00  --------------------------------------------------------  IN: 0 mL / OUT: 950 mL / NET: -950 mL        PHYSICAL EXAM:  GENERAL: NAD, well-developed  HEAD:  Atraumatic, Normocephalic  EYES: EOMI, PERRLA, conjunctiva and sclera clear  NECK: Supple, No JVD  CHEST/LUNG: Clear to auscultation bilaterally; No wheeze  HEART: Regular rate and rhythm; No murmurs, rubs, or gallops  ABDOMEN: Soft, Nontender, Nondistended; Bowel sounds present  EXTREMITIES:  2+ Peripheral Pulses, No clubbing, cyanosis, or edema  PSYCH: AAOx3  NEUROLOGY: non-focal  SKIN: No rashes or lesions    LABS:                        8.1    18.63 )-----------( 441      ( 09 May 2021 06:47 )             25.9     05-09    131<L>  |  96  |  20  ----------------------------<  220<H>  5.0   |  23  |  0.88    Ca    8.7      09 May 2021 06:46  Phos  4.0     05-09  Mg     2.2     05-09    TPro  7.2  /  Alb  2.6<L>  /  TBili  0.4  /  DBili  x   /  AST  18  /  ALT  20  /  AlkPhos  83  05-09    PT/INR - ( 08 May 2021 07:43 )   PT: 11.6 sec;   INR: 0.97 ratio         PTT - ( 08 May 2021 07:43 )  PTT:20.8 sec          RADIOLOGY & ADDITIONAL TESTS:    Imaging Personally Reviewed:    Consultant(s) Notes Reviewed:      Care Discussed with Consultants/Other Providers:   Dr. Sammy Rubin   Internal Medicine PGY-3  Pager [phone] ([facility])/[phone] ([facility])    Patient is a 60y old  Male who presents with a chief complaint of COVID19 w/ severe metabolic acidosis s/p intubation (08 May 2021 14:12)      SUBJECTIVE / OVERNIGHT EVENTS:  Nonverbal. Nodding that there's no pain.     MEDICATIONS  (STANDING):  albuterol/ipratropium for Nebulization 3 milliLiter(s) Nebulizer every 12 hours  cholecalciferol 400 Unit(s) Oral daily  dextrose 40% Gel 15 Gram(s) Oral once  dextrose 40% Gel 15 Gram(s) Oral once  dextrose 5%. 1000 milliLiter(s) (50 mL/Hr) IV Continuous <Continuous>  dextrose 5%. 1000 milliLiter(s) (100 mL/Hr) IV Continuous <Continuous>  dextrose 5%. 1000 milliLiter(s) (50 mL/Hr) IV Continuous <Continuous>  dextrose 5%. 1000 milliLiter(s) (100 mL/Hr) IV Continuous <Continuous>  dextrose 50% Injectable 25 Gram(s) IV Push once  dextrose 50% Injectable 12.5 Gram(s) IV Push once  dextrose 50% Injectable 25 Gram(s) IV Push once  dextrose 50% Injectable 25 Gram(s) IV Push once  dextrose 50% Injectable 12.5 Gram(s) IV Push once  dextrose 50% Injectable 25 Gram(s) IV Push once  glucagon  Injectable 1 milliGRAM(s) IntraMuscular once  glucagon  Injectable 1 milliGRAM(s) IntraMuscular once  insulin lispro (ADMELOG) corrective regimen sliding scale   SubCutaneous every 6 hours  insulin NPH human recombinant 10 Unit(s) SubCutaneous every 6 hours  levETIRAcetam  Solution 500 milliGRAM(s) Oral two times a day  meropenem  IVPB 1000 milliGRAM(s) IV Intermittent every 8 hours  multivitamin/minerals Oral Solution (WELLESSE) 30 milliLiter(s) Oral daily  polyethylene glycol 3350 17 Gram(s) Oral daily  senna 2 Tablet(s) Oral at bedtime  sodium chloride 0.9%. 1000 milliLiter(s) (75 mL/Hr) IV Continuous <Continuous>  tamsulosin 0.4 milliGRAM(s) Oral at bedtime  thiamine 100 milliGRAM(s) Oral daily    MEDICATIONS  (PRN):      Vital Signs Last 24 Hrs  T(C): 36.7 (09 May 2021 04:42), Max: 36.7 (08 May 2021 14:17)  T(F): 98.1 (09 May 2021 04:42), Max: 98.1 (08 May 2021 14:17)  HR: 105 (09 May 2021 05:45) (105 - 115)  BP: 122/80 (09 May 2021 04:42) (100/65 - 122/80)  BP(mean): --  RR: 18 (09 May 2021 04:42) (16 - 18)  SpO2: 96% (09 May 2021 05:45) (96% - 98%)  CAPILLARY BLOOD GLUCOSE      POCT Blood Glucose.: 256 mg/dL (09 May 2021 06:15)  POCT Blood Glucose.: 209 mg/dL (08 May 2021 23:55)  POCT Blood Glucose.: 217 mg/dL (08 May 2021 17:44)  POCT Blood Glucose.: 217 mg/dL (08 May 2021 12:27)    I&O's Summary    08 May 2021 07:01  -  09 May 2021 07:00  --------------------------------------------------------  IN: 0 mL / OUT: 950 mL / NET: -950 mL        PHYSICAL EXAM:  GENERAL: NAD, well-developed  HEAD:  Atraumatic, Normocephalic  EYES: EOMI, PERRLA, conjunctiva and sclera clear  NECK: Supple, No JVD  CHEST/LUNG: Clear to auscultation bilaterally; No wheeze  HEART: Regular rate and rhythm; No murmurs, rubs, or gallops  ABDOMEN: Soft, Nontender, Nondistended; Bowel sounds present  EXTREMITIES:  2+ Peripheral Pulses, No clubbing, cyanosis, or edema  PSYCH: AAOx0  NEUROLOGY: non-focal  SKIN: No rashes or lesions    LABS:                        8.1    18.63 )-----------( 441      ( 09 May 2021 06:47 )             25.9     05-09    131<L>  |  96  |  20  ----------------------------<  220<H>  5.0   |  23  |  0.88    Ca    8.7      09 May 2021 06:46  Phos  4.0     05-09  Mg     2.2     05-09    TPro  7.2  /  Alb  2.6<L>  /  TBili  0.4  /  DBili  x   /  AST  18  /  ALT  20  /  AlkPhos  83  05-09    PT/INR - ( 08 May 2021 07:43 )   PT: 11.6 sec;   INR: 0.97 ratio         PTT - ( 08 May 2021 07:43 )  PTT:20.8 sec          RADIOLOGY & ADDITIONAL TESTS:    Imaging Personally Reviewed:    Consultant(s) Notes Reviewed:      Care Discussed with Consultants/Other Providers:   Dr. Sammy Rubin   Internal Medicine PGY-3  Pager [phone] ([facility])/[phone] ([facility])    Patient is a 60y old  Male who presents with a chief complaint of COVID19 w/ severe metabolic acidosis s/p intubation (08 May 2021 14:12)      SUBJECTIVE / OVERNIGHT EVENTS:  Nonverbal. Nodding that there's no pain.     CT surg willing to drain empyema. Called son, he will speak to CT surg hear about the procedure and then decide on surgery vs. just continuing with IV abx.     MEDICATIONS  (STANDING):  albuterol/ipratropium for Nebulization 3 milliLiter(s) Nebulizer every 12 hours  cholecalciferol 400 Unit(s) Oral daily  dextrose 40% Gel 15 Gram(s) Oral once  dextrose 40% Gel 15 Gram(s) Oral once  dextrose 5%. 1000 milliLiter(s) (50 mL/Hr) IV Continuous <Continuous>  dextrose 5%. 1000 milliLiter(s) (100 mL/Hr) IV Continuous <Continuous>  dextrose 5%. 1000 milliLiter(s) (50 mL/Hr) IV Continuous <Continuous>  dextrose 5%. 1000 milliLiter(s) (100 mL/Hr) IV Continuous <Continuous>  dextrose 50% Injectable 25 Gram(s) IV Push once  dextrose 50% Injectable 12.5 Gram(s) IV Push once  dextrose 50% Injectable 25 Gram(s) IV Push once  dextrose 50% Injectable 25 Gram(s) IV Push once  dextrose 50% Injectable 12.5 Gram(s) IV Push once  dextrose 50% Injectable 25 Gram(s) IV Push once  glucagon  Injectable 1 milliGRAM(s) IntraMuscular once  glucagon  Injectable 1 milliGRAM(s) IntraMuscular once  insulin lispro (ADMELOG) corrective regimen sliding scale   SubCutaneous every 6 hours  insulin NPH human recombinant 10 Unit(s) SubCutaneous every 6 hours  levETIRAcetam  Solution 500 milliGRAM(s) Oral two times a day  meropenem  IVPB 1000 milliGRAM(s) IV Intermittent every 8 hours  multivitamin/minerals Oral Solution (WELLESSE) 30 milliLiter(s) Oral daily  polyethylene glycol 3350 17 Gram(s) Oral daily  senna 2 Tablet(s) Oral at bedtime  sodium chloride 0.9%. 1000 milliLiter(s) (75 mL/Hr) IV Continuous <Continuous>  tamsulosin 0.4 milliGRAM(s) Oral at bedtime  thiamine 100 milliGRAM(s) Oral daily    MEDICATIONS  (PRN):      Vital Signs Last 24 Hrs  T(C): 36.7 (09 May 2021 04:42), Max: 36.7 (08 May 2021 14:17)  T(F): 98.1 (09 May 2021 04:42), Max: 98.1 (08 May 2021 14:17)  HR: 105 (09 May 2021 05:45) (105 - 115)  BP: 122/80 (09 May 2021 04:42) (100/65 - 122/80)  BP(mean): --  RR: 18 (09 May 2021 04:42) (16 - 18)  SpO2: 96% (09 May 2021 05:45) (96% - 98%)  CAPILLARY BLOOD GLUCOSE      POCT Blood Glucose.: 256 mg/dL (09 May 2021 06:15)  POCT Blood Glucose.: 209 mg/dL (08 May 2021 23:55)  POCT Blood Glucose.: 217 mg/dL (08 May 2021 17:44)  POCT Blood Glucose.: 217 mg/dL (08 May 2021 12:27)    I&O's Summary    08 May 2021 07:01  -  09 May 2021 07:00  --------------------------------------------------------  IN: 0 mL / OUT: 950 mL / NET: -950 mL        PHYSICAL EXAM:  GENERAL: NAD, well-developed  HEAD:  Atraumatic, Normocephalic  EYES: EOMI, PERRLA, conjunctiva and sclera clear  NECK: Supple, No JVD  CHEST/LUNG: Clear to auscultation bilaterally; No wheeze  HEART: Regular rate and rhythm; No murmurs, rubs, or gallops  ABDOMEN: Soft, Nontender, Nondistended; Bowel sounds present  EXTREMITIES:  2+ Peripheral Pulses, No clubbing, cyanosis, or edema  PSYCH: AAOx0  NEUROLOGY: non-focal  SKIN: No rashes or lesions    LABS:                        8.1    18.63 )-----------( 441      ( 09 May 2021 06:47 )             25.9     05-09    131<L>  |  96  |  20  ----------------------------<  220<H>  5.0   |  23  |  0.88    Ca    8.7      09 May 2021 06:46  Phos  4.0     05-09  Mg     2.2     05-09    TPro  7.2  /  Alb  2.6<L>  /  TBili  0.4  /  DBili  x   /  AST  18  /  ALT  20  /  AlkPhos  83  05-09    PT/INR - ( 08 May 2021 07:43 )   PT: 11.6 sec;   INR: 0.97 ratio         PTT - ( 08 May 2021 07:43 )  PTT:20.8 sec          RADIOLOGY & ADDITIONAL TESTS:    Imaging Personally Reviewed:    Consultant(s) Notes Reviewed:      Care Discussed with Consultants/Other Providers:

## 2021-05-09 NOTE — PROGRESS NOTE ADULT - ASSESSMENT
60M hospitalized for COVID PNA, c/b cardiogenic/septic shock and ischemic/hemorrhagic stroke, now extubated on the floor w/ negative COVID PCR, Thoracic surgery consulted to evaluate the recently found R sided empyema.     5/8 VSS; NAD. Thoracic surgery recommend IR Consult/Tap for R empyema.     5/9  VSS, NAD.  IR deferring IR drain of empyema 2/2 risk of iatrogenic bronchopleural fistula. Recommends VATS procedure.  Dr. Tena spoke to patients brother in law regarding VATS procedure.  Family to decide on whether to proceed with operation.  Continue IV ABX per ID.

## 2021-05-09 NOTE — PROGRESS NOTE ADULT - SUBJECTIVE AND OBJECTIVE BOX
Subjective:  Unable to obtain. Patient non-verbal      V/S  T(C): 36.7 (05-09-21 @ 04:42), Max: 36.7 (05-08-21 @ 14:17)  HR: 105 (05-09-21 @ 05:45) (105 - 115)  BP: 122/80 (05-09-21 @ 04:42) (100/65 - 122/80)  RR: 18 (05-09-21 @ 04:42) (16 - 18)  SpO2: 96% (05-09-21 @ 05:45) (96% - 98%)      MEDICATIONS  (STANDING):  albuterol/ipratropium for Nebulization 3 milliLiter(s) Nebulizer every 12 hours  cefuroxime  IVPB 1500 milliGRAM(s) IV Intermittent once  cholecalciferol 400 Unit(s) Oral daily  dextrose 40% Gel 15 Gram(s) Oral once  dextrose 40% Gel 15 Gram(s) Oral once  dextrose 5%. 1000 milliLiter(s) (50 mL/Hr) IV Continuous <Continuous>  dextrose 5%. 1000 milliLiter(s) (100 mL/Hr) IV Continuous <Continuous>  dextrose 5%. 1000 milliLiter(s) (50 mL/Hr) IV Continuous <Continuous>  dextrose 5%. 1000 milliLiter(s) (100 mL/Hr) IV Continuous <Continuous>  dextrose 50% Injectable 25 Gram(s) IV Push once  dextrose 50% Injectable 12.5 Gram(s) IV Push once  dextrose 50% Injectable 25 Gram(s) IV Push once  dextrose 50% Injectable 25 Gram(s) IV Push once  dextrose 50% Injectable 12.5 Gram(s) IV Push once  dextrose 50% Injectable 25 Gram(s) IV Push once  glucagon  Injectable 1 milliGRAM(s) IntraMuscular once  glucagon  Injectable 1 milliGRAM(s) IntraMuscular once  insulin lispro (ADMELOG) corrective regimen sliding scale   SubCutaneous every 6 hours  insulin NPH human recombinant 12 Unit(s) SubCutaneous every 6 hours  levETIRAcetam  Solution 500 milliGRAM(s) Oral two times a day  meropenem  IVPB 1000 milliGRAM(s) IV Intermittent every 8 hours  multivitamin/minerals Oral Solution (WELLESSE) 30 milliLiter(s) Oral daily  polyethylene glycol 3350 17 Gram(s) Oral daily  senna 2 Tablet(s) Oral at bedtime  sodium chloride 0.9%. 1000 milliLiter(s) (75 mL/Hr) IV Continuous <Continuous>  tamsulosin 0.4 milliGRAM(s) Oral at bedtime  thiamine 100 milliGRAM(s) Oral daily      05-09    131<L>  |  96  |  20  ----------------------------<  220<H>  5.0   |  23  |  0.88    Ca    8.7      09 May 2021 06:46  Phos  4.0     05-09  Mg     2.2     05-09    TPro  7.2  /  Alb  2.6<L>  /  TBili  0.4  /  DBili  x   /  AST  18  /  ALT  20  /  AlkPhos  83  05-09                               8.1    18.63 )-----------( 441      ( 09 May 2021 06:47 )             25.9        PT/INR - ( 08 May 2021 07:43 )   PT: 11.6 sec;   INR: 0.97 ratio         PTT - ( 08 May 2021 07:43 )  PTT:20.8 sec         CAPILLARY BLOOD GLUCOSE      POCT Blood Glucose.: 231 mg/dL (09 May 2021 12:19)  POCT Blood Glucose.: 256 mg/dL (09 May 2021 06:15)  POCT Blood Glucose.: 209 mg/dL (08 May 2021 23:55)  POCT Blood Glucose.: 217 mg/dL (08 May 2021 17:44)         RADIOLOGY:    < from: CT Chest w/ IV Cont (05.06.21 @ 18:44) >  XAM:  CT ABDOMEN AND PELVIS IC                          EXAM:  CT CHEST IC                            *** ADDENDUM 05/07/2021  ***    Clarification correction to initial report: Air and fluid collection in the right lower lung may represent emphysema or parenchymal abscess    The case was discussed with pulmonology.    *** END OF ADDENDUM 05/07/2021  ***      PROCEDURE DATE:  05/06/2021            INTERPRETATION:  CLINICAL INFORMATION: Pneumonia.    COMPARISON: None.    CONTRAST/COMPLICATIONS:  IV Contrast: Omnipaque 350.  90 mL administered.   10 mL discarded.  Oral Contrast: None.  Complications: None.    PROCEDURE:  CT of the Chest, Abdomen and Pelvis was performed.  Sagittal and coronal reformats were performed.    FINDINGS:  CHEST:  LUNGS AND LARGE AIRWAYS: Patchy opacities in both lungs.  PLEURA: Enhancing air and fluid collection in the right pleural space, consistent with empyema.  VESSELS: Within normal limits.  HEART: Heart size is normal. No pericardial effusion.  MEDIASTINUM AND BESSIE: No lymphadenopathy.  CHEST WALL AND LOWER NECK: Within normal limits.    ABDOMEN AND PELVIS:  LIVER: Within normal limits.  BILE DUCTS: Normal caliber.  GALLBLADDER: Within normal limits.  SPLEEN: Multiple hypodense splenic lesions, probably infarcts.  PANCREAS: Within normal limits.  ADRENALS: Within normal limits.  KIDNEYS/URETERS: Within normal limits.    BLADDER: Contains a Mann catheter balloon. Mucosal hyperenhancement of the urinary bladder with mild surrounding inflammation.  REPRODUCTIVE ORGANS: Prostate within normal limits.    BOWEL: Percutaneous gastrostomy tube. No bowel obstruction. Appendix contains an appendicolith.  PERITONEUM: No ascites.  VESSELS: Atherosclerotic changes.  RETROPERITONEUM/LYMPH NODES: No lymphadenopathy.  ABDOMINAL WALL: Asymmetric enlargement of the right obturator internus muscle with hyperdense foci, suspicious for intramuscular hematoma with active bleed or intramuscular metastases can have this appearance.  BONES: Degenerative changes.    IMPRESSION:  Right empyema. Bilateral multifocal pneumonia.    Splenic infarcts.    Mucosal hyperenhancement of the urinary bladder, possibly cystitis.    Asymmetric enlargement of the right obturator internus muscle with hyperdense foci, suspicious for intramuscular hematoma with active bleed; alternatively, intramuscular metastases can have this appearance.    Dr. Julian discussed the preliminary findings with Dr. Spears on May 6, 2021 at 7:52 PM. The last 2 additional findings were discussed by Dr. St with Dr. Mckeon on May 7, 2021 at 10:56 AM. Readback was obtained.        ***Please see the addendum at the top of this report. It may contain additional important information or changes.****        ORALIA ST M.D., ATTENDING RADIOLOGIST  This document has been electronically signed. May  7 2021 10:56AM  Addend:ORALIA ST M.D., ATTENDING RADIOLOGIST  This addendum was electronically signed on: May  7 2021 11:59AM.    < end of copied text >    CXR:    < from: Xray Chest 1 View- PORTABLE-Routine (Xray Chest 1 View- PORTABLE-Routine in AM.) (05.09.21 @ 08:47) >  EXAM:  XR CHEST PORTABLE ROUTINE 1V                            PROCEDURE DATE:  05/09/2021            INTERPRETATION:  A single chest x-ray was obtained on May 9, 2021.    INDICATION: Follow-up right pleural effusion.    IMPRESSION:    The heart isnormal in size. Right pleural effusion. Right lower lobe pneumonia and/or atelectasis. The left lung is clear. No change in the appearance the chest when compared to previous study done on May 5, 2021 at 11:45 AM.      LAUREN ALCANTAR MD; Attending Radiologist  This document has been electronically signed. May  9 2021  8:56AM    < end of copied text >        Physical Exam:    Neurology: A&Ox1, NAD, no-focal deficits  CV : RRR, +S1S2  Lungs: Respirations non-labored, B/L BS  Abdomen: Peg tube in place, no erythema noted around tube. Abdomen NT/ND, +BSx4Q  Extremities: B/L LE edema, negative calf tenderness, +peripheral pulses palpable          PAST MEDICAL & SURGICAL HISTORY:  No pertinent past medical history    No significant past surgical history

## 2021-05-09 NOTE — CHART NOTE - NSCHARTNOTEFT_GEN_A_CORE
Dr Tena discussed surgical option for VATS for empyema  with pts brother in law Margarito who is HCP  At this point family declines surgery, prefer to continue abx. and observe Will continue to follow,   Dr Tena made aware, will keep NPO and discuss with family in am

## 2021-05-09 NOTE — PROGRESS NOTE ADULT - SUBJECTIVE AND OBJECTIVE BOX
60y old  Male who presents with a chief complaint of COVID19 w/ severe metabolic acidosis s/p intubation (09 May 2021 08:58)      Interval history:  Afebrile, + cough, intermittent chest pain.       Allergies:   No Known Allergies      Antimicrobials:  cefuroxime  IVPB 1500 milliGRAM(s) IV Intermittent once  meropenem  IVPB 1000 milliGRAM(s) IV Intermittent every 8 hours      REVIEW OF SYSTEMS:  No N/V  occasional abdominal pain   No rash.       Vital Signs Last 24 Hrs  T(C): 36.7 (05-09-21 @ 04:42), Max: 36.7 (05-08-21 @ 14:17)  T(F): 98.1 (05-09-21 @ 04:42), Max: 98.1 (05-08-21 @ 14:17)  HR: 105 (05-09-21 @ 05:45) (105 - 115)  BP: 122/80 (05-09-21 @ 04:42) (100/65 - 122/80)  BP(mean): --  RR: 18 (05-09-21 @ 04:42) (16 - 18)  SpO2: 96% (05-09-21 @ 05:45) (96% - 98%)      PHYSICAL EXAM:  Patient in no acute distress. Alert, awake,  Cardiovascular: S1S2 normal, tachy   Lungs: coarse breath sounds   Gastrointestinal: soft, nondistended. + peg   Extremities: no edema.  ischemic changes toes   IV sites not inflamed.   + olson                         8.1    18.63 )-----------( 441      ( 09 May 2021 06:47 )             25.9   05-09    131<L>  |  96  |  20  ----------------------------<  220<H>  5.0   |  23  |  0.88    Ca    8.7      09 May 2021 06:46  Phos  4.0     05-09  Mg     2.2     05-09    TPro  7.2  /  Alb  2.6<L>  /  TBili  0.4  /  DBili  x   /  AST  18  /  ALT  20  /  AlkPhos  83  05-09      LIVER FUNCTIONS - ( 09 May 2021 06:46 )  Alb: 2.6 g/dL / Pro: 7.2 g/dL / ALK PHOS: 83 U/L / ALT: 20 U/L / AST: 18 U/L / GGT: x           Culture - Urine (05.06.21 @ 06:36)   Specimen Source: .Urine Clean Catch (Midstream)   Culture Results:   >100,000 CFU/ml Klebsiella pneumoniae     Radiology:  < from: Xray Chest 1 View- PORTABLE-Routine (Xray Chest 1 View- PORTABLE-Routine in AM.) (05.09.21 @ 08:47) >  IMPRESSION:    The heart isnormal in size. Right pleural effusion. Right lower lobe pneumonia and/or atelectasis. The left lung is clear. No change in the appearance the chest when compared to previous study done on May 5, 2021 at 11:45 AM.

## 2021-05-09 NOTE — PROGRESS NOTE ADULT - ATTENDING COMMENTS
CT hip noted - bleeding seems to have stopped  per CT surgery, would perform VATS if patient and family in agreement, awaiting their decision  continue abx  d/w ID  holding AC for now though he has PE - CBC has been stable, hematoma seems to have stopped bleeding. Continued to hold in case patient might go for OR tomorrow with CT surg.  transfuse if hb<7 or symptomatic.

## 2021-05-09 NOTE — PROGRESS NOTE ADULT - PROBLEM SELECTOR PLAN 5
- Type II diabetes mellitus noted; a1c noted to be greater than 10   - On insulin sliding scale with q6 NPH   - Monitor fingersticks while on tube feeds  - endocrine recs appreciated for uncontrolled DM

## 2021-05-09 NOTE — PROGRESS NOTE ADULT - ASSESSMENT
59 y/o M w/ new onset diabetes with A1c of 10.3% on admission for COVID with prolonged hospital course, most recently CT now with Empyema, concern for bilateral PNA, abscess . Pt with persistent hyperglycemia over 200 mg/dl infection likely contributing.   On continuous tube feeds via peg with hyperglycemia;  BG above goal will increase NPH regimen to BG Goal 100-180mg/dl . Per team pt to be npo p mn for vats tomorrow.

## 2021-05-09 NOTE — PROGRESS NOTE ADULT - PROBLEM SELECTOR PLAN 8
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   Significant lower extremity arterial disease not identified. Decreased amplitude to the pulse volume recordings at the levels of the toes bilaterally.  - Podiatry recs appreciated, no acute surgical intervention at this time, applying betadine to wound  - Wound care recs appreciated  - vascular recs appreciated

## 2021-05-09 NOTE — PROGRESS NOTE ADULT - PROBLEM SELECTOR PLAN 1
Leukocytosis and tachycardia  - ID recs appreciated    - started on zosyn 5/7 and switched to meropenem 5/7  - CXR with RLL opacity  - O2 supplementation as necessary   - BCx in lab, f/u results   - UA positive  - UCx growing Kleb  - CT chest, a/p + for empyema--. Pulm and CT consulted, will f/u with plan to manage empyema   - CT abd showing likely cystitis     Obturator Hematoma   - bleeding hematoma in right thigh on CT on 5/7  - CT hip showing non active bleed, hematoma on right thigh on 5/8  - lovenox on hold   - CBC to monitor hbg q6  - doppler to r/o DVT in LE and then if + will consult IR to consider IVC filter Leukocytosis and tachycardia  - ID recs appreciated    - started on zosyn 5/7 and switched to meropenem 5/7  - CXR with RLL opacity  - O2 supplementation as necessary   - BCx in lab, f/u results   - UA positive  - UCx growing Kleb  - CT chest, a/p + for empyema--. Pulm and CT consulted, will f/u with plan to manage empyema   - CT abd showing likely cystitis     Obturator Hematoma   - bleeding hematoma in right thigh on CT on 5/7  - CT hip showing non active bleed, hematoma on right thigh on 5/8  - lovenox on hold   - CBC to monitor hbg q6  - doppler to r/o DVT in LE and then if + will consult IR to consider IVC filter  - CT surg recommending OR wash out

## 2021-05-09 NOTE — PROGRESS NOTE ADULT - ASSESSMENT
59 yo M with PMH of DM who was initially admitted to [facility] ICU 4/1 for hypoxic respiratory failure 2/2 COVID c/b PE with R heart strain, cardiogenic and septic shock, ischemic and hemorrhagic CVA and ESBL klebsiella ventilator associated PNA, transferred to [facility] for neurosurgery eval on 4/12  No fever, has leukocytosis  Has rising WBC, conversely, patient appears well at bedside--no fevers, no chills, no new complaints  Bilateral LE dry gangrene  CXR RLL atelectasis vs pna? No clinical signs pneumonia  No sob/no cough, no abd pain, N/V/D  Has PEG  PCT minimally elevated    CT now with Empyema, concern for bilateral PNA, abscess    1) Leukocytosis: rising   - Now suspected to be infectious pulmonary sequelae  - Trend WBC  - F/U pending blood cultures, NTD     2) positive urine cx, abnormal U/A   pt with urinary retention   on jennifer already     3) Suspected Empyema/Abscess: needs source control given rising leucocytosis even on broad spectrum abx.  - Meropenem 1g q 8  - Pulmonary following   - s/p IR eval, concern for development of bronchopleural fistula, no intervention   - CTS on board, possible surgical plan   - Repeat sputum culture if producing sputum    4) Gangrene  - Appears noninfectious/dry gangrene  - Monitor wounds      Zaira Estevez  Pager: [phone]. If no response or past 5 pm call [phone].

## 2021-05-09 NOTE — PROGRESS NOTE ADULT - PROBLEM SELECTOR PLAN 4
-Pulmonary embolus noted on ct scan performed at time of admission  -Etiology of venous thromboembolism uncertain at this time; no known history of thrombophilia; provoked in setting of acute covid illness   - Transitioned from heparin to lovenox but now holding due to acute bleed   - will discharge on Eliquis -Pulmonary embolus noted on ct scan performed at time of admission  -Etiology of venous thromboembolism uncertain at this time; no known history of thrombophilia; provoked in setting of acute covid illness   - restarting heparin gtt 5/9  - will discharge on Eliquis

## 2021-05-10 LAB
-  AMIKACIN: SIGNIFICANT CHANGE UP
-  AMOXICILLIN/CLAVULANIC ACID: SIGNIFICANT CHANGE UP
-  AMPICILLIN/SULBACTAM: SIGNIFICANT CHANGE UP
-  AMPICILLIN: SIGNIFICANT CHANGE UP
-  AZTREONAM: SIGNIFICANT CHANGE UP
-  CEFAZOLIN: SIGNIFICANT CHANGE UP
-  CEFEPIME: SIGNIFICANT CHANGE UP
-  CEFOXITIN: SIGNIFICANT CHANGE UP
-  CEFTRIAXONE: SIGNIFICANT CHANGE UP
-  CIPROFLOXACIN: SIGNIFICANT CHANGE UP
-  ERTAPENEM: SIGNIFICANT CHANGE UP
-  GENTAMICIN: SIGNIFICANT CHANGE UP
-  IMIPENEM: SIGNIFICANT CHANGE UP
-  LEVOFLOXACIN: SIGNIFICANT CHANGE UP
-  MEROPENEM: SIGNIFICANT CHANGE UP
-  NITROFURANTOIN: SIGNIFICANT CHANGE UP
-  PIPERACILLIN/TAZOBACTAM: SIGNIFICANT CHANGE UP
-  TIGECYCLINE: SIGNIFICANT CHANGE UP
-  TOBRAMYCIN: SIGNIFICANT CHANGE UP
-  TRIMETHOPRIM/SULFAMETHOXAZOLE: SIGNIFICANT CHANGE UP
ALBUMIN SERPL ELPH-MCNC: 2.7 G/DL — LOW (ref 3.3–5)
ALP SERPL-CCNC: 71 U/L — SIGNIFICANT CHANGE UP (ref 40–120)
ALT FLD-CCNC: 22 U/L — SIGNIFICANT CHANGE UP (ref 10–45)
ANION GAP SERPL CALC-SCNC: 14 MMOL/L — SIGNIFICANT CHANGE UP (ref 5–17)
APTT BLD: 30.5 SEC — SIGNIFICANT CHANGE UP (ref 27.5–35.5)
AST SERPL-CCNC: 20 U/L — SIGNIFICANT CHANGE UP (ref 10–40)
BASOPHILS # BLD AUTO: 0.08 K/UL — SIGNIFICANT CHANGE UP (ref 0–0.2)
BASOPHILS NFR BLD AUTO: 0.5 % — SIGNIFICANT CHANGE UP (ref 0–2)
BILIRUB SERPL-MCNC: 0.4 MG/DL — SIGNIFICANT CHANGE UP (ref 0.2–1.2)
BUN SERPL-MCNC: 19 MG/DL — SIGNIFICANT CHANGE UP (ref 7–23)
CALCIUM SERPL-MCNC: 8.7 MG/DL — SIGNIFICANT CHANGE UP (ref 8.4–10.5)
CHLORIDE SERPL-SCNC: 98 MMOL/L — SIGNIFICANT CHANGE UP (ref 96–108)
CO2 SERPL-SCNC: 22 MMOL/L — SIGNIFICANT CHANGE UP (ref 22–31)
CREAT SERPL-MCNC: 0.87 MG/DL — SIGNIFICANT CHANGE UP (ref 0.5–1.3)
CULTURE RESULTS: SIGNIFICANT CHANGE UP
EOSINOPHIL # BLD AUTO: 0.18 K/UL — SIGNIFICANT CHANGE UP (ref 0–0.5)
EOSINOPHIL NFR BLD AUTO: 1.1 % — SIGNIFICANT CHANGE UP (ref 0–6)
GLUCOSE BLDC GLUCOMTR-MCNC: 112 MG/DL — HIGH (ref 70–99)
GLUCOSE BLDC GLUCOMTR-MCNC: 123 MG/DL — HIGH (ref 70–99)
GLUCOSE BLDC GLUCOMTR-MCNC: 158 MG/DL — HIGH (ref 70–99)
GLUCOSE BLDC GLUCOMTR-MCNC: 187 MG/DL — HIGH (ref 70–99)
GLUCOSE BLDC GLUCOMTR-MCNC: 188 MG/DL — HIGH (ref 70–99)
GLUCOSE SERPL-MCNC: 101 MG/DL — HIGH (ref 70–99)
HCT VFR BLD CALC: 25.4 % — LOW (ref 39–50)
HCT VFR BLD CALC: 25.6 % — LOW (ref 39–50)
HGB BLD-MCNC: 7.9 G/DL — LOW (ref 13–17)
HGB BLD-MCNC: 8 G/DL — LOW (ref 13–17)
IMM GRANULOCYTES NFR BLD AUTO: 6.6 % — HIGH (ref 0–1.5)
INR BLD: 1.07 RATIO — SIGNIFICANT CHANGE UP (ref 0.88–1.16)
LYMPHOCYTES # BLD AUTO: 1.81 K/UL — SIGNIFICANT CHANGE UP (ref 1–3.3)
LYMPHOCYTES # BLD AUTO: 11.1 % — LOW (ref 13–44)
MAGNESIUM SERPL-MCNC: 2.4 MG/DL — SIGNIFICANT CHANGE UP (ref 1.6–2.6)
MCHC RBC-ENTMCNC: 25.9 PG — LOW (ref 27–34)
MCHC RBC-ENTMCNC: 26 PG — LOW (ref 27–34)
MCHC RBC-ENTMCNC: 31.1 GM/DL — LOW (ref 32–36)
MCHC RBC-ENTMCNC: 31.3 GM/DL — LOW (ref 32–36)
MCV RBC AUTO: 82.8 FL — SIGNIFICANT CHANGE UP (ref 80–100)
MCV RBC AUTO: 83.6 FL — SIGNIFICANT CHANGE UP (ref 80–100)
METHOD TYPE: SIGNIFICANT CHANGE UP
MONOCYTES # BLD AUTO: 1.27 K/UL — HIGH (ref 0–0.9)
MONOCYTES NFR BLD AUTO: 7.8 % — SIGNIFICANT CHANGE UP (ref 2–14)
NEUTROPHILS # BLD AUTO: 11.89 K/UL — HIGH (ref 1.8–7.4)
NEUTROPHILS NFR BLD AUTO: 72.9 % — SIGNIFICANT CHANGE UP (ref 43–77)
NRBC # BLD: 0 /100 WBCS — SIGNIFICANT CHANGE UP (ref 0–0)
NRBC # BLD: 0 /100 WBCS — SIGNIFICANT CHANGE UP (ref 0–0)
ORGANISM # SPEC MICROSCOPIC CNT: SIGNIFICANT CHANGE UP
ORGANISM # SPEC MICROSCOPIC CNT: SIGNIFICANT CHANGE UP
PHOSPHATE SERPL-MCNC: 4.5 MG/DL — SIGNIFICANT CHANGE UP (ref 2.5–4.5)
PLATELET # BLD AUTO: 446 K/UL — HIGH (ref 150–400)
PLATELET # BLD AUTO: 475 K/UL — HIGH (ref 150–400)
POTASSIUM SERPL-MCNC: 4.5 MMOL/L — SIGNIFICANT CHANGE UP (ref 3.5–5.3)
POTASSIUM SERPL-SCNC: 4.5 MMOL/L — SIGNIFICANT CHANGE UP (ref 3.5–5.3)
PROT SERPL-MCNC: 7.5 G/DL — SIGNIFICANT CHANGE UP (ref 6–8.3)
PROTHROM AB SERPL-ACNC: 12.8 SEC — SIGNIFICANT CHANGE UP (ref 10.6–13.6)
RBC # BLD: 3.04 M/UL — LOW (ref 4.2–5.8)
RBC # BLD: 3.09 M/UL — LOW (ref 4.2–5.8)
RBC # FLD: 17.6 % — HIGH (ref 10.3–14.5)
RBC # FLD: 17.7 % — HIGH (ref 10.3–14.5)
SODIUM SERPL-SCNC: 134 MMOL/L — LOW (ref 135–145)
SPECIMEN SOURCE: SIGNIFICANT CHANGE UP
WBC # BLD: 16.31 K/UL — HIGH (ref 3.8–10.5)
WBC # BLD: 16.87 K/UL — HIGH (ref 3.8–10.5)
WBC # FLD AUTO: 16.31 K/UL — HIGH (ref 3.8–10.5)
WBC # FLD AUTO: 16.87 K/UL — HIGH (ref 3.8–10.5)

## 2021-05-10 PROCEDURE — 99232 SBSQ HOSP IP/OBS MODERATE 35: CPT | Mod: 57

## 2021-05-10 PROCEDURE — 99233 SBSQ HOSP IP/OBS HIGH 50: CPT | Mod: GC

## 2021-05-10 PROCEDURE — 99232 SBSQ HOSP IP/OBS MODERATE 35: CPT

## 2021-05-10 PROCEDURE — 71045 X-RAY EXAM CHEST 1 VIEW: CPT | Mod: 26

## 2021-05-10 RX ORDER — HEPARIN SODIUM 5000 [USP'U]/ML
2500 INJECTION INTRAVENOUS; SUBCUTANEOUS EVERY 6 HOURS
Refills: 0 | Status: DISCONTINUED | OUTPATIENT
Start: 2021-05-10 | End: 2021-05-12

## 2021-05-10 RX ORDER — HUMAN INSULIN 100 [IU]/ML
12 INJECTION, SUSPENSION SUBCUTANEOUS EVERY 6 HOURS
Refills: 0 | Status: COMPLETED | OUTPATIENT
Start: 2021-05-10 | End: 2021-05-11

## 2021-05-10 RX ORDER — HEPARIN SODIUM 5000 [USP'U]/ML
5500 INJECTION INTRAVENOUS; SUBCUTANEOUS EVERY 6 HOURS
Refills: 0 | Status: DISCONTINUED | OUTPATIENT
Start: 2021-05-10 | End: 2021-05-12

## 2021-05-10 RX ORDER — HEPARIN SODIUM 5000 [USP'U]/ML
INJECTION INTRAVENOUS; SUBCUTANEOUS
Qty: 25000 | Refills: 0 | Status: DISCONTINUED | OUTPATIENT
Start: 2021-05-10 | End: 2021-05-11

## 2021-05-10 RX ADMIN — LEVETIRACETAM 500 MILLIGRAM(S): 250 TABLET, FILM COATED ORAL at 17:45

## 2021-05-10 RX ADMIN — HEPARIN SODIUM 1200 UNIT(S)/HR: 5000 INJECTION INTRAVENOUS; SUBCUTANEOUS at 17:44

## 2021-05-10 RX ADMIN — LEVETIRACETAM 500 MILLIGRAM(S): 250 TABLET, FILM COATED ORAL at 05:24

## 2021-05-10 RX ADMIN — Medication 100 MILLIGRAM(S): at 17:44

## 2021-05-10 RX ADMIN — MEROPENEM 100 MILLIGRAM(S): 1 INJECTION INTRAVENOUS at 05:25

## 2021-05-10 RX ADMIN — Medication 2: at 12:31

## 2021-05-10 RX ADMIN — MEROPENEM 100 MILLIGRAM(S): 1 INJECTION INTRAVENOUS at 21:43

## 2021-05-10 RX ADMIN — Medication 400 UNIT(S): at 17:44

## 2021-05-10 RX ADMIN — Medication 30 MILLILITER(S): at 21:43

## 2021-05-10 RX ADMIN — Medication 3 MILLILITER(S): at 18:44

## 2021-05-10 RX ADMIN — Medication 2: at 00:04

## 2021-05-10 RX ADMIN — SENNA PLUS 2 TABLET(S): 8.6 TABLET ORAL at 21:42

## 2021-05-10 RX ADMIN — MEROPENEM 100 MILLIGRAM(S): 1 INJECTION INTRAVENOUS at 13:55

## 2021-05-10 RX ADMIN — TAMSULOSIN HYDROCHLORIDE 0.4 MILLIGRAM(S): 0.4 CAPSULE ORAL at 21:43

## 2021-05-10 RX ADMIN — POLYETHYLENE GLYCOL 3350 17 GRAM(S): 17 POWDER, FOR SOLUTION ORAL at 17:44

## 2021-05-10 RX ADMIN — Medication 3 MILLILITER(S): at 06:31

## 2021-05-10 RX ADMIN — HUMAN INSULIN 12 UNIT(S): 100 INJECTION, SUSPENSION SUBCUTANEOUS at 18:02

## 2021-05-10 NOTE — PROGRESS NOTE ADULT - SUBJECTIVE AND OBJECTIVE BOX
INTERVAL EVENTS  No acute events overnight  CT surgery evaluated patient and potential plan for VATS    OBJECTIVE    Vital Signs Last 24 Hrs  T(C): 37.2 (10 May 2021 15:00), Max: 37.3 (09 May 2021 22:02)  T(F): 99 (10 May 2021 15:00), Max: 99.1 (09 May 2021 22:02)  HR: 119 (10 May 2021 15:00) (102 - 119)  BP: 108/76 (10 May 2021 15:00) (101/64 - 108/76)  RR: 18 (10 May 2021 04:07) (17 - 18)  SpO2: 96% (10 May 2021 08:00) (95% - 100%)      PHYSICAL EXAM  General: no acute distress  HEENT: normocephalic  Eyes: pupils equal and reactive to light  Neck: supple  Respiratory: non labored breathing, decreased breath sounds in bases  Cardiovascular: normal rate, regular rhythm  Gastrointestinal: abdomen soft, non tender, non distended  Extremities: no lower extremity edema  Neurological: not oriented    MEDICATIONS  (STANDING):  albuterol/ipratropium for Nebulization 3 milliLiter(s) Nebulizer every 12 hours  cholecalciferol 400 Unit(s) Oral daily  dextrose 40% Gel 15 Gram(s) Oral once  dextrose 40% Gel 15 Gram(s) Oral once  dextrose 5%. 1000 milliLiter(s) (50 mL/Hr) IV Continuous <Continuous>  dextrose 5%. 1000 milliLiter(s) (100 mL/Hr) IV Continuous <Continuous>  dextrose 5%. 1000 milliLiter(s) (50 mL/Hr) IV Continuous <Continuous>  dextrose 5%. 1000 milliLiter(s) (100 mL/Hr) IV Continuous <Continuous>  dextrose 50% Injectable 25 Gram(s) IV Push once  dextrose 50% Injectable 12.5 Gram(s) IV Push once  dextrose 50% Injectable 25 Gram(s) IV Push once  dextrose 50% Injectable 25 Gram(s) IV Push once  dextrose 50% Injectable 12.5 Gram(s) IV Push once  dextrose 50% Injectable 25 Gram(s) IV Push once  glucagon  Injectable 1 milliGRAM(s) IntraMuscular once  glucagon  Injectable 1 milliGRAM(s) IntraMuscular once  heparin  Infusion.  Unit(s)/Hr (12 mL/Hr) IV Continuous <Continuous>  insulin lispro (ADMELOG) corrective regimen sliding scale   SubCutaneous every 6 hours  insulin NPH human recombinant 12 Unit(s) SubCutaneous every 6 hours  levETIRAcetam  Solution 500 milliGRAM(s) Oral two times a day  meropenem  IVPB 1000 milliGRAM(s) IV Intermittent every 8 hours  multivitamin/minerals Oral Solution (WELLESSE) 30 milliLiter(s) Oral daily  polyethylene glycol 3350 17 Gram(s) Oral daily  senna 2 Tablet(s) Oral at bedtime  sodium chloride 0.9%. 1000 milliLiter(s) (75 mL/Hr) IV Continuous <Continuous>  tamsulosin 0.4 milliGRAM(s) Oral at bedtime  thiamine 100 milliGRAM(s) Oral daily    MEDICATIONS  (PRN):  heparin   Injectable 5500 Unit(s) IV Push every 6 hours PRN For aPTT less than 40  heparin   Injectable 2500 Unit(s) IV Push every 6 hours PRN For aPTT between 40 - 57      LABORATORY                          8.0    16.31 )-----------( 446      ( 10 May 2021 06:30 )             25.6     05-10    134<L>  |  98  |  19  ----------------------------<  101<H>  4.5   |  22  |  0.87    Ca    8.7      10 May 2021 06:30  Phos  4.5     05-10  Mg     2.4     05-10    TPro  7.5  /  Alb  2.7<L>  /  TBili  0.4  /  DBili  x   /  AST  20  /  ALT  22  /  AlkPhos  71  05-10      RADIOLOGY    CT Chest w/ IV Cont (05.06.21 @ 18:44) >  Right empyema. Bilateral multifocal pneumonia.  Splenic infarcts.  Mucosal hyperenhancement of the urinary bladder, possibly cystitis.  Asymmetric enlargement of the right obturator internus muscle with hyperdense foci, suspicious for intramuscular hematoma with active bleed; alternatively, intramuscular metastases can have this appearance.    Dr. Julian discussed the preliminary findings with Dr. Spears on May 6, 2021 at 7:52 PM. The last 2 additional findings were discussed by Dr. St with Dr. Mckeon on May 7, 2021 at 10:56 AM. Readback was obtained.    < end of copied text >      TTE with Doppler (w/Cont) (04.15.21 @ 19:22) >  1. Mild aortic root dilatation  (Ao: 4.2 cm at the sinuses of Valsalva).  2. Increased relative wall thickness with normal left ventricular mass index, consistent with concentric left ventricular remodeling.  3. Hyperdynamic left ventricular systolic function. Endocardial visualization enhanced with intravenous injection of Ultrasonic Enhancing Agent (Definity).  4. The right ventricle is not well visualized; grossly Normal right ventricular size and systolic function. TV s' = 20 cm/sec.  5. Estimated right ventricular systolic pressure equals 31 mm Hg, assuming right atrial pressure equals 8 mm Hg, consistent with normal pulmonary pressures.  6. Unable to evalaute for PFO/ASD in the setting of poor image quality. Consider repeat limited study with agitated saliine if clinically indicated.  7. Normal pericardium with trace pericardial effusion.  *** No previous Echo exam.    < end of copied text >

## 2021-05-10 NOTE — PROGRESS NOTE ADULT - ASSESSMENT
60 yr male with PMH of DM who was initially admitted to [facility] ICU for hypoxic respiratory failure 2/2 COVID c/b PE with R heart strain, cardiogenic and septic shock, ischemic and hemorrhagic CVA and ESBL klebsiella ventilator associate PNA, transferred to [facility] for neurosurgery eval and further management. s/p PEG, now monitoring for refeeding syndrome, complicated with right empyema planned for VATs

## 2021-05-10 NOTE — PROGRESS NOTE ADULT - ASSESSMENT
61 yo man with past medical history of diabetes mellitus who was initially admitted to [facility] ICU for hypoxic respiratory failure secondary to SARS-CoV-2 infection complicated by right heart strain, septic shock, ischemic stroke with hemorrhagic transformation and ESBL Klebsiella pneumonia. Transferred to [facility] for neurosurgery evaluation and further management. Now s/p PEG. Pulmonary service consulted for concern of empyema on CT chest.     Imaging reviewed with chest radiologist today. Air and fluid collection does not appear to be lung abscess but is located in pleural space consistent with empyema. Bedside ultrasound with posterior pocket of loculated fluid with difficult safe pocket for chest tube placement.      Recommendations:  - CT evaluation for possible VATS on Wednesday  - Continue antibiotics as per ID    --------------------------------------------------------  Troy Hong, PGY-5  Pulmonary/Critical Care Fellow  Pager: [phone] ([facility]), [phone] ([facility])  Pulmonary spectra: [id number]

## 2021-05-10 NOTE — PROGRESS NOTE ADULT - SUBJECTIVE AND OBJECTIVE BOX
Thoracic Pre-op Note:    CC: Patient is a 60y old  Male who presents with a chief complaint of COVID19 w/ severe metabolic acidosis s/p intubation (10 May 2021 08:01)                                                                                                             Surgeon:  Veerna    Procedure: Right VATS    Allergies    No Known Allergies    Intolerances        HPI: 60 yr male with PMH of DM who was initially admitted to [facility] ICU for hypoxic respiratory failure 2/2 COVID c/b PE with R heart strain, cardiogenic and septic shock, ischemic and hemorrhagic CVA and ESBL klebsiella ventilator associate PNA, transferred to [facility] for neurosurgery eval and further management. s/p PEG with resulting right empyema> to OR today for VATS with Dr Albarado      Addendum 4/1/21 1700:  Limited history obtained from nitesh Anders, who lives in the [place] [phone]). Patient has a PMH of low BP and takes no medications. Patient tested positive for COVID on 3/19. He was initially doing fine with some fatigue, but suddenly decompensated after yesterday. Unable to obtain social history. Wife, Lucero, is currently in [place] - she went due to medical issues but was unable to return due to COVID travel restrictions.        (12 Apr 2021 20:54)          MEDICATIONS  (STANDING):  albuterol/ipratropium for Nebulization 3 milliLiter(s) Nebulizer every 12 hours  cefuroxime  IVPB 1500 milliGRAM(s) IV Intermittent once  cholecalciferol 400 Unit(s) Oral daily  dextrose 40% Gel 15 Gram(s) Oral once  dextrose 40% Gel 15 Gram(s) Oral once  dextrose 5%. 1000 milliLiter(s) (50 mL/Hr) IV Continuous <Continuous>  dextrose 5%. 1000 milliLiter(s) (100 mL/Hr) IV Continuous <Continuous>  dextrose 5%. 1000 milliLiter(s) (50 mL/Hr) IV Continuous <Continuous>  dextrose 5%. 1000 milliLiter(s) (100 mL/Hr) IV Continuous <Continuous>  dextrose 50% Injectable 25 Gram(s) IV Push once  dextrose 50% Injectable 12.5 Gram(s) IV Push once  dextrose 50% Injectable 25 Gram(s) IV Push once  dextrose 50% Injectable 25 Gram(s) IV Push once  dextrose 50% Injectable 12.5 Gram(s) IV Push once  dextrose 50% Injectable 25 Gram(s) IV Push once  glucagon  Injectable 1 milliGRAM(s) IntraMuscular once  glucagon  Injectable 1 milliGRAM(s) IntraMuscular once  insulin lispro (ADMELOG) corrective regimen sliding scale   SubCutaneous every 6 hours  levETIRAcetam  Solution 500 milliGRAM(s) Oral two times a day  meropenem  IVPB 1000 milliGRAM(s) IV Intermittent every 8 hours  multivitamin/minerals Oral Solution (WELLESSE) 30 milliLiter(s) Oral daily  polyethylene glycol 3350 17 Gram(s) Oral daily  senna 2 Tablet(s) Oral at bedtime  sodium chloride 0.9%. 1000 milliLiter(s) (75 mL/Hr) IV Continuous <Continuous>  tamsulosin 0.4 milliGRAM(s) Oral at bedtime  thiamine 100 milliGRAM(s) Oral daily    MEDICATIONS  (PRN):        Labs:                        8.0    16.31 )-----------( 446      ( 10 May 2021 06:30 )             25.6     05-10    134<L>  |  98  |  19  ----------------------------<  101<H>  4.5   |  22  |  0.87    Ca    8.7      10 May 2021 06:30  Phos  4.5     05-10  Mg     2.4     05-10    TPro  7.5  /  Alb  2.7<L>  /  TBili  0.4  /  DBili  x   /  AST  20  /  ALT  22  /  AlkPhos  71  05-10    PT/INR - ( 10 May 2021 06:30 )   PT: 12.8 sec;   INR: 1.07 ratio         PTT - ( 10 May 2021 06:30 )  PTT:30.5 sec    Blood Type: ABO Interpretation: B (05-09 @ 19:16)      COVID COVID-19 PCR . (05.09.21 @ 16:14)    COVID-19 PCR: NotDetec:       CXR: < from: Xray Chest 1 View- PORTABLE-Routine (Xray Chest 1 View- PORTABLE-Routine in AM.) (05.09.21 @ 08:47) >    IMPRESSION:    The heart isnormal in size. Right pleural effusion. Right lower lobe pneumonia and/or atelectasis. The left lung is clear. No change in the appearance the chest when compared to previous study done on May 5, 2021 at 11:45 AM.    < end of copied text >    Neuro: Awake, eyes open, does not follow commands, non verbal    Pulm: Room air, non labored, diminished at right base    CV: RRR, S1S2    Abd: Soft,PEG in place +BS    Ext: No edema, necrotic toes      Pt has AICD/PPM [ ] Yes  [x ] No             Brand Name:  [employer]  [x ] Yes  [ ] No  NPO after Midnight [ x] Yes  [ ] No  Pre-op ABX ordered, to be taped on chart:  [x ] Yes  [ ] No       Consent obtained  [ ] Yes  [ ] No

## 2021-05-10 NOTE — PROGRESS NOTE ADULT - PROBLEM SELECTOR PLAN 1
-Goal glucose 100-180  -FS q6h  - Once restarted on TFs, restart NPH 12 units sq q6h ; HOLD IF TUBE FEEDING TURNED OFF  -c/w  moderate correction scale sq q6h.         Outpt. endo follow-up  Outpt. optho, podiatry, nephrology  Dispo: basal bolus vs. basal + orals depending on insulin requirements. Needs education and pen teaching.       Maria G Luna (pager [phone])  On evenings and weekends, please call [phone] or page endocrine fellow on call.   Please note that this patient may be followed by different provider tomorrow. If no answer, contact endocrine fellow on call.

## 2021-05-10 NOTE — PROGRESS NOTE ADULT - ASSESSMENT
61 yo M with PMH of DM who was initially admitted to [facility] ICU 4/1 for hypoxic respiratory failure 2/2 COVID c/b PE with R heart strain, cardiogenic and septic shock, ischemic and hemorrhagic CVA and ESBL klebsiella ventilator associated PNA, transferred to [facility] for neurosurgery eval on 4/12  No fever, has leukocytosis  Has rising WBC, conversely, patient appears well at bedside--no fevers, no chills, no new complaints  UCX with ESBL  Bilateral LE dry gangrene  CXR RLL atelectasis vs pna? No clinical signs pneumonia  No sob/no cough, no abd pain, N/V/D  Has PEG  PCT minimally elevated  CT now with Empyema, concern for bilateral PNA, abscess  1) Leukocytosis  - Now suspected to be infectious pulmonary sequelae  - Trend WBC  2) COVID  - Prior infection  - Monitor for any signs active COVID/reinfection  3) Suspected Empyema/Abscess  - Meropenem 1g q 8  - For VATS today with Thoracic  4) Gangrene  - Appears noninfectious/dry gangrene  - Monitor wounds    Sammy Chambers MD  Pager [phone]  After 5pm and on weekends call [phone]

## 2021-05-10 NOTE — PROGRESS NOTE ADULT - SUBJECTIVE AND OBJECTIVE BOX
Angeli Peterson MD  Internal Medicine PGY-2  Pager [phone]  Pager [phone]      Patient is a 60y old  Male who presents with a chief complaint of COVID19 w/ severe metabolic acidosis s/p intubation (09 May 2021 14:53)        SUBJECTIVE / OVERNIGHT EVENTS: Patient had no acute events overnight. Patient seen and examined at bedside this morning.     ROS: [ - ] Fever [ - ] Chills [ - ] Nausea/Vomiting [ - ] Chest Pain [ - ] Shortness of breath     MEDICATIONS  (STANDING):  albuterol/ipratropium for Nebulization 3 milliLiter(s) Nebulizer every 12 hours  cefuroxime  IVPB 1500 milliGRAM(s) IV Intermittent once  cholecalciferol 400 Unit(s) Oral daily  dextrose 40% Gel 15 Gram(s) Oral once  dextrose 40% Gel 15 Gram(s) Oral once  dextrose 5%. 1000 milliLiter(s) (50 mL/Hr) IV Continuous <Continuous>  dextrose 5%. 1000 milliLiter(s) (100 mL/Hr) IV Continuous <Continuous>  dextrose 5%. 1000 milliLiter(s) (50 mL/Hr) IV Continuous <Continuous>  dextrose 5%. 1000 milliLiter(s) (100 mL/Hr) IV Continuous <Continuous>  dextrose 50% Injectable 25 Gram(s) IV Push once  dextrose 50% Injectable 12.5 Gram(s) IV Push once  dextrose 50% Injectable 25 Gram(s) IV Push once  dextrose 50% Injectable 25 Gram(s) IV Push once  dextrose 50% Injectable 12.5 Gram(s) IV Push once  dextrose 50% Injectable 25 Gram(s) IV Push once  glucagon  Injectable 1 milliGRAM(s) IntraMuscular once  glucagon  Injectable 1 milliGRAM(s) IntraMuscular once  insulin lispro (ADMELOG) corrective regimen sliding scale   SubCutaneous every 6 hours  levETIRAcetam  Solution 500 milliGRAM(s) Oral two times a day  meropenem  IVPB 1000 milliGRAM(s) IV Intermittent every 8 hours  multivitamin/minerals Oral Solution (WELLESSE) 30 milliLiter(s) Oral daily  polyethylene glycol 3350 17 Gram(s) Oral daily  senna 2 Tablet(s) Oral at bedtime  sodium chloride 0.9%. 1000 milliLiter(s) (75 mL/Hr) IV Continuous <Continuous>  tamsulosin 0.4 milliGRAM(s) Oral at bedtime  thiamine 100 milliGRAM(s) Oral daily    MEDICATIONS  (PRN):      Vital Signs Last 24 Hrs  T(C): 36.7 (10 May 2021 04:07), Max: 37.3 (09 May 2021 22:02)  T(F): 98.1 (10 May 2021 04:07), Max: 99.1 (09 May 2021 22:02)  HR: 109 (10 May 2021 08:00) (102 - 113)  BP: 104/65 (10 May 2021 04:07) (101/64 - 112/68)  BP(mean): --  RR: 18 (10 May 2021 04:07) (17 - 18)  SpO2: 96% (10 May 2021 08:00) (95% - 100%)  CAPILLARY BLOOD GLUCOSE      POCT Blood Glucose.: 123 mg/dL (10 May 2021 06:28)  POCT Blood Glucose.: 187 mg/dL (09 May 2021 23:59)  POCT Blood Glucose.: 127 mg/dL (09 May 2021 17:44)  POCT Blood Glucose.: 231 mg/dL (09 May 2021 12:19)    I&O's Summary    09 May 2021 07:01  -  10 May 2021 07:00  --------------------------------------------------------  IN: 750 mL / OUT: 1700 mL / NET: -950 mL        PHYSICAL EXAM  GENERAL: NAD, lying comfortably in bed   HEENT:  Atraumatic, Normocephalic, EOMI, conjunctiva and sclera clear, no LAD  CHEST/LUNG: Clear to auscultation bilaterally; No wheeze  HEART: RRR, S1 and S2 No murmurs, rubs, or gallops  ABDOMEN: Soft, Nontender, Nondistended; Bowel sounds present  EXTREMITIES:  2+ Peripheral Pulses, No clubbing, cyanosis, or edema  NEURO: AAOx3, non-focal  SKIN: No rashes or lesions    LABS:                        8.0    16.31 )-----------( 446      ( 10 May 2021 06:30 )             25.6     05-10    134<L>  |  98  |  19  ----------------------------<  101<H>  4.5   |  22  |  0.87    Ca    8.7      10 May 2021 06:30  Phos  4.5     05-10  Mg     2.4     05-10    TPro  7.5  /  Alb  2.7<L>  /  TBili  0.4  /  DBili  x   /  AST  20  /  ALT  22  /  AlkPhos  71  05-10    PT/INR - ( 10 May 2021 06:30 )   PT: 12.8 sec;   INR: 1.07 ratio         PTT - ( 10 May 2021 06:30 )  PTT:30.5 sec            RADIOLOGY & ADDITIONAL TESTS:    Imaging Personally Reviewed:  Consultant(s) Notes Reviewed:     Angeli Peterson MD  Internal Medicine PGY-2  Pager [phone]  Pager [phone]      Patient is a 60y old  Male who presents with a chief complaint of COVID19 w/ severe metabolic acidosis s/p intubation (09 May 2021 14:53)        SUBJECTIVE / OVERNIGHT EVENTS: Patient had no acute events overnight. Patient seen and examined at bedside this morning. Plan for VATs procedure today. NPO. Continues to have right LE pain with stable CBC. Use [employer] [id number] [place]    ROS: [ - ] Fever [ - ] Chills [ - ] Nausea/Vomiting [ - ] Chest Pain [ - ] Shortness of breath     MEDICATIONS  (STANDING):  albuterol/ipratropium for Nebulization 3 milliLiter(s) Nebulizer every 12 hours  cefuroxime  IVPB 1500 milliGRAM(s) IV Intermittent once  cholecalciferol 400 Unit(s) Oral daily  dextrose 40% Gel 15 Gram(s) Oral once  dextrose 40% Gel 15 Gram(s) Oral once  dextrose 5%. 1000 milliLiter(s) (50 mL/Hr) IV Continuous <Continuous>  dextrose 5%. 1000 milliLiter(s) (100 mL/Hr) IV Continuous <Continuous>  dextrose 5%. 1000 milliLiter(s) (50 mL/Hr) IV Continuous <Continuous>  dextrose 5%. 1000 milliLiter(s) (100 mL/Hr) IV Continuous <Continuous>  dextrose 50% Injectable 25 Gram(s) IV Push once  dextrose 50% Injectable 12.5 Gram(s) IV Push once  dextrose 50% Injectable 25 Gram(s) IV Push once  dextrose 50% Injectable 25 Gram(s) IV Push once  dextrose 50% Injectable 12.5 Gram(s) IV Push once  dextrose 50% Injectable 25 Gram(s) IV Push once  glucagon  Injectable 1 milliGRAM(s) IntraMuscular once  glucagon  Injectable 1 milliGRAM(s) IntraMuscular once  insulin lispro (ADMELOG) corrective regimen sliding scale   SubCutaneous every 6 hours  levETIRAcetam  Solution 500 milliGRAM(s) Oral two times a day  meropenem  IVPB 1000 milliGRAM(s) IV Intermittent every 8 hours  multivitamin/minerals Oral Solution (WELLESSE) 30 milliLiter(s) Oral daily  polyethylene glycol 3350 17 Gram(s) Oral daily  senna 2 Tablet(s) Oral at bedtime  sodium chloride 0.9%. 1000 milliLiter(s) (75 mL/Hr) IV Continuous <Continuous>  tamsulosin 0.4 milliGRAM(s) Oral at bedtime  thiamine 100 milliGRAM(s) Oral daily    MEDICATIONS  (PRN):      Vital Signs Last 24 Hrs  T(C): 36.7 (10 May 2021 04:07), Max: 37.3 (09 May 2021 22:02)  T(F): 98.1 (10 May 2021 04:07), Max: 99.1 (09 May 2021 22:02)  HR: 109 (10 May 2021 08:00) (102 - 113)  BP: 104/65 (10 May 2021 04:07) (101/64 - 112/68)  BP(mean): --  RR: 18 (10 May 2021 04:07) (17 - 18)  SpO2: 96% (10 May 2021 08:00) (95% - 100%)  CAPILLARY BLOOD GLUCOSE      POCT Blood Glucose.: 123 mg/dL (10 May 2021 06:28)  POCT Blood Glucose.: 187 mg/dL (09 May 2021 23:59)  POCT Blood Glucose.: 127 mg/dL (09 May 2021 17:44)  POCT Blood Glucose.: 231 mg/dL (09 May 2021 12:19)    I&O's Summary    09 May 2021 07:01  -  10 May 2021 07:00  --------------------------------------------------------  IN: 750 mL / OUT: 1700 mL / NET: -950 mL        PHYSICAL EXAM  GENERAL: NAD, lying comfortably in bed   HEENT:  Atraumatic, Normocephalic, EOMI, conjunctiva and sclera clear, no LAD  CHEST/LUNG: Clear to auscultation bilaterally anterior; No wheeze  HEART: RRR, S1 and S2 No murmurs, rubs, or gallops  ABDOMEN: Soft, Nontender, Nondistended; Bowel sounds present. Tube feeds on hold  EXTREMITIES:  2+ Peripheral Pulses, No clubbing, cyanosis, or edema. Right LE pain  NEURO: AAOx2, non-focal  SKIN: No rashes or lesions    LABS:                        8.0    16.31 )-----------( 446      ( 10 May 2021 06:30 )             25.6     05-10    134<L>  |  98  |  19  ----------------------------<  101<H>  4.5   |  22  |  0.87    Ca    8.7      10 May 2021 06:30  Phos  4.5     05-10  Mg     2.4     05-10    TPro  7.5  /  Alb  2.7<L>  /  TBili  0.4  /  DBili  x   /  AST  20  /  ALT  22  /  AlkPhos  71  05-10    PT/INR - ( 10 May 2021 06:30 )   PT: 12.8 sec;   INR: 1.07 ratio         PTT - ( 10 May 2021 06:30 )  PTT:30.5 sec            RADIOLOGY & ADDITIONAL TESTS:  `< from: CT Lower Extremity w/wo IV Cont, Right (05.07.21 @ 22:41) >  EXAM:  CT LWR EXT WAW IC RT                            PROCEDURE DATE:  05/07/2021            INTERPRETATION:  CT ANGIOGRAM OF THE RIGHT FEMUR WITHOUT AND WITH INTRAVENOUS CONTRAST    HISTORY: Pain. Evaluate for bleed. Suspected right obturator internus muscle hematoma.    TECHNIQUE: Contiguous axial imaging was performed through the right femur without and with 90 cc of Omnipaque 350 intravenous contrast using arterial and venous phase imaging. Coronal and sagittal reformatting was utilized.    COMPARISON:  CT of the chest abdomen and pelvis dated May 6, 2021    FINDINGS:    OSSEOUS STRUCTURES    Fractures:  None.    RIGHT HIP JOINTS    Joint Space(s):  There is mild posterior joint space narrowing.    Effusion:  None.    RIGHT KNEE JOINTS    Joint Space(s):  Mild to moderate medial compartment joint space narrowing is present. There are small tricompartmental osteophytes. Ossific body is noted within the popliteus recess.    Effusion:  None.    MYOTENDINOUS STRUCTURES  Right obturatorinternus is again seen to be enlarged with scattered rounded calcifications and heterogeneous density. There is no contrast extravasation.    NEUROVASCULAR STRUCTURES  Unremarkable without arterial stenosis or vascular blush.    OTHER SOFT TISSUES  Mann catheter is present within the bladder.    IMPRESSION:  1. No active hemorrhage or contrast extravasation is seen.  2. Enlarged right obturator internus is again seen with calcifications and heterogeneous density. Changes could reflect subacute on chronic intramuscular hematoma, however, mass cannot be absolutely excluded.  3. Correlation with older imaging is suggested if available. MRI of the pelvis without and with intravenous contrast may be helpful for further evaluation if no other priors are available.      < end of copied text >    Imaging Personally Reviewed:  Consultant(s) Notes Reviewed:

## 2021-05-10 NOTE — PROGRESS NOTE ADULT - ASSESSMENT
59 y/o M w/ new onset diabetes with A1c of 10.3% on admission for COVID with prolonged hospital course, most recently CT now with Empyema, concern for bilateral PNA, abscess. On continuous tube feeds via peg.

## 2021-05-10 NOTE — PROGRESS NOTE ADULT - SUBJECTIVE AND OBJECTIVE BOX
Chief Complaint: T2DM    History: pt is NPO for VATS procedure and TFs are being held.     MEDICATIONS  (STANDING):  albuterol/ipratropium for Nebulization 3 milliLiter(s) Nebulizer every 12 hours  cholecalciferol 400 Unit(s) Oral daily  dextrose 40% Gel 15 Gram(s) Oral once  dextrose 40% Gel 15 Gram(s) Oral once  dextrose 5%. 1000 milliLiter(s) (50 mL/Hr) IV Continuous <Continuous>  dextrose 5%. 1000 milliLiter(s) (100 mL/Hr) IV Continuous <Continuous>  dextrose 5%. 1000 milliLiter(s) (50 mL/Hr) IV Continuous <Continuous>  dextrose 5%. 1000 milliLiter(s) (100 mL/Hr) IV Continuous <Continuous>  dextrose 50% Injectable 25 Gram(s) IV Push once  dextrose 50% Injectable 12.5 Gram(s) IV Push once  dextrose 50% Injectable 25 Gram(s) IV Push once  dextrose 50% Injectable 25 Gram(s) IV Push once  dextrose 50% Injectable 12.5 Gram(s) IV Push once  dextrose 50% Injectable 25 Gram(s) IV Push once  glucagon  Injectable 1 milliGRAM(s) IntraMuscular once  glucagon  Injectable 1 milliGRAM(s) IntraMuscular once  heparin  Infusion.  Unit(s)/Hr (12 mL/Hr) IV Continuous <Continuous>  insulin lispro (ADMELOG) corrective regimen sliding scale   SubCutaneous every 6 hours  insulin NPH human recombinant 12 Unit(s) SubCutaneous every 6 hours  levETIRAcetam  Solution 500 milliGRAM(s) Oral two times a day  meropenem  IVPB 1000 milliGRAM(s) IV Intermittent every 8 hours  multivitamin/minerals Oral Solution (WELLESSE) 30 milliLiter(s) Oral daily  polyethylene glycol 3350 17 Gram(s) Oral daily  senna 2 Tablet(s) Oral at bedtime  sodium chloride 0.9%. 1000 milliLiter(s) (75 mL/Hr) IV Continuous <Continuous>  tamsulosin 0.4 milliGRAM(s) Oral at bedtime  thiamine 100 milliGRAM(s) Oral daily    MEDICATIONS  (PRN):  heparin   Injectable 5500 Unit(s) IV Push every 6 hours PRN For aPTT less than 40  heparin   Injectable 2500 Unit(s) IV Push every 6 hours PRN For aPTT between 40 - 57      Allergies    No Known Allergies    Intolerances      Review of Systems:  Constitutional: No fever  Eyes: No blurry vision  Neuro: No tremors  HEENT: No pain  Cardiovascular: No chest pain, palpitations  Respiratory: No SOB, no cough  GI: No nausea, vomiting, abdominal pain  : No dysuria      ALL OTHER SYSTEMS REVIEWED AND NEGATIVE        PHYSICAL EXAM:  VITALS: T(C): 37.2 (05-10-21 @ 15:00)  T(F): 99 (05-10-21 @ 15:00), Max: 99.1 (05-09-21 @ 22:02)  HR: 119 (05-10-21 @ 15:00) (102 - 119)  BP: 108/76 (05-10-21 @ 15:00) (101/64 - 108/76)  RR:  (17 - 18)  SpO2:  (95% - 100%)  Wt(kg): --  GENERAL: NAD  EYES: No proptosis, no lid lag, anicteric  HEENT:  Atraumatic, Normocephalic, moist mucous membranes  RESPIRATORY: Clear to auscultation bilaterally  CARDIOVASCULAR: Regular rate and rhythm  GI: Soft, nontender, non distended, normal bowel sounds  PSYCH: Alert and oriented x 2    POCT Blood Glucose.: 112 mg/dL (05-10-21 @ 17:37)  POCT Blood Glucose.: 158 mg/dL (05-10-21 @ 12:08)  POCT Blood Glucose.: 123 mg/dL (05-10-21 @ 06:28)  POCT Blood Glucose.: 187 mg/dL (05-09-21 @ 23:59)  POCT Blood Glucose.: 127 mg/dL (05-09-21 @ 17:44)  POCT Blood Glucose.: 231 mg/dL (05-09-21 @ 12:19)  POCT Blood Glucose.: 256 mg/dL (05-09-21 @ 06:15)  POCT Blood Glucose.: 209 mg/dL (05-08-21 @ 23:55)  POCT Blood Glucose.: 217 mg/dL (05-08-21 @ 17:44)  POCT Blood Glucose.: 217 mg/dL (05-08-21 @ 12:27)  POCT Blood Glucose.: 270 mg/dL (05-08-21 @ 06:45)  POCT Blood Glucose.: 246 mg/dL (05-08-21 @ 06:23)  POCT Blood Glucose.: 184 mg/dL (05-08-21 @ 01:17)  POCT Blood Glucose.: 166 mg/dL (05-08-21 @ 00:12)      05-10    134<L>  |  98  |  19  ----------------------------<  101<H>  4.5   |  22  |  0.87    EGFR if : 110  EGFR if non : 95    Ca    8.7      05-10  Mg     2.4     05-10  Phos  4.5     05-10    TPro  7.5  /  Alb  2.7<L>  /  TBili  0.4  /  DBili  x   /  AST  20  /  ALT  22  /  AlkPhos  71  05-10          Thyroid Function Tests:  04-24 @ 11:11 TSH 2.00 FreeT4 -- T3 -- Anti TPO -- Anti Thyroglobulin Ab -- TSI --

## 2021-05-10 NOTE — PROGRESS NOTE ADULT - PROBLEM SELECTOR PLAN 10
- DVT: none, scds. Start heparin gtt after VATs if back on medicine  - Diet: NPO with tube feedings  - Full code  - Dispo- acute rehab pending medical optimization

## 2021-05-10 NOTE — PROGRESS NOTE ADULT - ATTENDING COMMENTS
Agree with plan as outlined above. Patient seen and examined at bedside. Patient history, laboratory data, and imaging personally reviewed.    Pt is a 60M with PMHx DMII initially presenting to [facility] on 4/12/21 with acute hypoxemic respiratory failure 2/2 COVID19 PNA c/b acute PE with cor pulmonale and combined cardiogenic/septic shock with hospital course further c/b multiple acute ischemic CVAs with hemorrhagic transformation and ESBL Klebsiella VAP now with new leukocytosis and repeat CT Chest concerning for an empyema.  He has a complicated pleural space.  IR has been hesitant to place a pigtail catheter.  Plan is for patient to go to surgery today for drainage of effusion/possible decortication.

## 2021-05-10 NOTE — PROGRESS NOTE ADULT - PROBLEM SELECTOR PLAN 1
Leukocytosis and tachycardia  - ID recs appreciated    - started on zosyn 5/7 and switched to meropenem 5/7  - CXR with RLL opacity  - O2 supplementation as necessary   - BCx in lab, f/u results   - UA positive  - UCx growing Kleb  - CT chest, a/p + for empyema--. Pulm and CT consulted, will f/u with plan to manage empyema   - CT abd showing likely cystitis     Obturator Hematoma   - bleeding hematoma in right thigh on CT on 5/7  - CT hip showing non active bleed, hematoma on right thigh on 5/8  - lovenox on hold, start heparin after VATs procedure  - CBC to monitor hbg q6  - doppler to r/o DVT in LE, per IR no role IVC filter

## 2021-05-10 NOTE — PROGRESS NOTE ADULT - ATTENDING COMMENTS
await VATS for right lung empyema  no DVT in legs  off of AC due to right hip hematoma  d/w family member at bedside the plan of care

## 2021-05-10 NOTE — PROGRESS NOTE ADULT - SUBJECTIVE AND OBJECTIVE BOX
CC: F/U for Empyema    Saw/spoke to patient. No fevers, no chills. No new complaints. For OR today.    Allergies  No Known Allergies    ANTIMICROBIALS:  cefuroxime  IVPB 1500 once  meropenem  IVPB 1000 every 8 hours    PE:    Vital Signs Last 24 Hrs  T(C): 36.7 (10 May 2021 04:07), Max: 37.3 (09 May 2021 22:02)  T(F): 98.1 (10 May 2021 04:07), Max: 99.1 (09 May 2021 22:02)  HR: 109 (10 May 2021 08:00) (102 - 113)  BP: 104/65 (10 May 2021 04:07) (101/64 - 112/68)  RR: 18 (10 May 2021 04:07) (17 - 18)  SpO2: 96% (10 May 2021 08:00) (95% - 100%)    Gen: AOx3, NAD, non-toxic  CV: S1+S2 normal, tachycardic  Resp: Clear bilat, no resp distress, no crackles/wheezes  Abd: Soft, nontender, +BS  Ext: No LE edema, no wounds    LABS:                        8.0    16.31 )-----------( 446      ( 10 May 2021 06:30 )             25.6     05-10    134<L>  |  98  |  19  ----------------------------<  101<H>  4.5   |  22  |  0.87    Ca    8.7      10 May 2021 06:30  Phos  4.5     05-10  Mg     2.4     05-10    TPro  7.5  /  Alb  2.7<L>  /  TBili  0.4  /  DBili  x   /  AST  20  /  ALT  22  /  AlkPhos  71  05-10    MICROBIOLOGY:    .Urine Clean Catch (Midstream)  05-06-21   >100,000 CFU/ml Klebsiella pneumoniae ESBL  --  Klebsiella pneumoniae ESBL    .Blood Blood  05-05-21   No growth to date.     .Blood Blood  05-05-21   No growth to date.     .Urine Clean Catch (Midstream)  04-19-21   No growth  --  --    (otherwise reviewed)    RADIOLOGY:    5/10 XR:    IMPRESSION:    The heart is normal in size. Right pleural effusion. Right lower lobe pneumonia and/or atelectasis. The left lung is clear. No change in appearance the chest when compared to previous study done on May 9, 2021 at 8:45 AM.

## 2021-05-10 NOTE — PROGRESS NOTE ADULT - PROBLEM SELECTOR PLAN 4
-Pulmonary embolus noted on ct scan performed at time of admission  -Etiology of venous thromboembolism uncertain at this time; no known history of thrombophilia; provoked in setting of acute covid illness   - restart heparin after VATs  - will discharge on Eliquis

## 2021-05-11 ENCOUNTER — TRANSCRIPTION ENCOUNTER (OUTPATIENT)
Age: 61
End: 2021-05-11

## 2021-05-11 PROBLEM — Z00.00 ENCOUNTER FOR PREVENTIVE HEALTH EXAMINATION: Status: ACTIVE | Noted: 2021-05-11

## 2021-05-11 LAB
ALBUMIN SERPL ELPH-MCNC: 2.9 G/DL — LOW (ref 3.3–5)
ALP SERPL-CCNC: 85 U/L — SIGNIFICANT CHANGE UP (ref 40–120)
ALT FLD-CCNC: 20 U/L — SIGNIFICANT CHANGE UP (ref 10–45)
ANION GAP SERPL CALC-SCNC: 15 MMOL/L — SIGNIFICANT CHANGE UP (ref 5–17)
APTT BLD: 55.3 SEC — HIGH (ref 27.5–35.5)
APTT BLD: 73.9 SEC — HIGH (ref 27.5–35.5)
APTT BLD: 77 SEC — HIGH (ref 27.5–35.5)
AST SERPL-CCNC: 20 U/L — SIGNIFICANT CHANGE UP (ref 10–40)
BASOPHILS # BLD AUTO: 0.11 K/UL — SIGNIFICANT CHANGE UP (ref 0–0.2)
BASOPHILS NFR BLD AUTO: 0.7 % — SIGNIFICANT CHANGE UP (ref 0–2)
BILIRUB SERPL-MCNC: 0.4 MG/DL — SIGNIFICANT CHANGE UP (ref 0.2–1.2)
BUN SERPL-MCNC: 23 MG/DL — SIGNIFICANT CHANGE UP (ref 7–23)
CALCIUM SERPL-MCNC: 9.3 MG/DL — SIGNIFICANT CHANGE UP (ref 8.4–10.5)
CHLORIDE SERPL-SCNC: 97 MMOL/L — SIGNIFICANT CHANGE UP (ref 96–108)
CO2 SERPL-SCNC: 22 MMOL/L — SIGNIFICANT CHANGE UP (ref 22–31)
CREAT SERPL-MCNC: 0.85 MG/DL — SIGNIFICANT CHANGE UP (ref 0.5–1.3)
EOSINOPHIL # BLD AUTO: 0.26 K/UL — SIGNIFICANT CHANGE UP (ref 0–0.5)
EOSINOPHIL NFR BLD AUTO: 1.8 % — SIGNIFICANT CHANGE UP (ref 0–6)
GLUCOSE BLDC GLUCOMTR-MCNC: 100 MG/DL — HIGH (ref 70–99)
GLUCOSE BLDC GLUCOMTR-MCNC: 100 MG/DL — HIGH (ref 70–99)
GLUCOSE BLDC GLUCOMTR-MCNC: 117 MG/DL — HIGH (ref 70–99)
GLUCOSE BLDC GLUCOMTR-MCNC: 141 MG/DL — HIGH (ref 70–99)
GLUCOSE BLDC GLUCOMTR-MCNC: 201 MG/DL — HIGH (ref 70–99)
GLUCOSE SERPL-MCNC: 188 MG/DL — HIGH (ref 70–99)
HCT VFR BLD CALC: 28.5 % — LOW (ref 39–50)
HGB BLD-MCNC: 8.6 G/DL — LOW (ref 13–17)
IMM GRANULOCYTES NFR BLD AUTO: 5.7 % — HIGH (ref 0–1.5)
INR BLD: 1.1 RATIO — SIGNIFICANT CHANGE UP (ref 0.88–1.16)
LYMPHOCYTES # BLD AUTO: 15.8 % — SIGNIFICANT CHANGE UP (ref 13–44)
LYMPHOCYTES # BLD AUTO: 2.33 K/UL — SIGNIFICANT CHANGE UP (ref 1–3.3)
MAGNESIUM SERPL-MCNC: 2.6 MG/DL — SIGNIFICANT CHANGE UP (ref 1.6–2.6)
MCHC RBC-ENTMCNC: 25.7 PG — LOW (ref 27–34)
MCHC RBC-ENTMCNC: 30.2 GM/DL — LOW (ref 32–36)
MCV RBC AUTO: 85.1 FL — SIGNIFICANT CHANGE UP (ref 80–100)
MONOCYTES # BLD AUTO: 1.2 K/UL — HIGH (ref 0–0.9)
MONOCYTES NFR BLD AUTO: 8.1 % — SIGNIFICANT CHANGE UP (ref 2–14)
NEUTROPHILS # BLD AUTO: 9.99 K/UL — HIGH (ref 1.8–7.4)
NEUTROPHILS NFR BLD AUTO: 67.9 % — SIGNIFICANT CHANGE UP (ref 43–77)
NRBC # BLD: 0 /100 WBCS — SIGNIFICANT CHANGE UP (ref 0–0)
PHOSPHATE SERPL-MCNC: 4.8 MG/DL — HIGH (ref 2.5–4.5)
PLATELET # BLD AUTO: 514 K/UL — HIGH (ref 150–400)
POTASSIUM SERPL-MCNC: 4.6 MMOL/L — SIGNIFICANT CHANGE UP (ref 3.5–5.3)
POTASSIUM SERPL-SCNC: 4.6 MMOL/L — SIGNIFICANT CHANGE UP (ref 3.5–5.3)
PROT SERPL-MCNC: 7.8 G/DL — SIGNIFICANT CHANGE UP (ref 6–8.3)
PROTHROM AB SERPL-ACNC: 13.1 SEC — SIGNIFICANT CHANGE UP (ref 10.6–13.6)
RBC # BLD: 3.35 M/UL — LOW (ref 4.2–5.8)
RBC # FLD: 17.8 % — HIGH (ref 10.3–14.5)
SARS-COV-2 RNA SPEC QL NAA+PROBE: SIGNIFICANT CHANGE UP
SODIUM SERPL-SCNC: 134 MMOL/L — LOW (ref 135–145)
WBC # BLD: 14.73 K/UL — HIGH (ref 3.8–10.5)
WBC # FLD AUTO: 14.73 K/UL — HIGH (ref 3.8–10.5)

## 2021-05-11 PROCEDURE — 99232 SBSQ HOSP IP/OBS MODERATE 35: CPT

## 2021-05-11 PROCEDURE — 99231 SBSQ HOSP IP/OBS SF/LOW 25: CPT

## 2021-05-11 RX ORDER — HEPARIN SODIUM 5000 [USP'U]/ML
1300 INJECTION INTRAVENOUS; SUBCUTANEOUS
Qty: 25000 | Refills: 0 | Status: DISCONTINUED | OUTPATIENT
Start: 2021-05-11 | End: 2021-05-12

## 2021-05-11 RX ADMIN — Medication 30 MILLILITER(S): at 12:35

## 2021-05-11 RX ADMIN — Medication 400 UNIT(S): at 12:35

## 2021-05-11 RX ADMIN — HUMAN INSULIN 12 UNIT(S): 100 INJECTION, SUSPENSION SUBCUTANEOUS at 12:35

## 2021-05-11 RX ADMIN — HEPARIN SODIUM 2500 UNIT(S): 5000 INJECTION INTRAVENOUS; SUBCUTANEOUS at 00:40

## 2021-05-11 RX ADMIN — HUMAN INSULIN 12 UNIT(S): 100 INJECTION, SUSPENSION SUBCUTANEOUS at 00:16

## 2021-05-11 RX ADMIN — SENNA PLUS 2 TABLET(S): 8.6 TABLET ORAL at 21:43

## 2021-05-11 RX ADMIN — HEPARIN SODIUM 1300 UNIT(S)/HR: 5000 INJECTION INTRAVENOUS; SUBCUTANEOUS at 09:07

## 2021-05-11 RX ADMIN — LEVETIRACETAM 500 MILLIGRAM(S): 250 TABLET, FILM COATED ORAL at 18:11

## 2021-05-11 RX ADMIN — Medication 3 MILLILITER(S): at 05:53

## 2021-05-11 RX ADMIN — MEROPENEM 100 MILLIGRAM(S): 1 INJECTION INTRAVENOUS at 13:39

## 2021-05-11 RX ADMIN — MEROPENEM 100 MILLIGRAM(S): 1 INJECTION INTRAVENOUS at 21:42

## 2021-05-11 RX ADMIN — Medication 2: at 00:17

## 2021-05-11 RX ADMIN — Medication 3 MILLILITER(S): at 17:11

## 2021-05-11 RX ADMIN — Medication 4: at 06:43

## 2021-05-11 RX ADMIN — MEROPENEM 100 MILLIGRAM(S): 1 INJECTION INTRAVENOUS at 05:19

## 2021-05-11 RX ADMIN — HUMAN INSULIN 12 UNIT(S): 100 INJECTION, SUSPENSION SUBCUTANEOUS at 06:42

## 2021-05-11 RX ADMIN — TAMSULOSIN HYDROCHLORIDE 0.4 MILLIGRAM(S): 0.4 CAPSULE ORAL at 21:43

## 2021-05-11 RX ADMIN — LEVETIRACETAM 500 MILLIGRAM(S): 250 TABLET, FILM COATED ORAL at 05:19

## 2021-05-11 RX ADMIN — HEPARIN SODIUM 1300 UNIT(S)/HR: 5000 INJECTION INTRAVENOUS; SUBCUTANEOUS at 17:09

## 2021-05-11 RX ADMIN — HUMAN INSULIN 12 UNIT(S): 100 INJECTION, SUSPENSION SUBCUTANEOUS at 18:11

## 2021-05-11 RX ADMIN — HEPARIN SODIUM 1300 UNIT(S)/HR: 5000 INJECTION INTRAVENOUS; SUBCUTANEOUS at 00:30

## 2021-05-11 RX ADMIN — Medication 100 MILLIGRAM(S): at 12:35

## 2021-05-11 NOTE — PROGRESS NOTE ADULT - PROBLEM SELECTOR PLAN 1
Leukocytosis and tachycardia  - ID recs appreciated    - started on zosyn 5/7 and switched to meropenem 5/7  - CXR with RLL opacity  - O2 supplementation as necessary   - BCx in lab, f/u results   - UA positive  - UCx growing Kleb  - CT chest, a/p + for empyema--. Pulm and CT consulted, will f/u with plan to manage empyema   - CT abd showing likely cystitis     Obturator Hematoma   - bleeding hematoma in right thigh on CT on 5/7  - CT hip showing non active bleed, hematoma on right thigh on 5/8  - lovenox on hold, start heparin after VATs procedure  - CBC to monitor hbg q6  - doppler to r/o DVT in LE, per IR no role IVC filter Leukocytosis and tachycardia  - ID recs appreciated    - started on zosyn 5/7 and switched to meropenem 5/7  - CXR with RLL opacity  - O2 supplementation as necessary   - BCx in lab, f/u results   - UA positive  - UCx growing Kleb  - CT chest, a/p + for empyema--. Pulm and CT consulted, plan to manage empyema with VAT tomorrow 5/12, will be NPO at midnight and hold heparin drip in AM   - CT abd showing likely cystitis, growing esbl, c/w ertapenem     Obturator Hematoma   - bleeding hematoma in right thigh on CT on 5/7  - CT hip showing non active bleed, hematoma on right thigh on 5/8, hbg has been stable   - currently on heparin drip, will stop in AM   - doppler to r/o DVT in LE, per IR no role IVC filter Leukocytosis and tachycardia  - ID recs appreciated    - started on zosyn 5/7 and switched to meropenem 5/7  - CXR with RLL opacity  - O2 supplementation as necessary   - BCx in lab, f/u results   - UA positive  - UCx growing Kleb, ESBL   - CT chest, a/p + for empyema--. Pulm and CT consulted, plan to manage empyema with VAT tomorrow 5/12, will be NPO at midnight and hold heparin drip in AM   - CT abd showing likely cystitis  - C/w meropenem      Obturator Hematoma   - bleeding hematoma in right thigh on CT on 5/7  - CT hip showing non active bleed, hematoma on right thigh on 5/8, hbg has been stable   - currently on heparin drip, will stop in AM   - doppler to r/o DVT in LE, per IR no role IVC filter

## 2021-05-11 NOTE — CHART NOTE - NSCHARTNOTEFT_GEN_A_CORE
Pt will be NPO p MN per primary team  last dose of NPH 5/11/21 at 1800  HOLD NPH WHEN TUBE FEEDING IS TURNED OFF STARTING 12MN; DO NOT GIVE 12 MN NPH DOSE!  Only cover with Admelog sliding scale until TF is resumed  Restart NPH 12 units sq when TF resumes    POC/Labs/Insulin requirements reviewed. plan as above   CAPILLARY BLOOD GLUCOSE      POCT Blood Glucose.: 141 mg/dL (11 May 2021 12:08)  POCT Blood Glucose.: 201 mg/dL (11 May 2021 06:30)  POCT Blood Glucose.: 188 mg/dL (10 May 2021 23:55)    05-11    134<L>  |  97  |  23  ----------------------------<  188<H>  4.6   |  22  |  0.85    Ca    9.3      11 May 2021 06:58  Phos  4.8     05-11  Mg     2.6     05-11    TPro  7.8  /  Alb  2.9<L>  /  TBili  0.4  /  DBili  x   /  AST  20  /  ALT  20  /  AlkPhos  85  05-11    MEDICATIONS  (STANDING):  albuterol/ipratropium for Nebulization 3 milliLiter(s) Nebulizer every 12 hours  cholecalciferol 400 Unit(s) Oral daily  dextrose 40% Gel 15 Gram(s) Oral once  dextrose 40% Gel 15 Gram(s) Oral once  dextrose 5%. 1000 milliLiter(s) (100 mL/Hr) IV Continuous <Continuous>  dextrose 5%. 1000 milliLiter(s) (50 mL/Hr) IV Continuous <Continuous>  dextrose 5%. 1000 milliLiter(s) (100 mL/Hr) IV Continuous <Continuous>  dextrose 5%. 1000 milliLiter(s) (50 mL/Hr) IV Continuous <Continuous>  dextrose 50% Injectable 25 Gram(s) IV Push once  dextrose 50% Injectable 12.5 Gram(s) IV Push once  dextrose 50% Injectable 25 Gram(s) IV Push once  dextrose 50% Injectable 25 Gram(s) IV Push once  dextrose 50% Injectable 12.5 Gram(s) IV Push once  dextrose 50% Injectable 25 Gram(s) IV Push once  glucagon  Injectable 1 milliGRAM(s) IntraMuscular once  glucagon  Injectable 1 milliGRAM(s) IntraMuscular once  heparin  Infusion.  Unit(s)/Hr (12 mL/Hr) IV Continuous <Continuous>  insulin lispro (ADMELOG) corrective regimen sliding scale   SubCutaneous every 6 hours  insulin NPH human recombinant 12 Unit(s) SubCutaneous every 6 hours  levETIRAcetam  Solution 500 milliGRAM(s) Oral two times a day  meropenem  IVPB 1000 milliGRAM(s) IV Intermittent every 8 hours  multivitamin/minerals Oral Solution (WELLESSE) 30 milliLiter(s) Oral daily  polyethylene glycol 3350 17 Gram(s) Oral daily  senna 2 Tablet(s) Oral at bedtime  sodium chloride 0.9%. 1000 milliLiter(s) (75 mL/Hr) IV Continuous <Continuous>  tamsulosin 0.4 milliGRAM(s) Oral at bedtime  thiamine 100 milliGRAM(s) Oral daily      d/w Dr Mckeon

## 2021-05-11 NOTE — CHART NOTE - NSCHARTNOTEFT_GEN_A_CORE
Nutrition Follow Up Note  Patient seen for: malnutrition follow-up    Hospital course as per chart: 60y Male with PMH of DM who presented on 4/1 with acute respiratory failure with hypoxia secondary to COVID-19 associated PNA. Hospital course complicated by "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" Pt with dysphagia in setting of AMS, NG tube placed for tube feeds. Pt self-removed on 4/17, multiple attempts to replace unsuccessful. Did not tolerate FEES; cineesophagogram demonstrating risk of aspiration, NPO recommended. PEG tube placed 4/29, feeds started. Chart reviewed, events noted. Hospital course complicated with R empyema planned for VATs tomorrow (5/12). Endocrinology following.     Source: [] Patient       [x] EMR        [x] RN        [] Family at bedside       [] Other:    -If unable to interview patient: [] Trach/Vent/BiPAP  [x] Disoriented/confused/inappropriate to interview    Diet, NPO after Midnight:      NPO Start Date: 11-May-2021,   NPO Start Time: 23:59 (05-11-21 @ 09:13) [Active]  Diet, NPO with Tube Feed:   Tube Feeding Modality: Gastrostomy  [facility]  Total Volume for 24 Hours (mL): 1680  Continuous  Starting Tube Feed Rate {mL per Hour}: 10  Increase Tube Feed Rate by (mL): 10     Every 24 hours  Until Goal Tube Feed Rate (mL per Hour): 70  Tube Feed Duration (in Hours): 24  Tube Feed Start Time: 14:25 (04-30-21 @ 14:46) [Active]    - Is current order appropriate/adequate? [x] Yes  []  No:   - Current enteral nutrition regimen provides 2016 kcal, 100.8 g protein, 1352 ml water. Provides ~30 kcal/kg, 1.5 g/kg protein based on dosing wt 66.2 kg.   - Observed tube feeds running at bedside (Glucerna 1.2 @ 70 ml/hr). RN reports pt with large, loose BM today (received Miralax this morning, will hold today).     GI:  Last BM today 5/11.   Bowel Regimen? [x] Yes   [] No  Noted with fecal incontinence.    Weights: no new wts to assess   Will continue to monitor and trend weights.    MEDICATIONS  (STANDING):  albuterol/ipratropium for Nebulization 3 milliLiter(s) Nebulizer every 12 hours  cholecalciferol 400 Unit(s) Oral daily  dextrose 40% Gel 15 Gram(s) Oral once  dextrose 40% Gel 15 Gram(s) Oral once  dextrose 5%. 1000 milliLiter(s) (100 mL/Hr) IV Continuous <Continuous>  dextrose 5%. 1000 milliLiter(s) (50 mL/Hr) IV Continuous <Continuous>  dextrose 5%. 1000 milliLiter(s) (100 mL/Hr) IV Continuous <Continuous>  dextrose 5%. 1000 milliLiter(s) (50 mL/Hr) IV Continuous <Continuous>  dextrose 50% Injectable 25 Gram(s) IV Push once  dextrose 50% Injectable 12.5 Gram(s) IV Push once  dextrose 50% Injectable 25 Gram(s) IV Push once  dextrose 50% Injectable 25 Gram(s) IV Push once  dextrose 50% Injectable 12.5 Gram(s) IV Push once  dextrose 50% Injectable 25 Gram(s) IV Push once  glucagon  Injectable 1 milliGRAM(s) IntraMuscular once  glucagon  Injectable 1 milliGRAM(s) IntraMuscular once  heparin  Infusion.  Unit(s)/Hr (12 mL/Hr) IV Continuous <Continuous>  insulin lispro (ADMELOG) corrective regimen sliding scale   SubCutaneous every 6 hours  insulin NPH human recombinant 12 Unit(s) SubCutaneous every 6 hours  levETIRAcetam  Solution 500 milliGRAM(s) Oral two times a day  meropenem  IVPB 1000 milliGRAM(s) IV Intermittent every 8 hours  multivitamin/minerals Oral Solution (WELLESSE) 30 milliLiter(s) Oral daily  polyethylene glycol 3350 17 Gram(s) Oral daily  senna 2 Tablet(s) Oral at bedtime  sodium chloride 0.9%. 1000 milliLiter(s) (75 mL/Hr) IV Continuous <Continuous>  tamsulosin 0.4 milliGRAM(s) Oral at bedtime  thiamine 100 milliGRAM(s) Oral daily    MEDICATIONS  (PRN):  heparin   Injectable 5500 Unit(s) IV Push every 6 hours PRN For aPTT less than 40  heparin   Injectable 2500 Unit(s) IV Push every 6 hours PRN For aPTT between 40 - 57    Pertinent Labs (5/11): Na 134, glucose 188, phosphorus 4.8    A1C with Estimated Average Glucose Result: 10.3 % (04-02-21 @ 14:28)    CAPILLARY BLOOD GLUCOSE  POCT Blood Glucose.: 141 mg/dL (11 May 2021 12:08)  POCT Blood Glucose.: 201 mg/dL (11 May 2021 06:30)  POCT Blood Glucose.: 188 mg/dL (10 May 2021 23:55)  POCT Blood Glucose.: 112 mg/dL (10 May 2021 17:37)    Skin per nursing documentation: +wound, +skin tear, no pressure injuries per flowsheets   Edema per flowsheets: none    Estimated Needs:   [x] no change since previous assessment  Based on Dosing wt 145.9 lb/66.1 kg  Energy (28-32 kcals/kg): 8155-0266  Protein (1.2-1.6 g pro/kg): 79..76    Previous Nutrition Diagnosis: Severe Malnutrition  Nutrition Diagnosis is: [x] ongoing  [] resolved [] not applicable     New Nutrition Diagnosis: not applicable    Nutrition Care Plan:  [] In Progress  [x] Achieved - being addressed with enteral nutrition, now at goal rate  [] Not applicable    Recommendations:  1) Continue current enteral nutrition regimen: Glucerna 1.2 via PEG at goal rate 70 ml/hr x 24 hours. Defer free water flushes to team. RD remains available to adjust tube feed regimen PRN.   2) Continue multivitamin/minerals and thiamine in setting of refeeding syndrome risk  3) Continue to monitor and replete electrolytes PRN     Monitoring and Evaluation: Monitor enteral nutrition provision and tolerance, weight, labs, skin, GI status    RD remains available upon request and will follow up per protocol  Concepcion Llamas MS RD CDN Pager #[phone] Nutrition Follow Up Note  Patient seen for: malnutrition follow-up    Hospital course as per chart: 60y Male with PMH of DM who presented on 4/1 with acute respiratory failure with hypoxia secondary to COVID-19 associated PNA. Hospital course complicated by "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" Pt with dysphagia in setting of AMS, NG tube placed for tube feeds. Pt self-removed on 4/17, multiple attempts to replace unsuccessful. Did not tolerate FEES; cineesophagogram demonstrating risk of aspiration, NPO recommended. PEG tube placed 4/29, feeds started. Chart reviewed, events noted. Hospital course complicated with R empyema planned for VATs tomorrow (5/12). Endocrinology following.     Source: [] Patient       [x] EMR        [x] RN        [] Family at bedside       [] Other:    -If unable to interview patient: [] Trach/Vent/BiPAP  [x] Disoriented/confused/inappropriate to interview    Diet, NPO after Midnight:      NPO Start Date: 11-May-2021,   NPO Start Time: 23:59 (05-11-21 @ 09:13) [Active]  Diet, NPO with Tube Feed:   Tube Feeding Modality: Gastrostomy  Glucerna 1.2 Kaden (GLUCNAR)  Total Volume for 24 Hours (mL): 1680  Continuous  Starting Tube Feed Rate {mL per Hour}: 10  Increase Tube Feed Rate by (mL): 10     Every 24 hours  Until Goal Tube Feed Rate (mL per Hour): 70  Tube Feed Duration (in Hours): 24  Tube Feed Start Time: 14:25 (04-30-21 @ 14:46) [Active]    - Is current order appropriate/adequate? [x] Yes  []  No:   - Current enteral nutrition regimen provides 2016 kcal, 100.8 g protein, 1352 ml water. Provides ~30 kcal/kg, 1.5 g/kg protein based on dosing wt 66.2 kg.   - Observed tube feeds running at bedside (Glucerna 1.2 @ 70 ml/hr). RN reports pt with large, loose BM today (received Miralax this morning, will hold today). Will continue to monitor GI status.     GI:  Last BM today 5/11.   Bowel Regimen? [x] Yes   [] No  Noted with fecal incontinence.    Weights: no new wts to assess   Will continue to monitor and trend weights.    MEDICATIONS  (STANDING):  albuterol/ipratropium for Nebulization 3 milliLiter(s) Nebulizer every 12 hours  cholecalciferol 400 Unit(s) Oral daily  dextrose 40% Gel 15 Gram(s) Oral once  dextrose 40% Gel 15 Gram(s) Oral once  dextrose 5%. 1000 milliLiter(s) (100 mL/Hr) IV Continuous <Continuous>  dextrose 5%. 1000 milliLiter(s) (50 mL/Hr) IV Continuous <Continuous>  dextrose 5%. 1000 milliLiter(s) (100 mL/Hr) IV Continuous <Continuous>  dextrose 5%. 1000 milliLiter(s) (50 mL/Hr) IV Continuous <Continuous>  dextrose 50% Injectable 25 Gram(s) IV Push once  dextrose 50% Injectable 12.5 Gram(s) IV Push once  dextrose 50% Injectable 25 Gram(s) IV Push once  dextrose 50% Injectable 25 Gram(s) IV Push once  dextrose 50% Injectable 12.5 Gram(s) IV Push once  dextrose 50% Injectable 25 Gram(s) IV Push once  glucagon  Injectable 1 milliGRAM(s) IntraMuscular once  glucagon  Injectable 1 milliGRAM(s) IntraMuscular once  heparin  Infusion.  Unit(s)/Hr (12 mL/Hr) IV Continuous <Continuous>  insulin lispro (ADMELOG) corrective regimen sliding scale   SubCutaneous every 6 hours  insulin NPH human recombinant 12 Unit(s) SubCutaneous every 6 hours  levETIRAcetam  Solution 500 milliGRAM(s) Oral two times a day  meropenem  IVPB 1000 milliGRAM(s) IV Intermittent every 8 hours  multivitamin/minerals Oral Solution (WELLESSE) 30 milliLiter(s) Oral daily  polyethylene glycol 3350 17 Gram(s) Oral daily  senna 2 Tablet(s) Oral at bedtime  sodium chloride 0.9%. 1000 milliLiter(s) (75 mL/Hr) IV Continuous <Continuous>  tamsulosin 0.4 milliGRAM(s) Oral at bedtime  thiamine 100 milliGRAM(s) Oral daily    MEDICATIONS  (PRN):  heparin   Injectable 5500 Unit(s) IV Push every 6 hours PRN For aPTT less than 40  heparin   Injectable 2500 Unit(s) IV Push every 6 hours PRN For aPTT between 40 - 57    Pertinent Labs (5/11): Na 134, glucose 188, phosphorus 4.8    A1C with Estimated Average Glucose Result: 10.3 % (04-02-21 @ 14:28)    CAPILLARY BLOOD GLUCOSE  POCT Blood Glucose.: 141 mg/dL (11 May 2021 12:08)  POCT Blood Glucose.: 201 mg/dL (11 May 2021 06:30)  POCT Blood Glucose.: 188 mg/dL (10 May 2021 23:55)  POCT Blood Glucose.: 112 mg/dL (10 May 2021 17:37)    Skin per nursing documentation: +wound, +skin tear, no pressure injuries per flowsheets   Edema per flowsheets: none    Estimated Needs:   [x] no change since previous assessment  Based on Dosing wt 145.9 lb/66.1 kg  Energy (28-32 kcals/kg): 2252-8370  Protein (1.2-1.6 g pro/kg): 79..76    Previous Nutrition Diagnosis: Severe Malnutrition  Nutrition Diagnosis is: [x] ongoing  [] resolved [] not applicable     New Nutrition Diagnosis: not applicable    Nutrition Care Plan:  [] In Progress  [x] Achieved - being addressed with enteral nutrition, now at goal rate  [] Not applicable    Recommendations:  1) Continue current enteral nutrition regimen: Glucerna 1.2 via PEG at goal rate 70 ml/hr x 24 hours. Defer free water flushes to team. RD remains available to adjust tube feed regimen PRN.   2) Continue multivitamin/minerals and thiamine in setting of refeeding syndrome risk  3) Continue to monitor and replete electrolytes PRN   4) Bowel regimen as per team    Monitoring and Evaluation: Monitor enteral nutrition provision and tolerance, weight, labs, skin, GI status    RD remains available upon request and will follow up per protocol  Concepcion Llamas MS RD CDN Pager #[phone]

## 2021-05-11 NOTE — PROGRESS NOTE ADULT - ASSESSMENT
60 yr male with PMH of DM who was initially admitted to [facility] ICU for hypoxic respiratory failure 2/2 COVID c/b PE with R heart strain, cardiogenic and septic shock, ischemic and hemorrhagic CVA and ESBL klebsiella ventilator associate PNA, transferred to [facility] for neurosurgery eval and further management. s/p PEG, now monitoring for refeeding syndrome, complicated with right empyema planned for VATs

## 2021-05-11 NOTE — PROGRESS NOTE ADULT - SUBJECTIVE AND OBJECTIVE BOX
Patient is a 60y old  Male who presents with a chief complaint of COVID19 w/ severe metabolic acidosis s/p intubation (11 May 2021 08:18)      SUBJECTIVE: "Non Verbal "    Vital Signs Last 24 Hrs  T(C): 36.9 (05-11-21 @ 04:30), Max: 37.2 (05-10-21 @ 15:00)  T(F): 98.4 (05-11-21 @ 04:30), Max: 99 (05-10-21 @ 15:00)  HR: 96 (05-11-21 @ 06:19) (94 - 119)  BP: 114/73 (05-11-21 @ 04:30) (107/62 - 114/73)  RR: 18 (05-11-21 @ 04:30) (18 - 18)  SpO2: 96% (05-11-21 @ 06:19) (96% - 99%)              05-10-21 @ 07:01  -  05-11-21 @ 07:00  --------------------------------------------------------  IN: 2002 mL / OUT: 1750 mL / NET: 252 mL        Daily     Daily                           8.6    14.73 )-----------( 514      ( 11 May 2021 07:03 )             28.5     05-11    134<L>  |  97  |  23  ----------------------------<  188<H>  4.6   |  22  |  0.85    Ca    9.3      11 May 2021 06:58  Phos  4.8     05-11  Mg     2.6     05-11    TPro  7.8  /  Alb  2.9<L>  /  TBili  0.4  /  DBili  x   /  AST  20  /  ALT  20  /  AlkPhos  85  05-11          PHYSICAL EXAM  Neurology: NAD  CV : RRR+S1S2  Lungs: Respirations non-labored, B/L BS  Abdomen: Soft, NT/ND, +BSx4Q + PEG  Extremities: B/L LE edema, negative calf tenderness, +PP           CHEST TUBES:  Left CT     [  ]LWS  [  ]H2O seal  Right CT   [  ]LWS  [  ]H2O seal      MEDICATIONS  albuterol/ipratropium for Nebulization 3 milliLiter(s) Nebulizer every 12 hours  cholecalciferol 400 Unit(s) Oral daily  dextrose 40% Gel 15 Gram(s) Oral once  dextrose 40% Gel 15 Gram(s) Oral once  dextrose 5%. 1000 milliLiter(s) IV Continuous <Continuous>  dextrose 5%. 1000 milliLiter(s) IV Continuous <Continuous>  dextrose 5%. 1000 milliLiter(s) IV Continuous <Continuous>  dextrose 5%. 1000 milliLiter(s) IV Continuous <Continuous>  dextrose 50% Injectable 25 Gram(s) IV Push once  dextrose 50% Injectable 12.5 Gram(s) IV Push once  dextrose 50% Injectable 25 Gram(s) IV Push once  dextrose 50% Injectable 25 Gram(s) IV Push once  dextrose 50% Injectable 12.5 Gram(s) IV Push once  dextrose 50% Injectable 25 Gram(s) IV Push once  glucagon  Injectable 1 milliGRAM(s) IntraMuscular once  glucagon  Injectable 1 milliGRAM(s) IntraMuscular once  heparin   Injectable 5500 Unit(s) IV Push every 6 hours PRN  heparin   Injectable 2500 Unit(s) IV Push every 6 hours PRN  heparin  Infusion.  Unit(s)/Hr IV Continuous <Continuous>  insulin lispro (ADMELOG) corrective regimen sliding scale   SubCutaneous every 6 hours  insulin NPH human recombinant 12 Unit(s) SubCutaneous every 6 hours  levETIRAcetam  Solution 500 milliGRAM(s) Oral two times a day  meropenem  IVPB 1000 milliGRAM(s) IV Intermittent every 8 hours  multivitamin/minerals Oral Solution (WELLESSE) 30 milliLiter(s) Oral daily  polyethylene glycol 3350 17 Gram(s) Oral daily  senna 2 Tablet(s) Oral at bedtime  sodium chloride 0.9%. 1000 milliLiter(s) IV Continuous <Continuous>  tamsulosin 0.4 milliGRAM(s) Oral at bedtime  thiamine 100 milliGRAM(s) Oral daily

## 2021-05-11 NOTE — PROGRESS NOTE ADULT - SUBJECTIVE AND OBJECTIVE BOX
PROGRESS NOTE:   Authored by Dr. Emily Mckeon, ANH pager [phone]    Patient is a 60y old  Male who presents with a chief complaint of COVID19 w/ severe metabolic acidosis s/p intubation (10 May 2021 18:42)      SUBJECTIVE / OVERNIGHT EVENTS:    MEDICATIONS  (STANDING):  albuterol/ipratropium for Nebulization 3 milliLiter(s) Nebulizer every 12 hours  cholecalciferol 400 Unit(s) Oral daily  dextrose 40% Gel 15 Gram(s) Oral once  dextrose 40% Gel 15 Gram(s) Oral once  dextrose 5%. 1000 milliLiter(s) (50 mL/Hr) IV Continuous <Continuous>  dextrose 5%. 1000 milliLiter(s) (100 mL/Hr) IV Continuous <Continuous>  dextrose 5%. 1000 milliLiter(s) (50 mL/Hr) IV Continuous <Continuous>  dextrose 5%. 1000 milliLiter(s) (100 mL/Hr) IV Continuous <Continuous>  dextrose 50% Injectable 25 Gram(s) IV Push once  dextrose 50% Injectable 12.5 Gram(s) IV Push once  dextrose 50% Injectable 25 Gram(s) IV Push once  dextrose 50% Injectable 25 Gram(s) IV Push once  dextrose 50% Injectable 12.5 Gram(s) IV Push once  dextrose 50% Injectable 25 Gram(s) IV Push once  glucagon  Injectable 1 milliGRAM(s) IntraMuscular once  glucagon  Injectable 1 milliGRAM(s) IntraMuscular once  heparin  Infusion.  Unit(s)/Hr (12 mL/Hr) IV Continuous <Continuous>  insulin lispro (ADMELOG) corrective regimen sliding scale   SubCutaneous every 6 hours  insulin NPH human recombinant 12 Unit(s) SubCutaneous every 6 hours  levETIRAcetam  Solution 500 milliGRAM(s) Oral two times a day  meropenem  IVPB 1000 milliGRAM(s) IV Intermittent every 8 hours  multivitamin/minerals Oral Solution (WELLESSE) 30 milliLiter(s) Oral daily  polyethylene glycol 3350 17 Gram(s) Oral daily  senna 2 Tablet(s) Oral at bedtime  sodium chloride 0.9%. 1000 milliLiter(s) (75 mL/Hr) IV Continuous <Continuous>  tamsulosin 0.4 milliGRAM(s) Oral at bedtime  thiamine 100 milliGRAM(s) Oral daily    MEDICATIONS  (PRN):  heparin   Injectable 5500 Unit(s) IV Push every 6 hours PRN For aPTT less than 40  heparin   Injectable 2500 Unit(s) IV Push every 6 hours PRN For aPTT between 40 - 57      CAPILLARY BLOOD GLUCOSE      POCT Blood Glucose.: 201 mg/dL (11 May 2021 06:30)  POCT Blood Glucose.: 188 mg/dL (10 May 2021 23:55)  POCT Blood Glucose.: 112 mg/dL (10 May 2021 17:37)  POCT Blood Glucose.: 158 mg/dL (10 May 2021 12:08)    I&O's Summary    10 May 2021 07:01  -  11 May 2021 07:00  --------------------------------------------------------  IN: 2002 mL / OUT: 1750 mL / NET: 252 mL        PHYSICAL EXAM:  Vital Signs Last 24 Hrs  T(C): 36.9 (11 May 2021 04:30), Max: 37.2 (10 May 2021 15:00)  T(F): 98.4 (11 May 2021 04:30), Max: 99 (10 May 2021 15:00)  HR: 96 (11 May 2021 06:19) (94 - 119)  BP: 114/73 (11 May 2021 04:30) (107/62 - 114/73)  BP(mean): --  RR: 18 (11 May 2021 04:30) (18 - 18)  SpO2: 96% (11 May 2021 06:19) (96% - 99%)    GENERAL: No acute distress, well-developed  HEAD:  Atraumatic, Normocephalic  EYES: EOMI, PERRLA, conjunctiva and sclera clear  NECK: Supple, no lymphadenopathy, no JVD  CHEST/LUNG: CTAB; No wheezes, rales, or rhonchi  HEART: Regular rate and rhythm; No murmurs, rubs, or gallops  ABDOMEN: Soft, non-tender, non-distended; normal bowel sounds, no organomegaly  EXTREMITIES:  2+ peripheral pulses b/l, No clubbing, cyanosis, or edema  NEUROLOGY: A&O x 3, no focal deficits  SKIN: No rashes or lesions    LABS:                        8.6    14.73 )-----------( 514      ( 11 May 2021 07:03 )             28.5     05-11    134<L>  |  97  |  23  ----------------------------<  188<H>  4.6   |  22  |  0.85    Ca    9.3      11 May 2021 06:58  Phos  4.8     05-11  Mg     2.6     05-11    TPro  7.8  /  Alb  2.9<L>  /  TBili  0.4  /  DBili  x   /  AST  20  /  ALT  20  /  AlkPhos  85  05-11    PT/INR - ( 11 May 2021 06:58 )   PT: 13.1 sec;   INR: 1.10 ratio         PTT - ( 11 May 2021 06:58 )  PTT:77.0 sec            RADIOLOGY & ADDITIONAL TESTS:  Results Reviewed:   Imaging Personally Reviewed:  Electrocardiogram Personally Reviewed:    COORDINATION OF CARE:  Care Discussed with Consultants/Other Providers [Y/N]:  Prior or Outpatient Records Reviewed [Y/N]:   PROGRESS NOTE:   Authored by ANH Fritz pager [phone]    Patient is a 60y old  Male who presents with a chief complaint of COVID19 w/ severe metabolic acidosis s/p intubation (10 May 2021 18:42)      SUBJECTIVE / OVERNIGHT EVENTS: No events overnight. The patient was examined at bedside and appeared lethargic but not in acute distress. When asked how he was feeling he smiled and nodded. Patient cont to have tenderness to palpation of toes but does not seem to grimace with very light palpation of leg. Patient appears to be breathing well and he does not exhibit SOB     MEDICATIONS  (STANDING):  albuterol/ipratropium for Nebulization 3 milliLiter(s) Nebulizer every 12 hours  cholecalciferol 400 Unit(s) Oral daily  dextrose 40% Gel 15 Gram(s) Oral once  dextrose 40% Gel 15 Gram(s) Oral once  dextrose 5%. 1000 milliLiter(s) (50 mL/Hr) IV Continuous <Continuous>  dextrose 5%. 1000 milliLiter(s) (100 mL/Hr) IV Continuous <Continuous>  dextrose 5%. 1000 milliLiter(s) (50 mL/Hr) IV Continuous <Continuous>  dextrose 5%. 1000 milliLiter(s) (100 mL/Hr) IV Continuous <Continuous>  dextrose 50% Injectable 25 Gram(s) IV Push once  dextrose 50% Injectable 12.5 Gram(s) IV Push once  dextrose 50% Injectable 25 Gram(s) IV Push once  dextrose 50% Injectable 25 Gram(s) IV Push once  dextrose 50% Injectable 12.5 Gram(s) IV Push once  dextrose 50% Injectable 25 Gram(s) IV Push once  glucagon  Injectable 1 milliGRAM(s) IntraMuscular once  glucagon  Injectable 1 milliGRAM(s) IntraMuscular once  heparin  Infusion.  Unit(s)/Hr (12 mL/Hr) IV Continuous <Continuous>  insulin lispro (ADMELOG) corrective regimen sliding scale   SubCutaneous every 6 hours  insulin NPH human recombinant 12 Unit(s) SubCutaneous every 6 hours  levETIRAcetam  Solution 500 milliGRAM(s) Oral two times a day  meropenem  IVPB 1000 milliGRAM(s) IV Intermittent every 8 hours  multivitamin/minerals Oral Solution (WELLESSE) 30 milliLiter(s) Oral daily  polyethylene glycol 3350 17 Gram(s) Oral daily  senna 2 Tablet(s) Oral at bedtime  sodium chloride 0.9%. 1000 milliLiter(s) (75 mL/Hr) IV Continuous <Continuous>  tamsulosin 0.4 milliGRAM(s) Oral at bedtime  thiamine 100 milliGRAM(s) Oral daily    MEDICATIONS  (PRN):  heparin   Injectable 5500 Unit(s) IV Push every 6 hours PRN For aPTT less than 40  heparin   Injectable 2500 Unit(s) IV Push every 6 hours PRN For aPTT between 40 - 57      CAPILLARY BLOOD GLUCOSE      POCT Blood Glucose.: 201 mg/dL (11 May 2021 06:30)  POCT Blood Glucose.: 188 mg/dL (10 May 2021 23:55)  POCT Blood Glucose.: 112 mg/dL (10 May 2021 17:37)  POCT Blood Glucose.: 158 mg/dL (10 May 2021 12:08)    I&O's Summary    10 May 2021 07:01  -  11 May 2021 07:00  --------------------------------------------------------  IN: 2002 mL / OUT: 1750 mL / NET: 252 mL        PHYSICAL EXAM:  Vital Signs Last 24 Hrs  T(C): 36.9 (11 May 2021 04:30), Max: 37.2 (10 May 2021 15:00)  T(F): 98.4 (11 May 2021 04:30), Max: 99 (10 May 2021 15:00)  HR: 96 (11 May 2021 06:19) (94 - 119)  BP: 114/73 (11 May 2021 04:30) (107/62 - 114/73)  BP(mean): --  RR: 18 (11 May 2021 04:30) (18 - 18)  SpO2: 96% (11 May 2021 06:19) (96% - 99%)    GENERAL: No acute distress, malnourished   HEAD:  Atraumatic, Normocephalic  EYES: EOMI, PERRLA, conjunctiva and sclera clear  NECK: Supple, no lymphadenopathy, no JVD  CHEST/LUNG: b/l noisy breathing/ rales   HEART: Regular rate and rhythm; No murmurs, rubs, or gallops  ABDOMEN: Soft, non-tender, non-distended; normal bowel sounds, no organomegaly, PEG inplace with feeds running, no leaking of erythema around PEG site  EXTREMITIES:  2+ peripheral pulses b/l, No clubbing, cyanosis, or edema, right leg unable to feel significant swelling from hematoma   NEUROLOGY: A&O x 1-2, generalized weakness   SKIN: gangrenous changes on toes b/l     LABS:                        8.6    14.73 )-----------( 514      ( 11 May 2021 07:03 )             28.5     05-11    134<L>  |  97  |  23  ----------------------------<  188<H>  4.6   |  22  |  0.85    Ca    9.3      11 May 2021 06:58  Phos  4.8     05-11  Mg     2.6     05-11    TPro  7.8  /  Alb  2.9<L>  /  TBili  0.4  /  DBili  x   /  AST  20  /  ALT  20  /  AlkPhos  85  05-11    PT/INR - ( 11 May 2021 06:58 )   PT: 13.1 sec;   INR: 1.10 ratio         PTT - ( 11 May 2021 06:58 )  PTT:77.0 sec            RADIOLOGY & ADDITIONAL TESTS:  Results Reviewed:   Imaging Personally Reviewed:  Electrocardiogram Personally Reviewed:    COORDINATION OF CARE:  Care Discussed with Consultants/Other Providers [Y/N]:  Prior or Outpatient Records Reviewed [Y/N]:

## 2021-05-11 NOTE — PROGRESS NOTE ADULT - SUBJECTIVE AND OBJECTIVE BOX
CC: F/U for Empyema    Saw/spoke to patient. Unchanged. Doing well. No new complaints.    Allergies  No Known Allergies    ANTIMICROBIALS:  meropenem  IVPB 1000 every 8 hours    PE:    Vital Signs Last 24 Hrs  T(C): 36.8 (11 May 2021 11:45), Max: 37.2 (10 May 2021 15:00)  T(F): 98.3 (11 May 2021 11:45), Max: 99 (10 May 2021 15:00)  HR: 94 (11 May 2021 11:45) (94 - 119)  BP: 104/60 (11 May 2021 11:45) (104/60 - 114/73)  RR: 16 (11 May 2021 11:45) (16 - 18)  SpO2: 94% (11 May 2021 11:45) (94% - 99%)    Gen: AOx3, NAD, non-toxic  CV: S1+S2 normal, nontachycardic  Resp: Clear bilat, no resp distress, no crackles/wheezes  Abd: Soft, nontender, +BS  Ext: No LE edema, no wounds    LABS:                        8.6    14.73 )-----------( 514      ( 11 May 2021 07:03 )             28.5     05-11    134<L>  |  97  |  23  ----------------------------<  188<H>  4.6   |  22  |  0.85    Ca    9.3      11 May 2021 06:58  Phos  4.8     05-11  Mg     2.6     05-11    TPro  7.8  /  Alb  2.9<L>  /  TBili  0.4  /  DBili  x   /  AST  20  /  ALT  20  /  AlkPhos  85  05-11    MICROBIOLOGY:    .Urine Clean Catch (Midstream)  05-06-21   >100,000 CFU/ml Klebsiella pneumoniae ESBL  --  Klebsiella pneumoniae ESBL    .Blood Blood  05-05-21   No Growth Final     .Blood Blood  05-05-21   No Growth Final     .Sputum Sputum  04-13-21   Numerous Klebsiella pneumoniae ESBL  Normal Respiratory Lizzette absent  --  Klebsiella pneumoniae ESBL    .Sputum Sputum  04-12-21   Numerous Klebsiella pneumoniae ESBL  Normal Respiratory Lizzette absent  --  Klebsiella pneumoniae ESBL    (otherwise reviewed)    RADIOLOGY:    5/10 CXR:    IMPRESSION:    The heart is normal in size. Right pleural effusion. Right lower lobe pneumonia and/or atelectasis. The left lung is clear. No change in appearance the chest when compared to previous study done on May 9, 2021 at 8:45 AM.

## 2021-05-11 NOTE — PROGRESS NOTE ADULT - PROBLEM SELECTOR PLAN 1
OR Vats 5/12 with Dr. Albarado   NPO after midnight   Negative covid pcr within 48 hr needed   T&S x2 -done   Hold Hep gtt in the AM

## 2021-05-11 NOTE — PROGRESS NOTE ADULT - ATTENDING COMMENTS
VATS rescheduled for tomorrow.  heparin drip restarted - right hip hematoma is stable  hb is stable  +cough  abx as per ID  Is/Os  PEG feeds  family aware of plan of care

## 2021-05-11 NOTE — PROGRESS NOTE ADULT - NUTRITIONAL ASSESSMENT
This patient has been assessed with a concern for Malnutrition and has been determined to have a diagnosis/diagnoses of Severe protein-calorie malnutrition.    This patient is being managed with:   Diet NPO after Midnight-     NPO Start Date: 11-May-2021   NPO Start Time: 23:59  Entered: May 11 2021  9:13AM    Diet NPO with Tube Feed-  Tube Feeding Modality: Gastrostomy  Glucerna 1.2 Kaden (GLUCERNAR)  Total Volume for 24 Hours (mL): 1680  Continuous  Starting Tube Feed Rate {mL per Hour}: 10  Increase Tube Feed Rate by (mL): 10     Every 24 hours  Until Goal Tube Feed Rate (mL per Hour): 70  Tube Feed Duration (in Hours): 24  Tube Feed Start Time: 14:25  Entered: Apr 30 2021  2:46PM

## 2021-05-11 NOTE — PROGRESS NOTE ADULT - PROBLEM SELECTOR PLAN 4
-Pulmonary embolus noted on ct scan performed at time of admission  -Etiology of venous thromboembolism uncertain at this time; no known history of thrombophilia; provoked in setting of acute covid illness   - restart heparin after VATs  - will discharge on Eliquis -Pulmonary embolus noted on ct scan performed at time of admission  -Etiology of venous thromboembolism uncertain at this time; no known history of thrombophilia; provoked in setting of acute covid illness   - on heparin, will stop in AM and restart heparin after VATS  - will discharge on Eliquis

## 2021-05-11 NOTE — PROGRESS NOTE ADULT - ASSESSMENT
61 yo M with PMH of DM who was initially admitted to [facility] ICU 4/1 for hypoxic respiratory failure 2/2 COVID c/b PE with R heart strain, cardiogenic and septic shock, ischemic and hemorrhagic CVA and ESBL klebsiella ventilator associated PNA, transferred to [facility] for neurosurgery eval on 4/12  No fever, has leukocytosis  Has rising WBC, conversely, patient appears well at bedside--no fevers, no chills, no new complaints  UCX with ESBL  Bilateral LE dry gangrene  CXR RLL atelectasis vs pna? No clinical signs pneumonia  No sob/no cough, no abd pain, N/V/D  Has PEG  PCT minimally elevated  CT now with Empyema, concern for bilateral PNA, abscess  1) Leukocytosis  - Now suspected to be infectious pulmonary sequelae, still persistent--needs source control for resolution?  - Trend WBC  2) COVID  - Prior infection  - Monitor for any signs active COVID/reinfection  3) Suspected Empyema/Abscess  - Meropenem 1g q 8  - VATS scheduled tentatively for 5/12 with thoracic  4) Gangrene  - Appears noninfectious/dry gangrene  - Monitor wounds    Sammy Chambers MD  Pager [phone]  After 5pm and on weekends call [phone]

## 2021-05-11 NOTE — PROGRESS NOTE ADULT - ASSESSMENT
60M hospitalized for COVID PNA, c/b cardiogenic/septic shock and ischemic/hemorrhagic stroke, now extubated on the floor w/ negative COVID PCR, Thoracic surgery consulted to evaluate the recently found R sided empyema.     5/8 VSS; NAD. Thoracic surgery recommend IR Consult/Tap for R empyema.     5/9  VSS, NAD.  IR deferring IR drain of empyema 2/2 risk of iatrogenic bronchopleural fistula. Recommends VATS procedure.  Dr. Tena spoke to patients brother in law regarding VATS procedure.  Family to decide on whether to proceed with operation.  Continue IV ABX per ID.   5/11 VSS; Plan for VATs tomorrow 5/12/21 with Dr. Albarado. COVID pcr within 48 hr.

## 2021-05-12 ENCOUNTER — APPOINTMENT (OUTPATIENT)
Dept: THORACIC SURGERY | Facility: HOSPITAL | Age: 61
End: 2021-05-12

## 2021-05-12 LAB
ALBUMIN SERPL ELPH-MCNC: 2.4 G/DL — LOW (ref 3.3–5)
ALBUMIN SERPL ELPH-MCNC: 2.8 G/DL — LOW (ref 3.3–5)
ALP SERPL-CCNC: 69 U/L — SIGNIFICANT CHANGE UP (ref 40–120)
ALP SERPL-CCNC: 70 U/L — SIGNIFICANT CHANGE UP (ref 40–120)
ALT FLD-CCNC: 18 U/L — SIGNIFICANT CHANGE UP (ref 10–45)
ALT FLD-CCNC: 21 U/L — SIGNIFICANT CHANGE UP (ref 10–45)
ANION GAP SERPL CALC-SCNC: 12 MMOL/L — SIGNIFICANT CHANGE UP (ref 5–17)
ANION GAP SERPL CALC-SCNC: 15 MMOL/L — SIGNIFICANT CHANGE UP (ref 5–17)
ANISOCYTOSIS BLD QL: SLIGHT — SIGNIFICANT CHANGE UP
APTT BLD: 105.1 SEC — HIGH (ref 27.5–35.5)
APTT BLD: 30.9 SEC — SIGNIFICANT CHANGE UP (ref 27.5–35.5)
AST SERPL-CCNC: 18 U/L — SIGNIFICANT CHANGE UP (ref 10–40)
AST SERPL-CCNC: 27 U/L — SIGNIFICANT CHANGE UP (ref 10–40)
BASOPHILS # BLD AUTO: 0.12 K/UL — SIGNIFICANT CHANGE UP (ref 0–0.2)
BASOPHILS NFR BLD AUTO: 0.9 % — SIGNIFICANT CHANGE UP (ref 0–2)
BILIRUB DIRECT SERPL-MCNC: 0.3 MG/DL — HIGH (ref 0–0.2)
BILIRUB INDIRECT FLD-MCNC: 0.6 MG/DL — SIGNIFICANT CHANGE UP (ref 0.2–1)
BILIRUB SERPL-MCNC: 0.4 MG/DL — SIGNIFICANT CHANGE UP (ref 0.2–1.2)
BILIRUB SERPL-MCNC: 0.9 MG/DL — SIGNIFICANT CHANGE UP (ref 0.2–1.2)
BUN SERPL-MCNC: 21 MG/DL — SIGNIFICANT CHANGE UP (ref 7–23)
BUN SERPL-MCNC: 21 MG/DL — SIGNIFICANT CHANGE UP (ref 7–23)
CALCIUM SERPL-MCNC: 8.8 MG/DL — SIGNIFICANT CHANGE UP (ref 8.4–10.5)
CALCIUM SERPL-MCNC: 9.3 MG/DL — SIGNIFICANT CHANGE UP (ref 8.4–10.5)
CHLORIDE SERPL-SCNC: 100 MMOL/L — SIGNIFICANT CHANGE UP (ref 96–108)
CHLORIDE SERPL-SCNC: 99 MMOL/L — SIGNIFICANT CHANGE UP (ref 96–108)
CO2 SERPL-SCNC: 18 MMOL/L — LOW (ref 22–31)
CO2 SERPL-SCNC: 24 MMOL/L — SIGNIFICANT CHANGE UP (ref 22–31)
CREAT SERPL-MCNC: 0.74 MG/DL — SIGNIFICANT CHANGE UP (ref 0.5–1.3)
CREAT SERPL-MCNC: 0.8 MG/DL — SIGNIFICANT CHANGE UP (ref 0.5–1.3)
ELLIPTOCYTES BLD QL SMEAR: SLIGHT — SIGNIFICANT CHANGE UP
EOSINOPHIL # BLD AUTO: 0.34 K/UL — SIGNIFICANT CHANGE UP (ref 0–0.5)
EOSINOPHIL NFR BLD AUTO: 2.6 % — SIGNIFICANT CHANGE UP (ref 0–6)
GAS PNL BLDA: SIGNIFICANT CHANGE UP
GIANT PLATELETS BLD QL SMEAR: PRESENT — SIGNIFICANT CHANGE UP
GLUCOSE BLDC GLUCOMTR-MCNC: 125 MG/DL — HIGH (ref 70–99)
GLUCOSE BLDC GLUCOMTR-MCNC: 142 MG/DL — HIGH (ref 70–99)
GLUCOSE SERPL-MCNC: 122 MG/DL — HIGH (ref 70–99)
GLUCOSE SERPL-MCNC: 278 MG/DL — HIGH (ref 70–99)
HCT VFR BLD CALC: 28.1 % — LOW (ref 39–50)
HCT VFR BLD CALC: 36.3 % — LOW (ref 39–50)
HEPARINASE TEG R TIME: 7.7 MIN — SIGNIFICANT CHANGE UP (ref 4.3–8.3)
HGB BLD-MCNC: 11 G/DL — LOW (ref 13–17)
HGB BLD-MCNC: 8.5 G/DL — LOW (ref 13–17)
INR BLD: 1.09 RATIO — SIGNIFICANT CHANGE UP (ref 0.88–1.16)
INR BLD: 1.15 RATIO — SIGNIFICANT CHANGE UP (ref 0.88–1.16)
LYMPHOCYTES # BLD AUTO: 1.57 K/UL — SIGNIFICANT CHANGE UP (ref 1–3.3)
LYMPHOCYTES # BLD AUTO: 12.2 % — LOW (ref 13–44)
MAGNESIUM SERPL-MCNC: 2.5 MG/DL — SIGNIFICANT CHANGE UP (ref 1.6–2.6)
MAGNESIUM SERPL-MCNC: 2.6 MG/DL — SIGNIFICANT CHANGE UP (ref 1.6–2.6)
MANUAL SMEAR VERIFICATION: SIGNIFICANT CHANGE UP
MCHC RBC-ENTMCNC: 23.7 PG — LOW (ref 27–34)
MCHC RBC-ENTMCNC: 25.8 PG — LOW (ref 27–34)
MCHC RBC-ENTMCNC: 30.2 GM/DL — LOW (ref 32–36)
MCHC RBC-ENTMCNC: 30.3 GM/DL — LOW (ref 32–36)
MCV RBC AUTO: 78.2 FL — LOW (ref 80–100)
MCV RBC AUTO: 85.2 FL — SIGNIFICANT CHANGE UP (ref 80–100)
METAMYELOCYTES # FLD: 1.7 % — HIGH (ref 0–0)
MICROCYTES BLD QL: SLIGHT — SIGNIFICANT CHANGE UP
MONOCYTES # BLD AUTO: 0.34 K/UL — SIGNIFICANT CHANGE UP (ref 0–0.9)
MONOCYTES NFR BLD AUTO: 2.6 % — SIGNIFICANT CHANGE UP (ref 2–14)
MYELOCYTES NFR BLD: 0.9 % — HIGH (ref 0–0)
NEUTROPHILS # BLD AUTO: 9.87 K/UL — HIGH (ref 1.8–7.4)
NEUTROPHILS NFR BLD AUTO: 76.5 % — SIGNIFICANT CHANGE UP (ref 43–77)
NRBC # BLD: 0 /100 WBCS — SIGNIFICANT CHANGE UP (ref 0–0)
PHOSPHATE SERPL-MCNC: 3.9 MG/DL — SIGNIFICANT CHANGE UP (ref 2.5–4.5)
PHOSPHATE SERPL-MCNC: 4.6 MG/DL — HIGH (ref 2.5–4.5)
PLAT MORPH BLD: ABNORMAL
PLATELET # BLD AUTO: 555 K/UL — HIGH (ref 150–400)
PLATELET # BLD AUTO: 611 K/UL — HIGH (ref 150–400)
POIKILOCYTOSIS BLD QL AUTO: SLIGHT — SIGNIFICANT CHANGE UP
POLYCHROMASIA BLD QL SMEAR: SLIGHT — SIGNIFICANT CHANGE UP
POTASSIUM SERPL-MCNC: 4.8 MMOL/L — SIGNIFICANT CHANGE UP (ref 3.5–5.3)
POTASSIUM SERPL-MCNC: 5.3 MMOL/L — SIGNIFICANT CHANGE UP (ref 3.5–5.3)
POTASSIUM SERPL-SCNC: 4.8 MMOL/L — SIGNIFICANT CHANGE UP (ref 3.5–5.3)
POTASSIUM SERPL-SCNC: 5.3 MMOL/L — SIGNIFICANT CHANGE UP (ref 3.5–5.3)
PROMYELOCYTES # FLD: 1.7 % — HIGH (ref 0–0)
PROT SERPL-MCNC: 6.7 G/DL — SIGNIFICANT CHANGE UP (ref 6–8.3)
PROT SERPL-MCNC: 7.5 G/DL — SIGNIFICANT CHANGE UP (ref 6–8.3)
PROTHROM AB SERPL-ACNC: 13 SEC — SIGNIFICANT CHANGE UP (ref 10.6–13.6)
PROTHROM AB SERPL-ACNC: 13.7 SEC — HIGH (ref 10.6–13.6)
RAPIDTEG MAXIMUM AMPLITUDE: >75 MM (ref 52–70)
RBC # BLD: 3.3 M/UL — LOW (ref 4.2–5.8)
RBC # BLD: 4.64 M/UL — SIGNIFICANT CHANGE UP (ref 4.2–5.8)
RBC # FLD: 17.5 % — HIGH (ref 10.3–14.5)
RBC # FLD: 21 % — HIGH (ref 10.3–14.5)
RBC BLD AUTO: ABNORMAL
SODIUM SERPL-SCNC: 133 MMOL/L — LOW (ref 135–145)
SODIUM SERPL-SCNC: 135 MMOL/L — SIGNIFICANT CHANGE UP (ref 135–145)
TEG FUNCTIONAL FIBRINOGEN: >52 MM (ref 15–32)
TEG MAXIMUM AMPLITUDE: 70 MM (ref 52–69)
TEG REACTION TIME: 7.5 MIN — SIGNIFICANT CHANGE UP (ref 4.6–9.1)
VARIANT LYMPHS # BLD: 0.9 % — SIGNIFICANT CHANGE UP (ref 0–6)
WBC # BLD: 12.9 K/UL — HIGH (ref 3.8–10.5)
WBC # BLD: 28.27 K/UL — HIGH (ref 3.8–10.5)
WBC # FLD AUTO: 12.9 K/UL — HIGH (ref 3.8–10.5)
WBC # FLD AUTO: 28.27 K/UL — HIGH (ref 3.8–10.5)

## 2021-05-12 PROCEDURE — 99291 CRITICAL CARE FIRST HOUR: CPT

## 2021-05-12 PROCEDURE — 31645 BRNCHSC W/THER ASPIR 1ST: CPT

## 2021-05-12 PROCEDURE — 31624 DX BRONCHOSCOPE/LAVAGE: CPT

## 2021-05-12 PROCEDURE — 99232 SBSQ HOSP IP/OBS MODERATE 35: CPT

## 2021-05-12 PROCEDURE — 99233 SBSQ HOSP IP/OBS HIGH 50: CPT | Mod: GC

## 2021-05-12 PROCEDURE — 71045 X-RAY EXAM CHEST 1 VIEW: CPT | Mod: 26

## 2021-05-12 PROCEDURE — 32200 DRAIN OPEN LUNG LESION: CPT | Mod: 59

## 2021-05-12 PROCEDURE — 32601 THORACOSCOPY DIAGNOSTIC: CPT | Mod: 59

## 2021-05-12 PROCEDURE — 99232 SBSQ HOSP IP/OBS MODERATE 35: CPT | Mod: GC

## 2021-05-12 PROCEDURE — 32124 EXPLORE CHEST FREE ADHESIONS: CPT

## 2021-05-12 RX ORDER — INSULIN LISPRO 100/ML
VIAL (ML) SUBCUTANEOUS EVERY 6 HOURS
Refills: 0 | Status: DISCONTINUED | OUTPATIENT
Start: 2021-05-12 | End: 2021-05-13

## 2021-05-12 RX ORDER — DEXTROSE 50 % IN WATER 50 %
25 SYRINGE (ML) INTRAVENOUS ONCE
Refills: 0 | Status: DISCONTINUED | OUTPATIENT
Start: 2021-05-12 | End: 2021-05-12

## 2021-05-12 RX ORDER — MEROPENEM 1 G/30ML
1000 INJECTION INTRAVENOUS EVERY 8 HOURS
Refills: 0 | Status: COMPLETED | OUTPATIENT
Start: 2021-05-12 | End: 2021-05-17

## 2021-05-12 RX ORDER — DEXTROSE 50 % IN WATER 50 %
15 SYRINGE (ML) INTRAVENOUS ONCE
Refills: 0 | Status: DISCONTINUED | OUTPATIENT
Start: 2021-05-12 | End: 2021-05-12

## 2021-05-12 RX ORDER — DEXTROSE 50 % IN WATER 50 %
12.5 SYRINGE (ML) INTRAVENOUS ONCE
Refills: 0 | Status: DISCONTINUED | OUTPATIENT
Start: 2021-05-12 | End: 2021-05-12

## 2021-05-12 RX ORDER — IPRATROPIUM/ALBUTEROL SULFATE 18-103MCG
3 AEROSOL WITH ADAPTER (GRAM) INHALATION EVERY 12 HOURS
Refills: 0 | Status: DISCONTINUED | OUTPATIENT
Start: 2021-05-12 | End: 2021-05-13

## 2021-05-12 RX ORDER — POLYETHYLENE GLYCOL 3350 17 G/17G
17 POWDER, FOR SOLUTION ORAL DAILY
Refills: 0 | Status: DISCONTINUED | OUTPATIENT
Start: 2021-05-12 | End: 2021-06-04

## 2021-05-12 RX ORDER — GLUCAGON INJECTION, SOLUTION 0.5 MG/.1ML
1 INJECTION, SOLUTION SUBCUTANEOUS ONCE
Refills: 0 | Status: DISCONTINUED | OUTPATIENT
Start: 2021-05-12 | End: 2021-05-12

## 2021-05-12 RX ORDER — SENNA PLUS 8.6 MG/1
2 TABLET ORAL AT BEDTIME
Refills: 0 | Status: DISCONTINUED | OUTPATIENT
Start: 2021-05-12 | End: 2021-06-04

## 2021-05-12 RX ORDER — LEVETIRACETAM 250 MG/1
500 TABLET, FILM COATED ORAL
Refills: 0 | Status: DISCONTINUED | OUTPATIENT
Start: 2021-05-12 | End: 2021-05-13

## 2021-05-12 RX ORDER — SODIUM CHLORIDE 9 MG/ML
1000 INJECTION, SOLUTION INTRAVENOUS
Refills: 0 | Status: DISCONTINUED | OUTPATIENT
Start: 2021-05-12 | End: 2021-05-12

## 2021-05-12 RX ORDER — HYDROMORPHONE HYDROCHLORIDE 2 MG/ML
0.5 INJECTION INTRAMUSCULAR; INTRAVENOUS; SUBCUTANEOUS EVERY 4 HOURS
Refills: 0 | Status: DISCONTINUED | OUTPATIENT
Start: 2021-05-12 | End: 2021-05-18

## 2021-05-12 RX ORDER — THIAMINE MONONITRATE (VIT B1) 100 MG
100 TABLET ORAL DAILY
Refills: 0 | Status: DISCONTINUED | OUTPATIENT
Start: 2021-05-12 | End: 2021-06-02

## 2021-05-12 RX ORDER — TAMSULOSIN HYDROCHLORIDE 0.4 MG/1
0.4 CAPSULE ORAL AT BEDTIME
Refills: 0 | Status: DISCONTINUED | OUTPATIENT
Start: 2021-05-12 | End: 2021-05-20

## 2021-05-12 RX ORDER — CHLORHEXIDINE GLUCONATE 213 G/1000ML
1 SOLUTION TOPICAL
Refills: 0 | Status: DISCONTINUED | OUTPATIENT
Start: 2021-05-12 | End: 2021-05-18

## 2021-05-12 RX ORDER — ACETAMINOPHEN 500 MG
1000 TABLET ORAL ONCE
Refills: 0 | Status: COMPLETED | OUTPATIENT
Start: 2021-05-12 | End: 2021-05-12

## 2021-05-12 RX ORDER — CHOLECALCIFEROL (VITAMIN D3) 125 MCG
400 CAPSULE ORAL DAILY
Refills: 0 | Status: DISCONTINUED | OUTPATIENT
Start: 2021-05-12 | End: 2021-06-04

## 2021-05-12 RX ORDER — ACETAMINOPHEN 500 MG
1000 TABLET ORAL ONCE
Refills: 0 | Status: COMPLETED | OUTPATIENT
Start: 2021-05-13 | End: 2021-05-13

## 2021-05-12 RX ADMIN — Medication 400 UNIT(S): at 22:12

## 2021-05-12 RX ADMIN — Medication 100 MILLIGRAM(S): at 22:10

## 2021-05-12 RX ADMIN — HYDROMORPHONE HYDROCHLORIDE 0.5 MILLIGRAM(S): 2 INJECTION INTRAMUSCULAR; INTRAVENOUS; SUBCUTANEOUS at 20:47

## 2021-05-12 RX ADMIN — MEROPENEM 100 MILLIGRAM(S): 1 INJECTION INTRAVENOUS at 05:14

## 2021-05-12 RX ADMIN — LEVETIRACETAM 500 MILLIGRAM(S): 250 TABLET, FILM COATED ORAL at 22:10

## 2021-05-12 RX ADMIN — CHLORHEXIDINE GLUCONATE 1 APPLICATION(S): 213 SOLUTION TOPICAL at 22:11

## 2021-05-12 RX ADMIN — LEVETIRACETAM 500 MILLIGRAM(S): 250 TABLET, FILM COATED ORAL at 05:19

## 2021-05-12 RX ADMIN — MEROPENEM 100 MILLIGRAM(S): 1 INJECTION INTRAVENOUS at 22:10

## 2021-05-12 RX ADMIN — POLYETHYLENE GLYCOL 3350 17 GRAM(S): 17 POWDER, FOR SOLUTION ORAL at 22:10

## 2021-05-12 RX ADMIN — Medication 400 MILLIGRAM(S): at 22:11

## 2021-05-12 RX ADMIN — Medication 3 MILLILITER(S): at 05:26

## 2021-05-12 RX ADMIN — HYDROMORPHONE HYDROCHLORIDE 0.5 MILLIGRAM(S): 2 INJECTION INTRAMUSCULAR; INTRAVENOUS; SUBCUTANEOUS at 23:31

## 2021-05-12 RX ADMIN — SENNA PLUS 2 TABLET(S): 8.6 TABLET ORAL at 22:10

## 2021-05-12 NOTE — PRE-ANESTHESIA EVALUATION ADULT - NSANTHPMHFT_GEN_ALL_CORE
60 yr male with PMH of DM who was initially admitted to [facility] ICU for hypoxic respiratory failure 2/2 COVID c/b PE with R heart strain, mixed cardiogenic and septic shock, normal BiV function on recent TTE, ischemic and hemorrhagic CVA and ventilator associated PNA. Hospital course also notable for AMS, confused/lethargic at baseline, anemia, dysphagia s/p PEG, now monitoring for refeeding syndrome. Pt has a right empyema and is scheduled for VATs

## 2021-05-12 NOTE — CONSULT NOTE ADULT - ASSESSMENT
PLAN:    Neuro:   - Monitor mental status  - Pain control as needed with tylenol and oxy     Resp: s/p L. thoracotomy, drainage of abscess, decortication, flex bronch   - 1 angled chest tube , 1 straight chest tube, 1 Blakemore drain (anterior) - Air leak present post op   - Chest tube to suction   - Monitor pulse oximeter  - Out of bed to chair, ambulate as tolerated, and incentive spirometry to prevent atelectasis    CV:  - Intraop lexi, off pressors post op   - Monitor vital signs    GI:   - PEG in place   - NPO     Renal:   - Monitor I&Os  - Monitor electrolytes and replete as necessary    Heme:  - Monitor CBC and coags  - Hold VTE ppx for 8hrs post op   - On hep gtt for dvt; holding per thoracic     ID:   - Continue w/ meropenem     Endo:   - Monitor glucose     Disposition:  - Full Code   - SICU  PLAN:    Neuro:   - Monitor mental status  - Pain control as needed with tylenol and oxy     Resp: s/p L. thoracotomy, drainage of abscess, decortication, flex bronch   - 1 angled chest tube , 1 straight chest tube, 1 Blakemore drain (anterior) - Air leak present post op   - Chest tube to suction at 10mmhg  - Air leak expected and seen  - Monitor pulse oximeter  - Out of bed to chair, ambulate as tolerated, and incentive spirometry to prevent atelectasis    CV:  - Intraop lexi, off pressors post op   - Monitor vital signs    GI:   - PEG in place   - NPO     Renal:   - Monitor I&Os  - Monitor electrolytes and replete as necessary    Heme:  - Monitor CBC and coags  - Hold VTE ppx for 8hrs post op   - On hep gtt for dvt; holding per thoracic     ID:   - Continue w/ meropenem     Endo:   - Monitor glucose     Disposition:  - Full Code   - SICU

## 2021-05-12 NOTE — PROGRESS NOTE ADULT - PROBLEM SELECTOR PLAN 1
-FS q6h  - Once restarted on TFs, restart NPH 12 units sq q6h ; HOLD IF TUBE FEEDING TURNED OFF  -c/w  moderate correction scale sq q6h.   Discharge:  Will be based on TF/PO status and insulin requirements  Might need rehab so will c/w NPH while on TFs  -If going home will need to reevalute pt's abil;ity to learn DM care and if pt unable to care for self will need family to assist with care.   Needs Outpt. endo follow-up  Needs Outpt. optho, podiatry, nephrology  -Plan discussed with pt/team.  Contact info: [phone] (24/7). pager [phone]

## 2021-05-12 NOTE — PROGRESS NOTE ADULT - ATTENDING COMMENTS
Agree with plan as outlined above. Patient seen and examined at bedside. Patient history, laboratory data, and imaging personally reviewed.    Pt is a 60M with PMHx DMII initially presenting to [facility] on 4/12/21 with acute hypoxemic respiratory failure 2/2 COVID19 PNA c/b acute PE with cor pulmonale and combined cardiogenic/septic shock with hospital course further c/b multiple acute ischemic CVAs with hemorrhagic transformation and ESBL Klebsiella VAP now with new leukocytosis and repeat CT Chest concerning for an empyema.  He has a complicated pleural space.  Patient to go to OR today for VATS drainage/possible pleurodesis of effusion.  Agree with plan as outlined above.

## 2021-05-12 NOTE — PROGRESS NOTE ADULT - SUBJECTIVE AND OBJECTIVE BOX
Patient is a 60y old  Male who presents with a chief complaint of COVID19 w/ severe metabolic acidosis s/p intubation (12 May 2021 12:00)      INTERVAL History of Present Illness/OVERNIGHT EVENTS:    MEDICATIONS  (STANDING):  sodium chloride 0.9%. 1000 milliLiter(s) (75 mL/Hr) IV Continuous <Continuous>    MEDICATIONS  (PRN):      Allergies    No Known Allergies    Intolerances        REVIEW OF SYSTEMS:  Negative unless otherwise specified above.    Vital Signs Last 24 Hrs  T(C): 36.6 (12 May 2021 13:15), Max: 36.9 (11 May 2021 21:44)  T(F): 97.9 (12 May 2021 13:15), Max: 98.5 (12 May 2021 04:25)  HR: 98 (12 May 2021 13:15) (97 - 105)  BP: 114/70 (12 May 2021 13:15) (103/70 - 114/70)  BP(mean): --  RR: 17 (12 May 2021 13:15) (17 - 18)  SpO2: 98% (12 May 2021 13:15) (93% - 98%)      Weight (kg): 66.2 (05-12 @ 10:34)    PHYSICAL EXAM:  GENERAL: No apparent distress, appears stated age  HEAD:  Atraumatic, Normocephalic  EYES: Conjunctiva and sclera clear, no discharge  ENMT: Moist mucous membranes, no nasal discharge  NECK: Supple, no JVD  CHEST/LUNG: Clear to auscultation bilaterally, no wheeze or rales  HEART: Regular rhythm, no rubs or gallops  ABDOMEN: Soft, Nontender, Nondistended; Bowel sounds present  EXTREMITIES:  No clubbing, cyanosis or edema  SKIN: No rash or new discoloration  NERVOUS SYSTEM:  Alert & Oriented; Bilateral Lower extremity mobile, sensation to light touch intact      LABS:                        8.5    12.90 )-----------( 555      ( 12 May 2021 07:07 )             28.1     12 May 2021 07:07    135    |  99     |  21     ----------------------------<  122    4.8     |  24     |  0.80     Ca    9.3        12 May 2021 07:07  Phos  3.9       12 May 2021 07:07  Mg     2.6       12 May 2021 07:07    TPro  7.5    /  Alb  2.8    /  TBili  0.4    /  DBili  x      /  AST  18     /  ALT  18     /  AlkPhos  70     12 May 2021 07:07    PT/INR - ( 12 May 2021 07:07 )   PT: 13.7 sec;   INR: 1.15 ratio         PTT - ( 12 May 2021 07:07 )  PTT:105.1 sec    CAPILLARY BLOOD GLUCOSE      POCT Blood Glucose.: 142 mg/dL (12 May 2021 13:42)  POCT Blood Glucose.: 125 mg/dL (12 May 2021 06:30)  POCT Blood Glucose.: 117 mg/dL (11 May 2021 23:26)  POCT Blood Glucose.: 100 mg/dL (11 May 2021 21:35)  POCT Blood Glucose.: 100 mg/dL (11 May 2021 18:07)      RADIOLOGY & ADDITIONAL TESTS:    Images reviewed personally    Consultant Notes Reviewed and Care Discussed with relevant Consultants/Other Providers. Patient is a 60y old  Male who presents with a chief complaint of COVID19 w/ severe metabolic acidosis s/p intubation (12 May 2021 12:00)      INTERVAL History of Present Illness/OVERNIGHT EVENTS: Patient appeared well this morning. He denies any issues when asked he shakes his head "no". Patient does no grimace when touching leg and appears comfortable     MEDICATIONS  (STANDING):  sodium chloride 0.9%. 1000 milliLiter(s) (75 mL/Hr) IV Continuous <Continuous>    MEDICATIONS  (PRN):      Allergies    No Known Allergies    Intolerances        REVIEW OF SYSTEMS:  Negative unless otherwise specified above.    Vital Signs Last 24 Hrs  T(C): 36.6 (12 May 2021 13:15), Max: 36.9 (11 May 2021 21:44)  T(F): 97.9 (12 May 2021 13:15), Max: 98.5 (12 May 2021 04:25)  HR: 98 (12 May 2021 13:15) (97 - 105)  BP: 114/70 (12 May 2021 13:15) (103/70 - 114/70)  BP(mean): --  RR: 17 (12 May 2021 13:15) (17 - 18)  SpO2: 98% (12 May 2021 13:15) (93% - 98%)      Weight (kg): 66.2 (05-12 @ 10:34)    PHYSICAL EXAM:  GENERAL: No apparent distress, lethargic   HEAD:  Atraumatic, Normocephalic  EYES: Conjunctiva and sclera clear, no discharge  ENMT: Moist mucous membranes, no nasal discharge  NECK: Supple, no JVD  CHEST/LUNG: rales b/l   HEART: Regular rhythm, no rubs or gallops  ABDOMEN: Soft, Nontender, Nondistended; Bowel sounds present  EXTREMITIES:  No clubbing, cyanosis or edema  SKIN: No rash or new discoloration, gangrenous toes stable   NERVOUS SYSTEM:  Alert with difficulties with orientation; Bilateral Lower extremity mobile, sensation to light touch intact      LABS:                        8.5    12.90 )-----------( 555      ( 12 May 2021 07:07 )             28.1     12 May 2021 07:07    135    |  99     |  21     ----------------------------<  122    4.8     |  24     |  0.80     Ca    9.3        12 May 2021 07:07  Phos  3.9       12 May 2021 07:07  Mg     2.6       12 May 2021 07:07    TPro  7.5    /  Alb  2.8    /  TBili  0.4    /  DBili  x      /  AST  18     /  ALT  18     /  AlkPhos  70     12 May 2021 07:07    PT/INR - ( 12 May 2021 07:07 )   PT: 13.7 sec;   INR: 1.15 ratio         PTT - ( 12 May 2021 07:07 )  PTT:105.1 sec    CAPILLARY BLOOD GLUCOSE      POCT Blood Glucose.: 142 mg/dL (12 May 2021 13:42)  POCT Blood Glucose.: 125 mg/dL (12 May 2021 06:30)  POCT Blood Glucose.: 117 mg/dL (11 May 2021 23:26)  POCT Blood Glucose.: 100 mg/dL (11 May 2021 21:35)  POCT Blood Glucose.: 100 mg/dL (11 May 2021 18:07)      RADIOLOGY & ADDITIONAL TESTS:    Images reviewed personally    Consultant Notes Reviewed and Care Discussed with relevant Consultants/Other Providers.

## 2021-05-12 NOTE — PROGRESS NOTE ADULT - PROBLEM SELECTOR PLAN 1
Leukocytosis and tachycardia  - ID recs appreciated    - started on zosyn 5/7 and switched to meropenem 5/7  - CXR with RLL opacity  - O2 supplementation as necessary   - BCx in lab, f/u results   - UA positive  - UCx growing Kleb, ESBL   - CT chest, a/p + for empyema--. Pulm and CT consulted, plan to manage empyema with VAT tomorrow 5/12, will be NPO at midnight and hold heparin drip in AM   - CT abd showing likely cystitis  - C/w meropenem      Obturator Hematoma   - bleeding hematoma in right thigh on CT on 5/7  - CT hip showing non active bleed, hematoma on right thigh on 5/8, hbg has been stable   - currently on heparin drip, will stop in AM   - doppler to r/o DVT in LE, per IR no role IVC filter Leukocytosis and tachycardia  - ID recs appreciated    - started on zosyn 5/7 and switched to meropenem 5/7  - CXR with RLL opacity  - O2 supplementation as necessary   - BCx in lab, f/u results   - UA positive  - UCx growing Kleb, ESBL   - CT chest, a/p + for empyema--. Pulm and CT consulted, plan to manage empyema with VATs today   - CT abd showing likely cystitis  - C/w meropenem      Obturator Hematoma   - bleeding hematoma in right thigh on CT on 5/7  - CT hip showing non active bleed, hematoma on right thigh on 5/8, hbg has been stable   - currently on heparin drip, will stop in AM   - doppler to r/o DVT in LE, per IR no role IVC filter

## 2021-05-12 NOTE — PROGRESS NOTE ADULT - ASSESSMENT
61 y/o M Gujarati speaking w/ new onset diabetes > A1c of 10.3% on admission for COVID with prolonged hospital course requiring intubation and PEG insertion> now stable but going for VATS today for concern of Empyema/bilateral PNA, abscess. NPO with BG at goal while off TFs not requiring any insulin. Will restart NPH once pt back on TFs. BG goal 100 to 180s.

## 2021-05-12 NOTE — PROGRESS NOTE ADULT - ASSESSMENT
59 yo M with PMH of DM who was initially admitted to [facility] ICU 4/1 for hypoxic respiratory failure 2/2 COVID c/b PE with R heart strain, cardiogenic and septic shock, ischemic and hemorrhagic CVA and ESBL klebsiella ventilator associated PNA, transferred to [facility] for neurosurgery eval on 4/12  No fever, has leukocytosis  UCX with ESBL  Bilateral LE dry gangrene  No sob/no cough, no abd pain, N/V/D  Has PEG  PCT minimally elevated  CT now with Empyema, concern for bilateral PNA, abscess  1) Leukocytosis  - Now suspected to be infectious pulmonary sequelae, still persistent--needs source control for resolution?  - Trend WBC  2) COVID  - Prior infection  - Monitor for any signs active COVID/reinfection  3) Suspected Empyema/Abscess  - Meropenem 1g q 8  - VATS scheduled tentatively for 5/12 with thoracic, would obtain cultures from OR  4) Gangrene  - Appears noninfectious/dry gangrene  - Monitor wounds    Sammy Chambers MD  Pager [phone]  After 5pm and on weekends call [phone]

## 2021-05-12 NOTE — PROGRESS NOTE ADULT - SUBJECTIVE AND OBJECTIVE BOX
CC: F/U for Empyema    Saw/spoke to patient. No fevers, no chills. No new complaints. For OR today.    Allergies  No Known Allergies    ANTIMICROBIALS:  Off    OTHER MEDS:  sodium chloride 0.9%. 1000 milliLiter(s) IV Continuous <Continuous>    PE:    Vital Signs Last 24 Hrs  T(C): 36.6 (12 May 2021 13:15), Max: 36.9 (11 May 2021 21:44)  T(F): 97.9 (12 May 2021 13:15), Max: 98.5 (12 May 2021 04:25)  HR: 98 (12 May 2021 13:15) (97 - 105)  BP: 114/70 (12 May 2021 13:15) (103/70 - 114/70)  RR: 17 (12 May 2021 13:15) (17 - 18)  SpO2: 98% (12 May 2021 13:15) (93% - 98%)    Gen: AOx3, NAD, non-toxic, pleasant  CV: S1+S2 normal, nontachycardic  Resp: Clear bilat, no resp distress, no crackles/wheezes  Abd: Soft, nontender, +BS  Ext: No LE edema, no wounds  : Mann, clear urine    LABS:                        8.5    12.90 )-----------( 555      ( 12 May 2021 07:07 )             28.1     05-12    135  |  99  |  21  ----------------------------<  122<H>  4.8   |  24  |  0.80    Ca    9.3      12 May 2021 07:07  Phos  3.9     05-12  Mg     2.6     05-12    TPro  7.5  /  Alb  2.8<L>  /  TBili  0.4  /  DBili  x   /  AST  18  /  ALT  18  /  AlkPhos  70  05-12    MICROBIOLOGY:    .Urine Clean Catch (Midstream)  05-06-21   >100,000 CFU/ml Klebsiella pneumoniae ESBL  --  Klebsiella pneumoniae ESBL    .Blood Blood  05-05-21   No Growth Final    .Blood Blood  05-05-21   No Growth Final     .Urine Clean Catch (Midstream)  04-19-21   No growth    04-13-21   Numerous Klebsiella pneumoniae ESBL  Normal Respiratory Lizzette absent  --  Klebsiella pneumoniae ESBL    (otherwise reviewed)    RADIOLOGY:    5/10 XR:    IMPRESSION:    The heart is normal in size. Right pleural effusion. Right lower lobe pneumonia and/or atelectasis. The left lung is clear. No change in appearance the chest when compared to previous study done on May 9, 2021 at 8:45 AM.

## 2021-05-12 NOTE — PROGRESS NOTE ADULT - ASSESSMENT
60M hospitalized for COVID PNA, c/b cardiogenic/septic shock and ischemic/hemorrhagic stroke, now extubated on the floor w/ negative COVID PCR, Thoracic surgery consulted to evaluate the recently found R sided empyema.     5/8 VSS; NAD. Thoracic surgery recommend IR Consult/Tap for R empyema.     5/9  VSS, NAD.  IR deferring IR drain of empyema 2/2 risk of iatrogenic bronchopleural fistula. Recommends VATS procedure.  Dr. Tena spoke to patients brother in law regarding VATS procedure.  Family to decide on whether to proceed with operation.  Continue IV ABX per ID.   5/11 VSS; Plan for VATs tomorrow 5/12/21 with Dr. Albarado. COVID pcr within 48 hr.   5/12 OR this afternoon VATS for empyema

## 2021-05-12 NOTE — PROGRESS NOTE ADULT - SUBJECTIVE AND OBJECTIVE BOX
Subjective:  Non verbal, Eyes open, moves about in bed, does not follow commands      V/S  T(C): 36.9 (05-12-21 @ 10:34), Max: 36.9 (05-11-21 @ 21:44)  HR: 105 (05-12-21 @ 10:34) (94 - 105)  BP: 103/70 (05-12-21 @ 10:34) (103/70 - 112/74)  RR: 18 (05-12-21 @ 10:34) (16 - 18)  SpO2: 98% (05-12-21 @ 10:34) (93% - 98%)      MEDICATIONS  (STANDING):  albuterol/ipratropium for Nebulization 3 milliLiter(s) Nebulizer every 12 hours  cholecalciferol 400 Unit(s) Oral daily  dextrose 40% Gel 15 Gram(s) Oral once  dextrose 40% Gel 15 Gram(s) Oral once  dextrose 5%. 1000 milliLiter(s) (50 mL/Hr) IV Continuous <Continuous>  dextrose 5%. 1000 milliLiter(s) (100 mL/Hr) IV Continuous <Continuous>  dextrose 5%. 1000 milliLiter(s) (50 mL/Hr) IV Continuous <Continuous>  dextrose 5%. 1000 milliLiter(s) (100 mL/Hr) IV Continuous <Continuous>  dextrose 50% Injectable 25 Gram(s) IV Push once  dextrose 50% Injectable 25 Gram(s) IV Push once  dextrose 50% Injectable 12.5 Gram(s) IV Push once  dextrose 50% Injectable 25 Gram(s) IV Push once  dextrose 50% Injectable 12.5 Gram(s) IV Push once  dextrose 50% Injectable 25 Gram(s) IV Push once  glucagon  Injectable 1 milliGRAM(s) IntraMuscular once  glucagon  Injectable 1 milliGRAM(s) IntraMuscular once  insulin lispro (ADMELOG) corrective regimen sliding scale   SubCutaneous every 6 hours  levETIRAcetam  Solution 500 milliGRAM(s) Oral two times a day  meropenem  IVPB 1000 milliGRAM(s) IV Intermittent every 8 hours  multivitamin/minerals Oral Solution (WELLESSE) 30 milliLiter(s) Oral daily  polyethylene glycol 3350 17 Gram(s) Oral daily  senna 2 Tablet(s) Oral at bedtime  sodium chloride 0.9%. 1000 milliLiter(s) (75 mL/Hr) IV Continuous <Continuous>  tamsulosin 0.4 milliGRAM(s) Oral at bedtime  thiamine 100 milliGRAM(s) Oral daily      05-12    135  |  99  |  21  ----------------------------<  122<H>  4.8   |  24  |  0.80    Ca    9.3      12 May 2021 07:07  Phos  3.9     05-12  Mg     2.6     05-12    TPro  7.5  /  Alb  2.8<L>  /  TBili  0.4  /  DBili  x   /  AST  18  /  ALT  18  /  AlkPhos  70  05-12                               8.5    12.90 )-----------( 555      ( 12 May 2021 07:07 )             28.1        PT/INR - ( 12 May 2021 07:07 )   PT: 13.7 sec;   INR: 1.15 ratio         PTT - ( 12 May 2021 07:07 )  PTT:105.1 sec         CAPILLARY BLOOD GLUCOSE      POCT Blood Glucose.: 125 mg/dL (12 May 2021 06:30)  POCT Blood Glucose.: 117 mg/dL (11 May 2021 23:26)  POCT Blood Glucose.: 100 mg/dL (11 May 2021 21:35)  POCT Blood Glucose.: 100 mg/dL (11 May 2021 18:07)  POCT Blood Glucose.: 141 mg/dL (11 May 2021 12:08)           CXR: < from: Xray Chest 1 View- PORTABLE-Routine (Xray Chest 1 View- PORTABLE-Routine in AM.) (05.10.21 @ 09:44) >  The heart is normal in size. Right pleural effusion. Right lower lobe pneumonia and/or atelectasis. The left lung is clear. No change in appearance the chest when compared to previous study done on May 9, 2021 at 8:45 AM.    < end of copied text >        Physical Exam:      Neuro: Awake, eyes open, does not follow commands, non verbal    Pulm: Room air, non labored, diminished at right base    CV: RRR, S1S2    Abd: Soft,PEG in place +BS    Ext: No edema, necrotic toes              PAST MEDICAL & SURGICAL HISTORY:  No pertinent past medical history    No significant past surgical history

## 2021-05-12 NOTE — PROGRESS NOTE ADULT - ASSESSMENT
61 yo man with past medical history of diabetes mellitus who was initially admitted to [facility] ICU for hypoxic respiratory failure secondary to SARS-CoV-2 infection complicated by right heart strain, septic shock, ischemic stroke with hemorrhagic transformation and ESBL Klebsiella pneumonia. Transferred to [facility] for neurosurgery evaluation and further management. Now s/p PEG. Pulmonary service consulted for concern of empyema on CT chest.     Imaging reviewed with chest radiologist. Air and fluid collection does not appear to be lung abscess but is located in pleural space consistent with empyema. Bedside ultrasound with posterior pocket of loculated fluid with no safe pocket for chest tube placement.      Recommendations:  - Plan for VATS today  - Continue antibiotics as per ID    --------------------------------------------------------  Troy Hong, PGY-5  Pulmonary/Critical Care Fellow  Pager: [phone] ([facility]), [phone] ([facility])  Pulmonary spectra: [id number]

## 2021-05-12 NOTE — BRIEF OPERATIVE NOTE - OPERATION/FINDINGS
Pt is now s/p Right VATS converted to R thoracotomy, decortication, drainage of abscess Pt is now s/p Right VATS converted to R thoracotomy, decortication, drainage of abscess, flex bronch

## 2021-05-12 NOTE — PROGRESS NOTE ADULT - ASSESSMENT
60 yr male with PMH of DM who was initially admitted to [facility] ICU for hypoxic respiratory failure 2/2 COVID c/b PE with R heart strain, cardiogenic and septic shock, ischemic and hemorrhagic CVA and ESBL klebsiella ventilator associate PNA, transferred to [facility] for neurosurgery eval and further management. s/p PEG, now monitoring for refeeding syndrome, complicated with right empyema planned for VATs

## 2021-05-12 NOTE — CONSULT NOTE ADULT - SUBJECTIVE AND OBJECTIVE BOX
HPI:   61 yo man with past medical history of diabetes mellitus who was initially admitted to [facility] ICU for hypoxic respiratory failure secondary to SARS-CoV-2 infection complicated by right heart strain, cardiogenic and septic shock, ischemic stroke with hemorrhagic transformation and ESBL Klebsiella pneumonia. Transferred to [facility] for neurosurgery evaluation and further management. Now s/p PEG. Today CT chest/abd/pelv done, w/ CT chest showing R sided loculated collection, concerning for empyema. Thoracic Surgery consulted to evaluate. Pt is currently saturating >95% on room air. On 5/12 he underwent right VATS converted to open thoracotomy, decortication, drainage of abscess and flex bronch. Pt briefly on lexi intraop and extubated in OR.     , 2.5L Crystalloid, 2 units PRBC, .       PAST MEDICAL & SURGICAL HISTORY:  No pertinent past medical history    No significant past surgical history    PAST SURGICAL HISTORY: No significant past surgical history        FAMILY HISTORY: No pertinent family history in first degree relatives        SOCIAL HISTORY:    CODE STATUS:     HOME MEDICATIONS:    ALLERGIES: No Known Allergies      VITAL SIGNS:  ICU Vital Signs Last 24 Hrs  T(C): 36.6 (12 May 2021 13:15), Max: 36.9 (11 May 2021 21:44)  T(F): 97.9 (12 May 2021 13:15), Max: 98.5 (12 May 2021 04:25)  HR: 115 (12 May 2021 19:15) (97 - 115)  BP: 114/70 (12 May 2021 13:15) (103/70 - 114/70)  BP(mean): --  ABP: 100/64 (12 May 2021 19:15) (100/64 - 100/64)  ABP(mean): 77 (12 May 2021 19:15) (77 - 77)  RR: 32 (12 May 2021 19:15) (17 - 32)  SpO2: 92% (12 May 2021 19:15) (92% - 98%)      NEURO  Exam:  Meds:    RESPIRATORY  Mechanical Ventilation:   ABG - ( 12 May 2021 17:41 )  pH: 7.32  /  pCO2: 48    /  pO2: 283   / HCO3: 24    / Base Excess: -1.6  /  SaO2: 100     Lactate: x          Exam:  Meds:    CARDIOVASCULAR    Exam:  Cardiac Rhythm:  Meds:    GI/NUTRITION  Exam:  Diet:  Meds:    GENITOURINARY/RENAL  Meds:sodium chloride 0.9%. 1000 milliLiter(s) IV Continuous <Continuous>      05-11 @ 07:01  -  05-12 @ 07:00  --------------------------------------------------------  IN:    Enteral Tube Flush: 680 mL    Free Water: 460 mL    Glucerna: 350 mL    Heparin Infusion: 156 mL    IV PiggyBack: 150 mL  Total IN: 1796 mL    OUT:    Indwelling Catheter - Urethral (mL): 1300 mL    Intermittent Catheterization - Urethral (mL): 450 mL  Total OUT: 1750 mL    Total NET: 46 mL      05-12 @ 07:01  -  05-12 @ 19:20  --------------------------------------------------------  IN:  Total IN: 0 mL    OUT:    Indwelling Catheter - Urethral (mL): 550 mL  Total OUT: 550 mL    Total NET: -550 mL        Weight (kg): 66.2 (05-12 @ 10:34)  05-12    135  |  99  |  21  ----------------------------<  122<H>  4.8   |  24  |  0.80    Ca    9.3      12 May 2021 07:07  Phos  3.9     05-12  Mg     2.6     05-12    TPro  7.5  /  Alb  2.8<L>  /  TBili  0.4  /  DBili  x   /  AST  18  /  ALT  18  /  AlkPhos  70  05-12    [ ] Mann catheter, indication: urine output monitoring in critically ill patient    HEMATOLOGIC  [ ] VTE Prophylaxis:                          8.5    12.90 )-----------( 555      ( 12 May 2021 07:07 )             28.1     PT/INR - ( 12 May 2021 07:07 )   PT: 13.7 sec;   INR: 1.15 ratio         PTT - ( 12 May 2021 07:07 )  PTT:105.1 sec  Transfusion: [ ] PRBC	[ ] Platelets	[ ] FFP	[ ] Cryoprecipitate      INFECTIOUS DISEASES  Meds:meropenem  IVPB 1000 milliGRAM(s) IV Intermittent every 8 hours    RECENT CULTURES:      ENDOCRINE  Meds:  CAPILLARY BLOOD GLUCOSE      POCT Blood Glucose.: 142 mg/dL (12 May 2021 13:42)  POCT Blood Glucose.: 125 mg/dL (12 May 2021 06:30)  POCT Blood Glucose.: 117 mg/dL (11 May 2021 23:26)  POCT Blood Glucose.: 100 mg/dL (11 May 2021 21:35)      PATIENT CARE ACCESS DEVICES:  [ ] Peripheral IV  [ ] Central Venous Line	[ ] R	[ ] L	[ ] IJ	[ ] Fem	[ ] SC	Placed:   [ ] Arterial Line		[ ] R	[ ] L	[ ] Fem	[ ] Rad	[ ] Ax	Placed:   [ ] PICC:					[ ] Mediport  [ ] Urinary Catheter, Date Placed:   [x] Necessity of urinary, arterial, and venous catheters discussed    OTHER MEDICATIONS: chlorhexidine 2% Cloths 1 Application(s) Topical <User Schedule>      IMAGING STUDIES:

## 2021-05-12 NOTE — PROGRESS NOTE ADULT - SUBJECTIVE AND OBJECTIVE BOX
INTERVAL EVENTS  No acute events overnight  NPO for VATS today    OBJECTIVE    Vital Signs Last 24 Hrs  T(C): 36.6 (12 May 2021 13:15), Max: 36.9 (11 May 2021 21:44)  T(F): 97.9 (12 May 2021 13:15), Max: 98.5 (12 May 2021 04:25)  HR: 98 (12 May 2021 13:15) (97 - 105)  BP: 114/70 (12 May 2021 13:15) (103/70 - 114/70)  BP(mean): --  RR: 17 (12 May 2021 13:15) (17 - 18)  SpO2: 98% (12 May 2021 13:15) (93% - 98%)    PHYSICAL EXAM  General: no acute distress  HEENT: normocephalic  Eyes: pupils equal and reactive to light  Neck: supple  Respiratory: non labored breathing, decreased breath sounds in bases  Cardiovascular: normal rate, regular rhythm  Gastrointestinal: abdomen soft, non tender, non distended  Extremities: no lower extremity edema  Neurological: not oriented      MEDICATIONS  (STANDING):  sodium chloride 0.9%. 1000 milliLiter(s) (75 mL/Hr) IV Continuous <Continuous>    MEDICATIONS  (PRN):      LABORATORY                                            8.5    12.90 )-----------( 555      ( 12 May 2021 07:07 )             28.1     05-12    135  |  99  |  21  ----------------------------<  122<H>  4.8   |  24  |  0.80    Ca    9.3      12 May 2021 07:07  Phos  3.9     05-12  Mg     2.6     05-12    TPro  7.5  /  Alb  2.8<L>  /  TBili  0.4  /  DBili  x   /  AST  18  /  ALT  18  /  AlkPhos  70  05-12      RADIOLOGY    CT Chest w/ IV Cont (05.06.21 @ 18:44) >  Right empyema. Bilateral multifocal pneumonia.  Splenic infarcts.  Mucosal hyperenhancement of the urinary bladder, possibly cystitis.  Asymmetric enlargement of the right obturator internus muscle with hyperdense foci, suspicious for intramuscular hematoma with active bleed; alternatively, intramuscular metastases can have this appearance.    Dr. Julian discussed the preliminary findings with Dr. Spears on May 6, 2021 at 7:52 PM. The last 2 additional findings were discussed by Dr. St with Dr. Mckeon on May 7, 2021 at 10:56 AM. Readback was obtained.    < end of copied text >      TTE with Doppler (w/Cont) (04.15.21 @ 19:22) >  1. Mild aortic root dilatation  (Ao: 4.2 cm at the sinuses of Valsalva).  2. Increased relative wall thickness with normal left ventricular mass index, consistent with concentric left ventricular remodeling.  3. Hyperdynamic left ventricular systolic function. Endocardial visualization enhanced with intravenous injection of Ultrasonic Enhancing Agent (Definity).  4. The right ventricle is not well visualized; grossly Normal right ventricular size and systolic function. TV s' = 20 cm/sec.  5. Estimated right ventricular systolic pressure equals 31 mm Hg, assuming right atrial pressure equals 8 mm Hg, consistent with normal pulmonary pressures.  6. Unable to evalaute for PFO/ASD in the setting of poor image quality. Consider repeat limited study with agitated saliine if clinically indicated.  7. Normal pericardium with trace pericardial effusion.  *** No previous Echo exam.    < end of copied text >   104

## 2021-05-12 NOTE — PROGRESS NOTE ADULT - PROBLEM SELECTOR PLAN 2
- Etiology likely multi-factorial in setting of acute ischemic infarctions of the brain in conjunction vs acute kidney injury with uremia (now resolving) vs resolving hypoxic respiratory failure, covid-19 infection, superimposed bacterial pneumonia, urinary retention, concern for nutritional deficiencies)  - S/p management of: acute pneumonia with antibiotics; pulmonary embolus with full-dose anticoagulation  - Reorientation with family at bedside (pt speaks E[place], does best with family present)  - MRI Brain, and MRA Head demonstrated multiple evolving subacute infarcts with associated hemorrhages, likely expected evolution of ischemia  - Neuro recs appreciated  - As per neuro, EEG not suggestive of seizure-like activity  - C/w Keppra 500 mg BID  - repeat CT head 5/5 normal

## 2021-05-12 NOTE — PRE-ANESTHESIA EVALUATION ADULT - NSANTHOSAYNRD_GEN_A_CORE
No. HENNA screening performed.  STOP BANG Legend: 0-2 = LOW Risk; 3-4 = INTERMEDIATE Risk; 5-8 = HIGH Risk
No. HENNA screening performed.  STOP BANG Legend: 0-2 = LOW Risk; 3-4 = INTERMEDIATE Risk; 5-8 = HIGH Risk

## 2021-05-12 NOTE — PROGRESS NOTE ADULT - PROBLEM SELECTOR PLAN 4
-Pulmonary embolus noted on ct scan performed at time of admission  -Etiology of venous thromboembolism uncertain at this time; no known history of thrombophilia; provoked in setting of acute covid illness   - on heparin, will stop in AM and restart heparin after VATS  - will discharge on Eliquis

## 2021-05-12 NOTE — PROGRESS NOTE ADULT - SUBJECTIVE AND OBJECTIVE BOX
DIABETES FOLLOW UP NOTE: Saw pt earlier today  INTERVAL HX: 59 y/o M Gujarati speaking w/ new onset diabetes > A1c of 10.3% on admission for COVID with prolonged hospital course, most recently Empyema and concern for bilateral PNA, abscess. NPO today for VATS. Off TFs > BG at goal while NPO. Used official [employer]  # [id number] [place]. Unable to determine if pt is oriented. Reported having pain in his legs but when asked since when he said weeks ago but now pain is gone. Also can't recall the year but able to recall the month. Denies any pain.        Review of Systems:  General: As above  Limited assessment since pt appear forgetful and somewhat disoriented.    Allergies    No Known Allergies    Intolerances      MEDICATIONS  (STANDING):  sodium chloride 0.9%. 1000 milliLiter(s) (75 mL/Hr) IV Continuous <Continuous>      PHYSICAL EXAM:  VITALS: T(C): 36.6 (05-12-21 @ 13:15)  T(F): 97.9 (05-12-21 @ 13:15), Max: 98.5 (05-12-21 @ 04:25)  HR: 98 (05-12-21 @ 13:15) (97 - 105)  BP: 114/70 (05-12-21 @ 13:15) (103/70 - 114/70)  RR:  (17 - 18)  SpO2:  (93% - 98%)  Wt(kg): --  GENERAL: Male laying in bed in NAD  Abdomen: Soft, nontender, non distended  Extremities: Warm, no edema in all 4 exts  NEURO: Alert, forgetful    LABS:  POCT Blood Glucose.: 142 mg/dL (05-12-21 @ 13:42)  POCT Blood Glucose.: 125 mg/dL (05-12-21 @ 06:30)  POCT Blood Glucose.: 117 mg/dL (05-11-21 @ 23:26)  POCT Blood Glucose.: 100 mg/dL (05-11-21 @ 21:35)  POCT Blood Glucose.: 100 mg/dL (05-11-21 @ 18:07)  POCT Blood Glucose.: 141 mg/dL (05-11-21 @ 12:08)  POCT Blood Glucose.: 201 mg/dL (05-11-21 @ 06:30)  POCT Blood Glucose.: 188 mg/dL (05-10-21 @ 23:55)  POCT Blood Glucose.: 112 mg/dL (05-10-21 @ 17:37)  POCT Blood Glucose.: 158 mg/dL (05-10-21 @ 12:08)  POCT Blood Glucose.: 123 mg/dL (05-10-21 @ 06:28)  POCT Blood Glucose.: 187 mg/dL (05-09-21 @ 23:59)  POCT Blood Glucose.: 127 mg/dL (05-09-21 @ 17:44)                            8.5    12.90 )-----------( 555      ( 12 May 2021 07:07 )             28.1       05-12    135  |  99  |  21  ----------------------------<  122<H>  4.8   |  24  |  0.80      EGFR if non : 98    Ca    9.3      05-12  Mg     2.6     05-12  Phos  3.9     05-12    TPro  7.5  /  Alb  2.8<L>  /  TBili  0.4  /  DBili  x   /  AST  18  /  ALT  18  /  AlkPhos  70  05-12      Thyroid Function Tests:  04-24 @ 11:11 TSH 2.00 FreeT4 -- T3 -- Anti TPO -- Anti Thyroglobulin Ab -- TSI --      A1C with Estimated Average Glucose Result: 10.3 % (04-02-21 @ 14:28)      Estimated Average Glucose: 249 mg/dL (04-02-21 @ 14:28)        04-26 Chol 114 Direct LDL -- LDL calculated 53 HDL 35<L> Trig 126, 04-13 Chol -- Direct LDL -- LDL calculated -- HDL -- Trig 126

## 2021-05-13 LAB
ANION GAP SERPL CALC-SCNC: 12 MMOL/L — SIGNIFICANT CHANGE UP (ref 5–17)
ANION GAP SERPL CALC-SCNC: 13 MMOL/L — SIGNIFICANT CHANGE UP (ref 5–17)
ANION GAP SERPL CALC-SCNC: 14 MMOL/L — SIGNIFICANT CHANGE UP (ref 5–17)
ANION GAP SERPL CALC-SCNC: 17 MMOL/L — SIGNIFICANT CHANGE UP (ref 5–17)
ANION GAP SERPL CALC-SCNC: 17 MMOL/L — SIGNIFICANT CHANGE UP (ref 5–17)
APTT BLD: 29.9 SEC — SIGNIFICANT CHANGE UP (ref 27.5–35.5)
APTT BLD: 94.6 SEC — HIGH (ref 27.5–35.5)
APTT BLD: 95.8 SEC — HIGH (ref 27.5–35.5)
BASE EXCESS BLDV CALC-SCNC: 0.2 MMOL/L — SIGNIFICANT CHANGE UP (ref -2–2)
BUN SERPL-MCNC: 24 MG/DL — HIGH (ref 7–23)
BUN SERPL-MCNC: 25 MG/DL — HIGH (ref 7–23)
BUN SERPL-MCNC: 25 MG/DL — HIGH (ref 7–23)
BUN SERPL-MCNC: 26 MG/DL — HIGH (ref 7–23)
BUN SERPL-MCNC: 28 MG/DL — HIGH (ref 7–23)
CA-I SERPL-SCNC: 1.19 MMOL/L — SIGNIFICANT CHANGE UP (ref 1.12–1.3)
CALCIUM SERPL-MCNC: 8.6 MG/DL — SIGNIFICANT CHANGE UP (ref 8.4–10.5)
CALCIUM SERPL-MCNC: 8.9 MG/DL — SIGNIFICANT CHANGE UP (ref 8.4–10.5)
CALCIUM SERPL-MCNC: 9 MG/DL — SIGNIFICANT CHANGE UP (ref 8.4–10.5)
CHLORIDE BLDV-SCNC: 102 MMOL/L — SIGNIFICANT CHANGE UP (ref 96–108)
CHLORIDE SERPL-SCNC: 100 MMOL/L — SIGNIFICANT CHANGE UP (ref 96–108)
CHLORIDE SERPL-SCNC: 98 MMOL/L — SIGNIFICANT CHANGE UP (ref 96–108)
CHLORIDE SERPL-SCNC: 99 MMOL/L — SIGNIFICANT CHANGE UP (ref 96–108)
CO2 BLDV-SCNC: 28 MMOL/L — SIGNIFICANT CHANGE UP (ref 22–30)
CO2 SERPL-SCNC: 15 MMOL/L — LOW (ref 22–31)
CO2 SERPL-SCNC: 17 MMOL/L — LOW (ref 22–31)
CO2 SERPL-SCNC: 21 MMOL/L — LOW (ref 22–31)
CO2 SERPL-SCNC: 21 MMOL/L — LOW (ref 22–31)
CO2 SERPL-SCNC: 22 MMOL/L — SIGNIFICANT CHANGE UP (ref 22–31)
CREAT SERPL-MCNC: 0.8 MG/DL — SIGNIFICANT CHANGE UP (ref 0.5–1.3)
CREAT SERPL-MCNC: 0.8 MG/DL — SIGNIFICANT CHANGE UP (ref 0.5–1.3)
CREAT SERPL-MCNC: 0.82 MG/DL — SIGNIFICANT CHANGE UP (ref 0.5–1.3)
CREAT SERPL-MCNC: 0.82 MG/DL — SIGNIFICANT CHANGE UP (ref 0.5–1.3)
CREAT SERPL-MCNC: 0.85 MG/DL — SIGNIFICANT CHANGE UP (ref 0.5–1.3)
GAS PNL BLDV: 133 MMOL/L — LOW (ref 135–145)
GAS PNL BLDV: SIGNIFICANT CHANGE UP
GAS PNL BLDV: SIGNIFICANT CHANGE UP
GLUCOSE BLDC GLUCOMTR-MCNC: 138 MG/DL — HIGH (ref 70–99)
GLUCOSE BLDC GLUCOMTR-MCNC: 195 MG/DL — HIGH (ref 70–99)
GLUCOSE BLDC GLUCOMTR-MCNC: 262 MG/DL — HIGH (ref 70–99)
GLUCOSE BLDC GLUCOMTR-MCNC: 272 MG/DL — HIGH (ref 70–99)
GLUCOSE BLDC GLUCOMTR-MCNC: 277 MG/DL — HIGH (ref 70–99)
GLUCOSE BLDC GLUCOMTR-MCNC: 309 MG/DL — HIGH (ref 70–99)
GLUCOSE BLDC GLUCOMTR-MCNC: 351 MG/DL — HIGH (ref 70–99)
GLUCOSE BLDV-MCNC: 295 MG/DL — HIGH (ref 70–99)
GLUCOSE SERPL-MCNC: 262 MG/DL — HIGH (ref 70–99)
GLUCOSE SERPL-MCNC: 283 MG/DL — HIGH (ref 70–99)
GLUCOSE SERPL-MCNC: 290 MG/DL — HIGH (ref 70–99)
GLUCOSE SERPL-MCNC: 297 MG/DL — HIGH (ref 70–99)
GLUCOSE SERPL-MCNC: 98 MG/DL — SIGNIFICANT CHANGE UP (ref 70–99)
GRAM STN FLD: SIGNIFICANT CHANGE UP
HCO3 BLDV-SCNC: 26 MMOL/L — SIGNIFICANT CHANGE UP (ref 21–29)
HCT VFR BLD CALC: 32.4 % — LOW (ref 39–50)
HCT VFR BLD CALC: 33.9 % — LOW (ref 39–50)
HCT VFR BLD CALC: 37.1 % — LOW (ref 39–50)
HCT VFR BLDA CALC: 36 % — LOW (ref 39–50)
HGB BLD CALC-MCNC: 11.8 G/DL — LOW (ref 13–17)
HGB BLD-MCNC: 10.2 G/DL — LOW (ref 13–17)
HGB BLD-MCNC: 10.2 G/DL — LOW (ref 13–17)
HGB BLD-MCNC: 11.2 G/DL — LOW (ref 13–17)
LACTATE BLDV-MCNC: 1.7 MMOL/L — SIGNIFICANT CHANGE UP (ref 0.7–2)
MAGNESIUM SERPL-MCNC: 2.4 MG/DL — SIGNIFICANT CHANGE UP (ref 1.6–2.6)
MAGNESIUM SERPL-MCNC: 2.5 MG/DL — SIGNIFICANT CHANGE UP (ref 1.6–2.6)
MCHC RBC-ENTMCNC: 23.9 PG — LOW (ref 27–34)
MCHC RBC-ENTMCNC: 24.2 PG — LOW (ref 27–34)
MCHC RBC-ENTMCNC: 24.2 PG — LOW (ref 27–34)
MCHC RBC-ENTMCNC: 30.1 GM/DL — LOW (ref 32–36)
MCHC RBC-ENTMCNC: 30.2 GM/DL — LOW (ref 32–36)
MCHC RBC-ENTMCNC: 31.5 GM/DL — LOW (ref 32–36)
MCV RBC AUTO: 76.8 FL — LOW (ref 80–100)
MCV RBC AUTO: 79.3 FL — LOW (ref 80–100)
MCV RBC AUTO: 80.3 FL — SIGNIFICANT CHANGE UP (ref 80–100)
NIGHT BLUE STAIN TISS: SIGNIFICANT CHANGE UP
NRBC # BLD: 0 /100 WBCS — SIGNIFICANT CHANGE UP (ref 0–0)
OTHER CELLS CSF MANUAL: 6 ML/DL — LOW (ref 18–22)
PCO2 BLDV: 50 MMHG — SIGNIFICANT CHANGE UP (ref 35–50)
PH BLDV: 7.33 — LOW (ref 7.35–7.45)
PHOSPHATE SERPL-MCNC: 3.3 MG/DL — SIGNIFICANT CHANGE UP (ref 2.5–4.5)
PHOSPHATE SERPL-MCNC: 3.7 MG/DL — SIGNIFICANT CHANGE UP (ref 2.5–4.5)
PHOSPHATE SERPL-MCNC: 4 MG/DL — SIGNIFICANT CHANGE UP (ref 2.5–4.5)
PHOSPHATE SERPL-MCNC: 4.3 MG/DL — SIGNIFICANT CHANGE UP (ref 2.5–4.5)
PHOSPHATE SERPL-MCNC: 4.6 MG/DL — HIGH (ref 2.5–4.5)
PLATELET # BLD AUTO: 483 K/UL — HIGH (ref 150–400)
PLATELET # BLD AUTO: 483 K/UL — HIGH (ref 150–400)
PLATELET # BLD AUTO: 490 K/UL — HIGH (ref 150–400)
PO2 BLDV: 26 MMHG — SIGNIFICANT CHANGE UP (ref 25–45)
POTASSIUM BLDV-SCNC: 5.3 MMOL/L — SIGNIFICANT CHANGE UP (ref 3.5–5.3)
POTASSIUM SERPL-MCNC: 4.9 MMOL/L — SIGNIFICANT CHANGE UP (ref 3.5–5.3)
POTASSIUM SERPL-MCNC: 5 MMOL/L — SIGNIFICANT CHANGE UP (ref 3.5–5.3)
POTASSIUM SERPL-MCNC: 5.7 MMOL/L — HIGH (ref 3.5–5.3)
POTASSIUM SERPL-MCNC: 5.7 MMOL/L — HIGH (ref 3.5–5.3)
POTASSIUM SERPL-MCNC: 6.1 MMOL/L — HIGH (ref 3.5–5.3)
POTASSIUM SERPL-SCNC: 4.9 MMOL/L — SIGNIFICANT CHANGE UP (ref 3.5–5.3)
POTASSIUM SERPL-SCNC: 5 MMOL/L — SIGNIFICANT CHANGE UP (ref 3.5–5.3)
POTASSIUM SERPL-SCNC: 5.7 MMOL/L — HIGH (ref 3.5–5.3)
POTASSIUM SERPL-SCNC: 5.7 MMOL/L — HIGH (ref 3.5–5.3)
POTASSIUM SERPL-SCNC: 6.1 MMOL/L — HIGH (ref 3.5–5.3)
RBC # BLD: 4.22 M/UL — SIGNIFICANT CHANGE UP (ref 4.2–5.8)
RBC # BLD: 4.22 M/UL — SIGNIFICANT CHANGE UP (ref 4.2–5.8)
RBC # BLD: 4.68 M/UL — SIGNIFICANT CHANGE UP (ref 4.2–5.8)
RBC # FLD: 21.6 % — HIGH (ref 10.3–14.5)
RBC # FLD: 21.8 % — HIGH (ref 10.3–14.5)
RBC # FLD: 21.9 % — HIGH (ref 10.3–14.5)
SAO2 % BLDV: 40 % — LOW (ref 67–88)
SODIUM SERPL-SCNC: 130 MMOL/L — LOW (ref 135–145)
SODIUM SERPL-SCNC: 132 MMOL/L — LOW (ref 135–145)
SODIUM SERPL-SCNC: 132 MMOL/L — LOW (ref 135–145)
SODIUM SERPL-SCNC: 133 MMOL/L — LOW (ref 135–145)
SODIUM SERPL-SCNC: 135 MMOL/L — SIGNIFICANT CHANGE UP (ref 135–145)
SPECIMEN SOURCE: SIGNIFICANT CHANGE UP
WBC # BLD: 22.83 K/UL — HIGH (ref 3.8–10.5)
WBC # BLD: 23.95 K/UL — HIGH (ref 3.8–10.5)
WBC # BLD: 24.6 K/UL — HIGH (ref 3.8–10.5)
WBC # FLD AUTO: 22.83 K/UL — HIGH (ref 3.8–10.5)
WBC # FLD AUTO: 23.95 K/UL — HIGH (ref 3.8–10.5)
WBC # FLD AUTO: 24.6 K/UL — HIGH (ref 3.8–10.5)

## 2021-05-13 PROCEDURE — 99232 SBSQ HOSP IP/OBS MODERATE 35: CPT

## 2021-05-13 PROCEDURE — 99233 SBSQ HOSP IP/OBS HIGH 50: CPT

## 2021-05-13 PROCEDURE — 71045 X-RAY EXAM CHEST 1 VIEW: CPT | Mod: 26,76

## 2021-05-13 PROCEDURE — 99233 SBSQ HOSP IP/OBS HIGH 50: CPT | Mod: GC

## 2021-05-13 RX ORDER — SODIUM ZIRCONIUM CYCLOSILICATE 10 G/10G
10 POWDER, FOR SUSPENSION ORAL ONCE
Refills: 0 | Status: COMPLETED | OUTPATIENT
Start: 2021-05-13 | End: 2021-05-13

## 2021-05-13 RX ORDER — INSULIN LISPRO 100/ML
VIAL (ML) SUBCUTANEOUS EVERY 6 HOURS
Refills: 0 | Status: DISCONTINUED | OUTPATIENT
Start: 2021-05-13 | End: 2021-05-13

## 2021-05-13 RX ORDER — IPRATROPIUM/ALBUTEROL SULFATE 18-103MCG
3 AEROSOL WITH ADAPTER (GRAM) INHALATION EVERY 6 HOURS
Refills: 0 | Status: DISCONTINUED | OUTPATIENT
Start: 2021-05-13 | End: 2021-06-04

## 2021-05-13 RX ORDER — DEXTROSE 50 % IN WATER 50 %
15 SYRINGE (ML) INTRAVENOUS ONCE
Refills: 0 | Status: DISCONTINUED | OUTPATIENT
Start: 2021-05-13 | End: 2021-05-13

## 2021-05-13 RX ORDER — DEXTROSE 50 % IN WATER 50 %
50 SYRINGE (ML) INTRAVENOUS ONCE
Refills: 0 | Status: COMPLETED | OUTPATIENT
Start: 2021-05-13 | End: 2021-05-13

## 2021-05-13 RX ORDER — HUMAN INSULIN 100 [IU]/ML
12 INJECTION, SUSPENSION SUBCUTANEOUS EVERY 6 HOURS
Refills: 0 | Status: DISCONTINUED | OUTPATIENT
Start: 2021-05-13 | End: 2021-05-13

## 2021-05-13 RX ORDER — DEXTROSE 50 % IN WATER 50 %
25 SYRINGE (ML) INTRAVENOUS ONCE
Refills: 0 | Status: DISCONTINUED | OUTPATIENT
Start: 2021-05-13 | End: 2021-05-13

## 2021-05-13 RX ORDER — SODIUM CHLORIDE 9 MG/ML
1000 INJECTION, SOLUTION INTRAVENOUS
Refills: 0 | Status: DISCONTINUED | OUTPATIENT
Start: 2021-05-13 | End: 2021-05-13

## 2021-05-13 RX ORDER — GLUCAGON INJECTION, SOLUTION 0.5 MG/.1ML
1 INJECTION, SOLUTION SUBCUTANEOUS ONCE
Refills: 0 | Status: DISCONTINUED | OUTPATIENT
Start: 2021-05-13 | End: 2021-05-13

## 2021-05-13 RX ORDER — INSULIN HUMAN 100 [IU]/ML
10 INJECTION, SOLUTION SUBCUTANEOUS ONCE
Refills: 0 | Status: COMPLETED | OUTPATIENT
Start: 2021-05-13 | End: 2021-05-13

## 2021-05-13 RX ORDER — HUMAN INSULIN 100 [IU]/ML
14 INJECTION, SUSPENSION SUBCUTANEOUS EVERY 6 HOURS
Refills: 0 | Status: DISCONTINUED | OUTPATIENT
Start: 2021-05-13 | End: 2021-05-13

## 2021-05-13 RX ORDER — DEXTROSE 50 % IN WATER 50 %
12.5 SYRINGE (ML) INTRAVENOUS ONCE
Refills: 0 | Status: DISCONTINUED | OUTPATIENT
Start: 2021-05-13 | End: 2021-05-13

## 2021-05-13 RX ORDER — CALCIUM GLUCONATE 100 MG/ML
1 VIAL (ML) INTRAVENOUS ONCE
Refills: 0 | Status: COMPLETED | OUTPATIENT
Start: 2021-05-13 | End: 2021-05-13

## 2021-05-13 RX ORDER — LEVETIRACETAM 250 MG/1
500 TABLET, FILM COATED ORAL
Refills: 0 | Status: DISCONTINUED | OUTPATIENT
Start: 2021-05-13 | End: 2021-06-04

## 2021-05-13 RX ORDER — HEPARIN SODIUM 5000 [USP'U]/ML
1300 INJECTION INTRAVENOUS; SUBCUTANEOUS
Qty: 25000 | Refills: 0 | Status: DISCONTINUED | OUTPATIENT
Start: 2021-05-13 | End: 2021-05-16

## 2021-05-13 RX ORDER — INSULIN LISPRO 100/ML
VIAL (ML) SUBCUTANEOUS EVERY 6 HOURS
Refills: 0 | Status: DISCONTINUED | OUTPATIENT
Start: 2021-05-13 | End: 2021-05-30

## 2021-05-13 RX ORDER — HUMAN INSULIN 100 [IU]/ML
12 INJECTION, SUSPENSION SUBCUTANEOUS EVERY 6 HOURS
Refills: 0 | Status: DISCONTINUED | OUTPATIENT
Start: 2021-05-13 | End: 2021-05-16

## 2021-05-13 RX ADMIN — Medication 1000 MILLIGRAM(S): at 08:15

## 2021-05-13 RX ADMIN — HEPARIN SODIUM 13 UNIT(S)/HR: 5000 INJECTION INTRAVENOUS; SUBCUTANEOUS at 07:37

## 2021-05-13 RX ADMIN — HYDROMORPHONE HYDROCHLORIDE 0.5 MILLIGRAM(S): 2 INJECTION INTRAMUSCULAR; INTRAVENOUS; SUBCUTANEOUS at 12:21

## 2021-05-13 RX ADMIN — MEROPENEM 100 MILLIGRAM(S): 1 INJECTION INTRAVENOUS at 05:45

## 2021-05-13 RX ADMIN — INSULIN HUMAN 10 UNIT(S): 100 INJECTION, SOLUTION SUBCUTANEOUS at 02:47

## 2021-05-13 RX ADMIN — Medication 100 MILLIGRAM(S): at 12:09

## 2021-05-13 RX ADMIN — HUMAN INSULIN 12 UNIT(S): 100 INJECTION, SUSPENSION SUBCUTANEOUS at 18:27

## 2021-05-13 RX ADMIN — Medication 8: at 05:44

## 2021-05-13 RX ADMIN — Medication 400 UNIT(S): at 12:06

## 2021-05-13 RX ADMIN — SENNA PLUS 2 TABLET(S): 8.6 TABLET ORAL at 23:19

## 2021-05-13 RX ADMIN — Medication 1000 MILLIGRAM(S): at 14:52

## 2021-05-13 RX ADMIN — HEPARIN SODIUM 13 UNIT(S)/HR: 5000 INJECTION INTRAVENOUS; SUBCUTANEOUS at 05:44

## 2021-05-13 RX ADMIN — HUMAN INSULIN 12 UNIT(S): 100 INJECTION, SUSPENSION SUBCUTANEOUS at 12:07

## 2021-05-13 RX ADMIN — Medication 0: at 18:21

## 2021-05-13 RX ADMIN — HUMAN INSULIN 12 UNIT(S): 100 INJECTION, SUSPENSION SUBCUTANEOUS at 06:02

## 2021-05-13 RX ADMIN — Medication 400 MILLIGRAM(S): at 07:36

## 2021-05-13 RX ADMIN — Medication 3: at 00:04

## 2021-05-13 RX ADMIN — HYDROMORPHONE HYDROCHLORIDE 0.5 MILLIGRAM(S): 2 INJECTION INTRAMUSCULAR; INTRAVENOUS; SUBCUTANEOUS at 05:45

## 2021-05-13 RX ADMIN — SODIUM ZIRCONIUM CYCLOSILICATE 10 GRAM(S): 10 POWDER, FOR SUSPENSION ORAL at 10:46

## 2021-05-13 RX ADMIN — CHLORHEXIDINE GLUCONATE 1 APPLICATION(S): 213 SOLUTION TOPICAL at 05:45

## 2021-05-13 RX ADMIN — Medication 400 MILLIGRAM(S): at 13:56

## 2021-05-13 RX ADMIN — Medication 50 MILLILITER(S): at 02:48

## 2021-05-13 RX ADMIN — HYDROMORPHONE HYDROCHLORIDE 0.5 MILLIGRAM(S): 2 INJECTION INTRAMUSCULAR; INTRAVENOUS; SUBCUTANEOUS at 07:04

## 2021-05-13 RX ADMIN — Medication 6: at 12:07

## 2021-05-13 RX ADMIN — LEVETIRACETAM 500 MILLIGRAM(S): 250 TABLET, FILM COATED ORAL at 18:19

## 2021-05-13 RX ADMIN — Medication 3 MILLILITER(S): at 05:33

## 2021-05-13 RX ADMIN — POLYETHYLENE GLYCOL 3350 17 GRAM(S): 17 POWDER, FOR SOLUTION ORAL at 12:06

## 2021-05-13 RX ADMIN — HYDROMORPHONE HYDROCHLORIDE 0.5 MILLIGRAM(S): 2 INJECTION INTRAMUSCULAR; INTRAVENOUS; SUBCUTANEOUS at 12:35

## 2021-05-13 RX ADMIN — Medication 100 GRAM(S): at 02:48

## 2021-05-13 RX ADMIN — MEROPENEM 100 MILLIGRAM(S): 1 INJECTION INTRAVENOUS at 23:06

## 2021-05-13 RX ADMIN — LEVETIRACETAM 500 MILLIGRAM(S): 250 TABLET, FILM COATED ORAL at 05:45

## 2021-05-13 RX ADMIN — TAMSULOSIN HYDROCHLORIDE 0.4 MILLIGRAM(S): 0.4 CAPSULE ORAL at 23:07

## 2021-05-13 RX ADMIN — MEROPENEM 100 MILLIGRAM(S): 1 INJECTION INTRAVENOUS at 14:55

## 2021-05-13 NOTE — PROGRESS NOTE ADULT - NUTRITIONAL ASSESSMENT
This patient has been assessed with a concern for Malnutrition and has been determined to have a diagnosis/diagnoses of Severe protein-calorie malnutrition.    This patient is being managed with:   Diet NPO with Tube Feed-  Tube Feeding Modality: Gastrostomy  Glucerna 1.2 Kaden (GLUCERNARTH)  Total Volume for 24 Hours (mL): 1680  Continuous  Starting Tube Feed Rate {mL per Hour}: 20  Increase Tube Feed Rate by (mL): 20     Every 2 hours  Until Goal Tube Feed Rate (mL per Hour): 70  Tube Feed Duration (in Hours): 24  Tube Feed Start Time: 20:30  Entered: May 12 2021  8:12PM     Island Pedicle Flap With Canthal Suspension Text: The defect edges were debeveled with a #15 scalpel blade.  Given the location of the defect, shape of the defect and the proximity to free margins an island pedicle advancement flap was deemed most appropriate.  Using a sterile surgical marker, an appropriate advancement flap was drawn incorporating the defect, outlining the appropriate donor tissue and placing the expected incisions within the relaxed skin tension lines where possible. The area thus outlined was incised deep to adipose tissue with a #15 scalpel blade.  The skin margins were undermined to an appropriate distance in all directions around the primary defect and laterally outward around the island pedicle utilizing iris scissors.  There was minimal undermining beneath the pedicle flap. A suspension suture was placed in the canthal tendon to prevent tension and prevent ectropion.

## 2021-05-13 NOTE — PROGRESS NOTE ADULT - SUBJECTIVE AND OBJECTIVE BOX
INTERVAL EVENTS  Underwent VATS and transferred to SICU afterwards  No acute events overnight  Reports mild chest pain but otherwise feels well    OBJECTIVE    Vital Signs Last 24 Hrs  T(C): 36 (13 May 2021 11:00), Max: 36.3 (12 May 2021 19:15)  T(F): 96.8 (13 May 2021 11:00), Max: 97.3 (12 May 2021 19:15)  HR: 103 (13 May 2021 13:00) (96 - 118)  BP: 110/62 (13 May 2021 13:00) (96/52 - 127/70)  BP(mean): 80 (13 May 2021 13:00) (67 - 91)  RR: 20 (13 May 2021 13:00) (15 - 32)  SpO2: 99% (13 May 2021 13:00) (91% - 100%)    PHYSICAL EXAM  General: no acute distress  HEENT: normocephalic  Eyes: pupils equal and reactive to light  Neck: supple  Respiratory: non labored breathing, decreased breath sounds in bases  Cardiovascular: normal rate, regular rhythm  Gastrointestinal: abdomen soft, non tender, non distended  Extremities: no lower extremity edema  Neurological: not oriented      MEDICATIONS  (STANDING):  albuterol/ipratropium for Nebulization 3 milliLiter(s) Nebulizer every 12 hours  chlorhexidine 2% Cloths 1 Application(s) Topical <User Schedule>  cholecalciferol 400 Unit(s) Oral daily  heparin  Infusion 1300 Unit(s)/Hr (13 mL/Hr) IV Continuous <Continuous>  insulin lispro (ADMELOG) corrective regimen sliding scale   SubCutaneous every 6 hours  insulin NPH human recombinant 14 Unit(s) SubCutaneous every 6 hours  levETIRAcetam  Solution 500 milliGRAM(s) Oral two times a day  meropenem  IVPB 1000 milliGRAM(s) IV Intermittent every 8 hours  polyethylene glycol 3350 17 Gram(s) Oral daily  senna 2 Tablet(s) Oral at bedtime  tamsulosin 0.4 milliGRAM(s) Oral at bedtime  thiamine 100 milliGRAM(s) Oral daily    MEDICATIONS  (PRN):  HYDROmorphone  Injectable 0.5 milliGRAM(s) IV Push every 4 hours PRN Severe Pain (7 - 10)      LABORATORY                                   10.2   23.95 )-----------( 483      ( 13 May 2021 09:16 )             32.4     05-13    132<L>  |  99  |  25<H>  ----------------------------<  262<H>  5.0   |  21<L>  |  0.80    Ca    9.0      13 May 2021 09:16  Phos  3.3     05-13  Mg     2.5     05-13    TPro  6.7  /  Alb  2.4<L>  /  TBili  0.9  /  DBili  0.3<H>  /  AST  27  /  ALT  21  /  AlkPhos  69  05-12        RADIOLOGY    CT Chest w/ IV Cont (05.06.21 @ 18:44) >  Right empyema. Bilateral multifocal pneumonia.  Splenic infarcts.  Mucosal hyperenhancement of the urinary bladder, possibly cystitis.  Asymmetric enlargement of the right obturator internus muscle with hyperdense foci, suspicious for intramuscular hematoma with active bleed; alternatively, intramuscular metastases can have this appearance.    Dr. Julian discussed the preliminary findings with Dr. Spears on May 6, 2021 at 7:52 PM. The last 2 additional findings were discussed by Dr. St with Dr. Mckeon on May 7, 2021 at 10:56 AM. Readback was obtained.    < end of copied text >      TTE with Doppler (w/Cont) (04.15.21 @ 19:22) >  1. Mild aortic root dilatation  (Ao: 4.2 cm at the sinuses of Valsalva).  2. Increased relative wall thickness with normal left ventricular mass index, consistent with concentric left ventricular remodeling.  3. Hyperdynamic left ventricular systolic function. Endocardial visualization enhanced with intravenous injection of Ultrasonic Enhancing Agent (Definity).  4. The right ventricle is not well visualized; grossly Normal right ventricular size and systolic function. TV s' = 20 cm/sec.  5. Estimated right ventricular systolic pressure equals 31 mm Hg, assuming right atrial pressure equals 8 mm Hg, consistent with normal pulmonary pressures.  6. Unable to evalaute for PFO/ASD in the setting of poor image quality. Consider repeat limited study with agitated saliine if clinically indicated.  7. Normal pericardium with trace pericardial effusion.  *** No previous Echo exam.    < end of copied text >

## 2021-05-13 NOTE — ADVANCED PRACTICE NURSE CONSULT - RECOMMEDATIONS
Will recommend the following;  1. Left ear: apply Cavilon daily  2. b/l buttocks: routine pericare with Kobe  3. Complete CAir boots to off-load the heels  4. continue with turning and positioning  5. Nutrition support as pt condition allows  Tx plan discussed with RN

## 2021-05-13 NOTE — PROGRESS NOTE ADULT - SUBJECTIVE AND OBJECTIVE BOX
DIABETES FOLLOW UP : Seen earlier today    INTERVAL HX: Romain  Chhaya ID [id number]; TF and NPH  restarted this am, tolerating Glucerna TF BG elevated > 250. More alert today, able to state his name/birthday , current year, states "[facility]a city in [place]) when asked where he is. Reoriented pt he is in [facility]; at [facility]. Pt c/o slight sob- ICU team and RN notified on unit immediately. Followed up with ICU RN  in afternoon pt SOB resolved after repositioning and TF is now running at goal rate.  Received D50 & Reg insulin at 2 am for hyperkalemia.  BG variable as TF restarted.     Review of Systems:  General: As above  Cardiovascular: No chest pain, palpitations  Respiratory: + Slight SOB, no cough  GI: No nausea, vomiting, abdominal pain  Endocrine: no polyuria, polydipsia or S&Sx of hypoglycemia    Allergies    No Known Allergies    Intolerances      MEDICATIONS  (STANDING):  acetaminophen  IVPB .. 1000 milliGRAM(s) IV Intermittent once  albuterol/ipratropium for Nebulization 3 milliLiter(s) Nebulizer every 12 hours  chlorhexidine 2% Cloths 1 Application(s) Topical <User Schedule>  cholecalciferol 400 Unit(s) Oral daily  heparin  Infusion 1300 Unit(s)/Hr (13 mL/Hr) IV Continuous <Continuous>  insulin lispro (ADMELOG) corrective regimen sliding scale   SubCutaneous every 6 hours  insulin NPH human recombinant 12 Unit(s) SubCutaneous every 6 hours  levETIRAcetam  Solution 500 milliGRAM(s) Oral two times a day  meropenem  IVPB 1000 milliGRAM(s) IV Intermittent every 8 hours  polyethylene glycol 3350 17 Gram(s) Oral daily  senna 2 Tablet(s) Oral at bedtime  tamsulosin 0.4 milliGRAM(s) Oral at bedtime  thiamine 100 milliGRAM(s) Oral daily      PHYSICAL EXAM:  VITALS: T(C): 36 (05-13-21 @ 11:00)  T(F): 96.8 (05-13-21 @ 11:00), Max: 97.3 (05-12-21 @ 19:15)  HR: 103 (05-13-21 @ 13:00) (96 - 118)  BP: 110/62 (05-13-21 @ 13:00) (96/52 - 127/70)  RR:  (15 - 32)  SpO2:  (91% - 100%)  Wt(kg): --  GENERAL: male laying in bed in NAD  Resp: Chest tube x2 right   Abdomen: Soft, nontender, non distended, PEG   Extremities: Warm   NEURO: A&O X2    LABS:  POCT Blood Glucose.: 272 mg/dL (05-13-21 @ 12:05)  POCT Blood Glucose.: 309 mg/dL (05-13-21 @ 05:41)  POCT Blood Glucose.: 351 mg/dL (05-13-21 @ 03:29)  POCT Blood Glucose.: 262 mg/dL (05-13-21 @ 02:46)  POCT Blood Glucose.: 277 mg/dL (05-13-21 @ 00:01)  POCT Blood Glucose.: 142 mg/dL (05-12-21 @ 13:42)  POCT Blood Glucose.: 125 mg/dL (05-12-21 @ 06:30)  POCT Blood Glucose.: 117 mg/dL (05-11-21 @ 23:26)  POCT Blood Glucose.: 100 mg/dL (05-11-21 @ 21:35)  POCT Blood Glucose.: 100 mg/dL (05-11-21 @ 18:07)  POCT Blood Glucose.: 141 mg/dL (05-11-21 @ 12:08)  POCT Blood Glucose.: 201 mg/dL (05-11-21 @ 06:30)  POCT Blood Glucose.: 188 mg/dL (05-10-21 @ 23:55)  POCT Blood Glucose.: 112 mg/dL (05-10-21 @ 17:37)                            10.2   23.95 )-----------( 483      ( 13 May 2021 09:16 )             32.4       05-13    132<L>  |  99  |  25<H>  ----------------------------<  262<H>  5.0   |  21<L>  |  0.80    EGFR if : 114  EGFR if non : 98    Ca    9.0      05-13  Mg     2.5     05-13  Phos  3.3     05-13    TPro  6.7  /  Alb  2.4<L>  /  TBili  0.9  /  DBili  0.3<H>  /  AST  27  /  ALT  21  /  AlkPhos  69  05-12 04-26 Chol 114 Direct LDL -- LDL calculated 53 HDL 35<L> Trig 126, 04-13 Chol -- Direct LDL -- LDL calculated -- HDL -- Trig 126    Thyroid Function Tests:  04-24 @ 11:11 TSH 2.00       A1C with Estimated Average Glucose Result: 10.3 % (04-02-21 @ 14:28)      Estimated Average Glucose: 249 mg/dL (04-02-21 @ 14:28)

## 2021-05-13 NOTE — PROGRESS NOTE ADULT - NUTRITIONAL ASSESSMENT
This patient has been assessed with a concern for Malnutrition and has been determined to have a diagnosis/diagnoses of Severe protein-calorie malnutrition.    Diet, NPO with Tube Feed:   Tube Feeding Modality: Gastrostomy  Glucerna 1.2 Kaden (GLUCERNARTH)  Total Volume for 24 Hours (mL): 1680  Continuous  Starting Tube Feed Rate {mL per Hour}: 20  Increase Tube Feed Rate by (mL): 20     Every 2 hours  Until Goal Tube Feed Rate (mL per Hour): 70  Tube Feed Duration (in Hours): 24  Tube Feed Start Time: 20:30 (05-12-21 @ 20:12) [Active]

## 2021-05-13 NOTE — PROGRESS NOTE ADULT - PROBLEM SELECTOR PLAN 1
-FS q6h  - Adjust NPH 14 units sq q6h ; HOLD IF TUBE FEEDING TURNED OFF  -c/w  moderate correction scale sq q6h.     Discharge:  Will be based on TF/PO status and insulin requirements  Might need rehab so will c/w NPH while on TFs  -If going home will need to reevalute pt's ability to learn DM care and if pt unable to care for self will need family to assist with care.   Needs Outpt. endo follow-up  Needs Outpt. optho, podiatry, nephrology    Discussed with patient and SICU Resident & RN  Ban Calvert NP   In House Pager: [phone]   office:  [phone] (M-F 9a-5pm)               [phone] (nights/weekends) -FS q6h  - c/w NPH 12 units sq q6h ; HOLD IF TUBE FEEDING TURNED OFF  -c/w  moderate correction scale sq q6h.   -If two consecutive BG values above 180mg/dl can increase NPH to 14 units q6h    Discharge:  Will be based on TF/PO status and insulin requirements  Might need rehab so will c/w NPH while on TFs  -If going home will need to reevalute pt's ability to learn DM care and if pt unable to care for self will need family to assist with care.   Needs Outpt. endo follow-up  Needs Outpt. optho, podiatry, nephrology    Discussed with patient and SICU Resident & RN  Ban Calvert NP   In House Pager: [phone]   office:  [phone] (M-F 9a-5pm)               [phone] (nights/weekends)

## 2021-05-13 NOTE — PROGRESS NOTE ADULT - SUBJECTIVE AND OBJECTIVE BOX
60y Male was admitted on 04-12    Patient is a 60y old  Male who presents with a chief complaint of COVID19 w/ severe metabolic acidosis s/p intubation (21 Apr 2021 12:02)    HPI:  Patient seen and examined.    S/P Right VATS converted to R thoracotomy, decortication, drainage of abscess, flex bronch on 5/12    Mod A bed mobility with therapy    MEDICATIONS  (STANDING):  acetaminophen  IVPB .. 1000 milliGRAM(s) IV Intermittent once  albuterol/ipratropium for Nebulization 3 milliLiter(s) Nebulizer every 12 hours  chlorhexidine 2% Cloths 1 Application(s) Topical <User Schedule>  cholecalciferol 400 Unit(s) Oral daily  heparin  Infusion 1300 Unit(s)/Hr (13 mL/Hr) IV Continuous <Continuous>  insulin lispro (ADMELOG) corrective regimen sliding scale   SubCutaneous every 6 hours  insulin NPH human recombinant 12 Unit(s) SubCutaneous every 6 hours  levETIRAcetam  Solution 500 milliGRAM(s) Oral two times a day  meropenem  IVPB 1000 milliGRAM(s) IV Intermittent every 8 hours  polyethylene glycol 3350 17 Gram(s) Oral daily  senna 2 Tablet(s) Oral at bedtime  sodium zirconium cyclosilicate 10 Gram(s) Oral once  tamsulosin 0.4 milliGRAM(s) Oral at bedtime  thiamine 100 milliGRAM(s) Oral daily    MEDICATIONS  (PRN):  HYDROmorphone  Injectable 0.5 milliGRAM(s) IV Push every 4 hours PRN Severe Pain (7 - 10)    Vital Signs Last 24 Hrs  T(C): 36.1 (13 May 2021 07:00), Max: 36.6 (12 May 2021 13:15)  T(F): 97 (13 May 2021 07:00), Max: 97.9 (12 May 2021 13:15)  HR: 96 (13 May 2021 10:00) (96 - 118)  BP: 104/68 (13 May 2021 10:00) (96/52 - 127/70)  BP(mean): 81 (13 May 2021 10:00) (67 - 91)  RR: 20 (13 May 2021 10:00) (15 - 32)  SpO2: 98% (13 May 2021 10:00) (91% - 100%)    PHYSICAL EXAM  Constitutional - NAD,   HEENT - NCAT,   Chest - Breathing comfortably, No wheezing  Abdomen - Soft   Extremities - bilateral digital toe ischemia  Neurologic Exam -                 Alert  Follows verbal instruction w slow processing     Motor 3+-4/5 bl UE and LEs  Psychiatric - flat                          10.2   23.95 )-----------( 483      ( 13 May 2021 09:16 )             32.4       05-13    132<L>  |  99  |  25<H>  ----------------------------<  262<H>  5.0   |  21<L>  |  0.80    Ca    9.0      13 May 2021 09:16  Phos  3.3     05-13  Mg     2.5     05-13    TPro  6.7  /  Alb  2.4<L>  /  TBili  0.9  /  DBili  0.3<H>  /  AST  27  /  ALT  21  /  AlkPhos  69  05-12        ASSESSMENT/PLAN    60 year old man with prolonged hospitalization for COVID-19, DKA, PE, and CVA with hemorrhagic conversion and fevers suspected 2/2 to aspiration PNA (klebsiella), dysphagia    Disposition -  PT - ROM, Bed Mob, Transfers, Amb with AD   OT - ADLs, ROM  SLP - Dysphagia eval and treat  Prec- Falls, Pulm, cardiac  Monitor wbc ct, Na+  DVT px- Lovenox  Skin - Turn Q2hrs  Dispo- When medically stable will need Acute Rehab- can tolerate 3h/d of therapies and requires daily physician visits

## 2021-05-13 NOTE — PROGRESS NOTE ADULT - ATTENDING COMMENTS
Interval history noted.  Status post VATS decortication, cultures pending.  Three chest tubes in place.  Oxygenating well at RA, lungs clear anteriorly.  Agree with plan as outlined above.  Please reconsult as needed.

## 2021-05-13 NOTE — ADVANCED PRACTICE NURSE CONSULT - ASSESSMENT
The pt was encountered in the 8ICU- he is on a Progressa support surface and is being turned and positioned using z-meri cushions Will recommend Complete Cair boots for off-loading.  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  Mr Pizarro was awake and alert and responding appropriately to command, he was able to assist slightly with turning himself on his side for skin assessment.  He has a thin, frail appearance and is being followed by nutrition as he receives enteral feeds as able via a PEG tube.  Upon assessment, he presents with a small linear wound on his left ear that has a scab on it. Would not classify this as pressure injury.  On the right buttocks are 3 small open areas that also present with scabs - suggest that this may be the result of IAD as the pt has a hx of stool incontinence- would not classify as pressure injuries.

## 2021-05-13 NOTE — PROGRESS NOTE ADULT - ASSESSMENT
61 y/o M Gujarati speaking w/ new onset diabetes > A1c of 10.3% on admission for COVID with prolonged hospital course requiring intubation and PEG insertion> now stable ; s/p VATS  for Empyema/bilateral PNA, abscess. TF & NPH restarted this AM; Glucerna TF currently running at goal with BG Values above 250 today. Would increase NPH Dose as pt requiring 8 to 12 units correctional admelog. Continue to titrate to BG Goal of 100 to 180s.  59 y/o M Gujarati speaking w/ new onset diabetes > A1c of 10.3% on admission for COVID with prolonged hospital course requiring intubation and PEG insertion> now stable ; s/p VATS  for Empyema/bilateral PNA, abscess. TF & NPH restarted this AM; Glucerna TF currently running at goal with BG Values above 250 today. Would increase NPH Dose as pt requiring 8 to 12 units correctional admelog. BG elevated/requiring additional insulin likely 2/2 infection/surgical stress. Continue to titrate to BG Goal of 100 to 180s.  59 y/o M Gujarati speaking w/ new onset diabetes > A1c of 10.3% on admission for COVID with prolonged hospital course requiring intubation and PEG insertion> now stable ; s/p Right VATS converted to R thoracotomy for Empyema/bilateral PNA, abscess. TF & NPH restarted this AM; Glucerna TF currently running at goal with BG Values above 250 today. Would increase NPH Dose as pt requiring 8 to 12 units correctional admelog. BG elevated/requiring additional insulin likely 2/2 infection/surgical stress. Continue to titrate to BG Goal of 100 to 180s.  59 y/o M Gujarati speaking w/ new onset diabetes > A1c of 10.3% on admission for COVID with prolonged hospital course requiring intubation and PEG insertion> now stable ; s/p Right VATS converted to R thoracotomy for Empyema/bilateral PNA, abscess. TF & NPH restarted this AM; Glucerna TF currently running at goal with BG Values above 250 today. Would increase NPH Dose if BG above 180 as pt requiring 8 to 12 units correctional admelog. BG elevated/requiring additional insulin likely 2/2 infection/surgical stress. Continue to titrate to BG Goal of 100 to 180s.

## 2021-05-13 NOTE — CHART NOTE - NSCHARTNOTEFT_GEN_A_CORE
[facility] MICU TRANSFER NOTE    Please refer to MICU transfer note for full details.    Briefly, this is a      61 yo man with past medical history of diabetes mellitus who was initially admitted to [facility] ICU for hypoxic respiratory failure secondary to SARS-CoV-2 infection complicated by right heart strain, cardiogenic and septic shock, ischemic stroke with hemorrhagic transformation and ESBL Klebsiella pneumonia. Transferred to [facility] for neurosurgery evaluation and further management. Now s/p PEG. Today CT chest/abd/pelv done, w/ CT chest showing R sided loculated collection, concerning for empyema. Thoracic Surgery consulted to evaluate. Pt is currently saturating >95% on room air. On 5/12 he underwent right VATS converted to open thoracotomy, decortication, drainage of abscess and flex bronch. Pt briefly on lexi intraop and extubated in OR. Course complicated by small apical R sided pneumothorax s/p chest tube placement.       Vital Signs Last 24 Hrs  T(C): 36.2 (13 May 2021 19:00), Max: 36.3 (12 May 2021 23:00)  T(F): 97.2 (13 May 2021 19:00), Max: 97.3 (12 May 2021 23:00)  HR: 96 (13 May 2021 20:00) (91 - 116)  BP: 101/57 (13 May 2021 20:00) (96/52 - 127/70)  BP(mean): 74 (13 May 2021 20:00) (69 - 91)  RR: 20 (13 May 2021 20:00) (15 - 25)  SpO2: 99% (13 May 2021 20:00) (91% - 100%)  I&O's Summary    12 May 2021 07:01  -  13 May 2021 07:00  --------------------------------------------------------  IN: 422 mL / OUT: 1275 mL / NET: -853 mL    13 May 2021 07:01  -  13 May 2021 21:21  --------------------------------------------------------  IN: 1549 mL / OUT: 510 mL / NET: 1039 mL      Allergies    No Known Allergies    Intolerances      MEDICATIONS  (STANDING):  chlorhexidine 2% Cloths 1 Application(s) Topical <User Schedule>  cholecalciferol 400 Unit(s) Oral daily  heparin  Infusion 1300 Unit(s)/Hr (13 mL/Hr) IV Continuous <Continuous>  insulin lispro (ADMELOG) corrective regimen sliding scale   SubCutaneous every 6 hours  insulin NPH human recombinant 12 Unit(s) SubCutaneous every 6 hours  levETIRAcetam  Solution 500 milliGRAM(s) Oral two times a day  meropenem  IVPB 1000 milliGRAM(s) IV Intermittent every 8 hours  polyethylene glycol 3350 17 Gram(s) Oral daily  senna 2 Tablet(s) Oral at bedtime  tamsulosin 0.4 milliGRAM(s) Oral at bedtime  thiamine 100 milliGRAM(s) Oral daily    MEDICATIONS  (PRN):  albuterol/ipratropium for Nebulization 3 milliLiter(s) Nebulizer every 6 hours PRN Shortness of Breath and/or Wheezing  HYDROmorphone  Injectable 0.5 milliGRAM(s) IV Push every 4 hours PRN Severe Pain (7 - 10)                          10.2   22.83 )-----------( 483      ( 13 May 2021 16:38 )             33.9     05-13    135  |  100  |  28<H>  ----------------------------<  98  4.9   |  22  |  0.82    Ca    8.9      13 May 2021 16:38  Phos  3.7     05-13  Mg     2.5     05-13    TPro  6.7  /  Alb  2.4<L>  /  TBili  0.9  /  DBili  0.3<H>  /  AST  27  /  ALT  21  /  AlkPhos  69  05-12    PT/INR - ( 12 May 2021 19:35 )   PT: 13.0 sec;   INR: 1.09 ratio         PTT - ( 13 May 2021 16:38 )  PTT:95.8 sec        ASSESSMENT & PLAN:       Neuro:   - Monitor mental status  - Pain control as needed with tylenol and oxy   -keppra     Resp: s/p L. thoracotomy, drainage of abscess, decortication, flex bronch   - 1 angled chest tube , 1 straight chest tube, 1 Blakemore drain (anterior) - Air leak present post op   - Chest tube to suction at 10mmhg  - Air leak expected and seen  - Monitor pulse oximeter  - Out of bed to chair, ambulate as tolerated, and incentive spirometry to prevent atelectasis    CV:  - Intraop lexi, off pressors post op   - Monitor vital signs    GI:   - PEG in place   - TF to goal  -bowel regimen    Renal:   - Monitor I&Os  - Monitor electrolytes and replete as necessary  -tamsulosin    Heme:  - Monitor CBC and coags  - On hep gtt for PE    ID:   - Continue w/ meropenem ESBL kleb pna with empyema    Endo: Hyperglycemia  - followed by endo: 12units NPH Q6H with ISS Q6h    Dispo:  -medicine 3 [facility] [id number]  -signed out to Hospitalist and MAR  -accepting MD: DR KORY Steen.          FOR FOLLOW UP:  - f/u Xray chest in AM  - monitor chest tube output and leak  - f/u ID recs  - adjust insulin as per endo    Kemal Méndez MD  PGY-3 | Internal Medicine  [phone] / [phone]

## 2021-05-13 NOTE — CHART NOTE - NSCHARTNOTEFT_GEN_A_CORE
59 yo man with past medical history of diabetes mellitus who was initially admitted to [facility] ICU for hypoxic respiratory failure secondary to SARS-CoV-2 infection complicated by right heart strain, cardiogenic and septic shock, ischemic stroke with hemorrhagic transformation and ESBL Klebsiella pneumonia. Transferred to [facility] for neurosurgery evaluation and further management. Now s/p PEG. Today CT chest/abd/pelv done, w/ CT chest showing R sided loculated collection, concerning for empyema. Thoracic Surgery consulted to evaluate. Pt is currently saturating >95% on room air. On 5/12 he underwent right VATS converted to open thoracotomy, decortication, drainage of abscess and flex bronch. Pt briefly on lexi intraop and extubated in OR.     ICU Vital Signs Last 24 Hrs  T(C): 36.2 (13 May 2021 19:00), Max: 36.3 (12 May 2021 23:00)  T(F): 97.2 (13 May 2021 19:00), Max: 97.3 (12 May 2021 23:00)  HR: 96 (13 May 2021 20:00) (91 - 116)  BP: 101/57 (13 May 2021 20:00) (96/52 - 127/70)  BP(mean): 74 (13 May 2021 20:00) (69 - 91)  RR: 20 (13 May 2021 20:00) (15 - 25)  SpO2: 99% (13 May 2021 20:00) (91% - 100%)    .  LABS:                         10.2   22.83 )-----------( 483      ( 13 May 2021 16:38 )             33.9     05-13    135  |  100  |  28<H>  ----------------------------<  98  4.9   |  22  |  0.82    Ca    8.9      13 May 2021 16:38  Phos  3.7     05-13  Mg     2.5     05-13    TPro  6.7  /  Alb  2.4<L>  /  TBili  0.9  /  DBili  0.3<H>  /  AST  27  /  ALT  21  /  AlkPhos  69  05-12    PT/INR - ( 12 May 2021 19:35 )   PT: 13.0 sec;   INR: 1.09 ratio         PTT - ( 13 May 2021 16:38 )  PTT:95.8 sec    he underwent right VATS converted to open thoracotomy, decortication, drainage of abscess and flex bronch. Pt briefly on lexi intraop and extubated in OR.     PLAN:    Neuro:   - Monitor mental status  - Pain control as needed with tylenol and oxy   -keppra     Resp: s/p L. thoracotomy, drainage of abscess, decortication, flex bronch   - 1 angled chest tube , 1 straight chest tube, 1 Blakemore drain (anterior) - Air leak present post op   - Chest tube to suction at 10mmhg  - Air leak expected and seen  - Monitor pulse oximeter  - Out of bed to chair, ambulate as tolerated, and incentive spirometry to prevent atelectasis    CV:  - Intraop lexi, off pressors post op   - Monitor vital signs    GI:   - PEG in place   - TF to goal  -bowel regimen    Renal:   - Monitor I&Os  - Monitor electrolytes and replete as necessary  -tamsulosin    Heme:  - Monitor CBC and coags  - On hep gtt for PE    ID:   - Continue w/ meropenem ESBL kleb pna with empyema    Endo: Hyperglycemia  - followed by endo: 12units NPH Q6H with ISS Q6h    Dispo:  -medicine 3 [facility] [id number]  -signed out to Hospitalist and MAR  -accepting MD: DR KORY Steen

## 2021-05-13 NOTE — PROGRESS NOTE ADULT - ASSESSMENT
A: 5/12 he underwent right VATS converted to open thoracotomy, decortication, drainage of abscess and flex bronch. Pt briefly on lexi intraop and extubated in OR.     PLAN:    Neuro:   - Monitor mental status  - Pain control as needed with tylenol and oxy     Resp: s/p L. thoracotomy, drainage of abscess, decortication, flex bronch   - 1 angled chest tube , 1 straight chest tube, 1 [place] drain (anterior) - Air leak present post op   - Chest tube to suction at 10mmhg  - Air leak expected and seen  - Monitor pulse oximeter  - Out of bed to chair, ambulate as tolerated, and incentive spirometry to prevent atelectasis    CV:  - Intraop lexi, off pressors post op   - Monitor vital signs    GI:   - PEG in place   - NPO     Renal:   - Monitor I&Os  - Monitor electrolytes and replete as necessary    Heme:  - Monitor CBC and coags  - Hold VTE ppx for 8hrs post op   - On hep gtt for dvt; holding per thoracic     ID:   - Continue w/ meropenem     Endo:   - Monitor glucose     Disposition:  - Full Code   - SICU

## 2021-05-13 NOTE — PROGRESS NOTE ADULT - SUBJECTIVE AND OBJECTIVE BOX
Patient is a 60y old  Male who presents with a chief complaint of COVID19 w/ severe metabolic acidosis s/p intubation (13 May 2021 01:25)      SUBJECTIVE: " "    Vital Signs Last 24 Hrs  T(C): 36.1 (21 @ 07:00), Max: 36.9 (21 @ 10:34)  T(F): 97 (21 @ 07:00), Max: 97.9 (21 @ 13:15)  HR: 96 (21 @ 09:00) (96 - 118)  BP: 100/68 (21 @ 09:00) (96/52 - 127/70)  RR: 22 (21 @ 09:00) (15 - 32)  SpO2: 96% (21 @ 09:00) (91% - 100%)                21 @ 07:01  -  21 @ 07:00  --------------------------------------------------------  IN: 422 mL / OUT: 1275 mL / NET: -853 mL    21 @ 07:01  -  21 @ 09:36  --------------------------------------------------------  IN: 206 mL / OUT: 80 mL / NET: 126 mL        Daily Height in cm: 170 (12 May 2021 19:15)    Daily Weight in k.4 (13 May 2021 00:34)                          10.2   23.95 )-----------( 483      ( 13 May 2021 09:16 )             32.4         132<L>  |  98  |  26<H>  ----------------------------<  283<H>  5.7<H>   |  17<L>  |  0.85    Ca    9.0      13 May 2021 05:42  Phos  4.0     05-13  Mg     2.5     05-13    TPro  6.7  /  Alb  2.4<L>  /  TBili  0.9  /  DBili  0.3<H>  /  AST  27  /  ALT  21  /  AlkPhos  69  05-12          PHYSICAL EXAM  Neurology: A&Ox3, NAD, no gross deficits  CV : RRR+S1S2  Lungs: Respirations non-labored, B/L BS  Abdomen: Soft, NT/ND, +BSx4Q  Extremities: B/L LE edema, negative calf tenderness, +PP           CHEST TUBES:  Left CT     [  ]LWS  [  ]H2O seal  Right CT   [X  ]LWS  [  ]H2O seal        MEDICATIONS  acetaminophen  IVPB .. 1000 milliGRAM(s) IV Intermittent once  albuterol/ipratropium for Nebulization 3 milliLiter(s) Nebulizer every 12 hours  chlorhexidine 2% Cloths 1 Application(s) Topical <User Schedule>  cholecalciferol 400 Unit(s) Oral daily  heparin  Infusion 1300 Unit(s)/Hr IV Continuous <Continuous>  HYDROmorphone  Injectable 0.5 milliGRAM(s) IV Push every 4 hours PRN  insulin lispro (ADMELOG) corrective regimen sliding scale   SubCutaneous every 6 hours  insulin NPH human recombinant 12 Unit(s) SubCutaneous every 6 hours  levETIRAcetam  Solution 500 milliGRAM(s) Oral two times a day  meropenem  IVPB 1000 milliGRAM(s) IV Intermittent every 8 hours  polyethylene glycol 3350 17 Gram(s) Oral daily  senna 2 Tablet(s) Oral at bedtime  sodium zirconium cyclosilicate 10 Gram(s) Oral once  tamsulosin 0.4 milliGRAM(s) Oral at bedtime  thiamine 100 milliGRAM(s) Oral daily

## 2021-05-13 NOTE — PROGRESS NOTE ADULT - SUBJECTIVE AND OBJECTIVE BOX
CC: F/U for Empyema    Saw/spoke to patient. S/p OR, chest tubes in place. Appears generally well.    Allergies  No Known Allergies    ANTIMICROBIALS:  meropenem  IVPB 1000 every 8 hours    PE:    Vital Signs Last 24 Hrs  T(C): 36 (13 May 2021 11:00), Max: 36.6 (12 May 2021 13:15)  T(F): 96.8 (13 May 2021 11:00), Max: 97.9 (12 May 2021 13:15)  HR: 100 (13 May 2021 11:00) (96 - 118)  BP: 107/73 (13 May 2021 11:00) (96/52 - 127/70)  BP(mean): 86 (13 May 2021 11:00) (67 - 91)  RR: 19 (13 May 2021 11:00) (15 - 32)  SpO2: 98% (13 May 2021 11:00) (91% - 100%)    Gen: AOx3, NAD, non-toxic  CV: S1+S2 normal, nontachycardic  Resp: Clear bilat, no resp distress, no crackles/wheezes, chest tubes in place  Abd: Soft, nontender, +BS  Ext: No LE edema, no wounds    LABS:                        10.2   23.95 )-----------( 483      ( 13 May 2021 09:16 )             32.4     05-13    132<L>  |  99  |  25<H>  ----------------------------<  262<H>  5.0   |  21<L>  |  0.80    Ca    9.0      13 May 2021 09:16  Phos  3.3     05-13  Mg     2.5     05-13    TPro  6.7  /  Alb  2.4<L>  /  TBili  0.9  /  DBili  0.3<H>  /  AST  27  /  ALT  21  /  AlkPhos  69  05-12    MICROBIOLOGY:    BAL BRONCHIAL AVEOLAR LAVAGE  05-13-21   Testing in progress  --    Numerous polymorphonuclear leukocytes seen per low power field  No Squamous epithelial cells seen per low power field  Rare Gram Negative Rods seen per oil power field    .Tissue Other  05-12-21   Testing in progress  --    Rare polymorphonuclear leukocytes seen per low power field  No organisms seen per oil power field    .Urine Clean Catch (Midstream)  05-06-21   >100,000 CFU/ml Klebsiella pneumoniae ESBL  --  Klebsiella pneumoniae ESBL    (otherwise reviewed)    RADIOLOGY:    5/13 CXR:    IMPRESSION:    The heart is normal in size. 2 chest tubes are seen in the right hemithorax. Small right pneumothorax. The left lung is clear. Platelike atelectasis right lower lobe.

## 2021-05-13 NOTE — PROVIDER CONTACT NOTE (CHANGE IN STATUS NOTIFICATION) - RECOMMENDATIONS
Provider assessment
Oxygenation orders and assess patient for new infection/ respiratory compromise.
Provider assessment.

## 2021-05-13 NOTE — PROGRESS NOTE ADULT - SUBJECTIVE AND OBJECTIVE BOX
HISTORY  59 yo man with past medical history of diabetes mellitus who was initially admitted to [facility] ICU for hypoxic respiratory failure secondary to SARS-CoV-2 infection complicated by right heart strain, cardiogenic and septic shock, ischemic stroke with hemorrhagic transformation and ESBL Klebsiella pneumonia. Transferred to [facility] for neurosurgery evaluation and further management. Now s/p PEG. Today CT chest/abd/pelv done, w/ CT chest showing R sided loculated collection, concerning for empyema. Thoracic Surgery consulted to evaluate. Pt is currently saturating >95% on room air. On 5/12 he underwent right VATS converted to open thoracotomy, decortication, drainage of abscess and flex bronch. Pt briefly on lexi intraop and extubated in OR.     24 HOUR EVENTS:    - OR today  - Extubated  - Off pressors  - Restarted heparin  - Restarted tube feeds    SUBJECTIVE/ROS:  [ ] A ten-point review of systems was otherwise negative except as noted.  [ x] Due to altered mental status/intubation, subjective information were not able to be obtained from the patient. History was obtained, to the extent possible, from review of the chart and collateral sources of information.      NEURO  Exam: NAD  Meds: acetaminophen  IVPB .. 1000 milliGRAM(s) IV Intermittent once  acetaminophen  IVPB .. 1000 milliGRAM(s) IV Intermittent once  acetaminophen  IVPB .. 1000 milliGRAM(s) IV Intermittent once  HYDROmorphone  Injectable 0.5 milliGRAM(s) IV Push every 4 hours PRN Severe Pain (7 - 10)  levETIRAcetam 500 milliGRAM(s) Oral two times a day    [x] Adequacy of sedation and pain control has been assessed and adjusted      RESPIRATORY  RR: 22 (05-13-21 @ 01:00) (17 - 32)  SpO2: 97% (05-13-21 @ 01:00) (92% - 98%)  Wt(kg): --  Exam: No increased work of breathing   Mechanical Ventilation:   ABG - ( 12 May 2021 19:33 )  pH: 7.37  /  pCO2: 39    /  pO2: 61    / HCO3: 22    / Base Excess: -2.5  /  SaO2: 90      Lactate: x                [ ] Extubation Readiness Assessed  Meds: albuterol/ipratropium for Nebulization 3 milliLiter(s) Nebulizer every 12 hours        CARDIOVASCULAR  HR: 104 (05-13-21 @ 01:00) (98 - 118)  BP: 105/61 (05-13-21 @ 01:00) (103/70 - 115/61)  BP(mean): 75 (05-13-21 @ 01:00) (67 - 83)  ABP: 74/68 (05-12-21 @ 21:00) (74/68 - 112/52)  ABP(mean): 70 (05-12-21 @ 21:00) (70 - 84)  Wt(kg): --  CVP(cm H2O): --  VBG - ( 13 May 2021 01:34 )  pH: 7.33  /  pCO2: 50    /  pO2: 26    / HCO3: 26    / Base Excess: 0.2   /  SaO2: 40     Lactate: 1.7                Exam: RRR  Cardiac Rhythm: Tachcardic   Perfusion     [x ]Adequate   [ ]Inadequate  Mentation   [ x]Normal       [ ]Reduced  Extremities  [ ]Warm         [x ]Cool  Volume Status [ ]Hypervolemic [x ]Euvolemic [ ]Hypovolemic  Meds: tamsulosin 0.4 milliGRAM(s) Oral at bedtime        GI/NUTRITION  Exam: Abdomen soft, non tender, non distended  Diet: Tube feeds  Meds: polyethylene glycol 3350 17 Gram(s) Oral daily  senna 2 Tablet(s) Oral at bedtime      GENITOURINARY  I&O's Detail    05-11 @ 07:01  -  05-12 @ 07:00  --------------------------------------------------------  IN:    Enteral Tube Flush: 680 mL    Free Water: 460 mL    Glucerna: 350 mL    Heparin Infusion: 156 mL    IV PiggyBack: 150 mL  Total IN: 1796 mL    OUT:    Indwelling Catheter - Urethral (mL): 1300 mL    Intermittent Catheterization - Urethral (mL): 450 mL  Total OUT: 1750 mL    Total NET: 46 mL      05-12 @ 07:01  -  05-13 @ 01:54  --------------------------------------------------------  IN:    IV PiggyBack: 150 mL  Total IN: 150 mL    OUT:    Indwelling Catheter - Urethral (mL): 775 mL  Total OUT: 775 mL    Total NET: -625 mL        Weight (kg): 62.7 (05-12 @ 19:15)  05-13    130<L>  |  98  |  24<H>  ----------------------------<  297<H>  6.1<H>   |  15<L>  |  0.80    Ca    8.6      13 May 2021 00:11  Phos  4.6     05-13  Mg     2.5     05-13    TPro  6.7  /  Alb  2.4<L>  /  TBili  0.9  /  DBili  0.3<H>  /  AST  27  /  ALT  21  /  AlkPhos  69  05-12    [ ] Mann catheter, indication:   Meds: cholecalciferol 400 Unit(s) Oral daily  thiamine 100 milliGRAM(s) Oral daily        HEMATOLOGIC  Meds:   [x] VTE Prophylaxis                        11.2   24.60 )-----------( 490      ( 13 May 2021 00:11 )             37.1     PT/INR - ( 12 May 2021 19:35 )   PT: 13.0 sec;   INR: 1.09 ratio         PTT - ( 13 May 2021 00:11 )  PTT:29.9 sec  Transfusion     [ ] PRBC   [ ] Platelets   [ ] FFP   [ ] Cryoprecipitate      INFECTIOUS DISEASES  T(C): 36.3 (05-12-21 @ 23:00), Max: 36.9 (05-12-21 @ 04:25)  Wt(kg): --  WBC Count: 24.60 K/uL (05-13 @ 00:11)  WBC Count: 28.27 K/uL (05-12 @ 19:35)  WBC Count: 12.90 K/uL (05-12 @ 07:07)    Recent Cultures:  Specimen Source: .Tissue Other, 05-12 @ 23:11; Results --; Gram Stain:   Rare polymorphonuclear leukocytes seen per low power field  No organisms seen per oil power field; Organism: --  Specimen Source: .Urine Clean Catch (Midstream), 05-06 @ 06:36; Results   >100,000 CFU/ml Klebsiella pneumoniae ESBL; Gram Stain: --; Organism: Klebsiella pneumoniae ESBL    Meds: meropenem  IVPB 1000 milliGRAM(s) IV Intermittent every 8 hours        ENDOCRINE  Capillary Blood Glucose    Meds: insulin lispro (ADMELOG) corrective regimen sliding scale   SubCutaneous every 6 hours        ACCESS DEVICES:  [ ] Peripheral IV  [ ] Central Venous Line	[ ] R	[ ] L	[ ] IJ	[ ] Fem	[ ] SC	Placed:   [ ] Arterial Line		[ ] R	[ ] L	[ ] Fem	[ ] Rad	[ ] Ax	Placed:   [ ] PICC:					[ ] Mediport  [ ] Urinary Catheter, Date Placed:   [ ] Necessity of urinary, arterial, and venous catheters discussed    OTHER MEDICATIONS:  chlorhexidine 2% Cloths 1 Application(s) Topical <User Schedule>      CODE STATUS:     IMAGING:

## 2021-05-13 NOTE — PROGRESS NOTE ADULT - PROBLEM SELECTOR PLAN 1
OR Vats 5/12 with Dr. Albarado   Hep gtt restarted   Ant Aroldo, Angled Diaphragm, Posterior straight CT--> Dry Suction @ 40   RPT CXR @ 11 am (Patient on WS all night)   Monitor drainage   Daily CXR

## 2021-05-13 NOTE — PROGRESS NOTE ADULT - ASSESSMENT
60M hospitalized for COVID PNA, c/b cardiogenic/septic shock and ischemic/hemorrhagic stroke, now extubated on the floor w/ negative COVID PCR, Thoracic surgery consulted to evaluate the recently found R sided empyema.     5/8 VSS; NAD. Thoracic surgery recommend IR Consult/Tap for R empyema.     5/9  VSS, NAD.  IR deferring IR drain of empyema 2/2 risk of iatrogenic bronchopleural fistula. Recommends VATS procedure.  Dr. Tena spoke to patients brother in law regarding VATS procedure.  Family to decide on whether to proceed with operation.  Continue IV ABX per ID.   5/11 VSS; Plan for VATs tomorrow 5/12/21 with Dr. Albarado. COVID pcr within 48 hr.   5/12 OR this afternoon VATS for empyema  5/13 S/P Vats yesterday. Anterior Aroldo/Angled Diaphragm/Posterior straight chest tubes --> Dry SUCTION (@40mmhg). Monitor drainage and A/L on suction. (H2O seal overnight) RPT CXR @ 11 am. Back on medicine service once patient is stable to move out of SICU.

## 2021-05-13 NOTE — PROGRESS NOTE ADULT - ASSESSMENT
59 yo man with past medical history of diabetes mellitus who was initially admitted to [facility] ICU for hypoxic respiratory failure secondary to SARS-CoV-2 infection complicated by right heart strain, septic shock, ischemic stroke with hemorrhagic transformation and ESBL Klebsiella pneumonia. Transferred to [facility] for neurosurgery evaluation and further management. Now s/p PEG. Pulmonary service consulted for concern of empyema on CT chest. Now s/p VATS 05/12.     Recommendations:  - Continue antibiotics as per ID - will need at least 4 weeks of therapy  - Monitor chest tube output    --------------------------------------------------------  Troy Hong, PGY-5  Pulmonary/Critical Care Fellow  Pager: [phone] ([facility]), [phone] ([place])  Pulmonary spectra: [id number]   59 yo man with past medical history of diabetes mellitus who was initially admitted to [facility] ICU for hypoxic respiratory failure secondary to SARS-CoV-2 infection complicated by right heart strain, septic shock, ischemic stroke with hemorrhagic transformation and ESBL Klebsiella pneumonia. Transferred to [facility] for neurosurgery evaluation and further management. Now s/p PEG. Pulmonary service consulted for concern of empyema on CT chest. Now s/p VATS 05/12.     Recommendations:  - Follow cultures from VATS  - Continue antibiotics as per ID - will need at least 4 weeks of therapy  - Monitor chest tube output  - Will need repeat imaging after 6 weeks as an outpatient    Will sign off at this time. Please call us back if any questions or concerns.     --------------------------------------------------------  Troy Hong, PGY-5  Pulmonary/Critical Care Fellow  Pager: [phone] ([facility]), [phone] ([place])  Pulmonary spectra: [id number]

## 2021-05-13 NOTE — PROGRESS NOTE ADULT - ASSESSMENT
59 yo M with PMH of DM who was initially admitted to [facility] ICU 4/1 for hypoxic respiratory failure 2/2 COVID c/b PE with R heart strain, cardiogenic and septic shock, ischemic and hemorrhagic CVA and ESBL klebsiella ventilator associated PNA, transferred to [facility] for neurosurgery eval on 4/12  No fever, has leukocytosis  UCX with ESBL  Bilateral LE dry gangrene  No sob/no cough, no abd pain, N/V/D  Has PEG  PCT minimally elevated  CT now with Empyema, concern for bilateral PNA, abscess  S/p OR Thoracotomy, abscess drainage, bronch (5/12)  1) Leukocytosis  - Worsened, likely reactive to OR  - Trend WBC  2) COVID  - Prior infection  - Monitor for any signs active COVID/reinfection  3) Suspected Empyema/Abscess  - Meropenem 1g q 8  - Appreciate Thoracic procedure  - F/U OR cultures  4) Gangrene  - Appears noninfectious/dry gangrene  - Monitor wounds    Sammy Chambers MD  Pager [phone]  After 5pm and on weekends call [phone]

## 2021-05-13 NOTE — ADVANCED PRACTICE NURSE CONSULT - REASON FOR CONSULT
Requested by staff to assess skin status: left ear and right buttocks, Staff is concerned that the pt has developed stage 2 pressure injuries. PMH is noted:    59 yo man with past medical history of diabetes mellitus who was initially admitted to [facility] ICU for hypoxic respiratory failure secondary to SARS-CoV-2 infection complicated by right heart strain, cardiogenic and septic shock, ischemic stroke with hemorrhagic transformation and ESBL Klebsiella pneumonia. Transferred to [facility] for neurosurgery evaluation and further management. Now s/p PEG. Today CT chest/abd/pelv done, w/ CT chest showing R sided loculated collection, concerning for empyema. Thoracic Surgery consulted to evaluate. Pt is currently saturating >95% on room air. On 5/12 he underwent right VATS converted to open thoracotomy, decortication, drainage of abscess and flex bronch. Pt briefly on lexi intraop and extubated in OR.     24 HOUR EVENTS:    - OR today  - Extubated  - Off pressors  - Restarted heparin  - Restarted tube feeds

## 2021-05-14 LAB
-  AMIKACIN: SIGNIFICANT CHANGE UP
-  AMOXICILLIN/CLAVULANIC ACID: SIGNIFICANT CHANGE UP
-  AMPICILLIN/SULBACTAM: SIGNIFICANT CHANGE UP
-  AMPICILLIN: SIGNIFICANT CHANGE UP
-  AZTREONAM: SIGNIFICANT CHANGE UP
-  CEFAZOLIN: SIGNIFICANT CHANGE UP
-  CEFEPIME: SIGNIFICANT CHANGE UP
-  CEFOXITIN: SIGNIFICANT CHANGE UP
-  CEFTRIAXONE: SIGNIFICANT CHANGE UP
-  CIPROFLOXACIN: SIGNIFICANT CHANGE UP
-  ERTAPENEM: SIGNIFICANT CHANGE UP
-  GENTAMICIN: SIGNIFICANT CHANGE UP
-  IMIPENEM: SIGNIFICANT CHANGE UP
-  LEVOFLOXACIN: SIGNIFICANT CHANGE UP
-  MEROPENEM: SIGNIFICANT CHANGE UP
-  PIPERACILLIN/TAZOBACTAM: SIGNIFICANT CHANGE UP
-  TOBRAMYCIN: SIGNIFICANT CHANGE UP
-  TRIMETHOPRIM/SULFAMETHOXAZOLE: SIGNIFICANT CHANGE UP
ANION GAP SERPL CALC-SCNC: 10 MMOL/L — SIGNIFICANT CHANGE UP (ref 5–17)
APTT BLD: 58.9 SEC — HIGH (ref 27.5–35.5)
BUN SERPL-MCNC: 31 MG/DL — HIGH (ref 7–23)
CALCIUM SERPL-MCNC: 8.5 MG/DL — SIGNIFICANT CHANGE UP (ref 8.4–10.5)
CHLORIDE SERPL-SCNC: 97 MMOL/L — SIGNIFICANT CHANGE UP (ref 96–108)
CO2 SERPL-SCNC: 25 MMOL/L — SIGNIFICANT CHANGE UP (ref 22–31)
CREAT SERPL-MCNC: 0.75 MG/DL — SIGNIFICANT CHANGE UP (ref 0.5–1.3)
CULTURE RESULTS: SIGNIFICANT CHANGE UP
CULTURE RESULTS: SIGNIFICANT CHANGE UP
GLUCOSE BLDC GLUCOMTR-MCNC: 144 MG/DL — HIGH (ref 70–99)
GLUCOSE BLDC GLUCOMTR-MCNC: 145 MG/DL — HIGH (ref 70–99)
GLUCOSE BLDC GLUCOMTR-MCNC: 164 MG/DL — HIGH (ref 70–99)
GLUCOSE BLDC GLUCOMTR-MCNC: 174 MG/DL — HIGH (ref 70–99)
GLUCOSE BLDC GLUCOMTR-MCNC: 197 MG/DL — HIGH (ref 70–99)
GLUCOSE BLDC GLUCOMTR-MCNC: 199 MG/DL — HIGH (ref 70–99)
GLUCOSE SERPL-MCNC: 179 MG/DL — HIGH (ref 70–99)
HCT VFR BLD CALC: 29.9 % — LOW (ref 39–50)
HCT VFR BLD CALC: 30.7 % — LOW (ref 39–50)
HGB BLD-MCNC: 9.2 G/DL — LOW (ref 13–17)
HGB BLD-MCNC: 9.5 G/DL — LOW (ref 13–17)
MAGNESIUM SERPL-MCNC: 2.2 MG/DL — SIGNIFICANT CHANGE UP (ref 1.6–2.6)
MCHC RBC-ENTMCNC: 24 PG — LOW (ref 27–34)
MCHC RBC-ENTMCNC: 24.2 PG — LOW (ref 27–34)
MCHC RBC-ENTMCNC: 30.8 GM/DL — LOW (ref 32–36)
MCHC RBC-ENTMCNC: 30.9 GM/DL — LOW (ref 32–36)
MCV RBC AUTO: 78.1 FL — LOW (ref 80–100)
MCV RBC AUTO: 78.1 FL — LOW (ref 80–100)
METHOD TYPE: SIGNIFICANT CHANGE UP
NRBC # BLD: 0 /100 WBCS — SIGNIFICANT CHANGE UP (ref 0–0)
NRBC # BLD: 0 /100 WBCS — SIGNIFICANT CHANGE UP (ref 0–0)
ORGANISM # SPEC MICROSCOPIC CNT: SIGNIFICANT CHANGE UP
ORGANISM # SPEC MICROSCOPIC CNT: SIGNIFICANT CHANGE UP
PHOSPHATE SERPL-MCNC: 3 MG/DL — SIGNIFICANT CHANGE UP (ref 2.5–4.5)
PLATELET # BLD AUTO: 487 K/UL — HIGH (ref 150–400)
PLATELET # BLD AUTO: 523 K/UL — HIGH (ref 150–400)
POTASSIUM SERPL-MCNC: 4.7 MMOL/L — SIGNIFICANT CHANGE UP (ref 3.5–5.3)
POTASSIUM SERPL-SCNC: 4.7 MMOL/L — SIGNIFICANT CHANGE UP (ref 3.5–5.3)
RBC # BLD: 3.83 M/UL — LOW (ref 4.2–5.8)
RBC # BLD: 3.93 M/UL — LOW (ref 4.2–5.8)
RBC # FLD: 20.8 % — HIGH (ref 10.3–14.5)
RBC # FLD: 21.5 % — HIGH (ref 10.3–14.5)
SODIUM SERPL-SCNC: 132 MMOL/L — LOW (ref 135–145)
SPECIMEN SOURCE: SIGNIFICANT CHANGE UP
SPECIMEN SOURCE: SIGNIFICANT CHANGE UP
WBC # BLD: 21.96 K/UL — HIGH (ref 3.8–10.5)
WBC # BLD: 22.64 K/UL — HIGH (ref 3.8–10.5)
WBC # FLD AUTO: 21.96 K/UL — HIGH (ref 3.8–10.5)
WBC # FLD AUTO: 22.64 K/UL — HIGH (ref 3.8–10.5)

## 2021-05-14 PROCEDURE — 99232 SBSQ HOSP IP/OBS MODERATE 35: CPT

## 2021-05-14 PROCEDURE — 71045 X-RAY EXAM CHEST 1 VIEW: CPT | Mod: 26

## 2021-05-14 PROCEDURE — 99233 SBSQ HOSP IP/OBS HIGH 50: CPT | Mod: GC

## 2021-05-14 RX ORDER — SODIUM CHLORIDE 9 MG/ML
1000 INJECTION INTRAMUSCULAR; INTRAVENOUS; SUBCUTANEOUS
Refills: 0 | Status: COMPLETED | OUTPATIENT
Start: 2021-05-14 | End: 2021-05-14

## 2021-05-14 RX ORDER — ACETAMINOPHEN 500 MG
650 TABLET ORAL EVERY 6 HOURS
Refills: 0 | Status: DISCONTINUED | OUTPATIENT
Start: 2021-05-14 | End: 2021-05-16

## 2021-05-14 RX ADMIN — HUMAN INSULIN 12 UNIT(S): 100 INJECTION, SUSPENSION SUBCUTANEOUS at 00:41

## 2021-05-14 RX ADMIN — Medication 650 MILLIGRAM(S): at 07:02

## 2021-05-14 RX ADMIN — LEVETIRACETAM 500 MILLIGRAM(S): 250 TABLET, FILM COATED ORAL at 05:39

## 2021-05-14 RX ADMIN — LEVETIRACETAM 500 MILLIGRAM(S): 250 TABLET, FILM COATED ORAL at 17:11

## 2021-05-14 RX ADMIN — MEROPENEM 100 MILLIGRAM(S): 1 INJECTION INTRAVENOUS at 13:34

## 2021-05-14 RX ADMIN — MEROPENEM 100 MILLIGRAM(S): 1 INJECTION INTRAVENOUS at 21:48

## 2021-05-14 RX ADMIN — Medication 650 MILLIGRAM(S): at 18:55

## 2021-05-14 RX ADMIN — SENNA PLUS 2 TABLET(S): 8.6 TABLET ORAL at 21:00

## 2021-05-14 RX ADMIN — Medication 2: at 17:12

## 2021-05-14 RX ADMIN — HUMAN INSULIN 12 UNIT(S): 100 INJECTION, SUSPENSION SUBCUTANEOUS at 17:12

## 2021-05-14 RX ADMIN — POLYETHYLENE GLYCOL 3350 17 GRAM(S): 17 POWDER, FOR SOLUTION ORAL at 12:40

## 2021-05-14 RX ADMIN — SODIUM CHLORIDE 50 MILLILITER(S): 9 INJECTION INTRAMUSCULAR; INTRAVENOUS; SUBCUTANEOUS at 17:10

## 2021-05-14 RX ADMIN — Medication 400 UNIT(S): at 12:40

## 2021-05-14 RX ADMIN — MEROPENEM 100 MILLIGRAM(S): 1 INJECTION INTRAVENOUS at 06:12

## 2021-05-14 RX ADMIN — Medication 650 MILLIGRAM(S): at 17:11

## 2021-05-14 RX ADMIN — Medication 2: at 00:41

## 2021-05-14 RX ADMIN — Medication 3 MILLILITER(S): at 05:54

## 2021-05-14 RX ADMIN — HUMAN INSULIN 12 UNIT(S): 100 INJECTION, SUSPENSION SUBCUTANEOUS at 12:39

## 2021-05-14 RX ADMIN — Medication 100 MILLIGRAM(S): at 12:40

## 2021-05-14 RX ADMIN — HUMAN INSULIN 12 UNIT(S): 100 INJECTION, SUSPENSION SUBCUTANEOUS at 06:17

## 2021-05-14 RX ADMIN — Medication 2: at 06:16

## 2021-05-14 RX ADMIN — TAMSULOSIN HYDROCHLORIDE 0.4 MILLIGRAM(S): 0.4 CAPSULE ORAL at 21:00

## 2021-05-14 RX ADMIN — Medication 650 MILLIGRAM(S): at 06:31

## 2021-05-14 NOTE — PROGRESS NOTE ADULT - PROBLEM SELECTOR PLAN 1
- FS q6h  - c/w NPH 12 units sq q6h ; HOLD IF TUBE FEEDING TURNED OFF  - c/w  moderate correction scale sq q6h.   -If two consecutive BG values above 180mg/dl can increase NPH to 14 units q6h    Discharge:  Will be based on TF/PO status and insulin requirements  Might need rehab so will c/w NPH while on TFs  -If going home will need to reevalute pt's ability to learn DM care and if pt unable to care for self will need family to assist with care.   Needs Outpt. endo follow-up  Needs Outpt. optho, podiatry, nephrology    Discussed with patient and Dr Mckeon to follow up on Abdominal discomfort on palpation  Ban Calvert NP   In House Pager: [phone]   office:  [phone] (M-F 9a-5pm)               [phone] (nights/weekends)

## 2021-05-14 NOTE — PROVIDER CONTACT NOTE (CHANGE IN STATUS NOTIFICATION) - ASSESSMENT
/76 , , 90% on 2LNC, 99.7 axillary temp, 101 rectal temp.
VSS.  phone utilized. Pt oriented to self only. Reorientable, but confused.
Temp 100.7 and Hr 114
VSS, chest tubes in place. No ss of hyperglycemia.

## 2021-05-14 NOTE — PROGRESS NOTE ADULT - SUBJECTIVE AND OBJECTIVE BOX
PROGRESS NOTE:   Authored by Dr. Emily Mckeon, ANH pager [phone]    Patient is a 60y old  Male who presents with a chief complaint of COVID19 w/ severe metabolic acidosis s/p intubation (13 May 2021 14:09)      SUBJECTIVE / OVERNIGHT EVENTS:    MEDICATIONS  (STANDING):  chlorhexidine 2% Cloths 1 Application(s) Topical <User Schedule>  cholecalciferol 400 Unit(s) Oral daily  heparin  Infusion 1300 Unit(s)/Hr (13 mL/Hr) IV Continuous <Continuous>  insulin lispro (ADMELOG) corrective regimen sliding scale   SubCutaneous every 6 hours  insulin NPH human recombinant 12 Unit(s) SubCutaneous every 6 hours  levETIRAcetam  Solution 500 milliGRAM(s) Oral two times a day  meropenem  IVPB 1000 milliGRAM(s) IV Intermittent every 8 hours  polyethylene glycol 3350 17 Gram(s) Oral daily  senna 2 Tablet(s) Oral at bedtime  tamsulosin 0.4 milliGRAM(s) Oral at bedtime  thiamine 100 milliGRAM(s) Oral daily    MEDICATIONS  (PRN):  acetaminophen   Tablet .. 650 milliGRAM(s) Oral every 6 hours PRN Temp greater or equal to 38C (100.4F)  albuterol/ipratropium for Nebulization 3 milliLiter(s) Nebulizer every 6 hours PRN Shortness of Breath and/or Wheezing  HYDROmorphone  Injectable 0.5 milliGRAM(s) IV Push every 4 hours PRN Severe Pain (7 - 10)      CAPILLARY BLOOD GLUCOSE      POCT Blood Glucose.: 197 mg/dL (14 May 2021 06:13)  POCT Blood Glucose.: 164 mg/dL (14 May 2021 00:20)  POCT Blood Glucose.: 195 mg/dL (13 May 2021 22:49)  POCT Blood Glucose.: 138 mg/dL (13 May 2021 18:16)  POCT Blood Glucose.: 272 mg/dL (13 May 2021 12:05)    I&O's Summary    13 May 2021 07:01  -  14 May 2021 07:00  --------------------------------------------------------  IN: 2452 mL / OUT: 1310 mL / NET: 1142 mL        PHYSICAL EXAM:  Vital Signs Last 24 Hrs  T(C): 37.9 (14 May 2021 07:06), Max: 38 (14 May 2021 05:35)  T(F): 100.2 (14 May 2021 07:06), Max: 100.4 (14 May 2021 05:35)  HR: 115 (14 May 2021 07:06) (90 - 115)  BP: 105/65 (14 May 2021 07:06) (96/54 - 137/86)  BP(mean): 74 (13 May 2021 22:00) (70 - 86)  RR: 18 (14 May 2021 07:06) (15 - 27)  SpO2: 95% (14 May 2021 07:06) (91% - 99%)    GENERAL: No acute distress, well-developed  HEAD:  Atraumatic, Normocephalic  EYES: EOMI, PERRLA, conjunctiva and sclera clear  NECK: Supple, no lymphadenopathy, no JVD  CHEST/LUNG: CTAB; No wheezes, rales, or rhonchi  HEART: Regular rate and rhythm; No murmurs, rubs, or gallops  ABDOMEN: Soft, non-tender, non-distended; normal bowel sounds, no organomegaly  EXTREMITIES:  2+ peripheral pulses b/l, No clubbing, cyanosis, or edema  NEUROLOGY: A&O x 3, no focal deficits  SKIN: No rashes or lesions    LABS:                        9.2    22.64 )-----------( 487      ( 14 May 2021 04:43 )             29.9     05-14    132<L>  |  97  |  31<H>  ----------------------------<  179<H>  4.7   |  25  |  0.75    Ca    8.5      14 May 2021 04:43  Phos  3.0     05-14  Mg     2.2     05-14    TPro  6.7  /  Alb  2.4<L>  /  TBili  0.9  /  DBili  0.3<H>  /  AST  27  /  ALT  21  /  AlkPhos  69  05-12    PT/INR - ( 12 May 2021 19:35 )   PT: 13.0 sec;   INR: 1.09 ratio         PTT - ( 14 May 2021 07:03 )  PTT:58.9 sec          Culture - Acid Fast - Bronchial w/Smear (collected 13 May 2021 01:28)  Source: .Bronchial    Culture - Bronchial (collected 13 May 2021 01:28)  Source: .Bronchial  Gram Stain (13 May 2021 07:17):    Numerous polymorphonuclear leukocytes seen per low power field    No Squamous epithelial cells seen per low power field    Rare Gram Negative Rods seen per oil power field  Preliminary Report (13 May 2021 17:40):    Normal Respiratory Lizzette present    Culture - Fungal, Bronchial (collected 13 May 2021 01:28)  Source: BAL BRONCHIAL AVEOLAR LAVAGE  Preliminary Report (13 May 2021 08:37):    Testing in progress    Culture - Tissue with Gram Stain (collected 12 May 2021 23:11)  Source: .Tissue Other  Gram Stain (13 May 2021 01:05):    Rare polymorphonuclear leukocytes seen per low power field    No organisms seen per oil power field  Preliminary Report (13 May 2021 20:57):    Rare Klebsiella pneumoniae    Culture - Fungal, Tissue (collected 12 May 2021 23:11)  Source: .Tissue LOWER LOBE PEEL  Preliminary Report (13 May 2021 08:37):    Testing in progress    Culture - Acid Fast - Tissue w/Smear (collected 12 May 2021 23:11)  Source: .Tissue Other    Culture - Tissue with Gram Stain (collected 12 May 2021 23:11)  Source: .Tissue Other  Gram Stain (13 May 2021 01:24):    Rare polymorphonuclear leukocytes seen per low power field    No organisms seen per oil power field  Preliminary Report (13 May 2021 20:58):    Rare Klebsiella pneumoniae    Culture - Fungal, Tissue (collected 12 May 2021 23:11)  Source: .Tissue MIDDLE LOBE PEEL  Preliminary Report (13 May 2021 08:37):    Testing in progress    Culture - Acid Fast - Tissue w/Smear (collected 12 May 2021 23:11)  Source: .Tissue Other    Culture - Tissue with Gram Stain (collected 12 May 2021 23:11)  Source: .Tissue Other  Gram Stain (13 May 2021 01:24):    Rare polymorphonuclear leukocytes seen per low power field    No organisms seen per oil power field  Preliminary Report (13 May 2021 20:56):    Rare Klebsiella pneumoniae    Culture - Fungal, Tissue (collected 12 May 2021 23:11)  Source: .Tissue PLEURAL PEEL  Preliminary Report (13 May 2021 08:37):    Testing in progress    Culture - Acid Fast - Tissue w/Smear (collected 12 May 2021 23:11)  Source: .Tissue Other    Culture - Tissue with Gram Stain (collected 12 May 2021 23:11)  Source: .Tissue Other  Gram Stain (13 May 2021 01:05):    Rare polymorphonuclear leukocytes seen per low power field    No organisms seen per oil power field  Preliminary Report (13 May 2021 23:49):    Growth in fluid media only Gram Variable Rods    Culture - Fungal, Tissue (collected 12 May 2021 23:11)  Source: .Tissue UPPER LOBE PEEL  Preliminary Report (13 May 2021 08:33):    Testing in progress    Culture - Body Fluid with Gram Stain (collected 12 May 2021 23:11)  Source: .Body Fluid Pleural Fluid  Gram Stain (13 May 2021 02:53):    polymorphonuclear leukocytes seen    No organisms seen    by cytocentrifuge  Preliminary Report (13 May 2021 20:45):    Rare Klebsiella pneumoniae    Culture - Fungal, Body Fluid (collected 12 May 2021 23:11)  Source: .Body Fluid RIGHT CHEST ABSCESS  Preliminary Report (13 May 2021 08:33):    Testing in progress    Culture - Fungal, Tissue (collected 12 May 2021 23:11)  Source: .Tissue RIGHT CHEST TISSUE  Preliminary Report (13 May 2021 08:37):    Testing in progress    Culture - Tissue with Gram Stain (collected 12 May 2021 23:11)  Source: .Tissue Other  Gram Stain (13 May 2021 01:04):    Rare polymorphonuclear leukocytes seen per low power field    No organisms seen per oil power field  Preliminary Report (13 May 2021 23:48):    Growth in fluid media only Gram Negative Rods        RADIOLOGY & ADDITIONAL TESTS:  Results Reviewed:   Imaging Personally Reviewed:  Electrocardiogram Personally Reviewed:    COORDINATION OF CARE:  Care Discussed with Consultants/Other Providers [Y/N]:  Prior or Outpatient Records Reviewed [Y/N]:   PROGRESS NOTE:   Authored by Dr. Emily Mckeon, ANH pager [phone]    Patient is a 60y old  Male who presents with a chief complaint of COVID19 w/ severe metabolic acidosis s/p intubation (13 May 2021 14:09)      SUBJECTIVE / OVERNIGHT EVENTS Patient axamined at bedside. He appeared well but was only A&Ox1 to his name. He did not answer questions but nodded his head. He did not wince in pain to palpation of abd. Drains seem to be putting out serosangenous- bloody fluid.     MEDICATIONS  (STANDING):  chlorhexidine 2% Cloths 1 Application(s) Topical <User Schedule>  cholecalciferol 400 Unit(s) Oral daily  heparin  Infusion 1300 Unit(s)/Hr (13 mL/Hr) IV Continuous <Continuous>  insulin lispro (ADMELOG) corrective regimen sliding scale   SubCutaneous every 6 hours  insulin NPH human recombinant 12 Unit(s) SubCutaneous every 6 hours  levETIRAcetam  Solution 500 milliGRAM(s) Oral two times a day  meropenem  IVPB 1000 milliGRAM(s) IV Intermittent every 8 hours  polyethylene glycol 3350 17 Gram(s) Oral daily  senna 2 Tablet(s) Oral at bedtime  tamsulosin 0.4 milliGRAM(s) Oral at bedtime  thiamine 100 milliGRAM(s) Oral daily    MEDICATIONS  (PRN):  acetaminophen   Tablet .. 650 milliGRAM(s) Oral every 6 hours PRN Temp greater or equal to 38C (100.4F)  albuterol/ipratropium for Nebulization 3 milliLiter(s) Nebulizer every 6 hours PRN Shortness of Breath and/or Wheezing  HYDROmorphone  Injectable 0.5 milliGRAM(s) IV Push every 4 hours PRN Severe Pain (7 - 10)      CAPILLARY BLOOD GLUCOSE      POCT Blood Glucose.: 197 mg/dL (14 May 2021 06:13)  POCT Blood Glucose.: 164 mg/dL (14 May 2021 00:20)  POCT Blood Glucose.: 195 mg/dL (13 May 2021 22:49)  POCT Blood Glucose.: 138 mg/dL (13 May 2021 18:16)  POCT Blood Glucose.: 272 mg/dL (13 May 2021 12:05)    I&O's Summary    13 May 2021 07:01  -  14 May 2021 07:00  --------------------------------------------------------  IN: 2452 mL / OUT: 1310 mL / NET: 1142 mL        PHYSICAL EXAM:  Vital Signs Last 24 Hrs  T(C): 37.9 (14 May 2021 07:06), Max: 38 (14 May 2021 05:35)  T(F): 100.2 (14 May 2021 07:06), Max: 100.4 (14 May 2021 05:35)  HR: 115 (14 May 2021 07:06) (90 - 115)  BP: 105/65 (14 May 2021 07:06) (96/54 - 137/86)  BP(mean): 74 (13 May 2021 22:00) (70 - 86)  RR: 18 (14 May 2021 07:06) (15 - 27)  SpO2: 95% (14 May 2021 07:06) (91% - 99%)    GENERAL: No acute distress, malnourished   HEAD:  Atraumatic, Normocephalic  EYES: EOMI, PERRLA, conjunctiva and sclera clear  NECK: Supple, no lymphadenopathy, no JVD  CHEST/LUNG: b/l rhonchi but difficult to hear breath sounds, three drains in place, no erythema or leaking around sites.   HEART: Regular rate and rhythm; No murmurs, rubs, or gallops  ABDOMEN: Soft, non-tender, non-distended; normal bowel sounds, no organomegaly  EXTREMITIES:  2+ peripheral pulses b/l, No clubbing, cyanosis, or edema  NEUROLOGY: A&O x 1, no focal deficits  SKIN: gangrenous toes, no open lesion in feet     LABS:                        9.2    22.64 )-----------( 487      ( 14 May 2021 04:43 )             29.9     05-14    132<L>  |  97  |  31<H>  ----------------------------<  179<H>  4.7   |  25  |  0.75    Ca    8.5      14 May 2021 04:43  Phos  3.0     05-14  Mg     2.2     05-14    TPro  6.7  /  Alb  2.4<L>  /  TBili  0.9  /  DBili  0.3<H>  /  AST  27  /  ALT  21  /  AlkPhos  69  05-12    PT/INR - ( 12 May 2021 19:35 )   PT: 13.0 sec;   INR: 1.09 ratio         PTT - ( 14 May 2021 07:03 )  PTT:58.9 sec          Culture - Acid Fast - Bronchial w/Smear (collected 13 May 2021 01:28)  Source: .Bronchial    Culture - Bronchial (collected 13 May 2021 01:28)  Source: .Bronchial  Gram Stain (13 May 2021 07:17):    Numerous polymorphonuclear leukocytes seen per low power field    No Squamous epithelial cells seen per low power field    Rare Gram Negative Rods seen per oil power field  Preliminary Report (13 May 2021 17:40):    Normal Respiratory Lizzette present    Culture - Fungal, Bronchial (collected 13 May 2021 01:28)  Source: [place]IAL [place] LAVAGE  Preliminary Report (13 May 2021 08:37):    Testing in progress    Culture - Tissue with Gram Stain (collected 12 May 2021 23:11)  Source: .Tissue Other  Gram Stain (13 May 2021 01:05):    Rare polymorphonuclear leukocytes seen per low power field    No organisms seen per oil power field  Preliminary Report (13 May 2021 20:57):    Rare Klebsiella pneumoniae    Culture - Fungal, Tissue (collected 12 May 2021 23:11)  Source: .Tissue LOWER LOBE PEEL  Preliminary Report (13 May 2021 08:37):    Testing in progress    Culture - Acid Fast - Tissue w/Smear (collected 12 May 2021 23:11)  Source: .Tissue Other    Culture - Tissue with Gram Stain (collected 12 May 2021 23:11)  Source: .Tissue Other  Gram Stain (13 May 2021 01:24):    Rare polymorphonuclear leukocytes seen per low power field    No organisms seen per oil power field  Preliminary Report (13 May 2021 20:58):    Rare Klebsiella pneumoniae    Culture - Fungal, Tissue (collected 12 May 2021 23:11)  Source: .Tissue MIDDLE LOBE PEEL  Preliminary Report (13 May 2021 08:37):    Testing in progress    Culture - Acid Fast - Tissue w/Smear (collected 12 May 2021 23:11)  Source: .Tissue Other    Culture - Tissue with Gram Stain (collected 12 May 2021 23:11)  Source: .Tissue Other  Gram Stain (13 May 2021 01:24):    Rare polymorphonuclear leukocytes seen per low power field    No organisms seen per oil power field  Preliminary Report (13 May 2021 20:56):    Rare Klebsiella pneumoniae    Culture - Fungal, Tissue (collected 12 May 2021 23:11)  Source: .Tissue PLEURAL PEEL  Preliminary Report (13 May 2021 08:37):    Testing in progress    Culture - Acid Fast - Tissue w/Smear (collected 12 May 2021 23:11)  Source: .Tissue Other    Culture - Tissue with Gram Stain (collected 12 May 2021 23:11)  Source: .Tissue Other  Gram Stain (13 May 2021 01:05):    Rare polymorphonuclear leukocytes seen per low power field    No organisms seen per oil power field  Preliminary Report (13 May 2021 23:49):    Growth in fluid media only Gram Variable Rods    Culture - Fungal, Tissue (collected 12 May 2021 23:11)  Source: .Tissue UPPER LOBE PEEL  Preliminary Report (13 May 2021 08:33):    Testing in progress    Culture - Body Fluid with Gram Stain (collected 12 May 2021 23:11)  Source: .Body Fluid Pleural Fluid  Gram Stain (13 May 2021 02:53):    polymorphonuclear leukocytes seen    No organisms seen    by cytocentrifuge  Preliminary Report (13 May 2021 20:45):    Rare Klebsiella pneumoniae    Culture - Fungal, Body Fluid (collected 12 May 2021 23:11)  Source: .Body Fluid RIGHT CHEST ABSCESS  Preliminary Report (13 May 2021 08:33):    Testing in progress    Culture - Fungal, Tissue (collected 12 May 2021 23:11)  Source: .Tissue RIGHT CHEST TISSUE  Preliminary Report (13 May 2021 08:37):    Testing in progress    Culture - Tissue with Gram Stain (collected 12 May 2021 23:11)  Source: .Tissue Other  Gram Stain (13 May 2021 01:04):    Rare polymorphonuclear leukocytes seen per low power field    No organisms seen per oil power field  Preliminary Report (13 May 2021 23:48):    Growth in fluid media only Gram Negative Rods        RADIOLOGY & ADDITIONAL TESTS:  Results Reviewed:   Imaging Personally Reviewed:  Electrocardiogram Personally Reviewed:    COORDINATION OF CARE:  Care Discussed with Consultants/Other Providers [Y/N]:  Prior or Outpatient Records Reviewed [Y/N]:

## 2021-05-14 NOTE — PROVIDER CONTACT NOTE (CHANGE IN STATUS NOTIFICATION) - BACKGROUND
Pt admitted sp VATS procedure
Pt with klebsiella infection, s/p extubation and transfer from SICU a few days ago, unable to cough up sputum or to be suctioned now showing signs of infection and respiratory compromise. Currently NPO.
pt admitted for PNA, febrile this am and blood cx drawn
Pt admitted sp VATS procedure for empyema drainage.

## 2021-05-14 NOTE — PROGRESS NOTE ADULT - PROBLEM SELECTOR PLAN 4
-Pulmonary embolus noted on ct scan performed at time of admission  -Etiology of venous thromboembolism uncertain at this time; no known history of thrombophilia; provoked in setting of acute covid illness   - restart heparin after VATs  - will discharge on Eliquis -Pulmonary embolus noted on ct scan performed at time of admission  -Etiology of venous thromboembolism uncertain at this time; no known history of thrombophilia; provoked in setting of acute covid illness   - On heparin drip rate of 13  - f/u CBC to monitor hbg  - will discharge on Eliquis

## 2021-05-14 NOTE — PROGRESS NOTE ADULT - PROBLEM SELECTOR PLAN 1
Cystitis and Empyema   5/12 he underwent right VATS converted to open thoracotomy, decortication, drainage of abscess and flex bronch. Pt briefly on lexi intraop and extubated in OR.   - s/p L. thoracotomy, drainage of abscess, decortication, flex bronch   - 1 angled chest tube , 1 straight chest tube, 1 Blakemore drain (anterior) - Air leak present post op   - Chest tube to suction at 10mmhg  - Air leak expected and seen  - Monitor pulse oximeter  - Out of bed to chair, ambulate as tolerated, and incentive spirometry to prevent atelectasis  - Pain control as needed with tylenol and oxy    - c/w meropenem 5/7-  - BCx neg, UCx growing Kleb    Obturator Hematoma   - bleeding hematoma in right thigh on CT on 5/7  - CT hip showing non active bleed, hematoma on right thigh on 5/8  - lovenox on hold, start heparin after VATs procedure  - doppler to r/o DVT in LE, per IR no role IVC filter Cystitis and Empyema   5/12 he underwent right VATS converted to open thoracotomy, decortication, drainage of abscess and flex bronch. Pt briefly on lexi intraop and extubated in OR.   - s/p L. thoracotomy, drainage of abscess, decortication, flex bronch   - 1 angled chest tube , 1 straight chest tube, 1 Blakemore drain (anterior) - Air leak present post op   - Chest tube to suction at 10mmhg  - Air leak expected and seen  - Monitor pulse oximeter  - Out of bed to chair, ambulate as tolerated, and incentive spirometry to prevent atelectasis  - Pain control as needed with tylenol and oxy    - c/w meropenem 5/7-  - BCx neg, UCx growing Kleb    Obturator Hematoma   - bleeding hematoma in right thigh on CT on 5/7  - CT hip showing non active bleed, hematoma on right thigh on 5/8  - doppler to r/o DVT in LE, per IR no role IVC filter  - stable, monitor CBC

## 2021-05-14 NOTE — PROGRESS NOTE ADULT - NUTRITIONAL ASSESSMENT
This patient has been assessed with a concern for Malnutrition and has been determined to have a diagnosis/diagnoses of Severe protein-calorie malnutrition.    This patient is being managed with:   Diet NPO with Tube Feed-  Tube Feeding Modality: Gastrostomy  Glucerna 1.2 Kaden (GLUCERNARTH)  Total Volume for 24 Hours (mL): 1680  Continuous  Starting Tube Feed Rate {mL per Hour}: 20  Increase Tube Feed Rate by (mL): 20     Every 2 hours  Until Goal Tube Feed Rate (mL per Hour): 70  Tube Feed Duration (in Hours): 24  Tube Feed Start Time: 20:30  Entered: May 12 2021  8:12PM

## 2021-05-14 NOTE — PROGRESS NOTE ADULT - ATTENDING COMMENTS
s/p SICU stay after VATS  no pain  chronically ill but comfortable  right-sided chest tubes  +PEG feeds  chronic toe gangrene  trend CBC on AC  elevated WBC - continue Meropenem and f/up ID recs  Is/Os    patient with multiple co-morbid conditions; higher risk for future complications despite optimal medical therapy

## 2021-05-14 NOTE — PROGRESS NOTE ADULT - SUBJECTIVE AND OBJECTIVE BOX
DIABETES FOLLOW UP : Seen earlier today    INTERVAL Hx: BG mostly at goal today, TF running at goal, pt family member Nic at bedside interpreting pt denies abdominal fullness but reports Pt c/o mild discomfort on RUQ abdominal palpation. Per family pt is oriented states he is in the hospital, knows the year, and his name.       Review of Systems:  General: As above  Cardiovascular: No chest pain, palpitations  Respiratory: No SOB, no cough  GI: No nausea, vomiting, mild abdominal discomfort on RUQ palpation  Endocrine: no polyuria, polydipsia or S&Sx of hypoglycemia    Allergies    No Known Allergies    Intolerances      MEDICATIONS  (STANDING):  chlorhexidine 2% Cloths 1 Application(s) Topical <User Schedule>  cholecalciferol 400 Unit(s) Oral daily  heparin  Infusion 1300 Unit(s)/Hr (13 mL/Hr) IV Continuous <Continuous>  insulin lispro (ADMELOG) corrective regimen sliding scale   SubCutaneous every 6 hours  insulin NPH human recombinant 12 Unit(s) SubCutaneous every 6 hours  levETIRAcetam  Solution 500 milliGRAM(s) Oral two times a day  meropenem  IVPB 1000 milliGRAM(s) IV Intermittent every 8 hours  polyethylene glycol 3350 17 Gram(s) Oral daily  senna 2 Tablet(s) Oral at bedtime  sodium chloride 0.9%. 1000 milliLiter(s) (50 mL/Hr) IV Continuous <Continuous>  tamsulosin 0.4 milliGRAM(s) Oral at bedtime  thiamine 100 milliGRAM(s) Oral daily      PHYSICAL EXAM:  VITALS: T(C): 38.2 (05-14-21 @ 16:35)  T(F): 100.7 (05-14-21 @ 16:35), Max: 100.7 (05-14-21 @ 16:35)  HR: 114 (05-14-21 @ 16:35) (81 - 115)  BP: 112/67 (05-14-21 @ 16:35) (97/62 - 137/86)  RR:  (18 - 27)  SpO2:  (91% - 99%)  Wt(kg): --  GENERAL: male laying in bed in NAD family at bedside  Abdomen: Soft, nontender, non distended, peg  Extremities: Warm  NEURO: alert     LABS:  POCT Blood Glucose.: 145 mg/dL (05-14-21 @ 12:24)  POCT Blood Glucose.: 174 mg/dL (05-14-21 @ 08:17)  POCT Blood Glucose.: 197 mg/dL (05-14-21 @ 06:13)  POCT Blood Glucose.: 164 mg/dL (05-14-21 @ 00:20)  POCT Blood Glucose.: 195 mg/dL (05-13-21 @ 22:49)  POCT Blood Glucose.: 138 mg/dL (05-13-21 @ 18:16)  POCT Blood Glucose.: 272 mg/dL (05-13-21 @ 12:05)  POCT Blood Glucose.: 309 mg/dL (05-13-21 @ 05:41)  POCT Blood Glucose.: 351 mg/dL (05-13-21 @ 03:29)  POCT Blood Glucose.: 262 mg/dL (05-13-21 @ 02:46)  POCT Blood Glucose.: 277 mg/dL (05-13-21 @ 00:01)  POCT Blood Glucose.: 142 mg/dL (05-12-21 @ 13:42)  POCT Blood Glucose.: 125 mg/dL (05-12-21 @ 06:30)  POCT Blood Glucose.: 117 mg/dL (05-11-21 @ 23:26)  POCT Blood Glucose.: 100 mg/dL (05-11-21 @ 21:35)  POCT Blood Glucose.: 100 mg/dL (05-11-21 @ 18:07)                            9.5    21.96 )-----------( 523      ( 14 May 2021 15:02 )             30.7       05-14    132<L>  |  97  |  31<H>  ----------------------------<  179<H>  4.7   |  25  |  0.75    EGFR if : 117  EGFR if non : 101    Ca    8.5      05-14  Mg     2.2     05-14  Phos  3.0     05-14    TPro  6.7  /  Alb  2.4<L>  /  TBili  0.9  /  DBili  0.3<H>  /  AST  27  /  ALT  21  /  AlkPhos  69  05-12 04-26 Chol 114 Direct LDL -- LDL calculated 53 HDL 35<L> Trig 126, 04-13 Chol -- Direct LDL -- LDL calculated -- HDL -- Trig 126    Thyroid Function Tests:  04-24 @ 11:11 TSH 2.00 FreeT4 -- T3 -- Anti TPO -- Anti Thyroglobulin Ab -- TSI --        A1C with Estimated Average Glucose Result: 10.3 % (04-02-21 @ 14:28)      Estimated Average Glucose: 249 mg/dL (04-02-21 @ 14:28)

## 2021-05-14 NOTE — PROGRESS NOTE ADULT - ASSESSMENT
60M hospitalized for COVID PNA, c/b cardiogenic/septic shock and ischemic/hemorrhagic stroke, now extubated on the floor w/ negative COVID PCR, Thoracic surgery consulted to evaluate the recently found R sided empyema.     5/8 VSS; NAD. Thoracic surgery recommend IR Consult/Tap for R empyema.     5/9  VSS, NAD.  IR deferring IR drain of empyema 2/2 risk of iatrogenic bronchopleural fistula. Recommends VATS procedure.  Dr. Tena spoke to patients brother in law regarding VATS procedure.  Family to decide on whether to proceed with operation.  Continue IV ABX per ID.   5/11 VSS; Plan for VATs tomorrow 5/12/21 with Dr. Albarado. COVID pcr within 48 hr.   5/12 OR this afternoon VATS for empyema  5/13 S/P Vats yesterday. Anterior Aroldo/Angled Diaphragm/Posterior straight chest tubes --> Dry SUCTION (@40mmhg). Monitor drainage and A/L on suction. (H2O seal overnight) RPT CXR @ 11 am. Back on medicine service once patient is stable to move out of SICU.   5/14 HD stable, encourage OOB and mobilization, R chest tubes maintain to suction today, will likely water seal tomorrow. AM CXR noted. D/c wesley today.

## 2021-05-14 NOTE — PROGRESS NOTE ADULT - ASSESSMENT
59 y/o M Gujarati speaking w/ new onset diabetes > A1c of 10.3% on admission for COVID with prolonged hospital course requiring intubation and PEG insertion> now stable ; s/p Right VATS converted to R thoracotomy for Empyema/bilateral PNA, abscess. TF & NPH restarted this AM; Glucerna TF currently running at goal with BG Values above 250 today. Would increase NPH Dose if BG above 180.  BG elevated/requiring additional insulin likely 2/2 infection/surgical stress. Continue to titrate to BG Goal of 100 to 180s.

## 2021-05-14 NOTE — PROVIDER CONTACT NOTE (CRITICAL VALUE NOTIFICATION) - TEST AND RESULT REPORTED:
Tissue cx taken 5/12 growth in fluid media gram negative rods  Second tissue cx growth in fluid media, gram variable rods on 5/12 at 0748

## 2021-05-14 NOTE — PROGRESS NOTE ADULT - ASSESSMENT
60 yr male with PMH of DM who was initially admitted to [facility] ICU for hypoxic respiratory failure 2/2 COVID c/b PE with R heart strain, cardiogenic and septic shock, ischemic and hemorrhagic CVA and ESBL klebsiella ventilator associate PNA, transferred to [facility] for neurosurgery eval and further management. s/p PEG, now monitoring for refeeding syndrome, complicated with right empyema planned for VATs

## 2021-05-14 NOTE — PROGRESS NOTE ADULT - ASSESSMENT
61 yo M with PMH of DM who was initially admitted to [facility] ICU 4/1 for hypoxic respiratory failure 2/2 COVID c/b PE with R heart strain, cardiogenic and septic shock, ischemic and hemorrhagic CVA and ESBL klebsiella ventilator associated PNA, transferred to [facility] for neurosurgery eval on 4/12  No fever, has leukocytosis  UCX with ESBL K pne  Bilateral LE dry gangrene  No sob/no cough, no abd pain, N/V/D  Has PEG  PCT minimally elevated  CT now with Empyema, concern for bilateral PNA, abscess  S/p OR Thoracotomy, abscess drainage, bronch (5/12)--culture with K pne  1) Leukocytosis  - Worsened, likely reactive to OR  - Trend WBC  2) COVID  - Prior infection  - Monitor for any signs active COVID/reinfection  3) Suspected Empyema/Abscess  - Culture with K pne--S pending  - Meropenem 1g q 8  - Appreciate Thoracic procedure  - F/U OR cultures  4) Gangrene  - Appears noninfectious/dry gangrene  - Monitor wounds    Sammy Chambers MD  Pager [phone]  After 5pm and on weekends call [phone]

## 2021-05-14 NOTE — PROGRESS NOTE ADULT - PROBLEM SELECTOR PLAN 1
OR Vats 5/12 with Dr. Albarado   Hep gtt restarted   Ant Aroldo, Angled Diaphragm, Posterior straight CT--> Dry Suction @ 40   water seal chest tubes when indicated  serial CXR   Monitor drainage   Daily CXR

## 2021-05-14 NOTE — PROVIDER CONTACT NOTE (CHANGE IN STATUS NOTIFICATION) - SITUATION
Pt with persistent cough overnight, s/p duoneb treatment, now with spo2 90% on 2L NC, hr 118 , rectal temp 101 , diaphoretic and tachypneic
Pt AO1, disoriented to place time situation.
Temp 100.7 and Hr 114
Pt FS >300

## 2021-05-14 NOTE — PROGRESS NOTE ADULT - SUBJECTIVE AND OBJECTIVE BOX
CC: F/U for Empyema    Saw/spoke to patient. Low grade fevers, no chills. No new complaints.    Allergies  No Known Allergies    ANTIMICROBIALS:  meropenem  IVPB 1000 every 8 hours    PE:    Vital Signs Last 24 Hrs  T(C): 37 (14 May 2021 08:00), Max: 38 (14 May 2021 05:35)  T(F): 98.6 (14 May 2021 08:00), Max: 100.4 (14 May 2021 05:35)  HR: 113 (14 May 2021 08:50) (90 - 115)  BP: 97/62 (14 May 2021 08:50) (96/54 - 137/86)  BP(mean): 74 (13 May 2021 22:00) (70 - 78)  RR: 18 (14 May 2021 08:00) (18 - 27)  SpO2: 97% (14 May 2021 08:50) (91% - 99%)    Gen: AOx3, NAD, non-toxic, pleasant  CV: S1+S2 normal, tachycardic  Resp: Clear bilat, no resp distress, no crackles/wheezes  Abd: Soft, nontender, +BS  Ext: No LE edema, no wounds    LABS:                        9.2    22.64 )-----------( 487      ( 14 May 2021 04:43 )             29.9     05-14    132<L>  |  97  |  31<H>  ----------------------------<  179<H>  4.7   |  25  |  0.75    Ca    8.5      14 May 2021 04:43  Phos  3.0     05-14  Mg     2.2     05-14    TPro  6.7  /  Alb  2.4<L>  /  TBili  0.9  /  DBili  0.3<H>  /  AST  27  /  ALT  21  /  AlkPhos  69  05-12    MICROBIOLOGY:    BAL BRONCHIAL AVEOLAR LAVAGE  05-13-21   Testing in progress  --    Numerous polymorphonuclear leukocytes seen per low power field  No Squamous epithelial cells seen per low power field  Rare Gram Negative Rods seen per oil power field    .Tissue Other  05-12-21   Growth in fluid media only Gram Negative Rods  --    Rare polymorphonuclear leukocytes seen per low power field  No organisms seen per oil power field    .Urine Clean Catch (Midstream)  05-06-21   >100,000 CFU/ml Klebsiella pneumoniae ESBL  --  Klebsiella pneumoniae ESBL    .Blood Blood  05-05-21   No Growth Final     .Blood Blood  05-05-21   No Growth Final    .Urine Clean Catch (Midstream)  04-19-21   No growth     .Blood Blood-Peripheral  04-19-21   No Growth Final     RADIOLOGY:    5/13 XR:    IMPRESSION:    The heart is slightly enlarged. 2 chest tubes are seen in the hemithorax. A small residual pneumothorax is still present. The left lung appears to be clear. No acute bony pathology could be identified..

## 2021-05-14 NOTE — PROGRESS NOTE ADULT - SUBJECTIVE AND OBJECTIVE BOX
Patient is a 60y old  Male who presents with a chief complaint of COVID19 w/ severe metabolic acidosis s/p intubation (14 May 2021 07:51)        Vital Signs Last 24 Hrs  T(C): 37 (21 @ 08:00), Max: 38 (21 @ 05:35)  T(F): 98.6 (21 @ 08:00), Max: 100.4 (21 @ 05:35)  HR: 113 (21 @ 08:50) (90 - 115)  BP: 97/62 (21 @ 08:50) (96/54 - 137/86)  RR: 18 (21 @ 08:00) (18 - 27)  SpO2: 97% (21 @ 08:50) (91% - 99%)                21 @ 07:01  -  21 @ 07:00  --------------------------------------------------------  IN: 2452 mL / OUT: 1310 mL / NET: 1142 mL        Daily     Daily Weight in k.5 (14 May 2021 06:33)                          9.2    22.64 )-----------( 487      ( 14 May 2021 04:43 )             29.9     05-    132<L>  |  97  |  31<H>  ----------------------------<  179<H>  4.7   |  25  |  0.75    Ca    8.5      14 May 2021 04:43  Phos  3.0     -  Mg     2.2         TPro  6.7  /  Alb  2.4<L>  /  TBili  0.9  /  DBili  0.3<H>  /  AST  27  /  ALT  21  /  AlkPhos  69  05-12          PHYSICAL EXAM  Neurology: A&Ox3, NAD, no gross deficits  CV : RRR+S1S2  Lungs: Respirations non-labored, B/L BS  Abdomen: Soft, NT/ND, +BSx4Q + PEG tube  Extremities: B/L LE edema, negative calf tenderness, +PP         +olson  CHEST TUBES:  right chest tube posterior, angled, susanna to suction -40 mm hg            MEDICATIONS  acetaminophen   Tablet .. 650 milliGRAM(s) Oral every 6 hours PRN  albuterol/ipratropium for Nebulization 3 milliLiter(s) Nebulizer every 6 hours PRN  chlorhexidine 2% Cloths 1 Application(s) Topical <User Schedule>  cholecalciferol 400 Unit(s) Oral daily  heparin  Infusion 1300 Unit(s)/Hr IV Continuous <Continuous>  HYDROmorphone  Injectable 0.5 milliGRAM(s) IV Push every 4 hours PRN  insulin lispro (ADMELOG) corrective regimen sliding scale   SubCutaneous every 6 hours  insulin NPH human recombinant 12 Unit(s) SubCutaneous every 6 hours  levETIRAcetam  Solution 500 milliGRAM(s) Oral two times a day  meropenem  IVPB 1000 milliGRAM(s) IV Intermittent every 8 hours  polyethylene glycol 3350 17 Gram(s) Oral daily  senna 2 Tablet(s) Oral at bedtime  tamsulosin 0.4 milliGRAM(s) Oral at bedtime  thiamine 100 milliGRAM(s) Oral daily

## 2021-05-14 NOTE — PROVIDER CONTACT NOTE (CHANGE IN STATUS NOTIFICATION) - ACTION/TREATMENT ORDERED:
Continue to monitor.
Give Tylenol and continue to monitor
Continue to monitor. PA aware. NPH/Lantus to be ordered.
MD acknowledged and will come to bedside. Will also order oxygenation parameters, Blood/sputum/Urube cultures, IV tylenol and potentially vancomycin.

## 2021-05-14 NOTE — PROGRESS NOTE ADULT - PROBLEM SELECTOR PLAN 10
- DVT: none, scds.   - Diet: NPO with tube feedings  - Full code  - Dispo- acute rehab pending medical optimization

## 2021-05-15 LAB
-  AMIKACIN: SIGNIFICANT CHANGE UP
-  AMOXICILLIN/CLAVULANIC ACID: SIGNIFICANT CHANGE UP
-  AMPICILLIN/SULBACTAM: SIGNIFICANT CHANGE UP
-  AMPICILLIN: SIGNIFICANT CHANGE UP
-  AZTREONAM: SIGNIFICANT CHANGE UP
-  CEFAZOLIN: SIGNIFICANT CHANGE UP
-  CEFEPIME: SIGNIFICANT CHANGE UP
-  CEFOXITIN: SIGNIFICANT CHANGE UP
-  CEFTRIAXONE: SIGNIFICANT CHANGE UP
-  CIPROFLOXACIN: SIGNIFICANT CHANGE UP
-  ERTAPENEM: SIGNIFICANT CHANGE UP
-  GENTAMICIN: SIGNIFICANT CHANGE UP
-  IMIPENEM: SIGNIFICANT CHANGE UP
-  LEVOFLOXACIN: SIGNIFICANT CHANGE UP
-  MEROPENEM: SIGNIFICANT CHANGE UP
-  PIPERACILLIN/TAZOBACTAM: SIGNIFICANT CHANGE UP
-  TOBRAMYCIN: SIGNIFICANT CHANGE UP
-  TRIMETHOPRIM/SULFAMETHOXAZOLE: SIGNIFICANT CHANGE UP
ALBUMIN SERPL ELPH-MCNC: 2.2 G/DL — LOW (ref 3.3–5)
ALP SERPL-CCNC: 92 U/L — SIGNIFICANT CHANGE UP (ref 40–120)
ALT FLD-CCNC: 15 U/L — SIGNIFICANT CHANGE UP (ref 10–45)
ANION GAP SERPL CALC-SCNC: 11 MMOL/L — SIGNIFICANT CHANGE UP (ref 5–17)
APTT BLD: 68.9 SEC — HIGH (ref 27.5–35.5)
AST SERPL-CCNC: 18 U/L — SIGNIFICANT CHANGE UP (ref 10–40)
BILIRUB SERPL-MCNC: 0.3 MG/DL — SIGNIFICANT CHANGE UP (ref 0.2–1.2)
BUN SERPL-MCNC: 27 MG/DL — HIGH (ref 7–23)
CALCIUM SERPL-MCNC: 8.4 MG/DL — SIGNIFICANT CHANGE UP (ref 8.4–10.5)
CHLORIDE SERPL-SCNC: 96 MMOL/L — SIGNIFICANT CHANGE UP (ref 96–108)
CO2 SERPL-SCNC: 24 MMOL/L — SIGNIFICANT CHANGE UP (ref 22–31)
CREAT SERPL-MCNC: 0.67 MG/DL — SIGNIFICANT CHANGE UP (ref 0.5–1.3)
GLUCOSE BLDC GLUCOMTR-MCNC: 173 MG/DL — HIGH (ref 70–99)
GLUCOSE BLDC GLUCOMTR-MCNC: 176 MG/DL — HIGH (ref 70–99)
GLUCOSE BLDC GLUCOMTR-MCNC: 177 MG/DL — HIGH (ref 70–99)
GLUCOSE BLDC GLUCOMTR-MCNC: 185 MG/DL — HIGH (ref 70–99)
GLUCOSE SERPL-MCNC: 155 MG/DL — HIGH (ref 70–99)
HCT VFR BLD CALC: 30.3 % — LOW (ref 39–50)
HGB BLD-MCNC: 9.3 G/DL — LOW (ref 13–17)
INR BLD: 1.17 RATIO — HIGH (ref 0.88–1.16)
MAGNESIUM SERPL-MCNC: 2.1 MG/DL — SIGNIFICANT CHANGE UP (ref 1.6–2.6)
MCHC RBC-ENTMCNC: 24.3 PG — LOW (ref 27–34)
MCHC RBC-ENTMCNC: 30.7 GM/DL — LOW (ref 32–36)
MCV RBC AUTO: 79.3 FL — LOW (ref 80–100)
METHOD TYPE: SIGNIFICANT CHANGE UP
NRBC # BLD: 0 /100 WBCS — SIGNIFICANT CHANGE UP (ref 0–0)
PHOSPHATE SERPL-MCNC: 2.4 MG/DL — LOW (ref 2.5–4.5)
PLATELET # BLD AUTO: 502 K/UL — HIGH (ref 150–400)
POTASSIUM SERPL-MCNC: 4.5 MMOL/L — SIGNIFICANT CHANGE UP (ref 3.5–5.3)
POTASSIUM SERPL-SCNC: 4.5 MMOL/L — SIGNIFICANT CHANGE UP (ref 3.5–5.3)
PROT SERPL-MCNC: 6.6 G/DL — SIGNIFICANT CHANGE UP (ref 6–8.3)
PROTHROM AB SERPL-ACNC: 13.9 SEC — HIGH (ref 10.6–13.6)
RBC # BLD: 3.82 M/UL — LOW (ref 4.2–5.8)
RBC # FLD: 20.7 % — HIGH (ref 10.3–14.5)
SODIUM SERPL-SCNC: 131 MMOL/L — LOW (ref 135–145)
WBC # BLD: 18.39 K/UL — HIGH (ref 3.8–10.5)
WBC # FLD AUTO: 18.39 K/UL — HIGH (ref 3.8–10.5)

## 2021-05-15 PROCEDURE — 71045 X-RAY EXAM CHEST 1 VIEW: CPT | Mod: 26

## 2021-05-15 PROCEDURE — 99233 SBSQ HOSP IP/OBS HIGH 50: CPT | Mod: GC

## 2021-05-15 RX ORDER — SODIUM CHLORIDE 9 MG/ML
1000 INJECTION INTRAMUSCULAR; INTRAVENOUS; SUBCUTANEOUS
Refills: 0 | Status: DISCONTINUED | OUTPATIENT
Start: 2021-05-15 | End: 2021-05-16

## 2021-05-15 RX ADMIN — Medication 2: at 12:50

## 2021-05-15 RX ADMIN — MEROPENEM 100 MILLIGRAM(S): 1 INJECTION INTRAVENOUS at 13:28

## 2021-05-15 RX ADMIN — HUMAN INSULIN 12 UNIT(S): 100 INJECTION, SUSPENSION SUBCUTANEOUS at 00:23

## 2021-05-15 RX ADMIN — Medication 400 UNIT(S): at 12:51

## 2021-05-15 RX ADMIN — HEPARIN SODIUM 13 UNIT(S)/HR: 5000 INJECTION INTRAVENOUS; SUBCUTANEOUS at 09:05

## 2021-05-15 RX ADMIN — SENNA PLUS 2 TABLET(S): 8.6 TABLET ORAL at 21:35

## 2021-05-15 RX ADMIN — HUMAN INSULIN 12 UNIT(S): 100 INJECTION, SUSPENSION SUBCUTANEOUS at 12:50

## 2021-05-15 RX ADMIN — Medication 3 MILLILITER(S): at 05:03

## 2021-05-15 RX ADMIN — Medication 650 MILLIGRAM(S): at 06:11

## 2021-05-15 RX ADMIN — POLYETHYLENE GLYCOL 3350 17 GRAM(S): 17 POWDER, FOR SOLUTION ORAL at 12:51

## 2021-05-15 RX ADMIN — TAMSULOSIN HYDROCHLORIDE 0.4 MILLIGRAM(S): 0.4 CAPSULE ORAL at 21:35

## 2021-05-15 RX ADMIN — MEROPENEM 100 MILLIGRAM(S): 1 INJECTION INTRAVENOUS at 05:02

## 2021-05-15 RX ADMIN — Medication 2: at 06:10

## 2021-05-15 RX ADMIN — SODIUM CHLORIDE 50 MILLILITER(S): 9 INJECTION INTRAMUSCULAR; INTRAVENOUS; SUBCUTANEOUS at 21:54

## 2021-05-15 RX ADMIN — Medication 650 MILLIGRAM(S): at 04:52

## 2021-05-15 RX ADMIN — LEVETIRACETAM 500 MILLIGRAM(S): 250 TABLET, FILM COATED ORAL at 05:03

## 2021-05-15 RX ADMIN — HUMAN INSULIN 12 UNIT(S): 100 INJECTION, SUSPENSION SUBCUTANEOUS at 06:10

## 2021-05-15 RX ADMIN — Medication 0: at 00:22

## 2021-05-15 RX ADMIN — LEVETIRACETAM 500 MILLIGRAM(S): 250 TABLET, FILM COATED ORAL at 18:07

## 2021-05-15 RX ADMIN — MEROPENEM 100 MILLIGRAM(S): 1 INJECTION INTRAVENOUS at 21:35

## 2021-05-15 RX ADMIN — Medication 100 MILLIGRAM(S): at 12:51

## 2021-05-15 RX ADMIN — HUMAN INSULIN 12 UNIT(S): 100 INJECTION, SUSPENSION SUBCUTANEOUS at 18:08

## 2021-05-15 RX ADMIN — Medication 2: at 18:07

## 2021-05-15 NOTE — PROGRESS NOTE ADULT - SUBJECTIVE AND OBJECTIVE BOX
Patient is a 60y old  Male who presents with a chief complaint of COVID19 w/ severe metabolic acidosis s/p intubation (15 May 2021 11:40)       INTERVAL HPI/OVERNIGHT EVENTS:  Patient seen and evaluated at bedside.  Pt is resting comfortable in NAD.     Allergies    No Known Allergies    Intolerances        Vital Signs Last 24 Hrs  T(C): 37.1 (16 May 2021 00:43), Max: 38.1 (15 May 2021 04:35)  T(F): 98.8 (16 May 2021 00:43), Max: 100.5 (15 May 2021 04:35)  HR: 112 (16 May 2021 00:43) (102 - 115)  BP: 131/75 (16 May 2021 00:43) (109/70 - 131/75)  BP(mean): --  RR: 20 (16 May 2021 00:43) (18 - 20)  SpO2: 93% (16 May 2021 00:43) (93% - 94%)    LABS:                        9.3    18.39 )-----------( 502      ( 15 May 2021 07:18 )             30.3     05-15    131<L>  |  96  |  27<H>  ----------------------------<  155<H>  4.5   |  24  |  0.67    Ca    8.4      15 May 2021 07:18  Phos  2.4     05-15  Mg     2.1     05-15    TPro  6.6  /  Alb  2.2<L>  /  TBili  0.3  /  DBili  x   /  AST  18  /  ALT  15  /  AlkPhos  92  05-15    PT/INR - ( 15 May 2021 07:25 )   PT: 13.9 sec;   INR: 1.17 ratio         PTT - ( 15 May 2021 07:25 )  PTT:68.9 sec    CAPILLARY BLOOD GLUCOSE      POCT Blood Glucose.: 185 mg/dL (15 May 2021 23:55)  POCT Blood Glucose.: 173 mg/dL (15 May 2021 17:31)  POCT Blood Glucose.: 177 mg/dL (15 May 2021 12:03)  POCT Blood Glucose.: 176 mg/dL (15 May 2021 06:08)      Lower Extremity Physical Exam:  Vascular: DP/PT 2/4, B/L, CFT <3 seconds B/L, Temperature gradient warm to cool, B/L however diminished warmth noted on the R foot past TMT  Ortho: toes painful to palp  Neuro: Epicritic sensation intact to the level of digits, B/L.  Skin: bilateral digital gangrenous changes noted to distal tips of digits, notably on L foot 2nd digit full thickness, well adhered; R foot 2nd digit sloughed skin with no probe to bone nor signs of infection

## 2021-05-15 NOTE — PROGRESS NOTE ADULT - ASSESSMENT
60M w/ bilateral digital gangrene  - Pt seen and evaluated  - bilateral digital gangrenous changes noted to distal tips of digits, notably on L foot 2nd digit full thickness, well adhered ; R foot 2nd digit sloughed skin with no probe to bone nor signs of infection  - Foot currently without signs of infection, with demarcating gangrenous changes  - No acute podiatric surgical intervention at this time  - Plan to continue local wound care with betadine & with adequate blood flow & allow for further demarcation prior to any surgical intervention  - will monitor

## 2021-05-15 NOTE — PROGRESS NOTE ADULT - ATTENDING COMMENTS
Kalyyn Patrick MD  Division of Hospital Medicine  [facility]   Pager: [phone]    Patient seen and examined today with Team 3 Resident and Interns. Agree with above findings, assessment, and plan with the following additions/exceptions:    Empyema: s/p VATS. cultures growing ESBL Klebsiella, sensitive to jennifer and Prevotella. leukocytosis downtrending though still with periodic low grade fevers. c/w meropenem. f/u with ID. chest tube management as per thoracic surgery. daily CXR.  Pulmonary embolus: H/H stable. c/w hep gtt  L apical PTX: stable on serial CXR. continue to monitor on daily CXR.  Obdurator hematoma: found on 5/7. monitor for expansion and H/H while on hep gtt.    Rest as detailed in note above.    Plan discussed with patient and Team 3 intern Dr. Mckeon. Kaylyn Patrick MD  Division of Hospital Medicine  [facility]   Pager: [phone]    Patient seen and examined today with Team 3 Resident and Interns. Agree with above findings, assessment, and plan with the following additions/exceptions:    Empyema/Abscess: s/p VATS. cultures growing ESBL Klebsiella, sensitive to jennifer and Prevotella. leukocytosis downtrending though still with periodic low grade fevers. c/w meropenem. f/u with ID. chest tube management as per thoracic surgery. daily CXR.  Pulmonary embolus: H/H stable. c/w hep gtt  L apical PTX: stable on serial CXR. continue to monitor on daily CXR.  Obdurator hematoma: found on 5/7. monitor for expansion and H/H while on hep gtt.    Rest as detailed in note above.    Plan discussed with patient and Team 3 intern Dr. Mckeon.

## 2021-05-15 NOTE — PROGRESS NOTE ADULT - ASSESSMENT
60M hospitalized for COVID PNA, c/b cardiogenic/septic shock and ischemic/hemorrhagic stroke, now extubated on the floor w/ negative COVID PCR, Thoracic surgery consulted to evaluate the recently found R sided empyema.     5/8 VSS; NAD. Thoracic surgery recommend IR Consult/Tap for R empyema.     5/9  VSS, NAD.  IR deferring IR drain of empyema 2/2 risk of iatrogenic bronchopleural fistula. Recommends VATS procedure.  Dr. Tena spoke to patients brother in law regarding VATS procedure.  Family to decide on whether to proceed with operation.  Continue IV ABX per ID.   5/11 VSS; Plan for VATs tomorrow 5/12/21 with Dr. Albarado. COVID pcr within 48 hr.   5/12 OR this afternoon VATS for empyema  5/13 S/P Vats yesterday. Anterior Susanna/Angled Diaphragm/Posterior straight chest tubes --> Dry SUCTION (@40mmhg). Monitor drainage and A/L on suction. (H2O seal overnight) RPT CXR @ 11 am. Back on medicine service once patient is stable to move out of SICU.   5/14 HD stable, encourage OOB and mobilization, R chest tubes maintain to suction today, will likely water seal tomorrow. AM CXR noted. D/c olson today.   5/15 HD stable, AM CXR noted, hep held in anticipation of chest tube removal, however, chest tubes still draining, will resume hep gtt and re-eval chest tube removal in AM after CXR. Water seal chest tubes overnight.     *susanna chest tube to be last chest tube to be removed         60M hospitalized for COVID PNA, c/b cardiogenic/septic shock and ischemic/hemorrhagic stroke, now extubated on the floor w/ negative COVID PCR, Thoracic surgery consulted to evaluate the recently found R sided empyema.     5/8 VSS; NAD. Thoracic surgery recommend IR Consult/Tap for R empyema.     5/9  VSS, NAD.  IR deferring IR drain of empyema 2/2 risk of iatrogenic bronchopleural fistula. Recommends VATS procedure.  Dr. Tena spoke to patients brother in law regarding VATS procedure.  Family to decide on whether to proceed with operation.  Continue IV ABX per ID.   5/11 VSS; Plan for VATs tomorrow 5/12/21 with Dr. Albarado. COVID pcr within 48 hr.   5/12 OR this afternoon VATS for empyema  5/13 S/P Vats yesterday. Anterior Susanna/Angled Diaphragm/Posterior straight chest tubes --> Dry SUCTION (@40mmhg). Monitor drainage and A/L on suction. (H2O seal overnight) RPT CXR @ 11 am. Back on medicine service once patient is stable to move out of SICU.   5/14 HD stable, encourage OOB and mobilization, R chest tubes maintain to suction today, will likely water seal tomorrow. AM CXR noted. D/c olson today.   5/15 HD stable, AM CXR noted, hep held in anticipation of chest tube removal, however, chest tubes still draining, will resume hep gtt and re-eval chest tube removal in AM after CXR. Water seal chest tubes overnight.   chest tubes now back to suction d/t drainage / output.     *susanna chest tube to be last chest tube to be removed

## 2021-05-15 NOTE — PROGRESS NOTE ADULT - PROBLEM SELECTOR PLAN 1
Cystitis and Empyema   5/12 he underwent right VATS converted to open thoracotomy, decortication, drainage of abscess and flex bronch. Pt briefly on lexi intraop and extubated in OR.   - s/p L. thoracotomy, drainage of abscess, decortication, flex bronch   - 1 angled chest tube , 1 straight chest tube, 1 Blakemore drain (anterior) - Air leak present post op   - Chest tube to suction at 10mmhg  - Air leak expected and seen  - Monitor pulse oximeter  - Out of bed to chair, ambulate as tolerated, and incentive spirometry to prevent atelectasis  - Pain control as needed with tylenol and oxy    - c/w meropenem 5/7-  - BCx neg, UCx growing Kleb    Obturator Hematoma   - bleeding hematoma in right thigh on CT on 5/7  - CT hip showing non active bleed, hematoma on right thigh on 5/8  - doppler to r/o DVT in LE, per IR no role IVC filter  - stable, monitor CBC Cystitis and Empyema   5/12 he underwent right VATS converted to open thoracotomy, decortication, drainage of abscess and flex bronch. Pt briefly on lexi intraop and extubated in OR.   - s/p L. thoracotomy, drainage of abscess, decortication, flex bronch   - 1 angled chest tube , 1 straight chest tube, 1 Blakemore drain (anterior) - Air leak present post op   - Chest tube to suction at 10mmhg  - Air leak expected and seen  - Monitor pulse oximeter  - Out of bed to chair, ambulate as tolerated, and incentive spirometry to prevent atelectasis  - Pain control as needed with tylenol and oxy    - c/w meropenem 5/7-  - f/u ID about cultures of lung tissue   - BCx neg 5/14, UCx growing Kleb  - Now having low grade fevers (5/14), cont to monitor     Obturator Hematoma   - bleeding hematoma in right thigh on CT on 5/7  - CT hip showing non active bleed, hematoma on right thigh on 5/8  - doppler to r/o DVT in LE, per IR no role IVC filter  - stable, monitor CBC

## 2021-05-15 NOTE — PROGRESS NOTE ADULT - PROBLEM SELECTOR PLAN 1
OR Vats 5/12 with Dr. Albarado   Hep gtt restarted   hold hep 4 hrs prior to removing chest tube  Ant Susanna, Angled Diaphragm, Posterior straight CT--> Dry Suction @ 40   water seal chest tubes when indicated  5/15 chest tubes to water seal  serial CXR   Monitor drainage   Daily CXR  *susanna chest tube to be last chest tube to be removed

## 2021-05-15 NOTE — PROGRESS NOTE ADULT - SUBJECTIVE AND OBJECTIVE BOX
Patient is a 60y old  Male who presents with a chief complaint of COVID19 w/ severe metabolic acidosis s/p intubation (15 May 2021 07:32)      Vital Signs Last 24 Hrs  T(C): 36.9 (05-15-21 @ 08:21), Max: 38.2 (05-14-21 @ 16:35)  T(F): 98.4 (05-15-21 @ 08:21), Max: 100.7 (05-14-21 @ 16:35)  HR: 106 (05-15-21 @ 08:21) (81 - 115)  BP: 109/70 (05-15-21 @ 08:21) (103/65 - 123/74)  RR: 18 (05-15-21 @ 08:21) (18 - 20)  SpO2: 93% (05-15-21 @ 08:21) (93% - 99%)                05-14-21 @ 07:01  -  05-15-21 @ 07:00  --------------------------------------------------------  IN: 2492 mL / OUT: 180 mL / NET: 2312 mL        Daily     Daily                           9.3    18.39 )-----------( 502      ( 15 May 2021 07:18 )             30.3     05-15    131<L>  |  96  |  27<H>  ----------------------------<  155<H>  4.5   |  24  |  0.67    Ca    8.4      15 May 2021 07:18  Phos  2.4     05-15  Mg     2.1     05-15    TPro  6.6  /  Alb  2.2<L>  /  TBili  0.3  /  DBili  x   /  AST  18  /  ALT  15  /  AlkPhos  92  05-15                      MEDICATIONS  acetaminophen   Tablet .. 650 milliGRAM(s) Oral every 6 hours PRN  albuterol/ipratropium for Nebulization 3 milliLiter(s) Nebulizer every 6 hours PRN  chlorhexidine 2% Cloths 1 Application(s) Topical <User Schedule>  cholecalciferol 400 Unit(s) Oral daily  heparin  Infusion 1300 Unit(s)/Hr IV Continuous <Continuous>  HYDROmorphone  Injectable 0.5 milliGRAM(s) IV Push every 4 hours PRN  insulin lispro (ADMELOG) corrective regimen sliding scale   SubCutaneous every 6 hours  insulin NPH human recombinant 12 Unit(s) SubCutaneous every 6 hours  levETIRAcetam  Solution 500 milliGRAM(s) Oral two times a day  meropenem  IVPB 1000 milliGRAM(s) IV Intermittent every 8 hours  polyethylene glycol 3350 17 Gram(s) Oral daily  senna 2 Tablet(s) Oral at bedtime  tamsulosin 0.4 milliGRAM(s) Oral at bedtime  thiamine 100 milliGRAM(s) Oral daily      PHYSICAL EXAM  Neurology: A&Ox3, NAD, no gross deficits  CV : RRR+S1S2  Lungs: Respirations non-labored, B/L BS  Abdomen: Soft, NT/ND, +BSx4Q + PEG tube  Extremities: B/L LE edema, negative calf tenderness, +PP           CHEST TUBES:  right chest tube posterior, angled, susanna to water seal

## 2021-05-15 NOTE — PROGRESS NOTE ADULT - PROBLEM SELECTOR PLAN 4
-Pulmonary embolus noted on ct scan performed at time of admission  -Etiology of venous thromboembolism uncertain at this time; no known history of thrombophilia; provoked in setting of acute covid illness   - On heparin drip rate of 13  - f/u CBC to monitor hbg  - will discharge on Eliquis

## 2021-05-15 NOTE — PROVIDER CONTACT NOTE (CRITICAL VALUE NOTIFICATION) - TEST AND RESULT REPORTED:
body fluid collected 5/12/21 pleural fluid rare klebsiella pneumonia few lactobacillus species rare pre votalla melaninogenica

## 2021-05-15 NOTE — PROGRESS NOTE ADULT - ASSESSMENT
60 yr male with PMH of DM who was initially admitted to [facility] ICU for hypoxic respiratory failure 2/2 COVID c/b PE with R heart strain, cardiogenic and septic shock, ischemic and hemorrhagic CVA and ESBL klebsiella ventilator associate PNA, transferred to [facility] for neurosurgery eval and further management. s/p PEG, now monitoring for refeeding syndrome, complicated with right empyema planned for VATs

## 2021-05-15 NOTE — PROGRESS NOTE ADULT - SUBJECTIVE AND OBJECTIVE BOX
PROGRESS NOTE:   Authored by Dr. Emily Mckeon, ANH pager [phone]    Patient is a 60y old  Male who presents with a chief complaint of COVID19 w/ severe metabolic acidosis s/p intubation (14 May 2021 17:00)      SUBJECTIVE / OVERNIGHT EVENTS:    MEDICATIONS  (STANDING):  chlorhexidine 2% Cloths 1 Application(s) Topical <User Schedule>  cholecalciferol 400 Unit(s) Oral daily  heparin  Infusion 1300 Unit(s)/Hr (13 mL/Hr) IV Continuous <Continuous>  insulin lispro (ADMELOG) corrective regimen sliding scale   SubCutaneous every 6 hours  insulin NPH human recombinant 12 Unit(s) SubCutaneous every 6 hours  levETIRAcetam  Solution 500 milliGRAM(s) Oral two times a day  meropenem  IVPB 1000 milliGRAM(s) IV Intermittent every 8 hours  polyethylene glycol 3350 17 Gram(s) Oral daily  senna 2 Tablet(s) Oral at bedtime  tamsulosin 0.4 milliGRAM(s) Oral at bedtime  thiamine 100 milliGRAM(s) Oral daily    MEDICATIONS  (PRN):  acetaminophen   Tablet .. 650 milliGRAM(s) Oral every 6 hours PRN Temp greater or equal to 38C (100.4F)  albuterol/ipratropium for Nebulization 3 milliLiter(s) Nebulizer every 6 hours PRN Shortness of Breath and/or Wheezing  HYDROmorphone  Injectable 0.5 milliGRAM(s) IV Push every 4 hours PRN Severe Pain (7 - 10)      CAPILLARY BLOOD GLUCOSE      POCT Blood Glucose.: 176 mg/dL (15 May 2021 06:08)  POCT Blood Glucose.: 144 mg/dL (14 May 2021 23:57)  POCT Blood Glucose.: 199 mg/dL (14 May 2021 17:01)  POCT Blood Glucose.: 145 mg/dL (14 May 2021 12:24)  POCT Blood Glucose.: 174 mg/dL (14 May 2021 08:17)    I&O's Summary    14 May 2021 07:01  -  15 May 2021 07:00  --------------------------------------------------------  IN: 2492 mL / OUT: 180 mL / NET: 2312 mL        PHYSICAL EXAM:  Vital Signs Last 24 Hrs  T(C): 37.4 (15 May 2021 06:05), Max: 38.2 (14 May 2021 16:35)  T(F): 99.3 (15 May 2021 06:05), Max: 100.7 (14 May 2021 16:35)  HR: 112 (15 May 2021 06:05) (81 - 115)  BP: 123/74 (15 May 2021 04:35) (97/62 - 123/74)  BP(mean): --  RR: 20 (15 May 2021 04:35) (18 - 20)  SpO2: 94% (15 May 2021 04:35) (93% - 99%)    GENERAL: No acute distress, well-developed  HEAD:  Atraumatic, Normocephalic  EYES: EOMI, PERRLA, conjunctiva and sclera clear  NECK: Supple, no lymphadenopathy, no JVD  CHEST/LUNG: CTAB; No wheezes, rales, or rhonchi  HEART: Regular rate and rhythm; No murmurs, rubs, or gallops  ABDOMEN: Soft, non-tender, non-distended; normal bowel sounds, no organomegaly  EXTREMITIES:  2+ peripheral pulses b/l, No clubbing, cyanosis, or edema  NEUROLOGY: A&O x 3, no focal deficits  SKIN: No rashes or lesions    LABS:                        9.5    21.96 )-----------( 523      ( 14 May 2021 15:02 )             30.7     05-14    132<L>  |  97  |  31<H>  ----------------------------<  179<H>  4.7   |  25  |  0.75    Ca    8.5      14 May 2021 04:43  Phos  3.0     05-14  Mg     2.2     05-14      PTT - ( 14 May 2021 07:03 )  PTT:58.9 sec          Culture - Acid Fast - Bronchial w/Smear (collected 13 May 2021 01:28)  Source: .Bronchial    Culture - Bronchial (collected 13 May 2021 01:28)  Source: .Bronchial  Gram Stain (13 May 2021 07:17):    Numerous polymorphonuclear leukocytes seen per low power field    No Squamous epithelial cells seen per low power field    Rare Gram Negative Rods seen per oil power field  Final Report (14 May 2021 18:11):    Normal Respiratory Lizzette present    Culture - Fungal, Bronchial (collected 13 May 2021 01:28)  Source: BAL BRONCHIAL AVEOLAR LAVAGE  Preliminary Report (13 May 2021 08:37):    Testing in progress    Culture - Tissue with Gram Stain (collected 12 May 2021 23:11)  Source: .Tissue Other  Gram Stain (13 May 2021 01:05):    Rare polymorphonuclear leukocytes seen per low power field    No organisms seen per oil power field  Preliminary Report (14 May 2021 18:48):    Rare Klebsiella pneumoniae ESBL    Few Lactobacillus rhamnosus "Susceptibilities not performed"  Organism: Klebsiella pneumoniae ESBL (14 May 2021 17:48)  Organism: Klebsiella pneumoniae ESBL (14 May 2021 17:48)    Culture - Fungal, Tissue (collected 12 May 2021 23:11)  Source: .Tissue LOWER LOBE PEEL  Preliminary Report (13 May 2021 08:37):    Testing in progress    Culture - Acid Fast - Tissue w/Smear (collected 12 May 2021 23:11)  Source: .Tissue Other    Culture - Tissue with Gram Stain (collected 12 May 2021 23:11)  Source: .Tissue Other  Gram Stain (13 May 2021 01:24):    Rare polymorphonuclear leukocytes seen per low power field    No organisms seen per oil power field  Preliminary Report (14 May 2021 18:47):    Rare Klebsiella pneumoniae ESBL    Few Lactobacillus rhamnosus "Susceptibilities not performed"  Organism: Klebsiella pneumoniae ESBL (14 May 2021 17:44)  Organism: Klebsiella pneumoniae ESBL (14 May 2021 17:44)    Culture - Fungal, Tissue (collected 12 May 2021 23:11)  Source: .Tissue MIDDLE LOBE PEEL  Preliminary Report (13 May 2021 08:37):    Testing in progress    Culture - Acid Fast - Tissue w/Smear (collected 12 May 2021 23:11)  Source: .Tissue Other    Culture - Tissue with Gram Stain (collected 12 May 2021 23:11)  Source: .Tissue Other  Gram Stain (13 May 2021 01:24):    Rare polymorphonuclear leukocytes seen per low power field    No organisms seen per oil power field  Preliminary Report (14 May 2021 18:46):    Rare Klebsiella pneumoniae ESBL    Few Lactobacillus rhamnosus "Susceptibilities not performed"  Organism: Klebsiella pneumoniae ESBL (14 May 2021 17:50)  Organism: Klebsiella pneumoniae ESBL (14 May 2021 17:50)    Culture - Fungal, Tissue (collected 12 May 2021 23:11)  Source: .Tissue PLEURAL PEEL  Preliminary Report (13 May 2021 08:37):    Testing in progress    Culture - Acid Fast - Tissue w/Smear (collected 12 May 2021 23:11)  Source: .Tissue Other    Culture - Tissue with Gram Stain (collected 12 May 2021 23:11)  Source: .Tissue Other  Gram Stain (13 May 2021 01:05):    Rare polymorphonuclear leukocytes seen per low power field    No organisms seen per oil power field  Preliminary Report (14 May 2021 18:46):    Few Lactobacillus rhamnosus "Susceptibilities not performed"    Culture - Fungal, Tissue (collected 12 May 2021 23:11)  Source: .Tissue UPPER LOBE PEEL  Preliminary Report (13 May 2021 08:33):    Testing in progress    Culture - Body Fluid with Gram Stain (collected 12 May 2021 23:11)  Source: .Body Fluid Pleural Fluid  Gram Stain (13 May 2021 02:53):    polymorphonuclear leukocytes seen    No organisms seen    by cytocentrifuge  Final Report (14 May 2021 18:08):    Rare Klebsiella pneumoniae ESBL    Few Lactobacillus species "Susceptibilities not performed"    Rare Prevotella melaninogenica "Susceptibilities not performed"  Organism: Klebsiella pneumoniae ESBL (14 May 2021 18:10)  Organism: Klebsiella pneumoniae ESBL (14 May 2021 18:08)    Culture - Fungal, Body Fluid (collected 12 May 2021 23:11)  Source: .Body Fluid RIGHT CHEST ABSCESS  Preliminary Report (13 May 2021 08:33):    Testing in progress    Culture - Fungal, Tissue (collected 12 May 2021 23:11)  Source: .Tissue RIGHT CHEST TISSUE  Preliminary Report (13 May 2021 08:37):    Testing in progress    Culture - Tissue with Gram Stain (collected 12 May 2021 23:11)  Source: .Tissue Other  Gram Stain (13 May 2021 01:04):    Rare polymorphonuclear leukocytes seen per low power field    No organisms seen per oil power field  Preliminary Report (14 May 2021 18:51):    Growth in fluid media only Klebsiella pneumoniae    Rare Lactobacillus rhamnosus "Susceptibilities not performed"        RADIOLOGY & ADDITIONAL TESTS:  Results Reviewed:   Imaging Personally Reviewed:  Electrocardiogram Personally Reviewed:    COORDINATION OF CARE:  Care Discussed with Consultants/Other Providers [Y/N]:  Prior or Outpatient Records Reviewed [Y/N]:   PROGRESS NOTE:   Authored by Dr. Emily Mckeon, ANH pager [phone]    Patient is a 60y old  Male who presents with a chief complaint of COVID19 w/ severe metabolic acidosis s/p intubation (14 May 2021 17:00)      SUBJECTIVE / OVERNIGHT EVENTS: Patient examined at bedside. He appeared comfortable and awoke to my voice. Patient did not grimace with palpation of abd, no guarding. Patient has continuous output from drains. He spiked a low grade fever overnight which resolved with acetaminophen     MEDICATIONS  (STANDING):  chlorhexidine 2% Cloths 1 Application(s) Topical <User Schedule>  cholecalciferol 400 Unit(s) Oral daily  heparin  Infusion 1300 Unit(s)/Hr (13 mL/Hr) IV Continuous <Continuous>  insulin lispro (ADMELOG) corrective regimen sliding scale   SubCutaneous every 6 hours  insulin NPH human recombinant 12 Unit(s) SubCutaneous every 6 hours  levETIRAcetam  Solution 500 milliGRAM(s) Oral two times a day  meropenem  IVPB 1000 milliGRAM(s) IV Intermittent every 8 hours  polyethylene glycol 3350 17 Gram(s) Oral daily  senna 2 Tablet(s) Oral at bedtime  tamsulosin 0.4 milliGRAM(s) Oral at bedtime  thiamine 100 milliGRAM(s) Oral daily    MEDICATIONS  (PRN):  acetaminophen   Tablet .. 650 milliGRAM(s) Oral every 6 hours PRN Temp greater or equal to 38C (100.4F)  albuterol/ipratropium for Nebulization 3 milliLiter(s) Nebulizer every 6 hours PRN Shortness of Breath and/or Wheezing  HYDROmorphone  Injectable 0.5 milliGRAM(s) IV Push every 4 hours PRN Severe Pain (7 - 10)      CAPILLARY BLOOD GLUCOSE      POCT Blood Glucose.: 176 mg/dL (15 May 2021 06:08)  POCT Blood Glucose.: 144 mg/dL (14 May 2021 23:57)  POCT Blood Glucose.: 199 mg/dL (14 May 2021 17:01)  POCT Blood Glucose.: 145 mg/dL (14 May 2021 12:24)  POCT Blood Glucose.: 174 mg/dL (14 May 2021 08:17)    I&O's Summary    14 May 2021 07:01  -  15 May 2021 07:00  --------------------------------------------------------  IN: 2492 mL / OUT: 180 mL / NET: 2312 mL        PHYSICAL EXAM:  Vital Signs Last 24 Hrs  T(C): 37.4 (15 May 2021 06:05), Max: 38.2 (14 May 2021 16:35)  T(F): 99.3 (15 May 2021 06:05), Max: 100.7 (14 May 2021 16:35)  HR: 112 (15 May 2021 06:05) (81 - 115)  BP: 123/74 (15 May 2021 04:35) (97/62 - 123/74)  BP(mean): --  RR: 20 (15 May 2021 04:35) (18 - 20)  SpO2: 94% (15 May 2021 04:35) (93% - 99%)    GENERAL: No acute distress, malnourished   HEAD:  Atraumatic, Normocephalic  EYES: EOMI, PERRLA, conjunctiva and sclera clear  NECK: Supple, no lymphadenopathy, no JVD  CHEST/LUNG: rhonchi b/l but poor inspiratory effort, with 3 drains serosangenous output   HEART: Regular rate and rhythm; No murmurs, rubs, or gallops  ABDOMEN: Soft, non-tender, non-distended; normal bowel sounds, no organomegaly, PEG in place   EXTREMITIES:  2+ peripheral pulses b/l, No clubbing, cyanosis, or edema  NEUROLOGY: A&O x 1, weakness gernalized   SKIN: gangrenous toes    LABS:                        9.5    21.96 )-----------( 523      ( 14 May 2021 15:02 )             30.7     05-14    132<L>  |  97  |  31<H>  ----------------------------<  179<H>  4.7   |  25  |  0.75    Ca    8.5      14 May 2021 04:43  Phos  3.0     05-14  Mg     2.2     05-14      PTT - ( 14 May 2021 07:03 )  PTT:58.9 sec          Culture - Acid Fast - Bronchial w/Smear (collected 13 May 2021 01:28)  Source: .Bronchial    Culture - Bronchial (collected 13 May 2021 01:28)  Source: .Bronchial  Gram Stain (13 May 2021 07:17):    Numerous polymorphonuclear leukocytes seen per low power field    No Squamous epithelial cells seen per low power field    Rare Gram Negative Rods seen per oil power field  Final Report (14 May 2021 18:11):    Normal Respiratory Lizzette present    Culture - Fungal, Bronchial (collected 13 May 2021 01:28)  Source: [place] BRONCHIAL AVE[place] LAVAGE  Preliminary Report (13 May 2021 08:37):    Testing in progress    Culture - Tissue with Gram Stain (collected 12 May 2021 23:11)  Source: .Tissue Other  Gram Stain (13 May 2021 01:05):    Rare polymorphonuclear leukocytes seen per low power field    No organisms seen per oil power field  Preliminary Report (14 May 2021 18:48):    Rare Klebsiella pneumoniae ESBL    Few Lactobacillus rhamnosus "Susceptibilities not performed"  Organism: Klebsiella pneumoniae ESBL (14 May 2021 17:48)  Organism: Klebsiella pneumoniae ESBL (14 May 2021 17:48)    Culture - Fungal, Tissue (collected 12 May 2021 23:11)  Source: .Tissue LOWER LOBE PEEL  Preliminary Report (13 May 2021 08:37):    Testing in progress    Culture - Acid Fast - Tissue w/Smear (collected 12 May 2021 23:11)  Source: .Tissue Other    Culture - Tissue with Gram Stain (collected 12 May 2021 23:11)  Source: .Tissue Other  Gram Stain (13 May 2021 01:24):    Rare polymorphonuclear leukocytes seen per low power field    No organisms seen per oil power field  Preliminary Report (14 May 2021 18:47):    Rare Klebsiella pneumoniae ESBL    Few Lactobacillus rhamnosus "Susceptibilities not performed"  Organism: Klebsiella pneumoniae ESBL (14 May 2021 17:44)  Organism: Klebsiella pneumoniae ESBL (14 May 2021 17:44)    Culture - Fungal, Tissue (collected 12 May 2021 23:11)  Source: .Tissue MIDDLE LOBE PEEL  Preliminary Report (13 May 2021 08:37):    Testing in progress    Culture - Acid Fast - Tissue w/Smear (collected 12 May 2021 23:11)  Source: .Tissue Other    Culture - Tissue with Gram Stain (collected 12 May 2021 23:11)  Source: .Tissue Other  Gram Stain (13 May 2021 01:24):    Rare polymorphonuclear leukocytes seen per low power field    No organisms seen per oil power field  Preliminary Report (14 May 2021 18:46):    Rare Klebsiella pneumoniae ESBL    Few Lactobacillus rhamnosus "Susceptibilities not performed"  Organism: Klebsiella pneumoniae ESBL (14 May 2021 17:50)  Organism: Klebsiella pneumoniae ESBL (14 May 2021 17:50)    Culture - Fungal, Tissue (collected 12 May 2021 23:11)  Source: .Tissue PLEURAL PEEL  Preliminary Report (13 May 2021 08:37):    Testing in progress    Culture - Acid Fast - Tissue w/Smear (collected 12 May 2021 23:11)  Source: .Tissue Other    Culture - Tissue with Gram Stain (collected 12 May 2021 23:11)  Source: .Tissue Other  Gram Stain (13 May 2021 01:05):    Rare polymorphonuclear leukocytes seen per low power field    No organisms seen per oil power field  Preliminary Report (14 May 2021 18:46):    Few Lactobacillus rhamnosus "Susceptibilities not performed"    Culture - Fungal, Tissue (collected 12 May 2021 23:11)  Source: .Tissue UPPER LOBE PEEL  Preliminary Report (13 May 2021 08:33):    Testing in progress    Culture - Body Fluid with Gram Stain (collected 12 May 2021 23:11)  Source: .Body Fluid Pleural Fluid  Gram Stain (13 May 2021 02:53):    polymorphonuclear leukocytes seen    No organisms seen    by cytocentrifuge  Final Report (14 May 2021 18:08):    Rare Klebsiella pneumoniae ESBL    Few Lactobacillus species "Susceptibilities not performed"    Rare Prevotella melaninogenica "Susceptibilities not performed"  Organism: Klebsiella pneumoniae ESBL (14 May 2021 18:10)  Organism: Klebsiella pneumoniae ESBL (14 May 2021 18:08)    Culture - Fungal, Body Fluid (collected 12 May 2021 23:11)  Source: .Body Fluid RIGHT CHEST ABSCESS  Preliminary Report (13 May 2021 08:33):    Testing in progress    Culture - Fungal, Tissue (collected 12 May 2021 23:11)  Source: .Tissue RIGHT CHEST TISSUE  Preliminary Report (13 May 2021 08:37):    Testing in progress    Culture - Tissue with Gram Stain (collected 12 May 2021 23:11)  Source: .Tissue Other  Gram Stain (13 May 2021 01:04):    Rare polymorphonuclear leukocytes seen per low power field    No organisms seen per oil power field  Preliminary Report (14 May 2021 18:51):    Growth in fluid media only Klebsiella pneumoniae    Rare Lactobacillus rhamnosus "Susceptibilities not performed"        RADIOLOGY & ADDITIONAL TESTS:  Results Reviewed:   Imaging Personally Reviewed:  Electrocardiogram Personally Reviewed:    COORDINATION OF CARE:  Care Discussed with Consultants/Other Providers [Y/N]:  Prior or Outpatient Records Reviewed [Y/N]:

## 2021-05-15 NOTE — PROVIDER CONTACT NOTE (CRITICAL VALUE NOTIFICATION) - SITUATION
body fluid collected 5/12/21 pleural fluid rare klebsiella pneumonia few lactobacillus species rare pre votalla melaninogenica
Tissue cx taken 5/12 growth in fluid media gram negative rods  Second tissue cx growth in fluid media, gram variable rods on 5/12 at 0748

## 2021-05-16 LAB
ALBUMIN SERPL ELPH-MCNC: 2.1 G/DL — LOW (ref 3.3–5)
ALP SERPL-CCNC: 93 U/L — SIGNIFICANT CHANGE UP (ref 40–120)
ALT FLD-CCNC: 24 U/L — SIGNIFICANT CHANGE UP (ref 10–45)
ANION GAP SERPL CALC-SCNC: 12 MMOL/L — SIGNIFICANT CHANGE UP (ref 5–17)
ANISOCYTOSIS BLD QL: SLIGHT — SIGNIFICANT CHANGE UP
APTT BLD: 36.1 SEC — HIGH (ref 27.5–35.5)
APTT BLD: 49 SEC — HIGH (ref 27.5–35.5)
APTT BLD: 60.1 SEC — HIGH (ref 27.5–35.5)
AST SERPL-CCNC: 26 U/L — SIGNIFICANT CHANGE UP (ref 10–40)
BASOPHILS # BLD AUTO: 0.09 K/UL — SIGNIFICANT CHANGE UP (ref 0–0.2)
BASOPHILS # BLD AUTO: 0.17 K/UL — SIGNIFICANT CHANGE UP (ref 0–0.2)
BASOPHILS NFR BLD AUTO: 0.4 % — SIGNIFICANT CHANGE UP (ref 0–2)
BASOPHILS NFR BLD AUTO: 0.9 % — SIGNIFICANT CHANGE UP (ref 0–2)
BILIRUB SERPL-MCNC: 0.4 MG/DL — SIGNIFICANT CHANGE UP (ref 0.2–1.2)
BUN SERPL-MCNC: 23 MG/DL — SIGNIFICANT CHANGE UP (ref 7–23)
CALCIUM SERPL-MCNC: 8.2 MG/DL — LOW (ref 8.4–10.5)
CHLORIDE SERPL-SCNC: 98 MMOL/L — SIGNIFICANT CHANGE UP (ref 96–108)
CO2 SERPL-SCNC: 23 MMOL/L — SIGNIFICANT CHANGE UP (ref 22–31)
CREAT ?TM UR-MCNC: 38 MG/DL — SIGNIFICANT CHANGE UP
CREAT SERPL-MCNC: 0.68 MG/DL — SIGNIFICANT CHANGE UP (ref 0.5–1.3)
DACRYOCYTES BLD QL SMEAR: SLIGHT — SIGNIFICANT CHANGE UP
ELLIPTOCYTES BLD QL SMEAR: SLIGHT — SIGNIFICANT CHANGE UP
EOSINOPHIL # BLD AUTO: 0 K/UL — SIGNIFICANT CHANGE UP (ref 0–0.5)
EOSINOPHIL # BLD AUTO: 0.29 K/UL — SIGNIFICANT CHANGE UP (ref 0–0.5)
EOSINOPHIL NFR BLD AUTO: 0 % — SIGNIFICANT CHANGE UP (ref 0–6)
EOSINOPHIL NFR BLD AUTO: 1.4 % — SIGNIFICANT CHANGE UP (ref 0–6)
GLUCOSE BLDC GLUCOMTR-MCNC: 173 MG/DL — HIGH (ref 70–99)
GLUCOSE BLDC GLUCOMTR-MCNC: 175 MG/DL — HIGH (ref 70–99)
GLUCOSE BLDC GLUCOMTR-MCNC: 180 MG/DL — HIGH (ref 70–99)
GLUCOSE BLDC GLUCOMTR-MCNC: 186 MG/DL — HIGH (ref 70–99)
GLUCOSE SERPL-MCNC: 172 MG/DL — HIGH (ref 70–99)
HCT VFR BLD CALC: 26.2 % — LOW (ref 39–50)
HCT VFR BLD CALC: 26.3 % — LOW (ref 39–50)
HCT VFR BLD CALC: 27 % — LOW (ref 39–50)
HGB BLD-MCNC: 8 G/DL — LOW (ref 13–17)
HGB BLD-MCNC: 8.1 G/DL — LOW (ref 13–17)
HGB BLD-MCNC: 8.4 G/DL — LOW (ref 13–17)
HYPOCHROMIA BLD QL: SLIGHT — SIGNIFICANT CHANGE UP
IMM GRANULOCYTES NFR BLD AUTO: 4 % — HIGH (ref 0–1.5)
LYMPHOCYTES # BLD AUTO: 1.17 K/UL — SIGNIFICANT CHANGE UP (ref 1–3.3)
LYMPHOCYTES # BLD AUTO: 12.9 % — LOW (ref 13–44)
LYMPHOCYTES # BLD AUTO: 2.59 K/UL — SIGNIFICANT CHANGE UP (ref 1–3.3)
LYMPHOCYTES # BLD AUTO: 6.1 % — LOW (ref 13–44)
MAGNESIUM SERPL-MCNC: 2 MG/DL — SIGNIFICANT CHANGE UP (ref 1.6–2.6)
MANUAL SMEAR VERIFICATION: SIGNIFICANT CHANGE UP
MCHC RBC-ENTMCNC: 23.6 PG — LOW (ref 27–34)
MCHC RBC-ENTMCNC: 23.9 PG — LOW (ref 27–34)
MCHC RBC-ENTMCNC: 24.3 PG — LOW (ref 27–34)
MCHC RBC-ENTMCNC: 30.4 GM/DL — LOW (ref 32–36)
MCHC RBC-ENTMCNC: 30.9 GM/DL — LOW (ref 32–36)
MCHC RBC-ENTMCNC: 31.1 GM/DL — LOW (ref 32–36)
MCV RBC AUTO: 77.3 FL — LOW (ref 80–100)
MCV RBC AUTO: 77.6 FL — LOW (ref 80–100)
MCV RBC AUTO: 78 FL — LOW (ref 80–100)
MICROCYTES BLD QL: SIGNIFICANT CHANGE UP
MONOCYTES # BLD AUTO: 1.32 K/UL — HIGH (ref 0–0.9)
MONOCYTES # BLD AUTO: 1.47 K/UL — HIGH (ref 0–0.9)
MONOCYTES NFR BLD AUTO: 6.9 % — SIGNIFICANT CHANGE UP (ref 2–14)
MONOCYTES NFR BLD AUTO: 7.3 % — SIGNIFICANT CHANGE UP (ref 2–14)
NEUTROPHILS # BLD AUTO: 14.8 K/UL — HIGH (ref 1.8–7.4)
NEUTROPHILS # BLD AUTO: 16.52 K/UL — HIGH (ref 1.8–7.4)
NEUTROPHILS NFR BLD AUTO: 74 % — SIGNIFICANT CHANGE UP (ref 43–77)
NEUTROPHILS NFR BLD AUTO: 86.1 % — HIGH (ref 43–77)
NRBC # BLD: 0 /100 WBCS — SIGNIFICANT CHANGE UP (ref 0–0)
NRBC # BLD: 0 /100 WBCS — SIGNIFICANT CHANGE UP (ref 0–0)
NRBC # BLD: 1 /100 — HIGH (ref 0–0)
OB PNL STL: NEGATIVE — SIGNIFICANT CHANGE UP
OSMOLALITY SERPL: 290 MOSMOL/KG — SIGNIFICANT CHANGE UP (ref 275–300)
OSMOLALITY UR: 514 MOS/KG — SIGNIFICANT CHANGE UP (ref 300–900)
PHOSPHATE SERPL-MCNC: 2.8 MG/DL — SIGNIFICANT CHANGE UP (ref 2.5–4.5)
PLAT MORPH BLD: ABNORMAL
PLATELET # BLD AUTO: 458 K/UL — HIGH (ref 150–400)
PLATELET # BLD AUTO: 491 K/UL — HIGH (ref 150–400)
PLATELET # BLD AUTO: 505 K/UL — HIGH (ref 150–400)
POIKILOCYTOSIS BLD QL AUTO: SLIGHT — SIGNIFICANT CHANGE UP
POLYCHROMASIA BLD QL SMEAR: SLIGHT — SIGNIFICANT CHANGE UP
POTASSIUM SERPL-MCNC: 4.5 MMOL/L — SIGNIFICANT CHANGE UP (ref 3.5–5.3)
POTASSIUM SERPL-SCNC: 4.5 MMOL/L — SIGNIFICANT CHANGE UP (ref 3.5–5.3)
POTASSIUM UR-SCNC: 28 MMOL/L — SIGNIFICANT CHANGE UP
PROT SERPL-MCNC: 6.1 G/DL — SIGNIFICANT CHANGE UP (ref 6–8.3)
RBC # BLD: 3.39 M/UL — LOW (ref 4.2–5.8)
RBC # BLD: 3.39 M/UL — LOW (ref 4.2–5.8)
RBC # BLD: 3.46 M/UL — LOW (ref 4.2–5.8)
RBC # FLD: 19.8 % — HIGH (ref 10.3–14.5)
RBC # FLD: 19.8 % — HIGH (ref 10.3–14.5)
RBC # FLD: 19.9 % — HIGH (ref 10.3–14.5)
RBC BLD AUTO: ABNORMAL
SMUDGE CELLS # BLD: PRESENT — SIGNIFICANT CHANGE UP
SODIUM SERPL-SCNC: 133 MMOL/L — LOW (ref 135–145)
SODIUM UR-SCNC: 146 MMOL/L — SIGNIFICANT CHANGE UP
WBC # BLD: 19.19 K/UL — HIGH (ref 3.8–10.5)
WBC # BLD: 19.75 K/UL — HIGH (ref 3.8–10.5)
WBC # BLD: 20.04 K/UL — HIGH (ref 3.8–10.5)
WBC # FLD AUTO: 19.19 K/UL — HIGH (ref 3.8–10.5)
WBC # FLD AUTO: 19.75 K/UL — HIGH (ref 3.8–10.5)
WBC # FLD AUTO: 20.04 K/UL — HIGH (ref 3.8–10.5)

## 2021-05-16 PROCEDURE — 93010 ELECTROCARDIOGRAM REPORT: CPT

## 2021-05-16 PROCEDURE — 71045 X-RAY EXAM CHEST 1 VIEW: CPT | Mod: 26

## 2021-05-16 PROCEDURE — 74177 CT ABD & PELVIS W/CONTRAST: CPT | Mod: 26

## 2021-05-16 PROCEDURE — 99233 SBSQ HOSP IP/OBS HIGH 50: CPT | Mod: GC

## 2021-05-16 PROCEDURE — 71260 CT THORAX DX C+: CPT | Mod: 26

## 2021-05-16 RX ORDER — HUMAN INSULIN 100 [IU]/ML
13 INJECTION, SUSPENSION SUBCUTANEOUS EVERY 6 HOURS
Refills: 0 | Status: DISCONTINUED | OUTPATIENT
Start: 2021-05-16 | End: 2021-05-20

## 2021-05-16 RX ORDER — SODIUM CHLORIDE 9 MG/ML
1000 INJECTION INTRAMUSCULAR; INTRAVENOUS; SUBCUTANEOUS
Refills: 0 | Status: DISCONTINUED | OUTPATIENT
Start: 2021-05-16 | End: 2021-05-22

## 2021-05-16 RX ORDER — ACETAMINOPHEN 500 MG
650 TABLET ORAL EVERY 6 HOURS
Refills: 0 | Status: DISCONTINUED | OUTPATIENT
Start: 2021-05-16 | End: 2021-06-04

## 2021-05-16 RX ORDER — HEPARIN SODIUM 5000 [USP'U]/ML
1400 INJECTION INTRAVENOUS; SUBCUTANEOUS
Qty: 25000 | Refills: 0 | Status: DISCONTINUED | OUTPATIENT
Start: 2021-05-16 | End: 2021-05-17

## 2021-05-16 RX ADMIN — LEVETIRACETAM 500 MILLIGRAM(S): 250 TABLET, FILM COATED ORAL at 05:04

## 2021-05-16 RX ADMIN — HUMAN INSULIN 12 UNIT(S): 100 INJECTION, SUSPENSION SUBCUTANEOUS at 00:19

## 2021-05-16 RX ADMIN — HUMAN INSULIN 13 UNIT(S): 100 INJECTION, SUSPENSION SUBCUTANEOUS at 17:35

## 2021-05-16 RX ADMIN — TAMSULOSIN HYDROCHLORIDE 0.4 MILLIGRAM(S): 0.4 CAPSULE ORAL at 22:08

## 2021-05-16 RX ADMIN — Medication 100 MILLIGRAM(S): at 11:40

## 2021-05-16 RX ADMIN — SENNA PLUS 2 TABLET(S): 8.6 TABLET ORAL at 22:08

## 2021-05-16 RX ADMIN — Medication 2: at 00:21

## 2021-05-16 RX ADMIN — Medication 2: at 06:21

## 2021-05-16 RX ADMIN — HUMAN INSULIN 12 UNIT(S): 100 INJECTION, SUSPENSION SUBCUTANEOUS at 13:24

## 2021-05-16 RX ADMIN — SODIUM CHLORIDE 75 MILLILITER(S): 9 INJECTION INTRAMUSCULAR; INTRAVENOUS; SUBCUTANEOUS at 11:38

## 2021-05-16 RX ADMIN — Medication 400 UNIT(S): at 11:40

## 2021-05-16 RX ADMIN — LEVETIRACETAM 500 MILLIGRAM(S): 250 TABLET, FILM COATED ORAL at 17:18

## 2021-05-16 RX ADMIN — SODIUM CHLORIDE 75 MILLILITER(S): 9 INJECTION INTRAMUSCULAR; INTRAVENOUS; SUBCUTANEOUS at 15:54

## 2021-05-16 RX ADMIN — HUMAN INSULIN 12 UNIT(S): 100 INJECTION, SUSPENSION SUBCUTANEOUS at 06:23

## 2021-05-16 RX ADMIN — Medication 2: at 17:35

## 2021-05-16 RX ADMIN — MEROPENEM 100 MILLIGRAM(S): 1 INJECTION INTRAVENOUS at 22:08

## 2021-05-16 RX ADMIN — HEPARIN SODIUM 1700 UNIT(S)/HR: 5000 INJECTION INTRAVENOUS; SUBCUTANEOUS at 23:49

## 2021-05-16 RX ADMIN — HEPARIN SODIUM 1400 UNIT(S)/HR: 5000 INJECTION INTRAVENOUS; SUBCUTANEOUS at 10:22

## 2021-05-16 RX ADMIN — Medication 2: at 13:24

## 2021-05-16 RX ADMIN — MEROPENEM 100 MILLIGRAM(S): 1 INJECTION INTRAVENOUS at 13:23

## 2021-05-16 RX ADMIN — MEROPENEM 100 MILLIGRAM(S): 1 INJECTION INTRAVENOUS at 05:04

## 2021-05-16 RX ADMIN — Medication 650 MILLIGRAM(S): at 17:18

## 2021-05-16 RX ADMIN — Medication 650 MILLIGRAM(S): at 11:41

## 2021-05-16 NOTE — PROGRESS NOTE ADULT - ASSESSMENT
60M hospitalized for COVID PNA, c/b cardiogenic/septic shock and ischemic/hemorrhagic stroke, now extubated on the floor w/ negative COVID PCR, Thoracic surgery consulted to evaluate the recently found R sided empyema.     5/8 VSS; NAD. Thoracic surgery recommend IR Consult/Tap for R empyema.     5/9  VSS, NAD.  IR deferring IR drain of empyema 2/2 risk of iatrogenic bronchopleural fistula. Recommends VATS procedure.  Dr. Tena spoke to patients brother in law regarding VATS procedure.  Family to decide on whether to proceed with operation.  Continue IV ABX per ID.   5/11 VSS; Plan for VATs tomorrow 5/12/21 with Dr. Albarado. COVID pcr within 48 hr.   5/12 OR this afternoon VATS for empyema  5/13 S/P Vats yesterday. Anterior Susanna/Angled Diaphragm/Posterior straight chest tubes --> Dry SUCTION (@40mmhg). Monitor drainage and A/L on suction. (H2O seal overnight) RPT CXR @ 11 am. Back on medicine service once patient is stable to move out of SICU.   5/14 HD stable, encourage OOB and mobilization, R chest tubes maintain to suction today, will likely water seal tomorrow. AM CXR noted. D/c olson today.   5/15 HD stable, AM CXR noted, hep held in anticipation of chest tube removal, however, chest tubes still draining, will resume hep gtt and re-eval chest tube removal in AM after CXR. Water seal chest tubes overnight.   chest tubes now back to suction d/t drainage / output.   5/16 hemodynamically stable; Chest tubes to H20 seal; will wait to re-assess chest tube for removal tomorrow AM.    *susanna chest tube to be last chest tube to be removed

## 2021-05-16 NOTE — PROGRESS NOTE ADULT - PROBLEM SELECTOR PLAN 3
-Acute ischemic strokes, with concern for hemorrhagic conversion, noted on CT scan on 4/8  -Etiology of acute ischemic stroke uncertain at this time; TTE with bubble performed at bedside in the MICU without any evidence of right-to-left shunt; no arrhythmia noted on telemetry throughout stay in the MICU  - CTA head and neck without any evidence of carotid artery stenosis or dissection    Retention   - olson placed  - c/w flomax -Acute ischemic strokes, with concern for hemorrhagic conversion, noted on CT scan on 4/8  -Etiology of acute ischemic stroke uncertain at this time; TTE with bubble performed at bedside in the MICU without any evidence of right-to-left shunt; no arrhythmia noted on telemetry throughout stay in the MICU  - CTA head and neck without any evidence of carotid artery stenosis or dissection    Retention   - olson placed  - c/w flomax    EMPYEMA  Plan: OR Vats 5/12 with Dr. Albarado   Hep gtt restarted   hold hep 4 hrs prior to removing chest tube  Ant Susanna, Angled Diaphragm, Posterior straight CT--> Dry Suction @ 40   water seal chest tubes when indicated  5/15 chest tubes to water seal  serial CXR   Monitor drainage   Daily CXR  *susanna chest tube to be last chest tube to be removed.

## 2021-05-16 NOTE — PROGRESS NOTE ADULT - SUBJECTIVE AND OBJECTIVE BOX
Cardiology NP    Patient is a 60y old  Male who presents with a chief complaint of COVID19 w/ severe metabolic acidosis s/p intubation (16 May 2021 09:44)      HPI:  60M unknown medical history BIBEMS for respiratory distress, recently COVID+ as per EMS. Pt unable to comply with history 2/2 resp distress, nods yes to difficulty breathing, no to pain. Baseline AAOx3. En route, pt had RR 28, SaO2 60%. In ED initially hypoxic to 60% placed on NRB and satting low 90s and tachypneic to 40s on NRB. Placed on AVAPS, still tachypneic to 40s. Labs showing HAGMA, elevated FSG to 500s. Also with lactate of 15. Trops of 200 and pro-BNP elevated to 21k. MICU consulted for respiratory failure in setting of COVID, also concern for DKA. On encounter, pt is noncooperative with questioning. Nods and tries to speak, unable to form full sentences and visibly tachypneic on AVAPS. In ED given 2L NS, decadron. Started on insulin gtt. Subsequently intubated.     Initial vitals 108/86, , RR 28, T97, SaO2 60% on 15L NRB. Received 2L NS bolus. Started on insulin gtt.     Pt admitted to surg b for further management.     Addendum 4/1/21 1700:  Limited history obtained from nitesh Anders, who lives in the  (+44 ). Patient has a PMH of low BP and takes no medications. Patient tested positive for COVID on 3/19. He was initially doing fine with some fatigue, but suddenly decompensated after yesterday. Unable to obtain social history. Wife, Lucero, is currently in [place] - she went due to medical issues but was unable to return due to COVID travel restrictions.        (12 Apr 2021 20:54)      PAST MEDICAL & SURGICAL HISTORY:  No pertinent past medical history    No significant past surgical history        MEDICATIONS  (STANDING):  chlorhexidine 2% Cloths 1 Application(s) Topical <User Schedule>  cholecalciferol 400 Unit(s) Oral daily  heparin  Infusion. 1400 Unit(s)/Hr (14 mL/Hr) IV Continuous <Continuous>  insulin lispro (ADMELOG) corrective regimen sliding scale   SubCutaneous every 6 hours  insulin NPH human recombinant 12 Unit(s) SubCutaneous every 6 hours  levETIRAcetam  Solution 500 milliGRAM(s) Oral two times a day  meropenem  IVPB 1000 milliGRAM(s) IV Intermittent every 8 hours  polyethylene glycol 3350 17 Gram(s) Oral daily  senna 2 Tablet(s) Oral at bedtime  sodium chloride 0.9%. 1000 milliLiter(s) (50 mL/Hr) IV Continuous <Continuous>  tamsulosin 0.4 milliGRAM(s) Oral at bedtime  thiamine 100 milliGRAM(s) Oral daily    MEDICATIONS  (PRN):  acetaminophen   Tablet .. 650 milliGRAM(s) Oral every 6 hours PRN Temp greater or equal to 38C (100.4F)  albuterol/ipratropium for Nebulization 3 milliLiter(s) Nebulizer every 6 hours PRN Shortness of Breath and/or Wheezing  HYDROmorphone  Injectable 0.5 milliGRAM(s) IV Push every 4 hours PRN Severe Pain (7 - 10)      Allergies    No Known Allergies    Intolerances        REVIEW OF SYSTEMS:  Negative unless otherwise stated    Vital Signs Last 24 Hrs  T(C): 36.9 (16 May 2021 09:56), Max: 37.4 (15 May 2021 12:07)  T(F): 98.5 (16 May 2021 09:56), Max: 99.4 (15 May 2021 12:07)  HR: 116 (16 May 2021 09:56) (102 - 116)  BP: 129/52 (16 May 2021 09:56) (114/72 - 131/75)  BP(mean): --  RR: 18 (16 May 2021 09:56) (18 - 20)  SpO2: 94% (16 May 2021 09:56) (93% - 94%)    PHYSICAL EXAM:  Neuro:  Lungs:  COR:  Extremities:  Pain:    LABS:                        9.3    18.39 )-----------( 502      ( 15 May 2021 07:18 )             30.3     05-15    131<L>  |  96  |  27<H>  ----------------------------<  155<H>  4.5   |  24  |  0.67    Ca    8.4      15 May 2021 07:18  Phos  2.4     05-15  Mg     2.1     05-15    TPro  6.6  /  Alb  2.2<L>  /  TBili  0.3  /  DBili  x   /  AST  18  /  ALT  15  /  AlkPhos  92  05-15    PT/INR - ( 15 May 2021 07:25 )   PT: 13.9 sec;   INR: 1.17 ratio         PTT - ( 16 May 2021 07:31 )  PTT:49.0 sec    CAPILLARY BLOOD GLUCOSE      POCT Blood Glucose.: 173 mg/dL (16 May 2021 06:10)  POCT Blood Glucose.: 185 mg/dL (15 May 2021 23:55)  POCT Blood Glucose.: 173 mg/dL (15 May 2021 17:31)  POCT Blood Glucose.: 177 mg/dL (15 May 2021 12:03)      RADIOLOGY & ADDITIONAL TESTS:    Assessment/Plan:          Follow-up:            	 Thoracic NP    Patient is a 60y old  Male who presents with a chief complaint of COVID19 w/ severe metabolic acidosis s/p intubation (16 May 2021 09:44)      HPI:  60M unknown medical history BIBEMS for respiratory distress, recently COVID+ as per EMS. Pt unable to comply with history 2/2 resp distress, nods yes to difficulty breathing, no to pain. Baseline AAOx3. En route, pt had RR 28, SaO2 60%. In ED initially hypoxic to 60% placed on NRB and satting low 90s and tachypneic to 40s on NRB. Placed on AVAPS, still tachypneic to 40s. Labs showing HAGMA, elevated FSG to 500s. Also with lactate of 15. Trops of 200 and pro-BNP elevated to 21k. MICU consulted for respiratory failure in setting of COVID, also concern for DKA. On encounter, pt is noncooperative with questioning. Nods and tries to speak, unable to form full sentences and visibly tachypneic on AVAPS. In ED given 2L NS, decadron. Started on insulin gtt. Subsequently intubated.     Initial vitals 108/86, , RR 28, T97, SaO2 60% on 15L NRB. Received 2L NS bolus. Started on insulin gtt.     Pt admitted to surg b for further management.     Addendum 4/1/21 1700:  Limited history obtained from nitesh Anders, who lives in the  (+44 ). Patient has a PMH of low BP and takes no medications. Patient tested positive for COVID on 3/19. He was initially doing fine with some fatigue, but suddenly decompensated after yesterday. Unable to obtain social history. Wife, Lucero, is currently in [place] - she went due to medical issues but was unable to return due to COVID travel restrictions.        (12 Apr 2021 20:54)      PAST MEDICAL & SURGICAL HISTORY:  No pertinent past medical history    No significant past surgical history        MEDICATIONS  (STANDING):  chlorhexidine 2% Cloths 1 Application(s) Topical <User Schedule>  cholecalciferol 400 Unit(s) Oral daily  heparin  Infusion. 1400 Unit(s)/Hr (14 mL/Hr) IV Continuous <Continuous>  insulin lispro (ADMELOG) corrective regimen sliding scale   SubCutaneous every 6 hours  insulin NPH human recombinant 12 Unit(s) SubCutaneous every 6 hours  levETIRAcetam  Solution 500 milliGRAM(s) Oral two times a day  meropenem  IVPB 1000 milliGRAM(s) IV Intermittent every 8 hours  polyethylene glycol 3350 17 Gram(s) Oral daily  senna 2 Tablet(s) Oral at bedtime  sodium chloride 0.9%. 1000 milliLiter(s) (50 mL/Hr) IV Continuous <Continuous>  tamsulosin 0.4 milliGRAM(s) Oral at bedtime  thiamine 100 milliGRAM(s) Oral daily    MEDICATIONS  (PRN):  acetaminophen   Tablet .. 650 milliGRAM(s) Oral every 6 hours PRN Temp greater or equal to 38C (100.4F)  albuterol/ipratropium for Nebulization 3 milliLiter(s) Nebulizer every 6 hours PRN Shortness of Breath and/or Wheezing  HYDROmorphone  Injectable 0.5 milliGRAM(s) IV Push every 4 hours PRN Severe Pain (7 - 10)      Allergies    No Known Allergies    Intolerances  REVIEW OF SYSTEMS      General: frail appearing	    Skin/Breast: no rashes/lesions  	  Ophthalmologic:  	  ENMT:	    Respiratory and Thorax: +empyema; +chest tubes  	  Cardiovascular:	    Gastrointestinal:	+tube feedings    Genitourinary:	    Musculoskeletal:	    Neurological: +non-verbal	    Psychiatric:	    Hematology/Lymphatics:	    Endocrine:	    Allergic/Immunologic: 	    Vital Signs Last 24 Hrs  T(C): 36.9 (16 May 2021 09:56), Max: 37.4 (15 May 2021 12:07)  T(F): 98.5 (16 May 2021 09:56), Max: 99.4 (15 May 2021 12:07)  HR: 116 (16 May 2021 09:56) (102 - 116)  BP: 129/52 (16 May 2021 09:56) (114/72 - 131/75)  BP(mean): --  RR: 18 (16 May 2021 09:56) (18 - 20)  SpO2: 94% (16 May 2021 09:56) (93% - 94%)    PHYSICAL EXAM: frail appearing 59 y/o man seen @ bedside  Neuro: non-verbal  Lungs: anterior CTA bilaterally  COR: S1S2 present, no murmurs/rubs/gallops noted  Abdomen: SNTND +BS; Tube feeds  Extremities: MCFARLANE, no edema noted  Pain: no overt signs/symptoms of pain    LABS:                        9.3    18.39 )-----------( 502      ( 15 May 2021 07:18 )             30.3     05-15    131<L>  |  96  |  27<H>  ----------------------------<  155<H>  4.5   |  24  |  0.67    Ca    8.4      15 May 2021 07:18  Phos  2.4     05-15  Mg     2.1     05-15    TPro  6.6  /  Alb  2.2<L>  /  TBili  0.3  /  DBili  x   /  AST  18  /  ALT  15  /  AlkPhos  92  05-15    PT/INR - ( 15 May 2021 07:25 )   PT: 13.9 sec;   INR: 1.17 ratio         PTT - ( 16 May 2021 07:31 )  PTT:49.0 sec    CAPILLARY BLOOD GLUCOSE      POCT Blood Glucose.: 173 mg/dL (16 May 2021 06:10)  POCT Blood Glucose.: 185 mg/dL (15 May 2021 23:55)  POCT Blood Glucose.: 173 mg/dL (15 May 2021 17:31)  POCT Blood Glucose.: 177 mg/dL (15 May 2021 12:03)      RADIOLOGY & ADDITIONAL TESTS:                	 Thoracic NP    Patient is a 60y old  Male who presents with a chief complaint of COVID19 w/ severe metabolic acidosis s/p intubation (16 May 2021 09:44)      HPI:  60M unknown medical history BIBEMS for respiratory distress, recently COVID+ as per EMS. Pt unable to comply with history 2/2 resp distress, nods yes to difficulty breathing, no to pain. Baseline AAOx3. En route, pt had RR 28, SaO2 60%. In ED initially hypoxic to 60% placed on NRB and satting low 90s and tachypneic to 40s on NRB. Placed on AVAPS, still tachypneic to 40s. Labs showing HAGMA, elevated FSG to 500s. Also with lactate of 15. Trops of 200 and pro-BNP elevated to 21k. MICU consulted for respiratory failure in setting of COVID, also concern for DKA. On encounter, pt is noncooperative with questioning. Nods and tries to speak, unable to form full sentences and visibly tachypneic on AVAPS. In ED given 2L NS, decadron. Started on insulin gtt. Subsequently intubated.     Initial vitals 108/86, , RR 28, T97, SaO2 60% on 15L NRB. Received 2L NS bolus. Started on insulin gtt.     Pt admitted to surg b for further management.     Addendum 4/1/21 1700:  Limited history obtained from nitesh Anders, who lives in the  (+44 ). Patient has a PMH of low BP and takes no medications. Patient tested positive for COVID on 3/19. He was initially doing fine with some fatigue, but suddenly decompensated after yesterday. Unable to obtain social history. Wife, Lucero, is currently in [place] - she went due to medical issues but was unable to return due to COVID travel restrictions.        (12 Apr 2021 20:54)      PAST MEDICAL & SURGICAL HISTORY:  No pertinent past medical history    No significant past surgical history        MEDICATIONS  (STANDING):  chlorhexidine 2% Cloths 1 Application(s) Topical <User Schedule>  cholecalciferol 400 Unit(s) Oral daily  heparin  Infusion. 1400 Unit(s)/Hr (14 mL/Hr) IV Continuous <Continuous>  insulin lispro (ADMELOG) corrective regimen sliding scale   SubCutaneous every 6 hours  insulin NPH human recombinant 12 Unit(s) SubCutaneous every 6 hours  levETIRAcetam  Solution 500 milliGRAM(s) Oral two times a day  meropenem  IVPB 1000 milliGRAM(s) IV Intermittent every 8 hours  polyethylene glycol 3350 17 Gram(s) Oral daily  senna 2 Tablet(s) Oral at bedtime  sodium chloride 0.9%. 1000 milliLiter(s) (50 mL/Hr) IV Continuous <Continuous>  tamsulosin 0.4 milliGRAM(s) Oral at bedtime  thiamine 100 milliGRAM(s) Oral daily    MEDICATIONS  (PRN):  acetaminophen   Tablet .. 650 milliGRAM(s) Oral every 6 hours PRN Temp greater or equal to 38C (100.4F)  albuterol/ipratropium for Nebulization 3 milliLiter(s) Nebulizer every 6 hours PRN Shortness of Breath and/or Wheezing  HYDROmorphone  Injectable 0.5 milliGRAM(s) IV Push every 4 hours PRN Severe Pain (7 - 10)      Allergies    No Known Allergies    Intolerances  REVIEW OF SYSTEMS      General: frail appearing	    Skin/Breast: no rashes/lesions  	  Ophthalmologic:  	  ENMT:	    Respiratory and Thorax: +empyema; +chest tubes  	  Cardiovascular:	    Gastrointestinal:	+tube feedings    Genitourinary:	    Musculoskeletal:	    Neurological: +non-verbal	    Psychiatric:	    Hematology/Lymphatics:	    Endocrine:	    Allergic/Immunologic: 	    Vital Signs Last 24 Hrs  T(C): 36.9 (16 May 2021 09:56), Max: 37.4 (15 May 2021 12:07)  T(F): 98.5 (16 May 2021 09:56), Max: 99.4 (15 May 2021 12:07)  HR: 116 (16 May 2021 09:56) (102 - 116)  BP: 129/52 (16 May 2021 09:56) (114/72 - 131/75)  BP(mean): --  RR: 18 (16 May 2021 09:56) (18 - 20)  SpO2: 94% (16 May 2021 09:56) (93% - 94%)    PHYSICAL EXAM: frail appearing 59 y/o man seen @ bedside  Neuro: non-verbal  Lungs: anterior CTA bilaterally; Chest tubes x3 #2 Posterior drained 50ml overnight and 120 ml in 24 hours; #1 diaphragm drained 0 overnight and 10ml in 24 hours; #3 Aroldo tube drained 0 overnight and 0 in 24 hours. Chest tubes placed to H20 seal.  COR: S1S2 present, no murmurs/rubs/gallops noted  Abdomen: SNTND +BS; Tube feeds  Extremities: MCFARLANE, no edema noted  Pain: no overt signs/symptoms of pain    LABS:                        9.3    18.39 )-----------( 502      ( 15 May 2021 07:18 )             30.3     05-15    131<L>  |  96  |  27<H>  ----------------------------<  155<H>  4.5   |  24  |  0.67    Ca    8.4      15 May 2021 07:18  Phos  2.4     05-15  Mg     2.1     05-15    TPro  6.6  /  Alb  2.2<L>  /  TBili  0.3  /  DBili  x   /  AST  18  /  ALT  15  /  AlkPhos  92  05-15    PT/INR - ( 15 May 2021 07:25 )   PT: 13.9 sec;   INR: 1.17 ratio         PTT - ( 16 May 2021 07:31 )  PTT:49.0 sec    CAPILLARY BLOOD GLUCOSE      POCT Blood Glucose.: 173 mg/dL (16 May 2021 06:10)  POCT Blood Glucose.: 185 mg/dL (15 May 2021 23:55)  POCT Blood Glucose.: 173 mg/dL (15 May 2021 17:31)  POCT Blood Glucose.: 177 mg/dL (15 May 2021 12:03)      RADIOLOGY & ADDITIONAL TESTS:

## 2021-05-16 NOTE — CHART NOTE - NSCHARTNOTEFT_GEN_A_CORE
Reviewed chart notes/BG values from this weekend. BG values consistently 170-180s on present insulin regimen.     Plan:  -test BG Q6h  -Increase NPH 13 units q6h (hold if TF off)  -c/w Admelog moderate correction scale Q6h  Discharge:  Will be based on TF/PO status and insulin requirements  Might need rehab so will c/w NPH while on TFs  -If going home will need to reevalute pt's ability to learn DM care and if pt unable to care for self will need family to assist with care.   Needs Outpt. endo follow-up  Needs Outpt. optho, podiatry, nephrology    pager: [phone]   office:  [phone] (M-F 9a-5pm)               [phone] (nights/weekends)

## 2021-05-16 NOTE — PROGRESS NOTE ADULT - ASSESSMENT
60 yr male with PMH of DM who was initially admitted to [facility] ICU for hypoxic respiratory failure 2/2 COVID c/b PE with R heart strain, cardiogenic and septic shock, ischemic and hemorrhagic CVA and ESBL klebsiella ventilator associate PNA, transferred to [facility] for neurosurgery eval and further management. s/p PEG, now monitoring for refeeding syndrome, complicated with right empyema planned for VATs

## 2021-05-16 NOTE — PROGRESS NOTE ADULT - ATTENDING COMMENTS
Kaylyn Patrick MD  Division of Hospital Medicine  [facility]   Pager: [phone]    Patient seen and examined today with Team 3 Resident and Interns. Agree with above findings, assessment, and plan with the following additions/exceptions:    1. Empyema/Abscess: s/p VATS. cultures growing ESBL Klebsiella, sensitive to jennifer and Prevotella. leukocytosis downtrending/stable though still with periodic low grade fevers though has been afebrile in last 24 hours. c/w meropenem. f/u with ID. chest tube management as per thoracic surgery. daily CXR.  2. Anemia: H/H slowly steadily trending down again with tachycardia not improving IVF. concern for expansion of known prior obturator hematoma vs. new bleed. Will repeat CT C/A/P to evaluate. check FOBT.  repeat Hb tonight.  if continues to downtrend, will need to hold hep gtt.   3. Sinus Tachycardia: EKG sinus tach, unchanged from prior. though he winces at times (especially when examining chest tubes), per his niece bedside he denies pain though he visibly looks uncomfortable. will trial tylenol ATC as patient not able to ask for PRN. c/w IVF for now as does appear dry on exam and not on FWB with tube feeds (Na stable/improved with IVF though his urine osm high as well). concern for possible bleed as H/H steadily trending again. work-up as above.  4. Pulmonary embolus: currently on hep gtt. may need to hold if H/H continues to drop and/or imaging reveals expansion of his hematoma or bleed  5. L apical PTX: stable on serial CXR. continue to monitor on daily CXR.    Rest as detailed in note above.    Plan discussed with patient, niece bedside who provided translation, and Team 3 intern Dr. Mckeon.

## 2021-05-16 NOTE — PROGRESS NOTE ADULT - PROBLEM SELECTOR PLAN 1
Cystitis and Empyema   5/12 he underwent right VATS converted to open thoracotomy, decortication, drainage of abscess and flex bronch. Pt briefly on lexi intraop and extubated in OR.   - s/p L. thoracotomy, drainage of abscess, decortication, flex bronch   - 1 angled chest tube , 1 straight chest tube, 1 Blakemore drain (anterior) - Air leak present post op   - Chest tube to suction at 10mmhg  - Air leak expected and seen  - Monitor pulse oximeter  - Out of bed to chair, ambulate as tolerated, and incentive spirometry to prevent atelectasis  - Pain control as needed with tylenol and oxy    - c/w meropenem 5/7-  - f/u ID about cultures of lung tissue   - BCx neg 5/14, UCx growing Kleb  - Now having low grade fevers (5/14), cont to monitor     Obturator Hematoma   - bleeding hematoma in right thigh on CT on 5/7  - CT hip showing non active bleed, hematoma on right thigh on 5/8  - doppler to r/o DVT in LE, per IR no role IVC filter  - stable, monitor CBC Cystitis and Empyema   5/12 he underwent right VATS converted to open thoracotomy, decortication, drainage of abscess and flex bronch. Pt briefly on lexi intraop and extubated in OR.   - s/p L. thoracotomy, drainage of abscess, decortication, flex bronch   - 1 angled chest tube , 1 straight chest tube, 1 Blakemore drain (anterior) - Air leak present post op   - Chest tube to suction at 10mmhg  - Air leak expected and seen  - Monitor pulse oximeter  - Out of bed to chair, ambulate as tolerated, and incentive spirometry to prevent atelectasis  - Pain control as needed with tylenol and oxy    - c/w meropenem 5/7-  - f/u ID about cultures of lung tissue   - BCx neg 5/14, UCx growing Kleb  - Now having low grade fevers (5/14), cont to monitor   - elevated WBC and dropping hbg, will f.u with CT on abd/pelvis and chest and get repeat CBC to monitor for any kind of bleed or inflammation.     Obturator Hematoma   - bleeding hematoma in right thigh on CT on 5/7  - CT hip showing non active bleed, hematoma on right thigh on 5/8  - doppler to r/o DVT in LE, per IR no role IVC filter  - stable, monitor CBC

## 2021-05-16 NOTE — PROGRESS NOTE ADULT - SUBJECTIVE AND OBJECTIVE BOX
PROGRESS NOTE:   Authored by Dr. Emily Mckeon, ANH pager [phone]    Patient is a 60y old  Male who presents with a chief complaint of COVID19 w/ severe metabolic acidosis s/p intubation (15 May 2021 16:01)      SUBJECTIVE / OVERNIGHT EVENTS:    MEDICATIONS  (STANDING):  chlorhexidine 2% Cloths 1 Application(s) Topical <User Schedule>  cholecalciferol 400 Unit(s) Oral daily  heparin  Infusion 1300 Unit(s)/Hr (13 mL/Hr) IV Continuous <Continuous>  insulin lispro (ADMELOG) corrective regimen sliding scale   SubCutaneous every 6 hours  insulin NPH human recombinant 12 Unit(s) SubCutaneous every 6 hours  levETIRAcetam  Solution 500 milliGRAM(s) Oral two times a day  meropenem  IVPB 1000 milliGRAM(s) IV Intermittent every 8 hours  polyethylene glycol 3350 17 Gram(s) Oral daily  senna 2 Tablet(s) Oral at bedtime  sodium chloride 0.9%. 1000 milliLiter(s) (50 mL/Hr) IV Continuous <Continuous>  tamsulosin 0.4 milliGRAM(s) Oral at bedtime  thiamine 100 milliGRAM(s) Oral daily    MEDICATIONS  (PRN):  acetaminophen   Tablet .. 650 milliGRAM(s) Oral every 6 hours PRN Temp greater or equal to 38C (100.4F)  albuterol/ipratropium for Nebulization 3 milliLiter(s) Nebulizer every 6 hours PRN Shortness of Breath and/or Wheezing  HYDROmorphone  Injectable 0.5 milliGRAM(s) IV Push every 4 hours PRN Severe Pain (7 - 10)      CAPILLARY BLOOD GLUCOSE      POCT Blood Glucose.: 173 mg/dL (16 May 2021 06:10)  POCT Blood Glucose.: 185 mg/dL (15 May 2021 23:55)  POCT Blood Glucose.: 173 mg/dL (15 May 2021 17:31)  POCT Blood Glucose.: 177 mg/dL (15 May 2021 12:03)    I&O's Summary    15 May 2021 07:01  -  16 May 2021 07:00  --------------------------------------------------------  IN: 2653 mL / OUT: 430 mL / NET: 2223 mL        PHYSICAL EXAM:  Vital Signs Last 24 Hrs  T(C): 37.2 (16 May 2021 04:50), Max: 37.4 (15 May 2021 12:07)  T(F): 98.9 (16 May 2021 04:50), Max: 99.4 (15 May 2021 12:07)  HR: 112 (16 May 2021 04:50) (102 - 112)  BP: 114/73 (16 May 2021 04:50) (114/72 - 131/75)  BP(mean): --  RR: 20 (16 May 2021 04:50) (18 - 20)  SpO2: 93% (16 May 2021 04:50) (93% - 94%)    GENERAL: No acute distress, well-developed  HEAD:  Atraumatic, Normocephalic  EYES: EOMI, PERRLA, conjunctiva and sclera clear  NECK: Supple, no lymphadenopathy, no JVD  CHEST/LUNG: CTAB; No wheezes, rales, or rhonchi  HEART: Regular rate and rhythm; No murmurs, rubs, or gallops  ABDOMEN: Soft, non-tender, non-distended; normal bowel sounds, no organomegaly  EXTREMITIES:  2+ peripheral pulses b/l, No clubbing, cyanosis, or edema  NEUROLOGY: A&O x 3, no focal deficits  SKIN: No rashes or lesions    LABS:                        9.3    18.39 )-----------( 502      ( 15 May 2021 07:18 )             30.3     05-15    131<L>  |  96  |  27<H>  ----------------------------<  155<H>  4.5   |  24  |  0.67    Ca    8.4      15 May 2021 07:18  Phos  2.4     05-15  Mg     2.1     05-15    TPro  6.6  /  Alb  2.2<L>  /  TBili  0.3  /  DBili  x   /  AST  18  /  ALT  15  /  AlkPhos  92  05-15    PT/INR - ( 15 May 2021 07:25 )   PT: 13.9 sec;   INR: 1.17 ratio         PTT - ( 16 May 2021 07:31 )  PTT:49.0 sec          Culture - Blood (collected 14 May 2021 09:04)  Source: .Blood Blood  Preliminary Report (15 May 2021 10:00):    No growth to date.    Culture - Blood (collected 14 May 2021 09:04)  Source: .Blood Blood  Preliminary Report (15 May 2021 10:00):    No growth to date.        RADIOLOGY & ADDITIONAL TESTS:  Results Reviewed:   Imaging Personally Reviewed:  Electrocardiogram Personally Reviewed:    COORDINATION OF CARE:  Care Discussed with Consultants/Other Providers [Y/N]:  Prior or Outpatient Records Reviewed [Y/N]:   PROGRESS NOTE:   Authored by Dr. Emily Mckeon, ANH pager [phone]    Patient is a 60y old  Male who presents with a chief complaint of COVID19 w/ severe metabolic acidosis s/p intubation (15 May 2021 16:01)      SUBJECTIVE / OVERNIGHT EVENTS: Patient examined at bedside. He winced in some pain when I touched his abdomen but this is common for him. He seems to have some pain at the site of the chest tubes that have serosangenous fluid. Pt does not answer questions    MEDICATIONS  (STANDING):  chlorhexidine 2% Cloths 1 Application(s) Topical <User Schedule>  cholecalciferol 400 Unit(s) Oral daily  heparin  Infusion 1300 Unit(s)/Hr (13 mL/Hr) IV Continuous <Continuous>  insulin lispro (ADMELOG) corrective regimen sliding scale   SubCutaneous every 6 hours  insulin NPH human recombinant 12 Unit(s) SubCutaneous every 6 hours  levETIRAcetam  Solution 500 milliGRAM(s) Oral two times a day  meropenem  IVPB 1000 milliGRAM(s) IV Intermittent every 8 hours  polyethylene glycol 3350 17 Gram(s) Oral daily  senna 2 Tablet(s) Oral at bedtime  sodium chloride 0.9%. 1000 milliLiter(s) (50 mL/Hr) IV Continuous <Continuous>  tamsulosin 0.4 milliGRAM(s) Oral at bedtime  thiamine 100 milliGRAM(s) Oral daily    MEDICATIONS  (PRN):  acetaminophen   Tablet .. 650 milliGRAM(s) Oral every 6 hours PRN Temp greater or equal to 38C (100.4F)  albuterol/ipratropium for Nebulization 3 milliLiter(s) Nebulizer every 6 hours PRN Shortness of Breath and/or Wheezing  HYDROmorphone  Injectable 0.5 milliGRAM(s) IV Push every 4 hours PRN Severe Pain (7 - 10)      CAPILLARY BLOOD GLUCOSE      POCT Blood Glucose.: 173 mg/dL (16 May 2021 06:10)  POCT Blood Glucose.: 185 mg/dL (15 May 2021 23:55)  POCT Blood Glucose.: 173 mg/dL (15 May 2021 17:31)  POCT Blood Glucose.: 177 mg/dL (15 May 2021 12:03)    I&O's Summary    15 May 2021 07:01  -  16 May 2021 07:00  --------------------------------------------------------  IN: 2653 mL / OUT: 430 mL / NET: 2223 mL        PHYSICAL EXAM:  Vital Signs Last 24 Hrs  T(C): 37.2 (16 May 2021 04:50), Max: 37.4 (15 May 2021 12:07)  T(F): 98.9 (16 May 2021 04:50), Max: 99.4 (15 May 2021 12:07)  HR: 112 (16 May 2021 04:50) (102 - 112)  BP: 114/73 (16 May 2021 04:50) (114/72 - 131/75)  BP(mean): --  RR: 20 (16 May 2021 04:50) (18 - 20)  SpO2: 93% (16 May 2021 04:50) (93% - 94%)    GENERAL: No acute distress, malnourished  HEAD:  Atraumatic, Normocephalic  EYES: EOMI, PERRLA, conjunctiva and sclera clear  NECK: Supple, no lymphadenopathy, no JVD  CHEST/LUNG: decreased breath sounds b/l   HEART: Regular rate and rhythm; No murmurs, rubs, or gallops  ABDOMEN: Soft, tender, non-distended; normal bowel sounds, no organomegaly  EXTREMITIES:  2+ peripheral pulses b/l, No clubbing, cyanosis, or edema  NEUROLOGY: A&O x 0, gernalized weakness   SKIN: gangrenous toes     LABS:                        9.3    18.39 )-----------( 502      ( 15 May 2021 07:18 )             30.3     05-15    131<L>  |  96  |  27<H>  ----------------------------<  155<H>  4.5   |  24  |  0.67    Ca    8.4      15 May 2021 07:18  Phos  2.4     05-15  Mg     2.1     05-15    TPro  6.6  /  Alb  2.2<L>  /  TBili  0.3  /  DBili  x   /  AST  18  /  ALT  15  /  AlkPhos  92  05-15    PT/INR - ( 15 May 2021 07:25 )   PT: 13.9 sec;   INR: 1.17 ratio         PTT - ( 16 May 2021 07:31 )  PTT:49.0 sec          Culture - Blood (collected 14 May 2021 09:04)  Source: .Blood Blood  Preliminary Report (15 May 2021 10:00):    No growth to date.    Culture - Blood (collected 14 May 2021 09:04)  Source: .Blood Blood  Preliminary Report (15 May 2021 10:00):    No growth to date.        RADIOLOGY & ADDITIONAL TESTS:  Results Reviewed:   Imaging Personally Reviewed:  Electrocardiogram Personally Reviewed:    COORDINATION OF CARE:  Care Discussed with Consultants/Other Providers [Y/N]:  Prior or Outpatient Records Reviewed [Y/N]:

## 2021-05-17 DIAGNOSIS — E78.2 MIXED HYPERLIPIDEMIA: ICD-10-CM

## 2021-05-17 DIAGNOSIS — I10 ESSENTIAL (PRIMARY) HYPERTENSION: ICD-10-CM

## 2021-05-17 LAB
ALBUMIN SERPL ELPH-MCNC: 2.3 G/DL — LOW (ref 3.3–5)
ALP SERPL-CCNC: 111 U/L — SIGNIFICANT CHANGE UP (ref 40–120)
ALT FLD-CCNC: 35 U/L — SIGNIFICANT CHANGE UP (ref 10–45)
ANION GAP SERPL CALC-SCNC: 12 MMOL/L — SIGNIFICANT CHANGE UP (ref 5–17)
APTT BLD: 30.4 SEC — SIGNIFICANT CHANGE UP (ref 27.5–35.5)
APTT BLD: 75.5 SEC — HIGH (ref 27.5–35.5)
AST SERPL-CCNC: 35 U/L — SIGNIFICANT CHANGE UP (ref 10–40)
BASOPHILS # BLD AUTO: 0.09 K/UL — SIGNIFICANT CHANGE UP (ref 0–0.2)
BASOPHILS NFR BLD AUTO: 0.5 % — SIGNIFICANT CHANGE UP (ref 0–2)
BILIRUB SERPL-MCNC: 0.4 MG/DL — SIGNIFICANT CHANGE UP (ref 0.2–1.2)
BLD GP AB SCN SERPL QL: NEGATIVE — SIGNIFICANT CHANGE UP
BUN SERPL-MCNC: 22 MG/DL — SIGNIFICANT CHANGE UP (ref 7–23)
CALCIUM SERPL-MCNC: 8.5 MG/DL — SIGNIFICANT CHANGE UP (ref 8.4–10.5)
CHLORIDE SERPL-SCNC: 96 MMOL/L — SIGNIFICANT CHANGE UP (ref 96–108)
CO2 SERPL-SCNC: 23 MMOL/L — SIGNIFICANT CHANGE UP (ref 22–31)
CREAT SERPL-MCNC: 0.68 MG/DL — SIGNIFICANT CHANGE UP (ref 0.5–1.3)
CULTURE RESULTS: SIGNIFICANT CHANGE UP
D DIMER BLD IA.RAPID-MCNC: 2286 NG/ML DDU — HIGH
EOSINOPHIL # BLD AUTO: 0.3 K/UL — SIGNIFICANT CHANGE UP (ref 0–0.5)
EOSINOPHIL NFR BLD AUTO: 1.6 % — SIGNIFICANT CHANGE UP (ref 0–6)
FIBRINOGEN PPP-MCNC: 1188 MG/DL — HIGH (ref 290–520)
GLUCOSE BLDC GLUCOMTR-MCNC: 151 MG/DL — HIGH (ref 70–99)
GLUCOSE BLDC GLUCOMTR-MCNC: 165 MG/DL — HIGH (ref 70–99)
GLUCOSE BLDC GLUCOMTR-MCNC: 190 MG/DL — HIGH (ref 70–99)
GLUCOSE BLDC GLUCOMTR-MCNC: 74 MG/DL — SIGNIFICANT CHANGE UP (ref 70–99)
GLUCOSE SERPL-MCNC: 177 MG/DL — HIGH (ref 70–99)
HCT VFR BLD CALC: 25 % — LOW (ref 39–50)
HCT VFR BLD CALC: 25 % — LOW (ref 39–50)
HCT VFR BLD CALC: 27 % — LOW (ref 39–50)
HGB BLD-MCNC: 7.7 G/DL — LOW (ref 13–17)
HGB BLD-MCNC: 7.7 G/DL — LOW (ref 13–17)
HGB BLD-MCNC: 8.3 G/DL — LOW (ref 13–17)
IMM GRANULOCYTES NFR BLD AUTO: 4.2 % — HIGH (ref 0–1.5)
INR BLD: 1.12 RATIO — SIGNIFICANT CHANGE UP (ref 0.88–1.16)
LDH SERPL L TO P-CCNC: 211 U/L — SIGNIFICANT CHANGE UP (ref 50–242)
LYMPHOCYTES # BLD AUTO: 12.7 % — LOW (ref 13–44)
LYMPHOCYTES # BLD AUTO: 2.41 K/UL — SIGNIFICANT CHANGE UP (ref 1–3.3)
MAGNESIUM SERPL-MCNC: 2.1 MG/DL — SIGNIFICANT CHANGE UP (ref 1.6–2.6)
MCHC RBC-ENTMCNC: 23.7 PG — LOW (ref 27–34)
MCHC RBC-ENTMCNC: 24 PG — LOW (ref 27–34)
MCHC RBC-ENTMCNC: 24.1 PG — LOW (ref 27–34)
MCHC RBC-ENTMCNC: 30.7 GM/DL — LOW (ref 32–36)
MCHC RBC-ENTMCNC: 30.8 GM/DL — LOW (ref 32–36)
MCHC RBC-ENTMCNC: 30.8 GM/DL — LOW (ref 32–36)
MCV RBC AUTO: 76.9 FL — LOW (ref 80–100)
MCV RBC AUTO: 77.9 FL — LOW (ref 80–100)
MCV RBC AUTO: 78.5 FL — LOW (ref 80–100)
MONOCYTES # BLD AUTO: 1.32 K/UL — HIGH (ref 0–0.9)
MONOCYTES NFR BLD AUTO: 7 % — SIGNIFICANT CHANGE UP (ref 2–14)
NEUTROPHILS # BLD AUTO: 14.06 K/UL — HIGH (ref 1.8–7.4)
NEUTROPHILS NFR BLD AUTO: 74 % — SIGNIFICANT CHANGE UP (ref 43–77)
NRBC # BLD: 0 /100 WBCS — SIGNIFICANT CHANGE UP (ref 0–0)
ORGANISM # SPEC MICROSCOPIC CNT: SIGNIFICANT CHANGE UP
PHOSPHATE SERPL-MCNC: 3 MG/DL — SIGNIFICANT CHANGE UP (ref 2.5–4.5)
PLATELET # BLD AUTO: 508 K/UL — HIGH (ref 150–400)
PLATELET # BLD AUTO: 540 K/UL — HIGH (ref 150–400)
PLATELET # BLD AUTO: 556 K/UL — HIGH (ref 150–400)
POTASSIUM SERPL-MCNC: 5.1 MMOL/L — SIGNIFICANT CHANGE UP (ref 3.5–5.3)
POTASSIUM SERPL-SCNC: 5.1 MMOL/L — SIGNIFICANT CHANGE UP (ref 3.5–5.3)
PROT SERPL-MCNC: 6.7 G/DL — SIGNIFICANT CHANGE UP (ref 6–8.3)
PROTHROM AB SERPL-ACNC: 13.4 SEC — SIGNIFICANT CHANGE UP (ref 10.6–13.6)
RBC # BLD: 3.21 M/UL — LOW (ref 4.2–5.8)
RBC # BLD: 3.25 M/UL — LOW (ref 4.2–5.8)
RBC # BLD: 3.44 M/UL — LOW (ref 4.2–5.8)
RBC # FLD: 19.6 % — HIGH (ref 10.3–14.5)
RBC # FLD: 19.6 % — HIGH (ref 10.3–14.5)
RBC # FLD: 19.7 % — HIGH (ref 10.3–14.5)
RH IG SCN BLD-IMP: POSITIVE — SIGNIFICANT CHANGE UP
SODIUM SERPL-SCNC: 131 MMOL/L — LOW (ref 135–145)
SPECIMEN SOURCE: SIGNIFICANT CHANGE UP
WBC # BLD: 16.63 K/UL — HIGH (ref 3.8–10.5)
WBC # BLD: 18.97 K/UL — HIGH (ref 3.8–10.5)
WBC # BLD: 19.67 K/UL — HIGH (ref 3.8–10.5)
WBC # FLD AUTO: 16.63 K/UL — HIGH (ref 3.8–10.5)
WBC # FLD AUTO: 18.97 K/UL — HIGH (ref 3.8–10.5)
WBC # FLD AUTO: 19.67 K/UL — HIGH (ref 3.8–10.5)

## 2021-05-17 PROCEDURE — 99232 SBSQ HOSP IP/OBS MODERATE 35: CPT

## 2021-05-17 PROCEDURE — 99233 SBSQ HOSP IP/OBS HIGH 50: CPT

## 2021-05-17 PROCEDURE — 99233 SBSQ HOSP IP/OBS HIGH 50: CPT | Mod: GC

## 2021-05-17 PROCEDURE — 99024 POSTOP FOLLOW-UP VISIT: CPT

## 2021-05-17 PROCEDURE — 71045 X-RAY EXAM CHEST 1 VIEW: CPT | Mod: 26,76

## 2021-05-17 RX ORDER — MEROPENEM 1 G/30ML
1000 INJECTION INTRAVENOUS EVERY 8 HOURS
Refills: 0 | Status: DISCONTINUED | OUTPATIENT
Start: 2021-05-17 | End: 2021-06-04

## 2021-05-17 RX ADMIN — Medication 650 MILLIGRAM(S): at 07:28

## 2021-05-17 RX ADMIN — Medication 650 MILLIGRAM(S): at 06:17

## 2021-05-17 RX ADMIN — Medication 650 MILLIGRAM(S): at 17:50

## 2021-05-17 RX ADMIN — Medication 2: at 13:11

## 2021-05-17 RX ADMIN — Medication 650 MILLIGRAM(S): at 00:26

## 2021-05-17 RX ADMIN — Medication 650 MILLIGRAM(S): at 23:57

## 2021-05-17 RX ADMIN — HUMAN INSULIN 13 UNIT(S): 100 INJECTION, SUSPENSION SUBCUTANEOUS at 13:10

## 2021-05-17 RX ADMIN — Medication 2: at 23:55

## 2021-05-17 RX ADMIN — HUMAN INSULIN 13 UNIT(S): 100 INJECTION, SUSPENSION SUBCUTANEOUS at 06:17

## 2021-05-17 RX ADMIN — HUMAN INSULIN 13 UNIT(S): 100 INJECTION, SUSPENSION SUBCUTANEOUS at 23:56

## 2021-05-17 RX ADMIN — Medication 650 MILLIGRAM(S): at 12:09

## 2021-05-17 RX ADMIN — HUMAN INSULIN 13 UNIT(S): 100 INJECTION, SUSPENSION SUBCUTANEOUS at 00:26

## 2021-05-17 RX ADMIN — MEROPENEM 100 MILLIGRAM(S): 1 INJECTION INTRAVENOUS at 06:17

## 2021-05-17 RX ADMIN — Medication 2: at 06:18

## 2021-05-17 RX ADMIN — LEVETIRACETAM 500 MILLIGRAM(S): 250 TABLET, FILM COATED ORAL at 06:17

## 2021-05-17 RX ADMIN — LEVETIRACETAM 500 MILLIGRAM(S): 250 TABLET, FILM COATED ORAL at 17:49

## 2021-05-17 RX ADMIN — Medication 650 MILLIGRAM(S): at 01:13

## 2021-05-17 RX ADMIN — Medication 400 UNIT(S): at 12:09

## 2021-05-17 RX ADMIN — MEROPENEM 100 MILLIGRAM(S): 1 INJECTION INTRAVENOUS at 13:10

## 2021-05-17 RX ADMIN — HYDROMORPHONE HYDROCHLORIDE 0.5 MILLIGRAM(S): 2 INJECTION INTRAMUSCULAR; INTRAVENOUS; SUBCUTANEOUS at 13:10

## 2021-05-17 RX ADMIN — HYDROMORPHONE HYDROCHLORIDE 0.5 MILLIGRAM(S): 2 INJECTION INTRAMUSCULAR; INTRAVENOUS; SUBCUTANEOUS at 13:40

## 2021-05-17 RX ADMIN — Medication 650 MILLIGRAM(S): at 12:39

## 2021-05-17 RX ADMIN — Medication 2: at 00:26

## 2021-05-17 RX ADMIN — Medication 100 MILLIGRAM(S): at 12:09

## 2021-05-17 NOTE — PROGRESS NOTE ADULT - SUBJECTIVE AND OBJECTIVE BOX
CC: F/U for PNA    Saw/spoke to patient. Ill appearing. More fatigued today.    Allergies  No Known Allergies    ANTIMICROBIALS:  Off    PE:    Vital Signs Last 24 Hrs  T(C): 37.4 (17 May 2021 14:07), Max: 37.4 (17 May 2021 00:56)  T(F): 99.3 (17 May 2021 14:07), Max: 99.4 (17 May 2021 00:56)  HR: 108 (17 May 2021 09:35) (105 - 108)  BP: 109/71 (17 May 2021 14:07) (104/68 - 120/76)  RR: 18 (17 May 2021 14:07) (18 - 20)  SpO2: 96% (17 May 2021 14:07) (95% - 97%)    Gen: AOx1-2, fatigued  CV: S1+S2 normal, tachycardic  Resp: Clear bilat, no resp distress, no crackles/wheezes, chest tubes  Abd: Soft, nontender, +BS  Ext: No LE edema, no wounds    LABS:                        7.7    19.67 )-----------( 540      ( 17 May 2021 14:57 )             25.0     05-17    131<L>  |  96  |  22  ----------------------------<  177<H>  5.1   |  23  |  0.68    Ca    8.5      17 May 2021 07:21  Phos  3.0     05-17  Mg     2.1     05-17    TPro  6.7  /  Alb  2.3<L>  /  TBili  0.4  /  DBili  x   /  AST  35  /  ALT  35  /  AlkPhos  111  05-17    MICROBIOLOGY:    .Blood Blood  05-14-21   No growth to date.    BAL BRONCHIAL AVEOLAR LAVAGE  05-13-21   Rare Yeast  --    Numerous polymorphonuclear leukocytes seen per low power field  No Squamous epithelial cells seen per low power field  Rare Gram Negative Rods seen per oil power field    .Tissue Other  05-12-21   Growth in fluid media only Klebsiella pneumoniae ESBL  Rare Lactobacillus rhamnosus "Susceptibilities not performed"  --  Klebsiella pneumoniae ESBL    .Urine Clean Catch (Midstream)  05-06-21   >100,000 CFU/ml Klebsiella pneumoniae ESBL  --  Klebsiella pneumoniae ESBL    (otherwise reviewed)    RADIOLOGY:    CT 5/16:    FINDINGS:  CHEST:  LUNGS AND LARGE AIRWAYS: Interval new small cavitary lesions in the right lower lobe. Interval improvement of previously seen consolidation within the right lower lobe as compared to CT from 5/6/2021. Drain terminating in the right lower lobe.  PLEURA: Interval right hydropneumothorax with associated drain. New anterior small loculated pneumothorax.  VESSELS: Within normal limits.  HEART: Heart size is normal. No pericardial effusion.  MEDIASTINUM AND BESSIE: No lymphadenopathy.  CHEST WALL AND LOWER NECK: New large right chest wall hematoma extending inferiorly to the level of the right 10th rib. Subcutaneous emphysema is noted in the right chest wall. Surgical staples are visualized in the right chest wall.    ABDOMEN AND PELVIS:  LIVER: Within normal limits.  BILE DUCTS: Normal caliber.  GALLBLADDER: Within normal limits.  SPLEEN: Within normal limits.  PANCREAS: Within normal limits.  ADRENALS: Within normal limits.  KIDNEYS/URETERS: Within normal limits.    BLADDER: Within normal limits.  REPRODUCTIVE ORGANS: Prostate within normal limits.    BOWEL: No bowel obstruction. Appendix is normal. Gastrostomy tube with tip in the stomach.  PERITONEUM: No ascites.  VESSELS: Within normal limits.  RETROPERITONEUM/LYMPH NODES: No lymphadenopathy.  ABDOMINAL WALL: Small fat-containing umbilical hernia. Enlargement of the right obturator muscle with arterial phase enhancement consistent with the presence of hematoma and active bleed.  BONES: Within normal limits.    IMPRESSION:    Right obturator hematoma with regions of arterial phase hyperenhancement consistent with the presence of active bleed.    Large right chest wall hematoma extending inferiorly to the level of the right 10th rib. No evidence of active bleeding in the chest.    New anterior small loculated pneumothorax. Right-sided hydropneumothorax.    Interval new small cavitary lesions in the right lower lobe.    5/17 XR:    IMPRESSION:  Three right chest tubes unchanged in position.    Loculated right pleural effusion with likely associated passive atelectasis, not significantly changed.    Lucency associated with peripheral lateral right upper opacity, unchanged. Question interposed lung within loculated pleural fluid, versus air in the pleural space, versus a cavitary process.

## 2021-05-17 NOTE — PROGRESS NOTE ADULT - PROBLEM SELECTOR PLAN 1
OR Vats 5/12 with Dr. Albarado   5/15 chest tubes to water seal  5/17 Angled diaphragm chest tube removed as per Dr. Albarado   F/U Post CXR r/o PTX  Hep gtt can be restarted 1 hr post chest tube removal (12:30pm)   Ant Susanna, Posterior straight CT-->Water seal   serial CXR   Monitor drainage   Daily CXR  *susanna chest tube to be last chest tube to be removed

## 2021-05-17 NOTE — PROGRESS NOTE ADULT - ASSESSMENT
60 yr male with PMH of DM who was initially admitted to [facility] ICU for hypoxic respiratory failure 2/2 COVID c/b PE with R heart strain, cardiogenic and septic shock, ischemic and hemorrhagic CVA and ESBL klebsiella ventilator associate PNA, transferred to [facility] for neurosurgery eval and further management. s/p PEG, now monitoring for refeeding syndrome, complicated with right empyema planned for VATs

## 2021-05-17 NOTE — PROGRESS NOTE ADULT - NUTRITIONAL ASSESSMENT
Pt placed on 2L nasal cannula per providers orders. Will continue to monitor pts O2 levels. This patient has been assessed with a concern for Malnutrition and has been determined to have a diagnosis/diagnoses of Severe protein-calorie malnutrition.    This patient is being managed with:   Diet NPO with Tube Feed-  Tube Feeding Modality: Gastrostomy  Glucerna 1.2 Kaden (GLUCERNARTH)  Total Volume for 24 Hours (mL): 1680  Continuous  Starting Tube Feed Rate {mL per Hour}: 20  Increase Tube Feed Rate by (mL): 20     Every 2 hours  Until Goal Tube Feed Rate (mL per Hour): 70  Tube Feed Duration (in Hours): 24  Tube Feed Start Time: 20:30  Entered: May 12 2021  8:12PM

## 2021-05-17 NOTE — PROGRESS NOTE ADULT - PROBLEM SELECTOR PLAN 4
-Pulmonary embolus noted on ct scan performed at time of admission  -Etiology of venous thromboembolism uncertain at this time; no known history of thrombophilia; provoked in setting of acute covid illness   - On heparin drip rate of 13  - f/u CBC to monitor hbg  - will discharge on Eliquis -Pulmonary embolus noted on ct scan performed at time of admission  -Etiology of venous thromboembolism uncertain at this time; no known history of thrombophilia; provoked in setting of acute covid illness   - On heparin drip rate of 13, now on hold for acute bleed in right obturator   - f/u CBC to monitor hbg  - will discharge on Eliquis

## 2021-05-17 NOTE — PROGRESS NOTE ADULT - PROBLEM SELECTOR PLAN 3
-Acute ischemic strokes, with concern for hemorrhagic conversion, noted on CT scan on 4/8  -Etiology of acute ischemic stroke uncertain at this time; TTE with bubble performed at bedside in the MICU without any evidence of right-to-left shunt; no arrhythmia noted on telemetry throughout stay in the MICU  - CTA head and neck without any evidence of carotid artery stenosis or dissection    Retention   - olson placed  - c/w flomax    EMPYEMA  Plan: OR Vats 5/12 with Dr. Albarado   Hep gtt restarted   hold hep 4 hrs prior to removing chest tube  Ant Susanna, Angled Diaphragm, Posterior straight CT--> Dry Suction @ 40   water seal chest tubes when indicated  5/15 chest tubes to water seal  serial CXR   Monitor drainage   Daily CXR  *susanna chest tube to be last chest tube to be removed. -Acute ischemic strokes, with concern for hemorrhagic conversion, noted on CT scan on 4/8  -Etiology of acute ischemic stroke uncertain at this time; TTE with bubble performed at bedside in the MICU without any evidence of right-to-left shunt; no arrhythmia noted on telemetry throughout stay in the MICU  - CTA head and neck without any evidence of carotid artery stenosis or dissection    Retention   - olson placed  - c/w flomax    EMPYEMA   Plan: OR Vats 5/12 with Dr. Albarado   Hep gtt restarted   hold hep 4 hrs prior to removing chest tube  Ant Susanna, Angled Diaphragm, Posterior straight CT--> Dry Suction @ 40   water seal chest tubes when indicated  5/15 chest tubes to water seal  serial CXR   Monitor drainage   Daily CXR  *susanna chest tube to be last chest tube to be removed.  - now with necrotizing PNA on CT (from 5/16)

## 2021-05-17 NOTE — PROGRESS NOTE ADULT - PROBLEM SELECTOR PLAN 9
- Initiated goals of care discussion with patient's brother-in-law (listed in chart as primary point of contact); patient is full code at this time; goal is as much recovery as possible  -The patient's wife is in [place], but the patient's brother-in-law has been visiting and placing the patient's wife on facetime; she is aware of all that is happening and has happened - Initiated goals of care discussion with patient's brother-in-law (listed in chart as primary point of contact); patient is full code at this time; goal is as much recovery as possible  -The patient's wife is in [place], but the patient's brother-in-law has been visiting and placing the patient's wife on facetime; she is aware of all that is happening and has happened    5/17: Will need regroup with palliative for another family meeting

## 2021-05-17 NOTE — PROGRESS NOTE ADULT - SUBJECTIVE AND OBJECTIVE BOX
PROGRESS NOTE:   Authored by Dr. Emily Mckeon, ANH pager [phone]    Patient is a 60y old  Male who presents with a chief complaint of COVID19 w/ severe metabolic acidosis s/p intubation (16 May 2021 10:46)      SUBJECTIVE / OVERNIGHT EVENTS:    MEDICATIONS  (STANDING):  acetaminophen   Tablet .. 650 milliGRAM(s) Oral every 6 hours  chlorhexidine 2% Cloths 1 Application(s) Topical <User Schedule>  cholecalciferol 400 Unit(s) Oral daily  insulin lispro (ADMELOG) corrective regimen sliding scale   SubCutaneous every 6 hours  insulin NPH human recombinant 13 Unit(s) SubCutaneous every 6 hours  levETIRAcetam  Solution 500 milliGRAM(s) Oral two times a day  meropenem  IVPB 1000 milliGRAM(s) IV Intermittent every 8 hours  polyethylene glycol 3350 17 Gram(s) Oral daily  senna 2 Tablet(s) Oral at bedtime  sodium chloride 0.9%. 1000 milliLiter(s) (75 mL/Hr) IV Continuous <Continuous>  tamsulosin 0.4 milliGRAM(s) Oral at bedtime  thiamine 100 milliGRAM(s) Oral daily    MEDICATIONS  (PRN):  albuterol/ipratropium for Nebulization 3 milliLiter(s) Nebulizer every 6 hours PRN Shortness of Breath and/or Wheezing  HYDROmorphone  Injectable 0.5 milliGRAM(s) IV Push every 4 hours PRN Severe Pain (7 - 10)      CAPILLARY BLOOD GLUCOSE      POCT Blood Glucose.: 190 mg/dL (17 May 2021 06:12)  POCT Blood Glucose.: 175 mg/dL (16 May 2021 23:53)  POCT Blood Glucose.: 180 mg/dL (16 May 2021 17:08)  POCT Blood Glucose.: 186 mg/dL (16 May 2021 12:58)    I&O's Summary    16 May 2021 07:01  -  17 May 2021 07:00  --------------------------------------------------------  IN: 3373 mL / OUT: 110 mL / NET: 3263 mL        PHYSICAL EXAM:  Vital Signs Last 24 Hrs  T(C): 37 (17 May 2021 04:26), Max: 37.4 (17 May 2021 00:56)  T(F): 98.6 (17 May 2021 04:26), Max: 99.4 (17 May 2021 00:56)  HR: 106 (17 May 2021 04:26) (105 - 117)  BP: 105/59 (17 May 2021 04:26) (105/59 - 129/52)  BP(mean): --  RR: 20 (17 May 2021 04:26) (18 - 20)  SpO2: 96% (17 May 2021 04:26) (94% - 97%)    GENERAL: No acute distress, well-developed  HEAD:  Atraumatic, Normocephalic  EYES: EOMI, PERRLA, conjunctiva and sclera clear  NECK: Supple, no lymphadenopathy, no JVD  CHEST/LUNG: CTAB; No wheezes, rales, or rhonchi  HEART: Regular rate and rhythm; No murmurs, rubs, or gallops  ABDOMEN: Soft, non-tender, non-distended; normal bowel sounds, no organomegaly  EXTREMITIES:  2+ peripheral pulses b/l, No clubbing, cyanosis, or edema  NEUROLOGY: A&O x 3, no focal deficits  SKIN: No rashes or lesions    LABS:                        8.3    18.97 )-----------( 556      ( 17 May 2021 07:20 )             27.0     05-17    131<L>  |  96  |  22  ----------------------------<  177<H>  5.1   |  23  |  0.68    Ca    8.5      17 May 2021 07:21  Phos  3.0     05-17  Mg     2.1     05-17    TPro  6.7  /  Alb  2.3<L>  /  TBili  0.4  /  DBili  x   /  AST  35  /  ALT  35  /  AlkPhos  111  05-17    PTT - ( 17 May 2021 07:20 )  PTT:75.5 sec          Culture - Blood (collected 14 May 2021 09:04)  Source: .Blood Blood  Preliminary Report (15 May 2021 10:00):    No growth to date.    Culture - Blood (collected 14 May 2021 09:04)  Source: .Blood Blood  Preliminary Report (15 May 2021 10:00):    No growth to date.        RADIOLOGY & ADDITIONAL TESTS:  Results Reviewed:   Imaging Personally Reviewed:  Electrocardiogram Personally Reviewed:    COORDINATION OF CARE:  Care Discussed with Consultants/Other Providers [Y/N]:  Prior or Outpatient Records Reviewed [Y/N]:   PROGRESS NOTE:   Authored by Dr. Emily Mckeon, ANH pager [phone]    Patient is a 60y old  Male who presents with a chief complaint of COVID19 w/ severe metabolic acidosis s/p intubation (16 May 2021 10:46)      SUBJECTIVE / OVERNIGHT EVENTS: Overnight the patient was found to have a hematoma in the right lung as well as necrotizing PNA. The CT read this morning further included an active bleed in right obturator. Heparin drip was stopped this morning. The patient appears well. He is mentating better today and was able to tell me his name and his birthday. He states that he is not in any pain.     MEDICATIONS  (STANDING):  acetaminophen   Tablet .. 650 milliGRAM(s) Oral every 6 hours  chlorhexidine 2% Cloths 1 Application(s) Topical <User Schedule>  cholecalciferol 400 Unit(s) Oral daily  insulin lispro (ADMELOG) corrective regimen sliding scale   SubCutaneous every 6 hours  insulin NPH human recombinant 13 Unit(s) SubCutaneous every 6 hours  levETIRAcetam  Solution 500 milliGRAM(s) Oral two times a day  meropenem  IVPB 1000 milliGRAM(s) IV Intermittent every 8 hours  polyethylene glycol 3350 17 Gram(s) Oral daily  senna 2 Tablet(s) Oral at bedtime  sodium chloride 0.9%. 1000 milliLiter(s) (75 mL/Hr) IV Continuous <Continuous>  tamsulosin 0.4 milliGRAM(s) Oral at bedtime  thiamine 100 milliGRAM(s) Oral daily    MEDICATIONS  (PRN):  albuterol/ipratropium for Nebulization 3 milliLiter(s) Nebulizer every 6 hours PRN Shortness of Breath and/or Wheezing  HYDROmorphone  Injectable 0.5 milliGRAM(s) IV Push every 4 hours PRN Severe Pain (7 - 10)      CAPILLARY BLOOD GLUCOSE      POCT Blood Glucose.: 190 mg/dL (17 May 2021 06:12)  POCT Blood Glucose.: 175 mg/dL (16 May 2021 23:53)  POCT Blood Glucose.: 180 mg/dL (16 May 2021 17:08)  POCT Blood Glucose.: 186 mg/dL (16 May 2021 12:58)    I&O's Summary    16 May 2021 07:01  -  17 May 2021 07:00  --------------------------------------------------------  IN: 3373 mL / OUT: 110 mL / NET: 3263 mL        PHYSICAL EXAM:  Vital Signs Last 24 Hrs  T(C): 37 (17 May 2021 04:26), Max: 37.4 (17 May 2021 00:56)  T(F): 98.6 (17 May 2021 04:26), Max: 99.4 (17 May 2021 00:56)  HR: 106 (17 May 2021 04:26) (105 - 117)  BP: 105/59 (17 May 2021 04:26) (105/59 - 129/52)  BP(mean): --  RR: 20 (17 May 2021 04:26) (18 - 20)  SpO2: 96% (17 May 2021 04:26) (94% - 97%)    GENERAL: No acute distress  HEAD:  Atraumatic, Normocephalic  EYES: EOMI, PERRLA, conjunctiva and sclera clear  NECK: Supple, no lymphadenopathy, no JVD  CHEST/LUNG: crackles b/l. decreased breath sounds b/l, 3 chest tubes in place, serosangeunous fluid drained   HEART: Regular rate and rhythm; No murmurs, rubs, or gallops  ABDOMEN: Soft, non-tender, slightly distended; normal bowel sounds, no organomegaly  EXTREMITIES:  2+ peripheral pulses b/l, No clubbing, cyanosis, or edema, no pain with light palpation of right thigh, no bruising or evidence of hematoma   NEUROLOGY: A&O x 2, moving his right leg, unable to follow commands   SKIN: gangrenous changes to toes b/l     LABS:                        8.3    18.97 )-----------( 556      ( 17 May 2021 07:20 )             27.0     05-17    131<L>  |  96  |  22  ----------------------------<  177<H>  5.1   |  23  |  0.68    Ca    8.5      17 May 2021 07:21  Phos  3.0     05-17  Mg     2.1     05-17    TPro  6.7  /  Alb  2.3<L>  /  TBili  0.4  /  DBili  x   /  AST  35  /  ALT  35  /  AlkPhos  111  05-17    PTT - ( 17 May 2021 07:20 )  PTT:75.5 sec          Culture - Blood (collected 14 May 2021 09:04)  Source: .Blood Blood  Preliminary Report (15 May 2021 10:00):    No growth to date.    Culture - Blood (collected 14 May 2021 09:04)  Source: .Blood Blood  Preliminary Report (15 May 2021 10:00):    No growth to date.        RADIOLOGY & ADDITIONAL TESTS:  Results Reviewed:   Imaging Personally Reviewed:  Electrocardiogram Personally Reviewed:    COORDINATION OF CARE:  Care Discussed with Consultants/Other Providers [Y/N]:  Prior or Outpatient Records Reviewed [Y/N]:

## 2021-05-17 NOTE — PROGRESS NOTE ADULT - PROBLEM SELECTOR PLAN 1
- FS q6h, goal -180  - continue NPH 13 units sq q6h, HOLD IF TUBE FEEDING TURNED OFF  - continue  moderate correction scale sq q6h.     Discharge: basal/bolus, doses TBD

## 2021-05-17 NOTE — CONSULT NOTE ADULT - ASSESSMENT
Interventional Radiology    Evaluate for Procedure: evaluate for possible angiography / embolization    HPI: 60yoM hx DM initially admitted with COVID-19 respiratory failure complicated by pulmonary embolism, septic shock, ischemic/hemorrhagic stroke, necrotizing pneumonia s/p VATS - still with chest tube, obturator hematoma and chest hematoma. IR was consulted for possible angiography and/or embolization.     Allergies:   Medications (Abx/Cardiac/Anticoagulation/Blood Products)    heparin  Infusion.: 1700 Unit(s)/Hr IV Continuous (05-16 @ 10:23)  meropenem  IVPB: 100 mL/Hr IV Intermittent (05-17 @ 13:10)  tamsulosin: 0.4 milliGRAM(s) Oral (05-16 @ 22:08)    Data:    T(C): 37.4  HR: 108  BP: 109/71  RR: 18  SpO2: 96%    -WBC 18.97 / HgB 8.3 / Hct 27.0 / Plt 556  -Na 131 / Cl 96 / BUN 22 / Glucose 177  -K 5.1 / CO2 23 / Cr 0.68  -ALT 35 / Alk Phos 111 / T.Bili 0.4  -INR -- / PTT 75.5      Radiology: CT A/P on 5/6, CT C/A/P on 5/16    Assessment/Plan:   60yoM hx DM initially admitted with COVID-19 respiratory failure complicated by pulmonary embolism, septic shock, ischemic/hemorrhagic stroke, necrotizing pneumonia s/p VATS - still with chest tube, obturator hematoma and chest hematoma. IR was consulted for possible angiography and/or embolization.   -- imaging and labs reviewed, discussed with team  -- patient is hemodynamically stable with stable hgb  -- obturator hematoma is stable in size from 5/6 to 5/16, does not appear to be increasing in size or actively bleeding  -- if there is clinical concern for active hemorrhage consider first stopping heparin drip and potentially reversing with protamine sulfate, prior to angio/embo  -- no intervention indicated at this time    d/w Dr. Santos    --  Mirza Knapp DO/RUBY  Interventional Radiology Resident (PGY-2)   IR Pager [phone] Interventional Radiology    Evaluate for Procedure: evaluate for possible angiography / embolization    HPI: 60yoM hx DM initially admitted with COVID-19 respiratory failure complicated by pulmonary embolism, septic shock, ischemic/hemorrhagic stroke, necrotizing pneumonia s/p VATS - still with chest tube, obturator hematoma and chest hematoma. IR was consulted for possible angiography and/or embolization.     Allergies:   Medications (Abx/Cardiac/Anticoagulation/Blood Products)    heparin  Infusion.: 1700 Unit(s)/Hr IV Continuous (05-16 @ 10:23)  meropenem  IVPB: 100 mL/Hr IV Intermittent (05-17 @ 13:10)  tamsulosin: 0.4 milliGRAM(s) Oral (05-16 @ 22:08)    Data:    T(C): 37.4  HR: 108  BP: 109/71  RR: 18  SpO2: 96%    -WBC 18.97 / HgB 8.3 / Hct 27.0 / Plt 556  -Na 131 / Cl 96 / BUN 22 / Glucose 177  -K 5.1 / CO2 23 / Cr 0.68  -ALT 35 / Alk Phos 111 / T.Bili 0.4  -INR -- / PTT 75.5      Radiology: CT A/P on 5/6, CT C/A/P on 5/16    Assessment/Plan:   60yoM hx DM initially admitted with COVID-19 respiratory failure complicated by pulmonary embolism, septic shock, ischemic/hemorrhagic stroke, necrotizing pneumonia s/p VATS - still with chest tube, obturator hematoma and chest hematoma. IR was consulted for possible angiography and/or embolization.   -- imaging and labs reviewed, discussed with team  -- patient is hemodynamically stable with stable hgb  -- obturator hematoma is stable in size from 5/6 to 5/16, does not appear to be increasing in size or actively bleeding  -- if there is clinical concern for active hemorrhage consider first stopping heparin drip and potentially reversing with protamine sulfate, prior to angio/embo  -- no intervention at this time but if clinical condition changes, please contact IR.    d/w Dr. Santos    --  Mirza Knapp DO/RUBY  Interventional Radiology Resident (PGY-2)   IR Pager [phone]

## 2021-05-17 NOTE — PROGRESS NOTE ADULT - SUBJECTIVE AND OBJECTIVE BOX
Chief Complaint/Follow-up on:     Subjective:    MEDICATIONS  (STANDING):  acetaminophen   Tablet .. 650 milliGRAM(s) Oral every 6 hours  chlorhexidine 2% Cloths 1 Application(s) Topical <User Schedule>  cholecalciferol 400 Unit(s) Oral daily  insulin lispro (ADMELOG) corrective regimen sliding scale   SubCutaneous every 6 hours  insulin NPH human recombinant 13 Unit(s) SubCutaneous every 6 hours  levETIRAcetam  Solution 500 milliGRAM(s) Oral two times a day  meropenem  IVPB 1000 milliGRAM(s) IV Intermittent every 8 hours  polyethylene glycol 3350 17 Gram(s) Oral daily  senna 2 Tablet(s) Oral at bedtime  sodium chloride 0.9%. 1000 milliLiter(s) (75 mL/Hr) IV Continuous <Continuous>  tamsulosin 0.4 milliGRAM(s) Oral at bedtime  thiamine 100 milliGRAM(s) Oral daily    MEDICATIONS  (PRN):  albuterol/ipratropium for Nebulization 3 milliLiter(s) Nebulizer every 6 hours PRN Shortness of Breath and/or Wheezing  HYDROmorphone  Injectable 0.5 milliGRAM(s) IV Push every 4 hours PRN Severe Pain (7 - 10)      PHYSICAL EXAM:  VITALS: T(C): 37.4 (05-17-21 @ 16:27)  T(F): 99.3 (05-17-21 @ 16:27), Max: 99.4 (05-17-21 @ 00:56)  HR: 110 (05-17-21 @ 16:27) (105 - 110)  BP: 104/56 (05-17-21 @ 16:27) (104/56 - 120/76)  RR:  (18 - 20)  SpO2:  (96% - 97%)  Wt(kg): --  GENERAL: NAD, well-groomed, well-developed  EYES: No proptosis, no injection  HEENT:  Atraumatic, Normocephalic, moist mucous membranes  THYROID: Normal size, no palpable nodules  RESPIRATORY: Clear to auscultation bilaterally; No rales, rhonchi, wheezing, or rubs  CARDIOVASCULAR: Regular rate and rhythm; No murmurs; no peripheral edema  GI: Soft, nontender, non distended, normal bowel sounds  CUSHING'S SIGNS: no striae    POCT Blood Glucose.: 165 mg/dL (05-17-21 @ 12:37)  POCT Blood Glucose.: 190 mg/dL (05-17-21 @ 06:12)  POCT Blood Glucose.: 175 mg/dL (05-16-21 @ 23:53)  POCT Blood Glucose.: 180 mg/dL (05-16-21 @ 17:08)  POCT Blood Glucose.: 186 mg/dL (05-16-21 @ 12:58)  POCT Blood Glucose.: 173 mg/dL (05-16-21 @ 06:10)  POCT Blood Glucose.: 185 mg/dL (05-15-21 @ 23:55)  POCT Blood Glucose.: 173 mg/dL (05-15-21 @ 17:31)  POCT Blood Glucose.: 177 mg/dL (05-15-21 @ 12:03)  POCT Blood Glucose.: 176 mg/dL (05-15-21 @ 06:08)  POCT Blood Glucose.: 144 mg/dL (05-14-21 @ 23:57)    05-17    131<L>  |  96  |  22  ----------------------------<  177<H>  5.1   |  23  |  0.68    EGFR if : 122  EGFR if non : 105    Ca    8.5      05-17  Mg     2.1     05-17  Phos  3.0     05-17    TPro  6.7  /  Alb  2.3<L>  /  TBili  0.4  /  DBili  x   /  AST  35  /  ALT  35  /  AlkPhos  111  05-17          Thyroid Function Tests:  04-24 @ 11:11 TSH 2.00 FreeT4 -- T3 -- Anti TPO -- Anti Thyroglobulin Ab -- TSI --                           Chief Complaint/Follow-up on: T2D    Subjective: Patient is hypophonic s/p CVA. He speaks mostly Gujarati and was very happy when I greeted him in his language. I asked him to move his LE and he was able to fully kick with his R leg but had decreased ROM in his L leg.     MEDICATIONS  (STANDING):  acetaminophen   Tablet .. 650 milliGRAM(s) Oral every 6 hours  chlorhexidine 2% Cloths 1 Application(s) Topical <User Schedule>  cholecalciferol 400 Unit(s) Oral daily  insulin lispro (ADMELOG) corrective regimen sliding scale   SubCutaneous every 6 hours  insulin NPH human recombinant 13 Unit(s) SubCutaneous every 6 hours  levETIRAcetam  Solution 500 milliGRAM(s) Oral two times a day  meropenem  IVPB 1000 milliGRAM(s) IV Intermittent every 8 hours  polyethylene glycol 3350 17 Gram(s) Oral daily  senna 2 Tablet(s) Oral at bedtime  sodium chloride 0.9%. 1000 milliLiter(s) (75 mL/Hr) IV Continuous <Continuous>  tamsulosin 0.4 milliGRAM(s) Oral at bedtime  thiamine 100 milliGRAM(s) Oral daily    MEDICATIONS  (PRN):  albuterol/ipratropium for Nebulization 3 milliLiter(s) Nebulizer every 6 hours PRN Shortness of Breath and/or Wheezing  HYDROmorphone  Injectable 0.5 milliGRAM(s) IV Push every 4 hours PRN Severe Pain (7 - 10)      PHYSICAL EXAM:  VITALS: T(C): 37.4 (05-17-21 @ 16:27)  T(F): 99.3 (05-17-21 @ 16:27), Max: 99.4 (05-17-21 @ 00:56)  HR: 110 (05-17-21 @ 16:27) (105 - 110)  BP: 104/56 (05-17-21 @ 16:27) (104/56 - 120/76)  RR:  (18 - 20)  SpO2:  (96% - 97%)  Wt(kg): --  GENERAL: NAD, well-groomed, well-developed  HEENT:  Atraumatic, Normocephalic, moist mucous membranes  RESPIRATORY: Clear to auscultation bilaterally; No rales, rhonchi, wheezing, or rubs  CARDIOVASCULAR: Regular rate and rhythm; No murmurs  GI: Soft, nontender, non distended, normal bowel sounds      POCT Blood Glucose.: 165 mg/dL (05-17-21 @ 12:37)  POCT Blood Glucose.: 190 mg/dL (05-17-21 @ 06:12)  POCT Blood Glucose.: 175 mg/dL (05-16-21 @ 23:53)  POCT Blood Glucose.: 180 mg/dL (05-16-21 @ 17:08)  POCT Blood Glucose.: 186 mg/dL (05-16-21 @ 12:58)  POCT Blood Glucose.: 173 mg/dL (05-16-21 @ 06:10)  POCT Blood Glucose.: 185 mg/dL (05-15-21 @ 23:55)  POCT Blood Glucose.: 173 mg/dL (05-15-21 @ 17:31)  POCT Blood Glucose.: 177 mg/dL (05-15-21 @ 12:03)  POCT Blood Glucose.: 176 mg/dL (05-15-21 @ 06:08)  POCT Blood Glucose.: 144 mg/dL (05-14-21 @ 23:57)    05-17    131<L>  |  96  |  22  ----------------------------<  177<H>  5.1   |  23  |  0.68    EGFR if : 122  EGFR if non : 105    Ca    8.5      05-17  Mg     2.1     05-17  Phos  3.0     05-17    TPro  6.7  /  Alb  2.3<L>  /  TBili  0.4  /  DBili  x   /  AST  35  /  ALT  35  /  AlkPhos  111  05-17          Thyroid Function Tests:  04-24 @ 11:11 TSH 2.00

## 2021-05-17 NOTE — PROGRESS NOTE ADULT - SUBJECTIVE AND OBJECTIVE BOX
Patient is a 60y old  Male who presents with a chief complaint of COVID19 w/ severe metabolic acidosis s/p intubation (17 May 2021 08:42)      SUBJECTIVE: "Non-verbal "    Vital Signs Last 24 Hrs  T(C): 36.9 (21 @ 09:35), Max: 37.4 (21 @ 00:56)  T(F): 98.4 (21 @ 09:35), Max: 99.4 (21 @ 00:56)  HR: 108 (21 @ 09:35) (105 - 117)  BP: 104/68 (21 @ 09:35) (104/68 - 120/76)  RR: 18 (21 @ 09:35) (18 - 20)  SpO2: 96% (21 @ 09:35) (95% - 97%)                21 @ 07:01  -  21 @ 07:00  --------------------------------------------------------  IN: 3373 mL / OUT: 110 mL / NET: 3263 mL    21 @ 07:01  -  21 @ 11:21  --------------------------------------------------------  IN: 320 mL / OUT: 0 mL / NET: 320 mL        Daily     Daily Weight in k.5 (17 May 2021 06:00)                          8.3    18.97 )-----------( 556      ( 17 May 2021 07:20 )             27.0     -    131<L>  |  96  |  22  ----------------------------<  177<H>  5.1   |  23  |  0.68    Ca    8.5      17 May 2021 07:21  Phos  3.0       Mg     2.1         TPro  6.7  /  Alb  2.3<L>  /  TBili  0.4  /  DBili  x   /  AST  35  /  ALT  35  /  AlkPhos  111            PHYSICAL EXAM  Neurology: A&Ox3, NAD, no gross deficits  CV : RRR+S1S2  Lungs: Respirations non-labored, coarse rhonchi  Abdomen: Soft, NT/ND, +BSx4Q  Extremities: B/L LE edema, negative calf tenderness, +PP           CHEST TUBES:  Left CT     [  ]LWS  [  ]H2O seal  Right CT   [  ]LWS  [ X ]H2O seal              MEDICATIONS  acetaminophen   Tablet .. 650 milliGRAM(s) Oral every 6 hours  albuterol/ipratropium for Nebulization 3 milliLiter(s) Nebulizer every 6 hours PRN  chlorhexidine 2% Cloths 1 Application(s) Topical <User Schedule>  cholecalciferol 400 Unit(s) Oral daily  HYDROmorphone  Injectable 0.5 milliGRAM(s) IV Push every 4 hours PRN  insulin lispro (ADMELOG) corrective regimen sliding scale   SubCutaneous every 6 hours  insulin NPH human recombinant 13 Unit(s) SubCutaneous every 6 hours  levETIRAcetam  Solution 500 milliGRAM(s) Oral two times a day  meropenem  IVPB 1000 milliGRAM(s) IV Intermittent every 8 hours  polyethylene glycol 3350 17 Gram(s) Oral daily  senna 2 Tablet(s) Oral at bedtime  sodium chloride 0.9%. 1000 milliLiter(s) IV Continuous <Continuous>  tamsulosin 0.4 milliGRAM(s) Oral at bedtime  thiamine 100 milliGRAM(s) Oral daily

## 2021-05-17 NOTE — PROGRESS NOTE ADULT - ASSESSMENT
59 yo M with PMH of DM who was initially admitted to [facility] ICU 4/1 for hypoxic respiratory failure 2/2 COVID c/b PE with R heart strain, cardiogenic and septic shock, ischemic and hemorrhagic CVA and ESBL klebsiella ventilator associated PNA, transferred to [facility] for neurosurgery eval on 4/12  No fever, has leukocytosis  UCX with ESBL K pne  Bilateral LE dry gangrene  No sob/no cough, no abd pain, N/V/D  Has PEG  PCT minimally elevated  CT now with Empyema, concern for bilateral PNA, abscess  S/p OR Thoracotomy, abscess drainage, bronch (5/12)--culture with K pne  CT with areas of small cavitary lesions and hematomas  1) Leukocytosis  - Still elevated, reactive to CT findings?  - Trend WBC  2) COVID  - Prior infection  - Monitor for any signs active COVID/reinfection  3) Suspected Empyema/Abscess  - Culture with K pne, Lactobacillus, Prevotella--should be adequately treated with jennifer  - Meropenem 1g q 8  - Note CT findings, follow surgery if any further procedure needed for persistent cavities/hematoma  - Suspect yeast will be colonizer--but monitor culture  - Appreciate Thoracic procedure  - F/U OR cultures  4) Gangrene  - Appears noninfectious/dry gangrene  - Monitor wounds    Sammy Chambers MD  Pager [phone]  After 5pm and on weekends call [phone]

## 2021-05-17 NOTE — PROGRESS NOTE ADULT - ASSESSMENT
60M hospitalized for COVID PNA, c/b cardiogenic/septic shock and ischemic/hemorrhagic stroke, now extubated on the floor w/ negative COVID PCR, Thoracic surgery consulted to evaluate the recently found R sided empyema.     5/8 VSS; NAD. Thoracic surgery recommend IR Consult/Tap for R empyema.     5/9  VSS, NAD.  IR deferring IR drain of empyema 2/2 risk of iatrogenic bronchopleural fistula. Recommends VATS procedure.  Dr. Tena spoke to patients brother in law regarding VATS procedure.  Family to decide on whether to proceed with operation.  Continue IV ABX per ID.   5/11 VSS; Plan for VATs tomorrow 5/12/21 with Dr. Albarado. COVID pcr within 48 hr.   5/12 OR this afternoon VATS for empyema  5/13 S/P Vats yesterday. Anterior Susanna/Angled Diaphragm/Posterior straight chest tubes --> Dry SUCTION (@40mmhg). Monitor drainage and A/L on suction. (H2O seal overnight) RPT CXR @ 11 am. Back on medicine service once patient is stable to move out of SICU.   5/14 HD stable, encourage OOB and mobilization, R chest tubes maintain to suction today, will likely water seal tomorrow. AM CXR noted. D/c olson today.   5/15 HD stable, AM CXR noted, hep held in anticipation of chest tube removal, however, chest tubes still draining, will resume hep gtt and re-eval chest tube removal in AM after CXR. Water seal chest tubes overnight.   chest tubes now back to suction d/t drainage / output.   5/16 hemodynamically stable; Chest tubes to H20 seal; will wait to re-assess chest tube for removal tomorrow AM.  5/17 VSS; Angled diaphragm chest tube d'cd as per Dr. Albarado (zero output overnight.)   *susanna chest tube to be last chest tube to be removed

## 2021-05-17 NOTE — PROGRESS NOTE ADULT - PROBLEM SELECTOR PLAN 1
Cystitis and Empyema   5/12 he underwent right VATS converted to open thoracotomy, decortication, drainage of abscess and flex bronch. Pt briefly on lexi intraop and extubated in OR.   - s/p L. thoracotomy, drainage of abscess, decortication, flex bronch   - 1 angled chest tube , 1 straight chest tube, 1 Blakemore drain (anterior) - Air leak present post op   - Chest tube to suction at 10mmhg  - Air leak expected and seen  - Monitor pulse oximeter  - Out of bed to chair, ambulate as tolerated, and incentive spirometry to prevent atelectasis  - Pain control as needed with tylenol and oxy    - c/w meropenem 5/7-  - f/u ID about cultures of lung tissue   - BCx neg 5/14, UCx growing Kleb  - Now having low grade fevers (5/14), cont to monitor   - elevated WBC and dropping hbg, will f.u with CT on abd/pelvis and chest and get repeat CBC to monitor for any kind of bleed or inflammation.     Obturator Hematoma   - bleeding hematoma in right thigh on CT on 5/7  - CT hip showing non active bleed, hematoma on right thigh on 5/8  - doppler to r/o DVT in LE, per IR no role IVC filter  - stable, monitor CBC Cystitis and Empyema   5/12 he underwent right VATS converted to open thoracotomy, decortication, drainage of abscess and flex bronch. Pt briefly on lexi intraop and extubated in OR.   - s/p L. thoracotomy, drainage of abscess, decortication, flex bronch   - 1 angled chest tube , 1 straight chest tube, 1 Blakemore drain (anterior) - Air leak present post op   - Chest tube to suction at 10mmhg  - Air leak expected and seen  - Monitor pulse oximeter  - Out of bed to chair, ambulate as tolerated, and incentive spirometry to prevent atelectasis  - Pain control as needed with tylenol and oxy    - c/w meropenem 5/7-  - f/u ID about cultures of lung tissue   - BCx neg 5/14, UCx growing Kleb  - Now having low grade fevers (5/14), cont to monitor   - elevated WBC and dropping hbg, will f.u with CT on abd/pelvis and chest and get repeat CBC to monitor for any kind of bleed or inflammation.     Obturator Hematoma   - bleeding hematoma in right thigh on CT on 5/7  - CT hip showing non active bleed, hematoma on right thigh on 5/8  - doppler to r/o DVT in LE, per IR no role IVC filter  - active again on 5/17, monitor CBC, will check hbg q6, heparin on hold, will consult IR to embolize?

## 2021-05-17 NOTE — PROGRESS NOTE ADULT - ASSESSMENT
59 y/o Gujarati speaking male with new onset diabetes (HbA1c of 10.3) admitted with COVID with prolonged hospital course requiring intubation and PEG insertion; s/p Right VATS converted to R thoracotomy for Empyema/bilateral PNA, abscess on 24 hour continuous TF at goal. Endocrine consulted for hyperglycemia mgt.

## 2021-05-17 NOTE — CHART NOTE - NSCHARTNOTEFT_GEN_A_CORE
Received  call from IR regarding a CT addendum - large hematoma right chest wall and necrotizing PNA. Report endorsed to Dr Saavedra Received  call from LETHA Dunn regarding a CT abdomen addendum - large hematoma right chest wall and necrotizing PNA. Report endorsed to Dr Saavedra Received  call from LETHA Dunn regarding a CT abdomen/Pelvic addendum - large hematoma right chest wall and necrotizing PNA. Report endorsed to Dr Saavedra

## 2021-05-17 NOTE — PROGRESS NOTE ADULT - ATTENDING COMMENTS
Patient seen and examined today with Team 3 Resident and Interns. Agree with above findings, assessment, and plan with the following additions/exceptions:  60yoM hx DM initially admitted with COVID-19 resp failure c/b PE, R heart strain, septic shock, ischemic/hemorrhagic stroke tx to NS with stroke/bleed, no neurosurg intervention, here course has been complicated with ongoing delirium, poor mental status, necrotizing pneumonia s/p VATS - still with chest tube, obturator hematoma - 5/17 - repeat CT with chest hematoma, ongoing necrotizing pna and active obturator hematoma, heparin gtt on hold   1. Empyema/Abscess: s/p VATS. cultures growing ESBL Klebsiella, sensitive to jennifer and Prevotella. leukocytosis stable though still with periodic low grade fevers. c/w meropenem. f/u with ID, at this time no role for antifungal coverage, ongoing discussions. chest tube management as per thoracic surgery. daily CXR.  2. Anemia, active obturator hematoma, HD stable, hg stable, however, heparin gtt now on hold. Will call IR to see if candidate for embolization. Given b/l PE, recent CVA, high risk and needs AC, however, cannot AC with active bleed. Await IR input. according to thoracic, lung hematoma, ok, can start heparin. Monitor hg  3. Mental status - ongoing confusion, at this point delirium and damage from CVA likely persistent, chance of meaningful recovery is small  4. Pulmonary embolus: hep gtt on hold due to active obturator bleed, no indication for IVC filter since LE dopplers neg   5. L apical PTX: stable on serial CXR. continue to monitor on daily CXR.    d/w palliative, Dr. Martinez - will need to readdress goals of care, prognosis. Patient's prognosis is guarded.    Rest as detailed in note above.

## 2021-05-17 NOTE — PROGRESS NOTE ADULT - PROBLEM SELECTOR PLAN 3
-pt should be on a statin as he is over 40 with diabetes, check fasting lipids  -discussed with RN, tried to page the team without success.  Reyna Stover MD  Endocrinology Attending  Mondays and Tuesdays 9am-6pm: [phone] (pager)  Other days, night and weekend: [phone]

## 2021-05-18 LAB
ALBUMIN SERPL ELPH-MCNC: 2.3 G/DL — LOW (ref 3.3–5)
ALP SERPL-CCNC: 115 U/L — SIGNIFICANT CHANGE UP (ref 40–120)
ALT FLD-CCNC: 37 U/L — SIGNIFICANT CHANGE UP (ref 10–45)
ANION GAP SERPL CALC-SCNC: 12 MMOL/L — SIGNIFICANT CHANGE UP (ref 5–17)
ANION GAP SERPL CALC-SCNC: 12 MMOL/L — SIGNIFICANT CHANGE UP (ref 5–17)
APTT BLD: 27.4 SEC — LOW (ref 27.5–35.5)
AST SERPL-CCNC: 33 U/L — SIGNIFICANT CHANGE UP (ref 10–40)
BILIRUB SERPL-MCNC: 0.4 MG/DL — SIGNIFICANT CHANGE UP (ref 0.2–1.2)
BUN SERPL-MCNC: 25 MG/DL — HIGH (ref 7–23)
BUN SERPL-MCNC: 25 MG/DL — HIGH (ref 7–23)
CALCIUM SERPL-MCNC: 8.6 MG/DL — SIGNIFICANT CHANGE UP (ref 8.4–10.5)
CALCIUM SERPL-MCNC: 9.1 MG/DL — SIGNIFICANT CHANGE UP (ref 8.4–10.5)
CHLORIDE SERPL-SCNC: 97 MMOL/L — SIGNIFICANT CHANGE UP (ref 96–108)
CHLORIDE SERPL-SCNC: 98 MMOL/L — SIGNIFICANT CHANGE UP (ref 96–108)
CO2 SERPL-SCNC: 23 MMOL/L — SIGNIFICANT CHANGE UP (ref 22–31)
CO2 SERPL-SCNC: 23 MMOL/L — SIGNIFICANT CHANGE UP (ref 22–31)
CREAT SERPL-MCNC: 0.7 MG/DL — SIGNIFICANT CHANGE UP (ref 0.5–1.3)
CREAT SERPL-MCNC: 0.71 MG/DL — SIGNIFICANT CHANGE UP (ref 0.5–1.3)
GLUCOSE BLDC GLUCOMTR-MCNC: 142 MG/DL — HIGH (ref 70–99)
GLUCOSE BLDC GLUCOMTR-MCNC: 175 MG/DL — HIGH (ref 70–99)
GLUCOSE BLDC GLUCOMTR-MCNC: 175 MG/DL — HIGH (ref 70–99)
GLUCOSE SERPL-MCNC: 127 MG/DL — HIGH (ref 70–99)
GLUCOSE SERPL-MCNC: 164 MG/DL — HIGH (ref 70–99)
HCT VFR BLD CALC: 27.7 % — LOW (ref 39–50)
HCT VFR BLD CALC: 28.2 % — LOW (ref 39–50)
HCT VFR BLD CALC: 28.4 % — LOW (ref 39–50)
HGB BLD-MCNC: 8.7 G/DL — LOW (ref 13–17)
HGB BLD-MCNC: 8.8 G/DL — LOW (ref 13–17)
HGB BLD-MCNC: 9.1 G/DL — LOW (ref 13–17)
MAGNESIUM SERPL-MCNC: 2.1 MG/DL — SIGNIFICANT CHANGE UP (ref 1.6–2.6)
MCHC RBC-ENTMCNC: 24.8 PG — LOW (ref 27–34)
MCHC RBC-ENTMCNC: 24.8 PG — LOW (ref 27–34)
MCHC RBC-ENTMCNC: 25.9 PG — LOW (ref 27–34)
MCHC RBC-ENTMCNC: 31 GM/DL — LOW (ref 32–36)
MCHC RBC-ENTMCNC: 31.4 GM/DL — LOW (ref 32–36)
MCHC RBC-ENTMCNC: 32.3 GM/DL — SIGNIFICANT CHANGE UP (ref 32–36)
MCV RBC AUTO: 78.9 FL — LOW (ref 80–100)
MCV RBC AUTO: 80 FL — SIGNIFICANT CHANGE UP (ref 80–100)
MCV RBC AUTO: 80.1 FL — SIGNIFICANT CHANGE UP (ref 80–100)
NRBC # BLD: 0 /100 WBCS — SIGNIFICANT CHANGE UP (ref 0–0)
PHOSPHATE SERPL-MCNC: 3.8 MG/DL — SIGNIFICANT CHANGE UP (ref 2.5–4.5)
PLATELET # BLD AUTO: 524 K/UL — HIGH (ref 150–400)
PLATELET # BLD AUTO: 548 K/UL — HIGH (ref 150–400)
PLATELET # BLD AUTO: 579 K/UL — HIGH (ref 150–400)
POTASSIUM SERPL-MCNC: 5.2 MMOL/L — SIGNIFICANT CHANGE UP (ref 3.5–5.3)
POTASSIUM SERPL-MCNC: 5.6 MMOL/L — HIGH (ref 3.5–5.3)
POTASSIUM SERPL-SCNC: 5.2 MMOL/L — SIGNIFICANT CHANGE UP (ref 3.5–5.3)
POTASSIUM SERPL-SCNC: 5.6 MMOL/L — HIGH (ref 3.5–5.3)
PROT SERPL-MCNC: 6.6 G/DL — SIGNIFICANT CHANGE UP (ref 6–8.3)
RBC # BLD: 3.51 M/UL — LOW (ref 4.2–5.8)
RBC # BLD: 3.52 M/UL — LOW (ref 4.2–5.8)
RBC # BLD: 3.55 M/UL — LOW (ref 4.2–5.8)
RBC # FLD: 18.8 % — HIGH (ref 10.3–14.5)
RBC # FLD: 18.8 % — HIGH (ref 10.3–14.5)
RBC # FLD: 18.9 % — HIGH (ref 10.3–14.5)
SARS-COV-2 RNA SPEC QL NAA+PROBE: DETECTED
SODIUM SERPL-SCNC: 132 MMOL/L — LOW (ref 135–145)
SODIUM SERPL-SCNC: 133 MMOL/L — LOW (ref 135–145)
WBC # BLD: 16.58 K/UL — HIGH (ref 3.8–10.5)
WBC # BLD: 17.03 K/UL — HIGH (ref 3.8–10.5)
WBC # BLD: 19.51 K/UL — HIGH (ref 3.8–10.5)
WBC # FLD AUTO: 16.58 K/UL — HIGH (ref 3.8–10.5)
WBC # FLD AUTO: 17.03 K/UL — HIGH (ref 3.8–10.5)
WBC # FLD AUTO: 19.51 K/UL — HIGH (ref 3.8–10.5)

## 2021-05-18 PROCEDURE — 99232 SBSQ HOSP IP/OBS MODERATE 35: CPT

## 2021-05-18 PROCEDURE — 71045 X-RAY EXAM CHEST 1 VIEW: CPT | Mod: 26,76

## 2021-05-18 PROCEDURE — 93010 ELECTROCARDIOGRAM REPORT: CPT

## 2021-05-18 PROCEDURE — 99024 POSTOP FOLLOW-UP VISIT: CPT

## 2021-05-18 PROCEDURE — 99233 SBSQ HOSP IP/OBS HIGH 50: CPT | Mod: GC

## 2021-05-18 RX ORDER — CALCIUM GLUCONATE 100 MG/ML
1 VIAL (ML) INTRAVENOUS ONCE
Refills: 0 | Status: COMPLETED | OUTPATIENT
Start: 2021-05-18 | End: 2021-05-18

## 2021-05-18 RX ORDER — CALCIUM GLUCONATE 100 MG/ML
2 VIAL (ML) INTRAVENOUS ONCE
Refills: 0 | Status: DISCONTINUED | OUTPATIENT
Start: 2021-05-18 | End: 2021-05-18

## 2021-05-18 RX ORDER — SODIUM ZIRCONIUM CYCLOSILICATE 10 G/10G
5 POWDER, FOR SUSPENSION ORAL ONCE
Refills: 0 | Status: COMPLETED | OUTPATIENT
Start: 2021-05-18 | End: 2021-05-18

## 2021-05-18 RX ORDER — HEPARIN SODIUM 5000 [USP'U]/ML
INJECTION INTRAVENOUS; SUBCUTANEOUS
Qty: 25000 | Refills: 0 | Status: DISCONTINUED | OUTPATIENT
Start: 2021-05-18 | End: 2021-05-18

## 2021-05-18 RX ADMIN — Medication 2: at 12:47

## 2021-05-18 RX ADMIN — HYDROMORPHONE HYDROCHLORIDE 0.5 MILLIGRAM(S): 2 INJECTION INTRAMUSCULAR; INTRAVENOUS; SUBCUTANEOUS at 01:57

## 2021-05-18 RX ADMIN — Medication 650 MILLIGRAM(S): at 00:27

## 2021-05-18 RX ADMIN — HYDROMORPHONE HYDROCHLORIDE 0.5 MILLIGRAM(S): 2 INJECTION INTRAMUSCULAR; INTRAVENOUS; SUBCUTANEOUS at 01:27

## 2021-05-18 RX ADMIN — MEROPENEM 100 MILLIGRAM(S): 1 INJECTION INTRAVENOUS at 01:14

## 2021-05-18 RX ADMIN — HUMAN INSULIN 13 UNIT(S): 100 INJECTION, SUSPENSION SUBCUTANEOUS at 06:22

## 2021-05-18 RX ADMIN — Medication 650 MILLIGRAM(S): at 05:39

## 2021-05-18 RX ADMIN — Medication 650 MILLIGRAM(S): at 17:47

## 2021-05-18 RX ADMIN — SODIUM ZIRCONIUM CYCLOSILICATE 5 GRAM(S): 10 POWDER, FOR SUSPENSION ORAL at 09:56

## 2021-05-18 RX ADMIN — MEROPENEM 100 MILLIGRAM(S): 1 INJECTION INTRAVENOUS at 17:47

## 2021-05-18 RX ADMIN — LEVETIRACETAM 500 MILLIGRAM(S): 250 TABLET, FILM COATED ORAL at 17:47

## 2021-05-18 RX ADMIN — Medication 650 MILLIGRAM(S): at 11:22

## 2021-05-18 RX ADMIN — Medication 100 GRAM(S): at 11:21

## 2021-05-18 RX ADMIN — Medication 3 MILLILITER(S): at 11:21

## 2021-05-18 RX ADMIN — HUMAN INSULIN 13 UNIT(S): 100 INJECTION, SUSPENSION SUBCUTANEOUS at 12:46

## 2021-05-18 RX ADMIN — HEPARIN SODIUM 1100 UNIT(S)/HR: 5000 INJECTION INTRAVENOUS; SUBCUTANEOUS at 11:54

## 2021-05-18 RX ADMIN — HUMAN INSULIN 13 UNIT(S): 100 INJECTION, SUSPENSION SUBCUTANEOUS at 17:47

## 2021-05-18 RX ADMIN — MEROPENEM 100 MILLIGRAM(S): 1 INJECTION INTRAVENOUS at 08:13

## 2021-05-18 RX ADMIN — LEVETIRACETAM 500 MILLIGRAM(S): 250 TABLET, FILM COATED ORAL at 05:09

## 2021-05-18 RX ADMIN — POLYETHYLENE GLYCOL 3350 17 GRAM(S): 17 POWDER, FOR SOLUTION ORAL at 11:20

## 2021-05-18 RX ADMIN — Medication 2: at 06:22

## 2021-05-18 RX ADMIN — Medication 400 UNIT(S): at 11:21

## 2021-05-18 RX ADMIN — Medication 650 MILLIGRAM(S): at 05:09

## 2021-05-18 RX ADMIN — Medication 100 MILLIGRAM(S): at 11:20

## 2021-05-18 NOTE — PROGRESS NOTE ADULT - ATTENDING COMMENTS
heparin drip on hold due to chest tube removal  high risk for VTE, but also high risk for bleeding  localized collection right upper chest wall  consider ultrasound and monitor closely.  monitor off heparin drip and trend CBC    patient with multiple co-morbid conditions; higher risk for future complications despite optimal medical therapy   d/w family

## 2021-05-18 NOTE — PROGRESS NOTE ADULT - SUBJECTIVE AND OBJECTIVE BOX
DIABETES FOLLOW UP : Seen earlier today    INTERVAL HX: seen at bedside with [place]  via [employer] ID# [id number]; pt able to state his name, says year is 2020, unable o state where he is, denies pain/ HA/Dizziness/blurred vision/nausea. Offers no complaints. BG mostly at goal past 24 hours, noted BG 74 at dinner time pt denies symptoms. No interuptions in TF per chart review.       Allergies    No Known Allergies      MEDICATIONS  (STANDING):  acetaminophen   Tablet .. 650 milliGRAM(s) Oral every 6 hours  cholecalciferol 400 Unit(s) Oral daily  insulin lispro (ADMELOG) corrective regimen sliding scale   SubCutaneous every 6 hours  insulin NPH human recombinant 13 Unit(s) SubCutaneous every 6 hours  levETIRAcetam  Solution 500 milliGRAM(s) Oral two times a day  meropenem  IVPB 1000 milliGRAM(s) IV Intermittent every 8 hours  polyethylene glycol 3350 17 Gram(s) Oral daily  senna 2 Tablet(s) Oral at bedtime  sodium chloride 0.9%. 1000 milliLiter(s) (75 mL/Hr) IV Continuous <Continuous>  tamsulosin 0.4 milliGRAM(s) Oral at bedtime  thiamine 100 milliGRAM(s) Oral daily      PHYSICAL EXAM:  VITALS: T(C): 36.7 (05-18-21 @ 15:36)  T(F): 98 (05-18-21 @ 15:36), Max: 99 (05-17-21 @ 21:50)  HR: 101 (05-18-21 @ 15:36) (100 - 106)  BP: 103/68 (05-18-21 @ 15:36) (99/63 - 118/77)  RR:  (16 - 18)  SpO2:  (94% - 98%)  Wt(kg): --  GENERAL: male laying in bed in NAD family at bedside  Abdomen: Soft, nontender, non distended, peg  Extremities: Warm  Skin: Chest tubes right midaxillary  NEURO: alert  oriented x1     LABS:  POCT Blood Glucose.: 175 mg/dL (05-18-21 @ 12:03)  POCT Blood Glucose.: 175 mg/dL (05-18-21 @ 06:13)  POCT Blood Glucose.: 151 mg/dL (05-17-21 @ 23:53)  POCT Blood Glucose.: 74 mg/dL (05-17-21 @ 17:14)  POCT Blood Glucose.: 165 mg/dL (05-17-21 @ 12:37)  POCT Blood Glucose.: 190 mg/dL (05-17-21 @ 06:12)  POCT Blood Glucose.: 175 mg/dL (05-16-21 @ 23:53)  POCT Blood Glucose.: 180 mg/dL (05-16-21 @ 17:08)  POCT Blood Glucose.: 186 mg/dL (05-16-21 @ 12:58)  POCT Blood Glucose.: 173 mg/dL (05-16-21 @ 06:10)  POCT Blood Glucose.: 185 mg/dL (05-15-21 @ 23:55)  POCT Blood Glucose.: 173 mg/dL (05-15-21 @ 17:31)                            9.1    16.58 )-----------( 548      ( 18 May 2021 07:03 )             28.2       05-18    133<L>  |  98  |  25<H>  ----------------------------<  127<H>  5.2   |  23  |  0.70    EGFR if : 121  EGFR if non : 104    Ca    9.1      05-18  Mg     2.1     05-18  Phos  3.8     05-18    TPro  6.6  /  Alb  2.3<L>  /  TBili  0.4  /  DBili  x   /  AST  33  /  ALT  37  /  AlkPhos  115  05-18 04-26 Chol 114 Direct LDL -- LDL calculated 53 HDL 35<L> Trig 126, 04-13 Chol -- Direct LDL -- LDL calculated -- HDL -- Trig 126    Thyroid Function Tests:  04-24 @ 11:11 TSH 2.00 FreeT4 -- T3 -- Anti TPO -- Anti Thyroglobulin Ab -- TSI --    A1C with Estimated Average Glucose Result: 10.3 % (04-02-21 @ 14:28)      Estimated Average Glucose: 249 mg/dL (04-02-21 @ 14:28)

## 2021-05-18 NOTE — PROGRESS NOTE ADULT - SUBJECTIVE AND OBJECTIVE BOX
CC: F/U for Lung infection    Saw/spoke to patient. No fevers, no chills. No new complaints. Appears improved today.    Allergies  No Known Allergies    ANTIMICROBIALS:  meropenem  IVPB 1000 every 8 hours    PE:    Vital Signs Last 24 Hrs  T(C): 36.7 (18 May 2021 09:05), Max: 37.4 (17 May 2021 16:27)  T(F): 98.1 (18 May 2021 09:05), Max: 99.3 (17 May 2021 16:27)  HR: 104 (18 May 2021 09:05) (100 - 110)  BP: 114/67 (18 May 2021 09:05) (99/63 - 118/77)  RR: 18 (18 May 2021 09:05) (16 - 18)  SpO2: 96% (18 May 2021 09:05) (94% - 98%)    Gen: AOx2-3, improved, back to baseline  CV: S1+S2 normal, tachycardic  Resp: Clear bilat, no resp distress, no crackles/wheezes, chest tubes  Abd: Soft, nontender, +BS  Ext: No LE edema, no wounds    LABS:                        9.1    16.58 )-----------( 548      ( 18 May 2021 07:03 )             28.2     05-18    132<L>  |  97  |  25<H>  ----------------------------<  164<H>  5.6<H>   |  23  |  0.71    Ca    8.6      18 May 2021 07:02  Phos  3.8     05-18  Mg     2.1     05-18    TPro  6.6  /  Alb  2.3<L>  /  TBili  0.4  /  DBili  x   /  AST  33  /  ALT  37  /  AlkPhos  115  05-18    MICROBIOLOGY:    .Blood Blood  05-14-21   No growth to date.  --  --    BAL BRONCHIAL AVEOLAR LAVAGE  05-13-21   Rare Yeast  --    Numerous polymorphonuclear leukocytes seen per low power field  No Squamous epithelial cells seen per low power field  Rare Gram Negative Rods seen per oil power field    .Tissue Other  05-12-21   Growth in fluid media only Klebsiella pneumoniae ESBL  Rare Lactobacillus rhamnosus "Susceptibilities not performed"  --  Klebsiella pneumoniae ESBL    .Urine Clean Catch (Midstream)  05-06-21   >100,000 CFU/ml Klebsiella pneumoniae ESBL  --  Klebsiella pneumoniae ESBL    .Blood Blood  05-05-21   No Growth Final      (otherwise reviewed)    RADIOLOGY:    5/17 XR:    IMPRESSION:  Inferior angle right chest tube has been removed. Other chest tubes unchanged in position.    Loculated right pleural effusion with peripheral lucency in the right upper chest, not significantly changed. Once again, question of interposed lung between loculated pleural fluid, air in the pleural space, or cavitary process.

## 2021-05-18 NOTE — PROGRESS NOTE ADULT - PROBLEM SELECTOR PLAN 1
Cystitis and Empyema   5/12 he underwent right VATS converted to open thoracotomy, decortication, drainage of abscess and flex bronch. Pt briefly on lexi intraop and extubated in OR.   - s/p L. thoracotomy, drainage of abscess, decortication, flex bronch   - 1 angled chest tube , 1 straight chest tube, 1 Blakemore drain (anterior) - Air leak present post op   - Chest tube to suction at 10mmhg  - Air leak expected and seen  - Monitor pulse oximeter  - Out of bed to chair, ambulate as tolerated, and incentive spirometry to prevent atelectasis  - Pain control as needed with tylenol and oxy    - c/w meropenem 5/7-  - f/u ID about cultures of lung tissue   - BCx neg 5/14, UCx growing Kleb  - Now having low grade fevers (5/14), cont to monitor   - elevated WBC and dropping hbg, will f.u with CT on abd/pelvis and chest and get repeat CBC to monitor for any kind of bleed or inflammation.     Obturator Hematoma   - bleeding hematoma in right thigh on CT on 5/7  - CT hip showing non active bleed, hematoma on right thigh on 5/8  - doppler to r/o DVT in LE, per IR no role IVC filter  - active again on 5/17, monitor CBC, will check hbg q6, heparin on hold, will consult IR to embolize?

## 2021-05-18 NOTE — PROGRESS NOTE ADULT - ASSESSMENT
59 y/o Gujarati speaking male with new onset diabetes (HbA1c of 10.3) admitted with COVID with prolonged hospital course requiring intubation and PEG insertion; s/p Right VATS converted to R thoracotomy for Empyema/bilateral PNA, abscess with recent CT Showing necrotizing PNA . On 24 hour continuous TF at goal. Endocrine consulted for hyperglycemia mgt.

## 2021-05-18 NOTE — CHART NOTE - NSCHARTNOTEFT_GEN_A_CORE
Nutrition Follow Up Note  Patient seen for:    Chart reviewed, events noted.  60M hospitalized for COVID PNA, c/b cardiogenic/septic shock and ischemic/hemorrhagic stroke, now extubated on the floor w/ negative COVID PCR, Thoracic surgery consulted to evaluate the recently found R sided empyema. S/P VATS        Source:        [x] EMR        [X ] RN      [X ]  team rounds      Patient is non verbal, however shakes his head  "no" when asked if he has abdominal pain    Diet Order: tolerating tube feeding at goal rate  Diet, NPO with Tube Feed:   Tube Feeding Modality: Gastrostomy  Glucerna 1.2 Kaden (GLUCERNARTH)  Total Volume for 24 Hours (mL): 1680  Continuous  Starting Tube Feed Rate {mL per Hour}: 20  Increase Tube Feed Rate by (mL): 20     Every 2 hours  Until Goal Tube Feed Rate (mL per Hour): 70  Tube Feed Duration (in Hours): 24  Tube Feed Start Time: 20:30 (21)    - Is current order appropriate/adequate? [X ] Yes  [ ]  No:   - Current enteral nutrition regimen provides 2016 kcal, 100.8 g protein, 1352 ml water. Provides ~30 kcal/kg, 1.5 g/kg protein based on dosing wt 66.2 kg.       - Micronutrient Supplementation: cholecalciferol 400 Unit(s) Oral daily  sodium chloride 0.9%. 1000 milliLiter(s) IV Continuous <Continuous>  thiamine 100 milliGRAM(s) Oral daily    GI:  Last BM ___.   Bowel Regimen? [x ] Yes   [ ] No    Weights:   Daily Weight in k (-18), Weight in k.5 (-17), Weight in k.1 (-16), Weight in k.4 (-15), Weight in k.5 (-14), Weight in k.4 (-13), Weight in k.7 (-12)  MEDICATIONS  (STANDING):  acetaminophen   Tablet ..  cholecalciferol  insulin lispro (ADMELOG) corrective regimen sliding scale  insulin NPH human recombinant  levETIRAcetam  Solution  meropenem  IVPB  polyethylene glycol 3350  senna  sodium chloride 0.9%.  tamsulosin  thiamine    Pertinent Labs:  @ 15:00: Na 133<L>, BUN 25<H>, Cr 0.70, <H>, K+ 5.2   @ 07:02: Na 132<L>, BUN 25<H>, Cr 0.71, <H>, K+ 5.6<H>, Phos 3.8, Mg 2.1, Alk Phos 115, ALT/SGPT 37, AST/SGOT 33    A1C with Estimated Average Glucose Result: 10.3 % (21 @ 14:28)    Finger Sticks:  POCT Blood Glucose.: 175 mg/dL ( @ 12:03)  POCT Blood Glucose.: 175 mg/dL ( @ 06:13)  POCT Blood Glucose.: 151 mg/dL ( @ 23:53)  POCT Blood Glucose.: 74 mg/dL ( @ 17:14)      Skin per nursing documentation: no pressure breakdown; R butt skin tear  Edema: L foot, R foot 1 +  Estimated Needs:   [X ] no change since previous assessment  [ ] recalculated:     Previous Nutrition Diagnosis: Severe Malnutrition  Nutrition Diagnosis is: [X ] ongoing, addressed       Nutrition Care Plan:  [X ] In Progress  [ ] Achieved  [ ] Not applicable    Nutrition Interventions / Recommendations:     1) Continue  Glucerna 1.2 @70 ml/hr x 24 hr to provide total 1680ml,  2016 kcal, 30/kg,  protein 100.8gm, 1.5gm/kg,  1352 ml water based on dosing wt 66.2 kg.   2)Defer free water flushes to team.   2) Continue multivitamin/minerals, thiamine   3) Continue to monitor and replete electrolytes PRN   4) Bowel regimen as ordered                                                    Monitoring and Evaluation:   Continue to monitor Nutritional intake, Tolerance to diet prescription, weights, labs, skin integrity    JOANA Deshpande, RD, CDN #[phone]   RD remains available upon request and will follow up per protocol

## 2021-05-18 NOTE — PROGRESS NOTE ADULT - ASSESSMENT
60 yr male with PMH of DM who was initially admitted to [facility] ICU for hypoxic respiratory failure 2/2 COVID c/b PE with R heart strain, cardiogenic and septic shock, ischemic and hemorrhagic CVA and ESBL klebsiella ventilator associate PNA, transferred to [facility] for neurosurgery eval and further management. s/p PEG, now monitoring for refeeding syndrome, complicated with right empyema planned for VATs

## 2021-05-18 NOTE — PROGRESS NOTE ADULT - ASSESSMENT
61 yo M with PMH of DM who was initially admitted to [facility] ICU 4/1 for hypoxic respiratory failure 2/2 COVID c/b PE with R heart strain, cardiogenic and septic shock, ischemic and hemorrhagic CVA and ESBL klebsiella ventilator associated PNA, transferred to [facility] for neurosurgery eval on 4/12  No fever, has leukocytosis  UCX with ESBL K pne  Bilateral LE dry gangrene  No sob/no cough, no abd pain, N/V/D  Has PEG  PCT minimally elevated  CT now with Empyema, concern for bilateral PNA, abscess  S/p OR Thoracotomy, abscess drainage, bronch (5/12)--culture with K pne  CT with areas of small cavitary lesions and hematomas  1) Leukocytosis  - Improving?  - Trend WBC  2) COVID  - Prior infection  - Monitor for any signs active COVID/reinfection  3) Suspected Empyema/Abscess  - Culture with K pne, Lactobacillus, Prevotella--should be adequately treated with jennifer  - Meropenem 1g q 8  - Note CT findings, follow surgery if any further procedure needed for persistent cavities/hematoma  - Suspect yeast will be colonizer--but monitor culture  - Appreciate Thoracic procedure  - F/U OR cultures  4) Gangrene  - Appears noninfectious/dry gangrene  - Monitor wounds    Sammy Chambers MD  Pager [phone]  After 5pm and on weekends call [phone]

## 2021-05-18 NOTE — PROGRESS NOTE ADULT - PROBLEM SELECTOR PLAN 3
-Acute ischemic strokes, with concern for hemorrhagic conversion, noted on CT scan on 4/8  -Etiology of acute ischemic stroke uncertain at this time; TTE with bubble performed at bedside in the MICU without any evidence of right-to-left shunt; no arrhythmia noted on telemetry throughout stay in the MICU  - CTA head and neck without any evidence of carotid artery stenosis or dissection    Retention   - olson placed  - c/w flomax    EMPYEMA   Plan: OR Vats 5/12 with Dr. Albarado   Hep gtt restarted   hold hep 4 hrs prior to removing chest tube  Ant Susanna, Angled Diaphragm, Posterior straight CT--> Dry Suction @ 40   water seal chest tubes when indicated  5/15 chest tubes to water seal  serial CXR   Monitor drainage   Daily CXR  *susanna chest tube to be last chest tube to be removed.  - now with necrotizing PNA on CT (from 5/16)

## 2021-05-18 NOTE — PROGRESS NOTE ADULT - PROBLEM SELECTOR PLAN 4
-Pulmonary embolus noted on ct scan performed at time of admission  -Etiology of venous thromboembolism uncertain at this time; no known history of thrombophilia; provoked in setting of acute covid illness   - On heparin drip rate of 13, now on hold for acute bleed in right obturator   - f/u CBC to monitor hbg  - will discharge on Eliquis

## 2021-05-18 NOTE — PROGRESS NOTE ADULT - ASSESSMENT
60M hospitalized for COVID PNA, c/b cardiogenic/septic shock and ischemic/hemorrhagic stroke, now extubated on the floor w/ negative COVID PCR, Thoracic surgery consulted to evaluate the recently found R sided empyema.     5/8 VSS; NAD. Thoracic surgery recommend IR Consult/Tap for R empyema.     5/9  VSS, NAD.  IR deferring IR drain of empyema 2/2 risk of iatrogenic bronchopleural fistula. Recommends VATS procedure.  Dr. Tena spoke to patients brother in law regarding VATS procedure.  Family to decide on whether to proceed with operation.  Continue IV ABX per ID.   5/11 VSS; Plan for VATs tomorrow 5/12/21 with Dr. Albarado. COVID pcr within 48 hr.   5/12 OR this afternoon VATS for empyema  5/13 S/P Vats yesterday. Anterior Susanna/Angled Diaphragm/Posterior straight chest tubes --> Dry SUCTION (@40mmhg). Monitor drainage and A/L on suction. (H2O seal overnight) RPT CXR @ 11 am. Back on medicine service once patient is stable to move out of SICU.   5/14 HD stable, encourage OOB and mobilization, R chest tubes maintain to suction today, will likely water seal tomorrow. AM CXR noted. D/c olson today.   5/15 HD stable, AM CXR noted, hep held in anticipation of chest tube removal, however, chest tubes still draining, will resume hep gtt and re-eval chest tube removal in AM after CXR. Water seal chest tubes overnight.   chest tubes now back to suction d/t drainage / output.   5/16 hemodynamically stable; Chest tubes to H20 seal; will wait to re-assess chest tube for removal tomorrow AM.  5/17 VSS; Angled diaphragm chest tube d'cd as per Dr. Albarado (zero output overnight.)   *susanna chest tube to be last chest tube to be removed  5/18 VSS; Continue both chest tubes on water seal          60M hospitalized for COVID PNA, c/b cardiogenic/septic shock and ischemic/hemorrhagic stroke, now extubated on the floor w/ negative COVID PCR, Thoracic surgery consulted to evaluate the recently found R sided empyema.     5/8 VSS; NAD. Thoracic surgery recommend IR Consult/Tap for R empyema.     5/9  VSS, NAD.  IR deferring IR drain of empyema 2/2 risk of iatrogenic bronchopleural fistula. Recommends VATS procedure.  Dr. Tena spoke to patients brother in law regarding VATS procedure.  Family to decide on whether to proceed with operation.  Continue IV ABX per ID.   5/11 VSS; Plan for VATs tomorrow 5/12/21 with Dr. Albarado. COVID pcr within 48 hr.   5/12 OR this afternoon VATS for empyema  5/13 S/P Vats yesterday. Anterior Susanna/Angled Diaphragm/Posterior straight chest tubes --> Dry SUCTION (@40mmhg). Monitor drainage and A/L on suction. (H2O seal overnight) RPT CXR @ 11 am. Back on medicine service once patient is stable to move out of SICU.   5/14 HD stable, encourage OOB and mobilization, R chest tubes maintain to suction today, will likely water seal tomorrow. AM CXR noted. D/c olson today.   5/15 HD stable, AM CXR noted, hep held in anticipation of chest tube removal, however, chest tubes still draining, will resume hep gtt and re-eval chest tube removal in AM after CXR. Water seal chest tubes overnight.   chest tubes now back to suction d/t drainage / output.   5/16 hemodynamically stable; Chest tubes to H20 seal; will wait to re-assess chest tube for removal tomorrow AM.  5/17 VSS; Angled diaphragm chest tube d'cd as per Dr. Albarado (zero output overnight.)   *susanna chest tube to be last chest tube to be removed  5/18 VSS; Continue Posterior straight chest tube to water seal. Plan to remove Ant Susanna chest tube (No drainage overnight).

## 2021-05-18 NOTE — PROGRESS NOTE ADULT - PROBLEM SELECTOR PLAN 1
OR Vats 5/12 with Dr. Albarado   5/15 chest tubes to water seal  5/17 Angled diaphragm chest tube removed as per Dr. Albarado   F/U Post CXR r/o PTX  Hep gtt can be restarted 1 hr post chest tube removal (12:30pm)   Ant Susanna, Posterior straight CT-->Water seal   serial CXR   Monitor drainage   Daily CXR  *susanna chest tube to be last chest tube to be removed OR Vats 5/12 with Dr. Albarado   5/15 chest tubes to water seal  5/17 Angled diaphragm chest tube removed as per Dr. Albarado   F/U Post CXR r/o PTX  Hep gtt can be restarted 1 hr post chest tube removal (12:30pm)   Ant Aroldo, Posterior straight CT-->Water seal   serial CXR   Monitor drainage   Daily CXR  5/18 Plan to remove Anterior Aroldo chest tube

## 2021-05-18 NOTE — PROGRESS NOTE ADULT - PROBLEM SELECTOR PLAN 1
- FS q6h, goal -180  - continue NPH 13 units sq q6h, HOLD IF TUBE FEEDING TURNED OFF  - if Dinner BG below 100 would decrease 12noon NPH dose to 12units.   - continue  moderate correction scale sq q6h.     Discharge: basal/bolus, doses TBD

## 2021-05-18 NOTE — PROGRESS NOTE ADULT - SUBJECTIVE AND OBJECTIVE BOX
PROGRESS NOTE:   Authored by Dr. Emily Mckeon, ANH pager [phone]    Patient is a 60y old  Male who presents with a chief complaint of COVID19 w/ severe metabolic acidosis s/p intubation (17 May 2021 17:01)      SUBJECTIVE / OVERNIGHT EVENTS: patient examined at bedside     MEDICATIONS  (STANDING):  acetaminophen   Tablet .. 650 milliGRAM(s) Oral every 6 hours  cholecalciferol 400 Unit(s) Oral daily  insulin lispro (ADMELOG) corrective regimen sliding scale   SubCutaneous every 6 hours  insulin NPH human recombinant 13 Unit(s) SubCutaneous every 6 hours  levETIRAcetam  Solution 500 milliGRAM(s) Oral two times a day  meropenem  IVPB 1000 milliGRAM(s) IV Intermittent every 8 hours  polyethylene glycol 3350 17 Gram(s) Oral daily  senna 2 Tablet(s) Oral at bedtime  sodium chloride 0.9%. 1000 milliLiter(s) (75 mL/Hr) IV Continuous <Continuous>  tamsulosin 0.4 milliGRAM(s) Oral at bedtime  thiamine 100 milliGRAM(s) Oral daily    MEDICATIONS  (PRN):  albuterol/ipratropium for Nebulization 3 milliLiter(s) Nebulizer every 6 hours PRN Shortness of Breath and/or Wheezing  HYDROmorphone  Injectable 0.5 milliGRAM(s) IV Push every 4 hours PRN Severe Pain (7 - 10)      CAPILLARY BLOOD GLUCOSE      POCT Blood Glucose.: 175 mg/dL (18 May 2021 06:13)  POCT Blood Glucose.: 151 mg/dL (17 May 2021 23:53)  POCT Blood Glucose.: 74 mg/dL (17 May 2021 17:14)  POCT Blood Glucose.: 165 mg/dL (17 May 2021 12:37)    I&O's Summary    17 May 2021 07:01  -  18 May 2021 07:00  --------------------------------------------------------  IN: 2180 mL / OUT: 175 mL / NET: 2005 mL        PHYSICAL EXAM:  Vital Signs Last 24 Hrs  T(C): 36.9 (18 May 2021 04:39), Max: 37.4 (17 May 2021 14:07)  T(F): 98.4 (18 May 2021 04:39), Max: 99.3 (17 May 2021 14:07)  HR: 100 (18 May 2021 04:39) (100 - 110)  BP: 108/71 (18 May 2021 04:39) (99/63 - 118/77)  BP(mean): --  RR: 18 (18 May 2021 04:39) (16 - 18)  SpO2: 94% (18 May 2021 04:39) (94% - 98%)    GENERAL: No acute distress, well-developed  HEAD:  Atraumatic, Normocephalic  EYES: EOMI, PERRLA, conjunctiva and sclera clear  NECK: Supple, no lymphadenopathy, no JVD  CHEST/LUNG: CTAB; No wheezes, rales, or rhonchi  HEART: Regular rate and rhythm; No murmurs, rubs, or gallops  ABDOMEN: Soft, non-tender, non-distended; normal bowel sounds, no organomegaly  EXTREMITIES:  2+ peripheral pulses b/l, No clubbing, cyanosis, or edema  NEUROLOGY: A&O x 3, no focal deficits  SKIN: No rashes or lesions    LABS:                        9.1    16.58 )-----------( 548      ( 18 May 2021 07:03 )             28.2     05-17    131<L>  |  96  |  22  ----------------------------<  177<H>  5.1   |  23  |  0.68    Ca    8.5      17 May 2021 07:21  Phos  3.0     05-17  Mg     2.1     05-17    TPro  6.7  /  Alb  2.3<L>  /  TBili  0.4  /  DBili  x   /  AST  35  /  ALT  35  /  AlkPhos  111  05-17    PT/INR - ( 17 May 2021 14:57 )   PT: 13.4 sec;   INR: 1.12 ratio         PTT - ( 17 May 2021 14:57 )  PTT:30.4 sec            RADIOLOGY & ADDITIONAL TESTS:  Results Reviewed:   Imaging Personally Reviewed:  Electrocardiogram Personally Reviewed:    COORDINATION OF CARE:  Care Discussed with Consultants/Other Providers [Y/N]:  Prior or Outpatient Records Reviewed [Y/N]:   PROGRESS NOTE:   Authored by Dr. Emily Mckeon, AN[facility] pager [phone]    Patient is a 60y old  Male who presents with a chief complaint of COVID19 w/ severe metabolic acidosis s/p intubation (17 May 2021 17:01)      SUBJECTIVE / OVERNIGHT EVENTS: patient examined at bedside. He appeared more alert and awake today. He was moving around in the bed and was more conversant. He denied any pain and looked comfortable.     MEDICATIONS  (STANDING):  acetaminophen   Tablet .. 650 milliGRAM(s) Oral every 6 hours  cholecalciferol 400 Unit(s) Oral daily  insulin lispro (ADMELOG) corrective regimen sliding scale   SubCutaneous every 6 hours  insulin NPH human recombinant 13 Unit(s) SubCutaneous every 6 hours  levETIRAcetam  Solution 500 milliGRAM(s) Oral two times a day  meropenem  IVPB 1000 milliGRAM(s) IV Intermittent every 8 hours  polyethylene glycol 3350 17 Gram(s) Oral daily  senna 2 Tablet(s) Oral at bedtime  sodium chloride 0.9%. 1000 milliLiter(s) (75 mL/Hr) IV Continuous <Continuous>  tamsulosin 0.4 milliGRAM(s) Oral at bedtime  thiamine 100 milliGRAM(s) Oral daily    MEDICATIONS  (PRN):  albuterol/ipratropium for Nebulization 3 milliLiter(s) Nebulizer every 6 hours PRN Shortness of Breath and/or Wheezing  HYDROmorphone  Injectable 0.5 milliGRAM(s) IV Push every 4 hours PRN Severe Pain (7 - 10)      CAPILLARY BLOOD GLUCOSE      POCT Blood Glucose.: 175 mg/dL (18 May 2021 06:13)  POCT Blood Glucose.: 151 mg/dL (17 May 2021 23:53)  POCT Blood Glucose.: 74 mg/dL (17 May 2021 17:14)  POCT Blood Glucose.: 165 mg/dL (17 May 2021 12:37)    I&O's Summary    17 May 2021 07:01  -  18 May 2021 07:00  --------------------------------------------------------  IN: 2180 mL / OUT: 175 mL / NET: 2005 mL        PHYSICAL EXAM:  Vital Signs Last 24 Hrs  T(C): 36.9 (18 May 2021 04:39), Max: 37.4 (17 May 2021 14:07)  T(F): 98.4 (18 May 2021 04:39), Max: 99.3 (17 May 2021 14:07)  HR: 100 (18 May 2021 04:39) (100 - 110)  BP: 108/71 (18 May 2021 04:39) (99/63 - 118/77)  BP(mean): --  RR: 18 (18 May 2021 04:39) (16 - 18)  SpO2: 94% (18 May 2021 04:39) (94% - 98%)    GENERAL: No acute distress  HEAD:  Atraumatic, Normocephalic  EYES: EOMI, PERRLA, conjunctiva and sclera clear  NECK: Supple, no lymphadenopathy, no JVD  CHEST/LUNG: CTAB; rigth chest tubes in, no erythema or sign on bruising/hematoma noted on exam   HEART: tachycardia, regular rhythm; No murmurs, rubs, or gallops  ABDOMEN: Soft, non-tender, non-distended; normal bowel sounds, no organomegaly  EXTREMITIES:  2+ peripheral pulses b/l, No clubbing, cyanosis, or edema  NEUROLOGY: A&O x 2, generalized weakness   SKIN: gangrenous toes     LABS:                        9.1    16.58 )-----------( 548      ( 18 May 2021 07:03 )             28.2     05-17    131<L>  |  96  |  22  ----------------------------<  177<H>  5.1   |  23  |  0.68    Ca    8.5      17 May 2021 07:21  Phos  3.0     05-17  Mg     2.1     05-17    TPro  6.7  /  Alb  2.3<L>  /  TBili  0.4  /  DBili  x   /  AST  35  /  ALT  35  /  AlkPhos  111  05-17    PT/INR - ( 17 May 2021 14:57 )   PT: 13.4 sec;   INR: 1.12 ratio         PTT - ( 17 May 2021 14:57 )  PTT:30.4 sec            RADIOLOGY & ADDITIONAL TESTS:  Results Reviewed:   Imaging Personally Reviewed:  Electrocardiogram Personally Reviewed:    COORDINATION OF CARE:  Care Discussed with Consultants/Other Providers [Y/N]:  Prior or Outpatient Records Reviewed [Y/N]:

## 2021-05-18 NOTE — PROGRESS NOTE ADULT - PROBLEM SELECTOR PLAN 3
-pt should be on a statin as he is over 42 with diabetes, check fasting lipids      Discussed with patient and Resident Linda Calvert NP   In House Pager: [phone]   office:  [phone] (M-F 9a-5pm)               [phone] (nights/weekends)

## 2021-05-18 NOTE — PROGRESS NOTE ADULT - PROBLEM SELECTOR PLAN 9
- Initiated goals of care discussion with patient's brother-in-law (listed in chart as primary point of contact); patient is full code at this time; goal is as much recovery as possible  -The patient's wife is in [place], but the patient's brother-in-law has been visiting and placing the patient's wife on facetime; she is aware of all that is happening and has happened    5/17: Will need regroup with palliative for another family meeting

## 2021-05-18 NOTE — PROGRESS NOTE ADULT - SUBJECTIVE AND OBJECTIVE BOX
Patient is a 60y old  Male who presents with a chief complaint of COVID19 w/ severe metabolic acidosis s/p intubation (18 May 2021 07:41)      SUBJECTIVE: "Nonverbal, seen comfortable in bed "    Vital Signs Last 24 Hrs  T(C): 36.9 (21 @ 04:39), Max: 37.4 (21 @ 14:07)  T(F): 98.4 (21 @ 04:39), Max: 99.3 (21 @ 14:07)  HR: 100 (21 @ 04:39) (100 - 110)  BP: 108/71 (21 @ 04:39) (99/63 - 118/77)  RR: 18 (21 @ 04:39) (16 - 18)  SpO2: 94% (21 @ 04:39) (94% - 98%)                21 @ 07:01  -  21 @ 07:00  --------------------------------------------------------  IN: 2180 mL / OUT: 175 mL / NET: 2005 mL        Daily     Daily Weight in k (18 May 2021 05:00)                          9.1    16.58 )-----------( 548      ( 18 May 2021 07:03 )             28.2         132<L>  |  97  |  25<H>  ----------------------------<  164<H>  5.6<H>   |  23  |  0.71    Ca    8.6      18 May 2021 07:02  Phos  3.8       Mg     2.1         TPro  6.6  /  Alb  2.3<L>  /  TBili  0.4  /  DBili  x   /  AST  33  /  ALT  37  /  AlkPhos  115            PHYSICAL EXAM  Neurology: A&Ox3, NAD, no gross deficits  CV : RRR+S1S2  Lungs: Respirations non-labored, B/L BS  Abdomen: Soft, NT/ND, +BSx4Q  Extremities: B/L LE edema, negative calf tenderness, +PP           CHEST TUBES:  Left CT     [  ]LWS  [  ]H2O seal  Right CT   [  ]LWS  [X  ]H2O seal      MEDICATIONS  acetaminophen   Tablet .. 650 milliGRAM(s) Oral every 6 hours  albuterol/ipratropium for Nebulization 3 milliLiter(s) Nebulizer every 6 hours PRN  cholecalciferol 400 Unit(s) Oral daily  HYDROmorphone  Injectable 0.5 milliGRAM(s) IV Push every 4 hours PRN  insulin lispro (ADMELOG) corrective regimen sliding scale   SubCutaneous every 6 hours  insulin NPH human recombinant 13 Unit(s) SubCutaneous every 6 hours  levETIRAcetam  Solution 500 milliGRAM(s) Oral two times a day  meropenem  IVPB 1000 milliGRAM(s) IV Intermittent every 8 hours  polyethylene glycol 3350 17 Gram(s) Oral daily  senna 2 Tablet(s) Oral at bedtime  sodium chloride 0.9%. 1000 milliLiter(s) IV Continuous <Continuous>  tamsulosin 0.4 milliGRAM(s) Oral at bedtime  thiamine 100 milliGRAM(s) Oral daily

## 2021-05-19 LAB
ALBUMIN SERPL ELPH-MCNC: 2.2 G/DL — LOW (ref 3.3–5)
ALP SERPL-CCNC: 112 U/L — SIGNIFICANT CHANGE UP (ref 40–120)
ALT FLD-CCNC: 32 U/L — SIGNIFICANT CHANGE UP (ref 10–45)
ANION GAP SERPL CALC-SCNC: 12 MMOL/L — SIGNIFICANT CHANGE UP (ref 5–17)
AST SERPL-CCNC: 26 U/L — SIGNIFICANT CHANGE UP (ref 10–40)
BASOPHILS # BLD AUTO: 0.19 K/UL — SIGNIFICANT CHANGE UP (ref 0–0.2)
BASOPHILS NFR BLD AUTO: 1.1 % — SIGNIFICANT CHANGE UP (ref 0–2)
BILIRUB SERPL-MCNC: 0.4 MG/DL — SIGNIFICANT CHANGE UP (ref 0.2–1.2)
BUN SERPL-MCNC: 26 MG/DL — HIGH (ref 7–23)
CALCIUM SERPL-MCNC: 9 MG/DL — SIGNIFICANT CHANGE UP (ref 8.4–10.5)
CHLORIDE SERPL-SCNC: 99 MMOL/L — SIGNIFICANT CHANGE UP (ref 96–108)
CO2 SERPL-SCNC: 20 MMOL/L — LOW (ref 22–31)
CREAT SERPL-MCNC: 0.7 MG/DL — SIGNIFICANT CHANGE UP (ref 0.5–1.3)
CULTURE RESULTS: SIGNIFICANT CHANGE UP
CULTURE RESULTS: SIGNIFICANT CHANGE UP
EOSINOPHIL # BLD AUTO: 0.49 K/UL — SIGNIFICANT CHANGE UP (ref 0–0.5)
EOSINOPHIL NFR BLD AUTO: 2.9 % — SIGNIFICANT CHANGE UP (ref 0–6)
FUNGITELL: <31 PG/ML — SIGNIFICANT CHANGE UP
GLUCOSE BLDC GLUCOMTR-MCNC: 132 MG/DL — HIGH (ref 70–99)
GLUCOSE BLDC GLUCOMTR-MCNC: 136 MG/DL — HIGH (ref 70–99)
GLUCOSE BLDC GLUCOMTR-MCNC: 159 MG/DL — HIGH (ref 70–99)
GLUCOSE BLDC GLUCOMTR-MCNC: 175 MG/DL — HIGH (ref 70–99)
GLUCOSE SERPL-MCNC: 147 MG/DL — HIGH (ref 70–99)
HCT VFR BLD CALC: 30.7 % — LOW (ref 39–50)
HGB BLD-MCNC: 9.1 G/DL — LOW (ref 13–17)
IMM GRANULOCYTES NFR BLD AUTO: 5.8 % — HIGH (ref 0–1.5)
LYMPHOCYTES # BLD AUTO: 1.74 K/UL — SIGNIFICANT CHANGE UP (ref 1–3.3)
LYMPHOCYTES # BLD AUTO: 10.2 % — LOW (ref 13–44)
MAGNESIUM SERPL-MCNC: 2.2 MG/DL — SIGNIFICANT CHANGE UP (ref 1.6–2.6)
MCHC RBC-ENTMCNC: 24.8 PG — LOW (ref 27–34)
MCHC RBC-ENTMCNC: 29.6 GM/DL — LOW (ref 32–36)
MCV RBC AUTO: 83.7 FL — SIGNIFICANT CHANGE UP (ref 80–100)
MONOCYTES # BLD AUTO: 1.01 K/UL — HIGH (ref 0–0.9)
MONOCYTES NFR BLD AUTO: 5.9 % — SIGNIFICANT CHANGE UP (ref 2–14)
NEUTROPHILS # BLD AUTO: 12.56 K/UL — HIGH (ref 1.8–7.4)
NEUTROPHILS NFR BLD AUTO: 74.1 % — SIGNIFICANT CHANGE UP (ref 43–77)
NRBC # BLD: 0 /100 WBCS — SIGNIFICANT CHANGE UP (ref 0–0)
PHOSPHATE SERPL-MCNC: 4.8 MG/DL — HIGH (ref 2.5–4.5)
PLATELET # BLD AUTO: 578 K/UL — HIGH (ref 150–400)
POTASSIUM SERPL-MCNC: 5.4 MMOL/L — HIGH (ref 3.5–5.3)
POTASSIUM SERPL-SCNC: 5.4 MMOL/L — HIGH (ref 3.5–5.3)
PROT SERPL-MCNC: 7 G/DL — SIGNIFICANT CHANGE UP (ref 6–8.3)
RBC # BLD: 3.67 M/UL — LOW (ref 4.2–5.8)
RBC # FLD: 19.4 % — HIGH (ref 10.3–14.5)
SODIUM SERPL-SCNC: 131 MMOL/L — LOW (ref 135–145)
SPECIMEN SOURCE: SIGNIFICANT CHANGE UP
SPECIMEN SOURCE: SIGNIFICANT CHANGE UP
WBC # BLD: 16.98 K/UL — HIGH (ref 3.8–10.5)
WBC # FLD AUTO: 16.98 K/UL — HIGH (ref 3.8–10.5)

## 2021-05-19 PROCEDURE — 71045 X-RAY EXAM CHEST 1 VIEW: CPT | Mod: 26,77

## 2021-05-19 PROCEDURE — 99232 SBSQ HOSP IP/OBS MODERATE 35: CPT

## 2021-05-19 PROCEDURE — 99498 ADVNCD CARE PLAN ADDL 30 MIN: CPT

## 2021-05-19 PROCEDURE — 99497 ADVNCD CARE PLAN 30 MIN: CPT

## 2021-05-19 PROCEDURE — 99233 SBSQ HOSP IP/OBS HIGH 50: CPT | Mod: GC

## 2021-05-19 PROCEDURE — 99024 POSTOP FOLLOW-UP VISIT: CPT

## 2021-05-19 PROCEDURE — 71045 X-RAY EXAM CHEST 1 VIEW: CPT | Mod: 26

## 2021-05-19 RX ORDER — SODIUM ZIRCONIUM CYCLOSILICATE 10 G/10G
10 POWDER, FOR SUSPENSION ORAL ONCE
Refills: 0 | Status: COMPLETED | OUTPATIENT
Start: 2021-05-19 | End: 2021-05-19

## 2021-05-19 RX ADMIN — Medication 400 UNIT(S): at 12:55

## 2021-05-19 RX ADMIN — Medication 100 MILLIGRAM(S): at 12:56

## 2021-05-19 RX ADMIN — LEVETIRACETAM 500 MILLIGRAM(S): 250 TABLET, FILM COATED ORAL at 17:17

## 2021-05-19 RX ADMIN — Medication 2: at 12:53

## 2021-05-19 RX ADMIN — Medication 650 MILLIGRAM(S): at 17:30

## 2021-05-19 RX ADMIN — Medication 650 MILLIGRAM(S): at 17:17

## 2021-05-19 RX ADMIN — HUMAN INSULIN 13 UNIT(S): 100 INJECTION, SUSPENSION SUBCUTANEOUS at 00:28

## 2021-05-19 RX ADMIN — TAMSULOSIN HYDROCHLORIDE 0.4 MILLIGRAM(S): 0.4 CAPSULE ORAL at 21:12

## 2021-05-19 RX ADMIN — Medication 650 MILLIGRAM(S): at 06:00

## 2021-05-19 RX ADMIN — SENNA PLUS 2 TABLET(S): 8.6 TABLET ORAL at 21:12

## 2021-05-19 RX ADMIN — Medication 650 MILLIGRAM(S): at 13:15

## 2021-05-19 RX ADMIN — MEROPENEM 100 MILLIGRAM(S): 1 INJECTION INTRAVENOUS at 00:10

## 2021-05-19 RX ADMIN — LEVETIRACETAM 500 MILLIGRAM(S): 250 TABLET, FILM COATED ORAL at 06:00

## 2021-05-19 RX ADMIN — HUMAN INSULIN 13 UNIT(S): 100 INJECTION, SUSPENSION SUBCUTANEOUS at 12:54

## 2021-05-19 RX ADMIN — Medication 650 MILLIGRAM(S): at 00:11

## 2021-05-19 RX ADMIN — Medication 650 MILLIGRAM(S): at 12:56

## 2021-05-19 RX ADMIN — POLYETHYLENE GLYCOL 3350 17 GRAM(S): 17 POWDER, FOR SOLUTION ORAL at 12:55

## 2021-05-19 RX ADMIN — HUMAN INSULIN 13 UNIT(S): 100 INJECTION, SUSPENSION SUBCUTANEOUS at 06:01

## 2021-05-19 RX ADMIN — MEROPENEM 100 MILLIGRAM(S): 1 INJECTION INTRAVENOUS at 09:08

## 2021-05-19 RX ADMIN — MEROPENEM 100 MILLIGRAM(S): 1 INJECTION INTRAVENOUS at 17:17

## 2021-05-19 RX ADMIN — Medication 2: at 17:20

## 2021-05-19 RX ADMIN — HUMAN INSULIN 13 UNIT(S): 100 INJECTION, SUSPENSION SUBCUTANEOUS at 17:20

## 2021-05-19 RX ADMIN — SODIUM ZIRCONIUM CYCLOSILICATE 10 GRAM(S): 10 POWDER, FOR SUSPENSION ORAL at 13:05

## 2021-05-19 NOTE — PROGRESS NOTE ADULT - SUBJECTIVE AND OBJECTIVE BOX
Patient is a 60y old  Male who presents with a chief complaint of COVID19 w/ severe metabolic acidosis s/p intubation (19 May 2021 08:11)      Vital Signs Last 24 Hrs  T(C): 36.8 (21 @ 05:00), Max: 37.2 (21 @ 14:35)  T(F): 98.2 (21 @ 05:00), Max: 98.9 (21 @ 14:35)  HR: 99 (21 @ 05:00) (99 - 106)  BP: 113/75 (21 @ 05:00) (99/67 - 115/79)  RR: 16 (21 @ 05:00) (16 - 20)  SpO2: 96% (21 @ 05:00) (95% - 97%)            21 @ 07:01  -  21 @ 07:00  --------------------------------------------------------  IN: 2410 mL / OUT: 85 mL / NET: 2325 mL      Daily Weight in k.3 (19 May 2021 05:00)                          9.1    16.98 )-----------( 578      ( 19 May 2021 10:26 )             30.7     133<L>  |  98  |  25<H>  ----------------------------<  127<H>  5.2   |  23  |  0.70        PHYSICAL EXAM  Neurology: A&Ox3, NAD  CV : RRR+S1S2  Lungs: Respirations non-labored, B/L BS clear, diminished at bases  +right posterior chest tube to water seal with serosanguinous drainage, no air leak  Abdomen: Soft, NT/ND, +BSx4Q  Extremities: B/L LE warm, no edema, +PP             MEDICATIONS  acetaminophen   Tablet .. 650 milliGRAM(s) Oral every 6 hours  albuterol/ipratropium for Nebulization 3 milliLiter(s) Nebulizer every 6 hours PRN  cholecalciferol 400 Unit(s) Oral daily  HYDROmorphone  Injectable 0.5 milliGRAM(s) IV Push every 4 hours PRN  insulin lispro (ADMELOG) corrective regimen sliding scale   SubCutaneous every 6 hours  insulin NPH human recombinant 13 Unit(s) SubCutaneous every 6 hours  levETIRAcetam  Solution 500 milliGRAM(s) Oral two times a day  meropenem  IVPB 1000 milliGRAM(s) IV Intermittent every 8 hours  polyethylene glycol 3350 17 Gram(s) Oral daily  senna 2 Tablet(s) Oral at bedtime  sodium chloride 0.9%. 1000 milliLiter(s) IV Continuous <Continuous>  tamsulosin 0.4 milliGRAM(s) Oral at bedtime  thiamine 100 milliGRAM(s) Oral daily

## 2021-05-19 NOTE — PROGRESS NOTE ADULT - ASSESSMENT
60M hospitalized for COVID PNA, c/b cardiogenic/septic shock and ischemic/hemorrhagic stroke, now extubated on the floor w/ negative COVID PCR, Thoracic surgery consulted to evaluate the recently found R sided empyema.     5/8 VSS; NAD. Thoracic surgery recommend IR Consult/Tap for R empyema.     5/9  VSS, NAD.  IR deferring IR drain of empyema 2/2 risk of iatrogenic bronchopleural fistula. Recommends VATS procedure.  Dr. Tena spoke to patients brother in law regarding VATS procedure.  Family to decide on whether to proceed with operation.  Continue IV ABX per ID.   5/11 VSS; Plan for VATs tomorrow 5/12/21 with Dr. Albarado. COVID pcr within 48 hr.   5/12 OR this afternoon VATS for empyema  5/13 S/P Vats yesterday. Anterior Susanna/Angled Diaphragm/Posterior straight chest tubes --> Dry SUCTION (@40mmhg). Monitor drainage and A/L on suction. (H2O seal overnight) RPT CXR @ 11 am. Back on medicine service once patient is stable to move out of SICU.   5/14 HD stable, encourage OOB and mobilization, R chest tubes maintain to suction today, will likely water seal tomorrow. AM CXR noted. D/c olson today.   5/15 HD stable, AM CXR noted, hep held in anticipation of chest tube removal, however, chest tubes still draining, will resume hep gtt and re-eval chest tube removal in AM after CXR. Water seal chest tubes overnight.   chest tubes now back to suction d/t drainage / output.   5/16 hemodynamically stable; Chest tubes to H20 seal; will wait to re-assess chest tube for removal tomorrow AM.  5/17 VSS; Angled diaphragm chest tube d'cd as per Dr. Albarado (zero output overnight.)   *susanna chest tube to be last chest tube to be removed  5/18 VSS; Continue Posterior straight chest tube to water seal. Plan to remove Ant Susanna chest tube (No drainage overnight).   5/19 VSS, Right posterior chest tube 85cc/24h.      60M hospitalized for COVID PNA, c/b cardiogenic/septic shock and ischemic/hemorrhagic stroke, now extubated on the floor w/ negative COVID PCR, Thoracic surgery consulted to evaluate the recently found R sided empyema.     5/8 VSS; NAD. Thoracic surgery recommend IR Consult/Tap for R empyema.     5/9  VSS, NAD.  IR deferring IR drain of empyema 2/2 risk of iatrogenic bronchopleural fistula. Recommends VATS procedure.  Dr. Tena spoke to patients brother in law regarding VATS procedure.  Family to decide on whether to proceed with operation.  Continue IV ABX per ID.   5/11 VSS; Plan for VATs tomorrow 5/12/21 with Dr. Albarado. COVID pcr within 48 hr.   5/12 OR this afternoon VATS for empyema  5/13 S/P Vats yesterday. Anterior Susanna/Angled Diaphragm/Posterior straight chest tubes --> Dry SUCTION (@40mmhg). Monitor drainage and A/L on suction. (H2O seal overnight) RPT CXR @ 11 am. Back on medicine service once patient is stable to move out of SICU.   5/14 HD stable, encourage OOB and mobilization, R chest tubes maintain to suction today, will likely water seal tomorrow. AM CXR noted. D/c olson today.   5/15 HD stable, AM CXR noted, hep held in anticipation of chest tube removal, however, chest tubes still draining, will resume hep gtt and re-eval chest tube removal in AM after CXR. Water seal chest tubes overnight.   chest tubes now back to suction d/t drainage / output.   5/16 hemodynamically stable; Chest tubes to H20 seal; will wait to re-assess chest tube for removal tomorrow AM.  5/17 VSS; Angled diaphragm chest tube d'cd as per Dr. Albarado (zero output overnight.)   *susanna chest tube to be last chest tube to be removed  5/18 VSS; Continue Posterior straight chest tube to water seal. Plan to remove Ant Susanna chest tube (No drainage overnight).   5/19 VSS, Right posterior chest tube 85cc/24h. D/C posterior chest tube today per Dr. Albarado. F/U CXR.

## 2021-05-19 NOTE — GOALS OF CARE CONVERSATION - ADVANCED CARE PLANNING - CONVERSATION DETAILS
Mr. Pizarro and Dr. Mclain called me and d/w me about the patient's current medical issues ( empyema with lung cavitations s/p thoracotomy and chest tubes placement and thigh/chest wall hematoma which required holding on AC). They confirmed that goals were for trying all possible available treatments to improve the patient's condition. In indicated that goals were clear to the primary team; however, that if the patient continues to have complication or if his current issues do not improve that his risk of facing a life threatening  situation will increase and therefore that re-addressing code status was fundamental. I also stated that understanding the patient's multiple medical issues that it was difficult to see how he was going to survive a Cardiac arrest, even if CPR was provided, and if surviving that he was going to be in a worst functional and neurological state that the one he already is. I also stated that CPR was not going to achieve the goals the patient has for getting better nor it was going to represent a bridge for recovery. Mr. Mclain and Mr. Pizarro will further d/w the patient's family and get back to me with an answer.     30' were spent in ACP.

## 2021-05-19 NOTE — PROGRESS NOTE ADULT - SUBJECTIVE AND OBJECTIVE BOX
CC: F/U for PNA    Saw/spoke to patient. No new complaints. Unchanged.    Allergies  No Known Allergies    ANTIMICROBIALS:  meropenem  IVPB 1000 every 8 hours    PE:    Vital Signs Last 24 Hrs  T(C): 36.8 (19 May 2021 05:00), Max: 37.2 (18 May 2021 14:35)  T(F): 98.2 (19 May 2021 05:00), Max: 98.9 (18 May 2021 14:35)  HR: 99 (19 May 2021 05:00) (99 - 106)  BP: 113/75 (19 May 2021 05:00) (99/67 - 115/79)  RR: 16 (19 May 2021 05:00) (16 - 20)  SpO2: 96% (19 May 2021 05:00) (95% - 97%)    Gen: AOx3, NAD, non-toxic  CV: S1+S2 normal, nontachycardic  Resp: Clear bilat, no resp distress, no crackles/wheezes, chest tube  Abd: Soft, nontender, +BS  Ext: No LE edema, no wounds    LABS:                        9.1    16.98 )-----------( 578      ( 19 May 2021 10:26 )             30.7     05-19    131<L>  |  99  |  26<H>  ----------------------------<  147<H>  5.4<H>   |  20<L>  |  0.70    Ca    9.0      19 May 2021 10:26  Phos  4.8     05-19  Mg     2.2     05-19    TPro  7.0  /  Alb  2.2<L>  /  TBili  0.4  /  DBili  x   /  AST  26  /  ALT  32  /  AlkPhos  112  05-19    MICROBIOLOGY:    .Blood Blood  05-14-21   No Growth Final     BAL BRONCHIAL AVEOLAR LAVAGE  05-13-21   Rare Yeast  --    Numerous polymorphonuclear leukocytes seen per low power field  No Squamous epithelial cells seen per low power field  Rare Gram Negative Rods seen per oil power field    .Tissue Other  05-12-21   Growth in fluid media only Klebsiella pneumoniae ESBL  Rare Lactobacillus rhamnosus "Susceptibilities not performed"  --  Klebsiella pneumoniae ESBL    .Urine Clean Catch (Midstream)  05-06-21   >100,000 CFU/ml Klebsiella pneumoniae ESBL  --  Klebsiella pneumoniae ESBL    .Blood Blood  05-05-21   No Growth Final      .Blood Blood  05-05-21   No Growth Final      .Urine Clean Catch (Midstream)  04-19-21   No growth     RADIOLOGY:    5/19 XR:      IMPRESSION:    Poor inspiratory effort. The heart is slightly enlarged. A right chest tube is in place. Small right pleural effusion. Otherwise the lungs appear to be clear. No change in appearance the chest when compared to previous study done on May 18, 2021 at 11:12 AM.

## 2021-05-19 NOTE — PROGRESS NOTE ADULT - PROBLEM SELECTOR PLAN 3
-Acute ischemic strokes, with concern for hemorrhagic conversion, noted on CT scan on 4/8  -Etiology of acute ischemic stroke uncertain at this time; TTE with bubble performed at bedside in the MICU without any evidence of right-to-left shunt; no arrhythmia noted on telemetry throughout stay in the MICU  - CTA head and neck without any evidence of carotid artery stenosis or dissection    Retention   - olson placed  - c/w flomax    EMPYEMA   Plan: OR Vats 5/12 with Dr. Albarado   Hep gtt restarted   hold hep 4 hrs prior to removing chest tube  Ant Susanna, Angled Diaphragm, Posterior straight CT--> Dry Suction @ 40   water seal chest tubes when indicated  5/15 chest tubes to water seal  serial CXR   Monitor drainage   Daily CXR  *susanna chest tube to be last chest tube to be removed.  - now with necrotizing PNA on CT (from 5/16) -Acute ischemic strokes, with concern for hemorrhagic conversion, noted on CT scan on 4/8  -Etiology of acute ischemic stroke uncertain at this time; TTE with bubble performed at bedside in the MICU without any evidence of right-to-left shunt; no arrhythmia noted on telemetry throughout stay in the MICU  - CTA head and neck without any evidence of carotid artery stenosis or dissection    Retention   - olson placed  - c/w flomax    EMPYEMA   OR Vats 5/12 with Dr. Albarado   5/15 chest tubes to water seal  5/17 Angled diaphragm chest tube dc'd  5/18 Anterior Aroldo chest tube dc'd  Monitor drainage /Daily CXR  Maintain posterior chest tube to water seal.  Continue care per primary team  CT Chest 5/16 Interval new small cavitary lesions in the right lower lobe and right chest wall hematoma - no thoracic surgery intervention indicated per Dr. Albarado.

## 2021-05-19 NOTE — PROGRESS NOTE ADULT - ASSESSMENT
60 yr male with PMH of DM who was initially admitted to [facility] ICU for hypoxic respiratory failure 2/2 COVID c/b PE with R heart strain, cardiogenic and septic shock, ischemic and hemorrhagic CVA and ESBL klebsiella ventilator associate PNA, transferred to [facility] for neurosurgery eval and further management. s/p PEG, now monitoring for refeeding syndrome, complicated with right empyema planned for VATs

## 2021-05-19 NOTE — GOALS OF CARE CONVERSATION - ADVANCED CARE PLANNING - CONVERSATION DETAILS
I called the patient's surrogate (Mr. Pizarro) and d/w him about the events that have happened since the last time I spoke to him (4/28/2021) and including empyema with lung cavitations and thigh hematoma which required to hold on AC. I indicated that, as we discussed before, goals were for trying all possible available treatments to improve the patient's condition; however, that he continues to have complication which increase his risk for facing a life threatening  situation and therefore that re-addressing code status was fundamental. I also stated that understanding the patient's multiple medical issues that it was difficult to see how he was going to survive a Cardiac arrest even if CPR was provided and if surviving that he was going to be in a worst functional and neurological state that then one he already is into. Mr. Pizarro gave verbalized understanding about the info provided and indicated he was going to reach out to his friend, Dr. Mclain, so he was able to help him out with better defining code status. He will call me with Dr. Mclain so we can f/u on this conversation.     16' were spent in ACP.

## 2021-05-19 NOTE — PROGRESS NOTE ADULT - PROBLEM SELECTOR PLAN 1
Cystitis and Empyema   5/12 he underwent right VATS converted to open thoracotomy, decortication, drainage of abscess and flex bronch. Pt briefly on lexi intraop and extubated in OR.   - s/p L. thoracotomy, drainage of abscess, decortication, flex bronch   - 1 angled chest tube , 1 straight chest tube, 1 Blakemore drain (anterior) - Air leak present post op   - Chest tube to suction at 10mmhg  - Air leak expected and seen  - Monitor pulse oximeter  - Out of bed to chair, ambulate as tolerated, and incentive spirometry to prevent atelectasis  - Pain control as needed with tylenol and oxy    - c/w meropenem 5/7-  - f/u ID about cultures of lung tissue   - BCx neg 5/14, UCx growing Kleb  - Now having low grade fevers (5/14), cont to monitor   - elevated WBC and dropping hbg, will f.u with CT on abd/pelvis and chest and get repeat CBC to monitor for any kind of bleed or inflammation.     Obturator Hematoma   - bleeding hematoma in right thigh on CT on 5/7  - CT hip showing non active bleed, hematoma on right thigh on 5/8  - doppler to r/o DVT in LE, per IR no role IVC filter  - active again on 5/17, monitor CBC, will check hbg q6, heparin on hold, will consult IR to embolize? 5/12 he underwent right VATS converted to open thoracotomy, decortication, drainage of abscess and flex bronch. Pt briefly on lexi intraop and extubated in OR.   - s/p L. thoracotomy, drainage of abscess, decortication, flex bronch   - 1 angled chest tube , 1 straight chest tube, 1 Blakemore drain (anterior) - Air leak present post op   - Chest tube to suction at 10mmhg  - Air leak expected and seen  - Monitor pulse oximeter  - Out of bed to chair, ambulate as tolerated, and incentive spirometry to prevent atelectasis  - Pain control as needed with tylenol and oxy  - c/w meropenem 5/7-  - f/u ID about cultures of lung tissue   - BCx neg 5/14, UCx growing Kleb  - Now having low grade fevers (5/14), cont to monitor   - elevated WBC and dropping hbg, will f.u with CT on abd/pelvis and chest and get repeat CBC to monitor for any kind of bleed or inflammation.     Obturator Hematoma   - bleeding hematoma in right thigh on CT on 5/7  - CT hip showing non active bleed, hematoma on right thigh on 5/8  - doppler to r/o DVT in LE, per IR no role IVC filter  - active again on 5/17, monitor CBC, will check hbg q6, heparin on hold, will consult IR to embolize?

## 2021-05-19 NOTE — PROGRESS NOTE ADULT - SUBJECTIVE AND OBJECTIVE BOX
PROGRESS NOTE:   Authored by Dr. Emily Mckeon, ANH pager [phone]    Patient is a 60y old  Male who presents with a chief complaint of COVID19 w/ severe metabolic acidosis s/p intubation (18 May 2021 17:13)      SUBJECTIVE / OVERNIGHT EVENTS:    MEDICATIONS  (STANDING):  acetaminophen   Tablet .. 650 milliGRAM(s) Oral every 6 hours  cholecalciferol 400 Unit(s) Oral daily  insulin lispro (ADMELOG) corrective regimen sliding scale   SubCutaneous every 6 hours  insulin NPH human recombinant 13 Unit(s) SubCutaneous every 6 hours  levETIRAcetam  Solution 500 milliGRAM(s) Oral two times a day  meropenem  IVPB 1000 milliGRAM(s) IV Intermittent every 8 hours  polyethylene glycol 3350 17 Gram(s) Oral daily  senna 2 Tablet(s) Oral at bedtime  sodium chloride 0.9%. 1000 milliLiter(s) (75 mL/Hr) IV Continuous <Continuous>  tamsulosin 0.4 milliGRAM(s) Oral at bedtime  thiamine 100 milliGRAM(s) Oral daily    MEDICATIONS  (PRN):  albuterol/ipratropium for Nebulization 3 milliLiter(s) Nebulizer every 6 hours PRN Shortness of Breath and/or Wheezing  HYDROmorphone  Injectable 0.5 milliGRAM(s) IV Push every 4 hours PRN Severe Pain (7 - 10)      CAPILLARY BLOOD GLUCOSE      POCT Blood Glucose.: 132 mg/dL (19 May 2021 05:59)  POCT Blood Glucose.: 136 mg/dL (18 May 2021 23:46)  POCT Blood Glucose.: 142 mg/dL (18 May 2021 17:19)  POCT Blood Glucose.: 175 mg/dL (18 May 2021 12:03)    I&O's Summary    18 May 2021 07:01  -  19 May 2021 07:00  --------------------------------------------------------  IN: 2410 mL / OUT: 85 mL / NET: 2325 mL        PHYSICAL EXAM:  Vital Signs Last 24 Hrs  T(C): 36.8 (19 May 2021 05:00), Max: 37.2 (18 May 2021 14:35)  T(F): 98.2 (19 May 2021 05:00), Max: 98.9 (18 May 2021 14:35)  HR: 99 (19 May 2021 05:00) (99 - 106)  BP: 113/75 (19 May 2021 05:00) (99/67 - 115/79)  BP(mean): --  RR: 16 (19 May 2021 05:00) (16 - 20)  SpO2: 96% (19 May 2021 05:00) (95% - 97%)    GENERAL: No acute distress, well-developed  HEAD:  Atraumatic, Normocephalic  EYES: EOMI, PERRLA, conjunctiva and sclera clear  NECK: Supple, no lymphadenopathy, no JVD  CHEST/LUNG: CTAB; No wheezes, rales, or rhonchi  HEART: Regular rate and rhythm; No murmurs, rubs, or gallops  ABDOMEN: Soft, non-tender, non-distended; normal bowel sounds, no organomegaly  EXTREMITIES:  2+ peripheral pulses b/l, No clubbing, cyanosis, or edema  NEUROLOGY: A&O x 3, no focal deficits  SKIN: No rashes or lesions    LABS:                        8.7    19.51 )-----------( 579      ( 18 May 2021 19:18 )             27.7     05-18    133<L>  |  98  |  25<H>  ----------------------------<  127<H>  5.2   |  23  |  0.70    Ca    9.1      18 May 2021 15:00  Phos  3.8     05-18  Mg     2.1     05-18    TPro  6.6  /  Alb  2.3<L>  /  TBili  0.4  /  DBili  x   /  AST  33  /  ALT  37  /  AlkPhos  115  05-18    PT/INR - ( 17 May 2021 14:57 )   PT: 13.4 sec;   INR: 1.12 ratio         PTT - ( 18 May 2021 19:18 )  PTT:27.4 sec            RADIOLOGY & ADDITIONAL TESTS:  Results Reviewed:   Imaging Personally Reviewed:  Electrocardiogram Personally Reviewed:    COORDINATION OF CARE:  Care Discussed with Consultants/Other Providers [Y/N]:  Prior or Outpatient Records Reviewed [Y/N]:   PROGRESS NOTE:   Authored by Dr. Emily Mckeon, ANH pager [phone]    Patient is a 60y old  Male who presents with a chief complaint of COVID19 w/ severe metabolic acidosis s/p intubation (18 May 2021 17:13)      SUBJECTIVE / OVERNIGHT EVENTS: Patient examined at bedside. Appeared lethargic and not moving around as much as yesterday. He did not answer questions but did not appear to be in any pain during physical exam     MEDICATIONS  (STANDING):  acetaminophen   Tablet .. 650 milliGRAM(s) Oral every 6 hours  cholecalciferol 400 Unit(s) Oral daily  insulin lispro (ADMELOG) corrective regimen sliding scale   SubCutaneous every 6 hours  insulin NPH human recombinant 13 Unit(s) SubCutaneous every 6 hours  levETIRAcetam  Solution 500 milliGRAM(s) Oral two times a day  meropenem  IVPB 1000 milliGRAM(s) IV Intermittent every 8 hours  polyethylene glycol 3350 17 Gram(s) Oral daily  senna 2 Tablet(s) Oral at bedtime  sodium chloride 0.9%. 1000 milliLiter(s) (75 mL/Hr) IV Continuous <Continuous>  tamsulosin 0.4 milliGRAM(s) Oral at bedtime  thiamine 100 milliGRAM(s) Oral daily    MEDICATIONS  (PRN):  albuterol/ipratropium for Nebulization 3 milliLiter(s) Nebulizer every 6 hours PRN Shortness of Breath and/or Wheezing  HYDROmorphone  Injectable 0.5 milliGRAM(s) IV Push every 4 hours PRN Severe Pain (7 - 10)      CAPILLARY BLOOD GLUCOSE      POCT Blood Glucose.: 132 mg/dL (19 May 2021 05:59)  POCT Blood Glucose.: 136 mg/dL (18 May 2021 23:46)  POCT Blood Glucose.: 142 mg/dL (18 May 2021 17:19)  POCT Blood Glucose.: 175 mg/dL (18 May 2021 12:03)    I&O's Summary    18 May 2021 07:01  -  19 May 2021 07:00  --------------------------------------------------------  IN: 2410 mL / OUT: 85 mL / NET: 2325 mL        PHYSICAL EXAM:  Vital Signs Last 24 Hrs  T(C): 36.8 (19 May 2021 05:00), Max: 37.2 (18 May 2021 14:35)  T(F): 98.2 (19 May 2021 05:00), Max: 98.9 (18 May 2021 14:35)  HR: 99 (19 May 2021 05:00) (99 - 106)  BP: 113/75 (19 May 2021 05:00) (99/67 - 115/79)  BP(mean): --  RR: 16 (19 May 2021 05:00) (16 - 20)  SpO2: 96% (19 May 2021 05:00) (95% - 97%)    GENERAL: No acute distress, malnourished   HEAD:  Atraumatic, Normocephalic  EYES: EOMI, PERRLA, conjunctiva and sclera clear  NECK: Supple, no lymphadenopathy, no JVD  CHEST/LUNG: CTAB; rigth side of chest with inflam, no discoloration, nontender to palpation around site of drain, only susanna drain in place   HEART: Regular rate and rhythm; No murmurs, rubs, or gallops  ABDOMEN: Soft, non-tender, non-distended; normal bowel sounds, no organomegaly, PEG in place  EXTREMITIES:  2+ peripheral pulses b/l, No clubbing, cyanosis, or edema  NEUROLOGY: A&O x 0, lethargic   SKIN: gangrenous toes     LABS:                        8.7    19.51 )-----------( 579      ( 18 May 2021 19:18 )             27.7     05-18    133<L>  |  98  |  25<H>  ----------------------------<  127<H>  5.2   |  23  |  0.70    Ca    9.1      18 May 2021 15:00  Phos  3.8     05-18  Mg     2.1     05-18    TPro  6.6  /  Alb  2.3<L>  /  TBili  0.4  /  DBili  x   /  AST  33  /  ALT  37  /  AlkPhos  115  05-18    PT/INR - ( 17 May 2021 14:57 )   PT: 13.4 sec;   INR: 1.12 ratio         PTT - ( 18 May 2021 19:18 )  PTT:27.4 sec            RADIOLOGY & ADDITIONAL TESTS:  Results Reviewed:   Imaging Personally Reviewed:  Electrocardiogram Personally Reviewed:    COORDINATION OF CARE:  Care Discussed with Consultants/Other Providers [Y/N]:  Prior or Outpatient Records Reviewed [Y/N]:

## 2021-05-19 NOTE — PROGRESS NOTE ADULT - PROBLEM SELECTOR PLAN 1
OR Vats 5/12 with Dr. Albarado   5/15 chest tubes to water seal  5/17 Angled diaphragm chest tube dc'd  5/18 Anterior Aroldo chest tube dc'd  Monitor drainage /Daily CXR  Maintain posterior chest tube to water seal.  Continue care per primary team  CT Chest 5/16 Interval new small cavitary lesions in the right lower lobe and right chest wall hematoma - no thoracic surgery intervention indicated per Dr. Albarado. OR Vats 5/12 with Dr. Albarado   5/15 chest tubes to water seal  5/17 Angled diaphragm chest tube dc'd  5/18 Anterior Aroldo chest tube dc'd  5/19 Posterior straight chest tube dc'd   CT Chest 5/16 Interval new small cavitary lesions in the right lower lobe and right chest wall hematoma - no thoracic surgery intervention indicated per Dr. Albarado.  Thoracic surgery will sign off, please reconsult as needed.   Continue care per primary team

## 2021-05-19 NOTE — PROGRESS NOTE ADULT - ATTENDING COMMENTS
hb stable  f/up thoracic surgery recs  Is/Os  PEG feeds  abdomen soft  continue abx - duration per ID

## 2021-05-19 NOTE — PROGRESS NOTE ADULT - ASSESSMENT
61 yo M with PMH of DM who was initially admitted to [facility] ICU 4/1 for hypoxic respiratory failure 2/2 COVID c/b PE with R heart strain, cardiogenic and septic shock, ischemic and hemorrhagic CVA and ESBL klebsiella ventilator associated PNA, transferred to [facility] for neurosurgery eval on 4/12  No fever, has leukocytosis  UCX with ESBL K pne  Bilateral LE dry gangrene  Has PEG  PCT minimally elevated  CT now with Empyema, concern for bilateral PNA, abscess  S/p OR Thoracotomy, abscess drainage, bronch (5/12)--culture with K pne  CT with areas of small cavitary lesions and hematomas  1) Leukocytosis  - Improving?  - Trend WBC  2) COVID  - COVID+, unclear significance (was prior negative, and patient Ab+)  - Monitor for any signs active COVID/reinfection  - Any considerations isolation per infection control  3) Suspected Empyema/Abscess  - Culture with K pne, Lactobacillus, Prevotella--should be adequately treated with jennifer  - Meropenem 1g q 8  - Note CT findings, follow surgery if any further procedure needed for persistent cavities/hematoma  - Suspect yeast will be colonizer--but monitor culture  - Appreciate Thoracic procedure  - F/U OR cultures  - Follow clinically with   4) Gangrene  - Appears noninfectious/dry gangrene  - Monitor wounds    Sammy Chambers MD  Pager [phone]  After 5pm and on weekends call [phone]

## 2021-05-20 LAB
ALBUMIN SERPL ELPH-MCNC: 2.5 G/DL — LOW (ref 3.3–5)
ALP SERPL-CCNC: 112 U/L — SIGNIFICANT CHANGE UP (ref 40–120)
ALT FLD-CCNC: 30 U/L — SIGNIFICANT CHANGE UP (ref 10–45)
ANION GAP SERPL CALC-SCNC: 13 MMOL/L — SIGNIFICANT CHANGE UP (ref 5–17)
AST SERPL-CCNC: 26 U/L — SIGNIFICANT CHANGE UP (ref 10–40)
BILIRUB SERPL-MCNC: 0.4 MG/DL — SIGNIFICANT CHANGE UP (ref 0.2–1.2)
BUN SERPL-MCNC: 30 MG/DL — HIGH (ref 7–23)
CALCIUM SERPL-MCNC: 9.1 MG/DL — SIGNIFICANT CHANGE UP (ref 8.4–10.5)
CHLORIDE SERPL-SCNC: 98 MMOL/L — SIGNIFICANT CHANGE UP (ref 96–108)
CO2 SERPL-SCNC: 22 MMOL/L — SIGNIFICANT CHANGE UP (ref 22–31)
CREAT SERPL-MCNC: 0.68 MG/DL — SIGNIFICANT CHANGE UP (ref 0.5–1.3)
GLUCOSE BLDC GLUCOMTR-MCNC: 124 MG/DL — HIGH (ref 70–99)
GLUCOSE BLDC GLUCOMTR-MCNC: 155 MG/DL — HIGH (ref 70–99)
GLUCOSE BLDC GLUCOMTR-MCNC: 194 MG/DL — HIGH (ref 70–99)
GLUCOSE BLDC GLUCOMTR-MCNC: 211 MG/DL — HIGH (ref 70–99)
GLUCOSE BLDC GLUCOMTR-MCNC: 219 MG/DL — HIGH (ref 70–99)
GLUCOSE BLDC GLUCOMTR-MCNC: 92 MG/DL — SIGNIFICANT CHANGE UP (ref 70–99)
GLUCOSE SERPL-MCNC: 160 MG/DL — HIGH (ref 70–99)
HCT VFR BLD CALC: 30.9 % — LOW (ref 39–50)
HGB BLD-MCNC: 9.6 G/DL — LOW (ref 13–17)
MAGNESIUM SERPL-MCNC: 2.4 MG/DL — SIGNIFICANT CHANGE UP (ref 1.6–2.6)
MCHC RBC-ENTMCNC: 25 PG — LOW (ref 27–34)
MCHC RBC-ENTMCNC: 31.1 GM/DL — LOW (ref 32–36)
MCV RBC AUTO: 80.5 FL — SIGNIFICANT CHANGE UP (ref 80–100)
NRBC # BLD: 0 /100 WBCS — SIGNIFICANT CHANGE UP (ref 0–0)
PHOSPHATE SERPL-MCNC: 4.1 MG/DL — SIGNIFICANT CHANGE UP (ref 2.5–4.5)
PLATELET # BLD AUTO: 662 K/UL — HIGH (ref 150–400)
POTASSIUM SERPL-MCNC: 5.1 MMOL/L — SIGNIFICANT CHANGE UP (ref 3.5–5.3)
POTASSIUM SERPL-SCNC: 5.1 MMOL/L — SIGNIFICANT CHANGE UP (ref 3.5–5.3)
PROT SERPL-MCNC: 7.3 G/DL — SIGNIFICANT CHANGE UP (ref 6–8.3)
RBC # BLD: 3.84 M/UL — LOW (ref 4.2–5.8)
RBC # FLD: 19.7 % — HIGH (ref 10.3–14.5)
SODIUM SERPL-SCNC: 133 MMOL/L — LOW (ref 135–145)
WBC # BLD: 15.93 K/UL — HIGH (ref 3.8–10.5)
WBC # FLD AUTO: 15.93 K/UL — HIGH (ref 3.8–10.5)

## 2021-05-20 PROCEDURE — 99233 SBSQ HOSP IP/OBS HIGH 50: CPT | Mod: GC

## 2021-05-20 PROCEDURE — 99232 SBSQ HOSP IP/OBS MODERATE 35: CPT

## 2021-05-20 RX ORDER — INSULIN HUMAN 100 [IU]/ML
13 INJECTION, SOLUTION SUBCUTANEOUS
Refills: 0 | Status: DISCONTINUED | OUTPATIENT
Start: 2021-05-20 | End: 2021-05-20

## 2021-05-20 RX ORDER — HEPARIN SODIUM 5000 [USP'U]/ML
5000 INJECTION INTRAVENOUS; SUBCUTANEOUS EVERY 12 HOURS
Refills: 0 | Status: DISCONTINUED | OUTPATIENT
Start: 2021-05-20 | End: 2021-05-22

## 2021-05-20 RX ORDER — DOXAZOSIN MESYLATE 4 MG
2 TABLET ORAL AT BEDTIME
Refills: 0 | Status: DISCONTINUED | OUTPATIENT
Start: 2021-05-20 | End: 2021-06-04

## 2021-05-20 RX ORDER — HUMAN INSULIN 100 [IU]/ML
13 INJECTION, SUSPENSION SUBCUTANEOUS
Refills: 0 | Status: DISCONTINUED | OUTPATIENT
Start: 2021-05-20 | End: 2021-05-22

## 2021-05-20 RX ORDER — HUMAN INSULIN 100 [IU]/ML
12 INJECTION, SUSPENSION SUBCUTANEOUS EVERY 6 HOURS
Refills: 0 | Status: DISCONTINUED | OUTPATIENT
Start: 2021-05-20 | End: 2021-05-20

## 2021-05-20 RX ORDER — HUMAN INSULIN 100 [IU]/ML
12 INJECTION, SUSPENSION SUBCUTANEOUS
Refills: 0 | Status: DISCONTINUED | OUTPATIENT
Start: 2021-05-20 | End: 2021-05-23

## 2021-05-20 RX ORDER — HUMAN INSULIN 100 [IU]/ML
14 INJECTION, SUSPENSION SUBCUTANEOUS EVERY 6 HOURS
Refills: 0 | Status: DISCONTINUED | OUTPATIENT
Start: 2021-05-20 | End: 2021-05-20

## 2021-05-20 RX ADMIN — Medication 650 MILLIGRAM(S): at 01:21

## 2021-05-20 RX ADMIN — Medication 650 MILLIGRAM(S): at 05:58

## 2021-05-20 RX ADMIN — Medication 100 MILLIGRAM(S): at 13:43

## 2021-05-20 RX ADMIN — Medication 2 MILLIGRAM(S): at 23:36

## 2021-05-20 RX ADMIN — POLYETHYLENE GLYCOL 3350 17 GRAM(S): 17 POWDER, FOR SOLUTION ORAL at 13:44

## 2021-05-20 RX ADMIN — MEROPENEM 100 MILLIGRAM(S): 1 INJECTION INTRAVENOUS at 13:22

## 2021-05-20 RX ADMIN — HUMAN INSULIN 13 UNIT(S): 100 INJECTION, SUSPENSION SUBCUTANEOUS at 06:11

## 2021-05-20 RX ADMIN — Medication 650 MILLIGRAM(S): at 13:43

## 2021-05-20 RX ADMIN — Medication 650 MILLIGRAM(S): at 18:22

## 2021-05-20 RX ADMIN — HUMAN INSULIN 13 UNIT(S): 100 INJECTION, SUSPENSION SUBCUTANEOUS at 19:30

## 2021-05-20 RX ADMIN — Medication 4: at 13:35

## 2021-05-20 RX ADMIN — MEROPENEM 100 MILLIGRAM(S): 1 INJECTION INTRAVENOUS at 00:58

## 2021-05-20 RX ADMIN — Medication 650 MILLIGRAM(S): at 23:35

## 2021-05-20 RX ADMIN — Medication 650 MILLIGRAM(S): at 19:20

## 2021-05-20 RX ADMIN — Medication 400 UNIT(S): at 13:44

## 2021-05-20 RX ADMIN — HEPARIN SODIUM 5000 UNIT(S): 5000 INJECTION INTRAVENOUS; SUBCUTANEOUS at 18:29

## 2021-05-20 RX ADMIN — LEVETIRACETAM 500 MILLIGRAM(S): 250 TABLET, FILM COATED ORAL at 05:58

## 2021-05-20 RX ADMIN — Medication 2: at 00:50

## 2021-05-20 RX ADMIN — Medication 650 MILLIGRAM(S): at 14:14

## 2021-05-20 RX ADMIN — LEVETIRACETAM 500 MILLIGRAM(S): 250 TABLET, FILM COATED ORAL at 18:22

## 2021-05-20 RX ADMIN — Medication 650 MILLIGRAM(S): at 00:51

## 2021-05-20 RX ADMIN — Medication 650 MILLIGRAM(S): at 07:05

## 2021-05-20 RX ADMIN — Medication 2: at 06:11

## 2021-05-20 RX ADMIN — MEROPENEM 100 MILLIGRAM(S): 1 INJECTION INTRAVENOUS at 21:49

## 2021-05-20 RX ADMIN — HUMAN INSULIN 13 UNIT(S): 100 INJECTION, SUSPENSION SUBCUTANEOUS at 00:51

## 2021-05-20 NOTE — PROGRESS NOTE ADULT - ATTENDING COMMENTS
stable clinical status  right lateral chest swelling stable  awake, interactive  family member at bedside  careful use of heparin - hb stable  may consider SQ dosing initially and if no bleeding, transition to rx dose Lovenox  patient with multiple co-morbid conditions; higher risk for future complications despite optimal medical therapy

## 2021-05-20 NOTE — PROGRESS NOTE ADULT - PROBLEM SELECTOR PLAN 1
- FS q6h, goal -180  - adjust NPH 14 units sq q6h, HOLD IF TUBE FEEDING TURNED OFF  - if Dinner BG below 100 would decrease 12noon NPH dose to 13units.   - continue  moderate correction scale sq q6h.     Discharge: basal/bolus, doses TBD

## 2021-05-20 NOTE — PROGRESS NOTE ADULT - PROBLEM SELECTOR PLAN 1
R empyema-     IR recommends VATS procedure for drainage of empyema 2/2 risk of iatrogenic bronchopleural fistula  Dr. Tena spoke to patients brother in law about VATS. Family to decide if patient should undergo operation.  Continue IV Abx per ID recs. Patient seen and evaluated at bedside    Chief Complaint: sob     HPI:  89 frail M with PMH of MR/TR s/p MV and TV clip in 9/2019, IPF on 2L home O2, and hypothyroidism p/w inc sob over the past week. Denies any cp, dec urine output or dietary changes. Was instructed to start taking diuretic at the last cardiology follow up but never started it. Usually sat 88-90 on 2L NC but had to go up on oxygen due to subjective sob although o2 sat was not checked consistently.     PMHx:   Hypothyroid    Gastric ulcer    Mitral valve regurgitation    Diverticulosis    Pulmonary fibrosis        PSHx:   History of partial gastrectomy    History of hemorrhoidectomy    S/P colonoscopy        Allergies:  No Known Allergies      Home Meds:    Current Medications:       FAMILY HISTORY:  FH: colon cancer  son        Social History: Personally reviewed   No tobacco, EtOH or IVDU     REVIEW OF SYSTEMS:  Constitutional:     [x ] negative [ ] fevers [ ] chills [ ] weight loss [ ] weight gain  HEENT:                  [x ] negative [ ] dry eyes [ ] eye irritation [ ] postnasal drip [ ] nasal congestion  CV:                         [ x] negative  [ ] chest pain [ ] orthopnea [ ] palpitations [ ] murmur  Resp:                     [x ] negative [ ] cough [ ] shortness of breath [ ] dyspnea [ ] wheezing [ ] sputum [ ]hemoptysis  GI:                          [ x] negative [ ] nausea [ ] vomiting [ ] diarrhea [ ] constipation [ ] abd pain [ ] dysphagia   :                        [ x] negative [ ] dysuria [ ] nocturia [ ] hematuria [ ] increased urinary frequency  Musculoskeletal: [x ] negative [ ] back pain [ ] myalgias [ ] arthralgias [ ] fracture  Skin:                       [ x] negative [ ] rash [ ] itch  Neurological:        [ x] negative [ ] headache [ ] dizziness [ ] syncope [ ] weakness [ ] numbness  Psychiatric:           [ x] negative [ ] anxiety [ ] depression  Endocrine:            [ x] negative [ ] diabetes [ ] thyroid problem  Heme/Lymph:      [ x] negative [ ] anemia [ ] bleeding problem  Allergic/Immune: [ x] negative [ ] itchy eyes [ ] nasal discharge [ ] hives [ ] angioedema    [ x] All other systems negative  [ ] Unable to assess ROS due to      Physical Exam:  T(F): 97.4 (05-20), Max: 97.9 (05-20)  HR: 84 (05-20) (80 - 88)  BP: 119/80 (05-20) (119/80 - 131/83)  RR: 17 (05-20)  SpO2: 97% (05-20)    +NRB   Mildly elevated JVD   Bibasilar crackles   +systolic murmur at the LUSB   trace peripheral edema b/l     Cardiovascular Diagnostic Testing:    ECG: Personally reviewed    Echo: Personally reviewed  Conclusions:  1. Patient status-MitraClip placement. MitraClip is not  well visualized on this study. Mild-moderate mitral  regurgitation. Mean transmitral valve gradient equals 3-4  mm Hg. (HRabout 3 mmHg)  2. Normal trileaflet aortic valve. Mild aortic  regurgitation.  3. Normal left ventricular systolic function. Paradoxical  septal motion consistent with right ventricular overload.  4. Mild right ventricular enlargement with normal right  ventricular systolic function.  5. Tricuspid valve not well visualized. Patient status-post  tricuspid clip placement. Severe tricuspid regurgitation.    Stress Testing: Personally reviewed     Angiogram: Personally reviewed   LM:   --  LM: Normal.  LAD:   --  Mid LAD: There was a 40 % stenosis.  CX:   --  Mid circumflex: There was a 30% stenosis.  --  Distal circumflex: There was a 40 % stenosis.  RCA:   --  Distal RCA: There was a 40 % stenosis.    Imaging:    CXR: Personally reviewed    Labs: Personally reviewed                        9.2    6.87  )-----------( 322      ( 20 May 2021 15:04 )             30.3     05-20    138  |  102  |  19  ----------------------------<  78  4.4   |  23  |  1.04    Ca    8.6      20 May 2021 15:04    TPro  6.7  /  Alb  2.7<L>  /  TBili  0.4  /  DBili  x   /  AST  22  /  ALT  7<L>  /  AlkPhos  86  05-20      Serum Pro-Brain Natriuretic Peptide: 1598 pg/mL (05-20 @ 17:15)         Patient seen and evaluated at bedside    Chief Complaint: sob     HPI:  89 frail M with PMH of MR/TR s/p MV and TV clip in 9/2019, IPF on 2L home O2, and hypothyroidism p/w inc sob over the past week. Denies any cp, dec urine output or dietary changes. Was instructed to start taking diuretic at the last cardiology follow up but never started it. Usually sat 88-90 on 2L NC but had to go up on oxygen due to subjective sob although o2 sat was not checked consistently.     PMHx:   Hypothyroid  Gastric ulcer  Mitral valve regurgitation  Diverticulosis  Pulmonary fibrosis    PSHx:   History of partial gastrectomy  History of hemorrhoidectomy  S/P colonoscopy    Allergies:  No Known Allergies      Home Meds:  Current Medications:     FAMILY HISTORY:  FH: colon cancer  son    Social History: Personally reviewed   No tobacco, EtOH or IVDU     REVIEW OF SYSTEMS:  Constitutional:     [x ] negative [ ] fevers [ ] chills [ ] weight loss [ ] weight gain  HEENT:                  [x ] negative [ ] dry eyes [ ] eye irritation [ ] postnasal drip [ ] nasal congestion  CV:                         [ x] negative  [ ] chest pain [ ] orthopnea [ ] palpitations [ ] murmur  Resp:                     [x ] negative [ ] cough [ ] shortness of breath [ ] dyspnea [ ] wheezing [ ] sputum [ ]hemoptysis  GI:                          [ x] negative [ ] nausea [ ] vomiting [ ] diarrhea [ ] constipation [ ] abd pain [ ] dysphagia   :                        [ x] negative [ ] dysuria [ ] nocturia [ ] hematuria [ ] increased urinary frequency  Musculoskeletal: [x ] negative [ ] back pain [ ] myalgias [ ] arthralgias [ ] fracture  Skin:                       [ x] negative [ ] rash [ ] itch  Neurological:        [ x] negative [ ] headache [ ] dizziness [ ] syncope [ ] weakness [ ] numbness  Psychiatric:           [ x] negative [ ] anxiety [ ] depression  Endocrine:            [ x] negative [ ] diabetes [ ] thyroid problem  Heme/Lymph:      [ x] negative [ ] anemia [ ] bleeding problem  Allergic/Immune: [ x] negative [ ] itchy eyes [ ] nasal discharge [ ] hives [ ] angioedema    [ x] All other systems negative  [ ] Unable to assess ROS due to    Physical Exam:  T(F): 97.4 (05-20), Max: 97.9 (05-20)  HR: 84 (05-20) (80 - 88)  BP: 119/80 (05-20) (119/80 - 131/83)  RR: 17 (05-20)  SpO2: 97% (05-20)    +NRB   Mildly elevated JVD   Bibasilar crackles   +systolic murmur at the LUSB   trace peripheral edema b/l     Cardiovascular Diagnostic Testing:    ECG: Personally reviewed    Echo: 3/15/2021  Conclusions:  1. Patient status-MitraClip placement. MitraClip is not  well visualized on this study. Mild-moderate mitral  regurgitation. Mean transmitral valve gradient equals 3-4  mm Hg. (HRabout 3 mmHg)  2. Normal trileaflet aortic valve. Mild aortic  regurgitation.  3. Normal left ventricular systolic function. Paradoxical  septal motion consistent with right ventricular overload.  4. Mild right ventricular enlargement with normal right  ventricular systolic function.  5. Tricuspid valve not well visualized. Patient status-post  tricuspid clip placement. Severe tricuspid regurgitation.    Stress Testing: Personally reviewed     Angiogram: 7/25/2019  LM:   --  LM: Normal.  LAD:   --  Mid LAD: There was a 40 % stenosis.  CX:   --  Mid circumflex: There was a 30% stenosis.  --  Distal circumflex: There was a 40 % stenosis.  RCA:   --  Distal RCA: There was a 40 % stenosis.    Imaging:    CXR: Personally reviewed    Labs: Personally reviewed                        9.2    6.87  )-----------( 322      ( 20 May 2021 15:04 )             30.3     05-20    138  |  102  |  19  ----------------------------<  78  4.4   |  23  |  1.04    Ca    8.6      20 May 2021 15:04    TPro  6.7  /  Alb  2.7<L>  /  TBili  0.4  /  DBili  x   /  AST  22  /  ALT  7<L>  /  AlkPhos  86  05-20    Serum Pro-Brain Natriuretic Peptide: 1598 pg/mL (05-20 @ 17:15)

## 2021-05-20 NOTE — PROGRESS NOTE ADULT - ASSESSMENT
60 yr male with PMH of DM who was initially admitted to [facility] ICU for hypoxic respiratory failure 2/2 COVID c/b PE with R heart strain, cardiogenic and septic shock, ischemic and hemorrhagic CVA and ESBL klebsiella ventilator associate PNA, transferred to [facility] for neurosurgery eval and further management. s/p PEG, now monitoring for refeeding syndrome, complicated with right empyema planned for VATs

## 2021-05-20 NOTE — PROGRESS NOTE ADULT - SUBJECTIVE AND OBJECTIVE BOX
CC: F/U for PNA    Saw/spoke to patient. No fevers, no chills. No new complaints. More awake today. chest tube DCed.    Allergies  No Known Allergies    ANTIMICROBIALS:  meropenem  IVPB 1000 every 8 hours    PE:    Vital Signs Last 24 Hrs  T(C): 36.3 (20 May 2021 13:16), Max: 36.6 (19 May 2021 13:45)  T(F): 97.4 (20 May 2021 13:16), Max: 97.9 (19 May 2021 13:45)  HR: 100 (20 May 2021 13:16) (96 - 103)  BP: 115/74 (20 May 2021 13:16) (100/64 - 115/74)  RR: 18 (20 May 2021 13:16) (18 - 20)  SpO2: 97% (20 May 2021 13:16) (95% - 99%)    Gen: AOx3, NAD, non-toxic  CV: S1+S2 normal, nontachycardic  Resp: Clear bilat, no resp distress, no crackles/wheezes, chest tube removed  Abd: Soft, nontender, +BS  Ext: No LE edema, no wounds    LABS:                        9.6    15.93 )-----------( 662      ( 20 May 2021 09:19 )             30.9     05-20    133<L>  |  98  |  30<H>  ----------------------------<  160<H>  5.1   |  22  |  0.68    Ca    9.1      20 May 2021 09:19  Phos  4.1     05-20  Mg     2.4     05-20    TPro  7.3  /  Alb  2.5<L>  /  TBili  0.4  /  DBili  x   /  AST  26  /  ALT  30  /  AlkPhos  112  05-20    MICROBIOLOGY:    .Blood Blood  05-14-21   No Growth Final     BAL BRONCHIAL AVEOLAR LAVAGE  05-13-21   Rare Yeast  --    Numerous polymorphonuclear leukocytes seen per low power field  No Squamous epithelial cells seen per low power field  Rare Gram Negative Rods seen per oil power field    .Tissue Other  05-12-21   Growth in fluid media only Klebsiella pneumoniae ESBL  Rare Lactobacillus rhamnosus "Susceptibilities not performed"  --  Klebsiella pneumoniae ESBL    .Urine Clean Catch (Midstream)  05-06-21   >100,000 CFU/ml Klebsiella pneumoniae ESBL  --  Klebsiella pneumoniae ESBL    .Blood Blood  05-05-21   No Growth Final     .Blood Blood  05-05-21   No Growth Final     RADIOLOGY:    5/19 XR:    Frontal expiratory view of the chest shows the heart to be similar in size. The lungs show similar right pleural thickening or lateral effusion and there is no evidence of pneumothorax nor left pleural effusion. Gastric tube is again noted.    IMPRESSION:  No pneumothorax.

## 2021-05-20 NOTE — PROGRESS NOTE ADULT - SUBJECTIVE AND OBJECTIVE BOX
DIABETES FOLLOW UP : Seen earlier today    INTERVAL HX: Enid  Dale ID [id number]; pt not really talking to /answering questions today as much as he did few days ago, appears more confused, shaking hand "no " to any pain/ dizziness/blurred vision/Headache/SOB/CP/Nausea/Abdominal pain. BG mostly at/slightly above goal past 24 hours requiring 2 units corrective admelog on scale with NPH . continuous  TF running to peg tube      Allergies    No Known Allergies    MEDICATIONS  (STANDING):  acetaminophen   Tablet .. 650 milliGRAM(s) Oral every 6 hours  cholecalciferol 400 Unit(s) Oral daily  heparin   Injectable 5000 Unit(s) SubCutaneous every 12 hours  insulin lispro (ADMELOG) corrective regimen sliding scale   SubCutaneous every 6 hours  insulin NPH human recombinant 14 Unit(s) SubCutaneous every 6 hours  levETIRAcetam  Solution 500 milliGRAM(s) Oral two times a day  meropenem  IVPB 1000 milliGRAM(s) IV Intermittent every 8 hours  polyethylene glycol 3350 17 Gram(s) Oral daily  senna 2 Tablet(s) Oral at bedtime  sodium chloride 0.9%. 1000 milliLiter(s) (75 mL/Hr) IV Continuous <Continuous>  tamsulosin 0.4 milliGRAM(s) Oral at bedtime  thiamine 100 milliGRAM(s) Oral daily      PHYSICAL EXAM:  VITALS: T(C): 36.3 (05-20-21 @ 09:08)  T(F): 97.4 (05-20-21 @ 09:08), Max: 97.9 (05-19-21 @ 13:45)  HR: 98 (05-20-21 @ 09:08) (96 - 103)  BP: 102/67 (05-20-21 @ 09:08) (100/64 - 114/76)  RR:  (18 - 20)  SpO2:  (95% - 99%)  Wt(kg): --  GENERAL: male laying in bed in NAD  Abdomen: Soft, nontender, non distended peg with continuous TF running   Extremities: Warm, no edema in all 4 exts  NEURO: alert     LABS:  POCT Blood Glucose.: 219 mg/dL (05-20-21 @ 12:11)  POCT Blood Glucose.: 194 mg/dL (05-20-21 @ 06:03)  POCT Blood Glucose.: 155 mg/dL (05-19-21 @ 23:56)  POCT Blood Glucose.: 175 mg/dL (05-19-21 @ 16:59)  POCT Blood Glucose.: 159 mg/dL (05-19-21 @ 12:23)  POCT Blood Glucose.: 132 mg/dL (05-19-21 @ 05:59)  POCT Blood Glucose.: 136 mg/dL (05-18-21 @ 23:46)  POCT Blood Glucose.: 142 mg/dL (05-18-21 @ 17:19)  POCT Blood Glucose.: 175 mg/dL (05-18-21 @ 12:03)  POCT Blood Glucose.: 175 mg/dL (05-18-21 @ 06:13)  POCT Blood Glucose.: 151 mg/dL (05-17-21 @ 23:53)  POCT Blood Glucose.: 74 mg/dL (05-17-21 @ 17:14)                            9.6    15.93 )-----------( 662      ( 20 May 2021 09:19 )             30.9       05-20    133<L>  |  98  |  30<H>  ----------------------------<  160<H>  5.1   |  22  |  0.68    EGFR if : 122  EGFR if non : 105    Ca    9.1      05-20  Mg     2.4     05-20  Phos  4.1     05-20    TPro  7.3  /  Alb  2.5<L>  /  TBili  0.4  /  DBili  x   /  AST  26  /  ALT  30  /  AlkPhos  112  05-20 04-26 Chol 114 Direct LDL -- LDL calculated 53 HDL 35<L> Trig 126, 04-13 Chol -- Direct LDL -- LDL calculated -- HDL -- Trig 126    Thyroid Function Tests:  04-24 @ 11:11 TSH 2.00 FreeT4 -- T3 -- Anti TPO -- Anti Thyroglobulin Ab -- TSI --      A1C with Estimated Average Glucose Result: 10.3 % (04-02-21 @ 14:28)      Estimated Average Glucose: 249 mg/dL (04-02-21 @ 14:28)                         DIABETES FOLLOW UP : Seen earlier today    INTERVAL HX: Enid  Dale ID [id number]; pt not really talking to /answering questions today as much as he did few days ago, appears more confused, shaking hand "no " to any pain/ dizziness/blurred vision/Headache/SOB/CP/Nausea/Abdominal pain. BG mostly at/slightly above goal past 24 hours requiring 2 units corrective admelog on scale with NPH . continuous  TF running to peg tube.  AC Lunch       Allergies    No Known Allergies    MEDICATIONS  (STANDING):  acetaminophen   Tablet .. 650 milliGRAM(s) Oral every 6 hours  cholecalciferol 400 Unit(s) Oral daily  heparin   Injectable 5000 Unit(s) SubCutaneous every 12 hours  insulin lispro (ADMELOG) corrective regimen sliding scale   SubCutaneous every 6 hours  insulin NPH human recombinant 14 Unit(s) SubCutaneous every 6 hours  levETIRAcetam  Solution 500 milliGRAM(s) Oral two times a day  meropenem  IVPB 1000 milliGRAM(s) IV Intermittent every 8 hours  polyethylene glycol 3350 17 Gram(s) Oral daily  senna 2 Tablet(s) Oral at bedtime  sodium chloride 0.9%. 1000 milliLiter(s) (75 mL/Hr) IV Continuous <Continuous>  tamsulosin 0.4 milliGRAM(s) Oral at bedtime  thiamine 100 milliGRAM(s) Oral daily      PHYSICAL EXAM:  VITALS: T(C): 36.3 (05-20-21 @ 09:08)  T(F): 97.4 (05-20-21 @ 09:08), Max: 97.9 (05-19-21 @ 13:45)  HR: 98 (05-20-21 @ 09:08) (96 - 103)  BP: 102/67 (05-20-21 @ 09:08) (100/64 - 114/76)  RR:  (18 - 20)  SpO2:  (95% - 99%)  Wt(kg): --  GENERAL: male laying in bed in NAD  Abdomen: Soft, nontender, non distended peg with continuous TF running   Extremities: Warm, no edema in all 4 exts  NEURO: alert     LABS:  POCT Blood Glucose.: 219 mg/dL (05-20-21 @ 12:11)  POCT Blood Glucose.: 194 mg/dL (05-20-21 @ 06:03)  POCT Blood Glucose.: 155 mg/dL (05-19-21 @ 23:56)  POCT Blood Glucose.: 175 mg/dL (05-19-21 @ 16:59)  POCT Blood Glucose.: 159 mg/dL (05-19-21 @ 12:23)  POCT Blood Glucose.: 132 mg/dL (05-19-21 @ 05:59)  POCT Blood Glucose.: 136 mg/dL (05-18-21 @ 23:46)  POCT Blood Glucose.: 142 mg/dL (05-18-21 @ 17:19)  POCT Blood Glucose.: 175 mg/dL (05-18-21 @ 12:03)  POCT Blood Glucose.: 175 mg/dL (05-18-21 @ 06:13)  POCT Blood Glucose.: 151 mg/dL (05-17-21 @ 23:53)  POCT Blood Glucose.: 74 mg/dL (05-17-21 @ 17:14)                            9.6    15.93 )-----------( 662      ( 20 May 2021 09:19 )             30.9       05-20    133<L>  |  98  |  30<H>  ----------------------------<  160<H>  5.1   |  22  |  0.68    EGFR if : 122  EGFR if non : 105    Ca    9.1      05-20  Mg     2.4     05-20  Phos  4.1     05-20    TPro  7.3  /  Alb  2.5<L>  /  TBili  0.4  /  DBili  x   /  AST  26  /  ALT  30  /  AlkPhos  112  05-20 04-26 Chol 114 Direct LDL -- LDL calculated 53 HDL 35<L> Trig 126, 04-13 Chol -- Direct LDL -- LDL calculated -- HDL -- Trig 126    Thyroid Function Tests:  04-24 @ 11:11 TSH 2.00 FreeT4 -- T3 -- Anti TPO -- Anti Thyroglobulin Ab -- TSI --      A1C with Estimated Average Glucose Result: 10.3 % (04-02-21 @ 14:28)      Estimated Average Glucose: 249 mg/dL (04-02-21 @ 14:28)

## 2021-05-20 NOTE — PROGRESS NOTE ADULT - PROBLEM SELECTOR PLAN 3
-Acute ischemic strokes, with concern for hemorrhagic conversion, noted on CT scan on 4/8  -Etiology of acute ischemic stroke uncertain at this time; TTE with bubble performed at bedside in the MICU without any evidence of right-to-left shunt; no arrhythmia noted on telemetry throughout stay in the MICU  - CTA head and neck without any evidence of carotid artery stenosis or dissection    Retention   - olson placed  - c/w flomax    EMPYEMA   OR Vats 5/12 with Dr. Albarado   5/15 chest tubes to water seal  5/17 Angled diaphragm chest tube dc'd  5/18 Anterior Aroldo chest tube dc'd  Monitor drainage /Daily CXR  Maintain posterior chest tube to water seal.  Continue care per primary team  CT Chest 5/16 Interval new small cavitary lesions in the right lower lobe and right chest wall hematoma - no thoracic surgery intervention indicated per Dr. Albarado. -Acute ischemic strokes, with concern for hemorrhagic conversion, noted on CT scan on 4/8  -Etiology of acute ischemic stroke uncertain at this time; TTE with bubble performed at bedside in the MICU without any evidence of right-to-left shunt; no arrhythmia noted on telemetry throughout stay in the MICU  - CTA head and neck without any evidence of carotid artery stenosis or dissection    Retention   - olson placed  - c/w flomax    EMPYEMA   OR Vats 5/12 with Dr. Albarado   5/15 chest tubes removed, last one removed yesterday (5/19)  5/17 Angled diaphragm chest tube dc'd  5/18 Anterior Aroldo chest tube dc'd  CT Chest 5/16 Interval new small cavitary lesions in the right lower lobe and right chest wall hematoma - no thoracic surgery intervention indicated per Dr. Albarado.

## 2021-05-20 NOTE — PROGRESS NOTE ADULT - ASSESSMENT
61 y/o Gujarati speaking male with new onset diabetes (HbA1c of 10.3) admitted with COVID with prolonged hospital course requiring intubation and PEG insertion; s/p Right VATS converted to R thoracotomy for Empyema/bilateral PNA, abscess with recent CT Showing necrotizing PNA . On 24 hour continuous TF at goal. GOC conversations in progress per team.  Endocrine consulted for hyperglycemia mgt.  BG trending at/slightly above goal today increase NPH. BG Goal 100-180mg/dl

## 2021-05-20 NOTE — PROGRESS NOTE ADULT - PROBLEM SELECTOR PLAN 4
-Pulmonary embolus noted on ct scan performed at time of admission  -Etiology of venous thromboembolism uncertain at this time; no known history of thrombophilia; provoked in setting of acute covid illness   - On heparin drip rate of 13, now on hold for acute bleed in right obturator   - f/u CBC to monitor hbg  - will discharge on Eliquis -Pulmonary embolus noted on ct scan performed at time of admission  -Etiology of venous thromboembolism uncertain at this time; no known history of thrombophilia; provoked in setting of acute covid illness   - On heparin drip rate of 13, now on hold for acute bleed in right obturator   - f/u CBC to monitor hbg  - start 5000 heparin bid

## 2021-05-20 NOTE — PROGRESS NOTE ADULT - PROBLEM SELECTOR PLAN 1
5/12 he underwent right VATS converted to open thoracotomy, decortication, drainage of abscess and flex bronch. Pt briefly on lexi intraop and extubated in OR.   - s/p L. thoracotomy, drainage of abscess, decortication, flex bronch   - 1 angled chest tube , 1 straight chest tube, 1 [place] drain (anterior) - Air leak present post op   - Chest tube to suction at 10mmhg  - Air leak expected and seen  - Monitor pulse oximeter  - Out of bed to chair, ambulate as tolerated, and incentive spirometry to prevent atelectasis  - Pain control as needed with tylenol and oxy  - c/w meropenem 5/7-  - f/u ID about cultures of lung tissue   - BCx neg 5/14, UCx growing Kleb  - Now having low grade fevers (5/14), cont to monitor   - elevated WBC and dropping hbg, will f.u with CT on abd/pelvis and chest and get repeat CBC to monitor for any kind of bleed or inflammation.     Obturator Hematoma   - bleeding hematoma in right thigh on CT on 5/7  - CT hip showing non active bleed, hematoma on right thigh on 5/8  - doppler to r/o DVT in LE, per IR no role IVC filter  - active again on 5/17, monitor CBC, will check hbg q6, heparin on hold, will consult IR to embolize?

## 2021-05-20 NOTE — PROGRESS NOTE ADULT - ASSESSMENT
61 yo M with PMH of DM who was initially admitted to [facility] ICU 4/1 for hypoxic respiratory failure 2/2 COVID c/b PE with R heart strain, cardiogenic and septic shock, ischemic and hemorrhagic CVA and ESBL klebsiella ventilator associated PNA, transferred to [facility] for neurosurgery eval on 4/12  No fever, has leukocytosis  UCX with ESBL K pne  Bilateral LE dry gangrene  Has PEG  PCT minimally elevated  CT now with Empyema, concern for bilateral PNA, abscess  S/p OR Thoracotomy, abscess drainage, bronch (5/12)--culture with K pne  CT with areas of small cavitary lesions and hematomas  1) Leukocytosis  - Improving?  - Trend WBC  2) COVID  - COVID+, unclear significance (was prior negative, and patient Ab+)  - Monitor for any signs active COVID/reinfection  - Any considerations isolation per infection control  3) Suspected Empyema/Abscess  - Culture with K pne, Lactobacillus, Prevotella--should be adequately treated with jennifer  - Meropenem 1g q 8  - Note CT findings, follow surgery if any further procedure needed for persistent cavities/hematoma  - Suspect yeast will be colonizer--but monitor culture  - Appreciate Thoracic procedure  - F/U OR cultures  - If stabilization consider PO options vs IV depending on clinical status  4) Gangrene  - Appears noninfectious/dry gangrene  - Monitor wounds    Sammy Chambers MD  Pager [phone]  After 5pm and on weekends call [phone]

## 2021-05-20 NOTE — PROGRESS NOTE ADULT - SUBJECTIVE AND OBJECTIVE BOX
PROGRESS NOTE:   Authored by Dr. Emily Mckeon, ANH pager [phone]    Patient is a 60y old  Male who presents with a chief complaint of COVID19 w/ severe metabolic acidosis s/p intubation (19 May 2021 10:50)      SUBJECTIVE / OVERNIGHT EVENTS:    MEDICATIONS  (STANDING):  acetaminophen   Tablet .. 650 milliGRAM(s) Oral every 6 hours  cholecalciferol 400 Unit(s) Oral daily  insulin lispro (ADMELOG) corrective regimen sliding scale   SubCutaneous every 6 hours  insulin NPH human recombinant 13 Unit(s) SubCutaneous every 6 hours  levETIRAcetam  Solution 500 milliGRAM(s) Oral two times a day  meropenem  IVPB 1000 milliGRAM(s) IV Intermittent every 8 hours  polyethylene glycol 3350 17 Gram(s) Oral daily  senna 2 Tablet(s) Oral at bedtime  sodium chloride 0.9%. 1000 milliLiter(s) (75 mL/Hr) IV Continuous <Continuous>  tamsulosin 0.4 milliGRAM(s) Oral at bedtime  thiamine 100 milliGRAM(s) Oral daily    MEDICATIONS  (PRN):  albuterol/ipratropium for Nebulization 3 milliLiter(s) Nebulizer every 6 hours PRN Shortness of Breath and/or Wheezing      CAPILLARY BLOOD GLUCOSE  155 (20 May 2021 00:00)      POCT Blood Glucose.: 194 mg/dL (20 May 2021 06:03)  POCT Blood Glucose.: 155 mg/dL (19 May 2021 23:56)  POCT Blood Glucose.: 175 mg/dL (19 May 2021 16:59)  POCT Blood Glucose.: 159 mg/dL (19 May 2021 12:23)    I&O's Summary    19 May 2021 07:01  -  20 May 2021 07:00  --------------------------------------------------------  IN: 940 mL / OUT: 0 mL / NET: 940 mL        PHYSICAL EXAM:  Vital Signs Last 24 Hrs  T(C): 36.3 (20 May 2021 04:39), Max: 36.6 (19 May 2021 13:45)  T(F): 97.3 (20 May 2021 04:39), Max: 97.9 (19 May 2021 13:45)  HR: 97 (20 May 2021 04:39) (96 - 103)  BP: 100/66 (20 May 2021 04:39) (100/64 - 114/76)  BP(mean): --  RR: 18 (20 May 2021 04:39) (18 - 20)  SpO2: 95% (20 May 2021 04:39) (95% - 99%)    GENERAL: No acute distress, well-developed  HEAD:  Atraumatic, Normocephalic  EYES: EOMI, PERRLA, conjunctiva and sclera clear  NECK: Supple, no lymphadenopathy, no JVD  CHEST/LUNG: CTAB; No wheezes, rales, or rhonchi  HEART: Regular rate and rhythm; No murmurs, rubs, or gallops  ABDOMEN: Soft, non-tender, non-distended; normal bowel sounds, no organomegaly  EXTREMITIES:  2+ peripheral pulses b/l, No clubbing, cyanosis, or edema  NEUROLOGY: A&O x 3, no focal deficits  SKIN: No rashes or lesions    LABS:                        9.1    16.98 )-----------( 578      ( 19 May 2021 10:26 )             30.7     05-19    131<L>  |  99  |  26<H>  ----------------------------<  147<H>  5.4<H>   |  20<L>  |  0.70    Ca    9.0      19 May 2021 10:26  Phos  4.8     05-19  Mg     2.2     05-19    TPro  7.0  /  Alb  2.2<L>  /  TBili  0.4  /  DBili  x   /  AST  26  /  ALT  32  /  AlkPhos  112  05-19    PTT - ( 18 May 2021 19:18 )  PTT:27.4 sec            RADIOLOGY & ADDITIONAL TESTS:  Results Reviewed:   Imaging Personally Reviewed:  Electrocardiogram Personally Reviewed:    COORDINATION OF CARE:  Care Discussed with Consultants/Other Providers [Y/N]:  Prior or Outpatient Records Reviewed [Y/N]:   PROGRESS NOTE:   Authored by Dr. Emily Mckeon, ANH pager [phone]    Patient is a 60y old  Male who presents with a chief complaint of COVID19 w/ severe metabolic acidosis s/p intubation (19 May 2021 10:50)      SUBJECTIVE / OVERNIGHT EVENTS: Patient was seen and examined at bedside. he appeared well and did not have any significant changes since my exam yesterday      MEDICATIONS  (STANDING):  acetaminophen   Tablet .. 650 milliGRAM(s) Oral every 6 hours  cholecalciferol 400 Unit(s) Oral daily  insulin lispro (ADMELOG) corrective regimen sliding scale   SubCutaneous every 6 hours  insulin NPH human recombinant 13 Unit(s) SubCutaneous every 6 hours  levETIRAcetam  Solution 500 milliGRAM(s) Oral two times a day  meropenem  IVPB 1000 milliGRAM(s) IV Intermittent every 8 hours  polyethylene glycol 3350 17 Gram(s) Oral daily  senna 2 Tablet(s) Oral at bedtime  sodium chloride 0.9%. 1000 milliLiter(s) (75 mL/Hr) IV Continuous <Continuous>  tamsulosin 0.4 milliGRAM(s) Oral at bedtime  thiamine 100 milliGRAM(s) Oral daily    MEDICATIONS  (PRN):  albuterol/ipratropium for Nebulization 3 milliLiter(s) Nebulizer every 6 hours PRN Shortness of Breath and/or Wheezing      CAPILLARY BLOOD GLUCOSE  155 (20 May 2021 00:00)      POCT Blood Glucose.: 194 mg/dL (20 May 2021 06:03)  POCT Blood Glucose.: 155 mg/dL (19 May 2021 23:56)  POCT Blood Glucose.: 175 mg/dL (19 May 2021 16:59)  POCT Blood Glucose.: 159 mg/dL (19 May 2021 12:23)    I&O's Summary    19 May 2021 07:01  -  20 May 2021 07:00  --------------------------------------------------------  IN: 940 mL / OUT: 0 mL / NET: 940 mL        PHYSICAL EXAM:  Vital Signs Last 24 Hrs  T(C): 36.3 (20 May 2021 04:39), Max: 36.6 (19 May 2021 13:45)  T(F): 97.3 (20 May 2021 04:39), Max: 97.9 (19 May 2021 13:45)  HR: 97 (20 May 2021 04:39) (96 - 103)  BP: 100/66 (20 May 2021 04:39) (100/64 - 114/76)  BP(mean): --  RR: 18 (20 May 2021 04:39) (18 - 20)  SpO2: 95% (20 May 2021 04:39) (95% - 99%)    GENERAL: No acute distress, malnourished  HEAD:  Atraumatic, Normocephalic  EYES: EOMI, PERRLA, conjunctiva and sclera clear  NECK: Supple, no lymphadenopathy, no JVD  CHEST/LUNG: right chest wall hematoma, no erythema or discorloration, nontender, decreased breath sounds b/l  HEART: Regular rate and rhythm; No murmurs, rubs, or gallops  ABDOMEN: Soft, non-tender, non-distended; normal bowel sounds, no organomegaly, PEG in -place   EXTREMITIES:  2+ peripheral pulses b/l, No clubbing, cyanosis, or edema  NEUROLOGY: A&O x 2, generalized weakness   SKIN: gangrenous toes     LABS:                        9.1    16.98 )-----------( 578      ( 19 May 2021 10:26 )             30.7     05-19    131<L>  |  99  |  26<H>  ----------------------------<  147<H>  5.4<H>   |  20<L>  |  0.70    Ca    9.0      19 May 2021 10:26  Phos  4.8     05-19  Mg     2.2     05-19    TPro  7.0  /  Alb  2.2<L>  /  TBili  0.4  /  DBili  x   /  AST  26  /  ALT  32  /  AlkPhos  112  05-19    PTT - ( 18 May 2021 19:18 )  PTT:27.4 sec            RADIOLOGY & ADDITIONAL TESTS:  Results Reviewed:   Imaging Personally Reviewed:  Electrocardiogram Personally Reviewed:    COORDINATION OF CARE:  Care Discussed with Consultants/Other Providers [Y/N]:  Prior or Outpatient Records Reviewed [Y/N]:

## 2021-05-20 NOTE — PROGRESS NOTE ADULT - PROBLEM SELECTOR PLAN 3
-pt should be on a statin as he is over 40 with diabetes, check fasting lipids      Discussed with patient and Resident Linda Calvert NP   In House Pager: [phone]   office:  [phone] (M-F 9a-5pm)               [phone] (nights/weekends)

## 2021-05-21 LAB
ALBUMIN SERPL ELPH-MCNC: 2.7 G/DL — LOW (ref 3.3–5)
ALP SERPL-CCNC: 113 U/L — SIGNIFICANT CHANGE UP (ref 40–120)
ALT FLD-CCNC: 36 U/L — SIGNIFICANT CHANGE UP (ref 10–45)
ANION GAP SERPL CALC-SCNC: 13 MMOL/L — SIGNIFICANT CHANGE UP (ref 5–17)
AST SERPL-CCNC: 29 U/L — SIGNIFICANT CHANGE UP (ref 10–40)
BASOPHILS # BLD AUTO: 0.15 K/UL — SIGNIFICANT CHANGE UP (ref 0–0.2)
BASOPHILS NFR BLD AUTO: 0.9 % — SIGNIFICANT CHANGE UP (ref 0–2)
BILIRUB SERPL-MCNC: 0.4 MG/DL — SIGNIFICANT CHANGE UP (ref 0.2–1.2)
BUN SERPL-MCNC: 37 MG/DL — HIGH (ref 7–23)
CALCIUM SERPL-MCNC: 9.5 MG/DL — SIGNIFICANT CHANGE UP (ref 8.4–10.5)
CHLORIDE SERPL-SCNC: 99 MMOL/L — SIGNIFICANT CHANGE UP (ref 96–108)
CO2 SERPL-SCNC: 24 MMOL/L — SIGNIFICANT CHANGE UP (ref 22–31)
CREAT SERPL-MCNC: 0.72 MG/DL — SIGNIFICANT CHANGE UP (ref 0.5–1.3)
EOSINOPHIL # BLD AUTO: 0.52 K/UL — HIGH (ref 0–0.5)
EOSINOPHIL NFR BLD AUTO: 3.2 % — SIGNIFICANT CHANGE UP (ref 0–6)
GLUCOSE BLDC GLUCOMTR-MCNC: 112 MG/DL — HIGH (ref 70–99)
GLUCOSE BLDC GLUCOMTR-MCNC: 128 MG/DL — HIGH (ref 70–99)
GLUCOSE BLDC GLUCOMTR-MCNC: 158 MG/DL — HIGH (ref 70–99)
GLUCOSE BLDC GLUCOMTR-MCNC: 173 MG/DL — HIGH (ref 70–99)
GLUCOSE BLDC GLUCOMTR-MCNC: 174 MG/DL — HIGH (ref 70–99)
GLUCOSE SERPL-MCNC: 133 MG/DL — HIGH (ref 70–99)
HCT VFR BLD CALC: 31.7 % — LOW (ref 39–50)
HGB BLD-MCNC: 9.5 G/DL — LOW (ref 13–17)
IMM GRANULOCYTES NFR BLD AUTO: 6 % — HIGH (ref 0–1.5)
LYMPHOCYTES # BLD AUTO: 1.98 K/UL — SIGNIFICANT CHANGE UP (ref 1–3.3)
LYMPHOCYTES # BLD AUTO: 12.2 % — LOW (ref 13–44)
MAGNESIUM SERPL-MCNC: 2.4 MG/DL — SIGNIFICANT CHANGE UP (ref 1.6–2.6)
MCHC RBC-ENTMCNC: 24.4 PG — LOW (ref 27–34)
MCHC RBC-ENTMCNC: 30 GM/DL — LOW (ref 32–36)
MCV RBC AUTO: 81.3 FL — SIGNIFICANT CHANGE UP (ref 80–100)
MONOCYTES # BLD AUTO: 0.98 K/UL — HIGH (ref 0–0.9)
MONOCYTES NFR BLD AUTO: 6 % — SIGNIFICANT CHANGE UP (ref 2–14)
NEUTROPHILS # BLD AUTO: 11.6 K/UL — HIGH (ref 1.8–7.4)
NEUTROPHILS NFR BLD AUTO: 71.7 % — SIGNIFICANT CHANGE UP (ref 43–77)
NRBC # BLD: 0 /100 WBCS — SIGNIFICANT CHANGE UP (ref 0–0)
PHOSPHATE SERPL-MCNC: 4.4 MG/DL — SIGNIFICANT CHANGE UP (ref 2.5–4.5)
PLATELET # BLD AUTO: 697 K/UL — HIGH (ref 150–400)
POTASSIUM SERPL-MCNC: 5 MMOL/L — SIGNIFICANT CHANGE UP (ref 3.5–5.3)
POTASSIUM SERPL-SCNC: 5 MMOL/L — SIGNIFICANT CHANGE UP (ref 3.5–5.3)
PROT SERPL-MCNC: 7.5 G/DL — SIGNIFICANT CHANGE UP (ref 6–8.3)
RBC # BLD: 3.9 M/UL — LOW (ref 4.2–5.8)
RBC # FLD: 20.1 % — HIGH (ref 10.3–14.5)
SODIUM SERPL-SCNC: 136 MMOL/L — SIGNIFICANT CHANGE UP (ref 135–145)
WBC # BLD: 16.21 K/UL — HIGH (ref 3.8–10.5)
WBC # FLD AUTO: 16.21 K/UL — HIGH (ref 3.8–10.5)

## 2021-05-21 PROCEDURE — 99232 SBSQ HOSP IP/OBS MODERATE 35: CPT

## 2021-05-21 PROCEDURE — 99233 SBSQ HOSP IP/OBS HIGH 50: CPT | Mod: GC

## 2021-05-21 RX ORDER — HUMAN INSULIN 100 [IU]/ML
13 INJECTION, SUSPENSION SUBCUTANEOUS ONCE
Refills: 0 | Status: COMPLETED | OUTPATIENT
Start: 2021-05-21 | End: 2021-05-21

## 2021-05-21 RX ORDER — INSULIN GLARGINE 100 [IU]/ML
13 INJECTION, SOLUTION SUBCUTANEOUS ONCE
Refills: 0 | Status: DISCONTINUED | OUTPATIENT
Start: 2021-05-21 | End: 2021-05-21

## 2021-05-21 RX ADMIN — Medication 650 MILLIGRAM(S): at 13:47

## 2021-05-21 RX ADMIN — HUMAN INSULIN 13 UNIT(S): 100 INJECTION, SUSPENSION SUBCUTANEOUS at 00:39

## 2021-05-21 RX ADMIN — MEROPENEM 100 MILLIGRAM(S): 1 INJECTION INTRAVENOUS at 21:35

## 2021-05-21 RX ADMIN — Medication 2: at 00:39

## 2021-05-21 RX ADMIN — Medication 650 MILLIGRAM(S): at 13:05

## 2021-05-21 RX ADMIN — HEPARIN SODIUM 5000 UNIT(S): 5000 INJECTION INTRAVENOUS; SUBCUTANEOUS at 06:06

## 2021-05-21 RX ADMIN — HUMAN INSULIN 13 UNIT(S): 100 INJECTION, SUSPENSION SUBCUTANEOUS at 20:44

## 2021-05-21 RX ADMIN — Medication 100 MILLIGRAM(S): at 13:06

## 2021-05-21 RX ADMIN — Medication 2: at 06:19

## 2021-05-21 RX ADMIN — Medication 650 MILLIGRAM(S): at 00:05

## 2021-05-21 RX ADMIN — HEPARIN SODIUM 5000 UNIT(S): 5000 INJECTION INTRAVENOUS; SUBCUTANEOUS at 18:35

## 2021-05-21 RX ADMIN — HUMAN INSULIN 13 UNIT(S): 100 INJECTION, SUSPENSION SUBCUTANEOUS at 06:19

## 2021-05-21 RX ADMIN — Medication 650 MILLIGRAM(S): at 18:32

## 2021-05-21 RX ADMIN — Medication 650 MILLIGRAM(S): at 19:30

## 2021-05-21 RX ADMIN — Medication 650 MILLIGRAM(S): at 07:37

## 2021-05-21 RX ADMIN — MEROPENEM 100 MILLIGRAM(S): 1 INJECTION INTRAVENOUS at 06:07

## 2021-05-21 RX ADMIN — MEROPENEM 100 MILLIGRAM(S): 1 INJECTION INTRAVENOUS at 13:07

## 2021-05-21 RX ADMIN — LEVETIRACETAM 500 MILLIGRAM(S): 250 TABLET, FILM COATED ORAL at 06:07

## 2021-05-21 RX ADMIN — Medication 650 MILLIGRAM(S): at 06:07

## 2021-05-21 RX ADMIN — Medication 2 MILLIGRAM(S): at 21:35

## 2021-05-21 RX ADMIN — Medication 400 UNIT(S): at 13:06

## 2021-05-21 RX ADMIN — LEVETIRACETAM 500 MILLIGRAM(S): 250 TABLET, FILM COATED ORAL at 18:33

## 2021-05-21 RX ADMIN — HUMAN INSULIN 12 UNIT(S): 100 INJECTION, SUSPENSION SUBCUTANEOUS at 13:04

## 2021-05-21 NOTE — PROGRESS NOTE ADULT - ASSESSMENT
59 y/o Gujarati speaking male with new onset diabetes (HbA1c of 10.3) admitted with COVID with prolonged hospital course requiring intubation and PEG insertion; s/p Right VATS converted to R thoracotomy for Empyema/bilateral PNA, abscess with recent CT Showing necrotizing PNA . On 24 hour continuous TF at goal. GOC conversations in progress per team.  Endocrine consulted for hyperglycemia mgt. BG Goal 100-180mg/dl    BG at goal today on adjusted NPH regimen.

## 2021-05-21 NOTE — PROGRESS NOTE ADULT - PROBLEM SELECTOR PLAN 4
-Pulmonary embolus noted on ct scan performed at time of admission  -Etiology of venous thromboembolism uncertain at this time; no known history of thrombophilia; provoked in setting of acute covid illness   - s./p heparin drip rate of 13, now on hold for acute bleed in right obturator and right chest wall hematoma   - start 5000 heparin bid

## 2021-05-21 NOTE — PROGRESS NOTE ADULT - PROBLEM SELECTOR PLAN 1
- FS q6h, goal -180  -c/w NPH 12 units at 1200  -c/w NPH 13 units 0000; 0600; 1800  - if Dinner BG below 100 over weekend  would decrease 12noon NPH dose to 11 units.   - continue  moderate correction scale sq q6h.   - No clear pattern in BG to make further adjustments to regimen today    Discharge: basal/bolus, doses TBD

## 2021-05-21 NOTE — PROGRESS NOTE ADULT - SUBJECTIVE AND OBJECTIVE BOX
PROGRESS NOTE:   Authored by Dr. Emily Mckeon, ANH pager [phone]    Patient is a 60y old  Male who presents with a chief complaint of COVID19 w/ severe metabolic acidosis s/p intubation (20 May 2021 12:47)      SUBJECTIVE / OVERNIGHT EVENTS: patient examined at bedside. He appears comfortable and in no acute distress. Patient is woken up by voice and appears to be happy and not in anypain even with palpation of right side of chest or thigh. He nods yes to answers but for the most part appears confused. His mental status waxes and wanes      MEDICATIONS  (STANDING):  acetaminophen   Tablet .. 650 milliGRAM(s) Oral every 6 hours  cholecalciferol 400 Unit(s) Oral daily  doxazosin 2 milliGRAM(s) Oral at bedtime  heparin   Injectable 5000 Unit(s) SubCutaneous every 12 hours  insulin lispro (ADMELOG) corrective regimen sliding scale   SubCutaneous every 6 hours  insulin NPH human recombinant 12 Unit(s) SubCutaneous <User Schedule>  insulin NPH human recombinant 13 Unit(s) SubCutaneous <User Schedule>  levETIRAcetam  Solution 500 milliGRAM(s) Oral two times a day  meropenem  IVPB 1000 milliGRAM(s) IV Intermittent every 8 hours  polyethylene glycol 3350 17 Gram(s) Oral daily  senna 2 Tablet(s) Oral at bedtime  sodium chloride 0.9%. 1000 milliLiter(s) (75 mL/Hr) IV Continuous <Continuous>  thiamine 100 milliGRAM(s) Oral daily    MEDICATIONS  (PRN):  albuterol/ipratropium for Nebulization 3 milliLiter(s) Nebulizer every 6 hours PRN Shortness of Breath and/or Wheezing      CAPILLARY BLOOD GLUCOSE      POCT Blood Glucose.: 158 mg/dL (21 May 2021 06:09)  POCT Blood Glucose.: 174 mg/dL (21 May 2021 00:22)  POCT Blood Glucose.: 124 mg/dL (20 May 2021 18:21)  POCT Blood Glucose.: 92 mg/dL (20 May 2021 17:06)  POCT Blood Glucose.: 211 mg/dL (20 May 2021 13:18)  POCT Blood Glucose.: 219 mg/dL (20 May 2021 12:11)    I&O's Summary    20 May 2021 07:01  -  21 May 2021 07:00  --------------------------------------------------------  IN: 2130 mL / OUT: 0 mL / NET: 2130 mL        PHYSICAL EXAM:  Vital Signs Last 24 Hrs  T(C): 36.3 (21 May 2021 05:59), Max: 36.6 (21 May 2021 01:18)  T(F): 97.3 (21 May 2021 05:59), Max: 97.9 (21 May 2021 01:18)  HR: 100 (21 May 2021 05:59) (96 - 103)  BP: 113/78 (21 May 2021 05:59) (99/66 - 115/74)  BP(mean): --  RR: 18 (21 May 2021 05:59) (18 - 20)  SpO2: 97% (21 May 2021 05:59) (97% - 99%)    GENERAL: No acute distress, comfotable   HEAD:  Atraumatic, Normocephalic  EYES: EOMI, PERRLA, conjunctiva and sclera clear  NECK: Supple, no lymphadenopathy, no JVD  CHEST/LUNG: deminished breath sounds b/l, right side of chest with staples, notable hematoma on chets wall (nontender) swollen but not discolored, not growing in size, stable   HEART: Regular rate and rhythm; No murmurs, rubs, or gallops  ABDOMEN: Soft, non-tender, non-distended; normal bowel sounds, no organomegaly, PEG in place   EXTREMITIES:  2+ peripheral pulses b/l, No clubbing, cyanosis, or edema  NEUROLOGY: A&O x 1-2, generalized weakness   SKIN: gangrenous toes w/o change     LABS:                        9.6    15.93 )-----------( 662      ( 20 May 2021 09:19 )             30.9     05-20    133<L>  |  98  |  30<H>  ----------------------------<  160<H>  5.1   |  22  |  0.68    Ca    9.1      20 May 2021 09:19  Phos  4.1     05-20  Mg     2.4     05-20    TPro  7.3  /  Alb  2.5<L>  /  TBili  0.4  /  DBili  x   /  AST  26  /  ALT  30  /  AlkPhos  112  05-20                RADIOLOGY & ADDITIONAL TESTS:  Results Reviewed:   Imaging Personally Reviewed:  Electrocardiogram Personally Reviewed:    COORDINATION OF CARE:  Care Discussed with Consultants/Other Providers [Y/N]:  Prior or Outpatient Records Reviewed [Y/N]:

## 2021-05-21 NOTE — PROGRESS NOTE ADULT - ASSESSMENT
59 yo M with PMH of DM who was initially admitted to [facility] ICU 4/1 for hypoxic respiratory failure 2/2 COVID c/b PE with R heart strain, cardiogenic and septic shock, ischemic and hemorrhagic CVA and ESBL klebsiella ventilator associated PNA, transferred to [facility] for neurosurgery eval on 4/12  No fever, has leukocytosis  UCX with ESBL K pne  Bilateral LE dry gangrene  Has PEG  PCT minimally elevated  CT now with Empyema, concern for bilateral PNA, abscess  S/p OR Thoracotomy, abscess drainage, bronch (5/12)--culture with K pne  CT with areas of small cavitary lesions and hematomas  Patient appears generally stable at bedside  1) Leukocytosis  - Improving, stable  - Trend WBC  2) COVID  - COVID+, unclear significance (was prior negative, and patient Ab+)  - Monitor for any signs active COVID/reinfection  - Any considerations isolation per infection control  3) Suspected Empyema/Abscess  - Culture with K pne, Lactobacillus, Prevotella--should be adequately treated with jennifer  - Meropenem 1g q 8  - Note CT findings, follow surgery if any further procedure needed for persistent cavities/hematoma  - Suspect yeast will be colonizer--but monitor culture  - Appreciate Thoracic procedure  - F/U OR cultures  - If appears continued improved into Monday, would plan to complete course with PO abx (conversely, if worsening, may need to treat with full duration IV)  4) Gangrene  - Appears noninfectious/dry gangrene  - Monitor wounds    Sammy Chambers MD  Pager [phone]  After 5pm and on weekends call [phone]

## 2021-05-21 NOTE — PROGRESS NOTE ADULT - SUBJECTIVE AND OBJECTIVE BOX
CC: F/U for Empyema    Saw/spoke to patient. Appears generally well. No new complaints. Unchanged.    Allergies  No Known Allergies    ANTIMICROBIALS:  meropenem  IVPB 1000 every 8 hours    PE:    Vital Signs Last 24 Hrs  T(C): 36.2 (21 May 2021 13:22), Max: 36.6 (21 May 2021 01:18)  T(F): 97.1 (21 May 2021 13:22), Max: 97.9 (21 May 2021 01:18)  HR: 113 (21 May 2021 13:22) (96 - 113)  BP: 111/78 (21 May 2021 13:22) (99/66 - 113/78)  RR: 18 (21 May 2021 13:22) (18 - 20)  SpO2: 97% (21 May 2021 13:22) (96% - 99%)    Gen: AOx3, NAD, non-toxic  CV: S1+S2 normal, tachycardic  Resp: Clear bilat, no resp distress, no crackles/wheezes, chest tubes DCed  Abd: Soft, nontender, +BS  Ext: No LE edema, no wounds    LABS:                        9.5    16.21 )-----------( 697      ( 21 May 2021 07:34 )             31.7     05-21    136  |  99  |  37<H>  ----------------------------<  133<H>  5.0   |  24  |  0.72    Ca    9.5      21 May 2021 07:34  Phos  4.4     05-21  Mg     2.4     05-21    TPro  7.5  /  Alb  2.7<L>  /  TBili  0.4  /  DBili  x   /  AST  29  /  ALT  36  /  AlkPhos  113  05-21    MICROBIOLOGY:    .Blood Blood  05-14-21   No Growth Final     BAL BRONCHIAL AVEOLAR LAVAGE  05-13-21   Rare Yeast  --    Numerous polymorphonuclear leukocytes seen per low power field  No Squamous epithelial cells seen per low power field  Rare Gram Negative Rods seen per oil power field    .Tissue Other  05-12-21   Growth in fluid media only Klebsiella pneumoniae ESBL  Rare Lactobacillus rhamnosus "Susceptibilities not performed"  --  Klebsiella pneumoniae ESBL    .Urine Clean Catch (Midstream)  05-06-21   >100,000 CFU/ml Klebsiella pneumoniae ESBL  --  Klebsiella pneumoniae ESBL    (otherwise reviewed)    RADIOLOGY:    5/19 XR:    Frontal expiratory view of the chest shows the heart to be similar in size. The lungs show similar right pleural thickening or lateral effusion and there is no evidence of pneumothorax nor left pleural effusion. Gastric tube is again noted.    IMPRESSION:  No pneumothorax.

## 2021-05-21 NOTE — PROGRESS NOTE ADULT - ATTENDING COMMENTS
comfortable  tolerating heparin SQ  recent chest imaging showed no large PE  continue iv abx for now  d/w Dr. Chambers  final ID recs early next week ?oral vs iv abx  f/up thoracic surgery recs re: chest staples

## 2021-05-21 NOTE — PROGRESS NOTE ADULT - PROBLEM SELECTOR PLAN 3
-pt should be on a statin as he is over 40 with diabetes, check fasting lipids      Discussed with patient and RN Zoe Calvert NP   In House Pager: [phone]   office:  [phone] (M-F 9a-5pm)               [phone] (nights/weekends)

## 2021-05-21 NOTE — PROGRESS NOTE ADULT - ASSESSMENT
60 yr male with PMH of DM who was initially admitted to [facility] ICU for hypoxic respiratory failure 2/2 COVID c/b PE with R heart strain, cardiogenic and septic shock, ischemic and hemorrhagic CVA and ESBL klebsiella ventilator associate PNA, transferred to [facility] for neurosurgery eval and further management. s/p PEG, now monitoring for refeeding syndrome, complicated with right empyema planned for VATs

## 2021-05-21 NOTE — PROGRESS NOTE ADULT - SUBJECTIVE AND OBJECTIVE BOX
DIABETES FOLLOW UP : Seen earlier today    INTERVAL HX: pt drowsy on exam today, motioning hand "no" . BG below 100 at dinner yesterday , lowered lunch NPH , today AC Dinner  . BG stable today       Review of Systems:  unable to complete pt drowsy    Allergies    No Known Allergies      MEDICATIONS  (STANDING):  acetaminophen   Tablet .. 650 milliGRAM(s) Oral every 6 hours  cholecalciferol 400 Unit(s) Oral daily  doxazosin 2 milliGRAM(s) Oral at bedtime  heparin   Injectable 5000 Unit(s) SubCutaneous every 12 hours  insulin lispro (ADMELOG) corrective regimen sliding scale   SubCutaneous every 6 hours  insulin NPH human recombinant 12 Unit(s) SubCutaneous <User Schedule>  insulin NPH human recombinant 13 Unit(s) SubCutaneous <User Schedule>  levETIRAcetam  Solution 500 milliGRAM(s) Oral two times a day  meropenem  IVPB 1000 milliGRAM(s) IV Intermittent every 8 hours  polyethylene glycol 3350 17 Gram(s) Oral daily  senna 2 Tablet(s) Oral at bedtime  sodium chloride 0.9%. 1000 milliLiter(s) (75 mL/Hr) IV Continuous <Continuous>  thiamine 100 milliGRAM(s) Oral daily      PHYSICAL EXAM:  VITALS: T(C): 36.9 (05-21-21 @ 16:15)  T(F): 98.4 (05-21-21 @ 16:15), Max: 98.4 (05-21-21 @ 16:15)  HR: 98 (05-21-21 @ 16:15) (96 - 113)  BP: 106/69 (05-21-21 @ 16:15) (99/66 - 113/78)  RR:  (18 - 20)  SpO2:  (96% - 99%)  Wt(kg): --  GENERAL: male laying in bed in NAD  Abdomen: Soft, nontender, non distended; peg  Extremities: Warm, no edema in all 4 exts  NEURO: drowsy    LABS:  POCT Blood Glucose.: 128 mg/dL (05-21-21 @ 18:10)  POCT Blood Glucose.: 112 mg/dL (05-21-21 @ 12:09)  POCT Blood Glucose.: 158 mg/dL (05-21-21 @ 06:09)  POCT Blood Glucose.: 174 mg/dL (05-21-21 @ 00:22)  POCT Blood Glucose.: 124 mg/dL (05-20-21 @ 18:21)  POCT Blood Glucose.: 92 mg/dL (05-20-21 @ 17:06)  POCT Blood Glucose.: 211 mg/dL (05-20-21 @ 13:18)  POCT Blood Glucose.: 219 mg/dL (05-20-21 @ 12:11)  POCT Blood Glucose.: 194 mg/dL (05-20-21 @ 06:03)  POCT Blood Glucose.: 155 mg/dL (05-19-21 @ 23:56)  POCT Blood Glucose.: 175 mg/dL (05-19-21 @ 16:59)  POCT Blood Glucose.: 159 mg/dL (05-19-21 @ 12:23)  POCT Blood Glucose.: 132 mg/dL (05-19-21 @ 05:59)  POCT Blood Glucose.: 136 mg/dL (05-18-21 @ 23:46)                            9.5    16.21 )-----------( 697      ( 21 May 2021 07:34 )             31.7       05-21    136  |  99  |  37<H>  ----------------------------<  133<H>  5.0   |  24  |  0.72    EGFR if : 119  EGFR if non : 103    Ca    9.5      05-21  Mg     2.4     05-21  Phos  4.4     05-21    TPro  7.5  /  Alb  2.7<L>  /  TBili  0.4  /  DBili  x   /  AST  29  /  ALT  36  /  AlkPhos  113  05-21 04-26 Chol 114 Direct LDL -- LDL calculated 53 HDL 35<L> Trig 126, 04-13 Chol -- Direct LDL -- LDL calculated -- HDL -- Trig 126    Thyroid Function Tests:  04-24 @ 11:11 TSH 2.00 FreeT4 -- T3 -- Anti TPO -- Anti Thyroglobulin Ab -- TSI --      A1C with Estimated Average Glucose Result: 10.3 % (04-02-21 @ 14:28)      Estimated Average Glucose: 249 mg/dL (04-02-21 @ 14:28)

## 2021-05-21 NOTE — PROGRESS NOTE ADULT - PROBLEM SELECTOR PLAN 9
- Initiated goals of care discussion with patient's brother-in-law (listed in chart as primary point of contact); patient is full code at this time; goal is as much recovery as possible  -The patient's wife is in [place], but the patient's brother-in-law has been visiting and placing the patient's wife on facetime; she is aware of all that is happening and has happened    5/17: Will need regroup with palliative for another family meeting

## 2021-05-21 NOTE — PROGRESS NOTE ADULT - PROBLEM SELECTOR PLAN 3
-Acute ischemic strokes, with concern for hemorrhagic conversion, noted on CT scan on 4/8  -Etiology of acute ischemic stroke uncertain at this time; TTE with bubble performed at bedside in the MICU without any evidence of right-to-left shunt; no arrhythmia noted on telemetry throughout stay in the MICU  - CTA head and neck without any evidence of carotid artery stenosis or dissection    Retention   - olson placed  - c/w flomax    EMPYEMA   OR Vats 5/12 with Dr. Albarado   5/15 chest tubes, last one removed (5/19)  5/17 Angled diaphragm chest tube dc'd  5/18 Anterior Aroldo chest tube dc'd  CT Chest 5/16 Interval new small cavitary lesions in the right lower lobe and right chest wall hematoma - no thoracic surgery intervention indicated per Dr. Albarado.

## 2021-05-21 NOTE — PROGRESS NOTE ADULT - PROBLEM SELECTOR PLAN 1
5/12 he underwent right VATS converted to open thoracotomy, decortication, drainage of abscess and flex bronch. Pt briefly on lexi intraop and extubated in OR.   - s/p L. thoracotomy, drainage of abscess, decortication, flex bronch   - s/p 1 angled chest tube , 1 straight chest tube, 1 Blakemore drain (anterior)   - Monitor pulse oximeter  - Out of bed to chair, ambulate as tolerated, and incentive spirometry to prevent atelectasis  - Pain control as needed with tylenol and oxy  - c/w meropenem 5/7-    Obturator Hematoma   - bleeding hematoma in right thigh on CT on 5/7  - CT hip showing non active bleed, hematoma on right thigh on 5/8  - doppler to r/o DVT in LE, per IR no role IVC filter  - active again on 5/17, now stable

## 2021-05-21 NOTE — PROGRESS NOTE ADULT - SUBJECTIVE AND OBJECTIVE BOX
Patient is a 60y old  Male who presents with a chief complaint of COVID19 w/ severe metabolic acidosis s/p intubation (21 May 2021 18:57)       INTERVAL HPI/OVERNIGHT EVENTS:  Patient seen and evaluated at bedside.  Pt is resting comfortable in NAD.     Allergies    No Known Allergies    Intolerances        Vital Signs Last 24 Hrs  T(C): 36.4 (21 May 2021 19:59), Max: 36.9 (21 May 2021 16:15)  T(F): 97.5 (21 May 2021 19:59), Max: 98.4 (21 May 2021 16:15)  HR: 98 (21 May 2021 19:59) (98 - 113)  BP: 98/65 (21 May 2021 19:59) (98/65 - 113/78)  BP(mean): --  RR: 20 (21 May 2021 19:59) (18 - 20)  SpO2: 96% (21 May 2021 19:59) (96% - 97%)    LABS:                        9.5    16.21 )-----------( 697      ( 21 May 2021 07:34 )             31.7     05-21    136  |  99  |  37<H>  ----------------------------<  133<H>  5.0   |  24  |  0.72    Ca    9.5      21 May 2021 07:34  Phos  4.4     05-21  Mg     2.4     05-21    TPro  7.5  /  Alb  2.7<L>  /  TBili  0.4  /  DBili  x   /  AST  29  /  ALT  36  /  AlkPhos  113  05-21        CAPILLARY BLOOD GLUCOSE      POCT Blood Glucose.: 173 mg/dL (21 May 2021 20:24)  POCT Blood Glucose.: 128 mg/dL (21 May 2021 18:10)  POCT Blood Glucose.: 112 mg/dL (21 May 2021 12:09)  POCT Blood Glucose.: 158 mg/dL (21 May 2021 06:09)  POCT Blood Glucose.: 174 mg/dL (21 May 2021 00:22)      Lower Extremity Physical Exam:  Vascular: DP/PT 2/4, B/L, CFT <3 seconds B/L, Temperature gradient warm to cool, B/L however diminished warmth noted on the R foot past TMT  Ortho: toes painful to palp  Neuro: Epicritic sensation intact to the level of digits, B/L.  Skin: bilateral digital gangrenous changes noted to distal tips of digits, notably on L foot 2nd digit full thickness, well adhered; R foot 2nd digit sloughed skin with no probe to bone nor signs of infection

## 2021-05-22 LAB
ALBUMIN SERPL ELPH-MCNC: 2.8 G/DL — LOW (ref 3.3–5)
ALP SERPL-CCNC: 116 U/L — SIGNIFICANT CHANGE UP (ref 40–120)
ALT FLD-CCNC: 34 U/L — SIGNIFICANT CHANGE UP (ref 10–45)
ANION GAP SERPL CALC-SCNC: 14 MMOL/L — SIGNIFICANT CHANGE UP (ref 5–17)
APTT BLD: 30.5 SEC — SIGNIFICANT CHANGE UP (ref 27.5–35.5)
APTT BLD: 64.2 SEC — HIGH (ref 27.5–35.5)
APTT BLD: 69.5 SEC — HIGH (ref 27.5–35.5)
AST SERPL-CCNC: 29 U/L — SIGNIFICANT CHANGE UP (ref 10–40)
BILIRUB SERPL-MCNC: 0.4 MG/DL — SIGNIFICANT CHANGE UP (ref 0.2–1.2)
BUN SERPL-MCNC: 40 MG/DL — HIGH (ref 7–23)
CALCIUM SERPL-MCNC: 9.6 MG/DL — SIGNIFICANT CHANGE UP (ref 8.4–10.5)
CHLORIDE SERPL-SCNC: 98 MMOL/L — SIGNIFICANT CHANGE UP (ref 96–108)
CO2 SERPL-SCNC: 23 MMOL/L — SIGNIFICANT CHANGE UP (ref 22–31)
CREAT SERPL-MCNC: 0.75 MG/DL — SIGNIFICANT CHANGE UP (ref 0.5–1.3)
GLUCOSE BLDC GLUCOMTR-MCNC: 163 MG/DL — HIGH (ref 70–99)
GLUCOSE BLDC GLUCOMTR-MCNC: 181 MG/DL — HIGH (ref 70–99)
GLUCOSE BLDC GLUCOMTR-MCNC: 200 MG/DL — HIGH (ref 70–99)
GLUCOSE BLDC GLUCOMTR-MCNC: 211 MG/DL — HIGH (ref 70–99)
GLUCOSE BLDC GLUCOMTR-MCNC: 93 MG/DL — SIGNIFICANT CHANGE UP (ref 70–99)
GLUCOSE BLDC GLUCOMTR-MCNC: 98 MG/DL — SIGNIFICANT CHANGE UP (ref 70–99)
GLUCOSE SERPL-MCNC: 145 MG/DL — HIGH (ref 70–99)
HCT VFR BLD CALC: 29.2 % — LOW (ref 39–50)
HCT VFR BLD CALC: 29.5 % — LOW (ref 39–50)
HCT VFR BLD CALC: 30.4 % — LOW (ref 39–50)
HGB BLD-MCNC: 8.8 G/DL — LOW (ref 13–17)
HGB BLD-MCNC: 9.1 G/DL — LOW (ref 13–17)
HGB BLD-MCNC: 9.2 G/DL — LOW (ref 13–17)
INR BLD: 1.03 RATIO — SIGNIFICANT CHANGE UP (ref 0.88–1.16)
MAGNESIUM SERPL-MCNC: 2.6 MG/DL — SIGNIFICANT CHANGE UP (ref 1.6–2.6)
MCHC RBC-ENTMCNC: 24.4 PG — LOW (ref 27–34)
MCHC RBC-ENTMCNC: 24.7 PG — LOW (ref 27–34)
MCHC RBC-ENTMCNC: 25.1 PG — LOW (ref 27–34)
MCHC RBC-ENTMCNC: 29.8 GM/DL — LOW (ref 32–36)
MCHC RBC-ENTMCNC: 30.3 GM/DL — LOW (ref 32–36)
MCHC RBC-ENTMCNC: 31.2 GM/DL — LOW (ref 32–36)
MCV RBC AUTO: 80.4 FL — SIGNIFICANT CHANGE UP (ref 80–100)
MCV RBC AUTO: 81.5 FL — SIGNIFICANT CHANGE UP (ref 80–100)
MCV RBC AUTO: 81.9 FL — SIGNIFICANT CHANGE UP (ref 80–100)
NRBC # BLD: 0 /100 WBCS — SIGNIFICANT CHANGE UP (ref 0–0)
PHOSPHATE SERPL-MCNC: 4.3 MG/DL — SIGNIFICANT CHANGE UP (ref 2.5–4.5)
PLATELET # BLD AUTO: 626 K/UL — HIGH (ref 150–400)
PLATELET # BLD AUTO: 638 K/UL — HIGH (ref 150–400)
PLATELET # BLD AUTO: 682 K/UL — HIGH (ref 150–400)
POTASSIUM SERPL-MCNC: 5 MMOL/L — SIGNIFICANT CHANGE UP (ref 3.5–5.3)
POTASSIUM SERPL-SCNC: 5 MMOL/L — SIGNIFICANT CHANGE UP (ref 3.5–5.3)
PROT SERPL-MCNC: 7.6 G/DL — SIGNIFICANT CHANGE UP (ref 6–8.3)
PROTHROM AB SERPL-ACNC: 12.3 SEC — SIGNIFICANT CHANGE UP (ref 10.6–13.6)
RBC # BLD: 3.6 M/UL — LOW (ref 4.2–5.8)
RBC # BLD: 3.63 M/UL — LOW (ref 4.2–5.8)
RBC # BLD: 3.73 M/UL — LOW (ref 4.2–5.8)
RBC # FLD: 19.9 % — HIGH (ref 10.3–14.5)
RBC # FLD: 19.9 % — HIGH (ref 10.3–14.5)
RBC # FLD: 20 % — HIGH (ref 10.3–14.5)
SODIUM SERPL-SCNC: 135 MMOL/L — SIGNIFICANT CHANGE UP (ref 135–145)
WBC # BLD: 13.99 K/UL — HIGH (ref 3.8–10.5)
WBC # BLD: 14.81 K/UL — HIGH (ref 3.8–10.5)
WBC # BLD: 15.24 K/UL — HIGH (ref 3.8–10.5)
WBC # FLD AUTO: 13.99 K/UL — HIGH (ref 3.8–10.5)
WBC # FLD AUTO: 14.81 K/UL — HIGH (ref 3.8–10.5)
WBC # FLD AUTO: 15.24 K/UL — HIGH (ref 3.8–10.5)

## 2021-05-22 PROCEDURE — 99233 SBSQ HOSP IP/OBS HIGH 50: CPT | Mod: GC

## 2021-05-22 RX ORDER — HEPARIN SODIUM 5000 [USP'U]/ML
INJECTION INTRAVENOUS; SUBCUTANEOUS
Qty: 25000 | Refills: 0 | Status: DISCONTINUED | OUTPATIENT
Start: 2021-05-22 | End: 2021-05-23

## 2021-05-22 RX ORDER — HUMAN INSULIN 100 [IU]/ML
13 INJECTION, SUSPENSION SUBCUTANEOUS
Refills: 0 | Status: DISCONTINUED | OUTPATIENT
Start: 2021-05-22 | End: 2021-05-23

## 2021-05-22 RX ADMIN — Medication 650 MILLIGRAM(S): at 17:50

## 2021-05-22 RX ADMIN — Medication 650 MILLIGRAM(S): at 23:58

## 2021-05-22 RX ADMIN — LEVETIRACETAM 500 MILLIGRAM(S): 250 TABLET, FILM COATED ORAL at 17:50

## 2021-05-22 RX ADMIN — Medication 650 MILLIGRAM(S): at 06:54

## 2021-05-22 RX ADMIN — Medication 3 MILLILITER(S): at 09:13

## 2021-05-22 RX ADMIN — HEPARIN SODIUM 1100 UNIT(S)/HR: 5000 INJECTION INTRAVENOUS; SUBCUTANEOUS at 09:14

## 2021-05-22 RX ADMIN — HUMAN INSULIN 13 UNIT(S): 100 INJECTION, SUSPENSION SUBCUTANEOUS at 06:28

## 2021-05-22 RX ADMIN — MEROPENEM 100 MILLIGRAM(S): 1 INJECTION INTRAVENOUS at 21:42

## 2021-05-22 RX ADMIN — HUMAN INSULIN 13 UNIT(S): 100 INJECTION, SUSPENSION SUBCUTANEOUS at 17:50

## 2021-05-22 RX ADMIN — LEVETIRACETAM 500 MILLIGRAM(S): 250 TABLET, FILM COATED ORAL at 05:38

## 2021-05-22 RX ADMIN — SENNA PLUS 2 TABLET(S): 8.6 TABLET ORAL at 21:42

## 2021-05-22 RX ADMIN — Medication 650 MILLIGRAM(S): at 12:43

## 2021-05-22 RX ADMIN — HUMAN INSULIN 13 UNIT(S): 100 INJECTION, SUSPENSION SUBCUTANEOUS at 23:56

## 2021-05-22 RX ADMIN — HEPARIN SODIUM 5000 UNIT(S): 5000 INJECTION INTRAVENOUS; SUBCUTANEOUS at 05:39

## 2021-05-22 RX ADMIN — HEPARIN SODIUM 1100 UNIT(S)/HR: 5000 INJECTION INTRAVENOUS; SUBCUTANEOUS at 23:05

## 2021-05-22 RX ADMIN — MEROPENEM 100 MILLIGRAM(S): 1 INJECTION INTRAVENOUS at 12:46

## 2021-05-22 RX ADMIN — Medication 2: at 06:28

## 2021-05-22 RX ADMIN — Medication 0: at 23:55

## 2021-05-22 RX ADMIN — MEROPENEM 100 MILLIGRAM(S): 1 INJECTION INTRAVENOUS at 05:39

## 2021-05-22 RX ADMIN — Medication 650 MILLIGRAM(S): at 00:35

## 2021-05-22 RX ADMIN — Medication 650 MILLIGRAM(S): at 05:39

## 2021-05-22 RX ADMIN — Medication 650 MILLIGRAM(S): at 18:15

## 2021-05-22 RX ADMIN — Medication 400 UNIT(S): at 12:44

## 2021-05-22 RX ADMIN — Medication 2: at 01:29

## 2021-05-22 RX ADMIN — HUMAN INSULIN 12 UNIT(S): 100 INJECTION, SUSPENSION SUBCUTANEOUS at 12:42

## 2021-05-22 RX ADMIN — HEPARIN SODIUM 1100 UNIT(S)/HR: 5000 INJECTION INTRAVENOUS; SUBCUTANEOUS at 16:34

## 2021-05-22 RX ADMIN — Medication 2 MILLIGRAM(S): at 21:43

## 2021-05-22 RX ADMIN — Medication 4: at 12:41

## 2021-05-22 RX ADMIN — Medication 650 MILLIGRAM(S): at 00:05

## 2021-05-22 RX ADMIN — Medication 650 MILLIGRAM(S): at 13:05

## 2021-05-22 RX ADMIN — POLYETHYLENE GLYCOL 3350 17 GRAM(S): 17 POWDER, FOR SOLUTION ORAL at 12:43

## 2021-05-22 RX ADMIN — Medication 100 MILLIGRAM(S): at 12:43

## 2021-05-22 RX ADMIN — Medication 2: at 17:49

## 2021-05-22 RX ADMIN — HUMAN INSULIN 13 UNIT(S): 100 INJECTION, SUSPENSION SUBCUTANEOUS at 01:30

## 2021-05-22 NOTE — PROGRESS NOTE ADULT - PROBLEM SELECTOR PLAN 7
Dysphagia noted in setting of patient's acute alteration in mental status and hx of intubation  - Nasogastric tube placed for administration of tube feeds originally, but patient self removed 4/17; multiple attempts to replace unsuccessful   Evaluated by speech & swallow: unable to cooperate with FEES study, cineesophagram performed demonstrating aspiration  - PEG tube placed on 4/30, tube feedings started on 4/30, will slowly increase rate by 10 ml q24hrs, currently at 20cc/hr, + free water q8 Statement Selected

## 2021-05-22 NOTE — PROGRESS NOTE ADULT - ATTENDING COMMENTS
stable hb  no active bleeding  prior CT with PE and right heart strain  prior splenic infarcts  mostly in bed  await acute rehab next week  careful resumption of AC with heparin drip  will d/w pulmonary updated input  CBC q8  d/w RN at bedside the plan of care  patient with multiple co-morbid conditions; higher risk for future complications despite optimal medical therapy

## 2021-05-22 NOTE — PROGRESS NOTE ADULT - PROBLEM SELECTOR PLAN 4
-Pulmonary embolus noted on ct scan performed at time of admission  -Etiology of venous thromboembolism uncertain at this time; no known history of thrombophilia; provoked in setting of acute covid illness   - s./p heparin drip rate of 13, now on hold for acute bleed in right obturator and right chest wall hematoma   - start 5000 heparin bid; restart full AC if hgb and hematomas stable

## 2021-05-22 NOTE — PROGRESS NOTE ADULT - ASSESSMENT
Dudley Batres MD PGY3  Pager: [phone] ANH, [phone] [facility]  After 7: Night Float pager    Patient is a 60y old  Male who presents with a chief complaint of COVID19 w/ severe metabolic acidosis s/p intubation (21 May 2021 19:32)      SUBJECTIVE / OVERNIGHT EVENTS: Overnight, no acute events. Patient seen and examined at bedside this AM.     MEDICATIONS  (STANDING):  acetaminophen   Tablet .. 650 milliGRAM(s) Oral every 6 hours  cholecalciferol 400 Unit(s) Oral daily  doxazosin 2 milliGRAM(s) Oral at bedtime  heparin   Injectable 5000 Unit(s) SubCutaneous every 12 hours  insulin lispro (ADMELOG) corrective regimen sliding scale   SubCutaneous every 6 hours  insulin NPH human recombinant 12 Unit(s) SubCutaneous <User Schedule>  insulin NPH human recombinant 13 Unit(s) SubCutaneous <User Schedule>  levETIRAcetam  Solution 500 milliGRAM(s) Oral two times a day  meropenem  IVPB 1000 milliGRAM(s) IV Intermittent every 8 hours  polyethylene glycol 3350 17 Gram(s) Oral daily  senna 2 Tablet(s) Oral at bedtime  sodium chloride 0.9%. 1000 milliLiter(s) (75 mL/Hr) IV Continuous <Continuous>  thiamine 100 milliGRAM(s) Oral daily    MEDICATIONS  (PRN):  albuterol/ipratropium for Nebulization 3 milliLiter(s) Nebulizer every 6 hours PRN Shortness of Breath and/or Wheezing      Vital Signs Last 24 Hrs  T(C): 36.3 (22 May 2021 05:37), Max: 36.9 (21 May 2021 16:15)  T(F): 97.4 (22 May 2021 05:37), Max: 98.4 (21 May 2021 16:15)  HR: 96 (22 May 2021 05:37) (96 - 113)  BP: 100/63 (22 May 2021 05:37) (97/60 - 111/78)  BP(mean): --  RR: 18 (22 May 2021 05:37) (18 - 20)  SpO2: 97% (22 May 2021 05:37) (96% - 97%)  CAPILLARY BLOOD GLUCOSE      POCT Blood Glucose.: 163 mg/dL (22 May 2021 05:58)  POCT Blood Glucose.: 200 mg/dL (22 May 2021 01:15)  POCT Blood Glucose.: 173 mg/dL (21 May 2021 20:24)  POCT Blood Glucose.: 128 mg/dL (21 May 2021 18:10)  POCT Blood Glucose.: 112 mg/dL (21 May 2021 12:09)    I&O's Summary    20 May 2021 07:01  -  21 May 2021 07:00  --------------------------------------------------------  IN: 3030 mL / OUT: 0 mL / NET: 3030 mL    21 May 2021 07:01  -  22 May 2021 06:17  --------------------------------------------------------  IN: 1160 mL / OUT: 0 mL / NET: 1160 mL        PHYSICAL EXAM:  GENERAL: NAD, well-developed  EYES: EOMI, PERRLA, conjunctiva and sclera clear  NECK:  No JVD  CHEST/LUNG: CTABL ; No wheeze  HEART: RRR; No murmurs  ABDOMEN: Soft, Nontender, Nondistended; Bowel sounds present  : No Mann  EXTREMITIES:  2+ Peripheral Pulses, No edema  PSYCH: AAOx0  NEUROLOGY: non-focal  SKIN: No rashes or lesions. No sacral ulcer    LABS:                        9.5    16.21 )-----------( 697      ( 21 May 2021 07:34 )             31.7     05-21    136  |  99  |  37<H>  ----------------------------<  133<H>  5.0   |  24  |  0.72    Ca    9.5      21 May 2021 07:34  Phos  4.4     05-21  Mg     2.4     05-21    TPro  7.5  /  Alb  2.7<L>  /  TBili  0.4  /  DBili  x   /  AST  29  /  ALT  36  /  AlkPhos  113  05-21              Microbiology;        RADIOLOGY & ADDITIONAL TESTS:    Imaging Personally Reviewed:          Consultant(s) Notes Reviewed:  podiatry    Care Discussed with Consultants/Other Providers: podiatry       60 yr male with PMH of DM who was initially admitted to [facility] ICU for hypoxic respiratory failure 2/2 COVID c/b PE with R heart strain, cardiogenic and septic shock, ischemic and hemorrhagic CVA and ESBL klebsiella ventilator associate PNA, transferred to [facility] for neurosurgery eval and further management. s/p PEG, now monitoring for refeeding syndrome, complicated with right empyema planned for VATs.

## 2021-05-22 NOTE — PROGRESS NOTE ADULT - SUBJECTIVE AND OBJECTIVE BOX
Dudley Batres MD PGY3  Pager: [phone] ANH, [phone] [facility]  After 7: Night Float pager    Patient is a 60y old  Male who presents with a chief complaint of COVID19 w/ severe metabolic acidosis s/p intubation (21 May 2021 19:32)      SUBJECTIVE / OVERNIGHT EVENTS: Overnight, no acute events. Patient seen and examined at bedside this AM.     MEDICATIONS  (STANDING):  acetaminophen   Tablet .. 650 milliGRAM(s) Oral every 6 hours  cholecalciferol 400 Unit(s) Oral daily  doxazosin 2 milliGRAM(s) Oral at bedtime  heparin   Injectable 5000 Unit(s) SubCutaneous every 12 hours  insulin lispro (ADMELOG) corrective regimen sliding scale   SubCutaneous every 6 hours  insulin NPH human recombinant 12 Unit(s) SubCutaneous <User Schedule>  insulin NPH human recombinant 13 Unit(s) SubCutaneous <User Schedule>  levETIRAcetam  Solution 500 milliGRAM(s) Oral two times a day  meropenem  IVPB 1000 milliGRAM(s) IV Intermittent every 8 hours  polyethylene glycol 3350 17 Gram(s) Oral daily  senna 2 Tablet(s) Oral at bedtime  sodium chloride 0.9%. 1000 milliLiter(s) (75 mL/Hr) IV Continuous <Continuous>  thiamine 100 milliGRAM(s) Oral daily    MEDICATIONS  (PRN):  albuterol/ipratropium for Nebulization 3 milliLiter(s) Nebulizer every 6 hours PRN Shortness of Breath and/or Wheezing      Vital Signs Last 24 Hrs  T(C): 36.3 (22 May 2021 05:37), Max: 36.9 (21 May 2021 16:15)  T(F): 97.4 (22 May 2021 05:37), Max: 98.4 (21 May 2021 16:15)  HR: 96 (22 May 2021 05:37) (96 - 113)  BP: 100/63 (22 May 2021 05:37) (97/60 - 111/78)  BP(mean): --  RR: 18 (22 May 2021 05:37) (18 - 20)  SpO2: 97% (22 May 2021 05:37) (96% - 97%)  CAPILLARY BLOOD GLUCOSE      POCT Blood Glucose.: 163 mg/dL (22 May 2021 05:58)  POCT Blood Glucose.: 200 mg/dL (22 May 2021 01:15)  POCT Blood Glucose.: 173 mg/dL (21 May 2021 20:24)  POCT Blood Glucose.: 128 mg/dL (21 May 2021 18:10)  POCT Blood Glucose.: 112 mg/dL (21 May 2021 12:09)    I&O's Summary    20 May 2021 07:01  -  21 May 2021 07:00  --------------------------------------------------------  IN: 3030 mL / OUT: 0 mL / NET: 3030 mL    21 May 2021 07:01  -  22 May 2021 06:17  --------------------------------------------------------  IN: 1160 mL / OUT: 0 mL / NET: 1160 mL        PHYSICAL EXAM:  GENERAL: No acute distress, comfotable   HEAD:  Atraumatic, Normocephalic  EYES: EOMI, PERRLA, conjunctiva and sclera clear  NECK: Supple, no lymphadenopathy, no JVD  CHEST/LUNG: deminished breath sounds b/l, right side of chest with staples, notable hematoma on chest wall (nontender) swollen but not discolored, not growing in size, stable   HEART: Regular rate and rhythm; No murmurs, rubs, or gallops  ABDOMEN: Soft, non-tender, non-distended; normal bowel sounds, no organomegaly, PEG in place   EXTREMITIES:  2+ peripheral pulses b/l, No clubbing, cyanosis, or edema  NEUROLOGY: A&O x 1-2, generalized weakness   SKIN: gangrenous toes w/o change     LABS:                        9.5    16.21 )-----------( 697      ( 21 May 2021 07:34 )             31.7     05-21    136  |  99  |  37<H>  ----------------------------<  133<H>  5.0   |  24  |  0.72    Ca    9.5      21 May 2021 07:34  Phos  4.4     05-21  Mg     2.4     05-21    TPro  7.5  /  Alb  2.7<L>  /  TBili  0.4  /  DBili  x   /  AST  29  /  ALT  36  /  AlkPhos  113  05-21              Microbiology;        RADIOLOGY & ADDITIONAL TESTS:    Imaging Personally Reviewed:          Consultant(s) Notes Reviewed:  podiatry    Care Discussed with Consultants/Other Providers: podiatry

## 2021-05-23 LAB
ALBUMIN SERPL ELPH-MCNC: 2.9 G/DL — LOW (ref 3.3–5)
ALP SERPL-CCNC: 118 U/L — SIGNIFICANT CHANGE UP (ref 40–120)
ALT FLD-CCNC: 34 U/L — SIGNIFICANT CHANGE UP (ref 10–45)
ANION GAP SERPL CALC-SCNC: 12 MMOL/L — SIGNIFICANT CHANGE UP (ref 5–17)
APTT BLD: 47.1 SEC — HIGH (ref 27.5–35.5)
AST SERPL-CCNC: 29 U/L — SIGNIFICANT CHANGE UP (ref 10–40)
BILIRUB SERPL-MCNC: 0.5 MG/DL — SIGNIFICANT CHANGE UP (ref 0.2–1.2)
BLD GP AB SCN SERPL QL: NEGATIVE — SIGNIFICANT CHANGE UP
BUN SERPL-MCNC: 37 MG/DL — HIGH (ref 7–23)
CALCIUM SERPL-MCNC: 9.6 MG/DL — SIGNIFICANT CHANGE UP (ref 8.4–10.5)
CHLORIDE SERPL-SCNC: 98 MMOL/L — SIGNIFICANT CHANGE UP (ref 96–108)
CO2 SERPL-SCNC: 26 MMOL/L — SIGNIFICANT CHANGE UP (ref 22–31)
CREAT SERPL-MCNC: 0.72 MG/DL — SIGNIFICANT CHANGE UP (ref 0.5–1.3)
GLUCOSE BLDC GLUCOMTR-MCNC: 137 MG/DL — HIGH (ref 70–99)
GLUCOSE BLDC GLUCOMTR-MCNC: 148 MG/DL — HIGH (ref 70–99)
GLUCOSE BLDC GLUCOMTR-MCNC: 148 MG/DL — HIGH (ref 70–99)
GLUCOSE BLDC GLUCOMTR-MCNC: 170 MG/DL — HIGH (ref 70–99)
GLUCOSE SERPL-MCNC: 138 MG/DL — HIGH (ref 70–99)
HCT VFR BLD CALC: 31.8 % — LOW (ref 39–50)
HGB BLD-MCNC: 9.4 G/DL — LOW (ref 13–17)
MAGNESIUM SERPL-MCNC: 2.4 MG/DL — SIGNIFICANT CHANGE UP (ref 1.6–2.6)
MCHC RBC-ENTMCNC: 24.5 PG — LOW (ref 27–34)
MCHC RBC-ENTMCNC: 29.6 GM/DL — LOW (ref 32–36)
MCV RBC AUTO: 82.8 FL — SIGNIFICANT CHANGE UP (ref 80–100)
NRBC # BLD: 0 /100 WBCS — SIGNIFICANT CHANGE UP (ref 0–0)
PHOSPHATE SERPL-MCNC: 3.6 MG/DL — SIGNIFICANT CHANGE UP (ref 2.5–4.5)
PLATELET # BLD AUTO: 680 K/UL — HIGH (ref 150–400)
POTASSIUM SERPL-MCNC: 5 MMOL/L — SIGNIFICANT CHANGE UP (ref 3.5–5.3)
POTASSIUM SERPL-SCNC: 5 MMOL/L — SIGNIFICANT CHANGE UP (ref 3.5–5.3)
PROT SERPL-MCNC: 7.6 G/DL — SIGNIFICANT CHANGE UP (ref 6–8.3)
RBC # BLD: 3.84 M/UL — LOW (ref 4.2–5.8)
RBC # FLD: 20 % — HIGH (ref 10.3–14.5)
RH IG SCN BLD-IMP: POSITIVE — SIGNIFICANT CHANGE UP
SODIUM SERPL-SCNC: 136 MMOL/L — SIGNIFICANT CHANGE UP (ref 135–145)
WBC # BLD: 17.51 K/UL — HIGH (ref 3.8–10.5)
WBC # FLD AUTO: 17.51 K/UL — HIGH (ref 3.8–10.5)

## 2021-05-23 PROCEDURE — 99233 SBSQ HOSP IP/OBS HIGH 50: CPT | Mod: GC

## 2021-05-23 PROCEDURE — 99233 SBSQ HOSP IP/OBS HIGH 50: CPT

## 2021-05-23 RX ORDER — HEPARIN SODIUM 5000 [USP'U]/ML
5000 INJECTION INTRAVENOUS; SUBCUTANEOUS EVERY 12 HOURS
Refills: 0 | Status: DISCONTINUED | OUTPATIENT
Start: 2021-05-23 | End: 2021-06-04

## 2021-05-23 RX ORDER — HUMAN INSULIN 100 [IU]/ML
13 INJECTION, SUSPENSION SUBCUTANEOUS
Refills: 0 | Status: DISCONTINUED | OUTPATIENT
Start: 2021-05-23 | End: 2021-05-28

## 2021-05-23 RX ORDER — HUMAN INSULIN 100 [IU]/ML
12 INJECTION, SUSPENSION SUBCUTANEOUS DAILY
Refills: 0 | Status: DISCONTINUED | OUTPATIENT
Start: 2021-05-23 | End: 2021-05-23

## 2021-05-23 RX ORDER — HUMAN INSULIN 100 [IU]/ML
12 INJECTION, SUSPENSION SUBCUTANEOUS
Refills: 0 | Status: DISCONTINUED | OUTPATIENT
Start: 2021-05-23 | End: 2021-05-25

## 2021-05-23 RX ADMIN — Medication 650 MILLIGRAM(S): at 05:46

## 2021-05-23 RX ADMIN — SENNA PLUS 2 TABLET(S): 8.6 TABLET ORAL at 21:35

## 2021-05-23 RX ADMIN — LEVETIRACETAM 500 MILLIGRAM(S): 250 TABLET, FILM COATED ORAL at 18:05

## 2021-05-23 RX ADMIN — Medication 650 MILLIGRAM(S): at 18:04

## 2021-05-23 RX ADMIN — MEROPENEM 100 MILLIGRAM(S): 1 INJECTION INTRAVENOUS at 12:36

## 2021-05-23 RX ADMIN — MEROPENEM 100 MILLIGRAM(S): 1 INJECTION INTRAVENOUS at 21:34

## 2021-05-23 RX ADMIN — Medication 400 UNIT(S): at 12:33

## 2021-05-23 RX ADMIN — HUMAN INSULIN 12 UNIT(S): 100 INJECTION, SUSPENSION SUBCUTANEOUS at 12:36

## 2021-05-23 RX ADMIN — Medication 2 MILLIGRAM(S): at 21:34

## 2021-05-23 RX ADMIN — HUMAN INSULIN 13 UNIT(S): 100 INJECTION, SUSPENSION SUBCUTANEOUS at 05:45

## 2021-05-23 RX ADMIN — HUMAN INSULIN 12 UNIT(S): 100 INJECTION, SUSPENSION SUBCUTANEOUS at 18:02

## 2021-05-23 RX ADMIN — Medication 650 MILLIGRAM(S): at 12:50

## 2021-05-23 RX ADMIN — HEPARIN SODIUM 5000 UNIT(S): 5000 INJECTION INTRAVENOUS; SUBCUTANEOUS at 18:06

## 2021-05-23 RX ADMIN — Medication 0: at 05:44

## 2021-05-23 RX ADMIN — Medication 650 MILLIGRAM(S): at 12:32

## 2021-05-23 RX ADMIN — Medication 100 MILLIGRAM(S): at 12:32

## 2021-05-23 RX ADMIN — Medication 650 MILLIGRAM(S): at 00:28

## 2021-05-23 RX ADMIN — MEROPENEM 100 MILLIGRAM(S): 1 INJECTION INTRAVENOUS at 05:14

## 2021-05-23 RX ADMIN — LEVETIRACETAM 500 MILLIGRAM(S): 250 TABLET, FILM COATED ORAL at 05:15

## 2021-05-23 RX ADMIN — POLYETHYLENE GLYCOL 3350 17 GRAM(S): 17 POWDER, FOR SOLUTION ORAL at 12:35

## 2021-05-23 RX ADMIN — Medication 650 MILLIGRAM(S): at 18:20

## 2021-05-23 RX ADMIN — Medication 650 MILLIGRAM(S): at 05:15

## 2021-05-23 NOTE — PROGRESS NOTE ADULT - SUBJECTIVE AND OBJECTIVE BOX
[facility] - Division of Pulmonary, Critical Care and Sleep Medicine   Please call [phone] between 8am-pm weekdays, [phone] after hours and weekends    Interval Events: Request for re-evaluation of this patient for anticoagulation recommendations  61 yo M with h/o DM admitted on 4/1 for acute hypoxic respiratory failure secondary to SARS-CoV-2 infection, requiring endotracheal intubation, septic shock, ischemic stroke with hemorrhagic transformation and ESBL Klebsiella pneumonia. Initially admitted to [facility] ICU and transferred to [facility] for neurosurgery evaluation and further management. Pulmonary service consulted ealier this month for empyema, patient underwent VATS decortication on 5/12 with Dr. Albarado. grew ESBL Klebsiella pneumonia on fluid cultures, being treated with Meropenem. Three chest tubes removed over the course of the past week.   CT Chest 5/16 Interval new small cavitary lesions in the right lower lobe and right chest wall hematoma.    Also of note, RITO segmental PEs with e/o RH strain was noted on CTPA performed on 4/1. LE Dopplers negative on 4/15 and 5/8  Treated with heparin drip, however being held in light of acute bleed in right obturator and right chest wall hematoma found on CT 5/16 and is being maintained on Hep SQ bid, Hct stable around 30.      REVIEW OF SYSTEMS:  CV: [ ] chest pain   Resp: [ ] cough [ ] shortness of breath   [ ] All other systems negative  [ ] Unable to assess ROS because ________    OBJECTIVE:  ICU Vital Signs Last 24 Hrs  T(C): 36.4 (23 May 2021 05:11), Max: 36.9 (22 May 2021 23:47)  T(F): 97.5 (23 May 2021 05:11), Max: 98.4 (22 May 2021 23:47)  HR: 99 (23 May 2021 05:11) (98 - 102)  BP: 120/77 (23 May 2021 05:11) (101/66 - 120/77)  BP(mean): --  ABP: --  ABP(mean): --  RR: 18 (23 May 2021 05:11) (18 - 20)  SpO2: 98% (23 May 2021 05:11) (95% - 99%)        05-22 @ 07:01  -  05-23 @ 07:00  --------------------------------------------------------  IN: 2222 mL / OUT: 0 mL / NET: 2222 mL      CAPILLARY BLOOD GLUCOSE      POCT Blood Glucose.: 137 mg/dL (23 May 2021 05:34)      PHYSICAL EXAM:  General: NAD  HEENT: NC/AT  Lymph Nodes: no cervical or supraclavicular lymphadenopathy  Neck: supple  Respiratory:  CTA b/l, no wheezes, crackles or rhonchi  Cardiovascular:  RRR, no m/r/g  Abdomen: soft, NT/ND, +BS  Extremities: no clubbing, cyanosis or edema, warm  Skin: no rash  Neurological: AAOx3, non focal exam  Psychiatry: not anxious appearing, normal affect and mood    HOSPITAL MEDICATIONS:  MEDICATIONS  (STANDING):  acetaminophen   Tablet .. 650 milliGRAM(s) Oral every 6 hours  cholecalciferol 400 Unit(s) Oral daily  doxazosin 2 milliGRAM(s) Oral at bedtime  heparin  Infusion.  Unit(s)/Hr (11 mL/Hr) IV Continuous <Continuous>  insulin lispro (ADMELOG) corrective regimen sliding scale   SubCutaneous every 6 hours  insulin NPH human recombinant 13 Unit(s) SubCutaneous <User Schedule>  insulin NPH human recombinant 12 Unit(s) SubCutaneous <User Schedule>  levETIRAcetam  Solution 500 milliGRAM(s) Oral two times a day  meropenem  IVPB 1000 milliGRAM(s) IV Intermittent every 8 hours  polyethylene glycol 3350 17 Gram(s) Oral daily  senna 2 Tablet(s) Oral at bedtime  thiamine 100 milliGRAM(s) Oral daily    MEDICATIONS  (PRN):  albuterol/ipratropium for Nebulization 3 milliLiter(s) Nebulizer every 6 hours PRN Shortness of Breath and/or Wheezing      LABS:                        9.4    17.51 )-----------( 680      ( 23 May 2021 07:10 )             31.8     Hgb Trend: 9.4<--, 9.1<--, 8.8<--, 9.2<--, 9.5<--  05-23    136  |  98  |  37<H>  ----------------------------<  138<H>  5.0   |  26  |  0.72    Ca    9.6      23 May 2021 07:06  Phos  3.6     05-23  Mg     2.4     05-23    TPro  7.6  /  Alb  2.9<L>  /  TBili  0.5  /  DBili  x   /  AST  29  /  ALT  34  /  AlkPhos  118  05-23    Creatinine Trend: 0.72<--, 0.75<--, 0.72<--, 0.68<--, 0.70<--, 0.70<--  PT/INR - ( 22 May 2021 07:28 )   PT: 12.3 sec;   INR: 1.03 ratio         PTT - ( 23 May 2021 07:29 )  PTT:47.1 sec          MICROBIOLOGY:   Culture - Tissue with Gram Stain (05.12.21 @ 23:11)   - Tobramycin: S <=2   - Trimethoprim/Sulfamethoxazole: S <=0.5/9.5   - Meropenem: S <=1   - Piperacillin/Tazobactam: R >64   - Ciprofloxacin: S <=0.25   - Ertapenem: S <=0.5   - Gentamicin: S <=2   - Imipenem: S <=1   - Levofloxacin: S <=0.5   Gram Stain:   Rare polymorphonuclear leukocytes seen per low power field   No organisms seen per oil power field   - Amikacin: S <=16   - Amoxicillin/Clavulanic Acid: I 16/8   - Ampicillin: R >16 These ampicillin results predict results for amoxicillin   - Ampicillin/Sulbactam: R >16/8 Enterobacter, Citrobacter, and Serratia may develop resistance during prolonged therapy (3-4 days)   - Aztreonam: R 16   - Cefazolin: R >16 Enterobacter, Citrobacter, and Serratia may develop resistance during prolonged therapy (3-4 days)   - Cefepime: R 16   - Cefoxitin: I 16   - Ceftriaxone: R 32 Enterobacter, Citrobacter, and Serratia may develop resistance during prolonged therapy   Specimen Source: .Tissue Other   Culture Results:   Rare Klebsiella pneumoniae ESBL       RADIOLOGY:  [x ] Reviewed and interpreted by me  EXAM:  CT ANGIO CHEST (W)AW IC        PROCEDURE DATE:  Apr 1 2021   INTERPRETATION:  CLINICAL INFORMATION: Respiratory distress, difficulty breathing, hypoxia. Evaluate for pulmonary embolism.  COMPARISON: None.  PROCEDURE:  CT Angiography of the Chest.  Sagittal and coronal reformats were performed as well as 3D (MIP) reconstructions.    FINDINGS:    LUNGS AND AIRWAYS: Endotracheal tube tip is above the asha. Patent central airways.  Extensive bilateral opacities consistent with reported history of Covid.  PLEURA: No pleural effusion.  MEDIASTINUM AND BESSIE: No lymphadenopathy.  VESSELS: Multiple branching segmental pulmonary emboli in the left upper lobe (3:132, 3:146). Evaluation of subsegmental arteries is limited secondary to mixing artifact. There is reflux of contrast into the IVC.  HEART: The rightheart is increased in size of bowing of the intraventricular septum.. 1.2 cm apparent filling defect along the apical portion of the right ventricle which may represent clot in transit. No pericardial effusion.  CHEST WALL AND LOWER NECK: Within normal limits.  VISUALIZED UPPER ABDOMEN: Within normal limits.  BONES: Degenerative changes.    IMPRESSION:  Branching left upper lobe segmental pulmonary emboli with evidence of heart strain.    Extensive bilateral opacities consistent with reported history of Covid.    These findings were discussed with JOEL Chambers on  4/1/2021 at 3:27 PM by Dr. Castillo with read back confirmation.        EXAM:  CT ABDOMEN AND PELVIS IC                        EXAM:  CT CHEST IC                        PROCEDURE DATE:  05/16/2021    INTERPRETATION:  CLINICAL INFORMATION: Empyema, anemia, status post VATS procedure, evaluate for bleed  COMPARISON: CT chest 5/6/2021    CONTRAST/COMPLICATIONS:  IV Contrast: Omnipaque 350  90 cc administered   10 cc discarded  Oral Contrast: NONE  Complications: None reported at time of study completion    PROCEDURE:  CT of the Chest, Abdomen and Pelvis was performed.  Sagittal and coronal reformats were performed.    FINDINGS:  CHEST:  LUNGS AND LARGE AIRWAYS: Interval new small cavitary lesions in the right lower lobe. Interval improvement of previously seen consolidation within the right lower lobe as compared to CT from 5/6/2021. Drain terminating in the right lower lobe.  PLEURA: Interval right hydropneumothorax with associated drain. New anterior small loculated pneumothorax.  VESSELS: Within normal limits.  HEART: Heart size is normal. No pericardial effusion.  MEDIASTINUM AND BESSIE: No lymphadenopathy.  CHEST WALL AND LOWER NECK: New large right chest wall hematoma extending inferiorly to the level of the right 10th rib. Subcutaneous emphysema is noted in the right chest wall. Surgical staples are visualized in the right chest wall.    ABDOMEN AND PELVIS:  LIVER: Within normal limits.  BILE DUCTS: Normal caliber.  GALLBLADDER: Within normal limits.  SPLEEN: Within normal limits.  PANCREAS: Within normal limits.  ADRENALS: Within normal limits.  KIDNEYS/URETERS: Within normal limits.    BLADDER: Within normal limits.  REPRODUCTIVE ORGANS: Prostate within normal limits.    BOWEL: No bowel obstruction. Appendix is normal. Gastrostomy tube with tip in the stomach.  PERITONEUM: Noascites.  VESSELS: Within normal limits.  RETROPERITONEUM/LYMPH NODES: No lymphadenopathy.  ABDOMINAL WALL: Small fat-containing umbilical hernia. Enlargement of the right obturator muscle with arterial phase enhancement consistent with the presence of hematoma and active bleed.  BONES: Within normal limits.    IMPRESSION:    Right obturator hematoma with regions of arterial phase hyperenhancement consistent with the presence of active bleed.    Large right chest wall hematoma extending inferiorly to the level of the right 10th rib. No evidence of active bleeding in the chest.    New anterior small loculated pneumothorax. Right-sided hydropneumothorax.    Interval new small cavitary lesions in the right lower lobe.      EXAM:  CT BRAIN                            PROCEDURE DATE:  05/05/2021            INTERPRETATION:  .    CLINICAL INFORMATION: patient w/ hx of COVID and curry in past    TECHNIQUE: Multiple axial CT images of the head were obtained without contrast. Sagittal and coronal reconstructed images were acquired from the source data.    COMPARISON: Most recent prior CT examination of the head from 4/12/2021. Prior contrast enhanced brain MRI study from 4/22/2021.    FINDINGS: Multiple evolving subacute-chronic infarcts are again seen within the bilateral centrum semiovale, left cerebral hemisphere, bilateral frontal, left temporal, and bilateral occipital lobes. No gross hemorrhagic transformation is seen. No new areas of acute intracranial hemorrhage are noted. There is no hydrocephalus, shift of the midline structures, or abnormal extra axial fluid collection.    There is mild diffuse cerebral volume loss with prominence of the sulci, fissures, and cisternal spaces which is normal for the patient's age. There is mild periventricular white matter hypoattenuation statistically compatible with microvascular changes given calcific atherosclerotic disease of the intracranial arteries.    The paranasal sinuses and mastoid air cells are clear. The calvarium is intact. The orbits appear within normal limits.    IMPRESSION: Redemonstration of multiple evolving subacute-chronic infarcts, as discussed.    No acute intracranial findings.                CHAVA HAMMER MD; Attending Radiologist  This document has been electronically signed. May  6 2021  8:03AM            EXAM:  DUPLEX SCAN EXT VEINS LOWER BI                            PROCEDURE DATE:  05/08/2021            INTERPRETATION:  CLINICAL INFORMATION: Leg swelling.    COMPARISON: None available.    TECHNIQUE: Duplex sonography of the BILATERAL LOWER extremity veins with color and spectral Doppler, with and without compression.    FINDINGS:    RIGHT:  Normal compressibility of the RIGHT common femoral, femoral and popliteal veins.  Doppler examination shows normal spontaneous and phasic flow.  No RIGHTcalf vein thrombosis is detected.    LEFT:  Normal compressibility of the LEFT common femoral, femoral and popliteal veins.  Doppler examination shows normal spontaneous and phasic flow.  No LEFT calf vein thrombosis is detected.    IMPRESSION:  No evidence of deep venous thrombosis in either lower extremity.    PROCEDURE: Transthoracic echocardiogram with 2-D, M-Mode  and complete spectral andcolor flow Doppler. Patient was  injected with 10 cc's of aerosolized saline. Patient was  injected with 10 cc's of aerosolized saline. Verbal consent  was obtained for injection of  Ultrasonic Enhancing Agent  following a discussion of risks and benefits. Following  intravenous injection of Ultrasonic Enhancing Agent ,  harmonic imaging was performed.  INDICATION: Transient cerebral ischemic attack, unspecified  (G45.9)  ------------------------------------------------------------------------  Dimensions:    Normal Values:  LA:     2.4    2.0 - 4.0 cm  Ao:     4.2    2.0 - 3.8 cm  SEPTUM: 1.0    0.6 - 1.2 cm  PWT:    0.8    0.6 - 1.1 cm  LVIDd:  3.5    3.0 - 5.6 cm  LVIDs:  2.0    1.8 - 4.0 cm  Derived variables:  LVMI: 50 g/m2  RWT: 0.45  Fractional short: 43 %  EF (Visual Estimate): 75-80 %  Doppler Peak Velocity (m/sec): AoV=1.6  ------------------------------------------------------------------------  Observations:  Mitral Valve: Normal mitral valve. Minimal mitral  regurgitation.  Aortic Valve/Aorta: Normal trileaflet aortic valve. Peak  transaortic valve gradient equals 11 mm Hg, aortic valve  velocity time integral equals 25 cm, estimated aortic valve  area equals 2.9 sqcm. Minimal aortic regurgitation.  Peak  left ventricularoutflow tract gradient equals 9 mm Hg,  LVOT velocity time integral equals 21 cm.  Mild aortic root dilatation  (Ao: 4.2 cm at the sinuses of  Valsalva).  Left Atrium: Normal left atrium.  LA volume index = 18  cc/m2.  Left Ventricle: Hyperdynamic left ventricular systolic  function. Endocardial visualization enhanced with  intravenous injection of Ultrasonic Enhancing Agent  (Definity).  Increased relative wall thickness with normal  left ventricular mass index, consistent with concentric  left ventricular remodeling. Normal diastolic function  Right Heart: Normal right atrium. The right ventricle is  not well visualized; grossly Normal right ventricular size  and systolic function. TV s' = 20 cm/sec.  Normal tricuspid  valve. Minimal tricuspid regurgitation. Pulmonic valve not  well visualized, probably normal. Mild pulmonic  regurgitation.  Pericardium/Pleura: Normal pericardium with trace  pericardial effusion.  Hemodynamic: Estimated right atrial pressure is 8 mm Hg.  Estimated rightventricular systolic pressure equals 31 mm  Hg, assuming right atrial pressure equals 8 mm Hg,  consistent with normal pulmonary pressures. Unable to  evalaute for PFO/ASD in the setting of poor image quality.  Consider repeat limited study with agitated saliine if  clinically indicated.  ------------------------------------------------------------------------  Conclusions:  1. Mild aortic root dilatation  (Ao: 4.2 cm at the sinuses  of Valsalva).  2. Increased relative wall thickness with normalleft  ventricular mass index, consistent with concentric left  ventricular remodeling.  3. Hyperdynamic left ventricular systolic function.  Endocardial visualization enhanced with intravenous  injection of Ultrasonic Enhancing Agent (Definity).  4. The right ventricle is not well visualized; grossly  Normal right ventricular size and systolic function. TV s'  = 20 cm/sec.  5. Estimated right ventricular systolic pressure equals 31  mm Hg, assuming right atrial pressure equals 8 mm Hg,  consistent with normal pulmonary pressures.  6. Unable to evalaute for PFO/ASD in the setting of poor  image quality. Consider repeat limited study with agitated  saliine if clinically indicated.  7. Normal pericardium with trace pericardial effusion.  *** No previous Echo exam.  ------------------------------------------------------------------------  Confirmed on  4/15/2021 - 22:17:01 by Juventino Garcia M.D.  ------------------------------------------------------------------------   [facility] - Division of Pulmonary, Critical Care and Sleep Medicine   Please call [phone] between 8am-pm weekdays, [phone] after hours and weekends    Interval Events: Request for re-evaluation of this patient for anticoagulation recommendations  59 yo M with h/o DM admitted on 4/1 for acute hypoxic respiratory failure secondary to SARS-CoV-2 infection, requiring endotracheal intubation, septic shock, ischemic stroke with hemorrhagic transformation and ESBL Klebsiella pneumonia. Initially admitted to [facility] ICU and transferred to [facility] for neurosurgery evaluation and further management. Pulmonary service consulted ealier this month for empyema, patient underwent VATS decortication on 5/12 with Dr. Albarado. grew ESBL Klebsiella pneumonia on fluid cultures, being treated with Meropenem. Three chest tubes removed over the course of the past week.   CT Chest 5/16 Interval new small cavitary lesions in the right lower lobe and right chest wall hematoma.    Also of note, RITO segmental PEs with e/o RH strain was noted on CTPA performed on 4/1. LE Dopplers negative on 4/15 and 5/8  Treated with heparin drip, however being held in light of acute bleed in right obturator and right chest wall hematoma found on CT 5/16 and is being maintained on Hep SQ bid, Hct stable around 30.    Currently patient laying in bed, comfortable, no complaints.     REVIEW OF SYSTEMS:  CV: [ ] chest pain   Resp: [ ] cough [ ] shortness of breath   [x ] All other systems negative  [ ] Unable to assess ROS because ________    OBJECTIVE:  ICU Vital Signs Last 24 Hrs  T(C): 36.4 (23 May 2021 05:11), Max: 36.9 (22 May 2021 23:47)  T(F): 97.5 (23 May 2021 05:11), Max: 98.4 (22 May 2021 23:47)  HR: 99 (23 May 2021 05:11) (98 - 102)  BP: 120/77 (23 May 2021 05:11) (101/66 - 120/77)  BP(mean): --  ABP: --  ABP(mean): --  RR: 18 (23 May 2021 05:11) (18 - 20)  SpO2: 98% (23 May 2021 05:11) (95% - 99%)        05-22 @ 07:01  -  05-23 @ 07:00  --------------------------------------------------------  IN: 2222 mL / OUT: 0 mL / NET: 2222 mL      CAPILLARY BLOOD GLUCOSE      POCT Blood Glucose.: 137 mg/dL (23 May 2021 05:34)      PHYSICAL EXAM:  General: NAD  HEENT: NC/AT  Neck: supple  Respiratory:  CTA b/l, no wheezes, crackles or rhonchi  Cardiovascular:  RRR, no m/r/g  Abdomen: soft, NT/ND, +BS  Extremities: b/l toe gangrene, warm  Neurological: AAOx3, non focal exam  Psychiatry: not anxious appearing, normal affect and mood    HOSPITAL MEDICATIONS:  MEDICATIONS  (STANDING):  acetaminophen   Tablet .. 650 milliGRAM(s) Oral every 6 hours  cholecalciferol 400 Unit(s) Oral daily  doxazosin 2 milliGRAM(s) Oral at bedtime  heparin  Infusion.  Unit(s)/Hr (11 mL/Hr) IV Continuous <Continuous>  insulin lispro (ADMELOG) corrective regimen sliding scale   SubCutaneous every 6 hours  insulin NPH human recombinant 13 Unit(s) SubCutaneous <User Schedule>  insulin NPH human recombinant 12 Unit(s) SubCutaneous <User Schedule>  levETIRAcetam  Solution 500 milliGRAM(s) Oral two times a day  meropenem  IVPB 1000 milliGRAM(s) IV Intermittent every 8 hours  polyethylene glycol 3350 17 Gram(s) Oral daily  senna 2 Tablet(s) Oral at bedtime  thiamine 100 milliGRAM(s) Oral daily    MEDICATIONS  (PRN):  albuterol/ipratropium for Nebulization 3 milliLiter(s) Nebulizer every 6 hours PRN Shortness of Breath and/or Wheezing      LABS:                        9.4    17.51 )-----------( 680      ( 23 May 2021 07:10 )             31.8     Hgb Trend: 9.4<--, 9.1<--, 8.8<--, 9.2<--, 9.5<--  05-23    136  |  98  |  37<H>  ----------------------------<  138<H>  5.0   |  26  |  0.72    Ca    9.6      23 May 2021 07:06  Phos  3.6     05-23  Mg     2.4     05-23    TPro  7.6  /  Alb  2.9<L>  /  TBili  0.5  /  DBili  x   /  AST  29  /  ALT  34  /  AlkPhos  118  05-23    Creatinine Trend: 0.72<--, 0.75<--, 0.72<--, 0.68<--, 0.70<--, 0.70<--  PT/INR - ( 22 May 2021 07:28 )   PT: 12.3 sec;   INR: 1.03 ratio         PTT - ( 23 May 2021 07:29 )  PTT:47.1 sec          MICROBIOLOGY:   Culture - Tissue with Gram Stain (05.12.21 @ 23:11)   - Tobramycin: S <=2   - Trimethoprim/Sulfamethoxazole: S <=0.5/9.5   - Meropenem: S <=1   - Piperacillin/Tazobactam: R >64   - Ciprofloxacin: S <=0.25   - Ertapenem: S <=0.5   - Gentamicin: S <=2   - Imipenem: S <=1   - Levofloxacin: S <=0.5   Gram Stain:   Rare polymorphonuclear leukocytes seen per low power field   No organisms seen per oil power field   - Amikacin: S <=16   - Amoxicillin/Clavulanic Acid: I 16/8   - Ampicillin: R >16 These ampicillin results predict results for amoxicillin   - Ampicillin/Sulbactam: R >16/8 Enterobacter, Citrobacter, and Serratia may develop resistance during prolonged therapy (3-4 days)   - Aztreonam: R 16   - Cefazolin: R >16 Enterobacter, Citrobacter, and Serratia may develop resistance during prolonged therapy (3-4 days)   - Cefepime: R 16   - Cefoxitin: I 16   - Ceftriaxone: R 32 Enterobacter, Citrobacter, and Serratia may develop resistance during prolonged therapy   Specimen Source: .Tissue Other   Culture Results:   Rare Klebsiella pneumoniae ESBL       RADIOLOGY:  [x ] Reviewed and interpreted by me  EXAM:  CT ANGIO CHEST (W)AW IC        PROCEDURE DATE:  Apr 1 2021   INTERPRETATION:  CLINICAL INFORMATION: Respiratory distress, difficulty breathing, hypoxia. Evaluate for pulmonary embolism.  COMPARISON: None.  PROCEDURE:  CT Angiography of the Chest.  Sagittal and coronal reformats were performed as well as 3D (MIP) reconstructions.    FINDINGS:    LUNGS AND AIRWAYS: Endotracheal tube tip is above the asha. Patent central airways.  Extensive bilateral opacities consistent with reported history of Covid.  PLEURA: No pleural effusion.  MEDIASTINUM AND BESSIE: No lymphadenopathy.  VESSELS: Multiple branching segmental pulmonary emboli in the left upper lobe (3:132, 3:146). Evaluation of subsegmental arteries is limited secondary to mixing artifact. There is reflux of contrast into the IVC.  HEART: The rightheart is increased in size of bowing of the intraventricular septum.. 1.2 cm apparent filling defect along the apical portion of the right ventricle which may represent clot in transit. No pericardial effusion.  CHEST WALL AND LOWER NECK: Within normal limits.  VISUALIZED UPPER ABDOMEN: Within normal limits.  BONES: Degenerative changes.    IMPRESSION:  Branching left upper lobe segmental pulmonary emboli with evidence of heart strain.    Extensive bilateral opacities consistent with reported history of Covid.    These findings were discussed with JOEL Chambers on  4/1/2021 at 3:27 PM by Dr. Castillo with read back confirmation.        EXAM:  CT ABDOMEN AND PELVIS IC                        EXAM:  CT CHEST IC                        PROCEDURE DATE:  05/16/2021    INTERPRETATION:  CLINICAL INFORMATION: Empyema, anemia, status post VATS procedure, evaluate for bleed  COMPARISON: CT chest 5/6/2021    CONTRAST/COMPLICATIONS:  IV Contrast: Omnipaque 350  90 cc administered   10 cc discarded  Oral Contrast: NONE  Complications: None reported at time of study completion    PROCEDURE:  CT of the Chest, Abdomen and Pelvis was performed.  Sagittal and coronal reformats were performed.    FINDINGS:  CHEST:  LUNGS AND LARGE AIRWAYS: Interval new small cavitary lesions in the right lower lobe. Interval improvement of previously seen consolidation within the right lower lobe as compared to CT from 5/6/2021. Drain terminating in the right lower lobe.  PLEURA: Interval right hydropneumothorax with associated drain. New anterior small loculated pneumothorax.  VESSELS: Within normal limits.  HEART: Heart size is normal. No pericardial effusion.  MEDIASTINUM AND BESSIE: No lymphadenopathy.  CHEST WALL AND LOWER NECK: New large right chest wall hematoma extending inferiorly to the level of the right 10th rib. Subcutaneous emphysema is noted in the right chest wall. Surgical staples are visualized in the right chest wall.    ABDOMEN AND PELVIS:  LIVER: Within normal limits.  BILE DUCTS: Normal caliber.  GALLBLADDER: Within normal limits.  SPLEEN: Within normal limits.  PANCREAS: Within normal limits.  ADRENALS: Within normal limits.  KIDNEYS/URETERS: Within normal limits.    BLADDER: Within normal limits.  REPRODUCTIVE ORGANS: Prostate within normal limits.    BOWEL: No bowel obstruction. Appendix is normal. Gastrostomy tube with tip in the stomach.  PERITONEUM: Noascites.  VESSELS: Within normal limits.  RETROPERITONEUM/LYMPH NODES: No lymphadenopathy.  ABDOMINAL WALL: Small fat-containing umbilical hernia. Enlargement of the right obturator muscle with arterial phase enhancement consistent with the presence of hematoma and active bleed.  BONES: Within normal limits.    IMPRESSION:    Right obturator hematoma with regions of arterial phase hyperenhancement consistent with the presence of active bleed.    Large right chest wall hematoma extending inferiorly to the level of the right 10th rib. No evidence of active bleeding in the chest.    New anterior small loculated pneumothorax. Right-sided hydropneumothorax.    Interval new small cavitary lesions in the right lower lobe.      EXAM:  CT BRAIN                            PROCEDURE DATE:  05/05/2021            INTERPRETATION:  .    CLINICAL INFORMATION: patient w/ hx of COVID and curry in past    TECHNIQUE: Multiple axial CT images of the head were obtained without contrast. Sagittal and coronal reconstructed images were acquired from the source data.    COMPARISON: Most recent prior CT examination of the head from 4/12/2021. Prior contrast enhanced brain MRI study from 4/22/2021.    FINDINGS: Multiple evolving subacute-chronic infarcts are again seen within the bilateral centrum semiovale, left cerebral hemisphere, bilateral frontal, left temporal, and bilateral occipital lobes. No gross hemorrhagic transformation is seen. No new areas of acute intracranial hemorrhage are noted. There is no hydrocephalus, shift of the midline structures, or abnormal extra axial fluid collection.    There is mild diffuse cerebral volume loss with prominence of the sulci, fissures, and cisternal spaces which is normal for the patient's age. There is mild periventricular white matter hypoattenuation statistically compatible with microvascular changes given calcific atherosclerotic disease of the intracranial arteries.    The paranasal sinuses and mastoid air cells are clear. The calvarium is intact. The orbits appear within normal limits.    IMPRESSION: Redemonstration of multiple evolving subacute-chronic infarcts, as discussed.    No acute intracranial findings.                CHAVA HAMMER MD; Attending Radiologist  This document has been electronically signed. May  6 2021  8:03AM            EXAM:  DUPLEX SCAN EXT VEINS LOWER BI                            PROCEDURE DATE:  05/08/2021            INTERPRETATION:  CLINICAL INFORMATION: Leg swelling.    COMPARISON: None available.    TECHNIQUE: Duplex sonography of the BILATERAL LOWER extremity veins with color and spectral Doppler, with and without compression.    FINDINGS:    RIGHT:  Normal compressibility of the RIGHT common femoral, femoral and popliteal veins.  Doppler examination shows normal spontaneous and phasic flow.  No RIGHTcalf vein thrombosis is detected.    LEFT:  Normal compressibility of the LEFT common femoral, femoral and popliteal veins.  Doppler examination shows normal spontaneous and phasic flow.  No LEFT calf vein thrombosis is detected.    IMPRESSION:  No evidence of deep venous thrombosis in either lower extremity.    PROCEDURE: Transthoracic echocardiogram with 2-D, M-Mode  and complete spectral andcolor flow Doppler. Patient was  injected with 10 cc's of aerosolized saline. Patient was  injected with 10 cc's of aerosolized saline. Verbal consent  was obtained for injection of  Ultrasonic Enhancing Agent  following a discussion of risks and benefits. Following  intravenous injection of Ultrasonic Enhancing Agent ,  harmonic imaging was performed.  INDICATION: Transient cerebral ischemic attack, unspecified  (G45.9)  ------------------------------------------------------------------------  Dimensions:    Normal Values:  LA:     2.4    2.0 - 4.0 cm  Ao:     4.2    2.0 - 3.8 cm  SEPTUM: 1.0    0.6 - 1.2 cm  PWT:    0.8    0.6 - 1.1 cm  LVIDd:  3.5    3.0 - 5.6 cm  LVIDs:  2.0    1.8 - 4.0 cm  Derived variables:  LVMI: 50 g/m2  RWT: 0.45  Fractional short: 43 %  EF (Visual Estimate): 75-80 %  Doppler Peak Velocity (m/sec): AoV=1.6  ------------------------------------------------------------------------  Observations:  Mitral Valve: Normal mitral valve. Minimal mitral  regurgitation.  Aortic Valve/Aorta: Normal trileaflet aortic valve. Peak  transaortic valve gradient equals 11 mm Hg, aortic valve  velocity time integral equals 25 cm, estimated aortic valve  area equals 2.9 sqcm. Minimal aortic regurgitation.  Peak  left ventricularoutflow tract gradient equals 9 mm Hg,  LVOT velocity time integral equals 21 cm.  Mild aortic root dilatation  (Ao: 4.2 cm at the sinuses of  Valsalva).  Left Atrium: Normal left atrium.  LA volume index = 18  cc/m2.  Left Ventricle: Hyperdynamic left ventricular systolic  function. Endocardial visualization enhanced with  intravenous injection of Ultrasonic Enhancing Agent  (Definity).  Increased relative wall thickness with normal  left ventricular mass index, consistent with concentric  left ventricular remodeling. Normal diastolic function  Right Heart: Normal right atrium. The right ventricle is  not well visualized; grossly Normal right ventricular size  and systolic function. TV s' = 20 cm/sec.  Normal tricuspid  valve. Minimal tricuspid regurgitation. Pulmonic valve not  well visualized, probably normal. Mild pulmonic  regurgitation.  Pericardium/Pleura: Normal pericardium with trace  pericardial effusion.  Hemodynamic: Estimated right atrial pressure is 8 mm Hg.  Estimated rightventricular systolic pressure equals 31 mm  Hg, assuming right atrial pressure equals 8 mm Hg,  consistent with normal pulmonary pressures. Unable to  evalaute for PFO/ASD in the setting of poor image quality.  Consider repeat limited study with agitated saliine if  clinically indicated.  ------------------------------------------------------------------------  Conclusions:  1. Mild aortic root dilatation  (Ao: 4.2 cm at the sinuses  of Valsalva).  2. Increased relative wall thickness with normalleft  ventricular mass index, consistent with concentric left  ventricular remodeling.  3. Hyperdynamic left ventricular systolic function.  Endocardial visualization enhanced with intravenous  injection of Ultrasonic Enhancing Agent (Definity).  4. The right ventricle is not well visualized; grossly  Normal right ventricular size and systolic function. TV s'  = 20 cm/sec.  5. Estimated right ventricular systolic pressure equals 31  mm Hg, assuming right atrial pressure equals 8 mm Hg,  consistent with normal pulmonary pressures.  6. Unable to evalaute for PFO/ASD in the setting of poor  image quality. Consider repeat limited study with agitated  saliine if clinically indicated.  7. Normal pericardium with trace pericardial effusion.  *** No previous Echo exam.  ------------------------------------------------------------------------  Confirmed on  4/15/2021 - 22:17:01 by Juventino Garcia M.D.  ------------------------------------------------------------------------

## 2021-05-23 NOTE — PROGRESS NOTE ADULT - PROBLEM SELECTOR PLAN 4
-Pulmonary embolus noted on ct scan performed at time of admission  -Etiology of venous thromboembolism uncertain at this time; no known history of thrombophilia; provoked in setting of acute covid illness   - s./p heparin drip rate of 13, now on hold for acute bleed in right obturator and right chest wall hematoma   - start 5000 heparin bid; restart full AC if hgb and hematomas stable -Pulmonary embolus noted on ct scan performed at time of admission  -Etiology of venous thromboembolism uncertain at this time; no known history of thrombophilia; provoked in setting of acute covid illness   - s./p heparin drip rate of 13, now on hold for acute bleed in right obturator and right chest wall hematoma   - start 5000 heparin bid

## 2021-05-23 NOTE — CHART NOTE - NSCHARTNOTEFT_GEN_A_CORE
POC glucose, insulin requirements, lab values reviewed.   Pt currently with BG mostly at goal , past 24 hours BG 90's to 211, likely attributed to by active SIRS/  ESBL Klebsiella pneumonia on meropenem, ?necrotizing PNA s/p COVID per primary team ID and pulm following, also with prolonged/complicated hospital course . Primary team/Palliative following for GOC. Full code.   MN BG 90's on continuous feeding spoke with primary team no recent change in nutritional regimen. BG Goal 100-180mg/dl  Would lower 1800 NPH dose to prevent nocturnal BG <100. During Admission writer has attempted to lower NPH to 12 units q6h with variable control of bg 130's to 200s . Would Adjust patient specific regimen to prevent hypoglycemia as follows:    0000 NPH 13 units  0600 NPH 13 units  1200 NPH 12 units   1800 NPH change to 12 units      d/w Dr Mckeon and RN

## 2021-05-23 NOTE — PROGRESS NOTE ADULT - ASSESSMENT
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 Can c/w prophylactic heparin or Lovenox. Continue to monitor CBC closely.  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 No e/o LE DVTs on prior duplex. Can c/w prophylactic heparin or Lovenox. Continue to monitor CBC closely.  Goal O2 sats 92-96%  Ambulate as tolerated  Antibiotics per ID  Repeat CT chest, non contrast in 6 weeks  Possible d/c to rehab this week    Will follow up

## 2021-05-23 NOTE — PROGRESS NOTE ADULT - ATTENDING COMMENTS
comfortable  follows commands  repeat swallow eval prior to discharge  extensive debate about pros/cons of AC  appreciate pulm input  given recent studies indicate no VTE and COVID retreat>30 days, may monitor on prophylaxis dose Heparin given high bleeding risk  acute rehab next week  patient with multiple co-morbid conditions; higher risk for future complications despite optimal medical therapy

## 2021-05-23 NOTE — PROGRESS NOTE ADULT - ASSESSMENT
60 yr male with PMH of DM who was initially admitted to [facility] ICU for hypoxic respiratory failure 2/2 COVID c/b PE with R heart strain, cardiogenic and septic shock, ischemic and hemorrhagic CVA and ESBL klebsiella ventilator associate PNA, transferred to [facility] for neurosurgery eval and further management. s/p PEG, now monitoring for refeeding syndrome, complicated with right empyema planned for VATs.

## 2021-05-23 NOTE — PROGRESS NOTE ADULT - SUBJECTIVE AND OBJECTIVE BOX
PROGRESS NOTE:   Authored by Dr. Emily Mckeon, ANH pager [phone]    Patient is a 60y old  Male who presents with a chief complaint of COVID19 w/ severe metabolic acidosis s/p intubation (22 May 2021 06:17)      SUBJECTIVE / OVERNIGHT EVENTS:    MEDICATIONS  (STANDING):  acetaminophen   Tablet .. 650 milliGRAM(s) Oral every 6 hours  cholecalciferol 400 Unit(s) Oral daily  doxazosin 2 milliGRAM(s) Oral at bedtime  heparin  Infusion.  Unit(s)/Hr (11 mL/Hr) IV Continuous <Continuous>  insulin lispro (ADMELOG) corrective regimen sliding scale   SubCutaneous every 6 hours  insulin NPH human recombinant 13 Unit(s) SubCutaneous <User Schedule>  insulin NPH human recombinant 12 Unit(s) SubCutaneous <User Schedule>  levETIRAcetam  Solution 500 milliGRAM(s) Oral two times a day  meropenem  IVPB 1000 milliGRAM(s) IV Intermittent every 8 hours  polyethylene glycol 3350 17 Gram(s) Oral daily  senna 2 Tablet(s) Oral at bedtime  thiamine 100 milliGRAM(s) Oral daily    MEDICATIONS  (PRN):  albuterol/ipratropium for Nebulization 3 milliLiter(s) Nebulizer every 6 hours PRN Shortness of Breath and/or Wheezing      CAPILLARY BLOOD GLUCOSE      POCT Blood Glucose.: 137 mg/dL (23 May 2021 05:34)  POCT Blood Glucose.: 98 mg/dL (22 May 2021 23:46)  POCT Blood Glucose.: 93 mg/dL (22 May 2021 22:05)  POCT Blood Glucose.: 181 mg/dL (22 May 2021 17:02)  POCT Blood Glucose.: 211 mg/dL (22 May 2021 12:38)    I&O's Summary    22 May 2021 07:01  -  23 May 2021 07:00  --------------------------------------------------------  IN: 2222 mL / OUT: 0 mL / NET: 2222 mL        PHYSICAL EXAM:  Vital Signs Last 24 Hrs  T(C): 36.4 (23 May 2021 05:11), Max: 36.9 (22 May 2021 23:47)  T(F): 97.5 (23 May 2021 05:11), Max: 98.4 (22 May 2021 23:47)  HR: 99 (23 May 2021 05:11) (98 - 102)  BP: 120/77 (23 May 2021 05:11) (101/66 - 120/77)  BP(mean): --  RR: 18 (23 May 2021 05:11) (18 - 20)  SpO2: 98% (23 May 2021 05:11) (95% - 99%)    GENERAL: No acute distress, well-developed  HEAD:  Atraumatic, Normocephalic  EYES: EOMI, PERRLA, conjunctiva and sclera clear  NECK: Supple, no lymphadenopathy, no JVD  CHEST/LUNG: CTAB; No wheezes, rales, or rhonchi  HEART: Regular rate and rhythm; No murmurs, rubs, or gallops  ABDOMEN: Soft, non-tender, non-distended; normal bowel sounds, no organomegaly  EXTREMITIES:  2+ peripheral pulses b/l, No clubbing, cyanosis, or edema  NEUROLOGY: A&O x 3, no focal deficits  SKIN: No rashes or lesions    LABS:                        9.4    17.51 )-----------( 680      ( 23 May 2021 07:10 )             31.8     05-23    136  |  98  |  37<H>  ----------------------------<  138<H>  5.0   |  26  |  0.72    Ca    9.6      23 May 2021 07:06  Phos  3.6     05-23  Mg     2.4     05-23    TPro  7.6  /  Alb  2.9<L>  /  TBili  0.5  /  DBili  x   /  AST  29  /  ALT  34  /  AlkPhos  118  05-23    PT/INR - ( 22 May 2021 07:28 )   PT: 12.3 sec;   INR: 1.03 ratio         PTT - ( 23 May 2021 07:29 )  PTT:47.1 sec            RADIOLOGY & ADDITIONAL TESTS:  Results Reviewed:   Imaging Personally Reviewed:  Electrocardiogram Personally Reviewed:    COORDINATION OF CARE:  Care Discussed with Consultants/Other Providers [Y/N]:  Prior or Outpatient Records Reviewed [Y/N]:   PROGRESS NOTE:   Authored by Dr. Emily Mckeon, ANH pager [phone]    Patient is a 60y old  Male who presents with a chief complaint of COVID19 w/ severe metabolic acidosis s/p intubation (22 May 2021 06:17)      SUBJECTIVE / OVERNIGHT EVENTS: Patient examined at bedside. He appears well. Patient denies pain at the moment and is breathing well. Patient contines to move extremities and nod to questions     MEDICATIONS  (STANDING):  acetaminophen   Tablet .. 650 milliGRAM(s) Oral every 6 hours  cholecalciferol 400 Unit(s) Oral daily  doxazosin 2 milliGRAM(s) Oral at bedtime  heparin  Infusion.  Unit(s)/Hr (11 mL/Hr) IV Continuous <Continuous>  insulin lispro (ADMELOG) corrective regimen sliding scale   SubCutaneous every 6 hours  insulin NPH human recombinant 13 Unit(s) SubCutaneous <User Schedule>  insulin NPH human recombinant 12 Unit(s) SubCutaneous <User Schedule>  levETIRAcetam  Solution 500 milliGRAM(s) Oral two times a day  meropenem  IVPB 1000 milliGRAM(s) IV Intermittent every 8 hours  polyethylene glycol 3350 17 Gram(s) Oral daily  senna 2 Tablet(s) Oral at bedtime  thiamine 100 milliGRAM(s) Oral daily    MEDICATIONS  (PRN):  albuterol/ipratropium for Nebulization 3 milliLiter(s) Nebulizer every 6 hours PRN Shortness of Breath and/or Wheezing      CAPILLARY BLOOD GLUCOSE      POCT Blood Glucose.: 137 mg/dL (23 May 2021 05:34)  POCT Blood Glucose.: 98 mg/dL (22 May 2021 23:46)  POCT Blood Glucose.: 93 mg/dL (22 May 2021 22:05)  POCT Blood Glucose.: 181 mg/dL (22 May 2021 17:02)  POCT Blood Glucose.: 211 mg/dL (22 May 2021 12:38)    I&O's Summary    22 May 2021 07:01  -  23 May 2021 07:00  --------------------------------------------------------  IN: 2222 mL / OUT: 0 mL / NET: 2222 mL        PHYSICAL EXAM:  Vital Signs Last 24 Hrs  T(C): 36.4 (23 May 2021 05:11), Max: 36.9 (22 May 2021 23:47)  T(F): 97.5 (23 May 2021 05:11), Max: 98.4 (22 May 2021 23:47)  HR: 99 (23 May 2021 05:11) (98 - 102)  BP: 120/77 (23 May 2021 05:11) (101/66 - 120/77)  BP(mean): --  RR: 18 (23 May 2021 05:11) (18 - 20)  SpO2: 98% (23 May 2021 05:11) (95% - 99%)    GENERAL: No acute distress, well-developed  HEAD:  Atraumatic, Normocephalic  EYES: EOMI, PERRLA, conjunctiva and sclera clear  NECK: Supple, no lymphadenopathy, no JVD  CHEST/LUNG: CTAB; No wheezes, rales, or rhonchi, right chest wall hematoma of same size, no erythema or discoloration   HEART: Regular rate and rhythm; No murmurs, rubs, or gallops  ABDOMEN: Soft, non-tender, non-distended; normal bowel sounds, no organomegaly  EXTREMITIES:  2+ peripheral pulses b/l, No clubbing, cyanosis, or edema  NEUROLOGY: A&O x 1-2, generalized weakness   SKIN: gangrenous toes     LABS:                        9.4    17.51 )-----------( 680      ( 23 May 2021 07:10 )             31.8     05-23    136  |  98  |  37<H>  ----------------------------<  138<H>  5.0   |  26  |  0.72    Ca    9.6      23 May 2021 07:06  Phos  3.6     05-23  Mg     2.4     05-23    TPro  7.6  /  Alb  2.9<L>  /  TBili  0.5  /  DBili  x   /  AST  29  /  ALT  34  /  AlkPhos  118  05-23    PT/INR - ( 22 May 2021 07:28 )   PT: 12.3 sec;   INR: 1.03 ratio         PTT - ( 23 May 2021 07:29 )  PTT:47.1 sec            RADIOLOGY & ADDITIONAL TESTS:  Results Reviewed:   Imaging Personally Reviewed:  Electrocardiogram Personally Reviewed:    COORDINATION OF CARE:  Care Discussed with Consultants/Other Providers [Y/N]:  Prior or Outpatient Records Reviewed [Y/N]:

## 2021-05-23 NOTE — PROGRESS NOTE ADULT - PROBLEM SELECTOR PLAN 1
5/12 he underwent right VATS converted to open thoracotomy, decortication, drainage of abscess and flex bronch. Pt briefly on lexi intraop and extubated in OR.   - s/p L. thoracotomy, drainage of abscess, decortication, flex bronch   - s/p 1 angled chest tube , 1 straight chest tube, 1 Blakemore drain (anterior)   - Monitor pulse oximeter  - Out of bed to chair, ambulate as tolerated, and incentive spirometry to prevent atelectasis  - Pain control as needed with tylenol and oxy  - c/w meropenem 5/7-    Obturator Hematoma   - bleeding hematoma in right thigh on CT on 5/7  - CT hip showing non active bleed, hematoma on right thigh on 5/8  - doppler to r/o DVT in LE, per IR no role IVC filter  - active again on 5/17, now stable 5/12 he underwent right VATS converted to open thoracotomy, decortication, drainage of abscess and flex bronch. Pt briefly on lexi intraop and extubated in OR.   - s/p L. thoracotomy, drainage of abscess, decortication, flex bronch   - s/p 1 angled chest tube , 1 straight chest tube, 1 Blak[place]ore drain (anterior)   - Monitor pulse oximeter  - Out of bed to chair, ambulate as tolerated, and incentive spirometry to prevent atelectasis  - Pain control as needed with tylenol and oxy  - c/w meropenem 5/7-    Obturator Hematoma   - bleeding hematoma in right thigh on CT on 5/7  - CT hip showing non active bleed, hematoma on right thigh on 5/8  - doppler to r/o DVT in LE, per IR no role IVC filter  - active again on 5/17, now stable  - was on heparin drip on and off, now off with DVT ppx heparin bid

## 2021-05-24 LAB
ALBUMIN SERPL ELPH-MCNC: 2.7 G/DL — LOW (ref 3.3–5)
ALP SERPL-CCNC: 131 U/L — HIGH (ref 40–120)
ALT FLD-CCNC: 30 U/L — SIGNIFICANT CHANGE UP (ref 10–45)
ANION GAP SERPL CALC-SCNC: 13 MMOL/L — SIGNIFICANT CHANGE UP (ref 5–17)
APTT BLD: 31.9 SEC — SIGNIFICANT CHANGE UP (ref 27.5–35.5)
AST SERPL-CCNC: 27 U/L — SIGNIFICANT CHANGE UP (ref 10–40)
BASOPHILS # BLD AUTO: 0.22 K/UL — HIGH (ref 0–0.2)
BASOPHILS NFR BLD AUTO: 1.7 % — SIGNIFICANT CHANGE UP (ref 0–2)
BILIRUB SERPL-MCNC: 0.4 MG/DL — SIGNIFICANT CHANGE UP (ref 0.2–1.2)
BUN SERPL-MCNC: 34 MG/DL — HIGH (ref 7–23)
CALCIUM SERPL-MCNC: 9.2 MG/DL — SIGNIFICANT CHANGE UP (ref 8.4–10.5)
CHLORIDE SERPL-SCNC: 97 MMOL/L — SIGNIFICANT CHANGE UP (ref 96–108)
CO2 SERPL-SCNC: 23 MMOL/L — SIGNIFICANT CHANGE UP (ref 22–31)
CREAT SERPL-MCNC: 0.65 MG/DL — SIGNIFICANT CHANGE UP (ref 0.5–1.3)
EOSINOPHIL # BLD AUTO: 0.57 K/UL — HIGH (ref 0–0.5)
EOSINOPHIL NFR BLD AUTO: 4.4 % — SIGNIFICANT CHANGE UP (ref 0–6)
GLUCOSE BLDC GLUCOMTR-MCNC: 133 MG/DL — HIGH (ref 70–99)
GLUCOSE BLDC GLUCOMTR-MCNC: 159 MG/DL — HIGH (ref 70–99)
GLUCOSE BLDC GLUCOMTR-MCNC: 160 MG/DL — HIGH (ref 70–99)
GLUCOSE BLDC GLUCOMTR-MCNC: 183 MG/DL — HIGH (ref 70–99)
GLUCOSE SERPL-MCNC: 178 MG/DL — HIGH (ref 70–99)
HCT VFR BLD CALC: 30 % — LOW (ref 39–50)
HCT VFR BLD CALC: 31.8 % — LOW (ref 39–50)
HCT VFR BLD CALC: 31.8 % — LOW (ref 39–50)
HGB BLD-MCNC: 9.2 G/DL — LOW (ref 13–17)
HGB BLD-MCNC: 9.6 G/DL — LOW (ref 13–17)
HGB BLD-MCNC: 9.7 G/DL — LOW (ref 13–17)
LYMPHOCYTES # BLD AUTO: 0.57 K/UL — LOW (ref 1–3.3)
LYMPHOCYTES # BLD AUTO: 4.4 % — LOW (ref 13–44)
MAGNESIUM SERPL-MCNC: 2.2 MG/DL — SIGNIFICANT CHANGE UP (ref 1.6–2.6)
MANUAL SMEAR VERIFICATION: SIGNIFICANT CHANGE UP
MCHC RBC-ENTMCNC: 24.7 PG — LOW (ref 27–34)
MCHC RBC-ENTMCNC: 25.2 PG — LOW (ref 27–34)
MCHC RBC-ENTMCNC: 25.3 PG — LOW (ref 27–34)
MCHC RBC-ENTMCNC: 30.2 GM/DL — LOW (ref 32–36)
MCHC RBC-ENTMCNC: 30.5 GM/DL — LOW (ref 32–36)
MCHC RBC-ENTMCNC: 30.7 GM/DL — LOW (ref 32–36)
MCV RBC AUTO: 81.7 FL — SIGNIFICANT CHANGE UP (ref 80–100)
MCV RBC AUTO: 82.2 FL — SIGNIFICANT CHANGE UP (ref 80–100)
MCV RBC AUTO: 82.8 FL — SIGNIFICANT CHANGE UP (ref 80–100)
MONOCYTES # BLD AUTO: 0.68 K/UL — SIGNIFICANT CHANGE UP (ref 0–0.9)
MONOCYTES NFR BLD AUTO: 5.2 % — SIGNIFICANT CHANGE UP (ref 2–14)
MYELOCYTES NFR BLD: 1.7 % — HIGH (ref 0–0)
NEUTROPHILS # BLD AUTO: 10.77 K/UL — HIGH (ref 1.8–7.4)
NEUTROPHILS NFR BLD AUTO: 82.6 % — HIGH (ref 43–77)
NRBC # BLD: 0 /100 WBCS — SIGNIFICANT CHANGE UP (ref 0–0)
NRBC # BLD: 0 /100 WBCS — SIGNIFICANT CHANGE UP (ref 0–0)
PHOSPHATE SERPL-MCNC: 3.7 MG/DL — SIGNIFICANT CHANGE UP (ref 2.5–4.5)
PLAT MORPH BLD: NORMAL — SIGNIFICANT CHANGE UP
PLATELET # BLD AUTO: 647 K/UL — HIGH (ref 150–400)
PLATELET # BLD AUTO: 650 K/UL — HIGH (ref 150–400)
PLATELET # BLD AUTO: 666 K/UL — HIGH (ref 150–400)
POTASSIUM SERPL-MCNC: 4.7 MMOL/L — SIGNIFICANT CHANGE UP (ref 3.5–5.3)
POTASSIUM SERPL-SCNC: 4.7 MMOL/L — SIGNIFICANT CHANGE UP (ref 3.5–5.3)
PROT SERPL-MCNC: 7.5 G/DL — SIGNIFICANT CHANGE UP (ref 6–8.3)
RBC # BLD: 3.65 M/UL — LOW (ref 4.2–5.8)
RBC # BLD: 3.84 M/UL — LOW (ref 4.2–5.8)
RBC # BLD: 3.89 M/UL — LOW (ref 4.2–5.8)
RBC # FLD: 20.1 % — HIGH (ref 10.3–14.5)
RBC # FLD: 20.2 % — HIGH (ref 10.3–14.5)
RBC # FLD: 20.2 % — HIGH (ref 10.3–14.5)
RBC BLD AUTO: SIGNIFICANT CHANGE UP
SODIUM SERPL-SCNC: 133 MMOL/L — LOW (ref 135–145)
WBC # BLD: 13.04 K/UL — HIGH (ref 3.8–10.5)
WBC # BLD: 13.17 K/UL — HIGH (ref 3.8–10.5)
WBC # BLD: 14.45 K/UL — HIGH (ref 3.8–10.5)
WBC # FLD AUTO: 13.04 K/UL — HIGH (ref 3.8–10.5)
WBC # FLD AUTO: 13.17 K/UL — HIGH (ref 3.8–10.5)
WBC # FLD AUTO: 14.45 K/UL — HIGH (ref 3.8–10.5)

## 2021-05-24 PROCEDURE — 99232 SBSQ HOSP IP/OBS MODERATE 35: CPT

## 2021-05-24 PROCEDURE — 99233 SBSQ HOSP IP/OBS HIGH 50: CPT | Mod: GC

## 2021-05-24 PROCEDURE — 99497 ADVNCD CARE PLAN 30 MIN: CPT

## 2021-05-24 RX ADMIN — LEVETIRACETAM 500 MILLIGRAM(S): 250 TABLET, FILM COATED ORAL at 05:42

## 2021-05-24 RX ADMIN — HUMAN INSULIN 13 UNIT(S): 100 INJECTION, SUSPENSION SUBCUTANEOUS at 00:52

## 2021-05-24 RX ADMIN — SENNA PLUS 2 TABLET(S): 8.6 TABLET ORAL at 22:39

## 2021-05-24 RX ADMIN — Medication 2 MILLIGRAM(S): at 22:40

## 2021-05-24 RX ADMIN — Medication 650 MILLIGRAM(S): at 13:13

## 2021-05-24 RX ADMIN — Medication 650 MILLIGRAM(S): at 06:11

## 2021-05-24 RX ADMIN — Medication 650 MILLIGRAM(S): at 05:41

## 2021-05-24 RX ADMIN — Medication 650 MILLIGRAM(S): at 18:09

## 2021-05-24 RX ADMIN — MEROPENEM 100 MILLIGRAM(S): 1 INJECTION INTRAVENOUS at 05:41

## 2021-05-24 RX ADMIN — LEVETIRACETAM 500 MILLIGRAM(S): 250 TABLET, FILM COATED ORAL at 18:10

## 2021-05-24 RX ADMIN — HUMAN INSULIN 12 UNIT(S): 100 INJECTION, SUSPENSION SUBCUTANEOUS at 17:44

## 2021-05-24 RX ADMIN — Medication 2: at 05:49

## 2021-05-24 RX ADMIN — Medication 2: at 17:44

## 2021-05-24 RX ADMIN — HEPARIN SODIUM 5000 UNIT(S): 5000 INJECTION INTRAVENOUS; SUBCUTANEOUS at 05:41

## 2021-05-24 RX ADMIN — Medication 650 MILLIGRAM(S): at 01:22

## 2021-05-24 RX ADMIN — MEROPENEM 100 MILLIGRAM(S): 1 INJECTION INTRAVENOUS at 13:08

## 2021-05-24 RX ADMIN — Medication 0: at 00:52

## 2021-05-24 RX ADMIN — Medication 2: at 13:08

## 2021-05-24 RX ADMIN — HUMAN INSULIN 12 UNIT(S): 100 INJECTION, SUSPENSION SUBCUTANEOUS at 12:32

## 2021-05-24 RX ADMIN — Medication 650 MILLIGRAM(S): at 00:52

## 2021-05-24 RX ADMIN — Medication 650 MILLIGRAM(S): at 17:43

## 2021-05-24 RX ADMIN — HUMAN INSULIN 13 UNIT(S): 100 INJECTION, SUSPENSION SUBCUTANEOUS at 05:49

## 2021-05-24 RX ADMIN — MEROPENEM 100 MILLIGRAM(S): 1 INJECTION INTRAVENOUS at 22:39

## 2021-05-24 RX ADMIN — Medication 400 UNIT(S): at 17:42

## 2021-05-24 RX ADMIN — Medication 650 MILLIGRAM(S): at 12:32

## 2021-05-24 RX ADMIN — Medication 100 MILLIGRAM(S): at 12:32

## 2021-05-24 RX ADMIN — HEPARIN SODIUM 5000 UNIT(S): 5000 INJECTION INTRAVENOUS; SUBCUTANEOUS at 17:43

## 2021-05-24 NOTE — PROGRESS NOTE ADULT - PROBLEM SELECTOR PLAN 1
-test BG q6h  -c/w NPH 12 units at 1200, 1800  -c/w NPH 13 units 0000; 0600  -hold NPH if tube feeds off. Can reduce dose to 50% if BG less than 100mg/dl  - continue  moderate correction scale sq q6h.   BG goal (100-180mg/dl)  -notify endocrine team if diet advanced to PO. Will need to change insulin regimen. Notify endocrine team if TF stopped.   Discharge: TBD based on feeding modality and insulin requirements

## 2021-05-24 NOTE — PROGRESS NOTE ADULT - ATTENDING COMMENTS
discussed above plan with resident on rounds. patient seen and examined. no acute complaints (wanted cousin at beside translating)  labs and imaging reviewed.  I agree with the above findings, assessment, and plan with the following additions and exceptions:  AHRF/PNA/empyema s/p thoracotomy 5/12 with decortication and abscess drainage on meropenem   ID f/u regarding course of abx  repeat swallow eval today - c/w PEG feeds for now  DM2 - FS controlled, c/w NPH and admelog ISS for coverage  PE likely provoked - post covid infection thrombosis - LE dopplers negative for DVT, pulm f/u appreciated - will continue to hold full dose a/c given bleeding risk   c/w dvt proph hsq - h/h stable - cont to monitor   dispo to acute rehab once determination of abx made.   patient with multiple co-morbid conditions; higher risk for future complications despite optimal medical therapy - discussed with family at bedside

## 2021-05-24 NOTE — SWALLOW BEDSIDE ASSESSMENT ADULT - SLP GENERAL OBSERVATIONS
Pt found in be, on room air. Eyes open. No eye contact. No following commands. Christopher  #[id number] utilized fro exam. Pt tachypneic at times. O2 saturation 95%. Non-verbal, no attempts at phonation.
Pt awake in bed, grossly oriented to name, hospital, month and year. Pt unable to accurately report . Intermittently confused however increased verbal output compared to prior assessments. + Social greeting. Pt on room air. + Baseline cough and dysphonic vocal quality.

## 2021-05-24 NOTE — SWALLOW BEDSIDE ASSESSMENT ADULT - SLP PERTINENT HISTORY OF CURRENT PROBLEM
Please see addendum
H&P: 60M unknown medical history BIBEMS for respiratory distress, recently COVID+ as per EMS. Pt unable to comply with history 2/2 resp distress, nods yes to difficulty breathing, no to pain. Baseline AAOx3. En route, pt had RR 28, SaO2 60%. In ED initially hypoxic to 60% placed on NRB and satting low 90s and tachypneic to 40s on NRB. Placed on AVAPS, still tachypneic to 40s. Labs showing HAGMA, elevated FSG to 500s. Also with lactate of 15. Trops of 200 and pro-BNP elevated to 21k. MICU consulted for respiratory failure in setting of COVID, also concern for DKA. On encounter, pt is noncooperative with questioning. Nods and tries to speak, unable to form full sentences and visibly tachypneic on AVAPS. In ED given 2L NS, decadron. Started on insulin gtt. Subsequently intubated

## 2021-05-24 NOTE — PROGRESS NOTE ADULT - PROBLEM SELECTOR PLAN 1
5/12 he underwent right VATS converted to open thoracotomy, decortication, drainage of abscess and flex bronch. Pt briefly on lexi intraop and extubated in OR.   - s/p L. thoracotomy, drainage of abscess, decortication, flex bronch   - s/p 1 angled chest tube , 1 straight chest tube, 1 Blakemore drain (anterior)   - Monitor pulse oximeter  - Out of bed to chair, ambulate as tolerated, and incentive spirometry to prevent atelectasis  - Pain control as needed with tylenol and oxy  - c/w meropenem 5/7-    Obturator Hematoma   - bleeding hematoma in right thigh on CT on 5/7  - CT hip showing non active bleed, hematoma on right thigh on 5/8  - doppler to r/o DVT in LE, per IR no role IVC filter  - active again on 5/17, now stable  - was on heparin drip on and off, now off with DVT ppx heparin bid

## 2021-05-24 NOTE — SWALLOW BEDSIDE ASSESSMENT ADULT - SWALLOW EVAL: DIAGNOSIS
60M w/ PMH of unspecified low BPs, recent COVID+ BIBEMS for respiratory distress. Admitted to MICU for AHRF 2/2 COVID requiring 1 day of intubation, and DKA requiring insulin gtt. Extubated 4/13 and on RA. Course c/b CVA with hemorrhagic conversion, ongoing encephalopathy, dysphagia s/p NGT, acute PE on hep gtt, SUKI. Pt seen for bedside swallow evaluation. At this time, mentation does not support PO feeding.
Pt is 61 y/o unknown medical history BIBEMS for respiratory distress s/p recent COVID-19 infection. Course c//b intubation, dysphagia with subsequent PEG, and recent VATS surgery. This service re-consulted to re-assess swallow function. Given known history of silent aspiration PO trials were limited to less than 1 teaspoon of thin liquid to grossly assess swallow mechanism. Based on limited PO trials, pt presents with baseline cough and dysphonic vocal quality with reduced vocal volume. Plan for repeat objective testing to determine candidacy for initiation of PO diet.

## 2021-05-24 NOTE — SWALLOW BEDSIDE ASSESSMENT ADULT - SWALLOW EVAL: CURRENT DIET
NPO +KFT, feeds to be started pending results of bedside swallow evaluation
NPO, with non-oral nutrition/hydration/medications.

## 2021-05-24 NOTE — PROGRESS NOTE ADULT - PROBLEM SELECTOR PLAN 3
-pt should be on a statin as he is over 40 with diabetes. Can defer for now given chronic prolonged illness/hospital stay  - check fasting lipids as outpt    pager: [phone]   office:  [phone] (M-F 9a-5pm)               [phone] (nights/weekends)

## 2021-05-24 NOTE — PROGRESS NOTE ADULT - ASSESSMENT
59 y/o Gujarati speaking male with new onset diabetes (HbA1c of 10.3) admitted with COVID with prolonged hospital course requiring intubation and PEG insertion; s/p Right VATS converted to R thoracotomy for Empyema/bilateral PNA, abscess with recent CT Showing necrotizing PNA . On 24 hour continuous TF at goal. BG values at goal on current insulin regimen. Pending MBS to assess if diet can be advanced. BG goal (100-180mg/dl).

## 2021-05-24 NOTE — SWALLOW BEDSIDE ASSESSMENT ADULT - SWALLOW EVAL: RECOMMENDED DIET
NPO, with non-oral nutrition/hydration/medications.
Continue NPO, with non-oral nutrition/hydration/medications via PEG given history of dysphagia.

## 2021-05-24 NOTE — PROGRESS NOTE ADULT - SUBJECTIVE AND OBJECTIVE BOX
Diabetes Follow up note:    Chief complaint: T2DM    Interval Hx: BG values at goal. Pt seen at bedside. Had bedside swallow eval this afternoon. Able to nod to questions.      ROS:  general: no complaints    MEDS:  insulin lispro (ADMELOG) corrective regimen sliding scale   SubCutaneous every 6 hours  insulin NPH human recombinant 12 Unit(s) SubCutaneous <User Schedule>  insulin NPH human recombinant 13 Unit(s) SubCutaneous <User Schedule>    meropenem  IVPB 1000 milliGRAM(s) IV Intermittent every 8 hours    Allergies    No Known Allergies      PE:   General: Male lying in bed. NAD.   Vital Signs Last 24 Hrs  T(C): 36.3 (24 May 2021 14:38), Max: 37.1 (23 May 2021 19:11)  T(F): 97.4 (24 May 2021 14:38), Max: 98.7 (23 May 2021 19:11)  HR: 112 (24 May 2021 14:38) (98 - 116)  BP: 112/63 (24 May 2021 14:38) (97/65 - 119/77)  BP(mean): --  RR: 18 (24 May 2021 14:38) (18 - 20)  SpO2: 98% (24 May 2021 14:38) (94% - 98%)  Abd: Soft, NT,ND, PEG in place.   Extremities: Warm  Neuro: Awake. Alert.     LABS:  POCT Blood Glucose.: 160 mg/dL (05-24-21 @ 12:56)  POCT Blood Glucose.: 183 mg/dL (05-24-21 @ 05:46)  POCT Blood Glucose.: 133 mg/dL (05-24-21 @ 00:23)  POCT Blood Glucose.: 170 mg/dL (05-23-21 @ 21:32)  POCT Blood Glucose.: 148 mg/dL (05-23-21 @ 17:06)  POCT Blood Glucose.: 148 mg/dL (05-23-21 @ 12:23)  POCT Blood Glucose.: 137 mg/dL (05-23-21 @ 05:34)  POCT Blood Glucose.: 98 mg/dL (05-22-21 @ 23:46)  POCT Blood Glucose.: 93 mg/dL (05-22-21 @ 22:05)  POCT Blood Glucose.: 181 mg/dL (05-22-21 @ 17:02)  POCT Blood Glucose.: 211 mg/dL (05-22-21 @ 12:38)  POCT Blood Glucose.: 163 mg/dL (05-22-21 @ 05:58)  POCT Blood Glucose.: 200 mg/dL (05-22-21 @ 01:15)  POCT Blood Glucose.: 173 mg/dL (05-21-21 @ 20:24)  POCT Blood Glucose.: 128 mg/dL (05-21-21 @ 18:10)                            9.2    13.04 )-----------( 666      ( 24 May 2021 07:29 )             30.0       05-24    133<L>  |  97  |  34<H>  ----------------------------<  178<H>  4.7   |  23  |  0.65    Ca    9.2      24 May 2021 07:28  Phos  3.7     05-24  Mg     2.2     05-24    TPro  7.5  /  Alb  2.7<L>  /  TBili  0.4  /  DBili  x   /  AST  27  /  ALT  30  /  AlkPhos  131<H>  05-24    A1C with Estimated Average Glucose Result: 10.3 % (04-02-21 @ 14:28)          Contact number: fei [phone] or [phone]

## 2021-05-24 NOTE — SWALLOW BEDSIDE ASSESSMENT ADULT - PHARYNGEAL PHASE
Baseline cough makes subjective analysis of swallow difficult to a degree. Plan for repeat MBS to formally assess swallow function and determine candidacy for PO diet given h/o silent aspiration.

## 2021-05-24 NOTE — PROGRESS NOTE ADULT - SUBJECTIVE AND OBJECTIVE BOX
CC: F/U for Empyema    Saw/spoke to patient. Doing well. No new complaints. Unchanged.    Allergies  No Known Allergies    ANTIMICROBIALS:  meropenem  IVPB 1000 every 8 hours    PE:    Vital Signs Last 24 Hrs  T(C): 36.3 (24 May 2021 14:38), Max: 37.1 (23 May 2021 19:11)  T(F): 97.4 (24 May 2021 14:38), Max: 98.7 (23 May 2021 19:11)  HR: 112 (24 May 2021 14:38) (98 - 116)  BP: 112/63 (24 May 2021 14:38) (97/65 - 119/77)  RR: 18 (24 May 2021 14:38) (18 - 20)  SpO2: 98% (24 May 2021 14:38) (94% - 98%)    Gen: AOx3, NAD, non-toxic  CV: S1+S2 normal, tachycardic  Resp: Clear bilat, no resp distress, no crackles/wheezes  Abd: Soft, nontender, +BS  Ext: No LE edema, no wounds    LABS:                        9.6    14.45 )-----------( 650      ( 24 May 2021 15:43 )             31.8     05-24    133<L>  |  97  |  34<H>  ----------------------------<  178<H>  4.7   |  23  |  0.65    Ca    9.2      24 May 2021 07:28  Phos  3.7     05-24  Mg     2.2     05-24    TPro  7.5  /  Alb  2.7<L>  /  TBili  0.4  /  DBili  x   /  AST  27  /  ALT  30  /  AlkPhos  131<H>  05-24    MICROBIOLOGY:    .Blood Blood  05-14-21   No Growth Final     BAL BRONCHIAL AVEOLAR LAVAGE  05-13-21   Rare Yeast  --    Numerous polymorphonuclear leukocytes seen per low power field  No Squamous epithelial cells seen per low power field  Rare Gram Negative Rods seen per oil power field    .Tissue Other  05-12-21   Growth in fluid media only Klebsiella pneumoniae ESBL  Rare Lactobacillus rhamnosus "Susceptibilities not performed"  --  Klebsiella pneumoniae ESBL    .Urine Clean Catch (Midstream)  05-06-21   >100,000 CFU/ml Klebsiella pneumoniae ESBL  --  Klebsiella pneumoniae ESBL    .Blood Blood  05-05-21   No Growth Final    .Blood Blood  05-05-21   No Growth Final      (otherwise reviewed)    RADIOLOGY:    5/19 XR:    IMPRESSION:  No pneumothorax.

## 2021-05-24 NOTE — GOALS OF CARE CONVERSATION - ADVANCED CARE PLANNING - FAMILY/RELATIVE
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 [facility].
Brother in law, Margarito Pizarro.
Margarito Pizarro
Margarito Pizarro
Dr. Mclain, Mr. Pizarro (brother in law).

## 2021-05-24 NOTE — PROGRESS NOTE ADULT - ASSESSMENT
59 yo M with PMH of DM who was initially admitted to [facility] ICU 4/1 for hypoxic respiratory failure 2/2 COVID c/b PE with R heart strain, cardiogenic and septic shock, ischemic and hemorrhagic CVA and ESBL klebsiella ventilator associated PNA, transferred to [facility] for neurosurgery eval on 4/12  No fever, has leukocytosis  UCX with ESBL K pne  Bilateral LE dry gangrene  CT now with Empyema, concern for bilateral PNA, abscess  S/p OR Thoracotomy, abscess drainage, bronch (5/12)--culture with K pne  CT with areas of small cavitary lesions and hematomas  Patient appears generally stable at bedside  1) Leukocytosis  - Improving, stable  - Trend WBC  2) COVID  - COVID+, unclear significance (was prior negative, and patient Ab+)  - Monitor for any signs active COVID/reinfection  - Any considerations isolation per infection control  3) Suspected Empyema/Abscess  - Culture with K pne, Lactobacillus, Prevotella--should be adequately treated with jennifer  - Meropenem 1g q 8--continue while inpatient  - Note CT findings, follow surgery if any further procedure needed for persistent cavities/hematoma  - Appreciate Thoracic procedure  - When DC planning, would send out on Bactrim DS 2 tabs TID + Augmentin 875mg po q 12, for tentative 2 more weeks (then will further determine following)  4) Gangrene  - Appears noninfectious/dry gangrene  - Monitor wounds    Sammy Chambers MD  Pager [phone]  After 5pm and on weekends call [phone]

## 2021-05-24 NOTE — GOALS OF CARE CONVERSATION - ADVANCED CARE PLANNING - CONVERSATION/DISCUSSION
Diagnosis/Prognosis/Treatment Options
Diagnosis/Prognosis/Treatment Options
Treatment Options
Diagnosis/Prognosis/Treatment Options
Diagnosis/Prognosis/MOLST Discussed/Treatment Options

## 2021-05-24 NOTE — SWALLOW BEDSIDE ASSESSMENT ADULT - SPECIFY REASON(S)
to assess the swallow mechanism; r/o dysphagia.
to subjectively assess the swallow mechanism r/o dysphagia

## 2021-05-24 NOTE — GOALS OF CARE CONVERSATION - ADVANCED CARE PLANNING - NSPROPTRIGHTCAREGIVER_GEN_A_NUR
information could not be obtained

## 2021-05-24 NOTE — GOALS OF CARE CONVERSATION - ADVANCED CARE PLANNING - CONVERSATION DETAILS
I followed up with Mr. Pizarro about the patient's codes status. Mr. Pizarro stated that after d/w the patient's family, they are still looking for the patient to be full code. They would like to take things day by day and re-analyzing goals if the patient's condition were to acutely change. Therefore current goals are for prolonging life as a priority.         Amauri Martinez MD   Geriatrics and Palliative Care (GAP) Consult Service    of Geriatric and Palliative Medicine  [facility]      Please page the following number for clinical matters between the hours of 9 am and 5 pm from Monday through Friday : [phone]    After 5pm and on weekends, please see the contact information below:    In the event of newly developing, evolving, or worsening symptoms, please contact the Palliative Medicine team via pager (if the patient is at [facility] #[phone] or if the patient is at [facility] #[phone]) The Geriatric and Palliative Medicine service has coverage 24 hours a day/ 7 days a week to provide medical recommendations regarding symptom management needs via telephone I followed up with Mr. Pizarro about the patient's condition and codes status. Mr. MEDINA updated him about chest tubes being removed and the patient being more alert and as f/u 1 step commands ( as per respiratory and as per his RN). The patient's son in law then stated that after d/w the patient's family, they are still looking for the patient to be full code. They would like to take things day by day and re-analyzing goals if the patient's condition were to acutely change. Therefore current goals are for prolonging life as a priority.     Dispo planning is for [facility] when stable for DC.     Will sign off.     Amauri Martinez MD   Geriatrics and Palliative Care (GAP) Consult Service    of Geriatric and Palliative Medicine  [facility]      Please page the following number for clinical matters between the hours of 9 am and 5 pm from Monday through Friday : [phone]    After 5pm and on weekends, please see the contact information below:    In the event of newly developing, evolving, or worsening symptoms, please contact the Palliative Medicine team via pager (if the patient is at [facility] #[phone] or if the patient is at [facility] #[phone]) The Geriatric and Palliative Medicine service has coverage 24 hours a day/ 7 days a week to provide medical recommendations regarding symptom management needs via telephone

## 2021-05-24 NOTE — PROGRESS NOTE ADULT - SUBJECTIVE AND OBJECTIVE BOX
PROGRESS NOTE:   Authored by Dr. Emily Mckeon, ANH pager [phone]    Patient is a 60y old  Male who presents with a chief complaint of COVID19 w/ severe metabolic acidosis s/p intubation (23 May 2021 08:37)      SUBJECTIVE / OVERNIGHT EVENTS:    MEDICATIONS  (STANDING):  acetaminophen   Tablet .. 650 milliGRAM(s) Oral every 6 hours  cholecalciferol 400 Unit(s) Oral daily  doxazosin 2 milliGRAM(s) Oral at bedtime  heparin   Injectable 5000 Unit(s) SubCutaneous every 12 hours  insulin lispro (ADMELOG) corrective regimen sliding scale   SubCutaneous every 6 hours  insulin NPH human recombinant 13 Unit(s) SubCutaneous <User Schedule>  insulin NPH human recombinant 12 Unit(s) SubCutaneous <User Schedule>  levETIRAcetam  Solution 500 milliGRAM(s) Oral two times a day  meropenem  IVPB 1000 milliGRAM(s) IV Intermittent every 8 hours  polyethylene glycol 3350 17 Gram(s) Oral daily  senna 2 Tablet(s) Oral at bedtime  thiamine 100 milliGRAM(s) Oral daily    MEDICATIONS  (PRN):  albuterol/ipratropium for Nebulization 3 milliLiter(s) Nebulizer every 6 hours PRN Shortness of Breath and/or Wheezing      CAPILLARY BLOOD GLUCOSE      POCT Blood Glucose.: 183 mg/dL (24 May 2021 05:46)  POCT Blood Glucose.: 133 mg/dL (24 May 2021 00:23)  POCT Blood Glucose.: 170 mg/dL (23 May 2021 21:32)  POCT Blood Glucose.: 148 mg/dL (23 May 2021 17:06)  POCT Blood Glucose.: 148 mg/dL (23 May 2021 12:23)    I&O's Summary    23 May 2021 07:01  -  24 May 2021 07:00  --------------------------------------------------------  IN: 1982 mL / OUT: 0 mL / NET: 1982 mL        PHYSICAL EXAM:  Vital Signs Last 24 Hrs  T(C): 37.1 (24 May 2021 04:15), Max: 37.1 (23 May 2021 13:55)  T(F): 98.7 (24 May 2021 04:15), Max: 98.7 (23 May 2021 13:55)  HR: 100 (24 May 2021 04:15) (97 - 103)  BP: 115/77 (24 May 2021 04:15) (97/65 - 124/81)  BP(mean): --  RR: 19 (24 May 2021 04:15) (18 - 20)  SpO2: 97% (24 May 2021 04:15) (94% - 100%)    GENERAL: No acute distress, well-developed  HEAD:  Atraumatic, Normocephalic  EYES: EOMI, PERRLA, conjunctiva and sclera clear  NECK: Supple, no lymphadenopathy, no JVD  CHEST/LUNG: CTAB; No wheezes, rales, or rhonchi  HEART: Regular rate and rhythm; No murmurs, rubs, or gallops  ABDOMEN: Soft, non-tender, non-distended; normal bowel sounds, no organomegaly  EXTREMITIES:  2+ peripheral pulses b/l, No clubbing, cyanosis, or edema  NEUROLOGY: A&O x 3, no focal deficits  SKIN: No rashes or lesions    LABS:                        9.7    13.17 )-----------( 647      ( 23 May 2021 23:44 )             31.8     05-23    136  |  98  |  37<H>  ----------------------------<  138<H>  5.0   |  26  |  0.72    Ca    9.6      23 May 2021 07:06  Phos  3.6     05-23  Mg     2.4     05-23    TPro  7.6  /  Alb  2.9<L>  /  TBili  0.5  /  DBili  x   /  AST  29  /  ALT  34  /  AlkPhos  118  05-23    PT/INR - ( 22 May 2021 07:28 )   PT: 12.3 sec;   INR: 1.03 ratio         PTT - ( 23 May 2021 07:29 )  PTT:47.1 sec            RADIOLOGY & ADDITIONAL TESTS:  Results Reviewed:   Imaging Personally Reviewed:  Electrocardiogram Personally Reviewed:    COORDINATION OF CARE:  Care Discussed with Consultants/Other Providers [Y/N]:  Prior or Outpatient Records Reviewed [Y/N]:   PROGRESS NOTE:   Authored by Dr. Emily Mckeon, ANH pager [phone]    Patient is a 60y old  Male who presents with a chief complaint of COVID19 w/ severe metabolic acidosis s/p intubation (23 May 2021 08:37)      SUBJECTIVE / OVERNIGHT EVENTS: Patient seen and examined at bedside. He appeared comfortable and was able to move and nod yes to my questions. He denies any acute issues or pain.     MEDICATIONS  (STANDING):  acetaminophen   Tablet .. 650 milliGRAM(s) Oral every 6 hours  cholecalciferol 400 Unit(s) Oral daily  doxazosin 2 milliGRAM(s) Oral at bedtime  heparin   Injectable 5000 Unit(s) SubCutaneous every 12 hours  insulin lispro (ADMELOG) corrective regimen sliding scale   SubCutaneous every 6 hours  insulin NPH human recombinant 13 Unit(s) SubCutaneous <User Schedule>  insulin NPH human recombinant 12 Unit(s) SubCutaneous <User Schedule>  levETIRAcetam  Solution 500 milliGRAM(s) Oral two times a day  meropenem  IVPB 1000 milliGRAM(s) IV Intermittent every 8 hours  polyethylene glycol 3350 17 Gram(s) Oral daily  senna 2 Tablet(s) Oral at bedtime  thiamine 100 milliGRAM(s) Oral daily    MEDICATIONS  (PRN):  albuterol/ipratropium for Nebulization 3 milliLiter(s) Nebulizer every 6 hours PRN Shortness of Breath and/or Wheezing      CAPILLARY BLOOD GLUCOSE      POCT Blood Glucose.: 183 mg/dL (24 May 2021 05:46)  POCT Blood Glucose.: 133 mg/dL (24 May 2021 00:23)  POCT Blood Glucose.: 170 mg/dL (23 May 2021 21:32)  POCT Blood Glucose.: 148 mg/dL (23 May 2021 17:06)  POCT Blood Glucose.: 148 mg/dL (23 May 2021 12:23)    I&O's Summary    23 May 2021 07:01  -  24 May 2021 07:00  --------------------------------------------------------  IN: 1982 mL / OUT: 0 mL / NET: 1982 mL        PHYSICAL EXAM:  Vital Signs Last 24 Hrs  T(C): 37.1 (24 May 2021 04:15), Max: 37.1 (23 May 2021 13:55)  T(F): 98.7 (24 May 2021 04:15), Max: 98.7 (23 May 2021 13:55)  HR: 100 (24 May 2021 04:15) (97 - 103)  BP: 115/77 (24 May 2021 04:15) (97/65 - 124/81)  BP(mean): --  RR: 19 (24 May 2021 04:15) (18 - 20)  SpO2: 97% (24 May 2021 04:15) (94% - 100%)    GENERAL: No acute distress   HEAD:  Atraumatic, Normocephalic  EYES: EOMI, PERRLA, conjunctiva and sclera clear  NECK: Supple, no lymphadenopathy, no JVD  CHEST/LUNG: CTAB; difficult to hear lung sounds, No wheezes, rales, or rhonchi, right sided staples and chest wall hematoma of same size. no discoloration   HEART: Regular rhythm, tachy; No murmurs, rubs, or gallops  ABDOMEN: Soft, non-tender, non-distended; normal bowel sounds, no organomegaly, PEG in place   EXTREMITIES:  2+ peripheral pulses b/l, No clubbing, cyanosis, or edema  NEUROLOGY: A&O x 1-2, generalized weakness   SKIN: gangrenous toes     LABS:                        9.7    13.17 )-----------( 647      ( 23 May 2021 23:44 )             31.8     05-23    136  |  98  |  37<H>  ----------------------------<  138<H>  5.0   |  26  |  0.72    Ca    9.6      23 May 2021 07:06  Phos  3.6     05-23  Mg     2.4     05-23    TPro  7.6  /  Alb  2.9<L>  /  TBili  0.5  /  DBili  x   /  AST  29  /  ALT  34  /  AlkPhos  118  05-23    PT/INR - ( 22 May 2021 07:28 )   PT: 12.3 sec;   INR: 1.03 ratio         PTT - ( 23 May 2021 07:29 )  PTT:47.1 sec            RADIOLOGY & ADDITIONAL TESTS:  Results Reviewed:   Imaging Personally Reviewed:  Electrocardiogram Personally Reviewed:    COORDINATION OF CARE:  Care Discussed with Consultants/Other Providers [Y/N]:  Prior or Outpatient Records Reviewed [Y/N]:

## 2021-05-25 LAB
ALBUMIN SERPL ELPH-MCNC: 2.8 G/DL — LOW (ref 3.3–5)
ALP SERPL-CCNC: 127 U/L — HIGH (ref 40–120)
ALT FLD-CCNC: 31 U/L — SIGNIFICANT CHANGE UP (ref 10–45)
ANION GAP SERPL CALC-SCNC: 14 MMOL/L — SIGNIFICANT CHANGE UP (ref 5–17)
AST SERPL-CCNC: 32 U/L — SIGNIFICANT CHANGE UP (ref 10–40)
BASOPHILS # BLD AUTO: 0.12 K/UL — SIGNIFICANT CHANGE UP (ref 0–0.2)
BASOPHILS NFR BLD AUTO: 0.9 % — SIGNIFICANT CHANGE UP (ref 0–2)
BILIRUB SERPL-MCNC: 0.4 MG/DL — SIGNIFICANT CHANGE UP (ref 0.2–1.2)
BUN SERPL-MCNC: 36 MG/DL — HIGH (ref 7–23)
CALCIUM SERPL-MCNC: 9.4 MG/DL — SIGNIFICANT CHANGE UP (ref 8.4–10.5)
CHLORIDE SERPL-SCNC: 100 MMOL/L — SIGNIFICANT CHANGE UP (ref 96–108)
CO2 SERPL-SCNC: 23 MMOL/L — SIGNIFICANT CHANGE UP (ref 22–31)
CREAT SERPL-MCNC: 0.72 MG/DL — SIGNIFICANT CHANGE UP (ref 0.5–1.3)
EOSINOPHIL # BLD AUTO: 0.57 K/UL — HIGH (ref 0–0.5)
EOSINOPHIL NFR BLD AUTO: 4.1 % — SIGNIFICANT CHANGE UP (ref 0–6)
GLUCOSE BLDC GLUCOMTR-MCNC: 122 MG/DL — HIGH (ref 70–99)
GLUCOSE BLDC GLUCOMTR-MCNC: 151 MG/DL — HIGH (ref 70–99)
GLUCOSE BLDC GLUCOMTR-MCNC: 82 MG/DL — SIGNIFICANT CHANGE UP (ref 70–99)
GLUCOSE BLDC GLUCOMTR-MCNC: 90 MG/DL — SIGNIFICANT CHANGE UP (ref 70–99)
GLUCOSE SERPL-MCNC: 89 MG/DL — SIGNIFICANT CHANGE UP (ref 70–99)
HCT VFR BLD CALC: 32.4 % — LOW (ref 39–50)
HGB BLD-MCNC: 9.9 G/DL — LOW (ref 13–17)
IMM GRANULOCYTES NFR BLD AUTO: 4.1 % — HIGH (ref 0–1.5)
LYMPHOCYTES # BLD AUTO: 16.3 % — SIGNIFICANT CHANGE UP (ref 13–44)
LYMPHOCYTES # BLD AUTO: 2.25 K/UL — SIGNIFICANT CHANGE UP (ref 1–3.3)
MAGNESIUM SERPL-MCNC: 2.2 MG/DL — SIGNIFICANT CHANGE UP (ref 1.6–2.6)
MCHC RBC-ENTMCNC: 25.3 PG — LOW (ref 27–34)
MCHC RBC-ENTMCNC: 30.6 GM/DL — LOW (ref 32–36)
MCV RBC AUTO: 82.7 FL — SIGNIFICANT CHANGE UP (ref 80–100)
MONOCYTES # BLD AUTO: 0.99 K/UL — HIGH (ref 0–0.9)
MONOCYTES NFR BLD AUTO: 7.2 % — SIGNIFICANT CHANGE UP (ref 2–14)
NEUTROPHILS # BLD AUTO: 9.27 K/UL — HIGH (ref 1.8–7.4)
NEUTROPHILS NFR BLD AUTO: 67.4 % — SIGNIFICANT CHANGE UP (ref 43–77)
NRBC # BLD: 0 /100 WBCS — SIGNIFICANT CHANGE UP (ref 0–0)
PHOSPHATE SERPL-MCNC: 3.6 MG/DL — SIGNIFICANT CHANGE UP (ref 2.5–4.5)
PLATELET # BLD AUTO: 699 K/UL — HIGH (ref 150–400)
POTASSIUM SERPL-MCNC: 4.8 MMOL/L — SIGNIFICANT CHANGE UP (ref 3.5–5.3)
POTASSIUM SERPL-SCNC: 4.8 MMOL/L — SIGNIFICANT CHANGE UP (ref 3.5–5.3)
PROT SERPL-MCNC: 7.6 G/DL — SIGNIFICANT CHANGE UP (ref 6–8.3)
RBC # BLD: 3.92 M/UL — LOW (ref 4.2–5.8)
RBC # FLD: 20.4 % — HIGH (ref 10.3–14.5)
SODIUM SERPL-SCNC: 137 MMOL/L — SIGNIFICANT CHANGE UP (ref 135–145)
WBC # BLD: 13.77 K/UL — HIGH (ref 3.8–10.5)
WBC # FLD AUTO: 13.77 K/UL — HIGH (ref 3.8–10.5)

## 2021-05-25 PROCEDURE — 99232 SBSQ HOSP IP/OBS MODERATE 35: CPT

## 2021-05-25 PROCEDURE — 99233 SBSQ HOSP IP/OBS HIGH 50: CPT | Mod: GC

## 2021-05-25 RX ORDER — SODIUM CHLORIDE 9 MG/ML
1000 INJECTION INTRAMUSCULAR; INTRAVENOUS; SUBCUTANEOUS
Refills: 0 | Status: DISCONTINUED | OUTPATIENT
Start: 2021-05-25 | End: 2021-05-30

## 2021-05-25 RX ORDER — HUMAN INSULIN 100 [IU]/ML
9 INJECTION, SUSPENSION SUBCUTANEOUS
Refills: 0 | Status: DISCONTINUED | OUTPATIENT
Start: 2021-05-25 | End: 2021-05-27

## 2021-05-25 RX ORDER — INSULIN HUMAN 100 [IU]/ML
6 INJECTION, SOLUTION SUBCUTANEOUS ONCE
Refills: 0 | Status: COMPLETED | OUTPATIENT
Start: 2021-05-25 | End: 2021-05-25

## 2021-05-25 RX ORDER — HUMAN INSULIN 100 [IU]/ML
11 INJECTION, SUSPENSION SUBCUTANEOUS
Refills: 0 | Status: DISCONTINUED | OUTPATIENT
Start: 2021-05-25 | End: 2021-05-27

## 2021-05-25 RX ADMIN — INSULIN HUMAN 6 UNIT(S): 100 INJECTION, SOLUTION SUBCUTANEOUS at 00:52

## 2021-05-25 RX ADMIN — Medication 100 MILLIGRAM(S): at 12:01

## 2021-05-25 RX ADMIN — MEROPENEM 100 MILLIGRAM(S): 1 INJECTION INTRAVENOUS at 05:46

## 2021-05-25 RX ADMIN — POLYETHYLENE GLYCOL 3350 17 GRAM(S): 17 POWDER, FOR SOLUTION ORAL at 12:02

## 2021-05-25 RX ADMIN — SODIUM CHLORIDE 75 MILLILITER(S): 9 INJECTION INTRAMUSCULAR; INTRAVENOUS; SUBCUTANEOUS at 07:32

## 2021-05-25 RX ADMIN — Medication 650 MILLIGRAM(S): at 01:19

## 2021-05-25 RX ADMIN — Medication 650 MILLIGRAM(S): at 00:49

## 2021-05-25 RX ADMIN — HEPARIN SODIUM 5000 UNIT(S): 5000 INJECTION INTRAVENOUS; SUBCUTANEOUS at 05:46

## 2021-05-25 RX ADMIN — Medication 650 MILLIGRAM(S): at 12:30

## 2021-05-25 RX ADMIN — Medication 400 UNIT(S): at 12:01

## 2021-05-25 RX ADMIN — Medication 650 MILLIGRAM(S): at 18:00

## 2021-05-25 RX ADMIN — HUMAN INSULIN 11 UNIT(S): 100 INJECTION, SUSPENSION SUBCUTANEOUS at 12:32

## 2021-05-25 RX ADMIN — Medication 2: at 12:31

## 2021-05-25 RX ADMIN — SENNA PLUS 2 TABLET(S): 8.6 TABLET ORAL at 23:08

## 2021-05-25 RX ADMIN — Medication 650 MILLIGRAM(S): at 23:08

## 2021-05-25 RX ADMIN — LEVETIRACETAM 500 MILLIGRAM(S): 250 TABLET, FILM COATED ORAL at 17:30

## 2021-05-25 RX ADMIN — MEROPENEM 100 MILLIGRAM(S): 1 INJECTION INTRAVENOUS at 20:40

## 2021-05-25 RX ADMIN — Medication 2 MILLIGRAM(S): at 23:07

## 2021-05-25 RX ADMIN — MEROPENEM 100 MILLIGRAM(S): 1 INJECTION INTRAVENOUS at 12:02

## 2021-05-25 RX ADMIN — Medication 650 MILLIGRAM(S): at 17:30

## 2021-05-25 RX ADMIN — Medication 650 MILLIGRAM(S): at 12:01

## 2021-05-25 RX ADMIN — HUMAN INSULIN 9 UNIT(S): 100 INJECTION, SUSPENSION SUBCUTANEOUS at 17:31

## 2021-05-25 RX ADMIN — INSULIN HUMAN 6 UNIT(S): 100 INJECTION, SOLUTION SUBCUTANEOUS at 06:33

## 2021-05-25 RX ADMIN — LEVETIRACETAM 500 MILLIGRAM(S): 250 TABLET, FILM COATED ORAL at 05:47

## 2021-05-25 RX ADMIN — Medication 650 MILLIGRAM(S): at 06:19

## 2021-05-25 RX ADMIN — Medication 650 MILLIGRAM(S): at 05:46

## 2021-05-25 RX ADMIN — HEPARIN SODIUM 5000 UNIT(S): 5000 INJECTION INTRAVENOUS; SUBCUTANEOUS at 17:31

## 2021-05-25 NOTE — PROGRESS NOTE ADULT - PROBLEM SELECTOR PLAN 7
Dysphagia noted in setting of patient's acute alteration in mental status and hx of intubation  - Nasogastric tube placed for administration of tube feeds originally, but patient self removed 4/17; multiple attempts to replace unsuccessful   Evaluated by speech & swallow: unable to cooperate with FEES study, cineesophagram performed demonstrating aspiration  - PEG tube placed on 4/30, tube feedings started on 4/30, will slowly increase rate by 10 ml q24hrs, currently at 20cc/hr, + free water q8  - planned for MBS with speech and swallow for reval

## 2021-05-25 NOTE — PROGRESS NOTE ADULT - PROBLEM SELECTOR PLAN 1
-test BG q6h  -NPH 13 units sq 0000 and 0600  -NPH 11 units sq 1200  -NPH 9 units sq 1800  -hold NPH if tube feeds off. Can reduce dose to 50% if BG less than 100mg/dl; if reduced dosage ordered ensure Humulin N ordered   - continue  moderate correction scale sq q6h.   BG goal (100-180mg/dl)  -notify endocrine team if diet advanced to PO. Will need to change insulin regimen. Notify endocrine team if TF stopped.   Discharge: TBD based on feeding modality and insulin requirements

## 2021-05-25 NOTE — PROGRESS NOTE ADULT - PROBLEM SELECTOR PLAN 3
-pt should be on a statin as he is over 40 with diabetes. Can defer for now given chronic prolonged illness/hospital stay  - check fasting lipids as outpt      Discussed with patient bedside RN and Dr Mckeon via teams  Ban Calvert NP   In House Pager: [phone]   office:  [phone] (M-F 9a-5pm)               [phone] (nights/weekends)

## 2021-05-25 NOTE — PROGRESS NOTE ADULT - PROBLEM SELECTOR PLAN 10
- DVT: none, scds.   - Diet: NPO with tube feedings  - Full code  - Dispo- acute rehab pending medical optimization.. may need [facility], will call PMR to reassess patients capabilities

## 2021-05-25 NOTE — PROGRESS NOTE ADULT - ASSESSMENT
59 yo M with PMH of DM who was initially admitted to [facility] ICU 4/1 for hypoxic respiratory failure 2/2 COVID c/b PE with R heart strain, cardiogenic and septic shock, ischemic and hemorrhagic CVA and ESBL klebsiella ventilator associated PNA, transferred to [facility] for neurosurgery eval on 4/12  No fever, has leukocytosis  UCX with ESBL K pne  Bilateral LE dry gangrene  CT now with Empyema, concern for bilateral PNA, abscess  S/p OR Thoracotomy, abscess drainage, bronch (5/12)--culture with K pne  CT with areas of small cavitary lesions and hematomas  Patient appears generally stable at bedside  1) Leukocytosis  - Improving, stable  - Trend WBC  2) COVID  - COVID+, unclear significance (was prior negative, and patient Ab+)  - Monitor for any signs active COVID/reinfection  - Any considerations isolation per infection control  3) Suspected Empyema/Abscess  - Culture with K pne, Lactobacillus, Prevotella--should be adequately treated with jennifer  - Meropenem 1g q 8--continue while inpatient  - Note CT findings, follow surgery if any further procedure needed for persistent cavities/hematoma  - Appreciate Thoracic procedure  - When DC planning, would send out on Bactrim DS 2 tabs TID + Augmentin 875mg po q 12, for tentative 2 more weeks (then will further determine following)  4) Gangrene  - Appears noninfectious/dry gangrene  - Monitor wounds    Sammy Chambers MD  Pager [phone]  After 5pm and on weekends call [phone]

## 2021-05-25 NOTE — PROGRESS NOTE ADULT - ATTENDING COMMENTS
discussed above plan with resident on rounds. patient seen and examined. still having pain on right chest wall - overall improved  labs and imaging reviewed.  I agree with the above findings, assessment, and plan with the following additions and exceptions:  AHRF/PNA/empyema s/p thoracotomy 5/12 with decortication and abscess drainage on meropenem   ID f/u regarding course of abx  repeat swallow eval today - c/w PEG feeds for now, plan for MBS tomorrow  DM2 - FS low this morning - Endocrine f/u. for now c/w NPH and admelog ISS for coverage  PE likely provoked - post covid infection thrombosis - LE dopplers negative for DVT, pulm f/u appreciated - will continue to hold full dose a/c given bleeding risk  c/w dvt proph hsq - h/h stable - cont to monitor   PT recc - now [facility], PMR consult to evaluate if able to go to acute rehab.  patient with multiple co-morbid conditions; higher risk for future complications despite optimal medical therapy - discussed with family at bedside

## 2021-05-25 NOTE — PROGRESS NOTE ADULT - SUBJECTIVE AND OBJECTIVE BOX
CC: F/U for Empyema    Saw/spoke to patient. No fevers, no chills. No new complaints.    Allergies  No Known Allergies    ANTIMICROBIALS:  meropenem  IVPB 1000 every 8 hours    PE:    Vital Signs Last 24 Hrs  T(C): 36.3 (25 May 2021 12:02), Max: 37.1 (25 May 2021 04:19)  T(F): 97.4 (25 May 2021 12:02), Max: 98.8 (25 May 2021 04:19)  HR: 78 (25 May 2021 12:02) (78 - 112)  BP: 103/56 (25 May 2021 12:02) (101/69 - 112/63)  RR: 17 (25 May 2021 12:02) (16 - 18)  SpO2: 98% (25 May 2021 12:02) (97% - 98%)    Gen: AOx3, NAD, non-toxic, pleasant  CV: S1+S2 normal, nontachycardic  Resp: Clear bilat, no resp distress, no crackles/wheezes  Abd: Soft, nontender, +BS  Ext: No LE edema, no wounds    LABS:                        9.9    13.77 )-----------( 699      ( 25 May 2021 07:12 )             32.4     05-25    137  |  100  |  36<H>  ----------------------------<  89  4.8   |  23  |  0.72    Ca    9.4      25 May 2021 07:09  Phos  3.6     05-25  Mg     2.2     05-25    TPro  7.6  /  Alb  2.8<L>  /  TBili  0.4  /  DBili  x   /  AST  32  /  ALT  31  /  AlkPhos  127<H>  05-25    MICROBIOLOGY:    .Blood Blood  05-14-21   No Growth Final    BAL BRONCHIAL AVEOLAR LAVAGE  05-13-21   Rare Yeast  --    Numerous polymorphonuclear leukocytes seen per low power field  No Squamous epithelial cells seen per low power field  Rare Gram Negative Rods seen per oil power field    .Tissue Other  05-12-21   Growth in fluid media only Klebsiella pneumoniae ESBL  Rare Lactobacillus rhamnosus "Susceptibilities not performed"  --  Klebsiella pneumoniae ESBL    .Urine Clean Catch (Midstream)  05-06-21   >100,000 CFU/ml Klebsiella pneumoniae ESBL  --  Klebsiella pneumoniae ESBL    (otherwise reviewed)    RADIOLOGY:    5/19 XR:    Frontal expiratory view of the chest shows the heart to be similar in size. The lungs show similar right pleural thickening or lateral effusion and there is no evidence of pneumothorax nor left pleural effusion. Gastric tube is again noted.    IMPRESSION:  No pneumothorax.

## 2021-05-25 NOTE — PROGRESS NOTE ADULT - SUBJECTIVE AND OBJECTIVE BOX
DIABETES FOLLOW UP : Seen earlier today    INTERVAL HX: maryosmar  Crysjayy ID [id number]; pt A& OX2 knows he is in hospital and name unable to state year says"two thousand" . BG variable past 24 hours two values below 100,  team reduced dose of humulin N for BG less than 100 and gave as Humulin R twice over night , denies any hypoglycemia symptoms o/n continuous TF running at goal      Review of Systems:  General: As above  Cardiovascular: No chest pain, palpitations  Respiratory: No SOB, no cough  GI: No nausea, vomiting, abdominal pain  Endocrine: no polyuria, polydipsia or S&Sx of hypoglycemia    Allergies    No Known Allergies    Intolerances      MEDICATIONS  (STANDING):  acetaminophen   Tablet .. 650 milliGRAM(s) Oral every 6 hours  cholecalciferol 400 Unit(s) Oral daily  doxazosin 2 milliGRAM(s) Oral at bedtime  heparin   Injectable 5000 Unit(s) SubCutaneous every 12 hours  insulin lispro (ADMELOG) corrective regimen sliding scale   SubCutaneous every 6 hours  insulin NPH human recombinant 13 Unit(s) SubCutaneous <User Schedule>  insulin NPH human recombinant 11 Unit(s) SubCutaneous <User Schedule>  insulin NPH human recombinant 9 Unit(s) SubCutaneous <User Schedule>  levETIRAcetam  Solution 500 milliGRAM(s) Oral two times a day  meropenem  IVPB 1000 milliGRAM(s) IV Intermittent every 8 hours  polyethylene glycol 3350 17 Gram(s) Oral daily  senna 2 Tablet(s) Oral at bedtime  sodium chloride 0.9%. 1000 milliLiter(s) (75 mL/Hr) IV Continuous <Continuous>  thiamine 100 milliGRAM(s) Oral daily      PHYSICAL EXAM:  VITALS: T(C): 36.3 (05-25-21 @ 12:02)  T(F): 97.4 (05-25-21 @ 12:02), Max: 98.8 (05-25-21 @ 04:19)  HR: 78 (05-25-21 @ 12:02) (78 - 108)  BP: 103/56 (05-25-21 @ 12:02) (101/69 - 111/74)  RR:  (16 - 18)  SpO2:  (97% - 98%)  Wt(kg): --  GENERAL: male laying in bed  in NAD  Abdomen: Soft, nontender, non distended PEG in place with TF running  Extremities: Warm  NEURO: A&O X2    LABS:  POCT Blood Glucose.: 151 mg/dL (05-25-21 @ 12:16)  POCT Blood Glucose.: 90 mg/dL (05-25-21 @ 05:54)  POCT Blood Glucose.: 82 mg/dL (05-25-21 @ 00:15)  POCT Blood Glucose.: 159 mg/dL (05-24-21 @ 17:28)  POCT Blood Glucose.: 160 mg/dL (05-24-21 @ 12:56)  POCT Blood Glucose.: 183 mg/dL (05-24-21 @ 05:46)  POCT Blood Glucose.: 133 mg/dL (05-24-21 @ 00:23)  POCT Blood Glucose.: 170 mg/dL (05-23-21 @ 21:32)  POCT Blood Glucose.: 148 mg/dL (05-23-21 @ 17:06)  POCT Blood Glucose.: 148 mg/dL (05-23-21 @ 12:23)  POCT Blood Glucose.: 137 mg/dL (05-23-21 @ 05:34)  POCT Blood Glucose.: 98 mg/dL (05-22-21 @ 23:46)  POCT Blood Glucose.: 93 mg/dL (05-22-21 @ 22:05)  POCT Blood Glucose.: 181 mg/dL (05-22-21 @ 17:02)                            9.9    13.77 )-----------( 699      ( 25 May 2021 07:12 )             32.4       05-25    137  |  100  |  36<H>  ----------------------------<  89  4.8   |  23  |  0.72    EGFR if : 119  EGFR if non : 103    Ca    9.4      05-25  Mg     2.2     05-25  Phos  3.6     05-25    TPro  7.6  /  Alb  2.8<L>  /  TBili  0.4  /  DBili  x   /  AST  32  /  ALT  31  /  AlkPhos  127<H>  05-25 04-26 Chol 114 Direct LDL -- LDL calculated 53 HDL 35<L> Trig 126, 04-13 Chol -- Direct LDL -- LDL calculated -- HDL -- Trig 126      A1C with Estimated Average Glucose Result: 10.3 % (04-02-21 @ 14:28)      Estimated Average Glucose: 249 mg/dL (04-02-21 @ 14:28)

## 2021-05-25 NOTE — PROGRESS NOTE ADULT - ASSESSMENT
59 y/o Gujarati speaking male with new onset diabetes (HbA1c of 10.3) admitted with COVID with prolonged hospital course requiring intubation and PEG insertion; s/p Right VATS converted to R thoracotomy for Empyema/bilateral PNA, abscess with recent CT Showing necrotizing PNA . On 24 hour continuous TF at goal. BG values mostly at goal with two BG reading below 100 past 24 hours, suspect pt having nocturnal BG <100 due to NPH, lower Noon and Dinner doses further to maintain BG at goal. Pending MBS to assess if diet can be advanced. BG goal (100-180mg/dl).

## 2021-05-25 NOTE — PROGRESS NOTE ADULT - PROBLEM SELECTOR PLAN 9
- Initiated goals of care discussion with patient's brother-in-law (listed in chart as primary point of contact); patient is full code at this time; goal is as much recovery as possible  -The patient's wife is in [place], but the patient's brother-in-law has been visiting and placing the patient's wife on facetime; she is aware of all that is happening and has happened    Full code, multiple lengthy discussions, appreciate palliative care help

## 2021-05-25 NOTE — PROGRESS NOTE ADULT - SUBJECTIVE AND OBJECTIVE BOX
PROGRESS NOTE:   Authored by Dr. Emily Mckeon, AN[facility] pager [phone]    Patient is a 60y old  Male who presents with a chief complaint of COVID19 w/ severe metabolic acidosis s/p intubation (24 May 2021 15:47)      SUBJECTIVE / OVERNIGHT EVENTS: Patient examined at bedside. he appears well and denies any pain. Patient mentating similar to past few days    MEDICATIONS  (STANDING):  acetaminophen   Tablet .. 650 milliGRAM(s) Oral every 6 hours  cholecalciferol 400 Unit(s) Oral daily  doxazosin 2 milliGRAM(s) Oral at bedtime  heparin   Injectable 5000 Unit(s) SubCutaneous every 12 hours  insulin lispro (ADMELOG) corrective regimen sliding scale   SubCutaneous every 6 hours  insulin NPH human recombinant 13 Unit(s) SubCutaneous <User Schedule>  insulin NPH human recombinant 11 Unit(s) SubCutaneous <User Schedule>  insulin NPH human recombinant 9 Unit(s) SubCutaneous <User Schedule>  levETIRAcetam  Solution 500 milliGRAM(s) Oral two times a day  meropenem  IVPB 1000 milliGRAM(s) IV Intermittent every 8 hours  polyethylene glycol 3350 17 Gram(s) Oral daily  senna 2 Tablet(s) Oral at bedtime  sodium chloride 0.9%. 1000 milliLiter(s) (75 mL/Hr) IV Continuous <Continuous>  thiamine 100 milliGRAM(s) Oral daily    MEDICATIONS  (PRN):  albuterol/ipratropium for Nebulization 3 milliLiter(s) Nebulizer every 6 hours PRN Shortness of Breath and/or Wheezing      CAPILLARY BLOOD GLUCOSE      POCT Blood Glucose.: 90 mg/dL (25 May 2021 05:54)  POCT Blood Glucose.: 82 mg/dL (25 May 2021 00:15)  POCT Blood Glucose.: 159 mg/dL (24 May 2021 17:28)  POCT Blood Glucose.: 160 mg/dL (24 May 2021 12:56)    I&O's Summary    24 May 2021 07:01  -  25 May 2021 07:00  --------------------------------------------------------  IN: 2530 mL / OUT: 0 mL / NET: 2530 mL    25 May 2021 07:01  -  25 May 2021 11:41  --------------------------------------------------------  IN: 240 mL / OUT: 0 mL / NET: 240 mL        PHYSICAL EXAM:  Vital Signs Last 24 Hrs  T(C): 36.1 (25 May 2021 09:06), Max: 37.1 (25 May 2021 04:19)  T(F): 97 (25 May 2021 09:06), Max: 98.8 (25 May 2021 04:19)  HR: 95 (25 May 2021 09:06) (95 - 112)  BP: 101/69 (25 May 2021 09:06) (101/69 - 112/63)  BP(mean): --  RR: 18 (25 May 2021 09:06) (16 - 18)  SpO2: 97% (25 May 2021 09:06) (97% - 98%)    GENERAL: No acute distress  HEAD:  Atraumatic, Normocephalic  EYES: EOMI, PERRLA, conjunctiva and sclera clear  NECK: Supple, no lymphadenopathy, no JVD  CHEST/LUNG: CTAB; No wheezes, rales, or rhonchi, right sided chest wall swelling without increase in size. staples in place and covered   HEART: Regular rate and rhythm; No murmurs, rubs, or gallops  ABDOMEN: Soft, non-tender, non-distended; normal bowel sounds, no organomegaly, PEG   EXTREMITIES:  2+ peripheral pulses b/l, No clubbing, cyanosis, or edema  NEUROLOGY: A&O x 2, weakness   SKIN: Necrotic toes, no changes     LABS:                        9.9    13.77 )-----------( 699      ( 25 May 2021 07:12 )             32.4     05-25    137  |  100  |  36<H>  ----------------------------<  89  4.8   |  23  |  0.72    Ca    9.4      25 May 2021 07:09  Phos  3.6     05-25  Mg     2.2     05-25    TPro  7.6  /  Alb  2.8<L>  /  TBili  0.4  /  DBili  x   /  AST  32  /  ALT  31  /  AlkPhos  127<H>  05-25    PTT - ( 24 May 2021 07:29 )  PTT:31.9 sec            RADIOLOGY & ADDITIONAL TESTS:  Results Reviewed:   Imaging Personally Reviewed:  Electrocardiogram Personally Reviewed:    COORDINATION OF CARE:  Care Discussed with Consultants/Other Providers [Y/N]:  Prior or Outpatient Records Reviewed [Y/N]:

## 2021-05-25 NOTE — PROGRESS NOTE ADULT - ASSESSMENT
60 yr male with PMH of DM who was initially admitted to [facility] ICU for hypoxic respiratory failure 2/2 COVID c/b PE with R heart strain, cardiogenic and septic shock, ischemic and hemorrhagic CVA and ESBL klebsiella ventilator associate PNA, transferred to [facility] for neurosurgery eval and further management. s/p PEG, now monitoring for refeeding syndrome, complicated with right empyema s/p VATs, c.w meropenem planning for ARLETH vs AR

## 2021-05-26 LAB
ALBUMIN SERPL ELPH-MCNC: 2.8 G/DL — LOW (ref 3.3–5)
ALP SERPL-CCNC: 143 U/L — HIGH (ref 40–120)
ALT FLD-CCNC: 34 U/L — SIGNIFICANT CHANGE UP (ref 10–45)
ANION GAP SERPL CALC-SCNC: 13 MMOL/L — SIGNIFICANT CHANGE UP (ref 5–17)
AST SERPL-CCNC: 32 U/L — SIGNIFICANT CHANGE UP (ref 10–40)
BASOPHILS # BLD AUTO: 0.12 K/UL — SIGNIFICANT CHANGE UP (ref 0–0.2)
BASOPHILS NFR BLD AUTO: 1 % — SIGNIFICANT CHANGE UP (ref 0–2)
BILIRUB SERPL-MCNC: 0.5 MG/DL — SIGNIFICANT CHANGE UP (ref 0.2–1.2)
BUN SERPL-MCNC: 30 MG/DL — HIGH (ref 7–23)
CALCIUM SERPL-MCNC: 9.6 MG/DL — SIGNIFICANT CHANGE UP (ref 8.4–10.5)
CHLORIDE SERPL-SCNC: 100 MMOL/L — SIGNIFICANT CHANGE UP (ref 96–108)
CO2 SERPL-SCNC: 22 MMOL/L — SIGNIFICANT CHANGE UP (ref 22–31)
CREAT SERPL-MCNC: 0.67 MG/DL — SIGNIFICANT CHANGE UP (ref 0.5–1.3)
EOSINOPHIL # BLD AUTO: 0.63 K/UL — HIGH (ref 0–0.5)
EOSINOPHIL NFR BLD AUTO: 5.3 % — SIGNIFICANT CHANGE UP (ref 0–6)
GLUCOSE BLDC GLUCOMTR-MCNC: 102 MG/DL — HIGH (ref 70–99)
GLUCOSE BLDC GLUCOMTR-MCNC: 140 MG/DL — HIGH (ref 70–99)
GLUCOSE BLDC GLUCOMTR-MCNC: 144 MG/DL — HIGH (ref 70–99)
GLUCOSE BLDC GLUCOMTR-MCNC: 156 MG/DL — HIGH (ref 70–99)
GLUCOSE BLDC GLUCOMTR-MCNC: 89 MG/DL — SIGNIFICANT CHANGE UP (ref 70–99)
GLUCOSE SERPL-MCNC: 152 MG/DL — HIGH (ref 70–99)
HCT VFR BLD CALC: 33.9 % — LOW (ref 39–50)
HGB BLD-MCNC: 10.1 G/DL — LOW (ref 13–17)
IMM GRANULOCYTES NFR BLD AUTO: 3.7 % — HIGH (ref 0–1.5)
LYMPHOCYTES # BLD AUTO: 1.87 K/UL — SIGNIFICANT CHANGE UP (ref 1–3.3)
LYMPHOCYTES # BLD AUTO: 15.6 % — SIGNIFICANT CHANGE UP (ref 13–44)
MAGNESIUM SERPL-MCNC: 2.2 MG/DL — SIGNIFICANT CHANGE UP (ref 1.6–2.6)
MCHC RBC-ENTMCNC: 24.9 PG — LOW (ref 27–34)
MCHC RBC-ENTMCNC: 29.8 GM/DL — LOW (ref 32–36)
MCV RBC AUTO: 83.5 FL — SIGNIFICANT CHANGE UP (ref 80–100)
MONOCYTES # BLD AUTO: 0.8 K/UL — SIGNIFICANT CHANGE UP (ref 0–0.9)
MONOCYTES NFR BLD AUTO: 6.7 % — SIGNIFICANT CHANGE UP (ref 2–14)
NEUTROPHILS # BLD AUTO: 8.12 K/UL — HIGH (ref 1.8–7.4)
NEUTROPHILS NFR BLD AUTO: 67.7 % — SIGNIFICANT CHANGE UP (ref 43–77)
NRBC # BLD: 0 /100 WBCS — SIGNIFICANT CHANGE UP (ref 0–0)
PHOSPHATE SERPL-MCNC: 3.9 MG/DL — SIGNIFICANT CHANGE UP (ref 2.5–4.5)
PLATELET # BLD AUTO: 646 K/UL — HIGH (ref 150–400)
POTASSIUM SERPL-MCNC: 5.2 MMOL/L — SIGNIFICANT CHANGE UP (ref 3.5–5.3)
POTASSIUM SERPL-SCNC: 5.2 MMOL/L — SIGNIFICANT CHANGE UP (ref 3.5–5.3)
PROT SERPL-MCNC: 7.9 G/DL — SIGNIFICANT CHANGE UP (ref 6–8.3)
RBC # BLD: 4.06 M/UL — LOW (ref 4.2–5.8)
RBC # FLD: 20.9 % — HIGH (ref 10.3–14.5)
SARS-COV-2 RNA SPEC QL NAA+PROBE: SIGNIFICANT CHANGE UP
SODIUM SERPL-SCNC: 135 MMOL/L — SIGNIFICANT CHANGE UP (ref 135–145)
WBC # BLD: 11.98 K/UL — HIGH (ref 3.8–10.5)
WBC # FLD AUTO: 11.98 K/UL — HIGH (ref 3.8–10.5)

## 2021-05-26 PROCEDURE — 74230 X-RAY XM SWLNG FUNCJ C+: CPT | Mod: 26

## 2021-05-26 PROCEDURE — 99232 SBSQ HOSP IP/OBS MODERATE 35: CPT

## 2021-05-26 PROCEDURE — 99233 SBSQ HOSP IP/OBS HIGH 50: CPT | Mod: GC

## 2021-05-26 RX ADMIN — Medication 650 MILLIGRAM(S): at 00:25

## 2021-05-26 RX ADMIN — LEVETIRACETAM 500 MILLIGRAM(S): 250 TABLET, FILM COATED ORAL at 17:25

## 2021-05-26 RX ADMIN — Medication 650 MILLIGRAM(S): at 23:56

## 2021-05-26 RX ADMIN — HEPARIN SODIUM 5000 UNIT(S): 5000 INJECTION INTRAVENOUS; SUBCUTANEOUS at 06:31

## 2021-05-26 RX ADMIN — Medication 650 MILLIGRAM(S): at 12:34

## 2021-05-26 RX ADMIN — Medication 2 MILLIGRAM(S): at 23:14

## 2021-05-26 RX ADMIN — HUMAN INSULIN 13 UNIT(S): 100 INJECTION, SUSPENSION SUBCUTANEOUS at 00:22

## 2021-05-26 RX ADMIN — Medication 2: at 12:53

## 2021-05-26 RX ADMIN — Medication 650 MILLIGRAM(S): at 17:26

## 2021-05-26 RX ADMIN — HUMAN INSULIN 9 UNIT(S): 100 INJECTION, SUSPENSION SUBCUTANEOUS at 17:26

## 2021-05-26 RX ADMIN — Medication 650 MILLIGRAM(S): at 12:04

## 2021-05-26 RX ADMIN — Medication 100 MILLIGRAM(S): at 12:04

## 2021-05-26 RX ADMIN — HUMAN INSULIN 11 UNIT(S): 100 INJECTION, SUSPENSION SUBCUTANEOUS at 12:52

## 2021-05-26 RX ADMIN — Medication 650 MILLIGRAM(S): at 18:00

## 2021-05-26 RX ADMIN — MEROPENEM 100 MILLIGRAM(S): 1 INJECTION INTRAVENOUS at 20:27

## 2021-05-26 RX ADMIN — HUMAN INSULIN 13 UNIT(S): 100 INJECTION, SUSPENSION SUBCUTANEOUS at 06:29

## 2021-05-26 RX ADMIN — Medication 400 UNIT(S): at 12:04

## 2021-05-26 RX ADMIN — Medication 650 MILLIGRAM(S): at 23:14

## 2021-05-26 RX ADMIN — SENNA PLUS 2 TABLET(S): 8.6 TABLET ORAL at 23:15

## 2021-05-26 RX ADMIN — Medication 650 MILLIGRAM(S): at 06:31

## 2021-05-26 RX ADMIN — HEPARIN SODIUM 5000 UNIT(S): 5000 INJECTION INTRAVENOUS; SUBCUTANEOUS at 17:26

## 2021-05-26 RX ADMIN — MEROPENEM 100 MILLIGRAM(S): 1 INJECTION INTRAVENOUS at 12:52

## 2021-05-26 RX ADMIN — MEROPENEM 100 MILLIGRAM(S): 1 INJECTION INTRAVENOUS at 06:22

## 2021-05-26 RX ADMIN — LEVETIRACETAM 500 MILLIGRAM(S): 250 TABLET, FILM COATED ORAL at 06:33

## 2021-05-26 NOTE — PROGRESS NOTE ADULT - PROBLEM SELECTOR PLAN 7
Dysphagia noted in setting of patient's acute alteration in mental status and hx of intubation  - Nasogastric tube placed for administration of tube feeds originally, but patient self removed 4/17; multiple attempts to replace unsuccessful   Evaluated by speech & swallow: unable to cooperate with FEES study, cineesophagram performed demonstrating aspiration  - PEG tube placed on 4/30, tube feedings started on 4/30, will slowly increase rate by 10 ml q24hrs, currently at 20cc/hr, + free water q8  - planned for MBS with speech and swallow for reval 5/26

## 2021-05-26 NOTE — SWALLOW VFSS/MBS ASSESSMENT ADULT - SLP PERTINENT HISTORY OF CURRENT PROBLEM
H&P: 60M unknown medical history BIBEMS for respiratory distress, recently COVID+ as per EMS. Pt unable to comply with history 2/2 resp distress, nods yes to difficulty breathing, no to pain. Baseline AAOx3. En route, pt had RR 28, SaO2 60%. In ED initially hypoxic to 60% placed on NRB and satting low 90s and tachypneic to 40s on NRB. Placed on AVAPS, still tachypneic to 40s. Labs showing HAGMA, elevated FSG to 500s. Also with lactate of 15. Trops of 200 and pro-BNP elevated to 21k. MICU consulted for respiratory failure in setting of COVID, also concern for DKA. On encounter, pt is noncooperative with questioning. Nods and tries to speak, unable to form full sentences and visibly tachypneic on AVAPS. In ED given 2L NS, decadron. Started on insulin gtt. Subsequently intubated
Please see addendum

## 2021-05-26 NOTE — SWALLOW VFSS/MBS ASSESSMENT ADULT - DIAGNOSTIC IMPRESSIONS
Pt is 59 y/o unknown medical history BIBEMS for respiratory distress s/p recent COVID-19 infection. Course c//b intubation, dysphagia with subsequent PEG, and recent VATS surgery. Seen for repeat MBS to determine candidacy for initiation of PO diet. Pt continues to present with a pharyngeal dysphagia marked by post swallow pharyngeal residue and silent laryngeal penetration with honey thick liquids via teaspoon and silent aspiration with thin puree. Compensatory strategies were not trialed as suspect silent aspiration occurred after the swallow, additionally, pt unlikely to independently carry over compensatory strategies at present time. This service will continue to follow for restorative swallow therapy.   Disorders: Reduced hyo-laryngeal excursion, reduced epiglottic retroflection, reduced laryngeal closure, reduced pharyngeal contractility, and signs of reduced supraglottic sensation, reduced subglottic sensation.

## 2021-05-26 NOTE — PROGRESS NOTE ADULT - SUBJECTIVE AND OBJECTIVE BOX
60y Male was admitted on 04-12    Patient is a 60y old  Male who presents with a chief complaint of COVID19 w/ severe metabolic acidosis s/p intubation (21 Apr 2021 12:02)    HPI:  Patient seen and examined.  Feeling well- tolerating therapies- max A transfers and gait.  Denies pain, no CP,  no SOB    MEDICATIONS  (STANDING):  acetaminophen   Tablet .. 650 milliGRAM(s) Oral every 6 hours  cholecalciferol 400 Unit(s) Oral daily  doxazosin 2 milliGRAM(s) Oral at bedtime  heparin   Injectable 5000 Unit(s) SubCutaneous every 12 hours  insulin lispro (ADMELOG) corrective regimen sliding scale   SubCutaneous every 6 hours  insulin NPH human recombinant 13 Unit(s) SubCutaneous <User Schedule>  insulin NPH human recombinant 11 Unit(s) SubCutaneous <User Schedule>  insulin NPH human recombinant 9 Unit(s) SubCutaneous <User Schedule>  levETIRAcetam  Solution 500 milliGRAM(s) Oral two times a day  meropenem  IVPB 1000 milliGRAM(s) IV Intermittent every 8 hours  polyethylene glycol 3350 17 Gram(s) Oral daily  senna 2 Tablet(s) Oral at bedtime  sodium chloride 0.9%. 1000 milliLiter(s) (75 mL/Hr) IV Continuous <Continuous>  thiamine 100 milliGRAM(s) Oral daily    MEDICATIONS  (PRN):  albuterol/ipratropium for Nebulization 3 milliLiter(s) Nebulizer every 6 hours PRN Shortness of Breath and/or Wheezing    Vital Signs Last 24 Hrs  T(C): 37 (26 May 2021 04:55), Max: 37 (26 May 2021 04:55)  T(F): 98.6 (26 May 2021 04:55), Max: 98.6 (26 May 2021 04:55)  HR: 95 (26 May 2021 04:55) (78 - 105)  BP: 109/68 (26 May 2021 04:55) (103/56 - 113/73)  BP(mean): --  RR: 18 (26 May 2021 04:55) (17 - 18)  SpO2: 96% (26 May 2021 04:55) (95% - 98%)    PHYSICAL EXAM  Constitutional - NAD,   HEENT - NCAT,   Chest - Breathing comfortably, No wheezing  Abdomen - Soft   Extremities - bilateral digital toe ischemia  Neurologic Exam -                 Alert  Follows verbal instruction w slow processing     Motor 3+/5 bl UE and 3/5 bl LEs  Psychiatric -Nml                                   10.1   11.98 )-----------( 646      ( 26 May 2021 07:27 )             33.9     05-26    135  |  100  |  30<H>  ----------------------------<  152<H>  5.2   |  22  |  0.67    Ca    9.6      26 May 2021 07:21  Phos  3.9     05-26  Mg     2.2     05-26    TPro  7.9  /  Alb  2.8<L>  /  TBili  0.5  /  DBili  x   /  AST  32  /  ALT  34  /  AlkPhos  143<H>  05-26    ASSESSMENT/PLAN    60 year old man with prolonged hospitalization for COVID-19, DKA, PE, and CVA with hemorrhagic conversion and fevers suspected 2/2 to aspiration PNA (klebsiella), dysphagia    Disposition -  PT - ROM, Bed Mob, Transfers, Amb with AD   OT - ADLs, ROM  SLP - Dysphagia eval and treat  Prec- Falls, Pulm, cardiac  Monitor K+ and plts  DVT px- Heparin  Skin - Turn Q2hrs  Dispo- When medically stable will need Acute Rehab- can tolerate 3h/d of therapies and requires daily physician visits

## 2021-05-26 NOTE — PROGRESS NOTE ADULT - PROBLEM SELECTOR PLAN 3
-Acute ischemic strokes, with concern for hemorrhagic conversion, noted on CT scan on 4/8  -Etiology of acute ischemic stroke uncertain at this time; TTE with bubble performed at bedside in the MICU without any evidence of right-to-left shunt; no arrhythmia noted on telemetry throughout stay in the MICU  - CTA head and neck without any evidence of carotid artery stenosis or dissection    Retention   - olson placed  - c/w doxazosin     EMPYEMA   OR Vats 5/12 with Dr. Albarado   5/15 chest tubes, last one removed (5/19)  5/17 Angled diaphragm chest tube dc'd  5/18 Anterior Aroldo chest tube dc'd  CT Chest 5/16 Interval new small cavitary lesions in the right lower lobe and right chest wall hematoma - no thoracic surgery intervention indicated per Dr. Albarado.

## 2021-05-26 NOTE — SWALLOW VFSS/MBS ASSESSMENT ADULT - ORAL PHASE
within functional limits
+ Tongue pumping; up to max spillover to the valleculae,/Delayed oral transit time/Reduced anterior - posterior transport

## 2021-05-26 NOTE — PROGRESS NOTE ADULT - ASSESSMENT
61 yo M with PMH of DM who was initially admitted to [facility] ICU 4/1 for hypoxic respiratory failure 2/2 COVID c/b PE with R heart strain, cardiogenic and septic shock, ischemic and hemorrhagic CVA and ESBL klebsiella ventilator associated PNA, transferred to [facility] for neurosurgery eval on 4/12  No fever, has leukocytosis  UCX with ESBL K pne  Bilateral LE dry gangrene  CT now with Empyema, concern for bilateral PNA, abscess  S/p OR Thoracotomy, abscess drainage, bronch (5/12)--culture with K pne  CT with areas of small cavitary lesions and hematomas  Patient appears generally stable at bedside  1) Leukocytosis  - Improving, stable  - Trend WBC  2) COVID  - COVID+, unclear significance (was prior negative, and patient Ab+)  - Monitor for any signs active COVID/reinfection  - Any considerations isolation per infection control  3) Suspected Empyema/Abscess  - Culture with K pne, Lactobacillus, Prevotella--should be adequately treated with jennifer  - Meropenem 1g q 8--continue while inpatient  - Note CT findings, follow surgery if any further procedure needed for persistent cavities/hematoma  - Appreciate Thoracic procedure  - When DC planning, would send out on Bactrim DS 2 tabs TID + Augmentin 875mg po q 12, for tentative 2 more weeks (then will further determine if role for further course)  4) Gangrene  - Appears noninfectious/dry gangrene  - Monitor wounds    Sammy Chambers MD  Pager [phone]  After 5pm and on weekends call [phone]

## 2021-05-26 NOTE — SWALLOW VFSS/MBS ASSESSMENT ADULT - ADDITIONAL RECOMMENDATIONS
Maintain good oral hygiene.   Restorative swallow therapy. Will continue to follow while patient is in-house, as schedule permits.
Maintain good oral hygiene  Restorative swallow therapy. Will continue to follow while patient is in-house, as schedule permits.

## 2021-05-26 NOTE — SWALLOW VFSS/MBS ASSESSMENT ADULT - PHARYNGEAL PHASE COMMENTS
Multiple PO trials provided off fluoroscopy, upon reinitiation of fluoro residue noted subglottically suggestive of silent aspiration during the swallow without retrieval + Intermittent spontaneous coughing noted. Cued cough did not eliminate aspirated material.

## 2021-05-26 NOTE — SWALLOW VFSS/MBS ASSESSMENT ADULT - LARYNGEAL PENETRATION DURING THE SWALLOW - SILENT
deep; to the level of the vocal folds; without retrieval/Trace/Mild
over the laryngeal surface of the epiglottis with incomplete retrieval. Retrieved on subsequent swallows./Trace

## 2021-05-26 NOTE — PROGRESS NOTE ADULT - ATTENDING COMMENTS
discussed above plan with resident on rounds. patient seen and examined. still having pain on right chest wall - overall improved  labs and imaging reviewed.  I agree with the above findings, assessment, and plan with the following additions and exceptions:  AHRF/PNA/empyema s/p thoracotomy 5/12 with decortication and abscess drainage on meropenem (plan for discharge on bactrim and augmentin) - d/w ID - Dr. Chambers  dysphagia s/p peg - MBS today - c/w PEG feeds for now, f/u s+S recommendations  DM2 - Fs better controlled - Endocrine f/u appreciated. NPH adjusted. c/w admelog ISS for coverage  PE likely provoked - post covid infection thrombosis - LE dopplers negative for DVT, pulm f/u appreciated - will continue to hold full dose a/c given bleeding risk  c/w dvt proph hsq - h/h stable - cont to monitor   PMR consult appreciated - Acute rehab   patient with multiple co-morbid conditions; higher risk for future complications despite optimal medical therapy - discussed with family at bedside

## 2021-05-26 NOTE — SWALLOW VFSS/MBS ASSESSMENT ADULT - ADDITIONAL INFORMATION
Reduced hyolaryngeal excursion, reduced epiglottic retroflection
+ slight calcifications of laryngeal structures  + reversal of typical cervical lordosis

## 2021-05-26 NOTE — SWALLOW VFSS/MBS ASSESSMENT ADULT - ROSENBEK'S PENETRATION ASPIRATION SCALE
(8) contrast passes glottis, visible subglottic residue remains, absent patient response (aspiration) (2) contrast enters airway, remains above the vocal cords, no residue remains (penetration)

## 2021-05-26 NOTE — SWALLOW VFSS/MBS ASSESSMENT ADULT - SLP GENERAL OBSERVATIONS
Pt encountered in radiology, secure in RAYO chair. Pt awake and alert, generally cooperative, inconsistently following directions for the purposes of the exam.  services offered, pt refused and requested to use family member (Chandana Pizarro) as . + Dysphonia.
Pt arrived to radiology secured in RAYO chair, able to communicate simple needs and follow simple directions for exam. + Dysphonia with hoarse hypophonic vocal quality.

## 2021-05-26 NOTE — SWALLOW VFSS/MBS ASSESSMENT ADULT - POSITIONING
[FreeTextEntry1] : I, Loc Hsu, acted solely as a scribe for Dr. Pee Villalpando on this date 10/21/2020.\par All medical record entries made by the Scribe were at my, Dr. Pee Villalpando, direction and personally dictated by me on 10/21/2020. I have reviewed the chart and agree that the record accurately reflects my personal performance of the history, physical exam, assessment and plan. I have also personally directed, reviewed, and agreed with the chart. Lateral

## 2021-05-26 NOTE — SWALLOW VFSS/MBS ASSESSMENT ADULT - ASPIRATION PRECAUTIONS
Strict aspiration and reflux precautions for secretions and if enteral feeds are initiated./yes
for secretions and enteral feeds/yes

## 2021-05-26 NOTE — SWALLOW VFSS/MBS ASSESSMENT ADULT - RECOMMENDED CONSISTENCY
NPO, with non-oral nutrition/hydration/medications.
Continue NPO, with non-oral nutrition/hydration/medications via PEG

## 2021-05-26 NOTE — PROGRESS NOTE ADULT - ASSESSMENT
60 yr male with PMH of DM who was initially admitted to [facility] ICU for hypoxic respiratory failure 2/2 COVID c/b PE with R heart strain, cardiogenic and septic shock, ischemic and hemorrhagic CVA and ESBL klebsiella ventilator associate PNA, transferred to [facility] for neurosurgery eval and further management. s/p PEG, now monitoring for refeeding syndrome, complicated with right empyema s/p VATs, c.w meropenem planning for ARLETH vs AR

## 2021-05-26 NOTE — PROGRESS NOTE ADULT - SUBJECTIVE AND OBJECTIVE BOX
PROGRESS NOTE:   Authored by Dr. Emily Mckeon, ANH pager [phone]    Patient is a 60y old  Male who presents with a chief complaint of COVID19 w/ severe metabolic acidosis s/p intubation (25 May 2021 16:07)      SUBJECTIVE / OVERNIGHT EVENTS: Patient examined at bedside. He appears well. He got a haircut yesterday and appears happy. He was moving around and able to interact with me and the nurses. His mental status seems     MEDICATIONS  (STANDING):  acetaminophen   Tablet .. 650 milliGRAM(s) Oral every 6 hours  cholecalciferol 400 Unit(s) Oral daily  doxazosin 2 milliGRAM(s) Oral at bedtime  heparin   Injectable 5000 Unit(s) SubCutaneous every 12 hours  insulin lispro (ADMELOG) corrective regimen sliding scale   SubCutaneous every 6 hours  insulin NPH human recombinant 13 Unit(s) SubCutaneous <User Schedule>  insulin NPH human recombinant 11 Unit(s) SubCutaneous <User Schedule>  insulin NPH human recombinant 9 Unit(s) SubCutaneous <User Schedule>  levETIRAcetam  Solution 500 milliGRAM(s) Oral two times a day  meropenem  IVPB 1000 milliGRAM(s) IV Intermittent every 8 hours  polyethylene glycol 3350 17 Gram(s) Oral daily  senna 2 Tablet(s) Oral at bedtime  sodium chloride 0.9%. 1000 milliLiter(s) (75 mL/Hr) IV Continuous <Continuous>  thiamine 100 milliGRAM(s) Oral daily    MEDICATIONS  (PRN):  albuterol/ipratropium for Nebulization 3 milliLiter(s) Nebulizer every 6 hours PRN Shortness of Breath and/or Wheezing      CAPILLARY BLOOD GLUCOSE      POCT Blood Glucose.: 144 mg/dL (26 May 2021 06:27)  POCT Blood Glucose.: 140 mg/dL (26 May 2021 00:16)  POCT Blood Glucose.: 122 mg/dL (25 May 2021 17:06)  POCT Blood Glucose.: 151 mg/dL (25 May 2021 12:16)    I&O's Summary    25 May 2021 07:01  -  26 May 2021 07:00  --------------------------------------------------------  IN: 1765 mL / OUT: 0 mL / NET: 1765 mL    26 May 2021 07:01  -  26 May 2021 10:10  --------------------------------------------------------  IN: 105 mL / OUT: 0 mL / NET: 105 mL        PHYSICAL EXAM:  Vital Signs Last 24 Hrs  T(C): 37 (26 May 2021 04:55), Max: 37 (26 May 2021 04:55)  T(F): 98.6 (26 May 2021 04:55), Max: 98.6 (26 May 2021 04:55)  HR: 95 (26 May 2021 04:55) (78 - 105)  BP: 109/68 (26 May 2021 04:55) (103/56 - 113/73)  BP(mean): --  RR: 18 (26 May 2021 04:55) (17 - 18)  SpO2: 96% (26 May 2021 04:55) (95% - 98%)    GENERAL: No acute distress, well-developed  HEAD:  Atraumatic, Normocephalic  EYES: EOMI, PERRLA, conjunctiva and sclera clear  NECK: Supple, no lymphadenopathy, no JVD  CHEST/LUNG: CTAB; No wheezes, rales, or rhonchi, right side of chest wall with staples, decreasing hematoma   HEART: Regular rate and rhythm; No murmurs, rubs, or gallops  ABDOMEN: Soft, non-tender, non-distended; normal bowel sounds, no organomegaly, PEG inplace   EXTREMITIES:  2+ peripheral pulses b/l, No clubbing, cyanosis, or edema, no notable hematoma in thigh   NEUROLOGY: A&O x 2-3, generalized weakness but improving, no focal deficits   SKIN: gangrenous toes of no acute changes     LABS:                        10.1   11.98 )-----------( 646      ( 26 May 2021 07:27 )             33.9     05-26    135  |  100  |  30<H>  ----------------------------<  152<H>  5.2   |  22  |  0.67    Ca    9.6      26 May 2021 07:21  Phos  3.9     05-26  Mg     2.2     05-26    TPro  7.9  /  Alb  2.8<L>  /  TBili  0.5  /  DBili  x   /  AST  32  /  ALT  34  /  AlkPhos  143<H>  05-26                RADIOLOGY & ADDITIONAL TESTS:  Results Reviewed:   Imaging Personally Reviewed:  Electrocardiogram Personally Reviewed:    COORDINATION OF CARE:  Care Discussed with Consultants/Other Providers [Y/N]:  Prior or Outpatient Records Reviewed [Y/N]:

## 2021-05-26 NOTE — PROVIDER CONTACT NOTE (OTHER) - RECOMMENDATIONS
Team 3 notified
team 3 MD chavez made aware of PTT 49.0
no intermittent catheterization ordered. bladder scan again in 4 hours
team 3 MD Alvarez made aware
IV Night team to place IV?
Notify MD
team 3 Tiffany Kang made aware
team 3 MD Alvarez made aware
team 3 Royce, notified, this is the first theraputic PTT

## 2021-05-27 LAB
ALBUMIN SERPL ELPH-MCNC: 2.6 G/DL — LOW (ref 3.3–5)
ALP SERPL-CCNC: 136 U/L — HIGH (ref 40–120)
ALT FLD-CCNC: 30 U/L — SIGNIFICANT CHANGE UP (ref 10–45)
ANION GAP SERPL CALC-SCNC: 10 MMOL/L — SIGNIFICANT CHANGE UP (ref 5–17)
AST SERPL-CCNC: 27 U/L — SIGNIFICANT CHANGE UP (ref 10–40)
BASOPHILS # BLD AUTO: 0.09 K/UL — SIGNIFICANT CHANGE UP (ref 0–0.2)
BASOPHILS NFR BLD AUTO: 0.8 % — SIGNIFICANT CHANGE UP (ref 0–2)
BILIRUB SERPL-MCNC: 0.4 MG/DL — SIGNIFICANT CHANGE UP (ref 0.2–1.2)
BUN SERPL-MCNC: 34 MG/DL — HIGH (ref 7–23)
CALCIUM SERPL-MCNC: 9.5 MG/DL — SIGNIFICANT CHANGE UP (ref 8.4–10.5)
CHLORIDE SERPL-SCNC: 101 MMOL/L — SIGNIFICANT CHANGE UP (ref 96–108)
CO2 SERPL-SCNC: 26 MMOL/L — SIGNIFICANT CHANGE UP (ref 22–31)
CREAT SERPL-MCNC: 0.7 MG/DL — SIGNIFICANT CHANGE UP (ref 0.5–1.3)
EOSINOPHIL # BLD AUTO: 0.66 K/UL — HIGH (ref 0–0.5)
EOSINOPHIL NFR BLD AUTO: 6 % — SIGNIFICANT CHANGE UP (ref 0–6)
GLUCOSE BLDC GLUCOMTR-MCNC: 108 MG/DL — HIGH (ref 70–99)
GLUCOSE BLDC GLUCOMTR-MCNC: 141 MG/DL — HIGH (ref 70–99)
GLUCOSE BLDC GLUCOMTR-MCNC: 158 MG/DL — HIGH (ref 70–99)
GLUCOSE BLDC GLUCOMTR-MCNC: 192 MG/DL — HIGH (ref 70–99)
GLUCOSE BLDC GLUCOMTR-MCNC: 199 MG/DL — HIGH (ref 70–99)
GLUCOSE SERPL-MCNC: 158 MG/DL — HIGH (ref 70–99)
HCT VFR BLD CALC: 32.9 % — LOW (ref 39–50)
HGB BLD-MCNC: 9.9 G/DL — LOW (ref 13–17)
IMM GRANULOCYTES NFR BLD AUTO: 2.2 % — HIGH (ref 0–1.5)
LYMPHOCYTES # BLD AUTO: 1.53 K/UL — SIGNIFICANT CHANGE UP (ref 1–3.3)
LYMPHOCYTES # BLD AUTO: 13.9 % — SIGNIFICANT CHANGE UP (ref 13–44)
MAGNESIUM SERPL-MCNC: 2.3 MG/DL — SIGNIFICANT CHANGE UP (ref 1.6–2.6)
MCHC RBC-ENTMCNC: 25 PG — LOW (ref 27–34)
MCHC RBC-ENTMCNC: 30.1 GM/DL — LOW (ref 32–36)
MCV RBC AUTO: 83.1 FL — SIGNIFICANT CHANGE UP (ref 80–100)
MONOCYTES # BLD AUTO: 0.84 K/UL — SIGNIFICANT CHANGE UP (ref 0–0.9)
MONOCYTES NFR BLD AUTO: 7.6 % — SIGNIFICANT CHANGE UP (ref 2–14)
NEUTROPHILS # BLD AUTO: 7.66 K/UL — HIGH (ref 1.8–7.4)
NEUTROPHILS NFR BLD AUTO: 69.5 % — SIGNIFICANT CHANGE UP (ref 43–77)
NRBC # BLD: 0 /100 WBCS — SIGNIFICANT CHANGE UP (ref 0–0)
PHOSPHATE SERPL-MCNC: 4.8 MG/DL — HIGH (ref 2.5–4.5)
PLATELET # BLD AUTO: 618 K/UL — HIGH (ref 150–400)
POTASSIUM SERPL-MCNC: 4.9 MMOL/L — SIGNIFICANT CHANGE UP (ref 3.5–5.3)
POTASSIUM SERPL-SCNC: 4.9 MMOL/L — SIGNIFICANT CHANGE UP (ref 3.5–5.3)
PROT SERPL-MCNC: 7.3 G/DL — SIGNIFICANT CHANGE UP (ref 6–8.3)
RBC # BLD: 3.96 M/UL — LOW (ref 4.2–5.8)
RBC # FLD: 20.8 % — HIGH (ref 10.3–14.5)
SODIUM SERPL-SCNC: 137 MMOL/L — SIGNIFICANT CHANGE UP (ref 135–145)
WBC # BLD: 11.02 K/UL — HIGH (ref 3.8–10.5)
WBC # FLD AUTO: 11.02 K/UL — HIGH (ref 3.8–10.5)

## 2021-05-27 PROCEDURE — 99232 SBSQ HOSP IP/OBS MODERATE 35: CPT

## 2021-05-27 PROCEDURE — 99233 SBSQ HOSP IP/OBS HIGH 50: CPT | Mod: GC

## 2021-05-27 RX ORDER — HUMAN INSULIN 100 [IU]/ML
6 INJECTION, SUSPENSION SUBCUTANEOUS
Refills: 0 | Status: DISCONTINUED | OUTPATIENT
Start: 2021-05-28 | End: 2021-05-28

## 2021-05-27 RX ORDER — HUMAN INSULIN 100 [IU]/ML
10 INJECTION, SUSPENSION SUBCUTANEOUS
Refills: 0 | Status: DISCONTINUED | OUTPATIENT
Start: 2021-05-27 | End: 2021-05-28

## 2021-05-27 RX ORDER — INSULIN HUMAN 100 [IU]/ML
6 INJECTION, SOLUTION SUBCUTANEOUS ONCE
Refills: 0 | Status: COMPLETED | OUTPATIENT
Start: 2021-05-27 | End: 2021-05-27

## 2021-05-27 RX ORDER — HUMAN INSULIN 100 [IU]/ML
6 INJECTION, SUSPENSION SUBCUTANEOUS
Refills: 0 | Status: DISCONTINUED | OUTPATIENT
Start: 2021-05-27 | End: 2021-05-27

## 2021-05-27 RX ORDER — HUMAN INSULIN 100 [IU]/ML
3 INJECTION, SUSPENSION SUBCUTANEOUS ONCE
Refills: 0 | Status: COMPLETED | OUTPATIENT
Start: 2021-05-27 | End: 2021-05-27

## 2021-05-27 RX ADMIN — MEROPENEM 100 MILLIGRAM(S): 1 INJECTION INTRAVENOUS at 05:12

## 2021-05-27 RX ADMIN — HUMAN INSULIN 10 UNIT(S): 100 INJECTION, SUSPENSION SUBCUTANEOUS at 14:58

## 2021-05-27 RX ADMIN — Medication 650 MILLIGRAM(S): at 19:03

## 2021-05-27 RX ADMIN — LEVETIRACETAM 500 MILLIGRAM(S): 250 TABLET, FILM COATED ORAL at 19:02

## 2021-05-27 RX ADMIN — HUMAN INSULIN 13 UNIT(S): 100 INJECTION, SUSPENSION SUBCUTANEOUS at 06:07

## 2021-05-27 RX ADMIN — Medication 100 MILLIGRAM(S): at 12:17

## 2021-05-27 RX ADMIN — INSULIN HUMAN 6 UNIT(S): 100 INJECTION, SOLUTION SUBCUTANEOUS at 00:17

## 2021-05-27 RX ADMIN — HEPARIN SODIUM 5000 UNIT(S): 5000 INJECTION INTRAVENOUS; SUBCUTANEOUS at 05:06

## 2021-05-27 RX ADMIN — Medication 650 MILLIGRAM(S): at 19:45

## 2021-05-27 RX ADMIN — Medication 650 MILLIGRAM(S): at 13:00

## 2021-05-27 RX ADMIN — Medication 650 MILLIGRAM(S): at 05:06

## 2021-05-27 RX ADMIN — HUMAN INSULIN 3 UNIT(S): 100 INJECTION, SUSPENSION SUBCUTANEOUS at 19:00

## 2021-05-27 RX ADMIN — LEVETIRACETAM 500 MILLIGRAM(S): 250 TABLET, FILM COATED ORAL at 05:06

## 2021-05-27 RX ADMIN — Medication 650 MILLIGRAM(S): at 05:19

## 2021-05-27 RX ADMIN — Medication 2 MILLIGRAM(S): at 20:38

## 2021-05-27 RX ADMIN — Medication 400 UNIT(S): at 12:17

## 2021-05-27 RX ADMIN — Medication 650 MILLIGRAM(S): at 12:16

## 2021-05-27 RX ADMIN — Medication 2: at 14:56

## 2021-05-27 RX ADMIN — MEROPENEM 100 MILLIGRAM(S): 1 INJECTION INTRAVENOUS at 20:34

## 2021-05-27 RX ADMIN — MEROPENEM 100 MILLIGRAM(S): 1 INJECTION INTRAVENOUS at 12:31

## 2021-05-27 RX ADMIN — HEPARIN SODIUM 5000 UNIT(S): 5000 INJECTION INTRAVENOUS; SUBCUTANEOUS at 19:04

## 2021-05-27 NOTE — PROGRESS NOTE ADULT - ATTENDING COMMENTS
discussed above plan with resident on rounds. patient seen and examined. still having pain on right chest wall - overall improved  labs and imaging reviewed.  I agree with the above findings, assessment, and plan with the following additions and exceptions:  AHRF/PNA/empyema s/p thoracotomy 5/12 with decortication and abscess drainage on meropenem (plan for discharge on bactrim and augmentin) - d/w ID - Dr. Chambers  dysphagia s/p peg - failed MBS 5/26 - c/w PEG feeds for now - reeval in rehab  DM2 - Fs better controlled - Endocrine f/u appreciated - c/w NPH and admelog ISS for coverage  PE likely provoked - post covid infection thrombosis - LE dopplers negative for DVT, pulm f/u appreciated - will continue to hold full dose a/c given bleeding risk  c/w dvt proph hsq - h/h stable - cont to monitor   PMR consult appreciated - Acute rehab   patient with multiple co-morbid conditions; higher risk for future complications despite optimal medical therapy - discussed with family at bedside  discharge time - 38 minutes

## 2021-05-27 NOTE — PROGRESS NOTE ADULT - PROBLEM SELECTOR PLAN 1
-test BG q6h  -NPH 13 units sq 0000 and 0600  -NPH 10 units sq 1200  -NPH 6 units sq 1800  -hold NPH if tube feeds off. Can reduce dose to 50% if BG less than 100mg/dl; if reduced dosage ordered ensure Humulin N ordered   - continue  moderate correction scale sq q6h.   BG goal (100-180mg/dl)  -notify endocrine team if diet advanced to PO. Will need to change insulin regimen. Notify endocrine team if TF stopped.   Discharge: TBD based on feeding modality and insulin requirements

## 2021-05-27 NOTE — PROGRESS NOTE ADULT - SUBJECTIVE AND OBJECTIVE BOX
[address] Patient Status:  Inpatient   Age/Gender 41year old female MRN [id number]   Location [facility] Attending Jevon Marlow MD   Hosp Day # 1 PCP Dennis Bentley MD       Anesthesia Post-op Not PROGRESS NOTE:   Authored by Dr. Emily Mckeon, ANH pager [phone]    Patient is a 60y old  Male who presents with a chief complaint of COVID19 w/ severe metabolic acidosis s/p intubation (26 May 2021 10:58)      SUBJECTIVE / OVERNIGHT EVENTS: Patient examined at bedside. Patient has no acute complaints and appears comfortable    MEDICATIONS  (STANDING):  acetaminophen   Tablet .. 650 milliGRAM(s) Oral every 6 hours  cholecalciferol 400 Unit(s) Oral daily  doxazosin 2 milliGRAM(s) Oral at bedtime  heparin   Injectable 5000 Unit(s) SubCutaneous every 12 hours  insulin lispro (ADMELOG) corrective regimen sliding scale   SubCutaneous every 6 hours  insulin NPH human recombinant 11 Unit(s) SubCutaneous <User Schedule>  insulin NPH human recombinant 9 Unit(s) SubCutaneous <User Schedule>  insulin NPH human recombinant 13 Unit(s) SubCutaneous <User Schedule>  levETIRAcetam  Solution 500 milliGRAM(s) Oral two times a day  meropenem  IVPB 1000 milliGRAM(s) IV Intermittent every 8 hours  polyethylene glycol 3350 17 Gram(s) Oral daily  senna 2 Tablet(s) Oral at bedtime  sodium chloride 0.9%. 1000 milliLiter(s) (75 mL/Hr) IV Continuous <Continuous>  thiamine 100 milliGRAM(s) Oral daily    MEDICATIONS  (PRN):  albuterol/ipratropium for Nebulization 3 milliLiter(s) Nebulizer every 6 hours PRN Shortness of Breath and/or Wheezing      CAPILLARY BLOOD GLUCOSE      POCT Blood Glucose.: 141 mg/dL (27 May 2021 06:01)  POCT Blood Glucose.: 89 mg/dL (26 May 2021 23:28)  POCT Blood Glucose.: 102 mg/dL (26 May 2021 16:58)  POCT Blood Glucose.: 156 mg/dL (26 May 2021 12:47)    I&O's Summary    26 May 2021 07:01  -  27 May 2021 07:00  --------------------------------------------------------  IN: 1005 mL / OUT: 0 mL / NET: 1005 mL        PHYSICAL EXAM:  Vital Signs Last 24 Hrs  T(C): 36.8 (27 May 2021 04:54), Max: 37.1 (26 May 2021 19:47)  T(F): 98.2 (27 May 2021 04:54), Max: 98.7 (26 May 2021 19:47)  HR: 89 (27 May 2021 04:54) (89 - 100)  BP: 105/70 (27 May 2021 04:54) (100/69 - 110/68)  BP(mean): --  RR: 18 (27 May 2021 04:54) (18 - 20)  SpO2: 97% (27 May 2021 04:54) (95% - 98%)    GENERAL: No acute distress, well-developed  HEAD:  Atraumatic, Normocephalic  EYES: EOMI, PERRLA, conjunctiva and sclera clear  NECK: Supple, no lymphadenopathy, no JVD  CHEST/LUNG: CTAB; No wheezes, rales, or rhonchi, right side of chest wall with staples and healing hematoma, no increase in size   HEART: Regular rate and rhythm; No murmurs, rubs, or gallops  ABDOMEN: Soft, non-tender, non-distended; normal bowel sounds, no organomegaly, PEG in place   EXTREMITIES:  2+ peripheral pulses b/l, No clubbing, cyanosis, or edema  NEUROLOGY: A&O x 2-3, no focal deficits, generalized weakness   SKIN: stable gangrenous changes to toes    LABS:                        9.9    11.02 )-----------( 618      ( 27 May 2021 07:16 )             32.9     05-27    137  |  101  |  34<H>  ----------------------------<  158<H>  4.9   |  26  |  0.70    Ca    9.5      27 May 2021 07:09  Phos  4.8     05-27  Mg     2.3     05-27    TPro  7.3  /  Alb  2.6<L>  /  TBili  0.4  /  DBili  x   /  AST  27  /  ALT  30  /  AlkPhos  136<H>  05-27                RADIOLOGY & ADDITIONAL TESTS:  Results Reviewed:   Imaging Personally Reviewed:  Electrocardiogram Personally Reviewed:    COORDINATION OF CARE:  Care Discussed with Consultants/Other Providers [Y/N]:  Prior or Outpatient Records Reviewed [Y/N]:

## 2021-05-27 NOTE — PROGRESS NOTE ADULT - ASSESSMENT
61 y/o Gujarati speaking male with new onset diabetes (HbA1c of 10.3) admitted with COVID with prolonged hospital course requiring intubation and PEG insertion; s/p Right VATS converted to R thoracotomy for Empyema/bilateral PNA, abscess with  PNA . On 24 hour continuous TF at goal. BG values mostly at goal with one BG reading below 100 past 24 hours, suspect pt having nocturnal BG <100 due to NPH, lower Noon and Dinner doses further to maintain BG at goal. MBS completed continues on TF only. BG goal (100-180mg/dl).

## 2021-05-27 NOTE — PROGRESS NOTE ADULT - PROBLEM SELECTOR PLAN 3
-pt should be on a statin as he is over 40 with diabetes. Can defer for now given chronic prolonged illness/hospital stay  - check fasting lipids as outpt      Discussed with patient bedside RN Zoe Calvert NP   In House Pager: [phone]   office:  [phone] (M-F 9a-5pm)               [phone] (nights/weekends)

## 2021-05-27 NOTE — PROGRESS NOTE ADULT - ASSESSMENT
61 yo M with PMH of DM who was initially admitted to [facility] ICU 4/1 for hypoxic respiratory failure 2/2 COVID c/b PE with R heart strain, cardiogenic and septic shock, ischemic and hemorrhagic CVA and ESBL klebsiella ventilator associated PNA, transferred to [facility] for neurosurgery eval on 4/12  No fever, has leukocytosis  UCX with ESBL K pne  Bilateral LE dry gangrene  CT now with Empyema, concern for bilateral PNA, abscess  S/p OR Thoracotomy, abscess drainage, bronch (5/12)--culture with K pne  CT with areas of small cavitary lesions and hematomas  Patient appears generally stable at bedside  1) Leukocytosis  - Improving, stable  - Trend WBC  2) COVID  - COVID+, unclear significance (was prior negative, and patient Ab+)  - Monitor for any signs active COVID/reinfection  - Any considerations isolation per infection control  3) Suspected Empyema/Abscess  - Culture with K pne, Lactobacillus, Prevotella--should be adequately treated with jennifer  - Meropenem 1g q 8--continue while inpatient  - Note CT findings, follow surgery if any further procedure needed for persistent cavities/hematoma  - When DC planning, would send out on Bactrim DS 2 tabs TID + Augmentin 875mg po q 12, for tentative 2 more weeks (then will further determine if role for further course based on imaging)  4) Gangrene  - Appears noninfectious/dry gangrene  - Monitor wounds    If DC planning needs close follow up in ID clinic for repeat imaging    Sammy Chambers MD  Pager [phone]  After 5pm and on weekends call [phone]

## 2021-05-27 NOTE — PROGRESS NOTE ADULT - PROBLEM SELECTOR PLAN 2
Chief Complaint   Patient presents with   • Follow-up     Recheck thyroidectomy        HPI    Generally OK- feeling a bit weak. TFT's:       Ref Range & Units 6d ago     T4, Total 5.50 - 11.00 mcg/dL 5.66   Resulting Agency  [facility]   adenoma      Ref Range & Units 6d ago     TSH 0.460 - 4.680 mIU/mL 12.100 (H)   Resulting Agency  [facility]                 Past Medical History   Diagnosis Date   • Cancer      skin   • Cyst of left breast    • Disease of thyroid gland      nodule, no medications required   • Hypertension      hx.  pt state MD took her off meds   • Sleep apnea    • Sleep apnea        Past Surgical History   Procedure Laterality Date   • Breast cyst aspiration  10/13/2013     Fine Needle Aspiration with Imaging Guidance 75360 (Breast cyst)    • Tonsillectomy       trimmed uvula and septoplasty    • Nasal septum surgery     • Thyroidectomy Left 2/13/2017     Procedure: LEFT THYROIDECTOMY, POSSIBLE TOTAL;  Surgeon: Case Rocha MD;  Location: [facility];  Service:          Current Outpatient Prescriptions:   •  Biotin 10 MG capsule, Take 10 capsules by mouth Daily., Disp: , Rfl:   •  cholecalciferol (VITAMIN D3) 1000 UNITS tablet, Take 1,000 Units by mouth Daily., Disp: , Rfl:   •  Coconut Oil (EQL COCONUT OIL) 1000 MG capsule, Take 1,000 mg by mouth Daily., Disp: , Rfl:   •  desoximetasone (TOPICORT) 0.25 % cream, Apply 1 application topically As Needed for irritation., Disp: , Rfl:   •  Misc Natural Products (TART CHERRY ADVANCED) capsule, Take 1 capsule by mouth Daily., Disp: , Rfl:   •  Multiple Vitamin (MULTI VITAMIN DAILY PO), Take 1 tablet by mouth Daily., Disp: , Rfl:   •  oxymetazoline (NASAL DECONGESTANT SPRAY) 0.05 % nasal spray, 2 sprays into each nostril 2 (Two) Times a Day As Needed for congestion., Disp: , Rfl:   •  levothyroxine (SYNTHROID) 100 MCG tablet, Take 1 tablet by mouth Daily., Disp: 30 tablet, Rfl: 11    Allergies   Allergen Reactions   • Aleve [Naproxen] Hives   • Other  Other (See Comments)     Lactic acid causes skin to peel       No family history on file.    Social History     Social History   • Marital status: Single     Spouse name: N/A   • Number of children: N/A   • Years of education: N/A     Occupational History   • Not on file.     Social History Main Topics   • Smoking status: Never Smoker   • Smokeless tobacco: Never Used   • Alcohol use No   • Drug use: No   • Sexual activity: Not on file     Other Topics Concern   • Not on file     Social History Narrative       Review of Systems    Nothing to add    Physical Exam   Neck: Normal range of motion. Neck supple.             ASSESSMENT    Carli was seen today for follow-up.    Diagnoses and all orders for this visit:    Hx of thyroidectomy  Comments:  Lobectomy for benign disease    Other orders  -     levothyroxine (SYNTHROID) 100 MCG tablet; Take 1 tablet by mouth Daily.      PLAN    1. Begin low dose synthroif          This document has been electronically signed by Case Rocha MD on March 12, 2017 9:54 AM     goal less than 130/80 without medications defer to primary team for management.

## 2021-05-27 NOTE — PROGRESS NOTE ADULT - ASSESSMENT
60 yr male with PMH of DM who was initially admitted to [facility] ICU for hypoxic respiratory failure 2/2 COVID c/b PE with R heart strain, cardiogenic and septic shock, ischemic and hemorrhagic CVA and ESBL klebsiella ventilator associate PNA, transferred to [facility] for neurosurgery eval and further management. s/p PEG, now monitoring for refeeding syndrome, complicated with right empyema s/p VATs, c.w meropenem planning for AR

## 2021-05-27 NOTE — PROGRESS NOTE ADULT - SUBJECTIVE AND OBJECTIVE BOX
DIABETES FOLLOW UP : Seen earlier today    INTERVAL HX: pt giving "thumbs up " and smiling. No new complaints . BG 89 at midnight with no reported symptoms. BG otherwise at goal past 24 hours.     Allergies    No Known Allergies      MEDICATIONS  (STANDING):  acetaminophen   Tablet .. 650 milliGRAM(s) Oral every 6 hours  cholecalciferol 400 Unit(s) Oral daily  doxazosin 2 milliGRAM(s) Oral at bedtime  heparin   Injectable 5000 Unit(s) SubCutaneous every 12 hours  insulin lispro (ADMELOG) corrective regimen sliding scale   SubCutaneous every 6 hours  insulin NPH human recombinant 13 Unit(s) SubCutaneous <User Schedule>  insulin NPH human recombinant 6 Unit(s) SubCutaneous <User Schedule>  insulin NPH human recombinant 10 Unit(s) SubCutaneous <User Schedule>  levETIRAcetam  Solution 500 milliGRAM(s) Oral two times a day  meropenem  IVPB 1000 milliGRAM(s) IV Intermittent every 8 hours  polyethylene glycol 3350 17 Gram(s) Oral daily  senna 2 Tablet(s) Oral at bedtime  sodium chloride 0.9%. 1000 milliLiter(s) (75 mL/Hr) IV Continuous <Continuous>  thiamine 100 milliGRAM(s) Oral daily      PHYSICAL EXAM:  VITALS: T(C): 36.9 (05-27-21 @ 10:00)  T(F): 98.5 (05-27-21 @ 10:00), Max: 98.7 (05-26-21 @ 19:47)  HR: 103 (05-27-21 @ 10:00) (89 - 103)  BP: 103/67 (05-27-21 @ 10:00) (100/69 - 110/68)  RR:  (18 - 20)  SpO2:  (95% - 100%)  Wt(kg): --  GENERAL: male laying in bed in NAD  Abdomen: Soft, nontender, non distended  Extremities: Warm, no edema in all 4 exts  NEURO: Alert    LABS:  POCT Blood Glucose.: 192 mg/dL (05-27-21 @ 12:51)  POCT Blood Glucose.: 141 mg/dL (05-27-21 @ 06:01)  POCT Blood Glucose.: 89 mg/dL (05-26-21 @ 23:28)  POCT Blood Glucose.: 102 mg/dL (05-26-21 @ 16:58)  POCT Blood Glucose.: 156 mg/dL (05-26-21 @ 12:47)  POCT Blood Glucose.: 144 mg/dL (05-26-21 @ 06:27)  POCT Blood Glucose.: 140 mg/dL (05-26-21 @ 00:16)  POCT Blood Glucose.: 122 mg/dL (05-25-21 @ 17:06)  POCT Blood Glucose.: 151 mg/dL (05-25-21 @ 12:16)  POCT Blood Glucose.: 90 mg/dL (05-25-21 @ 05:54)  POCT Blood Glucose.: 82 mg/dL (05-25-21 @ 00:15)  POCT Blood Glucose.: 159 mg/dL (05-24-21 @ 17:28)                            9.9    11.02 )-----------( 618      ( 27 May 2021 07:16 )             32.9       05-27    137  |  101  |  34<H>  ----------------------------<  158<H>  4.9   |  26  |  0.70    EGFR if : 121  EGFR if non : 104    Ca    9.5      05-27  Mg     2.3     05-27  Phos  4.8     05-27    TPro  7.3  /  Alb  2.6<L>  /  TBili  0.4  /  DBili  x   /  AST  27  /  ALT  30  /  AlkPhos  136<H>  05-27 04-26 Chol 114 Direct LDL -- LDL calculated 53 HDL 35<L> Trig 126, 04-13 Chol -- Direct LDL -- LDL calculated -- HDL -- Trig 126    A1C with Estimated Average Glucose Result: 10.3 % (04-02-21 @ 14:28)      Estimated Average Glucose: 249 mg/dL (04-02-21 @ 14:28)

## 2021-05-27 NOTE — PROGRESS NOTE ADULT - SUBJECTIVE AND OBJECTIVE BOX
CC: F/U for Empyema    Saw/spoke to patient. Unchanged. Doing well.    Allergies  No Known Allergies    ANTIMICROBIALS:  meropenem  IVPB 1000 every 8 hours    PE:    Vital Signs Last 24 Hrs  T(C): 36.6 (27 May 2021 15:00), Max: 37.1 (26 May 2021 19:47)  T(F): 97.8 (27 May 2021 15:00), Max: 98.7 (26 May 2021 19:47)  HR: 105 (27 May 2021 15:00) (89 - 105)  BP: 102/69 (27 May 2021 15:00) (100/69 - 110/68)  RR: 18 (27 May 2021 15:00) (18 - 20)  SpO2: 97% (27 May 2021 15:00) (95% - 100%)    Gen: AOx3, NAD, non-toxic  CV: S1+S2 normal, nontachycardic  Resp: Clear bilat, no resp distress, no crackles/wheezes  Abd: Soft, nontender, +BS  Ext: No LE edema, no wounds    LABS:                        9.9    11.02 )-----------( 618      ( 27 May 2021 07:16 )             32.9     05-27    137  |  101  |  34<H>  ----------------------------<  158<H>  4.9   |  26  |  0.70    Ca    9.5      27 May 2021 07:09  Phos  4.8     05-27  Mg     2.3     05-27    TPro  7.3  /  Alb  2.6<L>  /  TBili  0.4  /  DBili  x   /  AST  27  /  ALT  30  /  AlkPhos  136<H>  05-27    MICROBIOLOGY:    .Blood Blood  05-14-21   No Growth Final     BAL BRONCHIAL AVEOLAR LAVAGE  05-13-21   Rare Yeast  --    Numerous polymorphonuclear leukocytes seen per low power field  No Squamous epithelial cells seen per low power field  Rare Gram Negative Rods seen per oil power field    .Tissue Other  05-12-21   Growth in fluid media only Klebsiella pneumoniae ESBL  Rare Lactobacillus rhamnosus "Susceptibilities not performed"  --  Klebsiella pneumoniae ESBL    .Urine Clean Catch (Midstream)  05-06-21   >100,000 CFU/ml Klebsiella pneumoniae ESBL  --  Klebsiella pneumoniae ESBL    .Blood Blood  05-05-21   No Growth Final    .Blood Blood  05-05-21   No Growth Final     RADIOLOGY:    5/19 XR:    Frontal expiratory view of the chest shows the heart to be similar in size. The lungs show similar right pleural thickening or lateral effusion and there is no evidence of pneumothorax nor left pleural effusion. Gastric tube is again noted.    IMPRESSION:  No pneumothorax.

## 2021-05-28 LAB
ALBUMIN SERPL ELPH-MCNC: 2.7 G/DL — LOW (ref 3.3–5)
ALP SERPL-CCNC: 124 U/L — HIGH (ref 40–120)
ALT FLD-CCNC: 30 U/L — SIGNIFICANT CHANGE UP (ref 10–45)
ANION GAP SERPL CALC-SCNC: 13 MMOL/L — SIGNIFICANT CHANGE UP (ref 5–17)
AST SERPL-CCNC: 33 U/L — SIGNIFICANT CHANGE UP (ref 10–40)
BASOPHILS # BLD AUTO: 0.09 K/UL — SIGNIFICANT CHANGE UP (ref 0–0.2)
BASOPHILS NFR BLD AUTO: 0.8 % — SIGNIFICANT CHANGE UP (ref 0–2)
BILIRUB SERPL-MCNC: 0.4 MG/DL — SIGNIFICANT CHANGE UP (ref 0.2–1.2)
BUN SERPL-MCNC: 34 MG/DL — HIGH (ref 7–23)
CALCIUM SERPL-MCNC: 9.8 MG/DL — SIGNIFICANT CHANGE UP (ref 8.4–10.5)
CHLORIDE SERPL-SCNC: 101 MMOL/L — SIGNIFICANT CHANGE UP (ref 96–108)
CO2 SERPL-SCNC: 23 MMOL/L — SIGNIFICANT CHANGE UP (ref 22–31)
CREAT SERPL-MCNC: 0.77 MG/DL — SIGNIFICANT CHANGE UP (ref 0.5–1.3)
EOSINOPHIL # BLD AUTO: 0.61 K/UL — HIGH (ref 0–0.5)
EOSINOPHIL NFR BLD AUTO: 5.5 % — SIGNIFICANT CHANGE UP (ref 0–6)
GLUCOSE BLDC GLUCOMTR-MCNC: 122 MG/DL — HIGH (ref 70–99)
GLUCOSE BLDC GLUCOMTR-MCNC: 124 MG/DL — HIGH (ref 70–99)
GLUCOSE BLDC GLUCOMTR-MCNC: 135 MG/DL — HIGH (ref 70–99)
GLUCOSE BLDC GLUCOMTR-MCNC: 162 MG/DL — HIGH (ref 70–99)
GLUCOSE BLDC GLUCOMTR-MCNC: 165 MG/DL — HIGH (ref 70–99)
GLUCOSE BLDC GLUCOMTR-MCNC: 61 MG/DL — LOW (ref 70–99)
GLUCOSE BLDC GLUCOMTR-MCNC: 63 MG/DL — LOW (ref 70–99)
GLUCOSE SERPL-MCNC: 122 MG/DL — HIGH (ref 70–99)
HCT VFR BLD CALC: 33.1 % — LOW (ref 39–50)
HGB BLD-MCNC: 9.9 G/DL — LOW (ref 13–17)
IMM GRANULOCYTES NFR BLD AUTO: 1.7 % — HIGH (ref 0–1.5)
LYMPHOCYTES # BLD AUTO: 1.99 K/UL — SIGNIFICANT CHANGE UP (ref 1–3.3)
LYMPHOCYTES # BLD AUTO: 18 % — SIGNIFICANT CHANGE UP (ref 13–44)
MAGNESIUM SERPL-MCNC: 2.4 MG/DL — SIGNIFICANT CHANGE UP (ref 1.6–2.6)
MCHC RBC-ENTMCNC: 25.3 PG — LOW (ref 27–34)
MCHC RBC-ENTMCNC: 29.9 GM/DL — LOW (ref 32–36)
MCV RBC AUTO: 84.7 FL — SIGNIFICANT CHANGE UP (ref 80–100)
MONOCYTES # BLD AUTO: 0.97 K/UL — HIGH (ref 0–0.9)
MONOCYTES NFR BLD AUTO: 8.8 % — SIGNIFICANT CHANGE UP (ref 2–14)
NEUTROPHILS # BLD AUTO: 7.2 K/UL — SIGNIFICANT CHANGE UP (ref 1.8–7.4)
NEUTROPHILS NFR BLD AUTO: 65.2 % — SIGNIFICANT CHANGE UP (ref 43–77)
NRBC # BLD: 0 /100 WBCS — SIGNIFICANT CHANGE UP (ref 0–0)
PHOSPHATE SERPL-MCNC: 4.5 MG/DL — SIGNIFICANT CHANGE UP (ref 2.5–4.5)
PLATELET # BLD AUTO: 577 K/UL — HIGH (ref 150–400)
POTASSIUM SERPL-MCNC: 5.2 MMOL/L — SIGNIFICANT CHANGE UP (ref 3.5–5.3)
POTASSIUM SERPL-SCNC: 5.2 MMOL/L — SIGNIFICANT CHANGE UP (ref 3.5–5.3)
PROT SERPL-MCNC: 7.9 G/DL — SIGNIFICANT CHANGE UP (ref 6–8.3)
RBC # BLD: 3.91 M/UL — LOW (ref 4.2–5.8)
RBC # FLD: 21 % — HIGH (ref 10.3–14.5)
SODIUM SERPL-SCNC: 137 MMOL/L — SIGNIFICANT CHANGE UP (ref 135–145)
WBC # BLD: 11.05 K/UL — HIGH (ref 3.8–10.5)
WBC # FLD AUTO: 11.05 K/UL — HIGH (ref 3.8–10.5)

## 2021-05-28 PROCEDURE — 99232 SBSQ HOSP IP/OBS MODERATE 35: CPT

## 2021-05-28 PROCEDURE — 99233 SBSQ HOSP IP/OBS HIGH 50: CPT | Mod: GC

## 2021-05-28 RX ORDER — HUMAN INSULIN 100 [IU]/ML
12 INJECTION, SUSPENSION SUBCUTANEOUS
Refills: 0 | Status: DISCONTINUED | OUTPATIENT
Start: 2021-05-28 | End: 2021-05-28

## 2021-05-28 RX ORDER — HUMAN INSULIN 100 [IU]/ML
2 INJECTION, SUSPENSION SUBCUTANEOUS
Refills: 0 | Status: DISCONTINUED | OUTPATIENT
Start: 2021-05-28 | End: 2021-05-28

## 2021-05-28 RX ORDER — HUMAN INSULIN 100 [IU]/ML
3 INJECTION, SUSPENSION SUBCUTANEOUS
Refills: 0 | Status: DISCONTINUED | OUTPATIENT
Start: 2021-05-28 | End: 2021-05-28

## 2021-05-28 RX ORDER — HUMAN INSULIN 100 [IU]/ML
15 INJECTION, SUSPENSION SUBCUTANEOUS
Refills: 0 | Status: DISCONTINUED | OUTPATIENT
Start: 2021-05-29 | End: 2021-06-01

## 2021-05-28 RX ORDER — DEXTROSE 50 % IN WATER 50 %
25 SYRINGE (ML) INTRAVENOUS ONCE
Refills: 0 | Status: COMPLETED | OUTPATIENT
Start: 2021-05-28 | End: 2021-05-28

## 2021-05-28 RX ADMIN — POLYETHYLENE GLYCOL 3350 17 GRAM(S): 17 POWDER, FOR SOLUTION ORAL at 13:08

## 2021-05-28 RX ADMIN — Medication 650 MILLIGRAM(S): at 14:45

## 2021-05-28 RX ADMIN — Medication 650 MILLIGRAM(S): at 05:46

## 2021-05-28 RX ADMIN — Medication 650 MILLIGRAM(S): at 18:25

## 2021-05-28 RX ADMIN — Medication 2 MILLIGRAM(S): at 22:27

## 2021-05-28 RX ADMIN — HUMAN INSULIN 13 UNIT(S): 100 INJECTION, SUSPENSION SUBCUTANEOUS at 00:24

## 2021-05-28 RX ADMIN — Medication 2: at 13:07

## 2021-05-28 RX ADMIN — HEPARIN SODIUM 5000 UNIT(S): 5000 INJECTION INTRAVENOUS; SUBCUTANEOUS at 18:23

## 2021-05-28 RX ADMIN — Medication 650 MILLIGRAM(S): at 13:09

## 2021-05-28 RX ADMIN — HUMAN INSULIN 13 UNIT(S): 100 INJECTION, SUSPENSION SUBCUTANEOUS at 05:57

## 2021-05-28 RX ADMIN — LEVETIRACETAM 500 MILLIGRAM(S): 250 TABLET, FILM COATED ORAL at 05:45

## 2021-05-28 RX ADMIN — MEROPENEM 100 MILLIGRAM(S): 1 INJECTION INTRAVENOUS at 05:46

## 2021-05-28 RX ADMIN — LEVETIRACETAM 500 MILLIGRAM(S): 250 TABLET, FILM COATED ORAL at 18:38

## 2021-05-28 RX ADMIN — Medication 400 UNIT(S): at 13:08

## 2021-05-28 RX ADMIN — SENNA PLUS 2 TABLET(S): 8.6 TABLET ORAL at 22:27

## 2021-05-28 RX ADMIN — Medication 100 MILLIGRAM(S): at 13:07

## 2021-05-28 RX ADMIN — Medication 650 MILLIGRAM(S): at 01:10

## 2021-05-28 RX ADMIN — MEROPENEM 100 MILLIGRAM(S): 1 INJECTION INTRAVENOUS at 22:32

## 2021-05-28 RX ADMIN — HEPARIN SODIUM 5000 UNIT(S): 5000 INJECTION INTRAVENOUS; SUBCUTANEOUS at 05:46

## 2021-05-28 RX ADMIN — HUMAN INSULIN 10 UNIT(S): 100 INJECTION, SUSPENSION SUBCUTANEOUS at 13:06

## 2021-05-28 RX ADMIN — Medication 650 MILLIGRAM(S): at 06:20

## 2021-05-28 RX ADMIN — Medication 650 MILLIGRAM(S): at 19:15

## 2021-05-28 RX ADMIN — Medication 650 MILLIGRAM(S): at 00:35

## 2021-05-28 RX ADMIN — MEROPENEM 100 MILLIGRAM(S): 1 INJECTION INTRAVENOUS at 15:57

## 2021-05-28 RX ADMIN — Medication 25 MILLILITER(S): at 17:33

## 2021-05-28 NOTE — PROGRESS NOTE ADULT - SUBJECTIVE AND OBJECTIVE BOX
CC: F/U for Empyema    Saw/spoke to patient. No fevers, no chills. No new complaints. Unchanged.    Allergies  No Known Allergies    ANTIMICROBIALS:  meropenem  IVPB 1000 every 8 hours    PE:    Vital Signs Last 24 Hrs  T(C): 36.3 (28 May 2021 11:11), Max: 36.7 (27 May 2021 16:07)  T(F): 97.4 (28 May 2021 11:11), Max: 98.1 (28 May 2021 05:05)  HR: 111 (28 May 2021 11:11) (98 - 118)  BP: 96/62 (28 May 2021 11:11) (96/62 - 122/81)    RR: 18 (28 May 2021 11:11) (18 - 20)  SpO2: 95% (28 May 2021 11:11) (95% - 99%)    Gen: AOx3, NAD, non-toxic, pleasant  CV: S1+S2 normal, tachycardic  Resp: Clear bilat, no resp distress, no crackles/wheezes  Abd: Soft, nontender, +BS  Ext: No LE edema, no wounds    LABS:                        9.9    11.05 )-----------( 577      ( 28 May 2021 06:39 )             33.1     05-28    137  |  101  |  34<H>  ----------------------------<  122<H>  5.2   |  23  |  0.77    Ca    9.8      28 May 2021 06:39  Phos  4.5     05-28  Mg     2.4     05-28    TPro  7.9  /  Alb  2.7<L>  /  TBili  0.4  /  DBili  x   /  AST  33  /  ALT  30  /  AlkPhos  124<H>  05-28    MICROBIOLOGY:    .Blood Blood  05-14-21   No Growth Final    BAL BRONCHIAL AVEOLAR LAVAGE  05-13-21   Rare Yeast  --    Numerous polymorphonuclear leukocytes seen per low power field  No Squamous epithelial cells seen per low power field  Rare Gram Negative Rods seen per oil power field    .Tissue Other  05-12-21   Growth in fluid media only Klebsiella pneumoniae ESBL  Rare Lactobacillus rhamnosus "Susceptibilities not performed"  --  Klebsiella pneumoniae ESBL    .Urine Clean Catch (Midstream)  05-06-21   >100,000 CFU/ml Klebsiella pneumoniae ESBL  --  Klebsiella pneumoniae ESBL    .Blood Blood  05-05-21   No Growth Final     .Blood Blood  05-05-21   No Growth Final     (otherwise reviewed)    RADIOLOGY:    5/26 Swallow study:    IMPRESSION: There is premature spill of contrast into the vallecula with decreased epiglottic tilt week frontal peristalsis which resulted in stasis in the vallecula and piriform sinuses after the swallow.    Trace penetration with honey thick liquids by spoon. Aspiration with puree thin liquids by spoon.    For further information and recommendations, please refer to the speech pathologist final report.

## 2021-05-28 NOTE — PROGRESS NOTE ADULT - ATTENDING COMMENTS
discussed above plan with resident on rounds. patient seen and examined. still having pain on right chest wall - overall improved  labs and imaging reviewed.  I agree with the above findings, assessment, and plan with the following additions and exceptions:  AHRF/PNA/empyema s/p thoracotomy 5/12 with decortication and abscess drainage on meropenem (plan for discharge on bactrim and augmentin) - d/w ID - Dr. Chambers  dysphagia s/p peg - failed MBS 5/26 - c/w PEG feeds for now - reeval in rehab  DM2 - Fs better controlled - Endocrine f/u appreciated - c/w NPH and admelog ISS for coverage  PE likely provoked - post covid infection thrombosis - LE dopplers negative for DVT, pulm f/u appreciated - will continue to hold full dose a/c given bleeding risk - +obturator hematoma and Rchest wall hematoma  c/w dvt proph hsq - h/h stable - cont to monitor   PMR consult appreciated - Acute rehab   patient with multiple co-morbid conditions; higher risk for future complications despite optimal medical therapy - discussed with family at bedside  discharge time - 38 minutes

## 2021-05-28 NOTE — PROVIDER CONTACT NOTE (OTHER) - REASON
Discontinuation of Isolation for COVID patient
pt PTT resulted 60.1
Heart rate
Pt is hypoglycemic
Pt removed SRAVAN feed tube
Unable to draw labs, Pt inadvertently removed IV's
Patient tachycardic
patient bladder scan shows 411 ccs
pt PTT 36.1
pt.s bladder scan 671
patient blood glucose 89, due for 13 units Humulin
team 3 MD Alvarez
pt PTT resulted 49.0
pt temp 100.4, , 88% on room air pt placed on NC at LPM:2  98%
went in to straigh cath pt @ pt diaper saturated wet in urine bladder scan done 46ml per scanner, no straigh cath
Low FS

## 2021-05-28 NOTE — PROVIDER CONTACT NOTE (OTHER) - BACKGROUND
PT admitted for PNA
pt admitted for pneumonia. NPO with continuous tube feeds
pt admitted with PNA due to organism
pt admitted with PNA due to organism
pt admitted  pneumonia due to organism
60 year old male history of COVID, s/p cardiogenic shock, CVA, ventilator associated PNA admitted with PNA and AMS
60 year old male history of COVID, s/p cardiogenic shock, CVA, ventilator associated PNA admitted with PNA and AMS
Adm with PNA
Admitted with PNA
Pneumonia
Pt admitted with PNA, AMS, ventilator associated PNA
hypoxic respiratory failure

## 2021-05-28 NOTE — PROGRESS NOTE ADULT - SUBJECTIVE AND OBJECTIVE BOX
Patient is a 60y old  Male who presents with a chief complaint of COVID19 w/ severe metabolic acidosis s/p intubation (29 May 2021 08:46)       INTERVAL HPI/OVERNIGHT EVENTS:  Patient seen and evaluated at bedside.  Pt is resting comfortable in NAD.    Allergies    No Known Allergies    Intolerances        Vital Signs Last 24 Hrs  T(C): 36.5 (29 May 2021 14:02), Max: 36.9 (28 May 2021 20:08)  T(F): 97.7 (29 May 2021 14:02), Max: 98.4 (28 May 2021 20:08)  HR: 67 (29 May 2021 14:02) (67 - 100)  BP: 150/55 (29 May 2021 14:02) (105/72 - 150/55)  BP(mean): --  RR: 18 (29 May 2021 14:02) (18 - 20)  SpO2: 97% (29 May 2021 14:02) (97% - 100%)    LABS:                        10.5   8.84  )-----------( 556      ( 29 May 2021 10:20 )             34.5     05-29    140  |  100  |  36<H>  ----------------------------<  143<H>  4.8   |  26  |  0.75    Ca    9.9      29 May 2021 07:16  Phos  4.7     05-29  Mg     2.5     05-29    TPro  7.9  /  Alb  2.7<L>  /  TBili  0.4  /  DBili  x   /  AST  33  /  ALT  30  /  AlkPhos  124<H>  05-28        CAPILLARY BLOOD GLUCOSE      POCT Blood Glucose.: 93 mg/dL (29 May 2021 17:23)  POCT Blood Glucose.: 170 mg/dL (29 May 2021 12:12)  POCT Blood Glucose.: 150 mg/dL (29 May 2021 05:58)  POCT Blood Glucose.: 142 mg/dL (29 May 2021 00:21)  POCT Blood Glucose.: 124 mg/dL (28 May 2021 18:00)      Lower Extremity Physical Exam:  Vascular: DP/PT 2/4, B/L, CFT <3 seconds B/L, Temperature gradient warm to cool, B/L however diminished warmth noted on the R foot past TMT  Ortho: toes painful to palp  Neuro: Epicritic sensation intact to the level of digits, B/L.  Skin: bilateral digital gangrenous changes noted to distal tips of digits, notably on L foot 2nd digit full thickness, well adhered; R foot 2nd digit sloughed skin with no probe to bone nor signs of infection, sites improved in appearance overall, new left foot 5th toe ulcer to subQ with loosely adhered toenail

## 2021-05-28 NOTE — PROGRESS NOTE ADULT - PROBLEM SELECTOR PLAN 3
-pt should be on a statin as he is over 40 with diabetes. Can defer for now given chronic prolonged illness/hospital stay  - check fasting lipids as outpt      Discussed with patient bedside RN Dr CHARLES Enriquez   Pager: [phone]  On evenings and weekends, please call [phone] or page endocrine fellow on call.   Please note that this patient may be followed by different provider tomorrow. If no answer, contact endocrine fellow on call [phone] M-F 9a-5pm -pt should be on a statin as he is over 42 with diabetes. Can defer for now given chronic prolonged illness/hospital stay  - check fasting lipids as outpt    Endocrine signing off at this time. d/w primary team  Discussed with patient bedside RN Dr CHARLES Enriquez   Pager: [phone]  On evenings and weekends, please call [phone] or page endocrine fellow on call.   Please note that this patient may be followed by different provider tomorrow. If no answer, contact endocrine fellow on call [phone] M-F 9a-5pm

## 2021-05-28 NOTE — CHART NOTE - NSCHARTNOTEFT_GEN_A_CORE
POC glucose, insulin requirements, lab values reviewed.   Per team pt for discharge today, would recommend to simplify insulin regimen with continuous tube feeding to discharge to rehab on :    0000 NPH 12 units sq  0600 NPH 12 units sq  1200 NPH 12 units sq  1800 NPH 3 units sq (to prevent nocturnal hypoglycemia)  Admelog Moderate Corrective scale q6h  If pt remains admitted please change orders as above   due to increased activity at rehab may need further adjustments   Outpatient Endocrinology/Podiatry/Optho followup after rehab   Needs outpt endocrine established. Can call Patient Access Services to schedule an appointment. [phone]. or can follow up w/previous Endocrinologist.    D/w Dr Linda Calvert, NP   In House Pager: [phone]   office:  [phone] (M-F 9a-5pm)               [phone] (nights/weekends)

## 2021-05-28 NOTE — PROVIDER CONTACT NOTE (OTHER) - NAME OF MD/NP/PA/DO NOTIFIED:
Dr. Bashir Clemens
MD Claudia
Dr. Peterson
Dr. Rodriguez
MD Royce Marek
dr. keith mendez
pt no void since olson removed per bladder scanner 379 ml
Unit leadership and bed board.
tem 3 MD Alvarez
MD Destin Hou
MD Robertson
team 3 Royce
team 3 Tiffany Kang
MD Spears
Team 3 Gunnar
team 3 MD Suh

## 2021-05-28 NOTE — PROVIDER CONTACT NOTE (OTHER) - ACTION/TREATMENT ORDERED:
MD will discontinue NPH and order IVF dextrose 5% with lactated ringers, also ordered dextrose IVP.
bonnie cath pt.
team 3 MD Alvarez made aware per MD avery rosenberg pt
pt on patient specific nomogram, pending MD orders for heparin gtt
team  3 MD Alvarez made aware d/ c juan a rosenberg
team 3 Tiffany Kang made aware tylenol and blood cultures x3 sets , recheck temp at 7:30 am
MD Aware, tube to be replaced. Will continue to monitor throughout shift.
MD aware. As per MD continue to monitor patient. no new orders. will continue to monitor.
Will put order in
per MD keep rate at 14/hr, next PTT at 22:24, continue to monitor
Observation continues.
As per nomogram, increase rate to 17ml/hr from 14ml. Next aptt at 0520. Continue to monitor pt.
MD Aware, no further actions at this time. Report given to night Nurse to contact IV team for placement.
MD aware, Per MD, will enter order for half dose. RN will administer per new orders
bladder scan again 4 hours from now. will continue to monitor.

## 2021-05-28 NOTE — PROVIDER CONTACT NOTE (OTHER) - SITUATION
Pt removed SRAVAN feed tube
patient blood glucose 89, due for 13 units Humulin
patient is tachycardic, 
pt PTT 36.1
patient FS 61, repeated 63. No protocol in place for hypoglycemia
pt PTT resulted 60.1
Patient diagnosed with Covid >22 days ago. Asymptomatic. No further isolation required.
Patient's heart rate increased during my shift 104-108.
Unable to draw labs, pt refusing, jumping, pulling away. , Pt inadvertently removed IV's, PM IV ABX not given.
went in to straigh cath pt @ pt diaper saturated wet in urine bladder scan done 46ml per scanner, no straigh cath
patient retaining 411 ccs of urine. patient however still able to urinate on his own with saturated incontinence
Pt's fingerstick was 56, repeated at 60
pt PTT resulted 49.0
pt no void since olson removed per bladder scanner 379 m
pt temp 100.4, , 88% on room air pt placed on NC at LPM:2  98%

## 2021-05-28 NOTE — PROGRESS NOTE ADULT - PROBLEM SELECTOR PLAN 1
test BG q6h  Adjust  NPH 12-12-12-3 and  continue  moderate correction scale sq q6h; continue this regimen on discharge to rehab today    hold NPH if tube feeds off. Can reduce dose to 50% if BG less than 100mg/dl; if reduced dosage ordered ensure Humulin N ordered test BG q6h  Adjust  NPH 12-12-12-3 and  continue  moderate correction scale sq q6h; continue this regimen on discharge to rehab today    hold NPH if tube feeds off. Can reduce dose to 50% if BG less than 100mg/dl; if reduced dosage ordered ensure Humulin N ordered    due to increased activity at rehab may need further adjustments   Outpatient Endocrinology/Podiatry/Optho followup after rehab   Needs outpt endocrine established. Can call Patient Access Services to schedule an appointment. [phone]. or can follow up w/previous Endocrinologist.

## 2021-05-28 NOTE — PROGRESS NOTE ADULT - SUBJECTIVE AND OBJECTIVE BOX
DIABETES FOLLOW UP : Seen earlier today    INTERVAL HX: BG at goal past 24 hours, no hypoglycemia, pt seen at bedside asking to go "potty" RN notified     Allergies    No Known Allergies        MEDICATIONS  (STANDING):  acetaminophen   Tablet .. 650 milliGRAM(s) Oral every 6 hours  cholecalciferol 400 Unit(s) Oral daily  doxazosin 2 milliGRAM(s) Oral at bedtime  heparin   Injectable 5000 Unit(s) SubCutaneous every 12 hours  insulin lispro (ADMELOG) corrective regimen sliding scale   SubCutaneous every 6 hours  insulin NPH human recombinant 13 Unit(s) SubCutaneous <User Schedule>  insulin NPH human recombinant 10 Unit(s) SubCutaneous <User Schedule>  insulin NPH human recombinant 6 Unit(s) SubCutaneous <User Schedule>  levETIRAcetam  Solution 500 milliGRAM(s) Oral two times a day  meropenem  IVPB 1000 milliGRAM(s) IV Intermittent every 8 hours  polyethylene glycol 3350 17 Gram(s) Oral daily  senna 2 Tablet(s) Oral at bedtime  sodium chloride 0.9%. 1000 milliLiter(s) (75 mL/Hr) IV Continuous <Continuous>  thiamine 100 milliGRAM(s) Oral daily      PHYSICAL EXAM:  VITALS: T(C): 36.3 (05-28-21 @ 11:11)  T(F): 97.4 (05-28-21 @ 11:11), Max: 98.1 (05-28-21 @ 05:05)  HR: 111 (05-28-21 @ 11:11) (98 - 118)  BP: 96/62 (05-28-21 @ 11:11) (96/62 - 122/81)  RR:  (18 - 20)  SpO2:  (95% - 99%)  Wt(kg): --  GENERAL: male sitting in chair in NAD  Abdomen: Soft, nontender, non distended peg  Extremities: Warm, no edema in all 4 exts  NEURO: Alert    LABS:  POCT Blood Glucose.: 162 mg/dL (05-28-21 @ 12:23)  POCT Blood Glucose.: 165 mg/dL (05-28-21 @ 06:21)  POCT Blood Glucose.: 135 mg/dL (05-28-21 @ 05:56)  POCT Blood Glucose.: 122 mg/dL (05-28-21 @ 00:18)  POCT Blood Glucose.: 108 mg/dL (05-27-21 @ 18:30)  POCT Blood Glucose.: 158 mg/dL (05-27-21 @ 17:08)  POCT Blood Glucose.: 199 mg/dL (05-27-21 @ 14:53)  POCT Blood Glucose.: 192 mg/dL (05-27-21 @ 12:51)  POCT Blood Glucose.: 141 mg/dL (05-27-21 @ 06:01)  POCT Blood Glucose.: 89 mg/dL (05-26-21 @ 23:28)  POCT Blood Glucose.: 102 mg/dL (05-26-21 @ 16:58)  POCT Blood Glucose.: 156 mg/dL (05-26-21 @ 12:47)  POCT Blood Glucose.: 144 mg/dL (05-26-21 @ 06:27)  POCT Blood Glucose.: 140 mg/dL (05-26-21 @ 00:16)  POCT Blood Glucose.: 122 mg/dL (05-25-21 @ 17:06)                            9.9    11.05 )-----------( 577      ( 28 May 2021 06:39 )             33.1       05-28    137  |  101  |  34<H>  ----------------------------<  122<H>  5.2   |  23  |  0.77    EGFR if : 116  EGFR if non : 100    Ca    9.8      05-28  Mg     2.4     05-28  Phos  4.5     05-28    TPro  7.9  /  Alb  2.7<L>  /  TBili  0.4  /  DBili  x   /  AST  33  /  ALT  30  /  AlkPhos  124<H>  05-28 04-26 Chol 114 Direct LDL -- LDL calculated 53 HDL 35<L> Trig 126, 04-13 Chol -- Direct LDL -- LDL calculated -- HDL -- Trig 126      A1C with Estimated Average Glucose Result: 10.3 % (04-02-21 @ 14:28)      Estimated Average Glucose: 249 mg/dL (04-02-21 @ 14:28)

## 2021-05-28 NOTE — PROGRESS NOTE ADULT - ASSESSMENT
60M w/ bilateral digital gangrene  - Pt seen and evaluated  - bilateral digital gangrenous changes noted to distal tips of digits, notably on L foot 2nd digit full thickness, well adhered ; R foot 2nd digit sloughed skin with no probe to bone nor signs of infection, improved in apperance  - Foot currently without signs of infection, with demarcating gangrenous changes  - New left foot 5th toe ulceration to subQ with loosely adhered 5th toenail due to ischemia, toenails removed using sterile forceps and site cleaned and dressing applied  - recommend daily dressing changes with iodosorb and DSD at the site  - No acute podiatric surgical intervention at this time  - Plan to continue local wound care with betadine & with adequate blood flow & allow for further demarcation prior to any surgical intervention  - will monitor

## 2021-05-28 NOTE — PROGRESS NOTE ADULT - SUBJECTIVE AND OBJECTIVE BOX
PROGRESS NOTE:   Authored by Dr. Emily Mckeon, ANH pager [phone]    Patient is a 60y old  Male who presents with a chief complaint of COVID19 w/ severe metabolic acidosis s/p intubation (27 May 2021 14:31)      SUBJECTIVE / OVERNIGHT EVENTS: Patient examined at bedside today. He looked well and was working with PT. Patient denied any acute complaints     MEDICATIONS  (STANDING):  acetaminophen   Tablet .. 650 milliGRAM(s) Oral every 6 hours  cholecalciferol 400 Unit(s) Oral daily  doxazosin 2 milliGRAM(s) Oral at bedtime  heparin   Injectable 5000 Unit(s) SubCutaneous every 12 hours  insulin lispro (ADMELOG) corrective regimen sliding scale   SubCutaneous every 6 hours  insulin NPH human recombinant 13 Unit(s) SubCutaneous <User Schedule>  insulin NPH human recombinant 10 Unit(s) SubCutaneous <User Schedule>  insulin NPH human recombinant 6 Unit(s) SubCutaneous <User Schedule>  levETIRAcetam  Solution 500 milliGRAM(s) Oral two times a day  meropenem  IVPB 1000 milliGRAM(s) IV Intermittent every 8 hours  polyethylene glycol 3350 17 Gram(s) Oral daily  senna 2 Tablet(s) Oral at bedtime  sodium chloride 0.9%. 1000 milliLiter(s) (75 mL/Hr) IV Continuous <Continuous>  thiamine 100 milliGRAM(s) Oral daily    MEDICATIONS  (PRN):  albuterol/ipratropium for Nebulization 3 milliLiter(s) Nebulizer every 6 hours PRN Shortness of Breath and/or Wheezing      CAPILLARY BLOOD GLUCOSE      POCT Blood Glucose.: 165 mg/dL (28 May 2021 06:21)  POCT Blood Glucose.: 135 mg/dL (28 May 2021 05:56)  POCT Blood Glucose.: 122 mg/dL (28 May 2021 00:18)  POCT Blood Glucose.: 108 mg/dL (27 May 2021 18:30)  POCT Blood Glucose.: 158 mg/dL (27 May 2021 17:08)  POCT Blood Glucose.: 199 mg/dL (27 May 2021 14:53)  POCT Blood Glucose.: 192 mg/dL (27 May 2021 12:51)    I&O's Summary    27 May 2021 07:01  -  28 May 2021 07:00  --------------------------------------------------------  IN: 840 mL / OUT: 0 mL / NET: 840 mL    28 May 2021 07:01  -  28 May 2021 11:23  --------------------------------------------------------  IN: 0 mL / OUT: 200 mL / NET: -200 mL        PHYSICAL EXAM:  Vital Signs Last 24 Hrs  T(C): 36.3 (28 May 2021 11:11), Max: 36.7 (27 May 2021 16:07)  T(F): 97.4 (28 May 2021 11:11), Max: 98.1 (28 May 2021 05:05)  HR: 111 (28 May 2021 11:11) (98 - 118)  BP: 96/62 (28 May 2021 11:11) (96/62 - 122/81)  BP(mean): --  RR: 18 (28 May 2021 11:11) (18 - 20)  SpO2: 95% (28 May 2021 11:11) (95% - 99%)    GENERAL: No acute distress, well-developed  HEAD:  Atraumatic, Normocephalic  EYES: EOMI, PERRLA, conjunctiva and sclera clear  NECK: Supple, no lymphadenopathy, no JVD  CHEST/LUNG: decreased breath sounds b/l   HEART: Regular rate and rhythm; No murmurs, rubs, or gallops  ABDOMEN: Soft, non-tender, non-distended; normal bowel sounds, no organomegaly  EXTREMITIES:  2+ peripheral pulses b/l, No clubbing, cyanosis, or edema  NEUROLOGY: A&O x 3, generalized weakness   SKIN: gangrenous toes     LABS:                        9.9    11.05 )-----------( 577      ( 28 May 2021 06:39 )             33.1     05-28    137  |  101  |  34<H>  ----------------------------<  122<H>  5.2   |  23  |  0.77    Ca    9.8      28 May 2021 06:39  Phos  4.5     05-28  Mg     2.4     05-28    TPro  7.9  /  Alb  2.7<L>  /  TBili  0.4  /  DBili  x   /  AST  33  /  ALT  30  /  AlkPhos  124<H>  05-28                RADIOLOGY & ADDITIONAL TESTS:  Results Reviewed:   Imaging Personally Reviewed:  Electrocardiogram Personally Reviewed:    COORDINATION OF CARE:  Care Discussed with Consultants/Other Providers [Y/N]:  Prior or Outpatient Records Reviewed [Y/N]:

## 2021-05-28 NOTE — PROVIDER CONTACT NOTE (OTHER) - ASSESSMENT
Pt remains lethargic at baseline, responds to painful stimuli. NPH not given.
pt awake, with cough congested neb Tx given, no n/v/d, no distress noted
AO2-3,  VSS,  sleeping comfortably.
no signs of distress noted
patient in no s/s of distress, denies any chest pain or SOB
A&Ox3-4
Pt removed SRAVAN feed tube
pt nonverbal, bedbound, chest tubes x3 to right side, PEG feeds running at 70, heparin gtt running at 13/hr currently. VSS no s/s distress, or pain
Pt nonverbal, bedbound, heparin gtt running at 14ml/hr at full anti-coagulation nomogram
Unable to draw labs, pt refusing, jumping, pulling away.  Pt inadvertently removed IV's, PM IV ABX not given.
no acute distress noted
pt awake, no distress noted
pt nonverbal, bedbound, chest tubes x3 to right side, PEG feeds running at 70, heparin gtt running at 14/hr currently. VSS no s/s distress, or pain
pt vss, no distress, no SOB, no n/v/d

## 2021-05-28 NOTE — PROVIDER CONTACT NOTE (OTHER) - DATE AND TIME:
16-May-2021 08:45
16-May-2021 18:09
17-Apr-2021 17:20
22-Apr-2021 13:45
28-May-2021 17:22
30-Apr-2021 18:45
14-Apr-2021 13:22
15-May-2021 00:20
20-Apr-2021 17:52
14-May-2021 06:15
16-May-2021 23:45
14-May-2021 23:46
01-May-2021 20:10
08-May-2021 19:01
17-Apr-2021 23:47
27-May-2021 00:01

## 2021-05-28 NOTE — PROGRESS NOTE ADULT - ASSESSMENT
61 yo M with PMH of DM who was initially admitted to [facility] ICU 4/1 for hypoxic respiratory failure 2/2 COVID c/b PE with R heart strain, cardiogenic and septic shock, ischemic and hemorrhagic CVA and ESBL klebsiella ventilator associated PNA, transferred to [facility] for neurosurgery eval on 4/12  No fever, has leukocytosis  UCX with ESBL K pne  Bilateral LE dry gangrene  CT now with Empyema, concern for bilateral PNA, abscess  S/p OR Thoracotomy, abscess drainage, bronch (5/12)--culture with K pne  CT with areas of small cavitary lesions and hematomas  Patient appears generally stable at bedside  1) Leukocytosis  - Improving, stable  - Trend WBC  2) COVID  - COVID+, unclear significance (was prior negative, and patient Ab+)  - Monitor for any signs active COVID/reinfection  - Any considerations isolation per infection control  3) Suspected Empyema/Abscess  - Culture with K pne, Lactobacillus, Prevotella--should be adequately treated with jennifer  - Meropenem 1g q 8--continue while inpatient  - Note CT findings, follow surgery if any further procedure needed for persistent cavities/hematoma  - When DC planning, would send out on Bactrim DS 2 tabs TID + Augmentin 875mg po q 12, for tentative 2 more weeks (then will further determine if role for further course based on imaging)  4) Gangrene  - Appears noninfectious/dry gangrene  - Monitor wounds    If DC planning needs close follow up in ID clinic for repeat imaging    Sammy Chambers MD  Pager [phone]  After 5pm and on weekends call [phone]

## 2021-05-28 NOTE — PROGRESS NOTE ADULT - ASSESSMENT
61 y/o Gujarati speaking male with new onset diabetes (HbA1c of 10.3) admitted with COVID with prolonged hospital course requiring intubation and PEG insertion; s/p Right VATS converted to R thoracotomy for Empyema/bilateral PNA, abscess with  PNA . On 24 hour continuous TF at goal. BG values at goal, suspect pt previously having nocturnal BG <100 due to NPH although he is on 24 hours tube feeding,  MBS completed continues on TF only. BG goal (100-180mg/dl).

## 2021-05-28 NOTE — PROGRESS NOTE ADULT - ASSESSMENT
60 yr male with PMH of DM who was initially admitted to [facility] ICU for hypoxic respiratory failure 2/2 COVID c/b PE with R heart strain, cardiogenic and septic shock, ischemic and hemorrhagic CVA and ESBL klebsiella ventilator associate PNA, transferred to [facility] for neurosurgery eval and further management. s/p PEG, now monitoring for refeeding syndrome, complicated with right empyema s/p VATs, c.w meropenem planning for AR

## 2021-05-29 LAB
ANION GAP SERPL CALC-SCNC: 14 MMOL/L — SIGNIFICANT CHANGE UP (ref 5–17)
BUN SERPL-MCNC: 36 MG/DL — HIGH (ref 7–23)
CALCIUM SERPL-MCNC: 9.9 MG/DL — SIGNIFICANT CHANGE UP (ref 8.4–10.5)
CHLORIDE SERPL-SCNC: 100 MMOL/L — SIGNIFICANT CHANGE UP (ref 96–108)
CO2 SERPL-SCNC: 26 MMOL/L — SIGNIFICANT CHANGE UP (ref 22–31)
CREAT SERPL-MCNC: 0.75 MG/DL — SIGNIFICANT CHANGE UP (ref 0.5–1.3)
GLUCOSE BLDC GLUCOMTR-MCNC: 142 MG/DL — HIGH (ref 70–99)
GLUCOSE BLDC GLUCOMTR-MCNC: 150 MG/DL — HIGH (ref 70–99)
GLUCOSE BLDC GLUCOMTR-MCNC: 170 MG/DL — HIGH (ref 70–99)
GLUCOSE BLDC GLUCOMTR-MCNC: 93 MG/DL — SIGNIFICANT CHANGE UP (ref 70–99)
GLUCOSE SERPL-MCNC: 143 MG/DL — HIGH (ref 70–99)
HCT VFR BLD CALC: 34.5 % — LOW (ref 39–50)
HGB BLD-MCNC: 10.5 G/DL — LOW (ref 13–17)
MAGNESIUM SERPL-MCNC: 2.5 MG/DL — SIGNIFICANT CHANGE UP (ref 1.6–2.6)
MCHC RBC-ENTMCNC: 25.5 PG — LOW (ref 27–34)
MCHC RBC-ENTMCNC: 30.4 GM/DL — LOW (ref 32–36)
MCV RBC AUTO: 83.7 FL — SIGNIFICANT CHANGE UP (ref 80–100)
NRBC # BLD: 0 /100 WBCS — SIGNIFICANT CHANGE UP (ref 0–0)
PHOSPHATE SERPL-MCNC: 4.7 MG/DL — HIGH (ref 2.5–4.5)
PLATELET # BLD AUTO: 556 K/UL — HIGH (ref 150–400)
POTASSIUM SERPL-MCNC: 4.8 MMOL/L — SIGNIFICANT CHANGE UP (ref 3.5–5.3)
POTASSIUM SERPL-SCNC: 4.8 MMOL/L — SIGNIFICANT CHANGE UP (ref 3.5–5.3)
RBC # BLD: 4.12 M/UL — LOW (ref 4.2–5.8)
RBC # FLD: 20.8 % — HIGH (ref 10.3–14.5)
SODIUM SERPL-SCNC: 140 MMOL/L — SIGNIFICANT CHANGE UP (ref 135–145)
WBC # BLD: 8.84 K/UL — SIGNIFICANT CHANGE UP (ref 3.8–10.5)
WBC # FLD AUTO: 8.84 K/UL — SIGNIFICANT CHANGE UP (ref 3.8–10.5)

## 2021-05-29 PROCEDURE — 71250 CT THORAX DX C-: CPT | Mod: 26

## 2021-05-29 PROCEDURE — 99233 SBSQ HOSP IP/OBS HIGH 50: CPT | Mod: GC

## 2021-05-29 RX ORDER — ACETAMINOPHEN 500 MG
1000 TABLET ORAL ONCE
Refills: 0 | Status: COMPLETED | OUTPATIENT
Start: 2021-05-29 | End: 2021-05-29

## 2021-05-29 RX ADMIN — Medication 650 MILLIGRAM(S): at 06:46

## 2021-05-29 RX ADMIN — HUMAN INSULIN 15 UNIT(S): 100 INJECTION, SUSPENSION SUBCUTANEOUS at 06:15

## 2021-05-29 RX ADMIN — Medication 650 MILLIGRAM(S): at 17:35

## 2021-05-29 RX ADMIN — LEVETIRACETAM 500 MILLIGRAM(S): 250 TABLET, FILM COATED ORAL at 17:34

## 2021-05-29 RX ADMIN — Medication 650 MILLIGRAM(S): at 00:08

## 2021-05-29 RX ADMIN — MEROPENEM 100 MILLIGRAM(S): 1 INJECTION INTRAVENOUS at 13:04

## 2021-05-29 RX ADMIN — MEROPENEM 100 MILLIGRAM(S): 1 INJECTION INTRAVENOUS at 06:14

## 2021-05-29 RX ADMIN — Medication 2 MILLIGRAM(S): at 23:17

## 2021-05-29 RX ADMIN — SENNA PLUS 2 TABLET(S): 8.6 TABLET ORAL at 23:17

## 2021-05-29 RX ADMIN — HEPARIN SODIUM 5000 UNIT(S): 5000 INJECTION INTRAVENOUS; SUBCUTANEOUS at 17:35

## 2021-05-29 RX ADMIN — Medication 2: at 12:47

## 2021-05-29 RX ADMIN — Medication 650 MILLIGRAM(S): at 00:35

## 2021-05-29 RX ADMIN — Medication 400 MILLIGRAM(S): at 11:16

## 2021-05-29 RX ADMIN — Medication 650 MILLIGRAM(S): at 23:17

## 2021-05-29 RX ADMIN — Medication 400 UNIT(S): at 11:17

## 2021-05-29 RX ADMIN — HUMAN INSULIN 15 UNIT(S): 100 INJECTION, SUSPENSION SUBCUTANEOUS at 17:36

## 2021-05-29 RX ADMIN — Medication 650 MILLIGRAM(S): at 06:15

## 2021-05-29 RX ADMIN — Medication 100 MILLIGRAM(S): at 11:17

## 2021-05-29 RX ADMIN — LEVETIRACETAM 500 MILLIGRAM(S): 250 TABLET, FILM COATED ORAL at 06:15

## 2021-05-29 RX ADMIN — MEROPENEM 100 MILLIGRAM(S): 1 INJECTION INTRAVENOUS at 22:03

## 2021-05-29 RX ADMIN — HEPARIN SODIUM 5000 UNIT(S): 5000 INJECTION INTRAVENOUS; SUBCUTANEOUS at 06:16

## 2021-05-29 NOTE — PROGRESS NOTE ADULT - PROBLEM SELECTOR PLAN 7
Dysphagia noted in setting of patient's acute alteration in mental status and hx of intubation  - Nasogastric tube placed for administration of tube feeds originally, but patient self removed 4/17; multiple attempts to replace unsuccessful   Evaluated by speech & swallow: unable to cooperate with FEES study, cineesophagram performed demonstrating aspiration  - PEG tube placed on 4/30, tube feedings started on 4/30, will slowly increase rate by 10 ml q24hrs, currently at 20cc/hr, + free water q8  - planned for MBS with speech and swallow for reval 5/26, did not pass, c/w tube feeds

## 2021-05-29 NOTE — PROGRESS NOTE ADULT - PROBLEM SELECTOR PLAN 5
- Type II diabetes mellitus noted; a1c noted to be greater than 10   - On insulin sliding scale with q6 NPH   - Monitor fingersticks while on tube feeds  - endocrine recs appreciated for uncontrolled DM  - Needs outpt endocrine established. Can call Patient Access Services to schedule an appointment. [phone]. or can follow up w/previous Endocrinologist.   - Adjust  NPH 12-12-12-3 and  continue  moderate correction scale sq q6h; continue this regimen on discharge to rehab  - FS q6

## 2021-05-29 NOTE — PROGRESS NOTE ADULT - SUBJECTIVE AND OBJECTIVE BOX
PROGRESS NOTE:   Authored by Dr. Emily Mckeon, ANH pager [phone]    Patient is a 60y old  Male who presents with a chief complaint of COVID19 w/ severe metabolic acidosis s/p intubation (28 May 2021 14:29)      SUBJECTIVE / OVERNIGHT EVENTS: Patient examined at bedside. He appeared well. Denied any acute complaints.     MEDICATIONS  (STANDING):  acetaminophen   Tablet .. 650 milliGRAM(s) Oral every 6 hours  cholecalciferol 400 Unit(s) Oral daily  doxazosin 2 milliGRAM(s) Oral at bedtime  heparin   Injectable 5000 Unit(s) SubCutaneous every 12 hours  insulin lispro (ADMELOG) corrective regimen sliding scale   SubCutaneous every 6 hours  insulin NPH human recombinant 15 Unit(s) SubCutaneous <User Schedule>  levETIRAcetam  Solution 500 milliGRAM(s) Oral two times a day  meropenem  IVPB 1000 milliGRAM(s) IV Intermittent every 8 hours  polyethylene glycol 3350 17 Gram(s) Oral daily  senna 2 Tablet(s) Oral at bedtime  sodium chloride 0.9%. 1000 milliLiter(s) (75 mL/Hr) IV Continuous <Continuous>  thiamine 100 milliGRAM(s) Oral daily    MEDICATIONS  (PRN):  albuterol/ipratropium for Nebulization 3 milliLiter(s) Nebulizer every 6 hours PRN Shortness of Breath and/or Wheezing      CAPILLARY BLOOD GLUCOSE      POCT Blood Glucose.: 150 mg/dL (29 May 2021 05:58)  POCT Blood Glucose.: 142 mg/dL (29 May 2021 00:21)  POCT Blood Glucose.: 124 mg/dL (28 May 2021 18:00)  POCT Blood Glucose.: 63 mg/dL (28 May 2021 17:02)  POCT Blood Glucose.: 61 mg/dL (28 May 2021 17:01)  POCT Blood Glucose.: 162 mg/dL (28 May 2021 12:23)    I&O's Summary    28 May 2021 07:01  -  29 May 2021 07:00  --------------------------------------------------------  IN: 1860 mL / OUT: 1000 mL / NET: 860 mL        PHYSICAL EXAM:  Vital Signs Last 24 Hrs  T(C): 36.9 (29 May 2021 05:00), Max: 37.1 (28 May 2021 16:15)  T(F): 98.4 (29 May 2021 05:00), Max: 98.7 (28 May 2021 16:15)  HR: 96 (29 May 2021 05:00) (96 - 118)  BP: 109/72 (29 May 2021 05:00) (96/62 - 109/72)  BP(mean): --  RR: 18 (29 May 2021 05:00) (18 - 20)  SpO2: 99% (29 May 2021 05:00) (95% - 99%)    GENERAL: No acute distress, well-developed  HEAD:  Atraumatic, Normocephalic  EYES: EOMI, PERRLA, conjunctiva and sclera clear  NECK: Supple, no lymphadenopathy, no JVD  CHEST/LUNG: CTAB; shallow breaths  HEART: Regular rhythm, tachy; No murmurs, rubs, or gallops  ABDOMEN: Soft, non-tender, non-distended; normal bowel sounds, no organomegaly, PEG in place   EXTREMITIES:  2+ peripheral pulses b/l, No clubbing, cyanosis, or edema  NEUROLOGY: A&O x 3, no focal deficits, generalized weaknes   SKIN: stable gengranous changes on toes     LABS:                        9.9    11.05 )-----------( 577      ( 28 May 2021 06:39 )             33.1     05-29    140  |  100  |  36<H>  ----------------------------<  143<H>  4.8   |  26  |  0.75    Ca    9.9      29 May 2021 07:16  Phos  4.7     05-29  Mg     2.5     05-29    TPro  7.9  /  Alb  2.7<L>  /  TBili  0.4  /  DBili  x   /  AST  33  /  ALT  30  /  AlkPhos  124<H>  05-28                RADIOLOGY & ADDITIONAL TESTS:  Results Reviewed:   Imaging Personally Reviewed:  Electrocardiogram Personally Reviewed:    COORDINATION OF CARE:  Care Discussed with Consultants/Other Providers [Y/N]:  Prior or Outpatient Records Reviewed [Y/N]:

## 2021-05-29 NOTE — PROGRESS NOTE ADULT - ATTENDING COMMENTS
Patient seen and examined, case d/w house staff.  Increased right sided rib pain, over site of R VATS incisions.  Site without any signs of infection, will obtain CT Chest to r/o any expansion of hematoma.

## 2021-05-29 NOTE — PROGRESS NOTE ADULT - PROBLEM SELECTOR PLAN 1
5/12 he underwent right VATS converted to open thoracotomy, decortication, drainage of abscess and flex bronch. Pt briefly on lexi intraop and extubated in OR.   - s/p L. thoracotomy, drainage of abscess, decortication, flex bronch   - s/p 1 angled chest tube , 1 straight chest tube, 1 Blakemore drain (anterior)   - Monitor pulse oximeter  - Out of bed to chair, ambulate as tolerated, and incentive spirometry to prevent atelectasis  - Pain control as needed with tylenol and oxy  - c/w meropenem 5/7- when he gets discharged   - When DC planning, would send out on Bactrim DS 2 tabs TID + Augmentin 875mg po q 12, for tentative 2 more weeks (then will further determine if role for further course based on imaging)  - needs close follow up in ID clinic for repeat imaging, Dr. Chambers    Obturator Hematoma   - bleeding hematoma in right thigh on CT on 5/7  - CT hip showing non active bleed, hematoma on right thigh on 5/8  - doppler to r/o DVT in LE, per IR no role IVC filter  - active again on 5/17, now stable  - was on heparin drip on and off, now off with DVT ppx heparin bid

## 2021-05-29 NOTE — PROGRESS NOTE ADULT - ASSESSMENT
60 yr male with PMH of DM who was initially admitted to [facility] ICU for hypoxic respiratory failure 2/2 COVID c/b PE with R heart strain, cardiogenic and septic shock, ischemic and hemorrhagic CVA and ESBL klebsiella ventilator associate PNA, transferred to [facility] for neurosurgery eval and further management. s/p PEG, now monitoring for refeeding syndrome, complicated with right empyema s/p VATs, c.w meropenem planning for AR

## 2021-05-30 LAB
GLUCOSE BLDC GLUCOMTR-MCNC: 102 MG/DL — HIGH (ref 70–99)
GLUCOSE BLDC GLUCOMTR-MCNC: 137 MG/DL — HIGH (ref 70–99)
GLUCOSE BLDC GLUCOMTR-MCNC: 144 MG/DL — HIGH (ref 70–99)
GLUCOSE BLDC GLUCOMTR-MCNC: 166 MG/DL — HIGH (ref 70–99)
GLUCOSE BLDC GLUCOMTR-MCNC: 196 MG/DL — HIGH (ref 70–99)

## 2021-05-30 PROCEDURE — 99233 SBSQ HOSP IP/OBS HIGH 50: CPT | Mod: GC

## 2021-05-30 RX ORDER — CADEXOMER IODINE 0.9 %
1 PADS, MEDICATED (EA) TOPICAL DAILY
Refills: 0 | Status: DISCONTINUED | OUTPATIENT
Start: 2021-05-30 | End: 2021-06-04

## 2021-05-30 RX ORDER — INSULIN LISPRO 100/ML
VIAL (ML) SUBCUTANEOUS EVERY 6 HOURS
Refills: 0 | Status: DISCONTINUED | OUTPATIENT
Start: 2021-05-30 | End: 2021-06-02

## 2021-05-30 RX ADMIN — POLYETHYLENE GLYCOL 3350 17 GRAM(S): 17 POWDER, FOR SOLUTION ORAL at 12:37

## 2021-05-30 RX ADMIN — Medication 650 MILLIGRAM(S): at 12:39

## 2021-05-30 RX ADMIN — Medication 650 MILLIGRAM(S): at 23:39

## 2021-05-30 RX ADMIN — MEROPENEM 100 MILLIGRAM(S): 1 INJECTION INTRAVENOUS at 21:10

## 2021-05-30 RX ADMIN — Medication 2 MILLIGRAM(S): at 23:02

## 2021-05-30 RX ADMIN — Medication 650 MILLIGRAM(S): at 18:00

## 2021-05-30 RX ADMIN — Medication 650 MILLIGRAM(S): at 12:53

## 2021-05-30 RX ADMIN — LEVETIRACETAM 500 MILLIGRAM(S): 250 TABLET, FILM COATED ORAL at 17:50

## 2021-05-30 RX ADMIN — Medication 100 MILLIGRAM(S): at 12:37

## 2021-05-30 RX ADMIN — Medication 650 MILLIGRAM(S): at 23:03

## 2021-05-30 RX ADMIN — MEROPENEM 100 MILLIGRAM(S): 1 INJECTION INTRAVENOUS at 12:38

## 2021-05-30 RX ADMIN — Medication 650 MILLIGRAM(S): at 17:48

## 2021-05-30 RX ADMIN — HUMAN INSULIN 15 UNIT(S): 100 INJECTION, SUSPENSION SUBCUTANEOUS at 06:32

## 2021-05-30 RX ADMIN — Medication 400 UNIT(S): at 12:37

## 2021-05-30 RX ADMIN — Medication 650 MILLIGRAM(S): at 05:39

## 2021-05-30 RX ADMIN — HEPARIN SODIUM 5000 UNIT(S): 5000 INJECTION INTRAVENOUS; SUBCUTANEOUS at 05:40

## 2021-05-30 RX ADMIN — SENNA PLUS 2 TABLET(S): 8.6 TABLET ORAL at 23:03

## 2021-05-30 RX ADMIN — HEPARIN SODIUM 5000 UNIT(S): 5000 INJECTION INTRAVENOUS; SUBCUTANEOUS at 17:49

## 2021-05-30 RX ADMIN — Medication 1 APPLICATION(S): at 12:38

## 2021-05-30 RX ADMIN — HUMAN INSULIN 15 UNIT(S): 100 INJECTION, SUSPENSION SUBCUTANEOUS at 17:49

## 2021-05-30 RX ADMIN — LEVETIRACETAM 500 MILLIGRAM(S): 250 TABLET, FILM COATED ORAL at 05:40

## 2021-05-30 RX ADMIN — MEROPENEM 100 MILLIGRAM(S): 1 INJECTION INTRAVENOUS at 05:40

## 2021-05-30 RX ADMIN — Medication 1: at 17:49

## 2021-05-30 RX ADMIN — Medication 1: at 12:39

## 2021-05-30 NOTE — PROGRESS NOTE ADULT - PROBLEM SELECTOR PLAN 2
- Etiology likely multi-factorial in setting of acute ischemic infarctions of the brain in conjunction vs acute kidney injury with uremia (now resolving) vs resolving hypoxic respiratory failure, covid-19 infection, superimposed bacterial pneumonia, urinary retention, concern for nutritional deficiencies)  - S/p management of: acute pneumonia with antibiotics; pulmonary embolus with full-dose anticoagulation  - Reorientation with family at bedside (pt speaks [place], does best with family present)  - MRI Brain, and MRA Head demonstrated multiple evolving subacute infarcts with associated hemorrhages, likely expected evolution of ischemia  - Neuro recs appreciated  - As per neuro, EEG not suggestive of seizure-like activity  - C/w Keppra 500 mg BID-> neuro f/u outpatient   - repeat CT head 5/5 normal

## 2021-05-30 NOTE — PROGRESS NOTE ADULT - SUBJECTIVE AND OBJECTIVE BOX
Angeli Peterson MD  Internal Medicine PGY-2  Pager [phone]  Pager [phone]      Patient is a 60y old  Male who presents with a chief complaint of COVID19 w/ severe metabolic acidosis s/p intubation (29 May 2021 08:46)        SUBJECTIVE / OVERNIGHT EVENTS: Patient had no acute events overnight. Patient seen and examined at bedside this morning.     ROS: [ - ] Fever [ - ] Chills [ - ] Nausea/Vomiting [ - ] Chest Pain [ - ] Shortness of breath     MEDICATIONS  (STANDING):  acetaminophen   Tablet .. 650 milliGRAM(s) Oral every 6 hours  cadexomer iodine 0.9% Gel 1 Application(s) Topical daily  cholecalciferol 400 Unit(s) Oral daily  doxazosin 2 milliGRAM(s) Oral at bedtime  heparin   Injectable 5000 Unit(s) SubCutaneous every 12 hours  insulin lispro (ADMELOG) corrective regimen sliding scale   SubCutaneous every 6 hours  insulin NPH human recombinant 15 Unit(s) SubCutaneous <User Schedule>  levETIRAcetam  Solution 500 milliGRAM(s) Oral two times a day  meropenem  IVPB 1000 milliGRAM(s) IV Intermittent every 8 hours  polyethylene glycol 3350 17 Gram(s) Oral daily  senna 2 Tablet(s) Oral at bedtime  sodium chloride 0.9%. 1000 milliLiter(s) (75 mL/Hr) IV Continuous <Continuous>  thiamine 100 milliGRAM(s) Oral daily    MEDICATIONS  (PRN):  albuterol/ipratropium for Nebulization 3 milliLiter(s) Nebulizer every 6 hours PRN Shortness of Breath and/or Wheezing      Vital Signs Last 24 Hrs  T(C): 36.4 (30 May 2021 04:52), Max: 37.3 (30 May 2021 00:19)  T(F): 97.6 (30 May 2021 04:52), Max: 99.1 (30 May 2021 00:19)  HR: 99 (30 May 2021 04:52) (67 - 99)  BP: 113/75 (30 May 2021 04:52) (109/75 - 150/55)  BP(mean): --  RR: 18 (30 May 2021 04:52) (18 - 18)  SpO2: 98% (30 May 2021 04:52) (96% - 100%)  CAPILLARY BLOOD GLUCOSE      POCT Blood Glucose.: 144 mg/dL (30 May 2021 06:24)  POCT Blood Glucose.: 102 mg/dL (30 May 2021 00:55)  POCT Blood Glucose.: 93 mg/dL (29 May 2021 17:23)  POCT Blood Glucose.: 170 mg/dL (29 May 2021 12:12)    I&O's Summary    29 May 2021 07:01  -  30 May 2021 07:00  --------------------------------------------------------  IN: 50 mL / OUT: 0 mL / NET: 50 mL        PHYSICAL EXAM  GENERAL: No acute distress, frail  HEAD:  Atraumatic, Normocephalic  NECK: Supple, no lymphadenopathy, no JVD  CHEST/LUNG: decreased breath sounds b/l. lateral to right posterior chest hematoma tender, size unchanged  HEART: Regular rate and rhythm; No murmurs, rubs, or gallops  ABDOMEN: Soft, non-tender, non-distended; normal bowel sounds, no organomegaly  EXTREMITIES:  2+ peripheral pulses b/l, No clubbing, cyanosis, or edema  NEUROLOGY: A&O x 3, generalized weakness   SKIN: gangrenous toes     LABS:                        10.5   8.84  )-----------( 556      ( 29 May 2021 10:20 )             34.5     05-29    140  |  100  |  36<H>  ----------------------------<  143<H>  4.8   |  26  |  0.75    Ca    9.9      29 May 2021 07:16  Phos  4.7     05-29  Mg     2.5     05-29                  RADIOLOGY & ADDITIONAL TESTS:    Imaging Personally Reviewed:  Consultant(s) Notes Reviewed:     Angeli Peterson MD  Internal Medicine PGY-2  Pager [phone]  Pager HEN [phone]      Patient is a 60y old  Male who presents with a chief complaint of COVID19 w/ severe metabolic acidosis s/p intubation (29 May 2021 08:46)        SUBJECTIVE / OVERNIGHT EVENTS: Patient had no acute events overnight. Patient seen and examined at bedside this morning[id number]. Patient denies pain but tender at right chest where hematoma located. He endorses dry skin LE. Overall patient appears to be improving, more vocal, awake, and regaining strength.     ROS: [ - ] Fever [ - ] Chills [ - ] Nausea/Vomiting [ - ] Chest Pain [ - ] Shortness of breath     MEDICATIONS  (STANDING):  acetaminophen   Tablet .. 650 milliGRAM(s) Oral every 6 hours  cadexomer iodine 0.9% Gel 1 Application(s) Topical daily  cholecalciferol 400 Unit(s) Oral daily  doxazosin 2 milliGRAM(s) Oral at bedtime  heparin   Injectable 5000 Unit(s) SubCutaneous every 12 hours  insulin lispro (ADMELOG) corrective regimen sliding scale   SubCutaneous every 6 hours  insulin NPH human recombinant 15 Unit(s) SubCutaneous <User Schedule>  levETIRAcetam  Solution 500 milliGRAM(s) Oral two times a day  meropenem  IVPB 1000 milliGRAM(s) IV Intermittent every 8 hours  polyethylene glycol 3350 17 Gram(s) Oral daily  senna 2 Tablet(s) Oral at bedtime  sodium chloride 0.9%. 1000 milliLiter(s) (75 mL/Hr) IV Continuous <Continuous>  thiamine 100 milliGRAM(s) Oral daily    MEDICATIONS  (PRN):  albuterol/ipratropium for Nebulization 3 milliLiter(s) Nebulizer every 6 hours PRN Shortness of Breath and/or Wheezing      Vital Signs Last 24 Hrs  T(C): 36.4 (30 May 2021 04:52), Max: 37.3 (30 May 2021 00:19)  T(F): 97.6 (30 May 2021 04:52), Max: 99.1 (30 May 2021 00:19)  HR: 99 (30 May 2021 04:52) (67 - 99)  BP: 113/75 (30 May 2021 04:52) (109/75 - 150/55)  BP(mean): --  RR: 18 (30 May 2021 04:52) (18 - 18)  SpO2: 98% (30 May 2021 04:52) (96% - 100%)  CAPILLARY BLOOD GLUCOSE      POCT Blood Glucose.: 144 mg/dL (30 May 2021 06:24)  POCT Blood Glucose.: 102 mg/dL (30 May 2021 00:55)  POCT Blood Glucose.: 93 mg/dL (29 May 2021 17:23)  POCT Blood Glucose.: 170 mg/dL (29 May 2021 12:12)    I&O's Summary    29 May 2021 07:01  -  30 May 2021 07:00  --------------------------------------------------------  IN: 50 mL / OUT: 0 mL / NET: 50 mL        PHYSICAL EXAM  GENERAL: No acute distress, frail  HEAD:  Atraumatic, Normocephalic  NECK: Supple, no lymphadenopathy, no JVD  CHEST/LUNG: decreased breath sounds b/l. lateral to right posterior chest hematoma tender, size unchanged  HEART: Regular rate and rhythm; No murmurs, rubs, or gallops  ABDOMEN: Soft, non-tender, non-distended; normal bowel sounds, no organomegaly  EXTREMITIES:  2+ peripheral pulses b/l, Dry gangreous toes left, unchanged, dry skin   NEUROLOGY: A&O x 3, generalized weakness   SKIN: gangrenous toes     LABS:                        10.5   8.84  )-----------( 556      ( 29 May 2021 10:20 )             34.5     05-29    140  |  100  |  36<H>  ----------------------------<  143<H>  4.8   |  26  |  0.75    Ca    9.9      29 May 2021 07:16  Phos  4.7     05-29  Mg     2.5     05-29                  RADIOLOGY & ADDITIONAL TESTS:    Imaging Personally Reviewed:  Consultant(s) Notes Reviewed:

## 2021-05-30 NOTE — PROGRESS NOTE ADULT - PROBLEM SELECTOR PLAN 10
- DVT: none, scds.   - Diet: NPO with tube feedings  - Full code  - Dispo- acute rehab potential Tuesday - DVT: HSQ  - Diet: NPO with tube feedings  - Full code  - Dispo- acute rehab potential Tuesday

## 2021-05-30 NOTE — PROGRESS NOTE ADULT - PROBLEM SELECTOR PLAN 4
-Pulmonary embolus noted on ct scan performed at time of admission  -Etiology of venous thromboembolism uncertain at this time; no known history of thrombophilia; provoked in setting of acute covid illness   - s./p heparin drip held due to recurrent bleed in right obturator and right chest wall hematoma   - 5000 heparin bid for dvt

## 2021-05-30 NOTE — PROGRESS NOTE ADULT - PROBLEM SELECTOR PLAN 1
5/12 he underwent right VATS converted to open thoracotomy, decortication, drainage of abscess and flex bronch. Pt briefly on lexi intraop and extubated in OR.   - s/p L. thoracotomy, drainage of abscess, decortication, flex bronch   - s/p 1 angled chest tube , 1 straight chest tube, 1 Blakemore drain (anterior) --> all removed  - Monitor pulse oximeter  - Out of bed to chair, ambulate as tolerated, and incentive spirometry to prevent atelectasis  - Pain control as needed with tylenol and oxy  - c/w meropenem 5/7- when he gets discharged   - When DC planning, would send out on Bactrim DS 2 tabs TID + Augmentin 875mg po q 12, for tentative 2 more weeks (then will further determine if role for further course based on imaging)  - needs close follow up in ID clinic for repeat imaging, Dr. Chambers    Obturator Hematoma (stable)  - bleeding hematoma in right thigh on CT on 5/7  - CT hip showing non active bleed, hematoma on right thigh on 5/8  - doppler to r/o DVT in LE, per IR no role IVC filter  - active again on 5/17, now stable  - was on heparin drip on and off, now off with DVT ppx heparin bid

## 2021-05-30 NOTE — PROGRESS NOTE ADULT - PROBLEM SELECTOR PLAN 7
Dysphagia noted in setting of patient's acute alteration in mental status and hx of intubation  - Nasogastric tube placed for administration of tube feeds originally, but patient self removed 4/17; multiple attempts to replace unsuccessful   Evaluated by speech & swallow: unable to cooperate with FEES study, cineesophagram performed demonstrating aspiration  - PEG tube placed on 4/30, tube feedings started on 4/30, will slowly increase rate by 10 ml q24hrs, currently at 20cc/hr, + free water q8  - MBS with speech and swallow for reval 5/26, did not pass, c/w tube feeds

## 2021-05-30 NOTE — PROGRESS NOTE ADULT - ASSESSMENT
60 yr male with PMH of DM who was initially admitted to [facility] ICU for hypoxic respiratory failure 2/2 COVID c/b PE with R heart strain, cardiogenic and septic shock, ischemic and hemorrhagic CVA and ESBL klebsiella ventilator associate PNA, transferred to [facility] for neurosurgery eval and further management. s/p PEG, now monitoring for refeeding syndrome, complicated with right empyema s/p VATs and obturator bleed (resolved) pending acute rehab

## 2021-05-30 NOTE — PROGRESS NOTE ADULT - ATTENDING COMMENTS
Patient seen and examined, case d/w house staff.    Right sided pain has resolved, CT with decrease in size of hematoma.  d/c planning to acute rehab.

## 2021-05-30 NOTE — PROGRESS NOTE ADULT - PROBLEM SELECTOR PLAN 5
- Type II diabetes mellitus noted; a1c noted to be greater than 10   - On insulin sliding scale with q6 NPH   - Monitor fingersticks while on tube feeds  - endocrine recs appreciated for uncontrolled DM  - Needs outpt endocrine established. Can call Patient Access Services to schedule an appointment. [phone]. or can follow up w/previous Endocrinologist.   - Adjust  NPH 12-12-12-3 and  continue  moderate correction scale sq q6h; continue this regimen on discharge to rehab  - FS q6

## 2021-05-30 NOTE — PROGRESS NOTE ADULT - PROBLEM SELECTOR PLAN 3
-Acute ischemic strokes, with concern for hemorrhagic conversion, noted on CT scan on 4/8  -Etiology of acute ischemic stroke uncertain at this time; TTE with bubble performed at bedside in the MICU without any evidence of right-to-left shunt; no arrhythmia noted on telemetry throughout stay in the MICU  - CTA head and neck without any evidence of carotid artery stenosis or dissection    Retention   - olson placed  - c/w doxazosin     EMPYEMA   OR Vats 5/12 with Dr. Albarado with all chest tubes removed by 5/18  CT Chest 5/16 Interval new small cavitary lesions in the right lower lobe and right chest wall hematoma - no thoracic surgery intervention indicated per Dr. Albarado.

## 2021-05-31 LAB
ANION GAP SERPL CALC-SCNC: 13 MMOL/L — SIGNIFICANT CHANGE UP (ref 5–17)
BUN SERPL-MCNC: 40 MG/DL — HIGH (ref 7–23)
CALCIUM SERPL-MCNC: 10 MG/DL — SIGNIFICANT CHANGE UP (ref 8.4–10.5)
CHLORIDE SERPL-SCNC: 102 MMOL/L — SIGNIFICANT CHANGE UP (ref 96–108)
CO2 SERPL-SCNC: 25 MMOL/L — SIGNIFICANT CHANGE UP (ref 22–31)
CREAT SERPL-MCNC: 0.88 MG/DL — SIGNIFICANT CHANGE UP (ref 0.5–1.3)
GLUCOSE BLDC GLUCOMTR-MCNC: 113 MG/DL — HIGH (ref 70–99)
GLUCOSE BLDC GLUCOMTR-MCNC: 165 MG/DL — HIGH (ref 70–99)
GLUCOSE BLDC GLUCOMTR-MCNC: 169 MG/DL — HIGH (ref 70–99)
GLUCOSE BLDC GLUCOMTR-MCNC: 176 MG/DL — HIGH (ref 70–99)
GLUCOSE SERPL-MCNC: 155 MG/DL — HIGH (ref 70–99)
HCT VFR BLD CALC: 33.9 % — LOW (ref 39–50)
HGB BLD-MCNC: 10.3 G/DL — LOW (ref 13–17)
MAGNESIUM SERPL-MCNC: 2.4 MG/DL — SIGNIFICANT CHANGE UP (ref 1.6–2.6)
MCHC RBC-ENTMCNC: 25.2 PG — LOW (ref 27–34)
MCHC RBC-ENTMCNC: 30.4 GM/DL — LOW (ref 32–36)
MCV RBC AUTO: 83.1 FL — SIGNIFICANT CHANGE UP (ref 80–100)
NRBC # BLD: 0 /100 WBCS — SIGNIFICANT CHANGE UP (ref 0–0)
PHOSPHATE SERPL-MCNC: 5 MG/DL — HIGH (ref 2.5–4.5)
PLATELET # BLD AUTO: 564 K/UL — HIGH (ref 150–400)
POTASSIUM SERPL-MCNC: 4.7 MMOL/L — SIGNIFICANT CHANGE UP (ref 3.5–5.3)
POTASSIUM SERPL-SCNC: 4.7 MMOL/L — SIGNIFICANT CHANGE UP (ref 3.5–5.3)
RBC # BLD: 4.08 M/UL — LOW (ref 4.2–5.8)
RBC # FLD: 20.7 % — HIGH (ref 10.3–14.5)
SARS-COV-2 RNA SPEC QL NAA+PROBE: SIGNIFICANT CHANGE UP
SODIUM SERPL-SCNC: 140 MMOL/L — SIGNIFICANT CHANGE UP (ref 135–145)
WBC # BLD: 8.7 K/UL — SIGNIFICANT CHANGE UP (ref 3.8–10.5)
WBC # FLD AUTO: 8.7 K/UL — SIGNIFICANT CHANGE UP (ref 3.8–10.5)

## 2021-05-31 PROCEDURE — 99233 SBSQ HOSP IP/OBS HIGH 50: CPT | Mod: GC

## 2021-05-31 PROCEDURE — 99232 SBSQ HOSP IP/OBS MODERATE 35: CPT

## 2021-05-31 RX ADMIN — Medication 650 MILLIGRAM(S): at 17:21

## 2021-05-31 RX ADMIN — HUMAN INSULIN 15 UNIT(S): 100 INJECTION, SUSPENSION SUBCUTANEOUS at 06:39

## 2021-05-31 RX ADMIN — Medication 2 MILLIGRAM(S): at 21:25

## 2021-05-31 RX ADMIN — Medication 650 MILLIGRAM(S): at 11:53

## 2021-05-31 RX ADMIN — Medication 1 APPLICATION(S): at 11:51

## 2021-05-31 RX ADMIN — LEVETIRACETAM 500 MILLIGRAM(S): 250 TABLET, FILM COATED ORAL at 05:07

## 2021-05-31 RX ADMIN — Medication 650 MILLIGRAM(S): at 06:26

## 2021-05-31 RX ADMIN — MEROPENEM 100 MILLIGRAM(S): 1 INJECTION INTRAVENOUS at 11:52

## 2021-05-31 RX ADMIN — HUMAN INSULIN 15 UNIT(S): 100 INJECTION, SUSPENSION SUBCUTANEOUS at 17:22

## 2021-05-31 RX ADMIN — MEROPENEM 100 MILLIGRAM(S): 1 INJECTION INTRAVENOUS at 05:08

## 2021-05-31 RX ADMIN — Medication 1: at 06:37

## 2021-05-31 RX ADMIN — SENNA PLUS 2 TABLET(S): 8.6 TABLET ORAL at 21:25

## 2021-05-31 RX ADMIN — HEPARIN SODIUM 5000 UNIT(S): 5000 INJECTION INTRAVENOUS; SUBCUTANEOUS at 05:07

## 2021-05-31 RX ADMIN — HEPARIN SODIUM 5000 UNIT(S): 5000 INJECTION INTRAVENOUS; SUBCUTANEOUS at 17:21

## 2021-05-31 RX ADMIN — Medication 1: at 12:35

## 2021-05-31 RX ADMIN — Medication 650 MILLIGRAM(S): at 05:07

## 2021-05-31 RX ADMIN — Medication 650 MILLIGRAM(S): at 11:51

## 2021-05-31 RX ADMIN — MEROPENEM 100 MILLIGRAM(S): 1 INJECTION INTRAVENOUS at 21:25

## 2021-05-31 RX ADMIN — Medication 400 UNIT(S): at 11:50

## 2021-05-31 RX ADMIN — Medication 100 MILLIGRAM(S): at 11:50

## 2021-05-31 RX ADMIN — POLYETHYLENE GLYCOL 3350 17 GRAM(S): 17 POWDER, FOR SOLUTION ORAL at 11:51

## 2021-05-31 RX ADMIN — Medication 1: at 17:20

## 2021-05-31 RX ADMIN — LEVETIRACETAM 500 MILLIGRAM(S): 250 TABLET, FILM COATED ORAL at 17:22

## 2021-05-31 NOTE — PROGRESS NOTE ADULT - SUBJECTIVE AND OBJECTIVE BOX
**********************************************  Martha Guilloryed, PGY-1  Internal Medicine   [facility] Pager #: [phone]  **********************************************     Patient is a 60y old  Male who presents with a chief complaint of COVID19 w/ severe metabolic acidosis s/p intubation (30 May 2021 07:04)    SUBJECTIVE / OVERNIGHT EVENTS:     OBJECTIVE:  Vital Signs Last 24 Hrs  T(C): 36.6 (31 May 2021 04:23), Max: 36.7 (30 May 2021 12:46)  T(F): 97.9 (31 May 2021 04:23), Max: 98.1 (30 May 2021 12:46)  HR: 98 (31 May 2021 04:23) (95 - 105)  BP: 106/70 (31 May 2021 04:23) (106/69 - 115/68)  RR: 18 (31 May 2021 04:23) (18 - 20)  SpO2: 99% (31 May 2021 04:23) (97% - 99%)    I&O's Summary    29 May 2021 07:01  -  30 May 2021 07:00  --------------------------------------------------------  IN: 1730 mL / OUT: 0 mL / NET: 1730 mL    30 May 2021 07:01  -  31 May 2021 06:49  --------------------------------------------------------  IN: 690 mL / OUT: 0 mL / NET: 690 mL      Physical Examination:      Labs:  CAPILLARY BLOOD GLUCOSE  POCT Blood Glucose.: 169 mg/dL (31 May 2021 06:32)  POCT Blood Glucose.: 137 mg/dL (30 May 2021 23:57)  POCT Blood Glucose.: 166 mg/dL (30 May 2021 17:12)  POCT Blood Glucose.: 196 mg/dL (30 May 2021 12:25)                          10.5   8.84  )-----------( 556      ( 29 May 2021 10:20 )             34.5     05-29    140  |  100  |  36<H>  ----------------------------<  143<H>  4.8   |  26  |  0.75    Ca    9.9      29 May 2021 07:16  Phos  4.7     05-29  Mg     2.5     05-29      Imaging Personally Reviewed:      MEDICATIONS  (STANDING):  acetaminophen   Tablet .. 650 milliGRAM(s) Oral every 6 hours  cadexomer iodine 0.9% Gel 1 Application(s) Topical daily  cholecalciferol 400 Unit(s) Oral daily  doxazosin 2 milliGRAM(s) Oral at bedtime  heparin   Injectable 5000 Unit(s) SubCutaneous every 12 hours  insulin lispro (ADMELOG) corrective regimen sliding scale   SubCutaneous every 6 hours  insulin NPH human recombinant 15 Unit(s) SubCutaneous <User Schedule>  levETIRAcetam  Solution 500 milliGRAM(s) Oral two times a day  meropenem  IVPB 1000 milliGRAM(s) IV Intermittent every 8 hours  polyethylene glycol 3350 17 Gram(s) Oral daily  senna 2 Tablet(s) Oral at bedtime  thiamine 100 milliGRAM(s) Oral daily    MEDICATIONS  (PRN):  albuterol/ipratropium for Nebulization 3 milliLiter(s) Nebulizer every 6 hours PRN Shortness of Breath and/or Wheezing   **********************************************  Martha Servando, PGY-1  Internal Medicine   [facility] Pager #: [phone]  **********************************************     Patient is a 60y old  Male who presents with a chief complaint of COVID19 w/ severe metabolic acidosis s/p intubation (30 May 2021 07:04)    SUBJECTIVE / OVERNIGHT EVENTS:   -No acute events overnight  -Pt examined at bedside this AM, with no subjective complaints  -Denies CP, palpitations, abdominal pain, n/v/d   -Upon asking, pt states he has mild pain in LLE    OBJECTIVE:  Vital Signs Last 24 Hrs  T(C): 36.6 (31 May 2021 04:23), Max: 36.7 (30 May 2021 12:46)  T(F): 97.9 (31 May 2021 04:23), Max: 98.1 (30 May 2021 12:46)  HR: 98 (31 May 2021 04:23) (95 - 105)  BP: 106/70 (31 May 2021 04:23) (106/69 - 115/68)  RR: 18 (31 May 2021 04:23) (18 - 20)  SpO2: 99% (31 May 2021 04:23) (97% - 99%)    I&O's Summary    29 May 2021 07:01  -  30 May 2021 07:00  --------------------------------------------------------  IN: 1730 mL / OUT: 0 mL / NET: 1730 mL    30 May 2021 07:01  -  31 May 2021 06:49  --------------------------------------------------------  IN: 690 mL / OUT: 0 mL / NET: 690 mL      Physical Examination:    General: No acute distress, well-appearing    Eyes: PERRL, EOMI     ENT: MMM, no oropharyngeal lesions or erythema appreciated     Pulm: No increased WOB. CTAB. No wheezing.     CV: RRR. S1&S2+. No M/R/G appreciated.     Abdomen: +BS. Soft, NTND. No organomegaly. +peg tube in place    MSK: Nml ROM    Extremities: No peripheral edema or cyanosis. LLE with dressing in place, dark discoloration of all toes.     Neuro: A&Ox3, no focal deficits     Labs:  CAPILLARY BLOOD GLUCOSE  POCT Blood Glucose.: 169 mg/dL (31 May 2021 06:32)  POCT Blood Glucose.: 137 mg/dL (30 May 2021 23:57)  POCT Blood Glucose.: 166 mg/dL (30 May 2021 17:12)  POCT Blood Glucose.: 196 mg/dL (30 May 2021 12:25)                          10.5   8.84  )-----------( 556      ( 29 May 2021 10:20 )             34.5     05-29    140  |  100  |  36<H>  ----------------------------<  143<H>  4.8   |  26  |  0.75    Ca    9.9      29 May 2021 07:16  Phos  4.7     05-29  Mg     2.5     05-29      Imaging Personally Reviewed:      MEDICATIONS  (STANDING):  acetaminophen   Tablet .. 650 milliGRAM(s) Oral every 6 hours  cadexomer iodine 0.9% Gel 1 Application(s) Topical daily  cholecalciferol 400 Unit(s) Oral daily  doxazosin 2 milliGRAM(s) Oral at bedtime  heparin   Injectable 5000 Unit(s) SubCutaneous every 12 hours  insulin lispro (ADMELOG) corrective regimen sliding scale   SubCutaneous every 6 hours  insulin NPH human recombinant 15 Unit(s) SubCutaneous <User Schedule>  levETIRAcetam  Solution 500 milliGRAM(s) Oral two times a day  meropenem  IVPB 1000 milliGRAM(s) IV Intermittent every 8 hours  polyethylene glycol 3350 17 Gram(s) Oral daily  senna 2 Tablet(s) Oral at bedtime  thiamine 100 milliGRAM(s) Oral daily    MEDICATIONS  (PRN):  albuterol/ipratropium for Nebulization 3 milliLiter(s) Nebulizer every 6 hours PRN Shortness of Breath and/or Wheezing

## 2021-05-31 NOTE — PROGRESS NOTE ADULT - PROBLEM SELECTOR PLAN 5
- Type II diabetes mellitus noted; a1c noted to be greater than 10   - On insulin sliding scale with q6 NPH   - Monitor fingersticks while on tube feeds  - endocrine recs appreciated for uncontrolled DM  - Needs outpt endocrine established. Can call Patient Access Services to schedule an appointment. [phone]. or can follow up w/previous Endocrinologist.   - Adjust  NPH 12-12-12-3 and  continue  moderate correction scale sq q6h; continue this regimen on discharge to rehab  - FS q6 - Type II diabetes mellitus noted; a1c noted to be greater than 10   - On insulin sliding scale with q6 NPH   - Monitor fingersticks while on tube feeds  - endocrine recs appreciated for uncontrolled DM  - Needs outpt endocrine established. Can call Patient Access Services to schedule an appointment. [phone]. or can follow up w/previous Endocrinologist.   - Adjust  NPH 15U BID and continue low correction scale sq q6h; continue this regimen on discharge to rehab  - FS q6

## 2021-05-31 NOTE — PROGRESS NOTE ADULT - ASSESSMENT
61 yo M with PMH of DM who was initially admitted to [facility] ICU 4/1 for hypoxic respiratory failure 2/2 COVID c/b PE with R heart strain, cardiogenic and septic shock, ischemic and hemorrhagic CVA and ESBL klebsiella ventilator associated PNA, transferred to [facility] for neurosurgery eval on 4/12  No fever, had leukocytosis  UCX with ESBL K pne  Bilateral LE dry gangrene  CT now with Empyema, concern for bilateral PNA, abscess  S/p OR Thoracotomy, abscess drainage, bronch (5/12)--culture with K pne  CT with areas of small cavitary lesions and hematomas  Repeat CT chest generally reassuring although still R hydropneumothorax and hematoma  1) Leukocytosis  - Improving, stable  - Trend WBC  2) COVID  - COVID+, unclear significance (was prior negative, and patient Ab+)  - Monitor for any signs active COVID/reinfection  - Any considerations isolation per infection control  3) Suspected Empyema/Abscess  - Meropenem 1g q 8--continue while inpatient  - Note CT findings, follow thoracic if any further procedure needed for hematoma/hydropneumothorax  - When DC planning, would send out on Bactrim DS 2 tabs TID + Augmentin 875mg po q 12, through 6/8/21  4) Gangrene  - Appears noninfectious/dry gangrene  - Monitor wounds    Sammy Chambers MD  Pager [phone]  After 5pm and on weekends call [phone]

## 2021-05-31 NOTE — PROGRESS NOTE ADULT - PROBLEM SELECTOR PLAN 4
-Pulmonary embolus noted on ct scan performed at time of admission  -Etiology of venous thromboembolism uncertain at this time; no known history of thrombophilia; provoked in setting of acute covid illness   - s./p heparin drip held due to recurrent bleed in right obturator and right chest wall hematoma   - 5000 heparin bid for dvt -Pulmonary embolus noted on ct scan performed at time of admission  -Etiology of venous thromboembolism uncertain at this time; no known history of thrombophilia; provoked in setting of acute covid illness   - s/p heparin drip held due to recurrent bleed in right obturator and right chest wall hematoma   - 5000 heparin bid for dvt

## 2021-05-31 NOTE — PROGRESS NOTE ADULT - PROBLEM SELECTOR PLAN 7
Dysphagia noted in setting of patient's acute alteration in mental status and hx of intubation  - Nasogastric tube placed for administration of tube feeds originally, but patient self removed 4/17; multiple attempts to replace unsuccessful   Evaluated by speech & swallow: unable to cooperate with FEES study, cineesophagram performed demonstrating aspiration  - PEG tube placed on 4/30, tube feedings started on 4/30, will slowly increase rate by 10 ml q24hrs, currently at 20cc/hr, + free water q8  - MBS with speech and swallow for reval 5/26, did not pass, c/w tube feeds Dysphagia noted in setting of patient's acute alteration in mental status and hx of intubation  - Nasogastric tube placed for administration of tube feeds originally, but patient self removed 4/17; multiple attempts to replace unsuccessful   Evaluated by speech & swallow: unable to cooperate with FEES study, cineesophagram performed demonstrating aspiration  - PEG tube placed on 4/30, tube feedings started on 4/30, will slowly increase rate by 10 ml q24hrs, currently at 20cc/hr, + free water q8  - MBS with speech and swallow for re-eval 5/26, did not pass, c/w tube feeds

## 2021-05-31 NOTE — PROGRESS NOTE ADULT - PROBLEM SELECTOR PLAN 3
-Acute ischemic strokes, with concern for hemorrhagic conversion, noted on CT scan on 4/8  -Etiology of acute ischemic stroke uncertain at this time; TTE with bubble performed at bedside in the MICU without any evidence of right-to-left shunt; no arrhythmia noted on telemetry throughout stay in the MICU  - CTA head and neck without any evidence of carotid artery stenosis or dissection    Retention   - olson placed  - c/w doxazosin     EMPYEMA   OR Vats 5/12 with Dr. Albarado with all chest tubes removed by 5/18  CT Chest 5/16 Interval new small cavitary lesions in the right lower lobe and right chest wall hematoma - no thoracic surgery intervention indicated per Dr. Albarado. -Acute ischemic strokes, with concern for hemorrhagic conversion, noted on CT scan on 4/8  -Etiology of acute ischemic stroke uncertain at this time; TTE with bubble performed at bedside in the MICU without any evidence of right-to-left shunt; no arrhythmia noted on telemetry throughout stay in the MICU  - CTA head and neck without any evidence of carotid artery stenosis or dissection    #Urinary Retention   - olson placed  - c/w doxazosin

## 2021-05-31 NOTE — PROGRESS NOTE ADULT - SUBJECTIVE AND OBJECTIVE BOX
CC: F/U for Empyema    Saw/spoke to patient. No fevers. No chills. No new complaints.    Allergies  No Known Allergies    ANTIMICROBIALS:  meropenem  IVPB 1000 every 8 hours    PE:    Vital Signs Last 24 Hrs  T(C): 36.7 (31 May 2021 12:14), Max: 36.7 (30 May 2021 12:46)  T(F): 98.1 (31 May 2021 12:14), Max: 98.1 (30 May 2021 12:46)  HR: 100 (31 May 2021 12:14) (95 - 105)  BP: 117/71 (31 May 2021 12:14) (106/69 - 117/71)  RR: 18 (31 May 2021 12:14) (18 - 20)  SpO2: 99% (31 May 2021 12:14) (97% - 99%)    Gen: AOx3, NAD, non-toxic  CV: S1+S2 normal, nontachycardic  Resp: Clear bilat, no resp distress, no crackles/wheezes  Abd: Soft, nontender, +BS  Ext: No LE edema, no wounds    LABS:                        10.3   8.70  )-----------( 564      ( 31 May 2021 07:08 )             33.9     05-31    140  |  102  |  40<H>  ----------------------------<  155<H>  4.7   |  25  |  0.88    Ca    10.0      31 May 2021 07:08  Phos  5.0     05-31  Mg     2.4     05-31    MICROBIOLOGY:    .Blood Blood  05-14-21   No Growth Final     BAL BRONCHIAL AVEOLAR LAVAGE  05-13-21   Rare Yeast  --    Numerous polymorphonuclear leukocytes seen per low power field  No Squamous epithelial cells seen per low power field  Rare Gram Negative Rods seen per oil power field    .Tissue Other  05-12-21   Growth in fluid media only Klebsiella pneumoniae ESBL  Rare Lactobacillus rhamnosus "Susceptibilities not performed"  --  Klebsiella pneumoniae ESBL    .Urine Clean Catch (Midstream)  05-06-21   >100,000 CFU/ml Klebsiella pneumoniae ESBL  --  Klebsiella pneumoniae ESBL    RADIOLOGY:    5/29 CT:  IMPRESSION:  1.  No change in the loculated right hydropneumothorax likely differences in technique make.  2.  Slight decrease in size of the right chest wall hematoma, allowing for differences in technique.  3.  The bilateral opacities have decreased since 5/16/2021. Patient educated to discontinue contact lens wear 1 week(s) prior to surgery.

## 2021-05-31 NOTE — PROGRESS NOTE ADULT - PROBLEM SELECTOR PLAN 8
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   Significant lower extremity arterial disease not identified. Decreased amplitude to the pulse volume recordings at the levels of the toes bilaterally.  - Podiatry recs appreciated, no acute surgical intervention at this time, applying betadine to wound  - Wound care recs appreciated  - vascular recs appreciated -Dry gangrene noted over distal toes on bilateral lower extremities--most concerning for microvascular disease subsequent to vasopressor administration   -Dorsalis pedis pulses intact bilaterally  - ERASMO demonstrating right ERASMO: 1.09, left ERASMO: 1.24, right TBI: 0.78, and left TBI: 0.73.   Significant lower extremity arterial disease not identified. Decreased amplitude to the pulse volume recordings at the levels of the toes bilaterally.  - Podiatry recs appreciated, no acute surgical intervention at this time, applying betadine to wound  - Wound care recs appreciated  - Vascular recs appreciated

## 2021-05-31 NOTE — PROGRESS NOTE ADULT - ASSESSMENT
60 yr male with PMH of DM who was initially admitted to [facility] ICU for hypoxic respiratory failure 2/2 COVID c/b PE with R heart strain, cardiogenic and septic shock, ischemic and hemorrhagic CVA and ESBL klebsiella ventilator associate PNA, transferred to [facility] for neurosurgery eval and further management. s/p PEG, now monitoring for refeeding syndrome, complicated with right empyema s/p VATs and obturator bleed (resolved) pending acute rehab 61yo male with PMH of DM who was initially admitted to [facility] ICU for hypoxic respiratory failure 2/2 COVID c/b PE with R heart strain, cardiogenic and septic shock, ischemic and hemorrhagic CVA and ESBL klebsiella ventilator associate PNA, transferred to [facility] for neurosurgery eval and further management. s/p PEG, now monitoring for refeeding syndrome. Course c/b right empyema s/p VATs and obturator bleed (resolved). Now pending acute rehab.

## 2021-05-31 NOTE — PROGRESS NOTE ADULT - PROBLEM SELECTOR PLAN 1
5/12 he underwent right VATS converted to open thoracotomy, decortication, drainage of abscess and flex bronch. Pt briefly on lexi intraop and extubated in OR.   - s/p L. thoracotomy, drainage of abscess, decortication, flex bronch   - s/p 1 angled chest tube , 1 straight chest tube, 1 Blakemore drain (anterior) --> all removed  - Monitor pulse oximeter  - Out of bed to chair, ambulate as tolerated, and incentive spirometry to prevent atelectasis  - Pain control as needed with tylenol and oxy  - c/w meropenem 5/7- when he gets discharged   - When DC planning, would send out on Bactrim DS 2 tabs TID + Augmentin 875mg po q 12, for tentative 2 more weeks (then will further determine if role for further course based on imaging)  - needs close follow up in ID clinic for repeat imaging, Dr. Chambers    Obturator Hematoma (stable)  - bleeding hematoma in right thigh on CT on 5/7  - CT hip showing non active bleed, hematoma on right thigh on 5/8  - doppler to r/o DVT in LE, per IR no role IVC filter  - active again on 5/17, now stable  - was on heparin drip on and off, now off with DVT ppx heparin bid ESBL Klebsiella PNA c/b empyema 5/12 he underwent right VATS converted to open L thoracotomy, decortication, drainage of abscess and flex bronch. Pt briefly on lexi intraop and extubated in OR.   - s/p 1 angled chest tube , 1 straight chest tube, 1 Blakemore drain (anterior) --> all removed  - Out of bed to chair, ambulate as tolerated, and incentive spirometry to prevent atelectasis  - Pain control as needed with tylenol   - c/w meropenem 5/7 until discharge  - When DC planning, would send out on Bactrim DS 2 tabs TID + Augmentin 875mg po q 12, for tentative 2 more weeks (then will further determine if role for further course based on imaging)  - ID following, recs appreciated - needs close follow up in ID clinic for repeat imaging, Dr. Chambers  - CT Chest 5/29 with improvement in bilateral opacities. Slight decrease in right chest wall hematoma. Stable loculated right hydropneumothorax.     #Empyema  OR Vats 5/12 with Dr. Albarado with all chest tubes removed by 5/18  CT Chest 5/16 Interval new small cavitary lesions in the right lower lobe and right chest wall hematoma - no thoracic surgery intervention indicated per Dr. Albarado.    #Obturator Hematoma (stable)  - bleeding hematoma in right thigh on CT on 5/7  - CT hip showing non active bleed, hematoma on right thigh on 5/8  - doppler to r/o DVT in LE, per IR no role IVC filter  - active again on 5/17, now stable  - was on heparin drip on and off, now off. C/w DVT ppx heparin bid

## 2021-05-31 NOTE — PROGRESS NOTE ADULT - PROBLEM SELECTOR PLAN 10
- DVT: HSQ  - Diet: NPO with tube feedings  - Full code  - Dispo- acute rehab potential Tuesday - DVT: HSQ  - Diet: NPO with tube feedings  - Full code  - Dispo- acute rehab potential Tuesday. COVID PCR neg.

## 2021-06-01 LAB
GLUCOSE BLDC GLUCOMTR-MCNC: 106 MG/DL — HIGH (ref 70–99)
GLUCOSE BLDC GLUCOMTR-MCNC: 187 MG/DL — HIGH (ref 70–99)
GLUCOSE BLDC GLUCOMTR-MCNC: 83 MG/DL — SIGNIFICANT CHANGE UP (ref 70–99)
GLUCOSE BLDC GLUCOMTR-MCNC: 95 MG/DL — SIGNIFICANT CHANGE UP (ref 70–99)

## 2021-06-01 PROCEDURE — 99232 SBSQ HOSP IP/OBS MODERATE 35: CPT | Mod: GC

## 2021-06-01 PROCEDURE — 99232 SBSQ HOSP IP/OBS MODERATE 35: CPT

## 2021-06-01 RX ORDER — SODIUM CHLORIDE 9 MG/ML
1000 INJECTION, SOLUTION INTRAVENOUS
Refills: 0 | Status: DISCONTINUED | OUTPATIENT
Start: 2021-06-01 | End: 2021-06-01

## 2021-06-01 RX ORDER — DEXTROSE 50 % IN WATER 50 %
15 SYRINGE (ML) INTRAVENOUS ONCE
Refills: 0 | Status: DISCONTINUED | OUTPATIENT
Start: 2021-06-01 | End: 2021-06-01

## 2021-06-01 RX ORDER — DEXTROSE 50 % IN WATER 50 %
25 SYRINGE (ML) INTRAVENOUS ONCE
Refills: 0 | Status: DISCONTINUED | OUTPATIENT
Start: 2021-06-01 | End: 2021-06-01

## 2021-06-01 RX ORDER — GLUCAGON INJECTION, SOLUTION 0.5 MG/.1ML
1 INJECTION, SOLUTION SUBCUTANEOUS ONCE
Refills: 0 | Status: DISCONTINUED | OUTPATIENT
Start: 2021-06-01 | End: 2021-06-01

## 2021-06-01 RX ORDER — DEXTROSE 50 % IN WATER 50 %
12.5 SYRINGE (ML) INTRAVENOUS ONCE
Refills: 0 | Status: DISCONTINUED | OUTPATIENT
Start: 2021-06-01 | End: 2021-06-01

## 2021-06-01 RX ORDER — HUMAN INSULIN 100 [IU]/ML
15 INJECTION, SUSPENSION SUBCUTANEOUS
Refills: 0 | Status: DISCONTINUED | OUTPATIENT
Start: 2021-06-01 | End: 2021-06-03

## 2021-06-01 RX ADMIN — Medication 2 MILLIGRAM(S): at 21:28

## 2021-06-01 RX ADMIN — HUMAN INSULIN 15 UNIT(S): 100 INJECTION, SUSPENSION SUBCUTANEOUS at 18:59

## 2021-06-01 RX ADMIN — HEPARIN SODIUM 5000 UNIT(S): 5000 INJECTION INTRAVENOUS; SUBCUTANEOUS at 17:53

## 2021-06-01 RX ADMIN — Medication 650 MILLIGRAM(S): at 17:55

## 2021-06-01 RX ADMIN — Medication 400 UNIT(S): at 11:45

## 2021-06-01 RX ADMIN — Medication 650 MILLIGRAM(S): at 05:34

## 2021-06-01 RX ADMIN — Medication 650 MILLIGRAM(S): at 00:30

## 2021-06-01 RX ADMIN — MEROPENEM 100 MILLIGRAM(S): 1 INJECTION INTRAVENOUS at 05:34

## 2021-06-01 RX ADMIN — HUMAN INSULIN 15 UNIT(S): 100 INJECTION, SUSPENSION SUBCUTANEOUS at 06:04

## 2021-06-01 RX ADMIN — Medication 1: at 06:05

## 2021-06-01 RX ADMIN — POLYETHYLENE GLYCOL 3350 17 GRAM(S): 17 POWDER, FOR SOLUTION ORAL at 11:45

## 2021-06-01 RX ADMIN — SENNA PLUS 2 TABLET(S): 8.6 TABLET ORAL at 21:27

## 2021-06-01 RX ADMIN — LEVETIRACETAM 500 MILLIGRAM(S): 250 TABLET, FILM COATED ORAL at 17:53

## 2021-06-01 RX ADMIN — HEPARIN SODIUM 5000 UNIT(S): 5000 INJECTION INTRAVENOUS; SUBCUTANEOUS at 05:34

## 2021-06-01 RX ADMIN — Medication 650 MILLIGRAM(S): at 05:48

## 2021-06-01 RX ADMIN — Medication 650 MILLIGRAM(S): at 17:53

## 2021-06-01 RX ADMIN — SODIUM CHLORIDE 100 MILLILITER(S): 9 INJECTION, SOLUTION INTRAVENOUS at 13:55

## 2021-06-01 RX ADMIN — Medication 650 MILLIGRAM(S): at 11:46

## 2021-06-01 RX ADMIN — Medication 100 MILLIGRAM(S): at 11:45

## 2021-06-01 RX ADMIN — Medication 1 APPLICATION(S): at 11:45

## 2021-06-01 RX ADMIN — MEROPENEM 100 MILLIGRAM(S): 1 INJECTION INTRAVENOUS at 21:27

## 2021-06-01 RX ADMIN — LEVETIRACETAM 500 MILLIGRAM(S): 250 TABLET, FILM COATED ORAL at 05:34

## 2021-06-01 RX ADMIN — Medication 650 MILLIGRAM(S): at 00:05

## 2021-06-01 RX ADMIN — MEROPENEM 100 MILLIGRAM(S): 1 INJECTION INTRAVENOUS at 12:37

## 2021-06-01 NOTE — PROGRESS NOTE ADULT - ASSESSMENT
61 y/o Gujarati speaking male with new onset diabetes (HbA1c of 10.3) admitted with COVID with prolonged hospital course requiring intubation and PEG insertion; s/p Right VATS converted to R thoracotomy for Empyema/bilateral PNA, abscess with  PNA . On 24 hour continuous TF at goal. BG values at goal, suspect pt previously having nocturnal BG <100 due to NPH although he is on 24 hours tube feeding,  MBS completed continues on TF only. BG goal (100-180mg/dl).  59 y/o Gujarati speaking male with new onset diabetes (HbA1c of 10.3) admitted with COVID with prolonged hospital course requiring intubation and PEG insertion; s/p Right VATS converted to R thoracotomy for Empyema/bilateral PNA, abscess with  PNA . On 24 hour continuous TF at goal. BG values at goal, suspect pt previously having nocturnal BG <100 due to NPH although he is on 24 hours tube feeding,  MBS completed continues on TF only. Peg malfunction/leaking this AM TF held, NPH was given this AM prior to PEG malfunction. Now with BG below goal (83) would start D5 infusion to prevent hypoglycemia as pt without nutritional intake until PEG fixed.  BG goal (100-180mg/dl).

## 2021-06-01 NOTE — CONSULT NOTE ADULT - CONSULT REQUESTED DATE/TIME
07-May-2021 12:00
07-May-2021 12:15
06-May-2021 12:42
07-May-2021 21:04
01-Jun-2021
12-May-2021 19:20
15-Apr-2021 16:44
17-May-2021 14:19
19-Apr-2021 15:56
20-Apr-2021 18:47
23-Apr-2021 10:46
19-Apr-2021 14:00
22-Apr-2021 09:35
26-Apr-2021 14:42

## 2021-06-01 NOTE — PROGRESS NOTE ADULT - PROBLEM SELECTOR PLAN 10
- DVT: HSQ  - Diet: NPO with tube feedings  - Full code  - Dispo- acute rehab potential Tuesday. COVID PCR neg. - DVT: HSQ  - Diet: NPO with tube feedings - held until peg tube can be evaluated by GI   - Full code  - Dispo- acute rehab potential today. COVID PCR neg.

## 2021-06-01 NOTE — PROGRESS NOTE ADULT - SUBJECTIVE AND OBJECTIVE BOX
DIABETES FOLLOW UP : Seen earlier today    INTERVAL HX: family at bedside interpreting pt offer no complaints, Noted TF off , per d/w RN TF off since AM as PEG leaking, NPH was given this AM. BG now below 100 pt denies any s/s hypoglycemia, no hypoglycemia,  spoke with RN & Resident MD Al would place pt on D5 infusion until PEG fixed by GI . BG otherwise mostly at goal on present regimen      Review of Systems:  General: As above  Cardiovascular: No chest pain, palpitations  Respiratory: No SOB, no cough  GI: No nausea, vomiting, abdominal pain  Endocrine: no polyuria, polydipsia or S&Sx of hypoglycemia    Allergies    No Known Allergies    Intolerances      MEDICATIONS  (STANDING):  acetaminophen   Tablet .. 650 milliGRAM(s) Oral every 6 hours  cadexomer iodine 0.9% Gel 1 Application(s) Topical daily  cholecalciferol 400 Unit(s) Oral daily  dextrose 5% + lactated ringers. 1000 milliLiter(s) (100 mL/Hr) IV Continuous <Continuous>  doxazosin 2 milliGRAM(s) Oral at bedtime  heparin   Injectable 5000 Unit(s) SubCutaneous every 12 hours  insulin lispro (ADMELOG) corrective regimen sliding scale   SubCutaneous every 6 hours  insulin NPH human recombinant 15 Unit(s) SubCutaneous <User Schedule>  levETIRAcetam  Solution 500 milliGRAM(s) Oral two times a day  meropenem  IVPB 1000 milliGRAM(s) IV Intermittent every 8 hours  polyethylene glycol 3350 17 Gram(s) Oral daily  senna 2 Tablet(s) Oral at bedtime  thiamine 100 milliGRAM(s) Oral daily      PHYSICAL EXAM:  VITALS: T(C): 36.6 (06-01-21 @ 11:42)  T(F): 97.8 (06-01-21 @ 11:42), Max: 97.8 (06-01-21 @ 11:42)  HR: 97 (06-01-21 @ 11:42) (96 - 100)  BP: 107/68 (06-01-21 @ 11:42) (100/65 - 114/72)  RR:  (18 - 18)  SpO2:  (96% - 99%)  Wt(kg): --  GENERAL: male laying in bed in NAD  Abdomen: Soft, nontender, non distended, peg disconnected from tube feeding  Extremities: Warm, necrotic toes  NEURO: Alert appropriate     LABS:  POCT Blood Glucose.: 83 mg/dL (06-01-21 @ 12:16)  POCT Blood Glucose.: 187 mg/dL (06-01-21 @ 06:00)  POCT Blood Glucose.: 113 mg/dL (05-31-21 @ 23:54)  POCT Blood Glucose.: 176 mg/dL (05-31-21 @ 17:00)  POCT Blood Glucose.: 165 mg/dL (05-31-21 @ 12:22)  POCT Blood Glucose.: 169 mg/dL (05-31-21 @ 06:32)  POCT Blood Glucose.: 137 mg/dL (05-30-21 @ 23:57)  POCT Blood Glucose.: 166 mg/dL (05-30-21 @ 17:12)  POCT Blood Glucose.: 196 mg/dL (05-30-21 @ 12:25)  POCT Blood Glucose.: 144 mg/dL (05-30-21 @ 06:24)  POCT Blood Glucose.: 102 mg/dL (05-30-21 @ 00:55)  POCT Blood Glucose.: 93 mg/dL (05-29-21 @ 17:23)                            10.3   8.70  )-----------( 564      ( 31 May 2021 07:08 )             33.9       05-31    140  |  102  |  40<H>  ----------------------------<  155<H>  4.7   |  25  |  0.88    EGFR if : 110  EGFR if non : 95    Ca    10.0      05-31  Mg     2.4     05-31  Phos  5.0     05-31 04-26 Chol 114 Direct LDL -- LDL calculated 53 HDL 35<L> Trig 126, 04-13 Chol -- Direct LDL -- LDL calculated -- HDL -- Trig 126    Thyroid Function Tests:          A1C with Estimated Average Glucose Result: 10.3 % (04-02-21 @ 14:28)      Estimated Average Glucose: 249 mg/dL (04-02-21 @ 14:28)

## 2021-06-01 NOTE — PROGRESS NOTE ADULT - ASSESSMENT
61yo male with PMH of DM who was initially admitted to [facility] ICU for hypoxic respiratory failure 2/2 COVID c/b PE with R heart strain, cardiogenic and septic shock, ischemic and hemorrhagic CVA and ESBL klebsiella ventilator associate PNA, transferred to [facility] for neurosurgery eval and further management. s/p PEG, now monitoring for refeeding syndrome. Course c/b right empyema s/p VATs and obturator bleed (resolved). Now pending acute rehab.  59yo male with PMH of DM who was initially admitted to [facility] ICU for hypoxic respiratory failure 2/2 COVID c/b PE with R heart strain, cardiogenic and septic shock, ischemic and hemorrhagic CVA and ESBL klebsiella ventilator associate PNA, transferred to [facility] for neurosurgery eval and further management. s/p PEG, now monitoring for refeeding syndrome. Course c/b right empyema s/p VATs and obturator bleed (resolved). Now pending acute rehab placement.

## 2021-06-01 NOTE — PROGRESS NOTE ADULT - PROBLEM SELECTOR PLAN 7
Dysphagia noted in setting of patient's acute alteration in mental status and hx of intubation  - Nasogastric tube placed for administration of tube feeds originally, but patient self removed 4/17; multiple attempts to replace unsuccessful   Evaluated by speech & swallow: unable to cooperate with FEES study, cineesophagram performed demonstrating aspiration  - PEG tube placed on 4/30, tube feedings started on 4/30, will slowly increase rate by 10 ml q24hrs, currently at 20cc/hr, + free water q8  - MBS with speech and swallow for re-eval 5/26, did not pass, c/w tube feeds

## 2021-06-01 NOTE — CONSULT NOTE ADULT - REASON FOR ADMISSION
COVID19 w/ severe metabolic acidosis s/p intubation

## 2021-06-01 NOTE — CONSULT NOTE ADULT - ATTENDING COMMENTS
Patient seen and examined, agree with above. Will await new fever infectious workup. If workup negative, will discuss with family regarding risks/benefits of PEG if he has no improvement in his mental status. The greatest risk is likely patient pulling out PEG prior to tract formation, which can lead to maria d leak and peritonitis. Would consider NGT replacement in the meantime for nutrition.
60m with recent COVID/stroke with fever/leukocytosis, asp pna  -suspect aspiration  -dc meropenem and vancomycin   -invanz 1 gm iv q24  -check bcx x 2, mssa/mrsa nasal PCR  -aspiration precautions    Chan Alaniz  Attending Physician   Division of Infectious Disease  Pager #[phone]  Available on Microsoft Teams also  After 5pm/weekend or no response, call #[phone]
Agree with plan as outlined above. Patient seen and examined at bedside. Patient history, laboratory data, and imaging personally reviewed.    Pt is a 60M with PMHx DMII initially presenting to [facility] on 4/12/21 with acute hypoxemic respiratory failure 2/2 COVID19 PNA c/b acute PE with cor pulmonale and combined cardiogenic/septic shock with hospital course further c/b multiple acute ischemic CVAs with hemorrhagic transformation and ESBL Klebsiella VAP now with new leukocytosis and repeat CT Chest concerning for an irregularly shaped large thick rimmed area of cavitation with an air-fluid level and some surrounding GGO/consolidation likely 2/2 abscess. Bedside POCUS (-) for evidence of empyema or any areas of fluid or a pocket for thoracentesis.    -Agree with ID recs for initiation of Meropenem   -Would not drain abscess at this time unless pt does not respond to initial Abx  -Thoracic sx to evaluate   -Pulmonary will continue to follow
Seen and examined with resident. Agree with note.   60 year old man with prolonged hospitalization for COVID-19, DKA, PE, and CVA with hemorrhagic conversion and fevers suspected 2/2 to aspiration PNA (klebsiella), dysphagia  Patient will need acute rehabilitation when stable.
Patient seen and examined. Agree with above. PEG tube is intact, just the stopper/flap was leaking. Replaced at bedside, ok to use PEG for feeds.
patient arrived to SICU s/p decortication  patient awake, alert and breathing comfortable  of concern is that patient showing sinus tachycardia  given history of recent pulmonary embolism a bedside critical care echocardiography was performed  the right ventricle is larger than left and likely represents continued strain pattern - although the patient is not hypotensive  plan will be to restart therapeutic heparin when cleared by surgical team and continue close monitoring
patient awake,  non verbal, blinking spontaneously and to threat. does not follow commands consistently  no spontaneous movement, but withdraws all 4 equally to pain, facial grimacing is symmetric  CT head multiple infracts- unchanged    Impression- altered mental status- Multi-factorial - impaired level of arousal due to global cerebral dysfunction likely from combination of infarcts, COVID-19 encephalopathy, associated hypoxia, persistent pharmacologic effects of sedatives , precipitous drop in BP  and multiple metabolic derangements.    Plan:  []Continue heparin gtt w/ transition to DOAC when able  [] EEG  [] MRI brain, MRA head   []Care per MICU team  []q4hr neuro checks
poor mental status, likely to need alternate source of nutrition.  Call with any questions or concerns or if needed to retest.

## 2021-06-01 NOTE — PROGRESS NOTE ADULT - SUBJECTIVE AND OBJECTIVE BOX
CC: F/U for Empyema    Saw/spoke to patient. No fevers, no chills. No new complaints.    Allergies  No Known Allergies    ANTIMICROBIALS:  meropenem  IVPB 1000 every 8 hours    PE:    Vital Signs Last 24 Hrs  T(C): 36.6 (01 Jun 2021 11:42), Max: 36.6 (01 Jun 2021 11:42)  T(F): 97.8 (01 Jun 2021 11:42), Max: 97.8 (01 Jun 2021 11:42)  HR: 97 (01 Jun 2021 11:42) (96 - 100)  BP: 107/68 (01 Jun 2021 11:42) (100/65 - 114/72)  RR: 18 (01 Jun 2021 11:42) (18 - 18)  SpO2: 96% (01 Jun 2021 11:42) (96% - 99%)    Gen: AOx3, NAD, non-toxic, pleasant  CV: S1+S2 normal, nontachycardic  Resp: Clear bilat, no resp distress, no crackles/wheezes  Abd: Soft, nontender, +BS  Ext: L toe gangrene    LABS:                        10.3   8.70  )-----------( 564      ( 31 May 2021 07:08 )             33.9     05-31    140  |  102  |  40<H>  ----------------------------<  155<H>  4.7   |  25  |  0.88    Ca    10.0      31 May 2021 07:08  Phos  5.0     05-31  Mg     2.4     05-31    MICROBIOLOGY:    .Blood Blood  05-14-21   No Growth Final    BAL BRONCHIAL AVEOLAR LAVAGE  05-13-21   Rare Yeast  --    Numerous polymorphonuclear leukocytes seen per low power field  No Squamous epithelial cells seen per low power field  Rare Gram Negative Rods seen per oil power field    .Tissue Other  05-12-21   Growth in fluid media only Klebsiella pneumoniae ESBL  Rare Lactobacillus rhamnosus "Susceptibilities not performed"  --  Klebsiella pneumoniae ESBL    .Urine Clean Catch (Midstream)  05-06-21   >100,000 CFU/ml Klebsiella pneumoniae ESBL  --  Klebsiella pneumoniae ESBL    (otherwise reviewed)    RADIOLOGY:    5/29     IMPRESSION:  1.  No change in the loculated right hydropneumothorax likely differences in technique make.  2.  Slight decrease in size of the right chest wall hematoma, allowing for differences in technique.  3.  The bilateral opacities have decreased since 5/16/2021.

## 2021-06-01 NOTE — PROGRESS NOTE ADULT - PROBLEM SELECTOR PLAN 1
ESBL Klebsiella PNA c/b empyema 5/12 he underwent right VATS converted to open L thoracotomy, decortication, drainage of abscess and flex bronch. Pt briefly on lexi intraop and extubated in OR.   - s/p 1 angled chest tube , 1 straight chest tube, 1 Blakemore drain (anterior) --> all removed  - Out of bed to chair, ambulate as tolerated, and incentive spirometry to prevent atelectasis  - Pain control as needed with tylenol   - c/w meropenem 5/7 until discharge  - When DC planning, would send out on Bactrim DS 2 tabs TID + Augmentin 875mg po q 12, for tentative 2 more weeks (then will further determine if role for further course based on imaging)  - ID following, recs appreciated - needs close follow up in ID clinic for repeat imaging, Dr. Chambers  - CT Chest 5/29 with improvement in bilateral opacities. Slight decrease in right chest wall hematoma. Stable loculated right hydropneumothorax.     #Empyema  OR Vats 5/12 with Dr. Albarado with all chest tubes removed by 5/18  CT Chest 5/16 Interval new small cavitary lesions in the right lower lobe and right chest wall hematoma - no thoracic surgery intervention indicated per Dr. Albarado.    #Obturator Hematoma (stable)  - bleeding hematoma in right thigh on CT on 5/7  - CT hip showing non active bleed, hematoma on right thigh on 5/8  - doppler to r/o DVT in LE, per IR no role IVC filter  - active again on 5/17, now stable  - was on heparin drip on and off, now off. C/w DVT ppx heparin bid ESBL Klebsiella PNA c/b empyema 5/12 he underwent right VATS converted to open L thoracotomy, decortication, drainage of abscess and flex bronch. Pt briefly on lexi intraop and extubated in OR.   - s/p 1 angled chest tube , 1 straight chest tube, 1 Blakemore drain (anterior) --> all removed  - Out of bed to chair, ambulate as tolerated, and incentive spirometry to prevent atelectasis  - Pain control as needed with tylenol   - c/w meropenem 5/7 until discharge  - When DC planning, would send out on Bactrim DS 2 tabs TID + Augmentin 875mg po q12 until 6/8/21  - ID following, recs appreciated - needs close follow up in ID clinic for repeat imaging, Dr. Chambers  - CT Chest 5/29 with improvement in bilateral opacities. Slight decrease in right chest wall hematoma. Stable loculated right hydropneumothorax.     #Empyema  OR Vats 5/12 with Dr. Albarado with all chest tubes removed by 5/18  CT Chest 5/16 Interval new small cavitary lesions in the right lower lobe and right chest wall hematoma - no thoracic surgery intervention indicated per Dr. Albarado.    #Obturator Hematoma (stable)  - bleeding hematoma in right thigh on CT on 5/7  - CT hip showing non active bleed, hematoma on right thigh on 5/8  - doppler to r/o DVT in LE, per IR no role IVC filter  - active again on 5/17, now stable  - was on heparin drip on and off, now off. C/w DVT ppx heparin bid

## 2021-06-01 NOTE — PROGRESS NOTE ADULT - TIME BILLING
glycemic control
- Evaluation and management s/p decortication and empyema.
glycemic control
Complex medical care as above. Review of medical history and overnight events. Discussion with consulting providers and review of recommendations.
-plan of care  -glycemic control
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, no incidence of A.fib and I didn't see any PFO/ASD on bedside ECHO  -keppra for seizure ppx   -PT, OT, OOB, fall precautions   -neuro consult   -resp failure resolved - now on RA  -PE with RV strain + L femoral DVT - continue iv heparin   -high aspiration risk - maintain aspiration precautions  -ordered speech eval however patient likely has dysphagia - NGT inserted for feeds/meds   -ESBL Klebsiella pneumonia - started on meropenem 4/14, will likely do a 7 day course   -slight Cr elevation, hypernatremia - free water   -lines removed  -urinary retention requires prn straight cath   -uncontrolled diabetes with hyperglycemia - added NPH   -stable for floor
plan of care glycemic control prevent hypoglycemia
-plan of care  -glycemic control
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 Pizarro (Radiologist at [facility]) by phone
glycemic control; plan of care
glycemic control
glycemic control
preventing hypoglycemia, plan of care
glycemic control plan of care
glycemic control, NPO planning
Spent 25 minutes assessing pt/labs/meds and discussing plan with primary team. Pt is unable to care for self as of now
-plan of care  -glycemic control
-plan of care  -glycemic control
glycemic control, TF

## 2021-06-01 NOTE — PROGRESS NOTE ADULT - SUPERVISING ATTENDING
Dr Reyna Stover
Reyna Stover
Dr Reyna Stover
Reyna Stover
Dr Reyna Stover
Reyna Stover
Reyna Stover

## 2021-06-01 NOTE — CONSULT NOTE ADULT - NSICDXPILOT_GEN_ALL_CORE
[place]

## 2021-06-01 NOTE — CONSULT NOTE ADULT - SUBJECTIVE AND OBJECTIVE BOX
Chief Complaint: Shortness of breath    HPI:    61 y/o M w/ hx of DM and COVID-19 infection in 2021 c/b hypoxic respiratory failure, PE w/ R heart strain, cardiogenic and septic shock, ischemic and hemorrhagic CVA, s/p PEG on 21, transferred to [facility] for neurosurgical evaluation on 21, hospital course c/b empyema.    Per nursing, patient's Y-port has been leaking. Patient accompanied by family member who also confirmed leakage from Y-port. No leakage from PEG bumper or tract. No fevers/chills, nausea/vomiting.     Allergies:  No Known Allergies    Hospital Medications:  acetaminophen   Tablet .. 650 milliGRAM(s) Oral every 6 hours  albuterol/ipratropium for Nebulization 3 milliLiter(s) Nebulizer every 6 hours PRN  cadexomer iodine 0.9% Gel 1 Application(s) Topical daily  cholecalciferol 400 Unit(s) Oral daily  dextrose 5% + lactated ringers. 1000 milliLiter(s) IV Continuous <Continuous>  doxazosin 2 milliGRAM(s) Oral at bedtime  heparin   Injectable 5000 Unit(s) SubCutaneous every 12 hours  insulin lispro (ADMELOG) corrective regimen sliding scale   SubCutaneous every 6 hours  levETIRAcetam  Solution 500 milliGRAM(s) Oral two times a day  meropenem  IVPB 1000 milliGRAM(s) IV Intermittent every 8 hours  polyethylene glycol 3350 17 Gram(s) Oral daily  senna 2 Tablet(s) Oral at bedtime  thiamine 100 milliGRAM(s) Oral daily      Allergies:   No Known Allergies      PMHX/PSHX:  No pertinent past medical history    No significant past surgical history        Family history:  No pertinent family history in first degree relatives        Denies family history of colon cancer/polyps, stomach cancer/polyps, pancreatic cancer/masses, liver cancer/disease, ovarian cancer and endometrial cancer.    Social History:     Tob: Denies  EtOH: Denies  Illicit Drugs: Denies    ROS:     General:  No wt loss, fevers, chills, night sweats, fatigue  Eyes:  Good vision, no reported pain  ENT:  No sore throat, pain, runny nose, dysphagia  CV:  No pain, palpitations, hypo/hypertension  Pulm:  No dyspnea, cough, tachypnea, wheezing  GI:  No pain, No nausea, No vomiting, No diarrhea, No constipation, No weight loss, No fever, No pruritis, No bright red blood per rectum, No tarry stools, No dysphagia,  :  No pain, bleeding, incontinence, nocturia  Muscle:  No pain, weakness  Neuro:  No weakness, tingling, memory problems  Psych:  No fatigue, insomnia, mood problems, depression  Endocrine:  No polyuria, polydipsia, cold/heat intolerance  Heme:  No petechiae, ecchymosis, easy bruisability  Skin:  No rash, tattoos, scars, edema    PHYSICAL EXAM:     Vital Signs:  Vital Signs Last 24 Hrs  T(C): 36.7 (2021 14:58), Max: 36.7 (2021 14:58)  T(F): 98 (2021 14:58), Max: 98 (2021 14:58)  HR: 95 (2021 14:58) (95 - 100)  BP: 115/73 (2021 14:58) (100/65 - 115/73)  BP(mean): --  RR: 18 (2021 14:58) (18 - 18)  SpO2: 98% (2021 14:58) (96% - 99%)    Daily Weight in k.4 (2021 08:00)    GENERAL:  No acute distress  HEENT:  Normocephalic/atraumatic, no scleral icterus  CHEST: Normal effort, no accessory muscle use  HEART:  Regular rate and rhythm  ABDOMEN:  Soft, non-tender, non-distended, PEG in place c/d/i  EXTREMITIES: No cyanosis, clubbing, or edema  SKIN:  No rash/erythema  NEURO:  Alert and oriented x 3    LABS:                        10.3   8.70  )-----------( 564      ( 31 May 2021 07:08 )             33.9       -    140  |  102  |  40<H>  ----------------------------<  155<H>  4.7   |  25  |  0.88    Ca    10.0      31 May 2021 07:08  Phos  5.0     05-  Mg     2.4                    10.3   8.70  )-----------( 564      ( 31 May 2021 07:08 )             33.9     Imaging:    Reviewed   Chief Complaint: Shortness of breath    HPI:    61 y/o M w/ hx of DM and COVID-19 infection in 2021 c/b hypoxic respiratory failure, PE w/ R heart strain, cardiogenic and septic shock, ischemic and hemorrhagic CVA, s/p PEG on 21, transferred to [facility] for neurosurgical evaluation on 21, hospital course c/b empyema.    Per nursing, patient's Y-port has been leaking. Patient accompanied by family member who also confirmed leakage from Y-port. No leakage from PEG bumper or tract. No fevers/chills, nausea/vomiting.    Allergies:  No Known Allergies    Hospital Medications:  acetaminophen   Tablet .. 650 milliGRAM(s) Oral every 6 hours  albuterol/ipratropium for Nebulization 3 milliLiter(s) Nebulizer every 6 hours PRN  cadexomer iodine 0.9% Gel 1 Application(s) Topical daily  cholecalciferol 400 Unit(s) Oral daily  dextrose 5% + lactated ringers. 1000 milliLiter(s) IV Continuous <Continuous>  doxazosin 2 milliGRAM(s) Oral at bedtime  heparin   Injectable 5000 Unit(s) SubCutaneous every 12 hours  insulin lispro (ADMELOG) corrective regimen sliding scale   SubCutaneous every 6 hours  levETIRAcetam  Solution 500 milliGRAM(s) Oral two times a day  meropenem  IVPB 1000 milliGRAM(s) IV Intermittent every 8 hours  polyethylene glycol 3350 17 Gram(s) Oral daily  senna 2 Tablet(s) Oral at bedtime  thiamine 100 milliGRAM(s) Oral daily      Allergies:   No Known Allergies      PMHX/PSHX:  No pertinent past medical history    No significant past surgical history        Family history:  No pertinent family history in first degree relatives        Denies family history of colon cancer/polyps, stomach cancer/polyps, pancreatic cancer/masses, liver cancer/disease, ovarian cancer and endometrial cancer.    Social History:     Tob: Denies  EtOH: Denies  Illicit Drugs: Denies    ROS:     General:  No wt loss, fevers, chills, night sweats, fatigue  Eyes:  Good vision, no reported pain  ENT:  No sore throat, pain, runny nose, dysphagia  CV:  No pain, palpitations, hypo/hypertension  Pulm:  No dyspnea, cough, tachypnea, wheezing  GI:  No pain, No nausea, No vomiting, No diarrhea, No constipation, No weight loss, No fever, No pruritis, No bright red blood per rectum, No tarry stools, No dysphagia,  :  No pain, bleeding, incontinence, nocturia  Muscle:  No pain, weakness  Neuro:  No weakness, tingling, memory problems  Psych:  No fatigue, insomnia, mood problems, depression  Endocrine:  No polyuria, polydipsia, cold/heat intolerance  Heme:  No petechiae, ecchymosis, easy bruisability  Skin:  No rash, tattoos, scars, edema    PHYSICAL EXAM:     Vital Signs:  Vital Signs Last 24 Hrs  T(C): 36.7 (2021 14:58), Max: 36.7 (2021 14:58)  T(F): 98 (2021 14:58), Max: 98 (2021 14:58)  HR: 95 (2021 14:58) (95 - 100)  BP: 115/73 (2021 14:58) (100/65 - 115/73)  BP(mean): --  RR: 18 (2021 14:58) (18 - 18)  SpO2: 98% (2021 14:58) (96% - 99%)    Daily Weight in k.4 (2021 08:00)    GENERAL:  No acute distress  HEENT:  Normocephalic/atraumatic, no scleral icterus  CHEST: Normal effort, no accessory muscle use  HEART:  Regular rate and rhythm  ABDOMEN:  Soft, non-tender, non-distended, PEG in place c/d/i, bumper loose not touching skin  EXTREMITIES: No cyanosis, clubbing, or edema  SKIN:  No rash/erythema  NEURO:  Alert and oriented x 3    LABS:                        10.3   8.70  )-----------( 564      ( 31 May 2021 07:08 )             33.9           140  |  102  |  40<H>  ----------------------------<  155<H>  4.7   |  25  |  0.88    Ca    10.0      31 May 2021 07:08  Phos  5.0       Mg     2.4                    10.3   8.70  )-----------( 564      ( 31 May 2021 07:08 )             33.9     Imaging:    Reviewed

## 2021-06-01 NOTE — CONSULT NOTE ADULT - CONSULT REQUESTED BY NAME
Greyson Andrea)
Mindy Cabrera
[place]
medicine
Medicine
Dr. Yao
ED
Primary Team
Primary Team
Thoracic Surgery
Dr. Yao
sonja G
Med
Speech and swallow

## 2021-06-01 NOTE — CONSULT NOTE ADULT - CONSULT REASON
AMS
Goals of care
Uncontrolled Type 2 DM w/ hyperglycemia
fever
Concern for empyema
Bilateral toe gangrene
rehab services
FEES
evaluate for possible angiography / embolization
R empyema
bilateral distal digital gangrene
HD and respiratory monitoring
PEG
Leaking PEG Y-Port

## 2021-06-01 NOTE — PROGRESS NOTE ADULT - ATTENDING COMMENTS
Interviewed with Shannon mora .   No complaints, patient feels well.   With leakage from the PEG this AM, awaiting GI eval. Hold NPH while off of tube feeds.   Rest as above.

## 2021-06-01 NOTE — PROGRESS NOTE ADULT - PROBLEM SELECTOR PLAN 1
D5 infusion for now while TF Being held secondary to PEG tube leaking , BG below 100 and trending down , prevent hypoglycemia. Discontinue D5 gtt if BG over 150     test BG q6h  Continue NPH 15units sq q12 HOLD IF TF ARE HELD   Continue Moderate correction scale sq q6h  Can reduce dose to 50% if BG less than 100mg/dl; if reduced dosage ordered ensure Humulin N ordered    Dispo: NPH 15 units sq BID, Admelog moderate correction scale q6h   due to increased activity at rehab may need further adjustments   Outpatient Endocrinology/Podiatry/Optho followup after rehab   Needs outpt endocrine established. Can call Patient Access Services to schedule an appointment. [phone]. or can follow up w/previous Endocrinologist.

## 2021-06-01 NOTE — CONSULT NOTE ADULT - PROVIDER SPECIALTY LIST ADULT
Intervent Radiology
Rehab Medicine
SICU
Thoracic Surgery
Vascular Surgery
ENT
Podiatry
Infectious Disease
Palliative Care
Gastroenterology
Gastroenterology
Intervent Radiology
Pulmonology
Neurology
Endocrinology

## 2021-06-01 NOTE — PROGRESS NOTE ADULT - PROBLEM SELECTOR PLAN 4
-Pulmonary embolus noted on ct scan performed at time of admission  -Etiology of venous thromboembolism uncertain at this time; no known history of thrombophilia; provoked in setting of acute covid illness   - s/p heparin drip held due to recurrent bleed in right obturator and right chest wall hematoma   - 5000 heparin bid for dvt

## 2021-06-01 NOTE — PROGRESS NOTE ADULT - PROBLEM SELECTOR PLAN 3
-pt should be on a statin as he is over 40 with diabetes. Can defer for now given chronic prolonged illness/hospital stay  - check fasting lipids as outpt    Discussed with patient and Dr Al Resident  Pager: [phone]  On evenings and weekends, please call [phone] or page endocrine fellow on call.   Please note that this patient may be followed by different provider tomorrow. If no answer, contact endocrine fellow on call [phone] M-PINEDA 9a-5pm

## 2021-06-01 NOTE — PROGRESS NOTE ADULT - PROBLEM SELECTOR PLAN 3
-Acute ischemic strokes, with concern for hemorrhagic conversion, noted on CT scan on 4/8  -Etiology of acute ischemic stroke uncertain at this time; TTE with bubble performed at bedside in the MICU without any evidence of right-to-left shunt; no arrhythmia noted on telemetry throughout stay in the MICU  - CTA head and neck without any evidence of carotid artery stenosis or dissection    #Urinary Retention   - olson placed  - c/w doxazosin -Acute ischemic strokes, with concern for hemorrhagic conversion, noted on CT scan on 4/8  -Etiology of acute ischemic stroke uncertain at this time; TTE with bubble performed at bedside in the MICU without any evidence of right-to-left shunt; no arrhythmia noted on telemetry throughout stay in the MICU  - CTA head and neck without any evidence of carotid artery stenosis or dissection    #Urinary Retention   - Mann removed 5/30, urinating appropriately.   - c/w doxazosin

## 2021-06-01 NOTE — CONSULT NOTE ADULT - ASSESSMENT
Impression:    61 y/o M w/ hx of DM and COVID-19 infection in 4/2021 c/b hypoxic respiratory failure, PE w/ R heart strain, cardiogenic and septic shock, ischemic and hemorrhagic CVA, s/p PEG on 4/29/21, transferred to [facility] for neurosurgical evaluation on 4/12/21, hospital course c/b empyema.    # Leaking PEG Y-port    Recommendations:  - Y-port replaced  - Ok for meds, water, and feedings  - Rest of care per primary team    Sulaiman Claros MD  Gastroenterology Fellow  Please contact via [employer] Teams  Pager number:   [phone] (Long range pager)  [phone] ([facility] pager)  Pageable via [place] [facility] and [employer]. Select Tools --> On Call [facility] --> Search for name    Please call LUNA ([phone]) or e-mail [email or web address] if there are any additional questions or concerns, during weekdays from 8 am to 5 pm.     Please call answering service for on-call GI fellow ([phone]) after 5pm and before 8am, and on weekends.

## 2021-06-01 NOTE — PROGRESS NOTE ADULT - SUBJECTIVE AND OBJECTIVE BOX
**********************************************  Martha Guilloryed, PGY-1  Internal Medicine   [facility] Pager #: [phone]  **********************************************     Patient is a 60y old  Male who presents with a chief complaint of COVID19 w/ severe metabolic acidosis s/p intubation (31 May 2021 06:49)    SUBJECTIVE / OVERNIGHT EVENTS:     OBJECTIVE:  Vital Signs Last 24 Hrs  T(C): 36.5 (01 Jun 2021 04:48), Max: 36.7 (31 May 2021 12:14)  T(F): 97.7 (01 Jun 2021 04:48), Max: 98.1 (31 May 2021 12:14)  HR: 100 (01 Jun 2021 04:48) (96 - 100)  BP: 100/65 (01 Jun 2021 04:48) (100/65 - 117/71)  RR: 18 (01 Jun 2021 04:48) (18 - 18)  SpO2: 97% (01 Jun 2021 04:48) (96% - 99%)    I&O's Summary    30 May 2021 07:01  -  31 May 2021 07:00  --------------------------------------------------------  IN: 2570 mL / OUT: 0 mL / NET: 2570 mL    31 May 2021 07:01  -  01 Jun 2021 06:33  --------------------------------------------------------  IN: 740 mL / OUT: 0 mL / NET: 740 mL      Physical Examination:      Labs:  CAPILLARY BLOOD GLUCOSE  POCT Blood Glucose.: 187 mg/dL (01 Jun 2021 06:00)  POCT Blood Glucose.: 113 mg/dL (31 May 2021 23:54)  POCT Blood Glucose.: 176 mg/dL (31 May 2021 17:00)  POCT Blood Glucose.: 165 mg/dL (31 May 2021 12:22)                          10.3   8.70  )-----------( 564      ( 31 May 2021 07:08 )             33.9     05-31    140  |  102  |  40<H>  ----------------------------<  155<H>  4.7   |  25  |  0.88    Ca    10.0      31 May 2021 07:08  Phos  5.0     05-31  Mg     2.4     05-31        Imaging Personally Reviewed:      MEDICATIONS  (STANDING):  acetaminophen   Tablet .. 650 milliGRAM(s) Oral every 6 hours  cadexomer iodine 0.9% Gel 1 Application(s) Topical daily  cholecalciferol 400 Unit(s) Oral daily  doxazosin 2 milliGRAM(s) Oral at bedtime  heparin   Injectable 5000 Unit(s) SubCutaneous every 12 hours  insulin lispro (ADMELOG) corrective regimen sliding scale   SubCutaneous every 6 hours  insulin NPH human recombinant 15 Unit(s) SubCutaneous <User Schedule>  levETIRAcetam  Solution 500 milliGRAM(s) Oral two times a day  meropenem  IVPB 1000 milliGRAM(s) IV Intermittent every 8 hours  polyethylene glycol 3350 17 Gram(s) Oral daily  senna 2 Tablet(s) Oral at bedtime  thiamine 100 milliGRAM(s) Oral daily    MEDICATIONS  (PRN):  albuterol/ipratropium for Nebulization 3 milliLiter(s) Nebulizer every 6 hours PRN Shortness of Breath and/or Wheezing   **********************************************  Martha Al, PGY-1  Internal Medicine   [facility] Pager #: [phone]  **********************************************     Patient is a 60y old  Male who presents with a chief complaint of COVID19 w/ severe metabolic acidosis s/p intubation (31 May 2021 06:49)    SUBJECTIVE / OVERNIGHT EVENTS:   -Overnight, pt's PEG tube was leaking at the site of the feeding port, tube feeds were held   -Pt examined at bedside this AM, well-appearing with no subjective complaints  -Denies CP, palpitations, SOB, n/v/d, abdominal pain  -He's alert and oriented    OBJECTIVE:  Vital Signs Last 24 Hrs  T(C): 36.5 (01 Jun 2021 04:48), Max: 36.7 (31 May 2021 12:14)  T(F): 97.7 (01 Jun 2021 04:48), Max: 98.1 (31 May 2021 12:14)  HR: 100 (01 Jun 2021 04:48) (96 - 100)  BP: 100/65 (01 Jun 2021 04:48) (100/65 - 117/71)  RR: 18 (01 Jun 2021 04:48) (18 - 18)  SpO2: 97% (01 Jun 2021 04:48) (96% - 99%)    I&O's Summary    30 May 2021 07:01  -  31 May 2021 07:00  --------------------------------------------------------  IN: 2570 mL / OUT: 0 mL / NET: 2570 mL    31 May 2021 07:01  -  01 Jun 2021 06:33  --------------------------------------------------------  IN: 740 mL / OUT: 0 mL / NET: 740 mL      Physical Examination:    General: No acute distress, well-appearing    Eyes: PERRL, EOMI     ENT: MMM, no oropharyngeal lesions or erythema appreciated     Pulm: No increased WOB. CTAB. No wheezing.     CV: RRR. S1&S2+. No M/R/G appreciated.     Abdomen: +BS. Soft, NTND. No organomegaly. Peg tube insertion site c/d/i    MSK: Nml ROM    Extremities: No peripheral edema or cyanosis.     Neuro: A&Ox2-3, no focal deficits     Skin: Warm and dry. No visible rash.     Labs:  CAPILLARY BLOOD GLUCOSE  POCT Blood Glucose.: 187 mg/dL (01 Jun 2021 06:00)  POCT Blood Glucose.: 113 mg/dL (31 May 2021 23:54)  POCT Blood Glucose.: 176 mg/dL (31 May 2021 17:00)  POCT Blood Glucose.: 165 mg/dL (31 May 2021 12:22)                          10.3   8.70  )-----------( 564      ( 31 May 2021 07:08 )             33.9     05-31    140  |  102  |  40<H>  ----------------------------<  155<H>  4.7   |  25  |  0.88    Ca    10.0      31 May 2021 07:08  Phos  5.0     05-31  Mg     2.4     05-31        Imaging Personally Reviewed:      MEDICATIONS  (STANDING):  acetaminophen   Tablet .. 650 milliGRAM(s) Oral every 6 hours  cadexomer iodine 0.9% Gel 1 Application(s) Topical daily  cholecalciferol 400 Unit(s) Oral daily  doxazosin 2 milliGRAM(s) Oral at bedtime  heparin   Injectable 5000 Unit(s) SubCutaneous every 12 hours  insulin lispro (ADMELOG) corrective regimen sliding scale   SubCutaneous every 6 hours  insulin NPH human recombinant 15 Unit(s) SubCutaneous <User Schedule>  levETIRAcetam  Solution 500 milliGRAM(s) Oral two times a day  meropenem  IVPB 1000 milliGRAM(s) IV Intermittent every 8 hours  polyethylene glycol 3350 17 Gram(s) Oral daily  senna 2 Tablet(s) Oral at bedtime  thiamine 100 milliGRAM(s) Oral daily    MEDICATIONS  (PRN):  albuterol/ipratropium for Nebulization 3 milliLiter(s) Nebulizer every 6 hours PRN Shortness of Breath and/or Wheezing

## 2021-06-01 NOTE — PROGRESS NOTE ADULT - ASSESSMENT
61 yo M with PMH of DM who was initially admitted to [facility] ICU 4/1 for hypoxic respiratory failure 2/2 COVID c/b PE with R heart strain, cardiogenic and septic shock, ischemic and hemorrhagic CVA and ESBL klebsiella ventilator associated PNA, transferred to [facility] for neurosurgery eval on 4/12  No fever, had leukocytosis  UCX with ESBL K pne  Bilateral LE dry gangrene  CT now with Empyema, concern for bilateral PNA, abscess  S/p OR Thoracotomy, abscess drainage, bronch (5/12)--culture with K pne  CT with areas of small cavitary lesions and hematomas  Repeat CT chest generally reassuring although still R hydropneumothorax and hematoma  1) Leukocytosis  - Improving, stable  - Trend WBC  2) COVID  - COVID+, unclear significance (was prior negative, and patient Ab+)  - Monitor for any signs active COVID/reinfection  - Any considerations isolation per infection control  3) Suspected Empyema/Abscess  - Meropenem 1g q 8--continue while inpatient  - Note CT findings, follow thoracic if any further procedure needed for hematoma/hydropneumothorax  - When DC planning, would send out on Bactrim DS 2 tabs TID + Augmentin 875mg po q 12, through 6/8/21  - Would repeat imaging following antibiotic completion to ensure reassuring findings  4) Gangrene  - Appears noninfectious/dry gangrene  - Monitor wounds    Signing off. Please call with further questions or change in status.    Sammy Chambers MD  Pager [phone]  After 5pm and on weekends call [phone]

## 2021-06-01 NOTE — PROGRESS NOTE ADULT - PA/NP ONLY VISIT
ACP only visit

## 2021-06-01 NOTE — PROGRESS NOTE ADULT - PROBLEM SELECTOR PLAN 5
- Type II diabetes mellitus noted; a1c noted to be greater than 10   - On insulin sliding scale with q6 NPH   - Monitor fingersticks while on tube feeds  - endocrine recs appreciated for uncontrolled DM  - Needs outpt endocrine established. Can call Patient Access Services to schedule an appointment. [phone]. or can follow up w/previous Endocrinologist.   - Adjust  NPH 15U BID and continue low correction scale sq q6h; continue this regimen on discharge to rehab  - FS q6

## 2021-06-02 LAB
GLUCOSE BLDC GLUCOMTR-MCNC: 123 MG/DL — HIGH (ref 70–99)
GLUCOSE BLDC GLUCOMTR-MCNC: 127 MG/DL — HIGH (ref 70–99)
GLUCOSE BLDC GLUCOMTR-MCNC: 159 MG/DL — HIGH (ref 70–99)
GLUCOSE BLDC GLUCOMTR-MCNC: 97 MG/DL — SIGNIFICANT CHANGE UP (ref 70–99)
SARS-COV-2 RNA SPEC QL NAA+PROBE: SIGNIFICANT CHANGE UP

## 2021-06-02 PROCEDURE — 99232 SBSQ HOSP IP/OBS MODERATE 35: CPT | Mod: GC

## 2021-06-02 PROCEDURE — 99232 SBSQ HOSP IP/OBS MODERATE 35: CPT

## 2021-06-02 RX ORDER — DOXAZOSIN MESYLATE 4 MG
1 TABLET ORAL
Qty: 0 | Refills: 0 | DISCHARGE
Start: 2021-06-02

## 2021-06-02 RX ORDER — HUMAN INSULIN 100 [IU]/ML
12 INJECTION, SUSPENSION SUBCUTANEOUS
Qty: 0 | Refills: 0 | DISCHARGE
Start: 2021-06-02

## 2021-06-02 RX ORDER — ACETAMINOPHEN 500 MG
2 TABLET ORAL
Qty: 0 | Refills: 0 | DISCHARGE
Start: 2021-06-02

## 2021-06-02 RX ORDER — LEVETIRACETAM 250 MG/1
5 TABLET, FILM COATED ORAL
Qty: 0 | Refills: 0 | DISCHARGE
Start: 2021-06-02

## 2021-06-02 RX ORDER — INSULIN LISPRO 100/ML
0 VIAL (ML) SUBCUTANEOUS
Qty: 0 | Refills: 0 | DISCHARGE
Start: 2021-06-02

## 2021-06-02 RX ORDER — CHOLECALCIFEROL (VITAMIN D3) 125 MCG
400 CAPSULE ORAL
Qty: 0 | Refills: 0 | DISCHARGE
Start: 2021-06-02

## 2021-06-02 RX ORDER — IPRATROPIUM/ALBUTEROL SULFATE 18-103MCG
3 AEROSOL WITH ADAPTER (GRAM) INHALATION
Qty: 0 | Refills: 0 | DISCHARGE
Start: 2021-06-02

## 2021-06-02 RX ORDER — HEPARIN SODIUM 5000 [USP'U]/ML
5000 INJECTION INTRAVENOUS; SUBCUTANEOUS
Qty: 0 | Refills: 0 | DISCHARGE
Start: 2021-06-02

## 2021-06-02 RX ORDER — CADEXOMER IODINE 0.9 %
1 PADS, MEDICATED (EA) TOPICAL
Qty: 0 | Refills: 0 | DISCHARGE
Start: 2021-06-02

## 2021-06-02 RX ORDER — POLYETHYLENE GLYCOL 3350 17 G/17G
17 POWDER, FOR SOLUTION ORAL
Qty: 0 | Refills: 0 | DISCHARGE
Start: 2021-06-02

## 2021-06-02 RX ORDER — SENNA PLUS 8.6 MG/1
2 TABLET ORAL
Qty: 0 | Refills: 0 | DISCHARGE
Start: 2021-06-02

## 2021-06-02 RX ORDER — HUMAN INSULIN 100 [IU]/ML
15 INJECTION, SUSPENSION SUBCUTANEOUS
Qty: 0 | Refills: 0 | DISCHARGE
Start: 2021-06-02

## 2021-06-02 RX ORDER — INSULIN LISPRO 100/ML
VIAL (ML) SUBCUTANEOUS EVERY 6 HOURS
Refills: 0 | Status: DISCONTINUED | OUTPATIENT
Start: 2021-06-02 | End: 2021-06-04

## 2021-06-02 RX ADMIN — HUMAN INSULIN 15 UNIT(S): 100 INJECTION, SUSPENSION SUBCUTANEOUS at 18:15

## 2021-06-02 RX ADMIN — Medication 650 MILLIGRAM(S): at 00:41

## 2021-06-02 RX ADMIN — Medication 400 UNIT(S): at 13:13

## 2021-06-02 RX ADMIN — MEROPENEM 100 MILLIGRAM(S): 1 INJECTION INTRAVENOUS at 13:12

## 2021-06-02 RX ADMIN — LEVETIRACETAM 500 MILLIGRAM(S): 250 TABLET, FILM COATED ORAL at 05:03

## 2021-06-02 RX ADMIN — Medication 650 MILLIGRAM(S): at 06:00

## 2021-06-02 RX ADMIN — Medication 650 MILLIGRAM(S): at 13:13

## 2021-06-02 RX ADMIN — HUMAN INSULIN 15 UNIT(S): 100 INJECTION, SUSPENSION SUBCUTANEOUS at 06:38

## 2021-06-02 RX ADMIN — MEROPENEM 100 MILLIGRAM(S): 1 INJECTION INTRAVENOUS at 21:32

## 2021-06-02 RX ADMIN — HEPARIN SODIUM 5000 UNIT(S): 5000 INJECTION INTRAVENOUS; SUBCUTANEOUS at 18:14

## 2021-06-02 RX ADMIN — MEROPENEM 100 MILLIGRAM(S): 1 INJECTION INTRAVENOUS at 05:03

## 2021-06-02 RX ADMIN — Medication 2 MILLIGRAM(S): at 21:32

## 2021-06-02 RX ADMIN — Medication 650 MILLIGRAM(S): at 18:14

## 2021-06-02 RX ADMIN — Medication 650 MILLIGRAM(S): at 00:01

## 2021-06-02 RX ADMIN — Medication 650 MILLIGRAM(S): at 13:43

## 2021-06-02 RX ADMIN — Medication 650 MILLIGRAM(S): at 05:03

## 2021-06-02 RX ADMIN — LEVETIRACETAM 500 MILLIGRAM(S): 250 TABLET, FILM COATED ORAL at 18:14

## 2021-06-02 RX ADMIN — Medication 1 APPLICATION(S): at 13:14

## 2021-06-02 RX ADMIN — HEPARIN SODIUM 5000 UNIT(S): 5000 INJECTION INTRAVENOUS; SUBCUTANEOUS at 05:04

## 2021-06-02 NOTE — PROGRESS NOTE ADULT - PROBLEM SELECTOR PLAN 3
-Acute ischemic strokes, with concern for hemorrhagic conversion, noted on CT scan on 4/8  -Etiology of acute ischemic stroke uncertain at this time; TTE with bubble performed at bedside in the MICU without any evidence of right-to-left shunt; no arrhythmia noted on telemetry throughout stay in the MICU  - CTA head and neck without any evidence of carotid artery stenosis or dissection    #Urinary Retention   - Mann removed 5/30, urinating appropriately.   - c/w doxazosin

## 2021-06-02 NOTE — PROGRESS NOTE ADULT - SUBJECTIVE AND OBJECTIVE BOX
**********************************************  Martha Guilloryed, PGY-1  Internal Medicine   [facility] Pager #: [phone]  **********************************************     Patient is a 60y old  Male who presents with a chief complaint of COVID19 w/ severe metabolic acidosis s/p intubation (01 Jun 2021 16:40)    SUBJECTIVE / OVERNIGHT EVENTS:     OBJECTIVE:  Vital Signs Last 24 Hrs  T(C): 36.4 (02 Jun 2021 04:37), Max: 36.7 (01 Jun 2021 14:58)  T(F): 97.6 (02 Jun 2021 04:37), Max: 98 (01 Jun 2021 14:58)  HR: 92 (02 Jun 2021 04:37) (88 - 97)  BP: 116/61 (02 Jun 2021 04:37) (107/68 - 116/61)  RR: 18 (02 Jun 2021 04:37) (18 - 18)  SpO2: 98% (02 Jun 2021 04:37) (96% - 99%)    I&O's Summary    01 Jun 2021 07:01  -  02 Jun 2021 07:00  --------------------------------------------------------  IN: 1270 mL / OUT: 300 mL / NET: 970 mL      Physical Examination:      Labs:  CAPILLARY BLOOD GLUCOSE  POCT Blood Glucose.: 123 mg/dL (02 Jun 2021 06:37)  POCT Blood Glucose.: 106 mg/dL (01 Jun 2021 23:44)  POCT Blood Glucose.: 95 mg/dL (01 Jun 2021 17:19)  POCT Blood Glucose.: 83 mg/dL (01 Jun 2021 12:16)        Imaging Personally Reviewed:      MEDICATIONS  (STANDING):  acetaminophen   Tablet .. 650 milliGRAM(s) Oral every 6 hours  cadexomer iodine 0.9% Gel 1 Application(s) Topical daily  cholecalciferol 400 Unit(s) Oral daily  doxazosin 2 milliGRAM(s) Oral at bedtime  heparin   Injectable 5000 Unit(s) SubCutaneous every 12 hours  insulin lispro (ADMELOG) corrective regimen sliding scale   SubCutaneous every 6 hours  insulin NPH human recombinant 15 Unit(s) SubCutaneous <User Schedule>  levETIRAcetam  Solution 500 milliGRAM(s) Oral two times a day  meropenem  IVPB 1000 milliGRAM(s) IV Intermittent every 8 hours  polyethylene glycol 3350 17 Gram(s) Oral daily  senna 2 Tablet(s) Oral at bedtime  thiamine 100 milliGRAM(s) Oral daily    MEDICATIONS  (PRN):  albuterol/ipratropium for Nebulization 3 milliLiter(s) Nebulizer every 6 hours PRN Shortness of Breath and/or Wheezing   **********************************************  Martha Al, PGY-1  Internal Medicine   [facility] Pager #: [phone]  **********************************************     Patient is a 60y old  Male who presents with a chief complaint of COVID19 w/ severe metabolic acidosis s/p intubation (01 Jun 2021 16:40)    SUBJECTIVE / OVERNIGHT EVENTS:   -No acute events overnight  -Pt examined at bedside this AM, with no subjective complaints - PEG tube functioning well   -Denies CP, palpitations, SOB, n/v/d     OBJECTIVE:  Vital Signs Last 24 Hrs  T(C): 36.4 (02 Jun 2021 04:37), Max: 36.7 (01 Jun 2021 14:58)  T(F): 97.6 (02 Jun 2021 04:37), Max: 98 (01 Jun 2021 14:58)  HR: 92 (02 Jun 2021 04:37) (88 - 97)  BP: 116/61 (02 Jun 2021 04:37) (107/68 - 116/61)  RR: 18 (02 Jun 2021 04:37) (18 - 18)  SpO2: 98% (02 Jun 2021 04:37) (96% - 99%)    I&O's Summary    01 Jun 2021 07:01  -  02 Jun 2021 07:00  --------------------------------------------------------  IN: 1270 mL / OUT: 300 mL / NET: 970 mL      Physical Examination:    General: No acute distress, well-appearing    Eyes: PERRL, EOMI     ENT: MMM, no oropharyngeal lesions or erythema appreciated     Pulm: No increased WOB. CTAB. No wheezing.     CV: RRR. S1&S2+. No M/R/G appreciated.     Abdomen: +BS. Soft, NTND. No organomegaly.     MSK: Nml ROM    Extremities: No peripheral edema or cyanosis.     Neuro: A&Ox3, no focal deficits     Skin: Warm and dry. No visible rash.     Labs:  CAPILLARY BLOOD GLUCOSE  POCT Blood Glucose.: 123 mg/dL (02 Jun 2021 06:37)  POCT Blood Glucose.: 106 mg/dL (01 Jun 2021 23:44)  POCT Blood Glucose.: 95 mg/dL (01 Jun 2021 17:19)  POCT Blood Glucose.: 83 mg/dL (01 Jun 2021 12:16)        Imaging Personally Reviewed:      MEDICATIONS  (STANDING):  acetaminophen   Tablet .. 650 milliGRAM(s) Oral every 6 hours  cadexomer iodine 0.9% Gel 1 Application(s) Topical daily  cholecalciferol 400 Unit(s) Oral daily  doxazosin 2 milliGRAM(s) Oral at bedtime  heparin   Injectable 5000 Unit(s) SubCutaneous every 12 hours  insulin lispro (ADMELOG) corrective regimen sliding scale   SubCutaneous every 6 hours  insulin NPH human recombinant 15 Unit(s) SubCutaneous <User Schedule>  levETIRAcetam  Solution 500 milliGRAM(s) Oral two times a day  meropenem  IVPB 1000 milliGRAM(s) IV Intermittent every 8 hours  polyethylene glycol 3350 17 Gram(s) Oral daily  senna 2 Tablet(s) Oral at bedtime  thiamine 100 milliGRAM(s) Oral daily    MEDICATIONS  (PRN):  albuterol/ipratropium for Nebulization 3 milliLiter(s) Nebulizer every 6 hours PRN Shortness of Breath and/or Wheezing

## 2021-06-02 NOTE — PROGRESS NOTE ADULT - NUTRITIONAL ASSESSMENT
This patient has been assessed with a concern for Malnutrition and has been determined to have a diagnosis/diagnoses of Severe protein-calorie malnutrition.    This patient is being managed with:   Diet NPO with Tube Feed-  Tube Feeding Modality: Gastrostomy  Glucerna 1.2 Kaden (GLUCERNARTH)  Total Volume for 24 Hours (mL): 1680  Continuous  Starting Tube Feed Rate {mL per Hour}: 20  Increase Tube Feed Rate by (mL): 20     Every 2 hours  Until Goal Tube Feed Rate (mL per Hour): 70  Tube Feed Duration (in Hours): 24  Tube Feed Start Time: 20:30  Entered: Jun 1 2021  3:53PM

## 2021-06-02 NOTE — CHART NOTE - NSCHARTNOTEFT_GEN_A_CORE
Nutrition Follow Up Note  Patient seen for: malnutrition follow up    Chart reviewed, events noted. 59yo male with PMH of DM who was initially admitted to [facility] ICU for hypoxic respiratory failure 2/2 COVID c/b PE with R heart strain, cardiogenic and septic shock, ischemic and hemorrhagic CVA and ESBL klebsiella ventilator associate PNA, transferred to [facility] for neurosurgery eval and further management. s/p PEG, now monitoring for refeeding syndrome. Course c/b right empyema s/p VATs and obturator bleed (resolved). Now pending acute rehab.   -- PEG leaking  - resolved.     Source: [x] Patient       [x] EMR        [x] RN    Pt [place] speaking,  services utilized ID# [id number]    Diet Order:   Diet, NPO with Tube Feed:   Tube Feeding Modality: Gastrostomy  Glucerna 1.2 Kaden (GLUCERNARTH)  Total Volume for 24 Hours (mL): 1680  Continuous  Starting Tube Feed Rate {mL per Hour}: 20  Increase Tube Feed Rate by (mL): 20     Every 2 hours  Until Goal Tube Feed Rate (mL per Hour): 70  Tube Feed Duration (in Hours): 24  Tube Feed Start Time: 20:30 (21)    - Is current order appropriate/adequate? [x] Yes  []  No:   - Current enteral nutrition regimen provides 2016 kcal, 100.8 g protein, 1352 ml water. Provides ~30 kcal/kg, 1.5 g/kg protein based on dosing wt 66.2 kg.   - Observed tube feeds running at bedside (Glucerna 1.2 @ 70 ml/hr)  - Pt denies nausea, vomiting, diarrhea, or constipation. Denies abdominal discomfort or distention. Pt with no questions at this time regarding tube feeding.    GI:  Last BM 6/2 x 1.   Bowel Regimen? [x] Yes   [] No    Weights:   Daily Weight in k.9 (-), Weight in k.4 (), Weight in k (), Weight in k.9 (), Weight in k.1 (30), Weight in k.5 (-29), Weight in k.6 (-)  weight fluctuations noted - will continue to monitor     Nutritionally Pertinent MEDICATIONS  (STANDING):  cholecalciferol  doxazosin  insulin lispro (ADMELOG) corrective regimen sliding scale  insulin NPH human recombinant  meropenem  IVPB  polyethylene glycol 3350  senna    Pertinent Labs:   A1C with Estimated Average Glucose Result: 10.3 % (21 @ 14:28)    Finger Sticks:  POCT Blood Glucose.: 127 mg/dL ( @ 12:17)  POCT Blood Glucose.: 159 mg/dL ( @ 08:17)  POCT Blood Glucose.: 123 mg/dL ( @ 06:37)  POCT Blood Glucose.: 106 mg/dL ( @ 23:44)    Skin per nursing documentation: no pressure injuries noted  Edema: No noted edema as per flow sheets.     Estimated Needs:   Based on Dosing wt 145.9 lb/66.1 kg  Energy (28-32 kcals/kg): 5978-2567  Protein (1.2-1.6 g pro/kg): 79..76    Previous Nutrition Diagnosis: Severe Malnutrition  Nutrition Diagnosis is: [x] ongoing  [] resolved [] not applicable     New Nutrition Diagnosis: [x] Not applicable    Nutrition Care Plan:  [] In Progress  [x] Achieved with PEG feeding  [] Not applicable    Nutrition Interventions:     Recommendations:      1) Continue current enteral nutrition regimen: Glucerna 1.2 via PEG at goal rate 70 ml/hr x 24 hours - at goal provides 2016 kcal, 100.8 g protein, 1352 ml water. Provides ~30 kcal/kg, 1.5 g/kg protein based on dosing wt 66.2 kg. Defer free water flushes to team. RD remains available to adjust tube feed regimen PRN.   2) Continue to monitor and replete electrolytes PRN   3) RD remains available to adjust TF regimen/formulary as needed/PRN.    Monitoring and Evaluation:   Continue to monitor nutritional intake, tolerance to diet prescription, weights, labs, skin integrity    RD remains available upon request and will follow up per protocol    Vania Dorsey MS, RD, CDN Pager# [phone]

## 2021-06-02 NOTE — PROGRESS NOTE ADULT - PROBLEM SELECTOR PLAN 5
- Type II diabetes mellitus noted; a1c noted to be greater than 10   - On insulin sliding scale with q6 NPH   - Monitor fingersticks while on tube feeds  - endocrine recs appreciated for uncontrolled DM  - Needs outpt endocrine established. Can call Patient Access Services to schedule an appointment. [phone]. or can follow up w/previous Endocrinologist.   - Adjust  NPH 15U BID and continue low correction scale sq q6h; continue this regimen on discharge to rehab  - FS q6

## 2021-06-02 NOTE — PROGRESS NOTE ADULT - SUBJECTIVE AND OBJECTIVE BOX
60y Male was admitted on 04-12    Patient is a 60y old  Male who presents with a chief complaint of COVID19 w/ severe metabolic acidosis s/p intubation (21 Apr 2021 12:02)    HPI:  Patient seen and examined.  Feeling stronger.  Min A transfers, CG gait    MEDICATIONS  (STANDING):  acetaminophen   Tablet .. 650 milliGRAM(s) Oral every 6 hours  cadexomer iodine 0.9% Gel 1 Application(s) Topical daily  cholecalciferol 400 Unit(s) Oral daily  doxazosin 2 milliGRAM(s) Oral at bedtime  heparin   Injectable 5000 Unit(s) SubCutaneous every 12 hours  insulin lispro (ADMELOG) corrective regimen sliding scale   SubCutaneous every 6 hours  insulin NPH human recombinant 15 Unit(s) SubCutaneous <User Schedule>  levETIRAcetam  Solution 500 milliGRAM(s) Oral two times a day  meropenem  IVPB 1000 milliGRAM(s) IV Intermittent every 8 hours  polyethylene glycol 3350 17 Gram(s) Oral daily  senna 2 Tablet(s) Oral at bedtime    MEDICATIONS  (PRN):  albuterol/ipratropium for Nebulization 3 milliLiter(s) Nebulizer every 6 hours PRN Shortness of Breath and/or Wheezing    Vital Signs Last 24 Hrs  T(C): 36.4 (02 Jun 2021 04:37), Max: 36.7 (01 Jun 2021 14:58)  T(F): 97.6 (02 Jun 2021 04:37), Max: 98 (01 Jun 2021 14:58)  HR: 92 (02 Jun 2021 04:37) (88 - 97)  BP: 116/61 (02 Jun 2021 04:37) (107/68 - 116/61)  BP(mean): --  RR: 18 (02 Jun 2021 04:37) (18 - 18)  SpO2: 98% (02 Jun 2021 04:37) (96% - 99%)    PHYSICAL EXAM  Constitutional - NAD,   HEENT - NCAT,   Chest - Breathing comfortably, No wheezing  Abdomen - Soft   Extremities - bilateral digital toe ischemia  Neurologic Exam -                 Alert  Follows verbal instruction w slow processing     Motor 4+/5 bl UE and LEs  Psychiatric - flat    ASSESSMENT/PLAN    60 year old man with prolonged hospitalization for COVID-19, DKA, PE, and CVA with hemorrhagic conversion and fevers suspected 2/2 to aspiration PNA (klebsiella), dysphagia    Disposition -  PT - ROM, Bed Mob, Transfers, Amb with AD   OT - ADLs, ROM  SLP - Dysphagia eval and treat  Prec- Falls, Pulm, cardiac  DVT px- Heparin  Skin - Turn Q2hrs  Dispo- When medically stable will need Acute Rehab- can tolerate 3h/d of therapies and requires daily physician visits

## 2021-06-02 NOTE — PROGRESS NOTE ADULT - PROBLEM SELECTOR PLAN 1
ESBL Klebsiella PNA c/b empyema 5/12 he underwent right VATS converted to open L thoracotomy, decortication, drainage of abscess and flex bronch. Pt briefly on lexi intraop and extubated in OR.   - s/p 1 angled chest tube , 1 straight chest tube, 1 [place] drain (anterior) --> all removed  - Out of bed to chair, ambulate as tolerated, and incentive spirometry to prevent atelectasis  - Pain control as needed with tylenol   - c/w meropenem 5/7 until discharge  - When DC planning, would send out on Bactrim DS 2 tabs TID + Augmentin 875mg po q12 until 6/8/21  - ID following, recs appreciated - needs close follow up in ID clinic for repeat imaging, Dr. Chambers  - CT Chest 5/29 with improvement in bilateral opacities. Slight decrease in right chest wall hematoma. Stable loculated right hydropneumothorax.     #Empyema  OR Vats 5/12 with Dr. Albarado with all chest tubes removed by 5/18  CT Chest 5/16 Interval new small cavitary lesions in the right lower lobe and right chest wall hematoma - no thoracic surgery intervention indicated per Dr. Albarado.    #Obturator Hematoma (stable)  - bleeding hematoma in right thigh on CT on 5/7  - CT hip showing non active bleed, hematoma on right thigh on 5/8  - doppler to r/o DVT in LE, per IR no role IVC filter  - active again on 5/17, now stable  - was on heparin drip on and off, now off. C/w DVT ppx heparin bid

## 2021-06-02 NOTE — PROGRESS NOTE ADULT - ATTENDING COMMENTS
discussed above plan with resident on rounds. still having pain on right chest wall - overall improved  labs and imaging reviewed.  I agree with the above findings, assessment, and plan with the following additions and exceptions:  AHRF/PNA/empyema s/p thoracotomy 5/12 with decortication and abscess drainage on meropenem (plan for discharge on bactrim and augmentin) - ID following  dysphagia s/p peg - failed MBS 5/26 - c/w PEG feeds - no leakage today. GI consult appreciated  DM2 - Fs better controlled - Endocrine f/u appreciated - c/w NPH and admelog ISS for coverage  PE likely provoked - post covid infection thrombosis - LE dopplers negative for DVT, pulm f/u appreciated - will continue to hold full dose a/c given bleeding risk - +obturator hematoma and Rchest wall hematoma  c/w dvt proph hsq - h/h stable - cont to monitor   PMR consult appreciated - plan for Acute rehab tomorrow  patient with multiple co-morbid conditions; higher risk for future complications despite optimal medical therapy - discussed with family at bedside  discharge time - 38 minutes .

## 2021-06-02 NOTE — PROGRESS NOTE ADULT - PROBLEM SELECTOR PLAN 10
- DVT: HSQ  - Diet: NPO with tube feedings - held until peg tube can be evaluated by GI   - Full code  - Dispo- acute rehab potential today. COVID PCR neg. - DVT: HSQ  - Diet: NPO with tube feedings   - Full code  - Dispo- acute rehab potentially today. COVID PCR neg.

## 2021-06-02 NOTE — PROGRESS NOTE ADULT - ASSESSMENT
61yo male with PMH of DM who was initially admitted to [facility] ICU for hypoxic respiratory failure 2/2 COVID c/b PE with R heart strain, cardiogenic and septic shock, ischemic and hemorrhagic CVA and ESBL klebsiella ventilator associate PNA, transferred to [facility] for neurosurgery eval and further management. s/p PEG, now monitoring for refeeding syndrome. Course c/b right empyema s/p VATs and obturator bleed (resolved). Now pending acute rehab placement.

## 2021-06-03 LAB
GLUCOSE BLDC GLUCOMTR-MCNC: 133 MG/DL — HIGH (ref 70–99)
GLUCOSE BLDC GLUCOMTR-MCNC: 162 MG/DL — HIGH (ref 70–99)
GLUCOSE BLDC GLUCOMTR-MCNC: 167 MG/DL — HIGH (ref 70–99)

## 2021-06-03 PROCEDURE — 99232 SBSQ HOSP IP/OBS MODERATE 35: CPT | Mod: GC

## 2021-06-03 RX ORDER — HUMAN INSULIN 100 [IU]/ML
12 INJECTION, SUSPENSION SUBCUTANEOUS
Refills: 0 | Status: DISCONTINUED | OUTPATIENT
Start: 2021-06-03 | End: 2021-06-04

## 2021-06-03 RX ORDER — HUMAN INSULIN 100 [IU]/ML
14 INJECTION, SUSPENSION SUBCUTANEOUS
Refills: 0 | Status: DISCONTINUED | OUTPATIENT
Start: 2021-06-03 | End: 2021-06-03

## 2021-06-03 RX ADMIN — SENNA PLUS 2 TABLET(S): 8.6 TABLET ORAL at 01:52

## 2021-06-03 RX ADMIN — LEVETIRACETAM 500 MILLIGRAM(S): 250 TABLET, FILM COATED ORAL at 05:06

## 2021-06-03 RX ADMIN — LEVETIRACETAM 500 MILLIGRAM(S): 250 TABLET, FILM COATED ORAL at 17:35

## 2021-06-03 RX ADMIN — MEROPENEM 100 MILLIGRAM(S): 1 INJECTION INTRAVENOUS at 05:06

## 2021-06-03 RX ADMIN — HUMAN INSULIN 12 UNIT(S): 100 INJECTION, SUSPENSION SUBCUTANEOUS at 17:34

## 2021-06-03 RX ADMIN — MEROPENEM 100 MILLIGRAM(S): 1 INJECTION INTRAVENOUS at 20:11

## 2021-06-03 RX ADMIN — Medication 2 MILLIGRAM(S): at 21:18

## 2021-06-03 RX ADMIN — Medication 650 MILLIGRAM(S): at 05:07

## 2021-06-03 RX ADMIN — Medication 2: at 17:34

## 2021-06-03 RX ADMIN — Medication 2: at 06:52

## 2021-06-03 RX ADMIN — HUMAN INSULIN 15 UNIT(S): 100 INJECTION, SUSPENSION SUBCUTANEOUS at 06:52

## 2021-06-03 RX ADMIN — HEPARIN SODIUM 5000 UNIT(S): 5000 INJECTION INTRAVENOUS; SUBCUTANEOUS at 05:06

## 2021-06-03 RX ADMIN — Medication 650 MILLIGRAM(S): at 12:55

## 2021-06-03 RX ADMIN — Medication 650 MILLIGRAM(S): at 00:50

## 2021-06-03 RX ADMIN — Medication 650 MILLIGRAM(S): at 00:27

## 2021-06-03 RX ADMIN — Medication 650 MILLIGRAM(S): at 17:34

## 2021-06-03 RX ADMIN — Medication 1 APPLICATION(S): at 12:56

## 2021-06-03 RX ADMIN — MEROPENEM 100 MILLIGRAM(S): 1 INJECTION INTRAVENOUS at 13:06

## 2021-06-03 RX ADMIN — Medication 650 MILLIGRAM(S): at 06:05

## 2021-06-03 RX ADMIN — Medication 400 UNIT(S): at 12:56

## 2021-06-03 RX ADMIN — HEPARIN SODIUM 5000 UNIT(S): 5000 INJECTION INTRAVENOUS; SUBCUTANEOUS at 17:34

## 2021-06-03 RX ADMIN — SENNA PLUS 2 TABLET(S): 8.6 TABLET ORAL at 21:18

## 2021-06-03 NOTE — CHART NOTE - NSCHARTNOTEFT_GEN_A_CORE
POC/Insulin requirements/Labs reviewed.  Noted pt awaiting d/c to rehab potentially today    Discharge Recs:   NPH 12 units sq bid hold if Tube feedings off/NPO  Moderate correction scale sq q6h  due to increased activity at rehab may need further adjustments   Outpatient Endocrinology/Podiatry/Optho followup after rehab   Needs outpt endocrine established. Can call Patient Access Services to schedule an appointment. [phone]. or can follow up w/previous Endocrinologist.       Discussed with Dr Al  Pager: [phone]  On evenings and weekends, please call [phone] or page endocrine fellow on call.   Please note that this patient may be followed by different provider tomorrow. If no answer, contact endocrine fellow on call [phone] M-F 9a-5pm POC/Insulin requirements/Labs reviewed.  Noted pt awaiting d/c to rehab potentially today  While inpatient lowered  NPH to 14 units sq bid +Moderate admelog correctionscale q6h    Discharge Recs:   NPH 12 units sq bid hold if Tube feedings off/NPO  Moderate admelog correction scale sq q6h  due to increased activity at rehab may need further adjustments   Outpatient Endocrinology/Podiatry/Optho followup after rehab   Needs outpt endocrine established. Can call Patient Access Services to schedule an appointment. [phone]. or can follow up w/previous Endocrinologist.       Discussed with Dr Al  Pager: [phone]  On evenings and weekends, please call [phone] or page endocrine fellow on call.   Please note that this patient may be followed by different provider tomorrow. If no answer, contact endocrine fellow on call [phone] M-F 9a-5pm

## 2021-06-03 NOTE — PROGRESS NOTE ADULT - ASSESSMENT
61yo male with PMH of DM who was initially admitted to [facility] ICU for hypoxic respiratory failure 2/2 COVID c/b PE with R heart strain, cardiogenic and septic shock, ischemic and hemorrhagic CVA and ESBL klebsiella ventilator associate PNA, transferred to [facility] for neurosurgery eval and further management. s/p PEG, now monitoring for refeeding syndrome. Course c/b right empyema s/p VATs and obturator bleed (resolved). Now pending acute rehab bed availability. 59yo male with PMH of DM who was initially admitted to [facility] ICU for hypoxic respiratory failure 2/2 COVID c/b PE with R heart strain, cardiogenic and septic shock, ischemic and hemorrhagic CVA and ESBL klebsiella ventilator associate PNA, transferred to [facility] for neurosurgery eval and further management. s/p PEG. Course c/b right empyema s/p VATs and obturator bleed (resolved). Likely discharge to [facility].

## 2021-06-03 NOTE — PROGRESS NOTE ADULT - ATTENDING SUPERVISION STATEMENT
Fellow
Resident
ACP
ACP/Resident
Fellow
Fellow
ACP
ACP
Fellow
ACP
ACP
Resident

## 2021-06-03 NOTE — PROGRESS NOTE ADULT - ATTENDING COMMENTS
Patient with prolonged course in setting of COVID infxn c/b VTE/stroke, empyema s/p decordication/drainage, dysphagia s/p PEG    #AHRF/PNA/empyema s/p thoracotomy 5/12 with decortication and abscess drainage on meropenem (plan for discharge on bactrim and augmentin), appreciate ID following    #dysphagia s/p PEG, no leaking around PEG site after adaptor adjusted  GI consult appreciated    #DM2: c/w NPH and admelog ISS for coverage    #PE: c/w prophylactic dose given elevated bleeding risk including hematoma    #LE gangrene: podiatray following, overall improving, appreciate recs    PMR consult appreciated - plan for acute rehab however awaiting bed availability at [place]

## 2021-06-03 NOTE — PROGRESS NOTE ADULT - PROBLEM SELECTOR PLAN 10
- DVT: HSQ  - Diet: NPO with tube feedings   - Full code  - Dispo- acute rehab potentially today. COVID PCR neg.

## 2021-06-03 NOTE — PROGRESS NOTE ADULT - PROBLEM SELECTOR PLAN 8
-Dry gangrene noted over distal toes on bilateral lower extremities--most concerning for microvascular disease subsequent to vasopressor administration   -Dorsalis pedis pulses intact bilaterally  -ERASMO demonstrating right ERASMO: 1.09, left ERASMO: 1.24, right TBI: 0.78, and left TBI: 0.73.   Significant lower extremity arterial disease not identified. Decreased amplitude to the pulse volume recordings at the levels of the toes bilaterally.  -Podiatry recs appreciated, no acute surgical intervention at this time, applying betadine to wound  -Wound care recs appreciated  -Vascular recs appreciated

## 2021-06-03 NOTE — PROGRESS NOTE ADULT - PROBLEM SELECTOR PLAN 5
- Type II diabetes mellitus noted; a1c noted to be greater than 10   - On insulin sliding scale with q6 NPH   - Monitor fingersticks while on tube feeds  - endocrine recs appreciated for uncontrolled DM  - Needs outpt endocrine established. Can call Patient Access Services to schedule an appointment. [phone]. or can follow up w/previous Endocrinologist.   - Adjust  NPH 15U BID and continue low correction scale sq q6h; continue this regimen on discharge to rehab  - FS q6

## 2021-06-03 NOTE — PROGRESS NOTE ADULT - SUBJECTIVE AND OBJECTIVE BOX
**********************************************  Martha Servando, PGY-1  Internal Medicine   [facility] Pager #: [phone]  **********************************************     Patient is a 60y old  Male who presents with a chief complaint of COVID19 w/ severe metabolic acidosis s/p intubation (02 Jun 2021 10:35)    SUBJECTIVE / OVERNIGHT EVENTS:     OBJECTIVE:  Vital Signs Last 24 Hrs  T(C): 36.3 (03 Jun 2021 05:01), Max: 36.6 (02 Jun 2021 11:51)  T(F): 97.3 (03 Jun 2021 05:01), Max: 97.8 (02 Jun 2021 11:51)  HR: 96 (03 Jun 2021 05:01) (93 - 96)  BP: 111/72 (03 Jun 2021 05:01) (96/60 - 113/70)  RR: 18 (03 Jun 2021 05:01) (18 - 18)  SpO2: 99% (03 Jun 2021 05:01) (94% - 99%)    I&O's Summary    02 Jun 2021 07:01  -  03 Jun 2021 07:00  --------------------------------------------------------  IN: 1200 mL / OUT: 400 mL / NET: 800 mL      Physical Examination:      Labs:  CAPILLARY BLOOD GLUCOSE  146 (02 Jun 2021 17:10)  POCT Blood Glucose.: 167 mg/dL (03 Jun 2021 06:47)  POCT Blood Glucose.: 97 mg/dL (02 Jun 2021 23:55)  POCT Blood Glucose.: 127 mg/dL (02 Jun 2021 12:17)  POCT Blood Glucose.: 159 mg/dL (02 Jun 2021 08:17)      Imaging Personally Reviewed:      MEDICATIONS  (STANDING):  acetaminophen   Tablet .. 650 milliGRAM(s) Oral every 6 hours  cadexomer iodine 0.9% Gel 1 Application(s) Topical daily  cholecalciferol 400 Unit(s) Oral daily  doxazosin 2 milliGRAM(s) Oral at bedtime  heparin   Injectable 5000 Unit(s) SubCutaneous every 12 hours  insulin lispro (ADMELOG) corrective regimen sliding scale   SubCutaneous every 6 hours  insulin NPH human recombinant 15 Unit(s) SubCutaneous <User Schedule>  levETIRAcetam  Solution 500 milliGRAM(s) Oral two times a day  meropenem  IVPB 1000 milliGRAM(s) IV Intermittent every 8 hours  polyethylene glycol 3350 17 Gram(s) Oral daily  senna 2 Tablet(s) Oral at bedtime    MEDICATIONS  (PRN):  albuterol/ipratropium for Nebulization 3 milliLiter(s) Nebulizer every 6 hours PRN Shortness of Breath and/or Wheezing   **********************************************  Martha Al, PGY-1  Internal Medicine   [facility] Pager #: [phone]  **********************************************     Patient is a 60y old  Male who presents with a chief complaint of COVID19 w/ severe metabolic acidosis s/p intubation (02 Jun 2021 10:35)    SUBJECTIVE / OVERNIGHT EVENTS:   -No acute events overnight  -Pt examined at bedside this AM, with no subjective complaints   -Pt had one episode of loose stool yesterday AM, but no recurrence   -Denies CP, palpitations, SOB, n/v/d     OBJECTIVE:  Vital Signs Last 24 Hrs  T(C): 36.3 (03 Jun 2021 05:01), Max: 36.6 (02 Jun 2021 11:51)  T(F): 97.3 (03 Jun 2021 05:01), Max: 97.8 (02 Jun 2021 11:51)  HR: 96 (03 Jun 2021 05:01) (93 - 96)  BP: 111/72 (03 Jun 2021 05:01) (96/60 - 113/70)  RR: 18 (03 Jun 2021 05:01) (18 - 18)  SpO2: 99% (03 Jun 2021 05:01) (94% - 99%)    I&O's Summary    02 Jun 2021 07:01  -  03 Jun 2021 07:00  --------------------------------------------------------  IN: 1200 mL / OUT: 400 mL / NET: 800 mL      Physical Examination:    General: No acute distress, well-appearing    Eyes: PERRL, EOMI     ENT: MMM, no oropharyngeal lesions or erythema appreciated     Pulm: No increased WOB. CTAB. No wheezing.     CV: RRR. S1&S2+. No M/R/G appreciated.     Abdomen: +BS. Soft, NTND. No organomegaly. Peg tube site c/d/i    MSK: Nml ROM    Extremities: No peripheral edema or cyanosis.     Neuro: A&Ox3, no focal deficits     Skin: Warm and dry. No visible rash.     Labs:  CAPILLARY BLOOD GLUCOSE  146 (02 Jun 2021 17:10)  POCT Blood Glucose.: 167 mg/dL (03 Jun 2021 06:47)  POCT Blood Glucose.: 97 mg/dL (02 Jun 2021 23:55)  POCT Blood Glucose.: 127 mg/dL (02 Jun 2021 12:17)  POCT Blood Glucose.: 159 mg/dL (02 Jun 2021 08:17)      Imaging Personally Reviewed:      MEDICATIONS  (STANDING):  acetaminophen   Tablet .. 650 milliGRAM(s) Oral every 6 hours  cadexomer iodine 0.9% Gel 1 Application(s) Topical daily  cholecalciferol 400 Unit(s) Oral daily  doxazosin 2 milliGRAM(s) Oral at bedtime  heparin   Injectable 5000 Unit(s) SubCutaneous every 12 hours  insulin lispro (ADMELOG) corrective regimen sliding scale   SubCutaneous every 6 hours  insulin NPH human recombinant 15 Unit(s) SubCutaneous <User Schedule>  levETIRAcetam  Solution 500 milliGRAM(s) Oral two times a day  meropenem  IVPB 1000 milliGRAM(s) IV Intermittent every 8 hours  polyethylene glycol 3350 17 Gram(s) Oral daily  senna 2 Tablet(s) Oral at bedtime    MEDICATIONS  (PRN):  albuterol/ipratropium for Nebulization 3 milliLiter(s) Nebulizer every 6 hours PRN Shortness of Breath and/or Wheezing

## 2021-06-03 NOTE — PROGRESS NOTE ADULT - PROBLEM SELECTOR PLAN 9
-Initiated goals of care discussion with patient's brother-in-law (listed in chart as primary point of contact); patient is full code at this time; goal is as much recovery as possible  -The patient's wife is in [place], but the patient's brother-in-law has been visiting and placing the patient's wife on facetime; she is aware of all that is happening and has happened  -Full code, multiple lengthy discussions, appreciate palliative care help

## 2021-06-04 ENCOUNTER — INPATIENT (INPATIENT)
Facility: HOSPITAL | Age: 61
LOS: 18 days | Discharge: NURSING, REHAB OR LONG-TERM CARE | DRG: 949 | End: 2021-06-23
Attending: PHYSICAL MEDICINE & REHABILITATION | Admitting: PHYSICAL MEDICINE & REHABILITATION
Payer: COMMERCIAL

## 2021-06-04 ENCOUNTER — TRANSCRIPTION ENCOUNTER (OUTPATIENT)
Age: 61
End: 2021-06-04

## 2021-06-04 VITALS
DIASTOLIC BLOOD PRESSURE: 73 MMHG | OXYGEN SATURATION: 96 % | WEIGHT: 145.28 LBS | SYSTOLIC BLOOD PRESSURE: 117 MMHG | RESPIRATION RATE: 17 BRPM | HEART RATE: 101 BPM | HEIGHT: 65 IN | TEMPERATURE: 98 F

## 2021-06-04 VITALS
TEMPERATURE: 98 F | HEART RATE: 107 BPM | RESPIRATION RATE: 18 BRPM | OXYGEN SATURATION: 97 % | SYSTOLIC BLOOD PRESSURE: 118 MMHG | DIASTOLIC BLOOD PRESSURE: 76 MMHG

## 2021-06-04 DIAGNOSIS — M79.81 NONTRAUMATIC HEMATOMA OF SOFT TISSUE: ICD-10-CM

## 2021-06-04 DIAGNOSIS — R53.81 OTHER MALAISE: ICD-10-CM

## 2021-06-04 DIAGNOSIS — J94.8 OTHER SPECIFIED PLEURAL CONDITIONS: ICD-10-CM

## 2021-06-04 DIAGNOSIS — E44.0 MODERATE PROTEIN-CALORIE MALNUTRITION: ICD-10-CM

## 2021-06-04 DIAGNOSIS — I63.9 CEREBRAL INFARCTION, UNSPECIFIED: ICD-10-CM

## 2021-06-04 DIAGNOSIS — I26.99 OTHER PULMONARY EMBOLISM WITHOUT ACUTE COR PULMONALE: ICD-10-CM

## 2021-06-04 DIAGNOSIS — R11.2 NAUSEA WITH VOMITING, UNSPECIFIED: ICD-10-CM

## 2021-06-04 DIAGNOSIS — I70.269 ATHEROSCLEROSIS OF NATIVE ARTERIES OF EXTREMITIES WITH GANGRENE, UNSPECIFIED EXTREMITY: ICD-10-CM

## 2021-06-04 DIAGNOSIS — I63.542 CEREBRAL INFARCTION DUE TO UNSPECIFIED OCCLUSION OR STENOSIS OF LEFT CEREBELLAR ARTERY: ICD-10-CM

## 2021-06-04 DIAGNOSIS — Z43.1 ENCOUNTER FOR ATTENTION TO GASTROSTOMY: ICD-10-CM

## 2021-06-04 DIAGNOSIS — I69.391 DYSPHAGIA FOLLOWING CEREBRAL INFARCTION: ICD-10-CM

## 2021-06-04 DIAGNOSIS — Z51.89 ENCOUNTER FOR OTHER SPECIFIED AFTERCARE: ICD-10-CM

## 2021-06-04 DIAGNOSIS — R26.9 UNSPECIFIED ABNORMALITIES OF GAIT AND MOBILITY: ICD-10-CM

## 2021-06-04 DIAGNOSIS — J86.9 PYOTHORAX WITHOUT FISTULA: ICD-10-CM

## 2021-06-04 DIAGNOSIS — R53.83 OTHER FATIGUE: ICD-10-CM

## 2021-06-04 DIAGNOSIS — G93.40 ENCEPHALOPATHY, UNSPECIFIED: ICD-10-CM

## 2021-06-04 DIAGNOSIS — I69.398 OTHER SEQUELAE OF CEREBRAL INFARCTION: ICD-10-CM

## 2021-06-04 DIAGNOSIS — R33.9 RETENTION OF URINE, UNSPECIFIED: ICD-10-CM

## 2021-06-04 DIAGNOSIS — T36.8X5A ADVERSE EFFECT OF OTHER SYSTEMIC ANTIBIOTICS, INITIAL ENCOUNTER: ICD-10-CM

## 2021-06-04 DIAGNOSIS — E11.22 TYPE 2 DIABETES MELLITUS WITH DIABETIC CHRONIC KIDNEY DISEASE: ICD-10-CM

## 2021-06-04 DIAGNOSIS — E11.51 TYPE 2 DIABETES MELLITUS WITH DIABETIC PERIPHERAL ANGIOPATHY WITHOUT GANGRENE: ICD-10-CM

## 2021-06-04 DIAGNOSIS — I69.310 ATTENTION AND CONCENTRATION DEFICIT FOLLOWING CEREBRAL INFARCTION: ICD-10-CM

## 2021-06-04 DIAGNOSIS — I63.89 OTHER CEREBRAL INFARCTION: ICD-10-CM

## 2021-06-04 DIAGNOSIS — Z86.16 PERSONAL HISTORY OF COVID-19: ICD-10-CM

## 2021-06-04 DIAGNOSIS — E11.65 TYPE 2 DIABETES MELLITUS WITH HYPERGLYCEMIA: ICD-10-CM

## 2021-06-04 DIAGNOSIS — Z48.813 ENCOUNTER FOR SURGICAL AFTERCARE FOLLOWING SURGERY ON THE RESPIRATORY SYSTEM: ICD-10-CM

## 2021-06-04 DIAGNOSIS — Z16.12 EXTENDED SPECTRUM BETA LACTAMASE (ESBL) RESISTANCE: ICD-10-CM

## 2021-06-04 DIAGNOSIS — Z86.711 PERSONAL HISTORY OF PULMONARY EMBOLISM: ICD-10-CM

## 2021-06-04 DIAGNOSIS — N17.9 ACUTE KIDNEY FAILURE, UNSPECIFIED: ICD-10-CM

## 2021-06-04 DIAGNOSIS — R27.8 OTHER LACK OF COORDINATION: ICD-10-CM

## 2021-06-04 DIAGNOSIS — N18.2 CHRONIC KIDNEY DISEASE, STAGE 2 (MILD): ICD-10-CM

## 2021-06-04 DIAGNOSIS — E87.5 HYPERKALEMIA: ICD-10-CM
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
## 2021-06-04 LAB
ANION GAP SERPL CALC-SCNC: 11 MMOL/L — SIGNIFICANT CHANGE UP (ref 5–17)
BUN SERPL-MCNC: 38 MG/DL — HIGH (ref 7–23)
CALCIUM SERPL-MCNC: 9.8 MG/DL — SIGNIFICANT CHANGE UP (ref 8.4–10.5)
CHLORIDE SERPL-SCNC: 100 MMOL/L — SIGNIFICANT CHANGE UP (ref 96–108)
CO2 SERPL-SCNC: 27 MMOL/L — SIGNIFICANT CHANGE UP (ref 22–31)
CREAT SERPL-MCNC: 0.98 MG/DL — SIGNIFICANT CHANGE UP (ref 0.5–1.3)
GLUCOSE BLDC GLUCOMTR-MCNC: 118 MG/DL — HIGH (ref 70–99)
GLUCOSE BLDC GLUCOMTR-MCNC: 136 MG/DL — HIGH (ref 70–99)
GLUCOSE BLDC GLUCOMTR-MCNC: 158 MG/DL — HIGH (ref 70–99)
GLUCOSE BLDC GLUCOMTR-MCNC: 165 MG/DL — HIGH (ref 70–99)
GLUCOSE SERPL-MCNC: 176 MG/DL — HIGH (ref 70–99)
HCT VFR BLD CALC: 36.1 % — LOW (ref 39–50)
HGB BLD-MCNC: 10.9 G/DL — LOW (ref 13–17)
MCHC RBC-ENTMCNC: 25.4 PG — LOW (ref 27–34)
MCHC RBC-ENTMCNC: 30.2 GM/DL — LOW (ref 32–36)
MCV RBC AUTO: 84.1 FL — SIGNIFICANT CHANGE UP (ref 80–100)
NRBC # BLD: 0 /100 WBCS — SIGNIFICANT CHANGE UP (ref 0–0)
PLATELET # BLD AUTO: 467 K/UL — HIGH (ref 150–400)
POTASSIUM SERPL-MCNC: 4.9 MMOL/L — SIGNIFICANT CHANGE UP (ref 3.5–5.3)
POTASSIUM SERPL-SCNC: 4.9 MMOL/L — SIGNIFICANT CHANGE UP (ref 3.5–5.3)
RBC # BLD: 4.29 M/UL — SIGNIFICANT CHANGE UP (ref 4.2–5.8)
RBC # FLD: 20.3 % — HIGH (ref 10.3–14.5)
SODIUM SERPL-SCNC: 138 MMOL/L — SIGNIFICANT CHANGE UP (ref 135–145)
WBC # BLD: 9.65 K/UL — SIGNIFICANT CHANGE UP (ref 3.8–10.5)
WBC # FLD AUTO: 9.65 K/UL — SIGNIFICANT CHANGE UP (ref 3.8–10.5)

## 2021-06-04 PROCEDURE — 82784 ASSAY IGA/IGD/IGG/IGM EACH: CPT

## 2021-06-04 PROCEDURE — 85610 PROTHROMBIN TIME: CPT

## 2021-06-04 PROCEDURE — 80061 LIPID PANEL: CPT

## 2021-06-04 PROCEDURE — U0003: CPT

## 2021-06-04 PROCEDURE — 82330 ASSAY OF CALCIUM: CPT

## 2021-06-04 PROCEDURE — 87102 FUNGUS ISOLATION CULTURE: CPT

## 2021-06-04 PROCEDURE — 86900 BLOOD TYPING SEROLOGIC ABO: CPT

## 2021-06-04 PROCEDURE — 87640 STAPH A DNA AMP PROBE: CPT

## 2021-06-04 PROCEDURE — 82435 ASSAY OF BLOOD CHLORIDE: CPT

## 2021-06-04 PROCEDURE — 84484 ASSAY OF TROPONIN QUANT: CPT

## 2021-06-04 PROCEDURE — 83735 ASSAY OF MAGNESIUM: CPT

## 2021-06-04 PROCEDURE — 82947 ASSAY GLUCOSE BLOOD QUANT: CPT

## 2021-06-04 PROCEDURE — 87077 CULTURE AEROBIC IDENTIFY: CPT

## 2021-06-04 PROCEDURE — 86140 C-REACTIVE PROTEIN: CPT

## 2021-06-04 PROCEDURE — 94003 VENT MGMT INPAT SUBQ DAY: CPT

## 2021-06-04 PROCEDURE — 82565 ASSAY OF CREATININE: CPT

## 2021-06-04 PROCEDURE — C1769: CPT

## 2021-06-04 PROCEDURE — 74177 CT ABD & PELVIS W/CONTRAST: CPT

## 2021-06-04 PROCEDURE — 83615 LACTATE (LD) (LDH) ENZYME: CPT

## 2021-06-04 PROCEDURE — 71260 CT THORAX DX C+: CPT

## 2021-06-04 PROCEDURE — 85384 FIBRINOGEN ACTIVITY: CPT

## 2021-06-04 PROCEDURE — 74018 RADEX ABDOMEN 1 VIEW: CPT

## 2021-06-04 PROCEDURE — 82570 ASSAY OF URINE CREATININE: CPT

## 2021-06-04 PROCEDURE — 85379 FIBRIN DEGRADATION QUANT: CPT

## 2021-06-04 PROCEDURE — 84478 ASSAY OF TRIGLYCERIDES: CPT

## 2021-06-04 PROCEDURE — 83930 ASSAY OF BLOOD OSMOLALITY: CPT

## 2021-06-04 PROCEDURE — 87641 MR-STAPH DNA AMP PROBE: CPT

## 2021-06-04 PROCEDURE — 70544 MR ANGIOGRAPHY HEAD W/O DYE: CPT

## 2021-06-04 PROCEDURE — 84133 ASSAY OF URINE POTASSIUM: CPT

## 2021-06-04 PROCEDURE — P9016: CPT

## 2021-06-04 PROCEDURE — 95816 EEG AWAKE AND DROWSY: CPT

## 2021-06-04 PROCEDURE — 85025 COMPLETE CBC W/AUTO DIFF WBC: CPT

## 2021-06-04 PROCEDURE — 94640 AIRWAY INHALATION TREATMENT: CPT

## 2021-06-04 PROCEDURE — 82746 ASSAY OF FOLIC ACID SERUM: CPT

## 2021-06-04 PROCEDURE — 82550 ASSAY OF CK (CPK): CPT

## 2021-06-04 PROCEDURE — 83880 ASSAY OF NATRIURETIC PEPTIDE: CPT

## 2021-06-04 PROCEDURE — 81001 URINALYSIS AUTO W/SCOPE: CPT

## 2021-06-04 PROCEDURE — 71250 CT THORAX DX C-: CPT

## 2021-06-04 PROCEDURE — 71045 X-RAY EXAM CHEST 1 VIEW: CPT

## 2021-06-04 PROCEDURE — C1889: CPT

## 2021-06-04 PROCEDURE — 36430 TRANSFUSION BLD/BLD COMPNT: CPT

## 2021-06-04 PROCEDURE — 80048 BASIC METABOLIC PNL TOTAL CA: CPT

## 2021-06-04 PROCEDURE — 92507 TX SP LANG VOICE COMM INDIV: CPT

## 2021-06-04 PROCEDURE — 82803 BLOOD GASES ANY COMBINATION: CPT

## 2021-06-04 PROCEDURE — 83935 ASSAY OF URINE OSMOLALITY: CPT

## 2021-06-04 PROCEDURE — 86923 COMPATIBILITY TEST ELECTRIC: CPT

## 2021-06-04 PROCEDURE — 85027 COMPLETE CBC AUTOMATED: CPT

## 2021-06-04 PROCEDURE — 85396 CLOTTING ASSAY WHOLE BLOOD: CPT

## 2021-06-04 PROCEDURE — 92611 MOTION FLUOROSCOPY/SWALLOW: CPT

## 2021-06-04 PROCEDURE — 92526 ORAL FUNCTION THERAPY: CPT

## 2021-06-04 PROCEDURE — 84295 ASSAY OF SERUM SODIUM: CPT

## 2021-06-04 PROCEDURE — 97535 SELF CARE MNGMENT TRAINING: CPT

## 2021-06-04 PROCEDURE — 86850 RBC ANTIBODY SCREEN: CPT

## 2021-06-04 PROCEDURE — 84443 ASSAY THYROID STIM HORMONE: CPT

## 2021-06-04 PROCEDURE — 87449 NOS EACH ORGANISM AG IA: CPT

## 2021-06-04 PROCEDURE — C9399: CPT

## 2021-06-04 PROCEDURE — 82272 OCCULT BLD FECES 1-3 TESTS: CPT

## 2021-06-04 PROCEDURE — 85730 THROMBOPLASTIN TIME PARTIAL: CPT

## 2021-06-04 PROCEDURE — 83605 ASSAY OF LACTIC ACID: CPT

## 2021-06-04 PROCEDURE — 99239 HOSP IP/OBS DSCHRG MGMT >30: CPT | Mod: GC

## 2021-06-04 PROCEDURE — 87205 SMEAR GRAM STAIN: CPT

## 2021-06-04 PROCEDURE — 87186 SC STD MICRODIL/AGAR DIL: CPT

## 2021-06-04 PROCEDURE — 97129 THER IVNTJ 1ST 15 MIN: CPT

## 2021-06-04 PROCEDURE — 74230 X-RAY XM SWLNG FUNCJ C+: CPT

## 2021-06-04 PROCEDURE — 87086 URINE CULTURE/COLONY COUNT: CPT

## 2021-06-04 PROCEDURE — 81003 URINALYSIS AUTO W/O SCOPE: CPT

## 2021-06-04 PROCEDURE — 70553 MRI BRAIN STEM W/O & W/DYE: CPT

## 2021-06-04 PROCEDURE — 82962 GLUCOSE BLOOD TEST: CPT

## 2021-06-04 PROCEDURE — 87075 CULTR BACTERIA EXCEPT BLOOD: CPT

## 2021-06-04 PROCEDURE — 92610 EVALUATE SWALLOWING FUNCTION: CPT

## 2021-06-04 PROCEDURE — 80053 COMPREHEN METABOLIC PANEL: CPT

## 2021-06-04 PROCEDURE — 87015 SPECIMEN INFECT AGNT CONCNTJ: CPT

## 2021-06-04 PROCEDURE — 73702 CT LWR EXTREMITY W/O&W/DYE: CPT

## 2021-06-04 PROCEDURE — 87206 SMEAR FLUORESCENT/ACID STAI: CPT

## 2021-06-04 PROCEDURE — 84100 ASSAY OF PHOSPHORUS: CPT

## 2021-06-04 PROCEDURE — 93923 UPR/LXTR ART STDY 3+ LVLS: CPT

## 2021-06-04 PROCEDURE — 87635 SARS-COV-2 COVID-19 AMP PRB: CPT

## 2021-06-04 PROCEDURE — 87070 CULTURE OTHR SPECIMN AEROBIC: CPT

## 2021-06-04 PROCEDURE — L8699: CPT

## 2021-06-04 PROCEDURE — 84300 ASSAY OF URINE SODIUM: CPT

## 2021-06-04 PROCEDURE — 85018 HEMOGLOBIN: CPT

## 2021-06-04 PROCEDURE — 84132 ASSAY OF SERUM POTASSIUM: CPT

## 2021-06-04 PROCEDURE — 83520 IMMUNOASSAY QUANT NOS NONAB: CPT

## 2021-06-04 PROCEDURE — 86901 BLOOD TYPING SEROLOGIC RH(D): CPT

## 2021-06-04 PROCEDURE — 97530 THERAPEUTIC ACTIVITIES: CPT

## 2021-06-04 PROCEDURE — 97110 THERAPEUTIC EXERCISES: CPT

## 2021-06-04 PROCEDURE — 86769 SARS-COV-2 COVID-19 ANTIBODY: CPT

## 2021-06-04 PROCEDURE — 94668 MNPJ CHEST WALL SBSQ: CPT

## 2021-06-04 PROCEDURE — 92523 SPEECH SOUND LANG COMPREHEN: CPT

## 2021-06-04 PROCEDURE — 70450 CT HEAD/BRAIN W/O DYE: CPT

## 2021-06-04 PROCEDURE — 97167 OT EVAL HIGH COMPLEX 60 MIN: CPT

## 2021-06-04 PROCEDURE — 97112 NEUROMUSCULAR REEDUCATION: CPT

## 2021-06-04 PROCEDURE — 87040 BLOOD CULTURE FOR BACTERIA: CPT

## 2021-06-04 PROCEDURE — 86803 HEPATITIS C AB TEST: CPT

## 2021-06-04 PROCEDURE — 93970 EXTREMITY STUDY: CPT

## 2021-06-04 PROCEDURE — 84145 PROCALCITONIN (PCT): CPT

## 2021-06-04 PROCEDURE — 97163 PT EVAL HIGH COMPLEX 45 MIN: CPT

## 2021-06-04 PROCEDURE — 82607 VITAMIN B-12: CPT

## 2021-06-04 PROCEDURE — 80076 HEPATIC FUNCTION PANEL: CPT

## 2021-06-04 PROCEDURE — C8929: CPT

## 2021-06-04 PROCEDURE — 87116 MYCOBACTERIA CULTURE: CPT

## 2021-06-04 PROCEDURE — U0005: CPT

## 2021-06-04 PROCEDURE — 82728 ASSAY OF FERRITIN: CPT

## 2021-06-04 PROCEDURE — 85014 HEMATOCRIT: CPT

## 2021-06-04 PROCEDURE — 93005 ELECTROCARDIOGRAM TRACING: CPT

## 2021-06-04 RX ORDER — SENNA PLUS 8.6 MG/1
2 TABLET ORAL AT BEDTIME
Refills: 0 | Status: DISCONTINUED | OUTPATIENT
Start: 2021-06-04 | End: 2021-06-11

## 2021-06-04 RX ORDER — LEVETIRACETAM 250 MG/1
500 TABLET, FILM COATED ORAL
Refills: 0 | Status: DISCONTINUED | OUTPATIENT
Start: 2021-06-04 | End: 2021-06-23

## 2021-06-04 RX ORDER — DOXAZOSIN MESYLATE 4 MG
2 TABLET ORAL AT BEDTIME
Refills: 0 | Status: DISCONTINUED | OUTPATIENT
Start: 2021-06-04 | End: 2021-06-08

## 2021-06-04 RX ORDER — CHOLECALCIFEROL (VITAMIN D3) 125 MCG
400 CAPSULE ORAL DAILY
Refills: 0 | Status: DISCONTINUED | OUTPATIENT
Start: 2021-06-04 | End: 2021-06-08

## 2021-06-04 RX ORDER — BNT162B2 0.23 MG/2.25ML
0.3 INJECTION, SUSPENSION INTRAMUSCULAR ONCE
Refills: 0 | Status: COMPLETED | OUTPATIENT
Start: 2021-06-04 | End: 2021-06-04

## 2021-06-04 RX ORDER — INSULIN LISPRO 100/ML
VIAL (ML) SUBCUTANEOUS EVERY 6 HOURS
Refills: 0 | Status: DISCONTINUED | OUTPATIENT
Start: 2021-06-04 | End: 2021-06-05

## 2021-06-04 RX ORDER — JNJ-78436735 50000000000 [PFU]/.5ML
0.5 SUSPENSION INTRAMUSCULAR ONCE
Refills: 0 | Status: DISCONTINUED | OUTPATIENT
Start: 2021-06-04 | End: 2021-06-04

## 2021-06-04 RX ORDER — HEPARIN SODIUM 5000 [USP'U]/ML
5000 INJECTION INTRAVENOUS; SUBCUTANEOUS EVERY 12 HOURS
Refills: 0 | Status: DISCONTINUED | OUTPATIENT
Start: 2021-06-04 | End: 2021-06-15

## 2021-06-04 RX ORDER — CADEXOMER IODINE 0.9 %
1 PADS, MEDICATED (EA) TOPICAL DAILY
Refills: 0 | Status: DISCONTINUED | OUTPATIENT
Start: 2021-06-04 | End: 2021-06-23

## 2021-06-04 RX ORDER — HUMAN INSULIN 100 [IU]/ML
12 INJECTION, SUSPENSION SUBCUTANEOUS
Refills: 0 | Status: DISCONTINUED | OUTPATIENT
Start: 2021-06-04 | End: 2021-06-08

## 2021-06-04 RX ORDER — ACETAMINOPHEN 500 MG
650 TABLET ORAL EVERY 6 HOURS
Refills: 0 | Status: DISCONTINUED | OUTPATIENT
Start: 2021-06-04 | End: 2021-06-23

## 2021-06-04 RX ORDER — POLYETHYLENE GLYCOL 3350 17 G/17G
17 POWDER, FOR SOLUTION ORAL DAILY
Refills: 0 | Status: DISCONTINUED | OUTPATIENT
Start: 2021-06-04 | End: 2021-06-11

## 2021-06-04 RX ADMIN — Medication 400 UNIT(S): at 12:13

## 2021-06-04 RX ADMIN — HUMAN INSULIN 12 UNIT(S): 100 INJECTION, SUSPENSION SUBCUTANEOUS at 05:53

## 2021-06-04 RX ADMIN — POLYETHYLENE GLYCOL 3350 17 GRAM(S): 17 POWDER, FOR SOLUTION ORAL at 12:13

## 2021-06-04 RX ADMIN — BNT162B2 0.3 MILLILITER(S): 0.23 INJECTION, SUSPENSION INTRAMUSCULAR at 14:59

## 2021-06-04 RX ADMIN — LEVETIRACETAM 500 MILLIGRAM(S): 250 TABLET, FILM COATED ORAL at 05:06

## 2021-06-04 RX ADMIN — Medication 2: at 12:53

## 2021-06-04 RX ADMIN — HEPARIN SODIUM 5000 UNIT(S): 5000 INJECTION INTRAVENOUS; SUBCUTANEOUS at 05:07

## 2021-06-04 RX ADMIN — MEROPENEM 100 MILLIGRAM(S): 1 INJECTION INTRAVENOUS at 05:07

## 2021-06-04 RX ADMIN — Medication 650 MILLIGRAM(S): at 02:00

## 2021-06-04 RX ADMIN — Medication 2 MILLIGRAM(S): at 23:11

## 2021-06-04 RX ADMIN — Medication 650 MILLIGRAM(S): at 05:52

## 2021-06-04 RX ADMIN — MEROPENEM 100 MILLIGRAM(S): 1 INJECTION INTRAVENOUS at 12:14

## 2021-06-04 RX ADMIN — Medication 650 MILLIGRAM(S): at 12:13

## 2021-06-04 RX ADMIN — Medication 2: at 05:53

## 2021-06-04 RX ADMIN — Medication 650 MILLIGRAM(S): at 05:07

## 2021-06-04 RX ADMIN — Medication 650 MILLIGRAM(S): at 23:11

## 2021-06-04 RX ADMIN — Medication 650 MILLIGRAM(S): at 00:42

## 2021-06-04 NOTE — DISCHARGE NOTE NURSING/CASE MANAGEMENT/SOCIAL WORK - NSDCFUADDAPPT_GEN_ALL_CORE_FT
Bactrim DS 2 tabs TID + Augmentin 875mg po q12 until 6/8/21. Follow with Dr. Sammy Chambers (Infectious Disease) upon discharge.     Needs outpatient Endocrinologist established. Can call Patient Access Services to schedule an appointment. [phone]. or can follow up w/ previous Endocrinologist.     Will need close follow up care with ID, Endocrine, PCP, Podiatry, Neurology and Pulmonary

## 2021-06-04 NOTE — H&P ADULT - ASSESSMENT
ASSESSMENT/PLAN    59yo male with PMH of DM who was initially admitted to [facility] ICU for hypoxic respiratory failure 2/2 COVID c/b PE with R heart strain, cardiogenic and septic shock, ischemic and hemorrhagic CVA and ESBL klebsiella ventilator associate PNA, transferred to [facility] for neurosurgery eval and further management. s/p PEG. Course c/b right empyema s/p VATs and obturator bleed (resolved). Now admitted for functional decline.     #ESBL Klebsiella PNA c/b empyema   - 5/12 underwent right VATS converted to open L thoracotomy, decortication, drainage of abscess and flex bronch.  - c/w meropenem 5/7 and transition to Bactrim DS 2 tabs TID + Augmentin 875mg po q12  upon DC to GC until 6/8   - ID following, recs appreciated - needs close follow up in ID clinic for repeat imaging, Dr. Chambers  - CT Chest 5/29 with improvement in bilateral opacities. Slight decrease in right chest wall hematoma. Stable loculated right hydropneumothorax.   - Gait Instability, ADL impairments and Functional impairments: start Comprehensive Rehab Program of PT/OT/SLP    #Empyema   -OR Vats 5/12 with Dr. Albarado with all chest tubes removed by 5/18  -CT Chest 5/16 Interval new small cavitary lesions in the right lower lobe and right chest wall hematoma - no thoracic surgery intervention indicated per Dr. Albarado.    #Obturator Hematoma (stable)  - bleeding hematoma in right thigh on CT on 5/7  - CT hip showing non active bleed, hematoma on right thigh on 5/8  - doppler to r/o DVT in LE, per IR no role IVC filter  - active again on 5/17, now stable  - was on heparin drip on and off, now off. C/w DVT ppx heparin bid.     #Pulmonary embolus   -Pulmonary embolus noted on ct scan performed at time of admission  -Etiology of venous thromboembolism uncertain at this time; no known history of thrombophilia; provoked in setting of acute covid illness   -s/p heparin drip held due to recurrent bleed in right obturator and right chest wall hematoma   - 5000 heparin bid for dvt.     #CVA  -Acute ischemic strokes, with concern for hemorrhagic conversion, noted on CT scan on 4/8  -Etiology of acute ischemic stroke uncertain at this time; TTE with bubble performed at bedside without any evidence of right-to-left shunt; no arrhythmia noted on telemetry throughout stay in the MICU  -CTA head and neck without any evidence of carotid artery stenosis or dissection    #Altered mental status  - Etiology likely multi-factorial in setting of acute ischemic infarctions of the brain in conjunction vs acute kidney injury with uremia (now resolving) vs resolving hypoxic respiratory failure, covid-19 infection, superimposed bacterial pneumonia, urinary retention, concern for nutritional deficiencies)  - Reorientation with family at bedside (pt speaks [place], does best with family present)  - MRI Brain, and MRA Head demonstrated multiple evolving subacute infarcts with associated hemorrhages, likely expected evolution of ischemia  - As per neuro, EEG not suggestive of seizure-like activity  - C/w Keppra 500 mg BID-> neuro f/u outpatient   - repeat CT head 5/5 normal.     #Urinary Retention   - Mann removed 5/30, urinating appropriately.   - c/w doxazosin    #Diabetes mellitus  - Type II diabetes mellitus noted; a1c noted to be greater than 10   - On insulin sliding scale with q6 NPH   - Monitor fingersticks while on tube feeds  - Needs outpt endocrine established. Can call Patient Access Services to schedule an appointment. [phone]. or can follow up w/previous Endocrinologist.   - Adjust  NPH 15U BID and continue low correction scale sq q6h   - FS q6.     #Hypertension.  - Was on metoprolol tartrate 25 mg twice daily since 4/16; discontinued  - BP is stable  - If is hypertensive persistently, can consider antihypertensives.    #Dysphagia  - PEG tube placed on 4/30, tube feedings started on 4/30, + free water q8    #Peripheral vascular disease  -Dry gangrene noted over distal toes on bilateral lower extremities--most concerning for microvascular disease subsequent to vasopressor administration   -Dorsalis pedis pulses intact bilaterally  -ERASMO demonstrating right ERASMO: 1.09, left ERASMO: 1.24, right TBI: 0.78, and left TBI: 0.73.  -Podiatry recs appreciated, no acute surgical intervention at this time, applying betadine to wound  -Wound care recs appreciated  -Vascular recs appreciated.     #Pain Mgmt   - Tylenol PRN, Oxycodone PRN    #GI/Bowel Mgmt   - Continent c/w Senna, Miralax PRN    #/Bladder Mgmt   - Continent, PVR    #FEN   -PEG feeds     #Precautions / PROPHYLAXIS:   - Falls, Cardiac, Sternal, Spinal, Seizure   - ortho: Weight bearing status: WBAT   - Lungs: Aspiration, Incentive Spirometer   - Pressure injury/Skin: OOB to Chair, PT/OT    - DVT: HSQ         MEDICAL PROGNOSIS: GOOD                                   REHAB POTENTIAL: GOOD  ESTIMATED DISPOSITION: HOME                             ELOS: 10-14 Days   EXPECTED THERAPY:     P.T. 1hr/day       O.T. 1hr/day      S.L.P. 1hr/day      EXP FREQUENCY: 5 days per 7 day period     PRESCREEN COMPARISON: I have reviewed the prescreen information and I have found no relevant changes between the preadmission screening and my post admission evaluation     RATIONALE FOR INPATIENT ADMISSION - Patient demonstrates the following: (check all that apply)  [X] Medically appropriate for rehabilitation admission  [X] Has attainable rehab goals with an appropriate initial discharge plan  [X] Has rehabilitation potential (expected to make a significant improvement within a reasonable period of time)   [X] Requires close medical managment by a rehab physician, rehab nursing care, Hospitalist and comprehensive interdisciplinary team (including PT, OT, & or SLP, Prosthetics and Orthotics)     ASSESSMENT/PLAN    61yo male with PMH of DM who was initially admitted to [facility] ICU for hypoxic respiratory failure 2/2 COVID c/b PE with R heart strain, cardiogenic and septic shock, ischemic and hemorrhagic CVA and ESBL klebsiella ventilator associate PNA, transferred to [facility] for neurosurgery eval and further management. s/p PEG. Course c/b right empyema s/p VATs and obturator bleed (resolved). Now admitted for functional decline.     #ESBL Klebsiella PNA c/b empyema   - 5/12 underwent right VATS converted to open L thoracotomy, decortication, drainage of abscess and flex bronch.  - c/w meropenem 5/7 and transition to Bactrim DS 2 tabs TID + Augmentin 875mg po q12  upon DC to GC until 6/8   - ID following, recs appreciated - needs close follow up in ID clinic for repeat imaging, Dr. Chambers  - CT Chest 5/29 with improvement in bilateral opacities. Slight decrease in right chest wall hematoma. Stable loculated right hydropneumothorax.   - Gait Instability, ADL impairments and Functional impairments: start Comprehensive Rehab Program of PT/OT/SLP    #Empyema   -OR Vats 5/12 with Dr. Albarado with all chest tubes removed by 5/18  -CT Chest 5/16 Interval new small cavitary lesions in the right lower lobe and right chest wall hematoma - no thoracic surgery intervention indicated per Dr. Albarado.    #Obturator Hematoma (stable)  - bleeding hematoma in right thigh on CT on 5/7  - CT hip showing non active bleed, hematoma on right thigh on 5/8  - doppler to r/o DVT in LE, per IR no role IVC filter  - active again on 5/17, now stable  - was on heparin drip on and off, now off. C/w DVT ppx heparin bid.     #Pulmonary embolus   -Pulmonary embolus noted on ct scan performed at time of admission  -Etiology of venous thromboembolism uncertain at this time; no known history of thrombophilia; provoked in setting of acute covid illness   -s/p heparin drip held due to recurrent bleed in right obturator and right chest wall hematoma   - 5000 heparin bid for dvt.     #CVA  -Acute ischemic strokes, with concern for hemorrhagic conversion, noted on CT scan on 4/8  -Etiology of acute ischemic stroke uncertain at this time; TTE with bubble performed at bedside without any evidence of right-to-left shunt; no arrhythmia noted on telemetry throughout stay in the MICU  -CTA head and neck without any evidence of carotid artery stenosis or dissection    #Altered mental status  - Etiology likely multi-factorial in setting of acute ischemic infarctions of the brain in conjunction vs acute kidney injury with uremia (now resolving) vs resolving hypoxic respiratory failure, covid-19 infection, superimposed bacterial pneumonia, urinary retention, concern for nutritional deficiencies)  - Reorientation with family at bedside (pt speaks Gujar[place], does best with family present)  - MRI Brain, and MRA Head demonstrated multiple evolving subacute infarcts with associated hemorrhages, likely expected evolution of ischemia  - As per neuro, EEG not suggestive of seizure-like activity  - C/w Keppra 500 mg BID-> neuro f/u outpatient   - repeat CT head 5/5 normal.     #Urinary Retention   - Mann removed 5/30, urinating appropriately.   - c/w doxazosin    #Diabetes mellitus  - Type II diabetes mellitus noted; a1c noted to be greater than 10   - On insulin sliding scale with q6 NPH   - Monitor fingersticks while on tube feeds  - Needs outpt endocrine established. Can call Patient Access Services to schedule an appointment. [phone]. or can follow up w/previous Endocrinologist.   - Adjust  NPH 15U BID and continue low correction scale sq q6h   - FS q6.   - As per endocrine recs: IF BG less than 100mg/dl can give half of NPH dose     #Hypertension.  - Was on metoprolol tartrate 25 mg twice daily since 4/16; discontinued  - BP is stable  - If is hypertensive persistently, can consider antihypertensives.    #Dysphagia  - PEG tube placed on 4/30, tube feedings started on 4/30, + free water q8    #Peripheral vascular disease  -Dry gangrene noted over distal toes on bilateral lower extremities--most concerning for microvascular disease subsequent to vasopressor administration   -Dorsalis pedis pulses intact bilaterally  -ERASMO demonstrating right ERASMO: 1.09, left ERASMO: 1.24, right TBI: 0.78, and left TBI: 0.73.  -Podiatry recs appreciated, no acute surgical intervention at this time, applying betadine to wound    #Pain Mgmt   - Tylenol PRN, Oxycodone PRN    #GI/Bowel Mgmt   - Continent c/w Senna, Miralax PRN    #/Bladder Mgmt   - Continent, PVR    #FEN   -PEG feeds     #Precautions / PROPHYLAXIS:   - Falls, Cardiac, Sternal, Spinal, Seizure   - ortho: Weight bearing status: WBAT   - Lungs: Aspiration, Incentive Spirometer   - Pressure injury/Skin: OOB to Chair, PT/OT    - DVT: HSQ         MEDICAL PROGNOSIS: GOOD                                   REHAB POTENTIAL: GOOD  ESTIMATED DISPOSITION: HOME                             ELOS: 10-14 Days   EXPECTED THERAPY:     P.T. 1hr/day       O.T. 1hr/day      S.L.P. 1hr/day      EXP FREQUENCY: 5 days per 7 day period     PRESCREEN COMPARISON: I have reviewed the prescreen information and I have found no relevant changes between the preadmission screening and my post admission evaluation     RATIONALE FOR INPATIENT ADMISSION - Patient demonstrates the following: (check all that apply)  [X] Medically appropriate for rehabilitation admission  [X] Has attainable rehab goals with an appropriate initial discharge plan  [X] Has rehabilitation potential (expected to make a significant improvement within a reasonable period of time)   [X] Requires close medical managment by a rehab physician, rehab nursing care, Hospitalist and comprehensive interdisciplinary team (including PT, OT, & or SLP, Prosthetics and Orthotics)     ASSESSMENT/PLAN    61yo male with PMH of DM who was initially admitted to [facility] ICU for hypoxic respiratory failure 2/2 COVID c/b PE with R heart strain, cardiogenic and septic shock, ischemic and hemorrhagic CVA and ESBL klebsiella ventilator associate PNA, transferred to [facility] for neurosurgery eval and further management. s/p PEG. Course c/b right empyema s/p VATs and obturator bleed (resolved). Now admitted for functional decline.     #ESBL Klebsiella PNA c/b empyema   - 5/12 underwent right VATS converted to open L thoracotomy, decortication, drainage of abscess and flex bronch.  - c/w meropenem 5/7 and transition to Bactrim DS 2 tabs TID + Augmentin 875mg po q12  upon DC to GC until 6/8   - ID following, recs appreciated - needs close follow up in ID clinic for repeat imaging, Dr. Chambers  - CT Chest 5/29 with improvement in bilateral opacities. Slight decrease in right chest wall hematoma. Stable loculated right hydropneumothorax.   - Gait Instability, ADL impairments and Functional impairments: start Comprehensive Rehab Program of PT/OT/SLP    #Empyema   -OR Vats 5/12 with Dr. Albarado with all chest tubes removed by 5/18  -CT Chest 5/16 Interval new small cavitary lesions in the right lower lobe and right chest wall hematoma - no thoracic surgery intervention indicated per Dr. Albarado.    #Obturator Hematoma (stable)  - bleeding hematoma in right thigh on CT on 5/7  - CT hip showing non active bleed, hematoma on right thigh on 5/8  - doppler to r/o DVT in LE, per IR no role IVC filter  - active again on 5/17, now stable  - was on heparin drip on and off, now off. C/w DVT ppx heparin bid.     #Pulmonary embolus   -Pulmonary embolus noted on ct scan performed at time of admission  -Etiology of venous thromboembolism uncertain at this time; no known history of thrombophilia; provoked in setting of acute covid illness   -s/p heparin drip held due to recurrent bleed in right obturator and right chest wall hematoma   - 5000 heparin bid for dvt.     #CVA  -Acute bilateral ischemic strokes, with concern for hemorrhagic conversion, noted on CT scan on 4/8  -Etiology of acute ischemic stroke uncertain at this time; TTE with bubble performed at bedside without any evidence of right-to-left shunt; no arrhythmia noted on telemetry throughout stay in the MICU  -CTA head and neck without any evidence of carotid artery stenosis or dissection    #Altered mental status  - Etiology likely multi-factorial in setting of acute ischemic infarctions of the brain in conjunction vs acute kidney injury with uremia (now resolving) vs resolving hypoxic respiratory failure, covid-19 infection, superimposed bacterial pneumonia, urinary retention, concern for nutritional deficiencies)  - Reorientation with family at bedside (pt speaks [place], does best with family present)  - MRI Brain, and MRA Head demonstrated multiple evolving subacute infarcts with associated hemorrhages, likely expected evolution of ischemia  - As per neuro, EEG not suggestive of seizure-like activity  - C/w Keppra 500 mg BID-> neuro f/u outpatient   - repeat CT head 5/5 normal.     #Urinary Retention   - Mann removed 5/30, urinating appropriately.   - c/w doxazosin    #Diabetes mellitus  - Type II diabetes mellitus noted; a1c noted to be greater than 10   - NPH 12 U BID and continue correction scale sq q6h   - FS q6.   - As per endocrine recs: IF BG less than 100mg/dl can give half of NPH dose   - Follow up endo outpatient     #Hypertension.  - Was on metoprolol tartrate 25 mg twice daily since 4/16; discontinued  - BP is stable  - If is hypertensive persistently, can consider antihypertensives.    #Dysphagia  - PEG tube placed on 4/30, tube feedings started on 4/30, + free water q8    #Peripheral vascular disease  -Dry gangrene noted over distal toes on bilateral lower extremities--most concerning for microvascular disease subsequent to vasopressor administration   -Dorsalis pedis pulses intact bilaterally  -ERASMO demonstrating right ERASMO: 1.09, left ERASMO: 1.24, right TBI: 0.78, and left TBI: 0.73.  -Podiatry recs appreciated, no acute surgical intervention at this time, applying betadine to wound  -As per ID non infectious appearing     #Pain Mgmt   - Tylenol PRN, Oxycodone PRN    #GI/Bowel Mgmt   - Continent c/w Senna, Miralax PRN    #/Bladder Mgmt   - Continent, PVR    #FEN   -PEG feeds     #Precautions / PROPHYLAXIS:   - Falls, Cardiac, Sternal, Spinal, Seizure   - ortho: Weight bearing status: WBAT   - Lungs: Aspiration, Incentive Spirometer   - Pressure injury/Skin: OOB to Chair, PT/OT    - DVT: HSQ         MEDICAL PROGNOSIS: GOOD                                   REHAB POTENTIAL: GOOD  ESTIMATED DISPOSITION: HOME                             ELOS: 10-14 Days   EXPECTED THERAPY:     P.T. 1hr/day       O.T. 1hr/day      S.L.P. 1hr/day      EXP FREQUENCY: 5 days per 7 day period     PRESCREEN COMPARISON: I have reviewed the prescreen information and I have found no relevant changes between the preadmission screening and my post admission evaluation     RATIONALE FOR INPATIENT ADMISSION - Patient demonstrates the following: (check all that apply)  [X] Medically appropriate for rehabilitation admission  [X] Has attainable rehab goals with an appropriate initial discharge plan  [X] Has rehabilitation potential (expected to make a significant improvement within a reasonable period of time)   [X] Requires close medical managment by a rehab physician, rehab nursing care, Hospitalist and comprehensive interdisciplinary team (including PT, OT, & or SLP, Prosthetics and Orthotics)

## 2021-06-04 NOTE — PROGRESS NOTE ADULT - REASON FOR ADMISSION
COVID19 w/ severe metabolic acidosis s/p intubation
COVID19 w/ severe metabolic acidosis s/p intubation  Preop  VATS
COVID19 w/ severe metabolic acidosis s/p intubation
COVID19
COVID19 w/ severe metabolic acidosis s/p intubation

## 2021-06-04 NOTE — PROGRESS NOTE ADULT - NSICDXPILOT_GEN_ALL_CORE
[place]run[place]iz[place]

## 2021-06-04 NOTE — H&P ADULT - NSHPREVIEWOFSYSTEMS_GEN_ALL_CORE
REVIEW OF SYSTEMS  Constitutional: No fever, No Chills, No fatigue  HEENT: No eye pain, No visual disturbances, No difficulty hearing  Pulm: No cough,  No shortness of breath  Cardio: No chest pain, No palpitations  GI:  No abdominal pain, No nausea, No vomiting, No diarrhea, No constipation  : No dysuria, No frequency, No hematuria  Neuro: No headaches, No memory loss, No loss of strength, No numbness, No tremors  Skin: No itching, No rashes, No lesions   Endo: No temperature intolerance  MSK: No joint pain, No joint swelling, No muscle pain, No Neck or back pain  Psych:  No depression, No anxiety REVIEW OF SYSTEMS  Constitutional: No fever, No Chills, +fatigue   HEENT: No eye pain, No visual disturbances, No difficulty hearing  Pulm: No cough,  No shortness of breath  Cardio: No chest pain, No palpitations  GI:  No abdominal pain, No nausea, No vomiting, No diarrhea, No constipation  : No dysuria, No frequency, No hematuria  Neuro: No headaches, No memory loss, No loss of strength, No numbness, No tremors  Skin: No itching, No rashes, No lesions   Endo: No temperature intolerance  MSK: No joint pain, No joint swelling, No muscle pain, No Neck or back pain  Psych:  No depression, No anxiety

## 2021-06-04 NOTE — DISCHARGE NOTE NURSING/CASE MANAGEMENT/SOCIAL WORK - PATIENT PORTAL LINK FT
You can access the FollowMyHealth Patient Portal offered by [facility] by registering at the following website: [email or web address]. By joining [facility]’s FollowMyHealth portal, you will also be able to view your health information using other applications (apps) compatible with our system.

## 2021-06-04 NOTE — H&P ADULT - NSHPLABSRESULTS_GEN_ALL_CORE
RECENT LABS/IMAGING                        10.9   9.65  )-----------( 467      ( 04 Jun 2021 14:05 )             36.1     06-04    138  |  100  |  38<H>  ----------------------------<  176<H>  4.9   |  27  |  0.98    Ca    9.8      04 Jun 2021 14:05 RECENT LABS/IMAGING                        10.9   9.65  )-----------( 467      ( 04 Jun 2021 14:05 )             36.1     06-04    138  |  100  |  38<H>  ----------------------------<  176<H>  4.9   |  27  |  0.98    Ca    9.8      04 Jun 2021 14:05    < from: MR Angio Head No Cont (04.22.21 @ 21:31) >      IMPRESSION:    MRI BRAIN: Multiple evolving subacute infarcts with associated hemorrhagesincluding in the bilateral centrum semiovale, bilateral parieto-occipital region, left posterior temporal lobe, right thalamus, and anterior temporal poles bilaterally. Evolving subacute infarct with hemorrhagic transformation in the left cerebellar hemisphere. Note, many of these infarcts have associated abnormal contrast enhancement, likely reflecting expected evolution of ischemia, however, follow-up imaging to document resolution of this finding is recommended.    MRA HEAD: No evidence of proximal vessel stenosis, occlusion, aneurysm or vascular malformation. CT angiography may be obtained for further assessment.    < end of copied text >

## 2021-06-04 NOTE — PROGRESS NOTE ADULT - PROVIDER SPECIALTY LIST ADULT
Critical Care
Endocrinology
Infectious Disease
Pulmonology
Rehab Medicine
Thoracic Surgery
Thoracic Surgery
Critical Care
Critical Care
Endocrinology
Gastroenterology
Infectious Disease
Internal Medicine
Neurology
Podiatry
Pulmonology
Rehab Medicine
Thoracic Surgery
Endocrinology
Gastroenterology
Infectious Disease
Internal Medicine
Podiatry
SICU
Thoracic Surgery
Critical Care
Endocrinology
Infectious Disease
Internal Medicine
MICU
Podiatry
Rehab Medicine
Rehab Medicine
Thoracic Surgery
Endocrinology
Endocrinology
Infectious Disease
Internal Medicine
Podiatry
CT Surgery
Endocrinology
Hospitalist
Internal Medicine
Internal Medicine
Thoracic Surgery
Infectious Disease
Internal Medicine
Thoracic Surgery
Internal Medicine
Thoracic Surgery
Endocrinology
Internal Medicine
Thoracic Surgery
Internal Medicine
Infectious Disease
Infectious Disease
Endocrinology
Internal Medicine
Endocrinology
Internal Medicine
Endocrinology
Internal Medicine
Infectious Disease
Internal Medicine

## 2021-06-04 NOTE — PROGRESS NOTE ADULT - PROBLEM/PLAN-8
DISPLAY PLAN FREE TEXT
Initial

## 2021-06-04 NOTE — H&P ADULT - ATTENDING COMMENTS
60M s/p complicated COVID course with multiple CVAs as well as PEs, agree with above, good candidate for acute rehab

## 2021-06-04 NOTE — PROGRESS NOTE ADULT - PROBLEM SELECTOR PLAN 5
- Type II diabetes mellitus noted; a1c noted to be greater than 10   - On insulin sliding scale with q6 NPH   - Monitor fingersticks while on tube feeds  - endocrine recs appreciated for uncontrolled DM  - Needs outpt endocrine established. Can call Patient Access Services to schedule an appointment. [phone]. or can follow up w/previous Endocrinologist.   - Adjust  NPH 15U BID and continue low correction scale sq q6h; continue this regimen on discharge to rehab  - FS q6

## 2021-06-04 NOTE — PROGRESS NOTE ADULT - SUBJECTIVE AND OBJECTIVE BOX
**********************************************  Martha Guilloryed, PGY-1  Internal Medicine   [facility] Pager #: [phone]  **********************************************     Patient is a 60y old  Male who presents with a chief complaint of COVID19 w/ severe metabolic acidosis s/p intubation (03 Jun 2021 07:12)    SUBJECTIVE / OVERNIGHT EVENTS:     OBJECTIVE:  Vital Signs Last 24 Hrs  T(C): 36.3 (04 Jun 2021 04:44), Max: 36.8 (03 Jun 2021 09:29)  T(F): 97.4 (04 Jun 2021 04:44), Max: 98.3 (03 Jun 2021 09:29)  HR: 101 (04 Jun 2021 04:44) (84 - 106)  BP: 107/71 (04 Jun 2021 04:44) (97/67 - 112/74)  RR: 18 (04 Jun 2021 04:44) (18 - 20)  SpO2: 96% (04 Jun 2021 04:44) (95% - 100%)    I&O's Summary    02 Jun 2021 07:01  -  03 Jun 2021 07:00  --------------------------------------------------------  IN: 1200 mL / OUT: 400 mL / NET: 800 mL    03 Jun 2021 07:01  -  04 Jun 2021 06:58  --------------------------------------------------------  IN: 821 mL / OUT: 1000 mL / NET: -179 mL      Physical Examination:      Labs:  CAPILLARY BLOOD GLUCOSE  POCT Blood Glucose.: 165 mg/dL (04 Jun 2021 05:36)  POCT Blood Glucose.: 136 mg/dL (04 Jun 2021 00:11)  POCT Blood Glucose.: 162 mg/dL (03 Jun 2021 17:14)  POCT Blood Glucose.: 133 mg/dL (03 Jun 2021 12:30)    Imaging Personally Reviewed:      MEDICATIONS  (STANDING):  acetaminophen   Tablet .. 650 milliGRAM(s) Oral every 6 hours  cadexomer iodine 0.9% Gel 1 Application(s) Topical daily  cholecalciferol 400 Unit(s) Oral daily  doxazosin 2 milliGRAM(s) Oral at bedtime  heparin   Injectable 5000 Unit(s) SubCutaneous every 12 hours  insulin lispro (ADMELOG) corrective regimen sliding scale   SubCutaneous every 6 hours  insulin NPH human recombinant 12 Unit(s) SubCutaneous <User Schedule>  levETIRAcetam  Solution 500 milliGRAM(s) Oral two times a day  meropenem  IVPB 1000 milliGRAM(s) IV Intermittent every 8 hours  polyethylene glycol 3350 17 Gram(s) Oral daily  senna 2 Tablet(s) Oral at bedtime    MEDICATIONS  (PRN):  albuterol/ipratropium for Nebulization 3 milliLiter(s) Nebulizer every 6 hours PRN Shortness of Breath and/or Wheezing   **********************************************  Martha Servando, PGY-1  Internal Medicine   [facility] Pager #: [phone]  **********************************************     Patient is a 60y old  Male who presents with a chief complaint of COVID19 w/ severe metabolic acidosis s/p intubation (03 Jun 2021 07:12)    SUBJECTIVE / OVERNIGHT EVENTS:   -No acute events overnight  -Pt continues to be pending bed availability at [facility]  -Examined at bedside this AM, no subjective complaints  -Denies CP, palpitations, SOB, n/v/d     OBJECTIVE:  Vital Signs Last 24 Hrs  T(C): 36.3 (04 Jun 2021 04:44), Max: 36.8 (03 Jun 2021 09:29)  T(F): 97.4 (04 Jun 2021 04:44), Max: 98.3 (03 Jun 2021 09:29)  HR: 101 (04 Jun 2021 04:44) (84 - 106)  BP: 107/71 (04 Jun 2021 04:44) (97/67 - 112/74)  RR: 18 (04 Jun 2021 04:44) (18 - 20)  SpO2: 96% (04 Jun 2021 04:44) (95% - 100%)    I&O's Summary    02 Jun 2021 07:01  -  03 Jun 2021 07:00  --------------------------------------------------------  IN: 1200 mL / OUT: 400 mL / NET: 800 mL    03 Jun 2021 07:01  -  04 Jun 2021 06:58  --------------------------------------------------------  IN: 821 mL / OUT: 1000 mL / NET: -179 mL      Physical Examination:    General: No acute distress, well-appearing    Eyes: PERRL, EOMI     ENT: MMM, no oropharyngeal lesions or erythema appreciated     Pulm: No increased WOB. CTAB. No wheezing.     CV: RRR. S1&S2+. No M/R/G appreciated.     Abdomen: +BS. Soft, NTND. No organomegaly.     MSK: Nml ROM    Extremities: No peripheral edema or cyanosis. +LLE with dressing in place, BLE digits dark.     Neuro: A&Ox3, no focal deficits     Skin: Warm and dry. No visible rash.     Labs:  CAPILLARY BLOOD GLUCOSE  POCT Blood Glucose.: 165 mg/dL (04 Jun 2021 05:36)  POCT Blood Glucose.: 136 mg/dL (04 Jun 2021 00:11)  POCT Blood Glucose.: 162 mg/dL (03 Jun 2021 17:14)  POCT Blood Glucose.: 133 mg/dL (03 Jun 2021 12:30)    Imaging Personally Reviewed:      MEDICATIONS  (STANDING):  acetaminophen   Tablet .. 650 milliGRAM(s) Oral every 6 hours  cadexomer iodine 0.9% Gel 1 Application(s) Topical daily  cholecalciferol 400 Unit(s) Oral daily  doxazosin 2 milliGRAM(s) Oral at bedtime  heparin   Injectable 5000 Unit(s) SubCutaneous every 12 hours  insulin lispro (ADMELOG) corrective regimen sliding scale   SubCutaneous every 6 hours  insulin NPH human recombinant 12 Unit(s) SubCutaneous <User Schedule>  levETIRAcetam  Solution 500 milliGRAM(s) Oral two times a day  meropenem  IVPB 1000 milliGRAM(s) IV Intermittent every 8 hours  polyethylene glycol 3350 17 Gram(s) Oral daily  senna 2 Tablet(s) Oral at bedtime    MEDICATIONS  (PRN):  albuterol/ipratropium for Nebulization 3 milliLiter(s) Nebulizer every 6 hours PRN Shortness of Breath and/or Wheezing

## 2021-06-04 NOTE — H&P ADULT - NSHPSOCIALHISTORY_GEN_ALL_CORE
Smoking - Denied  EtOH - Denied   Drugs - Denied     As per CHRISTOPH Davis (spoke with family) Pt lives in apartment with wife, however wife is currently in [place]. PTA pt was independent with ambulation and ADL's and was employed.    CURRENT FUNCTIONAL STATUS  Bed Mobility: Min A   Transfers: Mod A   Gait: Max A RW 2 steps

## 2021-06-04 NOTE — PROGRESS NOTE ADULT - ASSESSMENT
61yo male with PMH of DM who was initially admitted to [facility] ICU for hypoxic respiratory failure 2/2 COVID c/b PE with R heart strain, cardiogenic and septic shock, ischemic and hemorrhagic CVA and ESBL klebsiella ventilator associate PNA, transferred to [facility] for neurosurgery eval and further management. s/p PEG. Course c/b right empyema s/p VATs and obturator bleed (resolved). Likely discharge to [facility].

## 2021-06-04 NOTE — CHART NOTE - NSCHARTNOTEFT_GEN_A_CORE
POC glucose, insulin requirements, lab values reviewed.   Continue NPH 12 units sq q6h  (HOLD IF TF OFF) + Moderate admelog corrective scale.   IF BG less than 100mg/dl can give half of NPH dose as pt with increased activity which decreases insulin requirments.  Per team awaiting bed at rehab call endocrine if BG hypoglycemic or persistent hyperglycemia, call back for any questions or concerns, continue current regimen on discharge to rehab. Endocrine signing off d/w Dr Al

## 2021-06-04 NOTE — CHART NOTE - NSCHARTNOTESELECT_GEN_ALL_CORE
MAR Accept Note
MAR accept note/Event Note
Medicine covid floor/Transfer Note
Nutrition Services
Speech and Swallow
Speech and swallow
Thoracic Surgery/Event Note
endocrine/Event Note
endocrine/Event Note
endocrine/Off Service Note
Endocrine/Event Note
Endocrine/Event Note
Event Note
Neurology
Neurology/Event Note
Nutrition Services
Speech and Swallow
Speech and Swallow
Thoracic surgery/Event Note
Transfer Note
endocrine/Event Note
endocrine/Event Note

## 2021-06-04 NOTE — H&P ADULT - HISTORY OF PRESENT ILLNESS
59yo M with T2DM and recently diagnosed COVID (3/19) BIBEMS to [facility] on 4/1 for hypoxic respiratory failure secondary to COVID PNA, requiring endotracheal intubation (4/1-4/13). Course significant for Septic shock on admission, treated with vancomycin and cefepime. Found to have ESBL Klebsiella PNA c/b empyema s/p VATs decortication. Treated with prolonged course of Meropenem while hospitalized and transitioned to Augmentin/Bactrim upon discharge (until 6/8). Will follow up with ID as outpatient.     Patient had acute ischemic CVA with areas of hemorrhagic transformation. Found to have multi-focal acute-to-subacute infarctions. 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 Subsequent scans showed hemorrhagic conversion. Transferred to [facility] for Neurosurgery evaluation, no surgical intervention at this time. Will require outpatient follow up.     Course complicated by RITO segmental PEs with e/o RH strain: Provoked in the setting of sepsis/high inflammatory state with COVID infection. LE Dopplers negative x 2. Initially anticoagulated with heparin gtt. Course further complicated by a right chest wall and right obturator hematomas. Due to concern for worsening hemorrhagic conversion of stroke and hematomas, the patient's therapeutic anticoagulation was discontinued. He was evaluated by the neurosurgical team, which said that there was no indication for acute intervention. Patient will not be anticoagulated upon discharge.   Patient's AMS likely in the setting of acute strokes, improved throughout hospitalization. Due to prolonged intubation with subsequent AMS and dysphagia, pt had PEG tube placed. Pt failed MBS following improvement of mental status.     Patient also had bedside debridement and woundcare of her BLE digits and will follow up with Podiatry as an outpatient.    Medically cleared for discharge to  [facility] 6/[facility].    Patient's brother in law armaan interpreted via phone     59yo M with T2DM and recently diagnosed COVID (3/19) BIBEMS to [facility] on 4/1 for hypoxic respiratory failure secondary to COVID PNA, requiring endotracheal intubation (4/1-4/13). Course significant for Septic shock on admission, treated with vancomycin and cefepime. Found to have ESBL Klebsiella PNA c/b empyema s/p VATs decortication. Treated with prolonged course of Meropenem while hospitalized and transitioned to Augmentin/Bactrim upon discharge (until 6/8). Will follow up with ID as outpatient.     Patient had acute ischemic CVA with areas of hemorrhagic transformation. Found to have multi-focal acute-to-subacute infarctions. 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 Subsequent scans showed hemorrhagic conversion. Transferred to [facility] for Neurosurgery evaluation, no surgical intervention at this time. Will require outpatient follow up.     Course complicated by RITO segmental PEs with e/o RH strain: Provoked in the setting of sepsis/high inflammatory state with COVID infection. LE Dopplers negative x 2. Initially anticoagulated with heparin gtt. Course further complicated by a right chest wall and right obturator hematomas. Due to concern for worsening hemorrhagic conversion of stroke and hematomas, the patient's therapeutic anticoagulation was discontinued. He was evaluated by the neurosurgical team, which said that there was no indication for acute intervention. Patient will not be anticoagulated upon discharge.   Patient's AMS likely in the setting of acute strokes, improved throughout hospitalization. Due to prolonged intubation with subsequent AMS and dysphagia, pt had PEG tube placed. Pt failed MBS following improvement of mental status.     Patient also had bedside debridement and woundcare of her BLE digits and will follow up with Podiatry as an outpatient.    Medically cleared for discharge to  [facility] 6/4.

## 2021-06-04 NOTE — H&P ADULT - NSHPPHYSICALEXAM_GEN_ALL_CORE
Gen - NAD, Comfortable  HEENT - NCAT, EOMI, MMM  Neck - Supple, No limited ROM  Pulm - CTAB, No wheeze, No rhonchi, No crackles  Cardiovascular - RRR, S1S2, No murmurs  Abdomen - Soft, NT/ND, +BS  Extremities - No C/C/E, No calf tenderness  Neuro-     Cognitive - AAOx3     Communication - Fluent, No dysarthria     Attention: Intact      Judgement: Good evidence of judgement     Memory: Recall 3 objects immediate and 3 min later         Cranial Nerves - CN 2-12 intact     Motor -                     LEFT    UE - ShAB 5/5, EF 5/5, EE 5/5, WE 5/5,  5/5                    RIGHT UE - ShAB 5/5, EF 5/5, EE 5/5, WE 5/5,  5/5                    LEFT    LE - HF 5/5, KE 5/5, DF 5/5, PF 5/5                    RIGHT LE - HF 5/5, KE 5/5, DF 5/5, PF 5/5        Sensory - Intact to LT     Reflexes - DTR Intact, No primitive reflexive     Coordination - FTN intact     Tone - normal  Psychiatric - Mood stable, Affect WNL  Skin:  all skin intact    Wounds: None Present Gen - NAD, Comfortable  HEENT - NCAT   Neck - Supple, No limited ROM  Pulm - CTAB, No wheeze, No rhonchi, No crackles  Cardiovascular - RRR, S1S2, No murmurs  Abdomen - Soft, NT/ND, +PEG   Extremities - No C/C/E, No calf tenderness  Neuro-     Cognitive - Awake alert oriented to self, but unaware of his location, situation and year      Communication - Delayed processing      Attention: Impaired      Judgement: Impaired      Motor - Not following directions but moving all extremities spontaneously      Sensory - Intact to LT     Tone - normal  Psychiatric - Mood stable, Affect WNL  Skin:  all skin intact Gen - NAD, Comfortable  HEENT - NCAT   Neck - Supple, No limited ROM  Pulm - CTAB, No wheeze, No rhonchi, No crackles  Cardiovascular - RRR, S1S2, No murmurs  Abdomen - Soft, NT/ND, +PEG   Extremities - No C/C/E, No calf tenderness, BLE dark digits with dressing on the left foot   Neuro-     Cognitive - Awake alert oriented to self, but unaware of his location, situation and year      Communication - Delayed processing      Attention: Impaired      Judgement: Impaired      Motor - Not following commands but moving all extremities spontaneously      Sensory - Intact to LT  Psychiatric - Mood stable, Affect WNL  Skin:  all skin intact

## 2021-06-05 LAB
ALBUMIN SERPL ELPH-MCNC: 2.6 G/DL — LOW (ref 3.3–5)
ALP SERPL-CCNC: 113 U/L — SIGNIFICANT CHANGE UP (ref 40–120)
ALT FLD-CCNC: 46 U/L — HIGH (ref 10–45)
ANION GAP SERPL CALC-SCNC: 7 MMOL/L — SIGNIFICANT CHANGE UP (ref 5–17)
ANION GAP SERPL CALC-SCNC: 7 MMOL/L — SIGNIFICANT CHANGE UP (ref 5–17)
AST SERPL-CCNC: 117 U/L — HIGH (ref 10–40)
BASOPHILS # BLD AUTO: 0.12 K/UL — SIGNIFICANT CHANGE UP (ref 0–0.2)
BASOPHILS NFR BLD AUTO: 1.3 % — SIGNIFICANT CHANGE UP (ref 0–2)
BILIRUB SERPL-MCNC: 0.6 MG/DL — SIGNIFICANT CHANGE UP (ref 0.2–1.2)
BUN SERPL-MCNC: 40 MG/DL — HIGH (ref 7–23)
BUN SERPL-MCNC: 42 MG/DL — HIGH (ref 7–23)
CALCIUM SERPL-MCNC: 9.8 MG/DL — SIGNIFICANT CHANGE UP (ref 8.4–10.5)
CALCIUM SERPL-MCNC: 9.9 MG/DL — SIGNIFICANT CHANGE UP (ref 8.4–10.5)
CHLORIDE SERPL-SCNC: 103 MMOL/L — SIGNIFICANT CHANGE UP (ref 96–108)
CHLORIDE SERPL-SCNC: 104 MMOL/L — SIGNIFICANT CHANGE UP (ref 96–108)
CO2 SERPL-SCNC: 28 MMOL/L — SIGNIFICANT CHANGE UP (ref 22–31)
CO2 SERPL-SCNC: 29 MMOL/L — SIGNIFICANT CHANGE UP (ref 22–31)
CREAT SERPL-MCNC: 1.21 MG/DL — SIGNIFICANT CHANGE UP (ref 0.5–1.3)
CREAT SERPL-MCNC: 1.4 MG/DL — HIGH (ref 0.5–1.3)
EOSINOPHIL # BLD AUTO: 0.79 K/UL — HIGH (ref 0–0.5)
EOSINOPHIL NFR BLD AUTO: 8.7 % — HIGH (ref 0–6)
GLUCOSE BLDC GLUCOMTR-MCNC: 112 MG/DL — HIGH (ref 70–99)
GLUCOSE BLDC GLUCOMTR-MCNC: 114 MG/DL — HIGH (ref 70–99)
GLUCOSE BLDC GLUCOMTR-MCNC: 171 MG/DL — HIGH (ref 70–99)
GLUCOSE BLDC GLUCOMTR-MCNC: 177 MG/DL — HIGH (ref 70–99)
GLUCOSE BLDC GLUCOMTR-MCNC: 186 MG/DL — HIGH (ref 70–99)
GLUCOSE SERPL-MCNC: 179 MG/DL — HIGH (ref 70–99)
GLUCOSE SERPL-MCNC: 189 MG/DL — HIGH (ref 70–99)
HCT VFR BLD CALC: 38.3 % — LOW (ref 39–50)
HGB BLD-MCNC: 11.7 G/DL — LOW (ref 13–17)
IMM GRANULOCYTES NFR BLD AUTO: 0.6 % — SIGNIFICANT CHANGE UP (ref 0–1.5)
LYMPHOCYTES # BLD AUTO: 2.11 K/UL — SIGNIFICANT CHANGE UP (ref 1–3.3)
LYMPHOCYTES # BLD AUTO: 23.3 % — SIGNIFICANT CHANGE UP (ref 13–44)
MCHC RBC-ENTMCNC: 26 PG — LOW (ref 27–34)
MCHC RBC-ENTMCNC: 30.5 GM/DL — LOW (ref 32–36)
MCV RBC AUTO: 85.1 FL — SIGNIFICANT CHANGE UP (ref 80–100)
MONOCYTES # BLD AUTO: 0.93 K/UL — HIGH (ref 0–0.9)
MONOCYTES NFR BLD AUTO: 10.3 % — SIGNIFICANT CHANGE UP (ref 2–14)
NEUTROPHILS # BLD AUTO: 5.04 K/UL — SIGNIFICANT CHANGE UP (ref 1.8–7.4)
NEUTROPHILS NFR BLD AUTO: 55.8 % — SIGNIFICANT CHANGE UP (ref 43–77)
NRBC # BLD: 0 /100 WBCS — SIGNIFICANT CHANGE UP (ref 0–0)
PLATELET # BLD AUTO: 360 K/UL — SIGNIFICANT CHANGE UP (ref 150–400)
POTASSIUM SERPL-MCNC: 5 MMOL/L — SIGNIFICANT CHANGE UP (ref 3.5–5.3)
POTASSIUM SERPL-MCNC: 5.6 MMOL/L — HIGH (ref 3.5–5.3)
POTASSIUM SERPL-SCNC: 5 MMOL/L — SIGNIFICANT CHANGE UP (ref 3.5–5.3)
POTASSIUM SERPL-SCNC: 5.6 MMOL/L — HIGH (ref 3.5–5.3)
PROT SERPL-MCNC: 8.4 G/DL — HIGH (ref 6–8.3)
RBC # BLD: 4.5 M/UL — SIGNIFICANT CHANGE UP (ref 4.2–5.8)
RBC # FLD: 20.2 % — HIGH (ref 10.3–14.5)
SODIUM SERPL-SCNC: 138 MMOL/L — SIGNIFICANT CHANGE UP (ref 135–145)
SODIUM SERPL-SCNC: 140 MMOL/L — SIGNIFICANT CHANGE UP (ref 135–145)
WBC # BLD: 9.04 K/UL — SIGNIFICANT CHANGE UP (ref 3.8–10.5)
WBC # FLD AUTO: 9.04 K/UL — SIGNIFICANT CHANGE UP (ref 3.8–10.5)

## 2021-06-05 PROCEDURE — 99222 1ST HOSP IP/OBS MODERATE 55: CPT

## 2021-06-05 PROCEDURE — 99221 1ST HOSP IP/OBS SF/LOW 40: CPT

## 2021-06-05 RX ORDER — SODIUM CHLORIDE 9 MG/ML
1000 INJECTION, SOLUTION INTRAVENOUS
Refills: 0 | Status: DISCONTINUED | OUTPATIENT
Start: 2021-06-05 | End: 2021-06-07

## 2021-06-05 RX ORDER — ONDANSETRON 8 MG/1
4 TABLET, FILM COATED ORAL EVERY 6 HOURS
Refills: 0 | Status: DISCONTINUED | OUTPATIENT
Start: 2021-06-05 | End: 2021-06-23

## 2021-06-05 RX ORDER — INSULIN LISPRO 100/ML
VIAL (ML) SUBCUTANEOUS
Refills: 0 | Status: DISCONTINUED | OUTPATIENT
Start: 2021-06-05 | End: 2021-06-22

## 2021-06-05 RX ADMIN — LEVETIRACETAM 500 MILLIGRAM(S): 250 TABLET, FILM COATED ORAL at 17:59

## 2021-06-05 RX ADMIN — Medication 650 MILLIGRAM(S): at 07:17

## 2021-06-05 RX ADMIN — SENNA PLUS 2 TABLET(S): 8.6 TABLET ORAL at 23:12

## 2021-06-05 RX ADMIN — Medication 650 MILLIGRAM(S): at 23:12

## 2021-06-05 RX ADMIN — Medication 2: at 12:52

## 2021-06-05 RX ADMIN — Medication 2 TABLET(S): at 23:12

## 2021-06-05 RX ADMIN — HUMAN INSULIN 12 UNIT(S): 100 INJECTION, SUSPENSION SUBCUTANEOUS at 18:01

## 2021-06-05 RX ADMIN — Medication 650 MILLIGRAM(S): at 06:22

## 2021-06-05 RX ADMIN — Medication 1 TABLET(S): at 06:23

## 2021-06-05 RX ADMIN — HEPARIN SODIUM 5000 UNIT(S): 5000 INJECTION INTRAVENOUS; SUBCUTANEOUS at 17:59

## 2021-06-05 RX ADMIN — SODIUM CHLORIDE 75 MILLILITER(S): 9 INJECTION, SOLUTION INTRAVENOUS at 15:55

## 2021-06-05 RX ADMIN — Medication 2 TABLET(S): at 06:22

## 2021-06-05 RX ADMIN — HUMAN INSULIN 12 UNIT(S): 100 INJECTION, SUSPENSION SUBCUTANEOUS at 06:36

## 2021-06-05 RX ADMIN — Medication 1 TABLET(S): at 17:59

## 2021-06-05 RX ADMIN — Medication 650 MILLIGRAM(S): at 14:00

## 2021-06-05 RX ADMIN — Medication 2: at 06:37

## 2021-06-05 RX ADMIN — Medication 650 MILLIGRAM(S): at 00:11

## 2021-06-05 RX ADMIN — LEVETIRACETAM 500 MILLIGRAM(S): 250 TABLET, FILM COATED ORAL at 06:23

## 2021-06-05 RX ADMIN — Medication 400 UNIT(S): at 13:02

## 2021-06-05 RX ADMIN — Medication 2 MILLIGRAM(S): at 23:12

## 2021-06-05 RX ADMIN — Medication 1 APPLICATION(S): at 13:02

## 2021-06-05 RX ADMIN — Medication 650 MILLIGRAM(S): at 13:02

## 2021-06-05 RX ADMIN — Medication 2 TABLET(S): at 13:02

## 2021-06-05 RX ADMIN — HEPARIN SODIUM 5000 UNIT(S): 5000 INJECTION INTRAVENOUS; SUBCUTANEOUS at 06:23

## 2021-06-05 NOTE — DIETITIAN INITIAL EVALUATION ADULT. - PHYSCIAL ASSESSMENT
Temporalis, Orbital Fat Pads, Buccal Fat Pads, Clavicle, Bicep, Quadricep & Gastrocnemius(Calf) Wasting Observed  (Per Nutrition Focused Physical Exam)  Pt Unsure of UBW

## 2021-06-05 NOTE — DIETITIAN INITIAL EVALUATION ADULT. - PERTINENT LABORATORY DATA
6/4   Na-138  K-4.9  BUN-30H  Creat-0.98  Gluc-176H  Hemoglobin A1c - 10.3H(on 4/12)  POCT (over Last 3 Days) - Ranging from

## 2021-06-05 NOTE — DIETITIAN INITIAL EVALUATION ADULT. - ADD RECOMMEND
1) Monitor Weights, Tolerance, Skin, POCT & Labwork   2) Recommend Change Tubefeeding to 2CalHN 300ml Bolus x4 Day 3) Nutrition Education Provided on Need for Bolus Feeding 4) Continue Nutrition Plan of Care

## 2021-06-05 NOTE — CONSULT NOTE ADULT - ASSESSMENT
61yo male with PMH of DM who was initially admitted to [facility] ICU for hypoxic respiratory failure 2/2 COVID c/b PE with R heart strain, cardiogenic and septic shock, ischemic and hemorrhagic CVA and ESBL klebsiella ventilator associate PNA, transferred to [facility] for neurosurgery eval and further management. s/p PEG. Course c/b right empyema s/p VATs and obturator bleed (resolved). Now admitted for functional decline.     #Debility  #ESBL Klebsiella PNA c/b empyema  #s/p COVID pneumonia  - 5/12 underwent right VATS converted to open L thoracotomy, decortication, drainage of abscess and flex bronch.  - c/w meropenem 5/7 and transition to Bactrim DS 2 tabs TID + Augmentin 875mg po q12  upon DC to [facility] until 6/8   - ID following, recs appreciated - needs close follow up in ID clinic for repeat imaging, Dr. Chambers  - CT Chest 5/29 with improvement in bilateral opacities. Slight decrease in right chest wall hematoma. Stable loculated right hydropneumothorax.   - Comprehensive Rehab Program    #SUKI on CKD stage 2  #Hyperkalemia  - Increase in creatinine can occur with trimethoprim  - hydrate/monitor BMP  - K 5.6 -- repeat BMP STAT    #Empyema   -OR Vats 5/12 with Dr. Albarado with all chest tubes removed by 5/18  -CT Chest 5/16 Interval new small cavitary lesions in the right lower lobe and right chest wall hematoma - no thoracic surgery intervention indicated per Dr. Albarado.    #Obturator Hematoma (stable)  - bleeding hematoma in right thigh on CT on 5/7  - CT hip showing non active bleed, hematoma on right thigh on 5/8  - doppler to r/o DVT in LE, per IR no role IVC filter  - active again on 5/17, now stable  - was on heparin drip on and off, now off. C/w DVT ppx heparin bid.     #Pulmonary embolus   -Pulmonary embolus noted on ct scan performed at time of admission  -Etiology of venous thromboembolism uncertain at this time; no known history of thrombophilia; provoked in setting of acute covid illness   -s/p heparin drip held due to recurrent bleed in right obturator and right chest wall hematoma   - 5000 heparin bid for dvt ppx    #CVA  -Acute bilateral ischemic strokes, with concern for hemorrhagic conversion, noted on CT scan on 4/8  -Etiology of acute ischemic stroke uncertain at this time; TTE with bubble performed at bedside without any evidence of right-to-left shunt; no arrhythmia noted on telemetry throughout stay in the MICU  -CTA head and neck without any evidence of carotid artery stenosis or dissection    #Encephalopathy due to multifactorial causes - improving  - Etiology likely multi-factorial in setting of acute ischemic infarctions of the brain in conjunction vs acute kidney injury with uremia (now resolving) vs resolving hypoxic respiratory failure, covid-19 infection, superimposed bacterial pneumonia, urinary retention, concern for nutritional deficiencies)  - Reorientation with family at bedside (pt speaks [place], does best with family present)  - MRI Brain, and MRA Head demonstrated multiple evolving subacute infarcts with associated hemorrhages, likely expected evolution of ischemia  - As per neuro, EEG not suggestive of seizure-like activity  - C/w Keppra 500 mg BID-> neuro f/u outpatient   - repeat CT head 5/5 normal.     #Diabetes mellitus type II  - Type II diabetes mellitus noted; a1c noted to be greater than 10  - NPH 12 U BID and continue correction scale sq q6h  - FS q6  - As per endocrine recs: IF BG less than 100mg/dl can give half of NPH dose  - Follow up endo outpatient    #Hypertension.  - Was on metoprolol tartrate 25 mg twice daily since 4/16; discontinued  - BP is stable  - If is hypertensive persistently, can consider antihypertensives    #Dysphagia  - PEG tube placed on 4/30, tube feedings started on 4/30, + free water q8    #Peripheral vascular disease  -Dry gangrene noted over distal toes on bilateral lower extremities--most concerning for microvascular disease subsequent to vasopressor administration   -Dorsalis pedis pulses intact bilaterally  -ERASMO demonstrating right ERASMO: 1.09, left ERASMO: 1.24, right TBI: 0.78, and left TBI: 0.73.  -Podiatry recs appreciated, no acute surgical intervention at this time, applying betadine to wound  -As per ID non infectious appearing     #Urinary Retention - improved  - Mann removed 5/30, urinating appropriately  - c/w doxazosin    #Pain Mgmt   - Tylenol PRN, Oxycodone PRN    #GI/Bowel Mgmt   - Continent c/w Senna, Miralax PRN    #Precautions / PROPHYLAXIS:   - Falls, Cardiac, Sternal, Spinal, Seizure   - ortho: Weight bearing status: WBAT   - Lungs: Aspiration, Incentive Spirometer   - Pressure injury/Skin: OOB to Chair, PT/OT    - DVT: HSQ 59yo male with PMH of DM who was initially admitted to [facility] ICU for hypoxic respiratory failure 2/2 COVID c/b PE with R heart strain, cardiogenic and septic shock, ischemic and hemorrhagic CVA and ESBL klebsiella ventilator associate PNA, transferred to [facility] for neurosurgery eval and further management. s/p PEG. Course c/b right empyema s/p VATs and obturator bleed (resolved). Now admitted for functional decline.     #Debility  #ESBL Klebsiella PNA c/b empyema  #s/p COVID pneumonia  - 5/12 underwent right VATS converted to open L thoracotomy, decortication, drainage of abscess and flex bronch.  - c/w meropenem 5/7 and transition to Bactrim DS 2 tabs TID + Augmentin 875mg po q12  upon DC to [facility] until 6/8   - ID following, recs appreciated - needs close follow up in ID clinic for repeat imaging, Dr. Chambers  - CT Chest 5/29 with improvement in bilateral opacities. Slight decrease in right chest wall hematoma. Stable loculated right hydropneumothorax.   - Comprehensive Rehab Program    #SUKI on CKD stage 2  #Hyperkalemia  - Increase in creatinine can occur with trimethoprim  - hydrate/monitor BMP  - K 5.6 -- repeat BMP STAT  - confirmed with lab that sameple was hemolyzed    #Empyema   -OR Vats 5/12 with Dr. Albarado with all chest tubes removed by 5/18  -CT Chest 5/16 Interval new small cavitary lesions in the right lower lobe and right chest wall hematoma - no thoracic surgery intervention indicated per Dr. Albarado.    #Obturator Hematoma (stable)  - bleeding hematoma in right thigh on CT on 5/7  - CT hip showing non active bleed, hematoma on right thigh on 5/8  - doppler to r/o DVT in LE, per IR no role IVC filter  - active again on 5/17, now stable  - was on heparin drip on and off, now off. C/w DVT ppx heparin bid.     #Pulmonary embolus   -Pulmonary embolus noted on ct scan performed at time of admission  -Etiology of venous thromboembolism uncertain at this time; no known history of thrombophilia; provoked in setting of acute covid illness   -s/p heparin drip held due to recurrent bleed in right obturator and right chest wall hematoma   - 5000 heparin bid for dvt ppx    #CVA  -Acute bilateral ischemic strokes, with concern for hemorrhagic conversion, noted on CT scan on 4/8  -Etiology of acute ischemic stroke uncertain at this time; TTE with bubble performed at bedside without any evidence of right-to-left shunt; no arrhythmia noted on telemetry throughout stay in the MICU  -CTA head and neck without any evidence of carotid artery stenosis or dissection    #Encephalopathy due to multifactorial causes - improving  - Etiology likely multi-factorial in setting of acute ischemic infarctions of the brain in conjunction vs acute kidney injury with uremia (now resolving) vs resolving hypoxic respiratory failure, covid-19 infection, superimposed bacterial pneumonia, urinary retention, concern for nutritional deficiencies)  - Reorientation with family at bedside (pt speaks Gujarati, does best with family present)  - MRI Brain, and MRA Head demonstrated multiple evolving subacute infarcts with associated hemorrhages, likely expected evolution of ischemia  - As per neuro, EEG not suggestive of seizure-like activity  - C/w Keppra 500 mg BID-> neuro f/u outpatient   - repeat CT head 5/5 normal.     #Diabetes mellitus type II  - Type II diabetes mellitus noted; a1c noted to be greater than 10  - NPH 12 U BID and continue correction scale sq q6h  - FS q6  - As per endocrine recs: IF BG less than 100mg/dl can give half of NPH dose  - Follow up endo outpatient    #Hypertension.  - Was on metoprolol tartrate 25 mg twice daily since 4/16; discontinued  - BP is stable  - If is hypertensive persistently, can consider antihypertensives    #Dysphagia  - PEG tube placed on 4/30, tube feedings started on 4/30, + free water q8    #Peripheral vascular disease  -Dry gangrene noted over distal toes on bilateral lower extremities--most concerning for microvascular disease subsequent to vasopressor administration   -Dorsalis pedis pulses intact bilaterally  -ERASMO demonstrating right ERASMO: 1.09, left ERASMO: 1.24, right TBI: 0.78, and left TBI: 0.73.  -Podiatry recs appreciated, no acute surgical intervention at this time, applying betadine to wound  -As per ID non infectious appearing     #Urinary Retention - improved  - Mann removed 5/30, urinating appropriately  - c/w doxazosin    #Pain Mgmt   - Tylenol PRN, Oxycodone PRN    #GI/Bowel Mgmt   - Continent c/w Senna, Miralax PRN    #Precautions / PROPHYLAXIS:   - Falls, Cardiac, Sternal, Spinal, Seizure   - ortho: Weight bearing status: WBAT   - Lungs: Aspiration, Incentive Spirometer   - Pressure injury/Skin: OOB to Chair, PT/OT    - DVT: HSQ

## 2021-06-05 NOTE — DIETITIAN INITIAL EVALUATION ADULT. - SIGNS/SYMPTOMS
Fat Depletion/Muscle Wasting Observed (Per Nutrition Focused Physical Exam) NPO, Aspiration Precautions & Need for Tubefeeding

## 2021-06-05 NOTE — DIETITIAN NUTRITION RISK NOTIFICATION - TREATMENT: THE FOLLOWING DIET HAS BEEN RECOMMENDED
Recommend Change Tubefeeding to 2CalHN 300ml Bolus x4 Day (Provides 2,400kcal & 100.2grams of Protein)

## 2021-06-05 NOTE — PROVIDER CONTACT NOTE (OTHER) - ACTION/TREATMENT ORDERED:
MD agreeable to giving pt less, will include amount in the order. MD requesting to do accuchecks, as per  new order, to monitor patients glucose due to insulin being given prior to feeding

## 2021-06-05 NOTE — CONSULT NOTE ADULT - SUBJECTIVE AND OBJECTIVE BOX
Patient is a 60y old  Male who presents with a chief complaint of CVA (04 Jun 2021 16:29)      HPI:  Patient's brother in law armaan interpreted via phone     59yo M with T2DM and recently diagnosed COVID (3/19) BIBEMS to [facility] on 4/1 for hypoxic respiratory failure secondary to COVID PNA, requiring endotracheal intubation (4/1-4/13). Course significant for Septic shock on admission, treated with vancomycin and cefepime. Found to have ESBL Klebsiella PNA c/b empyema s/p VATs decortication. Treated with prolonged course of Meropenem while hospitalized and transitioned to Augmentin/Bactrim upon discharge (until 6/8). Will follow up with ID as outpatient.     Patient had acute ischemic CVA with areas of hemorrhagic transformation. Found to have multi-focal acute-to-subacute infarctions. 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 Subsequent scans showed hemorrhagic conversion. Transferred to [facility] for Neurosurgery evaluation, no surgical intervention at this time. Will require outpatient follow up.     Course complicated by RITO segmental PEs with e/o RH strain: Provoked in the setting of sepsis/high inflammatory state with COVID infection. LE Dopplers negative x 2. Initially anticoagulated with heparin gtt. Course further complicated by a right chest wall and right obturator hematomas. Due to concern for worsening hemorrhagic conversion of stroke and hematomas, the patient's therapeutic anticoagulation was discontinued. He was evaluated by the neurosurgical team, which said that there was no indication for acute intervention. Patient will not be anticoagulated upon discharge.   Patient's AMS likely in the setting of acute strokes, improved throughout hospitalization. Due to prolonged intubation with subsequent AMS and dysphagia, pt had PEG tube placed. Pt failed MBS following improvement of mental status.     Patient also had bedside debridement and woundcare of her BLE digits and will follow up with Podiatry as an outpatient.    Medically cleared for discharge to  [facility] 6/4.    (04 Jun 2021 16:29)      PAST MEDICAL & SURGICAL HISTORY:  No pertinent past medical history    No significant past surgical history        Allergies: NKDA  Family hx: denies pertinent hx in parents  Social hx: denies smoking, etoh, drug use        trimethoprim  160 mG/sulfamethoxazole 800 mG 2 Tablet(s) Oral three times a day      REVIEW OF SYSTEMS:  CONSTITUTIONAL: No fever, weight loss, or fatigue  EYES: No eye pain, visual disturbances, or discharge  ENMT:  No difficulty hearing, tinnitus, vertigo; No sinus or throat pain  NECK: No pain or stiffness  BREASTS: No pain, masses, or nipple discharge  RESPIRATORY: No cough, wheezing, chills or hemoptysis; No shortness of breath  CARDIOVASCULAR: No chest pain, palpitations, dizziness, or leg swelling  GASTROINTESTINAL: No abdominal or epigastric pain. No nausea, vomiting, or hematemesis; No diarrhea or constipation. No melena or hematochezia.  GENITOURINARY: No dysuria, frequency, hematuria, or incontinence  NEUROLOGICAL: No headaches, memory loss, loss of strength, numbness, or tremors  SKIN: No itching, burning, rashes, or lesions   LYMPH NODES: No enlarged glands  ENDOCRINE: No heat or cold intolerance; No hair loss  MUSCULOSKELETAL: No joint pain or swelling; No muscle, back, or extremity pain  PSYCHIATRIC: No depression, anxiety, mood swings, or difficulty sleeping  HEME/LYMPH: No easy bruising, or bleeding gums  ALLERGY AND IMMUNOLOGIC: No hives or eczema    ALL ROS REVIEWED AND NORMAL EXCEPT AS STATED ABOVE    T(C): 36.4 (06-05-21 @ 07:28), Max: 36.7 (06-04-21 @ 18:19)  HR: 114 (06-05-21 @ 07:28) (99 - 114)  BP: 104/64 (06-05-21 @ 07:28) (104/64 - 119/75)  RR: 16 (06-05-21 @ 07:28) (16 - 18)  SpO2: 96% (06-05-21 @ 07:28) (96% - 98%)  Wt(kg): --Vital Signs Last 24 Hrs  T(C): 36.4 (05 Jun 2021 07:28), Max: 36.7 (04 Jun 2021 18:19)  T(F): 97.5 (05 Jun 2021 07:28), Max: 98 (04 Jun 2021 18:19)  HR: 114 (05 Jun 2021 07:28) (99 - 114)  BP: 104/64 (05 Jun 2021 07:28) (104/64 - 119/75)  BP(mean): --  RR: 16 (05 Jun 2021 07:28) (16 - 18)  SpO2: 96% (05 Jun 2021 07:28) (96% - 98%)    PHYSICAL EXAM:  GENERAL: NAD, well-groomed, well-developed  HEAD:  Atraumatic, Normocephalic  EYES: EOMI, PERRLA, conjunctiva and sclera clear  ENMT: No tonsillar erythema, exudates, or enlargement; Moist mucous membranes, Good dentition, No lesions  NECK: Supple, No JVD, Normal thyroid  NERVOUS SYSTEM:  Alert & Oriented X3, Good concentration; Motor Strength 5/5 B/L upper and lower extremities; DTRs 2+ intact and symmetric  CHEST/LUNG: Clear to percussion bilaterally; No rales, rhonchi, wheezing, or rubs  HEART: Regular rate and rhythm; No murmurs, rubs, or gallops  ABDOMEN: Soft, Nontender, Nondistended; Bowel sounds present  EXTREMITIES:  2+ Peripheral Pulses, No clubbing, cyanosis, or edema  LYMPH: No lymphadenopathy noted  SKIN: No rashes or lesions    LABS:                        11.7   9.04  )-----------( 360      ( 05 Jun 2021 07:15 )             38.3     06-05    138  |  103  |  40<H>  ----------------------------<  189<H>  5.6<H>   |  28  |  1.21    Ca    9.8      05 Jun 2021 07:15    TPro  8.4<H>  /  Alb  2.6<L>  /  TBili  0.6  /  DBili  x   /  AST  117<H>  /  ALT  46<H>  /  AlkPhos  113  06-05         CAPILLARY BLOOD GLUCOSE      POCT Blood Glucose.: 177 mg/dL (05 Jun 2021 06:35)  POCT Blood Glucose.: 118 mg/dL (04 Jun 2021 23:09)  POCT Blood Glucose.: 158 mg/dL (04 Jun 2021 12:48)            RADIOLOGY & ADDITIONAL TESTS:    Consultant(s) Notes Reviewed:  [x ] YES  [ ] NO  Care Discussed with Consultants/Other Providers [ x] YES  [ ] NO  Imaging Personally Reviewed:  [ ] YES  [ ] NO

## 2021-06-05 NOTE — PROVIDER CONTACT NOTE (OTHER) - SITUATION
Pt received first bolus of Twocal HN via peg tube. Pts family member in the room at the time of feeding, pt stated he did not feel any discomfort with feeding at the time.

## 2021-06-05 NOTE — DIETITIAN INITIAL EVALUATION ADULT. - OTHER INFO
Initial Nutrition Assessment   60yr Old Male   Dx:   Diet - NPO & Aspiration Precautions  Tubefeeding - on Glucerna 1.5 60ml/hrx24 & Manual Flush 200ml x4Day   (Recommend Change Tubefeeding To 2CalHN 300ml Bolus x4 Day)   Denies Food Allergy/Intolerance  Tolerates Tubefeeding Well   Surgical Incision on Right Arm & No Pressure Ulcers (as Per Nursing Flow Sheets)  No Edema Noted (as Per Nursing Flow Sheets)  No Recent Nausea/Vomiting/Diarrhea/Constipation (as Per Patient)

## 2021-06-05 NOTE — DIETITIAN INITIAL EVALUATION ADULT. - ORAL INTAKE PTA/DIET HISTORY
on Glucerna 1.5 60ml/hrx24 & Manual Flush 200ml x4Day   Recommend Change Tubefeeding To 2CalHN 300ml Bolus x4 Day (Provides 2,400kcal & 100.2grams of Protein)     Nutrition Education Provided on Need for Bolus Feeding

## 2021-06-06 LAB
ANION GAP SERPL CALC-SCNC: 7 MMOL/L — SIGNIFICANT CHANGE UP (ref 5–17)
BUN SERPL-MCNC: 37 MG/DL — HIGH (ref 7–23)
CALCIUM SERPL-MCNC: 9.5 MG/DL — SIGNIFICANT CHANGE UP (ref 8.4–10.5)
CHLORIDE SERPL-SCNC: 102 MMOL/L — SIGNIFICANT CHANGE UP (ref 96–108)
CO2 SERPL-SCNC: 28 MMOL/L — SIGNIFICANT CHANGE UP (ref 22–31)
CREAT SERPL-MCNC: 1.53 MG/DL — HIGH (ref 0.5–1.3)
GLUCOSE BLDC GLUCOMTR-MCNC: 115 MG/DL — HIGH (ref 70–99)
GLUCOSE BLDC GLUCOMTR-MCNC: 126 MG/DL — HIGH (ref 70–99)
GLUCOSE BLDC GLUCOMTR-MCNC: 203 MG/DL — HIGH (ref 70–99)
GLUCOSE BLDC GLUCOMTR-MCNC: 246 MG/DL — HIGH (ref 70–99)
GLUCOSE SERPL-MCNC: 174 MG/DL — HIGH (ref 70–99)
POTASSIUM SERPL-MCNC: 4.7 MMOL/L — SIGNIFICANT CHANGE UP (ref 3.5–5.3)
POTASSIUM SERPL-SCNC: 4.7 MMOL/L — SIGNIFICANT CHANGE UP (ref 3.5–5.3)
SODIUM SERPL-SCNC: 137 MMOL/L — SIGNIFICANT CHANGE UP (ref 135–145)

## 2021-06-06 PROCEDURE — 99232 SBSQ HOSP IP/OBS MODERATE 35: CPT

## 2021-06-06 PROCEDURE — 99233 SBSQ HOSP IP/OBS HIGH 50: CPT

## 2021-06-06 RX ADMIN — HUMAN INSULIN 12 UNIT(S): 100 INJECTION, SUSPENSION SUBCUTANEOUS at 17:51

## 2021-06-06 RX ADMIN — Medication 650 MILLIGRAM(S): at 17:51

## 2021-06-06 RX ADMIN — Medication 650 MILLIGRAM(S): at 00:40

## 2021-06-06 RX ADMIN — Medication 2 TABLET(S): at 23:13

## 2021-06-06 RX ADMIN — LEVETIRACETAM 500 MILLIGRAM(S): 250 TABLET, FILM COATED ORAL at 17:50

## 2021-06-06 RX ADMIN — Medication 1 APPLICATION(S): at 12:26

## 2021-06-06 RX ADMIN — HEPARIN SODIUM 5000 UNIT(S): 5000 INJECTION INTRAVENOUS; SUBCUTANEOUS at 06:06

## 2021-06-06 RX ADMIN — Medication 1 TABLET(S): at 06:06

## 2021-06-06 RX ADMIN — LEVETIRACETAM 500 MILLIGRAM(S): 250 TABLET, FILM COATED ORAL at 06:06

## 2021-06-06 RX ADMIN — Medication 400 UNIT(S): at 12:26

## 2021-06-06 RX ADMIN — Medication 4: at 12:27

## 2021-06-06 RX ADMIN — Medication 2 TABLET(S): at 13:58

## 2021-06-06 RX ADMIN — Medication 650 MILLIGRAM(S): at 13:10

## 2021-06-06 RX ADMIN — Medication 4: at 23:11

## 2021-06-06 RX ADMIN — Medication 650 MILLIGRAM(S): at 06:07

## 2021-06-06 RX ADMIN — Medication 2 MILLIGRAM(S): at 23:14

## 2021-06-06 RX ADMIN — Medication 650 MILLIGRAM(S): at 12:26

## 2021-06-06 RX ADMIN — HUMAN INSULIN 12 UNIT(S): 100 INJECTION, SUSPENSION SUBCUTANEOUS at 06:23

## 2021-06-06 RX ADMIN — Medication 1 TABLET(S): at 17:51

## 2021-06-06 RX ADMIN — SENNA PLUS 2 TABLET(S): 8.6 TABLET ORAL at 23:13

## 2021-06-06 RX ADMIN — HEPARIN SODIUM 5000 UNIT(S): 5000 INJECTION INTRAVENOUS; SUBCUTANEOUS at 17:51

## 2021-06-06 RX ADMIN — POLYETHYLENE GLYCOL 3350 17 GRAM(S): 17 POWDER, FOR SOLUTION ORAL at 12:27

## 2021-06-06 RX ADMIN — Medication 650 MILLIGRAM(S): at 18:42

## 2021-06-06 RX ADMIN — Medication 2 TABLET(S): at 06:06

## 2021-06-06 RX ADMIN — ONDANSETRON 4 MILLIGRAM(S): 8 TABLET, FILM COATED ORAL at 06:06

## 2021-06-06 RX ADMIN — Medication 650 MILLIGRAM(S): at 06:37

## 2021-06-06 NOTE — PROVIDER CONTACT NOTE (OTHER) - SITUATION
Pt threw up this morning. Pt is complaining of feeling nauseous. Pt had emesis episode. Pt is complaining of feeling nauseous.

## 2021-06-06 NOTE — PROGRESS NOTE ADULT - SUBJECTIVE AND OBJECTIVE BOX
Cc: Gait dysfunction    [id number]. Patient had episodes of vomiting/nausea after tube feeds. Denies any abdominal pain. Voiding well. Pain controlled, no chest pain, no N/V, no Fevers/Chills. No other new ROS  Has been tolerating rehabilitation program.    acetaminophen   Tablet .. 650 milliGRAM(s) Oral every 6 hours  amoxicillin  875 milliGRAM(s)/clavulanate 1 Tablet(s) Oral every 12 hours  cadexomer iodine 0.9% Gel 1 Application(s) Topical daily  cholecalciferol 400 Unit(s) Oral daily  doxazosin 2 milliGRAM(s) Oral at bedtime  heparin   Injectable 5000 Unit(s) SubCutaneous every 12 hours  insulin lispro (ADMELOG) corrective regimen sliding scale   SubCutaneous four times a day before meals  insulin NPH human recombinant 12 Unit(s) SubCutaneous <User Schedule>  levETIRAcetam  Solution 500 milliGRAM(s) Oral two times a day  ondansetron   Disintegrating Tablet 4 milliGRAM(s) Oral every 6 hours PRN  polyethylene glycol 3350 17 Gram(s) Oral daily  senna 2 Tablet(s) Oral at bedtime  sodium chloride 0.45%. 1000 milliLiter(s) IV Continuous <Continuous>  trimethoprim  160 mG/sulfamethoxazole 800 mG 2 Tablet(s) Oral three times a day      T(C): 36.4 (06-06-21 @ 09:25), Max: 36.7 (06-05-21 @ 22:09)  HR: 105 (06-06-21 @ 09:25) (96 - 107)  BP: 110/74 (06-06-21 @ 09:25) (100/61 - 111/72)  RR: 17 (06-06-21 @ 09:25) (16 - 17)  SpO2: 98% (06-06-21 @ 09:25) (97% - 98%)    In NAD  HEENT- EOMI  Heart- RRR, S1S2  Lungs- CTA bl.  Abd- + BS, NT, PEG site clean  Ext- No calf pain  Neuro- Exam unchanged          Imp: Patient with diagnosis of debility due to COVID19, CVA, bacteremia, PE admitted for comprehensive acute rehabilitation.    Plan:  - Continue PT/OT/SLP  - DVT prophylaxis  - Skin- Turn q2h, check skin daily  - Continue current medications; patient medically stable.   -Active issues- Intermittent N/V. Will continue Zofran PRN and f/u with Nutrition regarding PEG feeds. Will continue Bactrim/Augmentin until 6/8. Monitoring BMP/Crt, could be 2/2 Bactrim.   - Patient is stable to continue current rehabilitation program.

## 2021-06-06 NOTE — PROGRESS NOTE ADULT - ASSESSMENT
59yo male with PMH of DM who was initially admitted to [facility] ICU for hypoxic respiratory failure 2/2 COVID c/b PE with R heart strain, cardiogenic and septic shock, ischemic and hemorrhagic CVA and ESBL klebsiella ventilator associate PNA, transferred to [facility] for neurosurgery eval and further management. s/p PEG. Course c/b right empyema s/p VATs and obturator bleed (resolved). Now admitted for functional decline.     #Debility  #ESBL Klebsiella PNA c/b empyema  #s/p COVID pneumonia  - 5/12 underwent right VATS converted to open L thoracotomy, decortication, drainage of abscess and flex bronch.  - c/w meropenem 5/7 and transition to Bactrim DS 2 tabs TID + Augmentin 875mg po q12  upon DC to [facility] until 6/8   - ID following, recs appreciated - needs close follow up in ID clinic for repeat imaging, Dr. Chambers  - CT Chest 5/29 with improvement in bilateral opacities. Slight decrease in right chest wall hematoma. Stable loculated right hydropneumothorax.   - Comprehensive Rehab Program    #SUKI on CKD stage 2  #Hyperkalemia - sample hemolyzed - improved  - Increase in creatinine can occur with trimethoprim  - follow up BMP today    #Empyema   -OR Vats 5/12 with Dr. Albarado with all chest tubes removed by 5/18  -CT Chest 5/16 Interval new small cavitary lesions in the right lower lobe and right chest wall hematoma - no thoracic surgery intervention indicated per Dr. Albarado.    #Obturator Hematoma (stable)  - bleeding hematoma in right thigh on CT on 5/7  - CT hip showing non active bleed, hematoma on right thigh on 5/8  - doppler to r/o DVT in LE, per IR no role IVC filter  - active again on 5/17, now stable  - was on heparin drip on and off, now off. C/w DVT ppx heparin bid.     #Pulmonary embolus   -Pulmonary embolus noted on ct scan performed at time of admission  -Etiology of venous thromboembolism uncertain at this time; no known history of thrombophilia; provoked in setting of acute covid illness   -s/p heparin drip held due to recurrent bleed in right obturator and right chest wall hematoma   - 5000 heparin bid for dvt ppx    #CVA  -Acute bilateral ischemic strokes, with concern for hemorrhagic conversion, noted on CT scan on 4/8  -Etiology of acute ischemic stroke uncertain at this time; TTE with bubble performed at bedside without any evidence of right-to-left shunt; no arrhythmia noted on telemetry throughout stay in the MICU  -CTA head and neck without any evidence of carotid artery stenosis or dissection    #Encephalopathy due to multifactorial causes - improving  - Etiology likely multi-factorial in setting of acute ischemic infarctions of the brain in conjunction vs acute kidney injury with uremia (now resolving) vs resolving hypoxic respiratory failure, covid-19 infection, superimposed bacterial pneumonia, urinary retention, concern for nutritional deficiencies)  - Reorientation with family at bedside (pt speaks Gustevenrati, does best with family present)  - MRI Brain, and MRA Head demonstrated multiple evolving subacute infarcts with associated hemorrhages, likely expected evolution of ischemia  - As per neuro, EEG not suggestive of seizure-like activity  - C/w Keppra 500 mg BID-> neuro f/u outpatient   - repeat CT head 5/5 normal.     #Diabetes mellitus type II  - Type II diabetes mellitus noted; a1c noted to be greater than 10  - NPH 12 U BID and continue correction scale sq q6h  - FS q6  - As per endocrine recs: IF BG less than 100mg/dl can give half of NPH dose  - Follow up endo outpatient    #Hypertension.  - Was on metoprolol tartrate 25 mg twice daily since 4/16; discontinued  - BP is stable  - If is hypertensive persistently, can consider antihypertensives    #Dysphagia  - PEG tube placed on 4/30, tube feedings started on 4/30, + free water q8    #Peripheral vascular disease  -Dry gangrene noted over distal toes on bilateral lower extremities--most concerning for microvascular disease subsequent to vasopressor administration   -Dorsalis pedis pulses intact bilaterally  -ERASMO demonstrating right ERASMO: 1.09, left ERASMO: 1.24, right TBI: 0.78, and left TBI: 0.73.  -Podiatry recs appreciated, no acute surgical intervention at this time, applying betadine to wound  -As per ID non infectious appearing     #Urinary Retention - improved  - Mann removed 5/30, urinating appropriately  - c/w doxazosin    #Pain Mgmt   - Tylenol PRN, Oxycodone PRN    #GI/Bowel Mgmt   - Continent c/w Senna, Miralax PRN    #Precautions / PROPHYLAXIS:   - Falls, Cardiac, Sternal, Spinal, Seizure   - ortho: Weight bearing status: WBAT   - Lungs: Aspiration, Incentive Spirometer   - Pressure injury/Skin: OOB to Chair, PT/OT    - DVT: HSQ

## 2021-06-06 NOTE — PROVIDER CONTACT NOTE (OTHER) - ACTION/TREATMENT ORDERED:
PA made aware. No new orders at this time. Bolus feeding to be given at 10PM as ordered. Will continue to monitor.

## 2021-06-06 NOTE — PROVIDER CONTACT NOTE (OTHER) - ASSESSMENT
VS stable as documented. Pt does not have any complaints of nausea or distress at this time. HOB elevated.

## 2021-06-06 NOTE — PROVIDER CONTACT NOTE (OTHER) - SITUATION
Pt vomited once earlier today. As per order BS checked at 8PM and 10PM. 8PM BS is 186 and 10PM BS is 112.

## 2021-06-06 NOTE — PROGRESS NOTE ADULT - SUBJECTIVE AND OBJECTIVE BOX
Patient is a 60y old  Male who presents with a chief complaint of CVA (05 Jun 2021 11:52)      Patient seen and examined at bedside.  Denies chest pain, dyspnea, abd pain.    ALLERGIES:  No Known Allergies    MEDICATIONS  (STANDING):  acetaminophen   Tablet .. 650 milliGRAM(s) Oral every 6 hours  amoxicillin  875 milliGRAM(s)/clavulanate 1 Tablet(s) Oral every 12 hours  cadexomer iodine 0.9% Gel 1 Application(s) Topical daily  cholecalciferol 400 Unit(s) Oral daily  doxazosin 2 milliGRAM(s) Oral at bedtime  heparin   Injectable 5000 Unit(s) SubCutaneous every 12 hours  insulin lispro (ADMELOG) corrective regimen sliding scale   SubCutaneous four times a day before meals  insulin NPH human recombinant 12 Unit(s) SubCutaneous <User Schedule>  levETIRAcetam  Solution 500 milliGRAM(s) Oral two times a day  polyethylene glycol 3350 17 Gram(s) Oral daily  senna 2 Tablet(s) Oral at bedtime  sodium chloride 0.45%. 1000 milliLiter(s) (75 mL/Hr) IV Continuous <Continuous>  trimethoprim  160 mG/sulfamethoxazole 800 mG 2 Tablet(s) Oral three times a day    MEDICATIONS  (PRN):  ondansetron   Disintegrating Tablet 4 milliGRAM(s) Oral every 6 hours PRN Nausea and/or Vomiting    Vital Signs Last 24 Hrs  T(F): 97.5 (06 Jun 2021 09:25), Max: 98.1 (05 Jun 2021 22:09)  HR: 105 (06 Jun 2021 09:25) (96 - 107)  BP: 110/74 (06 Jun 2021 09:25) (100/61 - 111/72)  RR: 17 (06 Jun 2021 09:25) (16 - 17)  SpO2: 98% (06 Jun 2021 09:25) (97% - 98%)  I&O's Summary    05 Jun 2021 07:01  -  06 Jun 2021 07:00  --------------------------------------------------------  IN: 1100 mL / OUT: 200 mL / NET: 900 mL      BMI (kg/m2): 24.2 (06-04-21 @ 18:19)  PHYSICAL EXAM:  General: NAD, A/O x 3  ENT: MMM  Neck: Supple, No JVD  Lungs: Clear to auscultation bilaterally  Cardio: RRR, S1/S2, No murmurs  Abdomen: Soft, Nontender, Nondistended; Bowel sounds present  Extremities: No calf tenderness, No pitting edema    LABS:                        11.7   9.04  )-----------( 360      ( 05 Jun 2021 07:15 )             38.3       06-05    140  |  104  |  42  ----------------------------<  179  5.0   |  29  |  1.40    Ca    9.9      05 Jun 2021 12:35    TPro  8.4  /  Alb  2.6  /  TBili  0.6  /  DBili  x   /  AST  117  /  ALT  46  /  AlkPhos  113  06-05     eGFR if Non African American: 55 mL/min/1.73M2 (06-05-21 @ 12:35)  eGFR if : 64 mL/min/1.73M2 (06-05-21 @ 12:35)             04-26 Chol 114 mg/dL LDL -- HDL 35 mg/dL Trig 126 mg/dL              POCT Blood Glucose.: 126 mg/dL (06 Jun 2021 06:00)  POCT Blood Glucose.: 112 mg/dL (05 Jun 2021 22:06)  POCT Blood Glucose.: 186 mg/dL (05 Jun 2021 20:16)  POCT Blood Glucose.: 114 mg/dL (05 Jun 2021 18:00)  POCT Blood Glucose.: 171 mg/dL (05 Jun 2021 12:51)          COVID-19 PCR: NotDetec (06-02-21 @ 08:50)  COVID-19 PCR: NotDetec (05-31-21 @ 07:10)  COVID-19 PCR: NotDetec (05-26-21 @ 07:21)  COVID-19 PCR: Detected (05-18-21 @ 05:34)  COVID-19 PCR: NotDetec (05-11-21 @ 10:33)      RADIOLOGY & ADDITIONAL TESTS:    Care Discussed with Consultants/Other Providers:

## 2021-06-06 NOTE — PROVIDER CONTACT NOTE (OTHER) - ASSESSMENT
Pt threw up this morning. No blood present in vomit. Pt is complaining of feeling nauseous. VS as documented. No blood present in vomit. Pt is complaining of feeling nauseous. VS as documented.

## 2021-06-07 LAB
ALBUMIN SERPL ELPH-MCNC: 2.6 G/DL — LOW (ref 3.3–5)
ALP SERPL-CCNC: 93 U/L — SIGNIFICANT CHANGE UP (ref 40–120)
ALT FLD-CCNC: 32 U/L — SIGNIFICANT CHANGE UP (ref 10–45)
ANION GAP SERPL CALC-SCNC: 4 MMOL/L — LOW (ref 5–17)
ANION GAP SERPL CALC-SCNC: 6 MMOL/L — SIGNIFICANT CHANGE UP (ref 5–17)
AST SERPL-CCNC: 25 U/L — SIGNIFICANT CHANGE UP (ref 10–40)
BASOPHILS # BLD AUTO: 0.08 K/UL — SIGNIFICANT CHANGE UP (ref 0–0.2)
BASOPHILS NFR BLD AUTO: 1 % — SIGNIFICANT CHANGE UP (ref 0–2)
BILIRUB SERPL-MCNC: 0.3 MG/DL — SIGNIFICANT CHANGE UP (ref 0.2–1.2)
BUN SERPL-MCNC: 37 MG/DL — HIGH (ref 7–23)
BUN SERPL-MCNC: 38 MG/DL — HIGH (ref 7–23)
CALCIUM SERPL-MCNC: 9.2 MG/DL — SIGNIFICANT CHANGE UP (ref 8.4–10.5)
CALCIUM SERPL-MCNC: 9.4 MG/DL — SIGNIFICANT CHANGE UP (ref 8.4–10.5)
CHLORIDE SERPL-SCNC: 101 MMOL/L — SIGNIFICANT CHANGE UP (ref 96–108)
CHLORIDE SERPL-SCNC: 102 MMOL/L — SIGNIFICANT CHANGE UP (ref 96–108)
CO2 SERPL-SCNC: 28 MMOL/L — SIGNIFICANT CHANGE UP (ref 22–31)
CO2 SERPL-SCNC: 30 MMOL/L — SIGNIFICANT CHANGE UP (ref 22–31)
CREAT SERPL-MCNC: 1.56 MG/DL — HIGH (ref 0.5–1.3)
CREAT SERPL-MCNC: 1.66 MG/DL — HIGH (ref 0.5–1.3)
EOSINOPHIL # BLD AUTO: 0.59 K/UL — HIGH (ref 0–0.5)
EOSINOPHIL NFR BLD AUTO: 7.1 % — HIGH (ref 0–6)
GLUCOSE BLDC GLUCOMTR-MCNC: 112 MG/DL — HIGH (ref 70–99)
GLUCOSE BLDC GLUCOMTR-MCNC: 124 MG/DL — HIGH (ref 70–99)
GLUCOSE BLDC GLUCOMTR-MCNC: 136 MG/DL — HIGH (ref 70–99)
GLUCOSE BLDC GLUCOMTR-MCNC: 212 MG/DL — HIGH (ref 70–99)
GLUCOSE SERPL-MCNC: 126 MG/DL — HIGH (ref 70–99)
GLUCOSE SERPL-MCNC: 128 MG/DL — HIGH (ref 70–99)
HCT VFR BLD CALC: 33.2 % — LOW (ref 39–50)
HGB BLD-MCNC: 10.3 G/DL — LOW (ref 13–17)
IMM GRANULOCYTES NFR BLD AUTO: 0.5 % — SIGNIFICANT CHANGE UP (ref 0–1.5)
LYMPHOCYTES # BLD AUTO: 1.65 K/UL — SIGNIFICANT CHANGE UP (ref 1–3.3)
LYMPHOCYTES # BLD AUTO: 19.8 % — SIGNIFICANT CHANGE UP (ref 13–44)
MCHC RBC-ENTMCNC: 25.9 PG — LOW (ref 27–34)
MCHC RBC-ENTMCNC: 31 GM/DL — LOW (ref 32–36)
MCV RBC AUTO: 83.6 FL — SIGNIFICANT CHANGE UP (ref 80–100)
MONOCYTES # BLD AUTO: 0.9 K/UL — SIGNIFICANT CHANGE UP (ref 0–0.9)
MONOCYTES NFR BLD AUTO: 10.8 % — SIGNIFICANT CHANGE UP (ref 2–14)
NEUTROPHILS # BLD AUTO: 5.06 K/UL — SIGNIFICANT CHANGE UP (ref 1.8–7.4)
NEUTROPHILS NFR BLD AUTO: 60.8 % — SIGNIFICANT CHANGE UP (ref 43–77)
NRBC # BLD: 0 /100 WBCS — SIGNIFICANT CHANGE UP (ref 0–0)
PLATELET # BLD AUTO: 361 K/UL — SIGNIFICANT CHANGE UP (ref 150–400)
POTASSIUM SERPL-MCNC: 5.4 MMOL/L — HIGH (ref 3.5–5.3)
POTASSIUM SERPL-MCNC: 5.5 MMOL/L — HIGH (ref 3.5–5.3)
POTASSIUM SERPL-SCNC: 5.4 MMOL/L — HIGH (ref 3.5–5.3)
POTASSIUM SERPL-SCNC: 5.5 MMOL/L — HIGH (ref 3.5–5.3)
PROT SERPL-MCNC: 7.9 G/DL — SIGNIFICANT CHANGE UP (ref 6–8.3)
RBC # BLD: 3.97 M/UL — LOW (ref 4.2–5.8)
RBC # FLD: 19.9 % — HIGH (ref 10.3–14.5)
SODIUM SERPL-SCNC: 135 MMOL/L — SIGNIFICANT CHANGE UP (ref 135–145)
SODIUM SERPL-SCNC: 136 MMOL/L — SIGNIFICANT CHANGE UP (ref 135–145)
WBC # BLD: 8.32 K/UL — SIGNIFICANT CHANGE UP (ref 3.8–10.5)
WBC # FLD AUTO: 8.32 K/UL — SIGNIFICANT CHANGE UP (ref 3.8–10.5)

## 2021-06-07 PROCEDURE — 99232 SBSQ HOSP IP/OBS MODERATE 35: CPT | Mod: GC

## 2021-06-07 PROCEDURE — 76775 US EXAM ABDO BACK WALL LIM: CPT | Mod: 26

## 2021-06-07 PROCEDURE — 74018 RADEX ABDOMEN 1 VIEW: CPT | Mod: 26

## 2021-06-07 PROCEDURE — 99233 SBSQ HOSP IP/OBS HIGH 50: CPT

## 2021-06-07 RX ORDER — TIOTROPIUM BROMIDE 18 UG/1
1 CAPSULE ORAL; RESPIRATORY (INHALATION) ONCE
Refills: 0 | Status: DISCONTINUED | OUTPATIENT
Start: 2021-06-07 | End: 2021-06-08

## 2021-06-07 RX ORDER — ALBUTEROL 90 UG/1
1 AEROSOL, METERED ORAL ONCE
Refills: 0 | Status: DISCONTINUED | OUTPATIENT
Start: 2021-06-07 | End: 2021-06-08

## 2021-06-07 RX ORDER — IPRATROPIUM BROMIDE 0.2 MG/ML
500 SOLUTION, NON-ORAL INHALATION ONCE
Refills: 0 | Status: COMPLETED | OUTPATIENT
Start: 2021-06-07 | End: 2021-06-07

## 2021-06-07 RX ORDER — SODIUM CHLORIDE 9 MG/ML
1000 INJECTION INTRAMUSCULAR; INTRAVENOUS; SUBCUTANEOUS
Refills: 0 | Status: DISCONTINUED | OUTPATIENT
Start: 2021-06-07 | End: 2021-06-07

## 2021-06-07 RX ORDER — PANTOPRAZOLE SODIUM 20 MG/1
40 TABLET, DELAYED RELEASE ORAL DAILY
Refills: 0 | Status: DISCONTINUED | OUTPATIENT
Start: 2021-06-07 | End: 2021-06-23

## 2021-06-07 RX ORDER — IPRATROPIUM/ALBUTEROL SULFATE 18-103MCG
3 AEROSOL WITH ADAPTER (GRAM) INHALATION ONCE
Refills: 0 | Status: COMPLETED | OUTPATIENT
Start: 2021-06-07 | End: 2021-06-07

## 2021-06-07 RX ADMIN — Medication 4: at 17:54

## 2021-06-07 RX ADMIN — HEPARIN SODIUM 5000 UNIT(S): 5000 INJECTION INTRAVENOUS; SUBCUTANEOUS at 06:12

## 2021-06-07 RX ADMIN — Medication 650 MILLIGRAM(S): at 23:30

## 2021-06-07 RX ADMIN — PANTOPRAZOLE SODIUM 40 MILLIGRAM(S): 20 TABLET, DELAYED RELEASE ORAL at 12:57

## 2021-06-07 RX ADMIN — Medication 650 MILLIGRAM(S): at 12:49

## 2021-06-07 RX ADMIN — Medication 1 TABLET(S): at 06:12

## 2021-06-07 RX ADMIN — Medication 3 MILLILITER(S): at 16:44

## 2021-06-07 RX ADMIN — LEVETIRACETAM 500 MILLIGRAM(S): 250 TABLET, FILM COATED ORAL at 06:12

## 2021-06-07 RX ADMIN — Medication 650 MILLIGRAM(S): at 22:46

## 2021-06-07 RX ADMIN — Medication 1 APPLICATION(S): at 12:57

## 2021-06-07 RX ADMIN — Medication 2 MILLIGRAM(S): at 22:45

## 2021-06-07 RX ADMIN — SODIUM CHLORIDE 100 MILLILITER(S): 9 INJECTION INTRAMUSCULAR; INTRAVENOUS; SUBCUTANEOUS at 08:49

## 2021-06-07 RX ADMIN — LEVETIRACETAM 500 MILLIGRAM(S): 250 TABLET, FILM COATED ORAL at 17:55

## 2021-06-07 RX ADMIN — Medication 30 MILLILITER(S): at 09:08

## 2021-06-07 RX ADMIN — HUMAN INSULIN 12 UNIT(S): 100 INJECTION, SUSPENSION SUBCUTANEOUS at 06:12

## 2021-06-07 RX ADMIN — ONDANSETRON 4 MILLIGRAM(S): 8 TABLET, FILM COATED ORAL at 18:01

## 2021-06-07 RX ADMIN — Medication 400 UNIT(S): at 12:49

## 2021-06-07 RX ADMIN — HEPARIN SODIUM 5000 UNIT(S): 5000 INJECTION INTRAVENOUS; SUBCUTANEOUS at 17:54

## 2021-06-07 RX ADMIN — Medication 650 MILLIGRAM(S): at 06:12

## 2021-06-07 RX ADMIN — Medication 1 TABLET(S): at 17:54

## 2021-06-07 RX ADMIN — ONDANSETRON 4 MILLIGRAM(S): 8 TABLET, FILM COATED ORAL at 08:01

## 2021-06-07 RX ADMIN — POLYETHYLENE GLYCOL 3350 17 GRAM(S): 17 POWDER, FOR SOLUTION ORAL at 12:56

## 2021-06-07 RX ADMIN — HUMAN INSULIN 12 UNIT(S): 100 INJECTION, SUSPENSION SUBCUTANEOUS at 17:56

## 2021-06-07 RX ADMIN — Medication 650 MILLIGRAM(S): at 07:05

## 2021-06-07 RX ADMIN — Medication 500 MICROGRAM(S): at 16:44

## 2021-06-07 RX ADMIN — SENNA PLUS 2 TABLET(S): 8.6 TABLET ORAL at 22:46

## 2021-06-07 RX ADMIN — Medication 650 MILLIGRAM(S): at 17:54

## 2021-06-07 RX ADMIN — Medication 650 MILLIGRAM(S): at 13:15

## 2021-06-07 RX ADMIN — Medication 2 TABLET(S): at 13:02

## 2021-06-07 RX ADMIN — Medication 2 TABLET(S): at 06:12

## 2021-06-07 RX ADMIN — Medication 650 MILLIGRAM(S): at 19:10

## 2021-06-07 NOTE — CHART NOTE - NSCHARTNOTEFT_GEN_A_CORE
Nutrition Follow Up Note  Hospital Course (Per Electronic Medical Record): 60y old  Male who presents with a chief complaint of CVA   Source: Medical Record [X] Patient [ ] Family [ ]         Diet: npo with tube feedings     Enteral/Parenteral Nutrition: TwoCal  ml QID via PEG (provides 2400 kcal, 100 g protein).  Dietitian consulted due to pt vomiting tube feeding contents this morning. Discussed with RN and NP, can trial increasing the duration of bolus feeding with use of pump, and see if pt able to tolerate feeding better. Recommend continuing with TwoCal  ml TID, increase feeding duration to 1 hour (@ 7 am, 12 pm, 4 pm, 8 pm). Pt is now on zofran & maalox prn. Will continue to monitor tolerance of tube feeding and follow up.    Current Weight: 143.3lbs (6/6)  145.2 lbs (6/4)      Pertinent Medications: MEDICATIONS  (STANDING):  acetaminophen   Tablet .. 650 milliGRAM(s) Oral every 6 hours  amoxicillin  875 milliGRAM(s)/clavulanate 1 Tablet(s) Oral every 12 hours  cadexomer iodine 0.9% Gel 1 Application(s) Topical daily  cholecalciferol 400 Unit(s) Oral daily  doxazosin 2 milliGRAM(s) Oral at bedtime  heparin   Injectable 5000 Unit(s) SubCutaneous every 12 hours  insulin lispro (ADMELOG) corrective regimen sliding scale   SubCutaneous four times a day before meals  insulin NPH human recombinant 12 Unit(s) SubCutaneous <User Schedule>  levETIRAcetam  Solution 500 milliGRAM(s) Oral two times a day  pantoprazole   Suspension 40 milliGRAM(s) Oral daily  polyethylene glycol 3350 17 Gram(s) Oral daily  senna 2 Tablet(s) Oral at bedtime  sodium chloride 0.9%. 1000 milliLiter(s) (100 mL/Hr) IV Continuous <Continuous>  trimethoprim  160 mG/sulfamethoxazole 800 mG 2 Tablet(s) Oral three times a day    MEDICATIONS  (PRN):  aluminum hydroxide/magnesium hydroxide/simethicone Suspension 30 milliLiter(s) Oral every 4 hours PRN Dyspepsia  ondansetron   Disintegrating Tablet 4 milliGRAM(s) Oral every 6 hours PRN Nausea and/or Vomiting      Pertinent Labs:  06-07 Na135 mmol/L Glu 126 mg/dL<H> K+ 5.4 mmol/L<H> Cr  1.56 mg/dL<H> BUN 38 mg/dL<H> 06-07 Alb 2.6 g/dL<L>        Skin: L foot 5th toe wound, surgical incision lateral side, incontinence associated dermatitis Per nursing flowsheets     Edema: No edema per nursing flow sheets     Last BM: on 6/4 Per nursing flowsheets     Estimated Needs:   [X] No Change since Previous Assessment  [ ] Recalculated:     Previous Nutrition Diagnosis:   Moderate malnutrition  Chewing/Swallowing difficulty    Nutrition Diagnosis is [X] Ongoing  - addressed with tube feeding, see recommendations below  [ ] Resolved   [ ] Not Applicable      New Nutrition Diagnosis: [X] Not Applicable  [ ] Inadequate Protein Energy Intake   [ ] Inadequate Oral Intake   [ ] Excessive Energy Intake   [ ] Increased Nutrient Needs   [ ] Obesity   [ ] Altered GI Function   [ ] Unintended Weight Loss   [ ] Food & Nutrition Related Knowledge Deficit  [ ] Limited Adherence to nutrition related recommendations   [ ] Malnutrition      Interventions:   1. Recommend continuing with TwoCal  ml TID, increase feeding duration to 1 hour via pump (@ 7 am, 12 pm, 4 pm, 8 pm).   2. Monitor tolerance of tube feeding  3. Keep head of bed elevated after feeding    Monitoring & Evaluation:   [X] Weights   [X] PO Intake   [X] Follow Up (Per Protocol)  [X] TF tolerance   [X] Other: Labs    RD Remains Available.  Alpa Burgos RD Nutrition Follow Up Note  Hospital Course (Per Electronic Medical Record): 60y old  Male who presents with a chief complaint of CVA   Source: Medical Record [X] Patient [ ] Family [ ]         Diet: npo with tube feedings     Enteral/Parenteral Nutrition: TwoCal  ml QID via PEG (provides 2400 kcal, 100 g protein).  Dietitian consulted due to pt vomiting tube feeding contents this morning. Discussed with RN and NP, can trial increasing the duration of bolus feeding with use of pump, and see if pt able to tolerate feeding better. Recommend continuing with TwoCal  ml TID, increase feeding duration to 1 hour (@ 7 am, 12 pm, 4 pm, 8 pm). Pt is now on zofran & maalox prn. Will continue to monitor tolerance of tube feeding and follow up.    Current Weight: 143.3lbs (6/6)  145.2 lbs (6/4)      Pertinent Medications: MEDICATIONS  (STANDING):  acetaminophen   Tablet .. 650 milliGRAM(s) Oral every 6 hours  amoxicillin  875 milliGRAM(s)/clavulanate 1 Tablet(s) Oral every 12 hours  cadexomer iodine 0.9% Gel 1 Application(s) Topical daily  cholecalciferol 400 Unit(s) Oral daily  doxazosin 2 milliGRAM(s) Oral at bedtime  heparin   Injectable 5000 Unit(s) SubCutaneous every 12 hours  insulin lispro (ADMELOG) corrective regimen sliding scale   SubCutaneous four times a day before meals  insulin NPH human recombinant 12 Unit(s) SubCutaneous <User Schedule>  levETIRAcetam  Solution 500 milliGRAM(s) Oral two times a day  pantoprazole   Suspension 40 milliGRAM(s) Oral daily  polyethylene glycol 3350 17 Gram(s) Oral daily  senna 2 Tablet(s) Oral at bedtime  sodium chloride 0.9%. 1000 milliLiter(s) (100 mL/Hr) IV Continuous <Continuous>  trimethoprim  160 mG/sulfamethoxazole 800 mG 2 Tablet(s) Oral three times a day    MEDICATIONS  (PRN):  aluminum hydroxide/magnesium hydroxide/simethicone Suspension 30 milliLiter(s) Oral every 4 hours PRN Dyspepsia  ondansetron   Disintegrating Tablet 4 milliGRAM(s) Oral every 6 hours PRN Nausea and/or Vomiting      Pertinent Labs:  06-07 Na135 mmol/L Glu 126 mg/dL<H> K+ 5.4 mmol/L<H> Cr  1.56 mg/dL<H> BUN 38 mg/dL<H> 06-07 Alb 2.6 g/dL<L>        Skin: L foot 5th toe wound, surgical incision lateral side, incontinence associated dermatitis Per nursing flowsheets     Edema: No edema per nursing flow sheets     Last BM: on 6/4 Per nursing flowsheets     Estimated Needs:   [X] No Change since Previous Assessment  [ ] Recalculated:     Previous Nutrition Diagnosis:   Moderate malnutrition  Chewing/Swallowing difficulty    Nutrition Diagnosis is [X] Ongoing  - addressed with tube feeding, see recommendations below  [ ] Resolved   [ ] Not Applicable      New Nutrition Diagnosis: [X] Not Applicable  [ ] Inadequate Protein Energy Intake   [ ] Inadequate Oral Intake   [ ] Excessive Energy Intake   [ ] Increased Nutrient Needs   [ ] Obesity   [ ] Altered GI Function   [ ] Unintended Weight Loss   [ ] Food & Nutrition Related Knowledge Deficit  [ ] Limited Adherence to nutrition related recommendations   [ ] Malnutrition      Interventions:   1. Recommend continuing with TwoCal  ml QID, increase feeding duration to 1 hour via pump (@ 7 am, 12 pm, 4 pm, 8 pm).   2. Monitor tolerance of tube feeding  3. Keep head of bed elevated after feeding    Monitoring & Evaluation:   [X] Weights   [X] PO Intake   [X] Follow Up (Per Protocol)  [X] TF tolerance   [X] Other: Labs    RD Remains Available.  Alpa Burgos RD

## 2021-06-07 NOTE — PROVIDER CONTACT NOTE (OTHER) - SITUATION
Pt vomiting this morning. Vomit is light brown in color with no indication of blood. Pt is complaining of acidity and nausea. Pt started vomiting this morning. Vomit is light brown in color with no indication of blood. Pt is complaining of acidity and nausea. Pt started vomiting this morning. Vomit is light brown in color with no presence of blood. Pt is complaining of acidity and nausea.

## 2021-06-07 NOTE — PROGRESS NOTE ADULT - SUBJECTIVE AND OBJECTIVE BOX
Subjective: NAD this am, Nausea and vomiting after lunch.       HISTORY OF PRESENT ILLNESS  59yo M with T2DM and recently diagnosed COVID (3/19) BIBEMS to [facility] on 4/1 for hypoxic respiratory failure secondary to COVID PNA, requiring endotracheal intubation (4/1-4/13). Course significant for Septic shock on admission, treated with vancomycin and cefepime. Found to have ESBL Klebsiella PNA c/b empyema s/p VATs decortication. Treated with prolonged course of Meropenem while hospitalized and transitioned to Augmentin/Bactrim upon discharge (until 6/8). Will follow up with ID as outpatient.     Patient had acute ischemic CVA with areas of hemorrhagic transformation. Found to have multi-focal acute-to-subacute infarctions. 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 Subsequent scans showed hemorrhagic conversion. Transferred to [facility] for Neurosurgery evaluation, no surgical intervention at this time. Will require outpatient follow up.     Course complicated by RITO segmental PEs with e/o RH strain: Provoked in the setting of sepsis/high inflammatory state with COVID infection. LE Dopplers negative x 2. Initially anticoagulated with heparin gtt. Course further complicated by a right chest wall and right obturator hematomas. Due to concern for worsening hemorrhagic conversion of stroke and hematomas, the patient's therapeutic anticoagulation was discontinued. He was evaluated by the neurosurgical team, which said that there was no indication for acute intervention. Patient will not be anticoagulated upon discharge.   Patient's AMS likely in the setting of acute strokes, improved throughout hospitalization. Due to prolonged intubation with subsequent AMS and dysphagia, pt had PEG tube placed. Pt failed MBS following improvement of mental status.     Patient also had bedside debridement and woundcare of her BLE digits and will follow up with Podiatry as an outpatient.    Medically cleared for discharge to  [facility] 6/4.      REVIEW OF SYMPTOMS  [X] Constitutional WNL     [X] Cardio WNL            [X] Resp-empeyema           [X] GI NV                          [X]  WNL                   [X] Heme WNL              [X] Endo-dm2                    [X] Skin WNL                 [X] MSK WNL            [X] Neuro WNL                   [X] Cognitive WNL        [X] Psych WNL      VITALS  Vital Signs Last 24 Hrs  T(C): 36.6 (07 Jun 2021 09:30), Max: 36.6 (06 Jun 2021 22:34)  T(F): 97.8 (07 Jun 2021 09:30), Max: 97.9 (06 Jun 2021 22:34)  HR: 102 (07 Jun 2021 09:30) (96 - 102)  BP: 110/72 (07 Jun 2021 09:30) (105/65 - 110/72)  BP(mean): --  RR: 17 (07 Jun 2021 09:30) (16 - 17)  SpO2: 96% (07 Jun 2021 09:30) (95% - 97%)      PHYSICAL EXAM  Constitutional - NAD, Comfortable in bed  HEENT - NCAT, EOMI  Neck - Supple, No limited ROM  Chest - CTA bilaterally  Cardiovascular - RR  Abdomen - BS+, Soft, NTND, PEG in place  Extremities - No C/C/E, No calf tenderness   Skin-no rash  Wounds-per H&P      Neurologic Exam - Awake, Alert, AAO x3      No aphasia, impaired attention and concentration, follows simple commands     Cranial Nerves - dysphagia        Coordination/dysmetria - impaired             Balance - impaired     Psychiatric - Mood stable, Affect WNL       RECENT LABS                        10.3   8.32  )-----------( 361      ( 07 Jun 2021 06:47 )             33.2     06-07    135  |  101  |  38<H>  ----------------------------<  126<H>  5.4<H>   |  30  |  1.56<H>    Ca    9.2      07 Jun 2021 10:35    TPro  7.9  /  Alb  2.6<L>  /  TBili  0.3  /  DBili  x   /  AST  25  /  ALT  32  /  AlkPhos  93  06-07      LIVER FUNCTIONS - ( 07 Jun 2021 06:47 )  Alb: 2.6 g/dL / Pro: 7.9 g/dL / ALK PHOS: 93 U/L / ALT: 32 U/L / AST: 25 U/L / GGT: x                           RADIOLOGY/OTHER RESULTS      CURRENT MEDICATIONS  MEDICATIONS  (STANDING):  acetaminophen   Tablet .. 650 milliGRAM(s) Oral every 6 hours  ALBUTerol    90 MICROgram(s) HFA Inhaler 1 Puff(s) Inhalation once  albuterol/ipratropium for Nebulization 3 milliLiter(s) Nebulizer once  amoxicillin  875 milliGRAM(s)/clavulanate 1 Tablet(s) Oral every 12 hours  cadexomer iodine 0.9% Gel 1 Application(s) Topical daily  cholecalciferol 400 Unit(s) Oral daily  doxazosin 2 milliGRAM(s) Oral at bedtime  heparin   Injectable 5000 Unit(s) SubCutaneous every 12 hours  insulin lispro (ADMELOG) corrective regimen sliding scale   SubCutaneous four times a day before meals  insulin NPH human recombinant 12 Unit(s) SubCutaneous <User Schedule>  ipratropium    for Nebulization 500 MICROGram(s) Nebulizer once  levETIRAcetam  Solution 500 milliGRAM(s) Oral two times a day  pantoprazole   Suspension 40 milliGRAM(s) Oral daily  polyethylene glycol 3350 17 Gram(s) Oral daily  senna 2 Tablet(s) Oral at bedtime  tiotropium 18 MICROgram(s) Capsule 1 Capsule(s) Inhalation once    MEDICATIONS  (PRN):  aluminum hydroxide/magnesium hydroxide/simethicone Suspension 30 milliLiter(s) Oral every 4 hours PRN Dyspepsia  ondansetron   Disintegrating Tablet 4 milliGRAM(s) Oral every 6 hours PRN Nausea and/or Vomiting      ASSESSMENT & PLAN    Continue comprehensive acute rehab program consisting of 3hrs/day of OT/PT and SLP.

## 2021-06-07 NOTE — CONSULT NOTE ADULT - SUBJECTIVE AND OBJECTIVE BOX
HPI:   Patient is a 60y male with a past history of DM who was transferred to [facility] rehab after a 2 month [place]/[facility] stay for Covid.He was admitted 4/2 with hypoxic respiratory failure secondary to Covid complicated by PE , cardiogenic and septic shock,ischemic and hemorrhagic stroke and ESBL VAP.  He was transferred to [facility] on 4/12 for NS evaluation.NS opted for conservative management.He developed an empyema, s/p RT sided thoracotomy on 5/12 with abscess drainage.ESBL klebsiella the main organism.He was treated with meropenem x close to 3 weeks and transitioned to bactrim and augmentin, stop date 6/8.  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    REVIEW OF SYSTEMS:  All other review of systems negative (Comprehensive ROS)    PAST MEDICAL & SURGICAL HISTORY:  No pertinent past medical history    No significant past surgical history        Allergies    No Known Allergies    Intolerances        Antimicrobials Day #  :last day  amoxicillin  875 milliGRAM(s)/clavulanate 1 Tablet(s) Oral every 12 hours    Other Medications:  acetaminophen   Tablet .. 650 milliGRAM(s) Oral every 6 hours  ALBUTerol    90 MICROgram(s) HFA Inhaler 1 Puff(s) Inhalation once  aluminum hydroxide/magnesium hydroxide/simethicone Suspension 30 milliLiter(s) Oral every 4 hours PRN  cadexomer iodine 0.9% Gel 1 Application(s) Topical daily  cholecalciferol 400 Unit(s) Oral daily  doxazosin 2 milliGRAM(s) Oral at bedtime  heparin   Injectable 5000 Unit(s) SubCutaneous every 12 hours  insulin lispro (ADMELOG) corrective regimen sliding scale   SubCutaneous four times a day before meals  insulin NPH human recombinant 12 Unit(s) SubCutaneous <User Schedule>  levETIRAcetam  Solution 500 milliGRAM(s) Oral two times a day  ondansetron   Disintegrating Tablet 4 milliGRAM(s) Oral every 6 hours PRN  pantoprazole   Suspension 40 milliGRAM(s) Oral daily  polyethylene glycol 3350 17 Gram(s) Oral daily  senna 2 Tablet(s) Oral at bedtime  tiotropium 18 MICROgram(s) Capsule 1 Capsule(s) Inhalation once      FAMILY HISTORY:  No pertinent family history in first degree relatives        SOCIAL HISTORY:  Smoking: x    ETOH: x    Drug Use: x      T(F): 97.8 (06-07-21 @ 09:30), Max: 97.9 (06-06-21 @ 22:34)  HR: 102 (06-07-21 @ 09:30)  BP: 110/72 (06-07-21 @ 09:30)  RR: 17 (06-07-21 @ 09:30)  SpO2: 96% (06-07-21 @ 09:30)  Wt(kg): --    PHYSICAL EXAM:  General: alert, no acute distress  Eyes:  anicteric, no conjunctival injection, no discharge  Oropharynx: no lesions or injection 	  Neck: supple, without adenopathy  Lungs: clear to auscultation, Rt thoracotomy scar is well healed  Heart: regular rate and rhythm; no murmur, rubs or gallops  Abdomen: soft, nondistended, nontender, without mass or organomegaly, peg  Skin: no rash  Extremities: no clubbing, cyanosis, or edema  Neurologic: alert, oriented, moves all extremities  Left foot with gangrenous changes to distal aspect of digits, dry  LAB RESULTS:                        10.3   8.32  )-----------( 361      ( 07 Jun 2021 06:47 )             33.2     06-07    135  |  101  |  38<H>  ----------------------------<  126<H>  5.4<H>   |  30  |  1.56<H>    Ca    9.2      07 Jun 2021 10:35    TPro  7.9  /  Alb  2.6<L>  /  TBili  0.3  /  DBili  x   /  AST  25  /  ALT  32  /  AlkPhos  93  06-07    LIVER FUNCTIONS - ( 07 Jun 2021 06:47 )  Alb: 2.6 g/dL / Pro: 7.9 g/dL / ALK PHOS: 93 U/L / ALT: 32 U/L / AST: 25 U/L / GGT: x               MICROBIOLOGY:  RECENT CULTURES:        RADIOLOGY REVIEWED:  < from: Xray Abdomen 1 View PORTABLE -Urgent (Xray Abdomen 1 View PORTABLE -Urgent .) (06.07.21 @ 16:12) >  INTERPRETATION:  CLINICAL INDICATION:  Vomiting.    COMPARISON:  5/4/2021.    TECHNIQUE:  Portable supine radiography of the abdomen performed.    FINDINGS:  Note is made of an indwelling PEG tube overlying the expected region of the stomach. Nonspecific bowel gas pattern. No evidence for mechanical bowel obstruction. No free intraperitoneal air identified on portable supine radiography. No abnormal intra-abdominal or pelvic calcifications noted. Degenerative changes of the lumbar spine noted.    IMPRESSION:  As above.    < end of copied text >  < from: US Renal (06.07.21 @ 10:18) >  IMPRESSION:    No evidence for bilateral hydronephrosis.    < end of copied text >  < from: CT Chest No Cont (05.29.21 @ 17:15) >    FINDINGS:    LUNGS, AIRWAYS, AND PLEURA: Secretions in the bronchus intermedius. The remainder of the airways are unremarkable.    Since 5/16/2021, the patchy bilateral opacities have decreased, however, residual groundglass opacities and curvilinear opacities and peripheral foci consolidation remain.    Previously described fluid and air lesions have decreased since 5/16/2021.    Loculated right hydropneumothorax is unchanged allowing for differences in technique. The catheter and chest tubes have been removed.    MEDIASTINUM AND BESSIE: Chest lymph nodes are unchanged. The visualized thyroid gland and esophagus are unremarkable.    VESSELS: Left-sided aortic arch and left-sided descending thoracic aorta. Atherosclerotic changes of the aorta and coronary arteries.    HEART: Heart size is normal. No pericardial effusion.    CHEST WALL AND LOWER NECK: Since 5/16/2021, the hematoma in the lateral right chest wall and adjacent subcutaneous emphysema, measure 8.1 x 3.1 x8.9 cm, unchanged allowing for differences in technique.    VISUALIZED UPPER ABDOMEN: Within normal limits.    BONES: Degenerative changes.    IMPRESSION:  1.  No change in the loculated right hydropneumothorax likely differences in technique make.  2.  Slight decrease in size of the right chest wall hematoma, allowing for differences in technique.  3.  The bilateral opacities have decreased since 5/16/2021.    < end of copied text >   HPI:   Patient is a 60y male with a past history of DM who was transferred to [facility] rehab after a 2 month [place]/[facility] stay for Covid.He was admitted 4/2 with hypoxic respiratory failure secondary to Covid complicated by PE , cardiogenic and septic shock,ischemic and hemorrhagic stroke and ESBL VAP.  He was transferred to [facility] on 4/12 for NS evaluation.NS opted for conservative management.He developed an empyema, s/p RT sided thoracotomy on 5/12 with abscess drainage and full decortication.Abscess cultures with .ESBL klebsiella the main organism.He was treated with meropenem x close to 3 weeks and transitioned to bactrim and augmentin, stop date 6/8.  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    REVIEW OF SYSTEMS:  All other review of systems negative (Comprehensive ROS)    PAST MEDICAL & SURGICAL HISTORY:  No pertinent past medical history    No significant past surgical history        Allergies    No Known Allergies    Intolerances        Antimicrobials Day #  :last day  amoxicillin  875 milliGRAM(s)/clavulanate 1 Tablet(s) Oral every 12 hours    Other Medications:  acetaminophen   Tablet .. 650 milliGRAM(s) Oral every 6 hours  ALBUTerol    90 MICROgram(s) HFA Inhaler 1 Puff(s) Inhalation once  aluminum hydroxide/magnesium hydroxide/simethicone Suspension 30 milliLiter(s) Oral every 4 hours PRN  cadexomer iodine 0.9% Gel 1 Application(s) Topical daily  cholecalciferol 400 Unit(s) Oral daily  doxazosin 2 milliGRAM(s) Oral at bedtime  heparin   Injectable 5000 Unit(s) SubCutaneous every 12 hours  insulin lispro (ADMELOG) corrective regimen sliding scale   SubCutaneous four times a day before meals  insulin NPH human recombinant 12 Unit(s) SubCutaneous <User Schedule>  levETIRAcetam  Solution 500 milliGRAM(s) Oral two times a day  ondansetron   Disintegrating Tablet 4 milliGRAM(s) Oral every 6 hours PRN  pantoprazole   Suspension 40 milliGRAM(s) Oral daily  polyethylene glycol 3350 17 Gram(s) Oral daily  senna 2 Tablet(s) Oral at bedtime  tiotropium 18 MICROgram(s) Capsule 1 Capsule(s) Inhalation once      FAMILY HISTORY:  No pertinent family history in first degree relatives        SOCIAL HISTORY:  Smoking: x    ETOH: x    Drug Use: x      T(F): 97.8 (06-07-21 @ 09:30), Max: 97.9 (06-06-21 @ 22:34)  HR: 102 (06-07-21 @ 09:30)  BP: 110/72 (06-07-21 @ 09:30)  RR: 17 (06-07-21 @ 09:30)  SpO2: 96% (06-07-21 @ 09:30)  Wt(kg): --    PHYSICAL EXAM:  General: alert, no acute distress  Eyes:  anicteric, no conjunctival injection, no discharge  Oropharynx: no lesions or injection 	  Neck: supple, without adenopathy  Lungs: clear to auscultation, Rt thoracotomy scar is well healed  Heart: regular rate and rhythm; no murmur, rubs or gallops  Abdomen: soft, nondistended, nontender, without mass or organomegaly, peg  Skin: no rash  Extremities: no clubbing, cyanosis, or edema  Neurologic: alert, oriented, moves all extremities  Left foot with gangrenous changes to distal aspect of digits, dry  LAB RESULTS:                        10.3   8.32  )-----------( 361      ( 07 Jun 2021 06:47 )             33.2     06-07    135  |  101  |  38<H>  ----------------------------<  126<H>  5.4<H>   |  30  |  1.56<H>    Ca    9.2      07 Jun 2021 10:35    TPro  7.9  /  Alb  2.6<L>  /  TBili  0.3  /  DBili  x   /  AST  25  /  ALT  32  /  AlkPhos  93  06-07    LIVER FUNCTIONS - ( 07 Jun 2021 06:47 )  Alb: 2.6 g/dL / Pro: 7.9 g/dL / ALK PHOS: 93 U/L / ALT: 32 U/L / AST: 25 U/L / GGT: x               MICROBIOLOGY:  RECENT CULTURES:        RADIOLOGY REVIEWED:  < from: Xray Abdomen 1 View PORTABLE -Urgent (Xray Abdomen 1 View PORTABLE -Urgent .) (06.07.21 @ 16:12) >  INTERPRETATION:  CLINICAL INDICATION:  Vomiting.    COMPARISON:  5/4/2021.    TECHNIQUE:  Portable supine radiography of the abdomen performed.    FINDINGS:  Note is made of an indwelling PEG tube overlying the expected region of the stomach. Nonspecific bowel gas pattern. No evidence for mechanical bowel obstruction. No free intraperitoneal air identified on portable supine radiography. No abnormal intra-abdominal or pelvic calcifications noted. Degenerative changes of the lumbar spine noted.    IMPRESSION:  As above.    < end of copied text >  < from: US Renal (06.07.21 @ 10:18) >  IMPRESSION:    No evidence for bilateral hydronephrosis.    < end of copied text >  < from: CT Chest No Cont (05.29.21 @ 17:15) >    FINDINGS:    LUNGS, AIRWAYS, AND PLEURA: Secretions in the bronchus intermedius. The remainder of the airways are unremarkable.    Since 5/16/2021, the patchy bilateral opacities have decreased, however, residual groundglass opacities and curvilinear opacities and peripheral foci consolidation remain.    Previously described fluid and air lesions have decreased since 5/16/2021.    Loculated right hydropneumothorax is unchanged allowing for differences in technique. The catheter and chest tubes have been removed.    MEDIASTINUM AND BESSIE: Chest lymph nodes are unchanged. The visualized thyroid gland and esophagus are unremarkable.    VESSELS: Left-sided aortic arch and left-sided descending thoracic aorta. Atherosclerotic changes of the aorta and coronary arteries.    HEART: Heart size is normal. No pericardial effusion.    CHEST WALL AND LOWER NECK: Since 5/16/2021, the hematoma in the lateral right chest wall and adjacent subcutaneous emphysema, measure 8.1 x 3.1 x8.9 cm, unchanged allowing for differences in technique.    VISUALIZED UPPER ABDOMEN: Within normal limits.    BONES: Degenerative changes.    IMPRESSION:  1.  No change in the loculated right hydropneumothorax likely differences in technique make.  2.  Slight decrease in size of the right chest wall hematoma, allowing for differences in technique.  3.  The bilateral opacities have decreased since 5/16/2021.    < end of copied text >  < from: CT Head No Cont (04.12.21 @ 21:56) >  INTERPRETATION:  Clinical indication: Follow-up CVA.    Multiple axial sections were performed from base of skull to vertex without contrast enhancement. Coronal and sagittal reconstructions were performed as well    This exam is compared with prior noncontrast head CT performed on April 11, 2021.    Abnormal low-attenuation involving left cerebellar region is again seen which is compatible with acute infarct. Acute infarcts involving the left posterior frontal, bilateral occipital parietal regions are again identified. These findings are compatible with acute subacute infarct as well. The infarct especially involving the high left parietal region does demonstrate some hemorrhagic transformation. No significant shift or herniation seen.    Dilation of the osseous structures with the appropriate window appears normal    The visualized paranasal sinuses mastoid and mastoids appear clear.    IMPRESSION: No significant change when allowing for differences in technique.    < end of copied text >

## 2021-06-07 NOTE — CONSULT NOTE ADULT - ASSESSMENT
Patient is a 60y male with a past history of DM who was transferred to [facility] rehab after a 2 month [place]/[facility] stay for Covid.He was admitted 4/2 with hypoxic respiratory failure secondary to Covid complicated by PE , cardiogenic and septic shock,ischemic and hemorrhagic stroke and ESBL VAP.  He was transferred to [facility] on 4/12 for NS evaluation.NS opted for conservative management.He developed an empyema/lung abscess, s/p RT sided thoracotomy on 5/12 with abscess drainage.ESBL klebsiella the main organism.He was treated with meropenem x close to 3 weeks and transitioned to bactrim and augmentin, stop date 6/8.  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  He has completed close to 1 month post thoracotomy antibiotics.Tissue cultures grew ESBL klebsiella, lactobacillus, and prevotella.  His increased creat may be bactrim induced.His CT scan dated 5/29 was felt to be satisfactory, he did have a small hydroptx.  Suggest:  1.d/c antibiotics as planned  2.Follow renal function off bactrim  3.Local wound care to left foot gangrene, "dry"  4.Consider repeat CT of chest this week as advised by ID prior to discharge  5.Monitor off antibiotics, additional w/u as indicated Patient is a 60y male with a past history of DM who was transferred to [facility] rehab after a 2 month [place]/[facility] stay for Covid.He was admitted 4/2 with hypoxic respiratory failure secondary to Covid complicated by PE , cardiogenic and septic shock,ischemic and hemorrhagic stroke and ESBL VAP.  He was transferred to [facility] on 4/12 for NS evaluation.NS opted for conservative management.He developed an empyema/lung abscess, s/p RT sided thoracotomy on 5/12 with abscess drainageand formal decortication..ESBL klebsiella is  the main organism but he also grew lactrobacillus and prevotella..He was treated with meropenem x close to 3 weeks and transitioned to bactrim and augmentin, stop date 6/8.  He came to rehab on 6/4 and has had episodes of N/V, renal saw for increased creat and advised d/c of bactrim.He has peg for nutrition, ischemic damage to left foot and an encephalopathy.He speaks no English, his brother is acting as his interpeter.He is able to follow commands and move all extremities.Prior brain imaging with multiple strokes.  He has completed close to 1 month post thoracotomy antibiotics.Tissue cultures grew ESBL klebsiella, lactobacillus, and prevotella.  His increased creat may be bactrim induced.His CT scan dated 5/29 was felt to be satisfactory, he did have a small hydroptx.  Suggest:  1.d/c antibiotics as planned  2.Follow renal function off bactrim  3.Local wound care to left foot gangrene, "dry"  4.Consider repeat CT of chest this week as advised by ID prior to discharge  5.Monitor off antibiotics, additional w/u as indicated

## 2021-06-07 NOTE — PROGRESS NOTE ADULT - SUBJECTIVE AND OBJECTIVE BOX
Patient is a 60y old  Male who presents with a chief complaint of CVA (06 Jun 2021 12:24)      Patient seen and examined at bedside.  Denies chest pain, dyspnea, abd pain.  Episode of vomitus this morning.    ALLERGIES:  No Known Allergies    MEDICATIONS  (STANDING):  acetaminophen   Tablet .. 650 milliGRAM(s) Oral every 6 hours  amoxicillin  875 milliGRAM(s)/clavulanate 1 Tablet(s) Oral every 12 hours  cadexomer iodine 0.9% Gel 1 Application(s) Topical daily  cholecalciferol 400 Unit(s) Oral daily  doxazosin 2 milliGRAM(s) Oral at bedtime  heparin   Injectable 5000 Unit(s) SubCutaneous every 12 hours  insulin lispro (ADMELOG) corrective regimen sliding scale   SubCutaneous four times a day before meals  insulin NPH human recombinant 12 Unit(s) SubCutaneous <User Schedule>  levETIRAcetam  Solution 500 milliGRAM(s) Oral two times a day  pantoprazole   Suspension 40 milliGRAM(s) Oral daily  polyethylene glycol 3350 17 Gram(s) Oral daily  senna 2 Tablet(s) Oral at bedtime  sodium chloride 0.9%. 1000 milliLiter(s) (100 mL/Hr) IV Continuous <Continuous>  trimethoprim  160 mG/sulfamethoxazole 800 mG 2 Tablet(s) Oral three times a day    MEDICATIONS  (PRN):  aluminum hydroxide/magnesium hydroxide/simethicone Suspension 30 milliLiter(s) Oral every 4 hours PRN Dyspepsia  ondansetron   Disintegrating Tablet 4 milliGRAM(s) Oral every 6 hours PRN Nausea and/or Vomiting    Vital Signs Last 24 Hrs  T(F): 97.8 (07 Jun 2021 09:30), Max: 97.9 (06 Jun 2021 22:34)  HR: 102 (07 Jun 2021 09:30) (96 - 102)  BP: 110/72 (07 Jun 2021 09:30) (105/65 - 110/72)  RR: 17 (07 Jun 2021 09:30) (16 - 17)  SpO2: 96% (07 Jun 2021 09:30) (95% - 97%)  I&O's Summary    06 Jun 2021 07:01  -  07 Jun 2021 07:00  --------------------------------------------------------  IN: 2000 mL / OUT: 0 mL / NET: 2000 mL      BMI (kg/m2): 24.2 (06-04-21 @ 18:19)  PHYSICAL EXAM:  General: NAD, A/O x 3  ENT: MMM  Neck: Supple, No JVD  Lungs: Clear to auscultation bilaterally  Cardio: RRR, S1/S2, No murmurs  Abdomen: Soft, Nontender, Nondistended; Bowel sounds present  Extremities: No calf tenderness, No pitting edema    LABS:                        10.3   8.32  )-----------( 361      ( 07 Jun 2021 06:47 )             33.2       06-07    135  |  101  |  38  ----------------------------<  126  5.4   |  30  |  1.56    Ca    9.2      07 Jun 2021 10:35    TPro  7.9  /  Alb  2.6  /  TBili  0.3  /  DBili  x   /  AST  25  /  ALT  32  /  AlkPhos  93  06-07     eGFR if Non African American: 48 mL/min/1.73M2 (06-07-21 @ 10:35)  eGFR if : 56 mL/min/1.73M2 (06-07-21 @ 10:35)             04-26 Chol 114 mg/dL LDL -- HDL 35 mg/dL Trig 126 mg/dL              POCT Blood Glucose.: 136 mg/dL (07 Jun 2021 06:10)  POCT Blood Glucose.: 203 mg/dL (06 Jun 2021 22:37)  POCT Blood Glucose.: 115 mg/dL (06 Jun 2021 17:50)  POCT Blood Glucose.: 246 mg/dL (06 Jun 2021 12:25)          COVID-19 PCR: NotDetec (06-02-21 @ 08:50)  COVID-19 PCR: NotDetec (05-31-21 @ 07:10)  COVID-19 PCR: NotDetec (05-26-21 @ 07:21)  COVID-19 PCR: Detected (05-18-21 @ 05:34)  COVID-19 PCR: NotDetec (05-11-21 @ 10:33)      RADIOLOGY & ADDITIONAL TESTS:    Care Discussed with Consultants/Other Providers:

## 2021-06-07 NOTE — PROVIDER CONTACT NOTE (OTHER) - BACKGROUND
pt is s/p covid. Pt currently NPO with bolus tube feedings. Last one given at 8PM on 6/6. Pt is s/p covid. Pt currently NPO with bolus tube feedings. Last feeding given at 8PM on 6/6.

## 2021-06-07 NOTE — PROVIDER CONTACT NOTE (OTHER) - ACTION/TREATMENT ORDERED:
MD made aware as well as day nurse. Fluids and maalox ordered. MD made aware as well as day nurse. Fluids and maalox ordered. Hold morning bolus feeding.

## 2021-06-07 NOTE — PROGRESS NOTE ADULT - ASSESSMENT
59yo male with PMH of DM who was initially admitted to [facility] ICU for hypoxic respiratory failure 2/2 COVID c/b PE with R heart strain, cardiogenic and septic shock, ischemic and hemorrhagic CVA and ESBL klebsiella ventilator associate PNA, transferred to [facility] for neurosurgery eval and further management. s/p PEG. Course c/b right empyema s/p VATs and obturator bleed (resolved). Now admitted for functional decline.     #Debility  #ESBL Klebsiella PNA c/b empyema  #s/p COVID pneumonia  - 5/12 underwent right VATS converted to open L thoracotomy, decortication, drainage of abscess and flex bronch.  - c/w meropenem 5/7 and transition to Bactrim DS 2 tabs TID + Augmentin 875mg po q12  upon DC to [facility] until 6/8   - ID following, recs appreciated - needs close follow up in ID clinic for repeat imaging, Dr. Chambers  - CT Chest 5/29 with improvement in bilateral opacities. Slight decrease in right chest wall hematoma. Stable loculated right hydropneumothorax.   - Comprehensive Rehab Program    #SUKI on CKD stage 2  - increase in Cr  - given IVF a few days ago with no improvement  - changed to NS @100 cc/hr - evaluate with BMP in Am  - nephro consulted    #Hyperkalemia  - K 5.5 repeat was 5.4  - sample not hemolyzed  - will give albuterol  - follow up BMP in afternoon    #Empyema   -OR Vats 5/12 with Dr. Albarado with all chest tubes removed by 5/18  -CT Chest 5/16 Interval new small cavitary lesions in the right lower lobe and right chest wall hematoma - no thoracic surgery intervention indicated per Dr. Albarado.    #Obturator Hematoma (stable)  - bleeding hematoma in right thigh on CT on 5/7  - CT hip showing non active bleed, hematoma on right thigh on 5/8  - doppler to r/o DVT in LE, per IR no role IVC filter  - active again on 5/17, now stable  - was on heparin drip on and off, now off. C/w DVT ppx heparin bid.     #Pulmonary embolus   -Pulmonary embolus noted on ct scan performed at time of admission  -Etiology of venous thromboembolism uncertain at this time; no known history of thrombophilia; provoked in setting of acute covid illness   -s/p heparin drip held due to recurrent bleed in right obturator and right chest wall hematoma   - 5000 heparin bid for dvt ppx    #CVA  -Acute bilateral ischemic strokes, with concern for hemorrhagic conversion, noted on CT scan on 4/8  -Etiology of acute ischemic stroke uncertain at this time; TTE with bubble performed at bedside without any evidence of right-to-left shunt; no arrhythmia noted on telemetry throughout stay in the MICU  -CTA head and neck without any evidence of carotid artery stenosis or dissection    #Encephalopathy due to multifactorial causes - improving  - Etiology likely multi-factorial in setting of acute ischemic infarctions of the brain in conjunction vs acute kidney injury with uremia (now resolving) vs resolving hypoxic respiratory failure, covid-19 infection, superimposed bacterial pneumonia, urinary retention, concern for nutritional deficiencies)  - Reorientation with family at bedside (pt speaks [place], does best with family present)  - MRI Brain, and MRA Head demonstrated multiple evolving subacute infarcts with associated hemorrhages, likely expected evolution of ischemia  - As per neuro, EEG not suggestive of seizure-like activity  - C/w Keppra 500 mg BID-> neuro f/u outpatient   - repeat CT head 5/5 normal.     #Diabetes mellitus type II  - Type II diabetes mellitus noted; a1c noted to be greater than 10  - NPH 12 U BID and continue correction scale sq q6h  - FS q6  - As per endocrine recs: IF BG less than 100mg/dl can give half of NPH dose  - Follow up endo outpatient    #Hypertension.  - Was on metoprolol tartrate 25 mg twice daily since 4/16; discontinued  - BP is stable  - If is hypertensive persistently, can consider antihypertensives    #Dysphagia  - PEG tube placed on 4/30, tube feedings started on 4/30, + free water q8    #Peripheral vascular disease  -Dry gangrene noted over distal toes on bilateral lower extremities--most concerning for microvascular disease subsequent to vasopressor administration   -Dorsalis pedis pulses intact bilaterally  -ERASMO demonstrating right ERASMO: 1.09, left ERASMO: 1.24, right TBI: 0.78, and left TBI: 0.73.  -Podiatry recs appreciated, no acute surgical intervention at this time, applying betadine to wound  -As per ID non infectious appearing     #Urinary Retention - improved  - Mann removed 5/30, urinating appropriately  - c/w doxazosin    #Pain Mgmt   - Tylenol PRN, Oxycodone PRN    #GI/Bowel Mgmt   - Continent c/w Senna, Miralax PRN  - added PPI via PEG tube  - maalox  - zofran for nausea    #Precautions / PROPHYLAXIS:   - Falls, Cardiac, Sternal, Spinal, Seizure   - ortho: Weight bearing status: WBAT   - Lungs: Aspiration, Incentive Spirometer   - Pressure injury/Skin: OOB to Chair, PT/OT    - DVT: HSQ

## 2021-06-07 NOTE — CHART NOTE - NSCHARTNOTEFT_GEN_A_CORE
REHABILITATION DIAGNOSIS/IMPAIRMENT GROUP CODE:  stroke    COMORBIDITIES/COMPLICATING CONDITIONS IMPACTING REHABILITATION:  HEALTH ISSUES - PROBLEM Dx:  Pulmonary thromboembolism, dysphagia/PEG, nausea          PAST MEDICAL & SURGICAL HISTORY:  No pertinent past medical history    No significant past surgical history        Based upon consideration of the patient's impairments, functional status, complicating conditions and any other contributing factors and after information garnered from the assessments of all therapy disciplines involved in treating the patient and other pertinent clinicians:    INTERDISCIPLINARY REHABILITATION INTERVENTIONS:    [ x  ] Transfer Training  [  x ] Bed Mobility  [ x  ] Therapeutic Exercise  [x  ] Balance/Coordination Exercises  [ x  ] Locomotion retraining  [ x  ] Stairs  [  x ] Functional Transfer Training  [ x ] Bowel/Bladder program  [  x ] Pain Management  [ x  ] Skin/Wound Care  [  x ] Visual/Perceptual Training  [   x] Therapeutic Recreation Activities  [ x  ] Neuromuscular Re-education  [ x ] Activities of Daily Living  [ x  ] Speech Exercise  [  x ] Swallowing Exercises  [   ] Vital Stim  [ x  ] Dietary Supplements  [   ] Calorie Count  [   ] Cognitive Exercises  [  x ] Congnitive/Linguistic Treatment  [   ] Behavior Program  [   ] Neuropsych Therapy  [ x  ] Patient/Family Counseling  [ x  ] Family Training  [ x  ] Community Re-entry  [ x  ] Orthotic Evaluation  [   ] Prosthetic Eval/Training    MEDICAL PROGNOSIS:  fair    REHAB POTENTIAL:  good  EXPECTED DAILY THERAPY:         PT: 1h       OT: 1h       ST: 1h       S&O:    EXPECTED INTENSITY OF PROGRAM:  3h daily, 5d week    EXPECTED FREQUENCY OF PROGRAM:  daily    ESTIMATED LOS:  14d    ESTIMATED DISPOSITION:  home c     INTERDISCIPLINARY FUNCTIONAL OUTCOMES?GOALS:         Gait/Mobility: CG       Transfers: CG       ADLs:CG       Functional Transfers:CG       Medication Management:mod I       Communication:mod I       Cognitive: CS       Dysphagia: CS       Bladder CG       Bowel:CG

## 2021-06-07 NOTE — CONSULT NOTE ADULT - SUBJECTIVE AND OBJECTIVE BOX
NEPHROLOGY CONSULTATION    CHIEF COMPLAINT:  SUKI      HPI:  Transferred here for rehab on 6/4 after long complex hospital course including covid pneumonia, septic shock, empyema, stroke, PE, hematomas while on A/C, dysphagia/PEG and digital ischemic wounds.  He had an SUIK in April with peak Cr 2.5 mg/dL.  Over past week or so there has been a small, slow rise in Cr from 0.70 to 1.6 mg/dL reportedly not better with IV fluid.  There has not been a significant rise in BUN however.          ROS:  denies SOB      PAST MEDICAL & SURGICAL HISTORY:  No pertinent past medical history    No significant past surgical history        SOCIAL HISTORY:  NA    FAMILY HISTORY:  NA      MEDICATIONS  (STANDING):  acetaminophen   Tablet .. 650 milliGRAM(s) Oral every 6 hours  ALBUTerol    90 MICROgram(s) HFA Inhaler 1 Puff(s) Inhalation once  albuterol/ipratropium for Nebulization 3 milliLiter(s) Nebulizer once  amoxicillin  875 milliGRAM(s)/clavulanate 1 Tablet(s) Oral every 12 hours  cadexomer iodine 0.9% Gel 1 Application(s) Topical daily  cholecalciferol 400 Unit(s) Oral daily  doxazosin 2 milliGRAM(s) Oral at bedtime  heparin   Injectable 5000 Unit(s) SubCutaneous every 12 hours  insulin lispro (ADMELOG) corrective regimen sliding scale   SubCutaneous four times a day before meals  insulin NPH human recombinant 12 Unit(s) SubCutaneous <User Schedule>  ipratropium    for Nebulization 500 MICROGram(s) Nebulizer once  levETIRAcetam  Solution 500 milliGRAM(s) Oral two times a day  pantoprazole   Suspension 40 milliGRAM(s) Oral daily  polyethylene glycol 3350 17 Gram(s) Oral daily  senna 2 Tablet(s) Oral at bedtime  sodium chloride 0.9%. 1000 milliLiter(s) (100 mL/Hr) IV Continuous <Continuous>  tiotropium 18 MICROgram(s) Capsule 1 Capsule(s) Inhalation once  trimethoprim  160 mG/sulfamethoxazole 800 mG 2 Tablet(s) Oral three times a day      PHYSICAL EXAMINATION:  T(F): 97.8 (06-07-21 @ 09:30)  HR: 102 (06-07-21 @ 09:30)  BP: 110/72 (06-07-21 @ 09:30)  RR: 17 (06-07-21 @ 09:30)  SpO2: 96% (06-07-21 @ 09:30)  Conversant, no apparent distress  PERRLA, pink conjunctivae, no ptosis  Good dentition, no pharyngeal erythema  Neck non tender, no mass, no thyromegaly or nodules  Normal respiratory effort, lungs clear to auscultation  Heart mild tachycardia, no murmurs or rubs, no peripheral edema  Abdomen soft, no masses, no organomegaly  Skin no rashes, ulcers or lesions, normal turgor and temperature      LABS:                        10.3   8.32  )-----------( 361      ( 07 Jun 2021 06:47 )             33.2     06-07    135  |  101  |  38<H>  ----------------------------<  126<H>  5.4<H>   |  30  |  1.56<H>    Ca    9.2      07 Jun 2021 10:35    TPro  7.9  /  Alb  2.6<L>  /  TBili  0.3  /  DBili  x   /  AST  25  /  ALT  32  /  AlkPhos  93  06-07        RADIOLOGY:  Renal sonogram shows 10-11 cm kidneys with no hydronephrosis      ASSESSMENT:  1.  Smoldering onset renal failure on high dose bactrim with eosinophilia suggestive of allergic interstitial nephritis    PLAN:  Suggest d/c bactrim since he is at end of his prescribed course  Check urine stain for eosinophils;  urinalysis;  urine electrolytes/cr  Follow daily CBC WITH DIFFERENTIAL

## 2021-06-07 NOTE — PROGRESS NOTE ADULT - ASSESSMENT
ASSESSMENT/PLAN    59yo male with PMH of DM who was initially admitted to [facility] ICU for hypoxic respiratory failure 2/2 COVID c/b PE with R heart strain, cardiogenic and septic shock, ischemic and hemorrhagic CVA and ESBL klebsiella ventilator associate PNA, transferred to [facility] for neurosurgery eval and further management. s/p PEG. Course c/b right empyema s/p VATs and obturator bleed (resolved). Now admitted for functional decline.     #ESBL Klebsiella PNA c/b empyema   - 5/12 underwent right VATS converted to open L thoracotomy, decortication, drainage of abscess and flex bronch.  - meropenem transition to Bactrim DS 2 tabs TID + Augmentin 875mg po q12  upon DC. Complete throughout 6/8.   - ID follow up requested for elevated cr on Bactrim -bactrim on hold per renal. Consult to ID placed 6/7  - CT Chest 5/29 with improvement in bilateral opacities. Slight decrease in right chest wall hematoma. Stable loculated right hydropneumothorax.   - Gait Instability, ADL impairments and Functional impairments:  Comprehensive Rehab Program of PT/OT/SLP    #Empyema   -OR Vats 5/12 with Dr. Albarado with all chest tubes removed by 5/18  -CT Chest 5/16 Interval new small cavitary lesions in the right lower lobe and right chest wall hematoma - no thoracic surgery intervention indicated per Dr. Albarado.    #Obturator Hematoma (stable)  - bleeding hematoma in right thigh on CT on 5/7  - CT hip showing non active bleed, hematoma on right thigh on 5/8  - was on heparin drip on and off, now off. C/w DVT ppx heparin bid.     #Pulmonary embolus  -Etiology of venous thromboembolism uncertain at this time; no known history of thrombophilia; provoked acute covid illness   -s/p heparin drip held due to recurrent bleed in right obturator and right chest wall hematoma   - 5000 heparin bid for dvt.  -monitor closely     #CVA  -Acute bilateral ischemic strokes, with concern for hemorrhagic conversion, noted on CT scan on 4/8  - TTE with bubble, no right-to-left shunt; no arrhythmia noted on tele, ?ILR  -CTA head and neck without any evidence of carotid artery stenosis or dissection  -Neurology follow up outpt,     #Altered mental status  - Etiology likely multi-factorial in setting of acute ischemic infarctions of the brain in conjunction vs acute kidney injury with uremia (now resolving) vs resolving hypoxic respiratory failure, covid-19 infection, superimposed bacterial pneumonia,   - MRI Brain, and MRA Head demonstrated multiple evolving subacute infarcts with associated hemorrhages,  - As per neuro, EEG not suggestive of seizure-like activity  - C/w Keppra 500 mg BID-> neuro f/u outpatient   - repeat CT head 5/5 no acute changes    #SUKI  -renal in  -Bactrim on hold  -workup started    #Urinary Retention   - Mann removed 5/30  -voiding freely  - c/w doxazosin    #Diabetes mellitus  - Type II diabetes mellitus noted; a1c>10  - NPH 12 U BID and continue correction scale sq q6h   - FS q6.   - As per endocrine recs: IF BG less than 100mg/dl can give half of NPH dose   - Follow up endo outpatient       #Dysphagia  - PEG tube placed on 4/30, tube feedings started on 4/30, + free water q8  -nausea/vomiting current formula  -nutrition to adjust TF  -XR abd follow up requested-hospitalist input requested    #Peripheral vascular disease  -Dry gangrene noted over distal toes on bilateral lower extremities  -ERASMO demonstrating right ERASMO: 1.09, left ERASMO: 1.24, right TBI: 0.78, and left TBI: 0.73.  -Podiatry recs appreciated, betadine to wound    #Pain Mgmt   - Tylenol PRN, Oxycodone PRN    #GI/Bowel Mgmt   - Continent c/w Senna, Miralax PRN    #/Bladder Mgmt   - Continent, PVR low    #FEN   -PEG feeds     #Precautions / PROPHYLAXIS:   - Falls, Cardiac, Sternal, Spinal, Seizure   - ortho: Weight bearing status: WBAT   - Lungs: Aspiration, Incentive Spirometer   - Pressure injury/Skin: OOB to Chair, PT/OT    - DVT: HSQ         MEDICAL PROGNOSIS: GOOD                                   REHAB POTENTIAL: GOOD  ESTIMATED DISPOSITION: HOME                             ELOS: 10-14 Days   EXPECTED THERAPY:     P.T. 1hr/day       O.T. 1hr/day      S.L.P. 1hr/day      EXP FREQUENCY: 5 days per 7 day period     PRESCREEN COMPARISON: I have reviewed the prescreen information and I have found no relevant changes between the preadmission screening and my post admission evaluation     RATIONALE FOR INPATIENT ADMISSION - Patient demonstrates the following: (check all that apply)  [X] Medically appropriate for rehabilitation admission  [X] Has attainable rehab goals with an appropriate initial discharge plan  [X] Has rehabilitation potential (expected to make a significant improvement within a reasonable period of time)   [X] Requires close medical managment by a rehab physician, rehab nursing care, Hospitalist and comprehensive interdisciplinary team (including PT, OT, & or SLP, Prosthetics and Orthotics)

## 2021-06-08 LAB
ANION GAP SERPL CALC-SCNC: 7 MMOL/L — SIGNIFICANT CHANGE UP (ref 5–17)
APPEARANCE UR: CLEAR — SIGNIFICANT CHANGE UP
BACTERIA # UR AUTO: ABNORMAL /HPF
BASOPHILS # BLD AUTO: 0.06 K/UL — SIGNIFICANT CHANGE UP (ref 0–0.2)
BASOPHILS NFR BLD AUTO: 0.8 % — SIGNIFICANT CHANGE UP (ref 0–2)
BILIRUB UR-MCNC: NEGATIVE — SIGNIFICANT CHANGE UP
BUN SERPL-MCNC: 30 MG/DL — HIGH (ref 7–23)
CALCIUM SERPL-MCNC: 9.2 MG/DL — SIGNIFICANT CHANGE UP (ref 8.4–10.5)
CHLORIDE SERPL-SCNC: 106 MMOL/L — SIGNIFICANT CHANGE UP (ref 96–108)
CHLORIDE UR-SCNC: 34 MMOL/L — SIGNIFICANT CHANGE UP
CO2 SERPL-SCNC: 26 MMOL/L — SIGNIFICANT CHANGE UP (ref 22–31)
COLOR SPEC: YELLOW — SIGNIFICANT CHANGE UP
CREAT ?TM UR-MCNC: 13 MG/DL — SIGNIFICANT CHANGE UP
CREAT SERPL-MCNC: 1.51 MG/DL — HIGH (ref 0.5–1.3)
DIFF PNL FLD: NEGATIVE — SIGNIFICANT CHANGE UP
EOSINOPHIL # BLD AUTO: 0.55 K/UL — HIGH (ref 0–0.5)
EOSINOPHIL NFR BLD AUTO: 7.7 % — HIGH (ref 0–6)
EOSINOPHIL NFR URNS MANUAL: NEGATIVE — SIGNIFICANT CHANGE UP
EPI CELLS # UR: SIGNIFICANT CHANGE UP
GLUCOSE BLDC GLUCOMTR-MCNC: 110 MG/DL — HIGH (ref 70–99)
GLUCOSE BLDC GLUCOMTR-MCNC: 133 MG/DL — HIGH (ref 70–99)
GLUCOSE BLDC GLUCOMTR-MCNC: 147 MG/DL — HIGH (ref 70–99)
GLUCOSE BLDC GLUCOMTR-MCNC: 87 MG/DL — SIGNIFICANT CHANGE UP (ref 70–99)
GLUCOSE BLDC GLUCOMTR-MCNC: 90 MG/DL — SIGNIFICANT CHANGE UP (ref 70–99)
GLUCOSE BLDC GLUCOMTR-MCNC: 99 MG/DL — SIGNIFICANT CHANGE UP (ref 70–99)
GLUCOSE SERPL-MCNC: 71 MG/DL — SIGNIFICANT CHANGE UP (ref 70–99)
GLUCOSE UR QL: NEGATIVE — SIGNIFICANT CHANGE UP
HCT VFR BLD CALC: 31.3 % — LOW (ref 39–50)
HGB BLD-MCNC: 9.6 G/DL — LOW (ref 13–17)
IMM GRANULOCYTES NFR BLD AUTO: 0.8 % — SIGNIFICANT CHANGE UP (ref 0–1.5)
KETONES UR-MCNC: NEGATIVE — SIGNIFICANT CHANGE UP
LEUKOCYTE ESTERASE UR-ACNC: NEGATIVE — SIGNIFICANT CHANGE UP
LYMPHOCYTES # BLD AUTO: 1.76 K/UL — SIGNIFICANT CHANGE UP (ref 1–3.3)
LYMPHOCYTES # BLD AUTO: 24.5 % — SIGNIFICANT CHANGE UP (ref 13–44)
MCHC RBC-ENTMCNC: 25.3 PG — LOW (ref 27–34)
MCHC RBC-ENTMCNC: 30.7 GM/DL — LOW (ref 32–36)
MCV RBC AUTO: 82.6 FL — SIGNIFICANT CHANGE UP (ref 80–100)
MONOCYTES # BLD AUTO: 0.86 K/UL — SIGNIFICANT CHANGE UP (ref 0–0.9)
MONOCYTES NFR BLD AUTO: 12 % — SIGNIFICANT CHANGE UP (ref 2–14)
NEUTROPHILS # BLD AUTO: 3.89 K/UL — SIGNIFICANT CHANGE UP (ref 1.8–7.4)
NEUTROPHILS NFR BLD AUTO: 54.2 % — SIGNIFICANT CHANGE UP (ref 43–77)
NITRITE UR-MCNC: NEGATIVE — SIGNIFICANT CHANGE UP
NRBC # BLD: 0 /100 WBCS — SIGNIFICANT CHANGE UP (ref 0–0)
PH UR: 7 — SIGNIFICANT CHANGE UP (ref 5–8)
PLATELET # BLD AUTO: 349 K/UL — SIGNIFICANT CHANGE UP (ref 150–400)
POTASSIUM SERPL-MCNC: 5.2 MMOL/L — SIGNIFICANT CHANGE UP (ref 3.5–5.3)
POTASSIUM SERPL-SCNC: 5.2 MMOL/L — SIGNIFICANT CHANGE UP (ref 3.5–5.3)
PROT UR-MCNC: NEGATIVE — SIGNIFICANT CHANGE UP
RBC # BLD: 3.79 M/UL — LOW (ref 4.2–5.8)
RBC # FLD: 19.9 % — HIGH (ref 10.3–14.5)
RBC CASTS # UR COMP ASSIST: SIGNIFICANT CHANGE UP /HPF (ref 0–4)
SODIUM SERPL-SCNC: 139 MMOL/L — SIGNIFICANT CHANGE UP (ref 135–145)
SODIUM UR-SCNC: 52 MMOL/L — SIGNIFICANT CHANGE UP
SP GR SPEC: 1.01 — SIGNIFICANT CHANGE UP (ref 1.01–1.02)
UROBILINOGEN FLD QL: NEGATIVE — SIGNIFICANT CHANGE UP
WBC # BLD: 7.18 K/UL — SIGNIFICANT CHANGE UP (ref 3.8–10.5)
WBC # FLD AUTO: 7.18 K/UL — SIGNIFICANT CHANGE UP (ref 3.8–10.5)
WBC UR QL: SIGNIFICANT CHANGE UP /HPF (ref 0–5)

## 2021-06-08 PROCEDURE — 99232 SBSQ HOSP IP/OBS MODERATE 35: CPT | Mod: GC

## 2021-06-08 PROCEDURE — 71250 CT THORAX DX C-: CPT | Mod: 26

## 2021-06-08 PROCEDURE — 99232 SBSQ HOSP IP/OBS MODERATE 35: CPT

## 2021-06-08 RX ORDER — GLUCAGON INJECTION, SOLUTION 0.5 MG/.1ML
1 INJECTION, SOLUTION SUBCUTANEOUS ONCE
Refills: 0 | Status: DISCONTINUED | OUTPATIENT
Start: 2021-06-08 | End: 2021-06-23

## 2021-06-08 RX ORDER — HUMAN INSULIN 100 [IU]/ML
10 INJECTION, SUSPENSION SUBCUTANEOUS
Refills: 0 | Status: DISCONTINUED | OUTPATIENT
Start: 2021-06-08 | End: 2021-06-08

## 2021-06-08 RX ORDER — INSULIN GLARGINE 100 [IU]/ML
10 INJECTION, SOLUTION SUBCUTANEOUS EVERY MORNING
Refills: 0 | Status: DISCONTINUED | OUTPATIENT
Start: 2021-06-09 | End: 2021-06-14

## 2021-06-08 RX ORDER — SODIUM CHLORIDE 9 MG/ML
1000 INJECTION, SOLUTION INTRAVENOUS
Refills: 0 | Status: DISCONTINUED | OUTPATIENT
Start: 2021-06-08 | End: 2021-06-23

## 2021-06-08 RX ORDER — DEXTROSE 50 % IN WATER 50 %
25 SYRINGE (ML) INTRAVENOUS ONCE
Refills: 0 | Status: DISCONTINUED | OUTPATIENT
Start: 2021-06-08 | End: 2021-06-23

## 2021-06-08 RX ORDER — DEXTROSE 50 % IN WATER 50 %
12.5 SYRINGE (ML) INTRAVENOUS ONCE
Refills: 0 | Status: DISCONTINUED | OUTPATIENT
Start: 2021-06-08 | End: 2021-06-23

## 2021-06-08 RX ORDER — DEXTROSE 50 % IN WATER 50 %
15 SYRINGE (ML) INTRAVENOUS ONCE
Refills: 0 | Status: DISCONTINUED | OUTPATIENT
Start: 2021-06-08 | End: 2021-06-23

## 2021-06-08 RX ADMIN — HEPARIN SODIUM 5000 UNIT(S): 5000 INJECTION INTRAVENOUS; SUBCUTANEOUS at 17:23

## 2021-06-08 RX ADMIN — HEPARIN SODIUM 5000 UNIT(S): 5000 INJECTION INTRAVENOUS; SUBCUTANEOUS at 05:37

## 2021-06-08 RX ADMIN — Medication 650 MILLIGRAM(S): at 13:01

## 2021-06-08 RX ADMIN — LEVETIRACETAM 500 MILLIGRAM(S): 250 TABLET, FILM COATED ORAL at 17:23

## 2021-06-08 RX ADMIN — SENNA PLUS 2 TABLET(S): 8.6 TABLET ORAL at 21:37

## 2021-06-08 RX ADMIN — HUMAN INSULIN 10 UNIT(S): 100 INJECTION, SUSPENSION SUBCUTANEOUS at 09:04

## 2021-06-08 RX ADMIN — Medication 650 MILLIGRAM(S): at 05:38

## 2021-06-08 RX ADMIN — Medication 1 APPLICATION(S): at 12:29

## 2021-06-08 RX ADMIN — Medication 400 UNIT(S): at 12:29

## 2021-06-08 RX ADMIN — Medication 650 MILLIGRAM(S): at 06:20

## 2021-06-08 RX ADMIN — Medication 650 MILLIGRAM(S): at 12:30

## 2021-06-08 RX ADMIN — PANTOPRAZOLE SODIUM 40 MILLIGRAM(S): 20 TABLET, DELAYED RELEASE ORAL at 12:29

## 2021-06-08 RX ADMIN — LEVETIRACETAM 500 MILLIGRAM(S): 250 TABLET, FILM COATED ORAL at 05:37

## 2021-06-08 RX ADMIN — Medication 1 TABLET(S): at 05:37

## 2021-06-08 NOTE — PROGRESS NOTE ADULT - ASSESSMENT
60y male with hx DM who was transferred to [facility] rehab after a 2 month [facility] stay for Covid.  He was admitted 4/2 with hypoxic respiratory failure secondary to Covid complicated by PE , cardiogenic and septic shock, ischemic and hemorrhagic stroke and ESBL VAP.  He was transferred to [facility] on 4/12 for NS evaluation. NS opted for conservative management. He developed an empyema/lung abscess, s/p RT sided thoracotomy on 5/12 with abscess drainage and formal decortication.  ESBL klebsiella is the main organism but he also grew lactrobacillus and prevotella.  He was treated with meropenem x close to 3 weeks and transitioned to bactrim and augmentin, stop date 6/8.  He came to rehab on 6/4 and has had episodes of N/V, renal saw for increased creat and advised d/c of bactrim.  He has peg for nutrition, ischemic damage to left foot and an encephalopathy.  He is able to follow commands and move all extremities. Prior brain imaging with multiple strokes.  He has completed close to 1 month post thoracotomy antibiotics.  Tissue cultures grew ESBL klebsiella, lactobacillus, and prevotella.  His increased creat may be bactrim induced.  His CT scan dated 5/29 was felt to be satisfactory, he did have a small hydroptx.  Suggest:  renal function slowly improvinh off bactrim  Local wound care to left foot dry gangrene  Consider repeat CT of chest this week as advised by prior ID assesment  no indications for abx at present 60y male with hx DM who was transferred to [facility] rehab after a 2 month [facility] stay for Covid.  He was admitted 4/2 with hypoxic respiratory failure secondary to Covid complicated by PE , cardiogenic and septic shock, ischemic and hemorrhagic stroke and ESBL VAP.  He was transferred to [facility] on 4/12 for NS evaluation. NS opted for conservative management. He developed an empyema/lung abscess, s/p RT sided thoracotomy on 5/12 with abscess drainage and formal decortication.  ESBL klebsiella is the main organism but he also grew lactrobacillus and prevotella.  He was treated with meropenem x close to 3 weeks and transitioned to bactrim and augmentin, stop date 6/8.  He came to rehab on 6/4 and has had episodes of N/V, renal saw for increased creat and advised d/c of bactrim.  He has peg for nutrition, ischemic damage to left foot and an encephalopathy.  He is able to follow commands and move all extremities. Prior brain imaging with multiple strokes.  He has completed close to 1 month post thoracotomy antibiotics.  Tissue cultures grew ESBL klebsiella, lactobacillus, and prevotella.  His increased creat may be bactrim induced.  His CT scan dated 5/29 was felt to be satisfactory, he did have a small hydroptx.  Suggest:  renal function slowly improving off bactrim  Local wound care to left foot dry gangrene  Consider repeat CT of chest this week as advised by prior ID assesment  no indications for abx at present

## 2021-06-08 NOTE — PROGRESS NOTE ADULT - SUBJECTIVE AND OBJECTIVE BOX
Subjective: NAD this am, Nausea and vomiting after lunch.       HISTORY OF PRESENT ILLNESS  59yo M with T2DM and recently diagnosed COVID (3/19) BIBEMS to [facility] on 4/1 for hypoxic respiratory failure secondary to COVID PNA, requiring endotracheal intubation (4/1-4/13). Course significant for Septic shock on admission, treated with vancomycin and cefepime. Found to have ESBL Klebsiella PNA c/b empyema s/p VATs decortication. Treated with prolonged course of Meropenem while hospitalized and transitioned to Augmentin/Bactrim upon discharge (until 6/8). Will follow up with ID as outpatient.     Patient had acute ischemic CVA with areas of hemorrhagic transformation. Found to have multi-focal acute-to-subacute infarctions. 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 Subsequent scans showed hemorrhagic conversion. Transferred to [facility] for Neurosurgery evaluation, no surgical intervention at this time. Will require outpatient follow up.     Course complicated by RITO segmental PEs with e/o RH strain: Provoked in the setting of sepsis/high inflammatory state with COVID infection. LE Dopplers negative x 2. Initially anticoagulated with heparin gtt. Course further complicated by a right chest wall and right obturator hematomas. Due to concern for worsening hemorrhagic conversion of stroke and hematomas, the patient's therapeutic anticoagulation was discontinued. He was evaluated by the neurosurgical team, which said that there was no indication for acute intervention. Patient will not be anticoagulated upon discharge.   Patient's AMS likely in the setting of acute strokes, improved throughout hospitalization. Due to prolonged intubation with subsequent AMS and dysphagia, pt had PEG tube placed. Pt failed MBS following improvement of mental status.     Patient also had bedside debridement and woundcare of her BLE digits and will follow up with Podiatry as an outpatient.    Medically cleared for discharge to  [facility] 6/4.     + vomiting yesterday, no nausea/vomiting today. No abdominal pain.      REVIEW OF SYMPTOMS  [X] Constitutional WNL     [X] Cardio WNL            [X] Resp-empeyema           [X] GI NV                          [X]  WNL                   [X] Heme WNL              [X] Endo-dm2                    [X] Skin WNL                 [X] MSK WNL            [X] Neuro WNL                   [X] Cognitive WNL        [X] Psych WNL      Vital Signs Last 24 Hrs  T(C): 36.3 (08 Jun 2021 08:04), Max: 36.6 (07 Jun 2021 23:50)  T(F): 97.3 (08 Jun 2021 08:04), Max: 97.8 (07 Jun 2021 23:50)  HR: 93 (08 Jun 2021 08:04) (93 - 96)  BP: 100/61 (08 Jun 2021 08:04) (100/61 - 104/63)  BP(mean): --  RR: 16 (08 Jun 2021 08:04) (16 - 16)  SpO2: 96% (08 Jun 2021 08:04) (96% - 98%)    PHYSICAL EXAM  Constitutional - NAD, Comfortable in bed  HEENT - NCAT, EOMI  Neck - Supple, No limited ROM  Chest - CTA bilaterally  Cardiovascular - RR  Abdomen - BS+, Soft, NTND, PEG in place  Extremities - No C/C/E, No calf tenderness   Skin-no rash  Wounds-per H&P      Neurologic Exam - Awake, Alert, AAO x3      No aphasia, impaired attention and concentration, follows simple commands     Cranial Nerves - dysphagia        Coordination/dysmetria - impaired             Balance - impaired     Psychiatric - Mood stable, Affect WNL         CURRENT MEDICATIONS  MEDICATIONS  (STANDING):  acetaminophen   Tablet .. 650 milliGRAM(s) Oral every 6 hours  ALBUTerol    90 MICROgram(s) HFA Inhaler 1 Puff(s) Inhalation once  albuterol/ipratropium for Nebulization 3 milliLiter(s) Nebulizer once  amoxicillin  875 milliGRAM(s)/clavulanate 1 Tablet(s) Oral every 12 hours  cadexomer iodine 0.9% Gel 1 Application(s) Topical daily  cholecalciferol 400 Unit(s) Oral daily  doxazosin 2 milliGRAM(s) Oral at bedtime  heparin   Injectable 5000 Unit(s) SubCutaneous every 12 hours  insulin lispro (ADMELOG) corrective regimen sliding scale   SubCutaneous four times a day before meals  insulin NPH human recombinant 12 Unit(s) SubCutaneous <User Schedule>  ipratropium    for Nebulization 500 MICROGram(s) Nebulizer once  levETIRAcetam  Solution 500 milliGRAM(s) Oral two times a day  pantoprazole   Suspension 40 milliGRAM(s) Oral daily  polyethylene glycol 3350 17 Gram(s) Oral daily  senna 2 Tablet(s) Oral at bedtime  tiotropium 18 MICROgram(s) Capsule 1 Capsule(s) Inhalation once    MEDICATIONS  (PRN):  aluminum hydroxide/magnesium hydroxide/simethicone Suspension 30 milliLiter(s) Oral every 4 hours PRN Dyspepsia  ondansetron   Disintegrating Tablet 4 milliGRAM(s) Oral every 6 hours PRN Nausea and/or Vomiting      ASSESSMENT & PLAN    Continue comprehensive acute rehab program consisting of 3hrs/day of OT/PT and SLP.

## 2021-06-08 NOTE — PROGRESS NOTE ADULT - ASSESSMENT
61yo male with PMH of DM who was initially admitted to [facility] ICU for hypoxic respiratory failure 2/2 COVID c/b PE with R heart strain, cardiogenic and septic shock, ischemic and hemorrhagic CVA and ESBL klebsiella ventilator associate PNA, transferred to [facility] for neurosurgery eval and further management. s/p PEG. Course c/b right empyema s/p VATs and obturator bleed (resolved). Now admitted for functional decline.     #Debility  #ESBL Klebsiella PNA c/b empyema  #s/p COVID pneumonia  - 5/12 underwent right VATS converted to open L thoracotomy, decortication, drainage of abscess and flex bronch.  - c/w meropenem 5/7 and transition to Bactrim DS 2 tabs TID + Augmentin 875mg po q12  upon DC to [facility] --- until 6/8 - Bactrim DC'ed 6/7 due to SUKI, augmentin to be completed 6/8  - ID following, recs appreciated - needs close follow up in ID clinic for repeat imaging, Dr. Chambers  - CT Chest 5/29 with improvement in bilateral opacities. Slight decrease in right chest wall hematoma. Stable loculated right hydropneumothorax.   - Comprehensive Rehab Program    #SUKI on CKD stage 2 - downtrending  - increase in Cr  - IVF  - nephro consulted  - bactrim discontinued    #Hyperkalemia - improved  - K 5.2 today  - monitor BMP    #Empyema   -OR Vats 5/12 with Dr. Albarado with all chest tubes removed by 5/18  -CT Chest 5/16 Interval new small cavitary lesions in the right lower lobe and right chest wall hematoma - no thoracic surgery intervention indicated per Dr. Albarado.    #Obturator Hematoma (stable)  - bleeding hematoma in right thigh on CT on 5/7  - CT hip showing non active bleed, hematoma on right thigh on 5/8  - doppler to r/o DVT in LE, per IR no role IVC filter  - active again on 5/17, now stable  - was on heparin drip on and off, now off. C/w DVT ppx heparin bid    #Pulmonary embolus  - Pulmonary embolus noted on ct scan performed at time of admission  - Etiology of venous thromboembolism uncertain at this time; no known history of thrombophilia; provoked in setting of acute covid illness   - s/p heparin drip held due to recurrent bleed in right obturator and right chest wall hematoma   - 5000 heparin bid for dvt ppx    #CVA  -Acute bilateral ischemic strokes, with concern for hemorrhagic conversion, noted on CT scan on 4/8  -Etiology of acute ischemic stroke uncertain at this time; TTE with bubble performed at bedside without any evidence of right-to-left shunt; no arrhythmia noted on telemetry throughout stay in the MICU  -CTA head and neck without any evidence of carotid artery stenosis or dissection    #Encephalopathy due to multifactorial causes - improving  - Etiology likely multi-factorial in setting of acute ischemic infarctions of the brain in conjunction vs acute kidney injury with uremia (now resolving) vs resolving hypoxic respiratory failure, covid-19 infection, superimposed bacterial pneumonia, urinary retention, concern for nutritional deficiencies  - Reorientation with family at bedside (pt speaks [place], does best with family present)  - MRI Brain, and MRA Head demonstrated multiple evolving subacute infarcts with associated hemorrhages, likely expected evolution of ischemia  - As per neuro, EEG not suggestive of seizure-like activity  - C/w Keppra 500 mg BID-> neuro f/u outpatient   - repeat CT head 5/5 normal.     #Diabetes mellitus type II  - Type II diabetes mellitus noted; a1c noted to be greater than 10  - NPH 12 U BID and continue correction scale sq q6h - NPH decreased to 10 units  - FS q6  - As per endocrine recs: IF BG less than 100mg/dl can give half of NPH dose  - Follow up endo outpatient    #Hypertension.  - Was on metoprolol tartrate 25 mg twice daily since 4/16; discontinued  - BP is stable  - If is hypertensive persistently, can consider antihypertensives    #Dysphagia  - PEG tube placed on 4/30, tube feedings started on 4/30, + free water q8    #Peripheral vascular disease  -Dry gangrene noted over distal toes on bilateral lower extremities--most concerning for microvascular disease subsequent to vasopressor administration   -Dorsalis pedis pulses intact bilaterally  -ERASMO demonstrating right ERASMO: 1.09, left ERASMO: 1.24, right TBI: 0.78, and left TBI: 0.73.  -Podiatry recs appreciated, no acute surgical intervention at this time, applying betadine to wound  -As per ID non infectious appearing     #Urinary Retention - improved  - Mann removed 5/30, urinating appropriately  - c/w doxazosin    #GI/Bowel Mgmt   - Continent c/w Senna, Miralax PRN  - added PPI via PEG tube  - maalox  - zofran for nausea    #Precautions / PROPHYLAXIS:   - Falls, Cardiac, Sternal, Spinal, Seizure   - ortho: Weight bearing status: WBAT   - Lungs: Aspiration, Incentive Spirometer   - Pressure injury/Skin: OOB to Chair, PT/OT    - DVT: HSQ 61yo male with PMH of DM who was initially admitted to [facility] ICU for hypoxic respiratory failure 2/2 COVID c/b PE with R heart strain, cardiogenic and septic shock, ischemic and hemorrhagic CVA and ESBL klebsiella ventilator associate PNA, transferred to [facility] for neurosurgery eval and further management. s/p PEG. Course c/b right empyema s/p VATs and obturator bleed (resolved). Now admitted for functional decline.     #Debility  #ESBL Klebsiella PNA c/b empyema  #s/p COVID pneumonia  - 5/12 underwent right VATS converted to open L thoracotomy, decortication, drainage of abscess and flex bronch.  - c/w meropenem 5/7 and transition to Bactrim DS 2 tabs TID + Augmentin 875mg po q12  upon DC to [facility] --- until 6/8 - Bactrim DC'ed 6/7 due to SUKI, augmentin to be completed 6/8  - ID following, recs appreciated - needs close follow up in ID clinic for repeat imaging, Dr. Chambers  - CT Chest 5/29 with improvement in bilateral opacities. Slight decrease in right chest wall hematoma. Stable loculated right hydropneumothorax.   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 Albarado with all chest tubes removed by 5/18  -CT Chest 5/16 Interval new small cavitary lesions in the right lower lobe and right chest wall hematoma - no thoracic surgery intervention indicated per Dr. Albarado.    #Obturator Hematoma (stable)  - bleeding hematoma in right thigh on CT on 5/7  - CT hip showing non active bleed, hematoma on right thigh on 5/8  - doppler to r/o DVT in LE, per IR no role IVC filter  - active again on 5/17, now stable  - was on heparin drip on and off, now off. C/w DVT ppx heparin bid    #Pulmonary embolus  - Pulmonary embolus noted on ct scan performed at time of admission  - Etiology of venous thromboembolism uncertain at this time; no known history of thrombophilia; provoked in setting of acute covid illness   - s/p heparin drip held due to recurrent bleed in right obturator and right chest wall hematoma   - 5000 heparin bid for dvt ppx    #CVA  -Acute bilateral ischemic strokes, with concern for hemorrhagic conversion, noted on CT scan on 4/8  -Etiology of acute ischemic stroke uncertain at this time; TTE with bubble performed at bedside without any evidence of right-to-left shunt; no arrhythmia noted on telemetry throughout stay in the MICU  -CTA head and neck without any evidence of carotid artery stenosis or dissection    #Encephalopathy due to multifactorial causes - improving  - Etiology likely multi-factorial in setting of acute ischemic infarctions of the brain in conjunction vs acute kidney injury with uremia (now resolving) vs resolving hypoxic respiratory failure, covid-19 infection, superimposed bacterial pneumonia, urinary retention, concern for nutritional deficiencies  - Reorientation with family at bedside (pt speaks [place], does best with family present)  - MRI Brain, and MRA Head demonstrated multiple evolving subacute infarcts with associated hemorrhages, likely expected evolution of ischemia  - As per neuro, EEG not suggestive of seizure-like activity  - C/w Keppra 500 mg BID-> neuro f/u outpatient   - repeat CT head 5/5 normal.     #Diabetes mellitus type II  - Type II diabetes mellitus noted; a1c noted to be greater than 10  - NPH 12 U BID and continue correction scale sq q6h - NPH decreased to 10 units  - FS q6  - As per endocrine recs: IF BG less than 100mg/dl can give half of NPH dose  - Follow up endo outpatient    #Hypertension.  - Was on metoprolol tartrate 25 mg twice daily since 4/16; discontinued  - BP is stable  - If is hypertensive persistently, can consider antihypertensives    #Dysphagia  - PEG tube placed on 4/30, tube feedings started on 4/30, + free water q8    #Peripheral vascular disease  -Dry gangrene noted over distal toes on bilateral lower extremities--most concerning for microvascular disease subsequent to vasopressor administration   -Dorsalis pedis pulses intact bilaterally  -ERASMO demonstrating right ERASMO: 1.09, left ERASMO: 1.24, right TBI: 0.78, and left TBI: 0.73.  -Podiatry recs appreciated, no acute surgical intervention at this time, applying betadine to wound  -As per ID non infectious appearing     #Urinary Retention - improved  - Mann removed 5/30, urinating appropriately  - c/w doxazosin    #GI/Bowel Mgmt   - Continent c/w Senna, Miralax PRN  - added PPI via PEG tube  - maalox  - zofran for nausea    #Precautions / PROPHYLAXIS:   - Falls, Cardiac, Sternal, Spinal, Seizure   - ortho: Weight bearing status: WBAT   - Lungs: Aspiration, Incentive Spirometer   - Pressure injury/Skin: OOB to Chair, PT/OT    - DVT: HSQ 61yo male with PMH of DM who was initially admitted to [facility] ICU for hypoxic respiratory failure 2/2 COVID c/b PE with R heart strain, cardiogenic and septic shock, ischemic and hemorrhagic CVA and ESBL klebsiella ventilator associate PNA, transferred to [facility] for neurosurgery eval and further management. s/p PEG. Course c/b right empyema s/p VATs and obturator bleed (resolved). Now admitted for functional decline.     #Debility  #ESBL Klebsiella PNA c/b empyema  #s/p COVID pneumonia  - 5/12 underwent right VATS converted to open L thoracotomy, decortication, drainage of abscess and flex bronch.  - c/w meropenem 5/7 and transition to Bactrim DS 2 tabs TID + Augmentin 875mg po q12  upon DC to [facility] --- until 6/8 - Bactrim DC'ed 6/7 due to SUKI, augmentin to be completed 6/8  - ID following, recs appreciated - needs close follow up in ID clinic for repeat imaging, Dr. Chambers  - CT Chest 5/29 with improvement in bilateral opacities. Slight decrease in right chest wall hematoma. Stable loculated right hydropneumothorax.   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 Albarado with all chest tubes removed by 5/18  -CT Chest 5/16 Interval new small cavitary lesions in the right lower lobe and right chest wall hematoma - no thoracic surgery intervention indicated per Dr. Albarado.    #Obturator Hematoma (stable)  - bleeding hematoma in right thigh on CT on 5/7  - CT hip showing non active bleed, hematoma on right thigh on 5/8  - doppler to r/o DVT in LE, per IR no role IVC filter  - active again on 5/17, now stable  - was on heparin drip on and off, now off. C/w DVT ppx heparin bid    #Pulmonary embolus  - Pulmonary embolus noted on ct scan performed at time of admission  - Etiology of venous thromboembolism uncertain at this time; no known history of thrombophilia; provoked in setting of acute covid illness   - s/p heparin drip held due to recurrent bleed in right obturator and right chest wall hematoma   - 5000 heparin bid for dvt ppx    #CVA  -Acute bilateral ischemic strokes, with concern for hemorrhagic conversion, noted on CT scan on 4/8  -Etiology of acute ischemic stroke uncertain at this time; TTE with bubble performed at bedside without any evidence of right-to-left shunt; no arrhythmia noted on telemetry throughout stay in the MICU  -CTA head and neck without any evidence of carotid artery stenosis or dissection    #Encephalopathy due to multifactorial causes - improving  - Etiology likely multi-factorial in setting of acute ischemic infarctions of the brain in conjunction vs acute kidney injury with uremia (now resolving) vs resolving hypoxic respiratory failure, covid-19 infection, superimposed bacterial pneumonia, urinary retention, concern for nutritional deficiencies  - Reorientation with family at bedside (pt speaks [place], does best with family present)  - MRI Brain, and MRA Head demonstrated multiple evolving subacute infarcts with associated hemorrhages, likely expected evolution of ischemia  - As per neuro, EEG not suggestive of seizure-like activity  - C/w Keppra 500 mg BID-> neuro f/u outpatient   - repeat CT head 5/5 normal.     #Diabetes mellitus type II  - Type II diabetes mellitus noted; a1c noted to be greater than 10  - NPH 12 U BID and continue correction scale sq q6h - NPH discontinued  - As per endocrine recs: IF BG less than 100mg/dl can give half of NPH dose  - Follow up endo outpatient  - changed to Lantus 10 units QAM, moderate ISS to assess pre-meal need    #Hypertension.  - Was on metoprolol tartrate 25 mg twice daily since 4/16; discontinued  - BP is stable  - If is hypertensive persistently, can consider antihypertensives    #Dysphagia  - PEG tube placed on 4/30, tube feedings started on 4/30, + free water q8    #Peripheral vascular disease  -Dry gangrene noted over distal toes on bilateral lower extremities--most concerning for microvascular disease subsequent to vasopressor administration   -Dorsalis pedis pulses intact bilaterally  -ERASMO demonstrating right ERASMO: 1.09, left ERASMO: 1.24, right TBI: 0.78, and left TBI: 0.73.  -Podiatry recs appreciated, no acute surgical intervention at this time, applying betadine to wound  -As per ID non infectious appearing     #Urinary Retention - improved  - Mann removed 5/30, urinating appropriately  - c/w doxazosin    #GI/Bowel Mgmt   - Continent c/w Senna, Miralax PRN  - added PPI via PEG tube  - maalox  - zofran for nausea    #Precautions / PROPHYLAXIS:   - Falls, Cardiac, Sternal, Spinal, Seizure   - ortho: Weight bearing status: WBAT   - Lungs: Aspiration, Incentive Spirometer   - Pressure injury/Skin: OOB to Chair, PT/OT    - DVT: HSQ

## 2021-06-08 NOTE — ADVANCED PRACTICE NURSE CONSULT - RECOMMEDATIONS
Discuss case with Dr. Alvarez and agreed:  Change NPH twice daily to Lantus 10 units sq daily starting 6/9/21 in am- Titrate to - 180  Recommended Admelog 3 units before each TF (4 times/day) to start with 8 pm TF today in addition to Admelog moderate correction scale before each TF ( 4 times/day) but agreed to moderate correction scale for now and monitor  c/w accu check before each TF    Discharge recommendations will depend on pt diet at time of discharge.   If pt passes swallow test and begins oral diet, recommend starting   metformin and titrating to 500 mg twice a day with breakfast and dinner- check GFR and if able titrate to optimal goal of 1000 mg twice daily.   Januvia 100 mg daily  If insulin needed at time of discharge would recommend Lantus Solostar Insulin pen (favorite list) once daily- dose dependent on needs at time of discharge.  Insulin pen needles 4 mm (scott)- favorite list  Would try to avoid multiple injections of insulin  Will need script for meter and supplies per insurance (favorite list)  Will need script for alcohol swabs- favorite list  Out pt follow up with Endocrinology for evaluation for SGLT2/GLP1

## 2021-06-08 NOTE — ADVANCED PRACTICE NURSE CONSULT - ASSESSMENT
59yo male with PMH of T2DM who was initially admitted to [facility] ICU for hypoxic respiratory failure 2/2 COVID c/b PE with R heart strain, cardiogenic and septic shock, ischemic and hemorrhagic CVA and ESBL klebsiella ventilator associate PNA, transferred to [facility] for neurosurgery eval and further management. s/p PEG. Course c/b right empyema s/p VATs and obturator bleed (resolved). Now admitted to  Rehab for functional decline.     Hgb A1C -10.3 on 4/2/2021    eGFR if Non African American: 50 mL/min/1.73M2 (06-08-21 @ 06:00)    POCT Blood Glucose.: 133 mg/dL (08 Jun 2021 07:56)  POCT Blood Glucose.: 87 mg/dL (08 Jun 2021 05:36)  POCT Blood Glucose.: 124 mg/dL (07 Jun 2021 22:38)  POCT Blood Glucose.: 212 mg/dL (07 Jun 2021 17:01)  POCT Blood Glucose.: 112 mg/dL (07 Jun 2021 12:49)    Pt has PEG tube and receives bolus feeds at * am, 12 p, 4 p and 8 p    Current In patient Diabetes Medication regimen:  insulin lispro (ADMELOG) corrective regimen sliding scale   SubCutaneous four times a day before meals  insulin NPH human recombinant 12 Unit(s) SubCutaneous at 8 am and 10 pm.    Pt speaks [place]- [place]  utilized to interpret – [employer]- ID # - [id number]  Pt lives alone but in same house as his brother in law [facility] [phone]    Pt is alert and oriented but unable to answer questions asked appropriately and appears forgetful as well.  Pt denies h/o T2DM but pt has not had medical insurance and was not receiving routine medical follow up.  Eyes- last eye exam  in [place] over 1 year ago- has reading glasses. Educated on importance of annual eye exam.  Teeth- intact, denies issues- educated to follow up with dentist every 6 months  Feet-  bilateral toe dry gangrene due to sepsis/poor perfusion  - educated to see podiatrist at least yearly - educated on diabetes foot care.  Education unable to validate education via teach back    Smoking- denies  Pt is a vegetarian  Pt educated on T2DM disease process, progression, importance of proper management, medical follow up, and learning diabetes self- management skills. Pt unable to validate education via teach back .  Pt will need education on Insulin pen use, insulin action, storage, injection technique, injection site identification and rotation and sharps disposal, S/S of hypoglycemia and tx per 15/15 rule, use of BG meter, how and when to perform FS.     Provided written resources of Leaving the hospital with Diabetes, Insulin pen use, S/S and Tx of hypoglycemia handout form from [employer] and advised pt to give to his brother in law to have him interpret for him.    TC to pt’s brother in law Girma Pizarro who confirmed pt is confused at times and forgetful. He also confirmed that pt does have active Medicaid insurance. So outpatient medications at time of discharge should be covered.  Informed brother in law of educational resources left for pt and he agreed to translate for pt.

## 2021-06-08 NOTE — PROGRESS NOTE ADULT - SUBJECTIVE AND OBJECTIVE BOX
No distress    Vital Signs Last 24 Hrs  T(C): 36.3 (21 @ 08:04), Max: 36.6 (21 @ 23:50)  T(F): 97.3 (21 @ 08:04), Max: 97.8 (21 @ 23:50)  HR: 93 (21 @ 08:04) (93 - 96)  BP: 100/61 (21 @ 08:04) (100/61 - 104/63)  RR: 16 (21 @ 08:04) (16 - 16)  SpO2: 96% (21 @ 08:04) (96% - 98%)    s1s2  b/l air entry  soft, ND, + PEG  no edema                        9.6    7.18  )-----------( 349      ( 2021 06:00 )             31.3     2021 06:00    139    |  106    |  30     ----------------------------<  71     5.2     |  26     |  1.51     Ca    9.2        2021 06:00    TPro  7.9    /  Alb  2.6    /  TBili  0.3    /  DBili  x      /  AST  25     /  ALT  32     /  AlkPhos  93     2021 06:47    LIVER FUNCTIONS - ( 2021 06:47 )  Alb: 2.6 g/dL / Pro: 7.9 g/dL / ALK PHOS: 93 U/L / ALT: 32 U/L / AST: 25 U/L / GGT: x           Urinalysis Basic - ( 2021 06:00 )    Color: Yellow / Appearance: Clear / S.010 / pH: x  Gluc: x / Ketone: Negative  / Bili: Negative / Urobili: Negative   Blood: x / Protein: Negative / Nitrite: Negative   Leuk Esterase: Negative / RBC: 0-4 /HPF / WBC 0-2 /HPF   Sq Epi: x / Non Sq Epi: Neg.-Few / Bacteria: Few /HPF    RADIOLOGY:  Renal sonogram shows 10-11 cm kidneys with no hydronephrosis    acetaminophen   Tablet .. 650 milliGRAM(s) Oral every 6 hours  aluminum hydroxide/magnesium hydroxide/simethicone Suspension 30 milliLiter(s) Oral every 4 hours PRN  cadexomer iodine 0.9% Gel 1 Application(s) Topical daily  cholecalciferol 400 Unit(s) Oral daily  dextrose 40% Gel 15 Gram(s) Oral once  dextrose 5%. 1000 milliLiter(s) IV Continuous <Continuous>  dextrose 5%. 1000 milliLiter(s) IV Continuous <Continuous>  dextrose 50% Injectable 25 Gram(s) IV Push once  dextrose 50% Injectable 12.5 Gram(s) IV Push once  dextrose 50% Injectable 25 Gram(s) IV Push once  glucagon  Injectable 1 milliGRAM(s) IntraMuscular once  heparin   Injectable 5000 Unit(s) SubCutaneous every 12 hours  insulin lispro (ADMELOG) corrective regimen sliding scale   SubCutaneous four times a day before meals  levETIRAcetam  Solution 500 milliGRAM(s) Oral two times a day  ondansetron   Disintegrating Tablet 4 milliGRAM(s) Oral every 6 hours PRN  pantoprazole   Suspension 40 milliGRAM(s) Oral daily  polyethylene glycol 3350 17 Gram(s) Oral daily  senna 2 Tablet(s) Oral at bedtime    ASSESSMENT/PLAN:    Transferred here for rehab on 21 after long complex hospital course including covid pneumonia, septic shock, empyema, stroke, PE, hematomas while on A/C, dysphagia/PEG and digital ischemic wounds. He had an SUKI in April with peak Cr 2.5 mg/dL. Over past week or so there has been a small, slow rise in Cr from 0.75 - 21 to 1.66 - 21.    Suspect SUKI due to bactrim   Bactrim d/c-d  Cr appears to be stabilizing  Avoid nephrotoxins  F/u BMP  Change feeds to Nepro to avoid rising K    [phone]

## 2021-06-08 NOTE — PROGRESS NOTE ADULT - SUBJECTIVE AND OBJECTIVE BOX
CC: f/u for CVA, post-covid infection    Patient reports no new events    REVIEW OF SYSTEMS: no fevers, no chills, no nausea, no vomiting, no abd pain, no resp symptoms  All other review of systems negative (Comprehensive ROS)    Antimicrobials Day #      Other Medications Reviewed    T(F): 97.3 (21 @ 08:04), Max: 97.8 (21 @ 23:50)  HR: 93 (21 @ 08:04)  BP: 100/61 (21 @ 08:04)  RR: 16 (21 @ 08:04)  SpO2: 96% (21 @ 08:04)    PHYSICAL EXAM:    General: alert, no acute distress  Eyes:  anicteric, no conjunctival injection, no discharge  Oropharynx: no lesions or injection 	  Neck: supple, without adenopathy  Lungs: clear to auscultation, Rt thoracotomy scar is well healed  Heart: regular rate and rhythm; no murmur, rubs or gallops  Abdomen: soft, nondistended, nontender, without mass or organomegaly, peg  Skin: no rash  Extremities: no clubbing, cyanosis, or edema  Neurologic: alert, oriented, moves all extremities  Left foot with gangrenous changes to distal aspect of digits, dry    LAB RESULTS:                        9.6    7.18  )-----------( 349      ( 2021 06:00 )             31.3     06-08    139  |  106  |  30<H>  ----------------------------<  71  5.2   |  26  |  1.51<H>    Ca    9.2      2021 06:00    TPro  7.9  /  Alb  2.6<L>  /  TBili  0.3  /  DBili  x   /  AST  25  /  ALT  32  /  AlkPhos  93  06-07    LIVER FUNCTIONS - ( 2021 06:47 )  Alb: 2.6 g/dL / Pro: 7.9 g/dL / ALK PHOS: 93 U/L / ALT: 32 U/L / AST: 25 U/L / GGT: x           Urinalysis Basic - ( 2021 06:00 )    Color: Yellow / Appearance: Clear / S.010 / pH: x  Gluc: x / Ketone: Negative  / Bili: Negative / Urobili: Negative   Blood: x / Protein: Negative / Nitrite: Negative   Leuk Esterase: Negative / RBC: 0-4 /HPF / WBC 0-2 /HPF   Sq Epi: x / Non Sq Epi: Neg.-Few / Bacteria: Few /HPF        MICROBIOLOGY REVIEWED:    RADIOLOGY REVIEWED:    < from: Xray Abdomen 1 View PORTABLE -Urgent (Xray Abdomen 1 View PORTABLE -Urgent .) (21 @ 16:12) >      TECHNIQUE:  Portable supine radiography of the abdomen performed.    FINDINGS:  Note is made of an indwelling PEG tube overlying the expected region of the stomach. Nonspecific bowel gas pattern. No evidence for mechanical bowel obstruction. No free intraperitoneal air identified on portable supine radiography. No abnormal intra-abdominal or pelvic calcifications noted. Degenerative changes of the lumbar spine noted.    IMPRESSION:  As above.      < end of copied text >   CC: f/u for CVA, post-covid infection    Patient had no fevers or leukocytosis at present    REVIEW OF SYSTEMS: no fevers, no chills, no nausea, no vomiting, no abd pain, no resp symptoms  All other review of systems negative (Comprehensive ROS)    Antimicrobials Day #      Other Medications Reviewed    T(F): 97.3 (21 @ 08:04), Max: 97.8 (21 @ 23:50)  HR: 93 (21 @ 08:04)  BP: 100/61 (21 @ 08:04)  RR: 16 (21 @ 08:04)  SpO2: 96% (21 @ 08:04)    PHYSICAL EXAM:    General: alert, no acute distress  Eyes:  anicteric, no conjunctival injection, no discharge  Oropharynx: no lesions or injection 	  Neck: supple, without adenopathy  Lungs: clear to auscultation, Rt thoracotomy scar is well healed  Heart: regular rate and rhythm; no murmur, rubs or gallops  Abdomen: soft, nondistended, nontender, without mass or organomegaly, peg  Skin: no rash  Extremities: no clubbing, cyanosis, or edema  Neurologic: alert, oriented, moves all extremities  Left foot with gangrenous changes to distal aspect of digits, dry    LAB RESULTS:                        9.6    7.18  )-----------( 349      ( 2021 06:00 )             31.3     06-08    139  |  106  |  30<H>  ----------------------------<  71  5.2   |  26  |  1.51<H>    Ca    9.2      2021 06:00    TPro  7.9  /  Alb  2.6<L>  /  TBili  0.3  /  DBili  x   /  AST  25  /  ALT  32  /  AlkPhos  93  06-07    LIVER FUNCTIONS - ( 2021 06:47 )  Alb: 2.6 g/dL / Pro: 7.9 g/dL / ALK PHOS: 93 U/L / ALT: 32 U/L / AST: 25 U/L / GGT: x           Urinalysis Basic - ( 2021 06:00 )    Color: Yellow / Appearance: Clear / S.010 / pH: x  Gluc: x / Ketone: Negative  / Bili: Negative / Urobili: Negative   Blood: x / Protein: Negative / Nitrite: Negative   Leuk Esterase: Negative / RBC: 0-4 /HPF / WBC 0-2 /HPF   Sq Epi: x / Non Sq Epi: Neg.-Few / Bacteria: Few /HPF        MICROBIOLOGY REVIEWED:    RADIOLOGY REVIEWED:    < from: Xray Abdomen 1 View PORTABLE -Urgent (Xray Abdomen 1 View PORTABLE -Urgent .) (21 @ 16:12) >      TECHNIQUE:  Portable supine radiography of the abdomen performed.    FINDINGS:  Note is made of an indwelling PEG tube overlying the expected region of the stomach. Nonspecific bowel gas pattern. No evidence for mechanical bowel obstruction. No free intraperitoneal air identified on portable supine radiography. No abnormal intra-abdominal or pelvic calcifications noted. Degenerative changes of the lumbar spine noted.    IMPRESSION:  As above.      < end of copied text >

## 2021-06-08 NOTE — PROGRESS NOTE ADULT - ASSESSMENT
ASSESSMENT/PLAN    61yo male with PMH of DM who was initially admitted to [facility] ICU for hypoxic respiratory failure 2/2 COVID c/b PE with R heart strain, cardiogenic and septic shock, ischemic and hemorrhagic CVA and ESBL klebsiella ventilator associate PNA, transferred to [facility] for neurosurgery eval and further management. s/p PEG. Course c/b right empyema s/p VATs and obturator bleed (resolved). Now admitted for functional decline.     #ESBL Klebsiella PNA c/b empyema   - 5/12 underwent right VATS converted to open L thoracotomy, decortication, drainage of abscess and flex bronch.  - meropenem transition to Bactrim DS 2 tabs TID + Augmentin 875mg po q12  upon DC. Complete throughout 6/8.   - ID follow up requested for elevated cr on Bactrim -bactrim on hold per renal. observe off abx as per ID   - CT Chest 5/29 with improvement in bilateral opacities. Slight decrease in right chest wall hematoma. Stable loculated right hydropneumothorax.   - Gait Instability, ADL impairments and Functional impairments:  Comprehensive Rehab Program of PT/OT/SLP    #Empyema   -OR Vats 5/12 with Dr. Albarado with all chest tubes removed by 5/18  -CT Chest 5/16 Interval new small cavitary lesions in the right lower lobe and right chest wall hematoma - no thoracic surgery intervention indicated per Dr. Albarado.  -CT chest follow up ordered     #Obturator Hematoma (stable)  - bleeding hematoma in right thigh on CT on 5/7  - CT hip showing non active bleed, hematoma on right thigh on 5/8  - was on heparin drip on and off, now off. C/w DVT ppx heparin bid.     #Pulmonary embolus  -Etiology of venous thromboembolism uncertain at this time; no known history of thrombophilia; provoked acute covid illness   -s/p heparin drip held due to recurrent bleed in right obturator and right chest wall hematoma   - 5000 heparin bid for dvt.  -monitor closely     #CVA  -Acute bilateral ischemic strokes, with concern for hemorrhagic conversion, noted on CT scan on 4/8  - TTE with bubble, no right-to-left shunt; no arrhythmia noted on tele, ?ILR  -CTA head and neck without any evidence of carotid artery stenosis or dissection  -Neurology follow up outpt,     #Altered mental status  - Etiology likely multi-factorial in setting of acute ischemic infarctions of the brain in conjunction vs acute kidney injury with uremia (now resolving) vs resolving hypoxic respiratory failure, covid-19 infection, superimposed bacterial pneumonia,   - MRI Brain, and MRA Head demonstrated multiple evolving subacute infarcts with associated hemorrhages,  - As per neuro, EEG not suggestive of seizure-like activity  - C/w Keppra 500 mg BID-> neuro f/u outpatient   - repeat CT head 5/5 no acute changes    #SUKI  -renal in  -Bactrim on hold  -workup started    #Urinary Retention   - Mann removed 5/30  -voiding freely  - c/w doxazosin    #Diabetes mellitus  - Type II diabetes mellitus noted; a1c>10  - NPH 12 U BID and continue correction scale sq q6h   - FS q6.   - As per endocrine recs: IF BG less than 100mg/dl can give half of NPH dose   - Follow up endo outpatient       #Dysphagia  - PEG tube placed on 4/30, tube feedings started on 4/30, + free water q8  -nausea/vomiting current formula  -nutrition to adjust TF  -XR abd follow up requested-hospitalist input requested    #Peripheral vascular disease  -Dry gangrene noted over distal toes on bilateral lower extremities  -ERASMO demonstrating right ERASMO: 1.09, left ERASMO: 1.24, right TBI: 0.78, and left TBI: 0.73.  -Podiatry recs appreciated, betadine to wound    #Pain Mgmt   - Tylenol PRN, Oxycodone PRN    #GI/Bowel Mgmt   - Continent c/w Senna, Miralax PRN    #/Bladder Mgmt   - Continent, PVR low    #FEN   -PEG feeds     #Precautions / PROPHYLAXIS:   - Falls, Cardiac, Sternal, Spinal, Seizure   - ortho: Weight bearing status: WBAT   - Lungs: Aspiration, Incentive Spirometer   - Pressure injury/Skin: OOB to Chair, PT/OT    - DVT: HSQ         MEDICAL PROGNOSIS: GOOD                                   REHAB POTENTIAL: GOOD  ESTIMATED DISPOSITION: HOME                             ELOS: 10-14 Days   EXPECTED THERAPY:     P.T. 1hr/day       O.T. 1hr/day      S.L.P. 1hr/day      EXP FREQUENCY: 5 days per 7 day period     PRESCREEN COMPARISON: I have reviewed the prescreen information and I have found no relevant changes between the preadmission screening and my post admission evaluation     RATIONALE FOR INPATIENT ADMISSION - Patient demonstrates the following: (check all that apply)  [X] Medically appropriate for rehabilitation admission  [X] Has attainable rehab goals with an appropriate initial discharge plan  [X] Has rehabilitation potential (expected to make a significant improvement within a reasonable period of time)   [X] Requires close medical managment by a rehab physician, rehab nursing care, Hospitalist and comprehensive interdisciplinary team (including PT, OT, & or SLP, Prosthetics and Orthotics)

## 2021-06-08 NOTE — PROGRESS NOTE ADULT - SUBJECTIVE AND OBJECTIVE BOX
Patient is a 60y old  Male who presents with a chief complaint of CVA (2021 17:13)      Patient seen and examined at bedside.  Denies chest pain, dyspnea, abd pain.  Vomiting improved. Pt feeling better and less worried.    ALLERGIES:  No Known Allergies    MEDICATIONS  (STANDING):  acetaminophen   Tablet .. 650 milliGRAM(s) Oral every 6 hours  amoxicillin  875 milliGRAM(s)/clavulanate 1 Tablet(s) Oral every 12 hours  cadexomer iodine 0.9% Gel 1 Application(s) Topical daily  cholecalciferol 400 Unit(s) Oral daily  doxazosin 2 milliGRAM(s) Oral at bedtime  heparin   Injectable 5000 Unit(s) SubCutaneous every 12 hours  insulin lispro (ADMELOG) corrective regimen sliding scale   SubCutaneous four times a day before meals  insulin NPH human recombinant 10 Unit(s) SubCutaneous <User Schedule>  levETIRAcetam  Solution 500 milliGRAM(s) Oral two times a day  pantoprazole   Suspension 40 milliGRAM(s) Oral daily  polyethylene glycol 3350 17 Gram(s) Oral daily  senna 2 Tablet(s) Oral at bedtime    MEDICATIONS  (PRN):  aluminum hydroxide/magnesium hydroxide/simethicone Suspension 30 milliLiter(s) Oral every 4 hours PRN Dyspepsia  ondansetron   Disintegrating Tablet 4 milliGRAM(s) Oral every 6 hours PRN Nausea and/or Vomiting    Vital Signs Last 24 Hrs  T(F): 97.3 (2021 08:04), Max: 97.8 (2021 09:30)  HR: 93 (2021 08:04) (93 - 102)  BP: 100/61 (2021 08:04) (100/61 - 110/72)  RR: 16 (2021 08:04) (16 - 17)  SpO2: 96% (2021 08:04) (96% - 98%)  I&O's Summary    2021 07:01  -  2021 07:00  --------------------------------------------------------  IN: 880 mL / OUT: 500 mL / NET: 380 mL      BMI (kg/m2): 24.2 (21 @ 18:19)  PHYSICAL EXAM:  General: NAD, A/O x 3  ENT: MMM  Neck: Supple, No JVD  Lungs: Clear to auscultation bilaterally  Cardio: RRR, S1/S2, No murmurs  Abdomen: Soft, Nontender, Nondistended; Bowel sounds present  Extremities: No calf tenderness, No pitting edema    LABS:                        9.6    7.18  )-----------( 349      ( 2021 06:00 )             31.3           139  |  106  |  30  ----------------------------<  71  5.2   |  26  |  1.51    Ca    9.2      2021 06:00    TPro  7.9  /  Alb  2.6  /  TBili  0.3  /  DBili  x   /  AST  25  /  ALT  32  /  AlkPhos  93  -07     eGFR if Non African American: 50 mL/min/1.73M2 (21 @ 06:00)  eGFR if African American: 58 mL/min/1.73M2 (21 @ 06:00)             - Chol 114 mg/dL LDL -- HDL 35 mg/dL Trig 126 mg/dL              POCT Blood Glucose.: 133 mg/dL (2021 07:56)  POCT Blood Glucose.: 87 mg/dL (2021 05:36)  POCT Blood Glucose.: 124 mg/dL (2021 22:38)  POCT Blood Glucose.: 212 mg/dL (2021 17:01)  POCT Blood Glucose.: 112 mg/dL (2021 12:49)      Urinalysis Basic - ( 2021 06:00 )    Color: Yellow / Appearance: Clear / S.010 / pH: x  Gluc: x / Ketone: Negative  / Bili: Negative / Urobili: Negative   Blood: x / Protein: Negative / Nitrite: Negative   Leuk Esterase: Negative / RBC: 0-4 /HPF / WBC 0-2 /HPF   Sq Epi: x / Non Sq Epi: Neg.-Few / Bacteria: Few /HPF        COVID-19 PCR: NotDetec (21 @ 08:50)  COVID-19 PCR: NotDetec (21 @ 07:10)  COVID-19 PCR: NotDetec (21 @ 07:21)  COVID-19 PCR: Detected (21 @ 05:34)  COVID-19 PCR: NotDetec (21 @ 10:33)      RADIOLOGY & ADDITIONAL TESTS:    Care Discussed with Consultants/Other Providers:

## 2021-06-09 LAB
A1C WITH ESTIMATED AVERAGE GLUCOSE RESULT: 5.7 % — HIGH (ref 4–5.6)
ANION GAP SERPL CALC-SCNC: 8 MMOL/L — SIGNIFICANT CHANGE UP (ref 5–17)
BUN SERPL-MCNC: 26 MG/DL — HIGH (ref 7–23)
CALCIUM SERPL-MCNC: 9.9 MG/DL — SIGNIFICANT CHANGE UP (ref 8.4–10.5)
CHLORIDE SERPL-SCNC: 106 MMOL/L — SIGNIFICANT CHANGE UP (ref 96–108)
CO2 SERPL-SCNC: 25 MMOL/L — SIGNIFICANT CHANGE UP (ref 22–31)
CREAT SERPL-MCNC: 1.38 MG/DL — HIGH (ref 0.5–1.3)
CULTURE RESULTS: SIGNIFICANT CHANGE UP
ESTIMATED AVERAGE GLUCOSE: 117 MG/DL — HIGH (ref 68–114)
GLUCOSE BLDC GLUCOMTR-MCNC: 136 MG/DL — HIGH (ref 70–99)
GLUCOSE BLDC GLUCOMTR-MCNC: 147 MG/DL — HIGH (ref 70–99)
GLUCOSE BLDC GLUCOMTR-MCNC: 187 MG/DL — HIGH (ref 70–99)
GLUCOSE BLDC GLUCOMTR-MCNC: 193 MG/DL — HIGH (ref 70–99)
GLUCOSE SERPL-MCNC: 106 MG/DL — HIGH (ref 70–99)
POTASSIUM SERPL-MCNC: 5.2 MMOL/L — SIGNIFICANT CHANGE UP (ref 3.5–5.3)
POTASSIUM SERPL-SCNC: 5.2 MMOL/L — SIGNIFICANT CHANGE UP (ref 3.5–5.3)
SODIUM SERPL-SCNC: 139 MMOL/L — SIGNIFICANT CHANGE UP (ref 135–145)
SPECIMEN SOURCE: SIGNIFICANT CHANGE UP

## 2021-06-09 PROCEDURE — 99232 SBSQ HOSP IP/OBS MODERATE 35: CPT

## 2021-06-09 PROCEDURE — 99232 SBSQ HOSP IP/OBS MODERATE 35: CPT | Mod: GC

## 2021-06-09 PROCEDURE — 99222 1ST HOSP IP/OBS MODERATE 55: CPT

## 2021-06-09 RX ADMIN — Medication 1 APPLICATION(S): at 12:33

## 2021-06-09 RX ADMIN — LEVETIRACETAM 500 MILLIGRAM(S): 250 TABLET, FILM COATED ORAL at 05:46

## 2021-06-09 RX ADMIN — Medication 2: at 17:33

## 2021-06-09 RX ADMIN — LEVETIRACETAM 500 MILLIGRAM(S): 250 TABLET, FILM COATED ORAL at 17:32

## 2021-06-09 RX ADMIN — Medication 650 MILLIGRAM(S): at 06:30

## 2021-06-09 RX ADMIN — HEPARIN SODIUM 5000 UNIT(S): 5000 INJECTION INTRAVENOUS; SUBCUTANEOUS at 17:32

## 2021-06-09 RX ADMIN — HEPARIN SODIUM 5000 UNIT(S): 5000 INJECTION INTRAVENOUS; SUBCUTANEOUS at 05:46

## 2021-06-09 RX ADMIN — Medication 650 MILLIGRAM(S): at 17:55

## 2021-06-09 RX ADMIN — Medication 650 MILLIGRAM(S): at 17:32

## 2021-06-09 RX ADMIN — Medication 650 MILLIGRAM(S): at 05:46

## 2021-06-09 RX ADMIN — PANTOPRAZOLE SODIUM 40 MILLIGRAM(S): 20 TABLET, DELAYED RELEASE ORAL at 12:33

## 2021-06-09 RX ADMIN — SENNA PLUS 2 TABLET(S): 8.6 TABLET ORAL at 21:48

## 2021-06-09 RX ADMIN — Medication 2: at 12:35

## 2021-06-09 RX ADMIN — INSULIN GLARGINE 10 UNIT(S): 100 INJECTION, SOLUTION SUBCUTANEOUS at 08:12

## 2021-06-09 NOTE — CONSULT NOTE ADULT - SUBJECTIVE AND OBJECTIVE BOX
Hospitalist note:    Patient's brother in law armaan interpreted via phone     61yo M with T2DM and recently diagnosed COVID (3/19) BIBEMS to [facility] on 4/1 for hypoxic respiratory failure secondary to COVID PNA, requiring endotracheal intubation (4/1-4/13). Course significant for Septic shock on admission, treated with vancomycin and cefepime. Found to have ESBL Klebsiella PNA c/b empyema s/p VATs decortication. Treated with prolonged course of Meropenem while hospitalized and transitioned to Augmentin/Bactrim upon discharge (until 6/8). Will follow up with ID as outpatient.     Patient had acute ischemic CVA with areas of hemorrhagic transformation. Found to have multi-focal acute-to-subacute infarctions. 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 Subsequent scans showed hemorrhagic conversion. Transferred to [facility] for Neurosurgery evaluation, no surgical intervention at this time. Will require outpatient follow up.     Course complicated by RITO segmental PEs with e/o RH strain: Provoked in the setting of sepsis/high inflammatory state with COVID infection. LE Dopplers negative x 2. Initially anticoagulated with heparin gtt. Course further complicated by a right chest wall and right obturator hematomas. Due to concern for worsening hemorrhagic conversion of stroke and hematomas, the patient's therapeutic anticoagulation was discontinued. He was evaluated by the neurosurgical team, which said that there was no indication for acute intervention. Patient will not be anticoagulated upon discharge.   Patient's AMS likely in the setting of acute strokes, improved throughout hospitalization. Due to prolonged intubation with subsequent AMS and dysphagia, pt had PEG tube placed. Pt failed MBS following improvement of mental status.     Patient also had bedside debridement and woundcare of her BLE digits and will follow up with Podiatry as an outpatient.    Medically cleared for discharge to  [facility] 6/4.           evaluated in room 385    patient awake and alert    looks well    vitlas stable    abdomen-peg tube intact, soft, non distended, non tender  no ecchymoses, no hematomas    extrem-neurovascular intact bilaterally  no compartment syndrome    labs:    Complete Blood Count + Automated Diff in AM (06.08.21 @ 06:00)   WBC Count: 7.18 K/uL   RBC Count: 3.79 M/uL   Hemoglobin: 9.6 g/dL   Hematocrit: 31.3 %   Mean Cell Volume: 82.6 fl   Mean Cell Hemoglobin: 25.3 pg   Mean Cell Hemoglobin Conc: 30.7 gm/dL   Red Cell Distrib Width: 19.9 %   Platelet Count - Automated: 349 K/uL   Auto Neutrophil #: 3.89 K/uL   Auto Lymphocyte #: 1.76 K/uL   Auto Monocyte #: 0.86 K/uL   Auto Eosinophil #: 0.55 K/uL   Auto Basophil #: 0.06 K/uL   Auto Neutrophil %: 54.2: Differential percentages must be correlated with absolute numbers for   clinical significance. %   Basic Metabolic Panel in AM (06.09.21 @ 06:34)   Sodium, Serum: 139 mmol/L   Potassium, Serum: 5.2 mmol/L   Chloride, Serum: 106 mmol/L   Carbon Dioxide, Serum: 25 mmol/L   Anion Gap, Serum: 8 mmol/L   Blood Urea Nitrogen, Serum: 26 mg/dL   Creatinine, Serum: 1.38 mg/dL   Glucose, Serum: 106 mg/dL   Calcium, Total Serum: 9.9 mg/dL   eGFR if Non : 56: Interpretative comment   The units for eGFR are mL/min/1.73M2 (normalized body surface area). The   eGFR is calculated from a serum creatinine using the CKD-EPI equation.   Other variables required for calculation are race, age and sex. Among   patients with chronic kidney disease (CKD), the eGFR is useful in   determining the stage of disease according to KDOQI CKD classification.     < from: CT Chest No Cont (06.08.21 @ 12:09) >  MPRESSION: Small right hydropneumothorax is stable.  Right chest wall hematoma slightly decreased  Stable patchy subpleural opacities.      < end of copied text >

## 2021-06-09 NOTE — PROGRESS NOTE ADULT - ASSESSMENT
59yo male with PMH of DM who was initially admitted to [facility] ICU for hypoxic respiratory failure 2/2 COVID c/b PE with R heart strain, cardiogenic and septic shock, ischemic and hemorrhagic CVA and ESBL klebsiella ventilator associate PNA, transferred to [facility] for neurosurgery eval and further management. s/p PEG. Course c/b right empyema s/p VATs and obturator bleed (resolved). Now admitted for functional decline.     #Debility  #ESBL Klebsiella PNA c/b empyema  #s/p COVID pneumonia  - 5/12 underwent right VATS converted to open L thoracotomy, decortication, drainage of abscess and flex bronch.  - c/w meropenem 5/7 and transition to Bactrim DS 2 tabs TID + Augmentin 875mg po q12  upon DC to [facility] --- until 6/8 - Bactrim DC'ed 6/7 due to SUKI, augmentin to be completed 6/8  - ID following, recs appreciated - needs close follow up in ID clinic for repeat imaging, Dr. Chambers  - CT Chest 5/29 with improvement in bilateral opacities. Slight decrease in right chest wall hematoma. Stable loculated right hydropneumothorax.   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 Albarado with all chest tubes removed by 5/18  -CT Chest 5/16 Interval new small cavitary lesions in the right lower lobe and right chest wall hematoma - no thoracic surgery intervention indicated per Dr. Albarado.    #Obturator Hematoma (stable)  - bleeding hematoma in right thigh on CT on 5/7  - CT hip showing non active bleed, hematoma on right thigh on 5/8  - doppler to r/o DVT in LE, per IR no role IVC filter  - active again on 5/17, now stable  - was on heparin drip on and off, now off. C/w DVT ppx heparin bid    #Pulmonary embolus  - Pulmonary embolus noted on ct scan performed at time of admission  - Etiology of venous thromboembolism uncertain at this time; no known history of thrombophilia; provoked in setting of acute covid illness   - s/p heparin drip held due to recurrent bleed in right obturator and right chest wall hematoma   - 5000 heparin bid for dvt ppx    #CVA  -Acute bilateral ischemic strokes, with concern for hemorrhagic conversion, noted on CT scan on 4/8  -Etiology of acute ischemic stroke uncertain at this time; TTE with bubble performed at bedside without any evidence of right-to-left shunt; no arrhythmia noted on telemetry throughout stay in the MICU  -CTA head and neck without any evidence of carotid artery stenosis or dissection    #Encephalopathy due to multifactorial causes - improving  - Etiology likely multi-factorial in setting of acute ischemic infarctions of the brain in conjunction vs acute kidney injury with uremia (now resolving) vs resolving hypoxic respiratory failure, covid-19 infection, superimposed bacterial pneumonia, urinary retention, concern for nutritional deficiencies  - Reorientation with family at bedside (pt speaks [place], does best with family present)  - MRI Brain, and MRA Head demonstrated multiple evolving subacute infarcts with associated hemorrhages, likely expected evolution of ischemia  - As per neuro, EEG not suggestive of seizure-like activity  - C/w Keppra 500 mg BID-> neuro f/u outpatient   - repeat CT head 5/5 normal.     #Diabetes mellitus type II  - Type II diabetes mellitus noted; a1c noted to be greater than 10  - NPH 12 U BID and continue correction scale sq q6h - NPH discontinued  - As per endocrine recs: IF BG less than 100mg/dl can give half of NPH dose  - Follow up endo outpatient  - changed to Lantus 10 units QAM, moderate ISS to assess pre-meal need    #Hypertension.  - Was on metoprolol tartrate 25 mg twice daily since 4/16; discontinued  - BP is stable  - If is hypertensive persistently, can consider antihypertensives    #Dysphagia  - PEG tube placed on 4/30, tube feedings started on 4/30, + free water q8    #Peripheral vascular disease  -Dry gangrene noted over distal toes on bilateral lower extremities--most concerning for microvascular disease subsequent to vasopressor administration   -Dorsalis pedis pulses intact bilaterally  -ERASMO demonstrating right ERASMO: 1.09, left ERASMO: 1.24, right TBI: 0.78, and left TBI: 0.73.  -Podiatry recs appreciated, no acute surgical intervention at this time, applying betadine to wound  -As per ID non infectious appearing     #Urinary Retention - improved  - Mann removed 5/30, urinating appropriately  - c/w doxazosin    #GI/Bowel Mgmt   - Continent c/w Senna, Miralax PRN  - added PPI via PEG tube  - maalox  - zofran for nausea    #Precautions / PROPHYLAXIS:   - Falls, Cardiac, Sternal, Spinal, Seizure   - ortho: Weight bearing status: WBAT   - Lungs: Aspiration, Incentive Spirometer   - Pressure injury/Skin: OOB to Chair, PT/OT    - DVT: HSQ

## 2021-06-09 NOTE — PROGRESS NOTE ADULT - SUBJECTIVE AND OBJECTIVE BOX
No distress    VSS    s1s2  b/l air entry  soft, ND, + PEG  no edema                               9.6    7.18  )-----------( 349      ( 2021 06:00 )             31.3     2021 06:34    139    |  106    |  26     ----------------------------<  106    5.2     |  25     |  1.38     Ca    9.9        2021 06:34    Urinalysis Basic - ( 2021 06:00 )    Color: Yellow / Appearance: Clear / S.010 / pH: x  Gluc: x / Ketone: Negative  / Bili: Negative / Urobili: Negative   Blood: x / Protein: Negative / Nitrite: Negative   Leuk Esterase: Negative / RBC: 0-4 /HPF / WBC 0-2 /HPF   Sq Epi: x / Non Sq Epi: Neg.-Few / Bacteria: Few /HPF    acetaminophen   Tablet .. 650 milliGRAM(s) Oral every 6 hours  aluminum hydroxide/magnesium hydroxide/simethicone Suspension 30 milliLiter(s) Oral every 4 hours PRN  cadexomer iodine 0.9% Gel 1 Application(s) Topical daily  dextrose 40% Gel 15 Gram(s) Oral once  dextrose 5%. 1000 milliLiter(s) IV Continuous <Continuous>  dextrose 5%. 1000 milliLiter(s) IV Continuous <Continuous>  dextrose 50% Injectable 25 Gram(s) IV Push once  dextrose 50% Injectable 12.5 Gram(s) IV Push once  dextrose 50% Injectable 25 Gram(s) IV Push once  glucagon  Injectable 1 milliGRAM(s) IntraMuscular once  heparin   Injectable 5000 Unit(s) SubCutaneous every 12 hours  insulin glargine Injectable (LANTUS) 10 Unit(s) SubCutaneous every morning  insulin lispro (ADMELOG) corrective regimen sliding scale   SubCutaneous four times a day before meals  levETIRAcetam  Solution 500 milliGRAM(s) Oral two times a day  ondansetron   Disintegrating Tablet 4 milliGRAM(s) Oral every 6 hours PRN  pantoprazole   Suspension 40 milliGRAM(s) Oral daily  polyethylene glycol 3350 17 Gram(s) Oral daily  senna 2 Tablet(s) Oral at bedtime    RADIOLOGY:  Renal sonogram shows 10-11 cm kidneys with no hydronephrosis    acetaminophen   Tablet .. 650 milliGRAM(s) Oral every 6 hours  aluminum hydroxide/magnesium hydroxide/simethicone Suspension 30 milliLiter(s) Oral every 4 hours PRN  cadexomer iodine 0.9% Gel 1 Application(s) Topical daily  dextrose 40% Gel 15 Gram(s) Oral once  dextrose 5%. 1000 milliLiter(s) IV Continuous <Continuous>  dextrose 5%. 1000 milliLiter(s) IV Continuous <Continuous>  dextrose 50% Injectable 25 Gram(s) IV Push once  dextrose 50% Injectable 12.5 Gram(s) IV Push once  dextrose 50% Injectable 25 Gram(s) IV Push once  glucagon  Injectable 1 milliGRAM(s) IntraMuscular once  heparin   Injectable 5000 Unit(s) SubCutaneous every 12 hours  insulin glargine Injectable (LANTUS) 10 Unit(s) SubCutaneous every morning  insulin lispro (ADMELOG) corrective regimen sliding scale   SubCutaneous four times a day before meals  levETIRAcetam  Solution 500 milliGRAM(s) Oral two times a day  ondansetron   Disintegrating Tablet 4 milliGRAM(s) Oral every 6 hours PRN  pantoprazole   Suspension 40 milliGRAM(s) Oral daily  polyethylene glycol 3350 17 Gram(s) Oral daily  senna 2 Tablet(s) Oral at bedtime    ASSESSMENT/PLAN:    Transferred here for rehab on 21 after long complex hospital course including covid pneumonia, septic shock, empyema, stroke, PE, hematomas while on A/C, dysphagia/PEG and digital ischemic wounds. He had an SUKI in April with peak Cr 2.5 mg/dL. Over past week or so there has been a small, slow rise in Cr from 0.75 - 21 to 1.66 - 21.    SUKI due to bactrim   Bactrim d/c-d  Cr is improving  Avoid nephrotoxins  F/u BMP  Change feeds to Nepro to avoid rising K    [phone]

## 2021-06-09 NOTE — CHART NOTE - NSCHARTNOTEFT_GEN_A_CORE
Nutrition Follow Up Note  Hospital Course (Per Electronic Medical Record): 61yo male with PMH of DM who was initially admitted to [facility] ICU for hypoxic respiratory failure 2/2 COVID c/b PE with R heart strain, cardiogenic and septic shock, ischemic and hemorrhagic CVA and ESBL klebsiella ventilator associate PNA, transferred to [facility] for neurosurgery eval and further management. s/p PEG. Course c/b right empyema s/p VATs and obturator bleed (resolved). Now admitted for functional decline.   Source: Medical Record [X] Patient [X] Family [ ]         Diet: NPO with tube feeding    Enteral/Parenteral Nutrition:  TwoCal  ml QID via PEG (provides 2400 kcal, 100 g protein), given slowly via pump over 1 hour (@ 7 am, 12 pm, 4 pm, 8 pm)  Pt's vomiting has resolved, still has some nausea. Per discussion with NP, nephro would like formula changed to Nepro CarbSteady as pt's potassium was elevated. Recommend changing formula, but continue with slow infusion via pump.     Current Weight: 143 lbs (6/9)  144.4 lbs (6/8)  143.3lbs (6/6)  145.2 lbs (6/4)      Pertinent Medications: MEDICATIONS  (STANDING):  acetaminophen   Tablet .. 650 milliGRAM(s) Oral every 6 hours  cadexomer iodine 0.9% Gel 1 Application(s) Topical daily  dextrose 40% Gel 15 Gram(s) Oral once  dextrose 5%. 1000 milliLiter(s) (50 mL/Hr) IV Continuous <Continuous>  dextrose 5%. 1000 milliLiter(s) (100 mL/Hr) IV Continuous <Continuous>  dextrose 50% Injectable 25 Gram(s) IV Push once  dextrose 50% Injectable 12.5 Gram(s) IV Push once  dextrose 50% Injectable 25 Gram(s) IV Push once  glucagon  Injectable 1 milliGRAM(s) IntraMuscular once  heparin   Injectable 5000 Unit(s) SubCutaneous every 12 hours  insulin glargine Injectable (LANTUS) 10 Unit(s) SubCutaneous every morning  insulin lispro (ADMELOG) corrective regimen sliding scale   SubCutaneous four times a day before meals  levETIRAcetam  Solution 500 milliGRAM(s) Oral two times a day  pantoprazole   Suspension 40 milliGRAM(s) Oral daily  polyethylene glycol 3350 17 Gram(s) Oral daily  senna 2 Tablet(s) Oral at bedtime    MEDICATIONS  (PRN):  aluminum hydroxide/magnesium hydroxide/simethicone Suspension 30 milliLiter(s) Oral every 4 hours PRN Dyspepsia  ondansetron   Disintegrating Tablet 4 milliGRAM(s) Oral every 6 hours PRN Nausea and/or Vomiting      Pertinent Labs:  06-09 Na139 mmol/L Glu 106 mg/dL<H> K+ 5.2 mmol/L Cr  1.38 mg/dL<H> BUN 26 mg/dL<H> 06-07 Alb 2.6 g/dL<L>        Skin:  L foot 5th toe wound, surgical incision lateral side, incontinence associated dermatitis Per nursing flowsheets    Edema: No edema per nursing flow sheets     Last BM: on 6/8 Per nursing flowsheets     Estimated Needs:   [X] No Change since Previous Assessment  [ ] Recalculated:     Previous Nutrition Diagnosis:   Moderate malnutrition  Chewing/Swallowing difficulty    Nutrition Diagnosis is [X] Ongoing    [ ] Resolved   [ ] Not Applicable      New Nutrition Diagnosis: [X] Not Applicable  [ ] Inadequate Protein Energy Intake   [ ] Inadequate Oral Intake   [ ] Excessive Energy Intake   [ ] Increased Nutrient Needs   [ ] Obesity   [ ] Altered GI Function   [ ] Unintended Weight Loss   [ ] Food & Nutrition Related Knowledge Deficit  [ ] Limited Adherence to nutrition related recommendations   [ ] Malnutrition      Interventions:   1. Recommend changing TF to Nepro CarbSteady 300 ml QID (will provide 2160 kcal, 97 g protein, 872 ml water) which still meets estimated needs   2. Monitor tolerance of tube feeding  3. Monitor renal indices & other labs values  4. Continue to provide feeding at slow rate (300 ml over 1 hour) via pump     Monitoring & Evaluation:   [X] Weights   [X] PO Intake   [X] Follow Up (Per Protocol)  [X] Tolerance to Diet Prescription   [X] Other: Labs & POCT    RD Remains Available.  Alpa Burgos RD

## 2021-06-09 NOTE — PROGRESS NOTE ADULT - SUBJECTIVE AND OBJECTIVE BOX
Patient is a 60y old  Male who presents with a chief complaint of CVA (2021 13:56)      Patient seen and examined at bedside.  Denies chest pain, dyspnea, abd pain.    ALLERGIES:  No Known Allergies    MEDICATIONS  (STANDING):  acetaminophen   Tablet .. 650 milliGRAM(s) Oral every 6 hours  cadexomer iodine 0.9% Gel 1 Application(s) Topical daily  dextrose 40% Gel 15 Gram(s) Oral once  dextrose 5%. 1000 milliLiter(s) (50 mL/Hr) IV Continuous <Continuous>  dextrose 5%. 1000 milliLiter(s) (100 mL/Hr) IV Continuous <Continuous>  dextrose 50% Injectable 25 Gram(s) IV Push once  dextrose 50% Injectable 12.5 Gram(s) IV Push once  dextrose 50% Injectable 25 Gram(s) IV Push once  glucagon  Injectable 1 milliGRAM(s) IntraMuscular once  heparin   Injectable 5000 Unit(s) SubCutaneous every 12 hours  insulin glargine Injectable (LANTUS) 10 Unit(s) SubCutaneous every morning  insulin lispro (ADMELOG) corrective regimen sliding scale   SubCutaneous four times a day before meals  levETIRAcetam  Solution 500 milliGRAM(s) Oral two times a day  pantoprazole   Suspension 40 milliGRAM(s) Oral daily  polyethylene glycol 3350 17 Gram(s) Oral daily  senna 2 Tablet(s) Oral at bedtime    MEDICATIONS  (PRN):  aluminum hydroxide/magnesium hydroxide/simethicone Suspension 30 milliLiter(s) Oral every 4 hours PRN Dyspepsia  ondansetron   Disintegrating Tablet 4 milliGRAM(s) Oral every 6 hours PRN Nausea and/or Vomiting    Vital Signs Last 24 Hrs  T(F): 97.2 (2021 21:02), Max: 97.2 (2021 21:02)  HR: 79 (2021 21:02) (79 - 79)  BP: 103/67 (2021 21:02) (103/67 - 103/67)  RR: 16 (2021 21:02) (16 - 16)  SpO2: 99% (2021 21:02) (99% - 99%)  I&O's Summary    2021 07:01  -  2021 07:00  --------------------------------------------------------  IN: 1120 mL / OUT: 2051 mL / NET: -931 mL      BMI (kg/m2): 24.2 (21 @ 18:19)  PHYSICAL EXAM:  General: NAD, A/O x 3  ENT: MMM  Neck: Supple, No JVD  Lungs: Clear to auscultation bilaterally  Cardio: RRR, S1/S2, No murmurs  Abdomen: Soft, Nontender, Nondistended; Bowel sounds present  Extremities: No calf tenderness, No pitting edema    LABS:                        9.6    7.18  )-----------( 349      ( 2021 06:00 )             31.3           139  |  106  |  26  ----------------------------<  106  5.2   |  25  |  1.38    Ca    9.9      2021 06:34    TPro  7.9  /  Alb  2.6  /  TBili  0.3  /  DBili  x   /  AST  25  /  ALT  32  /  AlkPhos  93  06-07     eGFR if Non African American: 56 mL/min/1.73M2 (21 @ 06:34)  eGFR if African American: 65 mL/min/1.73M2 (21 @ 06:34)              Chol 114 mg/dL LDL -- HDL 35 mg/dL Trig 126 mg/dL              POCT Blood Glucose.: 136 mg/dL (2021 08:10)  POCT Blood Glucose.: 99 mg/dL (2021 21:26)  POCT Blood Glucose.: 110 mg/dL (2021 17:21)  POCT Blood Glucose.: 90 mg/dL (2021 12:26)      Urinalysis Basic - ( 2021 06:00 )    Color: Yellow / Appearance: Clear / S.010 / pH: x  Gluc: x / Ketone: Negative  / Bili: Negative / Urobili: Negative   Blood: x / Protein: Negative / Nitrite: Negative   Leuk Esterase: Negative / RBC: 0-4 /HPF / WBC 0-2 /HPF   Sq Epi: x / Non Sq Epi: Neg.-Few / Bacteria: Few /HPF        COVID-19 PCR: NotDetec (21 @ 08:50)  COVID-19 PCR: NotDetec (21 @ 07:10)  COVID-19 PCR: NotDetec (21 @ 07:21)  COVID-19 PCR: Detected (21 @ 05:34)  COVID-19 PCR: NotDetec (21 @ 10:33)      RADIOLOGY & ADDITIONAL TESTS:    Care Discussed with Consultants/Other Providers:

## 2021-06-09 NOTE — PROGRESS NOTE ADULT - SUBJECTIVE AND OBJECTIVE BOX
Subjective: 'I feel good', no nausea/vomiting. Night uneventful.       HISTORY OF PRESENT ILLNESS    61yo M with T2DM and recently diagnosed COVID (3/19) BIBEMS to [facility] on  for hypoxic respiratory failure secondary to COVID PNA, requiring endotracheal intubation (-). Course significant for Septic shock on admission, treated with vancomycin and cefepime. Found to have ESBL Klebsiella PNA c/b empyema s/p VATs decortication. Treated with prolonged course of Meropenem while hospitalized and transitioned to Augmentin/Bactrim upon discharge (until ). Will follow up with ID as outpatient.     Patient had acute ischemic CVA with areas of hemorrhagic transformation. Found to have multi-focal acute-to-subacute infarctions. 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 Subsequent scans showed hemorrhagic conversion. Transferred to [facility] for Neurosurgery evaluation, no surgical intervention at this time. Will require outpatient follow up.     Course complicated by RITO segmental PEs with e/o RH strain: Provoked in the setting of sepsis/high inflammatory state with COVID infection. LE Dopplers negative x 2. Initially anticoagulated with heparin gtt. Course further complicated by a right chest wall and right obturator hematomas. Due to concern for worsening hemorrhagic conversion of stroke and hematomas, the patient's therapeutic anticoagulation was discontinued. He was evaluated by the neurosurgical team, which said that there was no indication for acute intervention. Patient will not be anticoagulated upon discharge.   Patient's AMS likely in the setting of acute strokes, improved throughout hospitalization. Due to prolonged intubation with subsequent AMS and dysphagia, pt had PEG tube placed. Pt failed MBS following improvement of mental status.     Patient also had bedside debridement and woundcare of her BLE digits and will follow up with Podiatry as an outpatient.    REVIEW OF SYMPTOMS  [X] Constitutional WNL     [X] Cardio WNL            [X] Resp-empeyema           [X] GI NV                          [X]  WNL                   [X] Heme WNL              [X] Endo-dm2                    [X] Skin WNL                 [X] MSK WNL            [X] Neuro WNL                   [X] Cognitive WNL        [X] Psych WNL      VITALS  Vital Signs Last 24 Hrs  T(C): 36.2 (2021 21:02), Max: 36.2 (2021 21:02)  T(F): 97.2 (2021 21:02), Max: 97.2 (2021 21:02)  HR: 79 (2021 21:02) (79 - 79)  BP: 103/67 (2021 21:02) (103/67 - 103/67)  BP(mean): --  RR: 16 (2021 21:02) (16 - 16)  SpO2: 99% (2021 21:02) (99% - 99%)        PHYSICAL EXAM  Constitutional - NAD, Comfortable in bed  HEENT - NCAT, EOMI  Neck - Supple, No limited ROM  Chest - CTA bilaterally  Cardiovascular - RR  Abdomen - BS+, Soft, NTND, PEG in place  Extremities - No C/C/E, No calf tenderness   Skin-no rash  Wounds-per H&P      Neurologic Exam - Awake, Alert, AAO x3      No aphasia, impaired attention and concentration, follows simple commands     Cranial Nerves - dysphagia        Coordination/dysmetria - impaired             Balance - impaired     Psychiatric - Mood stable, Affect WNL        RECENT LABS                        9.6    7.18  )-----------( 349      ( 2021 06:00 )             31.3     06-09    139  |  106  |  26<H>  ----------------------------<  106<H>  5.2   |  25  |  1.38<H>    Ca    9.9      2021 06:34          Urinalysis Basic - ( 2021 06:00 )    Color: Yellow / Appearance: Clear / S.010 / pH: x  Gluc: x / Ketone: Negative  / Bili: Negative / Urobili: Negative   Blood: x / Protein: Negative / Nitrite: Negative   Leuk Esterase: Negative / RBC: 0-4 /HPF / WBC 0-2 /HPF   Sq Epi: x / Non Sq Epi: Neg.-Few / Bacteria: Few /HPF        RADIOLOGY/OTHER RESULTS      EXAM:  CT CHEST      PROCEDURE DATE:  2021        INTERPRETATION:  CLINICAL INFORMATION: Follow-up empyema    COMPARISON: 2021.    CONTRAST/COMPLICATIONS:  IV Contrast: NONE  Oral Contrast: NONE  Complications: None reported at time of study completion    PROCEDURE:  CT of the Chest was performed.  Sagittal and coronal reformats were performed.    FINDINGS:    LUNGS AND AIRWAYS: Patent central airways.  Lungs are remarkable for left apical scarring. There our patchy groundglass densities predominantly in a subpleural location similar to the prior study. There is linear atelectasis and/or scarring at the right lung base.  PLEURA: There is a small right hydrocele pneumothorax similar to the prior study. There are patchy areas of high density along the right pleura at the level of the hydropneumothorax which may represent material from pleurodesis.  MEDIASTINUM AND BESSIE: Calcified mediastinal and left hilar lymph nodes are seen.  VESSELS: There is minimal coronary artery calcification .  HEART: Heart size is normal. No pericardial effusion.  CHEST WALL AND LOWER NECK: The previously seen subcutaneous emphysema on the right has mostly resolved. Chest wall hematoma on the right is still present but smaller.  VISUALIZED UPPER ABDOMEN: Within normal limits.  BONES: Degenerative changes of the spine. There is a right sixth mildly displaced rib fracture again noted    IMPRESSION: Small right hydropneumothorax is stable.  Right chest wall hematoma slightly decreased  Stable patchy subpleural opacities.              ROSIE KEMP MD; Attending Radiologist  This document has been electronically signed. 2021  3:39PM  CURRENT MEDICATIONS  MEDICATIONS  (STANDING):  acetaminophen   Tablet .. 650 milliGRAM(s) Oral every 6 hours  cadexomer iodine 0.9% Gel 1 Application(s) Topical daily  dextrose 40% Gel 15 Gram(s) Oral once  dextrose 5%. 1000 milliLiter(s) (50 mL/Hr) IV Continuous <Continuous>  dextrose 5%. 1000 milliLiter(s) (100 mL/Hr) IV Continuous <Continuous>  dextrose 50% Injectable 25 Gram(s) IV Push once  dextrose 50% Injectable 12.5 Gram(s) IV Push once  dextrose 50% Injectable 25 Gram(s) IV Push once  glucagon  Injectable 1 milliGRAM(s) IntraMuscular once  heparin   Injectable 5000 Unit(s) SubCutaneous every 12 hours  insulin glargine Injectable (LANTUS) 10 Unit(s) SubCutaneous every morning  insulin lispro (ADMELOG) corrective regimen sliding scale   SubCutaneous four times a day before meals  levETIRAcetam  Solution 500 milliGRAM(s) Oral two times a day  pantoprazole   Suspension 40 milliGRAM(s) Oral daily  polyethylene glycol 3350 17 Gram(s) Oral daily  senna 2 Tablet(s) Oral at bedtime    MEDICATIONS  (PRN):  aluminum hydroxide/magnesium hydroxide/simethicone Suspension 30 milliLiter(s) Oral every 4 hours PRN Dyspepsia  ondansetron   Disintegrating Tablet 4 milliGRAM(s) Oral every 6 hours PRN Nausea and/or Vomiting      ASSESSMENT & PLAN    Continue comprehensive acute rehab program consisting of 3hrs/day of OT/PT and SLP.

## 2021-06-09 NOTE — CONSULT NOTE ADULT - ASSESSMENT
a/p      H/H stable    ct chest shows resolving hematoma    no contraindication to anticoagulation            thank you    dr deirdre ashley  cell#[phone]

## 2021-06-09 NOTE — PROGRESS NOTE ADULT - ASSESSMENT
61yo male with PMH of DM who was initially admitted to [facility] ICU for hypoxic respiratory failure 2/2 COVID c/b PE with R heart strain, cardiogenic and septic shock, ischemic and hemorrhagic CVA and ESBL klebsiella ventilator associate PNA, transferred to [facility] for neurosurgery eval and further management. s/p PEG. Course c/b right empyema s/p VATs and obturator bleed (resolved). Now admitted for functional decline.     #ESBL Klebsiella PNA c/b empyema   - 5/12 underwent right VATS converted to open L thoracotomy, decortication, drainage of abscess and flex bronch.  - antibiotics completed. Afebrile. ID in.   - ID follow up requested for elevated cr on Bactrim -bactrim on hold per renal. Consult to ID placed 6/7  - CT Chest follow up 6/8 with improvement.  - Gait Instability, ADL impairments and Functional impairments:  Comprehensive Rehab Program of PT/OT/SLP    #Empyema   -OR Vats 5/12 with Dr. Albarado with all chest tubes removed by 5/18  -CT Chest 6/8 improved. no thoracic surgery intervention indicated per Dr. Albarado.    #Obturator Hematoma   - bleeding hematoma in right thigh on CT on 5/7  - CT hip showing non active bleed, hematoma on right thigh on 5/8  - was on heparin drip on and off, now off AC.  - cleared for DVT ppx heparin bid.     #Pulmonary embolus  -Etiology of venous thromboembolism uncertain at this time; no known history of thrombophilia; ?provoked acute covid illness   -s/p heparin drip held due to recurrent bleed in right obturator and right chest wall hematoma   - 5000 heparin bid for dvt-cleared to continue.  - AC/Noac on hold as per DC instructions.   -Asymptomatic, tolerating therapy. VSS.   -Hospitalist in, will follow recommendations.    #CVA  -Acute bilateral ischemic strokes, hemorrhagic conversion, CTH on 4/8  - TTE with bubble, no right-to-left shunt; no arrhythmia noted on tele, ?ILR  -CTA L Pica occlusion. Evaluated by Neurology prior to rehab-NOAC recommended when possible-however on hold for hx recurrent chest/thigh hematomas  -Neurology follow up outpt     #Altered mental status  - improved, A&Ox3, participates with care, no seizures overnight  - MRI Brain, and MRA Head demonstrated multiple evolving subacute infarcts with associated hemorrhages,  -  EEG neg seizure  - C/w Keppra 500 mg BID-> neuro f/u outpatient   - repeat CT head 5/5 no acute changes    #SUKI  -renal in, improved off Bactrim  -workup pending-will follow rec's  -IVF completed 6/8.    #Urinary Retention   - Mann removed 5/30  -voiding freely, low PVRs  - hold doxazosin for low BPs    #Diabetes mellitus  - Type II diabetes mellitus noted; a1c>10  - NPH to Lantus and Admelog scale.    -hospitalist following  - Follow up endo outpatient       #Dysphagia  - PEG tube placed on 4/30  -nausea/vomiting resolved  -nutrition to adjust TF  -XR abd follow up -neg acute findings    #Peripheral vascular disease  -Dry gangrene noted over distal toes on bilateral lower extremities  -ERASMO demonstrating right ERASMO: 1.09, left ERASMO: 1.24, right TBI: 0.78, and left TBI: 0.73.  -Podiatry recs appreciated, betadine to wound    #Pain Mgmt   - Tylenol PRN, Oxycodone PRN    #GI/Bowel Mgmt   - Continent c/w Senna, Miralax PRN    #/Bladder Mgmt   - Continent, PVR low    #FEN   -PEG feeds     #Precautions / PROPHYLAXIS:   - Falls, Cardiac, Seizure   - ortho: Weight bearing status: WBAT   - Lungs: Aspiration, Incentive Spirometer   - Pressure injury/Skin: OOB to Chair, PT/OT    - DVT: HSQ

## 2021-06-10 LAB
ALBUMIN SERPL ELPH-MCNC: 2.9 G/DL — LOW (ref 3.3–5)
ALP SERPL-CCNC: 92 U/L — SIGNIFICANT CHANGE UP (ref 40–120)
ALT FLD-CCNC: 27 U/L — SIGNIFICANT CHANGE UP (ref 10–45)
ANION GAP SERPL CALC-SCNC: 9 MMOL/L — SIGNIFICANT CHANGE UP (ref 5–17)
AST SERPL-CCNC: 21 U/L — SIGNIFICANT CHANGE UP (ref 10–40)
BASOPHILS # BLD AUTO: 0.12 K/UL — SIGNIFICANT CHANGE UP (ref 0–0.2)
BASOPHILS NFR BLD AUTO: 1.5 % — SIGNIFICANT CHANGE UP (ref 0–2)
BILIRUB SERPL-MCNC: 0.4 MG/DL — SIGNIFICANT CHANGE UP (ref 0.2–1.2)
BUN SERPL-MCNC: 28 MG/DL — HIGH (ref 7–23)
CALCIUM SERPL-MCNC: 10.1 MG/DL — SIGNIFICANT CHANGE UP (ref 8.4–10.5)
CHLORIDE SERPL-SCNC: 104 MMOL/L — SIGNIFICANT CHANGE UP (ref 96–108)
CO2 SERPL-SCNC: 26 MMOL/L — SIGNIFICANT CHANGE UP (ref 22–31)
CREAT SERPL-MCNC: 1.38 MG/DL — HIGH (ref 0.5–1.3)
EOSINOPHIL # BLD AUTO: 0.95 K/UL — HIGH (ref 0–0.5)
EOSINOPHIL NFR BLD AUTO: 11.9 % — HIGH (ref 0–6)
GLUCOSE BLDC GLUCOMTR-MCNC: 127 MG/DL — HIGH (ref 70–99)
GLUCOSE BLDC GLUCOMTR-MCNC: 135 MG/DL — HIGH (ref 70–99)
GLUCOSE BLDC GLUCOMTR-MCNC: 139 MG/DL — HIGH (ref 70–99)
GLUCOSE BLDC GLUCOMTR-MCNC: 204 MG/DL — HIGH (ref 70–99)
GLUCOSE SERPL-MCNC: 116 MG/DL — HIGH (ref 70–99)
HCT VFR BLD CALC: 35.8 % — LOW (ref 39–50)
HGB BLD-MCNC: 11 G/DL — LOW (ref 13–17)
IMM GRANULOCYTES NFR BLD AUTO: 1 % — SIGNIFICANT CHANGE UP (ref 0–1.5)
LYMPHOCYTES # BLD AUTO: 2.02 K/UL — SIGNIFICANT CHANGE UP (ref 1–3.3)
LYMPHOCYTES # BLD AUTO: 25.4 % — SIGNIFICANT CHANGE UP (ref 13–44)
MCHC RBC-ENTMCNC: 25.8 PG — LOW (ref 27–34)
MCHC RBC-ENTMCNC: 30.7 GM/DL — LOW (ref 32–36)
MCV RBC AUTO: 84 FL — SIGNIFICANT CHANGE UP (ref 80–100)
MONOCYTES # BLD AUTO: 0.89 K/UL — SIGNIFICANT CHANGE UP (ref 0–0.9)
MONOCYTES NFR BLD AUTO: 11.2 % — SIGNIFICANT CHANGE UP (ref 2–14)
NEUTROPHILS # BLD AUTO: 3.89 K/UL — SIGNIFICANT CHANGE UP (ref 1.8–7.4)
NEUTROPHILS NFR BLD AUTO: 49 % — SIGNIFICANT CHANGE UP (ref 43–77)
NRBC # BLD: 0 /100 WBCS — SIGNIFICANT CHANGE UP (ref 0–0)
PLATELET # BLD AUTO: 388 K/UL — SIGNIFICANT CHANGE UP (ref 150–400)
POTASSIUM SERPL-MCNC: 4.9 MMOL/L — SIGNIFICANT CHANGE UP (ref 3.5–5.3)
POTASSIUM SERPL-SCNC: 4.9 MMOL/L — SIGNIFICANT CHANGE UP (ref 3.5–5.3)
PROT SERPL-MCNC: 7.9 G/DL — SIGNIFICANT CHANGE UP (ref 6–8.3)
RBC # BLD: 4.26 M/UL — SIGNIFICANT CHANGE UP (ref 4.2–5.8)
RBC # FLD: 19.8 % — HIGH (ref 10.3–14.5)
SODIUM SERPL-SCNC: 139 MMOL/L — SIGNIFICANT CHANGE UP (ref 135–145)
WBC # BLD: 7.95 K/UL — SIGNIFICANT CHANGE UP (ref 3.8–10.5)
WBC # FLD AUTO: 7.95 K/UL — SIGNIFICANT CHANGE UP (ref 3.8–10.5)

## 2021-06-10 PROCEDURE — 99232 SBSQ HOSP IP/OBS MODERATE 35: CPT | Mod: GC

## 2021-06-10 PROCEDURE — 99233 SBSQ HOSP IP/OBS HIGH 50: CPT

## 2021-06-10 PROCEDURE — 70450 CT HEAD/BRAIN W/O DYE: CPT | Mod: 26

## 2021-06-10 PROCEDURE — 99232 SBSQ HOSP IP/OBS MODERATE 35: CPT

## 2021-06-10 RX ADMIN — POLYETHYLENE GLYCOL 3350 17 GRAM(S): 17 POWDER, FOR SOLUTION ORAL at 12:15

## 2021-06-10 RX ADMIN — Medication 650 MILLIGRAM(S): at 18:10

## 2021-06-10 RX ADMIN — Medication 650 MILLIGRAM(S): at 13:01

## 2021-06-10 RX ADMIN — HEPARIN SODIUM 5000 UNIT(S): 5000 INJECTION INTRAVENOUS; SUBCUTANEOUS at 17:13

## 2021-06-10 RX ADMIN — SENNA PLUS 2 TABLET(S): 8.6 TABLET ORAL at 22:49

## 2021-06-10 RX ADMIN — PANTOPRAZOLE SODIUM 40 MILLIGRAM(S): 20 TABLET, DELAYED RELEASE ORAL at 12:15

## 2021-06-10 RX ADMIN — Medication 1 APPLICATION(S): at 12:15

## 2021-06-10 RX ADMIN — Medication 650 MILLIGRAM(S): at 17:13

## 2021-06-10 RX ADMIN — Medication 4: at 12:14

## 2021-06-10 RX ADMIN — LEVETIRACETAM 500 MILLIGRAM(S): 250 TABLET, FILM COATED ORAL at 17:13

## 2021-06-10 RX ADMIN — HEPARIN SODIUM 5000 UNIT(S): 5000 INJECTION INTRAVENOUS; SUBCUTANEOUS at 06:12

## 2021-06-10 RX ADMIN — Medication 650 MILLIGRAM(S): at 12:15

## 2021-06-10 RX ADMIN — Medication 650 MILLIGRAM(S): at 06:57

## 2021-06-10 RX ADMIN — Medication 650 MILLIGRAM(S): at 06:12

## 2021-06-10 RX ADMIN — INSULIN GLARGINE 10 UNIT(S): 100 INJECTION, SOLUTION SUBCUTANEOUS at 08:34

## 2021-06-10 RX ADMIN — LEVETIRACETAM 500 MILLIGRAM(S): 250 TABLET, FILM COATED ORAL at 06:12

## 2021-06-10 NOTE — CONSULT NOTE ADULT - ASSESSMENT
Patient is a 60 year old man admitted to [facility] on 6/[facility]21. He was transferred from [facility] after an initial admission to [facility] with respiratory distress. He had been diagnosed with Covid 19 in March, 2021. He was intubated. Hospital course complicated by PE, Empyema, septic shock, right obturator hematoma, and multiple acute cerebral and cerebellar infarcts with areas of hemorrhagic conversion. He denies HA or other complaints. Neurological exam as above.      1) CT Head as above multiple infarcts. Left parietal cortical infarct subacute and new from prior CT 5/5. Left cerebellar infarct normally evolved. Left temporal , occipital, and right occipital infarcts unchanged. No hemorrhage.  2) F/U CT Head 6/14/21 no contrast.

## 2021-06-10 NOTE — PROGRESS NOTE ADULT - SUBJECTIVE AND OBJECTIVE BOX
Patient is a 60y old  Male who presents with a chief complaint of CVA (2021 21:33)      Patient seen and examined at bedside.  Denies chest pain, dyspnea, abd pain.    ALLERGIES:  No Known Allergies    MEDICATIONS  (STANDING):  acetaminophen   Tablet .. 650 milliGRAM(s) Oral every 6 hours  cadexomer iodine 0.9% Gel 1 Application(s) Topical daily  dextrose 40% Gel 15 Gram(s) Oral once  dextrose 5%. 1000 milliLiter(s) (50 mL/Hr) IV Continuous <Continuous>  dextrose 5%. 1000 milliLiter(s) (100 mL/Hr) IV Continuous <Continuous>  dextrose 50% Injectable 25 Gram(s) IV Push once  dextrose 50% Injectable 12.5 Gram(s) IV Push once  dextrose 50% Injectable 25 Gram(s) IV Push once  glucagon  Injectable 1 milliGRAM(s) IntraMuscular once  heparin   Injectable 5000 Unit(s) SubCutaneous every 12 hours  insulin glargine Injectable (LANTUS) 10 Unit(s) SubCutaneous every morning  insulin lispro (ADMELOG) corrective regimen sliding scale   SubCutaneous four times a day before meals  levETIRAcetam  Solution 500 milliGRAM(s) Oral two times a day  pantoprazole   Suspension 40 milliGRAM(s) Oral daily  polyethylene glycol 3350 17 Gram(s) Oral daily  senna 2 Tablet(s) Oral at bedtime    MEDICATIONS  (PRN):  aluminum hydroxide/magnesium hydroxide/simethicone Suspension 30 milliLiter(s) Oral every 4 hours PRN Dyspepsia  ondansetron   Disintegrating Tablet 4 milliGRAM(s) Oral every 6 hours PRN Nausea and/or Vomiting    Vital Signs Last 24 Hrs  T(F): 97.1 (10 Jim 2021 07:44), Max: 97.1 (10 Jim 2021 07:44)  HR: 87 (10 Jim 2021 07:44) (87 - 91)  BP: 98/65 (10 Jim 2021 07:44) (98/65 - 114/77)  RR: 16 (10 Jim 2021 07:44) (16 - 16)  SpO2: 99% (10 Jim 2021 07:44) (97% - 99%)  I&O's Summary    2021 07:01  -  10 Jim 2021 07:00  --------------------------------------------------------  IN: 1800 mL / OUT: 1050 mL / NET: 750 mL    10 Jim 2021 07:01  -  10 Jim 2021 09:36  --------------------------------------------------------  IN: 237 mL / OUT: 0 mL / NET: 237 mL        PHYSICAL EXAM:  General: NAD, A/O x 3  ENT: MMM  Neck: Supple, No JVD  Lungs: Clear to auscultation bilaterally  Cardio: RRR, S1/S2, No murmurs  Abdomen: Soft, Nontender, Nondistended; Bowel sounds present  Extremities: No calf tenderness, No pitting edema    LABS:                        11.0   7.95  )-----------( 388      ( 10 Jim 2021 07:25 )             35.8       06-10    139  |  104  |  28  ----------------------------<  116  4.9   |  26  |  1.38    Ca    10.1      10 Jim 2021 07:25    TPro  7.9  /  Alb  2.9  /  TBili  0.4  /  DBili  x   /  AST  21  /  ALT  27  /  AlkPhos  92  06-10     eGFR if Non African American: 56 mL/min/1.73M2 (06-10-21 @ 07:25)  eGFR if : 65 mL/min/1.73M2 (06-10-21 @ 07:25)             04- Chol 114 mg/dL LDL -- HDL 35 mg/dL Trig 126 mg/dL              POCT Blood Glucose.: 135 mg/dL (10 Jim 2021 07:41)  POCT Blood Glucose.: 147 mg/dL (2021 21:40)  POCT Blood Glucose.: 193 mg/dL (2021 17:31)  POCT Blood Glucose.: 187 mg/dL (2021 12:35)      Urinalysis Basic - ( 2021 06:00 )    Color: Yellow / Appearance: Clear / S.010 / pH: x  Gluc: x / Ketone: Negative  / Bili: Negative / Urobili: Negative   Blood: x / Protein: Negative / Nitrite: Negative   Leuk Esterase: Negative / RBC: 0-4 /HPF / WBC 0-2 /HPF   Sq Epi: x / Non Sq Epi: Neg.-Few / Bacteria: Few /HPF        COVID-19 PCR: NotDetec (21 @ 08:50)  COVID-19 PCR: NotDetec (21 @ 07:10)  COVID-19 PCR: NotDetec (21 @ 07:21)  COVID-19 PCR: Detected (21 @ 05:34)  COVID-19 PCR: NotDetec (21 @ 10:33)      RADIOLOGY & ADDITIONAL TESTS:    Care Discussed with Consultants/Other Providers:

## 2021-06-10 NOTE — PROGRESS NOTE ADULT - ASSESSMENT
59yo male with PMH of DM who was initially admitted to [facility] ICU for hypoxic respiratory failure 2/2 COVID c/b PE with R heart strain, cardiogenic and septic shock, ischemic and hemorrhagic CVA and ESBL klebsiella ventilator associate PNA, transferred to [facility] for neurosurgery eval and further management. s/p PEG. Course c/b right empyema s/p VATs and obturator bleed (resolved). Now admitted for functional decline.     #ESBL Klebsiella PNA c/b empyema   - 5/12 underwent right VATS converted to open L thoracotomy, decortication, drainage of abscess and flex bronch.  - antibiotics completed. Afebrile. ID in. bactrim on hold per renal.   - CT Chest follow up 6/8 with improvement.  - Gait Instability, ADL impairments and Functional impairments:  Comprehensive Rehab Program of PT/OT/SLP    #Empyema   -OR Vats 5/12 with Dr. Albarado with all chest tubes removed by 5/18  -CT Chest 6/8 improved. no thoracic surgery intervention indicated per Dr. Albarado.    #Obturator Hematoma   - bleeding hematoma in right thigh on CT on 5/7  - CT hip showing non active bleed, hematoma on right thigh on 5/8  - was on heparin drip on and off, now off AC.  - cleared for DVT ppx heparin bid.   -Evaluation by surgery at [facility]. No contraindication to AC as of 6/9 per Dr Cee. case discussed. Input appreciated.    #Pulmonary embolus  -Etiology of venous thromboembolism uncertain at this time; ?provoked acute covid illness   -s/p heparin drip held due to recurrent bleed in right obturator and right chest wall hematoma, now improved  - 5000 heparin bid for dvt-cleared to continue.  - AC/Noac on hold as per DC instructions. HH stable.  -Asymptomatic, tolerating therapy. VSS.   -Surgery in, will follow recommendations.    #CVA  -Acute bilateral ischemic strokes, hemorrhagic conversion, CTH follow up today and neurology requested for input re/AC therapy timing  - TTE with bubble, no right-to-left shunt; no arrhythmia noted on tele, ?ILR  -CTA L Pica occlusion. Evaluated by Neurology prior to rehab-NOAC recommended when possible-however on hold for hx recurrent chest/thigh hematomas  -Neurology follow up requested. Consult placed.    #Altered mental status  - improved, A&Ox3, participates with care, no seizures overnight  - MRI Brain, and MRA Head demonstrated multiple evolving subacute infarcts with associated hemorrhages,  -  EEG neg seizure  - C/w Keppra 500 mg BID-> neuro f/u outpatient   - repeat CT head 5/5 no acute changes    #SUKI  -renal in, now off Bactrim day 3  -workup pending-will follow rec's  -IVF completed.  -Nepro for hyperkalemia    #Urinary Retention   - Mann removed 5/30  -voiding freely, low PVRs  - hold doxazosin for low BPs    #Diabetes mellitus  - Type II diabetes mellitus noted; a1c>10  - NPH to Lantus and Admelog scale.    -hospitalist following  - Follow up endo outpatient       #Dysphagia  - PEG tube placed on 4/30  -nausea/vomiting resolved  -nutrition to adjust TF  -XR abd follow up -neg acute findings      #Peripheral vascular disease  -Dry gangrene noted over distal toes on bilateral lower extremities  -ERASMO demonstrating right ERASMO: 1.09, left ERASMO: 1.24, right TBI: 0.78, and left TBI: 0.73.  -Podiatry recs appreciated, betadine to wound    #Pain Mgmt   - Tylenol PRN, Oxycodone PRN    #GI/Bowel Mgmt   - Continent c/w Senna, Miralax PRN    #/Bladder Mgmt   - Continent, PVR low    #FEN   -PEG feeds     #Precautions / PROPHYLAXIS:   - Falls, Cardiac, Seizure   - ortho: Weight bearing status: WBAT   - Lungs: Aspiration, Incentive Spirometer   - Pressure injury/Skin: OOB to Chair, PT/OT    - DVT: HSQ

## 2021-06-10 NOTE — PROGRESS NOTE ADULT - SUBJECTIVE AND OBJECTIVE BOX
Subjective: NAD, night uneventful, 'I feel good'. No nausea/no vomiting. Working with therapy.      HISTORY OF PRESENT ILLNESS    59yo M with T2DM and recently diagnosed COVID (3/19) BIBEMS to [facility] on 4/1 for hypoxic respiratory failure secondary to COVID PNA, requiring endotracheal intubation (4/1-4/13). Course significant for Septic shock on admission, treated with vancomycin and cefepime. Found to have ESBL Klebsiella PNA c/b empyema s/p VATs decortication. Treated with prolonged course of Meropenem while hospitalized and transitioned to Augmentin/Bactrim upon discharge (until 6/8). Will follow up with ID as outpatient.     Patient had acute ischemic CVA with areas of hemorrhagic transformation. Found to have multi-focal acute-to-subacute infarctions. 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 Subsequent scans showed hemorrhagic conversion. Transferred to [facility] for Neurosurgery evaluation, no surgical intervention at this time. Will require outpatient follow up.     Course complicated by RITO segmental PEs with e/o RH strain: Provoked in the setting of sepsis/high inflammatory state with COVID infection. LE Dopplers negative x 2. Initially anticoagulated with heparin gtt. Course further complicated by a right chest wall and right obturator hematomas. Due to concern for worsening hemorrhagic conversion of stroke and hematomas, the patient's therapeutic anticoagulation was discontinued. He was evaluated by the neurosurgical team, which said that there was no indication for acute intervention. Patient will not be anticoagulated upon discharge.   Patient's AMS likely in the setting of acute strokes, improved throughout hospitalization. Due to prolonged intubation with subsequent AMS and dysphagia, pt had PEG tube placed. Pt failed MBS following improvement of mental status.     Patient also had bedside debridement and woundcare of her BLE digits and will follow up with Podiatry as an outpatient.    Medically cleared for discharge to  [facility] 6/4.       REVIEW OF SYMPTOMS  [X] Constitutional WNL     [X] Cardio WNL            [X] Resp-empeyema           [X] GI NV                          [X] -ulises                   [X] Heme-hematoma              [X] Endo-dm2                    [X] Skin WNL                 [X] MSK WNL            [X] Neuro-cva                  [X] Cognitive WNL        [X] Psych WNL        VITALS  Vital Signs Last 24 Hrs  T(C): 36.2 (10 Jim 2021 07:44), Max: 36.2 (10 Jim 2021 07:44)  T(F): 97.1 (10 Jim 2021 07:44), Max: 97.1 (10 Jim 2021 07:44)  HR: 87 (10 Jim 2021 07:44) (87 - 91)  BP: 98/65 (10 Jim 2021 07:44) (98/65 - 114/77)  BP(mean): --  RR: 16 (10 Jim 2021 07:44) (16 - 16)  SpO2: 99% (10 Jim 2021 07:44) (97% - 99%)      PHYSICAL EXAM  Constitutional - NAD, Comfortable  HEENT - NCAT, EOMI  Neck - Supple, No limited ROM  Chest - CTA bilaterally  Cardiovascular - RR  Abdomen - BS+, Soft, NTND, PEG in place  Extremities - No C/C/E, No calf tenderness   Skin-no rash  Wounds-per H&P      Neurologic Exam - Awake, Alert, AAO x3      No aphasia, impaired attention and concentration, follows simple commands     Cranial Nerves - dysphagia        Coordination/dysmetria - impaired             Balance - impaired     Psychiatric - Mood stable, Affect WNL        RECENT LABS                        11.0   7.95  )-----------( 388      ( 10 Jim 2021 07:25 )             35.8     06-10    139  |  104  |  28<H>  ----------------------------<  116<H>  4.9   |  26  |  1.38<H>    Ca    10.1      10 Jim 2021 07:25    TPro  7.9  /  Alb  2.9<L>  /  TBili  0.4  /  DBili  x   /  AST  21  /  ALT  27  /  AlkPhos  92  06-10      LIVER FUNCTIONS - ( 10 Jim 2021 07:25 )  Alb: 2.9 g/dL / Pro: 7.9 g/dL / ALK PHOS: 92 U/L / ALT: 27 U/L / AST: 21 U/L / GGT: x               RADIOLOGY/OTHER RESULTS      EXAM:  CT CHEST      PROCEDURE DATE:  06/08/2021        INTERPRETATION:  CLINICAL INFORMATION: Follow-up empyema    COMPARISON: 5/29/2021.    CONTRAST/COMPLICATIONS:  IV Contrast: NONE  Oral Contrast: NONE  Complications: None reported at time of study completion    PROCEDURE:  CT of the Chest was performed.  Sagittal and coronal reformats were performed.    FINDINGS:    LUNGS AND AIRWAYS: Patent central airways.  Lungs are remarkable for left apical scarring. There our patchy groundglass densities predominantly in a subpleural location similar to the prior study. There is linear atelectasis and/or scarring at the right lung base.  PLEURA: There is a small right hydrocele pneumothorax similar to the prior study. There are patchy areas of high density along the right pleura at the level of the hydropneumothorax which may represent material from pleurodesis.  MEDIASTINUM AND BESSIE: Calcified mediastinal and left hilar lymph nodes are seen.  VESSELS: There is minimal coronary artery calcification .  HEART: Heart size is normal. No pericardial effusion.  CHEST WALL AND LOWER NECK: The previously seen subcutaneous emphysema on the right has mostly resolved. Chest wall hematoma on the right is still present but smaller.  VISUALIZED UPPER ABDOMEN: Within normal limits.  BONES: Degenerative changes of the spine. There is a right sixth mildly displaced rib fracture again noted    IMPRESSION: Small right hydropneumothorax is stable.  Right chest wall hematoma slightly decreased  Stable patchy subpleural opacities.              ROSIE KEMP MD; Attending Radiologist  This document has been electronically signed. Jun 8 2021  3:39PM        CURRENT MEDICATIONS  MEDICATIONS  (STANDING):  acetaminophen   Tablet .. 650 milliGRAM(s) Oral every 6 hours  cadexomer iodine 0.9% Gel 1 Application(s) Topical daily  dextrose 40% Gel 15 Gram(s) Oral once  dextrose 5%. 1000 milliLiter(s) (50 mL/Hr) IV Continuous <Continuous>  dextrose 5%. 1000 milliLiter(s) (100 mL/Hr) IV Continuous <Continuous>  dextrose 50% Injectable 25 Gram(s) IV Push once  dextrose 50% Injectable 12.5 Gram(s) IV Push once  dextrose 50% Injectable 25 Gram(s) IV Push once  glucagon  Injectable 1 milliGRAM(s) IntraMuscular once  heparin   Injectable 5000 Unit(s) SubCutaneous every 12 hours  insulin glargine Injectable (LANTUS) 10 Unit(s) SubCutaneous every morning  insulin lispro (ADMELOG) corrective regimen sliding scale   SubCutaneous four times a day before meals  levETIRAcetam  Solution 500 milliGRAM(s) Oral two times a day  pantoprazole   Suspension 40 milliGRAM(s) Oral daily  polyethylene glycol 3350 17 Gram(s) Oral daily  senna 2 Tablet(s) Oral at bedtime    MEDICATIONS  (PRN):  aluminum hydroxide/magnesium hydroxide/simethicone Suspension 30 milliLiter(s) Oral every 4 hours PRN Dyspepsia  ondansetron   Disintegrating Tablet 4 milliGRAM(s) Oral every 6 hours PRN Nausea and/or Vomiting      ASSESSMENT & PLAN    Continue comprehensive acute rehab program consisting of 3hrs/day of OT/PT and SLP.

## 2021-06-10 NOTE — CHART NOTE - NSCHARTNOTEFT_GEN_A_CORE
Brief Nutrition Note: verbal consult received from RN- pt c/o fullness from tube feeding. Only receptive to 1 can at each feeding. Recommend change TF to Nepro (per Nephro request due to high K+) @ 240ml 4x/day (over 1 hr) via PEG which will provide 1,728kcals, 78g protein, 701ml H2O; 26kcals/kg, 1.2g protein/kg current weight which is adequate for weight maintenance. RD to remain available/follow up per protocol. Barby Garcia RDN Pager #[phone].

## 2021-06-10 NOTE — PROGRESS NOTE ADULT - ASSESSMENT
61yo male with PMH of DM who was initially admitted to [facility] ICU for hypoxic respiratory failure 2/2 COVID c/b PE with R heart strain, cardiogenic and septic shock, ischemic and hemorrhagic CVA and ESBL klebsiella ventilator associate PNA, transferred to [facility] for neurosurgery eval and further management. s/p PEG. Course c/b right empyema s/p VATs and obturator bleed (resolved). Now admitted for functional decline.     #Debility  #ESBL Klebsiella PNA c/b empyema  #s/p COVID pneumonia  - 5/12 underwent right VATS converted to open L thoracotomy, decortication, drainage of abscess and flex bronch.  - c/w meropenem 5/7 and transition to Bactrim DS 2 tabs TID + Augmentin 875mg po q12  upon DC to [facility] --- Bactrim DC'ed 6/7 due to SUKI, augmentin completed 6/8  - ID following, recs appreciated - needs close follow up in ID clinic for repeat imaging, Dr. Chambers  - CT Chest 5/29 with improvement in bilateral opacities. Slight decrease in right chest wall hematoma. Stable loculated right hydropneumothorax.   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 Albarado with all chest tubes removed by 5/18  -CT Chest 5/16 Interval new small cavitary lesions in the right lower lobe and right chest wall hematoma - no thoracic surgery intervention indicated per Dr. Albarado.    #Obturator Hematoma (stable)  - bleeding hematoma in right thigh on CT on 5/7  - CT hip showing non active bleed, hematoma on right thigh on 5/8  - doppler to r/o DVT in LE, per IR no role IVC filter  - active again on 5/17, now stable  - was on heparin drip on and off, now off. C/w DVT ppx heparin bid    #Pulmonary embolus  - Pulmonary embolus noted on ct scan performed at time of admission  - Etiology of venous thromboembolism uncertain at this time; no known history of thrombophilia; provoked in setting of acute covid illness   - s/p heparin drip held due to recurrent bleed in right obturator and right chest wall hematoma   - 5000 heparin bid for dvt ppx  - neurology clearance for AC as pt recently had hemorrhagic conversion of ischemic CVA    #CVA  -Acute bilateral ischemic strokes, with concern for hemorrhagic conversion, noted on CT scan on 4/8  -Etiology of acute ischemic stroke uncertain at this time; TTE with bubble performed at bedside without any evidence of right-to-left shunt; no arrhythmia noted on telemetry throughout stay in the MICU  -CTA head and neck without any evidence of carotid artery stenosis or dissection    #Encephalopathy due to multifactorial causes - improving  - Etiology likely multi-factorial in setting of acute ischemic infarctions of the brain in conjunction vs acute kidney injury with uremia (now resolving) vs resolving hypoxic respiratory failure, covid-19 infection, superimposed bacterial pneumonia, urinary retention, concern for nutritional deficiencies  - Reorientation with family at bedside (pt speaks Gustevenrati, does best with family present)  - MRI Brain, and MRA Head demonstrated multiple evolving subacute infarcts with associated hemorrhages, likely expected evolution of ischemia  - As per neuro, EEG not suggestive of seizure-like activity  - C/w Keppra 500 mg BID-> neuro f/u outpatient   - repeat CT head 5/5 normal.     #Diabetes mellitus type II  - Type II diabetes mellitus noted; a1c noted to be greater than 10  - NPH 12 U BID and continue correction scale sq q6h - NPH discontinued  - As per endocrine recs: IF BG less than 100mg/dl can give half of NPH dose  - Follow up endo outpatient  - changed to Lantus 10 units QAM, moderate ISS to assess pre-meal need    #Hypertension.  - Was on metoprolol tartrate 25 mg twice daily since 4/16; discontinued  - BP is stable  - If is hypertensive persistently, can consider antihypertensives    #Dysphagia  - PEG tube placed on 4/30, tube feedings started on 4/30, + free water q8    #Peripheral vascular disease  -Dry gangrene noted over distal toes on bilateral lower extremities--most concerning for microvascular disease subsequent to vasopressor administration   -Dorsalis pedis pulses intact bilaterally  -ERASMO demonstrating right ERASMO: 1.09, left ERASMO: 1.24, right TBI: 0.78, and left TBI: 0.73.  -Podiatry recs appreciated, no acute surgical intervention at this time, applying betadine to wound  -As per ID non infectious appearing     #Urinary Retention - improved  - Mann removed 5/30, urinating appropriately  - c/w doxazosin    #GI/Bowel Mgmt   - Continent c/w Senna, Miralax PRN  - added PPI via PEG tube  - maalox  - zofran for nausea    #Precautions / PROPHYLAXIS:   - Falls, Cardiac, Sternal, Spinal, Seizure   - ortho: Weight bearing status: WBAT   - Lungs: Aspiration, Incentive Spirometer   - Pressure injury/Skin: OOB to Chair, PT/OT    - DVT: HSQ

## 2021-06-10 NOTE — PROGRESS NOTE ADULT - SUBJECTIVE AND OBJECTIVE BOX
No distress    Vital Signs Last 24 Hrs  T(C): 36.2 (06-10-21 @ 07:44), Max: 36.2 (06-10-21 @ 07:44)  T(F): 97.1 (06-10-21 @ 07:44), Max: 97.1 (06-10-21 @ 07:44)  HR: 87 (06-10-21 @ 07:44) (87 - 91)  BP: 98/65 (06-10-21 @ 07:44) (98/65 - 114/77)  RR: 16 (06-10-21 @ 07:44) (16 - 16)  SpO2: 99% (06-10-21 @ 07:44) (97% - 99%)    s1s2  b/l air entry  soft, ND, + PEG  no edema                                       11.0   7.95  )-----------( 388      ( 10 Jim 2021 07:25 )             35.8     10 Jim 2021 07:25    139    |  104    |  28     ----------------------------<  116    4.9     |  26     |  1.38     Ca    10.1       10 Jim 2021 07:25    TPro  7.9    /  Alb  2.9    /  TBili  0.4    /  DBili  x      /  AST  21     /  ALT  27     /  AlkPhos  92     10 Jim 2021 07:25    LIVER FUNCTIONS - ( 10 Jim 2021 07:25 )  Alb: 2.9 g/dL / Pro: 7.9 g/dL / ALK PHOS: 92 U/L / ALT: 27 U/L / AST: 21 U/L / GGT: x           RADIOLOGY:  Renal sonogram shows 10-11 cm kidneys with no hydronephrosis    acetaminophen   Tablet .. 650 milliGRAM(s) Oral every 6 hours  aluminum hydroxide/magnesium hydroxide/simethicone Suspension 30 milliLiter(s) Oral every 4 hours PRN  cadexomer iodine 0.9% Gel 1 Application(s) Topical daily  dextrose 40% Gel 15 Gram(s) Oral once  dextrose 5%. 1000 milliLiter(s) IV Continuous <Continuous>  dextrose 5%. 1000 milliLiter(s) IV Continuous <Continuous>  dextrose 50% Injectable 25 Gram(s) IV Push once  dextrose 50% Injectable 12.5 Gram(s) IV Push once  dextrose 50% Injectable 25 Gram(s) IV Push once  glucagon  Injectable 1 milliGRAM(s) IntraMuscular once  heparin   Injectable 5000 Unit(s) SubCutaneous every 12 hours  insulin glargine Injectable (LANTUS) 10 Unit(s) SubCutaneous every morning  insulin lispro (ADMELOG) corrective regimen sliding scale   SubCutaneous four times a day before meals  levETIRAcetam  Solution 500 milliGRAM(s) Oral two times a day  ondansetron   Disintegrating Tablet 4 milliGRAM(s) Oral every 6 hours PRN  pantoprazole   Suspension 40 milliGRAM(s) Oral daily  polyethylene glycol 3350 17 Gram(s) Oral daily  senna 2 Tablet(s) Oral at bedtime    ASSESSMENT/PLAN:    Transferred here for rehab on 6/4/21 after long complex hospital course including covid pneumonia, septic shock, empyema, stroke, PE, hematomas while on A/C, dysphagia/PEG and digital ischemic wounds. He had an SUKI in April with peak Cr 2.5 mg/dL. Over past week or so there has been a small, slow rise in Cr from 0.75 - 5/29/21 to 1.66 - 6/7/21.    SUKI due to bactrim   Bactrim d/c-d  Cr is improving  Avoid nephrotoxins  F/u [facility]    [phone]

## 2021-06-10 NOTE — CONSULT NOTE ADULT - SUBJECTIVE AND OBJECTIVE BOX
Patient is a 60 year old man admitted to [facility] on 6/4/21. He was transferred from [facility] after an initial admission to [facility]  on 4/8/21with respiratory distress. He had been diagnosed with Covid 19 in March, 2021. He was intubated. Hospital course complicated by PE, Empyema, septic shock, right obturator hematoma, and multiple acute  bilateral cerebral and  left cerebellar infarcts with areas of hemorrhagic conversion. CTA head and neck  with occlusion Left PICA. He denies HA or other complaints.    PMH: As above           DM           HTN    SH: No allergy    Exam: Awake, alert, an  translates as he speaks Gujarati, responds with a few words appropriately occasionally to questions, follows simple commands with prompting            Pupils 2.5mm, EOM intact, no nystagmus, VFF            CN II-XII intact            Motor tone and strength-RLE- 5/5 except dorsiflexion of foot-3-/5,                                                   RUE-5/5                                                  LUE 5/5                                                  LLE 5/5                          11.0   7.95  )-----------( 388      ( 10 Jim 2021 07:25 )             35.8   06-10    139  |  104  |  28<H>  ----------------------------<  116<H>  4.9   |  26  |  1.38<H>    Ca    10.1      10 Jim 2021 07:25    TPro  7.9  /  Alb  2.9<L>  /  TBili  0.4  /  DBili  x   /  AST  21  /  ALT  27  /  AlkPhos  92  06-10    < from: CT Head No Cont (06.10.21 @ 12:20) >    INTERPRETATION:  CLINICAL INDICATION: Follow-up CVA    5mm axial sections of the brain were obtained from base to vertex, without the intravenous administration of contrast material. Coronal and sagittal computer generated reconstructed views are available.    Comparison is made with the prior CT of 5/5/2021.    There is moderate atrophy for the patient's age which is nonspecific but may be related to chronic illness was certain medications. There CVA in the left temporal occipital region, the right occipital region and the left cerebellum. The left cerebellar infarct is smaller and has normally evolved since the prior exam. The other infarcts are unchanged. Lucency in the left parietal white matter along the cortex is identified which may represent an interval subacute infarct. There is no hemorrhage.      IMPRESSION: Multiple infarcts. Left parietal cortical infarct may be new since the prior exam 5/5/2021 and appears subacute. Left cerebellar infarct is normally evolved. Left temporal occipital and right occipital infarcts unchanged.

## 2021-06-11 LAB
ANION GAP SERPL CALC-SCNC: 9 MMOL/L — SIGNIFICANT CHANGE UP (ref 5–17)
BUN SERPL-MCNC: 32 MG/DL — HIGH (ref 7–23)
CALCIUM SERPL-MCNC: 9.8 MG/DL — SIGNIFICANT CHANGE UP (ref 8.4–10.5)
CHLORIDE SERPL-SCNC: 104 MMOL/L — SIGNIFICANT CHANGE UP (ref 96–108)
CO2 SERPL-SCNC: 26 MMOL/L — SIGNIFICANT CHANGE UP (ref 22–31)
CREAT SERPL-MCNC: 1.43 MG/DL — HIGH (ref 0.5–1.3)
GLUCOSE BLDC GLUCOMTR-MCNC: 104 MG/DL — HIGH (ref 70–99)
GLUCOSE BLDC GLUCOMTR-MCNC: 120 MG/DL — HIGH (ref 70–99)
GLUCOSE BLDC GLUCOMTR-MCNC: 139 MG/DL — HIGH (ref 70–99)
GLUCOSE BLDC GLUCOMTR-MCNC: 144 MG/DL — HIGH (ref 70–99)
GLUCOSE SERPL-MCNC: 134 MG/DL — HIGH (ref 70–99)
POTASSIUM SERPL-MCNC: 4.4 MMOL/L — SIGNIFICANT CHANGE UP (ref 3.5–5.3)
POTASSIUM SERPL-SCNC: 4.4 MMOL/L — SIGNIFICANT CHANGE UP (ref 3.5–5.3)
SODIUM SERPL-SCNC: 139 MMOL/L — SIGNIFICANT CHANGE UP (ref 135–145)

## 2021-06-11 PROCEDURE — 99232 SBSQ HOSP IP/OBS MODERATE 35: CPT | Mod: GC

## 2021-06-11 PROCEDURE — 99232 SBSQ HOSP IP/OBS MODERATE 35: CPT

## 2021-06-11 PROCEDURE — 99222 1ST HOSP IP/OBS MODERATE 55: CPT

## 2021-06-11 PROCEDURE — 99223 1ST HOSP IP/OBS HIGH 75: CPT

## 2021-06-11 PROCEDURE — 93227 XTRNL ECG REC<48 HR R&I: CPT

## 2021-06-11 RX ORDER — ATORVASTATIN CALCIUM 80 MG/1
40 TABLET, FILM COATED ORAL AT BEDTIME
Refills: 0 | Status: DISCONTINUED | OUTPATIENT
Start: 2021-06-11 | End: 2021-06-23

## 2021-06-11 RX ORDER — SODIUM CHLORIDE 9 MG/ML
1000 INJECTION INTRAMUSCULAR; INTRAVENOUS; SUBCUTANEOUS
Refills: 0 | Status: DISCONTINUED | OUTPATIENT
Start: 2021-06-11 | End: 2021-06-14

## 2021-06-11 RX ADMIN — ATORVASTATIN CALCIUM 40 MILLIGRAM(S): 80 TABLET, FILM COATED ORAL at 22:26

## 2021-06-11 RX ADMIN — Medication 650 MILLIGRAM(S): at 06:03

## 2021-06-11 RX ADMIN — LEVETIRACETAM 500 MILLIGRAM(S): 250 TABLET, FILM COATED ORAL at 05:04

## 2021-06-11 RX ADMIN — Medication 650 MILLIGRAM(S): at 14:05

## 2021-06-11 RX ADMIN — INSULIN GLARGINE 10 UNIT(S): 100 INJECTION, SOLUTION SUBCUTANEOUS at 08:43

## 2021-06-11 RX ADMIN — Medication 1 APPLICATION(S): at 13:06

## 2021-06-11 RX ADMIN — SODIUM CHLORIDE 75 MILLILITER(S): 9 INJECTION INTRAMUSCULAR; INTRAVENOUS; SUBCUTANEOUS at 22:11

## 2021-06-11 RX ADMIN — Medication 650 MILLIGRAM(S): at 13:06

## 2021-06-11 RX ADMIN — HEPARIN SODIUM 5000 UNIT(S): 5000 INJECTION INTRAVENOUS; SUBCUTANEOUS at 05:04

## 2021-06-11 RX ADMIN — HEPARIN SODIUM 5000 UNIT(S): 5000 INJECTION INTRAVENOUS; SUBCUTANEOUS at 18:35

## 2021-06-11 RX ADMIN — Medication 650 MILLIGRAM(S): at 05:04

## 2021-06-11 RX ADMIN — Medication 650 MILLIGRAM(S): at 18:35

## 2021-06-11 RX ADMIN — LEVETIRACETAM 500 MILLIGRAM(S): 250 TABLET, FILM COATED ORAL at 18:35

## 2021-06-11 RX ADMIN — Medication 650 MILLIGRAM(S): at 19:20

## 2021-06-11 RX ADMIN — PANTOPRAZOLE SODIUM 40 MILLIGRAM(S): 20 TABLET, DELAYED RELEASE ORAL at 13:06

## 2021-06-11 RX ADMIN — Medication 650 MILLIGRAM(S): at 23:26

## 2021-06-11 NOTE — PROGRESS NOTE ADULT - ASSESSMENT
59yo male with PMH of DM who was initially admitted to [facility] ICU for hypoxic respiratory failure 2/2 COVID c/b PE with R heart strain, cardiogenic and septic shock, ischemic and hemorrhagic CVA and ESBL klebsiella ventilator associate PNA, transferred to [facility] for neurosurgery eval and further management. s/p PEG. Course c/b right empyema s/p VATs and obturator bleed (resolved). Now admitted for functional decline.     #ESBL Klebsiella PNA c/b empyema   - 5/12 underwent right VATS converted to open L thoracotomy, decortication, drainage of abscess and flex bronch.  - antibiotics completed. Afebrile. ID in. bactrim on hold per renal.   - CT Chest follow up 6/8 with improvement.  - Gait Instability, ADL impairments and Functional impairments:  Comprehensive Rehab Program of PT/OT/SLP    #Empyema   -OR Vats 5/12 with Dr. Albarado with all chest tubes removed by 5/18  -CT Chest 6/8 improved. no thoracic surgery intervention indicated per Dr. Albarado.    #Obturator Hematoma   - bleeding hematoma in right thigh on CT on 5/7  - CT hip showing non active bleed, hematoma on right thigh on 5/8  - was on heparin drip on and off, now off AC.  - cleared for DVT ppx heparin bid.   -Evaluation by surgery at [facility]. No contraindication to AC as of 6/9 per Dr Cee. case discussed. Input appreciated.    #Pulmonary embolus  -Etiology of venous thromboembolism uncertain at this time; ?provoked acute covid illness   -s/p heparin drip held due to recurrent bleed in right obturator and right chest wall hematoma, now improved  - 5000 heparin bid for dvt-cleared to continue.  - AC/Noac on hold as per DC instructions. HH stable.  -Asymptomatic, tolerating therapy. VSS.   -Surgery in, will follow recommendations.    #CVA  -Acute bilateral ischemic strokes, hemorrhagic conversion, CTH follow up today and neurology requested for input re/AC therapy timing  - TTE with bubble, no right-to-left shunt; no arrhythmia noted on tele, ?ILR  -CTA L Pica occlusion. Evaluated by Neurology prior to rehab-NOAC recommended when possible-however on hold for hx recurrent chest/thigh hematomas  -Neurology follow up requested. Consult placed.    #Altered mental status  - improved, A&Ox3, participates with care, no seizures overnight  - MRI Brain, and MRA Head demonstrated multiple evolving subacute infarcts with associated hemorrhages,  -  EEG neg seizure  - C/w Keppra 500 mg BID-> neuro f/u outpatient   - repeat CT head 5/5 no acute changes    #SUKI  -renal in, now off Bactrim day 3  -workup pending-will follow rec's  -IVF completed.  -Nepro for hyperkalemia    #Urinary Retention   - Mann removed 5/30  -voiding freely, low PVRs  - hold doxazosin for low BPs    #Diabetes mellitus  - Type II diabetes mellitus noted; a1c>10  - NPH to Lantus and Admelog scale.    -hospitalist following  - Follow up endo outpatient       #Dysphagia  - PEG tube placed on 4/30  -nausea/vomiting resolved  -nutrition to adjust TF  -XR abd follow up -neg acute findings      #Peripheral vascular disease  -Dry gangrene noted over distal toes on bilateral lower extremities  -ERASMO demonstrating right ERASMO: 1.09, left ERASMO: 1.24, right TBI: 0.78, and left TBI: 0.73.  -Podiatry recs appreciated, betadine to wound    #Pain Mgmt   - Tylenol PRN, Oxycodone PRN    #GI/Bowel Mgmt   - Continent c/w Senna, Miralax PRN    #/Bladder Mgmt   - Continent, PVR low    #FEN   -PEG feeds     #Precautions / PROPHYLAXIS:   - Falls, Cardiac, Seizure   - ortho: Weight bearing status: WBAT   - Lungs: Aspiration, Incentive Spirometer   - Pressure injury/Skin: OOB to Chair, PT/OT    - DVT: HSQ    59yo male with PMH of DM who was initially admitted to [facility] ICU for hypoxic respiratory failure 2/2 COVID c/b PE with R heart strain, cardiogenic and septic shock, ischemic and hemorrhagic CVA and ESBL klebsiella ventilator associate PNA, transferred to [facility] for neurosurgery eval and further management. s/p PEG. Course c/b right empyema s/p VATs and obturator bleed (resolved). Now admitted for functional decline.     #ESBL Klebsiella PNA c/b empyema   - 5/12 underwent right VATS converted to open L thoracotomy, decortication, drainage of abscess and flex bronch.  - antibiotics completed. Afebrile. ID in. bactrim on hold per renal.   - CT Chest follow up 6/8 with improvement.  - Gait Instability, ADL impairments and Functional impairments:  Comprehensive Rehab Program of PT/OT/SLP    #Empyema   -OR Vats 5/12 with Dr. Albarado with all chest tubes removed by 5/18  -CT Chest 6/8 improved. no thoracic surgery intervention indicated per Dr. Albarado.    #Obturator Hematoma   - bleeding hematoma in right thigh on CT on 5/7, CT hip showing non active bleed, hematoma on right thigh on 5/8  - was on heparin drip on and off, now off AC.  - cleared for DVT ppx heparin bid.   -Evaluation by surgery at [facility]. No contraindication to AC as of 6/9 per Dr Cee. case discussed. Input appreciated.    #Pulmonary embolus  -Etiology of venous thromboembolism uncertain at this time; ?provoked acute covid illness   -s/p heparin drip held due to recurrent bleed in right obturator and right chest wall hematoma, now improved  - 5000 heparin bid for dvt-cleared to continue.  - AC/Noac on hold as per DC instructions. HH stable.  -Asymptomatic, tolerating therapy. VSS.   -Surgery in, will follow recommendations.    #CVA  -Hx bilateral ischemic strokes, L Pica occlusion, hemorrhagic conversion, CTH follow up as above. Neurology consulted re/AC therapy timing. case discussed w/Dr Anderson on 6/11, AC to be held until CTH follow up 6/14.   - TTE with bubble, no right-to-left shunt; no arrhythmia noted on tele prior. Cardiology consulted 6/11-? holter/arrhythmia workup, ?AC in view of CTH changes noted on 6/.11.  -CTA prior shows L Pica occlusion. Evaluated by Neurology prior to rehab-NOAC recommended when possible-however at the time of acute admission on hold for 'hx recurrent chest/thigh hematomas'  -lipitor added    #Altered mental status  - improved, A&Ox3, participates with care, no seizures overnight  - EEG neg seizure  - C/w Keppra 500 mg BID-> neuro f/u outpatient   - repeat CT head completed    #SUKI  -renal in, now off Bactrim, will follow rec's  -IVF completed.  -Nepro for hyperkalemia-improved    #Urinary Retention   - Mann removed 5/30  -voiding freely, low PVRs  - hold doxazosin for low BPs    #Diabetes mellitus  - Type II diabetes mellitus noted; a1c>10  - NPH to Lantus and Admelog scale.    -hospitalist following  - Follow up endo outpatient       #Dysphagia  - PEG tube placed on 4/30  -nausea/vomiting resolved  -nutrition to adjust TF  -XR abd follow up -neg acute findings      #Peripheral vascular disease  -Dry gangrene noted over distal toes on bilateral lower extremities  -ERASMO demonstrating right ERASMO: 1.09, left ERASMO: 1.24, right TBI: 0.78, and left TBI: 0.73.  -Podiatry recs appreciated, betadine to wound    #Pain Mgmt   - Tylenol PRN, Oxycodone PRN    #GI/Bowel Mgmt   - hold Senna, Miralax for loose stool overnight.    #/Bladder Mgmt   - Continent, PVR low    #Precautions / PROPHYLAXIS:   - Falls, Cardiac, Seizure   - ortho: Weight bearing status: WBAT   - Lungs: Aspiration, Incentive Spirometer   - Pressure injury/Skin: OOB to Chair, PT/OT    - DVT: HSQ

## 2021-06-11 NOTE — PROGRESS NOTE ADULT - ASSESSMENT
60y male with hx DM who was transferred to [facility] rehab after a 2 month [facility] stay for Covid.  He was admitted 4/2 with hypoxic respiratory failure secondary to Covid complicated by PE , cardiogenic and septic shock, ischemic and hemorrhagic stroke and ESBL VAP.  He was transferred to [facility] on 4/12 for NS evaluation. NS opted for conservative management. He developed an empyema/lung abscess, s/p RT sided thoracotomy on 5/12 with abscess drainage and formal decortication.  ESBL klebsiella is the main organism but he also grew lactrobacillus and prevotella.  He was treated with meropenem x close to 3 weeks and transitioned to bactrim and augmentin, stop date 6/8.  He came to rehab on 6/4 and has had episodes of N/V, renal saw for increased creat and advised d/c of bactrim.  He has peg for nutrition, ischemic damage to left foot and an encephalopathy.  He is able to follow commands and move all extremities. Prior brain imaging with multiple strokes.  He has completed close to 1 month post thoracotomy antibiotics.  Tissue cultures grew ESBL klebsiella, lactobacillus, and prevotella.  His increased creat may be bactrim induced.  His CT scan dated 5/29 was felt to be satisfactory, he did have a small hydroptx.  antibiotics stopped 6/8  CT scan of chest on 6/8  is  with stable findings, small rt hydropneumothorax and resolving chest wall hematoma  Renal function is stable.No signs of recurrent infection  Suggest:  Monitor off antibiotics  Miralax may have contributed to diarrhea along with enteral feeds  Local wound care to left foot dry gangrene  Stable Chest CT findings  No clinical indications for antibiotics

## 2021-06-11 NOTE — CONSULT NOTE ADULT - SUBJECTIVE AND OBJECTIVE BOX
CHIEF COMPLAINT:  Patient is a 60y old  Male who presents with a chief complaint of CVA (11 Jun 2021 13:57)    HPI:  Patient's brother in law armaan interpreted via phone     59yo M with T2DM and recently diagnosed COVID (3/19) BIBEMS to [facility] on 4/1 for hypoxic respiratory failure secondary to COVID PNA, requiring endotracheal intubation (4/1-4/13). Course significant for Septic shock on admission, treated with vancomycin and cefepime. Found to have ESBL Klebsiella PNA c/b empyema s/p VATs decortication. Treated with prolonged course of Meropenem while hospitalized and transitioned to Augmentin/Bactrim upon discharge (until 6/8). Will follow up with ID as outpatient.     Patient had acute ischemic CVA with areas of hemorrhagic transformation. Found to have multi-focal acute-to-subacute infarctions. 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 Subsequent scans showed hemorrhagic conversion. Transferred to [facility] for Neurosurgery evaluation, no surgical intervention at this time. Will require outpatient follow up.     Course complicated by RITO segmental PEs with e/o RH strain: Provoked in the setting of sepsis/high inflammatory state with COVID infection. LE Dopplers negative x 2. Initially anticoagulated with heparin gtt. Course further complicated by a right chest wall and right obturator hematomas. Due to concern for worsening hemorrhagic conversion of stroke and hematomas, the patient's therapeutic anticoagulation was discontinued. He was evaluated by the neurosurgical team, which said that there was no indication for acute intervention. Patient will not be anticoagulated upon discharge.   Patient's AMS likely in the setting of acute strokes, improved throughout hospitalization. Due to prolonged intubation with subsequent AMS and dysphagia, pt had PEG tube placed. Pt failed MBS following improvement of mental status.     Patient also had bedside debridement and woundcare of her BLE digits and will follow up with Podiatry as an outpatient.    Medically cleared for discharge to  [facility] 6[facility].    (04 Jun 2021 16:29)    Seen today on request due to new cva . D/w with ping silva np . Not currently on oral a/c due to recent hematoma.        PMH:   No pertinent past medical history        PSH:   No significant past surgical history      FAMILY HISTORY:  No pertinent family history in first degree relatives        SOCIAL HISTORY:  Smoking:  Alcohol:  Drugs:    ALLERGIES:  No Known Allergies      Home Medications:  acetaminophen 325 mg oral tablet: 2 tab(s) orally every 6 hours (02 Jun 2021 10:05)  cadexomer iodine 0.9% topical gel: 1 application topically once a day (02 Jun 2021 10:05)  cholecalciferol oral tablet: 400 unit(s) orally once a day (02 Jun 2021 10:05)  doxazosin 2 mg oral tablet: 1 tab(s) orally once a day (at bedtime) (02 Jun 2021 10:05)  heparin: 5000 unit(s) subcutaneous every 12 hours (02 Jun 2021 10:05)  insulin isophane (NPH) 100 units/mL human recombinant subcutaneous suspension: 12 unit(s) subcutaneous (04 Jun 2021 12:04)  insulin lispro 100 units/mL injectable solution: Moderate sliding scale insulin: injectable   2 Unit(s) if Glucose 151 - 200  4 Unit(s) if Glucose 201 - 250  6 Unit(s) if Glucose 251 - 300  8 Unit(s) if Glucose 301 - 350  10 Unit(s) if Glucose 351 - 400  12 Unit(s) if Glucose Greater Than 400.     Please notify provider for BG &lt;70 or &gt;400 (02 Jun 2021 10:46)  ipratropium-albuterol 0.5 mg-2.5 mg/3 mL inhalation solution: 3 milliliter(s) inhaled every 6 hours, As needed, Shortness of Breath and/or Wheezing (02 Jun 2021 10:05)  levETIRAcetam 100 mg/mL oral solution: 5 milliliter(s) orally 2 times a day (02 Jun 2021 10:05)  polyethylene glycol 3350 oral powder for reconstitution: 17 gram(s) orally once a day (02 Jun 2021 10:05)  senna oral tablet: 2 tab(s) orally once a day (at bedtime) (02 Jun 2021 10:05)      MEDICATIONS:  acetaminophen   Tablet .. 650 milliGRAM(s) Oral every 6 hours  aluminum hydroxide/magnesium hydroxide/simethicone Suspension 30 milliLiter(s) Oral every 4 hours PRN  atorvastatin 40 milliGRAM(s) Oral at bedtime  cadexomer iodine 0.9% Gel 1 Application(s) Topical daily  dextrose 40% Gel 15 Gram(s) Oral once  dextrose 5%. 1000 milliLiter(s) IV Continuous <Continuous>  dextrose 5%. 1000 milliLiter(s) IV Continuous <Continuous>  dextrose 50% Injectable 25 Gram(s) IV Push once  dextrose 50% Injectable 12.5 Gram(s) IV Push once  dextrose 50% Injectable 25 Gram(s) IV Push once  glucagon  Injectable 1 milliGRAM(s) IntraMuscular once  heparin   Injectable 5000 Unit(s) SubCutaneous every 12 hours  insulin glargine Injectable (LANTUS) 10 Unit(s) SubCutaneous every morning  insulin lispro (ADMELOG) corrective regimen sliding scale   SubCutaneous four times a day before meals  levETIRAcetam  Solution 500 milliGRAM(s) Oral two times a day  ondansetron   Disintegrating Tablet 4 milliGRAM(s) Oral every 6 hours PRN  pantoprazole   Suspension 40 milliGRAM(s) Oral daily  sodium chloride 0.9%. 1000 milliLiter(s) IV Continuous <Continuous>      REVIEW OF SYSTEMS:    PHYSICAL EXAM:  T(C): 36.3 (06-11-21 @ 08:30), Max: 36.3 (06-11-21 @ 08:30)  HR: 96 (06-11-21 @ 08:30) (96 - 97)  BP: 121/77 (06-11-21 @ 08:30) (105/68 - 121/77)  RR: 16 (06-11-21 @ 08:30) (16 - 16)  SpO2: 98% (06-11-21 @ 08:30) (97% - 98%)  Wt(kg): --    GENERAL: NAD, well-groomed, well-developed  HEAD:  Atraumatic, Normocephalic  EYES: EOMI, conjunctiva and sclera clear  ENT: Moist mucous membranes,  NECK: Supple, No JVD, no bruits  CHEST/LUNG: dull r 1/3  No rales, rhonchi, wheezing, or rubs  HEART: Regular rate and rhythm; No murmurs, rubs, or gallops PMI non displaced.  ABDOMEN: Soft, Nontender, tubes in place  EXTREMITIES: , No clubbing, cyanosis, or edema  NERVOUS SYSTEM:  Alert     Cardiovascular Diagnostic Testing:  ECG: < from: 12 Lead ECG (05.18.21 @ 11:07) >    Atrial Rate 97 BPM    P-R Interval 140 ms    QRS Duration 120 ms    Q-T Interval 368 ms    QTC Calculation(Bazett) 467 ms    P Axis 12 degrees    R Axis 6 degrees    T Axis -9 degrees    Diagnosis Line NORMAL SINUS RHYTHM  POSSIBLE LEFT ATRIAL ENLARGEMENT  RIGHT BUNDLE BRANCH BLOCK  INFERIOR INFARCT (CITED ON OR BEFORE 02-MAY-2021)  ABNORMAL ECG  WHEN COMPARED WITH ECG OF 16-MAY-2021 08:16,  NO SIGNIFICANT CHANGE WAS FOUND  Confirmed by MD VOSS ANDREW ([id number]) on 5/19/2021 10:26:22 AM    < end of copied text >      LABS:                        11.0   7.95  )-----------( 388      ( 10 Jim 2021 07:25 )             35.8     06-11    139  |  104  |  32<H>  ----------------------------<  134<H>  4.4   |  26  |  1.43<H>    Ca    9.8      11 Jun 2021 07:05    TPro  7.9  /  Alb  2.9<L>  /  TBili  0.4  /  DBili  x   /  AST  21  /  ALT  27  /  AlkPhos  92  06-10    IMAGING:  < from: CT Head No Cont (06.10.21 @ 12:20) >      < from: CT Chest No Cont (05.29.21 @ 17:15) >    EXAM:  CT CHEST                            PROCEDURE DATE:  05/29/2021            INTERPRETATION:  CLINICAL INFORMATION: Increasing pain in site of known right chest wall hematoma. The patient was receiving anticoagulation. Anticoagulation has beendiscontinued for approximately 48 hours.    COMPARISON: Chest CT 5/16/2021. Radiograph chest 5/19/2021.    TECHNIQUE: Unenhanced helical images were obtained of the chest. Coronal and sagittal images were reconstructed. Maximum intensity projection images were generated.    FINDINGS:    LUNGS, AIRWAYS, AND PLEURA: Secretions in the bronchus intermedius. The remainder of the airways are unremarkable.    Since 5/16/2021, the patchy bilateral opacities have decreased, however, residual groundglass opacities and curvilinear opacities and peripheral foci consolidation remain.    Previously described fluid and air lesions have decreased since 5/16/2021.    Loculated right hydropneumothorax is unchanged allowing for differences in technique. The catheter and chest tubes have been removed.    MEDIASTINUM AND BESSIE: Chest lymph nodes are unchanged. The visualized thyroid gland and esophagus are unremarkable.    VESSELS: Left-sided aortic arch and left-sided descending thoracic aorta. Atherosclerotic changes of the aorta and coronary arteries.    HEART: Heart size is normal. No pericardial effusion.    CHEST WALL AND LOWER NECK: Since 5/16/2021, the hematoma in the lateral right chest wall and adjacent subcutaneous emphysema, measure 8.1 x 3.1 x8.9 cm, unchanged allowing for differences in technique.    VISUALIZED UPPER ABDOMEN: Within normal limits.    BONES: Degenerative changes.    IMPRESSION:  1.  No change in the loculated right hydropneumothorax likely differences in technique make.  2.  Slight decrease in size of the right chest wall hematoma, allowing for differences in technique.  3.  The bilateral opacities have decreased since 5/16/2021.              CHAVA IYER MD; Resident Radiology  This document has been electronically signed.  DEL BOOKER MD; Attending Radiologist  This document has been electronically signed. May 30 2021 10:52AM    < end of copied text >      EXAM:  CT BRAIN      PROCEDURE DATE:  06/10/2021        INTERPRETATION:  CLINICAL INDICATION: Follow-up CVA    5mm axial sections of the brain were obtained from base to vertex, without the intravenous administration of contrast material. Coronal and sagittal computer generated reconstructed views are available.    Comparison is made with the prior CT of 5/5/2021.    There is moderate atrophy for the patient's age which is nonspecific but may be related to chronic illness was certain medications. There CVA in the left temporal occipital region, the right occipital region and the left cerebellum. The left cerebellar infarct is smaller and has normally evolved since the prior exam. The other infarcts are unchanged. Lucency in the left parietal white matter along the cortex is identified which may represent an interval subacute infarct. There is no hemorrhage.      IMPRESSION: Multiple infarcts. Left parietal cortical infarct may be new since the prior exam 5/5/2021 and appears subacute. Left cerebellar infarct is normally evolved. Left temporal occipital and right occipital infarcts unchanged.      ROSIE TROTTER MD, ATTENDING RADIOLOGIST    < end of copied text >    < from: TTE with Doppler (w/Cont) (04.15.21 @ 19:22) >    Patient name: ADRIANA BOOKER  YOB: 1960   Age: 60 (M)   MR#: [id number]  Study Date: 4/15/2021  Location: [address] [place], [place] [id number]: Rudolph Davey RDCS  Study quality: Technically difficult  Referring Physician: Prashanth Booker MD  Blood Pressure: 151/77 mmHg  Height: 170 cm  Weight: 66 kg  BSA: 1.8 m2  Heart Rate: 121 mmHg  ------------------------------------------------------------------------  PROCEDURE: Transthoracic echocardiogram with 2-D, M-Mode  and complete spectral andcolor flow Doppler. Patient was  injected with 10 cc's of aerosolized saline. Patient was  injected with 10 cc's of aerosolized saline. Verbal consent  was obtained for injection of  Ultrasonic Enhancing Agent  following a discussion of risks and benefits. Following  intravenous injection of Ultrasonic Enhancing Agent ,  harmonic imaging was performed.  INDICATION: Transient cerebral ischemic attack, unspecified  (G45.9)  ------------------------------------------------------------------------  Dimensions:    Normal Values:  LA:     2.4    2.0 - 4.0 cm  Ao:     4.2    2.0 - 3.8 cm  SEPTUM: 1.0    0.6 - 1.2 cm  PWT:    0.8    0.6 - 1.1 cm  LVIDd:  3.5    3.0 - 5.6 cm  LVIDs:  2.0    1.8 - 4.0 cm  Derived variables:  LVMI: 50 g/m2  RWT: 0.45  Fractional short: 43 %  EF (Visual Estimate): 75-80 %  Doppler Peak Velocity (m/sec): AoV=1.6  ------------------------------------------------------------------------  Observations:  Mitral Valve: Normal mitral valve. Minimal mitral  regurgitation.  Aortic Valve/Aorta: Normal trileaflet aortic valve. Peak  transaortic valve gradient equals 11 mm Hg, aortic valve  velocity time integral equals 25 cm, estimated aortic valve  area equals 2.9 sqcm. Minimal aortic regurgitation.  Peak  left ventricularoutflow tract gradient equals 9 mm Hg,  LVOT velocity time integral equals 21 cm.  Mild aortic root dilatation  (Ao: 4.2 cm at the sinuses of  Valsalva).  Left Atrium: Normal left atrium.  LA volume index = 18  cc/m2.  Left Ventricle: Hyperdynamic left ventricular systolic  function. Endocardial visualization enhanced with  intravenous injection of Ultrasonic Enhancing Agent  (Definity).  Increased relative wall thickness with normal  left ventricular mass index, consistent with concentric  left ventricular remodeling. Normal diastolic function  Right Heart: Normal right atrium. The right ventricle is  not well visualized; grossly Normal right ventricular size  and systolic function. TV s' = 20 cm/sec.  Normal tricuspid  valve. Minimal tricuspid regurgitation. Pulmonic valve not  well visualized, probably normal. Mild pulmonic  regurgitation.  Pericardium/Pleura: Normal pericardium with trace  pericardial effusion.  Hemodynamic: Estimated right atrial pressure is 8 mm Hg.  Estimated rightventricular systolic pressure equals 31 mm  Hg, assuming right atrial pressure equals 8 mm Hg,  consistent with normal pulmonary pressures. Unable to  evalaute for PFO/ASD in the setting of poor image quality.  Consider repeat limited study with agitated saliine if  clinically indicated.  ------------------------------------------------------------------------  Conclusions:  1. Mild aortic root dilatation  (Ao: 4.2 cm at the sinuses  of Valsalva).  2. Increased relative wall thickness with normalleft  ventricular mass index, consistent with concentric left  ventricular remodeling.  3. Hyperdynamic left ventricular systolic function.  Endocardial visualization enhanced with intravenous  injection of Ultrasonic Enhancing Agent (Definity).  4. The right ventricle is not well visualized; grossly  Normal right ventricular size and systolic function. TV s'  = 20 cm/sec.  5. Estimated right ventricular systolic pressure equals 31  mm Hg, assuming right atrial pressure equals 8 mm Hg,  consistent with normal pulmonary pressures.  6. Unable to evalaute for PFO/ASD in the setting of poor  image quality. Consider repeat limited study with agitated  saliine if clinically indicated.  7. Normal pericardium with trace pericardial effusion.  *** No previous Echo exam.  ------------------------------------------------------------------------  Confirmed on  4/15/2021 - 22:17:01 by Juventino Garcia M.D.  ------------------------------------------------------------------------    < end of copied text >

## 2021-06-11 NOTE — CONSULT NOTE ADULT - SUBJECTIVE AND OBJECTIVE BOX
This is a 60 year old man with a history of diabtes, COVID (3/19) BIBEMS to [facility] on on April 1st for  for hypoxic respiratory failure secondary to COVID PNA, requiring endotracheal intubation (4/1-4/13). Course significant for Septic shock on admission, treated with vancomycin and cefepime. Found to have ESBL Klebsiella PNA c/b empyema s/p VATs decortication.   Patient had a prolonged hospital stay complicated by CVA with areas of hemorrhagic transformation.  Found to have Left upper lobe segmental PE with right heart strain, provoked by COVID and sepsis.  Patient could not be anticoagulated due to hemorrhagic conversion of CVA.  Patent now in rehab.        MEDICATIONS  (STANDING):  acetaminophen   Tablet .. 650 milliGRAM(s) Oral every 6 hours  atorvastatin 40 milliGRAM(s) Oral at bedtime  cadexomer iodine 0.9% Gel 1 Application(s) Topical daily  dextrose 40% Gel 15 Gram(s) Oral once  dextrose 5%. 1000 milliLiter(s) (50 mL/Hr) IV Continuous <Continuous>  dextrose 5%. 1000 milliLiter(s) (100 mL/Hr) IV Continuous <Continuous>  dextrose 50% Injectable 25 Gram(s) IV Push once  dextrose 50% Injectable 12.5 Gram(s) IV Push once  dextrose 50% Injectable 25 Gram(s) IV Push once  glucagon  Injectable 1 milliGRAM(s) IntraMuscular once  heparin   Injectable 5000 Unit(s) SubCutaneous every 12 hours  insulin glargine Injectable (LANTUS) 10 Unit(s) SubCutaneous every morning  insulin lispro (ADMELOG) corrective regimen sliding scale   SubCutaneous four times a day before meals  levETIRAcetam  Solution 500 milliGRAM(s) Oral two times a day  pantoprazole   Suspension 40 milliGRAM(s) Oral daily  sodium chloride 0.9%. 1000 milliLiter(s) (75 mL/Hr) IV Continuous <Continuous>    MEDICATIONS  (PRN):  aluminum hydroxide/magnesium hydroxide/simethicone Suspension 30 milliLiter(s) Oral every 4 hours PRN Dyspepsia  ondansetron   Disintegrating Tablet 4 milliGRAM(s) Oral every 6 hours PRN Nausea and/or Vomiting    Vital Signs Last 24 Hrs  T(C): 36.3 (11 Jun 2021 08:30), Max: 36.3 (11 Jun 2021 08:30)  T(F): 97.3 (11 Jun 2021 08:30), Max: 97.3 (11 Jun 2021 08:30)  HR: 96 (11 Jun 2021 08:30) (96 - 96)  BP: 121/77 (11 Jun 2021 08:30) (121/77 - 121/77)  BP(mean): --  RR: 16 (11 Jun 2021 08:30) (16 - 16)  SpO2: 98% (11 Jun 2021 08:30) (98% - 98%)  06-11    139  |  104  |  32<H>  ----------------------------<  134<H>  4.4   |  26  |  1.43<H>    Ca    9.8      11 Jun 2021 07:05    TPro  7.9  /  Alb  2.9<L>  /  TBili  0.4  /  DBili  x   /  AST  21  /  ALT  27  /  AlkPhos  92  06-10                        11.0   7.95  )-----------( 388      ( 10 Jim 2021 07:25 )             35.8

## 2021-06-11 NOTE — PROGRESS NOTE ADULT - SUBJECTIVE AND OBJECTIVE BOX
No distress    Vital Signs Last 24 Hrs  T(C): 36.3 (06-11-21 @ 08:30), Max: 36.3 (06-11-21 @ 08:30)  T(F): 97.3 (06-11-21 @ 08:30), Max: 97.3 (06-11-21 @ 08:30)  HR: 96 (06-11-21 @ 08:30) (96 - 97)  BP: 121/77 (06-11-21 @ 08:30) (105/68 - 121/77)  RR: 16 (06-11-21 @ 08:30) (16 - 16)  SpO2: 98% (06-11-21 @ 08:30) (97% - 98%)    s1s2  b/l air entry  soft, ND, + PEG  no edema                                                11.0   7.95  )-----------( 388      ( 10 Jim 2021 07:25 )             35.8     11 Jun 2021 07:05    139    |  104    |  32     ----------------------------<  134    4.4     |  26     |  1.43     Ca    9.8        11 Jun 2021 07:05    TPro  7.9    /  Alb  2.9    /  TBili  0.4    /  DBili  x      /  AST  21     /  ALT  27     /  AlkPhos  92     10 Jim 2021 07:25    LIVER FUNCTIONS - ( 10 Jim 2021 07:25 )  Alb: 2.9 g/dL / Pro: 7.9 g/dL / ALK PHOS: 92 U/L / ALT: 27 U/L / AST: 21 U/L / GGT: x           RADIOLOGY:  Renal sonogram shows 10-11 cm kidneys with no hydronephrosis    acetaminophen   Tablet .. 650 milliGRAM(s) Oral every 6 hours  aluminum hydroxide/magnesium hydroxide/simethicone Suspension 30 milliLiter(s) Oral every 4 hours PRN  atorvastatin 40 milliGRAM(s) Oral at bedtime  cadexomer iodine 0.9% Gel 1 Application(s) Topical daily  dextrose 40% Gel 15 Gram(s) Oral once  dextrose 5%. 1000 milliLiter(s) IV Continuous <Continuous>  dextrose 5%. 1000 milliLiter(s) IV Continuous <Continuous>  dextrose 50% Injectable 25 Gram(s) IV Push once  dextrose 50% Injectable 12.5 Gram(s) IV Push once  dextrose 50% Injectable 25 Gram(s) IV Push once  glucagon  Injectable 1 milliGRAM(s) IntraMuscular once  heparin   Injectable 5000 Unit(s) SubCutaneous every 12 hours  insulin glargine Injectable (LANTUS) 10 Unit(s) SubCutaneous every morning  insulin lispro (ADMELOG) corrective regimen sliding scale   SubCutaneous four times a day before meals  levETIRAcetam  Solution 500 milliGRAM(s) Oral two times a day  ondansetron   Disintegrating Tablet 4 milliGRAM(s) Oral every 6 hours PRN  pantoprazole   Suspension 40 milliGRAM(s) Oral daily  sodium chloride 0.9%. 1000 milliLiter(s) IV Continuous <Continuous>    ASSESSMENT/PLAN:    Transferred here for rehab on 6/4/21 after long complex hospital course including covid pneumonia, septic shock, empyema, stroke, PE, hematomas while on A/C, dysphagia/PEG and digital ischemic wounds. He had an SUKI in April with peak Cr 2.5 mg/dL. Over past week or so there has been a small, slow rise in Cr from 0.75 - 5/29/21 to 1.66 - 6/7/21.    SUKI due to bactrim/pre-renal   Bactrim d/c-d  Trial of IVF  Avoid nephrotoxins  F/u [facility]    [phone]

## 2021-06-11 NOTE — CONSULT NOTE ADULT - ASSESSMENT
post COVID 19 with CVAs, PE, respiratory failure  s/p empyema  currently not on oral a/c    suggest  cardiac monitoring ( event recorder, extended holter, ILR) as available  resume anticoag when ok with neuro  hematology coagulopathy work up  consider for RAJESH as well

## 2021-06-11 NOTE — PROGRESS NOTE ADULT - SUBJECTIVE AND OBJECTIVE BOX
Subjective: Loose stool with bloating.      HISTORY OF PRESENT ILLNESS    61yo M with T2DM and recently diagnosed COVID (3/19) BIBEMS to [facility] on 4/1 for hypoxic respiratory failure secondary to COVID PNA, requiring endotracheal intubation (4/1-4/13). Course significant for Septic shock on admission, treated with vancomycin and cefepime. Found to have ESBL Klebsiella PNA c/b empyema s/p VATs decortication. Treated with prolonged course of Meropenem while hospitalized and transitioned to Augmentin/Bactrim upon discharge (until 6/8). Will follow up with ID as outpatient.     Patient had acute ischemic CVA with areas of hemorrhagic transformation. Found to have multi-focal acute-to-subacute infarctions. 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 Subsequent scans showed hemorrhagic conversion. Transferred to [facility] for Neurosurgery evaluation, no surgical intervention at this time. Will require outpatient follow up.     Course complicated by RITO segmental PEs with e/o RH strain: Provoked in the setting of sepsis/high inflammatory state with COVID infection. LE Dopplers negative x 2. Initially anticoagulated with heparin gtt. Course further complicated by a right chest wall and right obturator hematomas. Due to concern for worsening hemorrhagic conversion of stroke and hematomas, the patient's therapeutic anticoagulation was discontinued. He was evaluated by the neurosurgical team, which said that there was no indication for acute intervention. Patient will not be anticoagulated upon discharge.   Patient's AMS likely in the setting of acute strokes, improved throughout hospitalization. Due to prolonged intubation with subsequent AMS and dysphagia, pt had PEG tube placed. Pt failed MBS following improvement of mental status.     Patient also had bedside debridement and woundcare of her BLE digits and will follow up with Podiatry as an outpatient.    Medically cleared for discharge to  [facility] 6/[facility].      REVIEW OF SYMPTOMS  [X] Constitutional WNL     [X] Cardio WNL            [X] Resp-empeyema           [X] GI NV                          [X] -ulises                   [X] Heme-hematoma              [X] Endo-dm2                    [X] Skin WNL                 [X] MSK WNL            [X] Neuro-cva                  [X] Cognitive WNL        [X] Psych WNL      VITALS  Vital Signs Last 24 Hrs  T(C): 36.3 (11 Jun 2021 08:30), Max: 36.3 (11 Jun 2021 08:30)  T(F): 97.3 (11 Jun 2021 08:30), Max: 97.3 (11 Jun 2021 08:30)  HR: 96 (11 Jun 2021 08:30) (96 - 97)  BP: 121/77 (11 Jun 2021 08:30) (105/68 - 121/77)  BP(mean): --  RR: 16 (11 Jun 2021 08:30) (16 - 16)  SpO2: 98% (11 Jun 2021 08:30) (97% - 98%)      PHYSICAL EXAM  Constitutional - NAD, Comfortable  HEENT - NCAT, EOMI  Neck - Supple, No limited ROM  Chest - CTA bilaterally  Cardiovascular - RR  Abdomen - BS+, Soft, NTND, PEG in place  Extremities - No C/C/E, No calf tenderness   Skin-no rash  Wounds-per H&P      Neurologic Exam - Awake, Alert, AAO x3      No aphasia, impaired attention and concentration, follows simple commands     Cranial Nerves - dysphagia        Coordination/dysmetria - impaired             Balance - impaired     Psychiatric - Mood stable, Affect WNL          RECENT LABS                        11.0   7.95  )-----------( 388      ( 10 Jim 2021 07:25 )             35.8     06-11    139  |  104  |  32<H>  ----------------------------<  134<H>  4.4   |  26  |  1.43<H>    Ca    9.8      11 Jun 2021 07:05    TPro  7.9  /  Alb  2.9<L>  /  TBili  0.4  /  DBili  x   /  AST  21  /  ALT  27  /  AlkPhos  92  06-10      LIVER FUNCTIONS - ( 10 Jim 2021 07:25 )  Alb: 2.9 g/dL / Pro: 7.9 g/dL / ALK PHOS: 92 U/L / ALT: 27 U/L / AST: 21 U/L / GGT: x               RADIOLOGY/OTHER RESULTS      EXAM:  CT BRAIN      PROCEDURE DATE:  06/10/2021        INTERPRETATION:  CLINICAL INDICATION: Follow-up CVA    5mm axial sections of the brain were obtained from base to vertex, without the intravenous administration of contrast material. Coronal and sagittal computer generated reconstructed views are available.    Comparison is made with the prior CT of 5/5/2021.    There is moderate atrophy for the patient's age which is nonspecific but may be related to chronic illness was certain medications. There CVA in the left temporal occipital region, the right occipital region and the left cerebellum. The left cerebellar infarct is smaller and has normally evolved since the prior exam. The other infarcts are unchanged. Lucency in the left parietal white matter along the cortex is identified which may represent an interval subacute infarct. There is no hemorrhage.      IMPRESSION: Multiple infarcts. Left parietal cortical infarct may be new since the prior exam 5/5/2021 and appears subacute. Left cerebellar infarct is normally evolved. Left temporal occipital and right occipital infarcts unchanged.                ROSIE TROTETR MD, ATTENDING RADIOLOGIST  This document has been electronically signed. Jim 10 2021 12:41PM  CURRENT MEDICATIONS  MEDICATIONS  (STANDING):  acetaminophen   Tablet .. 650 milliGRAM(s) Oral every 6 hours  cadexomer iodine 0.9% Gel 1 Application(s) Topical daily  dextrose 40% Gel 15 Gram(s) Oral once  dextrose 5%. 1000 milliLiter(s) (50 mL/Hr) IV Continuous <Continuous>  dextrose 5%. 1000 milliLiter(s) (100 mL/Hr) IV Continuous <Continuous>  dextrose 50% Injectable 25 Gram(s) IV Push once  dextrose 50% Injectable 12.5 Gram(s) IV Push once  dextrose 50% Injectable 25 Gram(s) IV Push once  glucagon  Injectable 1 milliGRAM(s) IntraMuscular once  heparin   Injectable 5000 Unit(s) SubCutaneous every 12 hours  insulin glargine Injectable (LANTUS) 10 Unit(s) SubCutaneous every morning  insulin lispro (ADMELOG) corrective regimen sliding scale   SubCutaneous four times a day before meals  levETIRAcetam  Solution 500 milliGRAM(s) Oral two times a day  pantoprazole   Suspension 40 milliGRAM(s) Oral daily    MEDICATIONS  (PRN):  aluminum hydroxide/magnesium hydroxide/simethicone Suspension 30 milliLiter(s) Oral every 4 hours PRN Dyspepsia  ondansetron   Disintegrating Tablet 4 milliGRAM(s) Oral every 6 hours PRN Nausea and/or Vomiting      ASSESSMENT & PLAN    Continue comprehensive acute rehab program consisting of 3hrs/day of OT/PT and SLP. Subjective: Loose stool with bloating overnight.      HISTORY OF PRESENT ILLNESS    61yo M with T2DM and recently diagnosed COVID (3/19) BIBEMS to [facility] on 4/1 for hypoxic respiratory failure secondary to COVID PNA, requiring endotracheal intubation (4/1-4/13). Course significant for Septic shock on admission, treated with vancomycin and cefepime. Found to have ESBL Klebsiella PNA c/b empyema s/p VATs decortication. Treated with prolonged course of Meropenem while hospitalized and transitioned to Augmentin/Bactrim upon discharge (until 6/8). Will follow up with ID as outpatient.     Patient had acute ischemic CVA with areas of hemorrhagic transformation. Found to have multi-focal acute-to-subacute infarctions. 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 Subsequent scans showed hemorrhagic conversion. Transferred to [facility] for Neurosurgery evaluation, no surgical intervention at this time. Will require outpatient follow up.     Course complicated by RITO segmental PEs with e/o RH strain: Provoked in the setting of sepsis/high inflammatory state with COVID infection. LE Dopplers negative x 2. Initially anticoagulated with heparin gtt. Course further complicated by a right chest wall and right obturator hematomas. Due to concern for worsening hemorrhagic conversion of stroke and hematomas, the patient's therapeutic anticoagulation was discontinued. He was evaluated by the neurosurgical team, which said that there was no indication for acute intervention. Patient will not be anticoagulated upon discharge.   Patient's AMS likely in the setting of acute strokes, improved throughout hospitalization. Due to prolonged intubation with subsequent AMS and dysphagia, pt had PEG tube placed. Pt failed MBS following improvement of mental status.     Patient also had bedside debridement and wound care of her BLE digits and will follow up with Podiatry as an outpatient.    Med[facility] for [facility] to  [facility] 6[facility].      REVIEW OF SYMPTOMS  [X] Constitutional WNL     [X] Cardio WNL            [X] Resp-empeyema           [X] GI NV                          [X] -ulises                   [X] Heme-hematoma              [X] Endo-dm2                    [X] Skin WNL                 [X] MSK WNL            [X] Neuro-cva                  [X] Cognitive WNL        [X] Psych WNL      VITALS  Vital Signs Last 24 Hrs  T(C): 36.3 (11 Jun 2021 08:30), Max: 36.3 (11 Jun 2021 08:30)  T(F): 97.3 (11 Jun 2021 08:30), Max: 97.3 (11 Jun 2021 08:30)  HR: 96 (11 Jun 2021 08:30) (96 - 97)  BP: 121/77 (11 Jun 2021 08:30) (105/68 - 121/77)  BP(mean): --  RR: 16 (11 Jun 2021 08:30) (16 - 16)  SpO2: 98% (11 Jun 2021 08:30) (97% - 98%)      PHYSICAL EXAM  Constitutional - NAD in bed  HEENT - NCAT, EOMI  Neck - Supple, No limited ROM  Chest - CTA bilaterally  Cardiovascular - RR  Abdomen - BS hyperactive, Soft, NTND, PEG in place  Extremities - No C/C/E, No calf tenderness   Skin-no rash  Wounds-per H&P      Neurologic Exam - Awake, Alert, AAO x3      No aphasia, impaired attention and concentration, follows simple commands     Cranial Nerves - dysphagia        Coordination/dysmetria - impaired             Balance - impaired     Psychiatric - Mood stable, Affect WNL          RECENT LABS                        11.0   7.95  )-----------( 388      ( 10 Jim 2021 07:25 )             35.8     06-11    139  |  104  |  32<H>  ----------------------------<  134<H>  4.4   |  26  |  1.43<H>    Ca    9.8      11 Jun 2021 07:05    TPro  7.9  /  Alb  2.9<L>  /  TBili  0.4  /  DBili  x   /  AST  21  /  ALT  27  /  AlkPhos  92  06-10      LIVER FUNCTIONS - ( 10 Jim 2021 07:25 )  Alb: 2.9 g/dL / Pro: 7.9 g/dL / ALK PHOS: 92 U/L / ALT: 27 U/L / AST: 21 U/L / GGT: x               RADIOLOGY/OTHER RESULTS      EXAM:  CT BRAIN      PROCEDURE DATE:  06/10/2021        INTERPRETATION:  CLINICAL INDICATION: Follow-up CVA    5mm axial sections of the brain were obtained from base to vertex, without the intravenous administration of contrast material. Coronal and sagittal computer generated reconstructed views are available.    Comparison is made with the prior CT of 5/5/2021.    There is moderate atrophy for the patient's age which is nonspecific but may be related to chronic illness was certain medications. There CVA in the left temporal occipital region, the right occipital region and the left cerebellum. The left cerebellar infarct is smaller and has normally evolved since the prior exam. The other infarcts are unchanged. Lucency in the left parietal white matter along the cortex is identified which may represent an interval subacute infarct. There is no hemorrhage.      IMPRESSION: Multiple infarcts. Left parietal cortical infarct may be new since the prior exam 5/5/2021 and appears subacute. Left cerebellar infarct is normally evolved. Left temporal occipital and right occipital infarcts unchanged.                ROSIE TROTTER MD, ATTENDING RADIOLOGIST  This document has been electronically signed. Jim 10 2021 12:41PM  CURRENT MEDICATIONS  MEDICATIONS  (STANDING):  acetaminophen   Tablet .. 650 milliGRAM(s) Oral every 6 hours  cadexomer iodine 0.9% Gel 1 Application(s) Topical daily  dextrose 40% Gel 15 Gram(s) Oral once  dextrose 5%. 1000 milliLiter(s) (50 mL/Hr) IV Continuous <Continuous>  dextrose 5%. 1000 milliLiter(s) (100 mL/Hr) IV Continuous <Continuous>  dextrose 50% Injectable 25 Gram(s) IV Push once  dextrose 50% Injectable 12.5 Gram(s) IV Push once  dextrose 50% Injectable 25 Gram(s) IV Push once  glucagon  Injectable 1 milliGRAM(s) IntraMuscular once  heparin   Injectable 5000 Unit(s) SubCutaneous every 12 hours  insulin glargine Injectable (LANTUS) 10 Unit(s) SubCutaneous every morning  insulin lispro (ADMELOG) corrective regimen sliding scale   SubCutaneous four times a day before meals  levETIRAcetam  Solution 500 milliGRAM(s) Oral two times a day  pantoprazole   Suspension 40 milliGRAM(s) Oral daily    MEDICATIONS  (PRN):  aluminum hydroxide/magnesium hydroxide/simethicone Suspension 30 milliLiter(s) Oral every 4 hours PRN Dyspepsia  ondansetron   Disintegrating Tablet 4 milliGRAM(s) Oral every 6 hours PRN Nausea and/or Vomiting      ASSESSMENT & PLAN    Continue comprehensive acute rehab program consisting of 3hrs/day of OT/PT and SLP.

## 2021-06-11 NOTE — PROGRESS NOTE ADULT - ASSESSMENT
61yo male with PMH of DM who was initially admitted to [facility] ICU for hypoxic respiratory failure 2/2 COVID c/b PE with R heart strain, cardiogenic and septic shock, ischemic and hemorrhagic CVA and ESBL klebsiella ventilator associate PNA, transferred to [facility] for neurosurgery eval and further management. s/p PEG. Course c/b right empyema s/p VATs and obturator bleed (resolved). Now admitted for functional decline.     #Debility  #ESBL Klebsiella PNA c/b empyema  #s/p COVID pneumonia  - 5/12 underwent right VATS converted to open L thoracotomy, decortication, drainage of abscess and flex bronch.  - c/w meropenem 5/7 and transition to Bactrim DS 2 tabs TID + Augmentin 875mg po q12  upon DC to [facility] --- Bactrim DC'ed 6/7 due to SUKI, augmentin completed 6/8  - ID following, recs appreciated - needs close follow up in ID clinic for repeat imaging, Dr. Chambers  - CT Chest 5/29 with improvement in bilateral opacities. Slight decrease in right chest wall hematoma. Stable loculated right hydropneumothorax.   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 Albarado with all chest tubes removed by 5/18  -CT Chest 5/16 Interval new small cavitary lesions in the right lower lobe and right chest wall hematoma - no thoracic surgery intervention indicated per Dr. Albarado.    #Obturator Hematoma (stable)  - bleeding hematoma in right thigh on CT on 5/7  - CT hip showing non active bleed, hematoma on right thigh on 5/8  - doppler to r/o DVT in LE, per IR no role IVC filter  - active again on 5/17, now stable  - was on heparin drip on and off, now off. C/w DVT ppx heparin bid    #Pulmonary embolus  - Pulmonary embolus noted on ct scan performed at time of admission  - Etiology of venous thromboembolism uncertain at this time; no known history of thrombophilia; provoked in setting of acute covid illness   - s/p heparin drip held due to recurrent bleed in right obturator and right chest wall hematoma   - 5000 heparin bid for dvt ppx  - Surgically cleared for AC in regards to hematomas  - neurology clearance for AC as pt recently had hemorrhagic conversion of ischemic CVA    #CVA  -Acute bilateral ischemic strokes, with concern for hemorrhagic conversion, noted on CT scan on 4/8  -Etiology of acute ischemic stroke uncertain at this time; TTE with bubble performed at bedside without any evidence of right-to-left shunt; no arrhythmia noted on telemetry throughout stay in the MICU  -CTA head and neck without any evidence of carotid artery stenosis or dissection  - 6/10 CT head: multiple infarcts, no hemorrhage  - CT head 6/14 for re-eval    #Encephalopathy due to multifactorial causes - improving  - Etiology likely multi-factorial in setting of acute ischemic infarctions of the brain in conjunction vs acute kidney injury with uremia (now resolving) vs resolving hypoxic respiratory failure, covid-19 infection, superimposed bacterial pneumonia, urinary retention, concern for nutritional deficiencies  - Reorientation with family at bedside (pt speaks Gukuldeep, does best with family present)  - MRI Brain, and MRA Head demonstrated multiple evolving subacute infarcts with associated hemorrhages, likely expected evolution of ischemia  - As per neuro, EEG not suggestive of seizure-like activity  - C/w Keppra 500 mg BID-> neuro f/u outpatient   - repeat CT head 5/5 normal.     #Diabetes mellitus type II  - April 2021: a1c 10 -- improved to 5.7  - NPH discontinued  - Lantus 10 units QAM, moderate ISS, accuchecks  - Follow up endo outpatient    #Hypertension.  - Was on metoprolol tartrate 25 mg twice daily since 4/16; discontinued  - BP is stable  - If is hypertensive persistently, can consider antihypertensives    #Dysphagia  - PEG tube placed on 4/30, tube feedings started on 4/30, + free water q8    #Peripheral vascular disease  -Dry gangrene noted over distal toes on bilateral lower extremities--most concerning for microvascular disease subsequent to vasopressor administration   -Dorsalis pedis pulses intact bilaterally  -ERASMO demonstrating right ERASMO: 1.09, left ERASMO: 1.24, right TBI: 0.78, and left TBI: 0.73.  -Podiatry recs appreciated, no acute surgical intervention at this time, applying betadine to wound  -As per ID non infectious appearing     #Urinary Retention - improved  - Mann removed 5/30, urinating appropriately  - c/w doxazosin    #GI/Bowel Mgmt   - Continent c/w Senna, Miralax PRN  - added PPI via PEG tube  - maalox  - zofran for nausea    #Precautions / PROPHYLAXIS:   - Falls, Cardiac, Sternal, Spinal, Seizure   - ortho: Weight bearing status: WBAT   - Lungs: Aspiration, Incentive Spirometer   - Pressure injury/Skin: OOB to Chair, PT/OT    - DVT: HSQ

## 2021-06-11 NOTE — PROGRESS NOTE ADULT - SUBJECTIVE AND OBJECTIVE BOX
CC: f/u for klebsiella empyema    Patient reports; he had loose BM's today but he received miralax  yesterday    REVIEW OF SYSTEMS:  All other review of systems negative (Comprehensive ROS)    Antimicrobials Day #  :off    Other Medications Reviewed  MEDICATIONS  (STANDING):  acetaminophen   Tablet .. 650 milliGRAM(s) Oral every 6 hours  atorvastatin 40 milliGRAM(s) Oral at bedtime  cadexomer iodine 0.9% Gel 1 Application(s) Topical daily  dextrose 40% Gel 15 Gram(s) Oral once  dextrose 5%. 1000 milliLiter(s) (50 mL/Hr) IV Continuous <Continuous>  dextrose 5%. 1000 milliLiter(s) (100 mL/Hr) IV Continuous <Continuous>  dextrose 50% Injectable 25 Gram(s) IV Push once  dextrose 50% Injectable 12.5 Gram(s) IV Push once  dextrose 50% Injectable 25 Gram(s) IV Push once  glucagon  Injectable 1 milliGRAM(s) IntraMuscular once  heparin   Injectable 5000 Unit(s) SubCutaneous every 12 hours  insulin glargine Injectable (LANTUS) 10 Unit(s) SubCutaneous every morning  insulin lispro (ADMELOG) corrective regimen sliding scale   SubCutaneous four times a day before meals  levETIRAcetam  Solution 500 milliGRAM(s) Oral two times a day  pantoprazole   Suspension 40 milliGRAM(s) Oral daily  sodium chloride 0.9%. 1000 milliLiter(s) (75 mL/Hr) IV Continuous <Continuous>    T(F): 97.3 (06-11-21 @ 08:30), Max: 97.3 (06-11-21 @ 08:30)  HR: 96 (06-11-21 @ 08:30)  BP: 121/77 (06-11-21 @ 08:30)  RR: 16 (06-11-21 @ 08:30)  SpO2: 98% (06-11-21 @ 08:30)  Wt(kg): --    PHYSICAL EXAM:  General: alert, no acute distress  Eyes:  anicteric, no conjunctival injection, no discharge  Oropharynx: no lesions or injection 	  Neck: supple, without adenopathy  Lungs: clear to auscultation  Heart: regular rate and rhythm; no murmur, rubs or gallops  Abdomen: soft, nondistended, nontender, without mass or organomegaly  Skin: ischemic changes to distal digits of left foot  Extremities: no clubbing, cyanosis, or edema  Neurologic: alert, oriented, moves all extremities    LAB RESULTS:                        11.0   7.95  )-----------( 388      ( 10 Jim 2021 07:25 )             35.8     06-11    139  |  104  |  32<H>  ----------------------------<  134<H>  4.4   |  26  |  1.43<H>    Ca    9.8      11 Jun 2021 07:05    TPro  7.9  /  Alb  2.9<L>  /  TBili  0.4  /  DBili  x   /  AST  21  /  ALT  27  /  AlkPhos  92  06-10    LIVER FUNCTIONS - ( 10 Jim 2021 07:25 )  Alb: 2.9 g/dL / Pro: 7.9 g/dL / ALK PHOS: 92 U/L / ALT: 27 U/L / AST: 21 U/L / GGT: x             MICROBIOLOGY:  RECENT CULTURES:      RADIOLOGY REVIEWED:    < from: CT Head No Cont (06.10.21 @ 12:20) >  IMPRESSION: Multiple infarcts. Left parietal cortical infarct may be new since the prior exam 5/5/2021 and appears subacute. Left cerebellar infarct is normally evolved. Left temporal occipital and right occipital infarcts unchanged.    < end of copied text >  < from: CT Chest No Cont (06.08.21 @ 12:09) >    IMPRESSION: Small right hydropneumothorax is stable.  Right chest wall hematoma slightly decreased  Stable patchy subpleural opacities.

## 2021-06-11 NOTE — PROGRESS NOTE ADULT - SUBJECTIVE AND OBJECTIVE BOX
Patient is a 60y old  Male who presents with a chief complaint of CVA (10 Jim 2021 18:34)      Patient seen and examined at bedside.  Denies chest pain, dyspnea, abd pain.    ALLERGIES:  No Known Allergies    MEDICATIONS  (STANDING):  acetaminophen   Tablet .. 650 milliGRAM(s) Oral every 6 hours  cadexomer iodine 0.9% Gel 1 Application(s) Topical daily  dextrose 40% Gel 15 Gram(s) Oral once  dextrose 5%. 1000 milliLiter(s) (50 mL/Hr) IV Continuous <Continuous>  dextrose 5%. 1000 milliLiter(s) (100 mL/Hr) IV Continuous <Continuous>  dextrose 50% Injectable 25 Gram(s) IV Push once  dextrose 50% Injectable 12.5 Gram(s) IV Push once  dextrose 50% Injectable 25 Gram(s) IV Push once  glucagon  Injectable 1 milliGRAM(s) IntraMuscular once  heparin   Injectable 5000 Unit(s) SubCutaneous every 12 hours  insulin glargine Injectable (LANTUS) 10 Unit(s) SubCutaneous every morning  insulin lispro (ADMELOG) corrective regimen sliding scale   SubCutaneous four times a day before meals  levETIRAcetam  Solution 500 milliGRAM(s) Oral two times a day  pantoprazole   Suspension 40 milliGRAM(s) Oral daily  polyethylene glycol 3350 17 Gram(s) Oral daily  senna 2 Tablet(s) Oral at bedtime    MEDICATIONS  (PRN):  aluminum hydroxide/magnesium hydroxide/simethicone Suspension 30 milliLiter(s) Oral every 4 hours PRN Dyspepsia  ondansetron   Disintegrating Tablet 4 milliGRAM(s) Oral every 6 hours PRN Nausea and/or Vomiting    Vital Signs Last 24 Hrs  T(F): 97 (10 Jim 2021 19:31), Max: 97.1 (10 Jim 2021 07:44)  HR: 97 (10 Jim 2021 19:31) (87 - 97)  BP: 105/68 (10 Jim 2021 19:31) (98/65 - 105/68)  RR: 16 (10 Jim 2021 19:31) (16 - 16)  SpO2: 97% (10 Jim 2021 19:31) (97% - 99%)  I&O's Summary    10 Jim 2021 07:01  -  11 Jun 2021 07:00  --------------------------------------------------------  IN: 1440 mL / OUT: 400 mL / NET: 1040 mL        PHYSICAL EXAM:  General: NAD, A/O x 3  ENT: MMM  Neck: Supple, No JVD  Lungs: Clear to auscultation bilaterally  Cardio: RRR, S1/S2, No murmurs  Abdomen: Soft, Nontender, Nondistended; Bowel sounds present  Extremities: No calf tenderness, No pitting edema    LABS:                        11.0   7.95  )-----------( 388      ( 10 Jim 2021 07:25 )             35.8       06-10    139  |  104  |  28  ----------------------------<  116  4.9   |  26  |  1.38    Ca    10.1      10 Jim 2021 07:25    TPro  7.9  /  Alb  2.9  /  TBili  0.4  /  DBili  x   /  AST  21  /  ALT  27  /  AlkPhos  92  06-10     eGFR if Non African American: 56 mL/min/1.73M2 (06-10-21 @ 07:25)  eGFR if : 65 mL/min/1.73M2 (06-10-21 @ 07:25)             04-26 Chol 114 mg/dL LDL -- HDL 35 mg/dL Trig 126 mg/dL              POCT Blood Glucose.: 139 mg/dL (10 Jim 2021 22:47)  POCT Blood Glucose.: 127 mg/dL (10 Jim 2021 17:12)  POCT Blood Glucose.: 204 mg/dL (10 Jim 2021 12:14)  POCT Blood Glucose.: 135 mg/dL (10 Jim 2021 07:41)          COVID-19 PCR: NotDetec (06-02-21 @ 08:50)  COVID-19 PCR: NotDetec (05-31-21 @ 07:10)  COVID-19 PCR: NotDetec (05-26-21 @ 07:21)  COVID-19 PCR: Detected (05-18-21 @ 05:34)      RADIOLOGY & ADDITIONAL TESTS:    Care Discussed with Consultants/Other Providers:

## 2021-06-11 NOTE — CONSULT NOTE ADULT - EXTREMITIES COMMENTS
Negin is a 32 year old year old female here for an initial OB visit.  She is . Patient's last menstrual period was 2019. Gestational Age 13w0d. FIONA 2020. She reports nausea and vomiting has improved significantly. She is able to eat and drink as the day progresses. She denies any fevers/chills, headaches, vision changes, SOB, diarrhea, constipation, lower abdominal pain, vaginal bleeding, dysuria or abnormal vaginal discharge. She has a hx of anxiety and depression. Stable at this time without any medication. Denies any suicidal or homicidal ideation.     OB History    Para Term  AB Living   4 3 3     3   SAB TAB Ectopic Molar Multiple Live Births           0 3      # Outcome Date GA Lbr Quinton/2nd Weight Sex Delivery Anes PTL Lv   4 Current            3 Term 10/31/18 40w3d 05:00 / 00:08 3.756 kg M Vag-Spont EPI N EDILSON   2 Term 11 40w0d  3.232 kg M Vag-Spont   EDILSON   1 Term 06 39w5d  3.345 kg M Vag-Spont   EDILSON      Birth Comments: System Generated. Please review and update pregnancy details.       Gynecological History:     Negin Gonzalez has positive history of STDs, Chlamydia.  Negin Gonzalez has positive history of abnormal Pap smears. Last Pap result was ASCUS, HPV negative in 2018.    Past Medical History:  Past Medical History:   Diagnosis Date   • Anxiety    • Chlamydia    • Depressive disorder        Past Surgical History:    CHOLECYSTECTOMY                                 2007          TONSILLECTOMY                                                 Family History   Problem Relation Age of Onset   • Patient is unaware of any medical problems Mother    • Diabetes Father    • Heart disease Maternal Grandmother    • Cancer Paternal Grandfather         throat cancer   • Patient is unaware of any medical problems Brother      Review of patient's family status indicates:    Mother                         Alive                     Father                         Alive                      Maternal Grandmother           Alive                     Paternal Grandfather                             Son                            Alive                     Son                            Alive                     Son                            Alive                     Brother                        Alive                     Maternal Grandfather           Alive                     Paternal Grandmother                               Social History     Socioeconomic History   • Marital status: Single     Spouse name: Not on file   • Number of children: 3   • Years of education: Not on file   • Highest education level: Not on file   Tobacco Use   • Smoking status: Current Every Day Smoker     Packs/day: 0.50     Types: Cigarettes     Start date: 2002   • Smokeless tobacco: Never Used   Substance and Sexual Activity   • Alcohol use: Not Currently     Frequency: Monthly or less     Drinks per session: 1 or 2     Binge frequency: Never     Comment: rare   • Drug use: No   • Sexual activity: Yes     Partners: Male     Birth control/protection: None   Lifestyle   • Physical activity:     Days per week: 0 days     Minutes per session: 0 min   • Stress: Not on file       Patient denies a history of domestic violence or sexual abuse.    Genetic Screening/Teratology Counseling    Reviewed:  2019 11:46 AM Mother Father   Name of Parent:       Have you had genetic carrier testing?:       If yes, what were the results?:       Any history of the following…     Thalassemia, or of Italian, Sinhala, Mediterranean or  descent?:   No No   Neural tube defect, such as meningomyelocele, spina bifida, or anencephaly?:   No No   Congenital heart defect?:   No No   Down syndrome?:   No No   Darius-Sachs disease, or of Ashkenazi Hindu descent?:   No No   Sickle cell disease or trait:   No No   Hemophilia or other clotting disorders:   No No   Muscular dystrophy:   No No   Cystic fibrosis:   Neg No    Audrain's chorea:   No No   Cognitive disability, developmental delay, or autism?:   Neg No   If yes, was the person tested for Fragile X?:   No No   Any other inherited genetic or chromosomal disorder?:   No No   A metabolic disorder (e.g. insulin dependent diabetes or PKU):   No No   A child with any birth defects not listed above:   No No   Recurrent pregnancy loss (2 or more) or any stillbirth?:   No           ALLERGIES:   Allergen Reactions   • Shrimp   (Food) HIVES       No outpatient medications have been marked as taking for the 12/20/19 encounter (First OB Visit) with Pari Villa NP.       Exam:   Visit Vitals  /64   Ht 5' 4\" (1.626 m)   Wt 98 kg   LMP 09/20/2019   BMI 37.09 kg/m²     Prenatal Physical     OB Prenatal Exam: Last filed by Pari Villa NP on 12/20/2019 12:00 PM     General Physical Exam     HEENT:  normal  Heart:  normal  Skin:  normal     Thyroid:  normal  Lungs:  normal  Extremities:  normal     Lymph Nodes:  normal  Breasts:  normal  Neurological:  normal     Abdomen:  normal                  Pelvic Exam     Vulva:  normal  Vagina:  normal     Cervix:  normal  Adnexa:  normal     Rectum:  normal  Spines:  average     Subpubic Arch:  normal                             The following topics were reviewed and discussed with the patient at this visit: FIRST TRIMESTER    HIV and other routine prenatal tests    Risk factors identified by prenatal history    Anticipated course of prenatal care    Nutrition and weight gain counseling; special diet    Toxoplasmosis precautions (cats/raw meat)    Sexual activity    Exercise    Influenza vaccine    Smoking counseling    Environmental / Work hazards    Travel    Alcohol    Illicit / Recreational drugs    Use of any medications (including supplements, vitamins, herbal or OTC drugs)    Indications for Ultrasound    Domestic Violence    Seat belt use    Childbirth Classes / Hospital facilities, She declines genetic screening after a  discussion of her options..      Assessment:  13w0d pregnancy.    Plan:  Prenatal labs ordered and cultures collected today.  Continue prenatal vitamins daily; will send rx for chewable tablets.   Medical history reviewed.   Smoking cessation encouraged and pt offered nicotine patch but declines.  Danger s/s reviewed.   RTC in 3 weeks or sooner if needed.    All of the patient’s questions were answered; she verbalizes understanding and is in agreement with the above mentioned plan.  She is certainly encouraged to call my office should any questions or concerns develop prior to her followup appointment.    Pari Villa NP       Walks and moves extremities

## 2021-06-11 NOTE — CONSULT NOTE ADULT - ASSESSMENT
This is a 60 year old man with a history of diabtes, COVID (3/19) BIBEMS to [facility] on on April 1st for  for hypoxic respiratory failure secondary to COVID PNA, requiring endotracheal intubation (4/1-4/13). Course significant for Septic shock on admission, treated with vancomycin and cefepime. Found to have ESBL Klebsiella PNA c/b empyema s/p VATs decortication.   Patient had a prolonged hospital stay complicated by CVA with areas of hemorrhagic transformation.  Found to have Left upper lobe segmental PE with right heart strain, provoked by COVID and sepsis.  Patient could not be anticoagulated due to hemorrhagic conversion of CVA.  Patent now in rehab.      Patient on SQ heparin 5000 units for DVT prophylaxis. Given recent hemorrhagic stroke would not anticoagulate for PE unless PE progresses to hemodynamically unstable state or oxygen desaturation, in which case we would be forced to anticoagulate.   The typical 1 month period after which could again be anticoagulated is approaching. If patient cleared for anticoagulation by neurology can again begin anticoagulation. Would recommend restarting with a heparin drip at first for 2 days since this can rapidly be reversed without prothrombotic medications.  After patient proven stable on anticoagulation, can transition after at least 2 days to an outpatient regimen such as lovenox or DOAC.

## 2021-06-12 LAB
ANION GAP SERPL CALC-SCNC: 8 MMOL/L — SIGNIFICANT CHANGE UP (ref 5–17)
BUN SERPL-MCNC: 34 MG/DL — HIGH (ref 7–23)
CALCIUM SERPL-MCNC: 9.9 MG/DL — SIGNIFICANT CHANGE UP (ref 8.4–10.5)
CHLORIDE SERPL-SCNC: 105 MMOL/L — SIGNIFICANT CHANGE UP (ref 96–108)
CO2 SERPL-SCNC: 27 MMOL/L — SIGNIFICANT CHANGE UP (ref 22–31)
CREAT SERPL-MCNC: 1.27 MG/DL — SIGNIFICANT CHANGE UP (ref 0.5–1.3)
CULTURE RESULTS: SIGNIFICANT CHANGE UP
GLUCOSE BLDC GLUCOMTR-MCNC: 113 MG/DL — HIGH (ref 70–99)
GLUCOSE BLDC GLUCOMTR-MCNC: 79 MG/DL — SIGNIFICANT CHANGE UP (ref 70–99)
GLUCOSE BLDC GLUCOMTR-MCNC: 94 MG/DL — SIGNIFICANT CHANGE UP (ref 70–99)
GLUCOSE BLDC GLUCOMTR-MCNC: 96 MG/DL — SIGNIFICANT CHANGE UP (ref 70–99)
GLUCOSE SERPL-MCNC: 117 MG/DL — HIGH (ref 70–99)
POTASSIUM SERPL-MCNC: 3.9 MMOL/L — SIGNIFICANT CHANGE UP (ref 3.5–5.3)
POTASSIUM SERPL-SCNC: 3.9 MMOL/L — SIGNIFICANT CHANGE UP (ref 3.5–5.3)
SODIUM SERPL-SCNC: 140 MMOL/L — SIGNIFICANT CHANGE UP (ref 135–145)
SPECIMEN SOURCE: SIGNIFICANT CHANGE UP

## 2021-06-12 PROCEDURE — 99232 SBSQ HOSP IP/OBS MODERATE 35: CPT

## 2021-06-12 RX ADMIN — Medication 650 MILLIGRAM(S): at 22:45

## 2021-06-12 RX ADMIN — HEPARIN SODIUM 5000 UNIT(S): 5000 INJECTION INTRAVENOUS; SUBCUTANEOUS at 05:21

## 2021-06-12 RX ADMIN — LEVETIRACETAM 500 MILLIGRAM(S): 250 TABLET, FILM COATED ORAL at 21:44

## 2021-06-12 RX ADMIN — Medication 650 MILLIGRAM(S): at 05:21

## 2021-06-12 RX ADMIN — Medication 650 MILLIGRAM(S): at 21:45

## 2021-06-12 RX ADMIN — ATORVASTATIN CALCIUM 40 MILLIGRAM(S): 80 TABLET, FILM COATED ORAL at 21:45

## 2021-06-12 RX ADMIN — Medication 650 MILLIGRAM(S): at 06:19

## 2021-06-12 RX ADMIN — SODIUM CHLORIDE 75 MILLILITER(S): 9 INJECTION INTRAMUSCULAR; INTRAVENOUS; SUBCUTANEOUS at 19:07

## 2021-06-12 RX ADMIN — Medication 650 MILLIGRAM(S): at 00:26

## 2021-06-12 RX ADMIN — PANTOPRAZOLE SODIUM 40 MILLIGRAM(S): 20 TABLET, DELAYED RELEASE ORAL at 13:18

## 2021-06-12 RX ADMIN — INSULIN GLARGINE 10 UNIT(S): 100 INJECTION, SOLUTION SUBCUTANEOUS at 09:32

## 2021-06-12 RX ADMIN — Medication 1 APPLICATION(S): at 13:18

## 2021-06-12 RX ADMIN — HEPARIN SODIUM 5000 UNIT(S): 5000 INJECTION INTRAVENOUS; SUBCUTANEOUS at 19:07

## 2021-06-12 RX ADMIN — LEVETIRACETAM 500 MILLIGRAM(S): 250 TABLET, FILM COATED ORAL at 05:21

## 2021-06-12 NOTE — PROGRESS NOTE ADULT - ASSESSMENT
59yo male with PMH of DM who was initially admitted to [facility] ICU for hypoxic respiratory failure 2/2 COVID c/b PE with R heart strain, cardiogenic and septic shock, ischemic and hemorrhagic CVA and ESBL klebsiella ventilator associate PNA, transferred to [facility] for neurosurgery eval and further management. s/p PEG. Course c/b right empyema s/p VATs and obturator bleed (resolved). Now admitted for functional decline.     #Debility  #ESBL Klebsiella PNA c/b empyema  #s/p COVID pneumonia  - 5/12 underwent right VATS converted to open L thoracotomy, decortication, drainage of abscess and flex bronch.  - c/w meropenem 5/7 and transition to Bactrim DS 2 tabs TID + Augmentin 875mg po q12  upon DC to [facility] --- Bactrim DC'ed 6/7 due to SUKI, augmentin completed 6/8  - ID following, recs appreciated - needs close follow up in ID clinic for repeat imaging, Dr. Chambers  - CT Chest 5/29 with improvement in bilateral opacities. Slight decrease in right chest wall hematoma. Stable loculated right hydropneumothorax.   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 Albarado with all chest tubes removed by 5/18  -CT Chest 5/16 Interval new small cavitary lesions in the right lower lobe and right chest wall hematoma - no thoracic surgery intervention indicated per Dr. Albarado.    #Obturator Hematoma (stable)  - bleeding hematoma in right thigh on CT on 5/7  - CT hip showing non active bleed, hematoma on right thigh on 5/8  - doppler to r/o DVT in LE, per IR no role IVC filter  - active again on 5/17, now stable  - was on heparin drip on and off, now off. C/w DVT ppx heparin bid    #Pulmonary embolus  - Pulmonary embolus noted on ct scan performed at time of admission  - Etiology of venous thromboembolism uncertain at this time; no known history of thrombophilia; provoked in setting of acute covid illness   - s/p heparin drip held due to recurrent bleed in right obturator and right chest wall hematoma   - 5000 heparin bid for dvt ppx  - Surgically cleared for AC in regards to hematomas  - neurology clearance for AC as pt recently had hemorrhagic conversion of ischemic CVA    #CVA  -Acute bilateral ischemic strokes, with concern for hemorrhagic conversion, noted on CT scan on 4/8  -Etiology of acute ischemic stroke uncertain at this time; TTE with bubble performed at bedside without any evidence of right-to-left shunt; no arrhythmia noted on telemetry throughout stay in the MICU  -CTA head and neck without any evidence of carotid artery stenosis or dissection  - 6/10 CT head: multiple infarcts, no hemorrhage  - CT head 6/14 for re-eval    #Encephalopathy due to multifactorial causes - improving  - Etiology likely multi-factorial in setting of acute ischemic infarctions of the brain in conjunction vs acute kidney injury with uremia (now resolving) vs resolving hypoxic respiratory failure, covid-19 infection, superimposed bacterial pneumonia, urinary retention, concern for nutritional deficiencies  - Reorientation with family at bedside (pt speaks Gukuldeep, does best with family present)  - MRI Brain, and MRA Head demonstrated multiple evolving subacute infarcts with associated hemorrhages, likely expected evolution of ischemia  - As per neuro, EEG not suggestive of seizure-like activity  - C/w Keppra 500 mg BID-> neuro f/u outpatient   - repeat CT head 5/5 normal.     #Diabetes mellitus type II  - April 2021: a1c 10 -- improved to 5.7  - NPH discontinued  - Lantus 10 units QAM, moderate ISS, accuchecks  - Follow up endo outpatient    #Hypertension.  - Was on metoprolol tartrate 25 mg twice daily since 4/16; discontinued  - BP is stable  - If is hypertensive persistently, can consider antihypertensives    #Dysphagia  - PEG tube placed on 4/30, tube feedings started on 4/30, + free water q8    #Peripheral vascular disease  -Dry gangrene noted over distal toes on bilateral lower extremities--most concerning for microvascular disease subsequent to vasopressor administration   -Dorsalis pedis pulses intact bilaterally  -ERASMO demonstrating right ERASMO: 1.09, left ERASMO: 1.24, right TBI: 0.78, and left TBI: 0.73.  -Podiatry recs appreciated, no acute surgical intervention at this time, applying betadine to wound  -As per ID non infectious appearing     #Urinary Retention - improved  - Mann removed 5/30, urinating appropriately  - c/w doxazosin    #GI/Bowel Mgmt   - Continent c/w Senna, Miralax PRN  - added PPI via PEG tube  - maalox  - zofran for nausea    #Precautions / PROPHYLAXIS:   - Falls, Cardiac, Sternal, Spinal, Seizure   - ortho: Weight bearing status: WBAT   - Lungs: Aspiration, Incentive Spirometer   - Pressure injury/Skin: OOB to Chair, PT/OT    - DVT: HSQ

## 2021-06-12 NOTE — PROGRESS NOTE ADULT - SUBJECTIVE AND OBJECTIVE BOX
patient notes vomiting 3 days ago, loose stool yesterday      REVIEW OF SYSTEMS  Constitutional - No fever,  No fatigue  Neurological - No headaches, No loss of strength  Musculoskeletal - No joint pain, No joint swelling, No muscle pain    VITALS  T(C): 36.8 (06-12-21 @ 08:45), Max: 36.8 (06-12-21 @ 08:45)  HR: 85 (06-12-21 @ 08:45) (85 - 93)  BP: 100/61 (06-12-21 @ 08:45) (99/64 - 100/61)  RR: 16 (06-12-21 @ 08:45) (16 - 16)  SpO2: 99% (06-12-21 @ 08:45) (96% - 99%)  Wt(kg): --       MEDICATIONS   acetaminophen   Tablet .. 650 milliGRAM(s) every 6 hours  aluminum hydroxide/magnesium hydroxide/simethicone Suspension 30 milliLiter(s) every 4 hours PRN  atorvastatin 40 milliGRAM(s) at bedtime  cadexomer iodine 0.9% Gel 1 Application(s) daily  dextrose 40% Gel 15 Gram(s) once  dextrose 5%. 1000 milliLiter(s) <Continuous>  dextrose 5%. 1000 milliLiter(s) <Continuous>  dextrose 50% Injectable 25 Gram(s) once  dextrose 50% Injectable 12.5 Gram(s) once  dextrose 50% Injectable 25 Gram(s) once  glucagon  Injectable 1 milliGRAM(s) once  heparin   Injectable 5000 Unit(s) every 12 hours  insulin glargine Injectable (LANTUS) 10 Unit(s) every morning  insulin lispro (ADMELOG) corrective regimen sliding scale   four times a day before meals  levETIRAcetam  Solution 500 milliGRAM(s) two times a day  ondansetron   Disintegrating Tablet 4 milliGRAM(s) every 6 hours PRN  pantoprazole   Suspension 40 milliGRAM(s) daily  sodium chloride 0.9%. 1000 milliLiter(s) <Continuous>      RECENT LABS/IMAGING    06-12    140  |  105  |  34<H>  ----------------------------<  117<H>  3.9   |  27  |  1.27    Ca    9.9      12 Jun 2021 06:50                  POCT Blood Glucose.: 113 mg/dL (06-12-21 @ 08:39)  POCT Blood Glucose.: 104 mg/dL (06-11-21 @ 22:23)  POCT Blood Glucose.: 139 mg/dL (06-11-21 @ 16:54)  POCT Blood Glucose.: 144 mg/dL (06-11-21 @ 13:04)    < from: CT Head No Cont (06.10.21 @ 12:20) >    IMPRESSION: Multiple infarcts. Left parietal cortical infarct may be new since the prior exam 5/5/2021 and appears subacute. Left cerebellar infarct is normally evolved. Left temporal occipital and right occipital infarcts unchanged.        < end of copied text >    ---------    PHYSICAL EXAM  Constitutional - NAD in bed  HEENT - NCAT, EOMI  Neck - Supple, No limited ROM  Chest - breathing comfortably   Cardiovascular - RR  Abdomen - PEG in place  Extremities - +dry skin b/l feet, heels   Wounds-per H&P      Neurologic Exam - Awake, Alert, AAO x3      No aphasia, impaired attention and concentration, follows simple commands     Cranial Nerves - dysphagia        Coordination/dysmetria - impaired           Psychiatric - Mood stable, Affect WNL      ASSESSMENT/PLAN  60y Male h/o DM with functional deficits after COVID, PE, cardiogenic/septic shock, ischemic, hemorrhagic CVA, PNA, s/p PEG  Continue current medical management  keppra for seizure prophylaxis   CT 6/10 with new left parietal cortical infarct, repeat CT recommended by neuro  off abx, afebrile  loose stool, miralax held  IVF trial, creatinine improved   followed by podiatry for dry gangrene, betadine to wound, last seen 5/28, needs follow up  cardiology consulted, holter monitor, consider RAJESH   Pain - Tylenol PRN, oxycodone   DVT PPX - heparin bid   Continue 3hrs a day of comprehensive rehab program.

## 2021-06-12 NOTE — PROGRESS NOTE ADULT - SUBJECTIVE AND OBJECTIVE BOX
Hospitalist: Steve Piper DO    CHIEF COMPLAINT: Patient is a 60y old  male who presents with a chief complaint of CVA (12 Jun 2021 11:01)      SUBJECTIVE / OVERNIGHT EVENTS: Patient seen and examined. No acute events overnight. Pain well controlled and patient without any complaints.    MEDICATIONS  (STANDING):  acetaminophen   Tablet .. 650 milliGRAM(s) Oral every 6 hours  atorvastatin 40 milliGRAM(s) Oral at bedtime  cadexomer iodine 0.9% Gel 1 Application(s) Topical daily  dextrose 40% Gel 15 Gram(s) Oral once  dextrose 5%. 1000 milliLiter(s) (50 mL/Hr) IV Continuous <Continuous>  dextrose 5%. 1000 milliLiter(s) (100 mL/Hr) IV Continuous <Continuous>  dextrose 50% Injectable 25 Gram(s) IV Push once  dextrose 50% Injectable 12.5 Gram(s) IV Push once  dextrose 50% Injectable 25 Gram(s) IV Push once  glucagon  Injectable 1 milliGRAM(s) IntraMuscular once  heparin   Injectable 5000 Unit(s) SubCutaneous every 12 hours  insulin glargine Injectable (LANTUS) 10 Unit(s) SubCutaneous every morning  insulin lispro (ADMELOG) corrective regimen sliding scale   SubCutaneous four times a day before meals  levETIRAcetam  Solution 500 milliGRAM(s) Oral two times a day  pantoprazole   Suspension 40 milliGRAM(s) Oral daily  sodium chloride 0.9%. 1000 milliLiter(s) (75 mL/Hr) IV Continuous <Continuous>    MEDICATIONS  (PRN):  aluminum hydroxide/magnesium hydroxide/simethicone Suspension 30 milliLiter(s) Oral every 4 hours PRN Dyspepsia  ondansetron   Disintegrating Tablet 4 milliGRAM(s) Oral every 6 hours PRN Nausea and/or Vomiting      VITALS:  T(F): 98.2 (06-12-21 @ 08:45), Max: 98.2 (06-12-21 @ 08:45)  HR: 85 (06-12-21 @ 08:45) (85 - 93)  BP: 100/61 (06-12-21 @ 08:45) (99/64 - 100/61)  RR: 16 (06-12-21 @ 08:45) (16 - 16)  SpO2: 99% (06-12-21 @ 08:45)      REVIEW OF SYSTEMS:  For ROV please refer back to H&P     PHYSICAL EXAM:  General: NAD, A/O x 3  ENT: MMM  Neck: Supple, No JVD  Lungs: Clear to auscultation bilaterally  Cardio: RRR, S1/S2, No murmurs  Abdomen: Soft, Nontender, Nondistended; Bowel sounds present  Extremities: No calf tenderness, No pitting edema      LABS:              x                    140  | 27   | 34           x     >-----------< x       ------------------------< 117                   x                    3.9  | 105  | 1.27                                         Ca 9.9   Mg x     Ph x           CAPILLARY BLOOD GLUCOSE      POCT Blood Glucose.: 94 mg/dL (12 Jun 2021 13:20)  POCT Blood Glucose.: 113 mg/dL (12 Jun 2021 08:39)  POCT Blood Glucose.: 104 mg/dL (11 Jun 2021 22:23)  POCT Blood Glucose.: 139 mg/dL (11 Jun 2021 16:54)        MICROBIOLOGY:          RADIOLOGY & ADDITIONAL TESTS:    Imaging Personally Reviewed:    [X] Consultant(s) Notes Reviewed:  [X] Care Discussed with Consultants/Other Providers:

## 2021-06-13 LAB
GLUCOSE BLDC GLUCOMTR-MCNC: 134 MG/DL — HIGH (ref 70–99)
GLUCOSE BLDC GLUCOMTR-MCNC: 202 MG/DL — HIGH (ref 70–99)
GLUCOSE BLDC GLUCOMTR-MCNC: 77 MG/DL — SIGNIFICANT CHANGE UP (ref 70–99)
GLUCOSE BLDC GLUCOMTR-MCNC: 81 MG/DL — SIGNIFICANT CHANGE UP (ref 70–99)

## 2021-06-13 PROCEDURE — 99232 SBSQ HOSP IP/OBS MODERATE 35: CPT

## 2021-06-13 RX ADMIN — LEVETIRACETAM 500 MILLIGRAM(S): 250 TABLET, FILM COATED ORAL at 17:05

## 2021-06-13 RX ADMIN — HEPARIN SODIUM 5000 UNIT(S): 5000 INJECTION INTRAVENOUS; SUBCUTANEOUS at 17:05

## 2021-06-13 RX ADMIN — Medication 650 MILLIGRAM(S): at 05:54

## 2021-06-13 RX ADMIN — Medication 650 MILLIGRAM(S): at 13:10

## 2021-06-13 RX ADMIN — PANTOPRAZOLE SODIUM 40 MILLIGRAM(S): 20 TABLET, DELAYED RELEASE ORAL at 12:38

## 2021-06-13 RX ADMIN — Medication 650 MILLIGRAM(S): at 18:15

## 2021-06-13 RX ADMIN — SODIUM CHLORIDE 75 MILLILITER(S): 9 INJECTION INTRAMUSCULAR; INTRAVENOUS; SUBCUTANEOUS at 23:18

## 2021-06-13 RX ADMIN — Medication 4: at 12:36

## 2021-06-13 RX ADMIN — Medication 650 MILLIGRAM(S): at 06:54

## 2021-06-13 RX ADMIN — Medication 1 APPLICATION(S): at 12:38

## 2021-06-13 RX ADMIN — LEVETIRACETAM 500 MILLIGRAM(S): 250 TABLET, FILM COATED ORAL at 05:54

## 2021-06-13 RX ADMIN — Medication 650 MILLIGRAM(S): at 17:05

## 2021-06-13 RX ADMIN — ATORVASTATIN CALCIUM 40 MILLIGRAM(S): 80 TABLET, FILM COATED ORAL at 21:04

## 2021-06-13 RX ADMIN — SODIUM CHLORIDE 75 MILLILITER(S): 9 INJECTION INTRAMUSCULAR; INTRAVENOUS; SUBCUTANEOUS at 08:46

## 2021-06-13 RX ADMIN — INSULIN GLARGINE 10 UNIT(S): 100 INJECTION, SOLUTION SUBCUTANEOUS at 08:43

## 2021-06-13 RX ADMIN — Medication 650 MILLIGRAM(S): at 23:24

## 2021-06-13 RX ADMIN — HEPARIN SODIUM 5000 UNIT(S): 5000 INJECTION INTRAVENOUS; SUBCUTANEOUS at 05:54

## 2021-06-13 RX ADMIN — Medication 650 MILLIGRAM(S): at 12:38

## 2021-06-13 NOTE — PROGRESS NOTE ADULT - SUBJECTIVE AND OBJECTIVE BOX
Patient is a 60y old  Male who presents with a chief complaint of CVA (13 Jun 2021 08:57)      Patient seen and examined at bedside.  Denies chest pain, dyspnea, abd pain.    ALLERGIES:  No Known Allergies    MEDICATIONS  (STANDING):  acetaminophen   Tablet .. 650 milliGRAM(s) Oral every 6 hours  atorvastatin 40 milliGRAM(s) Oral at bedtime  cadexomer iodine 0.9% Gel 1 Application(s) Topical daily  dextrose 40% Gel 15 Gram(s) Oral once  dextrose 5%. 1000 milliLiter(s) (50 mL/Hr) IV Continuous <Continuous>  dextrose 5%. 1000 milliLiter(s) (100 mL/Hr) IV Continuous <Continuous>  dextrose 50% Injectable 25 Gram(s) IV Push once  dextrose 50% Injectable 12.5 Gram(s) IV Push once  dextrose 50% Injectable 25 Gram(s) IV Push once  glucagon  Injectable 1 milliGRAM(s) IntraMuscular once  heparin   Injectable 5000 Unit(s) SubCutaneous every 12 hours  insulin glargine Injectable (LANTUS) 10 Unit(s) SubCutaneous every morning  insulin lispro (ADMELOG) corrective regimen sliding scale   SubCutaneous four times a day before meals  levETIRAcetam  Solution 500 milliGRAM(s) Oral two times a day  pantoprazole   Suspension 40 milliGRAM(s) Oral daily  sodium chloride 0.9%. 1000 milliLiter(s) (75 mL/Hr) IV Continuous <Continuous>    MEDICATIONS  (PRN):  aluminum hydroxide/magnesium hydroxide/simethicone Suspension 30 milliLiter(s) Oral every 4 hours PRN Dyspepsia  ondansetron   Disintegrating Tablet 4 milliGRAM(s) Oral every 6 hours PRN Nausea and/or Vomiting    Vital Signs Last 24 Hrs  T(F): 97.4 (13 Jun 2021 09:16), Max: 97.5 (12 Jun 2021 20:25)  HR: 83 (13 Jun 2021 09:16) (83 - 86)  BP: 98/65 (13 Jun 2021 09:16) (98/65 - 109/69)  RR: 16 (13 Jun 2021 09:16) (16 - 16)  SpO2: 99% (13 Jun 2021 09:16) (99% - 99%)  I&O's Summary    12 Jun 2021 07:01  -  13 Jun 2021 07:00  --------------------------------------------------------  IN: 1560 mL / OUT: 1100 mL / NET: 460 mL    13 Jun 2021 07:01  -  13 Jun 2021 11:05  --------------------------------------------------------  IN: 240 mL / OUT: 0 mL / NET: 240 mL        PHYSICAL EXAM:  General: NAD, A/O x 3  ENT: MMM  Neck: Supple, No JVD  Lungs: Clear to auscultation bilaterally  Cardio: RRR, S1/S2, No murmurs  Abdomen: Soft, Nontender, Nondistended; Bowel sounds present  Extremities: No calf tenderness, No pitting edema    LABS:      06-12    140  |  105  |  34  ----------------------------<  117  3.9   |  27  |  1.27    Ca    9.9      12 Jun 2021 06:50       eGFR if Non African American: 62 mL/min/1.73M2 (06-12-21 @ 06:50)  eGFR if : 72 mL/min/1.73M2 (06-12-21 @ 06:50)             04-26 Chol 114 mg/dL LDL -- HDL 35 mg/dL Trig 126 mg/dL              POCT Blood Glucose.: 81 mg/dL (13 Jun 2021 08:26)  POCT Blood Glucose.: 79 mg/dL (12 Jun 2021 20:40)  POCT Blood Glucose.: 96 mg/dL (12 Jun 2021 17:26)  POCT Blood Glucose.: 94 mg/dL (12 Jun 2021 13:20)          COVID-19 PCR: NotDetec (06-02-21 @ 08:50)  COVID-19 PCR: NotDetec (05-31-21 @ 07:10)  COVID-19 PCR: NotDetec (05-26-21 @ 07:21)  COVID-19 PCR: Detected (05-18-21 @ 05:34)      RADIOLOGY & ADDITIONAL TESTS:    Care Discussed with Consultants/Other Providers:

## 2021-06-13 NOTE — PROGRESS NOTE ADULT - ASSESSMENT
59yo male with PMH of DM who was initially admitted to [facility] ICU for hypoxic respiratory failure 2/2 COVID c/b PE with R heart strain, cardiogenic and septic shock, ischemic and hemorrhagic CVA and ESBL klebsiella ventilator associate PNA, transferred to [facility] for neurosurgery eval and further management. s/p PEG. Course c/b right empyema s/p VATs and obturator bleed (resolved). Now admitted for functional decline.     #Debility  #ESBL Klebsiella PNA c/b empyema  #s/p COVID pneumonia  - 5/12 underwent right VATS converted to open L thoracotomy, decortication, drainage of abscess and flex bronch.  - c/w meropenem 5/7 and transition to Bactrim DS 2 tabs TID + Augmentin 875mg po q12  upon DC to [facility] --- Bactrim DC'ed 6/7 due to SUKI, augmentin completed 6/8  - ID following, recs appreciated - needs close follow up in ID clinic for repeat imaging, Dr. Chambers  - CT Chest 5/29 with improvement in bilateral opacities. Slight decrease in right chest wall hematoma. Stable loculated right hydropneumothorax.   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 Albarado with all chest tubes removed by 5/18  -CT Chest 5/16 Interval new small cavitary lesions in the right lower lobe and right chest wall hematoma - no thoracic surgery intervention indicated per Dr. Albarado.    #Obturator Hematoma (stable)  - bleeding hematoma in right thigh on CT on 5/7  - CT hip showing non active bleed, hematoma on right thigh on 5/8  - doppler to r/o DVT in LE, per IR no role IVC filter  - active again on 5/17, now stable  - was on heparin drip on and off, now off. C/w DVT ppx heparin bid    #Pulmonary embolus  - Pulmonary embolus noted on ct scan performed at time of admission  - Etiology of venous thromboembolism uncertain at this time; no known history of thrombophilia; provoked in setting of acute covid illness   - s/p heparin drip held due to recurrent bleed in right obturator and right chest wall hematoma   - 5000 heparin bid for dvt ppx  - Surgically cleared for AC in regards to hematomas  - neurology clearance for AC as pt recently had hemorrhagic conversion of ischemic CVA  - hematology recs noted    #CVA  -Acute bilateral ischemic strokes, with concern for hemorrhagic conversion, noted on CT scan on 4/8  -Etiology of acute ischemic stroke uncertain at this time; TTE with bubble performed at bedside without any evidence of right-to-left shunt; no arrhythmia noted on telemetry throughout stay in the MICU  -CTA head and neck without any evidence of carotid artery stenosis or dissection  - 6/10 CT head: multiple infarcts, no hemorrhage  - CT head 6/14 for re-eval    #Encephalopathy due to multifactorial causes - improving  - Etiology likely multi-factorial in setting of acute ischemic infarctions of the brain in conjunction vs acute kidney injury with uremia (now resolving) vs resolving hypoxic respiratory failure, covid-19 infection, superimposed bacterial pneumonia, urinary retention, concern for nutritional deficiencies  - Reorientation with family at bedside (pt speaks Gujarati, does best with family present)  - MRI Brain, and MRA Head demonstrated multiple evolving subacute infarcts with associated hemorrhages, likely expected evolution of ischemia  - As per neuro, EEG not suggestive of seizure-like activity  - C/w Keppra 500 mg BID-> neuro f/u outpatient   - repeat CT head 5/5 normal.     #Diabetes mellitus type II  - April 2021: a1c 10 -- improved to 5.7  - NPH discontinued  - Lantus 10 units QAM, moderate ISS, accuchecks  - Follow up endo outpatient    #Hypertension.  - Was on metoprolol tartrate 25 mg twice daily since 4/16; discontinued  - BP is stable  - If is hypertensive persistently, can consider antihypertensives    #Dysphagia  - PEG tube placed on 4/30, tube feedings started on 4/30, + free water q8    #Peripheral vascular disease  -Dry gangrene noted over distal toes on bilateral lower extremities--most concerning for microvascular disease subsequent to vasopressor administration   -Dorsalis pedis pulses intact bilaterally  -ERASMO demonstrating right ERASMO: 1.09, left ERASMO: 1.24, right TBI: 0.78, and left TBI: 0.73.  -Podiatry recs appreciated, no acute surgical intervention at this time, applying betadine to wound  -As per ID non infectious appearing     #Urinary Retention - improved  - Mann removed 5/30, urinating appropriately  - c/w doxazosin    #GI/Bowel Mgmt   - Continent c/w Senna, Miralax PRN  - added PPI via PEG tube  - maalox  - zofran for nausea    #Precautions / PROPHYLAXIS:   - Falls, Cardiac, Sternal, Spinal, Seizure   - ortho: Weight bearing status: WBAT   - Lungs: Aspiration, Incentive Spirometer   - Pressure injury/Skin: OOB to Chair, PT/OT    - DVT: HSQ

## 2021-06-13 NOTE — PROGRESS NOTE ADULT - SUBJECTIVE AND OBJECTIVE BOX
No overnight events.      REVIEW OF SYSTEMS  Constitutional - No fever,  No fatigue  Neurological - No headaches, No loss of strength  Musculoskeletal - No joint pain, No joint swelling, No muscle pain    VITALS  T(C): 36.4 (06-12-21 @ 20:25), Max: 36.4 (06-12-21 @ 20:25)  HR: 86 (06-12-21 @ 20:25) (86 - 86)  BP: 109/69 (06-12-21 @ 20:25) (109/69 - 109/69)  RR: 16 (06-12-21 @ 20:25) (16 - 16)  SpO2: 99% (06-12-21 @ 20:25) (99% - 99%)  Wt(kg): --       MEDICATIONS   acetaminophen   Tablet .. 650 milliGRAM(s) every 6 hours  aluminum hydroxide/magnesium hydroxide/simethicone Suspension 30 milliLiter(s) every 4 hours PRN  atorvastatin 40 milliGRAM(s) at bedtime  cadexomer iodine 0.9% Gel 1 Application(s) daily  dextrose 40% Gel 15 Gram(s) once  dextrose 5%. 1000 milliLiter(s) <Continuous>  dextrose 5%. 1000 milliLiter(s) <Continuous>  dextrose 50% Injectable 25 Gram(s) once  dextrose 50% Injectable 12.5 Gram(s) once  dextrose 50% Injectable 25 Gram(s) once  glucagon  Injectable 1 milliGRAM(s) once  heparin   Injectable 5000 Unit(s) every 12 hours  insulin glargine Injectable (LANTUS) 10 Unit(s) every morning  insulin lispro (ADMELOG) corrective regimen sliding scale   four times a day before meals  levETIRAcetam  Solution 500 milliGRAM(s) two times a day  ondansetron   Disintegrating Tablet 4 milliGRAM(s) every 6 hours PRN  pantoprazole   Suspension 40 milliGRAM(s) daily  sodium chloride 0.9%. 1000 milliLiter(s) <Continuous>      RECENT LABS/IMAGING    06-12    140  |  105  |  34<H>  ----------------------------<  117<H>  3.9   |  27  |  1.27    Ca    9.9      12 Jun 2021 06:50                  POCT Blood Glucose.: 81 mg/dL (06-13-21 @ 08:26)  POCT Blood Glucose.: 79 mg/dL (06-12-21 @ 20:40)  POCT Blood Glucose.: 96 mg/dL (06-12-21 @ 17:26)  POCT Blood Glucose.: 94 mg/dL (06-12-21 @ 13:20)    ---------    --    PHYSICAL EXAM  Constitutional - NAD  HEENT - NCAT, EOMI  Neck - Supple, No limited ROM  Chest - breathing comfortably   Cardiovascular - RR  Abdomen - PEG in place  Extremities - +dry skin b/l feet, heels   Wounds-per H&P      Neurologic Exam - Awake, Alert, AAO x3      No aphasia, impaired attention and concentration, follows simple commands     Cranial Nerves - dysphagia        Coordination/dysmetria - impaired           Psychiatric - Mood stable, Affect WNL      ASSESSMENT/PLAN  60y Male h/o DM with functional deficits after COVID, PE, cardiogenic/septic shock, ischemic, hemorrhagic CVA, PNA, s/p PEG  Continue current medical management  keppra for seizure prophylaxis   CT 6/10 with new left parietal cortical infarct, repeat CT on 6/14 recommended by neuro  off abx, afebrile  loose stool, miralax held  IVF trial, creatinine improved   followed by podiatry for dry gangrene, betadine to wound, last seen 5/28, needs follow up  cardiology consulted, holter monitor, consider RAJESH   Pain - Tylenol PRN, oxycodone   DVT PPX - heparin bid   Continue 3hrs a day of comprehensive rehab program.

## 2021-06-14 DIAGNOSIS — I63.9 CEREBRAL INFARCTION, UNSPECIFIED: ICD-10-CM

## 2021-06-14 DIAGNOSIS — R13.10 DYSPHAGIA, UNSPECIFIED: ICD-10-CM

## 2021-06-14 LAB
ALBUMIN SERPL ELPH-MCNC: 2.7 G/DL — LOW (ref 3.3–5)
ALP SERPL-CCNC: 78 U/L — SIGNIFICANT CHANGE UP (ref 40–120)
ALT FLD-CCNC: 25 U/L — SIGNIFICANT CHANGE UP (ref 10–45)
ANION GAP SERPL CALC-SCNC: 8 MMOL/L — SIGNIFICANT CHANGE UP (ref 5–17)
AST SERPL-CCNC: 20 U/L — SIGNIFICANT CHANGE UP (ref 10–40)
BASOPHILS # BLD AUTO: 0.08 K/UL — SIGNIFICANT CHANGE UP (ref 0–0.2)
BASOPHILS NFR BLD AUTO: 1.1 % — SIGNIFICANT CHANGE UP (ref 0–2)
BILIRUB SERPL-MCNC: 0.4 MG/DL — SIGNIFICANT CHANGE UP (ref 0.2–1.2)
BUN SERPL-MCNC: 23 MG/DL — SIGNIFICANT CHANGE UP (ref 7–23)
CALCIUM SERPL-MCNC: 9.6 MG/DL — SIGNIFICANT CHANGE UP (ref 8.4–10.5)
CHLORIDE SERPL-SCNC: 109 MMOL/L — HIGH (ref 96–108)
CO2 SERPL-SCNC: 26 MMOL/L — SIGNIFICANT CHANGE UP (ref 22–31)
CREAT SERPL-MCNC: 0.95 MG/DL — SIGNIFICANT CHANGE UP (ref 0.5–1.3)
EOSINOPHIL # BLD AUTO: 0.42 K/UL — SIGNIFICANT CHANGE UP (ref 0–0.5)
EOSINOPHIL NFR BLD AUTO: 5.6 % — SIGNIFICANT CHANGE UP (ref 0–6)
GLUCOSE BLDC GLUCOMTR-MCNC: 100 MG/DL — HIGH (ref 70–99)
GLUCOSE BLDC GLUCOMTR-MCNC: 102 MG/DL — HIGH (ref 70–99)
GLUCOSE BLDC GLUCOMTR-MCNC: 159 MG/DL — HIGH (ref 70–99)
GLUCOSE BLDC GLUCOMTR-MCNC: 176 MG/DL — HIGH (ref 70–99)
GLUCOSE SERPL-MCNC: 95 MG/DL — SIGNIFICANT CHANGE UP (ref 70–99)
HCT VFR BLD CALC: 37.1 % — LOW (ref 39–50)
HGB BLD-MCNC: 11.6 G/DL — LOW (ref 13–17)
IMM GRANULOCYTES NFR BLD AUTO: 1.1 % — SIGNIFICANT CHANGE UP (ref 0–1.5)
LYMPHOCYTES # BLD AUTO: 1.78 K/UL — SIGNIFICANT CHANGE UP (ref 1–3.3)
LYMPHOCYTES # BLD AUTO: 23.8 % — SIGNIFICANT CHANGE UP (ref 13–44)
MCHC RBC-ENTMCNC: 26.2 PG — LOW (ref 27–34)
MCHC RBC-ENTMCNC: 31.3 GM/DL — LOW (ref 32–36)
MCV RBC AUTO: 83.7 FL — SIGNIFICANT CHANGE UP (ref 80–100)
MONOCYTES # BLD AUTO: 0.69 K/UL — SIGNIFICANT CHANGE UP (ref 0–0.9)
MONOCYTES NFR BLD AUTO: 9.2 % — SIGNIFICANT CHANGE UP (ref 2–14)
NEUTROPHILS # BLD AUTO: 4.42 K/UL — SIGNIFICANT CHANGE UP (ref 1.8–7.4)
NEUTROPHILS NFR BLD AUTO: 59.2 % — SIGNIFICANT CHANGE UP (ref 43–77)
NRBC # BLD: 0 /100 WBCS — SIGNIFICANT CHANGE UP (ref 0–0)
PLATELET # BLD AUTO: 332 K/UL — SIGNIFICANT CHANGE UP (ref 150–400)
POTASSIUM SERPL-MCNC: 3.7 MMOL/L — SIGNIFICANT CHANGE UP (ref 3.5–5.3)
POTASSIUM SERPL-SCNC: 3.7 MMOL/L — SIGNIFICANT CHANGE UP (ref 3.5–5.3)
PROT SERPL-MCNC: 7.7 G/DL — SIGNIFICANT CHANGE UP (ref 6–8.3)
RBC # BLD: 4.43 M/UL — SIGNIFICANT CHANGE UP (ref 4.2–5.8)
RBC # FLD: 18.9 % — HIGH (ref 10.3–14.5)
SODIUM SERPL-SCNC: 143 MMOL/L — SIGNIFICANT CHANGE UP (ref 135–145)
WBC # BLD: 7.47 K/UL — SIGNIFICANT CHANGE UP (ref 3.8–10.5)
WBC # FLD AUTO: 7.47 K/UL — SIGNIFICANT CHANGE UP (ref 3.8–10.5)

## 2021-06-14 PROCEDURE — 99232 SBSQ HOSP IP/OBS MODERATE 35: CPT | Mod: GC

## 2021-06-14 PROCEDURE — 74230 X-RAY XM SWLNG FUNCJ C+: CPT | Mod: 26

## 2021-06-14 PROCEDURE — 99233 SBSQ HOSP IP/OBS HIGH 50: CPT

## 2021-06-14 PROCEDURE — 99232 SBSQ HOSP IP/OBS MODERATE 35: CPT

## 2021-06-14 PROCEDURE — 70450 CT HEAD/BRAIN W/O DYE: CPT | Mod: 26

## 2021-06-14 RX ADMIN — ATORVASTATIN CALCIUM 40 MILLIGRAM(S): 80 TABLET, FILM COATED ORAL at 21:21

## 2021-06-14 RX ADMIN — INSULIN GLARGINE 10 UNIT(S): 100 INJECTION, SOLUTION SUBCUTANEOUS at 08:04

## 2021-06-14 RX ADMIN — Medication 650 MILLIGRAM(S): at 12:38

## 2021-06-14 RX ADMIN — Medication 2: at 12:38

## 2021-06-14 RX ADMIN — Medication 2: at 21:21

## 2021-06-14 RX ADMIN — LEVETIRACETAM 500 MILLIGRAM(S): 250 TABLET, FILM COATED ORAL at 18:14

## 2021-06-14 RX ADMIN — HEPARIN SODIUM 5000 UNIT(S): 5000 INJECTION INTRAVENOUS; SUBCUTANEOUS at 05:08

## 2021-06-14 RX ADMIN — PANTOPRAZOLE SODIUM 40 MILLIGRAM(S): 20 TABLET, DELAYED RELEASE ORAL at 12:38

## 2021-06-14 RX ADMIN — Medication 650 MILLIGRAM(S): at 13:30

## 2021-06-14 RX ADMIN — Medication 650 MILLIGRAM(S): at 05:08

## 2021-06-14 RX ADMIN — LEVETIRACETAM 500 MILLIGRAM(S): 250 TABLET, FILM COATED ORAL at 05:08

## 2021-06-14 RX ADMIN — HEPARIN SODIUM 5000 UNIT(S): 5000 INJECTION INTRAVENOUS; SUBCUTANEOUS at 18:14

## 2021-06-14 RX ADMIN — Medication 1 APPLICATION(S): at 12:39

## 2021-06-14 RX ADMIN — Medication 650 MILLIGRAM(S): at 06:00

## 2021-06-14 RX ADMIN — Medication 650 MILLIGRAM(S): at 00:19

## 2021-06-14 NOTE — PROGRESS NOTE ADULT - SUBJECTIVE AND OBJECTIVE BOX
06-14    143  |  109<H>  |  23  ----------------------------<  95  3.7   |  26  |  0.95    Ca    9.6      14 Jun 2021 06:30    TPro  7.7  /  Alb  2.7<L>  /  TBili  0.4  /  DBili  x   /  AST  20  /  ALT  25  /  AlkPhos  78  06-14  Patient with no acute complaints    MEDICATIONS  (STANDING):  acetaminophen   Tablet .. 650 milliGRAM(s) Oral every 6 hours  atorvastatin 40 milliGRAM(s) Oral at bedtime  cadexomer iodine 0.9% Gel 1 Application(s) Topical daily  dextrose 40% Gel 15 Gram(s) Oral once  dextrose 5%. 1000 milliLiter(s) (100 mL/Hr) IV Continuous <Continuous>  dextrose 5%. 1000 milliLiter(s) (50 mL/Hr) IV Continuous <Continuous>  dextrose 50% Injectable 25 Gram(s) IV Push once  dextrose 50% Injectable 12.5 Gram(s) IV Push once  dextrose 50% Injectable 25 Gram(s) IV Push once  glucagon  Injectable 1 milliGRAM(s) IntraMuscular once  heparin   Injectable 5000 Unit(s) SubCutaneous every 12 hours  insulin lispro (ADMELOG) corrective regimen sliding scale   SubCutaneous four times a day before meals  levETIRAcetam  Solution 500 milliGRAM(s) Oral two times a day  pantoprazole   Suspension 40 milliGRAM(s) Oral daily    MEDICATIONS  (PRN):  aluminum hydroxide/magnesium hydroxide/simethicone Suspension 30 milliLiter(s) Oral every 4 hours PRN Dyspepsia  ondansetron   Disintegrating Tablet 4 milliGRAM(s) Oral every 6 hours PRN Nausea and/or Vomiting      Vital Signs Last 24 Hrs  T(C): 36.3 (14 Jun 2021 08:39), Max: 36.6 (13 Jun 2021 20:57)  T(F): 97.3 (14 Jun 2021 08:39), Max: 97.9 (13 Jun 2021 20:57)  HR: 76 (14 Jun 2021 08:39) (76 - 87)  BP: 104/65 (14 Jun 2021 08:39) (104/65 - 112/67)  BP(mean): --  RR: 17 (14 Jun 2021 08:39) (16 - 17)  SpO2: 99% (14 Jun 2021 08:39) (98% - 99%)                      11.6   7.47  )-----------( 332      ( 14 Jun 2021 06:30 )             37.1

## 2021-06-14 NOTE — CONSULT NOTE ADULT - CONSULT REASON
cva
Dysphagia
SUKI
post Covid infections
"evaluate for hematomas/resumption of anticoagulation"
Stroke,
Medical management
PE, Hemorragic conversion of CVA

## 2021-06-14 NOTE — CONSULT NOTE ADULT - PROBLEM SELECTOR RECOMMENDATION 9
MBS performed today- passed  Esophagram ordered  Recent EGD 4/2021 without significant findings  Will clear based on above findings

## 2021-06-14 NOTE — CONSULT NOTE ADULT - SUBJECTIVE AND OBJECTIVE BOX
INTERVAL HPI/OVERNIGHT EVENTS:  HPI:    59yo M with T2DM and recently diagnosed COVID (3/19) BIBEMS to [facility] on  for hypoxic respiratory failure secondary to COVID PNA, requiring endotracheal intubation (-). Course significant for Septic shock on admission, treated with vancomycin and cefepime. Found to have ESBL Klebsiella PNA c/b empyema s/p VATs decortication. Treated with prolonged course of Meropenem while hospitalized and transitioned to Augmentin/Bactrim upon discharge (until ). Will follow up with ID as outpatient.     Patient had acute ischemic CVA with areas of hemorrhagic transformation. Found to have multi-focal acute-to-subacute infarctions. 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 Subsequent scans showed hemorrhagic conversion. Transferred to [facility] for Neurosurgery evaluation, no surgical intervention at this time. Will require outpatient follow up.     Course complicated by RITO segmental PEs with e/o RH strain: Provoked in the setting of sepsis/high inflammatory state with COVID infection. LE Dopplers negative x 2. Initially anticoagulated with heparin gtt. Course further complicated by a right chest wall and right obturator hematomas. Due to concern for worsening hemorrhagic conversion of stroke and hematomas, the patient's therapeutic anticoagulation was discontinued. He was evaluated by the neurosurgical team, which said that there was no indication for acute intervention. Patient will not be anticoagulated upon discharge.   Patient's AMS likely in the setting of acute strokes, improved throughout hospitalization. Due to prolonged intubation with subsequent AMS and dysphagia, pt had PEG tube placed. Pt failed MBS following improvement of mental status.     Patient also had bedside debridement and woundcare of her BLE digits and will follow up with Podiatry as an outpatient.    Medically cleared for discharge to [facility] .    (2021 16:29)      GI consulted to see patient for clearance for RAJESH. Per RN patient have MBS today, and passed study. Patient denies difficulty swallowing. No nausea, vomiting.     MEDICATIONS  (STANDING):  acetaminophen   Tablet .. 650 milliGRAM(s) Oral every 6 hours  atorvastatin 40 milliGRAM(s) Oral at bedtime  cadexomer iodine 0.9% Gel 1 Application(s) Topical daily  dextrose 40% Gel 15 Gram(s) Oral once  dextrose 5%. 1000 milliLiter(s) (100 mL/Hr) IV Continuous <Continuous>  dextrose 5%. 1000 milliLiter(s) (50 mL/Hr) IV Continuous <Continuous>  dextrose 50% Injectable 25 Gram(s) IV Push once  dextrose 50% Injectable 12.5 Gram(s) IV Push once  dextrose 50% Injectable 25 Gram(s) IV Push once  glucagon  Injectable 1 milliGRAM(s) IntraMuscular once  heparin   Injectable 5000 Unit(s) SubCutaneous every 12 hours  insulin lispro (ADMELOG) corrective regimen sliding scale   SubCutaneous four times a day before meals  levETIRAcetam  Solution 500 milliGRAM(s) Oral two times a day  pantoprazole   Suspension 40 milliGRAM(s) Oral daily    MEDICATIONS  (PRN):  aluminum hydroxide/magnesium hydroxide/simethicone Suspension 30 milliLiter(s) Oral every 4 hours PRN Dyspepsia  ondansetron   Disintegrating Tablet 4 milliGRAM(s) Oral every 6 hours PRN Nausea and/or Vomiting      Allergies    No Known Allergies    Intolerances        PAST MEDICAL & SURGICAL HISTORY:  No pertinent past medical history    No significant past surgical history        REVIEW OF SYSTEMS: negative unless indicated in HPI    non smoker  no ETOH  	  PHYSICAL EXAM:   Vital Signs:  Vital Signs Last 24 Hrs  T(C): 36.3 (2021 08:39), Max: 36.6 (2021 20:57)  T(F): 97.3 (2021 08:39), Max: 97.9 (2021 20:57)  HR: 76 (2021 08:39) (76 - 87)  BP: 104/65 (2021 08:39) (104/65 - 112/67)  BP(mean): --  RR: 17 (2021 08:39) (16 - 17)  SpO2: 99% (2021 08:39) (98% - 99%)  Daily     Daily Weight in k.4 (2021 23:05)I&O's Summary    2021 07:01  -  2021 07:00  --------------------------------------------------------  IN: 2660 mL / OUT: 1600 mL / NET: 1060 mL    GENERAL:  Appears stated age,  HEENT:  NC/AT,  conjunctivae clear and pink, no thyromegaly,   CHEST:  Full & symmetric excursion, no increased effort, breath sounds clear  HEART:  Regular rhythm, S1, S2, no murmur  ABDOMEN:  Soft, non-tender, non-distended, normoactive bowel sounds, Peg present CDI   EXTEREMITIES:  no edema  SKIN:  No rash/warm/dry  NEURO:  Alert, oriented,       LABS:                        11.6   7.47  )-----------( 332      ( 2021 06:30 )             37.1     06-14    143  |  109<H>  |  23  ----------------------------<  95  3.7   |  26  |  0.95    Ca    9.6      2021 06:30    TPro  7.7  /  Alb  2.7<L>  /  TBili  0.4  /  DBili  x   /  AST  20  /  ALT  25  /  AlkPhos  78  06-14        amylase   lipase  RADIOLOGY & ADDITIONAL TESTS:

## 2021-06-14 NOTE — CONSULT NOTE ADULT - CONSULT REQUESTED BY NAME
Dr. Anastasiia Perez
Dr Perez
Dr. Perez
Dr. Perez
Dr. Anastasiia Perez
Jose Eduardo SOTO
Dr. Perez
Kamille Arvizu

## 2021-06-14 NOTE — PROGRESS NOTE ADULT - ASSESSMENT
Patient is a 60 year old man [facility]. He was transferred from [facility] after an initial admission to [facility] with respiratory distress. He had been diagnosed with Covid 19 in March, 2021. He was intubated. Hospital course complicated by PE, Empyema, septic shock, right obturator hematoma, and multiple acute cerebral and cerebellar infarcts with areas of hemorrhagic conversion. He denies HA or other complaints. Neurological exam as above. Today, Monday, 6/14, he denies  HA or other complaints.      1) CT Head , 6/10/21,  multiple infarcts. Left parietal cortical infarct subacute and new from prior CT 5/5. Left cerebellar infarct normally evolved. Left temporal , occipital, and right occipital infarcts unchanged. No hemorrhage.  2) F/U CT Head 6/14/21 no contrast.   3) CT Head, 6/14/21 as above subacute/chronic infarction left cerebellum and chronic infarction bilateral frontal, parietal, and occipital lobes. No hemorrhage  4) As regards anticoagulation for PE no neurological contraindication to anticoagulation

## 2021-06-14 NOTE — PROGRESS NOTE ADULT - SUBJECTIVE AND OBJECTIVE BOX
Subjective: NAD this am, had loose stool yesterday and earlier today.       HISTORY OF PRESENT ILLNESS  61yo M with T2DM and recently diagnosed COVID (3/19) BIBEMS to [facility] on 4/1 for hypoxic respiratory failure secondary to COVID PNA, requiring endotracheal intubation (4/1-4/13). Course significant for Septic shock on admission, treated with vancomycin and cefepime. Found to have ESBL Klebsiella PNA c/b empyema s/p VATs decortication. Treated with prolonged course of Meropenem while hospitalized and transitioned to Augmentin/Bactrim upon discharge (until 6/8). Will follow up with ID as outpatient.     Patient had acute ischemic CVA with areas of hemorrhagic transformation. Found to have multi-focal acute-to-subacute infarctions. 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 Subsequent scans showed hemorrhagic conversion. Transferred to [facility] for Neurosurgery evaluation, no surgical intervention at this time. Will require outpatient follow up.     Course complicated by RITO segmental PEs with e/o RH strain: Provoked in the setting of sepsis/high inflammatory state with COVID infection. LE Dopplers negative x 2. Initially anticoagulated with heparin gtt. Course further complicated by a right chest wall and right obturator hematomas. Due to concern for worsening hemorrhagic conversion of stroke and hematomas, the patient's therapeutic anticoagulation was discontinued. He was evaluated by the neurosurgical team, which said that there was no indication for acute intervention. Patient will not be anticoagulated upon discharge.   Patient's AMS likely in the setting of acute strokes, improved throughout hospitalization. Due to prolonged intubation with subsequent AMS and dysphagia, pt had PEG tube placed. Pt failed MBS following improvement of mental status.     Patient also had bedside debridement and wound care of her BLE digits and will follow up with Podiatry as an outpatient.    REVIEW OF SYMPTOMS  [X] Constitutional WNL     [X] Cardio WNL            [X] Resp-empeyema           [X] GI loose stool                          [X] -ulises                   [X] Heme-hematoma              [X] Endo-dm2                    [X] Skin WNL                 [X] MSK WNL            [X] Neuro-cva                  [X] Cognitive WNL        [X] Psych WNL      VITALS  Vital Signs Last 24 Hrs  T(C): 36.3 (14 Jun 2021 08:39), Max: 36.6 (13 Jun 2021 20:57)  T(F): 97.3 (14 Jun 2021 08:39), Max: 97.9 (13 Jun 2021 20:57)  HR: 76 (14 Jun 2021 08:39) (76 - 87)  BP: 104/65 (14 Jun 2021 08:39) (104/65 - 112/67)  BP(mean): --  RR: 17 (14 Jun 2021 08:39) (16 - 17)  SpO2: 99% (14 Jun 2021 08:39) (98% - 99%)      PHYSICAL EXAM  Constitutional - NAD in bed  HEENT - NCAT, EOMI  Neck - Supple, No limited ROM  Chest - CTA bilaterally  Cardiovascular - RR  Abdomen - BS hyperactive, Soft, NTND, PEG in place  Extremities - No C/C/E, No calf tenderness   Skin-no rash  Wounds-per H&P      Neurologic Exam - Awake, Alert, AAO x3      No aphasia, impaired attention and concentration, follows simple commands     Cranial Nerves - dysphagia        Coordination/dysmetria - impaired             Balance - impaired     Psychiatric - Mood stable, Affect WNL      RECENT LABS                        11.6   7.47  )-----------( 332      ( 14 Jun 2021 06:30 )             37.1     06-14    143  |  109<H>  |  23  ----------------------------<  95  3.7   |  26  |  0.95    Ca    9.6      14 Jun 2021 06:30    TPro  7.7  /  Alb  2.7<L>  /  TBili  0.4  /  DBili  x   /  AST  20  /  ALT  25  /  AlkPhos  78  06-14      LIVER FUNCTIONS - ( 14 Jun 2021 06:30 )  Alb: 2.7 g/dL / Pro: 7.7 g/dL / ALK PHOS: 78 U/L / ALT: 25 U/L / AST: 20 U/L / GGT: x                   RADIOLOGY/OTHER RESULTS      EXAM:  CT BRAIN      PROCEDURE DATE:  06/14/2021        INTERPRETATION:  CT head without IV contrast    CLINICAL INFORMATION: Follow-up strokes    TECHNIQUE: Contiguous axial 5 mm sections were obtained through the head. Sagittal and coronal 2-D reformatted images were also obtained.   This scan was performed using automatic exposure control (radiation dose reduction software) to obtain a diagnostic image quality scan with patient dose as low as reasonably achievable.    FINDINGS:   CT dated 06/10/2021 available for review.    The brain demonstrates subacute/chronic infarction of the LEFT cerebellum and chronic infarctions in the BILATERAL frontal, parietal and occipital lobes.   No intracranial hemorrhage is found.  No mass effect is foundin the brain.    The ventricles, sulci and basal cisterns appear unremarkable.    The orbits are unremarkable.  The paranasal sinuses are clear.  The nasal cavity appears intact.  The nasopharynx is symmetric.  The central skull base, petrous temporal bones and calvarium remain intact.      IMPRESSION: Subacute/chronic infarction of the LEFT cerebellum and chronic infarctions in the BILATERAL frontal, parietal and occipital lobes.                EBONI SINGH MD; Attending Radiologist  This document has been electronically signed. Jun 14 2021 10:14AM    CURRENT MEDICATIONS  MEDICATIONS  (STANDING):  acetaminophen   Tablet .. 650 milliGRAM(s) Oral every 6 hours  atorvastatin 40 milliGRAM(s) Oral at bedtime  cadexomer iodine 0.9% Gel 1 Application(s) Topical daily  dextrose 40% Gel 15 Gram(s) Oral once  dextrose 5%. 1000 milliLiter(s) (50 mL/Hr) IV Continuous <Continuous>  dextrose 5%. 1000 milliLiter(s) (100 mL/Hr) IV Continuous <Continuous>  dextrose 50% Injectable 25 Gram(s) IV Push once  dextrose 50% Injectable 12.5 Gram(s) IV Push once  dextrose 50% Injectable 25 Gram(s) IV Push once  glucagon  Injectable 1 milliGRAM(s) IntraMuscular once  heparin   Injectable 5000 Unit(s) SubCutaneous every 12 hours  insulin lispro (ADMELOG) corrective regimen sliding scale   SubCutaneous four times a day before meals  levETIRAcetam  Solution 500 milliGRAM(s) Oral two times a day  pantoprazole   Suspension 40 milliGRAM(s) Oral daily    MEDICATIONS  (PRN):  aluminum hydroxide/magnesium hydroxide/simethicone Suspension 30 milliLiter(s) Oral every 4 hours PRN Dyspepsia  ondansetron   Disintegrating Tablet 4 milliGRAM(s) Oral every 6 hours PRN Nausea and/or Vomiting      ASSESSMENT & PLAN    Continue comprehensive acute rehab program consisting of 3hrs/day of OT/PT and SLP. MBS today

## 2021-06-14 NOTE — PROGRESS NOTE ADULT - SUBJECTIVE AND OBJECTIVE BOX
Patient is a 60y old  Male who presents with a chief complaint of CVA (13 Jun 2021 11:05)      Patient seen and examined at bedside.  Denies chest pain, dyspnea, abd pain.    ALLERGIES:  No Known Allergies    MEDICATIONS  (STANDING):  acetaminophen   Tablet .. 650 milliGRAM(s) Oral every 6 hours  atorvastatin 40 milliGRAM(s) Oral at bedtime  cadexomer iodine 0.9% Gel 1 Application(s) Topical daily  dextrose 40% Gel 15 Gram(s) Oral once  dextrose 5%. 1000 milliLiter(s) (50 mL/Hr) IV Continuous <Continuous>  dextrose 5%. 1000 milliLiter(s) (100 mL/Hr) IV Continuous <Continuous>  dextrose 50% Injectable 25 Gram(s) IV Push once  dextrose 50% Injectable 12.5 Gram(s) IV Push once  dextrose 50% Injectable 25 Gram(s) IV Push once  glucagon  Injectable 1 milliGRAM(s) IntraMuscular once  heparin   Injectable 5000 Unit(s) SubCutaneous every 12 hours  insulin lispro (ADMELOG) corrective regimen sliding scale   SubCutaneous four times a day before meals  levETIRAcetam  Solution 500 milliGRAM(s) Oral two times a day  pantoprazole   Suspension 40 milliGRAM(s) Oral daily  sodium chloride 0.9%. 1000 milliLiter(s) (75 mL/Hr) IV Continuous <Continuous>    MEDICATIONS  (PRN):  aluminum hydroxide/magnesium hydroxide/simethicone Suspension 30 milliLiter(s) Oral every 4 hours PRN Dyspepsia  ondansetron   Disintegrating Tablet 4 milliGRAM(s) Oral every 6 hours PRN Nausea and/or Vomiting    Vital Signs Last 24 Hrs  T(F): 97.9 (13 Jun 2021 20:57), Max: 97.9 (13 Jun 2021 20:57)  HR: 87 (13 Jun 2021 20:57) (83 - 87)  BP: 112/67 (13 Jun 2021 20:57) (98/65 - 112/67)  RR: 16 (13 Jun 2021 20:57) (16 - 16)  SpO2: 98% (13 Jun 2021 20:57) (98% - 99%)  I&O's Summary    13 Jun 2021 07:01  -  14 Jun 2021 07:00  --------------------------------------------------------  IN: 2660 mL / OUT: 1600 mL / NET: 1060 mL        PHYSICAL EXAM:  General: NAD, A/O x 3  ENT: MMM  Neck: Supple, No JVD  Lungs: Clear to auscultation bilaterally  Cardio: RRR, S1/S2, No murmurs  Abdomen: Soft, Nontender, Nondistended; Bowel sounds present  Extremities: No calf tenderness, No pitting edema    LABS:                        11.6   7.47  )-----------( 332      ( 14 Jun 2021 06:30 )             37.1       06-14    143  |  109  |  23  ----------------------------<  95  3.7   |  26  |  0.95    Ca    9.6      14 Jun 2021 06:30    TPro  7.7  /  Alb  2.7  /  TBili  0.4  /  DBili  x   /  AST  20  /  ALT  25  /  AlkPhos  78  06-14     eGFR if Non African American: 88 mL/min/1.73M2 (06-14-21 @ 06:30)  eGFR if African American: 101 mL/min/1.73M2 (06-14-21 @ 06:30)             04-26 Chol 114 mg/dL LDL -- HDL 35 mg/dL Trig 126 mg/dL              POCT Blood Glucose.: 100 mg/dL (14 Jun 2021 07:51)  POCT Blood Glucose.: 134 mg/dL (13 Jun 2021 21:03)  POCT Blood Glucose.: 77 mg/dL (13 Jun 2021 16:43)  POCT Blood Glucose.: 202 mg/dL (13 Jun 2021 11:39)          COVID-19 PCR: NotDetec (06-02-21 @ 08:50)  COVID-19 PCR: NotDetec (05-31-21 @ 07:10)  COVID-19 PCR: NotDetec (05-26-21 @ 07:21)  COVID-19 PCR: Detected (05-18-21 @ 05:34)      RADIOLOGY & ADDITIONAL TESTS:    Care Discussed with Consultants/Other Providers:

## 2021-06-14 NOTE — CONSULT NOTE ADULT - CONSULT REQUESTED DATE/TIME
07-Jun-2021 15:02
10-Jim-2021 18:13
05-Jun-2021 11:53
11-Jun-2021 14:30
14-Jun-2021 14:31
07-Jun-2021 17:14
09-Jun-2021 12:30
11-Jun-2021 21:50

## 2021-06-14 NOTE — PROGRESS NOTE ADULT - SUBJECTIVE AND OBJECTIVE BOX
Patient is a 60 year old man admitted to [facility] on 6/4/21. He was transferred from [facility] after an initial admission to [facility]  on 4/8/21with respiratory distress. He had been diagnosed with Covid 19 in March, 2021. He was intubated. Hospital course complicated by PE, Empyema, septic shock, right obturator hematoma, and multiple acute  bilateral cerebral and  left cerebellar infarcts with areas of hemorrhagic conversion. CTA head and neck  with occlusion Left PICA. He denies HA or other complaints.  Today, Monday, 6/4/21, he denies HA or other complaints.    PMH: As above           DM           HTN    SH: No allergy    Exam: Awake, alert, a nephew  translates as he speaks [place], responds with a few words appropriately occasionally to questions, follows simple commands with prompting            Pupils 2.5mm, EOM intact, no nystagmus, VFF            CN II-XII intact            Motor tone and strength-RLE- 5/5 except dorsiflexion of foot-3-/5,                                                   RUE-5/5                                                  LUE 5/5                                                  LLE 5/5                          11.0   7.95  )-----------( 388      ( 10 Jim 2021 07:25 )             35.8   06-10    139  |  104  |  28<H>  ----------------------------<  116<H>  4.9   |  26  |  1.38<H>    Ca    10.1      10 Jim 2021 07:25    TPro  7.9  /  Alb  2.9<L>  /  TBili  0.4  /  DBili  x   /  AST  21  /  ALT  27  /  AlkPhos  92  06-10      < from: CT Head No Cont (06.14.21 @ 09:44) >    FINDINGS:   CT dated 06/10/2021 available for review.    The brain demonstrates subacute/chronic infarction of the LEFT cerebellum and chronic infarctions in the BILATERAL frontal, parietal and occipital lobes.   No intracranial hemorrhage is found.  No mass effect is foundin the brain.    The ventricles, sulci and basal cisterns appear unremarkable.    The orbits are unremarkable.  The paranasal sinuses are clear.  The nasal cavity appears intact.  The nasopharynx is symmetric.  The central skull base, petrous temporal bones and calvarium remain intact.      IMPRESSION: Subacute/chronic infarction of the LEFT cerebellum and chronic infarctions in the BILATERAL frontal, parietal and occipital lobes.            < from: CT Head No Cont (06.10.21 @ 12:20) >    INTERPRETATION:  CLINICAL INDICATION: Follow-up CVA    5mm axial sections of the brain were obtained from base to vertex, without the intravenous administration of contrast material. Coronal and sagittal computer generated reconstructed views are available.    Comparison is made with the prior CT of 5/5/2021.    There is moderate atrophy for the patient's age which is nonspecific but may be related to chronic illness was certain medications. There CVA in the left temporal occipital region, the right occipital region and the left cerebellum. The left cerebellar infarct is smaller and has normally evolved since the prior exam. The other infarcts are unchanged. Lucency in the left parietal white matter along the cortex is identified which may represent an interval subacute infarct. There is no hemorrhage.      IMPRESSION: Multiple infarcts. Left parietal cortical infarct may be new since the prior exam 5/5/2021 and appears subacute. Left cerebellar infarct is normally evolved. Left temporal occipital and right occipital infarcts unchanged.

## 2021-06-14 NOTE — PROGRESS NOTE ADULT - ASSESSMENT
This is a 60 year old man with a history of diabetes COVID (3/19) BIBEMS to [facility] on on April 1st for  for hypoxic respiratory failure secondary to COVID PNA, requiring endotracheal intubation (4/1-4/13). Course significant for Septic shock on admission, treated with vancomycin and cefepime. Found to have ESBL Klebsiella PNA c/b empyema s/p VATs decortication.   Patient had a prolonged hospital stay complicated by CVA with areas of hemorrhagic transformation.  Found to have Left upper lobe segmental PE with right heart strain, provoked by COVID and sepsis.  Patient could not be anticoagulated due to hemorrhagic conversion of CVA.  Patent now in rehab.      Patient on SQ heparin 5000 units for DVT prophylaxis.     When patient can be anticoagulated from a CNS hemorrhaging or hip hematoma standpoint, would beggin lovenox 1mg/kg BID rather than heparin given the heparin drip would require patient to leave rehab floor. This would be counter productive given his current progress.

## 2021-06-14 NOTE — PROGRESS NOTE ADULT - ASSESSMENT
61yo male with PMH of DM who was initially admitted to [facility] ICU for hypoxic respiratory failure 2/2 COVID c/b PE with R heart strain, cardiogenic and septic shock, ischemic and hemorrhagic CVA and ESBL klebsiella ventilator associate PNA, transferred to [facility] for neurosurgery eval and further management. s/p PEG. Course c/b right empyema s/p VATs and obturator bleed (resolved). Now admitted for functional decline.     #Debility  #ESBL Klebsiella PNA c/b empyema  #s/p COVID pneumonia  - 5/12 underwent right VATS converted to open L thoracotomy, decortication, drainage of abscess and flex bronch.  - c/w meropenem 5/7 and transition to Bactrim DS 2 tabs TID + Augmentin 875mg po q12  upon DC to [facility] --- Bactrim DC'ed 6/7 due to SUKI, augmentin completed 6/8  - ID following, recs appreciated - needs close follow up in ID clinic for repeat imaging, Dr. Chambers  - CT Chest 5/29 with improvement in bilateral opacities. Slight decrease in right chest wall hematoma. Stable loculated right hydropneumothorax.   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 Albarado with all chest tubes removed by 5/18  -CT Chest 5/16 Interval new small cavitary lesions in the right lower lobe and right chest wall hematoma - no thoracic surgery intervention indicated per Dr. Albarado.    #Obturator Hematoma (stable)  - bleeding hematoma in right thigh on CT on 5/7  - CT hip showing non active bleed, hematoma on right thigh on 5/8  - doppler to r/o DVT in LE, per IR no role IVC filter  - active again on 5/17, now stable  - was on heparin drip on and off, now off. C/w DVT ppx heparin bid    #Pulmonary embolus  - Pulmonary embolus noted on ct scan performed at time of admission  - Etiology of venous thromboembolism uncertain at this time; no known history of thrombophilia; provoked in setting of acute covid illness   - s/p heparin drip held due to recurrent bleed in right obturator and right chest wall hematoma   - 5000 heparin bid for dvt ppx  - Surgically cleared for AC in regards to hematomas  - neurology clearance for AC as pt recently had hemorrhagic conversion of ischemic CVA  - hematology recs noted    #CVA  -Acute bilateral ischemic strokes, with concern for hemorrhagic conversion, noted on CT scan on 4/8  -Etiology of acute ischemic stroke uncertain at this time; TTE with bubble performed at bedside without any evidence of right-to-left shunt; no arrhythmia noted on telemetry throughout stay in the MICU  -CTA head and neck without any evidence of carotid artery stenosis or dissection  - 6/10 CT head: multiple infarcts, no hemorrhage  - CT head 6/14 for re-eval    #Encephalopathy due to multifactorial causes - improving  - Etiology likely multi-factorial in setting of acute ischemic infarctions of the brain in conjunction vs acute kidney injury with uremia (now resolving) vs resolving hypoxic respiratory failure, covid-19 infection, superimposed bacterial pneumonia, urinary retention, concern for nutritional deficiencies  - Reorientation with family at bedside (pt speaks [place], does best with family present)  - MRI Brain, and MRA Head demonstrated multiple evolving subacute infarcts with associated hemorrhages, likely expected evolution of ischemia  - As per neuro, EEG not suggestive of seizure-like activity  - C/w Keppra 500 mg BID-> neuro f/u outpatient   - repeat CT head 5/5 normal.     #Diabetes mellitus type II  - April 2021: a1c 10 -- improved to 5.7  - NPH discontinued  - Lantus 10 units QAM - will switch to bedtime as there is fluctuations in daytime finger sticks  - moderate ISS, accuchecks  - Follow up endo outpatient    #Hypertension.  - Was on metoprolol tartrate 25 mg twice daily since 4/16; discontinued  - BP is stable  - If is hypertensive persistently, can consider antihypertensives    #Dysphagia  - PEG tube placed on 4/30, tube feedings started on 4/30, + free water q8    #Peripheral vascular disease  -Dry gangrene noted over distal toes on bilateral lower extremities--most concerning for microvascular disease subsequent to vasopressor administration   -Dorsalis pedis pulses intact bilaterally  -ERASMO demonstrating right ERASMO: 1.09, left ERASMO: 1.24, right TBI: 0.78, and left TBI: 0.73.  -Podiatry recs appreciated, no acute surgical intervention at this time, applying betadine to wound  -As per ID non infectious appearing     #Urinary Retention - improved  - Mann removed 5/30, urinating appropriately  - c/w doxazosin    #GI/Bowel Mgmt   - Continent c/w Senna, Miralax PRN  - added PPI via PEG tube  - maalox  - zofran for nausea    #Precautions / PROPHYLAXIS:   - Falls, Cardiac, Sternal, Spinal, Seizure   - ortho: Weight bearing status: WBAT   - Lungs: Aspiration, Incentive Spirometer   - Pressure injury/Skin: OOB to Chair, PT/OT    - DVT: HSQ 59yo male with PMH of DM who was initially admitted to [facility] ICU for hypoxic respiratory failure 2/2 COVID c/b PE with R heart strain, cardiogenic and septic shock, ischemic and hemorrhagic CVA and ESBL klebsiella ventilator associate PNA, transferred to [facility] for neurosurgery eval and further management. s/p PEG. Course c/b right empyema s/p VATs and obturator bleed (resolved). Now admitted for functional decline.     #Debility  #ESBL Klebsiella PNA c/b empyema  #s/p COVID pneumonia  - 5/12 underwent right VATS converted to open L thoracotomy, decortication, drainage of abscess and flex bronch.  - c/w meropenem 5/7 and transition to Bactrim DS 2 tabs TID + Augmentin 875mg po q12  upon DC to [facility] --- Bactrim DC'ed 6/7 due to SUKI, augmentin completed 6/8  - ID following, recs appreciated - needs close follow up in ID clinic for repeat imaging, Dr. Chambers  - CT Chest 5/29 with improvement in bilateral opacities. Slight decrease in right chest wall hematoma. Stable loculated right hydropneumothorax.   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 Albarado with all chest tubes removed by 5/18  -CT Chest 5/16 Interval new small cavitary lesions in the right lower lobe and right chest wall hematoma - no thoracic surgery intervention indicated per Dr. Albarado.    #Obturator Hematoma (stable)  - bleeding hematoma in right thigh on CT on 5/7  - CT hip showing non active bleed, hematoma on right thigh on 5/8  - doppler to r/o DVT in LE, per IR no role IVC filter  - active again on 5/17, now stable  - was on heparin drip on and off, now off. C/w DVT ppx heparin bid    #Pulmonary embolus  - Pulmonary embolus noted on ct scan performed at time of admission  - Etiology of venous thromboembolism uncertain at this time; no known history of thrombophilia; provoked in setting of acute covid illness   - s/p heparin drip held due to recurrent bleed in right obturator and right chest wall hematoma   - 5000 heparin bid for dvt ppx  - Surgically cleared for AC in regards to hematomas  - neurology clearance for AC as pt recently had hemorrhagic conversion of ischemic CVA  - hematology recs noted    #CVA  -Acute bilateral ischemic strokes, with concern for hemorrhagic conversion, noted on CT scan on 4/8  -Etiology of acute ischemic stroke uncertain at this time; TTE with bubble performed at bedside without any evidence of right-to-left shunt; no arrhythmia noted on telemetry throughout stay in the MICU  -CTA head and neck without any evidence of carotid artery stenosis or dissection  - 6/10 CT head: multiple infarcts, no hemorrhage  - CT head 6/14 for re-eval    #Encephalopathy due to multifactorial causes - improving  - Etiology likely multi-factorial in setting of acute ischemic infarctions of the brain in conjunction vs acute kidney injury with uremia (now resolving) vs resolving hypoxic respiratory failure, covid-19 infection, superimposed bacterial pneumonia, urinary retention, concern for nutritional deficiencies  - Reorientation with family at bedside (pt speaks [place], does best with family present)  - MRI Brain, and MRA Head demonstrated multiple evolving subacute infarcts with associated hemorrhages, likely expected evolution of ischemia  - As per neuro, EEG not suggestive of seizure-like activity  - C/w Keppra 500 mg BID-> neuro f/u outpatient   - repeat CT head 5/5 normal.     #Diabetes mellitus type II  - April 2021: a1c 10 -- improved to 5.7  - NPH discontinued  - Lantus 10 units QAM - will switch to bedtime as there is fluctuations in daytime finger sticks  - moderate ISS, accuchecks  - Follow up endo outpatient    #Hypertension.  - Was on metoprolol tartrate 25 mg twice daily since 4/16; discontinued  - BP is stable  - If is hypertensive persistently, can consider antihypertensives    #Dysphagia  - PEG tube placed on 4/30, tube feedings started on 4/30, + free water q8    #Peripheral vascular disease  -Dry gangrene noted over distal toes on bilateral lower extremities--most concerning for microvascular disease subsequent to vasopressor administration   -Dorsalis pedis pulses intact bilaterally  -ERASMO demonstrating right ERASMO: 1.09, left ERASMO: 1.24, right TBI: 0.78, and left TBI: 0.73.  -Podiatry recs appreciated, no acute surgical intervention at this time, applying betadine to wound  -As per ID non infectious appearing     #Urinary Retention - improved  - Mann removed 5/30, urinating appropriately  - c/w doxazosin    #GI/Bowel Mgmt   - Continent c/w Senna, Miralax PRN  - added PPI via PEG tube  - maalox  - zofran for nausea    #Precautions / PROPHYLAXIS:   - Falls, Cardiac, Sternal, Spinal, Seizure   - ortho: Weight bearing status: WBAT   - Lungs: Aspiration, Incentive Spirometer   - Pressure injury/Skin: OOB to Chair, PT/OT    - DVT: HSQ

## 2021-06-15 LAB
GLUCOSE BLDC GLUCOMTR-MCNC: 109 MG/DL — HIGH (ref 70–99)
GLUCOSE BLDC GLUCOMTR-MCNC: 115 MG/DL — HIGH (ref 70–99)
GLUCOSE BLDC GLUCOMTR-MCNC: 141 MG/DL — HIGH (ref 70–99)
GLUCOSE BLDC GLUCOMTR-MCNC: 83 MG/DL — SIGNIFICANT CHANGE UP (ref 70–99)

## 2021-06-15 PROCEDURE — 99231 SBSQ HOSP IP/OBS SF/LOW 25: CPT

## 2021-06-15 PROCEDURE — 99232 SBSQ HOSP IP/OBS MODERATE 35: CPT

## 2021-06-15 PROCEDURE — 99232 SBSQ HOSP IP/OBS MODERATE 35: CPT | Mod: GC

## 2021-06-15 RX ORDER — INSULIN GLARGINE 100 [IU]/ML
8 INJECTION, SOLUTION SUBCUTANEOUS AT BEDTIME
Refills: 0 | Status: DISCONTINUED | OUTPATIENT
Start: 2021-06-15 | End: 2021-06-17

## 2021-06-15 RX ORDER — ENOXAPARIN SODIUM 100 MG/ML
60 INJECTION SUBCUTANEOUS EVERY 12 HOURS
Refills: 0 | Status: DISCONTINUED | OUTPATIENT
Start: 2021-06-15 | End: 2021-06-19

## 2021-06-15 RX ADMIN — LEVETIRACETAM 500 MILLIGRAM(S): 250 TABLET, FILM COATED ORAL at 17:43

## 2021-06-15 RX ADMIN — Medication 650 MILLIGRAM(S): at 05:54

## 2021-06-15 RX ADMIN — Medication 650 MILLIGRAM(S): at 06:32

## 2021-06-15 RX ADMIN — ENOXAPARIN SODIUM 60 MILLIGRAM(S): 100 INJECTION SUBCUTANEOUS at 17:43

## 2021-06-15 RX ADMIN — PANTOPRAZOLE SODIUM 40 MILLIGRAM(S): 20 TABLET, DELAYED RELEASE ORAL at 12:32

## 2021-06-15 RX ADMIN — Medication 1 APPLICATION(S): at 12:34

## 2021-06-15 RX ADMIN — LEVETIRACETAM 500 MILLIGRAM(S): 250 TABLET, FILM COATED ORAL at 05:53

## 2021-06-15 RX ADMIN — ATORVASTATIN CALCIUM 40 MILLIGRAM(S): 80 TABLET, FILM COATED ORAL at 21:37

## 2021-06-15 RX ADMIN — INSULIN GLARGINE 8 UNIT(S): 100 INJECTION, SOLUTION SUBCUTANEOUS at 21:38

## 2021-06-15 RX ADMIN — HEPARIN SODIUM 5000 UNIT(S): 5000 INJECTION INTRAVENOUS; SUBCUTANEOUS at 05:54

## 2021-06-15 NOTE — CHART NOTE - NSCHARTNOTEFT_GEN_A_CORE
59 y/o male admitted to rehab. Patient with recent endoscopy for peg placement about 6 weeks ago, no abnormal findings noted. May proceed with RAJESH. Discussed with Dr. Jean and Dr. Jacinto. 61 y/o male admitted to rehab. Patient with recent endoscopy for peg placement about 6 weeks ago, no abnormal findings noted. May proceed with RAJESH. Discussed with Dr. Jean and Dr. Jacinto. Will sign off.

## 2021-06-15 NOTE — PROGRESS NOTE ADULT - SUBJECTIVE AND OBJECTIVE BOX
Patient is a 60y old  Male who presents with a chief complaint of CVA (14 Jun 2021 18:36)      Patient seen and examined at bedside.  Denies chest pain, dyspnea, abd pain.  Pt eating this morning when seen.    ALLERGIES:  No Known Allergies    MEDICATIONS  (STANDING):  acetaminophen   Tablet .. 650 milliGRAM(s) Oral every 6 hours  atorvastatin 40 milliGRAM(s) Oral at bedtime  cadexomer iodine 0.9% Gel 1 Application(s) Topical daily  dextrose 40% Gel 15 Gram(s) Oral once  dextrose 5%. 1000 milliLiter(s) (100 mL/Hr) IV Continuous <Continuous>  dextrose 5%. 1000 milliLiter(s) (50 mL/Hr) IV Continuous <Continuous>  dextrose 50% Injectable 25 Gram(s) IV Push once  dextrose 50% Injectable 12.5 Gram(s) IV Push once  dextrose 50% Injectable 25 Gram(s) IV Push once  glucagon  Injectable 1 milliGRAM(s) IntraMuscular once  heparin   Injectable 5000 Unit(s) SubCutaneous every 12 hours  insulin glargine Injectable (LANTUS) 8 Unit(s) SubCutaneous at bedtime  insulin lispro (ADMELOG) corrective regimen sliding scale   SubCutaneous four times a day before meals  levETIRAcetam  Solution 500 milliGRAM(s) Oral two times a day  pantoprazole   Suspension 40 milliGRAM(s) Oral daily    MEDICATIONS  (PRN):  aluminum hydroxide/magnesium hydroxide/simethicone Suspension 30 milliLiter(s) Oral every 4 hours PRN Dyspepsia  ondansetron   Disintegrating Tablet 4 milliGRAM(s) Oral every 6 hours PRN Nausea and/or Vomiting    Vital Signs Last 24 Hrs  T(F): 97.9 (15 Jim 2021 09:07), Max: 97.9 (15 Jim 2021 09:07)  HR: 90 (15 Jim 2021 09:07) (89 - 90)  BP: 111/73 (15 Jim 2021 09:07) (111/73 - 116/69)  RR: 17 (15 Jim 2021 09:07) (16 - 17)  SpO2: 99% (15 Jim 2021 09:07) (99% - 100%)  I&O's Summary    14 Jun 2021 07:01  -  15 Jim 2021 07:00  --------------------------------------------------------  IN: 1320 mL / OUT: 500 mL / NET: 820 mL        PHYSICAL EXAM:  General: NAD, A/O x 3  ENT: MMM  Neck: Supple, No JVD  Lungs: Clear to auscultation bilaterally  Cardio: RRR, S1/S2, No murmurs  Abdomen: Soft, Nontender, Nondistended; Bowel sounds present  Extremities: No calf tenderness, No pitting edema    LABS:                        11.6   7.47  )-----------( 332      ( 14 Jun 2021 06:30 )             37.1       06-14    143  |  109  |  23  ----------------------------<  95  3.7   |  26  |  0.95    Ca    9.6      14 Jun 2021 06:30    TPro  7.7  /  Alb  2.7  /  TBili  0.4  /  DBili  x   /  AST  20  /  ALT  25  /  AlkPhos  78  06-14     eGFR if Non African American: 88 mL/min/1.73M2 (06-14-21 @ 06:30)  eGFR if African American: 101 mL/min/1.73M2 (06-14-21 @ 06:30)             04-26 Chol 114 mg/dL LDL -- HDL 35 mg/dL Trig 126 mg/dL              POCT Blood Glucose.: 83 mg/dL (15 Jim 2021 07:50)  POCT Blood Glucose.: 159 mg/dL (14 Jun 2021 21:19)  POCT Blood Glucose.: 102 mg/dL (14 Jun 2021 16:45)  POCT Blood Glucose.: 176 mg/dL (14 Jun 2021 12:37)          COVID-19 PCR: NotDetec (06-02-21 @ 08:50)  COVID-19 PCR: NotDetec (05-31-21 @ 07:10)  COVID-19 PCR: NotDetec (05-26-21 @ 07:21)  COVID-19 PCR: Detected (05-18-21 @ 05:34)      RADIOLOGY & ADDITIONAL TESTS:    Care Discussed with Consultants/Other Providers:

## 2021-06-15 NOTE — PROGRESS NOTE ADULT - ASSESSMENT
59yo male with PMH of DM who was initially admitted to [facility] ICU for hypoxic respiratory failure 2/2 COVID c/b PE with R heart strain, cardiogenic and septic shock, ischemic and hemorrhagic CVA and ESBL klebsiella ventilator associate PNA, transferred to [facility] for neurosurgery eval and further management. s/p PEG. Course c/b right empyema s/p VATs and obturator bleed (resolved). Now admitted for functional decline.     #ESBL Klebsiella PNA c/b empyema   - 5/12 underwent right VATS converted to open L thoracotomy, decortication, drainage of abscess and flex bronch.  - antibiotics completed. Afebrile. ID in.    - CT Chest follow up 6/8 with improvement.  - Gait Instability, ADL impairments and Functional impairments:  Comprehensive Rehab Program of PT/OT/SLP. MBS completed-diet advanced, tolerating well.     #Empyema   -OR Vats 5/12 with Dr. Albarado with all chest tubes removed by 5/18. Chest sites healing well.   -CT Chest 6/8 improved. no thoracic surgery intervention indicated per Dr. Albarado.    #Obturator Hematoma   - bleeding hematoma in right thigh on CT on 5/7, CT hip showing non active bleed/hematoma on right thigh on 5/8  - cleared for DVT ppx heparin bid prior to rehab  -Evaluation by surgery at [facility]. No contraindication to AC as of 6/9 per Dr Cee. HH stable. Case discussed 6/15. Input appreciated.    #Pulmonary embolus  -Etiology of venous thromboembolism uncertain at this time; ?provoked acute covid illness   -s/p heparin drip held due to recurrent bleed in right obturator and right chest wall hematoma, now improved.   - 5000 heparin bid for dvt- tolerating well to date.  - AC/Noac as per heme rec's 6/11. Neurology clearance based on CTH follow up 6/14. AC ok to begin. Lovenox mg/kg x 48h-close monitoring. Transition to Eliquis if stable.   -Asymptomatic, tolerating therapy. VSS.   -hospitalist following    #CVA  -Hx bilateral ischemic strokes, L Pica occlusion, hemorrhagic conversion, CTH follow up completed. Neurology consulted re/AC therapy timing. Case discussed w/Dr Anderson on 6/11, AC to begin per Neuro.   - TTE with bubble completed prior, no right-to-left shunt; no arrhythmia noted on tele prior. Cardiology consulted 6/11- holter/arrhythmia workup. RAJESH requested by Cardio, GI consulted for clearance as requested. NPO p mn.  -CTA prior shows L Pica occlusion. Evaluated by Neurology prior to rehab-NOAC recommended when possible-however at the time of acute admission on hold for 'hx recurrent chest/thigh hematomas'.   -lipitor added per hospitalist plan.    #Altered mental status  - improved, A&Ox3, participates with care, no seizures overnight  - EEG neg seizure  - C/w Keppra 500 mg BID-> neuro f/u outpatient   - repeat CT head completed    #SUKI  -renal in, now off Bactrim, will follow rec's  -IVF completed.  -Nepro for hyperkalemia-improved    #Urinary Retention   - Mann removed 5/30  -voiding freely, low PVRs  - hold doxazosin for low BPs    #Diabetes mellitus  - Type II diabetes mellitus noted; a1c>10, now improved  - NPH to Lantus and Admelog scale.    -hospitalist following  - Follow up endo outpatient       #Dysphagia  - PEG tube placed on 4/30  -nausea/vomiting resolved, monitor loose stools while PO diet advancing-s/p MBS completed. Tolerating PO.    -XR abd follow up -neg acute findings      #Peripheral vascular disease  -Dry gangrene noted over distal toes on bilateral lower extremities  -ERASMO demonstrating right ERASMO: 1.09, left ERASMO: 1.24, right TBI: 0.78, and left TBI: 0.73.  -Podiatry recs appreciated, betadine to wound    #Pain Mgmt   - Tylenol PRN, Oxycodone PRN    #GI/Bowel Mgmt   - hold Senna, Miralax for loose stool hx.  -Resolved now-PO diet advancing.    #/Bladder Mgmt   - Continent, PVR low    #Precautions / PROPHYLAXIS:   - Falls, Cardiac, Seizure   - ortho: Weight bearing status: WBAT   - Lungs: Aspiration, Incentive Spirometer   - Pressure injury/Skin: OOB to Chair, PT/OT    - DVT: HSQ    61yo male with PMH of DM who was initially admitted to [facility] ICU for hypoxic respiratory failure 2/2 COVID c/b PE with R heart strain, cardiogenic and septic shock, ischemic and hemorrhagic CVA and ESBL klebsiella ventilator associate PNA, transferred to [facility] for neurosurgery eval and further management. s/p PEG. Course c/b right empyema s/p VATs and obturator bleed (resolved). Now admitted for functional decline.     #ESBL Klebsiella PNA c/b empyema   - 5/12 underwent right VATS converted to open L thoracotomy, decortication, drainage of abscess and flex bronch.  - CT Chest follow up 6/8 with improvement.  - Gait Instability, ADL impairments and Functional impairments:  Comprehensive Rehab Program of PT/OT/SLP. MBS completed-diet advanced, tolerating well.     #Empyema   -OR Vats 5/12 with Dr. Albarado with all chest tubes removed by 5/18. Chest sites healing well.   -CT Chest 6/8 improved. no thoracic surgery intervention indicated per Dr. Albarado.    #Obturator Hematoma   - bleeding hematoma in right thigh on CT on 5/7, CT hip showing non active bleed/hematoma on right thigh on 5/8  - cleared for DVT ppx heparin bid prior to rehab  -Evaluation by surgery at [facility]. No contraindication to AC as of 6/9 per Dr Cee. HH stable. Case discussed 6/15. Input appreciated.    #Pulmonary embolus  -Etiology of venous thromboembolism uncertain at this time; ?provoked acute covid illness   -s/p heparin drip held due to recurrent bleed in right obturator and right chest wall hematoma, now improved.   - AC/Noac as per heme rec's 6/11. Neurology clearance based on CTH follow up 6/14. AC ok to begin. Lovenox mg/kg x 48h-close monitoring. Transition to Eliquis if stable.   -Asymptomatic, tolerating therapy. VSS.   -hospitalist following    #CVA  -Hx bilateral ischemic strokes, L Pica occlusion, hemorrhagic conversion, CTH follow up completed. Neurology consulted re/AC therapy timing. Case discussed w/Dr Anderson on 6/11, AC to begin per Neuro.   - TTE with bubble completed prior, no right-to-left shunt; no arrhythmia noted on tele prior. Cardiology consulted 6/11- holter/arrhythmia workup. RAJESH requested by Cardio, GI consulted for clearance as requested. NPO p mn.  -CTA prior shows L Pica occlusion. Evaluated by Neurology prior to rehab-NOAC recommended when possible-however at the time of acute admission on hold for 'hx recurrent chest/thigh hematomas'.   -lipitor added per hospitalist plan.    #Altered mental status  - improved, A&Ox3, participates with care, no seizures overnight  - EEG neg seizure  - C/w Keppra 500 mg BID-> neuro f/u outpatient   - repeat CT head completed    #SUKI  -renal in, now off Bactrim, will follow rec's  -IVF completed.  -Nepro for hyperkalemia-improved    #Urinary Retention   - Mann removed 5/30  -voiding freely, low PVRs  - hold doxazosin for low BPs    #Diabetes mellitus  - Type II diabetes mellitus noted; a1c>10, now improved  - NPH to Lantus and Admelog scale.    -hospitalist following  - Follow up endo outpatient       #Dysphagia  - PEG tube placed on 4/30  -nausea/vomiting resolved, monitor loose stools while PO diet advancing-s/p MBS completed. Tolerating PO.    -XR abd follow up -neg acute findings      #Peripheral vascular disease  -Dry gangrene noted over distal toes on bilateral lower extremities  -ERASMO demonstrating right ERASMO: 1.09, left ERASMO: 1.24, right TBI: 0.78, and left TBI: 0.73.  -Podiatry recs appreciated, betadine to wound    #Pain Mgmt   - Tylenol PRN, Oxycodone PRN    #GI/Bowel Mgmt   - hold Senna, Miralax for loose stool hx.  -Resolved now-PO diet advancing.    #/Bladder Mgmt   - Continent, PVR low    #Precautions / PROPHYLAXIS:   - Falls, Cardiac, Seizure   - ortho: Weight bearing status: WBAT   - Lungs: Aspiration, Incentive Spirometer   - Pressure injury/Skin: OOB to Chair, PT/OT    - DVT: HSQ

## 2021-06-15 NOTE — PROGRESS NOTE ADULT - ASSESSMENT
59yo male with PMH of DM who was initially admitted to [facility] ICU for hypoxic respiratory failure 2/2 COVID c/b PE with R heart strain, cardiogenic and septic shock, ischemic and hemorrhagic CVA and ESBL klebsiella ventilator associate PNA, transferred to [facility] for neurosurgery eval and further management. s/p PEG. Course c/b right empyema s/p VATs and obturator bleed (resolved). Now admitted for functional decline.     #Debility  #ESBL Klebsiella PNA c/b empyema  #s/p COVID pneumonia  - 5/12 underwent right VATS converted to open L thoracotomy, decortication, drainage of abscess and flex bronch.  - c/w meropenem 5/7 and transition to Bactrim DS 2 tabs TID + Augmentin 875mg po q12  upon DC to [facility] --- Bactrim DC'ed 6/7 due to SUKI, augmentin completed 6/8  - ID following, recs appreciated - needs close follow up in ID clinic for repeat imaging, Dr. Chambers  - CT Chest 5/29 with improvement in bilateral opacities. Slight decrease in right chest wall hematoma. Stable loculated right hydropneumothorax.   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 Albarado with all chest tubes removed by 5/18  -CT Chest 5/16 Interval new small cavitary lesions in the right lower lobe and right chest wall hematoma - no thoracic surgery intervention indicated per Dr. Albarado.    #Obturator Hematoma (stable)  - bleeding hematoma in right thigh on CT on 5/7  - CT hip showing non active bleed, hematoma on right thigh on 5/8  - doppler to r/o DVT in LE, per IR no role IVC filter  - active again on 5/17, now stable  - was on heparin drip on and off, now off. C/w DVT ppx heparin bid    #Pulmonary embolus  - Pulmonary embolus noted on ct scan performed at time of admission  - Etiology of venous thromboembolism uncertain at this time; no known history of thrombophilia; provoked in setting of acute covid illness   - s/p heparin drip held due to recurrent bleed in right obturator and right chest wall hematoma   - 5000 heparin bid for dvt ppx  - Neurology cleared for AC  - Gen surg to review hematomas prior to initiation of AC  - AC per hematolgy regimen  - hematology recs noted    #CVA  -Acute bilateral ischemic strokes, with concern for hemorrhagic conversion, noted on CT scan on 4/8  -Etiology of acute ischemic stroke uncertain at this time; TTE with bubble performed at bedside without any evidence of right-to-left shunt; no arrhythmia noted on telemetry throughout stay in the MICU  -CTA head and neck without any evidence of carotid artery stenosis or dissection  - 6/10 CT head: multiple infarcts, no hemorrhage  - CT head 6/14 no hemorrhages  - RAJESH tomorrow to assess for etiology of multiple CVAs, NPO after midnight    #Encephalopathy due to multifactorial causes - improving  - Etiology likely multi-factorial in setting of acute ischemic infarctions of the brain in conjunction vs acute kidney injury with uremia (now resolving) vs resolving hypoxic respiratory failure, covid-19 infection, superimposed bacterial pneumonia, urinary retention, concern for nutritional deficiencies  - Reorientation with family at bedside (pt speaks Jjti, does best with family present)  - MRI Brain, and MRA Head demonstrated multiple evolving subacute infarcts with associated hemorrhages, likely expected evolution of ischemia  - As per neuro, EEG not suggestive of seizure-like activity  - C/w Keppra 500 mg BID-> neuro f/u outpatient   - repeat CT head 5/5 normal.     #Diabetes mellitus type II  - April 2021: a1c 10 -- improved to 5.7  - NPH discontinued  - Lantus 10 units QAM discontinued  - Lantus 8 qhs added - will adjust as pt is now cleared for PO diet  - moderate ISS, accuchecks  - Follow up endo outpatient    #Hypertension.  - Was on metoprolol tartrate 25 mg twice daily since 4/16; discontinued  - BP is stable  - If is hypertensive persistently, can consider antihypertensives    #Dysphagia  - PEG tube placed on 4/30, tube feedings started on 4/30, + free water q8    #Peripheral vascular disease  -Dry gangrene noted over distal toes on bilateral lower extremities--most concerning for microvascular disease subsequent to vasopressor administration   -Dorsalis pedis pulses intact bilaterally  -ERASMO demonstrating right ERASMO: 1.09, left ERASMO: 1.24, right TBI: 0.78, and left TBI: 0.73.  -Podiatry recs appreciated, no acute surgical intervention at this time, applying betadine to wound  -As per ID non infectious appearing     #Urinary Retention - improved  - Mann removed 5/30, urinating appropriately  - c/w doxazosin    #GI/Bowel Mgmt   - Continent c/w Senna, Miralax PRN  - added PPI via PEG tube  - maalox  - zofran for nausea    #Precautions / PROPHYLAXIS:   - Falls, Cardiac, Sternal, Spinal, Seizure   - ortho: Weight bearing status: WBAT   - Lungs: Aspiration, Incentive Spirometer   - Pressure injury/Skin: OOB to Chair, PT/OT    - DVT: HSQ

## 2021-06-15 NOTE — CHART NOTE - NSCHARTNOTEFT_GEN_A_CORE
Nutrition Follow Up Note  Hospital Course (Per Electronic Medical Record): 61yo male with PMH of DM who was initially admitted to [facility] ICU for hypoxic respiratory failure 2/2 COVID c/b PE with R heart strain, cardiogenic and septic shock, ischemic and hemorrhagic CVA and ESBL klebsiella ventilator associate PNA, transferred to [facility] for neurosurgery eval and further management. s/p PEG. Course c/b right empyema s/p VATs and obturator bleed (resolved). Now admitted for functional decline.   Source: Medical Record [X] Patient [X] Family [ ]         Diet: dysphagia 1 pureed, thin liquids, lacto veg (accepts milk and milk products)  [employer] Gujarati-speaking  ([employer] ID#[id number]) used to provide translation. Pt reports good PO intake/appetite currently. Is happy to be receiving an oral diet; pt reports he is a vegetarian, does not consume eggs but accepts milk/dairy products. Diet changed to reflect this and diet office informed. Pt denies nausea, vomiting, diarrhea, constipation.      Enteral/Parenteral Nutrition: N/A    Current Weight: 148.5 lbs (6/13)  148.8 lbs (6/13)  143 lbs (6/9)  144.4 lbs (6/8)  143.3lbs (6/6)  145.2 lbs (6/4)    Pertinent Medications: MEDICATIONS  (STANDING):  acetaminophen   Tablet .. 650 milliGRAM(s) Oral every 6 hours  atorvastatin 40 milliGRAM(s) Oral at bedtime  cadexomer iodine 0.9% Gel 1 Application(s) Topical daily  dextrose 40% Gel 15 Gram(s) Oral once  dextrose 5%. 1000 milliLiter(s) (100 mL/Hr) IV Continuous <Continuous>  dextrose 5%. 1000 milliLiter(s) (50 mL/Hr) IV Continuous <Continuous>  dextrose 50% Injectable 25 Gram(s) IV Push once  dextrose 50% Injectable 12.5 Gram(s) IV Push once  dextrose 50% Injectable 25 Gram(s) IV Push once  glucagon  Injectable 1 milliGRAM(s) IntraMuscular once  heparin   Injectable 5000 Unit(s) SubCutaneous every 12 hours  insulin glargine Injectable (LANTUS) 8 Unit(s) SubCutaneous at bedtime  insulin lispro (ADMELOG) corrective regimen sliding scale   SubCutaneous four times a day before meals  levETIRAcetam  Solution 500 milliGRAM(s) Oral two times a day  pantoprazole   Suspension 40 milliGRAM(s) Oral daily    MEDICATIONS  (PRN):  aluminum hydroxide/magnesium hydroxide/simethicone Suspension 30 milliLiter(s) Oral every 4 hours PRN Dyspepsia  ondansetron   Disintegrating Tablet 4 milliGRAM(s) Oral every 6 hours PRN Nausea and/or Vomiting      Pertinent Labs:  06-14 Na143 mmol/L Glu 95 mg/dL K+ 3.7 mmol/L Cr  0.95 mg/dL BUN 23 mg/dL 06-14 Alb 2.7 g/dL<L>  POCT glucose x 4: 115 (H), 83, 159 (H), 102 (H)      Skin: Necrotic toes, surgical incision per nursing flow sheets R lateral side underarm Per nursing flowsheets     Edema: No edema per nursing flow sheets     Last BM: on 6/12 Per nursing flowsheets     Estimated Needs:   [X] No Change since Previous Assessment  [ ] Recalculated:     Previous Nutrition Diagnosis:   moderate malnutrition  chewing/swallowing difficulty      Nutrition Diagnosis is [X] Ongoing    [ ] Resolved   [ ] Not Applicable      New Nutrition Diagnosis: [X] Not Applicable  [ ] Inadequate Protein Energy Intake   [ ] Inadequate Oral Intake   [ ] Excessive Energy Intake   [ ] Increased Nutrient Needs   [ ] Obesity   [ ] Altered GI Function   [ ] Unintended Weight Loss   [ ] Food & Nutrition Related Knowledge Deficit  [ ] Limited Adherence to nutrition related recommendations   [ ] Malnutrition      Interventions:   1. Recommend continuing with current diet  2. Monitor and encourage PO intake  3. Follow SLP recommendations    Monitoring & Evaluation:   [X] Weights   [X] PO Intake   [X] Follow Up (Per Protocol)  [X] Tolerance to Diet Prescription   [X] Other: Labs & POCT glucose     RD Remains Available.  Alpa Burgos RD Nutrition Follow Up Note  Hospital Course (Per Electronic Medical Record): 61yo male with PMH of DM who was initially admitted to [facility] ICU for hypoxic respiratory failure 2/2 COVID c/b PE with R heart strain, cardiogenic and septic shock, ischemic and hemorrhagic CVA and ESBL klebsiella ventilator associate PNA, transferred to [facility] for neurosurgery eval and further management. s/p PEG. Course c/b right empyema s/p VATs and obturator bleed (resolved). Now admitted for functional decline.   Source: Medical Record [X] Patient [X] Family [ ]         Diet: dysphagia 1 pureed, thin liquids, lacto veg (accepts milk and milk products)  [employer] Gujarati-speaking  ([employer] ID#[id number]) used to provide translation. Pt reports good PO intake/appetite currently. Is happy to be receiving an oral diet; pt reports he is a vegetarian, does not consume eggs but accepts milk/dairy products. Diet changed to reflect this and diet office informed. Pt also supposed to received a bolus of Glucerna (240 ml over 1 hour) if he consumes <50% of meal. Per discussion w/ nursing, pt consumed all of breakfast, ate all of dessert during lunch, 50% of soup, and ~30% of entree; however pt complained of fullness. Will continue to monitor PO intake. Pt denies nausea, vomiting, diarrhea, constipation.      Enteral/Parenteral Nutrition: N/A    Current Weight: 148.5 lbs (6/13)  148.8 lbs (6/13)  143 lbs (6/9)  144.4 lbs (6/8)  143.3lbs (6/6)  145.2 lbs (6/4)    Pertinent Medications: MEDICATIONS  (STANDING):  acetaminophen   Tablet .. 650 milliGRAM(s) Oral every 6 hours  atorvastatin 40 milliGRAM(s) Oral at bedtime  cadexomer iodine 0.9% Gel 1 Application(s) Topical daily  dextrose 40% Gel 15 Gram(s) Oral once  dextrose 5%. 1000 milliLiter(s) (100 mL/Hr) IV Continuous <Continuous>  dextrose 5%. 1000 milliLiter(s) (50 mL/Hr) IV Continuous <Continuous>  dextrose 50% Injectable 25 Gram(s) IV Push once  dextrose 50% Injectable 12.5 Gram(s) IV Push once  dextrose 50% Injectable 25 Gram(s) IV Push once  glucagon  Injectable 1 milliGRAM(s) IntraMuscular once  heparin   Injectable 5000 Unit(s) SubCutaneous every 12 hours  insulin glargine Injectable (LANTUS) 8 Unit(s) SubCutaneous at bedtime  insulin lispro (ADMELOG) corrective regimen sliding scale   SubCutaneous four times a day before meals  levETIRAcetam  Solution 500 milliGRAM(s) Oral two times a day  pantoprazole   Suspension 40 milliGRAM(s) Oral daily    MEDICATIONS  (PRN):  aluminum hydroxide/magnesium hydroxide/simethicone Suspension 30 milliLiter(s) Oral every 4 hours PRN Dyspepsia  ondansetron   Disintegrating Tablet 4 milliGRAM(s) Oral every 6 hours PRN Nausea and/or Vomiting      Pertinent Labs:  06-14 Na143 mmol/L Glu 95 mg/dL K+ 3.7 mmol/L Cr  0.95 mg/dL BUN 23 mg/dL 06-14 Alb 2.7 g/dL<L>  POCT glucose x 4: 115 (H), 83, 159 (H), 102 (H)      Skin: Necrotic toes, surgical incision per nursing flow sheets R lateral side underarm Per nursing flowsheets     Edema: No edema per nursing flow sheets     Last BM: on 6/12 Per nursing flowsheets     Estimated Needs:   [X] No Change since Previous Assessment  [ ] Recalculated:     Previous Nutrition Diagnosis:   moderate malnutrition  chewing/swallowing difficulty      Nutrition Diagnosis is [X] Ongoing    [ ] Resolved   [ ] Not Applicable      New Nutrition Diagnosis: [X] Not Applicable  [ ] Inadequate Protein Energy Intake   [ ] Inadequate Oral Intake   [ ] Excessive Energy Intake   [ ] Increased Nutrient Needs   [ ] Obesity   [ ] Altered GI Function   [ ] Unintended Weight Loss   [ ] Food & Nutrition Related Knowledge Deficit  [ ] Limited Adherence to nutrition related recommendations   [ ] Malnutrition      Interventions:   1. Recommend continuing with current diet  2. Monitor and encourage PO intake  3. Follow SLP recommendations    Monitoring & Evaluation:   [X] Weights   [X] PO Intake   [X] Follow Up (Per Protocol)  [X] Tolerance to Diet Prescription   [X] Other: Labs & POCT glucose     RD Remains Available.  Alpa Burgos RD

## 2021-06-15 NOTE — PROGRESS NOTE ADULT - SUBJECTIVE AND OBJECTIVE BOX
oob/wheelchair    a and o x 3    no complaints    chest-no hematoma    abdomen benign    labs:      Complete Blood Count + Automated Diff (06.14.21 @ 06:30)   WBC Count: 7.47 K/uL   RBC Count: 4.43 M/uL   Hemoglobin: 11.6 g/dL   Hematocrit: 37.1 %   Mean Cell Volume: 83.7 fl   Mean Cell Hemoglobin: 26.2 pg   Mean Cell Hemoglobin Conc: 31.3 gm/dL   Red Cell Distrib Width: 18.9 %   Platelet Count - Automated: 332 K/uL   Auto Neutrophil #: 4.42 K/uL   Auto Lymphocyte #: 1.78 K/uL   Auto Monocyte #: 0.69 K/uL   Auto Eosinophil #: 0.42 K/uL

## 2021-06-15 NOTE — PROGRESS NOTE ADULT - SUBJECTIVE AND OBJECTIVE BOX
Subjective: Improving functional deficits.       HISTORY OF PRESENT ILLNESS  59yo M with T2DM and recently diagnosed COVID (3/19) BIBEMS to [facility] on 4/1 for hypoxic respiratory failure secondary to COVID PNA, requiring endotracheal intubation (4/1-4/13). Course significant for Septic shock on admission, treated with vancomycin and cefepime. Found to have ESBL Klebsiella PNA c/b empyema s/p VATs decortication. Treated with prolonged course of Meropenem while hospitalized and transitioned to Augmentin/Bactrim upon discharge (until 6/8). Will follow up with ID as outpatient.     Patient had acute ischemic CVA with areas of hemorrhagic transformation. Found to have multi-focal acute-to-subacute infarctions. 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 Subsequent scans showed hemorrhagic conversion. Transferred to [facility] for Neurosurgery evaluation, no surgical intervention at this time. Will require outpatient follow up.     Course complicated by RITO segmental PEs with e/o RH strain: Provoked in the setting of sepsis/high inflammatory state with COVID infection. LE Dopplers negative x 2. Initially anticoagulated with heparin gtt. Course further complicated by a right chest wall and right obturator hematomas. Due to concern for worsening hemorrhagic conversion of stroke and hematomas, the patient's therapeutic anticoagulation was discontinued. He was evaluated by the neurosurgical team, which said that there was no indication for acute intervention. Patient will not be anticoagulated upon discharge.   Patient's AMS likely in the setting of acute strokes, improved throughout hospitalization. Due to prolonged intubation with subsequent AMS and dysphagia, pt had PEG tube placed. Pt failed MBS following improvement of mental status.     Patient also had bedside debridement and woundcare of her BLE digits and will follow up with Podiatry as an outpatient.    REVIEW OF SYMPTOMS  [X] Constitutional WNL     [X] Cardio WNL            [X] Resp-empeyema           [X] GI loose stool                          [X] -ulises                   [X] Heme-hematoma              [X] Endo-dm2                    [X] Skin WNL                 [X] MSK WNL            [X] Neuro-cva                  [X] Cognitive WNL        [X] Psych WNL      VITALS  Vital Signs Last 24 Hrs  T(C): 36.6 (15 Jim 2021 09:07), Max: 36.6 (15 Jim 2021 09:07)  T(F): 97.9 (15 Jim 2021 09:07), Max: 97.9 (15 Jim 2021 09:07)  HR: 90 (15 Jim 2021 09:07) (89 - 90)  BP: 111/73 (15 Jim 2021 09:07) (111/73 - 116/69)  BP(mean): --  RR: 17 (15 Jim 2021 09:07) (16 - 17)  SpO2: 99% (15 Jim 2021 09:07) (99% - 100%)      PHYSICAL EXAM  Constitutional - NAD in bed  HEENT - NCAT, EOMI  Neck - Supple, No limited ROM  Chest - CTA bilaterally  Cardiovascular - RR  Abdomen - BS hyperactive, Soft, NTND, PEG in place  Extremities - No C/C/E, No calf tenderness   Skin-no rash  Wounds-per H&P      Neurologic Exam - Awake, Alert, AAO x3      No aphasia, impaired attention and concentration, follows simple commands     Cranial Nerves - dysphagia        Coordination/dysmetria - impaired             Balance - impaired     Psychiatric - Mood stable, Affect WNL      RECENT LABS                        11.6   7.47  )-----------( 332      ( 14 Jun 2021 06:30 )             37.1     06-14    143  |  109<H>  |  23  ----------------------------<  95  3.7   |  26  |  0.95    Ca    9.6      14 Jun 2021 06:30    TPro  7.7  /  Alb  2.7<L>  /  TBili  0.4  /  DBili  x   /  AST  20  /  ALT  25  /  AlkPhos  78  06-14      LIVER FUNCTIONS - ( 14 Jun 2021 06:30 )  Alb: 2.7 g/dL / Pro: 7.7 g/dL / ALK PHOS: 78 U/L / ALT: 25 U/L / AST: 20 U/L / GGT: x             RADIOLOGY/OTHER RESULTS      EXAM:  CT BRAIN      PROCEDURE DATE:  06/14/2021        INTERPRETATION:  CT head without IV contrast    CLINICAL INFORMATION: Follow-up strokes    TECHNIQUE: Contiguous axial 5 mm sections were obtained through the head. Sagittal and coronal 2-D reformatted images were also obtained.   This scan was performed using automatic exposure control (radiation dose reduction software) to obtain a diagnostic image quality scan with patient dose as low as reasonably achievable.    FINDINGS:   CT dated 06/10/2021 available for review.    The brain demonstrates subacute/chronic infarction of the LEFT cerebellum and chronic infarctions in the BILATERAL frontal, parietal and occipital lobes.   No intracranial hemorrhage is found.  No mass effect is foundin the brain.    The ventricles, sulci and basal cisterns appear unremarkable.    The orbits are unremarkable.  The paranasal sinuses are clear.  The nasal cavity appears intact.  The nasopharynx is symmetric.  The central skull base, petrous temporal bones and calvarium remain intact.      IMPRESSION: Subacute/chronic infarction of the LEFT cerebellum and chronic infarctions in the BILATERAL frontal, parietal and occipital lobes.                EBONI SINGH MD; Attending Radiologist  This document has been electronically signed. Jun 14 2021 10:14AM    CURRENT MEDICATIONS  MEDICATIONS  (STANDING):  acetaminophen   Tablet .. 650 milliGRAM(s) Oral every 6 hours  atorvastatin 40 milliGRAM(s) Oral at bedtime  cadexomer iodine 0.9% Gel 1 Application(s) Topical daily  dextrose 40% Gel 15 Gram(s) Oral once  dextrose 5%. 1000 milliLiter(s) (50 mL/Hr) IV Continuous <Continuous>  dextrose 5%. 1000 milliLiter(s) (100 mL/Hr) IV Continuous <Continuous>  dextrose 50% Injectable 25 Gram(s) IV Push once  dextrose 50% Injectable 12.5 Gram(s) IV Push once  dextrose 50% Injectable 25 Gram(s) IV Push once  glucagon  Injectable 1 milliGRAM(s) IntraMuscular once  heparin   Injectable 5000 Unit(s) SubCutaneous every 12 hours  insulin glargine Injectable (LANTUS) 8 Unit(s) SubCutaneous at bedtime  insulin lispro (ADMELOG) corrective regimen sliding scale   SubCutaneous four times a day before meals  levETIRAcetam  Solution 500 milliGRAM(s) Oral two times a day  pantoprazole   Suspension 40 milliGRAM(s) Oral daily    MEDICATIONS  (PRN):  aluminum hydroxide/magnesium hydroxide/simethicone Suspension 30 milliLiter(s) Oral every 4 hours PRN Dyspepsia  ondansetron   Disintegrating Tablet 4 milliGRAM(s) Oral every 6 hours PRN Nausea and/or Vomiting      ASSESSMENT & PLAN    Continue comprehensive acute rehab program consisting of 3hrs/day of OT/PT and SLP. Subjective: Improving functional deficits.       HISTORY OF PRESENT ILLNESS  59yo M with T2DM and recently diagnosed COVID (3/19) BIBEMS to [facility] on 4/1 for hypoxic respiratory failure secondary to COVID PNA, requiring endotracheal intubation (4/1-4/13). Course significant for Septic shock on admission, treated with vancomycin and cefepime. Found to have ESBL Klebsiella PNA c/b empyema s/p VATs decortication. Treated with prolonged course of Meropenem while hospitalized and transitioned to Augmentin/Bactrim upon discharge (until 6/8). Will follow up with ID as outpatient.     Patient had acute ischemic CVA with areas of hemorrhagic transformation. Found to have multi-focal acute-to-subacute infarctions. 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 Subsequent scans showed hemorrhagic conversion. Transferred to [facility] for Neurosurgery evaluation, no surgical intervention at this time. Will require outpatient follow up.     Course complicated by RITO segmental PEs with e/o RH strain: Provoked in the setting of sepsis/high inflammatory state with COVID infection. LE Dopplers negative x 2. Initially anticoagulated with heparin gtt. Course further complicated by a right chest wall and right obturator hematomas. Due to concern for worsening hemorrhagic conversion of stroke and hematomas, the patient's therapeutic anticoagulation was discontinued. He was evaluated by the neurosurgical team, which said that there was no indication for acute intervention. Patient will not be anticoagulated upon discharge.   Patient's AMS likely in the setting of acute strokes, improved throughout hospitalization. Due to prolonged intubation with subsequent AMS and dysphagia, pt had PEG tube placed. Pt failed MBS following improvement of mental status.     Patient also had bedside debridement and woundcare of her BLE digits and will follow up with Podiatry as an outpatient.    REVIEW OF SYMPTOMS  [X] Constitutional WNL     [X] Cardio WNL            [X] Resp-empeyema           [X] GI loose stool                          [X] -ulises                   [X] Heme-hematoma              [X] Endo-dm2                    [X] Skin WNL                 [X] MSK WNL            [X] Neuro-cva                  [X] Cognitive WNL        [X] Psych WNL      VITALS  Vital Signs Last 24 Hrs  T(C): 36.6 (15 Jim 2021 09:07), Max: 36.6 (15 Jim 2021 09:07)  T(F): 97.9 (15 Jim 2021 09:07), Max: 97.9 (15 Jim 2021 09:07)  HR: 90 (15 Jim 2021 09:07) (89 - 90)  BP: 111/73 (15 Jim 2021 09:07) (111/73 - 116/69)  BP(mean): --  RR: 17 (15 Jim 2021 09:07) (16 - 17)  SpO2: 99% (15 Jim 2021 09:07) (99% - 100%)      PHYSICAL EXAM  Constitutional - NAD in bed  HEENT - NCAT, EOMI  Neck - Supple, No limited ROM  Chest - CTA bilaterally  Cardiovascular - RR  Abdomen - BS hyperactive, Soft, NTND, PEG in place  Extremities - No C/C/E, No calf tenderness   Skin-no rash  Wounds-per H&P      Neurologic Exam - Awake, Alert, AAO x3      No aphasia, impaired attention and concentration, follows simple commands     Cranial Nerves - dysphagia        Coordination/dysmetria - impaired             Balance - impaired     Psychiatric - Mood stable, Affect WNL          IMPRESSION: Subacute/chronic infarction of the LEFT cerebellum and chronic infarctions in the BILATERAL frontal, parietal and occipital lobes.              CURRENT MEDICATIONS  MEDICATIONS  (STANDING):  acetaminophen   Tablet .. 650 milliGRAM(s) Oral every 6 hours  atorvastatin 40 milliGRAM(s) Oral at bedtime  cadexomer iodine 0.9% Gel 1 Application(s) Topical daily  dextrose 40% Gel 15 Gram(s) Oral once  dextrose 5%. 1000 milliLiter(s) (50 mL/Hr) IV Continuous <Continuous>  dextrose 5%. 1000 milliLiter(s) (100 mL/Hr) IV Continuous <Continuous>  dextrose 50% Injectable 25 Gram(s) IV Push once  dextrose 50% Injectable 12.5 Gram(s) IV Push once  dextrose 50% Injectable 25 Gram(s) IV Push once  glucagon  Injectable 1 milliGRAM(s) IntraMuscular once  heparin   Injectable 5000 Unit(s) SubCutaneous every 12 hours  insulin glargine Injectable (LANTUS) 8 Unit(s) SubCutaneous at bedtime  insulin lispro (ADMELOG) corrective regimen sliding scale   SubCutaneous four times a day before meals  levETIRAcetam  Solution 500 milliGRAM(s) Oral two times a day  pantoprazole   Suspension 40 milliGRAM(s) Oral daily    MEDICATIONS  (PRN):  aluminum hydroxide/magnesium hydroxide/simethicone Suspension 30 milliLiter(s) Oral every 4 hours PRN Dyspepsia  ondansetron   Disintegrating Tablet 4 milliGRAM(s) Oral every 6 hours PRN Nausea and/or Vomiting      ASSESSMENT & PLAN    Continue comprehensive acute rehab program consisting of 3hrs/day of OT/PT and SLP.

## 2021-06-16 LAB
GLUCOSE BLDC GLUCOMTR-MCNC: 153 MG/DL — HIGH (ref 70–99)
GLUCOSE BLDC GLUCOMTR-MCNC: 74 MG/DL — SIGNIFICANT CHANGE UP (ref 70–99)
GLUCOSE BLDC GLUCOMTR-MCNC: 77 MG/DL — SIGNIFICANT CHANGE UP (ref 70–99)
GLUCOSE BLDC GLUCOMTR-MCNC: 81 MG/DL — SIGNIFICANT CHANGE UP (ref 70–99)
GLUCOSE BLDC GLUCOMTR-MCNC: 87 MG/DL — SIGNIFICANT CHANGE UP (ref 70–99)
SARS-COV-2 RNA SPEC QL NAA+PROBE: SIGNIFICANT CHANGE UP

## 2021-06-16 PROCEDURE — 93325 DOPPLER ECHO COLOR FLOW MAPG: CPT | Mod: 26

## 2021-06-16 PROCEDURE — 99233 SBSQ HOSP IP/OBS HIGH 50: CPT

## 2021-06-16 PROCEDURE — 93312 ECHO TRANSESOPHAGEAL: CPT | Mod: 26

## 2021-06-16 PROCEDURE — 93320 DOPPLER ECHO COMPLETE: CPT | Mod: 26

## 2021-06-16 PROCEDURE — 99232 SBSQ HOSP IP/OBS MODERATE 35: CPT | Mod: GC

## 2021-06-16 RX ORDER — SODIUM CHLORIDE 9 MG/ML
500 INJECTION, SOLUTION INTRAVENOUS
Refills: 0 | Status: COMPLETED | OUTPATIENT
Start: 2021-06-16 | End: 2021-06-16

## 2021-06-16 RX ORDER — PETROLATUM,WHITE
1 JELLY (GRAM) TOPICAL THREE TIMES A DAY
Refills: 0 | Status: DISCONTINUED | OUTPATIENT
Start: 2021-06-16 | End: 2021-06-23

## 2021-06-16 RX ADMIN — Medication 1 APPLICATION(S): at 22:19

## 2021-06-16 RX ADMIN — Medication 650 MILLIGRAM(S): at 17:47

## 2021-06-16 RX ADMIN — LEVETIRACETAM 500 MILLIGRAM(S): 250 TABLET, FILM COATED ORAL at 17:47

## 2021-06-16 RX ADMIN — Medication 1 APPLICATION(S): at 17:49

## 2021-06-16 RX ADMIN — Medication 2: at 22:18

## 2021-06-16 RX ADMIN — ATORVASTATIN CALCIUM 40 MILLIGRAM(S): 80 TABLET, FILM COATED ORAL at 22:18

## 2021-06-16 RX ADMIN — Medication 1 APPLICATION(S): at 17:47

## 2021-06-16 RX ADMIN — INSULIN GLARGINE 8 UNIT(S): 100 INJECTION, SOLUTION SUBCUTANEOUS at 22:18

## 2021-06-16 RX ADMIN — ENOXAPARIN SODIUM 60 MILLIGRAM(S): 100 INJECTION SUBCUTANEOUS at 06:19

## 2021-06-16 RX ADMIN — LEVETIRACETAM 500 MILLIGRAM(S): 250 TABLET, FILM COATED ORAL at 06:19

## 2021-06-16 RX ADMIN — Medication 650 MILLIGRAM(S): at 18:29

## 2021-06-16 RX ADMIN — SODIUM CHLORIDE 50 MILLILITER(S): 9 INJECTION, SOLUTION INTRAVENOUS at 10:00

## 2021-06-16 RX ADMIN — ENOXAPARIN SODIUM 60 MILLIGRAM(S): 100 INJECTION SUBCUTANEOUS at 17:47

## 2021-06-16 NOTE — PROVIDER CONTACT NOTE (OTHER) - SITUATION
Patient blood sugar 77, NPO after midnight as of 06/15 23:59 for scheduled RAJESH at 14:00 this afternoon.

## 2021-06-16 NOTE — PROGRESS NOTE ADULT - ASSESSMENT
59yo male with PMH of DM who was initially admitted to [facility] ICU for hypoxic respiratory failure 2/2 COVID c/b PE with R heart strain, cardiogenic and septic shock, ischemic and hemorrhagic CVA and ESBL klebsiella ventilator associate PNA, transferred to [facility] for neurosurgery eval and further management. s/p PEG. Course c/b right empyema s/p VATs and obturator bleed (resolved). Now admitted for functional decline.     #ESBL Klebsiella PNA c/b empyema   - 5/12 underwent right VATS converted to open L thoracotomy, decortication, drainage of abscess and flex bronch.  - CT Chest follow up 6/8 with improvement.  - Gait Instability, ADL impairments and Functional impairments:  Comprehensive Rehab Program of PT/OT/SLP. MBS completed-diet advanced, tolerating well.     #Empyema   -OR Vats 5/12 with Dr. Albarado with all chest tubes removed by 5/18. Chest sites healing well.   -CT Chest 6/8 improved. no thoracic surgery intervention indicated per Dr. Albarado.    #Obturator Hematoma   - bleeding hematoma in right thigh on CT on 5/7, CT hip showing non active bleed/hematoma on right thigh on 5/8  - cleared for DVT ppx heparin bid prior to rehab  -Evaluation by surgery at [facility]. No contraindication to AC as of 6/9 per Dr Cee. HH stable. Case discussed 6/15. Input appreciated.    #Pulmonary embolus  -Etiology of venous thromboembolism uncertain at this time; ?provoked acute covid illness   -s/p heparin drip held due to recurrent bleed in right obturator and right chest wall hematoma, now improved.   - AC/Noac as per heme rec's 6/11. Neurology clearance based on CTH follow up 6/14. AC ok to begin. Lovenox mg/kg x 48h-close monitoring. Started on 6/15. Transition to Eliquis if stable. Labs am to follow.  -Asymptomatic, tolerating therapy. VSS.   -hospitalist following    #CVA  -Hx bilateral ischemic strokes, L Pica occlusion, hemorrhagic conversion, CTH follow up completed. Neurology consulted re/AC therapy timing. Case discussed w/Dr Anderson on 6/11, AC to begin per Neuro.   - TTE with bubble completed prior, no right-to-left shunt; no arrhythmia noted on tele prior. Cardiology consulted 6/11- holter/arrhythmia workup. RAJESH requested by Cardio, scheduled for 6/16. GI clearance. NPO p mn.  -CTA prior shows L Pica occlusion. Evaluated by Neurology prior to rehab-NOAC recommended when possible-however at the time of acute admission on hold for 'hx recurrent chest/thigh hematomas'.   -lipitor added per hospitalist plan.    #Altered mental status  - improved, A&Ox3, participates with care, no seizures overnight  - EEG neg seizure  - C/w Keppra 500 mg BID-> neuro f/u outpatient   - repeat CT head completed    #SUKI  -renal in, now off Bactrim, will follow rec's  -IVF completed.  -Nepro for hyperkalemia-improved    #Urinary Retention   - Mann removed 5/30  -voiding freely, low PVRs  - hold doxazosin for low BPs    #Diabetes mellitus  - Type II diabetes mellitus noted; a1c>10, now improved  - NPH to Lantus and Admelog scale.    -hospitalist following  - Follow up endo outpatient   -Podiatry follow up today      #Dysphagia  - PEG tube placed on 4/30  -nausea/vomiting resolved, monitor loose stools while PO diet advancing-s/p MBS completed. Tolerating PO.    -XR abd follow up -neg acute findings      #Peripheral vascular disease  -Dry gangrene noted over distal toes on bilateral lower extremities  -ERASMO demonstrating right ERASMO: 1.09, left ERASMO: 1.24, right TBI: 0.78, and left TBI: 0.73.  -Podiatry recs appreciated, betadine to wound    #Pain Mgmt   - Tylenol PRN, Oxycodone PRN    #GI/Bowel Mgmt   - hold Senna, Miralax for loose stool hx.  -Resolved now-PO diet advancing.    #/Bladder Mgmt   - Continent, PVR low    #Precautions / PROPHYLAXIS:   - Falls, Cardiac, Seizure   - ortho: Weight bearing status: WBAT   - Lungs: Aspiration, Incentive Spirometer   - Pressure injury/Skin: OOB to Chair, PT/OT

## 2021-06-16 NOTE — PROGRESS NOTE ADULT - SUBJECTIVE AND OBJECTIVE BOX
No distress    Vital Signs Last 24 Hrs  T(C): 36.4 (06-16-21 @ 08:13), Max: 36.4 (06-16-21 @ 08:13)  T(F): 97.5 (06-16-21 @ 08:13), Max: 97.5 (06-16-21 @ 08:13)  HR: 92 (06-16-21 @ 08:13) (92 - 92)  BP: 113/74 (06-16-21 @ 08:13) (113/74 - 113/74)  RR: 16 (06-16-21 @ 08:13) (16 - 16)  SpO2: 98% (06-16-21 @ 08:13) (98% - 98%)    s1s2  b/l air entry  soft, ND, + PEG  no edema                               RADIOLOGY:  Renal sonogram shows 10-11 cm kidneys with no hydronephrosis    acetaminophen   Tablet .. 650 milliGRAM(s) Oral every 6 hours  aluminum hydroxide/magnesium hydroxide/simethicone Suspension 30 milliLiter(s) Oral every 4 hours PRN  AQUAPHOR (petrolatum Ointment) 1 Application(s) Topical three times a day  atorvastatin 40 milliGRAM(s) Oral at bedtime  cadexomer iodine 0.9% Gel 1 Application(s) Topical daily  dextrose 40% Gel 15 Gram(s) Oral once  dextrose 5%. 1000 milliLiter(s) IV Continuous <Continuous>  dextrose 5%. 1000 milliLiter(s) IV Continuous <Continuous>  dextrose 50% Injectable 25 Gram(s) IV Push once  dextrose 50% Injectable 12.5 Gram(s) IV Push once  dextrose 50% Injectable 25 Gram(s) IV Push once  enoxaparin Injectable 60 milliGRAM(s) SubCutaneous every 12 hours  glucagon  Injectable 1 milliGRAM(s) IntraMuscular once  insulin glargine Injectable (LANTUS) 8 Unit(s) SubCutaneous at bedtime  insulin lispro (ADMELOG) corrective regimen sliding scale   SubCutaneous four times a day before meals  levETIRAcetam  Solution 500 milliGRAM(s) Oral two times a day  ondansetron   Disintegrating Tablet 4 milliGRAM(s) Oral every 6 hours PRN  pantoprazole   Suspension 40 milliGRAM(s) Oral daily    ASSESSMENT/PLAN:    Transferred here for rehab on 6/4/21 after long complex hospital course including covid pneumonia, septic shock, empyema, stroke, PE, hematomas while on A/C, dysphagia/PEG and digital ischemic wounds. He had an SUKI in April with peak Cr 2.5 which resolved w/Cr 0.75 - 5/29/21.    S/p new SUKI due to bactrim/pre-renal (peak Cr 1.66 - 6/7/21)  Bactrim d/c-d  SUKI resolved post IVF  Avoid nephrotoxins  F/u [facility]    [phone]

## 2021-06-16 NOTE — CHART NOTE - NSCHARTNOTEFT_GEN_A_CORE
Brief procedural summary:  Transesophageal echocardiogram (RAJESH)    Type of procedure:  Transesophageal echocardiogram (RAJESH)  Licensed independent practitioner:  Carlos Jacinto MD  Indication for RAJESH: s/p CVA, rule out cardiac source of embolism   Brief summary of findings: No evidence of ASD, PFO or intracardiac thrombus.  Normal LV function, mild aortic root enlargement  Complications:  None  Estimated blood loss:  None  Anesthesia type:  Moderate anesthesia (administered by Anesthesia - see separate Anesthesia note)    Full report to follow.    Carlos Jacinto MD

## 2021-06-16 NOTE — PROGRESS NOTE ADULT - ASSESSMENT
61yo male with PMH of DM who was initially admitted to [facility] ICU for hypoxic respiratory failure 2/2 COVID c/b PE with R heart strain, cardiogenic and septic shock, ischemic and hemorrhagic CVA and ESBL klebsiella ventilator associate PNA, transferred to [facility] for neurosurgery eval and further management. s/p PEG. Course c/b right empyema s/p VATs and obturator bleed (resolved). Now admitted for functional decline.     #Debility  #ESBL Klebsiella PNA c/b empyema  #s/p COVID pneumonia  - 5/12 underwent right VATS converted to open L thoracotomy, decortication, drainage of abscess and flex bronch.  - c/w meropenem 5/7 and transition to Bactrim DS 2 tabs TID + Augmentin 875mg po q12  upon DC to  --- Bactrim DC'ed 6/7 due to SUKI, augmentin completed 6/8  - ID following, recs appreciated - needs close follow up in ID clinic for repeat imaging, Dr. Chambers  - CT Chest 5/29 with improvement in bilateral opacities. Slight decrease in right chest wall hematoma. Stable loculated right hydropneumothorax.   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 Albarado with all chest tubes removed by 5/18  -CT Chest 5/16 Interval new small cavitary lesions in the right lower lobe and right chest wall hematoma - no thoracic surgery intervention indicated per Dr. Albarado.    #Obturator Hematoma (stable)  - bleeding hematoma in right thigh on CT on 5/7  - CT hip showing non active bleed, hematoma on right thigh on 5/8  - doppler to r/o DVT in LE, per IR no role IVC filter  - active again on 5/17, now stable  - was on heparin drip on and off, now off. C/w DVT ppx heparin bid    #Pulmonary embolus  - Pulmonary embolus noted on ct scan performed at time of admission  - Etiology of venous thromboembolism uncertain at this time; no known history of thrombophilia; provoked in setting of acute covid illness   - s/p heparin drip held due to recurrent bleed in right obturator and right chest wall hematoma   - 5000 heparin bid for dvt ppx  - Neurology cleared for AC  - Gen surg to review hematomas prior to initiation of AC  - AC per hematolgy regimen  - hematology recs noted  - on AC    #CVA  -Acute bilateral ischemic strokes, with concern for hemorrhagic conversion, noted on CT scan on 4/8  -Etiology of acute ischemic stroke uncertain at this time; TTE with bubble performed at bedside without any evidence of right-to-left shunt; no arrhythmia noted on telemetry throughout stay in the MICU  -CTA head and neck without any evidence of carotid artery stenosis or dissection  - 6/10 CT head: multiple infarcts, no hemorrhage  - CT head 6/14 no hemorrhages  - RAJESH today to assess for etiology of multiple CVAs    #Encephalopathy due to multifactorial causes - improving  - Etiology likely multi-factorial in setting of acute ischemic infarctions of the brain in conjunction vs acute kidney injury with uremia (now resolving) vs resolving hypoxic respiratory failure, covid-19 infection, superimposed bacterial pneumonia, urinary retention, concern for nutritional deficiencies  - Reorientation with family at bedside (pt speaks Gujarati, does best with family present)  - MRI Brain, and MRA Head demonstrated multiple evolving subacute infarcts with associated hemorrhages, likely expected evolution of ischemia  - As per neuro, EEG not suggestive of seizure-like activity  - C/w Keppra 500 mg BID-> neuro f/u outpatient   - repeat CT head 5/5 normal.     #Diabetes mellitus type II  - April 2021: a1c 10 -- improved to 5.7  - NPH discontinued  - Lantus 8 qhs   - moderate ISS, accuchecks  - Follow up endo outpatient    #Hypertension.  - Was on metoprolol tartrate 25 mg twice daily since 4/16; discontinued  - BP is stable  - If is hypertensive persistently, can consider antihypertensives    #Dysphagia  - PEG tube placed on 4/30, tube feedings started on 4/30, + free water q8    #Peripheral vascular disease  -Dry gangrene noted over distal toes on bilateral lower extremities--most concerning for microvascular disease subsequent to vasopressor administration   -Dorsalis pedis pulses intact bilaterally  -ERASMO demonstrating right ERASMO: 1.09, left ERASMO: 1.24, right TBI: 0.78, and left TBI: 0.73.  -Podiatry recs appreciated, no acute surgical intervention at this time, applying betadine to wound  -As per ID non infectious appearing     #Urinary Retention - improved  - Mann removed 5/30, urinating appropriately  - c/w doxazosin    #Precautions / PROPHYLAXIS:   - Falls, Cardiac, Sternal, Spinal, Seizure   - ortho: Weight bearing status: WBAT   - Lungs: Aspiration, Incentive Spirometer   - Pressure injury/Skin: OOB to Chair, PT/OT    - DVT: Lovenox 59yo male with PMH of DM who was initially admitted to [facility] ICU for hypoxic respiratory failure 2/2 COVID c/b PE with R heart strain, cardiogenic and septic shock, ischemic and hemorrhagic CVA and ESBL klebsiella ventilator associate PNA, transferred to [facility] for neurosurgery eval and further management. s/p PEG. Course c/b right empyema s/p VATs and obturator bleed (resolved). Now admitted for functional decline.     #Debility  #ESBL Klebsiella PNA c/b empyema  #s/p COVID pneumonia  - 5/12 underwent right VATS converted to open L thoracotomy, decortication, drainage of abscess and flex bronch.  - c/w meropenem 5/7 and transition to Bactrim DS 2 tabs TID + Augmentin 875mg po q12  upon DC to  --- Bactrim DC'ed 6/7 due to SUKI, augmentin completed 6/8  - ID following, recs appreciated - needs close follow up in ID clinic for repeat imaging, Dr. Chambers  - CT Chest 5/29 with improvement in bilateral opacities. Slight decrease in right chest wall hematoma. Stable loculated right hydropneumothorax.   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 Albarado with all chest tubes removed by 5/18  -CT Chest 5/16 Interval new small cavitary lesions in the right lower lobe and right chest wall hematoma - no thoracic surgery intervention indicated per Dr. Albarado.    #Obturator Hematoma (stable)  - bleeding hematoma in right thigh on CT on 5/7  - CT hip showing non active bleed, hematoma on right thigh on 5/8  - doppler to r/o DVT in LE, per IR no role IVC filter  - active again on 5/17, now stable  - was on heparin drip on and off, now off. C/w DVT ppx heparin bid    #Pulmonary embolus  - Pulmonary embolus noted on ct scan performed at time of admission  - Etiology of venous thromboembolism uncertain at this time; no known history of thrombophilia; provoked in setting of acute covid illness   - s/p heparin drip held due to recurrent bleed in right obturator and right chest wall hematoma   - 5000 heparin bid for dvt ppx  - Neurology cleared for AC  - Gen surg to review hematomas prior to initiation of AC  - AC per hematolgy regimen  - hematology recs noted  - on AC    #CVA  -Acute bilateral ischemic strokes, with concern for hemorrhagic conversion, noted on CT scan on 4/8  -Etiology of acute ischemic stroke uncertain at this time; TTE with bubble performed at bedside without any evidence of right-to-left shunt; no arrhythmia noted on telemetry throughout stay in the MICU  -CTA head and neck without any evidence of carotid artery stenosis or dissection  - 6/10 CT head: multiple infarcts, no hemorrhage  - CT head 6/14 no hemorrhages  - RAJESH today to assess for etiology of multiple CVAs    #Encephalopathy due to multifactorial causes - improving  - Etiology likely multi-factorial in setting of acute ischemic infarctions of the brain in conjunction vs acute kidney injury with uremia (now resolving) vs resolving hypoxic respiratory failure, covid-19 infection, superimposed bacterial pneumonia, urinary retention, concern for nutritional deficiencies  - Reorientation with family at bedside (pt speaks Gujarati, does best with family present)  - MRI Brain, and MRA Head demonstrated multiple evolving subacute infarcts with associated hemorrhages, likely expected evolution of ischemia  - As per neuro, EEG not suggestive of seizure-like activity  - C/w Keppra 500 mg BID-> neuro f/u outpatient   - repeat CT head 5/5 normal.     #Diabetes mellitus type II  - April 2021: a1c 10 -- improved to 5.7  - NPH discontinued  - Lantus 8 qhs   - moderate ISS, accuchecks  - Follow up endo outpatient    #Hypertension.  - Was on metoprolol tartrate 25 mg twice daily since 4/16; discontinued  - BP is stable  - If is hypertensive persistently, can consider antihypertensives    #Dysphagia  - PEG tube placed on 4/30, tube feedings started on 4/30, + free water q8    #Peripheral vascular disease  -Dry gangrene noted over distal toes on bilateral lower extremities--most concerning for microvascular disease subsequent to vasopressor administration   -Dorsalis pedis pulses intact bilaterally  -ERASMO demonstrating right ERASMO: 1.09, left ERASMO: 1.24, right TBI: 0.78, and left TBI: 0.73.  -Podiatry recs appreciated, no acute surgical intervention at this time, applying betadine to wound  -As per ID non infectious appearing     #Urinary Retention - improved  - Mann removed 5/30, urinating appropriately  - c/w doxazosin     #DVT ppx: Lovenox

## 2021-06-16 NOTE — PROVIDER CONTACT NOTE (OTHER) - ASSESSMENT
Blood sugar 77, no signs/symptoms of hypo glycemia. Patient denies fever/chills, denies dizziness, see vital signs on flowsheet.

## 2021-06-16 NOTE — PROGRESS NOTE ADULT - SUBJECTIVE AND OBJECTIVE BOX
Patient is a 60y old  Male who presents with a chief complaint of CVA (16 Jun 2021 08:54)      Patient seen and examined at bedside.  No overnight events  No complaints this morning. NPO since last night. RAJESH today. Cardio confirmed.  FS noted to be in 70s, started IVF hydration while NPO    ALLERGIES:  No Known Allergies    MEDICATIONS  (STANDING):  acetaminophen   Tablet .. 650 milliGRAM(s) Oral every 6 hours  AQUAPHOR (petrolatum Ointment) 1 Application(s) Topical three times a day  atorvastatin 40 milliGRAM(s) Oral at bedtime  cadexomer iodine 0.9% Gel 1 Application(s) Topical daily  dextrose 40% Gel 15 Gram(s) Oral once  dextrose 5%. 1000 milliLiter(s) (50 mL/Hr) IV Continuous <Continuous>  dextrose 5%. 1000 milliLiter(s) (100 mL/Hr) IV Continuous <Continuous>  dextrose 50% Injectable 25 Gram(s) IV Push once  dextrose 50% Injectable 12.5 Gram(s) IV Push once  dextrose 50% Injectable 25 Gram(s) IV Push once  enoxaparin Injectable 60 milliGRAM(s) SubCutaneous every 12 hours  glucagon  Injectable 1 milliGRAM(s) IntraMuscular once  insulin glargine Injectable (LANTUS) 8 Unit(s) SubCutaneous at bedtime  insulin lispro (ADMELOG) corrective regimen sliding scale   SubCutaneous four times a day before meals  levETIRAcetam  Solution 500 milliGRAM(s) Oral two times a day  pantoprazole   Suspension 40 milliGRAM(s) Oral daily    MEDICATIONS  (PRN):  aluminum hydroxide/magnesium hydroxide/simethicone Suspension 30 milliLiter(s) Oral every 4 hours PRN Dyspepsia  ondansetron   Disintegrating Tablet 4 milliGRAM(s) Oral every 6 hours PRN Nausea and/or Vomiting    Vital Signs Last 24 Hrs  T(F): 97.5 (16 Jun 2021 08:13), Max: 98.4 (15 Jim 2021 19:33)  HR: 92 (16 Jun 2021 08:13) (91 - 92)  BP: 113/74 (16 Jun 2021 08:13) (103/65 - 113/74)  RR: 16 (16 Jun 2021 08:13) (16 - 16)  SpO2: 98% (16 Jun 2021 08:13) (98% - 99%)  I&O's Summary    15 Jim 2021 07:01  -  16 Jun 2021 07:00  --------------------------------------------------------  IN: 300 mL / OUT: 1550 mL / NET: -1250 mL    16 Jun 2021 07:01  -  16 Jun 2021 13:10  --------------------------------------------------------  IN: 300 mL / OUT: 0 mL / NET: 300 mL    PHYSICAL EXAM:  GENERAL: NAD  HENT:  Atraumatic, Normocephalic; No tonsillar erythema, exudates, or enlargement; Moist mucous membranes;   EYES: EOMI, PERRLA, conjunctiva and sclera clear, no lid-lag  NECK: Supple, No JVD, Normal thyroid  CHEST/LUNG: Clear to percussion bilaterally; No rales, rhonchi, wheezing, or rubs; normal respiratory effort, no intercostal retractions  HEART: Regular rate and rhythm; No murmurs, rubs, or gallops  ABDOMEN: Soft, Nontender, Nondistended; Bowel sounds present; No HSM  MUSCULOSKELETAL/EXTREMITIES:  2+ Peripheral Pulses, No clubbing, cyanosis, or peripheral edema; No digital cyanosis  PSYCH: Appropriate affect, Alert & Awake      LABS:                        11.6   7.47  )-----------( 332      ( 14 Jun 2021 06:30 )             37.1       06-14    143  |  109  |  23  ----------------------------<  95  3.7   |  26  |  0.95    Ca    9.6      14 Jun 2021 06:30    TPro  7.7  /  Alb  2.7  /  TBili  0.4  /  DBili  x   /  AST  20  /  ALT  25  /  AlkPhos  78  06-14     eGFR if Non African American: 88 mL/min/1.73M2 (06-14-21 @ 06:30)  eGFR if African American: 101 mL/min/1.73M2 (06-14-21 @ 06:30)        04-26 Chol 114 mg/dL LDL -- HDL 35 mg/dL Trig 126 mg/dL        POCT Blood Glucose.: 87 mg/dL (16 Jun 2021 12:49)  POCT Blood Glucose.: 77 mg/dL (16 Jun 2021 08:11)  POCT Blood Glucose.: 141 mg/dL (15 Jim 2021 21:36)  POCT Blood Glucose.: 109 mg/dL (15 Jim 2021 16:49)      COVID-19 PCR: NotDetec (06-15-21 @ 19:15)  COVID-19 PCR: NotDetec (06-02-21 @ 08:50)  COVID-19 PCR: NotDetec (05-31-21 @ 07:10)  COVID-19 PCR: NotDetec (05-26-21 @ 07:21)  COVID-19 PCR: Detected (05-18-21 @ 05:34)      Care Discussed with Consultants/Other Providers: Yes

## 2021-06-16 NOTE — PROGRESS NOTE ADULT - SUBJECTIVE AND OBJECTIVE BOX
Subjective: I feel good, I need foot care. No nausea, no stomach pain.       HISTORY OF PRESENT ILLNESS  61yo M with T2DM and recently diagnosed COVID (3/19) BIBEMS to [facility] on 4/1 for hypoxic respiratory failure secondary to COVID PNA, requiring endotracheal intubation (4/1-4/13). Course significant for Septic shock on admission, treated with vancomycin and cefepime. Found to have ESBL Klebsiella PNA c/b empyema s/p VATs decortication. Treated with prolonged course of Meropenem while hospitalized and transitioned to Augmentin/Bactrim upon discharge (until 6/8). Will follow up with ID as outpatient.     Patient had acute ischemic CVA with areas of hemorrhagic transformation. Found to have multi-focal acute-to-subacute infarctions. 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 Subsequent scans showed hemorrhagic conversion. Transferred to [facility] for Neurosurgery evaluation, no surgical intervention at this time. Will require outpatient follow up.     Course complicated by RITO segmental PEs with e/o RH strain: Provoked in the setting of sepsis/high inflammatory state with COVID infection. LE Dopplers negative x 2. Initially anticoagulated with heparin gtt. Course further complicated by a right chest wall and right obturator hematomas. Due to concern for worsening hemorrhagic conversion of stroke and hematomas, the patient's therapeutic anticoagulation was discontinued. He was evaluated by the neurosurgical team, which said that there was no indication for acute intervention. Patient will not be anticoagulated upon discharge.   Patient's AMS likely in the setting of acute strokes, improved throughout hospitalization. Due to prolonged intubation with subsequent AMS and dysphagia, pt had PEG tube placed. Pt failed MBS following improvement of mental status.     Patient also had bedside debridement and woundcare of her BLE digits and will follow up with Podiatry as an outpatient.    Medically cleared for discharge to  [facility] 6/[facility].     REVIEW OF SYMPTOMS  [X] Constitutional WNL     [X] Cardio WNL            [X] Resp-empeyema           [X] GI loose stool                          [X] -ulises                   [X] Heme-hematoma              [X] Endo-dm2                    [X] Skin WNL                 [X] MSK WNL            [X] Neuro-cva                  [X] Cognitive WNL        [X] Psych WNL      VITALS  Vital Signs Last 24 Hrs  T(C): 36.4 (16 Jun 2021 08:13), Max: 36.9 (15 Jim 2021 19:33)  T(F): 97.5 (16 Jun 2021 08:13), Max: 98.4 (15 Jim 2021 19:33)  HR: 92 (16 Jun 2021 08:13) (90 - 92)  BP: 113/74 (16 Jun 2021 08:13) (103/65 - 113/74)  BP(mean): --  RR: 16 (16 Jun 2021 08:13) (16 - 17)  SpO2: 98% (16 Jun 2021 08:13) (98% - 99%)     PHYSICAL EXAM  Constitutional - NAD in bed  HEENT - NCAT, EOMI  Neck - Supple, No limited ROM  Chest - CTA bilaterally  Cardiovascular - RR  Abdomen - BS hyperactive, Soft, NTND, PEG in place  Extremities - No C/C/E, No calf tenderness   Skin-no rash  Wounds-per H&P      Neurologic Exam - Awake, Alert, AAO x3      No aphasia, impaired attention and concentration, follows simple commands     Cranial Nerves - dysphagia        Coordination/dysmetria - impaired             Balance - impaired     Psychiatric - Mood stable, Affect WNL        RADIOLOGY/OTHER RESULTS      CURRENT MEDICATIONS  MEDICATIONS  (STANDING):  acetaminophen   Tablet .. 650 milliGRAM(s) Oral every 6 hours  atorvastatin 40 milliGRAM(s) Oral at bedtime  cadexomer iodine 0.9% Gel 1 Application(s) Topical daily  dextrose 40% Gel 15 Gram(s) Oral once  dextrose 5%. 1000 milliLiter(s) (50 mL/Hr) IV Continuous <Continuous>  dextrose 5%. 1000 milliLiter(s) (100 mL/Hr) IV Continuous <Continuous>  dextrose 50% Injectable 25 Gram(s) IV Push once  dextrose 50% Injectable 12.5 Gram(s) IV Push once  dextrose 50% Injectable 25 Gram(s) IV Push once  enoxaparin Injectable 60 milliGRAM(s) SubCutaneous every 12 hours  glucagon  Injectable 1 milliGRAM(s) IntraMuscular once  insulin glargine Injectable (LANTUS) 8 Unit(s) SubCutaneous at bedtime  insulin lispro (ADMELOG) corrective regimen sliding scale   SubCutaneous four times a day before meals  levETIRAcetam  Solution 500 milliGRAM(s) Oral two times a day  pantoprazole   Suspension 40 milliGRAM(s) Oral daily    MEDICATIONS  (PRN):  aluminum hydroxide/magnesium hydroxide/simethicone Suspension 30 milliLiter(s) Oral every 4 hours PRN Dyspepsia  ondansetron   Disintegrating Tablet 4 milliGRAM(s) Oral every 6 hours PRN Nausea and/or Vomiting      ASSESSMENT & PLAN    Continue comprehensive acute rehab program consisting of 3hrs/day of OT/PT and SLP.

## 2021-06-17 LAB
ALBUMIN SERPL ELPH-MCNC: 2.6 G/DL — LOW (ref 3.3–5)
ALP SERPL-CCNC: 68 U/L — SIGNIFICANT CHANGE UP (ref 40–120)
ALT FLD-CCNC: 25 U/L — SIGNIFICANT CHANGE UP (ref 10–45)
ANION GAP SERPL CALC-SCNC: 6 MMOL/L — SIGNIFICANT CHANGE UP (ref 5–17)
AST SERPL-CCNC: 21 U/L — SIGNIFICANT CHANGE UP (ref 10–40)
BASOPHILS # BLD AUTO: 0.1 K/UL — SIGNIFICANT CHANGE UP (ref 0–0.2)
BASOPHILS NFR BLD AUTO: 1.4 % — SIGNIFICANT CHANGE UP (ref 0–2)
BILIRUB SERPL-MCNC: 0.5 MG/DL — SIGNIFICANT CHANGE UP (ref 0.2–1.2)
BUN SERPL-MCNC: 16 MG/DL — SIGNIFICANT CHANGE UP (ref 7–23)
CALCIUM SERPL-MCNC: 9.2 MG/DL — SIGNIFICANT CHANGE UP (ref 8.4–10.5)
CHLORIDE SERPL-SCNC: 108 MMOL/L — SIGNIFICANT CHANGE UP (ref 96–108)
CO2 SERPL-SCNC: 28 MMOL/L — SIGNIFICANT CHANGE UP (ref 22–31)
CREAT SERPL-MCNC: 1.01 MG/DL — SIGNIFICANT CHANGE UP (ref 0.5–1.3)
EOSINOPHIL # BLD AUTO: 0.31 K/UL — SIGNIFICANT CHANGE UP (ref 0–0.5)
EOSINOPHIL NFR BLD AUTO: 4.2 % — SIGNIFICANT CHANGE UP (ref 0–6)
GLUCOSE BLDC GLUCOMTR-MCNC: 143 MG/DL — HIGH (ref 70–99)
GLUCOSE BLDC GLUCOMTR-MCNC: 192 MG/DL — HIGH (ref 70–99)
GLUCOSE BLDC GLUCOMTR-MCNC: 74 MG/DL — SIGNIFICANT CHANGE UP (ref 70–99)
GLUCOSE BLDC GLUCOMTR-MCNC: 88 MG/DL — SIGNIFICANT CHANGE UP (ref 70–99)
GLUCOSE SERPL-MCNC: 87 MG/DL — SIGNIFICANT CHANGE UP (ref 70–99)
HCT VFR BLD CALC: 33.3 % — LOW (ref 39–50)
HGB BLD-MCNC: 10.4 G/DL — LOW (ref 13–17)
IMM GRANULOCYTES NFR BLD AUTO: 0.7 % — SIGNIFICANT CHANGE UP (ref 0–1.5)
LYMPHOCYTES # BLD AUTO: 1.22 K/UL — SIGNIFICANT CHANGE UP (ref 1–3.3)
LYMPHOCYTES # BLD AUTO: 16.5 % — SIGNIFICANT CHANGE UP (ref 13–44)
MCHC RBC-ENTMCNC: 26.6 PG — LOW (ref 27–34)
MCHC RBC-ENTMCNC: 31.2 GM/DL — LOW (ref 32–36)
MCV RBC AUTO: 85.2 FL — SIGNIFICANT CHANGE UP (ref 80–100)
MONOCYTES # BLD AUTO: 0.95 K/UL — HIGH (ref 0–0.9)
MONOCYTES NFR BLD AUTO: 12.9 % — SIGNIFICANT CHANGE UP (ref 2–14)
NEUTROPHILS # BLD AUTO: 4.75 K/UL — SIGNIFICANT CHANGE UP (ref 1.8–7.4)
NEUTROPHILS NFR BLD AUTO: 64.3 % — SIGNIFICANT CHANGE UP (ref 43–77)
NRBC # BLD: 0 /100 WBCS — SIGNIFICANT CHANGE UP (ref 0–0)
PLATELET # BLD AUTO: 313 K/UL — SIGNIFICANT CHANGE UP (ref 150–400)
POTASSIUM SERPL-MCNC: 3.7 MMOL/L — SIGNIFICANT CHANGE UP (ref 3.5–5.3)
POTASSIUM SERPL-SCNC: 3.7 MMOL/L — SIGNIFICANT CHANGE UP (ref 3.5–5.3)
PROT SERPL-MCNC: 7.1 G/DL — SIGNIFICANT CHANGE UP (ref 6–8.3)
RBC # BLD: 3.91 M/UL — LOW (ref 4.2–5.8)
RBC # FLD: 18.9 % — HIGH (ref 10.3–14.5)
SODIUM SERPL-SCNC: 142 MMOL/L — SIGNIFICANT CHANGE UP (ref 135–145)
WBC # BLD: 7.38 K/UL — SIGNIFICANT CHANGE UP (ref 3.8–10.5)
WBC # FLD AUTO: 7.38 K/UL — SIGNIFICANT CHANGE UP (ref 3.8–10.5)

## 2021-06-17 PROCEDURE — 99232 SBSQ HOSP IP/OBS MODERATE 35: CPT

## 2021-06-17 PROCEDURE — 99232 SBSQ HOSP IP/OBS MODERATE 35: CPT | Mod: GC

## 2021-06-17 RX ADMIN — Medication 650 MILLIGRAM(S): at 05:23

## 2021-06-17 RX ADMIN — Medication 1 APPLICATION(S): at 12:20

## 2021-06-17 RX ADMIN — Medication 2: at 22:09

## 2021-06-17 RX ADMIN — Medication 1 APPLICATION(S): at 05:23

## 2021-06-17 RX ADMIN — ENOXAPARIN SODIUM 60 MILLIGRAM(S): 100 INJECTION SUBCUTANEOUS at 05:23

## 2021-06-17 RX ADMIN — Medication 1 APPLICATION(S): at 22:13

## 2021-06-17 RX ADMIN — Medication 650 MILLIGRAM(S): at 13:10

## 2021-06-17 RX ADMIN — ATORVASTATIN CALCIUM 40 MILLIGRAM(S): 80 TABLET, FILM COATED ORAL at 22:09

## 2021-06-17 RX ADMIN — LEVETIRACETAM 500 MILLIGRAM(S): 250 TABLET, FILM COATED ORAL at 05:23

## 2021-06-17 RX ADMIN — Medication 650 MILLIGRAM(S): at 12:19

## 2021-06-17 RX ADMIN — Medication 1 APPLICATION(S): at 12:30

## 2021-06-17 RX ADMIN — ENOXAPARIN SODIUM 60 MILLIGRAM(S): 100 INJECTION SUBCUTANEOUS at 17:29

## 2021-06-17 RX ADMIN — LEVETIRACETAM 500 MILLIGRAM(S): 250 TABLET, FILM COATED ORAL at 17:29

## 2021-06-17 RX ADMIN — Medication 650 MILLIGRAM(S): at 05:58

## 2021-06-17 RX ADMIN — PANTOPRAZOLE SODIUM 40 MILLIGRAM(S): 20 TABLET, DELAYED RELEASE ORAL at 12:19

## 2021-06-17 NOTE — PROGRESS NOTE ADULT - SUBJECTIVE AND OBJECTIVE BOX
Subjective: NAD, night uneventful, offers no complaints. In good spirits, participating with therapy.      HISTORY OF PRESENT ILLNESS  61yo M with T2DM and recently diagnosed COVID (3/19) BIBEMS to [facility] on 4/1 for hypoxic respiratory failure secondary to COVID PNA, requiring endotracheal intubation (4/1-4/13). Course significant for Septic shock on admission, treated with vancomycin and cefepime. Found to have ESBL Klebsiella PNA c/b empyema s/p VATs decortication. Treated with prolonged course of Meropenem while hospitalized and transitioned to Augmentin/Bactrim upon discharge (until 6/8). Will follow up with ID as outpatient.     Patient had acute ischemic CVA with areas of hemorrhagic transformation. Found to have multi-focal acute-to-subacute infarctions. 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 Subsequent scans showed hemorrhagic conversion. Transferred to [facility] for Neurosurgery evaluation, no surgical intervention at this time. Will require outpatient follow up.     Course complicated by RITO segmental PEs with e/o RH strain: Provoked in the setting of sepsis/high inflammatory state with COVID infection. LE Dopplers negative x 2. Initially anticoagulated with heparin gtt. Course further complicated by a right chest wall and right obturator hematomas. Due to concern for worsening hemorrhagic conversion of stroke and hematomas, the patient's therapeutic anticoagulation was discontinued. He was evaluated by the neurosurgical team, which said that there was no indication for acute intervention. Patient will not be anticoagulated upon discharge.   Patient's AMS likely in the setting of acute strokes, improved throughout hospitalization. Due to prolonged intubation with subsequent AMS and dysphagia, pt had PEG tube placed. Pt failed MBS following improvement of mental status.     Patient also had bedside debridement and woundcare of her BLE digits and will follow up with Podiatry as an outpatient.    Medically cleared for discharge to  [facility] 6/4.     REVIEW OF SYMPTOMS  [X] Constitutional WNL     [X] Cardio WNL            [X] Resp-empeyema           [X] GI loose stool                          [X] -ulises                   [X] Heme-hematoma              [X] Endo-dm2                    [X] Skin WNL                 [X] MSK WNL            [X] Neuro-cva                  [X] Cognitive WNL        [X] Psych WNL      VITALS  Vital Signs Last 24 Hrs  T(C): 36.4 (17 Jun 2021 08:07), Max: 36.5 (16 Jun 2021 22:28)  T(F): 97.5 (17 Jun 2021 08:07), Max: 97.7 (16 Jun 2021 22:28)  HR: 93 (17 Jun 2021 08:07) (89 - 93)  BP: 100/63 (17 Jun 2021 08:07) (100/63 - 113/69)  BP(mean): --  RR: 16 (17 Jun 2021 08:07) (16 - 16)  SpO2: 99% (17 Jun 2021 08:07) (97% - 99%)        PHYSICAL EXAM  Constitutional - NAD in chair.   HEENT - NCAT, EOMI  Neck - Supple, No limited ROM  Chest - CTA bilaterally  Cardiovascular - RR  Abdomen - BS+, Soft, NTND, PEG in place  Extremities - No C/C/E, No calf tenderness   Skin-no rash  Wounds-per H&P      Neurologic Exam - Awake, Alert, AAO x3      No aphasia, impaired attention and concentration, follows simple commands     Cranial Nerves - dysphagia        Coordination/dysmetria - impaired             Balance - impaired     Psychiatric - Mood stable, Affect WNL        RECENT LABS                        10.4   7.38  )-----------( 313      ( 17 Jun 2021 05:30 )             33.3     06-17    142  |  108  |  16  ----------------------------<  87  3.7   |  28  |  1.01    Ca    9.2      17 Jun 2021 05:30    TPro  7.1  /  Alb  2.6<L>  /  TBili  0.5  /  DBili  x   /  AST  21  /  ALT  25  /  AlkPhos  68  06-17      LIVER FUNCTIONS - ( 17 Jun 2021 05:30 )  Alb: 2.6 g/dL / Pro: 7.1 g/dL / ALK PHOS: 68 U/L / ALT: 25 U/L / AST: 21 U/L / GGT: x               RADIOLOGY/OTHER RESULTS      EXAM:  CT BRAIN      PROCEDURE DATE:  06/14/2021        INTERPRETATION:  CT head without IV contrast    CLINICAL INFORMATION: Follow-up strokes    TECHNIQUE: Contiguous axial 5 mm sections were obtained through the head. Sagittal and coronal 2-D reformatted images were also obtained.   This scan was performed using automatic exposure control (radiation dose reduction software) to obtain a diagnostic image quality scan with patient dose as low as reasonably achievable.    FINDINGS:   CT dated 06/10/2021 available for review.    The brain demonstrates subacute/chronic infarction of the LEFT cerebellum and chronic infarctions in the BILATERAL frontal, parietal and occipital lobes.   No intracranial hemorrhage is found.  No mass effect is foundin the brain.    The ventricles, sulci and basal cisterns appear unremarkable.    The orbits are unremarkable.  The paranasal sinuses are clear.  The nasal cavity appears intact.  The nasopharynx is symmetric.  The central skull base, petrous temporal bones and calvarium remain intact.      IMPRESSION: Subacute/chronic infarction of the LEFT cerebellum and chronic infarctions in the BILATERAL frontal, parietal and occipital lobes.                EBONI SINGH MD; Attending Radiologist  This document has been electronically signed. Jun 14 2021 10:14AM      CURRENT MEDICATIONS  MEDICATIONS  (STANDING):  acetaminophen   Tablet .. 650 milliGRAM(s) Oral every 6 hours  AQUAPHOR (petrolatum Ointment) 1 Application(s) Topical three times a day  atorvastatin 40 milliGRAM(s) Oral at bedtime  cadexomer iodine 0.9% Gel 1 Application(s) Topical daily  dextrose 40% Gel 15 Gram(s) Oral once  dextrose 5%. 1000 milliLiter(s) (50 mL/Hr) IV Continuous <Continuous>  dextrose 5%. 1000 milliLiter(s) (100 mL/Hr) IV Continuous <Continuous>  dextrose 50% Injectable 25 Gram(s) IV Push once  dextrose 50% Injectable 12.5 Gram(s) IV Push once  dextrose 50% Injectable 25 Gram(s) IV Push once  enoxaparin Injectable 60 milliGRAM(s) SubCutaneous every 12 hours  glucagon  Injectable 1 milliGRAM(s) IntraMuscular once  insulin lispro (ADMELOG) corrective regimen sliding scale   SubCutaneous four times a day before meals  levETIRAcetam  Solution 500 milliGRAM(s) Oral two times a day  pantoprazole   Suspension 40 milliGRAM(s) Oral daily    MEDICATIONS  (PRN):  aluminum hydroxide/magnesium hydroxide/simethicone Suspension 30 milliLiter(s) Oral every 4 hours PRN Dyspepsia  ondansetron   Disintegrating Tablet 4 milliGRAM(s) Oral every 6 hours PRN Nausea and/or Vomiting      ASSESSMENT & PLAN      Continue comprehensive acute rehab program consisting of 3hrs/day of OT/PT and SLP.

## 2021-06-17 NOTE — PROGRESS NOTE ADULT - ASSESSMENT
61yo male with PMH of DM who was initially admitted to [facility] ICU for hypoxic respiratory failure 2/2 COVID c/b PE with R heart strain, cardiogenic and septic shock, ischemic and hemorrhagic CVA and ESBL klebsiella ventilator associate PNA, transferred to [facility] for neurosurgery eval and further management. s/p PEG. Course c/b right empyema s/p VATs and obturator bleed (resolved). Now admitted for functional decline.     #Debility  #ESBL Klebsiella PNA c/b empyema  #s/p COVID pneumonia  - 5/12 underwent right VATS converted to open L thoracotomy, decortication, drainage of abscess and flex bronch.  - c/w meropenem 5/7 and transition to Bactrim DS 2 tabs TID + Augmentin 875mg po q12  upon DC to [facility] --- Bactrim DC'ed 6/7 due to SUKI, augmentin completed 6/8  - ID following, recs appreciated - needs close follow up in ID clinic for repeat imaging, Dr. Chambers  - CT Chest 5/29 with improvement in bilateral opacities. Slight decrease in right chest wall hematoma. Stable loculated right hydropneumothorax.   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 Albarado with all chest tubes removed by 5/18  -CT Chest 5/16 Interval new small cavitary lesions in the right lower lobe and right chest wall hematoma - no thoracic surgery intervention indicated per Dr. Albarado.    #Obturator Hematoma (stable)  - bleeding hematoma in right thigh on CT on 5/7  - CT hip showing non active bleed, hematoma on right thigh on 5/8  - doppler to r/o DVT in LE, per IR no role IVC filter  - active again on 5/17, now stable  - was on heparin drip on and off, now off. C/w DVT ppx heparin bid    #Pulmonary embolus  - Pulmonary embolus noted on ct scan performed at time of admission  - Etiology of venous thromboembolism uncertain at this time; no known history of thrombophilia; provoked in setting of acute covid illness   - s/p heparin drip held due to recurrent bleed in right obturator and right chest wall hematoma   - Neurology cleared for AC  - Currently on lovenox 60mg q 12hrs; monitor Hg/Hct    #CVA  -Acute bilateral ischemic strokes, with concern for hemorrhagic conversion, noted on CT scan on 4/8  -Etiology of acute ischemic stroke uncertain at this time; TTE with bubble performed at bedside without any evidence of right-to-left shunt; no arrhythmia noted on telemetry throughout stay in the MICU  -CTA head and neck without any evidence of carotid artery stenosis or dissection  - 6/10 CT head: multiple infarcts, no hemorrhage  - CT head 6/14 no hemorrhages  - RAJESH noted done 6/16/21; no obvious thrombus/PFO noted    #Encephalopathy due to multifactorial causes - improving  - Etiology likely multi-factorial in setting of acute ischemic infarctions of the brain in conjunction vs acute kidney injury with uremia (now resolving) vs resolving hypoxic respiratory failure, covid-19 infection, superimposed bacterial pneumonia, urinary retention, concern for nutritional deficiencies  - Reorientation with family at bedside (pt speaks Gujarati, does best with family present)  - MRI Brain, and MRA Head demonstrated multiple evolving subacute infarcts with associated hemorrhages, likely expected evolution of ischemia  - As per neuro, EEG not suggestive of seizure-like activity  - C/w Keppra 500 mg BID-> neuro f/u outpatient   - repeat CT head 5/5 normal.     #Diabetes mellitus type II  - April 2021: a1c 10 -- improved to 5.7  - NPH discontinued  - Lantus 8 qhs   - moderate ISS, accuchecks  - Follow up endo outpatient    #Hypertension.  - Was on metoprolol tartrate 25 mg twice daily since 4/16; discontinued  - BP is stable  - If is hypertensive persistently, can consider antihypertensives    #Dysphagia  - PEG tube placed on 4/30, tube feedings started on 4/30, + free water q8    #Peripheral vascular disease  -Dry gangrene noted over distal toes on bilateral lower extremities--most concerning for microvascular disease subsequent to vasopressor administration   -Dorsalis pedis pulses intact bilaterally  -ERASMO demonstrating right ERASMO: 1.09, left ERASMO: 1.24, right TBI: 0.78, and left TBI: 0.73.  -Podiatry recs appreciated, no acute surgical intervention at this time, applying betadine to wound  -As per ID non infectious appearing     #Urinary Retention - improved  - Mann removed 5/30, urinating appropriately  - c/w doxazosin     #DVT ppx: Lovenox 60mg BID    Discussed with Rehab Team

## 2021-06-17 NOTE — PROGRESS NOTE ADULT - SUBJECTIVE AND OBJECTIVE BOX
Denies complaints    Vital Signs Last 24 Hrs  T(C): 36.4 (06-17-21 @ 08:07), Max: 36.5 (06-16-21 @ 22:28)  T(F): 97.5 (06-17-21 @ 08:07), Max: 97.7 (06-16-21 @ 22:28)  HR: 93 (06-17-21 @ 08:07) (89 - 93)  BP: 100/63 (06-17-21 @ 08:07) (100/63 - 113/69)  RR: 16 (06-17-21 @ 08:07) (16 - 16)  SpO2: 99% (06-17-21 @ 08:07) (97% - 99%)    s1s2  b/l air entry  soft, ND, + PEG  no edema                               RADIOLOGY:  Renal sonogram shows 10-11 cm kidneys with no hydronephrosis                        10.4   7.38  )-----------( 313      ( 17 Jun 2021 05:30 )             33.3     17 Jun 2021 05:30    142    |  108    |  16     ----------------------------<  87     3.7     |  28     |  1.01     Ca    9.2        17 Jun 2021 05:30    TPro  7.1    /  Alb  2.6    /  TBili  0.5    /  DBili  x      /  AST  21     /  ALT  25     /  AlkPhos  68     17 Jun 2021 05:30    LIVER FUNCTIONS - ( 17 Jun 2021 05:30 )  Alb: 2.6 g/dL / Pro: 7.1 g/dL / ALK PHOS: 68 U/L / ALT: 25 U/L / AST: 21 U/L / GGT: x           acetaminophen   Tablet .. 650 milliGRAM(s) Oral every 6 hours  aluminum hydroxide/magnesium hydroxide/simethicone Suspension 30 milliLiter(s) Oral every 4 hours PRN  AQUAPHOR (petrolatum Ointment) 1 Application(s) Topical three times a day  atorvastatin 40 milliGRAM(s) Oral at bedtime  cadexomer iodine 0.9% Gel 1 Application(s) Topical daily  dextrose 40% Gel 15 Gram(s) Oral once  dextrose 5%. 1000 milliLiter(s) IV Continuous <Continuous>  dextrose 5%. 1000 milliLiter(s) IV Continuous <Continuous>  dextrose 50% Injectable 25 Gram(s) IV Push once  dextrose 50% Injectable 12.5 Gram(s) IV Push once  dextrose 50% Injectable 25 Gram(s) IV Push once  enoxaparin Injectable 60 milliGRAM(s) SubCutaneous every 12 hours  glucagon  Injectable 1 milliGRAM(s) IntraMuscular once  insulin lispro (ADMELOG) corrective regimen sliding scale   SubCutaneous four times a day before meals  levETIRAcetam  Solution 500 milliGRAM(s) Oral two times a day  ondansetron   Disintegrating Tablet 4 milliGRAM(s) Oral every 6 hours PRN  pantoprazole   Suspension 40 milliGRAM(s) Oral daily    ASSESSMENT/PLAN:    Transferred here for rehab on 6/4/21 after long complex hospital course including covid pneumonia, septic shock, empyema, stroke, PE, hematomas while on A/C, dysphagia/PEG and digital ischemic wounds. He had an SUKI in April with peak Cr 2.5 which resolved w/Cr 0.75 - 5/29/21.    S/p new SUKI due to bactrim/pre-renal (peak Cr 1.66 - 6/7/21)  Bactrim d/c-d  SUKI resolving   Avoid nephrotoxins  Continue rehab  Encourage PO  F/u [facility]    [phone]

## 2021-06-17 NOTE — PROGRESS NOTE ADULT - SUBJECTIVE AND OBJECTIVE BOX
RADIOLOGY:  RAJESH 6/16/21  Summary:   1. Left ventricular ejection fraction, by visual estimation, is 60 to 65%.   2. No evidence of an intracardiac thrombus.   3. No evidence of an ASD or PFO   4. Normal global left ventricular systolic function.   5. Normal left ventricular internal cavity size.   6. Normal right ventricular size and function.   7. The left atrium is normal in size.   8.The right atrium is normal in size.   9. Structurally normal mitral valve, with normal leaflet excursion.  10. Structurally normal tricuspid valve, with normal leaflet excursion.  11. Mild aortic root enlargement  12. Color flow doppler and intravenous injection of agitated saline demonstrates the presence of an intact intra atrial septum.     Patient is a 60y old  Male who presents with a chief complaint of CVA (17 Jun 2021 07:00)    No events overnight   Denies chest pain, SOB  Patient seen and examined at bedside.    ALLERGIES:  No Known Allergies    MEDICATIONS  (STANDING):  acetaminophen   Tablet .. 650 milliGRAM(s) Oral every 6 hours  AQUAPHOR (petrolatum Ointment) 1 Application(s) Topical three times a day  atorvastatin 40 milliGRAM(s) Oral at bedtime  cadexomer iodine 0.9% Gel 1 Application(s) Topical daily  dextrose 40% Gel 15 Gram(s) Oral once  dextrose 5%. 1000 milliLiter(s) (50 mL/Hr) IV Continuous <Continuous>  dextrose 5%. 1000 milliLiter(s) (100 mL/Hr) IV Continuous <Continuous>  dextrose 50% Injectable 25 Gram(s) IV Push once  dextrose 50% Injectable 12.5 Gram(s) IV Push once  dextrose 50% Injectable 25 Gram(s) IV Push once  enoxaparin Injectable 60 milliGRAM(s) SubCutaneous every 12 hours  glucagon  Injectable 1 milliGRAM(s) IntraMuscular once  insulin lispro (ADMELOG) corrective regimen sliding scale   SubCutaneous four times a day before meals  levETIRAcetam  Solution 500 milliGRAM(s) Oral two times a day  pantoprazole   Suspension 40 milliGRAM(s) Oral daily    MEDICATIONS  (PRN):  aluminum hydroxide/magnesium hydroxide/simethicone Suspension 30 milliLiter(s) Oral every 4 hours PRN Dyspepsia  ondansetron   Disintegrating Tablet 4 milliGRAM(s) Oral every 6 hours PRN Nausea and/or Vomiting    Vital Signs Last 24 Hrs  T(F): 97.5 (17 Jun 2021 08:07), Max: 97.7 (16 Jun 2021 22:28)  HR: 93 (17 Jun 2021 08:07) (89 - 93)  BP: 100/63 (17 Jun 2021 08:07) (100/63 - 113/69)  RR: 16 (17 Jun 2021 08:07) (16 - 16)  SpO2: 99% (17 Jun 2021 08:07) (97% - 99%)  I&O's Summary    16 Jun 2021 07:01  -  17 Jun 2021 07:00  --------------------------------------------------------  IN: 550 mL / OUT: 200 mL / NET: 350 mL    17 Jun 2021 07:01  -  17 Jun 2021 11:14  --------------------------------------------------------  IN: 120 mL / OUT: 400 mL / NET: -280 mL        PHYSICAL EXAM:  GENERAL: NAD  HENT:  Atraumatic, Normocephalic; No tonsillar erythema, exudates, or enlargement; Moist mucous membranes;   EYES: EOMI, PERRLA, conjunctiva and sclera clear, no lid-lag  NECK: Supple, No JVD, Normal thyroid  CHEST/LUNG: Clear to percussion bilaterally; No rales, rhonchi, wheezing, or rubs; normal respiratory effort, no intercostal retractions  HEART: Regular rate and rhythm; No murmurs, rubs, or gallops  ABDOMEN: Soft, Nontender, Nondistended; Bowel sounds present; No HSM  MUSCULOSKELETAL/EXTREMITIES:  2+ Peripheral Pulses, No clubbing, cyanosis, or peripheral edema; No digital cyanosis  PSYCH: Appropriate affect, Alert & Awake      LABS:                        10.4   7.38  )-----------( 313      ( 17 Jun 2021 05:30 )             33.3       06-17    142  |  108  |  16  ----------------------------<  87  3.7   |  28  |  1.01    Ca    9.2      17 Jun 2021 05:30    TPro  7.1  /  Alb  2.6  /  TBili  0.5  /  DBili  x   /  AST  21  /  ALT  25  /  AlkPhos  68  06-17     eGFR if Non African American: 82 mL/min/1.73M2 (06-17-21 @ 05:30)  eGFR if African American: 95 mL/min/1.73M2 (06-17-21 @ 05:30)    04-26 Chol 114 mg/dL LDL -- HDL 35 mg/dL Trig 126 mg/dL    POCT Blood Glucose.: 74 mg/dL (17 Jun 2021 08:04)  POCT Blood Glucose.: 153 mg/dL (16 Jun 2021 22:15)  POCT Blood Glucose.: 81 mg/dL (16 Jun 2021 16:46)  POCT Blood Glucose.: 74 mg/dL (16 Jun 2021 13:24)  POCT Blood Glucose.: 87 mg/dL (16 Jun 2021 12:49)    COVID-19 PCR: NotDetec (06-15-21 @ 19:15)  COVID-19 PCR: NotDetec (06-02-21 @ 08:50)  COVID-19 PCR: NotDetec (05-31-21 @ 07:10)  COVID-19 PCR: NotDetec (05-26-21 @ 07:21)    COVID-19 PCR: NotDetec (06-15-21 @ 19:15)  COVID-19 PCR: NotDetec (06-02-21 @ 08:50)  COVID-19 PCR: NotDetec (05-31-21 @ 07:10)  COVID-19 PCR: NotDetec (05-26-21 @ 07:21)  COVID-19 PCR: Detected (05-18-21 @ 05:34)  COVID-19 PCR: NotDetec (05-11-21 @ 10:33)  COVID-19 PCR: NotDetec (05-09-21 @ 16:14)  COVID-19 PCR: NotDetec (05-06-21 @ 15:02)  COVID-19 PCR: NotDetec (05-03-21 @ 15:38)  COVID-19 PCR: Detected (04-21-21 @ 17:32)    RADIOLOGY:  RAJESH 6/16/21  Summary:   1. Left ventricular ejection fraction, by visual estimation, is 60 to 65%.   2. No evidence of an intracardiac thrombus.   3. No evidence of an ASD or PFO   4. Normal global left ventricular systolic function.   5. Normal left ventricular internal cavity size.   6. Normal right ventricular size and function.   7. The left atrium is normal in size.   8.The right atrium is normal in size.   9. Structurally normal mitral valve, with normal leaflet excursion.  10. Structurally normal tricuspid valve, with normal leaflet excursion.  11. Mild aortic root enlargement  12. Color flow doppler and intravenous injection of agitated saline demonstrates the presence of an intact intra atrial septum.

## 2021-06-17 NOTE — PROGRESS NOTE ADULT - ASSESSMENT
61yo male with PMH of DM who was initially admitted to [facility] ICU for hypoxic respiratory failure 2/2 COVID c/b PE with R heart strain, cardiogenic and septic shock, ischemic and hemorrhagic CVA and ESBL klebsiella ventilator associate PNA, transferred to [facility] for neurosurgery eval and further management. s/p PEG. Course c/b right empyema s/p VATs and obturator bleed (resolved). Now admitted for functional decline.     #ESBL Klebsiella PNA c/b empyema   - 5/12 underwent right VATS converted to open L thoracotomy, decortication, drainage of abscess and flex bronch.  - CT Chest follow up 6/8 with improvement.  - Gait Instability, ADL impairments and Functional impairments:  Comprehensive Rehab Program of PT/OT/SLP. MBS completed-diet advanced, tolerating well.     #Empyema   -OR Vats 5/12 with Dr. Albarado with all chest tubes removed by 5/18. Chest sites healing well.   -CT Chest 6/8 improved. No thoracic surgery intervention indicated per Dr. Albarado.    #Obturator Hematoma   - bleeding hematoma in right thigh on CT on 5/7, CT hip showing non active bleed/hematoma on right thigh on 5/8  - cleared for DVT ppx heparin bid prior to rehab  -Evaluation by surgery at [facility]. No contraindication to AC as of 6/9 per Dr Cee. HH stable. Case discussed 6/15. Input appreciated.    #Pulmonary embolus  -Etiology of venous thromboembolism uncertain at this time; ?provoked acute covid illness   -s/p heparin drip held due to recurrent bleed in right obturator and right chest wall hematoma, now improved.   - AC/Noac as per heme rec's 6/11. Neurology clearance based on CTH follow up 6/14. AC ok to begin. Lovenox mg/kg x 48h-close monitoring. Started on 6/15. Labs am 6/18. If HH stable and stable exam-will transition to Eliquis.   -Asymptomatic, tolerating therapy.   -hospitalist following    #CVA  -Hx bilateral ischemic strokes, L Pica occlusion, hemorrhagic conversion, CTH follow up completed. Neurology consulted re/AC therapy timing. Case discussed w/Dr Anderson on 6/11, AC to begin per Neuro.   - TTE with bubble completed prior, no right-to-left shunt; no arrhythmia noted on tele prior. Cardiology consulted 6/11- holter/arrhythmia workup. RAJESH requested by Cardio, scheduled for 6/16. GI clearance. NPO p mn.  -CTA prior shows L Pica occlusion. Evaluated by Neurology prior to rehab-NOAC recommended when possible-however at the time of acute admission on hold for 'hx recurrent chest/thigh hematomas'.   -lipitor added per hospitalist plan.    #Altered mental status  - improved, A&Ox3, participates with care, no seizures overnight  - EEG neg seizure  - C/w Keppra 500 mg BID-> neuro f/u outpatient   - repeat CT head completed    #SUKI  -renal in, now off Bactrim, will follow rec's  -IVF completed.  -Nepro for hyperkalemia-improved    #Urinary Retention   - Mann removed 5/30  -voiding freely, low PVRs  - hold doxazosin for low BPs    #Diabetes mellitus  - Type II diabetes mellitus noted; a1c>10, now improved  - hold Lantus for episodes of hypoglycemia. Admelog scale continues. Diabetic diet/PO diet.  -hospitalist following  - Follow up endo outpatient   -Podiatry input appreciated    #Dysphagia  - PEG tube placed on 4/30  -nausea/vomiting resolved, monitor loose stools while PO diet advancing-s/p MBS completed. Tolerating PO.    -XR abd follow up -neg acute findings      #Peripheral vascular disease  -Dry gangrene noted over distal toes on bilateral lower extremities  -ERASMO demonstrating right ERASMO: 1.09, left ERASMO: 1.24, right TBI: 0.78, and left TBI: 0.73.  -Podiatry recs appreciated, betadine to wound    #Pain Mgmt   - Tylenol PRN, Oxycodone PRN    #GI/Bowel Mgmt   - hold Senna, Miralax for loose stool hx.  -Resolved now-PO diet advancing.    #/Bladder Mgmt   - Continent, PVR low    #Precautions / PROPHYLAXIS:   - Falls, Cardiac, Seizure   - ortho: Weight bearing status: WBAT   - Lungs: Aspiration, Incentive Spirometer   - Pressure injury/Skin: OOB to Chair, PT/OT

## 2021-06-18 LAB
GLUCOSE BLDC GLUCOMTR-MCNC: 103 MG/DL — HIGH (ref 70–99)
GLUCOSE BLDC GLUCOMTR-MCNC: 121 MG/DL — HIGH (ref 70–99)
GLUCOSE BLDC GLUCOMTR-MCNC: 128 MG/DL — HIGH (ref 70–99)
GLUCOSE BLDC GLUCOMTR-MCNC: 240 MG/DL — HIGH (ref 70–99)
HCT VFR BLD CALC: 38.3 % — LOW (ref 39–50)
HGB BLD-MCNC: 12.2 G/DL — LOW (ref 13–17)
MCHC RBC-ENTMCNC: 26.6 PG — LOW (ref 27–34)
MCHC RBC-ENTMCNC: 31.9 GM/DL — LOW (ref 32–36)
MCV RBC AUTO: 83.4 FL — SIGNIFICANT CHANGE UP (ref 80–100)
NRBC # BLD: 0 /100 WBCS — SIGNIFICANT CHANGE UP (ref 0–0)
PLATELET # BLD AUTO: 348 K/UL — SIGNIFICANT CHANGE UP (ref 150–400)
RBC # BLD: 4.59 M/UL — SIGNIFICANT CHANGE UP (ref 4.2–5.8)
RBC # FLD: 18.8 % — HIGH (ref 10.3–14.5)
WBC # BLD: 7.11 K/UL — SIGNIFICANT CHANGE UP (ref 3.8–10.5)
WBC # FLD AUTO: 7.11 K/UL — SIGNIFICANT CHANGE UP (ref 3.8–10.5)

## 2021-06-18 PROCEDURE — 99232 SBSQ HOSP IP/OBS MODERATE 35: CPT

## 2021-06-18 PROCEDURE — 99232 SBSQ HOSP IP/OBS MODERATE 35: CPT | Mod: GC

## 2021-06-18 RX ADMIN — Medication 1 APPLICATION(S): at 12:19

## 2021-06-18 RX ADMIN — PANTOPRAZOLE SODIUM 40 MILLIGRAM(S): 20 TABLET, DELAYED RELEASE ORAL at 12:19

## 2021-06-18 RX ADMIN — ATORVASTATIN CALCIUM 40 MILLIGRAM(S): 80 TABLET, FILM COATED ORAL at 21:46

## 2021-06-18 RX ADMIN — ENOXAPARIN SODIUM 60 MILLIGRAM(S): 100 INJECTION SUBCUTANEOUS at 06:10

## 2021-06-18 RX ADMIN — Medication 1 APPLICATION(S): at 12:18

## 2021-06-18 RX ADMIN — Medication 4: at 21:45

## 2021-06-18 RX ADMIN — Medication 650 MILLIGRAM(S): at 06:56

## 2021-06-18 RX ADMIN — LEVETIRACETAM 500 MILLIGRAM(S): 250 TABLET, FILM COATED ORAL at 17:40

## 2021-06-18 RX ADMIN — Medication 1 APPLICATION(S): at 21:47

## 2021-06-18 RX ADMIN — Medication 1 APPLICATION(S): at 06:10

## 2021-06-18 RX ADMIN — LEVETIRACETAM 500 MILLIGRAM(S): 250 TABLET, FILM COATED ORAL at 06:10

## 2021-06-18 RX ADMIN — ENOXAPARIN SODIUM 60 MILLIGRAM(S): 100 INJECTION SUBCUTANEOUS at 17:40

## 2021-06-18 RX ADMIN — Medication 650 MILLIGRAM(S): at 06:10

## 2021-06-18 NOTE — PROGRESS NOTE ADULT - SUBJECTIVE AND OBJECTIVE BOX
Subjective: NAD, offers no complaints, 'feels good', in good spirits, participating in therapy.       HISTORY OF PRESENT ILLNESS  59yo M with T2DM and recently diagnosed COVID (3/19) BIBEMS to [facility] on 4/1 for hypoxic respiratory failure secondary to COVID PNA, requiring endotracheal intubation (4/1-4/13). Course significant for Septic shock on admission, treated with vancomycin and cefepime. Found to have ESBL Klebsiella PNA c/b empyema s/p VATs decortication. Treated with prolonged course of Meropenem while hospitalized and transitioned to Augmentin/Bactrim upon discharge (until 6/8). Will follow up with ID as outpatient.     Patient had acute ischemic CVA with areas of hemorrhagic transformation. Found to have multi-focal acute-to-subacute infarctions. 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 Subsequent scans showed hemorrhagic conversion. Transferred to [facility] for Neurosurgery evaluation, no surgical intervention at this time. Will require outpatient follow up.     Course complicated by RITO segmental PEs with e/o RH strain: Provoked in the setting of sepsis/high inflammatory state with COVID infection. LE Dopplers negative x 2. Initially anticoagulated with heparin gtt. Course further complicated by a right chest wall and right obturator hematomas. Due to concern for worsening hemorrhagic conversion of stroke and hematomas, the patient's therapeutic anticoagulation was discontinued. He was evaluated by the neurosurgical team, which said that there was no indication for acute intervention. Patient will not be anticoagulated upon discharge.   Patient's AMS likely in the setting of acute strokes, improved throughout hospitalization. Due to prolonged intubation with subsequent AMS and dysphagia, pt had PEG tube placed. Pt failed MBS following improvement of mental status.     Patient also had bedside debridement and woundcare of her BLE digits and will follow up with Podiatry as an outpatient.    Medically cleared for discharge to  [facility] 6/4.       REVIEW OF SYMPTOMS  [X] Constitutional WNL     [X] Cardio WNL            [X] Resp WNL           [X] GI WNL                          [X]  WNL                   [X] Heme WNL              [X] Endo WNL                     [X] Skin WNL                 [X] MSK WNL            [X] Neuro WNL                   [X] Cognitive WNL        [X] Psych WNL      VITALS  Vital Signs Last 24 Hrs  T(C): 36.6 (18 Jun 2021 08:22), Max: 36.6 (18 Jun 2021 00:03)  T(F): 97.8 (18 Jun 2021 08:22), Max: 97.9 (18 Jun 2021 00:03)  HR: 81 (18 Jun 2021 08:22) (81 - 82)  BP: 98/66 (18 Jun 2021 08:22) (98/66 - 102/69)  BP(mean): --  RR: 16 (18 Jun 2021 08:22) (16 - 16)  SpO2: 99% (18 Jun 2021 08:22) (99% - 99%)    REVIEW OF SYMPTOMS  [X] Constitutional WNL     [X] Cardio WNL            [X] Resp-empeyema           [X] GI loose stool                          [X] -ulises                   [X] Heme-hematoma              [X] Endo-dm2                    [X] Skin WNL                 [X] MSK WNL            [X] Neuro-cva                  [X] Cognitive WNL        [X] Psych WNL      RECENT LABS                        12.2   7.11  )-----------( 348      ( 18 Jun 2021 06:16 )             38.3     06-17    142  |  108  |  16  ----------------------------<  87  3.7   |  28  |  1.01    Ca    9.2      17 Jun 2021 05:30    TPro  7.1  /  Alb  2.6<L>  /  TBili  0.5  /  DBili  x   /  AST  21  /  ALT  25  /  AlkPhos  68  06-17      LIVER FUNCTIONS - ( 17 Jun 2021 05:30 )  Alb: 2.6 g/dL / Pro: 7.1 g/dL / ALK PHOS: 68 U/L / ALT: 25 U/L / AST: 21 U/L / GGT: x                 PHYSICAL EXAM  Constitutional - NAD in chair.   HEENT - NCAT, EOMI  Neck - Supple, No limited ROM  Chest - CTA bilaterally  Cardiovascular - RR  Abdomen - BS+, Soft, NTND, PEG in place  Extremities - No C/C/E, No calf tenderness   Skin-no rash  Wounds-per H&P      Neurologic Exam - Awake, Alert, AAO x3      No aphasia, impaired attention and concentration, follows simple commands     Cranial Nerves - dysphagia        Coordination/dysmetria - impaired             Balance - impaired     Psychiatric - Mood stable, Affect WNL          RADIOLOGY/OTHER RESULTS    EXAM:  CT BRAIN      PROCEDURE DATE:  06/14/2021        INTERPRETATION:  CT head without IV contrast    CLINICAL INFORMATION: Follow-up strokes    TECHNIQUE: Contiguous axial 5 mm sections were obtained through the head. Sagittal and coronal 2-D reformatted images were also obtained.   This scan was performed using automatic exposure control (radiation dose reduction software) to obtain a diagnostic image quality scan with patient dose as low as reasonably achievable.    FINDINGS:   CT dated 06/10/2021 available for review.    The brain demonstrates subacute/chronic infarction of the LEFT cerebellum and chronic infarctions in the BILATERAL frontal, parietal and occipital lobes.   No intracranial hemorrhage is found.  No mass effect is foundin the brain.    The ventricles, sulci and basal cisterns appear unremarkable.    The orbits are unremarkable.  The paranasal sinuses are clear.  The nasal cavity appears intact.  The nasopharynx is symmetric.  The central skull base, petrous temporal bones and calvarium remain intact.      IMPRESSION: Subacute/chronic infarction of the LEFT cerebellum and chronic infarctions in the BILATERAL frontal, parietal and occipital lobes.                EBONI SINGH MD; Attending Radiologist  This document has been electronically signed. Jun 14 2021 10:14AM    CURRENT MEDICATIONS  MEDICATIONS  (STANDING):  acetaminophen   Tablet .. 650 milliGRAM(s) Oral every 6 hours  AQUAPHOR (petrolatum Ointment) 1 Application(s) Topical three times a day  atorvastatin 40 milliGRAM(s) Oral at bedtime  cadexomer iodine 0.9% Gel 1 Application(s) Topical daily  dextrose 40% Gel 15 Gram(s) Oral once  dextrose 5%. 1000 milliLiter(s) (50 mL/Hr) IV Continuous <Continuous>  dextrose 5%. 1000 milliLiter(s) (100 mL/Hr) IV Continuous <Continuous>  dextrose 50% Injectable 25 Gram(s) IV Push once  dextrose 50% Injectable 12.5 Gram(s) IV Push once  dextrose 50% Injectable 25 Gram(s) IV Push once  enoxaparin Injectable 60 milliGRAM(s) SubCutaneous every 12 hours  glucagon  Injectable 1 milliGRAM(s) IntraMuscular once  insulin lispro (ADMELOG) corrective regimen sliding scale   SubCutaneous four times a day before meals  levETIRAcetam  Solution 500 milliGRAM(s) Oral two times a day  pantoprazole   Suspension 40 milliGRAM(s) Oral daily    MEDICATIONS  (PRN):  aluminum hydroxide/magnesium hydroxide/simethicone Suspension 30 milliLiter(s) Oral every 4 hours PRN Dyspepsia  ondansetron   Disintegrating Tablet 4 milliGRAM(s) Oral every 6 hours PRN Nausea and/or Vomiting      ASSESSMENT & PLAN    Continue comprehensive acute rehab program consisting of 3hrs/day of OT/PT and SLP.

## 2021-06-18 NOTE — PROGRESS NOTE ADULT - ASSESSMENT
59yo male with PMH of DM who was initially admitted to [facility] ICU for hypoxic respiratory failure 2/2 COVID c/b PE with R heart strain, cardiogenic and septic shock, ischemic and hemorrhagic CVA and ESBL klebsiella ventilator associate PNA, transferred to [facility] for neurosurgery eval and further management. s/p PEG. Course c/b right empyema s/p VATs and obturator bleed (resolved). Now admitted for functional decline.     #Debility  #ESBL Klebsiella PNA c/b empyema  #s/p COVID pneumonia  - 5/12 underwent right VATS converted to open L thoracotomy, decortication, drainage of abscess and flex bronch.  - c/w meropenem 5/7 and transition to Bactrim DS 2 tabs TID + Augmentin 875mg po q12  upon DC to [facility] --- Bactrim DC'ed 6/7 due to SUKI, augmentin completed 6/8  - ID following, recs appreciated - needs close follow up in ID clinic for repeat imaging, Dr. Chambers  - CT Chest 5/29 with improvement in bilateral opacities. Slight decrease in right chest wall hematoma. Stable loculated right hydropneumothorax.   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 Albarado with all chest tubes removed by 5/18  -CT Chest 5/16 Interval new small cavitary lesions in the right lower lobe and right chest wall hematoma - no thoracic surgery intervention indicated per Dr. Albarado.    #Obturator Hematoma (stable)  - bleeding hematoma in right thigh on CT on 5/7  - CT hip showing non active bleed, hematoma on right thigh on 5/8  - doppler to r/o DVT in LE, per IR no role IVC filter  - active again on 5/17, now stable  - Stable while on therapeutic Lovenox for PE  - Monitor H/H    #Pulmonary embolus  - Pulmonary embolus noted on ct scan performed at time of admission  - Neurology cleared for AC  - Currently on lovenox 60mg q 12hrs; monitor Hg/Hct    #CVA  -Acute bilateral ischemic strokes, with concern for hemorrhagic conversion, noted on CT scan on 4/8  -Etiology of acute ischemic stroke uncertain at this time; TTE with bubble performed at bedside without any evidence of right-to-left shunt; no arrhythmia noted on telemetry throughout stay in the MICU  -CTA head and neck without any evidence of carotid artery stenosis or dissection  - 6/10 CT head: multiple infarcts, no hemorrhage  - CT head 6/14 no hemorrhages  - RAJESH noted done 6/16/21; no obvious thrombus/PFO noted    #Encephalopathy due to multifactorial causes - resolved  - Etiology likely multi-factorial in setting of acute ischemic infarctions of the brain in conjunction vs acute kidney injury with uremia (now resolving) vs resolving hypoxic respiratory failure, covid-19 infection, superimposed bacterial pneumonia, urinary retention, concern for nutritional deficiencies  - Reorientation with family at bedside (pt speaks Gustevenrati, does best with family present)  - MRI Brain, and MRA Head demonstrated multiple evolving subacute infarcts with associated hemorrhages, likely expected evolution of ischemia  - As per neuro, EEG not suggestive of seizure-like activity  - C/w Keppra 500 mg BID-> neuro f/u outpatient   - repeat CT head 5/5 normal.     #Diabetes mellitus type II  - April 2021: a1c 10 -- improved to 5.7  - NPH discontinued  - Lantus 8 qhs   - moderate ISS, accuchecks  - Follow up endo outpatient    #Hypertension  - Was on metoprolol tartrate 25 mg twice daily since 4/16; discontinued  - BP is stable  - If is hypertensive persistently, can consider antihypertensives    #Dysphagia  - Tolerating Dysphagia 3 diet   - Eventual plan to remove PEG tube    #Peripheral vascular disease  -Dry gangrene noted over distal toes on bilateral lower extremities--most concerning for microvascular disease subsequent to vasopressor administration   -Dorsalis pedis pulses intact bilaterally  -ERASMO demonstrating right ERASMO: 1.09, left ERASMO: 1.24, right TBI: 0.78, and left TBI: 0.73.  -Podiatry recs appreciated, no acute surgical intervention at this time, applying betadine to wound  -As per ID non infectious appearing     #Urinary Retention - improved  - Urinating appropriately  - c/w doxazosin     #DVT ppx: Lovenox 60mg BID

## 2021-06-18 NOTE — PROGRESS NOTE ADULT - SUBJECTIVE AND OBJECTIVE BOX
Resting    Vital Signs Last 24 Hrs  T(C): 36.6 (06-18-21 @ 08:22), Max: 36.6 (06-18-21 @ 00:03)  T(F): 97.8 (06-18-21 @ 08:22), Max: 97.9 (06-18-21 @ 00:03)  HR: 81 (06-18-21 @ 08:22) (81 - 82)  BP: 98/66 (06-18-21 @ 08:22) (98/66 - 102/69)  RR: 16 (06-18-21 @ 08:22) (16 - 16)  SpO2: 99% (06-18-21 @ 08:22) (99% - 99%)    s1s2  b/l air entry  soft, ND, + PEG  no edema                                                   12.2   7.11  )-----------( 348      ( 18 Jun 2021 06:16 )             38.3     17 Jun 2021 05:30    142    |  108    |  16     ----------------------------<  87     3.7     |  28     |  1.01     Ca    9.2        17 Jun 2021 05:30    TPro  7.1    /  Alb  2.6    /  TBili  0.5    /  DBili  x      /  AST  21     /  ALT  25     /  AlkPhos  68     17 Jun 2021 05:30    LIVER FUNCTIONS - ( 17 Jun 2021 05:30 )  Alb: 2.6 g/dL / Pro: 7.1 g/dL / ALK PHOS: 68 U/L / ALT: 25 U/L / AST: 21 U/L / GGT: x           acetaminophen   Tablet .. 650 milliGRAM(s) Oral every 6 hours  aluminum hydroxide/magnesium hydroxide/simethicone Suspension 30 milliLiter(s) Oral every 4 hours PRN  AQUAPHOR (petrolatum Ointment) 1 Application(s) Topical three times a day  atorvastatin 40 milliGRAM(s) Oral at bedtime  cadexomer iodine 0.9% Gel 1 Application(s) Topical daily  dextrose 40% Gel 15 Gram(s) Oral once  dextrose 5%. 1000 milliLiter(s) IV Continuous <Continuous>  dextrose 5%. 1000 milliLiter(s) IV Continuous <Continuous>  dextrose 50% Injectable 25 Gram(s) IV Push once  dextrose 50% Injectable 12.5 Gram(s) IV Push once  dextrose 50% Injectable 25 Gram(s) IV Push once  enoxaparin Injectable 60 milliGRAM(s) SubCutaneous every 12 hours  glucagon  Injectable 1 milliGRAM(s) IntraMuscular once  insulin lispro (ADMELOG) corrective regimen sliding scale   SubCutaneous four times a day before meals  levETIRAcetam  Solution 500 milliGRAM(s) Oral two times a day  ondansetron   Disintegrating Tablet 4 milliGRAM(s) Oral every 6 hours PRN  pantoprazole   Suspension 40 milliGRAM(s) Oral daily    ASSESSMENT/PLAN:    Transferred here for rehab on 6/4/21 after long complex hospital course including covid pneumonia, septic shock, empyema, stroke, PE, hematomas while on A/C. He had an SUKI in April with peak Cr 2.5 which resolved w/Cr 0.75 - 5/29/21.    S/p new SUKI due to bactrim/pre-renal (peak Cr 1.66 - 6/7/21)  Bactrim d/c-d  SUKI resolving   Avoid nephrotoxins  Continue rehab  Encourage PO  F/u [facility]    [phone]

## 2021-06-18 NOTE — PROGRESS NOTE ADULT - ASSESSMENT
59yo male with PMH of DM who was initially admitted to [facility] ICU for hypoxic respiratory failure 2/2 COVID c/b PE with R heart strain, cardiogenic and septic shock, ischemic and hemorrhagic CVA and ESBL klebsiella ventilator associate PNA, transferred to [facility] for neurosurgery eval and further management. s/p PEG. Course c/b right empyema s/p VATs and obturator bleed (resolved). Now admitted for functional decline.     #ESBL Klebsiella PNA c/b empyema   - 5/12 underwent right VATS converted to open L thoracotomy, decortication, drainage of abscess and flex bronch.  - CT Chest follow up 6/8 with improvement.  - Gait Instability, ADL impairments and Functional impairments:  Comprehensive Rehab Program of PT/OT/SLP. MBS completed-diet advanced, tolerating well.     #Empyema   -OR Vats 5/12 with Dr. Albarado with all chest tubes removed by 5/18. Chest sites healing well.   -CT Chest 6/8 improved. No thoracic surgery intervention indicated per Dr. Albarado.    #Obturator Hematoma   - bleeding hematoma in right thigh on CT on 5/7, CT hip showing non active bleed/hematoma on right thigh on 5/8  - cleared for AC-Lovenox continues w/plan dc PEG Monday. Then, transition to Eliquis.  -Evaluation by surgery at [facility]. No contraindication to AC as of 6/9 per Dr Cee. HH stable. Case discussed 6/15. Input appreciated.    #Pulmonary embolus  -Etiology of venous thromboembolism uncertain at this time; ?provoked acute covid illness   -s/p heparin drip held due to recurrent bleed in right obturator and right chest wall hematoma, now improved.   - AC/Noac as per heme rec's 6/11. Neurology clearance based on CTH follow up 6/14. AC ok to begin. Lovenox continues w/close monitoring. Started on 6/15. Labs following. If HH stable and stable exam-will transition to Eliquis after PEG removal on Mon. GI eval requested.   -Asymptomatic, tolerating therapy.   -hospitalist following    #CVA  -Hx bilateral ischemic strokes, L Pica occlusion, hemorrhagic conversion, CTH follow up completed. Neurology consulted re/AC therapy timing. Case discussed w/Dr Anderson on 6/11, AC to begin per Neuro.   - TTE with bubble completed prior, no right-to-left shunt; no arrhythmia noted on tele prior. Cardiology consulted 6/11- holter/arrhythmia workup. RAJESH requested by Cardio, scheduled for 6/16. GI clearance. NPO p mn.  -CTA prior shows L Pica occlusion. Evaluated by Neurology prior to rehab-NOAC recommended when possible-however at the time of acute admission on hold for 'hx recurrent chest/thigh hematomas'.   -lipitor added per hospitalist plan.    #Altered mental status  - improved, A&Ox3, participates with care, no seizures overnight  - EEG neg seizure  - C/w Keppra 500 mg BID-> neuro f/u outpatient   - repeat CT head completed    #SUKI  -renal in, now off Bactrim, will follow rec's  -IVF completed.  -Nepro for hyperkalemia-improved    #Urinary Retention   - Mann removed 5/30  -voiding freely, low PVRs  - hold doxazosin for low BPs    #Diabetes mellitus  - Type II diabetes mellitus noted; a1c>10, now improved  - hold Lantus for episodes of hypoglycemia. Admelog scale continues. Diabetic diet/PO diet.  -hospitalist following  - Follow up endo outpatient   -Podiatry input appreciated    #Dysphagia  - PEG tube placed on 4/30  -nausea/vomiting resolved, monitor loose stools while PO diet advancing-s/p MBS completed. Tolerating PO.    -XR abd follow up -neg acute findings, formed BMs      #Peripheral vascular disease  -Dry gangrene noted over distal toes on bilateral lower extremities  -ERASMO demonstrating right ERASMO: 1.09, left ERASMO: 1.24, right TBI: 0.78, and left TBI: 0.73.  -Podiatry recs appreciated, betadine to wound    #Pain Mgmt   - Tylenol PRN, Oxycodone PRN    #GI/Bowel Mgmt   - hold Senna, Miralax for loose stool hx.  -Resolved now-PO diet advancing.    #/Bladder Mgmt   - Continent, PVR low    #Precautions / PROPHYLAXIS:   - Falls, Cardiac, Seizure   - ortho: Weight bearing status: WBAT   - Lungs: Aspiration, Incentive Spirometer   - Pressure injury/Skin: OOB to Chair, PT/OT

## 2021-06-18 NOTE — PROGRESS NOTE ADULT - SUBJECTIVE AND OBJECTIVE BOX
Patient is a 60y old  Male who presents with a chief complaint of CVA (17 Jun 2021 15:47)      SUBJECTIVE / OVERNIGHT EVENTS:  Able to communicate through native tongue, Christopher  Pt seen and examined at bedside. No acute events overnight.  Pt denies cp, palpitations, sob, abd pain, N/V, fever, chills.    ROS:  All other review of systems negative    Allergies    No Known Allergies    Intolerances        MEDICATIONS  (STANDING):  acetaminophen   Tablet .. 650 milliGRAM(s) Oral every 6 hours  AQUAPHOR (petrolatum Ointment) 1 Application(s) Topical three times a day  atorvastatin 40 milliGRAM(s) Oral at bedtime  cadexomer iodine 0.9% Gel 1 Application(s) Topical daily  dextrose 40% Gel 15 Gram(s) Oral once  dextrose 5%. 1000 milliLiter(s) (50 mL/Hr) IV Continuous <Continuous>  dextrose 5%. 1000 milliLiter(s) (100 mL/Hr) IV Continuous <Continuous>  dextrose 50% Injectable 25 Gram(s) IV Push once  dextrose 50% Injectable 12.5 Gram(s) IV Push once  dextrose 50% Injectable 25 Gram(s) IV Push once  enoxaparin Injectable 60 milliGRAM(s) SubCutaneous every 12 hours  glucagon  Injectable 1 milliGRAM(s) IntraMuscular once  insulin lispro (ADMELOG) corrective regimen sliding scale   SubCutaneous four times a day before meals  levETIRAcetam  Solution 500 milliGRAM(s) Oral two times a day  pantoprazole   Suspension 40 milliGRAM(s) Oral daily    MEDICATIONS  (PRN):  aluminum hydroxide/magnesium hydroxide/simethicone Suspension 30 milliLiter(s) Oral every 4 hours PRN Dyspepsia  ondansetron   Disintegrating Tablet 4 milliGRAM(s) Oral every 6 hours PRN Nausea and/or Vomiting      Vital Signs Last 24 Hrs  T(C): 36.6 (18 Jun 2021 08:22), Max: 36.6 (18 Jun 2021 00:03)  T(F): 97.8 (18 Jun 2021 08:22), Max: 97.9 (18 Jun 2021 00:03)  HR: 81 (18 Jun 2021 08:22) (81 - 82)  BP: 98/66 (18 Jun 2021 08:22) (98/66 - 102/69)  BP(mean): --  RR: 16 (18 Jun 2021 08:22) (16 - 16)  SpO2: 99% (18 Jun 2021 08:22) (99% - 99%)  CAPILLARY BLOOD GLUCOSE      POCT Blood Glucose.: 103 mg/dL (18 Jun 2021 08:15)  POCT Blood Glucose.: 192 mg/dL (17 Jun 2021 22:07)  POCT Blood Glucose.: 88 mg/dL (17 Jun 2021 16:47)  POCT Blood Glucose.: 143 mg/dL (17 Jun 2021 12:19)    I&O's Summary    17 Jun 2021 07:01  -  18 Jun 2021 07:00  --------------------------------------------------------  IN: 420 mL / OUT: 2500 mL / NET: -2080 mL        PHYSICAL EXAM:  GENERAL: NAD, thin male  CHEST/LUNG: Clear to auscultation bilaterally; No wheeze, nonlabored breathing  HEART: Regular rate and rhythm; No murmurs, rubs, or gallops  ABDOMEN: Soft, Nontender, Nondistended; Bowel sounds present + PEG   EXTREMITIES:  2+ Peripheral Pulses, No clubbing, cyanosis, or edema  NEUROLOGY: AAOx3, non-focal  PSYCH: calm, appropriate mood    LABS:                        12.2   7.11  )-----------( 348      ( 18 Jun 2021 06:16 )             38.3     06-17    142  |  108  |  16  ----------------------------<  87  3.7   |  28  |  1.01    Ca    9.2      17 Jun 2021 05:30    TPro  7.1  /  Alb  2.6<L>  /  TBili  0.5  /  DBili  x   /  AST  21  /  ALT  25  /  AlkPhos  68  06-17              RADIOLOGY & ADDITIONAL TESTS:  Results Reviewed:   Imaging Personally Reviewed:  Electrocardiogram Personally Reviewed:    COORDINATION OF CARE:  Care Discussed with Dr. Blackwell

## 2021-06-19 LAB
GLUCOSE BLDC GLUCOMTR-MCNC: 114 MG/DL — HIGH (ref 70–99)
GLUCOSE BLDC GLUCOMTR-MCNC: 130 MG/DL — HIGH (ref 70–99)
GLUCOSE BLDC GLUCOMTR-MCNC: 145 MG/DL — HIGH (ref 70–99)
GLUCOSE BLDC GLUCOMTR-MCNC: 204 MG/DL — HIGH (ref 70–99)

## 2021-06-19 PROCEDURE — 99232 SBSQ HOSP IP/OBS MODERATE 35: CPT

## 2021-06-19 RX ORDER — ENOXAPARIN SODIUM 100 MG/ML
60 INJECTION SUBCUTANEOUS EVERY 12 HOURS
Refills: 0 | Status: DISCONTINUED | OUTPATIENT
Start: 2021-06-19 | End: 2021-06-20

## 2021-06-19 RX ORDER — METFORMIN HYDROCHLORIDE 850 MG/1
500 TABLET ORAL
Refills: 0 | Status: DISCONTINUED | OUTPATIENT
Start: 2021-06-19 | End: 2021-06-21

## 2021-06-19 RX ADMIN — Medication 4: at 22:14

## 2021-06-19 RX ADMIN — Medication 1 APPLICATION(S): at 05:27

## 2021-06-19 RX ADMIN — PANTOPRAZOLE SODIUM 40 MILLIGRAM(S): 20 TABLET, DELAYED RELEASE ORAL at 12:01

## 2021-06-19 RX ADMIN — Medication 1 APPLICATION(S): at 12:01

## 2021-06-19 RX ADMIN — LEVETIRACETAM 500 MILLIGRAM(S): 250 TABLET, FILM COATED ORAL at 05:23

## 2021-06-19 RX ADMIN — ENOXAPARIN SODIUM 60 MILLIGRAM(S): 100 INJECTION SUBCUTANEOUS at 05:23

## 2021-06-19 RX ADMIN — ENOXAPARIN SODIUM 60 MILLIGRAM(S): 100 INJECTION SUBCUTANEOUS at 17:12

## 2021-06-19 RX ADMIN — LEVETIRACETAM 500 MILLIGRAM(S): 250 TABLET, FILM COATED ORAL at 17:12

## 2021-06-19 RX ADMIN — Medication 1 APPLICATION(S): at 22:15

## 2021-06-19 RX ADMIN — METFORMIN HYDROCHLORIDE 500 MILLIGRAM(S): 850 TABLET ORAL at 17:12

## 2021-06-19 RX ADMIN — ATORVASTATIN CALCIUM 40 MILLIGRAM(S): 80 TABLET, FILM COATED ORAL at 22:14

## 2021-06-19 NOTE — PROGRESS NOTE ADULT - SUBJECTIVE AND OBJECTIVE BOX
No overnight events.  Slept well.  Pain controlled, no chest pain, no N/V, no Fevers/Chills. No other new ROS.  Has been tolerating rehabilitation program.    VITALS  T(C): 36.6 (06-18-21 @ 21:51), Max: 36.6 (06-18-21 @ 08:22)  HR: 83 (06-18-21 @ 21:51) (81 - 83)  BP: 117/76 (06-18-21 @ 21:51) (98/66 - 117/76)  RR: 16 (06-18-21 @ 21:51) (16 - 16)  SpO2: 100% (06-18-21 @ 21:51) (99% - 100%)  Wt(kg): --     MEDICATIONS   acetaminophen   Tablet .. 650 milliGRAM(s) every 6 hours  aluminum hydroxide/magnesium hydroxide/simethicone Suspension 30 milliLiter(s) every 4 hours PRN  AQUAPHOR (petrolatum Ointment) 1 Application(s) three times a day  atorvastatin 40 milliGRAM(s) at bedtime  cadexomer iodine 0.9% Gel 1 Application(s) daily  dextrose 40% Gel 15 Gram(s) once  dextrose 5%. 1000 milliLiter(s) <Continuous>  dextrose 5%. 1000 milliLiter(s) <Continuous>  dextrose 50% Injectable 25 Gram(s) once  dextrose 50% Injectable 12.5 Gram(s) once  dextrose 50% Injectable 25 Gram(s) once  enoxaparin Injectable 60 milliGRAM(s) every 12 hours  glucagon  Injectable 1 milliGRAM(s) once  insulin lispro (ADMELOG) corrective regimen sliding scale   four times a day before meals  levETIRAcetam  Solution 500 milliGRAM(s) two times a day  ondansetron   Disintegrating Tablet 4 milliGRAM(s) every 6 hours PRN  pantoprazole   Suspension 40 milliGRAM(s) daily      RECENT LABS/IMAGING                        12.2   7.11  )-----------( 348      ( 18 Jun 2021 06:16 )             38.3                     POCT Blood Glucose.: 114 mg/dL (06-19-21 @ 08:10)  POCT Blood Glucose.: 240 mg/dL (06-18-21 @ 21:43)  POCT Blood Glucose.: 121 mg/dL (06-18-21 @ 16:59)  POCT Blood Glucose.: 128 mg/dL (06-18-21 @ 12:17)        ------------------------------------------  PHYSICAL EXAM  Constitutional - NAD, Comfortable  Pulm - Breathing comfortably, No wheezing  Abd - Nondistended  Extremities - No calf tenderness  Neurologic Exam - Awake, Alert  Psychiatric - Mood WNL     ASSESSMENT/PLAN  60y Male with impairments in mobility and ADLs   - Continue current rehabilitation program 3hrs a day   - Continue current medications, patient is medically stable   - DVT prophylaxis  - Skin - OOB and mobilization daily

## 2021-06-19 NOTE — PROGRESS NOTE ADULT - ASSESSMENT
59yo male with PMH of DM who was initially admitted to [facility] ICU for hypoxic respiratory failure 2/2 COVID c/b PE with R heart strain, cardiogenic and septic shock, ischemic and hemorrhagic CVA and ESBL klebsiella ventilator associate PNA, transferred to [facility] for neurosurgery eval and further management. s/p PEG. Course c/b right empyema s/p VATs and obturator bleed (resolved). Now admitted for functional decline.     #Debility  #ESBL Klebsiella PNA c/b empyema  #s/p COVID pneumonia  - 5/12 underwent right VATS converted to open L thoracotomy, decortication, drainage of abscess and flex bronch.  - c/w meropenem 5/7 and transition to Bactrim DS 2 tabs TID + Augmentin 875mg po q12  upon DC to [facility] --- Bactrim DC'ed 6/7 due to SUKI, augmentin completed 6/8  - ID following, recs appreciated - needs close follow up in ID clinic for repeat imaging, Dr. Chambers  - CT Chest 5/29 with improvement in bilateral opacities. Slight decrease in right chest wall hematoma. Stable loculated right hydropneumothorax.   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 Albarado with all chest tubes removed by 5/18  -CT Chest 5/16 Interval new small cavitary lesions in the right lower lobe and right chest wall hematoma - no thoracic surgery intervention indicated per Dr. Albarado.    #Obturator Hematoma (stable)  - bleeding hematoma in right thigh on CT on 5/7  - CT hip showing non active bleed, hematoma on right thigh on 5/8  - doppler to r/o DVT in LE, per IR no role IVC filter  - active again on 5/17, now stable  - Stable while on therapeutic Lovenox for PE  - Monitor H/H    #Pulmonary embolus  - Pulmonary embolus noted on ct scan performed at time of admission  - Neurology cleared for AC  - Currently on lovenox 60mg q 12hrs; monitor Hg/Hct    #CVA  -Acute bilateral ischemic strokes, with concern for hemorrhagic conversion, noted on CT scan on 4/8  -Etiology of acute ischemic stroke uncertain at this time; TTE with bubble performed at bedside without any evidence of right-to-left shunt; no arrhythmia noted on telemetry throughout stay in the MICU  -CTA head and neck without any evidence of carotid artery stenosis or dissection  - 6/10 CT head: multiple infarcts, no hemorrhage  - CT head 6/14 no hemorrhages  - RAJESH noted done 6/16/21; no obvious thrombus/PFO noted    #Encephalopathy due to multifactorial causes - resolved  - Etiology likely multi-factorial in setting of acute ischemic infarctions of the brain in conjunction vs acute kidney injury with uremia (now resolving) vs resolving hypoxic respiratory failure, covid-19 infection, superimposed bacterial pneumonia, urinary retention, concern for nutritional deficiencies  - Reorientation with family at bedside (pt speaks [place], does best with family present)  - MRI Brain, and MRA Head demonstrated multiple evolving subacute infarcts with associated hemorrhages, likely expected evolution of ischemia  - As per neuro, EEG not suggestive of seizure-like activity  - C/w Keppra 500 mg BID-> neuro f/u outpatient   - repeat CT head 5/5 normal.     #Diabetes mellitus type II  - April 2021: a1c 10 -- improved to 5.7  - Not on Long acting or premeal coverage  - moderate ISS, accuchecks  - Start Metformin 500mg BID  - Follow up endo outpatient    #Hypertension  - Was on metoprolol tartrate 25 mg twice daily since 4/16; discontinued  - BP is stable  - If is hypertensive persistently, can consider antihypertensives    #Dysphagia  - Tolerating Dysphagia 3 diet   - Eventual plan to remove PEG tube    #Peripheral vascular disease  -Dry gangrene noted over distal toes on bilateral lower extremities--most concerning for microvascular disease subsequent to vasopressor administration   -Dorsalis pedis pulses intact bilaterally  -ERASMO demonstrating right ERASMO: 1.09, left ERASMO: 1.24, right TBI: 0.78, and left TBI: 0.73.  -Podiatry recs appreciated, no acute surgical intervention at this time, applying betadine to wound  -As per ID non infectious appearing     #Urinary Retention - improved  - Urinating appropriately  - c/w doxazosin     #DVT ppx: Lovenox 60mg BID

## 2021-06-19 NOTE — PROGRESS NOTE ADULT - SUBJECTIVE AND OBJECTIVE BOX
Patient is a 60y old  Male who presents with a chief complaint of CVA (18 Jun 2021 19:08)      SUBJECTIVE / OVERNIGHT EVENTS:  Pt seen and examined at bedside. No acute events overnight.  Pt denies cp, palpitations, sob, abd pain, N/V, fever, chills.    ROS:  All other review of systems negative    Allergies    No Known Allergies    Intolerances        MEDICATIONS  (STANDING):  acetaminophen   Tablet .. 650 milliGRAM(s) Oral every 6 hours  AQUAPHOR (petrolatum Ointment) 1 Application(s) Topical three times a day  atorvastatin 40 milliGRAM(s) Oral at bedtime  cadexomer iodine 0.9% Gel 1 Application(s) Topical daily  dextrose 40% Gel 15 Gram(s) Oral once  dextrose 5%. 1000 milliLiter(s) (50 mL/Hr) IV Continuous <Continuous>  dextrose 5%. 1000 milliLiter(s) (100 mL/Hr) IV Continuous <Continuous>  dextrose 50% Injectable 25 Gram(s) IV Push once  dextrose 50% Injectable 12.5 Gram(s) IV Push once  dextrose 50% Injectable 25 Gram(s) IV Push once  enoxaparin Injectable 60 milliGRAM(s) SubCutaneous every 12 hours  glucagon  Injectable 1 milliGRAM(s) IntraMuscular once  insulin lispro (ADMELOG) corrective regimen sliding scale   SubCutaneous four times a day before meals  levETIRAcetam  Solution 500 milliGRAM(s) Oral two times a day  pantoprazole   Suspension 40 milliGRAM(s) Oral daily    MEDICATIONS  (PRN):  aluminum hydroxide/magnesium hydroxide/simethicone Suspension 30 milliLiter(s) Oral every 4 hours PRN Dyspepsia  ondansetron   Disintegrating Tablet 4 milliGRAM(s) Oral every 6 hours PRN Nausea and/or Vomiting      Vital Signs Last 24 Hrs  T(C): 36.3 (19 Jun 2021 08:51), Max: 36.6 (18 Jun 2021 21:51)  T(F): 97.4 (19 Jun 2021 08:51), Max: 97.8 (18 Jun 2021 21:51)  HR: 87 (19 Jun 2021 08:51) (83 - 87)  BP: 115/71 (19 Jun 2021 08:51) (115/71 - 117/76)  BP(mean): --  RR: 16 (19 Jun 2021 08:51) (16 - 16)  SpO2: 99% (19 Jun 2021 08:51) (99% - 100%)  CAPILLARY BLOOD GLUCOSE      POCT Blood Glucose.: 114 mg/dL (19 Jun 2021 08:10)  POCT Blood Glucose.: 240 mg/dL (18 Jun 2021 21:43)  POCT Blood Glucose.: 121 mg/dL (18 Jun 2021 16:59)  POCT Blood Glucose.: 128 mg/dL (18 Jun 2021 12:17)    I&O's Summary    18 Jun 2021 07:01  -  19 Jun 2021 07:00  --------------------------------------------------------  IN: 360 mL / OUT: 200 mL / NET: 160 mL        PHYSICAL EXAM:  GENERAL: NAD, thin male  CHEST/LUNG: Clear to auscultation bilaterally; No wheeze, nonlabored breathing  HEART: Regular rate and rhythm; No murmurs, rubs, or gallops  ABDOMEN: Soft, Nontender, Nondistended; Bowel sounds present + PEG   EXTREMITIES:  2+ Peripheral Pulses, No clubbing, cyanosis, or edema  NEUROLOGY: AAOx3, non-focal  PSYCH: calm, appropriate mood    LABS:                        12.2   7.11  )-----------( 348      ( 18 Jun 2021 06:16 )             38.3                     RADIOLOGY & ADDITIONAL TESTS:  Results Reviewed:   Imaging Personally Reviewed:  Electrocardiogram Personally Reviewed:    COORDINATION OF CARE:  Care Discussed with Consultants/Other Providers [Y/N]:  Prior or Outpatient Records Reviewed [Y/N]:

## 2021-06-20 LAB
GLUCOSE BLDC GLUCOMTR-MCNC: 104 MG/DL — HIGH (ref 70–99)
GLUCOSE BLDC GLUCOMTR-MCNC: 127 MG/DL — HIGH (ref 70–99)
GLUCOSE BLDC GLUCOMTR-MCNC: 149 MG/DL — HIGH (ref 70–99)
GLUCOSE BLDC GLUCOMTR-MCNC: 176 MG/DL — HIGH (ref 70–99)
SARS-COV-2 RNA SPEC QL NAA+PROBE: SIGNIFICANT CHANGE UP

## 2021-06-20 PROCEDURE — 99232 SBSQ HOSP IP/OBS MODERATE 35: CPT

## 2021-06-20 RX ADMIN — Medication 2: at 21:56

## 2021-06-20 RX ADMIN — Medication 1 APPLICATION(S): at 13:24

## 2021-06-20 RX ADMIN — METFORMIN HYDROCHLORIDE 500 MILLIGRAM(S): 850 TABLET ORAL at 05:36

## 2021-06-20 RX ADMIN — PANTOPRAZOLE SODIUM 40 MILLIGRAM(S): 20 TABLET, DELAYED RELEASE ORAL at 11:56

## 2021-06-20 RX ADMIN — LEVETIRACETAM 500 MILLIGRAM(S): 250 TABLET, FILM COATED ORAL at 05:36

## 2021-06-20 RX ADMIN — Medication 1 APPLICATION(S): at 11:59

## 2021-06-20 RX ADMIN — ENOXAPARIN SODIUM 60 MILLIGRAM(S): 100 INJECTION SUBCUTANEOUS at 05:36

## 2021-06-20 RX ADMIN — METFORMIN HYDROCHLORIDE 500 MILLIGRAM(S): 850 TABLET ORAL at 17:33

## 2021-06-20 RX ADMIN — Medication 1 APPLICATION(S): at 21:57

## 2021-06-20 RX ADMIN — LEVETIRACETAM 500 MILLIGRAM(S): 250 TABLET, FILM COATED ORAL at 17:33

## 2021-06-20 RX ADMIN — ATORVASTATIN CALCIUM 40 MILLIGRAM(S): 80 TABLET, FILM COATED ORAL at 21:57

## 2021-06-20 RX ADMIN — Medication 1 APPLICATION(S): at 05:36

## 2021-06-20 NOTE — PROGRESS NOTE ADULT - SUBJECTIVE AND OBJECTIVE BOX
No overnight events.  Slept well and has no pain.   No other new ROS.  Has been tolerating rehabilitation program.    VITALS  T(C): 36.6 (06-19-21 @ 22:19), Max: 36.6 (06-19-21 @ 22:19)  HR: 91 (06-19-21 @ 22:19) (87 - 91)  BP: 143/85 (06-19-21 @ 22:19) (115/71 - 143/85)  RR: 16 (06-19-21 @ 22:19) (16 - 16)  SpO2: 100% (06-19-21 @ 22:19) (99% - 100%)  Wt(kg): --     MEDICATIONS   acetaminophen   Tablet .. 650 milliGRAM(s) every 6 hours  aluminum hydroxide/magnesium hydroxide/simethicone Suspension 30 milliLiter(s) every 4 hours PRN  AQUAPHOR (petrolatum Ointment) 1 Application(s) three times a day  atorvastatin 40 milliGRAM(s) at bedtime  cadexomer iodine 0.9% Gel 1 Application(s) daily  dextrose 40% Gel 15 Gram(s) once  dextrose 5%. 1000 milliLiter(s) <Continuous>  dextrose 5%. 1000 milliLiter(s) <Continuous>  dextrose 50% Injectable 25 Gram(s) once  dextrose 50% Injectable 12.5 Gram(s) once  dextrose 50% Injectable 25 Gram(s) once  enoxaparin Injectable 60 milliGRAM(s) every 12 hours  glucagon  Injectable 1 milliGRAM(s) once  insulin lispro (ADMELOG) corrective regimen sliding scale   four times a day before meals  levETIRAcetam  Solution 500 milliGRAM(s) two times a day  metFORMIN 500 milliGRAM(s) two times a day  ondansetron   Disintegrating Tablet 4 milliGRAM(s) every 6 hours PRN  pantoprazole   Suspension 40 milliGRAM(s) daily      RECENT LABS/IMAGING                   POCT Blood Glucose.: 104 mg/dL (06-20-21 @ 07:44)  POCT Blood Glucose.: 204 mg/dL (06-19-21 @ 22:12)  POCT Blood Glucose.: 130 mg/dL (06-19-21 @ 17:11)  POCT Blood Glucose.: 145 mg/dL (06-19-21 @ 12:00)  POCT Blood Glucose.: 114 mg/dL (06-19-21 @ 08:10)    ------------------------------------------  PHYSICAL EXAM  Constitutional - NAD, Comfortable  Pulm - Breathing comfortably, No wheezing  Abd - Nondistended  Extremities - No calf tenderness  Neurologic Exam - Awake, Alert  Psychiatric - Mood WNL     ASSESSMENT/PLAN  60y Male with impairments in mobility and ADLs   - Continue current rehabilitation program 3hrs a day   - Continue current medications, patient is medically stable   - DVT prophylaxis  - Skin - OOB and mobilization daily      No overnight events.  Slept well and has no pain.   No other new ROS.  Has been tolerating rehabilitation program.    VITALS  T(C): 36.6 (06-19-21 @ 22:19), Max: 36.6 (06-19-21 @ 22:19)  HR: 91 (06-19-21 @ 22:19) (87 - 91)  BP: 143/85 (06-19-21 @ 22:19) (115/71 - 143/85)  RR: 16 (06-19-21 @ 22:19) (16 - 16)  SpO2: 100% (06-19-21 @ 22:19) (99% - 100%)  Wt(kg): --     MEDICATIONS   acetaminophen   Tablet .. 650 milliGRAM(s) every 6 hours  aluminum hydroxide/magnesium hydroxide/simethicone Suspension 30 milliLiter(s) every 4 hours PRN  AQUAPHOR (petrolatum Ointment) 1 Application(s) three times a day  atorvastatin 40 milliGRAM(s) at bedtime  cadexomer iodine 0.9% Gel 1 Application(s) daily  dextrose 40% Gel 15 Gram(s) once  dextrose 5%. 1000 milliLiter(s) <Continuous>  dextrose 5%. 1000 milliLiter(s) <Continuous>  dextrose 50% Injectable 25 Gram(s) once  dextrose 50% Injectable 12.5 Gram(s) once  dextrose 50% Injectable 25 Gram(s) once  enoxaparin Injectable 60 milliGRAM(s) every 12 hours  glucagon  Injectable 1 milliGRAM(s) once  insulin lispro (ADMELOG) corrective regimen sliding scale   four times a day before meals  levETIRAcetam  Solution 500 milliGRAM(s) two times a day  metFORMIN 500 milliGRAM(s) two times a day  ondansetron   Disintegrating Tablet 4 milliGRAM(s) every 6 hours PRN  pantoprazole   Suspension 40 milliGRAM(s) daily      RECENT LABS/IMAGING                   POCT Blood Glucose.: 104 mg/dL (06-20-21 @ 07:44)  POCT Blood Glucose.: 204 mg/dL (06-19-21 @ 22:12)  POCT Blood Glucose.: 130 mg/dL (06-19-21 @ 17:11)  POCT Blood Glucose.: 145 mg/dL (06-19-21 @ 12:00)  POCT Blood Glucose.: 114 mg/dL (06-19-21 @ 08:10)    ------------------------------------------  PHYSICAL EXAM  Constitutional - NAD, Comfortable  Pulm - Breathing comfortably, No wheezing  Abd - Nondistended  Extremities - No calf tenderness  Neurologic Exam - Awake, Alert  Psychiatric - Mood WNL     ASSESSMENT/PLAN  60y Male with impairments in mobility and ADLs   - Continue current rehabilitation program 3hrs a day   - Continue current medications, patient is medically stable   - DVT prophylaxis - HOLD for PEG removal in AM  - Skin - OOB and mobilization daily

## 2021-06-20 NOTE — PROGRESS NOTE ADULT - SUBJECTIVE AND OBJECTIVE BOX
Patient is a 60y old  Male who presents with a chief complaint of CVA (19 Jun 2021 09:05)      SUBJECTIVE / OVERNIGHT EVENTS:  Pt seen and examined at bedside. No acute events overnight.  Pt denies cp, palpitations, sob, abd pain, N/V, fever, chills.    ROS:  All other review of systems negative    Allergies    No Known Allergies    Intolerances        MEDICATIONS  (STANDING):  acetaminophen   Tablet .. 650 milliGRAM(s) Oral every 6 hours  AQUAPHOR (petrolatum Ointment) 1 Application(s) Topical three times a day  atorvastatin 40 milliGRAM(s) Oral at bedtime  cadexomer iodine 0.9% Gel 1 Application(s) Topical daily  dextrose 40% Gel 15 Gram(s) Oral once  dextrose 5%. 1000 milliLiter(s) (50 mL/Hr) IV Continuous <Continuous>  dextrose 5%. 1000 milliLiter(s) (100 mL/Hr) IV Continuous <Continuous>  dextrose 50% Injectable 25 Gram(s) IV Push once  dextrose 50% Injectable 12.5 Gram(s) IV Push once  dextrose 50% Injectable 25 Gram(s) IV Push once  glucagon  Injectable 1 milliGRAM(s) IntraMuscular once  insulin lispro (ADMELOG) corrective regimen sliding scale   SubCutaneous four times a day before meals  levETIRAcetam  Solution 500 milliGRAM(s) Oral two times a day  metFORMIN 500 milliGRAM(s) Oral two times a day  pantoprazole   Suspension 40 milliGRAM(s) Oral daily    MEDICATIONS  (PRN):  aluminum hydroxide/magnesium hydroxide/simethicone Suspension 30 milliLiter(s) Oral every 4 hours PRN Dyspepsia  ondansetron   Disintegrating Tablet 4 milliGRAM(s) Oral every 6 hours PRN Nausea and/or Vomiting      Vital Signs Last 24 Hrs  T(C): 36.4 (20 Jun 2021 08:42), Max: 36.6 (19 Jun 2021 22:19)  T(F): 97.6 (20 Jun 2021 08:42), Max: 97.9 (19 Jun 2021 22:19)  HR: 102 (20 Jun 2021 08:42) (91 - 102)  BP: 99/63 (20 Jun 2021 08:42) (99/63 - 143/85)  BP(mean): --  RR: 16 (20 Jun 2021 08:42) (16 - 16)  SpO2: 98% (20 Jun 2021 08:42) (98% - 100%)  CAPILLARY BLOOD GLUCOSE      POCT Blood Glucose.: 104 mg/dL (20 Jun 2021 07:44)  POCT Blood Glucose.: 204 mg/dL (19 Jun 2021 22:12)  POCT Blood Glucose.: 130 mg/dL (19 Jun 2021 17:11)  POCT Blood Glucose.: 145 mg/dL (19 Jun 2021 12:00)    I&O's Summary    19 Jun 2021 07:01  -  20 Jun 2021 07:00  --------------------------------------------------------  IN: 0 mL / OUT: 1000 mL / NET: -1000 mL        PHYSICAL EXAM:  GENERAL: NAD, thin male  CHEST/LUNG: Clear to auscultation bilaterally; No wheeze, nonlabored breathing  HEART: Regular rate and rhythm; No murmurs, rubs, or gallops  ABDOMEN: Soft, Nontender, Nondistended; Bowel sounds present + PEG   EXTREMITIES:  2+ Peripheral Pulses, No clubbing, cyanosis, or edema  NEUROLOGY: AAOx3, non-focal  PSYCH: calm, appropriate mood    LABS:                    RADIOLOGY & ADDITIONAL TESTS:  Results Reviewed:   Imaging Personally Reviewed:  Electrocardiogram Personally Reviewed:    COORDINATION OF CARE:  Care Discussed with Consultants/Other Providers [Y/N]:  Prior or Outpatient Records Reviewed [Y/N]:

## 2021-06-20 NOTE — PROGRESS NOTE ADULT - ASSESSMENT
59yo male with PMH of DM who was initially admitted to [facility] ICU for hypoxic respiratory failure 2/2 COVID c/b PE with R heart strain, cardiogenic and septic shock, ischemic and hemorrhagic CVA and ESBL klebsiella ventilator associate PNA, transferred to [facility] for neurosurgery eval and further management. s/p PEG. Course c/b right empyema s/p VATs and obturator bleed (resolved). Now admitted for functional decline.     #Debility  #ESBL Klebsiella PNA c/b empyema  #s/p COVID pneumonia  - 5/12 underwent right VATS converted to open L thoracotomy, decortication, drainage of abscess and flex bronch.  - c/w meropenem 5/7 and transition to Bactrim DS 2 tabs TID + Augmentin 875mg po q12  upon DC to [facility] --- Bactrim DC'ed 6/7 due to USKI, augmentin completed 6/8  - ID following, recs appreciated - needs close follow up in ID clinic for repeat imaging, Dr. Chambers  - CT Chest 5/29 with improvement in bilateral opacities. Slight decrease in right chest wall hematoma. Stable loculated right hydropneumothorax.   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 Albarado with all chest tubes removed by 5/18  -CT Chest 5/16 Interval new small cavitary lesions in the right lower lobe and right chest wall hematoma - no thoracic surgery intervention indicated per Dr. Albarado.    #Obturator Hematoma (stable)  - bleeding hematoma in right thigh on CT on 5/7  - CT hip showing non active bleed, hematoma on right thigh on 5/8  - doppler to r/o DVT in LE, per IR no role IVC filter  - active again on 5/17, now stable  - Stable while on therapeutic Lovenox for PE  - Monitor H/H    #Pulmonary embolus  - Pulmonary embolus noted on ct scan performed at time of admission  - Neurology cleared for AC  - Currently on lovenox 60mg q 12hrs; monitor Hg/Hct    #CVA  -Acute bilateral ischemic strokes, with concern for hemorrhagic conversion, noted on CT scan on 4/8  -Etiology of acute ischemic stroke uncertain at this time; TTE with bubble performed at bedside without any evidence of right-to-left shunt; no arrhythmia noted on telemetry throughout stay in the MICU  -CTA head and neck without any evidence of carotid artery stenosis or dissection  - 6/10 CT head: multiple infarcts, no hemorrhage  - CT head 6/14 no hemorrhages  - RAJESH noted done 6/16/21; no obvious thrombus/PFO noted    #Encephalopathy due to multifactorial causes - resolved  - Etiology likely multi-factorial in setting of acute ischemic infarctions of the brain in conjunction vs acute kidney injury with uremia (now resolving) vs resolving hypoxic respiratory failure, covid-19 infection, superimposed bacterial pneumonia, urinary retention, concern for nutritional deficiencies  - MRI Brain, and MRA Head demonstrated multiple evolving subacute infarcts with associated hemorrhages, likely expected evolution of ischemia  - C/w Keppra 500 mg BID-> neuro f/u outpatient   - repeat CT head 5/5 normal.     #Diabetes mellitus type II  - April 2021: a1c 10 -- improved to 5.7  - Not on Long acting or premeal coverage  - moderate ISS, accuchecks  - Continue Metformin 500mg BID  - Follow up endo outpatient    #Hypertension  - Was on metoprolol tartrate 25 mg twice daily since 4/16; discontinued  - BP is stable  - If is hypertensive persistently, can consider antihypertensives    #Dysphagia  - Tolerating Dysphagia 3 diet   - Eventual plan to remove PEG tube    #Peripheral vascular disease  -Dry gangrene noted over distal toes on bilateral lower extremities--most concerning for microvascular disease subsequent to vasopressor administration   -Dorsalis pedis pulses intact bilaterally  -ERASMO demonstrating right ERASMO: 1.09, left ERASMO: 1.24, right TBI: 0.78, and left TBI: 0.73.  -Podiatry recs appreciated, no acute surgical intervention at this time, applying betadine to wound  -As per ID non infectious appearing     #Urinary Retention - improved  - Urinating appropriately  - c/w doxazosin     #DVT ppx: Lovenox 60mg BID

## 2021-06-21 DIAGNOSIS — Z43.1 ENCOUNTER FOR ATTENTION TO GASTROSTOMY: ICD-10-CM

## 2021-06-21 LAB
ALBUMIN SERPL ELPH-MCNC: 2.9 G/DL — LOW (ref 3.3–5)
ALP SERPL-CCNC: 79 U/L — SIGNIFICANT CHANGE UP (ref 40–120)
ALT FLD-CCNC: 40 U/L — SIGNIFICANT CHANGE UP (ref 10–45)
ANION GAP SERPL CALC-SCNC: 6 MMOL/L — SIGNIFICANT CHANGE UP (ref 5–17)
AST SERPL-CCNC: 26 U/L — SIGNIFICANT CHANGE UP (ref 10–40)
BASOPHILS # BLD AUTO: 0.08 K/UL — SIGNIFICANT CHANGE UP (ref 0–0.2)
BASOPHILS NFR BLD AUTO: 0.9 % — SIGNIFICANT CHANGE UP (ref 0–2)
BILIRUB SERPL-MCNC: 0.3 MG/DL — SIGNIFICANT CHANGE UP (ref 0.2–1.2)
BUN SERPL-MCNC: 17 MG/DL — SIGNIFICANT CHANGE UP (ref 7–23)
CALCIUM SERPL-MCNC: 9.1 MG/DL — SIGNIFICANT CHANGE UP (ref 8.4–10.5)
CHLORIDE SERPL-SCNC: 106 MMOL/L — SIGNIFICANT CHANGE UP (ref 96–108)
CO2 SERPL-SCNC: 28 MMOL/L — SIGNIFICANT CHANGE UP (ref 22–31)
CREAT SERPL-MCNC: 1.01 MG/DL — SIGNIFICANT CHANGE UP (ref 0.5–1.3)
EOSINOPHIL # BLD AUTO: 0.34 K/UL — SIGNIFICANT CHANGE UP (ref 0–0.5)
EOSINOPHIL NFR BLD AUTO: 3.7 % — SIGNIFICANT CHANGE UP (ref 0–6)
GLUCOSE BLDC GLUCOMTR-MCNC: 119 MG/DL — HIGH (ref 70–99)
GLUCOSE BLDC GLUCOMTR-MCNC: 126 MG/DL — HIGH (ref 70–99)
GLUCOSE BLDC GLUCOMTR-MCNC: 203 MG/DL — HIGH (ref 70–99)
GLUCOSE BLDC GLUCOMTR-MCNC: 83 MG/DL — SIGNIFICANT CHANGE UP (ref 70–99)
GLUCOSE SERPL-MCNC: 140 MG/DL — HIGH (ref 70–99)
HCT VFR BLD CALC: 36.4 % — LOW (ref 39–50)
HGB BLD-MCNC: 11.6 G/DL — LOW (ref 13–17)
IMM GRANULOCYTES NFR BLD AUTO: 0.8 % — SIGNIFICANT CHANGE UP (ref 0–1.5)
LYMPHOCYTES # BLD AUTO: 1.88 K/UL — SIGNIFICANT CHANGE UP (ref 1–3.3)
LYMPHOCYTES # BLD AUTO: 20.5 % — SIGNIFICANT CHANGE UP (ref 13–44)
MCHC RBC-ENTMCNC: 26.4 PG — LOW (ref 27–34)
MCHC RBC-ENTMCNC: 31.9 GM/DL — LOW (ref 32–36)
MCV RBC AUTO: 82.9 FL — SIGNIFICANT CHANGE UP (ref 80–100)
MONOCYTES # BLD AUTO: 0.74 K/UL — SIGNIFICANT CHANGE UP (ref 0–0.9)
MONOCYTES NFR BLD AUTO: 8.1 % — SIGNIFICANT CHANGE UP (ref 2–14)
NEUTROPHILS # BLD AUTO: 6.07 K/UL — SIGNIFICANT CHANGE UP (ref 1.8–7.4)
NEUTROPHILS NFR BLD AUTO: 66 % — SIGNIFICANT CHANGE UP (ref 43–77)
NRBC # BLD: 0 /100 WBCS — SIGNIFICANT CHANGE UP (ref 0–0)
PLATELET # BLD AUTO: 316 K/UL — SIGNIFICANT CHANGE UP (ref 150–400)
POTASSIUM SERPL-MCNC: 4.4 MMOL/L — SIGNIFICANT CHANGE UP (ref 3.5–5.3)
POTASSIUM SERPL-SCNC: 4.4 MMOL/L — SIGNIFICANT CHANGE UP (ref 3.5–5.3)
PROT SERPL-MCNC: 7.7 G/DL — SIGNIFICANT CHANGE UP (ref 6–8.3)
RBC # BLD: 4.39 M/UL — SIGNIFICANT CHANGE UP (ref 4.2–5.8)
RBC # FLD: 18.4 % — HIGH (ref 10.3–14.5)
SODIUM SERPL-SCNC: 140 MMOL/L — SIGNIFICANT CHANGE UP (ref 135–145)
WBC # BLD: 9.18 K/UL — SIGNIFICANT CHANGE UP (ref 3.8–10.5)
WBC # FLD AUTO: 9.18 K/UL — SIGNIFICANT CHANGE UP (ref 3.8–10.5)

## 2021-06-21 PROCEDURE — 99233 SBSQ HOSP IP/OBS HIGH 50: CPT

## 2021-06-21 PROCEDURE — 99232 SBSQ HOSP IP/OBS MODERATE 35: CPT

## 2021-06-21 RX ORDER — APIXABAN 2.5 MG/1
10 TABLET, FILM COATED ORAL EVERY 12 HOURS
Refills: 0 | Status: DISCONTINUED | OUTPATIENT
Start: 2021-06-22 | End: 2021-06-23

## 2021-06-21 RX ADMIN — METFORMIN HYDROCHLORIDE 500 MILLIGRAM(S): 850 TABLET ORAL at 05:37

## 2021-06-21 RX ADMIN — ATORVASTATIN CALCIUM 40 MILLIGRAM(S): 80 TABLET, FILM COATED ORAL at 21:23

## 2021-06-21 RX ADMIN — Medication 1 APPLICATION(S): at 05:38

## 2021-06-21 RX ADMIN — LEVETIRACETAM 500 MILLIGRAM(S): 250 TABLET, FILM COATED ORAL at 05:37

## 2021-06-21 RX ADMIN — Medication 1 APPLICATION(S): at 11:43

## 2021-06-21 RX ADMIN — Medication 650 MILLIGRAM(S): at 05:37

## 2021-06-21 RX ADMIN — Medication 1 APPLICATION(S): at 21:23

## 2021-06-21 RX ADMIN — Medication 4: at 16:58

## 2021-06-21 RX ADMIN — Medication 650 MILLIGRAM(S): at 14:40

## 2021-06-21 RX ADMIN — LEVETIRACETAM 500 MILLIGRAM(S): 250 TABLET, FILM COATED ORAL at 17:20

## 2021-06-21 RX ADMIN — Medication 1 APPLICATION(S): at 13:34

## 2021-06-21 RX ADMIN — Medication 650 MILLIGRAM(S): at 15:16

## 2021-06-21 RX ADMIN — PANTOPRAZOLE SODIUM 40 MILLIGRAM(S): 20 TABLET, DELAYED RELEASE ORAL at 17:20

## 2021-06-21 RX ADMIN — Medication 650 MILLIGRAM(S): at 06:31

## 2021-06-21 NOTE — PROGRESS NOTE ADULT - ADDITIONAL PE
Awake, Alert, AAO x3      No aphasia, impaired attention and concentration, follows simple commands     Cranial Nerves - dysphagia        Coordination/dysmetria - impaired

## 2021-06-21 NOTE — PROGRESS NOTE ADULT - SUBJECTIVE AND OBJECTIVE BOX
Subjective: I feel great. NAD this am in chair. NPO for PEG removal.     HISTORY OF PRESENT ILLNESS  61yo M with T2DM and recently diagnosed COVID (3/19) BIBEMS to [facility] on 4/1 for hypoxic respiratory failure secondary to COVID PNA, requiring endotracheal intubation (4/1-4/13). Course significant for Septic shock on admission, treated with vancomycin and cefepime. Found to have ESBL Klebsiella PNA c/b empyema s/p VATs decortication. Treated with prolonged course of Meropenem while hospitalized and transitioned to Augmentin/Bactrim upon discharge (until 6/8). Will follow up with ID as outpatient.     Patient had acute ischemic CVA with areas of hemorrhagic transformation. Found to have multi-focal acute-to-subacute infarctions. 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 Subsequent scans showed hemorrhagic conversion. Transferred to [facility] for Neurosurgery evaluation, no surgical intervention at this time. Will require outpatient follow up.     Course complicated by RITO segmental PEs with e/o RH strain: Provoked in the setting of sepsis/high inflammatory state with COVID infection. LE Dopplers negative x 2. Initially anticoagulated with heparin gtt. Course further complicated by a right chest wall and right obturator hematomas. Due to concern for worsening hemorrhagic conversion of stroke and hematomas, the patient's therapeutic anticoagulation was discontinued. He was evaluated by the neurosurgical team, which said that there was no indication for acute intervention. Patient will not be anticoagulated upon discharge.   Patient's AMS likely in the setting of acute strokes, improved throughout hospitalization. Due to prolonged intubation with subsequent AMS and dysphagia, pt had PEG tube placed. Pt failed MBS following improvement of mental status.     Patient also had bedside debridement and woundcare of her BLE digits and will follow up with Podiatry as an outpatient.    Medically cleared for discharge to  [facility] 6[facility].       REVIEW OF SYMPTOMS  [X] Constitutional WNL     [X] Cardio WNL            [X] Resp WNL           [X] GI WNL                          [X]  WNL                   [X] Heme WNL              [X] Endo WNL                     [X] Skin WNL                 [X] MSK WNL            [X] Neuro WNL                   [X] Cognitive WNL        [X] Psych WNL      VITALS  Vital Signs Last 24 Hrs  T(C): 36.7 (21 Jun 2021 08:21), Max: 36.7 (20 Jun 2021 21:55)  T(F): 98 (21 Jun 2021 08:21), Max: 98 (20 Jun 2021 21:55)  HR: 89 (21 Jun 2021 08:21) (89 - 96)  BP: 115/73 (21 Jun 2021 08:21) (101/66 - 115/73)  BP(mean): --  RR: 16 (21 Jun 2021 08:21) (16 - 16)  SpO2: 100% (21 Jun 2021 08:21) (100% - 100%)      REVIEW OF SYMPTOMS  [X] Constitutional WNL     [X] Cardio WNL            [X] Resp-empeyema           [X] GI loose stool                          [X] -ulises                   [X] Heme-hematoma              [X] Endo-dm2                    [X] Skin WNL                 [X] MSK WNL            [X] Neuro-cva                  [X] Cognitive WNL        [X] Psych WNL    RECENT LABS                        11.6   9.18  )-----------( 316      ( 21 Jun 2021 07:30 )             36.4     06-21    140  |  106  |  17  ----------------------------<  140<H>  4.4   |  28  |  1.01    Ca    9.1      21 Jun 2021 07:30    TPro  7.7  /  Alb  2.9<L>  /  TBili  0.3  /  DBili  x   /  AST  26  /  ALT  40  /  AlkPhos  79  06-21      LIVER FUNCTIONS - ( 21 Jun 2021 07:30 )  Alb: 2.9 g/dL / Pro: 7.7 g/dL / ALK PHOS: 79 U/L / ALT: 40 U/L / AST: 26 U/L / GGT: x                PHYSICAL EXAM  Constitutional - NAD in chair.   HEENT - NCAT, EOMI  Neck - Supple, No limited ROM  Chest - CTA bilaterally  Cardiovascular - RR  Abdomen - BS+, Soft, NTND, PEG in place  Extremities - No C/C/E, No calf tenderness   Skin-no rash  Wounds-per H&P      Neurologic Exam - Awake, Alert, AAO x3      No aphasia, impaired attention and concentration, follows simple commands     Cranial Nerves - dysphagia        Coordination/dysmetria - impaired             Balance - impaired     Psychiatric - Mood stable, Affect WNL          RADIOLOGY/OTHER RESULTS      CURRENT MEDICATIONS  MEDICATIONS  (STANDING):  acetaminophen   Tablet .. 650 milliGRAM(s) Oral every 6 hours  AQUAPHOR (petrolatum Ointment) 1 Application(s) Topical three times a day  atorvastatin 40 milliGRAM(s) Oral at bedtime  cadexomer iodine 0.9% Gel 1 Application(s) Topical daily  dextrose 40% Gel 15 Gram(s) Oral once  dextrose 5%. 1000 milliLiter(s) (100 mL/Hr) IV Continuous <Continuous>  dextrose 5%. 1000 milliLiter(s) (50 mL/Hr) IV Continuous <Continuous>  dextrose 50% Injectable 25 Gram(s) IV Push once  dextrose 50% Injectable 12.5 Gram(s) IV Push once  dextrose 50% Injectable 25 Gram(s) IV Push once  glucagon  Injectable 1 milliGRAM(s) IntraMuscular once  insulin lispro (ADMELOG) corrective regimen sliding scale   SubCutaneous four times a day before meals  levETIRAcetam  Solution 500 milliGRAM(s) Oral two times a day  pantoprazole   Suspension 40 milliGRAM(s) Oral daily    MEDICATIONS  (PRN):  aluminum hydroxide/magnesium hydroxide/simethicone Suspension 30 milliLiter(s) Oral every 4 hours PRN Dyspepsia  ondansetron   Disintegrating Tablet 4 milliGRAM(s) Oral every 6 hours PRN Nausea and/or Vomiting      ASSESSMENT & PLAN      Continue comprehensive acute rehab program consisting of 3hrs/day of OT/PT and SLP. Subjective: I feel great. NAD this am in chair. NPO for PEG removal.     HISTORY OF PRESENT ILLNESS  59yo M with T2DM and recently diagnosed COVID (3/19) BIBEMS to [facility] on 4/1 for hypoxic respiratory failure secondary to COVID PNA, requiring endotracheal intubation (4/1-4/13). Course significant for Septic shock on admission, treated with vancomycin and cefepime. Found to have ESBL Klebsiella PNA c/b empyema s/p VATs decortication. Treated with prolonged course of Meropenem while hospitalized and transitioned to Augmentin/Bactrim upon discharge (until 6/8). Will follow up with ID as outpatient.     Patient had acute ischemic CVA with areas of hemorrhagic transformation. Found to have multi-focal acute-to-subacute infarctions. 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 Subsequent scans showed hemorrhagic conversion. Transferred to [facility] for Neurosurgery evaluation, no surgical intervention at this time. Will require outpatient follow up.     Course complicated by RITO segmental PEs with e/o RH strain: Provoked in the setting of sepsis/high inflammatory state with COVID infection. LE Dopplers negative x 2. Initially anticoagulated with heparin gtt. Course further complicated by a right chest wall and right obturator hematomas. Due to concern for worsening hemorrhagic conversion of stroke and hematomas, the patient's therapeutic anticoagulation was discontinued. He was evaluated by the neurosurgical team, which said that there was no indication for acute intervention. Patient will not be anticoagulated upon discharge.   Patient's AMS likely in the setting of acute strokes, improved throughout hospitalization. Due to prolonged intubation with subsequent AMS and dysphagia, pt had PEG tube placed. Pt failed MBS following improvement of mental status.     Patient also had bedside debridement and woundcare of her BLE digits and will follow up with Podiatry as an outpatient.    Medically [facility] for [facility] to  [facility] 6[facility].          VITALS  Vital Signs Last 24 Hrs  T(C): 36.7 (21 Jun 2021 08:21), Max: 36.7 (20 Jun 2021 21:55)  T(F): 98 (21 Jun 2021 08:21), Max: 98 (20 Jun 2021 21:55)  HR: 89 (21 Jun 2021 08:21) (89 - 96)  BP: 115/73 (21 Jun 2021 08:21) (101/66 - 115/73)  BP(mean): --  RR: 16 (21 Jun 2021 08:21) (16 - 16)  SpO2: 100% (21 Jun 2021 08:21) (100% - 100%)        RECENT LABS                        11.6   9.18  )-----------( 316      ( 21 Jun 2021 07:30 )             36.4     06-21    140  |  106  |  17  ----------------------------<  140<H>  4.4   |  28  |  1.01    Ca    9.1      21 Jun 2021 07:30    TPro  7.7  /  Alb  2.9<L>  /  TBili  0.3  /  DBili  x   /  AST  26  /  ALT  40  /  AlkPhos  79  06-21      LIVER FUNCTIONS - ( 21 Jun 2021 07:30 )  Alb: 2.9 g/dL / Pro: 7.7 g/dL / ALK PHOS: 79 U/L / ALT: 40 U/L / AST: 26 U/L / GGT: x                         RADIOLOGY/OTHER RESULTS      CURRENT MEDICATIONS  MEDICATIONS  (STANDING):  acetaminophen   Tablet .. 650 milliGRAM(s) Oral every 6 hours  AQUAPHOR (petrolatum Ointment) 1 Application(s) Topical three times a day  atorvastatin 40 milliGRAM(s) Oral at bedtime  cadexomer iodine 0.9% Gel 1 Application(s) Topical daily  dextrose 40% Gel 15 Gram(s) Oral once  dextrose 5%. 1000 milliLiter(s) (100 mL/Hr) IV Continuous <Continuous>  dextrose 5%. 1000 milliLiter(s) (50 mL/Hr) IV Continuous <Continuous>  dextrose 50% Injectable 25 Gram(s) IV Push once  dextrose 50% Injectable 12.5 Gram(s) IV Push once  dextrose 50% Injectable 25 Gram(s) IV Push once  glucagon  Injectable 1 milliGRAM(s) IntraMuscular once  insulin lispro (ADMELOG) corrective regimen sliding scale   SubCutaneous four times a day before meals  levETIRAcetam  Solution 500 milliGRAM(s) Oral two times a day  pantoprazole   Suspension 40 milliGRAM(s) Oral daily    MEDICATIONS  (PRN):  aluminum hydroxide/magnesium hydroxide/simethicone Suspension 30 milliLiter(s) Oral every 4 hours PRN Dyspepsia  ondansetron   Disintegrating Tablet 4 milliGRAM(s) Oral every 6 hours PRN Nausea and/or Vomiting      ASSESSMENT & PLAN      Continue comprehensive acute rehab program consisting of 3hrs/day of OT/PT and SLP.

## 2021-06-21 NOTE — PROGRESS NOTE ADULT - SUBJECTIVE AND OBJECTIVE BOX
Patient is a 60y old  Male who presents with a chief complaint of CVA (20 Jun 2021 09:07)      Patient seen and examined at bedside.  No overnight events  No complaints this morning    ALLERGIES:  No Known Allergies    MEDICATIONS  (STANDING):  acetaminophen   Tablet .. 650 milliGRAM(s) Oral every 6 hours  AQUAPHOR (petrolatum Ointment) 1 Application(s) Topical three times a day  atorvastatin 40 milliGRAM(s) Oral at bedtime  cadexomer iodine 0.9% Gel 1 Application(s) Topical daily  dextrose 40% Gel 15 Gram(s) Oral once  dextrose 5%. 1000 milliLiter(s) (50 mL/Hr) IV Continuous <Continuous>  dextrose 5%. 1000 milliLiter(s) (100 mL/Hr) IV Continuous <Continuous>  dextrose 50% Injectable 25 Gram(s) IV Push once  dextrose 50% Injectable 12.5 Gram(s) IV Push once  dextrose 50% Injectable 25 Gram(s) IV Push once  glucagon  Injectable 1 milliGRAM(s) IntraMuscular once  insulin lispro (ADMELOG) corrective regimen sliding scale   SubCutaneous four times a day before meals  levETIRAcetam  Solution 500 milliGRAM(s) Oral two times a day  pantoprazole   Suspension 40 milliGRAM(s) Oral daily    MEDICATIONS  (PRN):  aluminum hydroxide/magnesium hydroxide/simethicone Suspension 30 milliLiter(s) Oral every 4 hours PRN Dyspepsia  ondansetron   Disintegrating Tablet 4 milliGRAM(s) Oral every 6 hours PRN Nausea and/or Vomiting    Vital Signs Last 24 Hrs  T(F): 98 (21 Jun 2021 08:21), Max: 98 (20 Jun 2021 21:55)  HR: 89 (21 Jun 2021 08:21) (89 - 96)  BP: 115/73 (21 Jun 2021 08:21) (101/66 - 115/73)  RR: 16 (21 Jun 2021 08:21) (16 - 16)  SpO2: 100% (21 Jun 2021 08:21) (100% - 100%)  I&O's Summary    20 Jun 2021 07:01  -  21 Jun 2021 07:00  --------------------------------------------------------  IN: 0 mL / OUT: 100 mL / NET: -100 mL          PHYSICAL EXAM:  GENERAL: NAD  HENT:  Atraumatic, Normocephalic; No tonsillar erythema, exudates, or enlargement; Moist mucous membranes;   EYES: EOMI, PERRLA, conjunctiva and sclera clear, no lid-lag  NECK: Supple, No JVD, Normal thyroid  CHEST/LUNG: Clear to percussion bilaterally; No rales, rhonchi, wheezing, or rubs; normal respiratory effort, no intercostal retractions  HEART: Regular rate and rhythm; No murmurs, rubs, or gallops  ABDOMEN: Soft, Nontender, Nondistended; Bowel sounds present; No HSM  MUSCULOSKELETAL/EXTREMITIES:  2+ Peripheral Pulses, No clubbing, cyanosis, or peripheral edema; No digital cyanosis  PSYCH: Appropriate affect, Alert & Awake    LABS:                        11.6   9.18  )-----------( 316      ( 21 Jun 2021 07:30 )             36.4          04-26 Chol 114 mg/dL LDL -- HDL 35 mg/dL Trig 126 mg/dL    POCT Blood Glucose.: 126 mg/dL (21 Jun 2021 07:30)  POCT Blood Glucose.: 176 mg/dL (20 Jun 2021 21:55)  POCT Blood Glucose.: 149 mg/dL (20 Jun 2021 17:32)  POCT Blood Glucose.: 127 mg/dL (20 Jun 2021 11:56)    COVID-19 PCR: NotDetec (06-20-21 @ 05:00)  COVID-19 PCR: NotDetec (06-15-21 @ 19:15)  COVID-19 PCR: NotDetec (06-02-21 @ 08:50)  COVID-19 PCR: NotDetec (05-31-21 @ 07:10)  COVID-19 PCR: NotDetec (05-26-21 @ 07:21)      Care Discussed with Consultants/Other Providers: Yes   Patient is a 60y old  Male who presents with a chief complaint of CVA (20 Jun 2021 09:07)      Patient seen and examined at bedside.  No overnight events  No complaints this morning  PEG removal today    ALLERGIES:  No Known Allergies    MEDICATIONS  (STANDING):  acetaminophen   Tablet .. 650 milliGRAM(s) Oral every 6 hours  AQUAPHOR (petrolatum Ointment) 1 Application(s) Topical three times a day  atorvastatin 40 milliGRAM(s) Oral at bedtime  cadexomer iodine 0.9% Gel 1 Application(s) Topical daily  dextrose 40% Gel 15 Gram(s) Oral once  dextrose 5%. 1000 milliLiter(s) (50 mL/Hr) IV Continuous <Continuous>  dextrose 5%. 1000 milliLiter(s) (100 mL/Hr) IV Continuous <Continuous>  dextrose 50% Injectable 25 Gram(s) IV Push once  dextrose 50% Injectable 12.5 Gram(s) IV Push once  dextrose 50% Injectable 25 Gram(s) IV Push once  glucagon  Injectable 1 milliGRAM(s) IntraMuscular once  insulin lispro (ADMELOG) corrective regimen sliding scale   SubCutaneous four times a day before meals  levETIRAcetam  Solution 500 milliGRAM(s) Oral two times a day  pantoprazole   Suspension 40 milliGRAM(s) Oral daily    MEDICATIONS  (PRN):  aluminum hydroxide/magnesium hydroxide/simethicone Suspension 30 milliLiter(s) Oral every 4 hours PRN Dyspepsia  ondansetron   Disintegrating Tablet 4 milliGRAM(s) Oral every 6 hours PRN Nausea and/or Vomiting    Vital Signs Last 24 Hrs  T(F): 98 (21 Jun 2021 08:21), Max: 98 (20 Jun 2021 21:55)  HR: 89 (21 Jun 2021 08:21) (89 - 96)  BP: 115/73 (21 Jun 2021 08:21) (101/66 - 115/73)  RR: 16 (21 Jun 2021 08:21) (16 - 16)  SpO2: 100% (21 Jun 2021 08:21) (100% - 100%)  I&O's Summary    20 Jun 2021 07:01  -  21 Jun 2021 07:00  --------------------------------------------------------  IN: 0 mL / OUT: 100 mL / NET: -100 mL          PHYSICAL EXAM:  GENERAL: NAD  HENT:  Atraumatic, Normocephalic; No tonsillar erythema, exudates, or enlargement; Moist mucous membranes;   EYES: EOMI, PERRLA, conjunctiva and sclera clear, no lid-lag  NECK: Supple, No JVD, Normal thyroid  CHEST/LUNG: Clear to percussion bilaterally; No rales, rhonchi, wheezing, or rubs; normal respiratory effort, no intercostal retractions  HEART: Regular rate and rhythm; No murmurs, rubs, or gallops  ABDOMEN: Soft, Nontender, Nondistended; Bowel sounds present; No HSM  MUSCULOSKELETAL/EXTREMITIES:  2+ Peripheral Pulses, No clubbing, cyanosis, or peripheral edema; No digital cyanosis  PSYCH: Appropriate affect, Alert & Awake    LABS:                        11.6   9.18  )-----------( 316      ( 21 Jun 2021 07:30 )             36.4          04-26 Chol 114 mg/dL LDL -- HDL 35 mg/dL Trig 126 mg/dL    POCT Blood Glucose.: 126 mg/dL (21 Jun 2021 07:30)  POCT Blood Glucose.: 176 mg/dL (20 Jun 2021 21:55)  POCT Blood Glucose.: 149 mg/dL (20 Jun 2021 17:32)  POCT Blood Glucose.: 127 mg/dL (20 Jun 2021 11:56)    COVID-19 PCR: NotDetec (06-20-21 @ 05:00)  COVID-19 PCR: NotDetec (06-15-21 @ 19:15)  COVID-19 PCR: NotDetec (06-02-21 @ 08:50)  COVID-19 PCR: NotDetec (05-31-21 @ 07:10)  COVID-19 PCR: NotDetec (05-26-21 @ 07:21)      Care Discussed with Consultants/Other Providers: Yes

## 2021-06-21 NOTE — PROGRESS NOTE ADULT - SUBJECTIVE AND OBJECTIVE BOX
INTERVAL HPI/OVERNIGHT EVENTS:  HPI:  61 y/o male being seen today for peg removal. Patient NPO since yesterday, however prior to this he is tolerating     MEDICATIONS  (STANDING):  acetaminophen   Tablet .. 650 milliGRAM(s) Oral every 6 hours  AQUAPHOR (petrolatum Ointment) 1 Application(s) Topical three times a day  atorvastatin 40 milliGRAM(s) Oral at bedtime  cadexomer iodine 0.9% Gel 1 Application(s) Topical daily  dextrose 40% Gel 15 Gram(s) Oral once  dextrose 5%. 1000 milliLiter(s) (100 mL/Hr) IV Continuous <Continuous>  dextrose 5%. 1000 milliLiter(s) (50 mL/Hr) IV Continuous <Continuous>  dextrose 50% Injectable 25 Gram(s) IV Push once  dextrose 50% Injectable 12.5 Gram(s) IV Push once  dextrose 50% Injectable 25 Gram(s) IV Push once  glucagon  Injectable 1 milliGRAM(s) IntraMuscular once  insulin lispro (ADMELOG) corrective regimen sliding scale   SubCutaneous four times a day before meals  levETIRAcetam  Solution 500 milliGRAM(s) Oral two times a day  pantoprazole   Suspension 40 milliGRAM(s) Oral daily    MEDICATIONS  (PRN):  aluminum hydroxide/magnesium hydroxide/simethicone Suspension 30 milliLiter(s) Oral every 4 hours PRN Dyspepsia  ondansetron   Disintegrating Tablet 4 milliGRAM(s) Oral every 6 hours PRN Nausea and/or Vomiting      Allergies    No Known Allergies    Intolerances        PAST MEDICAL & SURGICAL HISTORY:  No pertinent past medical history    No significant past surgical history      PHYSICAL EXAM:   Vital Signs:  Vital Signs Last 24 Hrs  T(C): 36.7 (2021 08:21), Max: 36.7 (2021 21:55)  T(F): 98 (2021 08:21), Max: 98 (2021 21:55)  HR: 89 (2021 08:21) (89 - 96)  BP: 115/73 (2021 08:21) (101/66 - 115/73)  BP(mean): --  RR: 16 (2021 08:21) (16 - 16)  SpO2: 100% (2021 08:21) (100% - 100%)  Daily     Daily Weight in k.3 (2021 23:39)I&O's Summary    2021 07:01  -  2021 07:00  --------------------------------------------------------  IN: 0 mL / OUT: 100 mL / NET: -100 mL        GENERAL:  Appears stated age  HEENT:  NC/AT,  conjunctivae clear and pin  CHEST:  Full & symmetric excursion, no increased effort, breath sounds clear  HEART:  Regular rhythm, S1, S2, no murmur  ABDOMEN:  Soft, non-tender, non-distended, normoactive bowel sounds, peg cdi  EXTEREMITIES:  no edema  SKIN:  No rash/warm/dry, b/l feet drsg cdi, toes with dry scaly skin   NEURO:  Alert, oriented,       LABS:                        11.6   9.18  )-----------( 316      ( 2021 07:30 )             36.4     06-21    140  |  106  |  17  ----------------------------<  140<H>  4.4   |  28  |  1.01    Ca    9.1      2021 07:30    TPro  7.7  /  Alb  2.9<L>  /  TBili  0.3  /  DBili  x   /  AST  26  /  ALT  40  /  AlkPhos  79  06-21        amylase   lipase  RADIOLOGY & ADDITIONAL TESTS:   INTERVAL HPI/OVERNIGHT EVENTS:  HPI:    61 y/o male being seen today for peg removal. Patient NPO since yesterday, however prior to this he is tolerating diet by mouth.     MEDICATIONS  (STANDING):  acetaminophen   Tablet .. 650 milliGRAM(s) Oral every 6 hours  AQUAPHOR (petrolatum Ointment) 1 Application(s) Topical three times a day  atorvastatin 40 milliGRAM(s) Oral at bedtime  cadexomer iodine 0.9% Gel 1 Application(s) Topical daily  dextrose 40% Gel 15 Gram(s) Oral once  dextrose 5%. 1000 milliLiter(s) (100 mL/Hr) IV Continuous <Continuous>  dextrose 5%. 1000 milliLiter(s) (50 mL/Hr) IV Continuous <Continuous>  dextrose 50% Injectable 25 Gram(s) IV Push once  dextrose 50% Injectable 12.5 Gram(s) IV Push once  dextrose 50% Injectable 25 Gram(s) IV Push once  glucagon  Injectable 1 milliGRAM(s) IntraMuscular once  insulin lispro (ADMELOG) corrective regimen sliding scale   SubCutaneous four times a day before meals  levETIRAcetam  Solution 500 milliGRAM(s) Oral two times a day  pantoprazole   Suspension 40 milliGRAM(s) Oral daily    MEDICATIONS  (PRN):  aluminum hydroxide/magnesium hydroxide/simethicone Suspension 30 milliLiter(s) Oral every 4 hours PRN Dyspepsia  ondansetron   Disintegrating Tablet 4 milliGRAM(s) Oral every 6 hours PRN Nausea and/or Vomiting      Allergies    No Known Allergies    Intolerances        PAST MEDICAL & SURGICAL HISTORY:  No pertinent past medical history    No significant past surgical history      PHYSICAL EXAM:   Vital Signs:  Vital Signs Last 24 Hrs  T(C): 36.7 (2021 08:21), Max: 36.7 (2021 21:55)  T(F): 98 (2021 08:21), Max: 98 (2021 21:55)  HR: 89 (2021 08:21) (89 - 96)  BP: 115/73 (2021 08:21) (101/66 - 115/73)  BP(mean): --  RR: 16 (2021 08:21) (16 - 16)  SpO2: 100% (2021 08:21) (100% - 100%)  Daily     Daily Weight in k.3 (2021 23:39)I&O's Summary    2021 07:01  -  2021 07:00  --------------------------------------------------------  IN: 0 mL / OUT: 100 mL / NET: -100 mL        GENERAL:  Appears stated age  HEENT:  NC/AT,  conjunctivae clear and pin  CHEST:  Full & symmetric excursion, no increased effort, breath sounds clear  HEART:  Regular rhythm, S1, S2, no murmur  ABDOMEN:  Soft, non-tender, non-distended, normoactive bowel sounds, peg cdi  EXTEREMITIES:  no edema  SKIN:  No rash/warm/dry, b/l feet drsg cdi, toes with dry scaly skin   NEURO:  Alert, oriented,       LABS:                        11.6   9.18  )-----------( 316      ( 2021 07:30 )             36.4     06-21    140  |  106  |  17  ----------------------------<  140<H>  4.4   |  28  |  1.01    Ca    9.1      2021 07:30    TPro  7.7  /  Alb  2.9<L>  /  TBili  0.3  /  DBili  x   /  AST  26  /  ALT  40  /  AlkPhos  79  06-21        amylase   lipase  RADIOLOGY & ADDITIONAL TESTS:

## 2021-06-21 NOTE — PROGRESS NOTE ADULT - PROBLEM SELECTOR PLAN 1
Peg removed  Pressure drsg applied  Clear liquid diet today to start at 3pm  Advance to regular tomorrow   Will sign off please reconsult PRN  May start eliquis or lovenox tomorrow

## 2021-06-21 NOTE — PROGRESS NOTE ADULT - ASSESSMENT
61 y/o male admitted to rehab who is now in need ot peg removal. Peg removed at bedside. Patient tolerated well.

## 2021-06-21 NOTE — PROGRESS NOTE ADULT - ASSESSMENT
59yo male with PMH of DM who was initially admitted to [facility] ICU for hypoxic respiratory failure 2/2 COVID c/b PE with R heart strain, cardiogenic and septic shock, ischemic and hemorrhagic CVA and ESBL klebsiella ventilator associate PNA, transferred to [facility] for neurosurgery eval and further management. s/p PEG. Course c/b right empyema s/p VATs and obturator bleed (resolved). Now admitted for functional decline.     #ESBL Klebsiella PNA c/b empyema   - 5/12 underwent right VATS converted to open L thoracotomy, decortication, drainage of abscess and flex bronch.  - CT Chest follow up 6/8 with improvement.  - Gait Instability, ADL impairments and Functional impairments:  Comprehensive Rehab Program of PT/OT/SLP. MBS completed-diet advanced, tolerating well.     #Empyema   -OR Vats 5/12 with Dr. Albarado with all chest tubes removed by 5/18. Chest sites healing well.   -CT Chest 6/8 improved. No thoracic surgery intervention indicated per Dr. Albarado.    #Obturator Hematoma   - bleeding hematoma in right thigh on CT on 5/7, CT hip showing non active bleed/hematoma on right thigh on 5/8  - cleared for AC-on hold 6/21 for PEG removal today. Then, transition to Eliquis.  -Evaluation by surgery at [facility]. No contraindication to AC as of 6/9 per Dr Cee. HH stable. Case discussed 6/15. Input appreciated.    #Pulmonary embolus  -Etiology of venous thromboembolism uncertain at this time; ?provoked acute covid illness   -s/p heparin drip held due to recurrent bleed in right obturator and right chest wall hematoma, now improved.   - AC/Noac as per heme rec's 6/11. Neurology clearance based on CTH follow up 6/14. AC ok to begin. Lovenox continues w/close monitoring. Started on 6/15. Labs following. If HH stable and stable exam-will transition to Eliquis after PEG removal today. GI eval requested.   -Asymptomatic, tolerating therapy.   -hospitalist following    #CVA  -Hx bilateral ischemic strokes, L Pica occlusion, hemorrhagic conversion, CTH follow up completed. Neurology consulted re/AC therapy timing. Case discussed w/Dr Anderson on 6/11, AC to begin per Neuro.   - TTE with bubble completed prior, no right-to-left shunt; no arrhythmia noted on tele prior. Cardiology consulted 6/11- holter/arrhythmia workup. RAJESH requested by Cardio, scheduled for 6/16. GI clearance.   -CTA prior shows L Pica occlusion. Evaluated by Neurology prior to rehab-NOAC recommended when possible-however at the time of acute admission on hold for 'hx recurrent chest/thigh hematomas'.   -lipitor added per hospitalist plan.    #Altered mental status  - improved, A&Ox3, participates with care, no seizures overnight  - EEG neg seizure  - C/w Keppra 500 mg BID-> neuro f/u outpatient   - repeat CT head completed    #SUKI  -renal in, now off Bactrim, will follow rec's  -IVF completed.  -Nepro for hyperkalemia-improved    #Urinary Retention   - Mann removed 5/30  -voiding freely, low PVRs  - hold doxazosin for low BPs    #Diabetes mellitus  - Type II diabetes mellitus noted; a1c>10, now improved  - hold Lantus for episodes of hypoglycemia. Admelog scale continues. Diabetic diet/PO diet.  -hospitalist following  - Follow up endo outpatient   -Podiatry input appreciated    #Dysphagia  - PEG tube placed on 4/30, removed today 6/21  -nausea/vomiting resolved, monitor loose stools while PO diet advancing-s/p MBS completed. Tolerating PO.    -XR abd follow up -neg acute findings, formed BMs      #Peripheral vascular disease  -Dry gangrene noted over distal toes on bilateral lower extremities  -ERSAMO demonstrating right ERASMO: 1.09, left ERASMO: 1.24, right TBI: 0.78, and left TBI: 0.73.  -Podiatry recs appreciated, betadine to wound    #Pain Mgmt   - Tylenol PRN, Oxycodone PRN    #GI/Bowel Mgmt   - hold Senna, Miralax for loose stool hx.  -Resolved now-PO diet advancing.    #/Bladder Mgmt   - Continent, PVR low    #Precautions / PROPHYLAXIS:   - Falls, Cardiac, Seizure   - ortho: Weight bearing status: WBAT   - Lungs: Aspiration, Incentive Spirometer   - Pressure injury/Skin: OOB to Chair, PT/OT     59yo male with PMH of DM, hypoxic respiratory failure 2/2 COVID complicated by PE with R heart strain, cardiogenic and septic shock, ischemic and hemorrhagic CVA and ESBL klebsiella ventilator associate PNA, transferred to [facility] for neurosurgery eval and further management. s/p PEG. Course c/b right empyema s/p VATs and obturator bleed    # bilateral ischemic strokes, L Pica occlusion, hemorrhagic conversion  - Head CT 6/14: Subacute/chronic infarction of the LEFT cerebellum and chronic infarctions in the BILATERAL frontal, parietal and occipital lobe  - cleared to resume AC per Neuro.   - lipitor added   - continue comprehensive rehab program PT, OT< SLP 3 hours daily 5 x week  - Precatuions: cardiac, DM, AC, fall, AMS, SZ    # Altered mental status  - improved, A&Ox3, participates with care, no seizures overnight  - EEG neg seizure  - Keppra 500 mg BID PPX-  - neuro f/u outpatient     # ESBL Klebsiella PNA c/b empyema   - s/p right VATS converted to open L thoracotomy, decortication, drainage of abscess and flex bronch.  -chest tubes removed by 5/18  - CT Chest 6/8 improved. No thoracic surgery intervention indicated per Dr. Albarado.    #Obturator Hematoma   - bleeding hematoma in right thigh on CT on 5/7, CT hip showing non active bleed/hematoma on right thigh on 5/8  - cleared for AC by surgery and neuro  - AC on hold 6/21 for PEG removal. Then, transition to Eliquis 6/22  - monitor H/H. Hgb 11.6 6/21    # Pulmonary embolus  - Etiology of venous thromboembolism uncertain at this time; ?provoked acute covid illness   - AC held due to development right obturator and right chest wall hematoma, now improved.   - Cleared by neuro and surgery for lovenox, transition to Eliquis 6/22    # Diabetes mellitus  - Type II diabetes mellitus noted; a1c>10, now improved  - hold Lantus for episodes of hypoglycemia. Admelog scale continues. Diabetic diet/PO diet.  - Follow up endo outpatient     #SUKI  - s/p IVF  - BUn/Cr 17/1.01 6/21  #Peripheral vascular disease  -Dry gangrene noted over distal toes on bilateral lower extremities  -ERASMO demonstrating right ERASMO: 1.09, left ERASMO: 1.24, right TBI: 0.78, and left TBI: 0.73.  -Podiatry recs appreciated, betadine to wound    #Urinary Retention   - Mann removed 5/30  - voiding freely, low PVRs  - hold doxazosin for low BPs    #Dysphagia  - PEG tube placed on 4/30, removed today 6/21  - GI appreciated 6/21:  ·	Clear liquid diet today to start at 3pm  ·	Advance to regular tomorrow   ·	May start eliquis or lovenox tomorrow.     #Pain Mgmt   - Tylenol PRN, Oxycodone PRN    #GI/Bowel Mgmt   - hold Senna, Miralax for loose stool hx.  -Resolved now-PO diet advancing.    #/Bladder Mgmt   - Continent, PVR low    #Case discussed in IDT rounds 6/21:  - set up eating/grooming, set up UB dressing, CG LB, min assist transfers, ambulates with cues for foot placement and safety  - target dc ARLETH 6/23 if medically stable on eliquis    # LABS

## 2021-06-21 NOTE — PROGRESS NOTE ADULT - ASSESSMENT
61yo male with PMH of DM who was initially admitted to [facility] ICU for hypoxic respiratory failure 2/2 COVID c/b PE with R heart strain, cardiogenic and septic shock, ischemic and hemorrhagic CVA and ESBL klebsiella ventilator associate PNA, transferred to [facility] for neurosurgery eval and further management. s/p PEG. Course c/b right empyema s/p VATs and obturator bleed (resolved). Now admitted for functional decline.     #Debility  #ESBL Klebsiella PNA c/b empyema  #s/p COVID pneumonia  - 5/12 underwent right VATS converted to open L thoracotomy, decortication, drainage of abscess and flex bronch.  - Antibiotics treatment completed  - ID following, recs appreciated - needs close follow up in ID clinic for repeat imaging, Dr. Chambers  - CT Chest 5/29 with improvement in bilateral opacities. Slight decrease in right chest wall hematoma. Stable loculated right hydropneumothorax.   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 Albarado with all chest tubes removed by 5/18  -CT Chest 5/16 Interval new small cavitary lesions in the right lower lobe and right chest wall hematoma - no thoracic surgery intervention indicated per Dr. Albarado.    #Obturator Hematoma (stable)  - bleeding hematoma in right thigh on CT on 5/7  - CT hip showing non active bleed, hematoma on right thigh on 5/8  - doppler to r/o DVT in LE, per IR no role IVC filter  - active again on 5/17, now stable  - Stable while on therapeutic Lovenox for PE  - Monitor H/H    #Pulmonary embolus  - Pulmonary embolus noted on ct scan performed at time of admission  - Neurology cleared for AC  - Currently on lovenox 60mg q 12hrs; monitor Hg/Hct    #CVA  -Acute bilateral ischemic strokes, with concern for hemorrhagic conversion, noted on CT scan on 4/8  -Etiology of acute ischemic stroke uncertain at this time; TTE with bubble performed at bedside without any evidence of right-to-left shunt; no arrhythmia noted on telemetry throughout stay in the MICU  -CTA head and neck without any evidence of carotid artery stenosis or dissection  - 6/10 CT head: multiple infarcts, no hemorrhage  - CT head 6/14 no hemorrhages  - RAJESH noted done 6/16/21; no obvious thrombus/PFO noted    #Encephalopathy due to multifactorial causes - resolved  - Etiology likely multi-factorial in setting of acute ischemic infarctions of the brain in conjunction vs acute kidney injury with uremia (now resolving) vs resolving hypoxic respiratory failure, covid-19 infection, superimposed bacterial pneumonia, urinary retention, concern for nutritional deficiencies  - MRI Brain, and MRA Head demonstrated multiple evolving subacute infarcts with associated hemorrhages, likely expected evolution of ischemia  - C/w Keppra 500 mg BID-> neuro f/u outpatient   - repeat CT head 5/5 normal.     #Diabetes mellitus type II  - April 2021: a1c 10 -- improved to 5.7  - Not on Long acting or premeal coverage  - moderate ISS, accuchecks  - Metformin 500mg BID HELD. FS are WNL  - Follow up endo outpatient    #Hypertension  - Was on metoprolol tartrate 25 mg twice daily since 4/16; discontinued  - BP is stable  - If is hypertensive persistently, can consider antihypertensives    #Dysphagia  - Tolerating Dysphagia 3 diet   - Eventual plan to remove PEG tube    #Peripheral vascular disease  -Dry gangrene noted over distal toes on bilateral lower extremities--most concerning for microvascular disease subsequent to vasopressor administration   -Dorsalis pedis pulses intact bilaterally  -ERASMO demonstrating right ERASMO: 1.09, left ERASMO: 1.24, right TBI: 0.78, and left TBI: 0.73.  -Podiatry recs appreciated, no acute surgical intervention at this time, applying betadine to wound  -As per ID non infectious appearing     #Urinary Retention - improved  - Urinating appropriately  - c/w doxazosin     #DVT ppx: Lovenox 60mg BID 59yo male with PMH of DM who was initially admitted to [facility] ICU for hypoxic respiratory failure 2/2 COVID c/b PE with R heart strain, cardiogenic and septic shock, ischemic and hemorrhagic CVA and ESBL klebsiella ventilator associate PNA, transferred to [facility] for neurosurgery eval and further management. s/p PEG. Course c/b right empyema s/p VATs and obturator bleed (resolved). Now admitted for functional decline.     #Debility  #ESBL Klebsiella PNA c/b empyema  #s/p COVID pneumonia  - 5/12 underwent right VATS converted to open L thoracotomy, decortication, drainage of abscess and flex bronch.  - Antibiotics treatment completed  - ID following, recs appreciated - needs close follow up in ID clinic for repeat imaging, Dr. Chambers  - CT Chest 5/29 with improvement in bilateral opacities. Slight decrease in right chest wall hematoma. Stable loculated right hydropneumothorax.   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 Albarado with all chest tubes removed by 5/18  -CT Chest 5/16 Interval new small cavitary lesions in the right lower lobe and right chest wall hematoma - no thoracic surgery intervention indicated per Dr. Albarado.    #Obturator Hematoma (stable)  - bleeding hematoma in right thigh on CT on 5/7  - CT hip showing non active bleed, hematoma on right thigh on 5/8  - doppler to r/o DVT in LE, per IR no role IVC filter  - active again on 5/17, now stable  - Stable while on therapeutic Lovenox for PE  - Monitor H/H    #Pulmonary embolus  - Pulmonary embolus noted on ct scan performed at time of admission  - Neurology cleared for AC  - Currently on lovenox 60mg q 12hrs; monitor Hg/Hct    #CVA  -Acute bilateral ischemic strokes, with concern for hemorrhagic conversion, noted on CT scan on 4/8  -Etiology of acute ischemic stroke uncertain at this time; TTE with bubble performed at bedside without any evidence of right-to-left shunt; no arrhythmia noted on telemetry throughout stay in the MICU  -CTA head and neck without any evidence of carotid artery stenosis or dissection  - 6/10 CT head: multiple infarcts, no hemorrhage  - CT head 6/14 no hemorrhages  - RAJESH noted done 6/16/21; no obvious thrombus/PFO noted  - PEG removal today    #Encephalopathy due to multifactorial causes - resolved  - Etiology likely multi-factorial in setting of acute ischemic infarctions of the brain in conjunction vs acute kidney injury with uremia (now resolving) vs resolving hypoxic respiratory failure, covid-19 infection, superimposed bacterial pneumonia, urinary retention, concern for nutritional deficiencies  - MRI Brain, and MRA Head demonstrated multiple evolving subacute infarcts with associated hemorrhages, likely expected evolution of ischemia  - C/w Keppra 500 mg BID-> neuro f/u outpatient   - repeat CT head 5/5 normal.     #Diabetes mellitus type II  - April 2021: a1c 10 -- improved to 5.7  - Not on Long acting or premeal coverage  - moderate ISS, accuchecks  - Metformin 500mg BID HELD. FS are WNL  - Follow up endo outpatient    #Hypertension  - Was on metoprolol tartrate 25 mg twice daily since 4/16; discontinued  - BP is stable  - If is hypertensive persistently, can consider antihypertensives    #Dysphagia  - Tolerating Dysphagia 3 diet   - Eventual plan to remove PEG tube    #Peripheral vascular disease  -Dry gangrene noted over distal toes on bilateral lower extremities--most concerning for microvascular disease subsequent to vasopressor administration   -Dorsalis pedis pulses intact bilaterally  -ERASMO demonstrating right ERASMO: 1.09, left ERASMO: 1.24, right TBI: 0.78, and left TBI: 0.73.  -Podiatry recs appreciated, no acute surgical intervention at this time, applying betadine to wound  -As per ID non infectious appearing     #Urinary Retention - improved  - Urinating appropriately  - c/w doxazosin     #DVT ppx: Lovenox 60mg BID 61yo male with PMH of DM who was initially admitted to [facility] ICU for hypoxic respiratory failure 2/2 COVID c/b PE with R heart strain, cardiogenic and septic shock, ischemic and hemorrhagic CVA and ESBL klebsiella ventilator associate PNA, transferred to [facility] for neurosurgery eval and further management. s/p PEG. Course c/b right empyema s/p VATs and obturator bleed (resolved). Now admitted for functional decline.     #Debility  #ESBL Klebsiella PNA c/b empyema  #s/p COVID pneumonia  - 5/12 underwent right VATS converted to open L thoracotomy, decortication, drainage of abscess and flex bronch.  - Antibiotics treatment completed  - ID following, recs appreciated - needs close follow up in ID clinic for repeat imaging, Dr. Chambers  - CT Chest 5/29 with improvement in bilateral opacities. Slight decrease in right chest wall hematoma. Stable loculated right hydropneumothorax.   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 Albarado with all chest tubes removed by 5/18  -CT Chest 5/16 Interval new small cavitary lesions in the right lower lobe and right chest wall hematoma - no thoracic surgery intervention indicated per Dr. Albarado.    #Obturator Hematoma (stable)  - bleeding hematoma in right thigh on CT on 5/7  - CT hip showing non active bleed, hematoma on right thigh on 5/8  - doppler to r/o DVT in LE, per IR no role IVC filter  - active again on 5/17, now stable  - Stable while on therapeutic Lovenox for PE  - Monitor H/H    #Pulmonary embolus  - Pulmonary embolus noted on ct scan performed at time of admission  - Neurology cleared for AC  - Currently on lovenox 60mg q 12hrs; monitor Hg/Hct    #CVA  -Acute bilateral ischemic strokes, with concern for hemorrhagic conversion, noted on CT scan on 4/8  -Etiology of acute ischemic stroke uncertain at this time; TTE with bubble performed at bedside without any evidence of right-to-left shunt; no arrhythmia noted on telemetry throughout stay in the MICU  -CTA head and neck without any evidence of carotid artery stenosis or dissection  - 6/10 CT head: multiple infarcts, no hemorrhage  - CT head 6/14 no hemorrhages  - RAJESH noted done 6/16/21; no obvious thrombus/PFO noted  - PEG removal today    #Encephalopathy due to multifactorial causes - resolved  - Etiology likely multi-factorial in setting of acute ischemic infarctions of the brain in conjunction vs acute kidney injury with uremia (now resolving) vs resolving hypoxic respiratory failure, covid-19 infection, superimposed bacterial pneumonia, urinary retention, concern for nutritional deficiencies  - MRI Brain, and MRA Head demonstrated multiple evolving subacute infarcts with associated hemorrhages, likely expected evolution of ischemia  - C/w Keppra 500 mg BID-> neuro f/u outpatient   - repeat CT head 5/5 normal.     #Diabetes mellitus type II  - April 2021: a1c 10 -- improved to 5.7  - Not on Long acting or premeal coverage  - moderate ISS, accuchecks  - Metformin 500mg BID HELD. FS are WNL  - Follow up endo outpatient    #Hypertension  - Was on metoprolol tartrate 25 mg twice daily since 4/16; discontinued  - BP is stable  - If is hypertensive persistently, can consider antihypertensives    #Dysphagia  - Tolerating Dysphagia 3 diet   - Eventual plan to remove PEG tube    #Peripheral vascular disease  -Dry gangrene noted over distal toes on bilateral lower extremities--most concerning for microvascular disease subsequent to vasopressor administration   -Dorsalis pedis pulses intact bilaterally  -ERASMO demonstrating right ERASMO: 1.09, left ERASMO: 1.24, right TBI: 0.78, and left TBI: 0.73.  -Podiatry recs appreciated, no acute surgical intervention at this time, applying betadine to wound  -As per ID non infectious appearing     #Urinary Retention - improved  - Urinating appropriately  - c/w doxazosin     #DVT ppx: Lovenox 60mg BID

## 2021-06-22 ENCOUNTER — TRANSCRIPTION ENCOUNTER (OUTPATIENT)
Age: 61
End: 2021-06-22

## 2021-06-22 LAB
GLUCOSE BLDC GLUCOMTR-MCNC: 121 MG/DL — HIGH (ref 70–99)
GLUCOSE BLDC GLUCOMTR-MCNC: 126 MG/DL — HIGH (ref 70–99)
GLUCOSE BLDC GLUCOMTR-MCNC: 133 MG/DL — HIGH (ref 70–99)
GLUCOSE BLDC GLUCOMTR-MCNC: 222 MG/DL — HIGH (ref 70–99)

## 2021-06-22 PROCEDURE — 99232 SBSQ HOSP IP/OBS MODERATE 35: CPT

## 2021-06-22 RX ORDER — METFORMIN HYDROCHLORIDE 850 MG/1
500 TABLET ORAL DAILY
Refills: 0 | Status: DISCONTINUED | OUTPATIENT
Start: 2021-06-22 | End: 2021-06-22

## 2021-06-22 RX ORDER — ACETAMINOPHEN 500 MG
2 TABLET ORAL
Qty: 0 | Refills: 0 | DISCHARGE
Start: 2021-06-22

## 2021-06-22 RX ORDER — LEVETIRACETAM 250 MG/1
5 TABLET, FILM COATED ORAL
Qty: 0 | Refills: 0 | DISCHARGE
Start: 2021-06-22

## 2021-06-22 RX ORDER — APIXABAN 2.5 MG/1
2 TABLET, FILM COATED ORAL
Qty: 0 | Refills: 0 | DISCHARGE
Start: 2021-06-22

## 2021-06-22 RX ORDER — PANTOPRAZOLE SODIUM 20 MG/1
40 TABLET, DELAYED RELEASE ORAL
Qty: 0 | Refills: 0 | DISCHARGE
Start: 2021-06-22

## 2021-06-22 RX ORDER — METFORMIN HYDROCHLORIDE 850 MG/1
500 TABLET ORAL
Refills: 0 | Status: DISCONTINUED | OUTPATIENT
Start: 2021-06-22 | End: 2021-06-23

## 2021-06-22 RX ORDER — ATORVASTATIN CALCIUM 80 MG/1
1 TABLET, FILM COATED ORAL
Qty: 0 | Refills: 0 | DISCHARGE
Start: 2021-06-22

## 2021-06-22 RX ORDER — METFORMIN HYDROCHLORIDE 850 MG/1
1 TABLET ORAL
Qty: 0 | Refills: 0 | DISCHARGE
Start: 2021-06-22

## 2021-06-22 RX ORDER — PETROLATUM,WHITE
1 JELLY (GRAM) TOPICAL
Qty: 0 | Refills: 0 | DISCHARGE
Start: 2021-06-22

## 2021-06-22 RX ORDER — INSULIN LISPRO 100/ML
VIAL (ML) SUBCUTANEOUS
Refills: 0 | Status: DISCONTINUED | OUTPATIENT
Start: 2021-06-22 | End: 2021-06-23

## 2021-06-22 RX ORDER — CADEXOMER IODINE 0.9 %
1 PADS, MEDICATED (EA) TOPICAL
Qty: 0 | Refills: 0 | DISCHARGE
Start: 2021-06-22

## 2021-06-22 RX ADMIN — LEVETIRACETAM 500 MILLIGRAM(S): 250 TABLET, FILM COATED ORAL at 06:24

## 2021-06-22 RX ADMIN — Medication 1 APPLICATION(S): at 22:05

## 2021-06-22 RX ADMIN — METFORMIN HYDROCHLORIDE 500 MILLIGRAM(S): 850 TABLET ORAL at 17:06

## 2021-06-22 RX ADMIN — LEVETIRACETAM 500 MILLIGRAM(S): 250 TABLET, FILM COATED ORAL at 17:07

## 2021-06-22 RX ADMIN — METFORMIN HYDROCHLORIDE 500 MILLIGRAM(S): 850 TABLET ORAL at 13:16

## 2021-06-22 RX ADMIN — Medication 1 APPLICATION(S): at 13:18

## 2021-06-22 RX ADMIN — Medication 1 APPLICATION(S): at 06:25

## 2021-06-22 RX ADMIN — Medication 2: at 11:36

## 2021-06-22 RX ADMIN — APIXABAN 10 MILLIGRAM(S): 2.5 TABLET, FILM COATED ORAL at 17:06

## 2021-06-22 RX ADMIN — Medication 1 APPLICATION(S): at 12:17

## 2021-06-22 RX ADMIN — PANTOPRAZOLE SODIUM 40 MILLIGRAM(S): 20 TABLET, DELAYED RELEASE ORAL at 12:16

## 2021-06-22 RX ADMIN — APIXABAN 10 MILLIGRAM(S): 2.5 TABLET, FILM COATED ORAL at 06:24

## 2021-06-22 RX ADMIN — ATORVASTATIN CALCIUM 40 MILLIGRAM(S): 80 TABLET, FILM COATED ORAL at 22:05

## 2021-06-22 NOTE — DISCHARGE NOTE PROVIDER - DETAILS OF MALNUTRITION DIAGNOSIS/DIAGNOSES
This patient has been assessed with a concern for Malnutrition and was treated during this hospitalization for the following Nutrition diagnosis/diagnoses:     -  06/05/2021: Moderate protein-calorie malnutrition

## 2021-06-22 NOTE — DISCHARGE NOTE PROVIDER - NSDCCAREPROVSEEN_GEN_ALL_CORE_FT
Nohemi Werner Alphonso, Nohemi Tena, Freddy Jean, Leo Kay, Catrachita Zee, Jane Arvizu, Kamille CARLSON

## 2021-06-22 NOTE — PROGRESS NOTE ADULT - SUBJECTIVE AND OBJECTIVE BOX
Patient is a 60y old  Male who presents with a chief complaint of CVA (21 Jun 2021 13:47)      Patient seen and examined at bedside.  No overnight events  No complaints this morning. Feels well    ALLERGIES:  No Known Allergies    MEDICATIONS  (STANDING):  acetaminophen   Tablet .. 650 milliGRAM(s) Oral every 6 hours  apixaban 10 milliGRAM(s) Oral every 12 hours  AQUAPHOR (petrolatum Ointment) 1 Application(s) Topical three times a day  atorvastatin 40 milliGRAM(s) Oral at bedtime  cadexomer iodine 0.9% Gel 1 Application(s) Topical daily  dextrose 40% Gel 15 Gram(s) Oral once  dextrose 5%. 1000 milliLiter(s) (50 mL/Hr) IV Continuous <Continuous>  dextrose 5%. 1000 milliLiter(s) (100 mL/Hr) IV Continuous <Continuous>  dextrose 50% Injectable 25 Gram(s) IV Push once  dextrose 50% Injectable 12.5 Gram(s) IV Push once  dextrose 50% Injectable 25 Gram(s) IV Push once  glucagon  Injectable 1 milliGRAM(s) IntraMuscular once  insulin lispro (ADMELOG) corrective regimen sliding scale   SubCutaneous four times a day before meals  levETIRAcetam  Solution 500 milliGRAM(s) Oral two times a day  pantoprazole   Suspension 40 milliGRAM(s) Oral daily    MEDICATIONS  (PRN):  aluminum hydroxide/magnesium hydroxide/simethicone Suspension 30 milliLiter(s) Oral every 4 hours PRN Dyspepsia  ondansetron   Disintegrating Tablet 4 milliGRAM(s) Oral every 6 hours PRN Nausea and/or Vomiting    Vital Signs Last 24 Hrs  T(F): 97.3 (21 Jun 2021 20:18), Max: 98 (21 Jun 2021 08:21)  HR: 95 (21 Jun 2021 20:18) (89 - 95)  BP: 118/73 (21 Jun 2021 20:18) (115/73 - 118/73)  RR: 16 (21 Jun 2021 20:18) (16 - 16)  SpO2: 97% (21 Jun 2021 20:18) (97% - 100%)  I&O's Summary    21 Jun 2021 07:01  -  22 Jun 2021 07:00  --------------------------------------------------------  IN: 0 mL / OUT: 1000 mL / NET: -1000 mL      PHYSICAL EXAM:  GENERAL: NAD  HENT:  Atraumatic, Normocephalic; No tonsillar erythema, exudates, or enlargement; Moist mucous membranes;   EYES: EOMI, PERRLA, conjunctiva and sclera clear, no lid-lag  NECK: Supple, No JVD, Normal thyroid  CHEST/LUNG: Clear to percussion bilaterally; No rales, rhonchi, wheezing, or rubs; normal respiratory effort, no intercostal retractions  HEART: Regular rate and rhythm; No murmurs, rubs, or gallops  ABDOMEN: Soft, Nontender, Nondistended; Bowel sounds present; No HSM  MUSCULOSKELETAL/EXTREMITIES:  2+ Peripheral Pulses, No clubbing, cyanosis, or peripheral edema; No digital cyanosis  PSYCH: Appropriate affect, Alert & Awake    LABS:                        11.6   9.18  )-----------( 316      ( 21 Jun 2021 07:30 )             36.4       06-21    140  |  106  |  17  ----------------------------<  140  4.4   |  28  |  1.01    Ca    9.1      21 Jun 2021 07:30    TPro  7.7  /  Alb  2.9  /  TBili  0.3  /  DBili  x   /  AST  26  /  ALT  40  /  AlkPhos  79  06-21     eGFR if Non African American: 81 mL/min/1.73M2 (06-21-21 @ 07:30)  eGFR if : 94 mL/min/1.73M2 (06-21-21 @ 07:30)       04-26 Chol 114 mg/dL LDL -- HDL 35 mg/dL Trig 126 mg/dL        POCT Blood Glucose.: 83 mg/dL (21 Jun 2021 21:22)  POCT Blood Glucose.: 203 mg/dL (21 Jun 2021 16:57)  POCT Blood Glucose.: 119 mg/dL (21 Jun 2021 11:42)          COVID-19 PCR: NotDetec (06-20-21 @ 05:00)  COVID-19 PCR: NotDetec (06-15-21 @ 19:15)  COVID-19 PCR: NotDetec (06-02-21 @ 08:50)  COVID-19 PCR: NotDetec (05-31-21 @ 07:10)  COVID-19 PCR: NotDetec (05-26-21 @ 07:21)      Care Discussed with Consultants/Other Providers: Yes

## 2021-06-22 NOTE — PROGRESS NOTE ADULT - SUBJECTIVE AND OBJECTIVE BOX
Subjective: NAD, night uneventful, in good spirits, to therapy.       HISTORY OF PRESENT ILLNESS  61yo M with T2DM and recently diagnosed COVID (3/19) BIBEMS to [facility] on 4/1 for hypoxic respiratory failure secondary to COVID PNA, requiring endotracheal intubation (4/1-4/13). Course significant for Septic shock on admission, treated with vancomycin and cefepime. Found to have ESBL Klebsiella PNA c/b empyema s/p VATs decortication. Treated with prolonged course of Meropenem while hospitalized and transitioned to Augmentin/Bactrim upon discharge (until 6/8). Will follow up with ID as outpatient.     Patient had acute ischemic CVA with areas of hemorrhagic transformation. Found to have multi-focal acute-to-subacute infarctions. 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 Subsequent scans showed hemorrhagic conversion. Transferred to [facility] for Neurosurgery evaluation, no surgical intervention at this time. Will require outpatient follow up.     Course complicated by RITO segmental PEs with e/o RH strain: Provoked in the setting of sepsis/high inflammatory state with COVID infection. LE Dopplers negative x 2. Initially anticoagulated with heparin gtt. Course further complicated by a right chest wall and right obturator hematomas. Due to concern for worsening hemorrhagic conversion of stroke and hematomas, the patient's therapeutic anticoagulation was discontinued. He was evaluated by the neurosurgical team, which said that there was no indication for acute intervention. Patient will not be anticoagulated upon discharge.   Patient's AMS likely in the setting of acute strokes, improved throughout hospitalization. Due to prolonged intubation with subsequent AMS and dysphagia, pt had PEG tube placed. Pt failed MBS following improvement of mental status.     Patient also had bedside debridement and woundcare of her BLE digits and will follow up with Podiatry as an outpatient.    Medically cleared for discharge to  [facility] 6/[facility].       VITALS  Vital Signs Last 24 Hrs  T(C): 36.6 (22 Jun 2021 09:37), Max: 36.6 (22 Jun 2021 09:37)  T(F): 97.8 (22 Jun 2021 09:37), Max: 97.8 (22 Jun 2021 09:37)  HR: 100 (22 Jun 2021 09:37) (95 - 100)  BP: 112/72 (22 Jun 2021 09:37) (112/72 - 118/73)  BP(mean): --  RR: 18 (22 Jun 2021 09:37) (16 - 18)  SpO2: 97% (22 Jun 2021 09:37) (97% - 97%)      Review of Systems:   · Additional ROS	Patient pleasant, sitting in chair, NAD. PEG removed by GI    Physical Exam:   · Constitutional	detailed exam  · Constitutional Comments	alert, no distress, stable  · Respiratory	detailed exam  · Respiratory Details	respirations non-labored; clear to auscultation bilaterally  · Cardiovascular	detailed exam  · Cardiovascular Details	regular rate and rhythm   · Gastrointestinal	detailed exam  · GI Normal	soft; nontender; bowel sounds normal; PEG base clean, NT no redness  · Extremities	detailed exam   · Extremities Comments	calves soft, NT no erythema or warmth bilaterally  · Additional PE	Awake, Alert, AAO x3      No aphasia, impaired attention and concentration, follows simple commands     Cranial Nerves - dysphagia        Coordination/dysmetria - impaired    RECENT LABS                        11.6   9.18  )-----------( 316      ( 21 Jun 2021 07:30 )             36.4     06-21    140  |  106  |  17  ----------------------------<  140<H>  4.4   |  28  |  1.01    Ca    9.1      21 Jun 2021 07:30    TPro  7.7  /  Alb  2.9<L>  /  TBili  0.3  /  DBili  x   /  AST  26  /  ALT  40  /  AlkPhos  79  06-21      LIVER FUNCTIONS - ( 21 Jun 2021 07:30 )  Alb: 2.9 g/dL / Pro: 7.7 g/dL / ALK PHOS: 79 U/L / ALT: 40 U/L / AST: 26 U/L / GGT: x                           RADIOLOGY/OTHER RESULTS      CURRENT MEDICATIONS  MEDICATIONS  (STANDING):  acetaminophen   Tablet .. 650 milliGRAM(s) Oral every 6 hours  apixaban 10 milliGRAM(s) Oral every 12 hours  AQUAPHOR (petrolatum Ointment) 1 Application(s) Topical three times a day  atorvastatin 40 milliGRAM(s) Oral at bedtime  cadexomer iodine 0.9% Gel 1 Application(s) Topical daily  dextrose 40% Gel 15 Gram(s) Oral once  dextrose 5%. 1000 milliLiter(s) (100 mL/Hr) IV Continuous <Continuous>  dextrose 5%. 1000 milliLiter(s) (50 mL/Hr) IV Continuous <Continuous>  dextrose 50% Injectable 25 Gram(s) IV Push once  dextrose 50% Injectable 12.5 Gram(s) IV Push once  dextrose 50% Injectable 25 Gram(s) IV Push once  glucagon  Injectable 1 milliGRAM(s) IntraMuscular once  insulin lispro (ADMELOG) corrective regimen sliding scale   SubCutaneous Before meals and at bedtime  levETIRAcetam  Solution 500 milliGRAM(s) Oral two times a day  metFORMIN 500 milliGRAM(s) Oral daily  pantoprazole   Suspension 40 milliGRAM(s) Oral daily    MEDICATIONS  (PRN):  aluminum hydroxide/magnesium hydroxide/simethicone Suspension 30 milliLiter(s) Oral every 4 hours PRN Dyspepsia  ondansetron   Disintegrating Tablet 4 milliGRAM(s) Oral every 6 hours PRN Nausea and/or Vomiting      ASSESSMENT & PLAN    Continue comprehensive acute rehab program consisting of 3hrs/day of OT/PT and SLP. Subjective: NAD, night uneventful, in good spirits, to therapy.       HISTORY OF PRESENT ILLNESS  59yo M with T2DM and recently diagnosed COVID (3/19) BIBEMS to [facility] on 4/1 for hypoxic respiratory failure secondary to COVID PNA, requiring endotracheal intubation (4/1-4/13). Course significant for Septic shock on admission, treated with vancomycin and cefepime. Found to have ESBL Klebsiella PNA c/b empyema s/p VATs decortication. Treated with prolonged course of Meropenem while hospitalized and transitioned to Augmentin/Bactrim upon discharge (until 6/8). Will follow up with ID as outpatient.     Patient had acute ischemic CVA with areas of hemorrhagic transformation. Found to have multi-focal acute-to-subacute infarctions. 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 Subsequent scans showed hemorrhagic conversion. Transferred to [facility] for Neurosurgery evaluation, no surgical intervention at this time. Will require outpatient follow up.     Course complicated by RITO segmental PEs with e/o RH strain: Provoked in the setting of sepsis/high inflammatory state with COVID infection. LE Dopplers negative x 2. Initially anticoagulated with heparin gtt. Course further complicated by a right chest wall and right obturator hematomas. Due to concern for worsening hemorrhagic conversion of stroke and hematomas, the patient's therapeutic anticoagulation was discontinued. He was evaluated by the neurosurgical team, which said that there was no indication for acute intervention. Patient will not be anticoagulated upon discharge.   Patient's AMS likely in the setting of acute strokes, improved throughout hospitalization. Due to prolonged intubation with subsequent AMS and dysphagia, pt had PEG tube placed. Pt failed MBS following improvement of mental status.     Patient also had bedside debridement and woundcare of her BLE digits and will follow up with Podiatry as an outpatient.    Medically cleared for discharge to  [facility] 6/[facility].       VITALS  Vital Signs Last 24 Hrs  T(C): 36.6 (22 Jun 2021 09:37), Max: 36.6 (22 Jun 2021 09:37)  T(F): 97.8 (22 Jun 2021 09:37), Max: 97.8 (22 Jun 2021 09:37)  HR: 100 (22 Jun 2021 09:37) (95 - 100)  BP: 112/72 (22 Jun 2021 09:37) (112/72 - 118/73)  BP(mean): --  RR: 18 (22 Jun 2021 09:37) (16 - 18)  SpO2: 97% (22 Jun 2021 09:37) (97% - 97%)      Review of Systems:   · Additional ROS	Patient pleasant, sitting in chair, NAD. PEG removed by GI. tolerated liquid diet last night, no N.V or diarrhea. Will be upgraded to regular diet again today, patient agreeable    Seen with assistance [facility]  [employer] [place] #[phone]    Physical Exam:   · Constitutional	detailed exam  · Constitutional Comments	alert, no distress, stable  · Respiratory	detailed exam  · Respiratory Details	respirations non-labored; clear to auscultation bilaterally  · Cardiovascular	detailed exam  · Cardiovascular Details	regular rate and rhythm   · Gastrointestinal	detailed exam  · GI Normal	soft; nontender; bowel sounds normal;     PEG stoma healing well, no oozing, no bleeding, no redness or TTP  · Extremities	detailed exam   · Extremities Comments	calves soft, NT no erythema or warmth bilaterally  	    RECENT LABS                        11.6   9.18  )-----------( 316      ( 21 Jun 2021 07:30 )             36.4     06-21    140  |  106  |  17  ----------------------------<  140<H>  4.4   |  28  |  1.01    Ca    9.1      21 Jun 2021 07:30    TPro  7.7  /  Alb  2.9<L>  /  TBili  0.3  /  DBili  x   /  AST  26  /  ALT  40  /  AlkPhos  79  06-21      LIVER FUNCTIONS - ( 21 Jun 2021 07:30 )  Alb: 2.9 g/dL / Pro: 7.7 g/dL / ALK PHOS: 79 U/L / ALT: 40 U/L / AST: 26 U/L / GGT: x                           RADIOLOGY/OTHER RESULTS      CURRENT MEDICATIONS  MEDICATIONS  (STANDING):  acetaminophen   Tablet .. 650 milliGRAM(s) Oral every 6 hours  apixaban 10 milliGRAM(s) Oral every 12 hours  AQUAPHOR (petrolatum Ointment) 1 Application(s) Topical three times a day  atorvastatin 40 milliGRAM(s) Oral at bedtime  cadexomer iodine 0.9% Gel 1 Application(s) Topical daily  dextrose 40% Gel 15 Gram(s) Oral once  dextrose 5%. 1000 milliLiter(s) (100 mL/Hr) IV Continuous <Continuous>  dextrose 5%. 1000 milliLiter(s) (50 mL/Hr) IV Continuous <Continuous>  dextrose 50% Injectable 25 Gram(s) IV Push once  dextrose 50% Injectable 12.5 Gram(s) IV Push once  dextrose 50% Injectable 25 Gram(s) IV Push once  glucagon  Injectable 1 milliGRAM(s) IntraMuscular once  insulin lispro (ADMELOG) corrective regimen sliding scale   SubCutaneous Before meals and at bedtime  levETIRAcetam  Solution 500 milliGRAM(s) Oral two times a day  metFORMIN 500 milliGRAM(s) Oral daily  pantoprazole   Suspension 40 milliGRAM(s) Oral daily    MEDICATIONS  (PRN):  aluminum hydroxide/magnesium hydroxide/simethicone Suspension 30 milliLiter(s) Oral every 4 hours PRN Dyspepsia  ondansetron   Disintegrating Tablet 4 milliGRAM(s) Oral every 6 hours PRN Nausea and/or Vomiting      ASSESSMENT & PLAN    Continue comprehensive acute rehab program consisting of 3hrs/day of OT/PT and SLP.

## 2021-06-22 NOTE — PROGRESS NOTE ADULT - ASSESSMENT
59yo male with PMH of DM who was initially admitted to [facility] ICU for hypoxic respiratory failure 2/2 COVID c/b PE with R heart strain, cardiogenic and septic shock, ischemic and hemorrhagic CVA and ESBL klebsiella ventilator associate PNA, transferred to [facility] for neurosurgery eval and further management. s/p PEG. Course c/b right empyema s/p VATs and obturator bleed (resolved). Now admitted for functional decline.     #Debility  #ESBL Klebsiella PNA c/b empyema  #s/p COVID pneumonia  - 5/12 underwent right VATS converted to open L thoracotomy, decortication, drainage of abscess and flex bronch.  - Antibiotics treatment completed  - ID following, recs appreciated - needs close follow up in ID clinic for repeat imaging, Dr. Chambers  - CT Chest 5/29 with improvement in bilateral opacities. Slight decrease in right chest wall hematoma. Stable loculated right hydropneumothorax.   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 Albarado with all chest tubes removed by 5/18  -CT Chest 5/16 Interval new small cavitary lesions in the right lower lobe and right chest wall hematoma - no thoracic surgery intervention indicated per Dr. Albarado.    #Obturator Hematoma (stable)  - bleeding hematoma in right thigh on CT on 5/7  - CT hip showing non active bleed, hematoma on right thigh on 5/8  - doppler to r/o DVT in LE, per IR no role IVC filter  - active again on 5/17, now stable  - Stable while on therapeutic Lovenox for PE  - Monitor H/H    #Pulmonary embolus  - Pulmonary embolus noted on ct scan performed at time of admission  - Neurology cleared for AC  - Currently on lovenox 60mg q 12hrs; monitor Hg/Hct    #CVA  -Acute bilateral ischemic strokes, with concern for hemorrhagic conversion, noted on CT scan on 4/8  -Etiology of acute ischemic stroke uncertain at this time; TTE with bubble performed at bedside without any evidence of right-to-left shunt; no arrhythmia noted on telemetry throughout stay in the MICU  -CTA head and neck without any evidence of carotid artery stenosis or dissection  - 6/10 CT head: multiple infarcts, no hemorrhage  - CT head 6/14 no hemorrhages  - RAJESH noted done 6/16/21; no obvious thrombus/PFO noted  - PEG removal 6/21/21    #Encephalopathy due to multifactorial causes - resolved  - Etiology likely multi-factorial in setting of acute ischemic infarctions of the brain in conjunction vs acute kidney injury with uremia (now resolving) vs resolving hypoxic respiratory failure, covid-19 infection, superimposed bacterial pneumonia, urinary retention, concern for nutritional deficiencies  - MRI Brain, and MRA Head demonstrated multiple evolving subacute infarcts with associated hemorrhages, likely expected evolution of ischemia  - C/w Keppra 500 mg BID-> neuro f/u outpatient   - repeat CT head 5/5 normal.     #Diabetes mellitus type II  - April 2021: a1c 10 -- improved to 5.7  - moderate ISS, accuchecks  - Metformin 500mg BID HELD. FS are WNL  - Follow up endo outpatient    #Hypertension  - Was on metoprolol tartrate 25 mg twice daily since 4/16and now discontinued  - BP is stable  - If is hypertensive persistently, can consider antihypertensives    #Peripheral vascular disease  -Dry gangrene noted over distal toes on bilateral lower extremities--most concerning for microvascular disease subsequent to vasopressor administration   -Dorsalis pedis pulses intact bilaterally  -ERASMO demonstrating right ERASMO: 1.09, left ERASMO: 1.24, right TBI: 0.78, and left TBI: 0.73.  -Podiatry recs appreciated, no acute surgical intervention at this time, applying betadine to wound  -As per ID non infectious appearing     #Urinary Retention - improved  - c/w doxazosin     #DVT ppx: Lovenox 60mg BID 61yo male with PMH of DM who was initially admitted to [facility] ICU for hypoxic respiratory failure 2/2 COVID c/b PE with R heart strain, cardiogenic and septic shock, ischemic and hemorrhagic CVA and ESBL klebsiella ventilator associate PNA, transferred to [facility] for neurosurgery eval and further management. s/p PEG. Course c/b right empyema s/p VATs and obturator bleed (resolved). Now admitted for functional decline.     #Debility  #ESBL Klebsiella PNA c/b empyema  #s/p COVID pneumonia  - 5/12 underwent right VATS converted to open L thoracotomy, decortication, drainage of abscess and flex bronch.  - Antibiotics treatment completed  - ID following, recs appreciated - needs close follow up in ID clinic for repeat imaging, Dr. Chambers  - CT Chest 5/29 with improvement in bilateral opacities. Slight decrease in right chest wall hematoma. Stable loculated right hydropneumothorax.   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 Albarado with all chest tubes removed by 5/18  -CT Chest 5/16 Interval new small cavitary lesions in the right lower lobe and right chest wall hematoma - no thoracic surgery intervention indicated per Dr. Albarado.    #Obturator Hematoma (stable)  - bleeding hematoma in right thigh on CT on 5/7  - CT hip showing non active bleed, hematoma on right thigh on 5/8  - doppler to r/o DVT in LE, per IR no role IVC filter  - active again on 5/17, now stable  - Stable while on therapeutic Lovenox for PE  - Monitor H/H    #Pulmonary embolus  - Pulmonary embolus noted on ct scan performed at time of admission  - Neurology cleared for AC  - Currently on lovenox 60mg q 12hrs; monitor Hg/Hct    #CVA  -Acute bilateral ischemic strokes, with concern for hemorrhagic conversion, noted on CT scan on 4/8  -Etiology of acute ischemic stroke uncertain at this time; TTE with bubble performed at bedside without any evidence of right-to-left shunt; no arrhythmia noted on telemetry throughout stay in the MICU  -CTA head and neck without any evidence of carotid artery stenosis or dissection  - 6/10 CT head: multiple infarcts, no hemorrhage  - CT head 6/14 no hemorrhages  - RAJESH noted done 6/16/21; no obvious thrombus/PFO noted  - PEG removal 6/21/21    #Encephalopathy due to multifactorial causes - resolved  - Etiology likely multi-factorial in setting of acute ischemic infarctions of the brain in conjunction vs acute kidney injury with uremia (now resolving) vs resolving hypoxic respiratory failure, covid-19 infection, superimposed bacterial pneumonia, urinary retention, concern for nutritional deficiencies  - MRI Brain, and MRA Head demonstrated multiple evolving subacute infarcts with associated hemorrhages, likely expected evolution of ischemia  - C/w Keppra 500 mg BID-> neuro f/u outpatient   - repeat CT head 5/5 normal.     #Diabetes mellitus type II  - April 2021: a1c 10 -- improved to 5.7  - moderate ISS, accuchecks  - Metformin 500mg BID HELD. FS are WNL  - If FS continue to be elevated in 200s. start with metformin 500 mg daily  - Follow up endo outpatient    #Hypertension  - Was on metoprolol tartrate 25 mg twice daily since 4/16and now discontinued  - BP is stable  - If is hypertensive persistently, can consider antihypertensives    #Peripheral vascular disease  -Dry gangrene noted over distal toes on bilateral lower extremities--most concerning for microvascular disease subsequent to vasopressor administration   -Dorsalis pedis pulses intact bilaterally  -ERASMO demonstrating right ERASMO: 1.09, left ERASMO: 1.24, right TBI: 0.78, and left TBI: 0.73.  -Podiatry recs appreciated, no acute surgical intervention at this time, applying betadine to wound  -As per ID non infectious appearing     #Urinary Retention - improved  - c/w doxazosin     #DVT ppx: Lovenox 60mg BID

## 2021-06-22 NOTE — DISCHARGE NOTE PROVIDER - NSDCFUADDAPPT_GEN_ALL_CORE_FT
[facility]  Pulmonology  [address], [address]  [place] [address]  Phone: [phone]  Fax:   Follow Up Time: 1 month    [facility]  Neurology  [address] [place] [id number]  Phone: [phone]  Fax:   Follow Up Time: 1 month    Dr Colin Todd  Podiatry in 1 week  [phone]

## 2021-06-22 NOTE — CHART NOTE - NSCHARTNOTESELECT_GEN_ALL_CORE
Event Note
Nutrition Services
Nutrition Services
Event Note
Event Note
Nutrition Services

## 2021-06-22 NOTE — DISCHARGE NOTE PROVIDER - PROVIDER TOKENS
PROVIDER:[TOKEN:[[id number]]],PROVIDER:[TOKEN:[[id number]]],PROVIDER:[TOKEN:[[id number]]],PROVIDER:[TOKEN:[[id number]]],PROVIDER:[TOKEN:[[id number]]],PROVIDER:[TOKEN:[[id number]]]

## 2021-06-22 NOTE — PROGRESS NOTE ADULT - ASSESSMENT
61yo male with PMH of DM, hypoxic respiratory failure 2/2 COVID complicated by PE with R heart strain, cardiogenic and septic shock, ischemic and hemorrhagic CVA and ESBL klebsiella ventilator associate PNA, transferred to [facility] for neurosurgery eval and further management. s/p PEG. Course c/b right empyema s/p VATs and obturator bleed    # bilateral ischemic strokes, L Pica occlusion, hemorrhagic conversion  - Head CT 6/14: Subacute/chronic infarction of the LEFT cerebellum and chronic infarctions in the BILATERAL frontal, parietal and occipital lobe  - cleared to resume AC per Neuro.   - lipitor added   - continue comprehensive rehab program PT, OT< SLP 3 hours daily 5 x week  - Precatuions: cardiac, DM, AC, fall, AMS, SZ    # Hx Altered mental status  - improved, A&Ox3, participates with care, no seizures overnight  - EEG neg seizure  - Keppra 500 mg BID PPX-  - neuro f/u outpatient     # ESBL Klebsiella PNA c/b empyema   - s/p right VATS converted to open L thoracotomy, decortication, drainage of abscess and flex bronch.  -chest tubes removed by 5/18  - CT Chest 6/8 improved. No thoracic surgery intervention indicated per Dr. Albarado.    #Obturator Hematoma   - bleeding hematoma in right thigh on CT on 5/7, CT hip showing non active bleed/hematoma on right thigh on 5/8  - cleared for AC by surgery and neuro  -  Eliquis started 6/22  - CBC am following.     # Pulmonary embolus  - Etiology of venous thromboembolism uncertain at this time; ?provoked acute covid illness   - AC held due to development right obturator and right chest wall hematoma, now improved.   - Cleared by neuro and surgery for Eliquis to begin 6/22    # Diabetes mellitus  - Type II diabetes mellitus noted; a1c>10, now improved  - hold Lantus for episodes of hypoglycemia. Admelog scale continues. Diabetic diet/PO diet.  - Follow up endo outpatient     #SUKI  -resolved  - BUn/Cr 17/1.01 6/21  #Peripheral vascular disease  -Dry gangrene noted over distal toes on bilateral lower extremities  -ERASMO demonstrating right ERASMO: 1.09, left ERASMO: 1.24, right TBI: 0.78, and left TBI: 0.73.  -Podiatry recs appreciated, betadine to wound    #Urinary Retention   - Mann removed 5/30  - voiding freely, low PVRs  - hold doxazosin for low BPs    #Dysphagia  - PEG tube placed on 4/30, removed 6/21, site healing well. Tolerating PO.  - GI appreciated     #Pain Mgmt   - Tylenol PRN, Oxycodone PRN    #GI/Bowel Mgmt   - hold Senna, Miralax for loose stool hx.  -Resolved now-PO diet well tolerated    #/Bladder Mgmt   - Continent, PVR low    #Case discussed in IDT rounds 6/21:  - set up eating/grooming, set up UB dressing, CG LB, min assist transfers, ambulates with cues for foot placement and safety  - target dc [facility] 6/23 if medically stable on eliquis    # LABS  -cbc am   59yo male with PMH of DM, hypoxic respiratory failure 2/2 COVID complicated by PE with R heart strain, cardiogenic and septic shock, ischemic and hemorrhagic CVA and ESBL klebsiella ventilator associate PNA, transferred to [facility] for neurosurgery eval and further management. s/p PEG. Course c/b right empyema s/p VATs and obturator bleed    # bilateral ischemic strokes, L Pica occlusion, hemorrhagic conversion  - Head CT 6/14: Subacute/chronic infarction of the LEFT cerebellum and chronic infarctions in the BILATERAL frontal, parietal and occipital lobe  - cleared to resume AC per Neuro.   - continue lipitor  - continue comprehensive rehab program PT, OT< SLP 3 hours daily 5 x week  - Precatuions: cardiac, DM, AC, fall, AMS, SZ    # Hx Altered mental status  - improved  - EEG neg seizure. On Keppra 500 mg BID PPX  - neuro f/u outpatient     # ESBL Klebsiella PNA c/b empyema   - s/p right VATS converted to open L thoracotomy, decortication, drainage of abscess and flex bronch.  -chest tubes removed by 5/18  - CT Chest 6/8 improved. No thoracic surgery intervention indicated per Dr. Albarado.    #Obturator Hematoma   - bleeding hematoma in right thigh on CT on 5/7, CT hip showing non active bleed/hematoma on right thigh on 5/8  - cleared for AC by surgery and neuro  -  Eliquis started 6/22. Discussed with patient via   - CBC am 6/23    # Pulmonary embolus  - Etiology of venous thromboembolism uncertain at this time; ?provoked acute covid illness   - AC held due to development right obturator and right chest wall hematoma, now improved.   - Cleared by neuro and surgery for Eliquis to begin 6/22    # Diabetes mellitus  - Type II diabetes mellitus noted; a1c>10, now improved  - hold Lantus for episodes of hypoglycemia. Admelog scale continues. Diabetic diet/PO diet.  - Follow up endo outpatient     #SUKI  -resolved  - BUn/Cr 17/1.01 6/21  #Peripheral vascular disease  -Dry gangrene noted over distal toes on bilateral lower extremities  -ERASMO demonstrating right ERASMO: 1.09, left ERASMO: 1.24, right TBI: 0.78, and left TBI: 0.73.  -Podiatry recs appreciated, betadine to wound    #Urinary Retention   - Mann removed 5/30  - voiding freely, low PVRs  - hold doxazosin for low BPs    #Dysphagia  - PEG tube placed on 4/30, removed 6/21, site healing well. Tolerating PO.  - GI appreciated  - cleared to resume full po diet     #Pain Mgmt   - Tylenol PRN, Oxycodone PRN    #GI/Bowel Mgmt   - hold Senna, Miralax for loose stool hx.  -Resolved now-PO diet well tolerated    #/Bladder Mgmt   - Continent, PVR low    #Case discussed in IDT rounds 6/21:  - set up eating/grooming, set up UB dressing, CG LB, min assist transfers, ambulates with cues for foot placement and safety  - target dc [facility] 6/23 if medically stable on eliquis    # LABS  -cbc am   possible dc [facility] tomorrow if stable, discussed with patient

## 2021-06-22 NOTE — CHART NOTE - NSCHARTNOTEFT_GEN_A_CORE
Nutrition Follow Up Note  Hospital Course (Per Electronic Medical Record): 59yo male with PMH of DM who was initially admitted to [facility] ICU for hypoxic respiratory failure 2/2 COVID c/b PE with R heart strain, cardiogenic and septic shock, ischemic and hemorrhagic CVA and ESBL klebsiella ventilator associate PNA, transferred to [facility] for neurosurgery eval and further management. s/p PEG. Course c/b right empyema s/p VATs and obturator bleed (resolved). Now admitted for functional decline.     Source: Medical Record [X] Patient [ ] Family [ ]       Information obtained from nursing, observed during meal rounds. Pt unable to provide reliable diet hx.    Diet: dysphagia 3 soft, thin liquids, consistent carbohydrate (evening snack)  Pt tolerating diet with good PO intake. Was on clear liquids yesterday for PEG removal. Pt is lacto-vegetarian, reported this to writer previously on 6/15. Recommend changing diet to lacto-veg. Will continue to follow SLP recommendations.     Enteral/Parenteral Nutrition: N/A    Current Weight: 149 lbs (6/21) weight fluctuations noted, continue to monitor.   146.1 lbs (6/20)  149.9 lbs (6/19)  148.1 lbs (6/17)  143.5 lbs (6/15)  148.8 lbs (6/13)  143 lbs (6/9)  144.4 lbs (6/8)  143.3lbs (6/6)  145.2 lbs (6/4)    Pertinent Medications: MEDICATIONS  (STANDING):  acetaminophen   Tablet .. 650 milliGRAM(s) Oral every 6 hours  apixaban 10 milliGRAM(s) Oral every 12 hours  AQUAPHOR (petrolatum Ointment) 1 Application(s) Topical three times a day  atorvastatin 40 milliGRAM(s) Oral at bedtime  cadexomer iodine 0.9% Gel 1 Application(s) Topical daily  dextrose 40% Gel 15 Gram(s) Oral once  dextrose 5%. 1000 milliLiter(s) (100 mL/Hr) IV Continuous <Continuous>  dextrose 5%. 1000 milliLiter(s) (50 mL/Hr) IV Continuous <Continuous>  dextrose 50% Injectable 25 Gram(s) IV Push once  dextrose 50% Injectable 12.5 Gram(s) IV Push once  dextrose 50% Injectable 25 Gram(s) IV Push once  glucagon  Injectable 1 milliGRAM(s) IntraMuscular once  insulin lispro (ADMELOG) corrective regimen sliding scale   SubCutaneous Before meals and at bedtime  levETIRAcetam  Solution 500 milliGRAM(s) Oral two times a day  metFORMIN 500 milliGRAM(s) Oral two times a day  pantoprazole   Suspension 40 milliGRAM(s) Oral daily    MEDICATIONS  (PRN):  aluminum hydroxide/magnesium hydroxide/simethicone Suspension 30 milliLiter(s) Oral every 4 hours PRN Dyspepsia  ondansetron   Disintegrating Tablet 4 milliGRAM(s) Oral every 6 hours PRN Nausea and/or Vomiting      Pertinent Labs:  06-21 Na140 mmol/L Glu 140 mg/dL<H> K+ 4.4 mmol/L Cr  1.01 mg/dL BUN 17 mg/dL 06-21 Alb 2.9 g/dL<L>        Skin: R lateral side under arm surgical incision, necrotic toes, incontinence associated dermatitis Per nursing flowsheets     Edema: No edema per nursing flow sheets     Last BM: on 6/18 Per nursing flowsheets     Estimated Needs:   [X] No Change since Previous Assessment  [ ] Recalculated:     Previous Nutrition Diagnosis:   moderate malnutrition    Nutrition Diagnosis is [X] Ongoing    [ ] Resolved   [ ] Not Applicable      New Nutrition Diagnosis: [X] Not Applicable  [ ] Inadequate Protein Energy Intake   [ ] Inadequate Oral Intake   [ ] Excessive Energy Intake   [ ] Increased Nutrient Needs   [ ] Obesity   [ ] Altered GI Function   [ ] Unintended Weight Loss   [ ] Food & Nutrition Related Knowledge Deficit  [ ] Limited Adherence to nutrition related recommendations   [ ] Malnutrition      Interventions:   1. Recommend changing diet to lacto-vegetarian, follow SLP recommendations  2. Encourage and monitor PO intake    Monitoring & Evaluation:   [X] Weights   [X] PO Intake   [X] Follow Up (Per Protocol)  [X] Tolerance to Diet Prescription   [X] Other: Labs & POCT glucose     RD Remains Available.  Alpa Burgos RD

## 2021-06-22 NOTE — DISCHARGE NOTE PROVIDER - CARE PROVIDERS DIRECT ADDRESSES
[email or web address]

## 2021-06-22 NOTE — DISCHARGE NOTE PROVIDER - NSDCCPCAREPLAN_GEN_ALL_CORE_FT
PRINCIPAL DISCHARGE DIAGNOSIS  Diagnosis: CVA (cerebrovascular accident)  Assessment and Plan of Treatment: s/p multiple CVAs. Continue Lipitor and Eliquis. follow up with neurology and cardiology in 1 week.      SECONDARY DISCHARGE DIAGNOSES  Diagnosis: Hematoma  Assessment and Plan of Treatment: Follow up with surgery Dr Sammy Tena in 1 week.    Diagnosis: Empyema  Assessment and Plan of Treatment: Follow up with Pulmonary doctor, antibiotics were completed.    Diagnosis: Pulmonary embolism  Assessment and Plan of Treatment: On Eliquis as per heme. Follow up with Vascular and Pulmonary.    Diagnosis: 2019 novel coronavirus disease (COVID-19)  Assessment and Plan of Treatment:     Diagnosis: DM2 (diabetes mellitus, type 2)  Assessment and Plan of Treatment: Continue metformin, and follow up with Endocrine

## 2021-06-22 NOTE — DISCHARGE NOTE PROVIDER - NSDCMRMEDTOKEN_GEN_ALL_CORE_FT
acetaminophen 325 mg oral tablet: 2 tab(s) orally every 6 hours  aluminum hydroxide-magnesium hydroxide 200 mg-200 mg/5 mL oral suspension: 30 milliliter(s) orally every 4 hours, As needed, Dyspepsia  apixaban 5 mg oral tablet: 2 tab(s) orally every 12 hours until 6/28. then transition to 5mg BID  atorvastatin 40 mg oral tablet: 1 tab(s) orally once a day (at bedtime)  cadexomer iodine 0.9% topical gel: 1 application topically once a day  cholecalciferol oral tablet: 400 unit(s) orally once a day  ipratropium-albuterol 0.5 mg-2.5 mg/3 mL inhalation solution: 3 milliliter(s) inhaled every 6 hours, As needed, Shortness of Breath and/or Wheezing  levETIRAcetam 100 mg/mL oral solution: 5 milliliter(s) orally 2 times a day  metFORMIN 500 mg oral tablet: 1 tab(s) orally 2 times a day  pantoprazole 40 mg oral granule, delayed release: 40 milligram(s) orally once a day  petrolatum topical ointment: 1 application topically 3 times a day

## 2021-06-22 NOTE — DISCHARGE NOTE PROVIDER - HOSPITAL COURSE
61yo M with T2DM and recently diagnosed COVID (3/19) BIBEMS to [facility] on 4/1 for hypoxic respiratory failure secondary to COVID PNA, requiring endotracheal intubation (4/1-4/13). Course significant for Septic shock on admission, treated with vancomycin and cefepime. Found to have ESBL Klebsiella PNA c/b empyema s/p VATs decortication. Treated with prolonged course of Meropenem while hospitalized and transitioned to Augmentin/Bactrim upon discharge (until 6/8). Will follow up with ID as outpatient. Patient had acute ischemic CVA with areas of hemorrhagic transformation. Found to have multi-focal acute-to-subacute infarctions. 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 Subsequent scans showed hemorrhagic conversion. Transferred to [facility] for Neurosurgery evaluation, no surgical intervention at this time. Will require outpatient follow up.   Course complicated by RITO segmental PEs with e/o RH strain: Provoked in the setting of sepsis/high inflammatory state with COVID infection. LE Dopplers negative x 2. Initially anticoagulated with heparin gtt. Course further complicated by a right chest wall and right obturator hematomas. Due to concern for worsening hemorrhagic conversion of stroke and hematomas, the patient's therapeutic anticoagulation was discontinued. He was evaluated by the neurosurgical team, which said that there was no indication for acute intervention.   Patient's AMS likely in the setting of acute strokes, improved throughout hospitalization. Due to prolonged intubation with subsequent AMS and dysphagia, pt had PEG tube placed. Pt failed MBS.    Patient also had bedside debridement and woundcare of her BLE digits and will follow up with Podiatry as an outpatient.  Medically cleared for discharge to [facility] 6/4.   At [facility] acute rehab patient completed comprehensive PT OT SLP program. Evaluated by renal, ID, neurology and GI. CTH repeated with new CVA-neurology consulted, CT chest follow up completed, cleared by surgery for AC for PE/CVas. No thrombus on RAJESH, Holter neg. Cardiology input appreciated. Hyperkalemia-renal in, bactrim discontinued as per ID and renal rec's. Afebrile. PEG removed by GI. Tolerating PO. betadine to toes per Podiatry-stable. Metformin added by hospitalist. cardura on hold for low ortho Bps. Voids freely. HH stable on AC-cleared by hematology. Dc to [facility] with follow ups in community.      61yo M with T2DM and recently diagnosed COVID (3/19) BIBEMS to [facility] on 4/1 for hypoxic respiratory failure secondary to COVID PNA, requiring endotracheal intubation (4/1-4/13). Course significant for Septic shock on admission, treated with vancomycin and cefepime. Found to have ESBL Klebsiella PNA c/b empyema s/p VATs decortication. Treated with prolonged course of Meropenem while hospitalized and transitioned to Augmentin/Bactrim upon discharge (until 6/8). Will follow up with ID as outpatient. Patient had acute ischemic CVA with areas of hemorrhagic transformation. Found to have multi-focal acute-to-subacute infarctions. 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 Subsequent scans showed hemorrhagic conversion. Transferred to [facility] for Neurosurgery evaluation, no surgical intervention at this time. Will require outpatient follow up.     hospital course complicated by RITO segmental PEs with e/o RH strain: Provoked in the setting of sepsis/high inflammatory state with COVID infection. LE Dopplers negative x 2. Initially anticoagulated with heparin gtt. Course further complicated by a right chest wall and right obturator hematomas. Due to concern for worsening hemorrhagic conversion of stroke and hematomas, the patient's therapeutic anticoagulation was discontinued. He was evaluated by the neurosurgical team, which said that there was no indication for acute intervention.  Patient's AMS was felt to be secondary to acute strokes, improved throughout hospitalization. Due to prolonged intubation with subsequent AMS and dysphagia, pt had PEG tube placed. Pt failed MBS.    Patient also had bedside debridement and woundcare of her BLE digits and will follow up with Podiatry as an outpatient. He was medically cleared for discharge to [facility] 6/4.     At [facility] acute rehab patient completed comprehensive PT OT SLP program. Evaluated by renal, ID, neurology and GI. CTH repeated with new CVA-neurology consulted, CT chest follow up completed, cleared by surgery for AC for PE/CVas. No thrombus on RAJESH, Holter neg. Cardiology input appreciated. Hyperkalemia-renal in, bactrim discontinued as per ID and renal rec's. Afebrile. PEG removed by GI. Tolerating PO. betadine to toes per Podiatry-stable. Metformin added by hospitalist. cardura on hold for low ortho Bps. Voids freely. HH stable on AC-cleared by hematology. Dc to [facility] with follow ups in community     59yo M with T2DM and recently diagnosed COVID (3/19) BIBEMS to [facility] on 4/1 for hypoxic respiratory failure secondary to COVID PNA, requiring endotracheal intubation (4/1-4/13). Course significant for Septic shock on admission, treated with vancomycin and cefepime. Found to have ESBL Klebsiella PNA c/b empyema s/p VATs decortication. Treated with prolonged course of Meropenem while hospitalized and transitioned to Augmentin/Bactrim upon discharge (until 6/8). Will follow up with ID as outpatient. Patient had acute ischemic CVA with areas of hemorrhagic transformation. Found to have multi-focal acute-to-subacute infarctions. 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 Subsequent scans showed hemorrhagic conversion. Transferred to [facility] for Neurosurgery evaluation, no surgical intervention at this time. Will require outpatient follow up.     hospital course complicated by RITO segmental PEs with e/o RH strain: Provoked in the setting of sepsis/high inflammatory state with COVID infection. LE Dopplers negative x 2. Initially anticoagulated with heparin gtt. Course further complicated by a right chest wall and right obturator hematomas. Due to concern for worsening hemorrhagic conversion of stroke and hematomas, the patient's therapeutic anticoagulation was discontinued. He was evaluated by the neurosurgical team, which said that there was no indication for acute intervention.  Patient's AMS was felt to be secondary to acute strokes, improved throughout hospitalization. Due to prolonged intubation with subsequent AMS and dysphagia, pt had PEG tube placed. Pt failed MBS.    Patient also had bedside debridement and woundcare of her BLE digits and will follow up with Podiatry as an outpatient. [facility]     At [facility] acute rehab patient completed comprehensive PT OT SLP program. Evaluated by renal, ID, neurology and GI. CTH repeated with new CVA-neurology consulted, CT chest follow up completed, cleared by surgery for AC for PE/CVas. No thrombus on RAJESH, Holter neg. Cardiology input appreciated. Hyperkalemia-renal in, bactrim discontinued as per ID and renal rec's. Afebrile. PEG removed by GI. Tolerating PO. betadine to toes per Podiatry-stable. Metformin added by hospitalist. cardura on hold for low ortho Bps. Voids freely. HH stable on AC-cleared by hematology. Dc to [facility] with follow ups in community    at the time of dc, patient was set up eating/grooming, set up UB dressing, CG LB, min assist transfers, ambulates with cues for foot placement and safety

## 2021-06-23 ENCOUNTER — TRANSCRIPTION ENCOUNTER (OUTPATIENT)
Age: 61
End: 2021-06-23

## 2021-06-23 VITALS
OXYGEN SATURATION: 95 % | RESPIRATION RATE: 16 BRPM | DIASTOLIC BLOOD PRESSURE: 71 MMHG | HEART RATE: 98 BPM | TEMPERATURE: 98 F | SYSTOLIC BLOOD PRESSURE: 103 MMHG

## 2021-06-23 LAB
GLUCOSE BLDC GLUCOMTR-MCNC: 114 MG/DL — HIGH (ref 70–99)
GLUCOSE BLDC GLUCOMTR-MCNC: 97 MG/DL — SIGNIFICANT CHANGE UP (ref 70–99)
HCT VFR BLD CALC: 38.1 % — LOW (ref 39–50)
HGB BLD-MCNC: 12 G/DL — LOW (ref 13–17)
MCHC RBC-ENTMCNC: 26.8 PG — LOW (ref 27–34)
MCHC RBC-ENTMCNC: 31.5 GM/DL — LOW (ref 32–36)
MCV RBC AUTO: 85.2 FL — SIGNIFICANT CHANGE UP (ref 80–100)
NRBC # BLD: 0 /100 WBCS — SIGNIFICANT CHANGE UP (ref 0–0)
PLATELET # BLD AUTO: 346 K/UL — SIGNIFICANT CHANGE UP (ref 150–400)
RBC # BLD: 4.47 M/UL — SIGNIFICANT CHANGE UP (ref 4.2–5.8)
RBC # FLD: 17.9 % — HIGH (ref 10.3–14.5)
WBC # BLD: 8.95 K/UL — SIGNIFICANT CHANGE UP (ref 3.8–10.5)
WBC # FLD AUTO: 8.95 K/UL — SIGNIFICANT CHANGE UP (ref 3.8–10.5)

## 2021-06-23 PROCEDURE — 82436 ASSAY OF URINE CHLORIDE: CPT

## 2021-06-23 PROCEDURE — U0003: CPT

## 2021-06-23 PROCEDURE — 70450 CT HEAD/BRAIN W/O DYE: CPT

## 2021-06-23 PROCEDURE — 84300 ASSAY OF URINE SODIUM: CPT

## 2021-06-23 PROCEDURE — 97110 THERAPEUTIC EXERCISES: CPT

## 2021-06-23 PROCEDURE — 36415 COLL VENOUS BLD VENIPUNCTURE: CPT

## 2021-06-23 PROCEDURE — 97163 PT EVAL HIGH COMPLEX 45 MIN: CPT

## 2021-06-23 PROCEDURE — 74230 X-RAY XM SWLNG FUNCJ C+: CPT

## 2021-06-23 PROCEDURE — 99239 HOSP IP/OBS DSCHRG MGMT >30: CPT

## 2021-06-23 PROCEDURE — 83036 HEMOGLOBIN GLYCOSYLATED A1C: CPT

## 2021-06-23 PROCEDURE — 97530 THERAPEUTIC ACTIVITIES: CPT

## 2021-06-23 PROCEDURE — 92523 SPEECH SOUND LANG COMPREHEN: CPT

## 2021-06-23 PROCEDURE — 76775 US EXAM ABDO BACK WALL LIM: CPT

## 2021-06-23 PROCEDURE — 85025 COMPLETE CBC W/AUTO DIFF WBC: CPT

## 2021-06-23 PROCEDURE — 92507 TX SP LANG VOICE COMM INDIV: CPT

## 2021-06-23 PROCEDURE — 87205 SMEAR GRAM STAIN: CPT

## 2021-06-23 PROCEDURE — 97116 GAIT TRAINING THERAPY: CPT

## 2021-06-23 PROCEDURE — 92611 MOTION FLUOROSCOPY/SWALLOW: CPT

## 2021-06-23 PROCEDURE — 97535 SELF CARE MNGMENT TRAINING: CPT

## 2021-06-23 PROCEDURE — 97167 OT EVAL HIGH COMPLEX 60 MIN: CPT

## 2021-06-23 PROCEDURE — 82962 GLUCOSE BLOOD TEST: CPT

## 2021-06-23 PROCEDURE — 74018 RADEX ABDOMEN 1 VIEW: CPT

## 2021-06-23 PROCEDURE — U0005: CPT

## 2021-06-23 PROCEDURE — 82570 ASSAY OF URINE CREATININE: CPT

## 2021-06-23 PROCEDURE — 85027 COMPLETE CBC AUTOMATED: CPT

## 2021-06-23 PROCEDURE — 80053 COMPREHEN METABOLIC PANEL: CPT

## 2021-06-23 PROCEDURE — 71250 CT THORAX DX C-: CPT

## 2021-06-23 PROCEDURE — 97112 NEUROMUSCULAR REEDUCATION: CPT

## 2021-06-23 PROCEDURE — 94640 AIRWAY INHALATION TREATMENT: CPT

## 2021-06-23 PROCEDURE — 93225 XTRNL ECG REC<48 HRS REC: CPT

## 2021-06-23 PROCEDURE — 93312 ECHO TRANSESOPHAGEAL: CPT

## 2021-06-23 PROCEDURE — 99232 SBSQ HOSP IP/OBS MODERATE 35: CPT

## 2021-06-23 PROCEDURE — 80048 BASIC METABOLIC PNL TOTAL CA: CPT

## 2021-06-23 PROCEDURE — 92526 ORAL FUNCTION THERAPY: CPT

## 2021-06-23 PROCEDURE — 81001 URINALYSIS AUTO W/SCOPE: CPT

## 2021-06-23 RX ADMIN — LEVETIRACETAM 500 MILLIGRAM(S): 250 TABLET, FILM COATED ORAL at 05:26

## 2021-06-23 RX ADMIN — PANTOPRAZOLE SODIUM 40 MILLIGRAM(S): 20 TABLET, DELAYED RELEASE ORAL at 11:59

## 2021-06-23 RX ADMIN — Medication 1 APPLICATION(S): at 11:59

## 2021-06-23 RX ADMIN — METFORMIN HYDROCHLORIDE 500 MILLIGRAM(S): 850 TABLET ORAL at 05:27

## 2021-06-23 RX ADMIN — Medication 1 APPLICATION(S): at 05:27

## 2021-06-23 RX ADMIN — APIXABAN 10 MILLIGRAM(S): 2.5 TABLET, FILM COATED ORAL at 05:26

## 2021-06-23 NOTE — PROGRESS NOTE ADULT - PROVIDER SPECIALTY LIST ADULT
Hospitalist
Nephrology
Nephrology
Physiatry
Physiatry
Rehab Medicine
Hospitalist
Hospitalist
Nephrology
Nephrology
Physiatry
Rehab Medicine
Hospitalist
Nephrology
Neurology
Physiatry
Physiatry
Rehab Medicine
Surgery
Hospitalist
Infectious Disease
Infectious Disease
Physiatry
Rehab Medicine
Gastroenterology
Heme/Onc
Hospitalist
Hospitalist
Physiatry
Hospitalist
Physiatry

## 2021-06-23 NOTE — PROGRESS NOTE ADULT - NUTRITIONAL ASSESSMENT
This patient has been assessed with a concern for Malnutrition and has been determined to have a diagnosis/diagnoses of Moderate protein-calorie malnutrition.    This patient is being managed with:   Diet NPO with Tube Feed-  Tube Feeding Modality: Gastrostomy  Nepro with Carb Steady  Total Volume for 24 Hours (mL): 960  Bolus  Total Volume of Bolus (mL):  240  Total # of Feeds: 4  Tube Feed Frequency: Every 6 hours   Tube Feed Start Time: 08:00  Bolus Feed Rate (mL per Hour): 240   Bolus Feed Duration (in Hours): 1  Bolus Feed Instructions:  Please give bolus TF slowly over 1 hr @ 8a 12p 4p 8p  Free Water Flush  Bolus   Total Volume per Flush (mL): 200   Frequency: Every 6 Hours    Start Time: 09:00  Free Water Flush Instructions:  Please give free H2O 1 hr after each feed  Entered: Jim 10 2021  9:57AM    
This patient has been assessed with a concern for Malnutrition and has been determined to have a diagnosis/diagnoses of Moderate protein-calorie malnutrition.    This patient is being managed with:   Diet NPO with Tube Feed-  Tube Feeding Modality: Gastrostomy  Nepro with Carb Steady  Total Volume for 24 Hours (mL): 960  Bolus  Total Volume of Bolus (mL):  240  Total # of Feeds: 4  Tube Feed Frequency: Every 6 hours   Tube Feed Start Time: 08:00  Bolus Feed Rate (mL per Hour): 240   Bolus Feed Duration (in Hours): 1  Bolus Feed Instructions:  Please give bolus TF slowly over 1 hr @ 8a 12p 4p 8p  Free Water Flush  Bolus   Total Volume per Flush (mL): 200   Frequency: Every 6 Hours    Start Time: 09:00  Free Water Flush Instructions:  Please give free H2O 1 hr after each feed  Entered: Jim 10 2021  9:57AM    Diet NPO with Tube Feed-  Tube Feeding Modality: Gastrostomy  Nepro with Carb Steady  Total Volume for 24 Hours (mL): 1200  Bolus  Total Volume of Bolus (mL):  300  Total # of Feeds: 4  Tube Feed Frequency: Every 6 hours   Tube Feed Start Time: 16:00  Bolus Feed Rate (mL per Hour): 300   Bolus Feed Duration (in Hours): 1  Bolus Feed Instructions:  Please give 300 ml formula over 1 hour at 7 AM 12 PM 4 PM and 8 PM  Bolus   Total Volume per Flush (mL): 200   Frequency: Every 6 Hours  Free Water Flush Instructions:  Please give 200 ml free water 1 hour before and after feeding  Entered: Jun 9 2021 12:47PM    The following pending diet order is being considered for treatment of Moderate protein-calorie malnutrition:null
This patient has been assessed with a concern for Malnutrition and has been determined to have a diagnosis/diagnoses of Moderate protein-calorie malnutrition.    This patient is being managed with:   Diet NPO with Tube Feed-  Tube Feeding Modality: Gastrostomy  TwoCal HN  Total Volume for 24 Hours (mL): 1200  Bolus  Total Volume of Bolus (mL):  300  Total # of Feeds: 4  Tube Feed Frequency: Every 6 hours   Tube Feed Start Time: 18:00  Bolus Feed Rate (mL per Hour): 300   Bolus Feed Duration (in Hours): 0.5  Bolus Feed Instructions:  Please Give 300ml Bolus Feeding @ 8am 12pm 4pm & 8pm  Free Water Flush  Bolus   Total Volume per Flush (mL): 200   Frequency: Every 6 Hours    Start Time: 20:00  Free Water Flush Instructions:  Give 200ml Free Water 1hr Before or After Each Bolus Feeding  Entered: Jun 5 2021 11:53AM    
This patient has been assessed with a concern for Malnutrition and has been determined to have a diagnosis/diagnoses of Moderate protein-calorie malnutrition.    This patient is being managed with:   Diet Dysphagia 1 Pureed-Thin Liquids-  Lacto Veg (Accepts Milk & Milk Products)  Entered: Jim 15 2021  8:59AM    
This patient has been assessed with a concern for Malnutrition and has been determined to have a diagnosis/diagnoses of Moderate protein-calorie malnutrition.    This patient is being managed with:   Diet Dysphagia 1 Pureed-Thin Liquids-  Lacto Veg (Accepts Milk & Milk Products)  Entered: Jim 15 2021  8:59AM    Diet NPO after Midnight-     NPO Start Date: 15-Jim-2021   NPO Start Time: 23:59  Entered: Jim 15 2021  7:50AM    
This patient has been assessed with a concern for Malnutrition and has been determined to have a diagnosis/diagnoses of Moderate protein-calorie malnutrition.    This patient is being managed with:   Diet Dysphagia 3 Soft-Thin Liquids-  Consistent Carbohydrate {Evening Snack}  Lacto Veg (Accepts Milk & Milk Products)  Entered: Jun 22 2021  2:36PM    Diet Dysphagia 3 Soft-Thin Liquids-  Consistent Carbohydrate {Evening Snack}  Entered: Jun 22 2021  8:43AM    The following pending diet order is being considered for treatment of Moderate protein-calorie malnutrition:null
This patient has been assessed with a concern for Malnutrition and has been determined to have a diagnosis/diagnoses of Moderate protein-calorie malnutrition.    This patient is being managed with:   Diet Clear Liquid-  Entered: Jun 21 2021  1:45PM    
This patient has been assessed with a concern for Malnutrition and has been determined to have a diagnosis/diagnoses of Moderate protein-calorie malnutrition.    This patient is being managed with:   Diet NPO with Tube Feed-  Tube Feeding Modality: Gastrostomy  TwoCal HN  Total Volume for 24 Hours (mL): 1200  Bolus  Total Volume of Bolus (mL):  300  Total # of Feeds: 4  Tube Feed Frequency: Every 6 hours   Tube Feed Start Time: 12:00  Bolus Feed Rate (mL per Hour): 300   Bolus Feed Duration (in Hours): 1  Bolus Feed Instructions:  Please give 300 ml formula over 1 hour @ 7 AM 12 PM 4 PM 8 PM.  Free Water Flush  Bolus   Total Volume per Flush (mL): 200   Frequency: Every 6 Hours    Start Time: 12:00  Free Water Flush Instructions:  Give 200 ml free water 1 hour before and after each feeding  Entered: Jun 7 2021 11:02AM    
This patient has been assessed with a concern for Malnutrition and has been determined to have a diagnosis/diagnoses of Moderate protein-calorie malnutrition.    This patient is being managed with:   Diet Dysphagia 1 Pureed-Thin Liquids-  Lacto Veg (Accepts Milk & Milk Products)  Entered: Jim 15 2021  8:59AM    Diet NPO after Midnight-     NPO Start Date: 15-Jim-2021   NPO Start Time: 23:59  Entered: Jim 15 2021  7:50AM    
This patient has been assessed with a concern for Malnutrition and has been determined to have a diagnosis/diagnoses of Moderate protein-calorie malnutrition.    This patient is being managed with:   Diet Dysphagia 3 Soft-Thin Liquids-  Consistent Carbohydrate {Evening Snack}  Entered: Jun 17 2021 10:02AM    
This patient has been assessed with a concern for Malnutrition and has been determined to have a diagnosis/diagnoses of Moderate protein-calorie malnutrition.    This patient is being managed with:   Diet NPO after Midnight-     NPO Start Date: 20-Jun-2021   NPO Start Time: 23:59  Except Medications  Entered: Jun 20 2021  1:39PM    Diet Dysphagia 3 Soft-Thin Liquids-  Consistent Carbohydrate {Evening Snack}  Entered: Jun 17 2021 10:02AM    
This patient has been assessed with a concern for Malnutrition and has been determined to have a diagnosis/diagnoses of Moderate protein-calorie malnutrition.    This patient is being managed with:   Diet Dysphagia 1 Pureed-Thin Liquids-  Lacto Veg (Accepts Milk & Milk Products)  Entered: Jim 15 2021  8:59AM    
This patient has been assessed with a concern for Malnutrition and has been determined to have a diagnosis/diagnoses of Moderate protein-calorie malnutrition.    This patient is being managed with:   Diet Dysphagia 3 Soft-Thin Liquids-  Consistent Carbohydrate {Evening Snack}  Entered: Jun 17 2021 10:02AM    
This patient has been assessed with a concern for Malnutrition and has been determined to have a diagnosis/diagnoses of Moderate protein-calorie malnutrition.    This patient is being managed with:   Diet NPO with Tube Feed-  Tube Feeding Modality: Gastrostomy  Nepro with Carb Steady  Total Volume for 24 Hours (mL): 960  Bolus  Total Volume of Bolus (mL):  240  Total # of Feeds: 4  Tube Feed Frequency: Every 6 hours   Tube Feed Start Time: 08:00  Bolus Feed Rate (mL per Hour): 240   Bolus Feed Duration (in Hours): 1  Bolus Feed Instructions:  Please give bolus TF slowly over 1 hr @ 8a 12p 4p 8p  Free Water Flush  Bolus   Total Volume per Flush (mL): 200   Frequency: Every 6 Hours    Start Time: 09:00  Free Water Flush Instructions:  Please give free H2O 1 hr after each feed  Entered: Jim 10 2021  9:57AM    
This patient has been assessed with a concern for Malnutrition and has been determined to have a diagnosis/diagnoses of Moderate protein-calorie malnutrition.    This patient is being managed with:   Diet NPO with Tube Feed-  Tube Feeding Modality: Gastrostomy  TwoCal HN  Total Volume for 24 Hours (mL): 1200  Bolus  Total Volume of Bolus (mL):  300  Total # of Feeds: 4  Tube Feed Frequency: Every 6 hours   Tube Feed Start Time: 12:00  Bolus Feed Rate (mL per Hour): 300   Bolus Feed Duration (in Hours): 1  Bolus Feed Instructions:  Please give 300 ml formula over 1 hour @ 7 AM 12 PM 4 PM 8 PM.  Free Water Flush  Bolus   Total Volume per Flush (mL): 200   Frequency: Every 6 Hours    Start Time: 12:00  Free Water Flush Instructions:  Give 200 ml free water 1 hour before and after each feeding  Entered: Jun 7 2021 11:02AM    
as per team? dysphagia diet
This patient has been assessed with a concern for Malnutrition and has been determined to have a diagnosis/diagnoses of Moderate protein-calorie malnutrition.    This patient is being managed with:   Diet Dysphagia 1 Pureed-Thin Liquids-  Lacto Veg (Accepts Milk & Milk Products)  Entered: Jim 15 2021  8:59AM    Diet NPO after Midnight-     NPO Start Date: 15-Jim-2021   NPO Start Time: 23:59  Entered: Jim 15 2021  7:50AM    
This patient has been assessed with a concern for Malnutrition and has been determined to have a diagnosis/diagnoses of Moderate protein-calorie malnutrition.    This patient is being managed with:   Diet Dysphagia 3 Soft-Thin Liquids-  Consistent Carbohydrate {Evening Snack}  Entered: Jun 22 2021  8:43AM    
This patient has been assessed with a concern for Malnutrition and has been determined to have a diagnosis/diagnoses of Moderate protein-calorie malnutrition.    This patient is being managed with:   Diet NPO after Midnight-     NPO Start Date: 20-Jun-2021   NPO Start Time: 23:59  Except Medications  Entered: Jun 20 2021  1:39PM    
This patient has been assessed with a concern for Malnutrition and has been determined to have a diagnosis/diagnoses of Moderate protein-calorie malnutrition.    This patient is being managed with:   Diet NPO with Tube Feed-  Tube Feeding Modality: Gastrostomy  Nepro with Carb Steady  Total Volume for 24 Hours (mL): 960  Bolus  Total Volume of Bolus (mL):  240  Total # of Feeds: 4  Tube Feed Frequency: Every 6 hours   Tube Feed Start Time: 08:00  Bolus Feed Rate (mL per Hour): 240   Bolus Feed Duration (in Hours): 1  Bolus Feed Instructions:  Please give bolus TF slowly over 1 hr @ 8a 12p 4p 8p  Free Water Flush  Bolus   Total Volume per Flush (mL): 200   Frequency: Every 6 Hours    Start Time: 09:00  Free Water Flush Instructions:  Please give free H2O 1 hr after each feed  Entered: Jim 10 2021  9:57AM    
This patient has been assessed with a concern for Malnutrition and has been determined to have a diagnosis/diagnoses of Moderate protein-calorie malnutrition.    This patient is being managed with:   Diet NPO with Tube Feed-  Tube Feeding Modality: Gastrostomy  Nepro with Carb Steady  Total Volume for 24 Hours (mL): 960  Bolus  Total Volume of Bolus (mL):  240  Total # of Feeds: 4  Tube Feed Frequency: Every 6 hours   Tube Feed Start Time: 08:00  Bolus Feed Rate (mL per Hour): 240   Bolus Feed Duration (in Hours): 1  Bolus Feed Instructions:  Please give bolus TF slowly over 1 hr @ 8a 12p 4p 8p  Free Water Flush  Bolus   Total Volume per Flush (mL): 200   Frequency: Every 6 Hours    Start Time: 09:00  Free Water Flush Instructions:  Please give free H2O 1 hr after each feed  Entered: Jim 10 2021  9:57AM    Diet NPO with Tube Feed-  Tube Feeding Modality: Gastrostomy  Nepro with Carb Steady  Total Volume for 24 Hours (mL): 1200  Bolus  Total Volume of Bolus (mL):  300  Total # of Feeds: 4  Tube Feed Frequency: Every 6 hours   Tube Feed Start Time: 16:00  Bolus Feed Rate (mL per Hour): 300   Bolus Feed Duration (in Hours): 1  Bolus Feed Instructions:  Please give 300 ml formula over 1 hour at 7 AM 12 PM 4 PM and 8 PM  Bolus   Total Volume per Flush (mL): 200   Frequency: Every 6 Hours  Free Water Flush Instructions:  Please give 200 ml free water 1 hour before and after feeding  Entered: Jun 9 2021 12:47PM    The following pending diet order is being considered for treatment of Moderate protein-calorie malnutrition:null

## 2021-06-23 NOTE — PROGRESS NOTE ADULT - ASSESSMENT
61yo male with PMH of DM2 who was initially admitted to [facility] ICU for hypoxic respiratory failure 2/2 COVID c/b PE with Right heart strain, cardiogenic and septic shock, ischemic and hemorrhagic CVA and ESBL klebsiella ventilator associate PNA, new strokes, s/p PEG. Course c/b right empyema s/p VATs and obturator bleed (resolved). Now admitted for functional decline.     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)  #Normocytic Anemia  - Renal function improved s/p discontinuation of bactrim    #Obturator Hematoma (stable)  - Stable, H/H stable    #Pulmonary embolus  - c/w full dose A/C - now on Eliquis    #Bilateral ischemic strokes with associated hemorrhages   -PT/OT/SLP  -statin  -PEG since removed  -seizure ppx     #Diabetes mellitus type II  A1C with Estimated Average Glucose Result: 5.7 % (06-09-21 @ 06:34)  POCT Blood Glucose.: 97 mg/dL (23 Jun 2021 08:30)  POCT Blood Glucose.: 133 mg/dL (22 Jun 2021 22:04)  POCT Blood Glucose.: 121 mg/dL (22 Jun 2021 17:05)  POCT Blood Glucose.: 222 mg/dL (22 Jun 2021 11:34)  -Metformin      #DVT ppx: Eliquis     Medically stable for transition to [facility]

## 2021-06-23 NOTE — PROGRESS NOTE ADULT - ASSESSMENT
61yo male with PMH of DM, hypoxic respiratory failure 2/2 COVID complicated by PE with R heart strain, cardiogenic and septic shock, ischemic and hemorrhagic CVA and ESBL klebsiella ventilator associate PNA, transferred to [facility] for neurosurgery eval and further management. s/p PEG. Course c/b right empyema s/p VATs and obturator bleed    # bilateral ischemic strokes, L Pica occlusion, hemorrhagic conversion  - Head CT 6/14: Subacute/chronic infarction of the LEFT cerebellum and chronic infarctions in the BILATERAL frontal, parietal and occipital lobe  - cleared to resume AC per Neuro. Tolerating well. Neurology follow up outpt in 1 week.  - continue lipitor  - completed comprehensive rehab program PT, OT< SLP 3 hours daily 5 x week    # Hx Altered mental status  - improved  - EEG neg seizure. On Keppra 500 mg BID PPX  - neuro f/u outpatient     # ESBL Klebsiella PNA c/b empyema   - s/p right VATS converted to open L thoracotomy, decortication, drainage of abscess and flex bronch.  -chest tubes removed by 5/18  - CT Chest 6/8 improved. No thoracic surgery intervention indicated per Dr. Albarado.    #Obturator Hematoma   - bleeding hematoma in right thigh on CT on 5/7, CT hip showing non active bleed/hematoma on right thigh on 5/8  - cleared for AC by surgery and neuro  -  Eliquis started 6/22. Discussed with patient via   - CBC am 6/23 stable    # Pulmonary embolus  - Etiology of venous thromboembolism uncertain at this time; ?provoked acute covid illness   - AC held due to development right obturator and right chest wall hematoma, now improved.   - Cleared by neuro and surgery for Eliquis to begin 6/22      # Diabetes mellitus  - Type II diabetes mellitus noted; a1c>10, now improved  - hold Lantus for episodes of hypoglycemia. Admelog scale continues. Diabetic diet/PO diet.  On Metformin now.  - Follow up endo outpatient     #SUKI  -resolved  - BUn/Cr 17/1.01 6/21  #Peripheral vascular disease  -Dry gangrene noted over distal toes on bilateral lower extremities  -ERASMO demonstrating right ERASMO: 1.09, left ERASMO: 1.24, right TBI: 0.78, and left TBI: 0.73.  -Podiatry recs appreciated, betadine to wound    #Urinary Retention   - Mann removed 5/30  - voiding freely, low PVRs  - hold doxazosin for low BPs    #Dysphagia  - PEG tube placed on 4/30, removed 6/21, site healing well. Tolerating PO.  - GI appreciated  - cleared to resume full po diet    #/Bladder Mgmt   - Continent, PVR low    #Case discussed in IDT rounds 6/21:  - set up eating/grooming, set up UB dressing, CG LB, min assist transfers, ambulates with cues for foot placement and safety  - target dc [facility] 6/23 if medically stable on eliquis    # LABS  -cbc stable   - dc [facility] today   59yo male with PMH of DM, hypoxic respiratory failure 2/2 COVID complicated by PE with R heart strain, cardiogenic and septic shock, ischemic and hemorrhagic CVA and ESBL klebsiella ventilator associate PNA, transferred to [facility] for neurosurgery eval and further management. s/p PEG. Course c/b right empyema s/p VATs and obturator bleed    # bilateral ischemic strokes, L Pica occlusion, hemorrhagic conversion  - Head CT 6/14: Subacute/chronic infarction of the LEFT cerebellum and chronic infarctions in the BILATERAL frontal, parietal and occipital lobe  - cleared to resume AC per Neuro. Tolerating well. Neurology follow up outpt in 1 week.  - continue lipitor  - completed comprehensive rehab program PT, OT< SLP 3 hours daily 5 x week    # Hx Altered mental status  - improved  - EEG neg seizure. On Keppra 500 mg BID PPX  - neuro f/u outpatient     # ESBL Klebsiella PNA c/b empyema   - s/p right VATS converted to open L thoracotomy, decortication, drainage of abscess and flex bronch.  - chest tubes removed by 5/18  - CT Chest 6/8 improved. No thoracic surgery intervention indicated per Dr. Albarado.    #Obturator Hematoma   - bleeding hematoma in right thigh on CT on 5/7, CT hip showing non active bleed/hematoma on right thigh on 5/8  - cleared for AC by surgery and neuro  -  Eliquis started 6/22. Discussed with patient via   - CBC am 6/23 stable, Hgb 12, vitals stable    # Pulmonary embolus  - Etiology of venous thromboembolism uncertain at this time; ?provoked acute covid illness   - AC held due to development right obturator and right chest wall hematoma, now improved.   - Cleared by neuro and surgery for Eliquis to begin 6/22    # Diabetes mellitus  - Type II diabetes mellitus noted; a1c>10, now improved  - continue metformin  - Follow up endo outpatient     #SUKI  - resolved  - BUn/Cr 17/1.01 6/21  #Peripheral vascular disease  -Dry gangrene noted over distal toes on bilateral lower extremities  -ERASMO demonstrating right ERASMO: 1.09, left ERASMO: 1.24, right TBI: 0.78, and left TBI: 0.73.  -Podiatry recs appreciated, betadine to wound    #Urinary Retention   - Mann removed 5/30  - voiding freely, low PVRs  - hold doxazosin for low BPs    #Dysphagia  - PEG tube placed on 4/30, removed 6/21, site healing well. Tolerating PO.  - GI appreciated  - cleared to resume full po diet    #/Bladder Mgmt   - Continent, PVR low    #Case discussed in IDT rounds 6/21:  - set up eating/grooming, set up UB dressing, CG LB, min assist transfers, ambulates with cues for foot placement and safety  - target dc [facility] 6/23 if medically stable on eliquis    # LABS  - cbc stable   - dc [facility] today

## 2021-06-23 NOTE — PROGRESS NOTE ADULT - PROBLEM SELECTOR PLAN 3
-Low-grade temperature elevations noted over past few days in micu  -For that reason, blood and sputum cultures drawn on 4/13; sputum cultures growing esbl klebsiella   -For that reason, managing patient at this time with meropenem 1 g q8h for treatment of ventilator-associated pneumonia (dqay 3/7 of antibiotic therapy)   -Maintaining oxygen saturations greater than 95% on room air at this time yes -Low-grade temperature elevations noted over past few days in micu  -For that reason, blood and sputum cultures drawn on 4/13; sputum cultures growing esbl klebsiella   -For that reason, managing patient at this time with meropenem 1 g q8h for treatment of ventilator-associated pneumonia (day 4/7 of antibiotic therapy)   -Maintaining oxygen saturations greater than 95% on room air at this time

## 2021-06-23 NOTE — PROGRESS NOTE ADULT - SUBJECTIVE AND OBJECTIVE BOX
Subjective: Night uneventful, NAD, offers no complaints. Dc today to [facility], [place].      HISTORY OF PRESENT ILLNESS  61yo M with T2DM and recently diagnosed COVID (3/19) BIBEMS to [facility] on 4/1 for hypoxic respiratory failure secondary to COVID PNA, requiring endotracheal intubation (4/1-4/13). Course significant for Septic shock on admission, treated with vancomycin and cefepime. Found to have ESBL Klebsiella PNA c/b empyema s/p VATs decortication. Treated with prolonged course of Meropenem while hospitalized and transitioned to Augmentin/Bactrim upon discharge (until 6/8). Will follow up with ID as outpatient.     Patient had acute ischemic CVA with areas of hemorrhagic transformation. Found to have multi-focal acute-to-subacute infarctions. 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 Subsequent scans showed hemorrhagic conversion. Transferred to [facility] for Neurosurgery evaluation, no surgical intervention at this time. Will require outpatient follow up.     Course complicated by RITO segmental PEs with e/o RH strain: Provoked in the setting of sepsis/high inflammatory state with COVID infection. LE Dopplers negative x 2. Initially anticoagulated with heparin gtt. Course further complicated by a right chest wall and right obturator hematomas. Due to concern for worsening hemorrhagic conversion of stroke and hematomas, the patient's therapeutic anticoagulation was discontinued. He was evaluated by the neurosurgical team, which said that there was no indication for acute intervention. Patient will not be anticoagulated upon discharge.   Patient's AMS likely in the setting of acute strokes, improved throughout hospitalization. Due to prolonged intubation with subsequent AMS and dysphagia, pt had PEG tube placed. Pt failed MBS following improvement of mental status.     Patient also had bedside debridement and woundcare of her BLE digits and will follow up with Podiatry as an outpatient.    Medically cleared for discharge to  [facility] 6[facility].          Review of Systems:   · Additional ROS	Patient pleasant, sitting in chair, NAD. PEG removed by GI. tolerated liquid diet last night, no N.V or diarrhea. Will be upgraded to regular diet again today, patient agreeable    Seen with assistance [place]  [employer] #[id number]    Physical Exam:   · Constitutional	detailed exam  · Constitutional Comments	alert, no distress, stable  · Respiratory	detailed exam  · Respiratory Details	respirations non-labored; clear to auscultation bilaterally  · Cardiovascular	detailed exam  · Cardiovascular Details	regular rate and rhythm   · Gastrointestinal	detailed exam  · GI Normal	soft; nontender; bowel sounds normal;     PEG stoma healing well, no oozing, no bleeding, no redness or TTP  · Extremities	detailed exam   · Extremities Comments	calves soft, NT no erythema or warmth bilaterally  	        VITALS  Vital Signs Last 24 Hrs  T(C): 36.6 (22 Jun 2021 19:19), Max: 36.6 (22 Jun 2021 09:37)  T(F): 97.9 (22 Jun 2021 19:19), Max: 97.9 (22 Jun 2021 19:19)  HR: 84 (22 Jun 2021 19:19) (84 - 100)  BP: 135/70 (22 Jun 2021 19:19) (112/72 - 135/70)  BP(mean): --  RR: 16 (22 Jun 2021 19:19) (16 - 18)  SpO2: 97% (22 Jun 2021 19:19) (97% - 97%)    RECENT LABS                        12.0   8.95  )-----------( 346      ( 23 Jun 2021 06:35 )             38.1           RADIOLOGY/OTHER RESULTS      CURRENT MEDICATIONS  MEDICATIONS  (STANDING):  acetaminophen   Tablet .. 650 milliGRAM(s) Oral every 6 hours  apixaban 10 milliGRAM(s) Oral every 12 hours  AQUAPHOR (petrolatum Ointment) 1 Application(s) Topical three times a day  atorvastatin 40 milliGRAM(s) Oral at bedtime  cadexomer iodine 0.9% Gel 1 Application(s) Topical daily  dextrose 40% Gel 15 Gram(s) Oral once  dextrose 5%. 1000 milliLiter(s) (50 mL/Hr) IV Continuous <Continuous>  dextrose 5%. 1000 milliLiter(s) (100 mL/Hr) IV Continuous <Continuous>  dextrose 50% Injectable 25 Gram(s) IV Push once  dextrose 50% Injectable 12.5 Gram(s) IV Push once  dextrose 50% Injectable 25 Gram(s) IV Push once  glucagon  Injectable 1 milliGRAM(s) IntraMuscular once  insulin lispro (ADMELOG) corrective regimen sliding scale   SubCutaneous Before meals and at bedtime  levETIRAcetam  Solution 500 milliGRAM(s) Oral two times a day  metFORMIN 500 milliGRAM(s) Oral two times a day  pantoprazole   Suspension 40 milliGRAM(s) Oral daily    MEDICATIONS  (PRN):  aluminum hydroxide/magnesium hydroxide/simethicone Suspension 30 milliLiter(s) Oral every 4 hours PRN Dyspepsia  ondansetron   Disintegrating Tablet 4 milliGRAM(s) Oral every 6 hours PRN Nausea and/or Vomiting      ASSESSMENT & PLAN    Completed comprehensive acute rehab program consisting of 3hrs/day of OT/PT and SLP. Major ARLETH today. Subjective: Night uneventful, NAD, offers no complaints. Dc today to [facility].      HISTORY OF PRESENT ILLNESS  61yo M with T2DM and recently diagnosed COVID (3/19) BIBEMS to [facility] on 4/1 for hypoxic respiratory failure secondary to COVID PNA, requiring endotracheal intubation (4/1-4/13). Course significant for Septic shock on admission, treated with vancomycin and cefepime. Found to have ESBL Klebsiella PNA c/b empyema s/p VATs decortication. Treated with prolonged course of Meropenem while hospitalized and transitioned to Augmentin/Bactrim upon discharge (until 6/8). Will follow up with ID as outpatient.     Patient had acute ischemic CVA with areas of hemorrhagic transformation. Found to have multi-focal acute-to-subacute infarctions. 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 Subsequent scans showed hemorrhagic conversion. Transferred to [facility] for Neurosurgery evaluation, no surgical intervention at this time. Will require outpatient follow up.     Course complicated by RITO segmental PEs with e/o RH strain: Provoked in the setting of sepsis/high inflammatory state with COVID infection. LE Dopplers negative x 2. Initially anticoagulated with heparin gtt. Course further complicated by a right chest wall and right obturator hematomas. Due to concern for worsening hemorrhagic conversion of stroke and hematomas, the patient's therapeutic anticoagulation was discontinued. He was evaluated by the neurosurgical team, which said that there was no indication for acute intervention. Patient will not be anticoagulated upon discharge.   Patient's AMS likely in the setting of acute strokes, improved throughout hospitalization. Due to prolonged intubation with subsequent AMS and dysphagia, pt had PEG tube placed. Pt failed MBS following improvement of mental status.     Patient also had bedside debridement and woundcare of her BLE digits and will follow up with Podiatry as an outpatient.    Medically cleared for discharge to  [facility] 6[facility].          Review of Systems:   · Additional ROS	Patient doing well, tolerated regular diet yesterday without N/V, diarrhea, abdominal pain or bloating    Dressing removed and stoma evaluated, some sanguinous drainage on dressing, mild but no oozing stoma    Patient aware and agreeable for dc to [facility] today    Physical Exam:   · Constitutional	detailed exam  · Constitutional Comments	alert, no distress, stable  pleasant, grossly Ox  3 and follows 1-2 step commands consistently  · Respiratory	detailed exam  · Respiratory Details	respirations non-labored; clear to auscultation bilaterally  · Cardiovascular	detailed exam  · Cardiovascular Details	regular rate and rhythm   · Gastrointestinal	detailed exam  · GI Normal	soft; nontender; bowel sounds normal;     PEG stoma healing well, no oozing, no active bleeding, no redness or TTP  Dressing applied  · Extremities	detailed exam   · Extremities Comments	calves soft, NT no erythema or warmth bilaterally  	        VITALS  Vital Signs Last 24 Hrs  T(C): 36.6 (22 Jun 2021 19:19), Max: 36.6 (22 Jun 2021 09:37)  T(F): 97.9 (22 Jun 2021 19:19), Max: 97.9 (22 Jun 2021 19:19)  HR: 84 (22 Jun 2021 19:19) (84 - 100)  BP: 135/70 (22 Jun 2021 19:19) (112/72 - 135/70)  BP(mean): --  RR: 16 (22 Jun 2021 19:19) (16 - 18)  SpO2: 97% (22 Jun 2021 19:19) (97% - 97%)    RECENT LABS                        12.0   8.95  )-----------( 346      ( 23 Jun 2021 06:35 )             38.1           RADIOLOGY/OTHER RESULTS      CURRENT MEDICATIONS  MEDICATIONS  (STANDING):  acetaminophen   Tablet .. 650 milliGRAM(s) Oral every 6 hours  apixaban 10 milliGRAM(s) Oral every 12 hours  AQUAPHOR (petrolatum Ointment) 1 Application(s) Topical three times a day  atorvastatin 40 milliGRAM(s) Oral at bedtime  cadexomer iodine 0.9% Gel 1 Application(s) Topical daily  dextrose 40% Gel 15 Gram(s) Oral once  dextrose 5%. 1000 milliLiter(s) (50 mL/Hr) IV Continuous <Continuous>  dextrose 5%. 1000 milliLiter(s) (100 mL/Hr) IV Continuous <Continuous>  dextrose 50% Injectable 25 Gram(s) IV Push once  dextrose 50% Injectable 12.5 Gram(s) IV Push once  dextrose 50% Injectable 25 Gram(s) IV Push once  glucagon  Injectable 1 milliGRAM(s) IntraMuscular once  insulin lispro (ADMELOG) corrective regimen sliding scale   SubCutaneous Before meals and at bedtime  levETIRAcetam  Solution 500 milliGRAM(s) Oral two times a day  metFORMIN 500 milliGRAM(s) Oral two times a day  pantoprazole   Suspension 40 milliGRAM(s) Oral daily    MEDICATIONS  (PRN):  aluminum hydroxide/magnesium hydroxide/simethicone Suspension 30 milliLiter(s) Oral every 4 hours PRN Dyspepsia  ondansetron   Disintegrating Tablet 4 milliGRAM(s) Oral every 6 hours PRN Nausea and/or Vomiting      ASSESSMENT & PLAN    Completed comprehensive acute rehab program consisting of 3hrs/day of OT/PT and SLP. Major ARLETH today.

## 2021-06-23 NOTE — DISCHARGE NOTE NURSING/CASE MANAGEMENT/SOCIAL WORK - PATIENT PORTAL LINK FT
You can access the FollowMyHealth Patient Portal offered by [facility] by registering at the following website: [email or web address]. By joining [facility]’s FollowMyHealth portal, you will also be able to view your health information using other applications (apps) compatible with our system.

## 2021-06-23 NOTE — PROGRESS NOTE ADULT - NSICDXPILOT_GEN_ALL_CORE
[place]

## 2021-06-23 NOTE — PROGRESS NOTE ADULT - ATTENDING COMMENTS
Patient doing well, can tolerate rehab.  SUKI-> will change bactrim    nausea/vomiting
Doing well, RAJESH today with results below      No evidence of ASD, PFO or intracardiac thrombus.  Normal LV function, mild aortic root enlargement
Patient is stable to continue rehab
Patient seen and examined. Stable for rehab   Stroke work up in progress. Pending RAJESH
Pt is doing well, progressing in rehab  Plan for GI to pull PEG
Agree with above.  Cleared for anticoagulation by cardiology
Patient is stable for rehab. Surgery note clears for anticoagulation. Will get clearance from Neurology
Progress note amended to include my discussions with the patient, NP, hospitalist, RN, SW, PT, and my findings. Case discussed with patient via [place] , including advancement diet, evaluation PEG site, plan to start eliquis, monitoring for signs bleeding, and goal of ARLETH when stable, possibly tomorrow. Patient verbalized understanding and agreement
Progress note amended to include my discussions with the patient, NP, hospitalist, RN, SW, PT, and my findings. Patient stable, tolerating po diet, including regular solids. Stoma evaluated, healing, mild sanguinous drainage on dressing but no oozing from stoma this morning. Surrounding skin without erythema, fluctuance or induration, NT and benign exam. Started on eliquis yesterday, vitals and H/h stable today with no signs active bleeding. Medically cleared for [facility] transfer today, patient aware and agreeable, to continue monitoring vitals and CBC at [facility] given restart eliquis. Time spent for evaluation and coordinating dc > 30 min
Patient is doing well, no acute complaints today. No n/v.   On CT head yesterday he was noted to have a new stroke on CT. Discussed with Dr. Anderson, she wants to get another head CT tomorrow. She wants to hold off on anticoagulation right now.
Patient seen and examined.  Agree with assessment and plan as above.    He no longer is using gastrostomy tube.  No abdominal pain.  He has been NPO since midnight.    VSS.  Abdomen soft, nontender BS.  G tube site appears clean.    Gastrostomy tube removed bedside without complication  Resume feeds, anticoagulation and antiplatelet agents later today
Progress note amended to include my discussions with the patient, NP, hospitalist, RN, SW, PT, and my findings. Labs reviewed, PEG removed this afternoon without complication, GI note appreciated, cleared to begin clear liquid diet at this time with advancement tomorrow if stable overnight. Will also begin eliquis tomorrow if stable.

## 2021-06-23 NOTE — DISCHARGE NOTE NURSING/CASE MANAGEMENT/SOCIAL WORK - NSDCVIVACCINE_GEN_ALL_CORE_FT
COVID-19, mRNA, LNP-S, PF, 30 mcg/0.3 mL dose (Pfizer); 04-Jun-2021 14:59; Mariano Castro (NICK); Pf[employer] Inc; [id number] (Exp. Date: 31-Jul-2021); IntraMuscular; Deltoid Left.; 0.3 milliLiter(s);

## 2021-06-23 NOTE — DISCHARGE NOTE NURSING/CASE MANAGEMENT/SOCIAL WORK - NSDCFUADDAPPT_GEN_ALL_CORE_FT
[facility]  Pulmonology  [address], [address]  [place] [address]  Phone: [phone]  Fax:   Follow Up Time: 1 month    [facility]  Neurology  [address] [place] [id number]  Phone: [phone]  Fax:   Follow Up Time: 1 month    Dr Colin Todd  Podiatry in 1 week  [phone]

## 2021-06-23 NOTE — PROGRESS NOTE ADULT - REASON FOR ADMISSION
CVA
s/p CVA
CVA

## 2021-06-23 NOTE — DISCHARGE NOTE NURSING/CASE MANAGEMENT/SOCIAL WORK - NSPROEXTENSIONSOFSELF_GEN_A_NUR
eyeglasses
You can access the FollowMyHealth Patient Portal offered by [facility] by registering at the following website: [email or web address]. By joining [employer]’s FollowMyHealth portal, you will also be able to view your health information using other applications (apps) compatible with our system.

## 2021-07-03 LAB
CULTURE RESULTS: SIGNIFICANT CHANGE UP
SPECIMEN SOURCE: SIGNIFICANT CHANGE UP

## 2021-07-16 NOTE — DISCHARGE NOTE PROVIDER - NS AS DC PROVIDER CONTACT Y/N MULTI
Well-controlled, continue current medications, medication adherence emphasized and lifestyle modifications recommended Yes

## 2022-10-13 NOTE — PROGRESS NOTE ADULT - PROBLEM SELECTOR PLAN 1
Prior Authorization Retail Medication Request    Medication/Dose: orlistat (XENICAL) 120 MG capsule  ICD code (if different than what is on RX):  R63.5  Previously Tried and Failed:  na  Rationale:  na    Insurance Name:  [facility]   Insurance ID:  [id number]      Pharmacy Information (if different than what is on RX)  Name:  [facility]   Phone:  [phone]     OR Vats 5/12 with Dr. Albarado   NPO   Negative covid pcr   T&S x2 -done   Hold Hep gtt

## 2023-06-28 NOTE — PROGRESS NOTE ADULT - PROBLEM SELECTOR PROBLEM 9
An attempt was made to contact the patient to schedule their follow up appointment with Dr March.   Per the follow up and disposition report, the patient is due for Return in about 3 months (around 6/22/2023)..     Result was left message.   Prophylactic measure

## 2023-08-22 NOTE — PROGRESS NOTE ADULT - PROBLEM SELECTOR PLAN 10
-Full-dose anticoagulation for dvt prophylaxis as above   - Will be reevaluated from speech and swallow  -Full code at this time No

## 2024-05-13 NOTE — PROGRESS NOTE ADULT - PROBLEM SELECTOR PLAN 8
3 feet with necrosis  - podiatry recs appreciated   - c/w wound care  - no active signs of infxn    Leukocytosis  - ID recs appreciated    - Will monitor off abx   - CXR with RLL opacity, presently no clinical signs pna  - O2 supplementation as necessary   - BCx in lab, f/u results   - UA positive, pending UCx  - CT chest, a/p for infection workup -Dry gangrene noted over distal toes on bilateral lower extremities--most concerning for microvascular disease subsequent to vasopressor administration   -Dorsalis pedis pulses intact bilaterally  - ERASMO demonstrating right ERASMO: 1.09, left ERASMO: 1.24, right TBI: 0.78, and left TBI: 0.73.   Significant lower extremity arterial disease not identified. Decreased amplitude to the pulse volume recordings at the levels of the toes bilaterally.  - Podiatry recs appreciated, no acute surgical intervention at this time, applying betadine to wound  - Wound care recs appreciated    feet with necrosis  - podiatry recs appreciated   - c/w wound care  - no active signs of infxn    Leukocytosis  - ID recs appreciated    - Will monitor off abx   - CXR with RLL opacity, presently no clinical signs pna  - O2 supplementation as necessary   - BCx in lab, f/u results   - UA positive, pending UCx  - CT chest, a/p for infection workup No

## 2024-07-16 NOTE — OCCUPATIONAL THERAPY INITIAL EVALUATION ADULT - AMBULATORY DEVICES NEEDED
[facility]'s SHAYLA in the cafe for cash to pay for ride.  Nursing is aware.  Pt will have dialysis today per his usual schedule of T/TH/Sa.    1511  Message sent to MD requesting orders for [facility] has ww, cane                 no

## 2024-08-07 NOTE — PROGRESS NOTE ADULT - PROBLEM SELECTOR PROBLEM 3
Jackelin, patient's POA called to make appointment for Adrienne based on referral from PCP. Patient is scheduled for 8/21 with Dr. Wilder. I let Jackelin know we will need a copy of MRI disc to download.    Stroke
